# Patient Record
Sex: FEMALE | Race: BLACK OR AFRICAN AMERICAN | Employment: UNEMPLOYED | URBAN - METROPOLITAN AREA
[De-identification: names, ages, dates, MRNs, and addresses within clinical notes are randomized per-mention and may not be internally consistent; named-entity substitution may affect disease eponyms.]

---

## 2017-01-13 ENCOUNTER — OFFICE VISIT (OUTPATIENT)
Dept: ENDOCRINOLOGY | Facility: CLINIC | Age: 52
End: 2017-01-13

## 2017-01-13 VITALS
HEART RATE: 85 BPM | WEIGHT: 158.6 LBS | SYSTOLIC BLOOD PRESSURE: 117 MMHG | DIASTOLIC BLOOD PRESSURE: 72 MMHG | HEIGHT: 69 IN | BODY MASS INDEX: 23.49 KG/M2

## 2017-01-13 DIAGNOSIS — E11.40 TYPE 2 DIABETES MELLITUS WITH DIABETIC NEUROPATHY, WITHOUT LONG-TERM CURRENT USE OF INSULIN (H): Chronic | ICD-10-CM

## 2017-01-13 DIAGNOSIS — E04.9 GOITER: Primary | ICD-10-CM

## 2017-01-13 DIAGNOSIS — E04.9 GOITER: ICD-10-CM

## 2017-01-13 LAB
HBA1C MFR BLD: 9.3 % (ref 4.3–6)
T4 FREE SERPL-MCNC: 1.06 NG/DL (ref 0.76–1.46)
TSH SERPL DL<=0.05 MIU/L-ACNC: 0.32 MU/L (ref 0.4–4)

## 2017-01-13 ASSESSMENT — PAIN SCALES - GENERAL: PAINLEVEL: NO PAIN (0)
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
## 2017-01-13 NOTE — NURSING NOTE
Chief Complaint   Patient presents with     RECHECK     return diabetes      Esperanza Bañuelos CMA
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
## 2017-01-16 LAB — THYROPEROXIDASE AB SERPL-ACNC: 11 IU/ML

## 2017-02-03 ENCOUNTER — OFFICE VISIT (OUTPATIENT)
Dept: ENDOCRINOLOGY | Facility: CLINIC | Age: 52
End: 2017-02-03

## 2017-02-03 VITALS
WEIGHT: 157.7 LBS | DIASTOLIC BLOOD PRESSURE: 80 MMHG | HEIGHT: 69 IN | HEART RATE: 115 BPM | BODY MASS INDEX: 23.36 KG/M2 | SYSTOLIC BLOOD PRESSURE: 117 MMHG

## 2017-02-03 DIAGNOSIS — E04.9 GOITER: Primary | ICD-10-CM

## 2017-02-03 ASSESSMENT — ENCOUNTER SYMPTOMS
LOSS OF CONSCIOUSNESS: 0
HYPERTENSION: 0
SYNCOPE: 0
HOARSE VOICE: 0
HEADACHES: 1
FATIGUE: 1
WEIGHT GAIN: 1
TINGLING: 0
LEG SWELLING: 1
PARALYSIS: 0
HYPOTENSION: 0
INCREASED ENERGY: 1
SINUS CONGESTION: 0
EXERCISE INTOLERANCE: 0
CHILLS: 0
SINUS PAIN: 0
TREMORS: 0
HALLUCINATIONS: 0
ARTHRALGIAS: 1
TROUBLE SWALLOWING: 1
NIGHT SWEATS: 1
DIZZINESS: 0
WEAKNESS: 0
POLYPHAGIA: 0
MYALGIAS: 1
DECREASED APPETITE: 0
CLAUDICATION: 0
TASTE DISTURBANCE: 0
WEIGHT LOSS: 0
MUSCLE CRAMPS: 1
SEIZURES: 0
FEVER: 0
JOINT SWELLING: 0
MEMORY LOSS: 0
SLEEP DISTURBANCES DUE TO BREATHING: 0
ALTERED TEMPERATURE REGULATION: 1
NECK MASS: 0
MUSCLE WEAKNESS: 1
NUMBNESS: 1
SPEECH CHANGE: 0
DISTURBANCES IN COORDINATION: 0
POOR WOUND HEALING: 1
NECK PAIN: 1
NAIL CHANGES: 0
SORE THROAT: 0
TACHYCARDIA: 1
SKIN CHANGES: 0
BACK PAIN: 1
PALPITATIONS: 0
POLYDIPSIA: 1
LIGHT-HEADEDNESS: 1
SMELL DISTURBANCE: 0
STIFFNESS: 1
ORTHOPNEA: 1
LEG PAIN: 1

## 2017-02-03 ASSESSMENT — PAIN SCALES - GENERAL: PAINLEVEL: NO PAIN (0)

## 2017-02-03 NOTE — PROGRESS NOTES
Endocrinology Note         Alessandra is a 51 year old female presents today for thyroid goiter.    HPI  Alessandra Pak is a pleasant 51 years old female with hx of secondary diabetes after Whipple operative for pancreatitis and is insulin dependence who is here for thyroid goiter    She usually sees ADELINE Jeffries for secondary diabetes. Last seen by Milady in January 2017.    Alessandra stated that she was noted to have enlarged thyroid for long time after her doctor examined her neck and noted for goiter. She did have US thyroid done in 6/2010 which showed an enlarged gland. The right lobe measures 6.9 x 2.6 x 1.6 cm. The left lobe measures 7.1 x 2.3 x 1.6 cm.  Thyroid parenchyma is homogeneous in echotexture. No distinct nodules. She has not had any serial ultrasound.     Over the past few weeks, she noticed more enlargement of the neck. She also has trouble swallowing on food. She denied choking or difficulty breathing when laying down. Reviewed lab, she did have mild suppressed TSH ranged 0.24-0.82 with normal FT4. TSI in 2010 was negative. TPO in 2-17 was negative. She denied radiation exposure to head and neck. She has not been on Amiodarone. She denied recent exposure to iodine. She is not currently on Biotin or supplement containing iodine or thyroid hormone. Brother has hx of hypothyroidism.    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     leep     Human papillomavirus in conditions classified elsewhere and of unspecified site 2/2012     + HPV 33     ASCUS with positive high risk HPV 8/2013     + HPV 33, [place] - GAVIN I, ECC- atypia     Depressive disorder      Gastro-oesophageal reflux disease      Type 2 diabetes mellitus without complications (H)      Hypertension       Uncomplicated asthma      Cardiomyopathy (H)      non ischemic cardiomyopathy with EF 15     Tobacco abuse 5/18/2013     Systolic CHF (H) 3/12/2015     Cervical high risk HPV (human papillomavirus) test positive 7/8/15, 7/25/16     NIL pap/+ HR HPV (not 16 or 18).        Allergies  Allergies   Allergen Reactions     No Known Drug Allergies      Medications  Current Outpatient Prescriptions   Medication Sig Dispense Refill     lidocaine (LIDODERM) 5 % Patch APPLY UP TO 3 PATCHES TO PAINFUL AREA AT ONCE FOR UP TO 12 HOURS WITHIN A 24 HOUR PERIOD.  REMOVE AFTER 12 HOURS 30 patch 1     SM NICOTINE 21 MG/24HR 24 hr patch REMOVE OLD PATCH AND APPLY ONE NEW PATCH TO SKIN EVERY 24 HOURS 28 patch 1     furosemide (LASIX) 20 MG tablet TAKE ONE TABLET BY MOUTH EVERY DAY 90 tablet 1     insulin aspart (NOVOLOG VIAL) 100 UNITS/ML VIAL Use as directed in insulin pump. Patient using up to 60 units per day 3 Month 3     blood glucose monitoring (HOLLIE CONTOUR NEXT) test strip Use to test blood sugar 10 times daily or as directed.  Ok to substitute alternative if insurance prefers. 300 each 12     blood glucose monitoring (HOLLIE MICROLET) lancets Use to test blood sugar 10 times daily or as directed.  Ok to substitute alternative if insurance prefers. 1 Box prn     acetone, Urine, test STRP 1 strip by In Vitro route as needed 25 each 1     pregabalin (LYRICA) 75 MG capsule Take 1 capsule (75 mg) by mouth 3 times daily 90 capsule 5     lisinopril (PRINIVIL,ZESTRIL) 10 MG tablet Take 1 tablet (10 mg) by mouth daily 90 tablet 1     amitriptyline (ELAVIL) 50 MG tablet Take 1 tablet (50 mg) by mouth At Bedtime 90 tablet 1     Ipratropium-Albuterol (COMBIVENT RESPIMAT)  MCG/ACT inhaler Inhale 1 puff into the lungs 4 times daily Not to exceed 6 doses per day. 1 Inhaler 6     ranitidine HCl 300 MG CAPS Take 300 mg by mouth daily 90 capsule 3     multivitamin, therapeutic with minerals (THERA-VIT-M) TABS Take 1 tablet by mouth daily 30  each 11     metoprolol (LOPRESSOR) 50 MG tablet Take 25 mg by mouth daily       study - lidocaine, LMX, (IDS# 4490) 4 % CREA Apply topically every 8 hours as needed for moderate pain 45 g 11     albuterol (ALBUTEROL) 108 (90 BASE) MCG/ACT inhaler Inhale 2 puffs into the lungs every 4 hours as needed for shortness of breath / dyspnea 1 Inhaler 5     polyethylene glycol (MIRALAX/GLYCOLAX) packet Take 17 g by mouth daily 28 packet 3     insulin pen needle (B-D U/F) 31G X 5 MM Use 3 time(s) a day. 3 each 3     blood glucose monitoring (FREESTYLE) lancets 1 each See Admin Instructions test 3-4 times per day 3 Box 3     glucose 4 G CHEW Take 3-4 tablets to treat low blood sugar. 25 tablet 11     methadone (DOLPHINE-INTENSOL) 10 mg/mL CONC Take 7 mLs by mouth daily.       MIRENA 20 MCG/24HR IU IUD placed today 1 0     ASPIRIN 81 MG OR TABS 1 tab po QD (Once per day) 100 3     glucagon (GLUCAGON EMERGENCY) 1 MG injection Inject 1 mg into the muscle once for 1 dose 1 mg 1     Family History  family history includes Anxiety Disorder in her maternal aunt; Arthritis in her mother; Bipolar Disorder in her brother; DIABETES in her father and mother; Depression in her maternal aunt; GASTROINTESTINAL DISEASE in her father; Hypertension in her father and mother; Lipids in her mother; Obesity in an other family member; Respiratory in an other family member; Thyroid Disease in her brother.  Social History  Social History   Substance Use Topics     Smoking status: Former Smoker -- 0.30 packs/day for 15 years     Types: Cigarettes     Smokeless tobacco: Former User     Quit date: 10/29/2014      Comment: Started smoking in 89/ smokes about 3 per day     Alcohol Use: No       ROS  Constitutional: no weight change, good energy  Eyes: no vision change, diplopia or red eyes   Neck: + difficulty swallowing, no choking, no neck pain, + neck swelling  Cardiovascular: no chest pain, palpitations  Respiratory: no dyspnea, cough, shortness of  "breath   GI: no nausea, vomiting, diarrhea or constipation, no abdominal pain   : no change in urine, no dysuria or hematuria  Musculoskeletal: no joint or muscle pain or swelling   Integumentary: no concerning lesions   Neuro: no loss of strength or sensation, no numbness or tingling, no tremor, no dizziness, no headache   Endo: no polyuria or polydipsia, no temperature intolerance   Heme/Lymph: no concerning bumps, no bleeding problems   Allergy: no environmental allergies   Psych: no depression or anxiety, wake up often at night    Physical Exam  /80 mmHg  Pulse 115  Ht 1.753 m (5' 9\")  Wt 71.532 kg (157 lb 11.2 oz)  BMI 23.28 kg/m2  Body mass index is 23.28 kg/(m^2).  Constitutional: no distress, comfortable, pleasant   Eyes: anicteric, normal extra-ocular movements, no lid lag or retraction  Neck: +visible and palpable enlarged thyroid bilaterally, no discrete nodule  Cardiovascular: regular rate and rhythm, normal S1 and S2, no murmurs  Respiratory: clear to auscultation, no wheezes or crackles, normal breath sounds   Gastrointestinal:  nontender, no hepatosplenomegaly, no masses   Musculoskeletal: no edema   Skin:  no jaundice   Neurological: cranial nerves intact, 2+ reflexes at patella, normal gait, no tremor on outstretched hands bilaterally  Psychological: appropriate mood   Lymphatic: no cervical  lymphadenopathy.      RESULTS  US thyroid 6/4/2010  FINDINGS: Thyroid ultrasound demonstrates an enlarged gland. The right lobe measures 6.9 x 2.6 x 1.6 cm. The left lobe measures 7.1 x 2.3 x  1.6 cm. The isthmus is 0.4 cm in thickness. Thyroid parenchyma is homogeneous in echotexture. No distinct nodules.         ASSESSMENT:    Alessandra Pak is a pleasant 51 years old female with hx of secondary diabetes after Whipple operative for pancreatitis and is insulin dependence who is here for thyroid goiter    1) generalized thyroid goiter: visible and palpable on exam. No discrete nodule felt. She " reported increased in size of the goiter and trouble swallowing in the past few weeks.  Previous US thyroid in 2010 showed enlarged thyroid with homogeneous echotexture. No discrete nodule noted in the previous US.  I will schedule her to get US thyroid today.      2) mild suppressed TSH -- subclinical hyperthyroidism: she has not been on medication interfering with thyroid function. She is clinically euthyroid on exam. TPO and TSI were negative.  I will see how US thyroid looks. She may need thyroid uptake and scan.      3) secondary diabetes: I did not address today. follow up with with Milady Nuno    PLAN:   - schedule US thyroid  - will contact patient with result    Gisselle Maya MD     Division of Diabetes and Endocrinology  Department of Medicine  [phone]

## 2017-02-03 NOTE — NURSING NOTE
"Chief Complaint   Patient presents with     Consult     F/U GOITER       Initial /80 mmHg  Pulse 115  Ht 1.753 m (5' 9\")  Wt 71.532 kg (157 lb 11.2 oz)  BMI 23.28 kg/m2 Estimated body mass index is 23.28 kg/(m^2) as calculated from the following:    Height as of this encounter: 1.753 m (5' 9\").    Weight as of this encounter: 71.532 kg (157 lb 11.2 oz).  Medication Reconciliation: complete       Yana Bryan, GLORY     "

## 2017-02-03 NOTE — Clinical Note
2/3/2017       RE: Alessandra Pak  [address]  [facility] [address]     Dear Colleague,    Thank you for referring your patient, Alessandra Pak, to the [facility] ENDOCRINOLOGY at [facility]. Please see a copy of my visit note below.         Endocrinology Note         Alessandra is a 51 year old female presents today for thyroid goiter.    HPI  Alessandra Pak is a pleasant 51 years old female with hx of secondary diabetes after Whipple operative for pancreatitis and is insulin dependence who is here for thyroid goiter    She usually sees ADELINE Jeffries for secondary diabetes. Last seen by Milady in January 2017.    Alessandra stated that she was noted to have enlarged thyroid for long time after her doctor examined her neck and noted for goiter. She did have US thyroid done in 6/2010 which showed an enlarged gland. The right lobe measures 6.9 x 2.6 x 1.6 cm. The left lobe measures 7.1 x 2.3 x 1.6 cm.  Thyroid parenchyma is homogeneous in echotexture. No distinct nodules. She has not had any serial ultrasound.     Over the past few weeks, she noticed more enlargement of the neck. She also has trouble swallowing on food. She denied choking or difficulty breathing when laying down. Reviewed lab, she did have mild suppressed TSH ranged 0.24-0.82 with normal FT4. TSI in 2010 was negative. TPO in 2-17 was negative. She denied radiation exposure to head and neck. She has not been on Amiodarone. She denied recent exposure to iodine. She is not currently on Biotin or supplement containing iodine or thyroid hormone. Brother has hx of hypothyroidism.    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     leep     Human papillomavirus in conditions  classified elsewhere and of unspecified site 2/2012     + HPV 33     ASCUS with positive high risk HPV 8/2013     + HPV 33, [place] - GAVIN I, ECC- atypia     Depressive disorder      Gastro-oesophageal reflux disease      Type 2 diabetes mellitus without complications (H)      Hypertension      Uncomplicated asthma      Cardiomyopathy (H)      non ischemic cardiomyopathy with EF 15     Tobacco abuse 5/18/2013     Systolic CHF (H) 3/12/2015     Cervical high risk HPV (human papillomavirus) test positive 7/8/15, 7/25/16     NIL pap/+ HR HPV (not 16 or 18).        Allergies  Allergies   Allergen Reactions     No Known Drug Allergies      Medications  Current Outpatient Prescriptions   Medication Sig Dispense Refill     lidocaine (LIDODERM) 5 % Patch APPLY UP TO 3 PATCHES TO PAINFUL AREA AT ONCE FOR UP TO 12 HOURS WITHIN A 24 HOUR PERIOD.  REMOVE AFTER 12 HOURS 30 patch 1     SM NICOTINE 21 MG/24HR 24 hr patch REMOVE OLD PATCH AND APPLY ONE NEW PATCH TO SKIN EVERY 24 HOURS 28 patch 1     furosemide (LASIX) 20 MG tablet TAKE ONE TABLET BY MOUTH EVERY DAY 90 tablet 1     insulin aspart (NOVOLOG VIAL) 100 UNITS/ML VIAL Use as directed in insulin pump. Patient using up to 60 units per day 3 Month 3     blood glucose monitoring (HOLLIE CONTOUR NEXT) test strip Use to test blood sugar 10 times daily or as directed.  Ok to substitute alternative if insurance prefers. 300 each 12     blood glucose monitoring (HOLLIE MICROLET) lancets Use to test blood sugar 10 times daily or as directed.  Ok to substitute alternative if insurance prefers. 1 Box prn     acetone, Urine, test STRP 1 strip by In Vitro route as needed 25 each 1     pregabalin (LYRICA) 75 MG capsule Take 1 capsule (75 mg) by mouth 3 times daily 90 capsule 5     lisinopril (PRINIVIL,ZESTRIL) 10 MG tablet Take 1 tablet (10 mg) by mouth daily 90 tablet 1     amitriptyline (ELAVIL) 50 MG tablet Take 1 tablet (50 mg) by mouth At Bedtime 90 tablet 1     Ipratropium-Albuterol  (COMBIVENT RESPIMAT)  MCG/ACT inhaler Inhale 1 puff into the lungs 4 times daily Not to exceed 6 doses per day. 1 Inhaler 6     ranitidine HCl 300 MG CAPS Take 300 mg by mouth daily 90 capsule 3     multivitamin, therapeutic with minerals (THERA-VIT-M) TABS Take 1 tablet by mouth daily 30 each 11     metoprolol (LOPRESSOR) 50 MG tablet Take 25 mg by mouth daily       study - lidocaine, LMX, (IDS# 4490) 4 % CREA Apply topically every 8 hours as needed for moderate pain 45 g 11     albuterol (ALBUTEROL) 108 (90 BASE) MCG/ACT inhaler Inhale 2 puffs into the lungs every 4 hours as needed for shortness of breath / dyspnea 1 Inhaler 5     polyethylene glycol (MIRALAX/GLYCOLAX) packet Take 17 g by mouth daily 28 packet 3     insulin pen needle (B-D U/F) 31G X 5 MM Use 3 time(s) a day. 3 each 3     blood glucose monitoring (FREESTYLE) lancets 1 each See Admin Instructions test 3-4 times per day 3 Box 3     glucose 4 G CHEW Take 3-4 tablets to treat low blood sugar. 25 tablet 11     methadone (DOLPHINE-INTENSOL) 10 mg/mL CONC Take 7 mLs by mouth daily.       MIRENA 20 MCG/24HR IU IUD placed today 1 0     ASPIRIN 81 MG OR TABS 1 tab po QD (Once per day) 100 3     glucagon (GLUCAGON EMERGENCY) 1 MG injection Inject 1 mg into the muscle once for 1 dose 1 mg 1     Family History  family history includes Anxiety Disorder in her maternal aunt; Arthritis in her mother; Bipolar Disorder in her brother; DIABETES in her father and mother; Depression in her maternal aunt; GASTROINTESTINAL DISEASE in her father; Hypertension in her father and mother; Lipids in her mother; Obesity in an other family member; Respiratory in an other family member; Thyroid Disease in her brother.  Social History  Social History   Substance Use Topics     Smoking status: Former Smoker -- 0.30 packs/day for 15 years     Types: Cigarettes     Smokeless tobacco: Former User     Quit date: 10/29/2014      Comment: Started smoking in 89/ smokes about 3 per  "day     Alcohol Use: No       ROS  Constitutional: no weight change, good energy  Eyes: no vision change, diplopia or red eyes   Neck: + difficulty swallowing, no choking, no neck pain, + neck swelling  Cardiovascular: no chest pain, palpitations  Respiratory: no dyspnea, cough, shortness of breath   GI: no nausea, vomiting, diarrhea or constipation, no abdominal pain   : no change in urine, no dysuria or hematuria  Musculoskeletal: no joint or muscle pain or swelling   Integumentary: no concerning lesions   Neuro: no loss of strength or sensation, no numbness or tingling, no tremor, no dizziness, no headache   Endo: no polyuria or polydipsia, no temperature intolerance   Heme/Lymph: no concerning bumps, no bleeding problems   Allergy: no environmental allergies   Psych: no depression or anxiety, wake up often at night    Physical Exam  /80 mmHg  Pulse 115  Ht 1.753 m (5' 9\")  Wt 71.532 kg (157 lb 11.2 oz)  BMI 23.28 kg/m2  Body mass index is 23.28 kg/(m^2).  Constitutional: no distress, comfortable, pleasant   Eyes: anicteric, normal extra-ocular movements, no lid lag or retraction  Neck: +visible and palpable enlarged thyroid bilaterally, no discrete nodule  Cardiovascular: regular rate and rhythm, normal S1 and S2, no murmurs  Respiratory: clear to auscultation, no wheezes or crackles, normal breath sounds   Gastrointestinal:  nontender, no hepatosplenomegaly, no masses   Musculoskeletal: no edema   Skin:  no jaundice   Neurological: cranial nerves intact, 2+ reflexes at patella, normal gait, no tremor on outstretched hands bilaterally  Psychological: appropriate mood   Lymphatic: no cervical  lymphadenopathy.      RESULTS  US thyroid 6/4/2010  FINDINGS: Thyroid ultrasound demonstrates an enlarged gland. The right lobe measures 6.9 x 2.6 x 1.6 cm. The left lobe measures 7.1 x 2.3 x  1.6 cm. The isthmus is 0.4 cm in thickness. Thyroid parenchyma is homogeneous in echotexture. No distinct " nodules.         ASSESSMENT:    Alessandra Pak is a pleasant 51 years old female with hx of secondary diabetes after Whipple operative for pancreatitis and is insulin dependence who is here for thyroid goiter    1) generalized thyroid goiter: visible and palpable on exam. No discrete nodule felt. She reported increased in size of the goiter and trouble swallowing in the past few weeks.  Previous US thyroid in 2010 showed enlarged thyroid with homogeneous echotexture. No discrete nodule noted in the previous US.  I will schedule her to get US thyroid today.      2) mild suppressed TSH -- subclinical hyperthyroidism: she has not been on medication interfering with thyroid function. She is clinically euthyroid on exam. TPO and TSI were negative.  I will see how US thyroid looks. She may need thyroid uptake and scan.      3) secondary diabetes: I did not address today. follow up with with Milady Nuno    PLAN:   - schedule US thyroid  - will contact patient with result    Gisselle Maya MD     Division of Diabetes and Endocrinology  Department of Medicine  [phone]

## 2017-02-03 NOTE — NURSING NOTE
Chief Complaint   Patient presents with     RECHECK     F/U GOOLINDA Rogers, CMA  Endocrinology & Diabetes 3G

## 2017-02-09 ENCOUNTER — TELEPHONE (OUTPATIENT)
Dept: ENDOCRINOLOGY | Facility: CLINIC | Age: 52
End: 2017-02-09

## 2017-02-09 NOTE — TELEPHONE ENCOUNTER
Reviewed US thyroid 2/8/2017    Findings:     Thyroid parenchyma: Homogeneous     The right lobe of the thyroid measures: 2.9 x 2.2 x 6.5 cm, previously 2.6 x 1.6 x 6.9 cm       The thyroid isthmus measures: 0.5 cm, previously 0.4 cm       The left lobe of the thyroid measures: 2.5 x 1.8 x 6.5 cm, previously 2.3 x 1.6 x 7.1 cm       Right lobe:  Nodule 1:  Nodule measurement: 0.3 x 0.1 x 0.3 cm  Echogenicity: Hypoechoic  Consistency: cystic  Calcifications: no  Hypervascular: no  Interval growth (>20%): New     Isthmus:    Nodule 1:  Nodule measurement: 0.4 x 0.3 x 0.5 cm  Echogenicity: Hypoechoic  Consistency: mixed  Calcifications: no  Hypervascular: no  Interval growth (>20%): New     Left Lobe:    Nodule 1:  Nodule measurement: 0.4 x 0.2 x 0.4 cm  Echogenicity: Hypoechoic  Consistency: cystic  Calcifications: no  Hypervascular: no  Interval growth (>20%): New                                                                       Impression:  1.  Tiny cystic nodules in the thyroid, as described above, were not seen on 6/4/2010. None of these nodules meet criteria for biopsy.  2.  No significant change in thyroid size.    Left message for the patient.  No intervention required.    Gisselle Maya MD    Division of Diabetes and Endocrinology  Department of Medicine  [phone]

## 2017-02-20 DIAGNOSIS — F41.9 ANXIETY: ICD-10-CM

## 2017-02-20 RX ORDER — AMITRIPTYLINE HYDROCHLORIDE 50 MG/1
TABLET ORAL
Qty: 90 TABLET | Refills: 1 | Status: CANCELLED | OUTPATIENT
Start: 2017-02-20

## 2017-02-20 NOTE — TELEPHONE ENCOUNTER
amitriptyline (ELAVIL) 50 MG tablet     Last Written Prescription Date: 725/16  Last Fill Quantity: 90, # refills: 1  Last Office Visit with [facility] primary care provider:  12/27/16   Next 5 appointments (look out 90 days)     Apr 03, 2017 11:20 AM CDT   [facility] with Brenden [facility]z [facility]   [facility]    [address] [place] [id number]   [phone]                   Last PHQ-9 score on record=   PHQ-9 SCORE 11/2/2016   Total Score 4

## 2017-02-21 ENCOUNTER — MYC REFILL (OUTPATIENT)
Dept: FAMILY MEDICINE | Facility: CLINIC | Age: 52
End: 2017-02-21

## 2017-02-21 DIAGNOSIS — F41.9 ANXIETY: ICD-10-CM

## 2017-02-21 RX ORDER — AMITRIPTYLINE HYDROCHLORIDE 50 MG/1
50 TABLET ORAL AT BEDTIME
Qty: 90 TABLET | Refills: 1 | Status: CANCELLED | OUTPATIENT
Start: 2017-02-21

## 2017-02-21 NOTE — TELEPHONE ENCOUNTER
Message from [employer]:  Original authorizing provider: Brionna Dc MD    Alessandra ROSAS Mellisa would like a refill of the following medications:  amitriptyline (ELAVIL) 50 MG tablet [Brionna Dc MD]    Preferred pharmacy: [facility] [place] [address]    Comment:

## 2017-02-22 ENCOUNTER — MYC MEDICAL ADVICE (OUTPATIENT)
Dept: FAMILY MEDICINE | Facility: CLINIC | Age: 52
End: 2017-02-22

## 2017-02-22 ENCOUNTER — TELEPHONE (OUTPATIENT)
Dept: FAMILY MEDICINE | Facility: CLINIC | Age: 52
End: 2017-02-22

## 2017-02-22 DIAGNOSIS — I42.8 NONISCHEMIC CARDIOMYOPATHY (H): ICD-10-CM

## 2017-02-22 DIAGNOSIS — F41.9 ANXIETY: ICD-10-CM

## 2017-02-22 RX ORDER — AMITRIPTYLINE HYDROCHLORIDE 50 MG/1
50 TABLET ORAL AT BEDTIME
Qty: 90 TABLET | Refills: 1 | Status: SHIPPED | OUTPATIENT
Start: 2017-02-22 | End: 2017-05-30

## 2017-02-22 RX ORDER — LISINOPRIL 10 MG/1
10 TABLET ORAL DAILY
Qty: 90 TABLET | Refills: 1 | Status: SHIPPED | OUTPATIENT
Start: 2017-02-22 | End: 2018-02-05

## 2017-02-28 ENCOUNTER — ALLIED HEALTH/NURSE VISIT (OUTPATIENT)
Dept: CARDIOLOGY | Facility: CLINIC | Age: 52
End: 2017-02-28
Attending: INTERNAL MEDICINE
Payer: COMMERCIAL

## 2017-02-28 DIAGNOSIS — I42.8 NONISCHEMIC CARDIOMYOPATHY (H): Primary | Chronic | ICD-10-CM

## 2017-02-28 PROCEDURE — 93296 REM INTERROG EVL PM/IDS: CPT | Mod: ZF

## 2017-02-28 NOTE — PROGRESS NOTES
Remote ICD transmission received and reviewed.  Device transmission sent per MD orders.  Patient has a [employer] single lead ICD.  Normal ICD function.  2 NSVT episodes recorded - 4 beats, 173-187 bpm.  Presenting EGM = VS @ 94 bpm.   = 0%.  Estimated battery longevity to MARGI = 11 years.  Patient notified of interrogation results via voicemail.  Plan for patient to send a remote transmission in 3 months as scheduled.    Remote ICD transmission

## 2017-02-28 NOTE — MR AVS SNAPSHOT
After Visit Summary   2/28/2017    Alessandra Pak    MRN: [id number]           Patient Information     Date Of Birth          1965        Visit Information        Provider Department      2/28/2017 6:00 AM  ICD [facility]        Today's Diagnoses     Nonischemic cardiomyopathy (H)    -  1       Follow-ups after your visit        Your next 10 appointments already scheduled     Apr 03, 2017 11:00 AM CDT   CT CHEST LOW DOSE NON CONTRAST with MGCT1   [facility])    [address] [place] [id number] [phone]           Please bring any scans or X-rays taken at other hospitals, if similar tests were done. Also bring a list of your medicines, including vitamins, minerals and over-the-counter drugs. It is safest to leave personal items at home.  Be sure to tell your doctor:   If you have any allergies.   If there s any chance you are pregnant.   If you are breastfeeding.   If you have any special needs.  You do not need to do anything special to prepare.  Please wear loose clothing, such as a sweat suit or jogging clothes. Avoid snaps, zippers and other metal. We may ask you to undress and put on a hospital gown.            Apr 03, 2017 11:20 AM CDT   Return Visit with Brenden Tamez MD   [facility])    [id number] [place] [id number]   [phone]            Apr 18, 2017 12:00 PM CDT   (Arrive by 11:45 AM)   RETURN DIABETES with Toya Nuno PA-C   [facility] ([facility])    [address] [place] [id number] [phone]              Who to contact     If you have questions or need follow up information about today's clinic visit or your schedule please contact [facility] directly at [phone].  Normal or non-critical lab and imaging results will be communicated to  you by MyChart, letter or phone within 4 business days after the clinic has received the results. If you do not hear from us within 7 days, please contact the clinic through MediWoundt or phone. If you have a critical or abnormal lab result, we will notify you by phone as soon as possible.  Submit refill requests through Adept Cloud or call your pharmacy and they will forward the refill request to us. Please allow 3 business days for your refill to be completed.          Additional Information About Your Visit        A V.E.T.S.c.a.r.e.harNewgistics Information     Ade[employer] Cloud gives you secure access to your electronic health record. If you see a primary care provider, you can also send messages to your care team and make appointments. If you have questions, please call your primary care clinic.  If you do not have a primary care provider, please call [phone] and they will assist you.        Care EveryWhere ID     This is your Care EveryWhere ID. This could be used by other organizations to access your [place] medical records  [id number]         Blood Pressure from Last 3 Encounters:   02/03/17 117/80   01/13/17 117/72   11/02/16 123/76    Weight from Last 3 Encounters:   02/03/17 71.5 kg (157 lb 11.2 oz)   01/13/17 71.9 kg (158 lb 9.6 oz)   11/02/16 69.4 kg (153 lb)              We Performed the Following     INTERROGATION DEVICE EVAL REMOTE, PACER/ICD        Primary Care Provider Office Phone # Fax #    Brionna Coby Dc [phone]       [facility] [place] [address]  [facility] [address]        Thank you!     Thank you for choosing [facility]  for your care. Our goal is always to provide you with excellent care. Hearing back from our patients is one way we can continue to improve our services. Please take a few minutes to complete the written survey that you may receive in the mail after your visit with us. Thank you!             Your Updated Medication List - Protect others around  you: Learn how to safely use, store and throw away your medicines at www.disposemymeds.org.          This list is accurate as of: 2/28/17 10:59 AM.  Always use your most recent med list.                   Brand Name Dispense Instructions for use    acetone (Urine) test Strp     25 each    1 strip by In Vitro route as needed       albuterol 108 (90 BASE) MCG/ACT Inhaler    albuterol    1 Inhaler    Inhale 2 puffs into the lungs every 4 hours as needed for shortness of breath / dyspnea       amitriptyline 50 MG tablet    ELAVIL    90 tablet    Take 1 tablet (50 mg) by mouth At Bedtime       aspirin 81 MG tablet     100    1 tab po QD (Once per day)       * blood glucose monitoring lancets     3 Box    1 each See Admin Instructions test 3-4 times per day       * blood glucose monitoring lancets     1 Box    Use to test blood sugar 10 times daily or as directed.  Ok to substitute alternative if insurance prefers.       blood glucose monitoring test strip    HOLLIE CONTOUR NEXT    300 each    Use to test blood sugar 10 times daily or as directed.  Ok to substitute alternative if insurance prefers.       furosemide 20 MG tablet    LASIX    90 tablet    TAKE ONE TABLET BY MOUTH EVERY DAY       glucagon 1 MG kit    GLUCAGON EMERGENCY    1 mg    Inject 1 mg into the muscle once for 1 dose       glucose 4 G Chew chewable tablet     25 tablet    Take 3-4 tablets to treat low blood sugar.       insulin pen needle 31G X 5 MM    B-D U/F    3 each    Use 3 time(s) a day.       Ipratropium-Albuterol  MCG/ACT inhaler    COMBIVENT RESPIMAT    1 Inhaler    Inhale 1 puff into the lungs 4 times daily Not to exceed 6 doses per day.       lisinopril 10 MG tablet    PRINIVIL/ZESTRIL    90 tablet    Take 1 tablet (10 mg) by mouth daily       methadone 10 mg/mL Conc (HIGH CONC) solution    DOLPHINE-INTENSOL     Take 7 mLs by mouth daily.       metoprolol 50 MG tablet    LOPRESSOR     Take 25 mg by mouth daily       MIRENA (52 MG) 20  MCG/24HR IUD   Generic drug:  levonorgestrel     1    placed today       multivitamin, therapeutic with minerals Tabs tablet     30 each    Take 1 tablet by mouth daily       NovoLOG VIAL 100 UNITS/ML injection   Generic drug:  insulin aspart     3 Month    Use as directed in insulin pump. Patient using up to 60 units per day       polyethylene glycol Packet    MIRALAX/GLYCOLAX    28 packet    Take 17 g by mouth daily       pregabalin 75 MG capsule    LYRICA    90 capsule    Take 1 capsule (75 mg) by mouth 3 times daily       ranitidine HCl 300 MG Caps     90 capsule    Take 300 mg by mouth daily       SM NICOTINE 21 MG/24HR 24 hr patch   Generic drug:  nicotine     28 patch    REMOVE OLD PATCH AND APPLY ONE NEW PATCH TO SKIN EVERY 24 HOURS       * study - lidocaine (LMX) 4 % Crea    IDS# 4490    45 g    Apply topically every 8 hours as needed for moderate pain       * lidocaine 5 % Patch    LIDODERM    30 patch    APPLY UP TO 3 PATCHES TO PAINFUL AREA AT ONCE FOR UP TO 12 HOURS WITHIN A 24 HOUR PERIOD.  REMOVE AFTER 12 HOURS       * Notice:  This list has 4 medication(s) that are the same as other medications prescribed for you. Read the directions carefully, and ask your doctor or other care provider to review them with you.

## 2017-03-05 DIAGNOSIS — K21.9 GASTROESOPHAGEAL REFLUX DISEASE WITHOUT ESOPHAGITIS: ICD-10-CM

## 2017-03-05 NOTE — TELEPHONE ENCOUNTER
ranitidine HCl 300 MG CAPS      Last Written Prescription Date: 2/4/16  Last Fill Quantity: 90,  # refills: 3   Last Office Visit with [facility], [facility] or [facility] prescribing provider: 11/2/16                                         Next 5 appointments (look out 90 days)     Apr 03, 2017 11:20 AM CDT   Return Visit with Brenden Tamez MD   [facility] ([facility])    [address] [place] [phone]   [phone]                       David Faarax  [facility]

## 2017-03-17 DIAGNOSIS — E11.40 CONTROLLED TYPE 2 DIABETES WITH NEUROPATHY (H): ICD-10-CM

## 2017-03-17 RX ORDER — PREGABALIN 75 MG/1
75 CAPSULE ORAL 3 TIMES DAILY
Qty: 90 CAPSULE | Refills: 5 | Status: SHIPPED | OUTPATIENT
Start: 2017-03-17 | End: 2017-09-24

## 2017-03-17 NOTE — TELEPHONE ENCOUNTER
lyrica 75mg      Last Written Prescription Date:  9/16/16  Last Fill Quantity: 90,   # refills: 5  Last Office Visit with [facility], [facility] or [facility] prescribing provider: 11/2/16  Future Office visit:    Next 5 appointments (look out 90 days)     Apr 03, 2017 11:20 AM CDT   Return Visit with Brenden Tamez MD   [facility] ([facility])    [address] [place] [id number] [phone]                   Routing refill request to provider for review/approval because:  Drug not on the [facility], [facility] or [facility] refill protocol or controlled substance  Thank you very kindly!  Iva Estevez Nathalia  [facility] [place]

## 2017-03-20 ENCOUNTER — TELEPHONE (OUTPATIENT)
Dept: ENDOCRINOLOGY | Facility: CLINIC | Age: 52
End: 2017-03-20

## 2017-03-20 NOTE — TELEPHONE ENCOUNTER
PA initiated for Helen contour next strips to communicate with  her Medtronic insulin pump. She is testing 10 times  daily  .  PA Approved for both the  strips and Quantity  over ride. Insurance  only allows  200/ 30 days.  300 per 30 days approved.   Patient and pharmacy notified.

## 2017-04-03 ENCOUNTER — OFFICE VISIT (OUTPATIENT)
Dept: PULMONOLOGY | Facility: CLINIC | Age: 52
End: 2017-04-03
Payer: MEDICARE

## 2017-04-03 ENCOUNTER — RADIANT APPOINTMENT (OUTPATIENT)
Dept: CT IMAGING | Facility: CLINIC | Age: 52
End: 2017-04-03
Attending: CLINICAL NURSE SPECIALIST
Payer: MEDICARE

## 2017-04-03 VITALS
WEIGHT: 160.9 LBS | BODY MASS INDEX: 23.76 KG/M2 | DIASTOLIC BLOOD PRESSURE: 87 MMHG | OXYGEN SATURATION: 97 % | SYSTOLIC BLOOD PRESSURE: 149 MMHG | HEART RATE: 119 BPM

## 2017-04-03 DIAGNOSIS — R91.8 LUNG NODULES: ICD-10-CM

## 2017-04-03 DIAGNOSIS — R91.8 PULMONARY NODULES: Primary | ICD-10-CM

## 2017-04-03 PROCEDURE — 71250 CT THORAX DX C-: CPT | Performed by: RADIOLOGY

## 2017-04-03 PROCEDURE — 99214 OFFICE O/P EST MOD 30 MIN: CPT | Performed by: INTERNAL MEDICINE

## 2017-04-03 NOTE — MR AVS SNAPSHOT
After Visit Summary   4/3/2017    Alessandra Pak    MRN: [id number]           Patient Information     Date Of Birth          1965        Visit Information        Provider Department      4/3/2017 11:20 AM Brenden Tamez MD [facility]        Today's Diagnoses     Pulmonary nodules    -  1       Follow-ups after your visit        Your next 10 appointments already scheduled     Apr 18, 2017 12:00 PM CDT   (Arrive by 11:45 AM)   RETURN DIABETES with Toya Nuno PA-C   [facility] ([facility])    [address] [place] [address] [phone]              Future tests that were ordered for you today     Open Future Orders        Priority Expected Expires Ordered    CT Chest w/o Contrast Routine 4/3/2018 4/3/2018 4/3/2017            Who to contact     If you have questions or need follow up information about today's clinic visit or your schedule please contact [facility] directly at [phone].  Normal or non-critical lab and imaging results will be communicated to you by Affinity Chinahart, letter or phone within 4 business days after the clinic has received the results. If you do not hear from us within 7 days, please contact the clinic through [employer]pt or phone. If you have a critical or abnormal lab result, we will notify you by phone as soon as possible.  Submit refill requests through Looxii or call your pharmacy and they will forward the refill request to us. Please allow 3 business days for your refill to be completed.          Additional Information About Your Visit        [employer] Chin[employer]art Information     Lo[employer] gives you secure access to your electronic health record. If you see a primary care provider, you can also send messages to your care team and make appointments. If you have questions, please call your primary care clinic.  If you do not have a primary care provider, please call  [phone] and they will assist you.      Voolgo is an electronic gateway that provides easy, online access to your medical records. With Voolgo, you can request a clinic appointment, read your test results, renew a prescription or communicate with your care team.     To access your existing account, please contact your [facility] or call [phone] for assistance.        Care EveryWhere ID     This is your Care EveryWhere ID. This could be used by other organizations to access your [place] medical records  [id number]        Your Vitals Were     Pulse Pulse Oximetry BMI (Body Mass Index)             119 97% 23.76 kg/m2          Blood Pressure from Last 3 Encounters:   04/03/17 149/87   02/03/17 117/80   01/13/17 117/72    Weight from Last 3 Encounters:   04/03/17 73 kg (160 lb 14.4 oz)   02/03/17 71.5 kg (157 lb 11.2 oz)   01/13/17 71.9 kg (158 lb 9.6 oz)               Primary Care Provider Office Phone # Fax #    Brionna Coby Dc [phone]       [facility] [address]  [place] [address]        Thank you!     Thank you for choosing [facility]  for your care. Our goal is always to provide you with excellent care. Hearing back from our patients is one way we can continue to improve our services. Please take a few minutes to complete the written survey that you may receive in the mail after your visit with us. Thank you!             Your Updated Medication List - Protect others around you: Learn how to safely use, store and throw away your medicines at www.disposemymeds.org.          This list is accurate as of: 4/3/17 12:06 PM.  Always use your most recent med list.                   Brand Name Dispense Instructions for use    acetone (Urine) test Strp     25 each    1 strip by In Vitro route as needed       albuterol 108 (90 BASE) MCG/ACT Inhaler    albuterol    1 Inhaler    Inhale 2 puffs into the  lungs every 4 hours as needed for shortness of breath / dyspnea       amitriptyline 50 MG tablet    ELAVIL    90 tablet    Take 1 tablet (50 mg) by mouth At Bedtime       aspirin 81 MG tablet     100    1 tab po QD (Once per day)       * blood glucose monitoring lancets     3 Box    1 each See Admin Instructions test 3-4 times per day       * blood glucose monitoring lancets     1 Box    Use to test blood sugar 10 times daily or as directed.  Ok to substitute alternative if insurance prefers.       blood glucose monitoring test strip    HOLLIE CONTOUR NEXT    300 each    Use to test blood sugar 10 times daily or as directed.  Ok to substitute alternative if insurance prefers.       furosemide 20 MG tablet    LASIX    90 tablet    TAKE ONE TABLET BY MOUTH EVERY DAY       glucagon 1 MG kit    GLUCAGON EMERGENCY    1 mg    Inject 1 mg into the muscle once for 1 dose       glucose 4 G Chew chewable tablet     25 tablet    Take 3-4 tablets to treat low blood sugar.       insulin pen needle 31G X 5 MM    B-D U/F    3 each    Use 3 time(s) a day.       Ipratropium-Albuterol  MCG/ACT inhaler    COMBIVENT RESPIMAT    1 Inhaler    Inhale 1 puff into the lungs 4 times daily Not to exceed 6 doses per day.       lisinopril 10 MG tablet    PRINIVIL/ZESTRIL    90 tablet    Take 1 tablet (10 mg) by mouth daily       methadone 10 mg/mL Conc (HIGH CONC) solution    DOLPHINE-INTENSOL     Take 7 mLs by mouth daily.       metoprolol 50 MG tablet    LOPRESSOR     Take 25 mg by mouth daily       MIRENA (52 MG) 20 MCG/24HR IUD   Generic drug:  levonorgestrel     1    placed today       multivitamin, therapeutic with minerals Tabs tablet     30 each    Take 1 tablet by mouth daily       NovoLOG VIAL 100 UNITS/ML injection   Generic drug:  insulin aspart     3 Month    Use as directed in insulin pump. Patient using up to 60 units per day       polyethylene glycol Packet    MIRALAX/GLYCOLAX    28 packet    Take 17 g by mouth daily        pregabalin 75 MG capsule    LYRICA    90 capsule    Take 1 capsule (75 mg) by mouth 3 times daily       ranitidine 300 MG tablet    ZANTAC    90 tablet    TAKE ONE TABLET BY MOUTH EVERY DAY       SM NICOTINE 21 MG/24HR 24 hr patch   Generic drug:  nicotine     28 patch    REMOVE OLD PATCH AND APPLY ONE NEW PATCH TO SKIN EVERY 24 HOURS       * study - lidocaine (LMX) 4 % Crea    IDS# 4490    45 g    Apply topically every 8 hours as needed for moderate pain       * lidocaine 5 % Patch    LIDODERM    30 patch    APPLY UP TO 3 PATCHES TO PAINFUL AREA AT ONCE FOR UP TO 12 HOURS WITHIN A 24 HOUR PERIOD.  REMOVE AFTER 12 HOURS       * Notice:  This list has 4 medication(s) that are the same as other medications prescribed for you. Read the directions carefully, and ask your doctor or other care provider to review them with you.

## 2017-04-03 NOTE — PROGRESS NOTES
This office note has been dictated.  # [id number].    jos jones md    I spent more than 25 minutes face to face and greater than 50% of time was for counseling and coordination of care about copd.

## 2017-04-03 NOTE — PROGRESS NOTES
REFERRING PROVIDER:  Dr. Mary Dc.      HISTORY OF PRESENT ILLNESS:  Twin Serrano is a 51-year-old woman with history of COPD who quit smoking almost a year ago.  She smoked 25 pack year.  She has not been hospitalized or visited an emergency department since last clinic visit in 10/2016.  She uses her inhalers as prescribed including Flovent maintenance inhaler and albuterol rescue inhaler, averaging once a week.      I have reviewed and there has been no change in her past medical, surgical, social or family history.      MEDICATIONS:  Reviewed in Epic.      PHYSICAL EXAMINATION:   GENERAL:  Alert, awake, oriented, not in distress.   VITAL SIGNS:  Blood pressure 149/87, pulse ox 97% on room air at rest, heart rate 119.   NECK:  Supple, symmetrical.   PSYCHIATRIC:  Mood and affect are appropriate.   NEUROLOGIC:  Alert, awake, oriented x3.      IMPRESSION AND PLAN:   1.  Chronic obstructive pulmonary disease.  Stable with the current regimen as in HPI which we will continue.   We will repeat CT scan of the chest and PFTs in 1 year when I will see her as a followup appointment.         Kehinde CALDWELL MD             D: 2017 12:30   T: 2017 15:20   MT:       Name:     TWIN SERRANO   MRN:      [id number]        Account:      [id number]   :      1965           Visit Date:   2017      Document: [id number]       cc: Brionna Dc MD

## 2017-04-03 NOTE — NURSING NOTE
"Alessandra Pak's goals for this visit include: Lung Nodule  She requests these members of her care team be copied on today's visit information: yes    PCP: Brionna Calabrese    Referring Provider:  No referring provider defined for this encounter.    Chief Complaint   Patient presents with     Lung Nodule       Initial /87 (BP Location: Left arm, Patient Position: Chair, Cuff Size: Adult Regular)  Pulse 119  Wt 73 kg (160 lb 14.4 oz)  SpO2 97%  BMI 23.76 kg/m2 Estimated body mass index is 23.76 kg/(m^2) as calculated from the following:    Height as of 2/3/17: 1.753 m (5' 9\").    Weight as of this encounter: 73 kg (160 lb 14.4 oz).  Medication Reconciliation: complete    Do you need any medication refills at today's visit? no    "

## 2017-04-18 ENCOUNTER — OFFICE VISIT (OUTPATIENT)
Dept: ENDOCRINOLOGY | Facility: CLINIC | Age: 52
End: 2017-04-18

## 2017-04-18 ENCOUNTER — OFFICE VISIT (OUTPATIENT)
Dept: EDUCATION SERVICES | Facility: CLINIC | Age: 52
End: 2017-04-18

## 2017-04-18 ENCOUNTER — MYC MEDICAL ADVICE (OUTPATIENT)
Dept: ENDOCRINOLOGY | Facility: CLINIC | Age: 52
End: 2017-04-18

## 2017-04-18 VITALS — HEIGHT: 69 IN

## 2017-04-18 DIAGNOSIS — E11.40 TYPE 2 DIABETES MELLITUS WITH DIABETIC NEUROPATHY, WITHOUT LONG-TERM CURRENT USE OF INSULIN (H): Primary | ICD-10-CM

## 2017-04-18 DIAGNOSIS — E10.65 TYPE 1 DIABETES MELLITUS WITH HYPERGLYCEMIA (H): Primary | ICD-10-CM

## 2017-04-18 LAB — HBA1C MFR BLD: 10.2 % (ref 4.3–6)

## 2017-04-18 ASSESSMENT — PAIN SCALES - GENERAL: PAINLEVEL: NO PAIN (0)
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
## 2017-04-18 NOTE — MR AVS SNAPSHOT
After Visit Summary   4/18/2017    Alessandra Pak    MRN: [id number]           Patient Information     Date Of Birth          1965        Visit Information        Provider Department      4/18/2017 12:00 PM Toya Nuno PA-C [facility]        Today's Diagnoses     Type 1 diabetes mellitus with hyperglycemia (H)    -  1       Follow-ups after your visit        Additional Services     PODIATRY/FOOT & ANKLE SURGERY REFERRAL       Your provider has referred you to: PREFERRED PROVIDERS:  Schedule pt to see either Dr. Cervantes or Dr. Lewis. Diabetic patient with nonhealing right foot/heel ulcer.    Please be aware that coverage of these services is subject to the terms and limitations of your health insurance plan.  Call member services at your health plan with any benefit or coverage questions.      Please bring the following to your appointment:  >>   Any x-rays, CTs or MRIs which have been performed.  Contact the facility where they were done to arrange for  prior to your scheduled appointment.    >>   List of current medications   >>   This referral request   >>   Any documents/labs given to you for this referral                  Who to contact     Please call your clinic at [phone] to:    Ask questions about your health    Make or cancel appointments    Discuss your medicines    Learn about your test results    Speak to your doctor   If you have compliments or concerns about an experience at your clinic, or if you wish to file a complaint, please contact [facility] Patient Relations at [phone] or email us at [email or web address]         Additional Information About Your Visit        MyChart Information     [employer] gives you secure access to your electronic health record. If you see a primary care provider, you can also send messages to your care team and make appointments. If you have questions, please call your  "primary care clinic.  If you do not have a primary care provider, please call [phone] and they will assist you.      [employer] is an electronic gateway that provides easy, online access to your medical records. With [employer], you can request a clinic appointment, read your test results, renew a prescription or communicate with your care team.     To access your existing account, please contact your [facility] or call [phone] for assistance.        Care EveryWhere ID     This is your Care EveryWhere ID. This could be used by other organizations to access your [facility] medical records  [id number]        Your Vitals Were     Height                   1.753 m (5' 9\")            Blood Pressure from Last 3 Encounters:   04/03/17 149/87   02/03/17 117/80   01/13/17 117/72    Weight from Last 3 Encounters:   04/03/17 73 kg (160 lb 14.4 oz)   02/03/17 71.5 kg (157 lb 11.2 oz)   01/13/17 71.9 kg (158 lb 9.6 oz)              We Performed the Following     PODIATRY/FOOT & ANKLE SURGERY REFERRAL        Primary Care Provider Office Phone # Fax #    Brionna Coby Dc [phone]       [facility] [address] [facility] [address]        Thank you!     Thank you for choosing [facility]  for your care. Our goal is always to provide you with excellent care. Hearing back from our patients is one way we can continue to improve our services. Please take a few minutes to complete the written survey that you may receive in the mail after your visit with us. Thank you!             Your Updated Medication List - Protect others around you: Learn how to safely use, store and throw away your medicines at www.disposemymeds.org.          This list is accurate as of: 4/18/17  2:47 PM.  Always use your most recent med list.                   Brand Name Dispense Instructions for use    acetone (Urine) test Strp     25 each    1 strip by In Vitro " route as needed       albuterol 108 (90 BASE) MCG/ACT Inhaler    albuterol    1 Inhaler    Inhale 2 puffs into the lungs every 4 hours as needed for shortness of breath / dyspnea       amitriptyline 50 MG tablet    ELAVIL    90 tablet    Take 1 tablet (50 mg) by mouth At Bedtime       aspirin 81 MG tablet     100    1 tab po QD (Once per day)       * blood glucose monitoring lancets     3 Box    1 each See Admin Instructions test 3-4 times per day       * blood glucose monitoring lancets     1 Box    Use to test blood sugar 10 times daily or as directed.  Ok to substitute alternative if insurance prefers.       blood glucose monitoring test strip    HOLLIE CONTOUR NEXT    300 each    Use to test blood sugar 10 times daily or as directed.  Ok to substitute alternative if insurance prefers.       furosemide 20 MG tablet    LASIX    90 tablet    TAKE ONE TABLET BY MOUTH EVERY DAY       glucagon 1 MG kit    GLUCAGON EMERGENCY    1 mg    Inject 1 mg into the muscle once for 1 dose       glucose 4 G Chew chewable tablet     25 tablet    Take 3-4 tablets to treat low blood sugar.       insulin pen needle 31G X 5 MM    B-D U/F    3 each    Use 3 time(s) a day.       Ipratropium-Albuterol  MCG/ACT inhaler    COMBIVENT RESPIMAT    1 Inhaler    Inhale 1 puff into the lungs 4 times daily Not to exceed 6 doses per day.       lisinopril 10 MG tablet    PRINIVIL/ZESTRIL    90 tablet    Take 1 tablet (10 mg) by mouth daily       methadone 10 mg/mL Conc (HIGH CONC) solution    DOLPHINE-INTENSOL     Take 7 mLs by mouth daily.       metoprolol 50 MG tablet    LOPRESSOR     Take 25 mg by mouth daily       MIRENA (52 MG) 20 MCG/24HR IUD   Generic drug:  levonorgestrel     1    placed today       multivitamin, therapeutic with minerals Tabs tablet     30 each    Take 1 tablet by mouth daily       NovoLOG VIAL 100 UNITS/ML injection   Generic drug:  insulin aspart     3 Month    Use as directed in insulin pump. Patient using up to 60  units per day       polyethylene glycol Packet    MIRALAX/GLYCOLAX    28 packet    Take 17 g by mouth daily       pregabalin 75 MG capsule    LYRICA    90 capsule    Take 1 capsule (75 mg) by mouth 3 times daily       ranitidine 300 MG tablet    ZANTAC    90 tablet    TAKE ONE TABLET BY MOUTH EVERY DAY       SM NICOTINE 21 MG/24HR 24 hr patch   Generic drug:  nicotine     28 patch    REMOVE OLD PATCH AND APPLY ONE NEW PATCH TO SKIN EVERY 24 HOURS       * study - lidocaine (LMX) 4 % Crea    IDS# 4490    45 g    Apply topically every 8 hours as needed for moderate pain       * lidocaine 5 % Patch    LIDODERM    30 patch    APPLY UP TO 3 PATCHES TO PAINFUL AREA AT ONCE FOR UP TO 12 HOURS WITHIN A 24 HOUR PERIOD.  REMOVE AFTER 12 HOURS       * Notice:  This list has 4 medication(s) that are the same as other medications prescribed for you. Read the directions carefully, and ask your doctor or other care provider to review them with you.

## 2017-04-18 NOTE — MR AVS SNAPSHOT
After Visit Summary   4/18/2017    Alessandra Pak    MRN: [id number]           Patient Information     Date Of Birth          1965        Visit Information        Provider Department      4/18/2017 12:30 PM Livia Connelly RN [facility]        Today's Diagnoses     Type 2 diabetes mellitus with diabetic neuropathy, without long-term current use of insulin (H)    -  1       Follow-ups after your visit        Your next 10 appointments already scheduled     Apr 25, 2017  1:20 PM CDT   (Arrive by 1:05 PM)   NEW FOOT/ANKLE with Barrington Lewis DPM   [facility] ([facility])    [address] [place] [address]  4th [facility] [id number] [phone]            Jun 02, 2017 11:30 AM CDT   (Arrive by 11:15 AM)   RETURN DIABETES with Toya Nuno PA-C   [facility] ([facility])    [address] [place] [address]  3rd [facility] [address] [phone]              Who to contact     Please call your clinic at [phone] to:    Ask questions about your health    Make or cancel appointments    Discuss your medicines    Learn about your test results    Speak to your doctor   If you have compliments or concerns about an experience at your clinic, or if you wish to file a complaint, please contact [facility] Patient Relations at [phone] or email us at [email or web address]         Additional Information About Your Visit        MyChart Information     [employer] gives you secure access to your electronic health record. If you see a primary care provider, you can also send messages to your care team and make appointments. If you have questions, please call your primary care clinic.  If you do not have a primary care provider, please call [phone] and they will assist you.      [employer] is an electronic gateway that provides easy, online access to your medical  records. With [employer], you can request a clinic appointment, read your test results, renew a prescription or communicate with your care team.     To access your existing account, please contact your [facility] or call [phone] for assistance.        Care EveryWhere ID     This is your Care EveryWhere ID. This could be used by other organizations to access your [facility] medical records  [id number]         Blood Pressure from Last 3 Encounters:   04/03/17 149/87   02/03/17 117/80   01/13/17 117/72    Weight from Last 3 Encounters:   04/03/17 73 kg (160 lb 14.4 oz)   02/03/17 71.5 kg (157 lb 11.2 oz)   01/13/17 71.9 kg (158 lb 9.6 oz)              Today, you had the following     No orders found for display       Primary Care Provider Office Phone # Fax #    Brionna Coby Dc [phone]       [facility] [address]  [facility] [address]        Thank you!     Thank you for choosing [facility]  for your care. Our goal is always to provide you with excellent care. Hearing back from our patients is one way we can continue to improve our services. Please take a few minutes to complete the written survey that you may receive in the mail after your visit with us. Thank you!             Your Updated Medication List - Protect others around you: Learn how to safely use, store and throw away your medicines at www.disposemymeds.org.          This list is accurate as of: 4/18/17 11:59 PM.  Always use your most recent med list.                   Brand Name Dispense Instructions for use    acetone (Urine) test Strp     25 each    1 strip by In Vitro route as needed       albuterol 108 (90 BASE) MCG/ACT Inhaler    albuterol    1 Inhaler    Inhale 2 puffs into the lungs every 4 hours as needed for shortness of breath / dyspnea       amitriptyline 50 MG tablet    ELAVIL    90 tablet    Take 1 tablet (50 mg) by mouth At Bedtime        aspirin 81 MG tablet     100    1 tab po QD (Once per day)       * blood glucose monitoring lancets     3 Box    1 each See Admin Instructions test 3-4 times per day       * blood glucose monitoring lancets     1 Box    Use to test blood sugar 10 times daily or as directed.  Ok to substitute alternative if insurance prefers.       blood glucose monitoring test strip    HOLLIE CONTOUR NEXT    300 each    Use to test blood sugar 10 times daily or as directed.  Ok to substitute alternative if insurance prefers.       furosemide 20 MG tablet    LASIX    90 tablet    TAKE ONE TABLET BY MOUTH EVERY DAY       glucagon 1 MG kit    GLUCAGON EMERGENCY    1 mg    Inject 1 mg into the muscle once for 1 dose       glucose 4 G Chew chewable tablet     25 tablet    Take 3-4 tablets to treat low blood sugar.       insulin pen needle 31G X 5 MM    B-D U/F    3 each    Use 3 time(s) a day.       Ipratropium-Albuterol  MCG/ACT inhaler    COMBIVENT RESPIMAT    1 Inhaler    Inhale 1 puff into the lungs 4 times daily Not to exceed 6 doses per day.       lisinopril 10 MG tablet    PRINIVIL/ZESTRIL    90 tablet    Take 1 tablet (10 mg) by mouth daily       methadone 10 mg/mL Conc (HIGH CONC) solution    DOLPHINE-INTENSOL     Take 7 mLs by mouth daily.       metoprolol 50 MG tablet    LOPRESSOR     Take 25 mg by mouth daily       MIRENA (52 MG) 20 MCG/24HR IUD   Generic drug:  levonorgestrel     1    placed today       multivitamin, therapeutic with minerals Tabs tablet     30 each    Take 1 tablet by mouth daily       NovoLOG VIAL 100 UNITS/ML injection   Generic drug:  insulin aspart     3 Month    Use as directed in insulin pump. Patient using up to 60 units per day       polyethylene glycol Packet    MIRALAX/GLYCOLAX    28 packet    Take 17 g by mouth daily       pregabalin 75 MG capsule    LYRICA    90 capsule    Take 1 capsule (75 mg) by mouth 3 times daily       ranitidine 300 MG tablet    ZANTAC    90 tablet    TAKE ONE TABLET  BY MOUTH EVERY DAY       SM NICOTINE 21 MG/24HR 24 hr patch   Generic drug:  nicotine     28 patch    REMOVE OLD PATCH AND APPLY ONE NEW PATCH TO SKIN EVERY 24 HOURS       * study - lidocaine (LMX) 4 % Crea    IDS# 4490    45 g    Apply topically every 8 hours as needed for moderate pain       * lidocaine 5 % Patch    LIDODERM    30 patch    APPLY UP TO 3 PATCHES TO PAINFUL AREA AT ONCE FOR UP TO 12 HOURS WITHIN A 24 HOUR PERIOD.  REMOVE AFTER 12 HOURS       * Notice:  This list has 4 medication(s) that are the same as other medications prescribed for you. Read the directions carefully, and ask your doctor or other care provider to review them with you.
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
## 2017-04-19 ENCOUNTER — PRE VISIT (OUTPATIENT)
Dept: ORTHOPEDICS | Facility: CLINIC | Age: 52
End: 2017-04-19

## 2017-04-19 NOTE — TELEPHONE ENCOUNTER
1.  Date/reason for appt: 4/25/17 -- non healing ulcer  2.  Referring provider: Toya Nuno  3.  Call to patient (Yes / No - short description): no, referred  4.  Previous care at / records requested from: [facility] -- records and referral in Epic.

## 2017-04-21 NOTE — PROGRESS NOTES
"Diabetes Educator Note:    Alessandra stopped in after her visit with Milady Nuno PA-C in endocrinology today.  She has been having some trouble with her Medtronic pump infusion sets \"coming out.\"    It sounds as if she has been putting them in her abdomen, and because of certain activities, they get snagged and pull out.    She is now putting them in her lower abdomen where they are protected by her pants, and is having fewer problems, but wonders if there are better ways to secure the infusion set.     Given samples of Mastisol, Skin Prep, and IV-3000 over tape, and shown on Medtronic's website how to order some specialized IV-3000 with infusion set cut-out to use.    Informed as to how to obtain both products I sampled her.  Asked her to contact us if she wants a prescription for one or the other, and informed that insurance will not always cover the cost of products like this, but we can try.      Contact information given in case of questions or problems.      Time spent in this visit:  Less than 30 minutes.   "

## 2017-04-25 ENCOUNTER — OFFICE VISIT (OUTPATIENT)
Dept: ORTHOPEDICS | Facility: CLINIC | Age: 52
End: 2017-04-25

## 2017-04-25 DIAGNOSIS — L97.412 HEEL ULCER, RIGHT, WITH FAT LAYER EXPOSED (H): Primary | Chronic | ICD-10-CM

## 2017-04-25 DIAGNOSIS — L97.512 ULCER OF TOE, RIGHT, WITH FAT LAYER EXPOSED (H): ICD-10-CM

## 2017-04-25 DIAGNOSIS — E11.40 TYPE 2 DIABETES MELLITUS WITH DIABETIC NEUROPATHY, WITHOUT LONG-TERM CURRENT USE OF INSULIN (H): Chronic | ICD-10-CM

## 2017-04-25 DIAGNOSIS — L97.412 HEEL ULCER, RIGHT, WITH FAT LAYER EXPOSED (H): Chronic | ICD-10-CM

## 2017-04-25 LAB — PREALB SERPL IA-MCNC: 23 MG/DL (ref 15–45)

## 2017-04-25 ASSESSMENT — ENCOUNTER SYMPTOMS
HYPOTENSION: 0
ORTHOPNEA: 0
RECTAL BLEEDING: 0
SORE THROAT: 0
DECREASED APPETITE: 0
MUSCLE CRAMPS: 1
ARTHRALGIAS: 1
LOSS OF CONSCIOUSNESS: 0
NUMBNESS: 0
INCREASED ENERGY: 0
PALPITATIONS: 0
JAUNDICE: 0
SKIN CHANGES: 0
VOMITING: 0
HEADACHES: 1
HALLUCINATIONS: 0
BLOATING: 1
CLAUDICATION: 1
NECK MASS: 0
EXERCISE INTOLERANCE: 0
RECTAL PAIN: 0
DIZZINESS: 0
SINUS CONGESTION: 0
BACK PAIN: 1
NIGHT SWEATS: 0
BLOOD IN STOOL: 0
TACHYCARDIA: 0
STIFFNESS: 1
MUSCLE WEAKNESS: 1
MYALGIAS: 1
SINUS PAIN: 0
CHILLS: 0
TINGLING: 1
HEARTBURN: 1
LIGHT-HEADEDNESS: 0
ABDOMINAL PAIN: 0
LEG PAIN: 1
SYNCOPE: 0
SPEECH CHANGE: 0
WEIGHT GAIN: 0
HYPERTENSION: 0
NECK PAIN: 0
DIARRHEA: 0
ALTERED TEMPERATURE REGULATION: 1
SLEEP DISTURBANCES DUE TO BREATHING: 0
SEIZURES: 0
HOARSE VOICE: 0
POLYDIPSIA: 1
TROUBLE SWALLOWING: 0
DISTURBANCES IN COORDINATION: 0
FATIGUE: 1
NAUSEA: 0
PARALYSIS: 0
NAIL CHANGES: 1
POOR WOUND HEALING: 1
POLYPHAGIA: 0
BOWEL INCONTINENCE: 0
LEG SWELLING: 1
TASTE DISTURBANCE: 1
FEVER: 0
TREMORS: 0
WEAKNESS: 0
JOINT SWELLING: 1
WEIGHT LOSS: 0
MEMORY LOSS: 0
CONSTIPATION: 1
SMELL DISTURBANCE: 0

## 2017-04-25 NOTE — MR AVS SNAPSHOT
After Visit Summary   4/25/2017    Alessandra Pak    MRN: [id number]           Patient Information     Date Of Birth          1965        Visit Information        Provider Department      4/25/2017 1:20 PM Barrington Lewis DPM [facility]        Today's Diagnoses     Heel ulcer, right, with fat layer exposed (H)    -  1    Type 2 diabetes mellitus with diabetic neuropathy, without long-term current use of insulin (H)        Ulcer of toe, right, with fat layer exposed (H)           Follow-ups after your visit        Your next 10 appointments already scheduled     Apr 25, 2017  3:00 PM CDT   US GABRIEL DOPPLER NO EXERCISE with [id number]   [facility] ([facility] [place])    [address] [place] [id number] [phone]           Please bring a list of your medicines (including vitamins, minerals and over-the-counter drugs). Also, tell your doctor about any allergies you may have. Wear comfortable clothes and leave your valuables at home.  No caffeine or tobacco for 1 hour prior to exam.  Please call the Imaging Department at your exam site with any questions.            May 02, 2017  1:20 PM CDT   (Arrive by 1:05 PM)   RETURN FOOT/ANKLE with Barrington Lewis DPM   [facility] ([facility] [place] [facility])    [address] [place] [id number] [phone]            Jun 02, 2017 11:30 AM CDT   (Arrive by 11:15 AM)   RETURN DIABETES with Toya Nuno PA-C   [facility] ([facility] [place])    [address] [place] [id number] [phone]              Who to contact     Please call your clinic at [phone] to:    Ask questions about your health    Make or cancel appointments    Discuss your medicines    Learn about your test results    Speak to your doctor   If you have compliments or concerns about an  experience at your clinic, or if you wish to file a complaint, please contact [facility] Patient Relations at [phone] or email us at [email or web address]         Additional Information About Your Visit        In[employer] Information     [employer] gives you secure access to your electronic health record. If you see a primary care provider, you can also send messages to your care team and make appointments. If you have questions, please call your primary care clinic.  If you do not have a primary care provider, please call [phone] and they will assist you.      Evino is an electronic gateway that provides easy, online access to your medical records. With Evino, you can request a clinic appointment, read your test results, renew a prescription or communicate with your care team.     To access your existing account, please contact your [facility] or call [phone] for assistance.        Care EveryWhere ID     This is your Care EveryWhere ID. This could be used by other organizations to access your [place] medical records  [id number]         Blood Pressure from Last 3 Encounters:   04/03/17 149/87   02/03/17 117/80   01/13/17 117/72    Weight from Last 3 Encounters:   04/03/17 73 kg (160 lb 14.4 oz)   02/03/17 71.5 kg (157 lb 11.2 oz)   01/13/17 71.9 kg (158 lb 9.6 oz)              We Performed the Following     DEBRIDEMENT WOUND UP TO 20 SQ CM        Primary Care Provider Office Phone # Fax #    Brionna Coby Dc [phone]       [facility] [address] [facility] [address]        Thank you!     Thank you for choosing [facility]  for your care. Our goal is always to provide you with excellent care. Hearing back from our patients is one way we can continue to improve our services. Please take a few minutes to complete the written survey that you may receive in the mail  after your visit with us. Thank you!             Your Updated Medication List - Protect others around you: Learn how to safely use, store and throw away your medicines at www.disposemymeds.org.          This list is accurate as of: 4/25/17  2:48 PM.  Always use your most recent med list.                   Brand Name Dispense Instructions for use    acetone (Urine) test Strp     25 each    1 strip by In Vitro route as needed       albuterol 108 (90 BASE) MCG/ACT Inhaler    albuterol    1 Inhaler    Inhale 2 puffs into the lungs every 4 hours as needed for shortness of breath / dyspnea       amitriptyline 50 MG tablet    ELAVIL    90 tablet    Take 1 tablet (50 mg) by mouth At Bedtime       aspirin 81 MG tablet     100    1 tab po QD (Once per day)       * blood glucose monitoring lancets     3 Box    1 each See Admin Instructions test 3-4 times per day       * blood glucose monitoring lancets     1 Box    Use to test blood sugar 10 times daily or as directed.  Ok to substitute alternative if insurance prefers.       blood glucose monitoring test strip    HOLLIE CONTOUR NEXT    300 each    Use to test blood sugar 10 times daily or as directed.  Ok to substitute alternative if insurance prefers.       furosemide 20 MG tablet    LASIX    90 tablet    TAKE ONE TABLET BY MOUTH EVERY DAY       glucagon 1 MG kit    GLUCAGON EMERGENCY    1 mg    Inject 1 mg into the muscle once for 1 dose       glucose 4 G Chew chewable tablet     25 tablet    Take 3-4 tablets to treat low blood sugar.       insulin pen needle 31G X 5 MM    B-D U/F    3 each    Use 3 time(s) a day.       Ipratropium-Albuterol  MCG/ACT inhaler    COMBIVENT RESPIMAT    1 Inhaler    Inhale 1 puff into the lungs 4 times daily Not to exceed 6 doses per day.       lisinopril 10 MG tablet    PRINIVIL/ZESTRIL    90 tablet    Take 1 tablet (10 mg) by mouth daily       methadone 10 mg/mL Conc (HIGH CONC) solution    DOLPHINE-INTENSOL     Take 7 mLs by mouth  daily.       metoprolol 50 MG tablet    LOPRESSOR     Take 25 mg by mouth daily       MIRENA (52 MG) 20 MCG/24HR IUD   Generic drug:  levonorgestrel     1    placed today       multivitamin, therapeutic with minerals Tabs tablet     30 each    Take 1 tablet by mouth daily       NovoLOG VIAL 100 UNITS/ML injection   Generic drug:  insulin aspart     3 Month    Use as directed in insulin pump. Patient using up to 60 units per day       polyethylene glycol Packet    MIRALAX/GLYCOLAX    28 packet    Take 17 g by mouth daily       pregabalin 75 MG capsule    LYRICA    90 capsule    Take 1 capsule (75 mg) by mouth 3 times daily       ranitidine 300 MG tablet    ZANTAC    90 tablet    TAKE ONE TABLET BY MOUTH EVERY DAY       SM NICOTINE 21 MG/24HR 24 hr patch   Generic drug:  nicotine     28 patch    REMOVE OLD PATCH AND APPLY ONE NEW PATCH TO SKIN EVERY 24 HOURS       * study - lidocaine (LMX) 4 % Crea    IDS# [id number]    45 g    Apply topically every 8 hours as needed for moderate pain       * lidocaine 5 % Patch    LIDODERM    30 patch    APPLY UP TO 3 PATCHES TO PAINFUL AREA AT ONCE FOR UP TO 12 HOURS WITHIN A 24 HOUR PERIOD.  REMOVE AFTER 12 HOURS       * Notice:  This list has 4 medication(s) that are the same as other medications prescribed for you. Read the directions carefully, and ask your doctor or other care provider to review them with you.

## 2017-04-25 NOTE — LETTER
4/25/2017       RE: Alessandra Pak  [address]  [facility] [address]     Dear Colleague,    Thank you for referring your patient, Alessandra Pak, to the [facility] at [facility]. Please see a copy of my visit note below.    Date of Service: 4/25/2017    Chief Complaint:   Chief Complaint   Patient presents with     Consult     Wound on Right 2nd toe and heel. Pt is a Type 2 diabetic.         HPI: Alessandra is a 51 year old female who presents today for further evaluation of right foot wounds. Alessandra presents today with her mom. She relates that she has a right heel wound that has been present for a couple of years. She was seeing wound care in [place], however she is transferring her care here. She has tried many treatments on her wound and has not seen much improvement over the last few months. She also endorses a blister formation on the right 2nd toe. This has been present for about a week. She notes that it did have pus coming out from under the blister.  She relates that she works at [employer] and is wearing her tennis shoes to work. Today, she presents wearing a Grecian-type sandal.     Review of Systems:  Answers for HPI/ROS submitted by the patient on 4/25/2017   General Symptoms: Yes  Skin Symptoms: Yes  HENT Symptoms: Yes  EYE SYMPTOMS: No  HEART SYMPTOMS: Yes  LUNG SYMPTOMS: No  INTESTINAL SYMPTOMS: Yes  URINARY SYMPTOMS: No  GYNECOLOGIC SYMPTOMS: No  BREAST SYMPTOMS: No  SKELETAL SYMPTOMS: Yes  BLOOD SYMPTOMS: No  NERVOUS SYSTEM SYMPTOMS: Yes  MENTAL HEALTH SYMPTOMS: No  Fever: No  Loss of appetite: No  Weight loss: No  Weight gain: No  Fatigue: Yes  Night sweats: No  Chills: No  Increased stress: No  Excessive hunger: No  Excessive thirst: Yes  Feeling hot or cold when others believe the temperature is normal: Yes  Loss of height: No  Post-operative complications: No  Surgical site pain: No  Hallucinations: No  Change in or Loss of Energy:  No  Hyperactivity: No  Confusion: No  Changes in hair: No  Changes in moles/birth marks: No  Itching: Yes  Rashes: No  Changes in nails: Yes  Acne: No  Hair in places you don't want it: Yes  Change in facial hair: No  Warts: No  Non-healing sores: Yes  Scarring: Yes  Flaking of skin: Yes  Color changes of hands/feet in cold : Yes  Sun sensitivity: Yes  Skin thickening: No  Ear pain: No  Ear discharge: No  Hearing loss: No  Tinnitus: No  Nosebleeds: Yes  Congestion: No  Sinus pain: No  Trouble swallowing: No   Voice hoarseness: No  Mouth sores: No  Sore throat: No  Tooth pain: No  Gum tenderness: No  Bleeding gums: No  Change in taste: Yes  Change in sense of smell: No  Dry mouth: Yes  Hearing aid used: No  Neck lump: No  Chest pain or pressure: No  Fast or irregular heartbeat: No  Pain in legs with walking: Yes  Swelling in feet or ankles: Yes  Trouble breathing while lying down: No  Fingers or Toes appear blue: Yes  High blood pressure: No  Low blood pressure: No  Fainting: No  Murmurs: No  Chest pain on exertion: No  Chest pain at rest: No  Cramping pain in leg during exercise: Yes  Pacemaker: No  Varicose veins: No  Edema or swelling: Yes  Fast heart beat: No  Wake up at night with shortness of breath: No  Heart flutters: No  Light-headedness: No  Exercise intolerance: No  Heart burn or indigestion: Yes  Nausea: No  Vomiting: No  Abdominal pain: No  Bloating: Yes  Constipation: Yes  Diarrhea: No  Blood in stool: No  Black stools: No  Rectal or Anal pain: No  Fecal incontinence: No  Rectal bleeding: No  Yellowing of skin or eyes: No  Vomit with blood: No  Change in stools: No  Back pain: Yes  Muscle aches: Yes  Neck pain: No  Swollen joints: Yes  Joint pain: Yes  Bone pain: No  Muscle cramps: Yes  Muscle weakness: Yes  Joint stiffness: Yes  Bone fracture: No  Trouble with coordination: No  Dizziness or trouble with balance: No  Fainting or black-out spells: No  Memory loss: No  Headache: Yes  Seizures: No  Speech  problems: No  Tingling: Yes  Tremor: No  Weakness: No  Difficulty walking: No  Paralysis: No  Numbness: No      PMH:   Past Medical History:   Diagnosis Date     Abdominal pain, right upper quadrant     sees Dr Mcclellan pain clinic at [facility]     ASCUS with positive high risk HPV 8/2013    + HPV 33, [place] - GAVIN I, ECC- atypia     Cardiomyopathy (H)     non ischemic cardiomyopathy with EF 15     Cervical high risk HPV (human papillomavirus) test positive 7/8/15, 7/25/16    NIL pap/+ HR HPV (not 16 or 18).      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    leep     Depressive disorder      Gastro-oesophageal reflux disease      Human papillomavirus in conditions classified elsewhere and of unspecified site 2/2012    + HPV 33     Hypertension      Profound impairment, one eye, impairment level not further specified     rt eye due to childhood injury     Systolic CHF (H) 3/12/2015     Tobacco abuse 5/18/2013     Type 2 diabetes mellitus without complications (H)      Uncomplicated asthma        PSxH:   Past Surgical History:   Procedure Laterality Date     C NONSPECIFIC PROCEDURE  2001    cholecystectomy     C NONSPECIFIC PROCEDURE  as a child    tonsillectomy     C NONSPECIFIC PROCEDURE  2001    whipple procedure     CARDIAC SURGERY      defib     COLPOSCOPY,LOOP ELECTRD CERVIX EXCIS  2002, 2011    stage 2 dysplasia     ENDOBRONCHIAL ULTRASOUND FLEXIBLE N/A 2/19/2015    Procedure: ENDOBRONCHIAL ULTRASOUND FLEXIBLE;  Surgeon: Brenden Tamez MD;  Location: [facility]     LEEP TX, CERVICAL  2014    LEEP TX Cervical     RECESSION RESECTION WITH ADJUSTABLE SUTURE  12/13/2011    Procedure:RECESSION RESECTION WITH ADJUSTABLE SUTURE; Right Strabismus Repair with Adjustable Suture       TUBAL LIGATION  2007    essure        Allergies: Naproxen and No known drug allergies    SH:   Social History     Social History     Marital status: Single     Spouse name: N/A     Number of children: N/A     Years of education: N/A     Occupational  History     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 No     Social History Narrative    Balanced Diet - Yes    Osteoporosis Prevention Measures - Dairy servings per day: 3 servings daily    Regular Exercise -  Yes Describe hand weights 20 minutes daily    Dental Exam - YES - Date: 3/10    Eye Exam - YES - Date: 1-2008    Self Breast Exam - Yes    Abuse: Current or Past (Physical, Sexual or Emotional)- No    Do you feel safe in your environment - Yes    Guns stored in the home - No    Sunscreen used - Yes    Seatbelts used - Yes    Lipids -  YES - Date: 1/10    Glucose -  YES - Date: 12/09    Colon Cancer Screening - No    Hemoccults - NO    Pap Test -  YES - Date: 6/19/08    Do you have any concerns about STD's -  No    Mammography - NO    DEXA - NO    Immunizations reviewed and up to date - Yes, last TD was 11-22-04    3/11/10    KATY Burrell CMA                                   FH:   Family History   Problem Relation Age of Onset     DIABETES Mother      diet controled     Thyroid Disease Brother      DIABETES Father      Hypertension Mother      Hypertension Father      Arthritis Mother      GASTROINTESTINAL DISEASE Father      gallbladder removed     Lipids Mother      Obesity Other      Son     Respiratory Other      Son and Daughter; asthma     Bipolar Disorder Brother      Depression Maternal Aunt      Anxiety Disorder Maternal Aunt        Objective:  PT and DP pulses are 1/4 on right foot and  non-palpable on left foot. CRT is <10 seconds. Absent pedal hair.   Sharp/dull is absent with 5.07 [place] monofilament bilaterally.  Nails thickened and discolored bilaterally. Skin is normal on feet, but very dry and scaly on lower legs. Two open lesions noted to right foot.    Wound #1  A diabetic wound is noted at right  dorsal second digit measuring 1.5 cm x 0.5 cm x 0.1 cm.    Lozano Classification: II to subcutaneous    Wound base: Red  Pink/Granulation    Edges: Loose skin, maceration    Drainage: slight/serous    Odor: None    Undermining: None    Bone Exposure: No    Clinical Signs of Infection: No    After obtaining patient consent, the wound was irrigated with copious amounts of saline. A scalpel was then used to debride the wound into subcutaneous tissue. The wound edges were debrided back to healthy, bleeding tissue. The wound base exhibited healthy bleeding. Given the patient's lack of sensation, no anesthesia was necessary for the procedure.  ___________________________________  Wound #2  A diabetic wound is noted at right  plantar heel measuring 1.8 cm x 0.8 cm x 0.2 cm    Lozano Classification: II    Wound base: Red  Pink/Granulation    Edges: Hyperkeratotic    Drainage: small/serous    Odor: None    Undermining: None    Bone Exposure: No    Clinical Signs of Infection: No    After obtaining patient consent, the wound was irrigated with copious amounts of saline. A scalpel was then used to debride the wound into subcutaneous tissue. The wound edges were debrided back to healthy, bleeding tissue. The wound base exhibited healthy bleeding. Given the patient's lack of sensation, no anesthesia was necessary for the procedure.    A1C    10.2%      4/18/17               9.3%      1/13/17    Assessment:   - Diabetic foot ulcerations  - DM type 2, uncontrolled  - Diabetic peripheral neuropathy  - Likely peripheral vascular disease    Plan:  - Pt seen and evaluated. Diagnosis and treatment options  discussed.   - Wounds debrided as described above.  - Ordered ABIs bilaterally.  - Dispensed DH2 shoe and removed pegs for offloading.  - Suggested increasing protein intake. Ordered pre-albumin.   - Ordered x-ray of R foot, three views. I read these after she had left. There is some questionable erosion of the 2nd digit distal phalanx. I did not probe to bone in this area, however it is possible that she has underlying bone infection. Will continue to monitor.   - Wound care instructions: Cleanse both wounds daily with microklenz, pat dry. Apply iodosorb to dorsal toe wound and medihoney to heel wound. Cover with gauze dressings. Change daily.  - RTC 1 week          Again, thank you for allowing me to participate in the care of your patient.      Sincerely,    Barrington Lewis DPM

## 2017-04-25 NOTE — PROGRESS NOTES
Date of Service: 4/25/2017    Chief Complaint:   Chief Complaint   Patient presents with     Consult     Wound on Right 2nd toe and heel. Pt is a Type 2 diabetic.         HPI: Alessandra is a 51 year old female who presents today for further evaluation of right foot wounds. Alessandra presents today with her mom. She relates that she has a right heel wound that has been present for a couple of years. She was seeing wound care in [place], however she is transferring her care here. She has tried many treatments on her wound and has not seen much improvement over the last few months. She also endorses a blister formation on the right 2nd toe. This has been present for about a week. She notes that it did have pus coming out from under the blister.  She relates that she works at [employer] and is wearing her tennis shoes to work. Today, she presents wearing a Grecian-type sandal.     Review of Systems:  Answers for HPI/ROS submitted by the patient on 4/25/2017   General Symptoms: Yes  Skin Symptoms: Yes  HENT Symptoms: Yes  EYE SYMPTOMS: No  HEART SYMPTOMS: Yes  LUNG SYMPTOMS: No  INTESTINAL SYMPTOMS: Yes  URINARY SYMPTOMS: No  GYNECOLOGIC SYMPTOMS: No  BREAST SYMPTOMS: No  SKELETAL SYMPTOMS: Yes  BLOOD SYMPTOMS: No  NERVOUS SYSTEM SYMPTOMS: Yes  MENTAL HEALTH SYMPTOMS: No  Fever: No  Loss of appetite: No  Weight loss: No  Weight gain: No  Fatigue: Yes  Night sweats: No  Chills: No  Increased stress: No  Excessive hunger: No  Excessive thirst: Yes  Feeling hot or cold when others believe the temperature is normal: Yes  Loss of height: No  Post-operative complications: No  Surgical site pain: No  Hallucinations: No  Change in or Loss of Energy: No  Hyperactivity: No  Confusion: No  Changes in hair: No  Changes in moles/birth marks: No  Itching: Yes  Rashes: No  Changes in nails: Yes  Acne: No  Hair in places you don't want it: Yes  Change in facial hair: No  Warts: No  Non-healing sores: Yes  Scarring: Yes  Flaking of skin: Yes  Color  changes of hands/feet in cold : Yes  Sun sensitivity: Yes  Skin thickening: No  Ear pain: No  Ear discharge: No  Hearing loss: No  Tinnitus: No  Nosebleeds: Yes  Congestion: No  Sinus pain: No  Trouble swallowing: No   Voice hoarseness: No  Mouth sores: No  Sore throat: No  Tooth pain: No  Gum tenderness: No  Bleeding gums: No  Change in taste: Yes  Change in sense of smell: No  Dry mouth: Yes  Hearing aid used: No  Neck lump: No  Chest pain or pressure: No  Fast or irregular heartbeat: No  Pain in legs with walking: Yes  Swelling in feet or ankles: Yes  Trouble breathing while lying down: No  Fingers or Toes appear blue: Yes  High blood pressure: No  Low blood pressure: No  Fainting: No  Murmurs: No  Chest pain on exertion: No  Chest pain at rest: No  Cramping pain in leg during exercise: Yes  Pacemaker: No  Varicose veins: No  Edema or swelling: Yes  Fast heart beat: No  Wake up at night with shortness of breath: No  Heart flutters: No  Light-headedness: No  Exercise intolerance: No  Heart burn or indigestion: Yes  Nausea: No  Vomiting: No  Abdominal pain: No  Bloating: Yes  Constipation: Yes  Diarrhea: No  Blood in stool: No  Black stools: No  Rectal or Anal pain: No  Fecal incontinence: No  Rectal bleeding: No  Yellowing of skin or eyes: No  Vomit with blood: No  Change in stools: No  Back pain: Yes  Muscle aches: Yes  Neck pain: No  Swollen joints: Yes  Joint pain: Yes  Bone pain: No  Muscle cramps: Yes  Muscle weakness: Yes  Joint stiffness: Yes  Bone fracture: No  Trouble with coordination: No  Dizziness or trouble with balance: No  Fainting or black-out spells: No  Memory loss: No  Headache: Yes  Seizures: No  Speech problems: No  Tingling: Yes  Tremor: No  Weakness: No  Difficulty walking: No  Paralysis: No  Numbness: No      PMH:   Past Medical History:   Diagnosis Date     Abdominal pain, right upper quadrant     sees Dr Mcclellan pain clinic at [facility]     ASCUS with positive high risk HPV 8/2013    + HPV 33,  [facility] - GAVIN I, ECC- atypia     Cardiomyopathy (H)     non ischemic cardiomyopathy with EF 15     Cervical high risk HPV (human papillomavirus) test positive 7/8/15, 7/25/16    NIL pap/+ HR HPV (not 16 or 18).      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    leep     Depressive disorder      Gastro-oesophageal reflux disease      Human papillomavirus in conditions classified elsewhere and of unspecified site 2/2012    + HPV 33     Hypertension      Profound impairment, one eye, impairment level not further specified     rt eye due to childhood injury     Systolic CHF (H) 3/12/2015     Tobacco abuse 5/18/2013     Type 2 diabetes mellitus without complications (H)      Uncomplicated asthma        PSxH:   Past Surgical History:   Procedure Laterality Date     C NONSPECIFIC PROCEDURE  2001    cholecystectomy     C NONSPECIFIC PROCEDURE  as a child    tonsillectomy     C NONSPECIFIC PROCEDURE  2001    whipple procedure     CARDIAC SURGERY      defib     COLPOSCOPY,LOOP ELECTRD CERVIX EXCIS  2002, 2011    stage 2 dysplasia     ENDOBRONCHIAL ULTRASOUND FLEXIBLE N/A 2/19/2015    Procedure: ENDOBRONCHIAL ULTRASOUND FLEXIBLE;  Surgeon: Brenden Tamez MD;  Location: [facility]     LEEP TX, CERVICAL  2014    LEEP TX Cervical     RECESSION RESECTION WITH ADJUSTABLE SUTURE  12/13/2011    Procedure:RECESSION RESECTION WITH ADJUSTABLE SUTURE; Right Strabismus Repair with Adjustable Suture       TUBAL LIGATION  2007    essure        Allergies: Naproxen and No known drug allergies    SH:   Social History     Social History     Marital status: Single     Spouse name: N/A     Number of children: N/A     Years of education: N/A     Occupational History     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 No     Social History Narrative    Balanced Diet - Yes    Osteoporosis Prevention Measures - Dairy servings per day: 3 servings daily    Regular Exercise -  Yes Describe hand weights 20 minutes daily    Dental Exam - YES - Date: 3/10    Eye Exam - YES - Date: 1-2008    Self Breast Exam - Yes    Abuse: Current or Past (Physical, Sexual or Emotional)- No    Do you feel safe in your environment - Yes    Guns stored in the home - No    Sunscreen used - Yes    Seatbelts used - Yes    Lipids -  YES - Date: 1/10    Glucose -  YES - Date: 12/09    Colon Cancer Screening - No    Hemoccults - NO    Pap Test -  YES - Date: 6/19/08    Do you have any concerns about STD's -  No    Mammography - NO    DEXA - NO    Immunizations reviewed and up to date - Yes, last TD was 11-22-04    3/11/10    KATY Burrell CMA                                   FH:   Family History   Problem Relation Age of Onset     DIABETES Mother      diet controled     Thyroid Disease Brother      DIABETES Father      Hypertension Mother      Hypertension Father      Arthritis Mother      GASTROINTESTINAL DISEASE Father      gallbladder removed     Lipids Mother      Obesity Other      Son     Respiratory Other      Son and Daughter; asthma     Bipolar Disorder Brother      Depression Maternal Aunt      Anxiety Disorder Maternal Aunt        Objective:  PT and DP pulses are 1/4 on right foot and non-palpable on left foot. CRT is <10 seconds. Absent pedal hair.   Sharp/dull is absent with 5.07 [place] monofilament bilaterally.  Nails thickened and discolored bilaterally. Skin is normal on feet, but very dry and scaly on lower legs. Two open lesions noted to right foot.    Wound #1  A diabetic  wound is noted at right  dorsal second digit measuring 1.5 cm x 0.5 cm x 0.1 cm.    Lozano Classification: II to subcutaneous    Wound base: Red  Pink/Granulation    Edges: Loose skin, maceration    Drainage: slight/serous    Odor: None    Undermining: None    Bone Exposure: No    Clinical Signs of Infection: No    After obtaining patient consent, the wound was irrigated with copious amounts of saline. A scalpel was then used to debride the wound into subcutaneous tissue. The wound edges were debrided back to healthy, bleeding tissue. The wound base exhibited healthy bleeding. Given the patient's lack of sensation, no anesthesia was necessary for the procedure.  ___________________________________  Wound #2  A diabetic wound is noted at right  plantar heel measuring 1.8 cm x 0.8 cm x 0.2 cm    Lozano Classification: II    Wound base: Red  Pink/Granulation    Edges: Hyperkeratotic    Drainage: small/serous    Odor: None    Undermining: None    Bone Exposure: No    Clinical Signs of Infection: No    After obtaining patient consent, the wound was irrigated with copious amounts of saline. A scalpel was then used to debride the wound into subcutaneous tissue. The wound edges were debrided back to healthy, bleeding tissue. The wound base exhibited healthy bleeding. Given the patient's lack of sensation, no anesthesia was necessary for the procedure.    A1C    10.2%      4/18/17               9.3%      1/13/17    Assessment:   - Diabetic foot ulcerations  - DM type 2, uncontrolled  - Diabetic peripheral neuropathy  - Likely peripheral vascular disease    Plan:  - Pt seen and evaluated. Diagnosis and treatment options discussed.   - Wounds debrided as described above.  - Ordered ABIs bilaterally.  - Dispensed DH2 shoe and removed pegs for offloading.  - Suggested increasing protein intake. Ordered pre-albumin.   - Ordered x-ray of R foot, three views. I read these after she had left. There is some questionable erosion of  the 2nd digit distal phalanx. I did not probe to bone in this area, however it is possible that she has underlying bone infection. Will continue to monitor.   - Wound care instructions: Cleanse both wounds daily with microklenz, pat dry. Apply iodosorb to dorsal toe wound and medihoney to heel wound. Cover with gauze dressings. Change daily.  - RTC 1 week

## 2017-04-25 NOTE — NURSING NOTE
Reason For Visit:   Chief Complaint   Patient presents with     Consult     Wound on Right 2nd toe and heel. Pt is a Type 2 diabetic.        Pain Assessment  Patient Currently in Pain: Yes  Primary Pain Location: Foot  Pain Orientation: Left, Right          Current Outpatient Prescriptions   Medication Sig Dispense Refill     pregabalin (LYRICA) 75 MG capsule Take 1 capsule (75 mg) by mouth 3 times daily 90 capsule 5     ranitidine (ZANTAC) 300 MG tablet TAKE ONE TABLET BY MOUTH EVERY DAY 90 tablet 1     lisinopril (PRINIVIL/ZESTRIL) 10 MG tablet Take 1 tablet (10 mg) by mouth daily 90 tablet 1     amitriptyline (ELAVIL) 50 MG tablet Take 1 tablet (50 mg) by mouth At Bedtime 90 tablet 1     lidocaine (LIDODERM) 5 % Patch APPLY UP TO 3 PATCHES TO PAINFUL AREA AT ONCE FOR UP TO 12 HOURS WITHIN A 24 HOUR PERIOD.  REMOVE AFTER 12 HOURS 30 patch 1     SM NICOTINE 21 MG/24HR 24 hr patch REMOVE OLD PATCH AND APPLY ONE NEW PATCH TO SKIN EVERY 24 HOURS 28 patch 1     furosemide (LASIX) 20 MG tablet TAKE ONE TABLET BY MOUTH EVERY DAY 90 tablet 1     insulin aspart (NOVOLOG VIAL) 100 UNITS/ML VIAL Use as directed in insulin pump. Patient using up to 60 units per day 3 Month 3     blood glucose monitoring (HOLLIE CONTOUR NEXT) test strip Use to test blood sugar 10 times daily or as directed.  Ok to substitute alternative if insurance prefers. 300 each 12     blood glucose monitoring (HOLLIE MICROLET) lancets Use to test blood sugar 10 times daily or as directed.  Ok to substitute alternative if insurance prefers. 1 Box prn     acetone, Urine, test STRP 1 strip by In Vitro route as needed 25 each 1     glucagon (GLUCAGON EMERGENCY) 1 MG injection Inject 1 mg into the muscle once for 1 dose 1 mg 1     Ipratropium-Albuterol (COMBIVENT RESPIMAT)  MCG/ACT inhaler Inhale 1 puff into the lungs 4 times daily Not to exceed 6 doses per day. 1 Inhaler 6     multivitamin, therapeutic with minerals (THERA-VIT-M) TABS Take 1 tablet by  mouth daily 30 each 11     metoprolol (LOPRESSOR) 50 MG tablet Take 25 mg by mouth daily       study - lidocaine, LMX, (IDS# 4490) 4 % CREA Apply topically every 8 hours as needed for moderate pain 45 g 11     albuterol (ALBUTEROL) 108 (90 BASE) MCG/ACT inhaler Inhale 2 puffs into the lungs every 4 hours as needed for shortness of breath / dyspnea 1 Inhaler 5     polyethylene glycol (MIRALAX/GLYCOLAX) packet Take 17 g by mouth daily 28 packet 3     insulin pen needle (B-D U/F) 31G X 5 MM Use 3 time(s) a day. 3 each 3     blood glucose monitoring (FREESTYLE) lancets 1 each See Admin Instructions test 3-4 times per day 3 Box 3     glucose 4 G CHEW Take 3-4 tablets to treat low blood sugar. 25 tablet 11     methadone (DOLPHINE-INTENSOL) 10 mg/mL CONC Take 7 mLs by mouth daily.       MIRENA 20 MCG/24HR IU IUD placed today 1 0     ASPIRIN 81 MG OR TABS 1 tab po QD (Once per day) 100 3          Allergies   Allergen Reactions     Naproxen GI Disturbance     No Known Drug Allergies

## 2017-05-02 ENCOUNTER — TELEPHONE (OUTPATIENT)
Dept: ENDOCRINOLOGY | Facility: CLINIC | Age: 52
End: 2017-05-02

## 2017-05-02 ENCOUNTER — OFFICE VISIT (OUTPATIENT)
Dept: ORTHOPEDICS | Facility: CLINIC | Age: 52
End: 2017-05-02

## 2017-05-02 DIAGNOSIS — E11.40 TYPE 2 DIABETES MELLITUS WITH DIABETIC NEUROPATHY, WITHOUT LONG-TERM CURRENT USE OF INSULIN (H): Chronic | ICD-10-CM

## 2017-05-02 DIAGNOSIS — L97.412 HEEL ULCER, RIGHT, WITH FAT LAYER EXPOSED (H): Primary | Chronic | ICD-10-CM

## 2017-05-02 NOTE — TELEPHONE ENCOUNTER
----- Message from Toya Nuno PA-C sent at 5/2/2017  1:52 PM CDT -----  Regarding: FW: labs needed    I will place an order for a C peptide/glucose. Could you please let her know the lab has been ordered.  Thanks dariel nuno    ----- Message -----     From: Sona Escobar RN     Sent: 5/2/2017  12:00 PM       To: Toya Nuno PA-C  Subject: labs needed                                      Alessandra is on a  Insulin pump with type 2 diabetes. Insurance requires a  Fasting c peptide and  Glucose done together.  They state she is   Medicare Part B eligible.     Since she is a Type 2 ?

## 2017-05-02 NOTE — LETTER
5/2/2017       RE: Alessandra Pak  [address]  [place] [address]     Dear Colleague,    Thank you for referring your patient, Alessandra Pak, to the [facility] at [facility]. Please see a copy of my visit note below.    Heel 1.5 x 0.5  Chief Complaint:   Chief Complaint   Patient presents with     Wound Check     1 week follow up. Wounds, Right foot.           Allergies   Allergen Reactions     Naproxen GI Disturbance     No Known Drug Allergies          Subjective: Alessandra is a 51 year old female who presents to the clinic today for a follow up of right foot wounds. She relates to feeling well and that the 2nd toe has dried out. She notices a difference in the size of heel wound and relates that it is getting smaller. Also here to discuss labs and xray. She continues to wear the DH2 shoe.     Objective      A diabetic wound is noted at right  heel measuring 1.5cm x 0.5cm x 0.1cm.    Lozano Classification: II to subcutaneous    Wound base: Red/Granulation    Edges: hyperkeratotic    Drainage: slight/serous    Odor: no    Undermining: no    Bone Exposure: No    Clinical Signs of Infection: No    After obtaining patient consent, the wound was irrigated with copious amounts of saline. A scalpel was then used to debride the wound into the subcutaneous tissue. The wound edges were debrided to healthy, bleeding tissue. Given the patient's lack of sensation, no anesthesia was necessary for the procedure.   The 2nd digit wound that was present at the last visit has healed and dried out. The toe is still discolored, however no open lesions are noted.     Assessment: Right diabetic heel wound - smaller than the last visit. Healed right 2nd digit wound    Plan:   - Pt seen and evaluated  - The right hell wound was debrided. I applied Medihoney to the heel. She should continue this. Also, continue with the Iodosorb on the 2nd toe. Continue DH2 shoe,  - Pt to  return to clinic in 2 weeks.     Again, thank you for allowing me to participate in the care of your patient.      Sincerely,    Barrington Lewis DPM

## 2017-05-02 NOTE — PROGRESS NOTES
Heel 1.5 x 0.5  Chief Complaint:   Chief Complaint   Patient presents with     Wound Check     1 week follow up. Wounds, Right foot.           Allergies   Allergen Reactions     Naproxen GI Disturbance     No Known Drug Allergies          Subjective: Alessandra is a 51 year old female who presents to the clinic today for a follow up of right foot wounds. She relates to feeling well and that the 2nd toe has dried out. She notices a difference in the size of heel wound and relates that it is getting smaller. Also here to discuss labs and xray. She continues to wear the DH2 shoe.     Objective      A diabetic wound is noted at right  heel measuring 1.5cm x 0.5cm x 0.1cm.    Lozano Classification: II to subcutaneous    Wound base: Red/Granulation    Edges: hyperkeratotic    Drainage: slight/serous    Odor: no    Undermining: no    Bone Exposure: No    Clinical Signs of Infection: No    After obtaining patient consent, the wound was irrigated with copious amounts of saline. A scalpel was then used to debride the wound into the subcutaneous tissue. The wound edges were debrided to healthy, bleeding tissue. Given the patient's lack of sensation, no anesthesia was necessary for the procedure.   The 2nd digit wound that was present at the last visit has healed and dried out. The toe is still discolored, however no open lesions are noted.     Assessment: Right diabetic heel wound - smaller than the last visit. Healed right 2nd digit wound    Plan:   - Pt seen and evaluated  - The right hell wound was debrided. I applied Medihoney to the heel. She should continue this. Also, continue with the Iodosorb on the 2nd toe. Continue DH2 shoe,  - Pt to return to clinic in 2 weeks.

## 2017-05-02 NOTE — NURSING NOTE
Reason For Visit:   Chief Complaint   Patient presents with     Wound Check     1 week follow up. Wounds, Right foot.        Pain Assessment  Patient Currently in Pain: Denies                   Current Outpatient Prescriptions   Medication Sig Dispense Refill     pregabalin (LYRICA) 75 MG capsule Take 1 capsule (75 mg) by mouth 3 times daily 90 capsule 5     ranitidine (ZANTAC) 300 MG tablet TAKE ONE TABLET BY MOUTH EVERY DAY 90 tablet 1     lisinopril (PRINIVIL/ZESTRIL) 10 MG tablet Take 1 tablet (10 mg) by mouth daily 90 tablet 1     amitriptyline (ELAVIL) 50 MG tablet Take 1 tablet (50 mg) by mouth At Bedtime 90 tablet 1     lidocaine (LIDODERM) 5 % Patch APPLY UP TO 3 PATCHES TO PAINFUL AREA AT ONCE FOR UP TO 12 HOURS WITHIN A 24 HOUR PERIOD.  REMOVE AFTER 12 HOURS 30 patch 1     SM NICOTINE 21 MG/24HR 24 hr patch REMOVE OLD PATCH AND APPLY ONE NEW PATCH TO SKIN EVERY 24 HOURS 28 patch 1     furosemide (LASIX) 20 MG tablet TAKE ONE TABLET BY MOUTH EVERY DAY 90 tablet 1     insulin aspart (NOVOLOG VIAL) 100 UNITS/ML VIAL Use as directed in insulin pump. Patient using up to 60 units per day 3 Month 3     blood glucose monitoring (HOLLIE CONTOUR NEXT) test strip Use to test blood sugar 10 times daily or as directed.  Ok to substitute alternative if insurance prefers. 300 each 12     blood glucose monitoring (HOLLIE MICROLET) lancets Use to test blood sugar 10 times daily or as directed.  Ok to substitute alternative if insurance prefers. 1 Box prn     acetone, Urine, test STRP 1 strip by In Vitro route as needed 25 each 1     glucagon (GLUCAGON EMERGENCY) 1 MG injection Inject 1 mg into the muscle once for 1 dose 1 mg 1     Ipratropium-Albuterol (COMBIVENT RESPIMAT)  MCG/ACT inhaler Inhale 1 puff into the lungs 4 times daily Not to exceed 6 doses per day. 1 Inhaler 6     multivitamin, therapeutic with minerals (THERA-VIT-M) TABS Take 1 tablet by mouth daily 30 each 11     metoprolol (LOPRESSOR) 50 MG tablet  Take 25 mg by mouth daily       study - lidocaine, LMX, (IDS# 4490) 4 % CREA Apply topically every 8 hours as needed for moderate pain 45 g 11     albuterol (ALBUTEROL) 108 (90 BASE) MCG/ACT inhaler Inhale 2 puffs into the lungs every 4 hours as needed for shortness of breath / dyspnea 1 Inhaler 5     polyethylene glycol (MIRALAX/GLYCOLAX) packet Take 17 g by mouth daily 28 packet 3     insulin pen needle (B-D U/F) 31G X 5 MM Use 3 time(s) a day. 3 each 3     blood glucose monitoring (FREESTYLE) lancets 1 each See Admin Instructions test 3-4 times per day 3 Box 3     glucose 4 G CHEW Take 3-4 tablets to treat low blood sugar. 25 tablet 11     methadone (DOLPHINE-INTENSOL) 10 mg/mL CONC Take 7 mLs by mouth daily.       MIRENA 20 MCG/24HR IU IUD placed today 1 0     ASPIRIN 81 MG OR TABS 1 tab po QD (Once per day) 100 3          Allergies   Allergen Reactions     Naproxen GI Disturbance     No Known Drug Allergies

## 2017-05-02 NOTE — MR AVS SNAPSHOT
After Visit Summary   5/2/2017    Alessandra Pak    MRN: [id number]           Patient Information     Date Of Birth          1965        Visit Information        Provider Department      5/2/2017 1:20 PM Barrington Lewis DPM [facility]        Today's Diagnoses     Heel ulcer, right, with fat layer exposed (H)    -  1    Type 2 diabetes mellitus with diabetic neuropathy, without long-term current use of insulin (H)           Follow-ups after your visit        Your next 10 appointments already scheduled     May 16, 2017  1:20 PM CDT   (Arrive by 1:05 PM)   RETURN FOOT/ANKLE with Barrington Lewis DPM   [facility] ([facility])    [address] [place] [address] [phone]            Jun 02, 2017 11:30 AM CDT   (Arrive by 11:15 AM)   RETURN DIABETES with Toya Nuno PA-C   [facility] ([facility])    [address] [place] [id number] [phone]              Future tests that were ordered for you today     Open Future Orders        Priority Expected Expires Ordered    C-peptide Routine 5/3/2017 9/1/2017 5/2/2017    Glucose Routine 5/3/2017 9/1/2017 5/2/2017            Who to contact     Please call your clinic at [phone] to:    Ask questions about your health    Make or cancel appointments    Discuss your medicines    Learn about your test results    Speak to your doctor   If you have compliments or concerns about an experience at your clinic, or if you wish to file a complaint, please contact [facility] Patient Relations at [phone] or email us at [email or web address]         Additional Information About Your Visit        MyChart Information     C[employer] gives you secure access to your electronic health record. If you see a primary care provider, you can also send messages to your  care team and make appointments. If you have questions, please call your primary care clinic.  If you do not have a primary care provider, please call [phone] and they will assist you.      [employer] is an electronic gateway that provides easy, online access to your medical records. With [employer], you can request a clinic appointment, read your test results, renew a prescription or communicate with your care team.     To access your existing account, please contact your [facility] or call [phone] for assistance.        Care EveryWhere ID     This is your Care EveryWhere ID. This could be used by other organizations to access your [place] medical records  [id number]         Blood Pressure from Last 3 Encounters:   04/03/17 149/87   02/03/17 117/80   01/13/17 117/72    Weight from Last 3 Encounters:   04/03/17 73 kg (160 lb 14.4 oz)   02/03/17 71.5 kg (157 lb 11.2 oz)   01/13/17 71.9 kg (158 lb 9.6 oz)              We Performed the Following     DEBRIDEMENT WOUND UP TO 20 SQ CM        Primary Care Provider Office Phone # Fax #    Brionna Coby Dc [phone]       [facility] [address] [facility] [address]        Thank you!     Thank you for choosing [facility]  for your care. Our goal is always to provide you with excellent care. Hearing back from our patients is one way we can continue to improve our services. Please take a few minutes to complete the written survey that you may receive in the mail after your visit with us. Thank you!             Your Updated Medication List - Protect others around you: Learn how to safely use, store and throw away your medicines at www.disposemymeds.org.          This list is accurate as of: 5/2/17  3:32 PM.  Always use your most recent med list.                   Brand Name Dispense Instructions for use    acetone (Urine) test Strp     25 each    1 strip by In Vitro  route as needed       albuterol 108 (90 BASE) MCG/ACT Inhaler    albuterol    1 Inhaler    Inhale 2 puffs into the lungs every 4 hours as needed for shortness of breath / dyspnea       amitriptyline 50 MG tablet    ELAVIL    90 tablet    Take 1 tablet (50 mg) by mouth At Bedtime       aspirin 81 MG tablet     100    1 tab po QD (Once per day)       * blood glucose monitoring lancets     3 Box    1 each See Admin Instructions test 3-4 times per day       * blood glucose monitoring lancets     1 Box    Use to test blood sugar 10 times daily or as directed.  Ok to substitute alternative if insurance prefers.       blood glucose monitoring test strip    HOLLIE CONTOUR NEXT    300 each    Use to test blood sugar 10 times daily or as directed.  Ok to substitute alternative if insurance prefers.       furosemide 20 MG tablet    LASIX    90 tablet    TAKE ONE TABLET BY MOUTH EVERY DAY       glucagon 1 MG kit    GLUCAGON EMERGENCY    1 mg    Inject 1 mg into the muscle once for 1 dose       glucose 4 G Chew chewable tablet     25 tablet    Take 3-4 tablets to treat low blood sugar.       insulin pen needle 31G X 5 MM    B-D U/F    3 each    Use 3 time(s) a day.       Ipratropium-Albuterol  MCG/ACT inhaler    COMBIVENT RESPIMAT    1 Inhaler    Inhale 1 puff into the lungs 4 times daily Not to exceed 6 doses per day.       lisinopril 10 MG tablet    PRINIVIL/ZESTRIL    90 tablet    Take 1 tablet (10 mg) by mouth daily       methadone 10 mg/mL Conc (HIGH CONC) solution    DOLPHINE-INTENSOL     Take 7 mLs by mouth daily.       metoprolol 50 MG tablet    LOPRESSOR     Take 25 mg by mouth daily       MIRENA (52 MG) 20 MCG/24HR IUD   Generic drug:  levonorgestrel     1    placed today       multivitamin, therapeutic with minerals Tabs tablet     30 each    Take 1 tablet by mouth daily       NovoLOG VIAL 100 UNITS/ML injection   Generic drug:  insulin aspart     3 Month    Use as directed in insulin pump. Patient using up to 60  units per day       polyethylene glycol Packet    MIRALAX/GLYCOLAX    28 packet    Take 17 g by mouth daily       pregabalin 75 MG capsule    LYRICA    90 capsule    Take 1 capsule (75 mg) by mouth 3 times daily       ranitidine 300 MG tablet    ZANTAC    90 tablet    TAKE ONE TABLET BY MOUTH EVERY DAY       SM NICOTINE 21 MG/24HR 24 hr patch   Generic drug:  nicotine     28 patch    REMOVE OLD PATCH AND APPLY ONE NEW PATCH TO SKIN EVERY 24 HOURS       * study - lidocaine (LMX) 4 % Crea    IDS# 4490    45 g    Apply topically every 8 hours as needed for moderate pain       * lidocaine 5 % Patch    LIDODERM    30 patch    APPLY UP TO 3 PATCHES TO PAINFUL AREA AT ONCE FOR UP TO 12 HOURS WITHIN A 24 HOUR PERIOD.  REMOVE AFTER 12 HOURS       * Notice:  This list has 4 medication(s) that are the same as other medications prescribed for you. Read the directions carefully, and ask your doctor or other care provider to review them with you.

## 2017-05-29 ENCOUNTER — MYC MEDICAL ADVICE (OUTPATIENT)
Dept: FAMILY MEDICINE | Facility: CLINIC | Age: 52
End: 2017-05-29

## 2017-05-29 DIAGNOSIS — F41.9 ANXIETY: ICD-10-CM

## 2017-05-30 ENCOUNTER — ALLIED HEALTH/NURSE VISIT (OUTPATIENT)
Dept: CARDIOLOGY | Facility: CLINIC | Age: 52
End: 2017-05-30
Attending: INTERNAL MEDICINE
Payer: COMMERCIAL

## 2017-05-30 DIAGNOSIS — I42.8 NONISCHEMIC CARDIOMYOPATHY (H): Primary | Chronic | ICD-10-CM

## 2017-05-30 PROCEDURE — 93289 INTERROG DEVICE EVAL HEART: CPT | Mod: 26 | Performed by: INTERNAL MEDICINE

## 2017-05-30 PROCEDURE — 93296 REM INTERROG EVL PM/IDS: CPT | Mod: ZF

## 2017-05-30 NOTE — TELEPHONE ENCOUNTER
Duplicate request from pharmacy as well      amitriptyline (ELAVIL) 50 MG tablet     Last Written Prescription Date: 02/22/17  Last Fill Quantity: 90, # refills: 1  [place] Office Visit with [facility] [place] care provider:  11/02/16        Last PHQ-9 score on record=   PHQ-9 SCORE 11/2/2016   Total Score 4

## 2017-05-30 NOTE — PROGRESS NOTES
Remote ICD transmission received and reviewed.  Device transmission sent per MD orders.  Patient has a [employer] single lead ICD.  Normal ICD function.  2 NSVT episodes recorded on 1/20/17 - 4 beats, 173-187 bpm.  Presenting EGM = NSR @ 100 bpm.   = 0%.  Estimated battery longevity to MARGI = 10.5 years.  Patient notified of interrogation results.  Patient reports that she is feeling well and denies complaints.  Plan for patient to send a remote transmission in 3 months as scheduled.    Remote ICD transmission

## 2017-05-30 NOTE — MR AVS SNAPSHOT
After Visit Summary   5/30/2017    Alessandra Pak    MRN: [id number]           Patient Information     Date Of Birth          1965        Visit Information        Provider Department      5/30/2017 6:00 AM [facility] ICD [facility]        Today's Diagnoses     Nonischemic cardiomyopathy (H)    -  1       Follow-ups after your visit        Your next 10 appointments already scheduled     Jun 02, 2017 11:30 AM CDT   (Arrive by 11:15 AM)   RETURN DIABETES with Toya Nuno PA-C   [facility] ([facility] [place])    [address] [place] [address] [phone]              Who to contact     If you have questions or need follow up information about today's clinic visit or your schedule please contact [facility] directly at [phone].  Normal or non-critical lab and imaging results will be communicated to you by [employer] Legend[employer]art, letter or phone within 4 business days after the clinic has received the results. If you do not hear from us within 7 days, please contact the clinic through [employer] Legend[employer]art or phone. If you have a critical or abnormal lab result, we will notify you by phone as soon as possible.  Submit refill requests through [employer] or call your pharmacy and they will forward the refill request to us. Please allow 3 business days for your refill to be completed.          Additional Information About Your Visit        MyChart Information     [employer] gives you secure access to your electronic health record. If you see a primary care provider, you can also send messages to your care team and make appointments. If you have questions, please call your primary care clinic.  If you do not have a primary care provider, please call [phone] and they will assist you.        Care EveryWhere ID     This is your Care EveryWhere ID. This could be used by other organizations to access your [place] medical records  [id number]          Blood Pressure from Last 3 Encounters:   04/03/17 149/87   02/03/17 117/80   01/13/17 117/72    Weight from Last 3 Encounters:   04/03/17 73 kg (160 lb 14.4 oz)   02/03/17 71.5 kg (157 lb 11.2 oz)   01/13/17 71.9 kg (158 lb 9.6 oz)              We Performed the Following     INTERROGATION DEVICE EVAL REMOTE, PACER/ICD        Primary Care Provider Office Phone # Fax #    Brionna Cobybaldomero Dc [phone]       [facility] [address] [place]  [facility] [address]        Thank you!     Thank you for choosing [facility]  for your care. Our goal is always to provide you with excellent care. Hearing back from our patients is one way we can continue to improve our services. Please take a few minutes to complete the written survey that you may receive in the mail after your visit with us. Thank you!             Your Updated Medication List - Protect others around you: Learn how to safely use, store and throw away your medicines at www.disposemymeds.org.          This list is accurate as of: 5/30/17  8:45 AM.  Always use your most recent med list.                   Brand Name Dispense Instructions for use    acetone (Urine) test Strp     25 each    1 strip by In Vitro route as needed       albuterol 108 (90 BASE) MCG/ACT Inhaler    albuterol    1 Inhaler    Inhale 2 puffs into the lungs every 4 hours as needed for shortness of breath / dyspnea       amitriptyline 50 MG tablet    ELAVIL    90 tablet    Take 1 tablet (50 mg) by mouth At Bedtime       aspirin 81 MG tablet     100    1 tab po QD (Once per day)       * blood glucose monitoring lancets     3 Box    1 each See Admin Instructions test 3-4 times per day       * blood glucose monitoring lancets     1 Box    Use to test blood sugar 10 times daily or as directed.  Ok to substitute alternative if insurance prefers.       blood glucose monitoring test strip    HOLLIE CONTOUR NEXT    300 each    Use to test blood sugar  10 times daily or as directed.  Ok to substitute alternative if insurance prefers.       furosemide 20 MG tablet    LASIX    90 tablet    TAKE ONE TABLET BY MOUTH EVERY DAY       glucagon 1 MG kit    GLUCAGON EMERGENCY    1 mg    Inject 1 mg into the muscle once for 1 dose       glucose 4 G Chew chewable tablet     25 tablet    Take 3-4 tablets to treat low blood sugar.       insulin pen needle 31G X 5 MM    B-D U/F    3 each    Use 3 time(s) a day.       Ipratropium-Albuterol  MCG/ACT inhaler    COMBIVENT RESPIMAT    1 Inhaler    Inhale 1 puff into the lungs 4 times daily Not to exceed 6 doses per day.       lisinopril 10 MG tablet    PRINIVIL/ZESTRIL    90 tablet    Take 1 tablet (10 mg) by mouth daily       methadone 10 mg/mL Conc (HIGH CONC) solution    DOLPHINE-INTENSOL     Take 7 mLs by mouth daily.       metoprolol 50 MG tablet    LOPRESSOR     Take 25 mg by mouth daily       MIRENA (52 MG) 20 MCG/24HR IUD   Generic drug:  levonorgestrel     1    placed today       multivitamin, therapeutic with minerals Tabs tablet     30 each    Take 1 tablet by mouth daily       NovoLOG VIAL 100 UNITS/ML injection   Generic drug:  insulin aspart     3 Month    Use as directed in insulin pump. Patient using up to 60 units per day       polyethylene glycol Packet    MIRALAX/GLYCOLAX    28 packet    Take 17 g by mouth daily       pregabalin 75 MG capsule    LYRICA    90 capsule    Take 1 capsule (75 mg) by mouth 3 times daily       ranitidine 300 MG tablet    ZANTAC    90 tablet    TAKE ONE TABLET BY MOUTH EVERY DAY       SM NICOTINE 21 MG/24HR 24 hr patch   Generic drug:  nicotine     28 patch    REMOVE OLD PATCH AND APPLY ONE NEW PATCH TO SKIN EVERY 24 HOURS       * study - lidocaine (LMX) 4 % Crea    IDS# 4490    45 g    Apply topically every 8 hours as needed for moderate pain       * lidocaine 5 % Patch    LIDODERM    30 patch    APPLY UP TO 3 PATCHES TO PAINFUL AREA AT ONCE FOR UP TO 12 HOURS WITHIN A 24 HOUR  PERIOD.  REMOVE AFTER 12 HOURS       * Notice:  This list has 4 medication(s) that are the same as other medications prescribed for you. Read the directions carefully, and ask your doctor or other care provider to review them with you.

## 2017-05-31 DIAGNOSIS — F41.9 ANXIETY: ICD-10-CM

## 2017-05-31 NOTE — TELEPHONE ENCOUNTER
amitriptyline (ELAVIL) 50 MG tablet      Last Written Prescription Date: 02/22/17  Last Quantity: 90, # refills: 1  Last Office Visit with [facility], [facility] or [facility] prescribing provider: 11/02/16       Creatinine   Date Value Ref Range Status   11/02/2016 1.13 (H) 0.52 - 1.04 mg/dL Final     Lab Results   Component Value Date    AST 22 02/10/2016     Lab Results   Component Value Date    ALT 36 02/10/2016     BP Readings from Last 3 Encounters:   04/03/17 149/87   02/03/17 117/80   01/13/17 117/72         [facility]

## 2017-06-02 ENCOUNTER — OFFICE VISIT (OUTPATIENT)
Dept: ENDOCRINOLOGY | Facility: CLINIC | Age: 52
End: 2017-06-02

## 2017-06-02 VITALS
DIASTOLIC BLOOD PRESSURE: 64 MMHG | HEART RATE: 102 BPM | WEIGHT: 157.9 LBS | BODY MASS INDEX: 23.39 KG/M2 | HEIGHT: 69 IN | SYSTOLIC BLOOD PRESSURE: 100 MMHG

## 2017-06-02 DIAGNOSIS — E13.42 SECONDARY DIABETES WITH PERIPHERAL NEUROPATHY (H): Primary | ICD-10-CM

## 2017-06-02 RX ORDER — AMITRIPTYLINE HYDROCHLORIDE 50 MG/1
50 TABLET ORAL AT BEDTIME
Qty: 30 TABLET | Refills: 0 | Status: SHIPPED | OUTPATIENT
Start: 2017-06-02 | End: 2017-06-21

## 2017-06-02 ASSESSMENT — PAIN SCALES - GENERAL: PAINLEVEL: NO PAIN (0)

## 2017-06-02 NOTE — TELEPHONE ENCOUNTER
Medication is being filled for 1 time refill only due to:  Patient needs to be seen for office visit for further refills.  Lita Kang RN
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
## 2017-06-02 NOTE — MR AVS SNAPSHOT
After Visit Summary   6/2/2017    Alessandra Pak    MRN: [id number]           Patient Information     Date Of Birth          1965        Visit Information        Provider Department      6/2/2017 11:30 AM Toya Nuno PA-C [facility]         Follow-ups after your visit        Your next 10 appointments already scheduled     Jun 07, 2017 10:40 AM CDT   (Arrive by 10:25 AM)   RETURN FOOT/ANKLE with Barrington Lewis DPM   [facility] ([place] [facility]    [address] [place] [id number] [phone]            Aug 04, 2017 12:00 PM CDT   (Arrive by 11:45 AM)   RETURN DIABETES with ROMIE Betancur [facility] ([place] [facility])    [address] [place] [address] [phone]              Who to contact     Please call your clinic at [phone] to:    Ask questions about your health    Make or cancel appointments    Discuss your medicines    Learn about your test results    Speak to your doctor   If you have compliments or concerns about an experience at your clinic, or if you wish to file a complaint, please contact [facility] Patient Relations at [phone] or email us at [email or web address]         Additional Information About Your Visit        MyChart Information     [employer] gives you secure access to your electronic health record. If you see a primary care provider, you can also send messages to your care team and make appointments. If you have questions, please call your primary care clinic.  If you do not have a primary care provider, please call [phone] and they will assist you.      [employer] is an electronic gateway that provides easy, online access to your medical records. With [employer], you can request a clinic appointment, read your test results, renew a prescription or communicate  "with your care team.     To access your existing account, please contact your [facility] or call [phone] for assistance.        Care EveryWhere ID     This is your Care EveryWhere ID. This could be used by other organizations to access your [place] medical records  [id number]        Your Vitals Were     Pulse Height BMI (Body Mass Index)             102 1.753 m (5' 9\") 23.32 kg/m2          Blood Pressure from Last 3 Encounters:   06/02/17 100/64   04/03/17 149/87   02/03/17 117/80    Weight from Last 3 Encounters:   06/02/17 71.6 kg (157 lb 14.4 oz)   04/03/17 73 kg (160 lb 14.4 oz)   02/03/17 71.5 kg (157 lb 11.2 oz)              Today, you had the following     No orders found for display       Primary Care Provider Office Phone # Fax #    Brionna Coby Dc [phone]       [facility] [address]  [facility] [address]        Thank you!     Thank you for choosing [facility] ENDOCRINOLOGY  for your care. Our goal is always to provide you with excellent care. Hearing back from our patients is one way we can continue to improve our services. Please take a few minutes to complete the written survey that you may receive in the mail after your visit with us. Thank you!             Your Updated Medication List - Protect others around you: Learn how to safely use, store and throw away your medicines at www.disposemymeds.org.          This list is accurate as of: 6/2/17 12:15 PM.  Always use your most recent med list.                   Brand Name Dispense Instructions for use    acetone (Urine) test Strp     25 each    1 strip by In Vitro route as needed       albuterol 108 (90 BASE) MCG/ACT Inhaler    albuterol    1 Inhaler    Inhale 2 puffs into the lungs every 4 hours as needed for shortness of breath / dyspnea       amitriptyline 50 MG tablet    ELAVIL    90 tablet    Take 1 tablet (50 mg) by mouth At Bedtime       aspirin 81 MG " tablet     100    1 tab po QD (Once per day)       * blood glucose monitoring lancets     3 Box    1 each See Admin Instructions test 3-4 times per day       * blood glucose monitoring lancets     1 Box    Use to test blood sugar 10 times daily or as directed.  Ok to substitute alternative if insurance prefers.       blood glucose monitoring test strip    HOLLIE CONTOUR NEXT    300 each    Use to test blood sugar 10 times daily or as directed.  Ok to substitute alternative if insurance prefers.       furosemide 20 MG tablet    LASIX    90 tablet    TAKE ONE TABLET BY MOUTH EVERY DAY       glucagon 1 MG kit    GLUCAGON EMERGENCY    1 mg    Inject 1 mg into the muscle once for 1 dose       glucose 4 G Chew chewable tablet     25 tablet    Take 3-4 tablets to treat low blood sugar.       insulin pen needle 31G X 5 MM    B-D U/F    3 each    Use 3 time(s) a day.       Ipratropium-Albuterol  MCG/ACT inhaler    COMBIVENT RESPIMAT    1 Inhaler    Inhale 1 puff into the lungs 4 times daily Not to exceed 6 doses per day.       lisinopril 10 MG tablet    PRINIVIL/ZESTRIL    90 tablet    Take 1 tablet (10 mg) by mouth daily       methadone 10 mg/mL Conc (HIGH CONC) solution    DOLPHINE-INTENSOL     Take 7 mLs by mouth daily.       metoprolol 50 MG tablet    LOPRESSOR     Take 25 mg by mouth daily       MIRENA (52 MG) 20 MCG/24HR IUD   Generic drug:  levonorgestrel     1    placed today       multivitamin, therapeutic with minerals Tabs tablet     30 each    Take 1 tablet by mouth daily       NovoLOG VIAL 100 UNITS/ML injection   Generic drug:  insulin aspart     3 Month    Use as directed in insulin pump. Patient using up to 60 units per day       polyethylene glycol Packet    MIRALAX/GLYCOLAX    28 packet    Take 17 g by mouth daily       pregabalin 75 MG capsule    LYRICA    90 capsule    Take 1 capsule (75 mg) by mouth 3 times daily       ranitidine 300 MG tablet    ZANTAC    90 tablet    TAKE ONE TABLET BY MOUTH EVERY  DAY       SM NICOTINE 21 MG/24HR 24 hr patch   Generic drug:  nicotine     28 patch    REMOVE OLD PATCH AND APPLY ONE NEW PATCH TO SKIN EVERY 24 HOURS       * study - lidocaine (LMX) 4 % Crea    IDS# 4490    45 g    Apply topically every 8 hours as needed for moderate pain       * lidocaine 5 % Patch    LIDODERM    30 patch    APPLY UP TO 3 PATCHES TO PAINFUL AREA AT ONCE FOR UP TO 12 HOURS WITHIN A 24 HOUR PERIOD.  REMOVE AFTER 12 HOURS       * Notice:  This list has 4 medication(s) that are the same as other medications prescribed for you. Read the directions carefully, and ask your doctor or other care provider to review them with you.

## 2017-06-05 RX ORDER — AMITRIPTYLINE HYDROCHLORIDE 50 MG/1
TABLET ORAL
Qty: 90 TABLET | Refills: 0 | OUTPATIENT
Start: 2017-06-05

## 2017-06-07 ENCOUNTER — OFFICE VISIT (OUTPATIENT)
Dept: ORTHOPEDICS | Facility: CLINIC | Age: 52
End: 2017-06-07

## 2017-06-07 DIAGNOSIS — I73.9 PAD (PERIPHERAL ARTERY DISEASE) (H): Primary | ICD-10-CM

## 2017-06-07 DIAGNOSIS — E11.22 TYPE 2 DIABETES MELLITUS WITH STAGE 3 CHRONIC KIDNEY DISEASE, UNSPECIFIED LONG TERM INSULIN USE STATUS: Chronic | ICD-10-CM

## 2017-06-07 DIAGNOSIS — N18.3 TYPE 2 DIABETES MELLITUS WITH STAGE 3 CHRONIC KIDNEY DISEASE, UNSPECIFIED LONG TERM INSULIN USE STATUS: Chronic | ICD-10-CM

## 2017-06-07 DIAGNOSIS — I42.8 NONISCHEMIC CARDIOMYOPATHY (H): ICD-10-CM

## 2017-06-07 DIAGNOSIS — L97.412 HEEL ULCER, RIGHT, WITH FAT LAYER EXPOSED (H): Chronic | ICD-10-CM

## 2017-06-07 NOTE — PROGRESS NOTES
Chief Complaint:   Chief Complaint   Patient presents with     Wound Check     2 week follow up. Wounds, right foot. Pt state dthe wound on the toe is healing well but the heel is not. Pt state dthat her heel has been draining more then usual.           Allergies   Allergen Reactions     Naproxen GI Disturbance     No Known Drug Allergies          Subjective: Alessandra is a 51 year old female who presents to the clinic today for a follow up of right foot wounds. She relates that the 2nd toe healed uneventfully. The heel was the size of a dime and then broke open again. She has been covering the wound with MediHoney. She relates that she is wearing the DH shoes, however she presents today in a sandal.     Objective      A diabetic wound is noted at right  posterior heel measuring 2.8cm x 2.9cm 0.1cm.    Lozano Classification: II    Wound base: [place]/Granulation    Edges: intact    Drainage: small/serous    Odor: no    Undermining: no    Bone Exposure: No    Clinical Signs of Infection: No    After obtaining patient consent, the wound was irrigated with copious amounts of saline. A scalpel was then used to debride the wound into the subcutaneous tissue. The wound edges were debrided to healthy, bleeding tissue. Given the patient's lack of sensation, no anesthesia was necessary for the procedure.       Assessment: Recurrent right heel wound     Plan:   - Pt seen and evaluated  - Wound was cleansed and debrided as described. I applied Endoform to the wound base and covered with a Hydrofera Blue, followed by 4x4s and an ACE. She should leave this intact until Sunday. On Sunday, go back to MediHoney dressing changes. Cont DH shoe use.   - Pt to return to clinic in 1 week  - Referral to IR for abnormal ABIs.

## 2017-06-07 NOTE — NURSING NOTE
Reason For Visit:   Chief Complaint   Patient presents with     Wound Check     2 week follow up. Wounds, right foot. Pt state dthe wound on the toe is healing well but the heel is not. Pt state dthat her heel has been draining more then usual.        Pain Assessment  Patient Currently in Pain: Denies          Current Outpatient Prescriptions   Medication Sig Dispense Refill     amitriptyline (ELAVIL) 50 MG tablet Take 1 tablet (50 mg) by mouth At Bedtime 30 tablet 0     pregabalin (LYRICA) 75 MG capsule Take 1 capsule (75 mg) by mouth 3 times daily 90 capsule 5     ranitidine (ZANTAC) 300 MG tablet TAKE ONE TABLET BY MOUTH EVERY DAY 90 tablet 1     lisinopril (PRINIVIL/ZESTRIL) 10 MG tablet Take 1 tablet (10 mg) by mouth daily 90 tablet 1     lidocaine (LIDODERM) 5 % Patch APPLY UP TO 3 PATCHES TO PAINFUL AREA AT ONCE FOR UP TO 12 HOURS WITHIN A 24 HOUR PERIOD.  REMOVE AFTER 12 HOURS 30 patch 1     SM NICOTINE 21 MG/24HR 24 hr patch REMOVE OLD PATCH AND APPLY ONE NEW PATCH TO SKIN EVERY 24 HOURS 28 patch 1     furosemide (LASIX) 20 MG tablet TAKE ONE TABLET BY MOUTH EVERY DAY 90 tablet 1     insulin aspart (NOVOLOG VIAL) 100 UNITS/ML VIAL Use as directed in insulin pump. Patient using up to 60 units per day 3 Month 3     blood glucose monitoring (HOLLIE CONTOUR NEXT) test strip Use to test blood sugar 10 times daily or as directed.  Ok to substitute alternative if insurance prefers. 300 each 12     blood glucose monitoring (HOLLIE MICROLET) lancets Use to test blood sugar 10 times daily or as directed.  Ok to substitute alternative if insurance prefers. 1 Box prn     acetone, Urine, test STRP 1 strip by In Vitro route as needed 25 each 1     glucagon (GLUCAGON EMERGENCY) 1 MG injection Inject 1 mg into the muscle once for 1 dose 1 mg 1     Ipratropium-Albuterol (COMBIVENT RESPIMAT)  MCG/ACT inhaler Inhale 1 puff into the lungs 4 times daily Not to exceed 6 doses per day. 1 Inhaler 6     multivitamin,  therapeutic with minerals (THERA-VIT-M) TABS Take 1 tablet by mouth daily 30 each 11     metoprolol (LOPRESSOR) 50 MG tablet Take 25 mg by mouth daily       study - lidocaine, LMX, (IDS# 4490) 4 % CREA Apply topically every 8 hours as needed for moderate pain 45 g 11     albuterol (ALBUTEROL) 108 (90 BASE) MCG/ACT inhaler Inhale 2 puffs into the lungs every 4 hours as needed for shortness of breath / dyspnea 1 Inhaler 5     polyethylene glycol (MIRALAX/GLYCOLAX) packet Take 17 g by mouth daily 28 packet 3     insulin pen needle (B-D U/F) 31G X 5 MM Use 3 time(s) a day. 3 each 3     blood glucose monitoring (FREESTYLE) lancets 1 each See Admin Instructions test 3-4 times per day 3 Box 3     glucose 4 G CHEW Take 3-4 tablets to treat low blood sugar. 25 tablet 11     methadone (DOLPHINE-INTENSOL) 10 mg/mL CONC Take 7 mLs by mouth daily.       MIRENA 20 MCG/24HR IU IUD placed today 1 0     ASPIRIN 81 MG OR TABS 1 tab po QD (Once per day) 100 3          Allergies   Allergen Reactions     Naproxen GI Disturbance     No Known Drug Allergies

## 2017-06-07 NOTE — TELEPHONE ENCOUNTER
furosemide (LASIX) 20 MG tablet      Last Written Prescription Date: 11/30/16  Last Fill Quantity: 90, # refills: 1  Last Office Visit with G, P or [facility] prescribing provider: 11/2/16       Potassium   Date Value Ref Range Status   11/02/2016 4.1 3.4 - 5.3 mmol/L Final     Creatinine   Date Value Ref Range Status   11/02/2016 1.13 (H) 0.52 - 1.04 mg/dL Final     BP Readings from Last 3 Encounters:   06/02/17 100/64   04/03/17 149/87   02/03/17 117/80           David Faarax  [facility]

## 2017-06-07 NOTE — LETTER
6/7/2017       RE: Alessandra Pak  [address]  [facility] [address]     Dear Colleague,    Thank you for referring your patient, Alessandra Pak, to the [facility] at [facility]. Please see a copy of my visit note below.      Chief Complaint:   Chief Complaint   Patient presents with     Wound Check     2 week follow up. Wounds, right foot. Pt state dthe wound on the toe is healing well but the heel is not. Pt state dthat her heel has been draining more then usual.           Allergies   Allergen Reactions     Naproxen GI Disturbance     No Known Drug Allergies          Subjective: Alessandra is a 51 year old female who presents to the clinic today for a follow up of right foot wounds. She relates that the 2nd toe healed uneventfully. The heel was the size of a dime and then broke open again. She has been covering the wound with MediHoney. She relates that she is wearing the DH shoes, however she presents today in a sandal.     Objective      A diabetic wound is noted at right  posterior heel measuring 2.8cm x 2.9cm 0.1cm.    Lozano Classification: II    Wound base: [place]/Granulation    Edges: intact    Drainage: small/serous    Odor: no    Undermining: no    Bone Exposure: No    Clinical Signs of Infection: No    After obtaining patient consent, the wound was irrigated with copious amounts of saline. A scalpel was then used to debride the wound into the subcutaneous tissue. The wound edges were debrided to healthy, bleeding tissue. Given the patient's lack of sensation, no anesthesia was necessary for the procedure.       Assessment: Recurrent right heel wound     Plan:   - Pt seen and evaluated  - Wound was cleansed and debrided as described. I applied Endoform to the wound base and covered with a Hydrofera Blue, followed by 4x4s and an ACE. She should leave this intact until Sunday. On Sunday, go back to MediHoney dressing changes. Cont DH shoe use.    - Pt to return to clinic in 1 week  - Referral to IR for abnormal ABIs.        Again, thank you for allowing me to participate in the care of your patient.      Sincerely,    Barrington Lewis DPM

## 2017-06-07 NOTE — MR AVS SNAPSHOT
After Visit Summary   6/7/2017    Alessandra Pak    MRN: [id number]           Patient Information     Date Of Birth          1965        Visit Information        Provider Department      6/7/2017 10:40 AM Barrington Lewis DPM [facility]        Today's Diagnoses     PAD (peripheral artery disease) (H)    -  1       Follow-ups after your visit        Additional Services     IR REFERRAL       [facility] IR REFERRAL  Call [phone] to schedule.    IR ARTERIAL/VENOUS/PAD referral    Associated Diagnosis: PAD with abnormal left GABRIEL  Date preferred: AYC    The Interventional Radiologist will consult patients for these procedures;   Peripheral arterial disease,   Angiomyolipoma   Claudication,   Varicose veins,   TIPS procedure,   lowers extremity swelling suspected venous disease   Arterial venous malformations (AVM),   venous malformation,   venous insufficiency   pelvic varicosities   Prostate artery embolization (BPH only)    If the procedure requested is not in the list above, please place this referral and call [phone].    The purpose of this referral is to make sure that   You are a candidate for this procedure  Adequate imaging is available to perform necessary procedures if indicated.    Please be aware that coverage of these services is subject to the terms and limitations of your health insurance plan.  Call member services at your health plan with any benefit or coverage questions.       Please bring the following to your appointment:  >>   >>   Any x-rays, CTs or MRIs which have been performed related to this condition.  Please contact the facility where they were done to arrange for  prior to your scheduled appointment.   We will need both images as well as reports.  Any new CT, MRI or other procedures ordered by your specialist must be performed at a [place] facility or coordinated by your clinic's referral office to ensure proper imaging can be obtained.      >>   List of current medications doses and, schedules.  >>   This referral request   >>   Any documents/labs given to you for this referral                  Your next 10 appointments already scheduled     Jun 14, 2017 11:40 AM CDT   (Arrive by 11:25 AM)   RETURN FOOT/ANKLE with Barrington Lewis DPM   [facility] ([facility])    [address] [place] [address] [phone]            Aug 04, 2017 12:00 PM CDT   (Arrive by 11:45 AM)   RETURN DIABETES with Toya Nuno PA-C   [facility] End[facility] ([facility])    [address] [place] [address] [phone]              Who to contact     Please call your clinic at [phone] to:    Ask questions about your health    Make or cancel appointments    Discuss your medicines    Learn about your test results    Speak to your doctor   If you have compliments or concerns about an experience at your clinic, or if you wish to file a complaint, please contact [facility] Patient Relations at [phone] or email us at [email or web address]         Additional Information About Your Visit        [email or web address] Information     [employer] gives you secure access to your electronic health record. If you see a primary care provider, you can also send messages to your care team and make appointments. If you have questions, please call your primary care clinic.  If you do not have a primary care provider, please call [phone] and they will assist you.      [employer] is an electronic gateway that provides easy, online access to your medical records. With [employer], you can request a clinic appointment, read your test results, renew a prescription or communicate with your care team.     To access your existing account, please contact your [facility] or call [phone] for assistance.         Care EveryWhere ID     This is your Care EveryWhere ID. This could be used by other organizations to access your [facility] medical records  [id number]         Blood Pressure from Last 3 Encounters:   06/02/17 100/64   04/03/17 149/87   02/03/17 117/80    Weight from Last 3 Encounters:   06/02/17 71.6 kg (157 lb 14.4 oz)   04/03/17 73 kg (160 lb 14.4 oz)   02/03/17 71.5 kg (157 lb 11.2 oz)              We Performed the Following     IR REFERRAL        Primary Care Provider Office Phone # Fax #    Brionna Coby Dc [phone]       [facility] [address]  [facility] [address]        Thank you!     Thank you for choosing [facility] OR[facility]INIC  for your care. Our goal is always to provide you with excellent care. Hearing back from our patients is one way we can continue to improve our services. Please take a few minutes to complete the written survey that you may receive in the mail after your visit with us. Thank you!             Your Updated Medication List - Protect others around you: Learn how to safely use, store and throw away your medicines at www.disposemymeds.org.          This list is accurate as of: 6/7/17 11:04 AM.  Always use your most recent med list.                   Brand Name Dispense Instructions for use    acetone (Urine) test Strp     25 each    1 strip by In Vitro route as needed       albuterol 108 (90 BASE) MCG/ACT Inhaler    albuterol    1 Inhaler    Inhale 2 puffs into the lungs every 4 hours as needed for shortness of breath / dyspnea       amitriptyline 50 MG tablet    ELAVIL    30 tablet    Take 1 tablet (50 mg) by mouth At Bedtime       aspirin 81 MG tablet     100    1 tab po QD (Once per day)       * blood glucose monitoring lancets     3 Box    1 each See Admin Instructions test 3-4 times per day       * blood glucose monitoring lancets     1 Box    Use to test blood sugar 10 times daily or as directed.  Ok to substitute  alternative if insurance prefers.       blood glucose monitoring test strip    HOLLIE CONTOUR NEXT    300 each    Use to test blood sugar 10 times daily or as directed.  Ok to substitute alternative if insurance prefers.       furosemide 20 MG tablet    LASIX    90 tablet    TAKE ONE TABLET BY MOUTH EVERY DAY       glucagon 1 MG kit    GLUCAGON EMERGENCY    1 mg    Inject 1 mg into the muscle once for 1 dose       glucose 4 G Chew chewable tablet     25 tablet    Take 3-4 tablets to treat low blood sugar.       insulin pen needle 31G X 5 MM    B-D U/F    3 each    Use 3 time(s) a day.       Ipratropium-Albuterol  MCG/ACT inhaler    COMBIVENT RESPIMAT    1 Inhaler    Inhale 1 puff into the lungs 4 times daily Not to exceed 6 doses per day.       lisinopril 10 MG tablet    PRINIVIL/ZESTRIL    90 tablet    Take 1 tablet (10 mg) by mouth daily       methadone 10 mg/mL Conc (HIGH CONC) solution    DOLPHINE-INTENSOL     Take 7 mLs by mouth daily.       metoprolol 50 MG tablet    LOPRESSOR     Take 25 mg by mouth daily       MIRENA (52 MG) 20 MCG/24HR IUD   Generic drug:  levonorgestrel     1    placed today       multivitamin, therapeutic with minerals Tabs tablet     30 each    Take 1 tablet by mouth daily       NovoLOG VIAL 100 UNITS/ML injection   Generic drug:  insulin aspart     3 Month    Use as directed in insulin pump. Patient using up to 60 units per day       polyethylene glycol Packet    MIRALAX/GLYCOLAX    28 packet    Take 17 g by mouth daily       pregabalin 75 MG capsule    LYRICA    90 capsule    Take 1 capsule (75 mg) by mouth 3 times daily       ranitidine 300 MG tablet    ZANTAC    90 tablet    TAKE ONE TABLET BY MOUTH EVERY DAY       SM NICOTINE 21 MG/24HR 24 hr patch   Generic drug:  nicotine     28 patch    REMOVE OLD PATCH AND APPLY ONE NEW PATCH TO SKIN EVERY 24 HOURS       * study - lidocaine (LMX) 4 % Crea    IDS# 5182    45 g    Apply topically every 8 hours as needed for moderate pain        * lidocaine 5 % Patch    LIDODERM    30 patch    APPLY UP TO 3 PATCHES TO PAINFUL AREA AT ONCE FOR UP TO 12 HOURS WITHIN A 24 HOUR PERIOD.  REMOVE AFTER 12 HOURS       * Notice:  This list has 4 medication(s) that are the same as other medications prescribed for you. Read the directions carefully, and ask your doctor or other care provider to review them with you.

## 2017-06-09 NOTE — TELEPHONE ENCOUNTER
Routing refill request to provider for review/approval because:  Labs out of range:  Creatinine  Alicia Pinzon RN

## 2017-06-12 RX ORDER — FUROSEMIDE 20 MG
20 TABLET ORAL DAILY
Qty: 30 TABLET | Refills: 0 | Status: SHIPPED | OUTPATIENT
Start: 2017-06-12 | End: 2017-06-21

## 2017-06-13 ENCOUNTER — MYC REFILL (OUTPATIENT)
Dept: FAMILY MEDICINE | Facility: CLINIC | Age: 52
End: 2017-06-13

## 2017-06-13 DIAGNOSIS — M54.16 LUMBAR RADICULOPATHY: ICD-10-CM

## 2017-06-13 DIAGNOSIS — I42.8 NONISCHEMIC CARDIOMYOPATHY (H): ICD-10-CM

## 2017-06-13 DIAGNOSIS — R06.02 SOB (SHORTNESS OF BREATH): ICD-10-CM

## 2017-06-13 RX ORDER — FUROSEMIDE 20 MG
20 TABLET ORAL DAILY
Qty: 30 TABLET | Refills: 0 | Status: CANCELLED | OUTPATIENT
Start: 2017-06-13

## 2017-06-13 NOTE — TELEPHONE ENCOUNTER
Message from [employer]:  Original authorizing provider: Kailee Paulino MD, MD Apariico RALPHLon Pak would like a refill of the following medications:  albuterol (ALBUTEROL) 108 (90 BASE) MCG/ACT inhaler [Kailee Paulino MD, MD]    Preferred pharmacy: [address]    Comment:  send the prescription to [facility] pharmacy [address] [place] [address]    Medication renewals requested in this message routed to other providers:  lidocaine (LIDODERM) 5 % Patch [Brionna Dc MD]  furosemide (LASIX) 20 MG tablet [Brionna Dc MD]

## 2017-06-13 NOTE — TELEPHONE ENCOUNTER
lidocaine (LIDODERM) 5 % Patch      Last Written Prescription Date: 12/27/16  Last Fill Quantity: 30, # refills: 1  [place] Office Visit with [facility] or [facility] prescribing provider: 11/02/16       Creatinine   Date Value Ref Range Status   11/02/2016 1.13 (H) 0.52 - 1.04 mg/dL Final         furosemide (LASIX) 20 MG tablet      Last Written Prescription Date: 06/12/17  Last Fill Quantity: 30, # refills: 0  Last Office Visit with [facility] or [facility] prescribing provider: 11/02/16       Potassium   Date Value Ref Range Status   11/02/2016 4.1 3.4 - 5.3 mmol/L Final     Creatinine   Date Value Ref Range Status   11/02/2016 1.13 (H) 0.52 - 1.04 mg/dL Final     BP Readings from Last 3 Encounters:   06/02/17 100/64   04/03/17 149/87   02/03/17 117/80

## 2017-06-13 NOTE — TELEPHONE ENCOUNTER
Message from [employer]:  Original authorizing provider: Brionna Dc MD    Alessandra Pak would like a refill of the following medications:  lidocaine (LIDODERM) 5 % Patch [Brionna Dc MD]  furosemide (LASIX) 20 MG tablet [Brionna Dc MD]    Preferred pharmacy: [address]    Comment:  send the prescription to [facility] pharmacy [address] [place] [address]    Medication renewals requested in this message routed to other providers:  albuterol (ALBUTEROL) 108 (90 BASE) MCG/ACT inhaler [Kailee Paulino MD, MD]

## 2017-06-14 ENCOUNTER — OFFICE VISIT (OUTPATIENT)
Dept: ORTHOPEDICS | Facility: CLINIC | Age: 52
End: 2017-06-14

## 2017-06-14 DIAGNOSIS — E11.40 TYPE 2 DIABETES MELLITUS WITH DIABETIC NEUROPATHY, WITHOUT LONG-TERM CURRENT USE OF INSULIN (H): Chronic | ICD-10-CM

## 2017-06-14 DIAGNOSIS — L97.412 HEEL ULCER, RIGHT, WITH FAT LAYER EXPOSED (H): Primary | Chronic | ICD-10-CM

## 2017-06-14 NOTE — LETTER
2017      RE: Alessandra Pak  [address]  [facility] [address]       Chief Complaint:   Chief Complaint   Patient presents with     WOUND CARE     Follow up right foot wounds.          Allergies   Allergen Reactions     Naproxen GI Disturbance     No Known Drug Allergies      Subjective: Alessandra is a 51 year old female who presents to the clinic today for a follow up of right heel wound. Alessandra reports severe pain around medial malleolus this past week, worse than her normal pain level in this area. She has a regimen of topical creams and lidocaine patches that she uses, and is also taking lyrica. She has been changing the wound dressing daily with MediHoney. She has an appointment scheduled with vascular in a week.     Objective  A pressure wound wound is noted at right  medial heel measuring 2.6 cm x 2.8 cm x 0.1 cm.    Lozano Classification: II    Wound base: Red/Granulation    Edges: intact    Drainage: small/serous    Odor: None    Undermining: No    Bone Exposure: No    Clinical Signs of Infection: No    No debridement necessary today.    GABRIEL 17  Impression:   Right le. GABRIEL: 1.11, normal.  2. TBI: 0.91, normal.  3. VPR: Normal waveforms throughout.     Left le. GABRIEL: 0.50, abnormal. Concerning for mild to moderate peripheral  arterial disease.  2. TBI: 0.50, abnormal.  3. VPR: Diminished waveforms throughout, suggestive of iliac inflow  disease. 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    Assessment: Right diabetic heel wound    Plan:   - Pt seen and evaluated  - Wound care instructions: wash with microklenz and pat dry. Apply MediHoney and Duoderm. Secure with bulky amount of 4x4 gauze and ACE wrap. Change every other day.  - Pt to return to clinic in 1 week.      Barrington Lewis DPM

## 2017-06-14 NOTE — NURSING NOTE
Reason For Visit:   Chief Complaint   Patient presents with     WOUND CARE     Follow up right foot wounds.       Pain Assessment  Patient Currently in Pain: Yes  0-10 Pain Scale: 7  Primary Pain Location: Foot  Pain Orientation: Right  Pain Descriptors: Aching, Burning  Alleviating Factors: Other (comment) (Lidocaine patches)  Aggravating Factors: Other (comment) (Nothing it just hurts.)

## 2017-06-14 NOTE — LETTER
2017       RE: Alessandra Pak  [address]  [facility] [address]     Dear Colleague,    Thank you for referring your patient, Alessandra Pak, to the [facility] at [facility]. Please see a copy of my visit note below.    Chief Complaint:   Chief Complaint   Patient presents with     WOUND CARE     Follow up right foot wounds.          Allergies   Allergen Reactions     Naproxen GI Disturbance     No Known Drug Allergies      Subjective: Alessandra is a 51 year old female who presents to the clinic today for a follow up of right heel wound. Alessandra reports severe pain around medial malleolus this past week, worse than her normal pain level in this area. She has a regimen of topical creams and lidocaine patches that she uses, and is also taking lyrica. She has been changing the wound dressing daily with MediHoney. She has an appointment scheduled with vascular in a week.     Objective  A pressure wound wound is noted at right  medial heel measuring 2.6 cm x 2.8 cm x 0.1 cm.    Lozano Classification: II    Wound base: Red/Granulation    Edges: intact    Drainage: small/serous    Odor: None    Undermining: No    Bone Exposure: No    Clinical Signs of Infection: No    No debridement necessary today.    GABRIEL 17  Impression:   Right le. GABRIEL: 1.11, normal.  2. TBI: 0.91, normal.  3. VPR: Normal waveforms throughout.     Left le. GABRIEL: 0.50, abnormal. Concerning for mild to moderate peripheral  arterial disease.  2. TBI: 0.50, abnormal.  3. VPR: Diminished waveforms throughout, suggestive of iliac inflow  disease. 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    Assessment: Right diabetic heel wound    Plan:   - Pt seen and evaluated  - Wound care instructions: wash with microklenz and pat dry. Apply MediHoney and Duoderm. Secure with bulky amount of 4x4 gauze and ACE wrap. Change every other  day.  - Pt to return to clinic in 1 week.      Again, thank you for allowing me to participate in the care of your patient.      Sincerely,    Barrington Lewis DPM

## 2017-06-14 NOTE — MR AVS SNAPSHOT
After Visit Summary   6/14/2017    Alessandra Pak    MRN: [id number]           Patient Information     Date Of Birth          1965        Visit Information        Provider Department      6/14/2017 11:40 AM Barrington Lewis DPM [facility]        Today's Diagnoses     Heel ulcer, right, with fat layer exposed (H)    -  1    Type 2 diabetes mellitus with diabetic neuropathy, without long-term current use of insulin (H)           Follow-ups after your visit        Your next 10 appointments already scheduled     Jun 21, 2017  1:00 PM CDT   (Arrive by 12:45 PM)   RETURN FOOT/ANKLE with Barrington Lewis DPM   [facility] ([facility])    [address] [place] [address]  4th [facility] [id number] [phone]            Jun 21, 2017  1:40 PM CDT   (Arrive by 1:25 PM)   New Vascular Visit with Merlin Kline MD   [facility] ([facility])    [address] [place] [address]  3rd [facility] [id number] [phone]            Aug 04, 2017 12:00 PM CDT   (Arrive by 11:45 AM)   RETURN DIABETES with Toya Nuno PA-C   [facility] ([facility])    [address] [place] [address] [phone]              Who to contact     Please call your clinic at [phone] to:    Ask questions about your health    Make or cancel appointments    Discuss your medicines    Learn about your test results    Speak to your doctor   If you have compliments or concerns about an experience at your clinic, or if you wish to file a complaint, please contact [facility] Patient Relations at [phone] or email us at [email or web address]         Additional Information About Your Visit        MyChart Information     [employer] gives you secure access to your electronic health record. If you see a primary care  provider, you can also send messages to your care team and make appointments. If you have questions, please call your primary care clinic.  If you do not have a primary care provider, please call [phone] and they will assist you.      55[employer] is an electronic gateway that provides easy, online access to your medical records. With 55[employer], you can request a clinic appointment, read your test results, renew a prescription or communicate with your care team.     To access your existing account, please contact your [facility] or call [phone] for assistance.        Care EveryWhere ID     This is your Care EveryWhere ID. This could be used by other organizations to access your [place] medical records  [id number]         Blood Pressure from Last 3 Encounters:   06/02/17 100/64   04/03/17 149/87   02/03/17 117/80    Weight from Last 3 Encounters:   06/02/17 71.6 kg (157 lb 14.4 oz)   04/03/17 73 kg (160 lb 14.4 oz)   02/03/17 71.5 kg (157 lb 11.2 oz)              Today, you had the following     No orders found for display       Primary Care Provider Office Phone # Fax #    Broinna Coby Dc [phone]       [facility] [address] [facility] [address]        Thank you!     Thank you for choosing [facility]  for your care. Our goal is always to provide you with excellent care. Hearing back from our patients is one way we can continue to improve our services. Please take a few minutes to complete the written survey that you may receive in the mail after your visit with us. Thank you!             Your Updated Medication List - Protect others around you: Learn how to safely use, store and throw away your medicines at www.disposemymeds.org.          This list is accurate as of: 6/14/17  1:01 PM.  Always use your most recent med list.                   Brand Name Dispense Instructions for use    acetone (Urine)  test Strp     25 each    1 strip by In Vitro route as needed       albuterol 108 (90 BASE) MCG/ACT Inhaler    albuterol    1 Inhaler    Inhale 2 puffs into the lungs every 4 hours as needed for shortness of breath / dyspnea       amitriptyline 50 MG tablet    ELAVIL    30 tablet    Take 1 tablet (50 mg) by mouth At Bedtime       aspirin 81 MG tablet     100    1 tab po QD (Once per day)       * blood glucose monitoring lancets     3 Box    1 each See Admin Instructions test 3-4 times per day       * blood glucose monitoring lancets     1 Box    Use to test blood sugar 10 times daily or as directed.  Ok to substitute alternative if insurance prefers.       blood glucose monitoring test strip    HOLLIE CONTOUR NEXT    300 each    Use to test blood sugar 10 times daily or as directed.  Ok to substitute alternative if insurance prefers.       furosemide 20 MG tablet    LASIX    30 tablet    Take 1 tablet (20 mg) by mouth daily Needs labs for more.       glucagon 1 MG kit    GLUCAGON EMERGENCY    1 mg    Inject 1 mg into the muscle once for 1 dose       glucose 4 G Chew chewable tablet     25 tablet    Take 3-4 tablets to treat low blood sugar.       insulin pen needle 31G X 5 MM    B-D U/F    3 each    Use 3 time(s) a day.       Ipratropium-Albuterol  MCG/ACT inhaler    COMBIVENT RESPIMAT    1 Inhaler    Inhale 1 puff into the lungs 4 times daily Not to exceed 6 doses per day.       lisinopril 10 MG tablet    PRINIVIL/ZESTRIL    90 tablet    Take 1 tablet (10 mg) by mouth daily       methadone 10 mg/mL Conc (HIGH CONC) solution    DOLPHINE-INTENSOL     Take 7 mLs by mouth daily.       metoprolol 50 MG tablet    LOPRESSOR     Take 25 mg by mouth daily       MIRENA (52 MG) 20 MCG/24HR IUD   Generic drug:  levonorgestrel     1    placed today       multivitamin, therapeutic with minerals Tabs tablet     30 each    Take 1 tablet by mouth daily       NovoLOG VIAL 100 UNITS/ML injection   Generic drug:  insulin aspart      3 Month    Use as directed in insulin pump. Patient using up to 60 units per day       polyethylene glycol Packet    MIRALAX/GLYCOLAX    28 packet    Take 17 g by mouth daily       pregabalin 75 MG capsule    LYRICA    90 capsule    Take 1 capsule (75 mg) by mouth 3 times daily       ranitidine 300 MG tablet    ZANTAC    90 tablet    TAKE ONE TABLET BY MOUTH EVERY DAY       SM NICOTINE 21 MG/24HR 24 hr patch   Generic drug:  nicotine     28 patch    REMOVE OLD PATCH AND APPLY ONE NEW PATCH TO SKIN EVERY 24 HOURS       * study - lidocaine (LMX) 4 % Crea    IDS# [id number]    45 g    Apply topically every 8 hours as needed for moderate pain       * lidocaine 5 % Patch    LIDODERM    30 patch    APPLY UP TO 3 PATCHES TO PAINFUL AREA AT ONCE FOR UP TO 12 HOURS WITHIN A 24 HOUR PERIOD.  REMOVE AFTER 12 HOURS       * Notice:  This list has 4 medication(s) that are the same as other medications prescribed for you. Read the directions carefully, and ask your doctor or other care provider to review them with you.

## 2017-06-14 NOTE — PROGRESS NOTES
Chief Complaint:   Chief Complaint   Patient presents with     WOUND CARE     Follow up right foot wounds.          Allergies   Allergen Reactions     Naproxen GI Disturbance     No Known Drug Allergies      Subjective: Alessandra is a 51 year old female who presents to the clinic today for a follow up of right heel wound. Alessandra reports severe pain around medial malleolus this past week, worse than her normal pain level in this area. She has a regimen of topical creams and lidocaine patches that she uses, and is also taking lyrica. She has been changing the wound dressing daily with MediHoney. She has an appointment scheduled with vascular in a week.     Objective  A pressure wound wound is noted at right  medial heel measuring 2.6 cm x 2.8 cm x 0.1 cm.    Lozano Classification: II    Wound base: Red/Granulation    Edges: intact    Drainage: small/serous    Odor: None    Undermining: No    Bone Exposure: No    Clinical Signs of Infection: No    No debridement necessary today.    GABRIEL 17  Impression:   Right le. GABRIEL: 1.11, normal.  2. TBI: 0.91, normal.  3. VPR: Normal waveforms throughout.     Left le. GABRIEL: 0.50, abnormal. Concerning for mild to moderate peripheral  arterial disease.  2. TBI: 0.50, abnormal.  3. VPR: Diminished waveforms throughout, suggestive of iliac inflow  disease. 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    Assessment: Right diabetic heel wound    Plan:   - Pt seen and evaluated  - Wound care instructions: wash with microklenz and pat dry. Apply MediHoney and Duoderm. Secure with bulky amount of 4x4 gauze and ACE wrap. Change every other day.  - Pt to return to clinic in 1 week.

## 2017-06-16 RX ORDER — ALBUTEROL SULFATE 90 UG/1
2 AEROSOL, METERED RESPIRATORY (INHALATION) EVERY 4 HOURS PRN
Qty: 1 INHALER | Refills: 5 | Status: ON HOLD | OUTPATIENT
Start: 2017-06-16 | End: 2018-07-05

## 2017-06-19 ENCOUNTER — TELEPHONE (OUTPATIENT)
Dept: INTERVENTIONAL RADIOLOGY/VASCULAR | Facility: CLINIC | Age: 52
End: 2017-06-19

## 2017-06-19 DIAGNOSIS — I70.213 ATHEROSCLEROSIS OF BOTH LOWER EXTREMITIES WITH INTERMITTENT CLAUDICATION (H): Primary | ICD-10-CM

## 2017-06-19 RX ORDER — LIDOCAINE 50 MG/G
1 PATCH TOPICAL EVERY 24 HOURS
Qty: 30 PATCH | Refills: 1 | Status: ON HOLD | OUTPATIENT
Start: 2017-06-19 | End: 2018-02-22

## 2017-06-19 NOTE — TELEPHONE ENCOUNTER
I called and spoke with Alessandra, per Dr. Kline's recommendation I have her scheduled for TCO2's bilat for 6/21/17 @ 12 pm at the [facility].  Pt confirms this.  CARLOTA Mujica RN, BSN  Interventional Radiology Care Coordinator   Phone:  [phone]

## 2017-06-20 ENCOUNTER — TELEPHONE (OUTPATIENT)
Dept: FAMILY MEDICINE | Facility: CLINIC | Age: 52
End: 2017-06-20

## 2017-06-20 NOTE — TELEPHONE ENCOUNTER
Plan does not cover this medication. Please call plan at [phone] to initiate prior authorization or call/fax pharmacy to change medication at [phone]. Patient ID # is [id number].        [facility]

## 2017-06-21 ENCOUNTER — OFFICE VISIT (OUTPATIENT)
Dept: RADIOLOGY | Facility: CLINIC | Age: 52
End: 2017-06-21

## 2017-06-21 ENCOUNTER — OFFICE VISIT (OUTPATIENT)
Dept: FAMILY MEDICINE | Facility: CLINIC | Age: 52
End: 2017-06-21
Payer: COMMERCIAL

## 2017-06-21 VITALS
TEMPERATURE: 97.3 F | BODY MASS INDEX: 23.04 KG/M2 | OXYGEN SATURATION: 97 % | SYSTOLIC BLOOD PRESSURE: 111 MMHG | WEIGHT: 156 LBS | DIASTOLIC BLOOD PRESSURE: 73 MMHG | HEART RATE: 95 BPM

## 2017-06-21 VITALS — HEART RATE: 114 BPM | SYSTOLIC BLOOD PRESSURE: 119 MMHG | DIASTOLIC BLOOD PRESSURE: 73 MMHG

## 2017-06-21 DIAGNOSIS — L97.412 HEEL ULCER, RIGHT, WITH FAT LAYER EXPOSED (H): Chronic | ICD-10-CM

## 2017-06-21 DIAGNOSIS — F41.9 ANXIETY: ICD-10-CM

## 2017-06-21 DIAGNOSIS — E11.42 DIABETIC POLYNEUROPATHY ASSOCIATED WITH TYPE 2 DIABETES MELLITUS (H): ICD-10-CM

## 2017-06-21 DIAGNOSIS — J44.9 CHRONIC OBSTRUCTIVE PULMONARY DISEASE, UNSPECIFIED COPD TYPE (H): Chronic | ICD-10-CM

## 2017-06-21 DIAGNOSIS — L97.412 HEEL ULCER, RIGHT, WITH FAT LAYER EXPOSED (H): Primary | ICD-10-CM

## 2017-06-21 DIAGNOSIS — I42.8 NONISCHEMIC CARDIOMYOPATHY (H): Primary | ICD-10-CM

## 2017-06-21 DIAGNOSIS — E11.22 TYPE 2 DIABETES MELLITUS WITH STAGE 3 CHRONIC KIDNEY DISEASE, UNSPECIFIED LONG TERM INSULIN USE STATUS: Chronic | ICD-10-CM

## 2017-06-21 DIAGNOSIS — N18.3 TYPE 2 DIABETES MELLITUS WITH STAGE 3 CHRONIC KIDNEY DISEASE, UNSPECIFIED LONG TERM INSULIN USE STATUS: Chronic | ICD-10-CM

## 2017-06-21 DIAGNOSIS — F33.1 MAJOR DEPRESSIVE DISORDER, RECURRENT EPISODE, MODERATE (H): ICD-10-CM

## 2017-06-21 LAB
ALBUMIN SERPL-MCNC: 3.1 G/DL (ref 3.4–5)
ALP SERPL-CCNC: 252 U/L (ref 40–150)
ALT SERPL W P-5'-P-CCNC: 38 U/L (ref 0–50)
ANION GAP SERPL CALCULATED.3IONS-SCNC: 7 MMOL/L (ref 3–14)
AST SERPL W P-5'-P-CCNC: 25 U/L (ref 0–45)
BILIRUB SERPL-MCNC: 0.3 MG/DL (ref 0.2–1.3)
BUN SERPL-MCNC: 22 MG/DL (ref 7–30)
CALCIUM SERPL-MCNC: 9.2 MG/DL (ref 8.5–10.1)
CHLORIDE SERPL-SCNC: 97 MMOL/L (ref 94–109)
CO2 SERPL-SCNC: 30 MMOL/L (ref 20–32)
CREAT SERPL-MCNC: 1.06 MG/DL (ref 0.52–1.04)
GFR SERPL CREATININE-BSD FRML MDRD: 55 ML/MIN/1.7M2
GLUCOSE SERPL-MCNC: 224 MG/DL (ref 70–99)
HBA1C MFR BLD: 11.8 % (ref 4.3–6)
HGB BLD-MCNC: 13.9 G/DL (ref 11.7–15.7)
POTASSIUM SERPL-SCNC: 4.9 MMOL/L (ref 3.4–5.3)
PROT SERPL-MCNC: 8.2 G/DL (ref 6.8–8.8)
SODIUM SERPL-SCNC: 134 MMOL/L (ref 133–144)

## 2017-06-21 PROCEDURE — 99207 C FOOT EXAM  NO CHARGE: CPT | Performed by: FAMILY MEDICINE

## 2017-06-21 PROCEDURE — 80053 COMPREHEN METABOLIC PANEL: CPT | Performed by: FAMILY MEDICINE

## 2017-06-21 PROCEDURE — 99214 OFFICE O/P EST MOD 30 MIN: CPT | Performed by: FAMILY MEDICINE

## 2017-06-21 PROCEDURE — 36415 COLL VENOUS BLD VENIPUNCTURE: CPT | Performed by: FAMILY MEDICINE

## 2017-06-21 PROCEDURE — 83036 HEMOGLOBIN GLYCOSYLATED A1C: CPT | Performed by: FAMILY MEDICINE

## 2017-06-21 PROCEDURE — 85018 HEMOGLOBIN: CPT | Performed by: FAMILY MEDICINE

## 2017-06-21 RX ORDER — FUROSEMIDE 20 MG
20 TABLET ORAL DAILY
Qty: 90 TABLET | Refills: 1 | Status: SHIPPED | OUTPATIENT
Start: 2017-06-21 | End: 2017-12-27

## 2017-06-21 RX ORDER — AMITRIPTYLINE HYDROCHLORIDE 50 MG/1
50 TABLET ORAL AT BEDTIME
Qty: 90 TABLET | Refills: 1 | Status: SHIPPED | OUTPATIENT
Start: 2017-06-21 | End: 2017-12-23

## 2017-06-21 RX ORDER — LIDOCAINE 40 MG/G
CREAM TOPICAL EVERY 8 HOURS PRN
Qty: 45 G | Refills: 11 | Status: ON HOLD | OUTPATIENT
Start: 2017-06-21 | End: 2018-02-17

## 2017-06-21 ASSESSMENT — PAIN SCALES - GENERAL: PAINLEVEL: NO PAIN (0)

## 2017-06-21 NOTE — PATIENT INSTRUCTIONS
Based on your medical history and these are the current health maintenance or preventive care services that you are due for (some may have been done at this visit)  Health Maintenance Due   Topic Date Due     ADVANCE DIRECTIVE PLANNING Q5 YRS  07/18/1985     FOOT EXAM Q1 YEAR  07/08/2016     EYE EXAM Q1 YEAR  09/23/2016     PAP Q6 MOS DIAGNOSTIC  01/25/2017     ALT Q1 YR  02/10/2017     HEMOGLOBIN Q1 YR  02/10/2017     CBC Q1 YR  02/10/2017     BMP Q6 MOS  05/02/2017     PHQ-9 Q6 MONTHS  05/02/2017     A1C Q3 MO  07/18/2017         At [facility], we strive to deliver an exceptional experience to you, every time we see you.    If you receive a survey in the mail, please send us back your thoughts. We really do value your feedback.    Your care team's suggested websites for health information:  Www.[employer] : Up to date and easily searchable information on multiple topics.  Www.medlineplus.gov : medication info, interactive tutorials, watch real surgeries online  Www.familydoctor.org : good info from the Academy of Family Physicians  Www.cdc.gov : public health info, travel advisories, epidemics (H1N1)  Www.aap.org : children's health info, normal development, vaccinations  Www.health.[facility].[place].us : [place] dept of health, public health issues in [place], N1N1    How to contact your care team:   Team Kristen/Devonte [phone]         Pharmacy [phone]    Dr. Seaman, Cara Cuevas PA-C, Dr. Mendes, Terri MANCIA CNP, Esperanza Slater PA-C, Dr. Perla, and GAVINO Redmond CNP    Team RNs: Marium & Stacy      Clinic hours  M-Th 7 am-7 pm   Fri 7 am-5 pm.   Urgent care M-F 11 am-9 pm,   Sat/Sun 9 am-5 pm.  Pharmacy M-Th 8 am-8 pm Fri 8 am-6 pm  Sat/Sun 9 am-5 pm.     All password changes, disabled accounts, or ID changes in Ebixt/MyHealth will be done by our Access Services Department.    If you need help with your account or password, call: [phone]. Clinic staff no  longer has the ability to change passwords.
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
## 2017-06-21 NOTE — NURSING NOTE
Dermatology Rooming Note    Alessandra Pak's goals for this visit include:   Chief Complaint   Patient presents with     Referral     PAD referral.     Carmencita Gamble MA

## 2017-06-21 NOTE — NURSING NOTE
"Chief Complaint   Patient presents with     Diabetes     Follow up.     Hypertension     Follow up.     Depression     Follow up.     Anxiety     Follow up.       Initial /73 (BP Location: Right arm, Patient Position: Chair, Cuff Size: Adult Regular)  Pulse 95  Temp 97.3  F (36.3  C) (Oral)  Wt 156 lb (70.8 kg)  SpO2 97%  Breastfeeding? No  BMI 23.04 kg/m2 Estimated body mass index is 23.04 kg/(m^2) as calculated from the following:    Height as of 6/2/17: 5' 9\" (1.753 m).    Weight as of this encounter: 156 lb (70.8 kg).  Medication Reconciliation: complete   An,CMA (AMAA)      "

## 2017-06-21 NOTE — LETTER
[address] [id number] [phone]    June 26, 2017    Alessandra Pak  [address]  [facility] [place] [address]    Ms. Pak,     Your kidney function is slightly better.  You diabetes is slightly worse.  Follow up with me in 6 months and with your other specialists as they recommend.     Please contact the clinic if you have additional questions.  Thank you.     Sincerely,     Brionna Dc/zana    Results for orders placed or performed in visit on 06/21/17   Hemoglobin   Result Value Ref Range    Hemoglobin 13.9 11.7 - 15.7 g/dL   HEMOGLOBIN A1C   Result Value Ref Range    Hemoglobin A1C 11.8 (H) 4.3 - 6.0 %   Comprehensive metabolic panel   Result Value Ref Range    Sodium 134 133 - 144 mmol/L    Potassium 4.9 3.4 - 5.3 mmol/L    Chloride 97 94 - 109 mmol/L    Carbon Dioxide 30 20 - 32 mmol/L    Anion Gap 7 3 - 14 mmol/L    Glucose 224 (H) 70 - 99 mg/dL    Urea Nitrogen 22 7 - 30 mg/dL    Creatinine 1.06 (H) 0.52 - 1.04 mg/dL    GFR Estimate 55 (L) >60 mL/min/1.7m2    GFR Estimate If Black 66 >60 mL/min/1.7m2    Calcium 9.2 8.5 - 10.1 mg/dL    Bilirubin Total 0.3 0.2 - 1.3 mg/dL    Albumin 3.1 (L) 3.4 - 5.0 g/dL    Protein Total 8.2 6.8 - 8.8 g/dL    Alkaline Phosphatase 252 (H) 40 - 150 U/L    ALT 38 0 - 50 U/L    AST 25 0 - 45 U/L
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
## 2017-06-21 NOTE — MR AVS SNAPSHOT
After Visit Summary   6/21/2017    Alessandra Pak    MRN: [id number]           Patient Information     Date Of Birth          1965        Visit Information        Provider Department      6/21/2017 3:00 PM Brionna Calabrese MD [facility]        Today's Diagnoses     Nonischemic cardiomyopathy (H)    -  1    Type 2 diabetes mellitus with stage 3 chronic kidney disease, unspecified long term insulin use status (H)        Chronic obstructive pulmonary disease, unspecified COPD type (H)        Heel ulcer, right, with fat layer exposed (H)        Major depressive disorder, recurrent episode, moderate (H)        Anxiety        Diabetic polyneuropathy associated with type 2 diabetes mellitus (H)          Care Instructions    Based on your medical history and these are the current health maintenance or preventive care services that you are due for (some may have been done at this visit)  Health Maintenance Due   Topic Date Due     ADVANCE DIRECTIVE PLANNING Q5 YRS  07/18/1985     FOOT EXAM Q1 YEAR  07/08/2016     EYE EXAM Q1 YEAR  09/23/2016     PAP Q6 MOS DIAGNOSTIC  01/25/2017     ALT Q1 YR  02/10/2017     HEMOGLOBIN Q1 YR  02/10/2017     CBC Q1 YR  02/10/2017     BMP Q6 MOS  05/02/2017     PHQ-9 Q6 MONTHS  05/02/2017     A1C Q3 MO  07/18/2017         At [facility], we strive to deliver an exceptional experience to you, every time we see you.    If you receive a survey in the mail, please send us back your thoughts. We really do value your feedback.    Your care team's suggested websites for health information:  Www.IDENT Technology.org : Up to date and easily searchable information on multiple topics.  Www.medlineplus.gov : medication info, interactive tutorials, watch real surgeries online  Www.familydoctor.org : good info from the Academy of Family Physicians  Www.cdc.gov : public health info, travel advisories, epidemics (H1N1)  Www.aap.org :  children's health info, normal development, vaccinations  Www.health.Pending sale to [facility].[place].us : [place] dept of health, public health issues in [place], N1N1    How to contact your care team:   Team rKisten/Devonte [phone]         Pharmacy [phone]    Dr. Seaman, Caar Cuevas PA-C, Dr. Mendes, Terri MANCIA CNP, Esperanza Slater PA-C, Dr. Perla, and GAVINO Redmond CNP    Team RNs: Marium & Stacy      Clinic hours  M-Th 7 am-7 pm   Fri 7 am-5 pm.   Urgent care M-F 11 am-9 pm,   Sat/Sun 9 am-5 pm.  Pharmacy M-Th 8 am-8 pm Fri 8 am-6 pm  Sat/Sun 9 am-5 pm.     All password changes, disabled accounts, or ID changes in [employer]/MyHealth will be done by our Access Services Department.    If you need help with your account or password, call: [phone]. Clinic staff no longer has the ability to change passwords.             Follow-ups after your visit        Your next 10 appointments already scheduled     Jun 28, 2017  3:00 PM CDT   [facility]    [address] [place] [id number] [phone]            Aug 04, 2017 12:00 PM CDT   (Arrive by 11:45 AM)   RETURN DIABETES with Toya Nuno PA-C   [facility] ([facility])    [address] [place] [id number] [phone]              Who to contact     If you have questions or need follow up information about today's clinic visit or your schedule please contact [facility] directly at [phone].  Normal or non-critical lab and imaging results will be communicated to you by MyChart, letter or phone within 4 business days after the clinic has received the results. If you do not hear from us within 7 days, please contact the clinic through MyChart or phone. If you have a critical or abnormal lab result, we will notify you by phone as soon as  possible.  Submit refill requests through [employer] or call your pharmacy and they will forward the refill request to us. Please allow 3 business days for your refill to be completed.          Additional Information About Your Visit        JobTalentsharFurnÃ©sh Information     [employer] gives you secure access to your electronic health record. If you see a primary care provider, you can also send messages to your care team and make appointments. If you have questions, please call your primary care clinic.  If you do not have a primary care provider, please call [phone] and they will assist you.        Care EveryWhere ID     This is your Care EveryWhere ID. This could be used by other organizations to access your Mobile medical records  [id number]        Your Vitals Were     Pulse Temperature Pulse Oximetry Breastfeeding? BMI (Body Mass Index)       95 97.3  F (36.3  C) (Oral) 97% No 23.04 kg/m2        Blood Pressure from Last 3 Encounters:   06/21/17 111/73   06/21/17 119/73   06/02/17 100/64    Weight from Last 3 Encounters:   06/21/17 156 lb (70.8 kg)   06/02/17 157 lb 14.4 oz (71.6 kg)   04/03/17 160 lb 14.4 oz (73 kg)              We Performed the Following     Comprehensive metabolic panel     FOOT EXAM  NO CHARGE [[id number]]     HEMOGLOBIN A1C     Hemoglobin          Today's Medication Changes          These changes are accurate as of: 6/21/17  4:13 PM.  If you have any questions, ask your nurse or doctor.               These medicines have changed or have updated prescriptions.        Dose/Directions    furosemide 20 MG tablet   Commonly known as:  LASIX   This may have changed:  additional instructions   Used for:  Nonischemic cardiomyopathy (H)   Changed by:  Brionna Calabrese MD        Dose:  20 mg   Take 1 tablet (20 mg) by mouth daily   Quantity:  90 tablet   Refills:  1            Where to get your medicines      These medications were sent to [facility] [address] [place] [facility] [address]CKNER  [address]  [address] [facility] [place] [address]     Phone:  [phone]     amitriptyline 50 MG tablet    furosemide 20 MG tablet         Some of these will need a paper prescription and others can be bought over the counter.  Ask your nurse if you have questions.     Bring a paper prescription for each of these medications     study - lidocaine (LMX) 4 % Crea                Primary Care Provider Office Phone # Fax #    Brionna Cobylucas Dc [phone]       [facility] [address]  [facility] [address]        Equal Access to Services     [facility] Hospital[facility]IG[facility] : Hadii aad ku hadasho Soomaali, waaxda luqadaha, qaybta kaalmada adeegyada, waxay michellein raphael levine . So [facility] [phone].    ATENCIÓN: Si habla español, tiene a nagy disposición servicios gratuitos de asistencia lingüística. Llame al [phone].    We comply with applicable federal civil rights laws and [place] laws. We do not discriminate on the basis of race, color, national origin, age, disability sex, sexual orientation or gender identity.            Thank you!     Thank you for choosing [facility]  for your care. Our goal is always to provide you with excellent care. Hearing back from our patients is one way we can continue to improve our services. Please take a few minutes to complete the written survey that you may receive in the mail after your visit with us. Thank you!             Your Updated Medication List - Protect others around you: Learn how to safely use, store and throw away your medicines at www.disposemymeds.org.          This list is accurate as of: 6/21/17  4:13 PM.  Always use your most recent med list.                   Brand Name Dispense Instructions for use Diagnosis    acetone (Urine) test Strp     25 each    1 strip by In Vitro route as needed    Type 2 diabetes mellitus with diabetic neuropathy (H)       albuterol 108 (90 BASE) MCG/ACT  Inhaler    albuterol    1 Inhaler    Inhale 2 puffs into the lungs every 4 hours as needed for shortness of breath / dyspnea    SOB (shortness of breath)       amitriptyline 50 MG tablet    ELAVIL    90 tablet    Take 1 tablet (50 mg) by mouth At Bedtime    Anxiety       aspirin 81 MG tablet     100    1 tab po QD (Once per day)    Chronic pancreatitis (H)       * blood glucose monitoring lancets     3 Box    1 each See Admin Instructions test 3-4 times per day    Type 2 diabetes, HbA1C goal < 8% (H)       * blood glucose monitoring lancets     1 Box    Use to test blood sugar 10 times daily or as directed.  Ok to substitute alternative if insurance prefers.    Diabetes mellitus type 1 (H)       blood glucose monitoring test strip    HOLLIE CONTOUR NEXT    300 each    Use to test blood sugar 10 times daily or as directed.  Ok to substitute alternative if insurance prefers.    Diabetes mellitus type 1 (H)       furosemide 20 MG tablet    LASIX    90 tablet    Take 1 tablet (20 mg) by mouth daily    Nonischemic cardiomyopathy (H)       glucagon 1 MG kit    GLUCAGON EMERGENCY    1 mg    Inject 1 mg into the muscle once for 1 dose    Type 2 diabetes mellitus with diabetic neuropathy (H)       glucose 4 G Chew chewable tablet     25 tablet    Take 3-4 tablets to treat low blood sugar.    Uncontrolled diabetes mellitus with complications (H)       insulin pen needle 31G X 5 MM    B-D U/F    3 each    Use 3 time(s) a day.    Type 2 diabetes, HbA1c goal < 7% (H)       Ipratropium-Albuterol  MCG/ACT inhaler    COMBIVENT RESPIMAT    1 Inhaler    Inhale 1 puff into the lungs 4 times daily Not to exceed 6 doses per day.    Chronic obstructive pulmonary disease, unspecified COPD type (H)       * lidocaine 5 % Patch    LIDODERM    30 patch    Place 1 patch onto the skin every 24 hours Apply patch up to 12 hours with in a 24 hour period.    Lumbar radiculopathy       * study - lidocaine (LMX) 4 % Crea    IDS# 4490    45 g     Apply topically every 8 hours as needed for moderate pain    Diabetic polyneuropathy associated with type 2 diabetes mellitus (H)       lisinopril 10 MG tablet    PRINIVIL/ZESTRIL    90 tablet    Take 1 tablet (10 mg) by mouth daily    Nonischemic cardiomyopathy (H)       methadone 10 mg/mL Conc (HIGH CONC) solution    DOLPHINE-INTENSOL     Take 7 mLs by mouth daily.    Chemical dependency (H)       metoprolol 50 MG tablet    LOPRESSOR     Take 25 mg by mouth daily        MIRENA (52 MG) 20 MCG/24HR IUD   Generic drug:  levonorgestrel     1    placed today    Excessive or frequent menstruation       multivitamin, therapeutic with minerals Tabs tablet     30 each    Take 1 tablet by mouth daily    Heart failure and kidney disease due to high blood pressure (H)       NovoLOG VIAL 100 UNITS/ML injection   Generic drug:  insulin aspart     3 Month    Use as directed in insulin pump. Patient using up to 60 units per day    Type 2 diabetes mellitus with diabetic neuropathy (H)       polyethylene glycol Packet    MIRALAX/GLYCOLAX    28 packet    Take 17 g by mouth daily    Constipation       pregabalin 75 MG capsule    LYRICA    90 capsule    Take 1 capsule (75 mg) by mouth 3 times daily    Controlled type 2 diabetes with neuropathy (H)       ranitidine 300 MG tablet    ZANTAC    90 tablet    TAKE ONE TABLET BY MOUTH EVERY DAY    Gastroesophageal reflux disease without esophagitis       SM NICOTINE 21 MG/24HR 24 hr patch   Generic drug:  nicotine     28 patch    REMOVE OLD PATCH AND APPLY ONE NEW PATCH TO SKIN EVERY 24 HOURS    Tobacco dependence syndrome       * Notice:  This list has 4 medication(s) that are the same as other medications prescribed for you. Read the directions carefully, and ask your doctor or other care provider to review them with you.

## 2017-06-22 ASSESSMENT — PATIENT HEALTH QUESTIONNAIRE - PHQ9: SUM OF ALL RESPONSES TO PHQ QUESTIONS 1-9: 12

## 2017-06-22 NOTE — PROGRESS NOTES
Faxed Rx for the Study-lidocaine, LMX 4% cream to [facility].  Beatriz Alfredo MA/  For Teams Spirit and Kristen

## 2017-06-26 NOTE — PROGRESS NOTES
Ms. Mellisa,    Your kidney function is slightly better.  You diabetes is slightly worse.  Follow up with me in 6 months and with your other specialists as they recommend.    Please contact the clinic if you have additional questions.  Thank you.    Sincerely,    Brionna Dc

## 2017-06-26 NOTE — PROGRESS NOTES
Interventional Radiology Clinic Visit    Date of this visit: 6/26/2017    Alessandra Pak presents for consultation for evaluation of chronic right heel ulcer.    Primary Physician: Brionna Calabrese      History Of Present Illness:    Alessandra Pak is a 51 year old female with a diagnosis of non or poorly healing right heel wound in the setting of diabetes and peripheral vascular disease. She is only an insulin pump for her diabetes management.    She has some pain along the wound edges but doesn't have what sounds like ischemic rest pain. She does endorse left calf claudication with walking.     Review of Systems:    10 Point ROS is positive for what is described in the HPI. Otherwise, the remainder of the ROS is negative.    Past Medical/Surgical History:    Past Medical History:   Diagnosis Date     Abdominal pain, right upper quadrant     sees Dr Mcclellan pain clinic at [facility]     ASCUS with positive high risk HPV 8/2013    + HPV 33, Leslie - GAVIN I, ECC- atypia     Cardiomyopathy (H)     non ischemic cardiomyopathy with EF 15     Cervical high risk HPV (human papillomavirus) test positive 7/8/15, 7/25/16    NIL pap/+ HR HPV (not 16 or 18).      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    leep     Depressive disorder      Gastro-oesophageal reflux disease      Human papillomavirus in conditions classified elsewhere and of unspecified site 2/2012    + HPV 33     Hypertension      Profound impairment, one eye, impairment level not further specified     rt eye due to childhood injury     Systolic CHF (H) 3/12/2015     Tobacco abuse 5/18/2013     Type 2 diabetes mellitus without complications (H)      Uncomplicated asthma      Past Surgical History:   Procedure Laterality Date     C NONSPECIFIC PROCEDURE  2001    cholecystectomy     C NONSPECIFIC PROCEDURE  as a child    tonsillectomy     C NONSPECIFIC PROCEDURE  2001    whipple procedure     CARDIAC SURGERY      defib     COLPOSCOPY,LOOP ELECTRD CERVIX  EXCIS  2002, 2011    stage 2 dysplasia     ENDOBRONCHIAL ULTRASOUND FLEXIBLE N/A 2/19/2015    Procedure: ENDOBRONCHIAL ULTRASOUND FLEXIBLE;  Surgeon: Brenden Tamez MD;  Location: [facility]     LEEP TX, CERVICAL  2014    LEEP TX Cervical     RECESSION RESECTION WITH ADJUSTABLE SUTURE  12/13/2011    Procedure:RECESSION RESECTION WITH ADJUSTABLE SUTURE; Right Strabismus Repair with Adjustable Suture       TUBAL LIGATION  2007    essure      Current Medications:    Current Outpatient Prescriptions   Medication Sig Dispense Refill     furosemide (LASIX) 20 MG tablet Take 1 tablet (20 mg) by mouth daily 90 tablet 1     amitriptyline (ELAVIL) 50 MG tablet Take 1 tablet (50 mg) by mouth At Bedtime 90 tablet 1     study - lidocaine, LMX, (IDS# 4490) 4 % CREA Apply topically every 8 hours as needed for moderate pain 45 g 11     lidocaine (LIDODERM) 5 % Patch Place 1 patch onto the skin every 24 hours Apply patch up to 12 hours with in a 24 hour period. 30 patch 1     albuterol (ALBUTEROL) 108 (90 BASE) MCG/ACT Inhaler Inhale 2 puffs into the lungs every 4 hours as needed for shortness of breath / dyspnea 1 Inhaler 5     pregabalin (LYRICA) 75 MG capsule Take 1 capsule (75 mg) by mouth 3 times daily 90 capsule 5     ranitidine (ZANTAC) 300 MG tablet TAKE ONE TABLET BY MOUTH EVERY DAY 90 tablet 1     lisinopril (PRINIVIL/ZESTRIL) 10 MG tablet Take 1 tablet (10 mg) by mouth daily 90 tablet 1     SM NICOTINE 21 MG/24HR 24 hr patch REMOVE OLD PATCH AND APPLY ONE NEW PATCH TO SKIN EVERY 24 HOURS 28 patch 1     insulin aspart (NOVOLOG VIAL) 100 UNITS/ML VIAL Use as directed in insulin pump. Patient using up to 60 units per day 3 Month 3     blood glucose monitoring (HOLLIE CONTOUR NEXT) test strip Use to test blood sugar 10 times daily or as directed.  Ok to substitute alternative if insurance prefers. 300 each 12     blood glucose monitoring (HOLLIE MICROLET) lancets Use to test blood sugar 10 times daily or as directed.  Ok to  substitute alternative if insurance prefers. 1 Box prn     acetone, Urine, test STRP 1 strip by In Vitro route as needed 25 each 1     glucagon (GLUCAGON EMERGENCY) 1 MG injection Inject 1 mg into the muscle once for 1 dose 1 mg 1     Ipratropium-Albuterol (COMBIVENT RESPIMAT)  MCG/ACT inhaler Inhale 1 puff into the lungs 4 times daily Not to exceed 6 doses per day. 1 Inhaler 6     multivitamin, therapeutic with minerals (THERA-VIT-M) TABS Take 1 tablet by mouth daily 30 each 11     metoprolol (LOPRESSOR) 50 MG tablet Take 25 mg by mouth daily       polyethylene glycol (MIRALAX/GLYCOLAX) packet Take 17 g by mouth daily 28 packet 3     insulin pen needle (B-D U/F) 31G X 5 MM Use 3 time(s) a day. 3 each 3     blood glucose monitoring (FREESTYLE) lancets 1 each See Admin Instructions test 3-4 times per day 3 Box 3     glucose 4 G CHEW Take 3-4 tablets to treat low blood sugar. 25 tablet 11     methadone (DOLPHINE-INTENSOL) 10 mg/mL CONC Take 7 mLs by mouth daily.       MIRENA 20 MCG/24HR IU IUD placed today 1 0     ASPIRIN 81 MG OR TABS 1 tab po QD (Once per day) 100 3     Allergies:    Naproxen and No known drug allergies    Family History:    Family History   Problem Relation Age of Onset     DIABETES Mother      diet controled     Hypertension Mother      Arthritis Mother      Lipids Mother      DIABETES Father      Hypertension Father      GASTROINTESTINAL DISEASE Father      gallbladder removed     Bipolar Disorder Brother      Thyroid Disease Brother      Obesity Other      Son     Respiratory Other      Son and Daughter; asthma     Depression Maternal Aunt      Anxiety Disorder Maternal Aunt      Social History:    Social History     Social History     Marital status: Single     Spouse name: N/A     Number of children: N/A     Years of education: N/A     Occupational History     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 No     Social History Narrative    Balanced Diet - Yes    Osteoporosis Prevention Measures - Dairy servings per day: 3 servings daily    Regular Exercise -  Yes Describe hand weights 20 minutes daily    Dental Exam - YES - Date: 3/10    Eye Exam - YES - Date: 1-2008    Self Breast Exam - Yes    Abuse: Current or Past (Physical, Sexual or Emotional)- No    Do you feel safe in your environment - Yes    Guns stored in the home - No    Sunscreen used - Yes    Seatbelts used - Yes    Lipids -  YES - Date: 1/10    Glucose -  YES - Date: 12/09    Colon Cancer Screening - No    Hemoccults - NO    Pap Test -  YES - Date: 6/19/08    Do you have any concerns about STD's -  No    Mammography - NO    DEXA - NO    Immunizations reviewed and up to date - Yes, last TD was 11-22-04    3/11/10    KATY Burrell CMA                                 Physical Exam:    /73  Pulse 114     GENERAL APPEARANCE: nad  HEENT: ncat  RESP: cta  CARDIOVASCULAR: rrr  ABDOMEN: soft, nt  VASCULAR: right foot wrapped. +1 left DP/PT no edema. No dermal changes.     Laboratory Studies:    Lab Results   Component Value Date    HGB 13.9 06/21/2017     Lab Results   Component Value Date     02/10/2016     Lab Results   Component Value Date    WBC 9.2 02/10/2016       Lab Results   Component Value Date    INR 1.30 02/21/2015         Lab Results   Component Value Date    CR 1.06 06/21/2017     Lab Results   Component Value Date    BUN 22 06/21/2017       Imaging:     I reviewed the GABRIEL which is normal on the right as well as  TBI. PVR and PPG waveforms are normal as well. Left GABRIEL is moderately diminished at 0.5. Old CT reviewed shows a focal left common iliac artery stenosis.    TcO2s show minimally diminished note along the right foot.     ASSESSMENT/PLAN:      Alessandra Pak is a 51 year old female with poorly healing wound diabetic right heel with non-invasive imaging demonstrating normal arterial flow into the right ankle and foot, with only minimally diminished wound healing capability from a tissue oxygen tension stand point along the right foot.     She does have what sounds like moderate grade left leg/calf claudication, with diminished ABIs and cross sectional evidence of a moderate to severe left RAJNI stenosis.     Plan:  No need for invasive imaging or therapy to treat right leg arteries.   She has evidence of arterial stenosis and life style limiting claudication of her left leg but currently does not desire intervention. She has been through cardiac rehab before.     A total of 45 minutes was spent in care for the patient, of which >50% was spent in counseling and co-ordination of care.     It was a pleasure seeing the patient.     Merlin Batista M.D.  Department of Interventional Radiology  [facility]    CC  Patient Care Team:  Brionna Calabrese MD as PCP - General (Family Practice)  Kailee Paulino MD as Referring Physician (Family Practice)  Nicole Llamas APRN CNS as Nurse Coordinator (Clinical Nurse Specialist)  Toya Nuno PA-C as Referring Physician (Physician Assistant)  Hernesto Guzman MD as MD (Internal Medicine)  Nohemi Dockery as Registered Nurse (Diabetic Education)  Cele Buchanan RD as Registered Dietician (Nutrition)  Merlin Kline MD as Resident (Radiology)  BRAYAN PERALES

## 2017-06-27 ENCOUNTER — OFFICE VISIT (OUTPATIENT)
Dept: ORTHOPEDICS | Facility: CLINIC | Age: 52
End: 2017-06-27

## 2017-06-27 DIAGNOSIS — E11.40 TYPE 2 DIABETES MELLITUS WITH DIABETIC NEUROPATHY, WITHOUT LONG-TERM CURRENT USE OF INSULIN (H): Chronic | ICD-10-CM

## 2017-06-27 DIAGNOSIS — N18.3 TYPE 2 DIABETES MELLITUS WITH STAGE 3 CHRONIC KIDNEY DISEASE, UNSPECIFIED LONG TERM INSULIN USE STATUS: Chronic | ICD-10-CM

## 2017-06-27 DIAGNOSIS — E11.22 TYPE 2 DIABETES MELLITUS WITH STAGE 3 CHRONIC KIDNEY DISEASE, UNSPECIFIED LONG TERM INSULIN USE STATUS: Chronic | ICD-10-CM

## 2017-06-27 DIAGNOSIS — L97.412 HEEL ULCER, RIGHT, WITH FAT LAYER EXPOSED (H): Primary | Chronic | ICD-10-CM

## 2017-06-27 NOTE — PROGRESS NOTES
Chief Complaint:   Chief Complaint   Patient presents with     Wound Check     Follow up. Wounds, right foot.           Allergies   Allergen Reactions     Naproxen GI Disturbance     No Known Drug Allergies          Subjective: Alessandra is a 51 year old female who presents to the clinic today for a follow up of right heel wound. She relates that she is using the Duoderm daily. She has seen IR and they decided that they would not pursue interventions at this time. She is wearing the DH shoes today.     Objective    Lab Results   Component Value Date    A1C 11.8 2017    A1C 11.5 2016    A1C >15.0  Results confirmed by repeat test   2016    A1C 11.8 10/05/2015    A1C 10.1 10/20/2014         A diabetic pressure wound is noted at left  heel measuring 3.5cm x 4cm x 0.2cm .    Lozano Classification: II    Wound base: Red  Pink/Granulation    Edges: maceraiton    Drainage: small/serous    Odor: no    Underminin to 6 O'Clock    Bone Exposure: No    Clinical Signs of Infection: No    After obtaining patient consent, the wound was irrigated with copious amounts of saline. A scalpel was then used to debride the wound into the subcutaneous tissue. The wound edges were debrided to healthy, bleeding tissue. Given the patient's lack of sensation, no anesthesia was necessary for the procedure.       Assessment: Right heel wound - larger than the last visit. More macerated.     Plan:   - Pt seen and evaluated  - Wound was cleansed and debrided as described.   - D/c the duoderm. Too much maceration. Endoform/hydrofera blue and a DSD were applied to the wound. She should leave this intact until Friday. She can then start daily MediHoney dressings.  - Pt to return to clinic in 1 week.

## 2017-06-27 NOTE — NURSING NOTE
Reason For Visit:   Chief Complaint   Patient presents with     Wound Check     Follow up. Wounds, right foot.        Pain Assessment  Patient Currently in Pain: Denies (Pt stated that she has pain in her foot in the morning. )             Current Outpatient Prescriptions   Medication Sig Dispense Refill     furosemide (LASIX) 20 MG tablet Take 1 tablet (20 mg) by mouth daily 90 tablet 1     amitriptyline (ELAVIL) 50 MG tablet Take 1 tablet (50 mg) by mouth At Bedtime 90 tablet 1     study - lidocaine, LMX, (IDS# 4490) 4 % CREA Apply topically every 8 hours as needed for moderate pain 45 g 11     lidocaine (LIDODERM) 5 % Patch Place 1 patch onto the skin every 24 hours Apply patch up to 12 hours with in a 24 hour period. 30 patch 1     albuterol (ALBUTEROL) 108 (90 BASE) MCG/ACT Inhaler Inhale 2 puffs into the lungs every 4 hours as needed for shortness of breath / dyspnea 1 Inhaler 5     pregabalin (LYRICA) 75 MG capsule Take 1 capsule (75 mg) by mouth 3 times daily 90 capsule 5     ranitidine (ZANTAC) 300 MG tablet TAKE ONE TABLET BY MOUTH EVERY DAY 90 tablet 1     lisinopril (PRINIVIL/ZESTRIL) 10 MG tablet Take 1 tablet (10 mg) by mouth daily 90 tablet 1     SM NICOTINE 21 MG/24HR 24 hr patch REMOVE OLD PATCH AND APPLY ONE NEW PATCH TO SKIN EVERY 24 HOURS 28 patch 1     insulin aspart (NOVOLOG VIAL) 100 UNITS/ML VIAL Use as directed in insulin pump. Patient using up to 60 units per day 3 Month 3     blood glucose monitoring (HOLLIE CONTOUR NEXT) test strip Use to test blood sugar 10 times daily or as directed.  Ok to substitute alternative if insurance prefers. 300 each 12     blood glucose monitoring (HOLLIE MICROLET) lancets Use to test blood sugar 10 times daily or as directed.  Ok to substitute alternative if insurance prefers. 1 Box prn     acetone, Urine, test STRP 1 strip by In Vitro route as needed 25 each 1     glucagon (GLUCAGON EMERGENCY) 1 MG injection Inject 1 mg into the muscle once for 1 dose 1 mg 1      Ipratropium-Albuterol (COMBIVENT RESPIMAT)  MCG/ACT inhaler Inhale 1 puff into the lungs 4 times daily Not to exceed 6 doses per day. 1 Inhaler 6     multivitamin, therapeutic with minerals (THERA-VIT-M) TABS Take 1 tablet by mouth daily 30 each 11     metoprolol (LOPRESSOR) 50 MG tablet Take 25 mg by mouth daily       polyethylene glycol (MIRALAX/GLYCOLAX) packet Take 17 g by mouth daily 28 packet 3     insulin pen needle (B-D U/F) 31G X 5 MM Use 3 time(s) a day. 3 each 3     blood glucose monitoring (FREESTYLE) lancets 1 each See Admin Instructions test 3-4 times per day 3 Box 3     glucose 4 G CHEW Take 3-4 tablets to treat low blood sugar. 25 tablet 11     methadone (DOLPHINE-INTENSOL) 10 mg/mL CONC Take 7 mLs by mouth daily.       MIRENA 20 MCG/24HR IU IUD placed today 1 0     ASPIRIN 81 MG OR TABS 1 tab po QD (Once per day) 100 3          Allergies   Allergen Reactions     Naproxen GI Disturbance     No Known Drug Allergies

## 2017-06-27 NOTE — MR AVS SNAPSHOT
After Visit Summary   6/27/2017    Alessandra Pak    MRN: [id number]           Patient Information     Date Of Birth          1965        Visit Information        Provider Department      6/27/2017 2:40 PM Barrington Lewis DPM [facility]         Follow-ups after your visit        Your next 10 appointments already scheduled     Jun 28, 2017  3:00 PM CDT   PHYSICAL with Brionna Dc MD   [facility] ([facility])    [address] [place] [id number]   [phone]            Jul 03, 2017 10:00 AM CDT   (Arrive by 9:45 AM)   RETURN FOOT/ANKLE with Barrington Lewis DPM   [facility] ([facility])    [address] [place] [id number] [phone]            Aug 04, 2017 12:00 PM CDT   (Arrive by 11:45 AM)   RETURN DIABETES with Toya Nuno PA-C   [facility] ([facility])    [address] [place] [address] [phone]              Who to contact     Please call your clinic at [phone] to:    Ask questions about your health    Make or cancel appointments    Discuss your medicines    Learn about your test results    Speak to your doctor   If you have compliments or concerns about an experience at your clinic, or if you wish to file a complaint, please contact [facility] Patient Relations at [phone] or email us at [email or web address]         Additional Information About Your Visit        MyChart Information     [employer] gives you secure access to your electronic health record. If you see a primary care provider, you can also send messages to your care team and make appointments. If you have questions, please call your primary care clinic.  If you do not have a primary care provider, please call [phone] and they  will assist you.      [employer] is an electronic gateway that provides easy, online access to your medical records. With Nimble, you can request a clinic appointment, read your test results, renew a prescription or communicate with your care team.     To access your existing account, please contact your [facility] or call [phone] for assistance.        Care EveryWhere ID     This is your Care EveryWhere ID. This could be used by other organizations to access your [facility] medical records  [id number]         Blood Pressure from Last 3 Encounters:   06/21/17 111/73   06/21/17 119/73   06/02/17 100/64    Weight from Last 3 Encounters:   06/21/17 70.8 kg (156 lb)   06/02/17 71.6 kg (157 lb 14.4 oz)   04/03/17 73 kg (160 lb 14.4 oz)              Today, you had the following     No orders found for display       Primary Care Provider Office Phone # Fax #    Brionna Coby Dc [phone]       [facility] [address] [facility] [address]        Equal Access to Services     [facility] Hospital[facility] : Hadii aad ku hadasho Soomaali, waaxda luqadaha, qaybta kaalmada adeegyada, sanjay schuler haysamuel levine . So [facility] [phone].    ATENCIÓN: Si habla español, tiene a nagy disposición servicios gratuitos de asistencia lingüística. Rodriguez al [phone].    We comply with applicable federal civil rights laws and [place] laws. We do not discriminate on the basis of race, color, national origin, age, disability sex, sexual orientation or gender identity.            Thank you!     Thank you for choosing [facility]  for your care. Our goal is always to provide you with excellent care. Hearing back from our patients is one way we can continue to improve our services. Please take a few minutes to complete the written survey that you may receive in the mail after your visit with us. Thank you!             Your Updated Medication  List - Protect others around you: Learn how to safely use, store and throw away your medicines at www.disposemymeds.org.          This list is accurate as of: 6/27/17  2:54 PM.  Always use your most recent med list.                   Brand Name Dispense Instructions for use Diagnosis    acetone (Urine) test Strp     25 each    1 strip by In Vitro route as needed    Type 2 diabetes mellitus with diabetic neuropathy (H)       albuterol 108 (90 BASE) MCG/ACT Inhaler    albuterol    1 Inhaler    Inhale 2 puffs into the lungs every 4 hours as needed for shortness of breath / dyspnea    SOB (shortness of breath)       amitriptyline 50 MG tablet    ELAVIL    90 tablet    Take 1 tablet (50 mg) by mouth At Bedtime    Anxiety       aspirin 81 MG tablet     100    1 tab po QD (Once per day)    Chronic pancreatitis (H)       * blood glucose monitoring lancets     3 Box    1 each See Admin Instructions test 3-4 times per day    Type 2 diabetes, HbA1C goal < 8% (H)       * blood glucose monitoring lancets     1 Box    Use to test blood sugar 10 times daily or as directed.  Ok to substitute alternative if insurance prefers.    Diabetes mellitus type 1 (H)       blood glucose monitoring test strip    HOLLIE CONTOUR NEXT    300 each    Use to test blood sugar 10 times daily or as directed.  Ok to substitute alternative if insurance prefers.    Diabetes mellitus type 1 (H)       furosemide 20 MG tablet    LASIX    90 tablet    Take 1 tablet (20 mg) by mouth daily    Nonischemic cardiomyopathy (H)       glucagon 1 MG kit    GLUCAGON EMERGENCY    1 mg    Inject 1 mg into the muscle once for 1 dose    Type 2 diabetes mellitus with diabetic neuropathy (H)       glucose 4 G Chew chewable tablet     25 tablet    Take 3-4 tablets to treat low blood sugar.    Uncontrolled diabetes mellitus with complications (H)       insulin pen needle 31G X 5 MM    B-D U/F    3 each    Use 3 time(s) a day.    Type 2 diabetes, HbA1c goal < 7% (H)        Ipratropium-Albuterol  MCG/ACT inhaler    COMBIVENT RESPIMAT    1 Inhaler    Inhale 1 puff into the lungs 4 times daily Not to exceed 6 doses per day.    Chronic obstructive pulmonary disease, unspecified COPD type (H)       * lidocaine 5 % Patch    LIDODERM    30 patch    Place 1 patch onto the skin every 24 hours Apply patch up to 12 hours with in a 24 hour period.    Lumbar radiculopathy       * study - lidocaine (LMX) 4 % Crea    IDS# 4490    45 g    Apply topically every 8 hours as needed for moderate pain    Diabetic polyneuropathy associated with type 2 diabetes mellitus (H)       lisinopril 10 MG tablet    PRINIVIL/ZESTRIL    90 tablet    Take 1 tablet (10 mg) by mouth daily    Nonischemic cardiomyopathy (H)       methadone 10 mg/mL Conc (HIGH CONC) solution    DOLPHINE-INTENSOL     Take 7 mLs by mouth daily.    Chemical dependency (H)       metoprolol 50 MG tablet    LOPRESSOR     Take 25 mg by mouth daily        MIRENA (52 MG) 20 MCG/24HR IUD   Generic drug:  levonorgestrel     1    placed today    Excessive or frequent menstruation       multivitamin, therapeutic with minerals Tabs tablet     30 each    Take 1 tablet by mouth daily    Heart failure and kidney disease due to high blood pressure (H)       NovoLOG VIAL 100 UNITS/ML injection   Generic drug:  insulin aspart     3 Month    Use as directed in insulin pump. Patient using up to 60 units per day    Type 2 diabetes mellitus with diabetic neuropathy (H)       polyethylene glycol Packet    MIRALAX/GLYCOLAX    28 packet    Take 17 g by mouth daily    Constipation       pregabalin 75 MG capsule    LYRICA    90 capsule    Take 1 capsule (75 mg) by mouth 3 times daily    Controlled type 2 diabetes with neuropathy (H)       ranitidine 300 MG tablet    ZANTAC    90 tablet    TAKE ONE TABLET BY MOUTH EVERY DAY    Gastroesophageal reflux disease without esophagitis       SM NICOTINE 21 MG/24HR 24 hr patch   Generic drug:  nicotine     28 patch     REMOVE OLD PATCH AND APPLY ONE NEW PATCH TO SKIN EVERY 24 HOURS    Tobacco dependence syndrome       * Notice:  This list has 4 medication(s) that are the same as other medications prescribed for you. Read the directions carefully, and ask your doctor or other care provider to review them with you.

## 2017-06-27 NOTE — LETTER
2017       RE: Alessandra Pak  [address]  [facility] [address]     Dear Colleague,    Thank you for referring your patient, Alessandra Pak, to the [facility] at [facility]. Please see a copy of my visit note below.    Chief Complaint:   Chief Complaint   Patient presents with     Wound Check     Follow up. Wounds, right foot.           Allergies   Allergen Reactions     Naproxen GI Disturbance     No Known Drug Allergies          Subjective: Alessandra is a 51 year old female who presents to the clinic today for a follow up of right heel wound. She relates that she is using the Duoderm daily. She has seen IR and they decided that they would not pursue interventions at this time. She is wearing the DH shoes today.     Objective    Lab Results   Component Value Date    A1C 11.8 2017    A1C 11.5 2016    A1C >15.0  Results confirmed by repeat test   2016    A1C 11.8 10/05/2015    A1C 10.1 10/20/2014         A diabetic pressure wound is noted at left  heel measuring 3.5cm x 4cm x 0.2cm .    Lozano Classification: II    Wound base: Red  Pink/Granulation    Edges: maceraiton    Drainage: small/serous    Odor: no    Underminin to 6 O'Clock    Bone Exposure: No    Clinical Signs of Infection: No    After obtaining patient consent, the wound was irrigated with copious amounts of saline. A scalpel was then used to debride the wound into the subcutaneous tissue. The wound edges were debrided to healthy, bleeding tissue. Given the patient's lack of sensation, no anesthesia was necessary for the procedure.       Assessment: Right heel wound - larger than the last visit. More macerated.     Plan:   - Pt seen and evaluated  - Wound was cleansed and debrided as described.   - D/c the duoderm. Too much maceration. Endoform/hydrofera blue and a DSD were applied to the wound. She should leave this intact until Friday. She can then start daily  MediHoney dressings.  - Pt to return to clinic in 1 week.    Again, thank you for allowing me to participate in the care of your patient.      Sincerely,    Barrington Lewis DPM

## 2017-06-28 ENCOUNTER — OFFICE VISIT (OUTPATIENT)
Dept: FAMILY MEDICINE | Facility: CLINIC | Age: 52
End: 2017-06-28
Payer: COMMERCIAL

## 2017-06-28 VITALS
HEART RATE: 115 BPM | HEIGHT: 69 IN | WEIGHT: 155 LBS | TEMPERATURE: 97.7 F | OXYGEN SATURATION: 98 % | SYSTOLIC BLOOD PRESSURE: 108 MMHG | BODY MASS INDEX: 22.96 KG/M2 | DIASTOLIC BLOOD PRESSURE: 64 MMHG

## 2017-06-28 DIAGNOSIS — Z71.89 ADVANCE CARE PLANNING: Chronic | ICD-10-CM

## 2017-06-28 DIAGNOSIS — Z00.00 ENCOUNTER FOR ROUTINE ADULT HEALTH EXAMINATION WITHOUT ABNORMAL FINDINGS: Primary | ICD-10-CM

## 2017-06-28 PROCEDURE — 87624 HPV HI-RISK TYP POOLED RSLT: CPT | Performed by: FAMILY MEDICINE

## 2017-06-28 PROCEDURE — G0476 HPV COMBO ASSAY CA SCREEN: HCPCS | Performed by: FAMILY MEDICINE

## 2017-06-28 PROCEDURE — G0145 SCR C/V CYTO,THINLAYER,RESCR: HCPCS | Performed by: FAMILY MEDICINE

## 2017-06-28 PROCEDURE — 99396 PREV VISIT EST AGE 40-64: CPT | Performed by: FAMILY MEDICINE

## 2017-06-28 NOTE — MR AVS SNAPSHOT
After Visit Summary   6/28/2017    Alessandra Pak    MRN: [id number]           Patient Information     Date Of Birth          1965        Visit Information        Provider Department      6/28/2017 3:00 PM Brionna Calabrese MD [facility]        Today's Diagnoses     Encounter for routine adult health examination without abnormal findings    -  1    Advance care planning          Care Instructions    Based on your medical history and these are the current health maintenance or preventive care services that you are due for (some may have been done at this visit)  Health Maintenance Due   Topic Date Due     ADVANCE DIRECTIVE PLANNING Q5 YRS  07/18/1985     EYE EXAM Q1 YEAR  09/23/2016     PAP Q6 MOS DIAGNOSTIC  01/25/2017     CBC Q1 YR  02/10/2017     LIPID MONITORING Q1 YEAR  07/25/2017     DEPRESSION ACTION PLAN Q1 YR  07/25/2017         At [facility], we strive to deliver an exceptional experience to you, every time we see you.    If you receive a survey in the mail, please send us back your thoughts. We really do value your feedback.    Your care team's suggested websites for health information:  Www.[employer].org : Up to date and easily searchable information on multiple topics.  Www.medlineplus.gov : medication info, interactive tutorials, watch real surgeries online  Www.familydoctor.org : good info from the Academy of Family Physicians  Www.cdc.gov : public health info, travel advisories, epidemics (H1N1)  Www.aap.org : children's health info, normal development, vaccinations  Www.health.[facility].[place].us : [place] dept of health, public health issues in [place], N1N1    How to contact your care team:   Team Kristen[employer]Spirit [phone]         Pharmacy [phone]    Dr. Seaman, Cara Cuevas PA-C, Terri Graham APRN CNP, Esperanza Slater PA-C, Dr. Perla, and GAVINO Redmond CNP    Team RNs: Marium Kumar      Clinic hours   M-Th 7 am-7 pm   Fri 7 am-5 pm.   Urgent care M-F 11 am-9 pm,   Sat/Sun 9 am-5 pm.  Pharmacy M-Th 8 am-8 pm Fri 8 am-6 pm  Sat/Sun 9 am-5 pm.     All password changes, disabled accounts, or ID changes in [employer]/MyHealth will be done by our Access Services Department.    If you need help with your account or password, call: [phone]. Clinic staff no longer has the ability to change passwords.     Preventive Health Recommendations  Female Ages 40 to 49    Yearly exam:     See your health care provider every year in order to  1. Review health changes.   2. Discuss preventive care.    3. Review your medicines if your doctor prescribed any.      Get a Pap test every three years (unless you have an abnormal result and your provider advises testing more often).      If you get Pap tests with HPV test, you only need to test every 5 years, unless you have an abnormal result. You do not need a Pap test if your uterus was removed (hysterectomy) and you have not had cancer.      You should be tested each year for STDs (sexually transmitted diseases), if you're at risk.       Ask your doctor if you should have a mammogram.      Have a colonoscopy (test for colon cancer) if someone in your family has had colon cancer or polyps before age 50.       Have a cholesterol test every 5 years.       Have a diabetes test (fasting glucose) after age 45. If you are at risk for diabetes, you should have this test every 3 years.    Shots: Get a flu shot each year. Get a tetanus shot every 10 years.     Nutrition:     Eat at least 5 servings of fruits and vegetables each day.    Eat whole-grain bread, whole-wheat pasta and brown rice instead of white grains and rice.    Talk to your provider about Calcium and Vitamin D.     Lifestyle    Exercise at least 150 minutes a week (an average of 30 minutes a day, 5 days a week). This will help you control your weight and prevent disease.    Limit alcohol to one drink per day.    No smoking.      Wear sunscreen to prevent skin cancer.    See your dentist every six months for an exam and cleaning.          Follow-ups after your visit        Your next 10 appointments already scheduled     Jul 03, 2017 10:00 AM CDT   (Arrive by 9:45 AM)   RETURN FOOT/ANKLE with Barrington Lewis DPM   [facility] ([facility])    [address] [place] [address] [id number] [phone]            Aug 04, 2017 12:00 PM CDT   (Arrive by 11:45 AM)   RETURN DIABETES with Toya Nuno PA-C   [facility] ([facility])    [address] [place] [address] [id number] [phone]              Future tests that were ordered for you today     Open Future Orders        Priority Expected Expires Ordered    Lipid panel reflex to direct LDL Routine 6/28/2017 6/28/2018 6/28/2017            Who to contact     If you have questions or need follow up information about today's clinic visit or your schedule please contact [facility] directly at [phone].  Normal or non-critical lab and imaging results will be communicated to you by [employer], letter or phone within 4 business days after the clinic has received the results. If you do not hear from us within 7 days, please contact the clinic through [employer] or phone. If you have a critical or abnormal lab result, we will notify you by phone as soon as possible.  Submit refill requests through [employer] or call your pharmacy and they will forward the refill request to us. Please allow 3 business days for your refill to be completed.          Additional Information About Your Visit        [employer] Information     [employer] gives you secure access to your electronic health record. If you see a primary care provider, you can also send messages to your care team and make appointments. If you have questions, please call your primary care clinic.  If you do not have  "a primary care provider, please call [phone] and they will assist you.        Care EveryWhere ID     This is your Care EveryWhere ID. This could be used by other organizations to access your [place] medical records  [id number]        Your Vitals Were     Pulse Temperature Height Pulse Oximetry BMI (Body Mass Index)       115 97.7  F (36.5  C) (Oral) 5' 9\" (1.753 m) 98% 22.89 kg/m2        Blood Pressure from Last 3 Encounters:   06/28/17 108/64   06/21/17 111/73   06/21/17 119/73    Weight from Last 3 Encounters:   06/28/17 155 lb (70.3 kg)   06/21/17 156 lb (70.8 kg)   06/02/17 157 lb 14.4 oz (71.6 kg)              We Performed the Following     HPV High Risk Types DNA Cervical     Pap imaged thin layer screen with HPV - recommended age 30 - 65 years (select HPV order below)        Primary Care Provider Office Phone # Fax #    Brionna Cobylucas Dc [phone]       [facility] [address]  [facility] [id number]        Equal Access to Services     NICK ROBLEDO AH: Hadii aad ku hadasho Soomaali, waaxda luqadaha, qaybta kaalmada adeegyada, waxay michellein hayjosefinan kate hoang. So [facility] [phone].    ATENCIÓN: Si habla español, tiene a nagy disposición servicios gratuitos de asistencia lingüística. [facility] [phone].    We comply with applicable federal civil rights laws and [place] laws. We do not discriminate on the basis of race, color, national origin, age, disability sex, sexual orientation or gender identity.            Thank you!     Thank you for choosing [facility]  for your care. Our goal is always to provide you with excellent care. Hearing back from our patients is one way we can continue to improve our services. Please take a few minutes to complete the written survey that you may receive in the mail after your visit with us. Thank you!             Your Updated Medication List - Protect others around you: Learn how to " safely use, store and throw away your medicines at www.disposemymeds.org.          This list is accurate as of: 6/28/17  3:28 PM.  Always use your most recent med list.                   Brand Name Dispense Instructions for use Diagnosis    acetone (Urine) test Strp     25 each    1 strip by In Vitro route as needed    Type 2 diabetes mellitus with diabetic neuropathy (H)       albuterol 108 (90 BASE) MCG/ACT Inhaler    albuterol    1 Inhaler    Inhale 2 puffs into the lungs every 4 hours as needed for shortness of breath / dyspnea    SOB (shortness of breath)       amitriptyline 50 MG tablet    ELAVIL    90 tablet    Take 1 tablet (50 mg) by mouth At Bedtime    Anxiety       aspirin 81 MG tablet     100    1 tab po QD (Once per day)    Chronic pancreatitis (H)       * blood glucose monitoring lancets     3 Box    1 each See Admin Instructions test 3-4 times per day    Type 2 diabetes, HbA1C goal < 8% (H)       * blood glucose monitoring lancets     1 Box    Use to test blood sugar 10 times daily or as directed.  Ok to substitute alternative if insurance prefers.    Diabetes mellitus type 1 (H)       blood glucose monitoring test strip    HOLLIE CONTOUR NEXT    300 each    Use to test blood sugar 10 times daily or as directed.  Ok to substitute alternative if insurance prefers.    Diabetes mellitus type 1 (H)       furosemide 20 MG tablet    LASIX    90 tablet    Take 1 tablet (20 mg) by mouth daily    Nonischemic cardiomyopathy (H)       glucagon 1 MG kit    GLUCAGON EMERGENCY    1 mg    Inject 1 mg into the muscle once for 1 dose    Type 2 diabetes mellitus with diabetic neuropathy (H)       glucose 4 G Chew chewable tablet     25 tablet    Take 3-4 tablets to treat low blood sugar.    Uncontrolled diabetes mellitus with complications (H)       insulin pen needle 31G X 5 MM    B-D U/F    3 each    Use 3 time(s) a day.    Type 2 diabetes, HbA1c goal < 7% (H)       Ipratropium-Albuterol  MCG/ACT inhaler     COMBIVENT RESPIMAT    1 Inhaler    Inhale 1 puff into the lungs 4 times daily Not to exceed 6 doses per day.    Chronic obstructive pulmonary disease, unspecified COPD type (H)       * lidocaine 5 % Patch    LIDODERM    30 patch    Place 1 patch onto the skin every 24 hours Apply patch up to 12 hours with in a 24 hour period.    Lumbar radiculopathy       * study - lidocaine (LMX) 4 % Crea    IDS# [id number]    45 g    Apply topically every 8 hours as needed for moderate pain    Diabetic polyneuropathy associated with type 2 diabetes mellitus (H)       lisinopril 10 MG tablet    PRINIVIL/ZESTRIL    90 tablet    Take 1 tablet (10 mg) by mouth daily    Nonischemic cardiomyopathy (H)       methadone 10 mg/mL Conc (HIGH CONC) solution    DOLPHINE-INTENSOL     Take 7 mLs by mouth daily.    Chemical dependency (H)       metoprolol 50 MG tablet    LOPRESSOR     Take 25 mg by mouth daily        MIRENA (52 MG) 20 MCG/24HR IUD   Generic drug:  levonorgestrel     1    placed today    Excessive or frequent menstruation       multivitamin, therapeutic with minerals Tabs tablet     30 each    Take 1 tablet by mouth daily    Heart failure and kidney disease due to high blood pressure (H)       NovoLOG VIAL 100 UNITS/ML injection   Generic drug:  insulin aspart     3 Month    Use as directed in insulin pump. Patient using up to 60 units per day    Type 2 diabetes mellitus with diabetic neuropathy (H)       polyethylene glycol Packet    MIRALAX/GLYCOLAX    28 packet    Take 17 g by mouth daily    Constipation       pregabalin 75 MG capsule    LYRICA    90 capsule    Take 1 capsule (75 mg) by mouth 3 times daily    Controlled type 2 diabetes with neuropathy (H)       ranitidine 300 MG tablet    ZANTAC    90 tablet    TAKE ONE TABLET BY MOUTH EVERY DAY    Gastroesophageal reflux disease without esophagitis       SM NICOTINE 21 MG/24HR 24 hr patch   Generic drug:  nicotine     28 patch    REMOVE OLD PATCH AND APPLY ONE NEW PATCH TO SKIN  EVERY 24 HOURS    Tobacco dependence syndrome       * Notice:  This list has 4 medication(s) that are the same as other medications prescribed for you. Read the directions carefully, and ask your doctor or other care provider to review them with you.

## 2017-06-28 NOTE — NURSING NOTE
"Chief Complaint   Patient presents with     Physical       Initial /64 (BP Location: Left arm, Patient Position: Chair, Cuff Size: Adult Large)  Pulse 115  Temp 97.7  F (36.5  C) (Oral)  Ht 5' 9\" (1.753 m)  Wt 155 lb (70.3 kg)  SpO2 98%  BMI 22.89 kg/m2 Estimated body mass index is 22.89 kg/(m^2) as calculated from the following:    Height as of this encounter: 5' 9\" (1.753 m).    Weight as of this encounter: 155 lb (70.3 kg).  Medication Reconciliation: complete   An,CMA (AMAA)      "

## 2017-06-28 NOTE — PATIENT INSTRUCTIONS
Based on your medical history and these are the current health maintenance or preventive care services that you are due for (some may have been done at this visit)  Health Maintenance Due   Topic Date Due     ADVANCE DIRECTIVE PLANNING Q5 YRS  07/18/1985     EYE EXAM Q1 YEAR  09/23/2016     PAP Q6 MOS DIAGNOSTIC  01/25/2017     CBC Q1 YR  02/10/2017     LIPID MONITORING Q1 YEAR  07/25/2017     DEPRESSION ACTION PLAN Q1 YR  07/25/2017         At [facility], we strive to deliver an exceptional experience to you, every time we see you.    If you receive a survey in the mail, please send us back your thoughts. We really do value your feedback.    Your care team's suggested websites for health information:  Www.[employer].org : Up to date and easily searchable information on multiple topics.  Www.medlineplus.gov : medication info, interactive tutorials, watch real surgeries online  Www.familydoctor.org : good info from the Academy of Family Physicians  Www.cdc.gov : public health info, travel advisories, epidemics (H1N1)  Www.aap.org : children's health info, normal development, vaccinations  Www.health.state[place]us : [place] dept of health, public health issues in [place], N1N1    How to contact your care team:   Team Kristen[employer]Spirit [phone]         Pharmacy [phone]    Dr. Seaman, Cara Cuevas PA-C, Dr. Mendes, Terri MANCIA CNP, Esperanza Slater PA-C, Dr. Perla, and GAVINO Redmond CNP    Team RNs: Marium Kumar      Clinic hours  M-Th 7 am-7 pm   Fri 7 am-5 pm.   Urgent care M-F 11 am-9 pm,   Sat/Sun 9 am-5 pm.  Pharmacy M-Th 8 am-8 pm Fri 8 am-6 pm  Sat/Sun 9 am-5 pm.     All password changes, disabled accounts, or ID changes in MyChart/MyHealth will be done by our Access Services Department.    If you need help with your account or password, call: [phone]. Clinic staff no longer has the ability to change passwords.     Preventive Health Recommendations  Female Ages 40  to 49    Yearly exam:     See your health care provider every year in order to  1. Review health changes.   2. Discuss preventive care.    3. Review your medicines if your doctor prescribed any.      Get a Pap test every three years (unless you have an abnormal result and your provider advises testing more often).      If you get Pap tests with HPV test, you only need to test every 5 years, unless you have an abnormal result. You do not need a Pap test if your uterus was removed (hysterectomy) and you have not had cancer.      You should be tested each year for STDs (sexually transmitted diseases), if you're at risk.       Ask your doctor if you should have a mammogram.      Have a colonoscopy (test for colon cancer) if someone in your family has had colon cancer or polyps before age 50.       Have a cholesterol test every 5 years.       Have a diabetes test (fasting glucose) after age 45. If you are at risk for diabetes, you should have this test every 3 years.    Shots: Get a flu shot each year. Get a tetanus shot every 10 years.     Nutrition:     Eat at least 5 servings of fruits and vegetables each day.    Eat whole-grain bread, whole-wheat pasta and brown rice instead of white grains and rice.    Talk to your provider about Calcium and Vitamin D.     Lifestyle    Exercise at least 150 minutes a week (an average of 30 minutes a day, 5 days a week). This will help you control your weight and prevent disease.    Limit alcohol to one drink per day.    No smoking.     Wear sunscreen to prevent skin cancer.    See your dentist every six months for an exam and cleaning.

## 2017-07-01 DIAGNOSIS — F41.9 ANXIETY: ICD-10-CM

## 2017-07-01 NOTE — TELEPHONE ENCOUNTER
amitriptyline (ELAVIL) 50 MG tablet      Last Written Prescription Date: 6/21/17  Last Quantity: 90, # refills: 1  Last Office Visit with G, [facility] or [facility] prescribing provider: 6/28/17       Creatinine   Date Value Ref Range Status   06/21/2017 1.06 (H) 0.52 - 1.04 mg/dL Final     Lab Results   Component Value Date    AST 25 06/21/2017     Lab Results   Component Value Date    ALT 38 06/21/2017     BP Readings from Last 3 Encounters:   06/28/17 108/64   06/21/17 111/73   06/21/17 119/73         Melany Marion  [facility]

## 2017-07-05 LAB
COPATH REPORT: NORMAL
PAP: NORMAL

## 2017-07-06 ENCOUNTER — TELEPHONE (OUTPATIENT)
Dept: FAMILY MEDICINE | Facility: CLINIC | Age: 52
End: 2017-07-06

## 2017-07-06 LAB
FINAL DIAGNOSIS: ABNORMAL
HPV HR 12 DNA CVX QL NAA+PROBE: POSITIVE
HPV16 DNA SPEC QL NAA+PROBE: NEGATIVE
HPV18 DNA SPEC QL NAA+PROBE: NEGATIVE
SPECIMEN DESCRIPTION: ABNORMAL

## 2017-07-06 RX ORDER — AMITRIPTYLINE HYDROCHLORIDE 50 MG/1
TABLET ORAL
Qty: 30 TABLET | Refills: 0 | OUTPATIENT
Start: 2017-07-06

## 2017-07-06 NOTE — TELEPHONE ENCOUNTER
Panel Management Review      BP Readings from Last 1 Encounters:   06/28/17 108/64    ,   Lab Results   Component Value Date    A1C 11.8 06/21/2017    A1C 11.5 11/02/2016   , 6/28/2017    Fail List measure:     Depression / Dysthymia review  PHQ-9 SCORE 2/4/2016 11/2/2016 6/21/2017   Total Score - - -   Total Score 3 4 12      Patient is due for:  PHQ9 and GAD7  INDEX DATE: FROM 11/21/2017 TO 1/21/2018      Patient is due/failing the following:   PHQ9    Action needed:   Patient needs to do PHQ9.    Type of outreach:    POSTPONED AS PROTOCOL    Questions for provider review:    None                                                                                                                                    Dai Lew CMA      Chart routed to Care Team .

## 2017-07-07 NOTE — TELEPHONE ENCOUNTER
Denied as duplicate - Rx sent 6/21/17.  Pharmacy advised via E-Scribe response.  Last RX in EPIC shows it was sent to the same pharmacy (as requesting) with 'Receipt confirmed by pharmacy'.      Gio Zheng RN

## 2017-07-19 ENCOUNTER — TELEPHONE (OUTPATIENT)
Dept: ORTHOPEDICS | Facility: CLINIC | Age: 52
End: 2017-07-19

## 2017-07-21 ENCOUNTER — OFFICE VISIT (OUTPATIENT)
Dept: ORTHOPEDICS | Facility: CLINIC | Age: 52
End: 2017-07-21

## 2017-07-21 DIAGNOSIS — L97.409 TYPE 2 DIABETES MELLITUS WITH DIABETIC HEEL ULCER (H): ICD-10-CM

## 2017-07-21 DIAGNOSIS — L97.412 HEEL ULCER, RIGHT, WITH FAT LAYER EXPOSED (H): Primary | Chronic | ICD-10-CM

## 2017-07-21 DIAGNOSIS — E11.621 TYPE 2 DIABETES MELLITUS WITH DIABETIC HEEL ULCER (H): ICD-10-CM

## 2017-07-21 NOTE — LETTER
7/21/2017      RE: Alessandra Pak  [address]  [facility] [id number]       Chief Complaint:   Chief Complaint   Patient presents with     RECHECK     Follow up for right foot wound.      Allergies   Allergen Reactions     Naproxen GI Disturbance     No Known Drug Allergies      Subjective: Alessandra is a 52 year old female who presents to the clinic today for a follow up of diabetic heel ulceration. She has been performing daily dressing changes with MediHoney. It hasn't been draining much lately. Admits pain which is worse than usual. She has been walking and more active lately because her mother is recovering from surgery and she has been taking care of her. Denies purulent drainage, erythema, edema.    Objective  A diabetic pressure wound is noted at left  heel measuring  2.5cm x 1.3cm x 0.2cm .     Lozano Classification: II     Wound base: Red  Pink/Granulation     Edges: maceraiton     Drainage: small/serous     Odor: no     Undermining: No     Bone Exposure: No     Clinical Signs of Infection: No     After obtaining patient consent, the wound was irrigated with copious amounts of saline. A scalpel was then used to debride the wound into the subcutaneous tissue. The wound edges were debrided to healthy, bleeding tissue. Given the patient's lack of sensation, no anesthesia was necessary for the procedure.      Assessment: Right heel wound - Healing well     Plan:   - Pt seen and evaluated  - Wound was cleansed and debrided as described.   -  Endoform/hydrofera blue and a DSD were applied to the wound. She should leave this intact until Monday afternoon. She can then start daily MediHoney dressings.  - Pt to return to clinic in 2 weeks.       Barrington Lewis DPM

## 2017-07-21 NOTE — LETTER
7/21/2017       RE: Alessandra Pak  [address]  [place] [id number]     Dear Colleague,    Thank you for referring your patient, Alessandra Pak, to the [facility] at [facility]. Please see a copy of my visit note below.    Chief Complaint:   Chief Complaint   Patient presents with     RECHECK     Follow up for right foot wound.      Allergies   Allergen Reactions     Naproxen GI Disturbance     No Known Drug Allergies      Subjective: Alessandra is a 52 year old female who presents to the clinic today for a follow up of diabetic heel ulceration. She has been performing daily dressing changes with MediHoney. It hasn't been draining much lately. Admits pain which is worse than usual. She has been walking and more active lately because her mother is recovering from surgery and she has been taking care of her. Denies purulent drainage, erythema, edema.    Objective  A diabetic pressure wound is noted at left  heel measuring  2.5cm x 1.3cm x 0.2cm .     Lozano Classification: II     Wound base: Red  Pink/Granulation     Edges: maceraiton     Drainage: small/serous     Odor: no     Undermining: No     Bone Exposure: No     Clinical Signs of Infection: No     After obtaining patient consent, the wound was irrigated with copious amounts of saline. A scalpel was then used to debride the wound into the subcutaneous tissue. The wound edges were debrided to healthy, bleeding tissue. Given the patient's lack of sensation, no anesthesia was necessary for the procedure.      Assessment: Right heel wound - Healing well     Plan:   - Pt seen and evaluated  - Wound was cleansed and debrided as described.   -  Endoform/hydrofera blue and a DSD were applied to the wound. She should leave this intact until Monday afternoon. She can then start daily MediHoney dressings.  - Pt to return to clinic in 2 weeks.       Barrington Lewis DPM

## 2017-07-21 NOTE — PROGRESS NOTES
Chief Complaint:   Chief Complaint   Patient presents with     RECHECK     Follow up for right foot wound.      Allergies   Allergen Reactions     Naproxen GI Disturbance     No Known Drug Allergies      Subjective: Alessandra is a 52 year old female who presents to the clinic today for a follow up of diabetic heel ulceration. She has been performing daily dressing changes with MediHoney. It hasn't been draining much lately. Admits pain which is worse than usual. She has been walking and more active lately because her mother is recovering from surgery and she has been taking care of her. Denies purulent drainage, erythema, edema.    Objective  A diabetic pressure wound is noted at left  heel measuring 2.5cm x 1.3cm x 0.2cm .     Lozano Classification: II     Wound base: Red  Pink/Granulation     Edges: maceraiton     Drainage: small/serous     Odor: no     Undermining: No     Bone Exposure: No     Clinical Signs of Infection: No     After obtaining patient consent, the wound was irrigated with copious amounts of saline. A scalpel was then used to debride the wound into the subcutaneous tissue. The wound edges were debrided to healthy, bleeding tissue. Given the patient's lack of sensation, no anesthesia was necessary for the procedure.      Assessment: Right heel wound - Healing well     Plan:   - Pt seen and evaluated  - Wound was cleansed and debrided as described.   -  Endoform/hydrofera blue and a DSD were applied to the wound. She should leave this intact until Monday afternoon. She can then start daily MediHoney dressings.  - Pt to return to clinic in 2 weeks.

## 2017-07-21 NOTE — MR AVS SNAPSHOT
After Visit Summary   7/21/2017    Alessandra Pak    MRN: [id number]           Patient Information     Date Of Birth          1965        Visit Information        Provider Department      7/21/2017 1:40 PM Barrington Lewis DPM [facility]        Today's Diagnoses     Heel ulcer, right, with fat layer exposed (H)    -  1    Type 2 diabetes mellitus with diabetic heel ulcer (H)           Follow-ups after your visit        Your next 10 appointments already scheduled     Aug 04, 2017 12:00 PM CDT   (Arrive by 11:45 AM)   RETURN DIABETES with Toya Nuno PA-C   [facility] ([facility])    [address] [place] [id number] [phone]            Aug 07, 2017  9:00 AM CDT   (Arrive by 8:45 AM)   RETURN FOOT/ANKLE with Barrington Lewis DPM   [facility] ([facility])    [address] [place] [id number] [phone]              Who to contact     Please call your clinic at [phone] to:    Ask questions about your health    Make or cancel appointments    Discuss your medicines    Learn about your test results    Speak to your doctor   If you have compliments or concerns about an experience at your clinic, or if you wish to file a complaint, please contact [facility] Patient Relations at [phone] or email us at [email or web address]         Additional Information About Your Visit        "[employer]hart Information     [employer] gives you secure access to your electronic health record. If you see a primary care provider, you can also send messages to your care team and make appointments. If you have questions, please call your primary care clinic.  If you do not have a primary care provider, please call [phone] and they will assist you.      [employer] is an electronic gateway that provides easy, online  access to your medical records. With [employer], you can request a clinic appointment, read your test results, renew a prescription or communicate with your care team.     To access your existing account, please contact your [facility] or call [phone] for assistance.        Care EveryWhere ID     This is your Care EveryWhere ID. This could be used by other organizations to access your [facility] medical records  [id number]         Blood Pressure from Last 3 Encounters:   06/28/17 108/64   06/21/17 111/73   06/21/17 119/73    Weight from Last 3 Encounters:   06/28/17 155 lb   06/21/17 156 lb   06/02/17 157 lb 14.4 oz              We Performed the Following     DEBRIDEMENT WOUND UP TO 20 SQ CM        Primary Care Provider Office Phone # Fax #    Brionna Coby Dc [phone]       [facility] [address]  [facility] [address]        Equal Access to Services     NICK ROBLEDO AH: Hadii aad ku hadasho Soomaali, waaxda luqadaha, qaybta kaalmada adeegyada, waxay idiin hayaan adeeg kharash la'josefinan . So [facility] [phone].    ATENCIÓN: Si habla español, tiene a nagy disposición servicios gratuitos de asistencia lingüística. Llame al [phone].    We comply with applicable federal civil rights laws and [place] laws. We do not discriminate on the basis of race, color, national origin, age, disability sex, sexual orientation or gender identity.            Thank you!     Thank you for choosing [facility]  for your care. Our goal is always to provide you with excellent care. Hearing back from our patients is one way we can continue to improve our services. Please take a few minutes to complete the written survey that you may receive in the mail after your visit with us. Thank you!             Your Updated Medication List - Protect others around you: Learn how to safely use, store and throw away your medicines at  www.disposemymeds.org.          This list is accurate as of: 7/21/17  2:50 PM.  Always use your most recent med list.                   Brand Name Dispense Instructions for use Diagnosis    acetone (Urine) test Strp     25 each    1 strip by In Vitro route as needed    Type 2 diabetes mellitus with diabetic neuropathy (H)       albuterol 108 (90 BASE) MCG/ACT Inhaler    albuterol    1 Inhaler    Inhale 2 puffs into the lungs every 4 hours as needed for shortness of breath / dyspnea    SOB (shortness of breath)       amitriptyline 50 MG tablet    ELAVIL    90 tablet    Take 1 tablet (50 mg) by mouth At Bedtime    Anxiety       aspirin 81 MG tablet     100    1 tab po QD (Once per day)    Chronic pancreatitis (H)       * blood glucose monitoring lancets     3 Box    1 each See Admin Instructions test 3-4 times per day    Type 2 diabetes, HbA1C goal < 8% (H)       * blood glucose monitoring lancets     1 Box    Use to test blood sugar 10 times daily or as directed.  Ok to substitute alternative if insurance prefers.    Diabetes mellitus type 1 (H)       blood glucose monitoring test strip    HOLLIE CONTOUR NEXT    300 each    Use to test blood sugar 10 times daily or as directed.  Ok to substitute alternative if insurance prefers.    Diabetes mellitus type 1 (H)       furosemide 20 MG tablet    LASIX    90 tablet    Take 1 tablet (20 mg) by mouth daily    Nonischemic cardiomyopathy (H)       glucagon 1 MG kit    GLUCAGON EMERGENCY    1 mg    Inject 1 mg into the muscle once for 1 dose    Type 2 diabetes mellitus with diabetic neuropathy (H)       glucose 4 G Chew chewable tablet     25 tablet    Take 3-4 tablets to treat low blood sugar.    Uncontrolled diabetes mellitus with complications (H)       insulin pen needle 31G X 5 MM    B-D U/F    3 each    Use 3 time(s) a day.    Type 2 diabetes, HbA1c goal < 7% (H)       Ipratropium-Albuterol  MCG/ACT inhaler    COMBIVENT RESPIMAT    1 Inhaler    Inhale 1 puff into  the lungs 4 times daily Not to exceed 6 doses per day.    Chronic obstructive pulmonary disease, unspecified COPD type (H)       * lidocaine 5 % Patch    LIDODERM    30 patch    Place 1 patch onto the skin every 24 hours Apply patch up to 12 hours with in a 24 hour period.    Lumbar radiculopathy       * study - lidocaine (LMX) 4 % Crea    IDS# [id number]    45 g    Apply topically every 8 hours as needed for moderate pain    Diabetic polyneuropathy associated with type 2 diabetes mellitus (H)       lisinopril 10 MG tablet    PRINIVIL/ZESTRIL    90 tablet    Take 1 tablet (10 mg) by mouth daily    Nonischemic cardiomyopathy (H)       methadone 10 mg/mL Conc (HIGH CONC) solution    DOLPHINE-INTENSOL     Take 7 mLs by mouth daily.    Chemical dependency (H)       metoprolol 50 MG tablet    LOPRESSOR     Take 25 mg by mouth daily        MIRENA (52 MG) 20 MCG/24HR IUD   Generic drug:  levonorgestrel     1    placed today    Excessive or frequent menstruation       multivitamin, therapeutic with minerals Tabs tablet     30 each    Take 1 tablet by mouth daily    Heart failure and kidney disease due to high blood pressure (H)       NovoLOG VIAL 100 UNITS/ML injection   Generic drug:  insulin aspart     3 Month    Use as directed in insulin pump. Patient using up to 60 units per day    Type 2 diabetes mellitus with diabetic neuropathy (H)       polyethylene glycol Packet    MIRALAX/GLYCOLAX    28 packet    Take 17 g by mouth daily    Constipation       pregabalin 75 MG capsule    LYRICA    90 capsule    Take 1 capsule (75 mg) by mouth 3 times daily    Controlled type 2 diabetes with neuropathy (H)       ranitidine 300 MG tablet    ZANTAC    90 tablet    TAKE ONE TABLET BY MOUTH EVERY DAY    Gastroesophageal reflux disease without esophagitis       SM NICOTINE 21 MG/24HR 24 hr patch   Generic drug:  nicotine     28 patch    REMOVE OLD PATCH AND APPLY ONE NEW PATCH TO SKIN EVERY 24 HOURS    Tobacco dependence syndrome       *  Notice:  This list has 4 medication(s) that are the same as other medications prescribed for you. Read the directions carefully, and ask your doctor or other care provider to review them with you.

## 2017-07-21 NOTE — NURSING NOTE
Reason For Visit:   Chief Complaint   Patient presents with     RECHECK     Follow up for right foot wound.          Pain Assessment  Patient Currently in Pain: Yes  0-10 Pain Scale: 5  Primary Pain Location: Foot  Pain Orientation: Right

## 2017-08-04 ENCOUNTER — OFFICE VISIT (OUTPATIENT)
Dept: ENDOCRINOLOGY | Facility: CLINIC | Age: 52
End: 2017-08-04

## 2017-08-04 VITALS
HEART RATE: 118 BPM | HEIGHT: 69 IN | BODY MASS INDEX: 22.31 KG/M2 | WEIGHT: 150.6 LBS | SYSTOLIC BLOOD PRESSURE: 109 MMHG | DIASTOLIC BLOOD PRESSURE: 75 MMHG

## 2017-08-04 DIAGNOSIS — E13.9 SECONDARY DIABETES MELLITUS (H): Primary | ICD-10-CM

## 2017-08-04 ASSESSMENT — PAIN SCALES - GENERAL: PAINLEVEL: NO PAIN (0)

## 2017-08-04 NOTE — MR AVS SNAPSHOT
After Visit Summary   8/4/2017    Alessandra Pak    MRN: [id number]           Patient Information     Date Of Birth          1965        Visit Information        Provider Department      8/4/2017 12:00 PM Toya Nuno PA-C [facility]         Follow-ups after your visit        Your next 10 appointments already scheduled     Aug 07, 2017  9:00 AM CDT   (Arrive by 8:45 AM)   RETURN FOOT/ANKLE with Barrington Lewis DPM   [facility] ([facility])    [address] [place] [id number] [phone]            Aug 09, 2017 12:30 PM CDT   (Arrive by 12:15 PM)   Office Visit with Anh Chow RN   [facility] ([facility])    [address] [place] [id number] [phone]           Bring a current list of meds and any records pertaining to this visit. For Physicals, please bring immunization records and any forms needing to be filled out. Please arrive 10 minutes early to complete paperwork.            Oct 04, 2017 10:30 AM CDT   (Arrive by 10:15 AM)   RETURN DIABETES with ROMIE Betancur [facility] ([facility])    [address] [place] [id number] [phone]              Who to contact     Please call your clinic at [phone] to:    Ask questions about your health    Make or cancel appointments    Discuss your medicines    Learn about your test results    Speak to your doctor   If you have compliments or concerns about an experience at your clinic, or if you wish to file a complaint, please contact [facility] Patient Relations at [phone] or email us at [email or web address]         Additional Information About Your Visit        MyChart Information     [employer] gives you secure access to your electronic health record. If you see a  "primary care provider, you can also send messages to your care team and make appointments. If you have questions, please call your primary care clinic.  If you do not have a primary care provider, please call [phone] and they will assist you.      [employer] is an electronic gateway that provides easy, online access to your medical records. With [employer], you can request a clinic appointment, read your test results, renew a prescription or communicate with your care team.     To access your existing account, please contact your [facility] or call [phone] for assistance.        Care EveryWhere ID     This is your Care EveryWhere ID. This could be used by other organizations to access your [facility] medical records  [id number]        Your Vitals Were     Pulse Height BMI (Body Mass Index)             118 1.753 m (5' 9\") 22.24 kg/m2          Blood Pressure from Last 3 Encounters:   08/04/17 109/75   06/28/17 108/64   06/21/17 111/73    Weight from Last 3 Encounters:   08/04/17 68.3 kg (150 lb 9.6 oz)   06/28/17 70.3 kg (155 lb)   06/21/17 70.8 kg (156 lb)              Today, you had the following     No orders found for display       Primary Care Provider Office Phone # Fax #    Brionna Coby Dc [phone]       [facility] [address] Health system [address]        Equal Access to Services     NICK ROBLEDO : Hadii aad ku hadasho Soomaali, waaxda luqadaha, qaybta kaalmada adeegyada, sanjay levine . So [facility] [phone].    ATENCIÓN: Si habla español, tiene a nagy disposición servicios gratuitos de asistencia lingüística. Llame al [phone].    We comply with applicable federal civil rights laws and [place] laws. We do not discriminate on the basis of race, color, national origin, age, disability sex, sexual orientation or gender identity.            Thank you!     Thank you for choosing [facility] " ENDOCRINOLOG[facility]  for your care. Our goal is always to provide you with excellent care. Hearing back from our patients is one way we can continue to improve our services. Please take a few minutes to complete the written survey that you may receive in the mail after your visit with us. Thank you!             Your Updated Medication List - Protect others around you: Learn how to safely use, store and throw away your medicines at www.disposemymeds.org.          This list is accurate as of: 8/4/17 12:32 PM.  Always use your most recent med list.                   Brand Name Dispense Instructions for use Diagnosis    acetone (Urine) test Strp     25 each    1 strip by In Vitro route as needed    Type 2 diabetes mellitus with diabetic neuropathy (H)       albuterol 108 (90 BASE) MCG/ACT Inhaler    albuterol    1 Inhaler    Inhale 2 puffs into the lungs every 4 hours as needed for shortness of breath / dyspnea    SOB (shortness of breath)       amitriptyline 50 MG tablet    ELAVIL    90 tablet    Take 1 tablet (50 mg) by mouth At Bedtime    Anxiety       aspirin 81 MG tablet     100    1 tab po QD (Once per day)    Chronic pancreatitis (H)       * blood glucose monitoring lancets     3 Box    1 each See Admin Instructions test 3-4 times per day    Type 2 diabetes, HbA1C goal < 8% (H)       * blood glucose monitoring lancets     1 Box    Use to test blood sugar 10 times daily or as directed.  Ok to substitute alternative if insurance prefers.    Diabetes mellitus type 1 (H)       blood glucose monitoring test strip    HOLLIE CONTOUR NEXT    300 each    Use to test blood sugar 10 times daily or as directed.  Ok to substitute alternative if insurance prefers.    Diabetes mellitus type 1 (H)       furosemide 20 MG tablet    LASIX    90 tablet    Take 1 tablet (20 mg) by mouth daily    Nonischemic cardiomyopathy (H)       glucagon 1 MG kit    GLUCAGON EMERGENCY    1 mg    Inject 1 mg into the muscle once for 1 dose    Type 2  diabetes mellitus with diabetic neuropathy (H)       glucose 4 G Chew chewable tablet     25 tablet    Take 3-4 tablets to treat low blood sugar.    Uncontrolled diabetes mellitus with complications (H)       insulin pen needle 31G X 5 MM    B-D U/F    3 each    Use 3 time(s) a day.    Type 2 diabetes, HbA1c goal < 7% (H)       Ipratropium-Albuterol  MCG/ACT inhaler    COMBIVENT RESPIMAT    1 Inhaler    Inhale 1 puff into the lungs 4 times daily Not to exceed 6 doses per day.    Chronic obstructive pulmonary disease, unspecified COPD type (H)       * lidocaine 5 % Patch    LIDODERM    30 patch    Place 1 patch onto the skin every 24 hours Apply patch up to 12 hours with in a 24 hour period.    Lumbar radiculopathy       * study - lidocaine (LMX) 4 % Crea    IDS# [id number]    45 g    Apply topically every 8 hours as needed for moderate pain    Diabetic polyneuropathy associated with type 2 diabetes mellitus (H)       lisinopril 10 MG tablet    PRINIVIL/ZESTRIL    90 tablet    Take 1 tablet (10 mg) by mouth daily    Nonischemic cardiomyopathy (H)       methadone 10 mg/mL Conc (HIGH CONC) solution    DOLPHINE-INTENSOL     Take 7 mLs by mouth daily.    Chemical dependency (H)       metoprolol 50 MG tablet    LOPRESSOR     Take 25 mg by mouth daily        MIRENA (52 MG) 20 MCG/24HR IUD   Generic drug:  levonorgestrel     1    placed today    Excessive or frequent menstruation       multivitamin, therapeutic with minerals Tabs tablet     30 each    Take 1 tablet by mouth daily    Heart failure and kidney disease due to high blood pressure (H)       NovoLOG VIAL 100 UNITS/ML injection   Generic drug:  insulin aspart     3 Month    Use as directed in insulin pump. Patient using up to 60 units per day    Type 2 diabetes mellitus with diabetic neuropathy (H)       polyethylene glycol Packet    MIRALAX/GLYCOLAX    28 packet    Take 17 g by mouth daily    Constipation       pregabalin 75 MG capsule    LYRICA    90 capsule     Take 1 capsule (75 mg) by mouth 3 times daily    Controlled type 2 diabetes with neuropathy (H)       ranitidine 300 MG tablet    ZANTAC    90 tablet    TAKE ONE TABLET BY MOUTH EVERY DAY    Gastroesophageal reflux disease without esophagitis       SM NICOTINE 21 MG/24HR 24 hr patch   Generic drug:  nicotine     28 patch    REMOVE OLD PATCH AND APPLY ONE NEW PATCH TO SKIN EVERY 24 HOURS    Tobacco dependence syndrome       * Notice:  This list has 4 medication(s) that are the same as other medications prescribed for you. Read the directions carefully, and ask your doctor or other care provider to review them with you.
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
## 2017-08-04 NOTE — NURSING NOTE
"Chief Complaint   Patient presents with     RECHECK     type 2 diabetes f/u        Initial /75 (BP Location: Right arm, Patient Position: Sitting, Cuff Size: Adult Regular)  Pulse 118  Ht 1.753 m (5' 9\")  Wt 68.3 kg (150 lb 9.6 oz)  BMI 22.24 kg/m2 Estimated body mass index is 22.24 kg/(m^2) as calculated from the following:    Height as of this encounter: 1.753 m (5' 9\").    Weight as of this encounter: 68.3 kg (150 lb 9.6 oz).  Medication Reconciliation: complete       "
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
## 2017-08-07 DIAGNOSIS — K21.9 GASTROESOPHAGEAL REFLUX DISEASE WITHOUT ESOPHAGITIS: ICD-10-CM

## 2017-08-07 NOTE — TELEPHONE ENCOUNTER
ranitidine (ZANTAC) 300 MG tablet      Last Written Prescription Date: 03/08/17  Last Fill Quantity: 90,  # refills: 1   Last Office Visit with NAZ, KOBI or [facility] prescribing provider: 6/28/17                                           Next 5 appointments (look out 90 days)     Aug 09, 2017 12:30 PM CDT   (Arrive by 12:15 PM)   Office Visit with Anh Chow RN   [facility] ([facility])    [address] [address] [phone]                      Radha Dixon  [facility]

## 2017-08-09 ENCOUNTER — TELEPHONE (OUTPATIENT)
Dept: FAMILY MEDICINE | Facility: CLINIC | Age: 52
End: 2017-08-09

## 2017-08-09 NOTE — TELEPHONE ENCOUNTER
Message through [facility] - requesting Prior Authorization for : Lidocaine 5% patches  This is a new Medication.  Route to the PCP for approval.  Cover-Meds-Key: [id number]       Form placed in the Green Folder: waiting for approval from PCP.  GLORY Estes ([employer])

## 2017-08-11 NOTE — TELEPHONE ENCOUNTER
Prescription approved per [facility] Refill Protocol.    Marisol Dunaway, MSN, RN-BC  Care Coordinator

## 2017-08-11 NOTE — TELEPHONE ENCOUNTER
Insurance replied: Approved until 2/6/18.  Form faxed to the pharmacy as protocol.  Called and left a voice msg for pt.  Form placed in abstraction.  GLORY Estes ([employer])

## 2017-08-16 ENCOUNTER — OFFICE VISIT (OUTPATIENT)
Dept: ORTHOPEDICS | Facility: CLINIC | Age: 52
End: 2017-08-16

## 2017-08-16 DIAGNOSIS — L97.411 DIABETIC ULCER OF RIGHT HEEL ASSOCIATED WITH TYPE 2 DIABETES MELLITUS, LIMITED TO BREAKDOWN OF SKIN (H): Primary | ICD-10-CM

## 2017-08-16 DIAGNOSIS — N18.30 CKD (CHRONIC KIDNEY DISEASE) STAGE 3, GFR 30-59 ML/MIN (H): Chronic | ICD-10-CM

## 2017-08-16 DIAGNOSIS — E11.40 TYPE 2 DIABETES MELLITUS WITH DIABETIC NEUROPATHY, WITHOUT LONG-TERM CURRENT USE OF INSULIN (H): Chronic | ICD-10-CM

## 2017-08-16 DIAGNOSIS — E11.621 DIABETIC ULCER OF RIGHT HEEL ASSOCIATED WITH TYPE 2 DIABETES MELLITUS, LIMITED TO BREAKDOWN OF SKIN (H): Primary | ICD-10-CM

## 2017-08-16 DIAGNOSIS — B35.1 DERMATOPHYTOSIS OF NAIL: ICD-10-CM

## 2017-08-16 NOTE — MR AVS SNAPSHOT
After Visit Summary   8/16/2017    Alessandra Pak    MRN: [id number]           Patient Information     Date Of Birth          1965        Visit Information        Provider Department      8/16/2017 1:40 PM Barrington Lewis DPM [facility]        Today's Diagnoses     Diabetic ulcer of right heel associated with type 2 diabetes mellitus, limited to breakdown of skin (H)    -  1    Type 2 diabetes mellitus with diabetic neuropathy, without long-term current use of insulin (H)        CKD (chronic kidney disease) stage 3, GFR 30-59 ml/min        Dermatophytosis of nail           Follow-ups after your visit        Your next 10 appointments already scheduled     Oct 04, 2017 10:30 AM CDT   (Arrive by 10:15 AM)   RETURN DIABETES with Toya Nuno PA-C   [facility] ([facility])    [address] [place] [id number] [phone]            Nov 15, 2017 12:00 PM CST   (Arrive by 11:45 AM)   RETURN FOOT/ANKLE with Barrington Lewis DPM   [facility] ([facility] [place])    [address] [place] [address] [phone]              Who to contact     Please call your clinic at [phone] to:    Ask questions about your health    Make or cancel appointments    Discuss your medicines    Learn about your test results    Speak to your doctor   If you have compliments or concerns about an experience at your clinic, or if you wish to file a complaint, please contact [facility] Patient Relations at [phone] or email us at [email or web address]         Additional Information About Your Visit        MyChart Information     [employer] gives you secure access to your electronic health record. If you see a primary care provider, you can also send messages to your care team and make appointments. If you have questions,  please call your primary care clinic.  If you do not have a primary care provider, please call [phone] and they will assist you.      [employer] is an electronic gateway that provides easy, online access to your medical records. With [employer], you can request a clinic appointment, read your test results, renew a prescription or communicate with your care team.     To access your existing account, please contact your [facility] or call [phone] for assistance.        Care EveryWhere ID     This is your Care EveryWhere ID. This could be used by other organizations to access your [place] medical records  [id number]         Blood Pressure from Last 3 Encounters:   08/04/17 109/75   06/28/17 108/64   06/21/17 111/73    Weight from Last 3 Encounters:   08/04/17 68.3 kg (150 lb 9.6 oz)   06/28/17 70.3 kg (155 lb)   06/21/17 70.8 kg (156 lb)              We Performed the Following     DEBRIDEMENT OF NAIL(S), 1-5        Primary Care Provider Office Phone # Fax #    Brionna Cobylucas Dc [phone]       [address] [address] [facility] [address]        Equal Access to Services     NICK ROBLEDO : Hadii aad ku hadasho Soomaali, waaxda luqadaha, qaybta kaalmada adeegyada, waxay idiin hayjosefinan kate hoang. So [facility] [place] [phone].    ATENCIÓN: Si habla español, tiene a nagy disposición servicios gratuitos de asistencia lingüística. Llame al [phone].    We comply with applicable federal civil rights laws and [place] laws. We do not discriminate on the basis of race, color, national origin, age, disability sex, sexual orientation or gender identity.            Thank you!     Thank you for choosing [facility]  for your care. Our goal is always to provide you with excellent care. Hearing back from our patients is one way we can continue to improve our services. Please take a few minutes to complete the written survey that you may receive  in the mail after your visit with us. Thank you!             Your Updated Medication List - Protect others around you: Learn how to safely use, store and throw away your medicines at www.disposemymeds.org.          This list is accurate as of: 8/16/17  2:07 PM.  Always use your most recent med list.                   Brand Name Dispense Instructions for use Diagnosis    acetone (Urine) test Strp     25 each    1 strip by In Vitro route as needed    Type 2 diabetes mellitus with diabetic neuropathy (H)       albuterol 108 (90 BASE) MCG/ACT Inhaler    albuterol    1 Inhaler    Inhale 2 puffs into the lungs every 4 hours as needed for shortness of breath / dyspnea    SOB (shortness of breath)       amitriptyline 50 MG tablet    ELAVIL    90 tablet    Take 1 tablet (50 mg) by mouth At Bedtime    Anxiety       aspirin 81 MG tablet     100    1 tab po QD (Once per day)    Chronic pancreatitis (H)       * blood glucose monitoring lancets     3 Box    1 each See Admin Instructions test 3-4 times per day    Type 2 diabetes, HbA1C goal < 8% (H)       * blood glucose monitoring lancets     1 Box    Use to test blood sugar 10 times daily or as directed.  Ok to substitute alternative if insurance prefers.    Diabetes mellitus type 1 (H)       blood glucose monitoring test strip    HOLLIE CONTOUR NEXT    300 each    Use to test blood sugar 10 times daily or as directed.  Ok to substitute alternative if insurance prefers.    Diabetes mellitus type 1 (H)       furosemide 20 MG tablet    LASIX    90 tablet    Take 1 tablet (20 mg) by mouth daily    Nonischemic cardiomyopathy (H)       glucagon 1 MG kit    GLUCAGON EMERGENCY    1 mg    Inject 1 mg into the muscle once for 1 dose    Type 2 diabetes mellitus with diabetic neuropathy (H)       glucose 4 G Chew chewable tablet     25 tablet    Take 3-4 tablets to treat low blood sugar.    Uncontrolled diabetes mellitus with complications (H)       insulin pen needle 31G X 5 MM    B-D U/F     3 each    Use 3 time(s) a day.    Type 2 diabetes, HbA1c goal < 7% (H)       Ipratropium-Albuterol  MCG/ACT inhaler    COMBIVENT RESPIMAT    1 Inhaler    Inhale 1 puff into the lungs 4 times daily Not to exceed 6 doses per day.    Chronic obstructive pulmonary disease, unspecified COPD type (H)       * lidocaine 5 % Patch    LIDODERM    30 patch    Place 1 patch onto the skin every 24 hours Apply patch up to 12 hours with in a 24 hour period.    Lumbar radiculopathy       * study - lidocaine (LMX) 4 % Crea    IDS# 4490    45 g    Apply topically every 8 hours as needed for moderate pain    Diabetic polyneuropathy associated with type 2 diabetes mellitus (H)       lisinopril 10 MG tablet    PRINIVIL/ZESTRIL    90 tablet    Take 1 tablet (10 mg) by mouth daily    Nonischemic cardiomyopathy (H)       methadone 10 mg/mL Conc (HIGH CONC) solution    DOLPHINE-INTENSOL     Take 7 mLs by mouth daily.    Chemical dependency (H)       metoprolol 50 MG tablet    LOPRESSOR     Take 25 mg by mouth daily        MIRENA (52 MG) 20 MCG/24HR IUD   Generic drug:  levonorgestrel     1    placed today    Excessive or frequent menstruation       multivitamin, therapeutic with minerals Tabs tablet     30 each    Take 1 tablet by mouth daily    Heart failure and kidney disease due to high blood pressure (H)       NovoLOG VIAL 100 UNITS/ML injection   Generic drug:  insulin aspart     3 Month    Use as directed in insulin pump. Patient using up to 60 units per day    Type 2 diabetes mellitus with diabetic neuropathy (H)       polyethylene glycol Packet    MIRALAX/GLYCOLAX    28 packet    Take 17 g by mouth daily    Constipation       pregabalin 75 MG capsule    LYRICA    90 capsule    Take 1 capsule (75 mg) by mouth 3 times daily    Controlled type 2 diabetes with neuropathy (H)       ranitidine 300 MG tablet    ZANTAC    90 tablet    Take 1 tablet (300 mg) by mouth daily    Gastroesophageal reflux disease without esophagitis        SM NICOTINE 21 MG/24HR 24 hr patch   Generic drug:  nicotine     28 patch    REMOVE OLD PATCH AND APPLY ONE NEW PATCH TO SKIN EVERY 24 HOURS    Tobacco dependence syndrome       * Notice:  This list has 4 medication(s) that are the same as other medications prescribed for you. Read the directions carefully, and ask your doctor or other care provider to review them with you.

## 2017-08-16 NOTE — NURSING NOTE
Reason For Visit:   Chief Complaint   Patient presents with     RECHECK     Follow up. Wounds, right foot.        Pain Assessment  Patient Currently in Pain: Other (Comment) (Pt stated that every now and then she has pain in her right foot. )             Current Outpatient Prescriptions   Medication Sig Dispense Refill     ranitidine (ZANTAC) 300 MG tablet Take 1 tablet (300 mg) by mouth daily 90 tablet 3     furosemide (LASIX) 20 MG tablet Take 1 tablet (20 mg) by mouth daily 90 tablet 1     amitriptyline (ELAVIL) 50 MG tablet Take 1 tablet (50 mg) by mouth At Bedtime 90 tablet 1     study - lidocaine, LMX, (IDS# 4490) 4 % CREA Apply topically every 8 hours as needed for moderate pain 45 g 11     lidocaine (LIDODERM) 5 % Patch Place 1 patch onto the skin every 24 hours Apply patch up to 12 hours with in a 24 hour period. 30 patch 1     albuterol (ALBUTEROL) 108 (90 BASE) MCG/ACT Inhaler Inhale 2 puffs into the lungs every 4 hours as needed for shortness of breath / dyspnea 1 Inhaler 5     pregabalin (LYRICA) 75 MG capsule Take 1 capsule (75 mg) by mouth 3 times daily 90 capsule 5     lisinopril (PRINIVIL/ZESTRIL) 10 MG tablet Take 1 tablet (10 mg) by mouth daily 90 tablet 1     SM NICOTINE 21 MG/24HR 24 hr patch REMOVE OLD PATCH AND APPLY ONE NEW PATCH TO SKIN EVERY 24 HOURS 28 patch 1     insulin aspart (NOVOLOG VIAL) 100 UNITS/ML VIAL Use as directed in insulin pump. Patient using up to 60 units per day 3 Month 3     blood glucose monitoring (HOLLIE CONTOUR NEXT) test strip Use to test blood sugar 10 times daily or as directed.  Ok to substitute alternative if insurance prefers. 300 each 12     blood glucose monitoring (HOLLIE MICROLET) lancets Use to test blood sugar 10 times daily or as directed.  Ok to substitute alternative if insurance prefers. 1 Box prn     acetone, Urine, test STRP 1 strip by In Vitro route as needed 25 each 1     glucagon (GLUCAGON EMERGENCY) 1 MG injection Inject 1 mg into the muscle once  for 1 dose 1 mg 1     Ipratropium-Albuterol (COMBIVENT RESPIMAT)  MCG/ACT inhaler Inhale 1 puff into the lungs 4 times daily Not to exceed 6 doses per day. 1 Inhaler 6     multivitamin, therapeutic with minerals (THERA-VIT-M) TABS Take 1 tablet by mouth daily 30 each 11     metoprolol (LOPRESSOR) 50 MG tablet Take 25 mg by mouth daily       polyethylene glycol (MIRALAX/GLYCOLAX) packet Take 17 g by mouth daily 28 packet 3     insulin pen needle (B-D U/F) 31G X 5 MM Use 3 time(s) a day. 3 each 3     blood glucose monitoring (FREESTYLE) lancets 1 each See Admin Instructions test 3-4 times per day 3 Box 3     glucose 4 G CHEW Take 3-4 tablets to treat low blood sugar. 25 tablet 11     methadone (DOLPHINE-INTENSOL) 10 mg/mL CONC Take 7 mLs by mouth daily.       MIRENA 20 MCG/24HR IU IUD placed today 1 0     ASPIRIN 81 MG OR TABS 1 tab po QD (Once per day) 100 3          Allergies   Allergen Reactions     Naproxen GI Disturbance     No Known Drug Allergies

## 2017-08-16 NOTE — PROGRESS NOTES
Chief Complaint:   Chief Complaint   Patient presents with     RECHECK     Follow up. Wounds, right foot.      Allergies   Allergen Reactions     Naproxen GI Disturbance     No Known Drug Allergies      Subjective: Alessandra is a 52 year old female who presents to the clinic today for a follow up of right diabetic heel wound. She has been applying MediHoney and a bandage daily. A new wound started on the top of her right foot after she wrapped the bandage too tight one time. It is mostly scabbed over with one small open area. Would also like her right big toenail trimmed. Denies pain or drainage to the wound, no surrounding erythema, edema or warmth.    Objective  Dorsal right ankle has small superficial wound that is 80% scab, 20% pink dermis tissue. No drainage, erythema, edema or calor.  Right heel wound has scabbed over almost completely, no loose or devitalized tissue to debride today. No erythema, edema, calor or drainage.   Right hallux nail is thickened, elongated, discolored and has subungual debris.     Assessment:   - Onychomycosis  - Diabetic heel ulceration  - DM type 2  - CKD stage 3  - Diabetic peripheral neuropathy    Plan:   - Pt seen and evaluated  - Continue to apply medihoney and bandage until scab falls off on its own.   - Nail trimmed x 1  - Pt to return to clinic in 3 months for diabetic foot care

## 2017-08-16 NOTE — LETTER
8/16/2017       RE: Alessandra Pak  [address]  [facility] [address]     Dear Colleague,    Thank you for referring your patient, Alessandra Pak, to the [facility] at [facility]. Please see a copy of my visit note below.    Chief Complaint:   Chief Complaint   Patient presents with     RECHECK     Follow up. Wounds, right foot.      Allergies   Allergen Reactions     Naproxen GI Disturbance     No Known Drug Allergies      Subjective: Alessandra is a 52 year old female who presents to the clinic today for a follow up of right diabetic heel wound. She has been applying MediHoney and a bandage daily. A new wound started on the top of her right foot after she wrapped the bandage too tight one time. It is mostly scabbed over with one small open area. Would also like her right big toenail trimmed. Denies pain or drainage to the wound, no surrounding erythema, edema or warmth.    Objective  Dorsal right ankle has small superficial wound that is 80% scab, 20% pink dermis tissue. No drainage, erythema, edema or calor.  Right heel wound has scabbed over almost completely, no loose or devitalized tissue to debride today. No erythema, edema, calor or drainage.   Right hallux nail is thickened, elongated, discolored and has subungual debris.     Assessment:   - Onychomycosis  - Diabetic heel ulceration  - DM type 2  - CKD stage 3  - Diabetic peripheral neuropathy    Plan:   - Pt seen and evaluated  - Continue to apply medihoney and bandage until scab falls off on its own.   - Nail trimmed x 1  - Pt to return to clinic in 3 months for diabetic foot care    Again, thank you for allowing me to participate in the care of your patient.      Sincerely,    Barrington Lewis DPM

## 2017-08-30 ENCOUNTER — ALLIED HEALTH/NURSE VISIT (OUTPATIENT)
Dept: CARDIOLOGY | Facility: CLINIC | Age: 52
End: 2017-08-30
Attending: INTERNAL MEDICINE
Payer: COMMERCIAL

## 2017-08-30 DIAGNOSIS — I42.8 NONISCHEMIC CARDIOMYOPATHY (H): Primary | Chronic | ICD-10-CM

## 2017-08-30 PROCEDURE — 93296 REM INTERROG EVL PM/IDS: CPT | Mod: ZF

## 2017-08-30 NOTE — MR AVS SNAPSHOT
After Visit Summary   8/30/2017    Alessandra Pak    MRN: [id number]           Patient Information     Date Of Birth          1965        Visit Information        Provider Department      8/30/2017 6:00 AM UC ICD REMOTE [facility]        Today's Diagnoses     Nonischemic cardiomyopathy (H)    -  1       Follow-ups after your visit        Your next 10 appointments already scheduled     Oct 04, 2017 10:30 AM CDT   (Arrive by 10:15 AM)   RETURN DIABETES with Toya Nuno PA-C   [facility] ([facility])    [address] [place] [address] [phone]            Nov 15, 2017 12:00 PM CST   (Arrive by 11:45 AM)   RETURN FOOT/ANKLE with Barrington Lewsi DPM   [facility] ([facility])    [address] [place] [address] [phone]              Who to contact     If you have questions or need follow up information about today's clinic visit or your schedule please contact [facility] directly at [phone].  Normal or non-critical lab and imaging results will be communicated to you by [employer], letter or phone within 4 business days after the clinic has received the results. If you do not hear from us within 7 days, please contact the clinic through [employer] or phone. If you have a critical or abnormal lab result, we will notify you by phone as soon as possible.  Submit refill requests through [employer] or call your pharmacy and they will forward the refill request to us. Please allow 3 business days for your refill to be completed.          Additional Information About Your Visit        MyChart Information     [employer] gives you secure access to your electronic health record. If you see a primary care provider, you can also send messages to your care team and make appointments. If you have questions, please call your primary care clinic.  If  you do not have a primary care provider, please call [phone] and they will assist you.        Care EveryWhere ID     This is your Care EveryWhere ID. This could be used by other organizations to access your [place] medical records  [id number]         Blood Pressure from Last 3 Encounters:   08/04/17 109/75   06/28/17 108/64   06/21/17 111/73    Weight from Last 3 Encounters:   08/04/17 68.3 kg (150 lb 9.6 oz)   06/28/17 70.3 kg (155 lb)   06/21/17 70.8 kg (156 lb)              We Performed the Following     INTERROGATION DEVICE EVAL REMOTE, PACER/ICD        Primary Care Provider Office Phone # Fax #    Brionna Tenorio Mary Dc [phone]       [address] [place]  [facility] [address]        Equal Access to Services     [facility]: Hadii aad ku hadasho Soomaali, waaxda luqadaha, qaybta kaalmada adeegyada, waxay idiin hayaan kate levine . So [facility] [phone].    ATENCIÓN: Si habla español, tiene a nagy disposición servicios gratuitos de asistencia lingüística. Rodriguez al [phone].    We comply with applicable federal civil rights laws and [place] laws. We do not discriminate on the basis of race, color, national origin, age, disability sex, sexual orientation or gender identity.            Thank you!     Thank you for choosing [facility]  for your care. Our goal is always to provide you with excellent care. Hearing back from our patients is one way we can continue to improve our services. Please take a few minutes to complete the written survey that you may receive in the mail after your visit with us. Thank you!             Your Updated Medication List - Protect others around you: Learn how to safely use, store and throw away your medicines at www.disposemymeds.org.          This list is accurate as of: 8/30/17  4:26 PM.  Always use your most recent med list.                   Brand Name Dispense Instructions for use Diagnosis    acetone (Urine) test Strp      25 each    1 strip by In Vitro route as needed    Type 2 diabetes mellitus with diabetic neuropathy (H)       albuterol 108 (90 BASE) MCG/ACT Inhaler    PROAIR HFA    1 Inhaler    Inhale 2 puffs into the lungs every 4 hours as needed for shortness of breath / dyspnea    SOB (shortness of breath)       amitriptyline 50 MG tablet    ELAVIL    90 tablet    Take 1 tablet (50 mg) by mouth At Bedtime    Anxiety       aspirin 81 MG tablet     100    1 tab po QD (Once per day)    Chronic pancreatitis (H)       * blood glucose monitoring lancets     3 Box    1 each See Admin Instructions test 3-4 times per day    Type 2 diabetes, HbA1C goal < 8% (H)       * blood glucose monitoring lancets     1 Box    Use to test blood sugar 10 times daily or as directed.  Ok to substitute alternative if insurance prefers.    Diabetes mellitus type 1 (H)       blood glucose monitoring test strip    HOLLIE CONTOUR NEXT    300 each    Use to test blood sugar 10 times daily or as directed.  Ok to substitute alternative if insurance prefers.    Diabetes mellitus type 1 (H)       furosemide 20 MG tablet    LASIX    90 tablet    Take 1 tablet (20 mg) by mouth daily    Nonischemic cardiomyopathy (H)       glucagon 1 MG kit    GLUCAGON EMERGENCY    1 mg    Inject 1 mg into the muscle once for 1 dose    Type 2 diabetes mellitus with diabetic neuropathy (H)       glucose 4 G Chew chewable tablet     25 tablet    Take 3-4 tablets to treat low blood sugar.    Uncontrolled diabetes mellitus with complications (H)       insulin pen needle 31G X 5 MM    B-D U/F    3 each    Use 3 time(s) a day.    Type 2 diabetes, HbA1c goal < 7% (H)       Ipratropium-Albuterol  MCG/ACT inhaler    COMBIVENT RESPIMAT    1 Inhaler    Inhale 1 puff into the lungs 4 times daily Not to exceed 6 doses per day.    Chronic obstructive pulmonary disease, unspecified COPD type (H)       * lidocaine 5 % Patch    LIDODERM    30 patch    Place 1 patch onto the skin every 24  hours Apply patch up to 12 hours with in a 24 hour period.    Lumbar radiculopathy       * study - lidocaine (LMX) 4 % Crea    IDS# 4490    45 g    Apply topically every 8 hours as needed for moderate pain    Diabetic polyneuropathy associated with type 2 diabetes mellitus (H)       lisinopril 10 MG tablet    PRINIVIL/ZESTRIL    90 tablet    Take 1 tablet (10 mg) by mouth daily    Nonischemic cardiomyopathy (H)       methadone 10 mg/mL Conc (HIGH CONC) solution    DOLPHINE-INTENSOL     Take 7 mLs by mouth daily.    Chemical dependency (H)       metoprolol 50 MG tablet    LOPRESSOR     Take 25 mg by mouth daily        MIRENA (52 MG) 20 MCG/24HR IUD   Generic drug:  levonorgestrel     1    placed today    Excessive or frequent menstruation       multivitamin, therapeutic with minerals Tabs tablet     30 each    Take 1 tablet by mouth daily    Heart failure and kidney disease due to high blood pressure (H)       NovoLOG VIAL 100 UNITS/ML injection   Generic drug:  insulin aspart     3 Month    Use as directed in insulin pump. Patient using up to 60 units per day    Type 2 diabetes mellitus with diabetic neuropathy (H)       polyethylene glycol Packet    MIRALAX/GLYCOLAX    28 packet    Take 17 g by mouth daily    Constipation       pregabalin 75 MG capsule    LYRICA    90 capsule    Take 1 capsule (75 mg) by mouth 3 times daily    Controlled type 2 diabetes with neuropathy (H)       ranitidine 300 MG tablet    ZANTAC    90 tablet    Take 1 tablet (300 mg) by mouth daily    Gastroesophageal reflux disease without esophagitis       SM NICOTINE 21 MG/24HR 24 hr patch   Generic drug:  nicotine     28 patch    REMOVE OLD PATCH AND APPLY ONE NEW PATCH TO SKIN EVERY 24 HOURS    Tobacco dependence syndrome       * Notice:  This list has 4 medication(s) that are the same as other medications prescribed for you. Read the directions carefully, and ask your doctor or other care provider to review them with you.

## 2017-08-30 NOTE — PROGRESS NOTES
Pt sent in a routine remote ICD check for evaluation per MD order.  Her ICD check shows 2 NSVT episodes 4 beats each and 173 bpm.  She RV paces 0% of the time, her heart rate histograms show good heart rate variation and her ICD battery estimates 10.5 years left.  Pt was called with the results and she reports feeling well and she does not offer any complaints.  We will plan for her to send her next remote on 12/5/17.    remote ICD

## 2017-09-24 DIAGNOSIS — E11.40 CONTROLLED TYPE 2 DIABETES WITH NEUROPATHY (H): ICD-10-CM

## 2017-09-24 NOTE — TELEPHONE ENCOUNTER
pregabalin (LYRICA) 75 MG capsule      Last Written Prescription Date:  3/17/17  Last Fill Quantity: 90,   # refills: 5  [facility] with [facility] [place] [facility], [facility] or  Health prescribing provider: 6/28/17  Future Office visit:       Routing refill request to provider for review/approval because:  Drug not on the [facility] [place] [facility], [facility] or  Health refill protocol or controlled substance      Melany [facility]

## 2017-09-25 RX ORDER — PREGABALIN 75 MG
CAPSULE ORAL
Qty: 90 CAPSULE | Refills: 5 | Status: SHIPPED | OUTPATIENT
Start: 2017-09-25 | End: 2018-03-25

## 2017-09-25 NOTE — TELEPHONE ENCOUNTER
Written rx faxed to the pharmacy, Pharmacy will contact pt when medication is ready for pickup.  Sae Malcolm,  [facility]

## 2017-10-02 DIAGNOSIS — E11.40 TYPE 2 DIABETES MELLITUS WITH DIABETIC NEUROPATHY (H): Chronic | ICD-10-CM

## 2017-10-03 DIAGNOSIS — E11.40 TYPE 2 DIABETES MELLITUS WITH DIABETIC NEUROPATHY (H): Chronic | ICD-10-CM

## 2017-10-04 ENCOUNTER — OFFICE VISIT (OUTPATIENT)
Dept: ENDOCRINOLOGY | Facility: CLINIC | Age: 52
End: 2017-10-04

## 2017-10-04 VITALS
DIASTOLIC BLOOD PRESSURE: 83 MMHG | HEART RATE: 121 BPM | SYSTOLIC BLOOD PRESSURE: 133 MMHG | WEIGHT: 147 LBS | BODY MASS INDEX: 21.77 KG/M2 | HEIGHT: 69 IN

## 2017-10-04 DIAGNOSIS — N18.3 TYPE 2 DIABETES MELLITUS WITH STAGE 3 CHRONIC KIDNEY DISEASE, UNSPECIFIED LONG TERM INSULIN USE STATUS: Primary | Chronic | ICD-10-CM

## 2017-10-04 DIAGNOSIS — E11.22 TYPE 2 DIABETES MELLITUS WITH STAGE 3 CHRONIC KIDNEY DISEASE, UNSPECIFIED LONG TERM INSULIN USE STATUS: Primary | Chronic | ICD-10-CM

## 2017-10-04 LAB — HBA1C MFR BLD: 12.9 % (ref 4.3–6)

## 2017-10-04 ASSESSMENT — PAIN SCALES - GENERAL: PAINLEVEL: NO PAIN (0)

## 2017-10-04 NOTE — MR AVS SNAPSHOT
After Visit Summary   10/4/2017    Alessandra Pak    MRN: [id number]           Patient Information     Date Of Birth          1965        Visit Information        Provider Department      10/4/2017 10:30 AM oTya Nuno PA-C [facility]         Follow-ups after your visit        Your next 10 appointments already scheduled     Nov 08, 2017  1:00 PM CST   (Arrive by 12:45 PM)   RETURN DIABETES with ROMIE Betancur [facility] ([facility])    [address] [place] [id number] [phone]            Nov 15, 2017 12:00 PM CST   (Arrive by 11:45 AM)   RETURN FOOT/ANKLE with ILYA Irby [facility] ([facility])    [address] [place] [id number] [phone]              Who to contact     Please call your clinic at [phone] to:    Ask questions about your health    Make or cancel appointments    Discuss your medicines    Learn about your test results    Speak to your doctor   If you have compliments or concerns about an experience at your clinic, or if you wish to file a complaint, please contact [facility] Patient Relations at [phone] or email us at [email or web address]         Additional Information About Your Visit        MyChart Information     InternetVistahart gives you secure access to your electronic health record. If you see a primary care provider, you can also send messages to your care team and make appointments. If you have questions, please call your primary care clinic.  If you do not have a primary care provider, please call [phone] and they will assist you.      Gnzo is an electronic gateway that provides easy, online access to your medical records. With Gnzo, you can request a clinic appointment, read your test results, renew a prescription or communicate  "with your care team.     To access your existing account, please contact your [facility] or call [phone] for assistance.        Care EveryWhere ID     This is your Care EveryWhere ID. This could be used by other organizations to access your [place] medical records  [id number]        Your Vitals Were     Pulse Height BMI (Body Mass Index)             121 1.753 m (5' 9\") 21.71 kg/m2          Blood Pressure from Last 3 Encounters:   10/04/17 133/83   08/04/17 109/75   06/28/17 108/64    Weight from Last 3 Encounters:   10/04/17 66.7 kg (147 lb)   08/04/17 68.3 kg (150 lb 9.6 oz)   06/28/17 70.3 kg (155 lb)              Today, you had the following     No orders found for display       Primary Care Provider Office Phone # Fax #    Brionna Coby Dc [phone]       [id number]  [facility] [address]        Equal Access to Services     [facility]: Hadii aad ku hadasho Soomaali, waaxda luqadaha, qaybta kaalmada adeegyada, waxay idiin hayaalucas levine . So [facility] [phone].    ATENCIÓN: Si habla español, tiene a nagy disposición servicios gratuitos de asistencia lingüística. Llame al [phone].    We comply with applicable federal civil rights laws and [place] laws. We do not discriminate on the basis of race, color, national origin, age, disability, sex, sexual orientation, or gender identity.            Thank you!     Thank you for choosing [facility]  for your care. Our goal is always to provide you with excellent care. Hearing back from our patients is one way we can continue to improve our services. Please take a few minutes to complete the written survey that you may receive in the mail after your visit with us. Thank you!             Your Updated Medication List - Protect others around you: Learn how to safely use, store and throw away your medicines at www.disposemymeds.org.          This list is accurate " as of: 10/4/17 11:04 AM.  Always use your most recent med list.                   Brand Name Dispense Instructions for use Diagnosis    acetone (Urine) test Strp     25 each    1 strip by In Vitro route as needed    Type 2 diabetes mellitus with diabetic neuropathy (H)       albuterol 108 (90 BASE) MCG/ACT Inhaler    PROAIR HFA    1 Inhaler    Inhale 2 puffs into the lungs every 4 hours as needed for shortness of breath / dyspnea    SOB (shortness of breath)       amitriptyline 50 MG tablet    ELAVIL    90 tablet    Take 1 tablet (50 mg) by mouth At Bedtime    Anxiety       aspirin 81 MG tablet     100    1 tab po QD (Once per day)    Chronic pancreatitis (H)       * blood glucose monitoring lancets     3 Box    1 each See Admin Instructions test 3-4 times per day    Type 2 diabetes, HbA1C goal < 8% (H)       * blood glucose monitoring lancets     1 Box    Use to test blood sugar 10 times daily or as directed.  Ok to substitute alternative if insurance prefers.    Diabetes mellitus type 1 (H)       blood glucose monitoring test strip    HOLLIE CONTOUR NEXT    300 each    Use to test blood sugar 10 times daily or as directed.  Ok to substitute alternative if insurance prefers.    Diabetes mellitus type 1 (H)       furosemide 20 MG tablet    LASIX    90 tablet    Take 1 tablet (20 mg) by mouth daily    Nonischemic cardiomyopathy (H)       glucagon 1 MG kit    GLUCAGON EMERGENCY    1 mg    Inject 1 mg into the muscle once for 1 dose    Type 2 diabetes mellitus with diabetic neuropathy (H)       glucose 4 G Chew chewable tablet     25 tablet    Take 3-4 tablets to treat low blood sugar.    Uncontrolled diabetes mellitus with complications (H)       insulin aspart 100 UNITS/ML injection    NovoLOG VIAL    60 mL    Use as directed in insulin pump. Patient using up to 60 units per day    Type 2 diabetes mellitus with diabetic neuropathy (H)       insulin pen needle 31G X 5 MM    B-D U/F    3 each    Use 3 time(s) a day.     Type 2 diabetes, HbA1c goal < 7% (H)       Ipratropium-Albuterol  MCG/ACT inhaler    COMBIVENT RESPIMAT    1 Inhaler    Inhale 1 puff into the lungs 4 times daily Not to exceed 6 doses per day.    Chronic obstructive pulmonary disease, unspecified COPD type (H)       * lidocaine 5 % Patch    LIDODERM    30 patch    Place 1 patch onto the skin every 24 hours Apply patch up to 12 hours with in a 24 hour period.    Lumbar radiculopathy       * study - lidocaine (LMX) 4 % Crea    IDS# [id number]    45 g    Apply topically every 8 hours as needed for moderate pain    Diabetic polyneuropathy associated with type 2 diabetes mellitus (H)       lisinopril 10 MG tablet    PRINIVIL/ZESTRIL    90 tablet    Take 1 tablet (10 mg) by mouth daily    Nonischemic cardiomyopathy (H)       LYRICA 75 MG capsule   Generic drug:  pregabalin     90 capsule    1 BY MOUTH 3 TIMES A DAY    Controlled type 2 diabetes with neuropathy (H)       methadone 10 mg/mL Conc (HIGH CONC) solution    DOLPHINE-INTENSOL     Take 7 mLs by mouth daily.    Chemical dependency (H)       metoprolol 50 MG tablet    LOPRESSOR     Take 25 mg by mouth daily        MIRENA (52 MG) 20 MCG/24HR IUD   Generic drug:  levonorgestrel     1    placed today    Excessive or frequent menstruation       multivitamin, therapeutic with minerals Tabs tablet     30 each    Take 1 tablet by mouth daily    Heart failure and kidney disease due to high blood pressure (H)       polyethylene glycol Packet    MIRALAX/GLYCOLAX    28 packet    Take 17 g by mouth daily    Constipation       ranitidine 300 MG tablet    ZANTAC    90 tablet    Take 1 tablet (300 mg) by mouth daily    Gastroesophageal reflux disease without esophagitis       SM NICOTINE 21 MG/24HR 24 hr patch   Generic drug:  nicotine     28 patch    REMOVE OLD PATCH AND APPLY ONE NEW PATCH TO SKIN EVERY 24 HOURS    Tobacco dependence syndrome       * Notice:  This list has 4 medication(s) that are the same as other medications  prescribed for you. Read the directions carefully, and ask your doctor or other care provider to review them with you.
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
## 2017-10-04 NOTE — NURSING NOTE
Chief Complaint   Patient presents with     RECHECK     return diabetes     Esperanza Bañuelos CMA
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
## 2017-11-22 NOTE — TELEPHONE ENCOUNTER
This writer attempted to contact patient  on 11/22/17      Reason for call PHQ-9 and left message to return call.      If patient calls back:  Patient contacted by 2nd floor [facility] Team (MA/TC). Inform patient that someone from the team will contact them, document that pt called and route to care team. .        Kim Hawthorne MA

## 2017-12-05 ENCOUNTER — ALLIED HEALTH/NURSE VISIT (OUTPATIENT)
Dept: CARDIOLOGY | Facility: CLINIC | Age: 52
End: 2017-12-05
Attending: INTERNAL MEDICINE
Payer: COMMERCIAL

## 2017-12-05 DIAGNOSIS — I50.22 CHRONIC SYSTOLIC CONGESTIVE HEART FAILURE (H): Primary | Chronic | ICD-10-CM

## 2017-12-05 PROCEDURE — 93296 REM INTERROG EVL PM/IDS: CPT | Mod: ZF

## 2017-12-05 NOTE — MR AVS SNAPSHOT
After Visit Summary   12/5/2017    Alessandra Pak    MRN: [id number]           Patient Information     Date Of Birth          1965        Visit Information        Provider Department      12/5/2017 6:00 AM UC ICD REMOTE [facility]        Today's Diagnoses     Chronic systolic congestive heart failure (H)    -  1       Follow-ups after your visit        Your next 10 appointments already scheduled     Feb 27, 2018  8:00 AM CST   (Arrive by 7:45 AM)   Implanted Defibulator with Uc Cv Device 1   [facility])    [address] [place] [address]  [id number]               Feb 27, 2018  8:30 AM CST   (Arrive by 8:15 AM)   RETURN HEART FAILURE with John Villarreal MD   [facility] ([facility])    [address] [place] [id number] [phone]            Apr 02, 2018  2:00 PM CDT   CT CHEST W/O CONTRAST with MGCT1   [facility])    [id number] [place] [id number] [phone]           Please bring any scans or X-rays taken at other hospitals, if similar tests were done. Also bring a list of your medicines, including vitamins, minerals and over-the-counter drugs. It is safest to leave personal items at home.  Be sure to tell your doctor:   If you have any allergies.   If there s any chance you are pregnant.   If you are breastfeeding.   If you have any special needs.  You do not need to do anything special to prepare.  Please wear loose clothing, such as a sweat suit or jogging clothes. Avoid snaps, zippers and other metal. We may ask you to undress and put on a hospital gown.            Apr 02, 2018  2:30 PM CDT   Office Visit with PFT LAB   [facility])    [address] [place] [address] [phone]           Bring a current list of meds and any records  pertaining to this visit. For Physicals, please bring immunization records and any forms needing to be filled out. Please arrive 10 minutes early to complete paperwork.            Apr 02, 2018  3:30 PM CDT   Return Visit with Rene Swartz MD   [email or web address] ([facility] [[email or web address]])    [address] [place] [address] [phone]              Who to contact     If you have questions or need follow up information about today's clinic visit or your schedule please contact [facility] directly at [phone].  Normal or non-critical lab and imaging results will be communicated to you by [employer], letter or phone within 4 business days after the clinic has received the results. If you do not hear from us within 7 days, please contact the clinic through [employer] or phone. If you have a critical or abnormal lab result, we will notify you by phone as soon as possible.  Submit refill requests through [employer] or call your pharmacy and they will forward the refill request to us. Please allow 3 business days for your refill to be completed.          Additional Information About Your Visit        MyChart Information     [employer] gives you secure access to your electronic health record. If you see a primary care provider, you can also send messages to your care team and make appointments. If you have questions, please call your primary care clinic.  If you do not have a primary care provider, please call [phone] and they will assist you.        Care EveryWhere ID     This is your Care EveryWhere ID. This could be used by other organizations to access your [facility] medical records  [id number]         Blood Pressure from Last 3 Encounters:   10/04/17 133/83   08/04/17 109/75   06/28/17 108/64    Weight from Last 3 Encounters:   10/04/17 66.7 kg (147 lb)   08/04/17 68.3 kg (150 lb 9.6 oz)   06/28/17 70.3 kg (155 lb)              We Performed the Following     INTERROGATION DEVICE  JOSE CRUZ QUIÑONEZ, PACER/ICD        Primary Care Provider Office Phone # Fax #    Brionna Tenorio Mary Dc [phone] [address]  [facility] [address]        Equal Access to Services     NICK ROBLEDO : Hadii aad ku hadasho Soomaali, waaxda luqadaha, qaybta kaalmada adeegyada, waxay idiin hayaan adeeg khkristiansh lashylan ah. So [facility] [phone].    ATENCIÓN: Si habla español, tiene a nagy disposición servicios gratuitos de asistencia lingüística. Llame al [phone].    We comply with applicable federal civil rights laws and [place] laws. We do not discriminate on the basis of race, color, national origin, age, disability, sex, sexual orientation, or gender identity.            Thank you!     Thank you for choosing [facility]  for your care. Our goal is always to provide you with excellent care. Hearing back from our patients is one way we can continue to improve our services. Please take a few minutes to complete the written survey that you may receive in the mail after your visit with us. Thank you!             Your Updated Medication List - Protect others around you: Learn how to safely use, store and throw away your medicines at www.disposemymeds.org.          This list is accurate as of: 12/5/17 11:59 PM.  Always use your most recent med list.                   Brand Name Dispense Instructions for use Diagnosis    acetone (Urine) test Strp     25 each    1 strip by In Vitro route as needed    Type 2 diabetes mellitus with diabetic neuropathy (H)       albuterol 108 (90 BASE) MCG/ACT Inhaler    PROAIR HFA    1 Inhaler    Inhale 2 puffs into the lungs every 4 hours as needed for shortness of breath / dyspnea    SOB (shortness of breath)       amitriptyline 50 MG tablet    ELAVIL    90 tablet    Take 1 tablet (50 mg) by mouth At Bedtime    Anxiety       aspirin 81 MG tablet     100    1 tab po QD (Once per day)    Chronic pancreatitis (H)       * blood glucose monitoring  lancets     3 Box    1 each See Admin Instructions test 3-4 times per day    Type 2 diabetes, HbA1C goal < 8% (H)       * blood glucose monitoring lancets     1 Box    Use to test blood sugar 10 times daily or as directed.  Ok to substitute alternative if insurance prefers.    Diabetes mellitus type 1 (H)       blood glucose monitoring test strip    HOLLIE CONTOUR NEXT    300 each    Use to test blood sugar 10 times daily or as directed.  Ok to substitute alternative if insurance prefers.    Diabetes mellitus type 1 (H)       furosemide 20 MG tablet    LASIX    90 tablet    Take 1 tablet (20 mg) by mouth daily    Nonischemic cardiomyopathy (H)       glucose 4 G Chew chewable tablet     25 tablet    Take 3-4 tablets to treat low blood sugar.    Uncontrolled diabetes mellitus with complications (H)       insulin aspart 100 UNITS/ML injection    NovoLOG VIAL    60 mL    Use as directed in insulin pump. Patient using up to 60 units per day    Type 2 diabetes mellitus with diabetic neuropathy (H)       insulin pen needle 31G X 5 MM    B-D U/F    3 each    Use 3 time(s) a day.    Type 2 diabetes, HbA1c goal < 7% (H)       Ipratropium-Albuterol  MCG/ACT inhaler    COMBIVENT RESPIMAT    1 Inhaler    Inhale 1 puff into the lungs 4 times daily Not to exceed 6 doses per day.    Chronic obstructive pulmonary disease, unspecified COPD type (H)       * lidocaine 5 % Patch    LIDODERM    30 patch    Place 1 patch onto the skin every 24 hours Apply patch up to 12 hours with in a 24 hour period.    Lumbar radiculopathy       * study - lidocaine (LMX) 4 % Crea    IDS# 4490    45 g    Apply topically every 8 hours as needed for moderate pain    Diabetic polyneuropathy associated with type 2 diabetes mellitus (H)       lisinopril 10 MG tablet    PRINIVIL/ZESTRIL    90 tablet    Take 1 tablet (10 mg) by mouth daily    Nonischemic cardiomyopathy (H)       LYRICA 75 MG capsule   Generic drug:  pregabalin     90 capsule    1 BY MOUTH  3 TIMES A DAY    Controlled type 2 diabetes with neuropathy (H)       methadone 10 mg/mL Conc (HIGH CONC) solution    DOLPHINE-INTENSOL     Take 7 mLs by mouth daily.    Chemical dependency (H)       metoprolol 50 MG tablet    LOPRESSOR     Take 25 mg by mouth daily        MIRENA (52 MG) 20 MCG/24HR IUD   Generic drug:  levonorgestrel     1    placed today    Excessive or frequent menstruation       multivitamin, therapeutic with minerals Tabs tablet     30 each    Take 1 tablet by mouth daily    Heart failure and kidney disease due to high blood pressure (H)       polyethylene glycol Packet    MIRALAX/GLYCOLAX    28 packet    Take 17 g by mouth daily    Constipation       ranitidine 300 MG tablet    ZANTAC    90 tablet    Take 1 tablet (300 mg) by mouth daily    Gastroesophageal reflux disease without esophagitis       SM NICOTINE 21 MG/24HR 24 hr patch   Generic drug:  nicotine     28 patch    REMOVE OLD PATCH AND APPLY ONE NEW PATCH TO SKIN EVERY 24 HOURS    Tobacco dependence syndrome       * Notice:  This list has 4 medication(s) that are the same as other medications prescribed for you. Read the directions carefully, and ask your doctor or other care provider to review them with you.

## 2017-12-08 NOTE — TELEPHONE ENCOUNTER
This writer attempted to contact patient on 12/08/17      Reason for call PHQ-9 and left message to return call.      If patient calls back:  Patient contacted by 2nd floor [facility] Team (MA/TC). Inform patient that someone from the team will contact them, document that pt called and route to care team. .        Kim Hawthorne MA

## 2017-12-12 NOTE — PROGRESS NOTES
Remote ICD transmission received and reviewed.  Device transmission sent per MD orders.  Patient has a [employer] single lead ICD.  Normal ICD function.  2 Nonsustained VT episodes recorded   Presenting EGM =  @ 60 bpm.   = 100%.  Estimated battery longevity to MARGI = 1 years  RV lead impedance trends appear stable.  Patient notified of interrogation results.  Patient reports that she is feeling well and denies complaints.  Plan for patient to return to clinic in 3 months as scheduled. 2/27/18    Remote ICD transmission

## 2017-12-23 DIAGNOSIS — F41.9 ANXIETY: ICD-10-CM

## 2017-12-23 NOTE — TELEPHONE ENCOUNTER
Requested Prescriptions   Pending Prescriptions Disp Refills     amitriptyline (ELAVIL) 50 MG tablet [Pharmacy Med Name: AMITRIPTYLINE HCL 50 MG TAB]    Last Written Prescription Date:  6/21/17  Last Fill Quantity: 90,  # refills: 1   Last Office Visit with [facility], [facility] or [facility] prescribing provider:  6/28/17   Future Office Visit:      90 tablet 1     Sig: TAKE 1 TABLET BY MOUTH AT BEDTIME    Tricyclic Antidepressants Protocol Passed    12/23/2017 12:59 AM       Passed - Blood pressure under 140/90    BP Readings from Last 3 Encounters:   10/04/17 133/83   08/04/17 109/75   06/28/17 108/64                Passed - Recent (12 mo) or future (30 d) visit with authorizing provider's specialty     Patient had office visit in the last year or has a visit in the next 30 days with authorizing provider.  See chart review.              Passed - Patient is age 18 or older       Passed - No active pregnancy on record       Passed - No positive pregnancy test in past 12 months              David Faarax  [facility]

## 2017-12-26 ENCOUNTER — TELEPHONE (OUTPATIENT)
Dept: FAMILY MEDICINE | Facility: CLINIC | Age: 52
End: 2017-12-26

## 2017-12-27 DIAGNOSIS — I42.8 NONISCHEMIC CARDIOMYOPATHY (H): ICD-10-CM

## 2017-12-27 NOTE — TELEPHONE ENCOUNTER
Requested Prescriptions   Pending Prescriptions Disp Refills     furosemide (LASIX) 20 MG tablet [Pharmacy Med Name: FUROSEMIDE 20 MG TABLET]  Last Written Prescription Date:  06/21/17  Last Fill Quantity: 90,  # refills: 1   Last Office Visit with [facility], P or [facility] prescribing provider:  06/28/17   Future Office Visit:    90 tablet 1     Sig: TAKE 1 TABLET (20 MG) BY MOUTH DAILY    Diuretics (Including Combos) Protocol Failed    12/27/2017 11:05 AM       Failed - Normal serum creatinine on file in past 12 months    Recent Labs   Lab Test  06/21/17   1528   CR  1.06*             Passed - Blood pressure under 140/90    BP Readings from Last 3 Encounters:   10/04/17 133/83   08/04/17 109/75   06/28/17 108/64                Passed - Recent or future visit with authorizing provider's specialty    Patient had office visit in the last year or has a visit in the next 30 days with authorizing provider.  See chart review.              Passed - Patient is age 18 or older       Passed - No active pregancy on record       Passed - Normal serum potassium on file in past 12 months    Recent Labs   Lab Test  06/21/17   1528   POTASSIUM  4.9                   Passed - Normal serum sodium on file in past 12 months    Recent Labs   Lab Test  06/21/17   1528   NA  134             Passed - No positive pregnancy test in past 12 months

## 2017-12-28 RX ORDER — AMITRIPTYLINE HYDROCHLORIDE 50 MG/1
TABLET ORAL
Qty: 90 TABLET | Refills: 0 | Status: SHIPPED | OUTPATIENT
Start: 2017-12-28 | End: 2018-02-05

## 2017-12-28 NOTE — TELEPHONE ENCOUNTER
Prescription approved per [facility] [place].  Cici Tay RN

## 2018-01-01 ENCOUNTER — TELEPHONE (OUTPATIENT)
Dept: INFECTIOUS DISEASES | Facility: CLINIC | Age: 53
End: 2018-01-01

## 2018-01-01 ENCOUNTER — APPOINTMENT (OUTPATIENT)
Dept: ULTRASOUND IMAGING | Facility: CLINIC | Age: 53
DRG: 853 | End: 2018-01-01
Attending: INTERNAL MEDICINE
Payer: COMMERCIAL

## 2018-01-01 ENCOUNTER — ANESTHESIA (OUTPATIENT)
Dept: SURGERY | Facility: CLINIC | Age: 53
DRG: 853 | End: 2018-01-01
Payer: COMMERCIAL

## 2018-01-01 ENCOUNTER — RADIANT APPOINTMENT (OUTPATIENT)
Dept: GENERAL RADIOLOGY | Facility: CLINIC | Age: 53
End: 2018-01-01
Attending: PODIATRIST
Payer: COMMERCIAL

## 2018-01-01 ENCOUNTER — OFFICE VISIT (OUTPATIENT)
Dept: ORTHOPEDICS | Facility: CLINIC | Age: 53
End: 2018-01-01
Payer: COMMERCIAL

## 2018-01-01 ENCOUNTER — MEDICAL CORRESPONDENCE (OUTPATIENT)
Dept: HEALTH INFORMATION MANAGEMENT | Facility: CLINIC | Age: 53
End: 2018-01-01

## 2018-01-01 ENCOUNTER — HOME INFUSION (PRE-WILLOW HOME INFUSION) (OUTPATIENT)
Dept: PHARMACY | Facility: CLINIC | Age: 53
End: 2018-01-01

## 2018-01-01 ENCOUNTER — TELEPHONE (OUTPATIENT)
Dept: ORTHOPEDICS | Facility: CLINIC | Age: 53
End: 2018-01-01

## 2018-01-01 ENCOUNTER — APPOINTMENT (OUTPATIENT)
Dept: CT IMAGING | Facility: CLINIC | Age: 53
DRG: 853 | End: 2018-01-01
Attending: PEDIATRICS
Payer: COMMERCIAL

## 2018-01-01 ENCOUNTER — HOSPITAL ENCOUNTER (EMERGENCY)
Facility: CLINIC | Age: 53
Discharge: HOME | End: 2018-11-12
Attending: EMERGENCY MEDICINE | Admitting: EMERGENCY MEDICINE
Payer: COMMERCIAL

## 2018-01-01 ENCOUNTER — ANESTHESIA EVENT (OUTPATIENT)
Dept: SURGERY | Facility: CLINIC | Age: 53
DRG: 853 | End: 2018-01-01
Payer: COMMERCIAL

## 2018-01-01 ENCOUNTER — TRANSFERRED RECORDS (OUTPATIENT)
Dept: HEALTH INFORMATION MANAGEMENT | Facility: CLINIC | Age: 53
End: 2018-01-01

## 2018-01-01 ENCOUNTER — APPOINTMENT (OUTPATIENT)
Dept: GENERAL RADIOLOGY | Facility: CLINIC | Age: 53
DRG: 853 | End: 2018-01-01
Attending: INTERNAL MEDICINE
Payer: COMMERCIAL

## 2018-01-01 ENCOUNTER — PRE VISIT (OUTPATIENT)
Dept: ORTHOPEDICS | Facility: CLINIC | Age: 53
End: 2018-01-01

## 2018-01-01 ENCOUNTER — PATIENT OUTREACH (OUTPATIENT)
Dept: CARE COORDINATION | Facility: CLINIC | Age: 53
End: 2018-01-01

## 2018-01-01 ENCOUNTER — HOSPITAL ENCOUNTER (EMERGENCY)
Facility: CLINIC | Age: 53
Discharge: HOME | End: 2018-12-05
Attending: EMERGENCY MEDICINE | Admitting: EMERGENCY MEDICINE
Payer: COMMERCIAL

## 2018-01-01 ENCOUNTER — TELEPHONE (OUTPATIENT)
Dept: CT IMAGING | Facility: CLINIC | Age: 53
End: 2018-01-01

## 2018-01-01 ENCOUNTER — APPOINTMENT (OUTPATIENT)
Dept: OCCUPATIONAL THERAPY | Facility: CLINIC | Age: 53
DRG: 853 | End: 2018-01-01
Attending: PEDIATRICS
Payer: COMMERCIAL

## 2018-01-01 ENCOUNTER — APPOINTMENT (OUTPATIENT)
Dept: PHYSICAL THERAPY | Facility: CLINIC | Age: 53
DRG: 853 | End: 2018-01-01
Attending: PEDIATRICS
Payer: COMMERCIAL

## 2018-01-01 ENCOUNTER — OFFICE VISIT (OUTPATIENT)
Dept: CT IMAGING | Facility: CLINIC | Age: 53
End: 2018-01-01
Attending: INTERNAL MEDICINE
Payer: COMMERCIAL

## 2018-01-01 ENCOUNTER — TELEPHONE (OUTPATIENT)
Dept: FAMILY MEDICINE | Facility: CLINIC | Age: 53
End: 2018-01-01

## 2018-01-01 ENCOUNTER — HOSPITAL ENCOUNTER (INPATIENT)
Facility: CLINIC | Age: 53
LOS: 8 days | Discharge: HOME HEALTH | DRG: 853 | End: 2018-10-15
Attending: EMERGENCY MEDICINE | Admitting: INTERNAL MEDICINE
Payer: COMMERCIAL

## 2018-01-01 ENCOUNTER — APPOINTMENT (OUTPATIENT)
Dept: INTERVENTIONAL RADIOLOGY/VASCULAR | Facility: CLINIC | Age: 53
DRG: 853 | End: 2018-01-01
Attending: SURGERY
Payer: COMMERCIAL

## 2018-01-01 ENCOUNTER — APPOINTMENT (OUTPATIENT)
Dept: GENERAL RADIOLOGY | Facility: CLINIC | Age: 53
End: 2018-01-01
Attending: EMERGENCY MEDICINE
Payer: COMMERCIAL

## 2018-01-01 ENCOUNTER — TELEPHONE (OUTPATIENT)
Dept: PULMONOLOGY | Facility: CLINIC | Age: 53
End: 2018-01-01

## 2018-01-01 ENCOUNTER — APPOINTMENT (OUTPATIENT)
Dept: GENERAL RADIOLOGY | Facility: CLINIC | Age: 53
DRG: 853 | End: 2018-01-01
Attending: EMERGENCY MEDICINE
Payer: COMMERCIAL

## 2018-01-01 ENCOUNTER — OFFICE VISIT (OUTPATIENT)
Dept: FAMILY MEDICINE | Facility: CLINIC | Age: 53
End: 2018-01-01
Payer: COMMERCIAL

## 2018-01-01 ENCOUNTER — MYC MEDICAL ADVICE (OUTPATIENT)
Dept: FAMILY MEDICINE | Facility: CLINIC | Age: 53
End: 2018-01-01

## 2018-01-01 ENCOUNTER — APPOINTMENT (OUTPATIENT)
Dept: OCCUPATIONAL THERAPY | Facility: CLINIC | Age: 53
DRG: 853 | End: 2018-01-01
Payer: COMMERCIAL

## 2018-01-01 ENCOUNTER — HEALTH MAINTENANCE LETTER (OUTPATIENT)
Age: 53
End: 2018-01-01

## 2018-01-01 ENCOUNTER — TELEPHONE (OUTPATIENT)
Dept: NEUROLOGY | Facility: CLINIC | Age: 53
End: 2018-01-01

## 2018-01-01 ENCOUNTER — ALLIED HEALTH/NURSE VISIT (OUTPATIENT)
Dept: PHARMACY | Facility: CLINIC | Age: 53
End: 2018-01-01
Attending: PEDIATRICS
Payer: COMMERCIAL

## 2018-01-01 ENCOUNTER — ALLIED HEALTH/NURSE VISIT (OUTPATIENT)
Dept: CARDIOLOGY | Facility: CLINIC | Age: 53
End: 2018-01-01
Attending: INTERNAL MEDICINE
Payer: COMMERCIAL

## 2018-01-01 VITALS
OXYGEN SATURATION: 97 % | RESPIRATION RATE: 25 BRPM | HEART RATE: 100 BPM | SYSTOLIC BLOOD PRESSURE: 174 MMHG | WEIGHT: 115.08 LBS | BODY MASS INDEX: 16.99 KG/M2 | DIASTOLIC BLOOD PRESSURE: 120 MMHG | TEMPERATURE: 97.6 F

## 2018-01-01 VITALS
TEMPERATURE: 98.4 F | BODY MASS INDEX: 16.44 KG/M2 | HEART RATE: 90 BPM | OXYGEN SATURATION: 96 % | DIASTOLIC BLOOD PRESSURE: 71 MMHG | WEIGHT: 111 LBS | HEIGHT: 69 IN | SYSTOLIC BLOOD PRESSURE: 121 MMHG | RESPIRATION RATE: 18 BRPM

## 2018-01-01 VITALS
DIASTOLIC BLOOD PRESSURE: 91 MMHG | SYSTOLIC BLOOD PRESSURE: 151 MMHG | HEART RATE: 90 BPM | OXYGEN SATURATION: 94 % | TEMPERATURE: 98 F

## 2018-01-01 VITALS
DIASTOLIC BLOOD PRESSURE: 70 MMHG | HEART RATE: 100 BPM | RESPIRATION RATE: 18 BRPM | WEIGHT: 113.76 LBS | BODY MASS INDEX: 16.85 KG/M2 | OXYGEN SATURATION: 93 % | SYSTOLIC BLOOD PRESSURE: 133 MMHG | TEMPERATURE: 96.5 F | HEIGHT: 69 IN

## 2018-01-01 VITALS
HEART RATE: 93 BPM | TEMPERATURE: 98.1 F | OXYGEN SATURATION: 100 % | DIASTOLIC BLOOD PRESSURE: 80 MMHG | SYSTOLIC BLOOD PRESSURE: 138 MMHG

## 2018-01-01 VITALS
DIASTOLIC BLOOD PRESSURE: 90 MMHG | SYSTOLIC BLOOD PRESSURE: 142 MMHG | HEART RATE: 86 BPM | TEMPERATURE: 98.7 F | OXYGEN SATURATION: 95 %

## 2018-01-01 VITALS
DIASTOLIC BLOOD PRESSURE: 76 MMHG | HEART RATE: 110 BPM | TEMPERATURE: 98.5 F | SYSTOLIC BLOOD PRESSURE: 138 MMHG | OXYGEN SATURATION: 90 %

## 2018-01-01 VITALS — WEIGHT: 113.9 LBS | BODY MASS INDEX: 16.87 KG/M2 | HEIGHT: 69 IN

## 2018-01-01 VITALS
TEMPERATURE: 97.3 F | OXYGEN SATURATION: 96 % | SYSTOLIC BLOOD PRESSURE: 88 MMHG | RESPIRATION RATE: 16 BRPM | DIASTOLIC BLOOD PRESSURE: 58 MMHG | HEART RATE: 101 BPM

## 2018-01-01 DIAGNOSIS — L97.426 DIABETIC ULCER OF LEFT MIDFOOT ASSOCIATED WITH TYPE 2 DIABETES MELLITUS, WITH BONE INVOLVEMENT WITHOUT EVIDENCE OF NECROSIS (H): ICD-10-CM

## 2018-01-01 DIAGNOSIS — G56.31 RADIAL NERVE PALSY, RIGHT: ICD-10-CM

## 2018-01-01 DIAGNOSIS — E11.69 DIABETIC FOOT ULCER WITH OSTEOMYELITIS (H): ICD-10-CM

## 2018-01-01 DIAGNOSIS — L97.423 DIABETIC ULCER OF LEFT MIDFOOT ASSOCIATED WITH TYPE 2 DIABETES MELLITUS, WITH NECROSIS OF MUSCLE (H): Primary | ICD-10-CM

## 2018-01-01 DIAGNOSIS — E11.621 DIABETIC ULCER OF LEFT MIDFOOT ASSOCIATED WITH TYPE 2 DIABETES MELLITUS, WITH NECROSIS OF MUSCLE (H): Primary | ICD-10-CM

## 2018-01-01 DIAGNOSIS — S98.132A AMPUTATED TOE OF LEFT FOOT (H): Primary | ICD-10-CM

## 2018-01-01 DIAGNOSIS — L97.423 DIABETIC ULCER OF LEFT MIDFOOT ASSOCIATED WITH TYPE 2 DIABETES MELLITUS, WITH NECROSIS OF MUSCLE (H): ICD-10-CM

## 2018-01-01 DIAGNOSIS — E11.40 TYPE 2 DIABETES MELLITUS WITH DIABETIC NEUROPATHY, WITH LONG-TERM CURRENT USE OF INSULIN (H): ICD-10-CM

## 2018-01-01 DIAGNOSIS — R10.84 ABDOMINAL PAIN, GENERALIZED: ICD-10-CM

## 2018-01-01 DIAGNOSIS — J81.1 CHRONIC PULMONARY EDEMA: ICD-10-CM

## 2018-01-01 DIAGNOSIS — R91.8 PULMONARY NODULES: ICD-10-CM

## 2018-01-01 DIAGNOSIS — M21.331 RIGHT WRIST DROP: ICD-10-CM

## 2018-01-01 DIAGNOSIS — E11.621 DIABETIC ULCER OF LEFT MIDFOOT ASSOCIATED WITH TYPE 2 DIABETES MELLITUS, WITH NECROSIS OF MUSCLE (H): ICD-10-CM

## 2018-01-01 DIAGNOSIS — K21.9 GASTROESOPHAGEAL REFLUX DISEASE WITHOUT ESOPHAGITIS: ICD-10-CM

## 2018-01-01 DIAGNOSIS — N18.30 TYPE 2 DIABETES MELLITUS WITH STAGE 3 CHRONIC KIDNEY DISEASE, WITH LONG-TERM CURRENT USE OF INSULIN (H): Primary | Chronic | ICD-10-CM

## 2018-01-01 DIAGNOSIS — L97.521 DIABETIC ULCER OF TOE OF LEFT FOOT ASSOCIATED WITH DIABETES MELLITUS DUE TO UNDERLYING CONDITION, LIMITED TO BREAKDOWN OF SKIN (H): Primary | ICD-10-CM

## 2018-01-01 DIAGNOSIS — E08.621 DIABETIC ULCER OF TOE OF LEFT FOOT ASSOCIATED WITH DIABETES MELLITUS DUE TO UNDERLYING CONDITION, LIMITED TO BREAKDOWN OF SKIN (H): ICD-10-CM

## 2018-01-01 DIAGNOSIS — L97.509 DIABETIC FOOT ULCER WITH OSTEOMYELITIS (H): ICD-10-CM

## 2018-01-01 DIAGNOSIS — L97.521 DIABETIC ULCER OF TOE OF LEFT FOOT ASSOCIATED WITH DIABETES MELLITUS DUE TO UNDERLYING CONDITION, LIMITED TO BREAKDOWN OF SKIN (H): ICD-10-CM

## 2018-01-01 DIAGNOSIS — T82.898A OCCLUSION OF PERIPHERALLY INSERTED CENTRAL CATHETER (PICC) LINE, INITIAL ENCOUNTER (H): ICD-10-CM

## 2018-01-01 DIAGNOSIS — J44.9 COPD (CHRONIC OBSTRUCTIVE PULMONARY DISEASE) (H): ICD-10-CM

## 2018-01-01 DIAGNOSIS — G89.29 OTHER CHRONIC PAIN: ICD-10-CM

## 2018-01-01 DIAGNOSIS — N17.9 ACUTE RENAL FAILURE, UNSPECIFIED ACUTE RENAL FAILURE TYPE (H): ICD-10-CM

## 2018-01-01 DIAGNOSIS — Z79.4 TYPE 2 DIABETES MELLITUS WITH DIABETIC NEUROPATHY, WITH LONG-TERM CURRENT USE OF INSULIN (H): ICD-10-CM

## 2018-01-01 DIAGNOSIS — R06.02 SOB (SHORTNESS OF BREATH): ICD-10-CM

## 2018-01-01 DIAGNOSIS — N18.30 TYPE 2 DIABETES MELLITUS WITH STAGE 3 CHRONIC KIDNEY DISEASE, WITH LONG-TERM CURRENT USE OF INSULIN (H): Chronic | ICD-10-CM

## 2018-01-01 DIAGNOSIS — I42.8 NONISCHEMIC CARDIOMYOPATHY (H): ICD-10-CM

## 2018-01-01 DIAGNOSIS — E11.621 DIABETIC FOOT ULCER WITH OSTEOMYELITIS (H): ICD-10-CM

## 2018-01-01 DIAGNOSIS — E11.22 TYPE 2 DIABETES MELLITUS WITH STAGE 3 CHRONIC KIDNEY DISEASE, WITH LONG-TERM CURRENT USE OF INSULIN (H): Chronic | ICD-10-CM

## 2018-01-01 DIAGNOSIS — E11.621 DIABETIC ULCER OF LEFT MIDFOOT ASSOCIATED WITH TYPE 2 DIABETES MELLITUS, WITH BONE INVOLVEMENT WITHOUT EVIDENCE OF NECROSIS (H): ICD-10-CM

## 2018-01-01 DIAGNOSIS — M86.072 ACUTE HEMATOGENOUS OSTEOMYELITIS OF LEFT FOOT (H): Primary | ICD-10-CM

## 2018-01-01 DIAGNOSIS — Z89.511 STATUS POST BELOW KNEE AMPUTATION OF RIGHT LOWER EXTREMITY (H): ICD-10-CM

## 2018-01-01 DIAGNOSIS — Z23 NEED FOR PROPHYLACTIC VACCINATION AND INOCULATION AGAINST INFLUENZA: ICD-10-CM

## 2018-01-01 DIAGNOSIS — I73.9 PAD (PERIPHERAL ARTERY DISEASE) (H): Primary | ICD-10-CM

## 2018-01-01 DIAGNOSIS — Z79.4 ENCOUNTER FOR LONG-TERM (CURRENT) USE OF INSULIN (H): ICD-10-CM

## 2018-01-01 DIAGNOSIS — M86.9 DIABETIC FOOT ULCER WITH OSTEOMYELITIS (H): ICD-10-CM

## 2018-01-01 DIAGNOSIS — Z89.511 STATUS POST BELOW KNEE AMPUTATION OF RIGHT LOWER EXTREMITY (H): Primary | ICD-10-CM

## 2018-01-01 DIAGNOSIS — M25.572 PAIN IN JOINT, ANKLE AND FOOT, LEFT: Primary | ICD-10-CM

## 2018-01-01 DIAGNOSIS — E08.621 DIABETIC ULCER OF TOE OF LEFT FOOT ASSOCIATED WITH DIABETES MELLITUS DUE TO UNDERLYING CONDITION, LIMITED TO BREAKDOWN OF SKIN (H): Primary | ICD-10-CM

## 2018-01-01 DIAGNOSIS — M86.9 OSTEOMYELITIS (H): ICD-10-CM

## 2018-01-01 DIAGNOSIS — S98.132A AMPUTATED TOE OF LEFT FOOT (H): ICD-10-CM

## 2018-01-01 DIAGNOSIS — Z79.4 TYPE 2 DIABETES MELLITUS WITH STAGE 3 CHRONIC KIDNEY DISEASE, WITH LONG-TERM CURRENT USE OF INSULIN (H): Chronic | ICD-10-CM

## 2018-01-01 DIAGNOSIS — E11.22 TYPE 2 DIABETES MELLITUS WITH STAGE 3 CHRONIC KIDNEY DISEASE, WITH LONG-TERM CURRENT USE OF INSULIN (H): Primary | Chronic | ICD-10-CM

## 2018-01-01 DIAGNOSIS — Z87.891 PERSONAL HISTORY OF TOBACCO USE, PRESENTING HAZARDS TO HEALTH: ICD-10-CM

## 2018-01-01 DIAGNOSIS — Z12.4 SCREENING FOR MALIGNANT NEOPLASM OF CERVIX: ICD-10-CM

## 2018-01-01 DIAGNOSIS — J33.9 NASAL POLYP: ICD-10-CM

## 2018-01-01 DIAGNOSIS — Z79.4 TYPE 2 DIABETES MELLITUS WITH DIABETIC NEUROPATHY, WITH LONG-TERM CURRENT USE OF INSULIN (H): Chronic | ICD-10-CM

## 2018-01-01 DIAGNOSIS — N18.30 CKD (CHRONIC KIDNEY DISEASE) STAGE 3, GFR 30-59 ML/MIN (H): ICD-10-CM

## 2018-01-01 DIAGNOSIS — E11.40 TYPE 2 DIABETES MELLITUS WITH DIABETIC NEUROPATHY, WITH LONG-TERM CURRENT USE OF INSULIN (H): Chronic | ICD-10-CM

## 2018-01-01 DIAGNOSIS — I42.8 NONISCHEMIC CARDIOMYOPATHY (H): Primary | Chronic | ICD-10-CM

## 2018-01-01 DIAGNOSIS — Z79.4 TYPE 2 DIABETES MELLITUS WITH STAGE 3 CHRONIC KIDNEY DISEASE, WITH LONG-TERM CURRENT USE OF INSULIN (H): Primary | Chronic | ICD-10-CM

## 2018-01-01 LAB
ABO + RH BLD: NORMAL
ABO + RH BLD: NORMAL
ALBUMIN SERPL-MCNC: 1.5 G/DL (ref 3.4–5)
ALBUMIN SERPL-MCNC: 1.6 G/DL (ref 3.4–5)
ALBUMIN SERPL-MCNC: 2.9 G/DL (ref 3.4–5)
ALBUMIN UR-MCNC: NEGATIVE MG/DL
ALP SERPL-CCNC: 149 U/L (ref 40–150)
ALP SERPL-CCNC: 152 U/L (ref 40–150)
ALP SERPL-CCNC: 157 U/L (ref 40–150)
ALT SERPL W P-5'-P-CCNC: 14 U/L (ref 0–50)
ALT SERPL W P-5'-P-CCNC: 20 U/L (ref 0–50)
ALT SERPL W P-5'-P-CCNC: 32 U/L (ref 0–50)
ANION GAP SERPL CALCULATED.3IONS-SCNC: 10 MMOL/L (ref 3–14)
ANION GAP SERPL CALCULATED.3IONS-SCNC: 6 MMOL/L (ref 3–14)
ANION GAP SERPL CALCULATED.3IONS-SCNC: 6 MMOL/L (ref 3–14)
ANION GAP SERPL CALCULATED.3IONS-SCNC: 7 MMOL/L (ref 3–14)
ANION GAP SERPL CALCULATED.3IONS-SCNC: 7 MMOL/L (ref 3–14)
ANION GAP SERPL CALCULATED.3IONS-SCNC: 8 MMOL/L (ref 3–14)
APPEARANCE UR: CLEAR
AST SERPL W P-5'-P-CCNC: 12 U/L (ref 0–45)
AST SERPL W P-5'-P-CCNC: 12 U/L (ref 0–45)
AST SERPL W P-5'-P-CCNC: 29 U/L (ref 0–45)
BACTERIA SPEC CULT: ABNORMAL
BACTERIA SPEC CULT: NO GROWTH
BASOPHILS # BLD AUTO: 0 10E9/L (ref 0–0.2)
BASOPHILS # BLD AUTO: 0 10E9/L (ref 0–0.2)
BASOPHILS NFR BLD AUTO: 0.1 %
BASOPHILS NFR BLD AUTO: 0.6 %
BILIRUB SERPL-MCNC: 0.3 MG/DL (ref 0.2–1.3)
BILIRUB UR QL STRIP: NEGATIVE
BLD GP AB SCN SERPL QL: NORMAL
BLOOD BANK CMNT PATIENT-IMP: NORMAL
BUN SERPL-MCNC: 22 MG/DL (ref 7–30)
BUN SERPL-MCNC: 24 MG/DL (ref 7–30)
BUN SERPL-MCNC: 24 MG/DL (ref 7–30)
BUN SERPL-MCNC: 28 MG/DL (ref 7–30)
BUN SERPL-MCNC: 30 MG/DL (ref 7–30)
BUN SERPL-MCNC: 32 MG/DL (ref 7–30)
BUN SERPL-MCNC: 35 MG/DL (ref 7–30)
BUN SERPL-MCNC: 51 MG/DL (ref 7–30)
BUN SERPL-MCNC: 63 MG/DL (ref 7–30)
CALCIUM SERPL-MCNC: 8.2 MG/DL (ref 8.5–10.1)
CALCIUM SERPL-MCNC: 8.4 MG/DL (ref 8.5–10.1)
CALCIUM SERPL-MCNC: 8.5 MG/DL (ref 8.5–10.1)
CALCIUM SERPL-MCNC: 8.5 MG/DL (ref 8.5–10.1)
CALCIUM SERPL-MCNC: 8.6 MG/DL (ref 8.5–10.1)
CALCIUM SERPL-MCNC: 9 MG/DL (ref 8.5–10.1)
CALCIUM SERPL-MCNC: 9 MG/DL (ref 8.5–10.1)
CHLORIDE SERPL-SCNC: 104 MMOL/L (ref 94–109)
CHLORIDE SERPL-SCNC: 106 MMOL/L (ref 94–109)
CHLORIDE SERPL-SCNC: 108 MMOL/L (ref 94–109)
CHLORIDE SERPL-SCNC: 109 MMOL/L (ref 94–109)
CHLORIDE SERPL-SCNC: 110 MMOL/L (ref 94–109)
CHLORIDE SERPL-SCNC: 111 MMOL/L (ref 94–109)
CHLORIDE SERPL-SCNC: 112 MMOL/L (ref 94–109)
CO2 SERPL-SCNC: 19 MMOL/L (ref 20–32)
CO2 SERPL-SCNC: 19 MMOL/L (ref 20–32)
CO2 SERPL-SCNC: 21 MMOL/L (ref 20–32)
CO2 SERPL-SCNC: 22 MMOL/L (ref 20–32)
CO2 SERPL-SCNC: 23 MMOL/L (ref 20–32)
CO2 SERPL-SCNC: 23 MMOL/L (ref 20–32)
CO2 SERPL-SCNC: 26 MMOL/L (ref 20–32)
COLOR UR AUTO: ABNORMAL
COPATH REPORT: NORMAL
CREAT SERPL-MCNC: 1.02 MG/DL (ref 0.52–1.04)
CREAT SERPL-MCNC: 1.2 MG/DL (ref 0.52–1.04)
CREAT SERPL-MCNC: 1.21 MG/DL (ref 0.52–1.04)
CREAT SERPL-MCNC: 1.28 MG/DL (ref 0.52–1.04)
CREAT SERPL-MCNC: 1.28 MG/DL (ref 0.52–1.04)
CREAT SERPL-MCNC: 1.29 MG/DL (ref 0.52–1.04)
CREAT SERPL-MCNC: 1.38 MG/DL (ref 0.52–1.04)
CREAT SERPL-MCNC: 1.65 MG/DL (ref 0.52–1.04)
CREAT SERPL-MCNC: 1.68 MG/DL (ref 0.57–1.11)
CREAT SERPL-MCNC: 2.12 MG/DL (ref 0.52–1.04)
CRP SERPL-MCNC: 280 MG/L (ref 0–8)
CRP SERPL-MCNC: 292 MG/L (ref 0–8)
DEPRECATED CALCIDIOL+CALCIFEROL SERPL-MC: 25 UG/L (ref 20–75)
DIFFERENTIAL METHOD BLD: ABNORMAL
DIFFERENTIAL METHOD BLD: ABNORMAL
EOSINOPHIL # BLD AUTO: 0.1 10E9/L (ref 0–0.7)
EOSINOPHIL # BLD AUTO: 0.3 10E9/L (ref 0–0.7)
EOSINOPHIL NFR BLD AUTO: 0.3 %
EOSINOPHIL NFR BLD AUTO: 3.6 %
ERYTHROCYTE [DISTWIDTH] IN BLOOD BY AUTOMATED COUNT: 15.2 % (ref 10–15)
ERYTHROCYTE [DISTWIDTH] IN BLOOD BY AUTOMATED COUNT: 15.3 % (ref 10–15)
ERYTHROCYTE [DISTWIDTH] IN BLOOD BY AUTOMATED COUNT: 15.5 % (ref 10–15)
ERYTHROCYTE [DISTWIDTH] IN BLOOD BY AUTOMATED COUNT: 15.6 % (ref 10–15)
ERYTHROCYTE [DISTWIDTH] IN BLOOD BY AUTOMATED COUNT: 15.6 % (ref 10–15)
ERYTHROCYTE [DISTWIDTH] IN BLOOD BY AUTOMATED COUNT: 15.9 % (ref 10–15)
ERYTHROCYTE [DISTWIDTH] IN BLOOD BY AUTOMATED COUNT: 18.5 % (ref 10–15)
ERYTHROCYTE [SEDIMENTATION RATE] IN BLOOD BY WESTERGREN METHOD: 118 MM/H (ref 0–30)
GFR SERPL CREATININE-BSD FRML MDRD: 25 ML/MIN/1.7M2
GFR SERPL CREATININE-BSD FRML MDRD: 32 ML/MIN/1.73M2
GFR SERPL CREATININE-BSD FRML MDRD: 33 ML/MIN/1.7M2
GFR SERPL CREATININE-BSD FRML MDRD: 40 ML/MIN/1.7M2
GFR SERPL CREATININE-BSD FRML MDRD: 44 ML/MIN/1.7M2
GFR SERPL CREATININE-BSD FRML MDRD: 47 ML/MIN/1.7M2
GFR SERPL CREATININE-BSD FRML MDRD: 47 ML/MIN/1.7M2
GFR SERPL CREATININE-BSD FRML MDRD: 57 ML/MIN/1.7M2
GLUCOSE BLDC GLUCOMTR-MCNC: 116 MG/DL (ref 70–99)
GLUCOSE BLDC GLUCOMTR-MCNC: 123 MG/DL (ref 70–99)
GLUCOSE BLDC GLUCOMTR-MCNC: 134 MG/DL (ref 70–99)
GLUCOSE BLDC GLUCOMTR-MCNC: 134 MG/DL (ref 70–99)
GLUCOSE BLDC GLUCOMTR-MCNC: 143 MG/DL (ref 70–99)
GLUCOSE BLDC GLUCOMTR-MCNC: 149 MG/DL (ref 70–99)
GLUCOSE BLDC GLUCOMTR-MCNC: 155 MG/DL (ref 70–99)
GLUCOSE BLDC GLUCOMTR-MCNC: 155 MG/DL (ref 70–99)
GLUCOSE BLDC GLUCOMTR-MCNC: 156 MG/DL (ref 70–99)
GLUCOSE BLDC GLUCOMTR-MCNC: 159 MG/DL (ref 70–99)
GLUCOSE BLDC GLUCOMTR-MCNC: 163 MG/DL (ref 70–99)
GLUCOSE BLDC GLUCOMTR-MCNC: 164 MG/DL (ref 70–99)
GLUCOSE BLDC GLUCOMTR-MCNC: 165 MG/DL (ref 70–99)
GLUCOSE BLDC GLUCOMTR-MCNC: 168 MG/DL (ref 70–99)
GLUCOSE BLDC GLUCOMTR-MCNC: 168 MG/DL (ref 70–99)
GLUCOSE BLDC GLUCOMTR-MCNC: 169 MG/DL (ref 70–99)
GLUCOSE BLDC GLUCOMTR-MCNC: 170 MG/DL (ref 70–99)
GLUCOSE BLDC GLUCOMTR-MCNC: 172 MG/DL (ref 70–99)
GLUCOSE BLDC GLUCOMTR-MCNC: 177 MG/DL (ref 70–99)
GLUCOSE BLDC GLUCOMTR-MCNC: 179 MG/DL (ref 70–99)
GLUCOSE BLDC GLUCOMTR-MCNC: 182 MG/DL (ref 70–99)
GLUCOSE BLDC GLUCOMTR-MCNC: 183 MG/DL (ref 70–99)
GLUCOSE BLDC GLUCOMTR-MCNC: 184 MG/DL (ref 70–99)
GLUCOSE BLDC GLUCOMTR-MCNC: 186 MG/DL (ref 70–99)
GLUCOSE BLDC GLUCOMTR-MCNC: 197 MG/DL (ref 70–99)
GLUCOSE BLDC GLUCOMTR-MCNC: 207 MG/DL (ref 70–99)
GLUCOSE BLDC GLUCOMTR-MCNC: 215 MG/DL (ref 70–99)
GLUCOSE BLDC GLUCOMTR-MCNC: 217 MG/DL (ref 70–99)
GLUCOSE BLDC GLUCOMTR-MCNC: 219 MG/DL (ref 70–99)
GLUCOSE BLDC GLUCOMTR-MCNC: 221 MG/DL (ref 70–99)
GLUCOSE BLDC GLUCOMTR-MCNC: 223 MG/DL (ref 70–99)
GLUCOSE BLDC GLUCOMTR-MCNC: 233 MG/DL (ref 70–99)
GLUCOSE BLDC GLUCOMTR-MCNC: 235 MG/DL (ref 70–99)
GLUCOSE BLDC GLUCOMTR-MCNC: 246 MG/DL (ref 70–99)
GLUCOSE BLDC GLUCOMTR-MCNC: 278 MG/DL (ref 70–99)
GLUCOSE BLDC GLUCOMTR-MCNC: 298 MG/DL (ref 70–99)
GLUCOSE SERPL-MCNC: 120 MG/DL (ref 70–99)
GLUCOSE SERPL-MCNC: 152 MG/DL (ref 70–99)
GLUCOSE SERPL-MCNC: 156 MG/DL (ref 70–99)
GLUCOSE SERPL-MCNC: 162 MG/DL (ref 70–99)
GLUCOSE SERPL-MCNC: 164 MG/DL (ref 70–99)
GLUCOSE SERPL-MCNC: 166 MG/DL (ref 70–99)
GLUCOSE SERPL-MCNC: 170 MG/DL (ref 70–99)
GLUCOSE SERPL-MCNC: 186 MG/DL (ref 70–99)
GLUCOSE SERPL-MCNC: 291 MG/DL (ref 65–100)
GLUCOSE SERPL-MCNC: 91 MG/DL (ref 70–99)
GLUCOSE UR STRIP-MCNC: NEGATIVE MG/DL
HBA1C MFR BLD: 9.1 % (ref 0–5.6)
HCG UR QL: NEGATIVE
HCT VFR BLD AUTO: 24.5 % (ref 35–47)
HCT VFR BLD AUTO: 24.7 % (ref 35–47)
HCT VFR BLD AUTO: 25 % (ref 35–47)
HCT VFR BLD AUTO: 25.2 % (ref 35–47)
HCT VFR BLD AUTO: 26 % (ref 35–47)
HCT VFR BLD AUTO: 26.1 % (ref 35–47)
HCT VFR BLD AUTO: 26.2 % (ref 35–47)
HCT VFR BLD AUTO: 29.2 % (ref 35–47)
HCT VFR BLD AUTO: 30.6 % (ref 35–47)
HCT VFR BLD AUTO: 35.5 % (ref 35–47)
HGB BLD-MCNC: 10 G/DL (ref 11.7–15.7)
HGB BLD-MCNC: 11.5 G/DL (ref 11.7–15.7)
HGB BLD-MCNC: 7.8 G/DL (ref 11.7–15.7)
HGB BLD-MCNC: 8 G/DL (ref 11.7–15.7)
HGB BLD-MCNC: 8.2 G/DL (ref 11.7–15.7)
HGB BLD-MCNC: 8.3 G/DL (ref 11.7–15.7)
HGB BLD-MCNC: 8.5 G/DL (ref 11.7–15.7)
HGB BLD-MCNC: 8.5 G/DL (ref 11.7–15.7)
HGB BLD-MCNC: 8.8 G/DL (ref 11.7–15.7)
HGB BLD-MCNC: 9.6 G/DL (ref 11.7–15.7)
HGB UR QL STRIP: NEGATIVE
IMM GRANULOCYTES # BLD: 0 10E9/L (ref 0–0.4)
IMM GRANULOCYTES # BLD: 0.1 10E9/L (ref 0–0.4)
IMM GRANULOCYTES NFR BLD: 0 %
IMM GRANULOCYTES NFR BLD: 0.4 %
INR PPP: 1.27 (ref 0.86–1.14)
INR PPP: 1.41 (ref 0.86–1.14)
INR PPP: 1.56 (ref 0.86–1.14)
INR PPP: 1.59 (ref 0.86–1.14)
INTERPRETATION ECG - MUSE: NORMAL
KETONES UR STRIP-MCNC: NEGATIVE MG/DL
LACTATE BLD-SCNC: 0.6 MMOL/L (ref 0.7–2)
LACTATE BLD-SCNC: 0.7 MMOL/L (ref 0.7–2)
LACTATE BLD-SCNC: 0.8 MMOL/L (ref 0.7–2)
LACTATE BLD-SCNC: 0.9 MMOL/L (ref 0.7–2)
LACTATE BLD-SCNC: 1 MMOL/L (ref 0.7–2)
LEUKOCYTE ESTERASE UR QL STRIP: ABNORMAL
LIPASE SERPL-CCNC: 22 U/L (ref 73–393)
LYMPHOCYTES # BLD AUTO: 2.8 10E9/L (ref 0.8–5.3)
LYMPHOCYTES # BLD AUTO: 3.5 10E9/L (ref 0.8–5.3)
LYMPHOCYTES NFR BLD AUTO: 11.3 %
LYMPHOCYTES NFR BLD AUTO: 48.9 %
Lab: ABNORMAL
Lab: ABNORMAL
Lab: NORMAL
MCH RBC QN AUTO: 25.3 PG (ref 26.5–33)
MCH RBC QN AUTO: 25.3 PG (ref 26.5–33)
MCH RBC QN AUTO: 25.5 PG (ref 26.5–33)
MCH RBC QN AUTO: 25.6 PG (ref 26.5–33)
MCH RBC QN AUTO: 25.6 PG (ref 26.5–33)
MCH RBC QN AUTO: 25.7 PG (ref 26.5–33)
MCH RBC QN AUTO: 25.9 PG (ref 26.5–33)
MCH RBC QN AUTO: 26.5 PG (ref 26.5–33)
MCH RBC QN AUTO: 26.7 PG (ref 26.5–33)
MCH RBC QN AUTO: 27.4 PG (ref 26.5–33)
MCHC RBC AUTO-ENTMCNC: 31.6 G/DL (ref 31.5–36.5)
MCHC RBC AUTO-ENTMCNC: 32.4 G/DL (ref 31.5–36.5)
MCHC RBC AUTO-ENTMCNC: 32.6 G/DL (ref 31.5–36.5)
MCHC RBC AUTO-ENTMCNC: 32.7 G/DL (ref 31.5–36.5)
MCHC RBC AUTO-ENTMCNC: 32.8 G/DL (ref 31.5–36.5)
MCHC RBC AUTO-ENTMCNC: 32.9 G/DL (ref 31.5–36.5)
MCHC RBC AUTO-ENTMCNC: 32.9 G/DL (ref 31.5–36.5)
MCHC RBC AUTO-ENTMCNC: 33.6 G/DL (ref 31.5–36.5)
MCV RBC AUTO: 77 FL (ref 78–100)
MCV RBC AUTO: 78 FL (ref 78–100)
MCV RBC AUTO: 79 FL (ref 78–100)
MCV RBC AUTO: 80 FL (ref 78–100)
MCV RBC AUTO: 81 FL (ref 78–100)
MCV RBC AUTO: 81 FL (ref 78–100)
MCV RBC AUTO: 85 FL (ref 78–100)
MONOCYTES # BLD AUTO: 0.4 10E9/L (ref 0–1.3)
MONOCYTES # BLD AUTO: 1.3 10E9/L (ref 0–1.3)
MONOCYTES NFR BLD AUTO: 5 %
MONOCYTES NFR BLD AUTO: 5.1 %
MUCOUS THREADS #/AREA URNS LPF: PRESENT /LPF
NEUTROPHILS # BLD AUTO: 20.5 10E9/L (ref 1.6–8.3)
NEUTROPHILS # BLD AUTO: 3 10E9/L (ref 1.6–8.3)
NEUTROPHILS NFR BLD AUTO: 41.8 %
NEUTROPHILS NFR BLD AUTO: 82.9 %
NITRATE UR QL: NEGATIVE
NRBC # BLD AUTO: 0 10*3/UL
NRBC # BLD AUTO: 0 10*3/UL
NRBC BLD AUTO-RTO: 0 /100
NRBC BLD AUTO-RTO: 0 /100
NT-PROBNP SERPL-MCNC: 2096 PG/ML (ref 0–900)
PH UR STRIP: 5 PH (ref 5–7)
PLATELET # BLD AUTO: 214 10E9/L (ref 150–450)
PLATELET # BLD AUTO: 317 10E9/L (ref 150–450)
PLATELET # BLD AUTO: 324 10E9/L (ref 150–450)
PLATELET # BLD AUTO: 328 10E9/L (ref 150–450)
PLATELET # BLD AUTO: 348 10E9/L (ref 150–450)
PLATELET # BLD AUTO: 358 10E9/L (ref 150–450)
PLATELET # BLD AUTO: 360 10E9/L (ref 150–450)
PLATELET # BLD AUTO: 361 10E9/L (ref 150–450)
PLATELET # BLD AUTO: 377 10E9/L (ref 150–450)
PLATELET # BLD AUTO: 428 10E9/L (ref 150–450)
POTASSIUM SERPL-SCNC: 4 MMOL/L (ref 3.4–5.3)
POTASSIUM SERPL-SCNC: 4.2 MMOL/L (ref 3.4–5.3)
POTASSIUM SERPL-SCNC: 4.2 MMOL/L (ref 3.4–5.3)
POTASSIUM SERPL-SCNC: 4.3 MMOL/L (ref 3.4–5.3)
POTASSIUM SERPL-SCNC: 4.4 MMOL/L (ref 3.4–5.3)
POTASSIUM SERPL-SCNC: 4.6 MMOL/L (ref 3.4–5.3)
POTASSIUM SERPL-SCNC: 4.9 MMOL/L (ref 3.4–5.3)
PREALB SERPL IA-MCNC: 6 MG/DL (ref 15–45)
PROT SERPL-MCNC: 6.6 G/DL (ref 6.8–8.8)
PROT SERPL-MCNC: 6.8 G/DL (ref 6.8–8.8)
PROT SERPL-MCNC: 7.6 G/DL (ref 6.8–8.8)
RADIOLOGIST FLAGS: NORMAL
RBC # BLD AUTO: 3.08 10E12/L (ref 3.8–5.2)
RBC # BLD AUTO: 3.16 10E12/L (ref 3.8–5.2)
RBC # BLD AUTO: 3.19 10E12/L (ref 3.8–5.2)
RBC # BLD AUTO: 3.25 10E12/L (ref 3.8–5.2)
RBC # BLD AUTO: 3.32 10E12/L (ref 3.8–5.2)
RBC # BLD AUTO: 3.32 10E12/L (ref 3.8–5.2)
RBC # BLD AUTO: 3.4 10E12/L (ref 3.8–5.2)
RBC # BLD AUTO: 3.6 10E12/L (ref 3.8–5.2)
RBC # BLD AUTO: 3.77 10E12/L (ref 3.8–5.2)
RBC # BLD AUTO: 4.19 10E12/L (ref 3.8–5.2)
RBC #/AREA URNS AUTO: <1 /HPF (ref 0–2)
SODIUM SERPL-SCNC: 133 MMOL/L (ref 133–144)
SODIUM SERPL-SCNC: 136 MMOL/L (ref 133–144)
SODIUM SERPL-SCNC: 138 MMOL/L (ref 133–144)
SODIUM SERPL-SCNC: 138 MMOL/L (ref 133–144)
SODIUM SERPL-SCNC: 139 MMOL/L (ref 133–144)
SODIUM SERPL-SCNC: 139 MMOL/L (ref 133–144)
SODIUM SERPL-SCNC: 140 MMOL/L (ref 133–144)
SODIUM SERPL-SCNC: 141 MMOL/L (ref 133–144)
SODIUM SERPL-SCNC: 141 MMOL/L (ref 133–144)
SOURCE: ABNORMAL
SP GR UR STRIP: 1.01 (ref 1–1.03)
SPECIMEN EXP DATE BLD: NORMAL
SPECIMEN SOURCE: ABNORMAL
SPECIMEN SOURCE: ABNORMAL
SPECIMEN SOURCE: NORMAL
UROBILINOGEN UR STRIP-MCNC: NORMAL MG/DL (ref 0–2)
VANCOMYCIN SERPL-MCNC: 20.7 MG/L
VANCOMYCIN SERPL-MCNC: 27 MG/L
WBC # BLD AUTO: 13.5 10E9/L (ref 4–11)
WBC # BLD AUTO: 15 10E9/L (ref 4–11)
WBC # BLD AUTO: 15.5 10E9/L (ref 4–11)
WBC # BLD AUTO: 16.1 10E9/L (ref 4–11)
WBC # BLD AUTO: 18.9 10E9/L (ref 4–11)
WBC # BLD AUTO: 20.9 10E9/L (ref 4–11)
WBC # BLD AUTO: 23.4 10E9/L (ref 4–11)
WBC # BLD AUTO: 24.8 10E9/L (ref 4–11)
WBC # BLD AUTO: 24.9 10E9/L (ref 4–11)
WBC # BLD AUTO: 7.2 10E9/L (ref 4–11)
WBC #/AREA URNS AUTO: 7 /HPF (ref 0–5)

## 2018-01-01 PROCEDURE — 81025 URINE PREGNANCY TEST: CPT | Performed by: ANESTHESIOLOGY

## 2018-01-01 PROCEDURE — 36415 COLL VENOUS BLD VENIPUNCTURE: CPT | Performed by: PEDIATRICS

## 2018-01-01 PROCEDURE — 85025 COMPLETE CBC W/AUTO DIFF WBC: CPT | Performed by: EMERGENCY MEDICINE

## 2018-01-01 PROCEDURE — 83036 HEMOGLOBIN GLYCOSYLATED A1C: CPT | Performed by: PEDIATRICS

## 2018-01-01 PROCEDURE — 40000133 ZZH STATISTIC OT WARD VISIT: Performed by: OCCUPATIONAL THERAPIST

## 2018-01-01 PROCEDURE — 12000008 ZZH R&B INTERMEDIATE UMMC

## 2018-01-01 PROCEDURE — 25000128 H RX IP 250 OP 636: Performed by: PEDIATRICS

## 2018-01-01 PROCEDURE — 85027 COMPLETE CBC AUTOMATED: CPT | Performed by: INTERNAL MEDICINE

## 2018-01-01 PROCEDURE — 99239 HOSP IP/OBS DSCHRG MGMT >30: CPT | Performed by: INTERNAL MEDICINE

## 2018-01-01 PROCEDURE — 99233 SBSQ HOSP IP/OBS HIGH 50: CPT | Performed by: INTERNAL MEDICINE

## 2018-01-01 PROCEDURE — 25000128 H RX IP 250 OP 636: Performed by: EMERGENCY MEDICINE

## 2018-01-01 PROCEDURE — 37000009 ZZH ANESTHESIA TECHNICAL FEE, EACH ADDTL 15 MIN: Performed by: ORTHOPAEDIC SURGERY

## 2018-01-01 PROCEDURE — 37000008 ZZH ANESTHESIA TECHNICAL FEE, 1ST 30 MIN: Performed by: ORTHOPAEDIC SURGERY

## 2018-01-01 PROCEDURE — 81001 URINALYSIS AUTO W/SCOPE: CPT | Performed by: EMERGENCY MEDICINE

## 2018-01-01 PROCEDURE — 97110 THERAPEUTIC EXERCISES: CPT | Mod: GO

## 2018-01-01 PROCEDURE — 82306 VITAMIN D 25 HYDROXY: CPT | Performed by: INTERNAL MEDICINE

## 2018-01-01 PROCEDURE — 85610 PROTHROMBIN TIME: CPT | Performed by: INTERNAL MEDICINE

## 2018-01-01 PROCEDURE — 25000132 ZZH RX MED GY IP 250 OP 250 PS 637: Performed by: PEDIATRICS

## 2018-01-01 PROCEDURE — 25000132 ZZH RX MED GY IP 250 OP 250 PS 637: Performed by: NURSE PRACTITIONER

## 2018-01-01 PROCEDURE — 87077 CULTURE AEROBIC IDENTIFY: CPT | Performed by: PODIATRIST

## 2018-01-01 PROCEDURE — 80048 BASIC METABOLIC PNL TOTAL CA: CPT | Performed by: PEDIATRICS

## 2018-01-01 PROCEDURE — 84134 ASSAY OF PREALBUMIN: CPT | Performed by: INTERNAL MEDICINE

## 2018-01-01 PROCEDURE — 93922 UPR/L XTREMITY ART 2 LEVELS: CPT

## 2018-01-01 PROCEDURE — 25000128 H RX IP 250 OP 636: Performed by: INTERNAL MEDICINE

## 2018-01-01 PROCEDURE — 97165 OT EVAL LOW COMPLEX 30 MIN: CPT | Mod: GO

## 2018-01-01 PROCEDURE — 80048 BASIC METABOLIC PNL TOTAL CA: CPT | Performed by: INTERNAL MEDICINE

## 2018-01-01 PROCEDURE — 25000128 H RX IP 250 OP 636: Performed by: SURGERY

## 2018-01-01 PROCEDURE — 40000556 ZZH STATISTIC PERIPHERAL IV START W US GUIDANCE

## 2018-01-01 PROCEDURE — 87076 CULTURE ANAEROBE IDENT EACH: CPT | Performed by: PODIATRIST

## 2018-01-01 PROCEDURE — 00000146 ZZHCL STATISTIC GLUCOSE BY METER IP

## 2018-01-01 PROCEDURE — 99284 EMERGENCY DEPT VISIT MOD MDM: CPT | Mod: Z6 | Performed by: EMERGENCY MEDICINE

## 2018-01-01 PROCEDURE — 25000128 H RX IP 250 OP 636

## 2018-01-01 PROCEDURE — 25000125 ZZHC RX 250: Performed by: SURGERY

## 2018-01-01 PROCEDURE — 40000003 ZZH STATISTIC IR STAFF TIME IN THE OR

## 2018-01-01 PROCEDURE — 99223 1ST HOSP IP/OBS HIGH 75: CPT | Mod: AI | Performed by: INTERNAL MEDICINE

## 2018-01-01 PROCEDURE — 83605 ASSAY OF LACTIC ACID: CPT | Performed by: INTERNAL MEDICINE

## 2018-01-01 PROCEDURE — 86850 RBC ANTIBODY SCREEN: CPT | Performed by: CLINICAL NURSE SPECIALIST

## 2018-01-01 PROCEDURE — 99207 ZZC APP CREDIT; MD BILLING SHARED VISIT: CPT | Performed by: NURSE PRACTITIONER

## 2018-01-01 PROCEDURE — 70450 CT HEAD/BRAIN W/O DYE: CPT

## 2018-01-01 PROCEDURE — 83690 ASSAY OF LIPASE: CPT | Performed by: EMERGENCY MEDICINE

## 2018-01-01 PROCEDURE — 25000132 ZZH RX MED GY IP 250 OP 250 PS 637: Performed by: PHYSICIAN ASSISTANT

## 2018-01-01 PROCEDURE — 25000132 ZZH RX MED GY IP 250 OP 250 PS 637: Performed by: INTERNAL MEDICINE

## 2018-01-01 PROCEDURE — 36569 INSJ PICC 5 YR+ W/O IMAGING: CPT

## 2018-01-01 PROCEDURE — 80202 ASSAY OF VANCOMYCIN: CPT | Performed by: INTERNAL MEDICINE

## 2018-01-01 PROCEDURE — 99282 EMERGENCY DEPT VISIT SF MDM: CPT | Mod: Z6 | Performed by: EMERGENCY MEDICINE

## 2018-01-01 PROCEDURE — 36000055 ZZH SURGERY LEVEL 2 W FLUORO 1ST 30 MIN - UMMC: Performed by: SURGERY

## 2018-01-01 PROCEDURE — 83605 ASSAY OF LACTIC ACID: CPT | Performed by: PEDIATRICS

## 2018-01-01 PROCEDURE — 25000128 H RX IP 250 OP 636: Performed by: NURSE PRACTITIONER

## 2018-01-01 PROCEDURE — 97110 THERAPEUTIC EXERCISES: CPT | Mod: GO | Performed by: OCCUPATIONAL THERAPIST

## 2018-01-01 PROCEDURE — C1713 ANCHOR/SCREW BN/BN,TIS/BN: HCPCS | Performed by: SURGERY

## 2018-01-01 PROCEDURE — 87185 SC STD ENZYME DETCJ PER NZM: CPT | Performed by: PODIATRIST

## 2018-01-01 PROCEDURE — G0463 HOSPITAL OUTPT CLINIC VISIT: HCPCS

## 2018-01-01 PROCEDURE — 40000133 ZZH STATISTIC OT WARD VISIT

## 2018-01-01 PROCEDURE — 36415 COLL VENOUS BLD VENIPUNCTURE: CPT | Performed by: INTERNAL MEDICINE

## 2018-01-01 PROCEDURE — 36000053 ZZH SURGERY LEVEL 2 EA 15 ADDTL MIN - UMMC: Performed by: SURGERY

## 2018-01-01 PROCEDURE — 36000059 ZZH SURGERY LEVEL 3 EA 15 ADDTL MIN UMMC: Performed by: ORTHOPAEDIC SURGERY

## 2018-01-01 PROCEDURE — 0Y6N0ZB DETACHMENT AT LEFT FOOT, PARTIAL 2ND RAY, OPEN APPROACH: ICD-10-PCS | Performed by: ORTHOPAEDIC SURGERY

## 2018-01-01 PROCEDURE — C1725 CATH, TRANSLUMIN NON-LASER: HCPCS | Performed by: SURGERY

## 2018-01-01 PROCEDURE — 96375 TX/PRO/DX INJ NEW DRUG ADDON: CPT

## 2018-01-01 PROCEDURE — 25000125 ZZHC RX 250: Performed by: INTERNAL MEDICINE

## 2018-01-01 PROCEDURE — 99214 OFFICE O/P EST MOD 30 MIN: CPT | Performed by: FAMILY MEDICINE

## 2018-01-01 PROCEDURE — 27211024 ZZHC OR SUPPLY OTHER OPNP: Performed by: SURGERY

## 2018-01-01 PROCEDURE — 97760 ORTHOTIC MGMT&TRAING 1ST ENC: CPT | Mod: GO | Performed by: OCCUPATIONAL THERAPIST

## 2018-01-01 PROCEDURE — 99285 EMERGENCY DEPT VISIT HI MDM: CPT | Mod: 25 | Performed by: EMERGENCY MEDICINE

## 2018-01-01 PROCEDURE — 84702 CHORIONIC GONADOTROPIN TEST: CPT | Performed by: ANESTHESIOLOGY

## 2018-01-01 PROCEDURE — 88305 TISSUE EXAM BY PATHOLOGIST: CPT | Performed by: ORTHOPAEDIC SURGERY

## 2018-01-01 PROCEDURE — 85027 COMPLETE CBC AUTOMATED: CPT | Performed by: PEDIATRICS

## 2018-01-01 PROCEDURE — C1769 GUIDE WIRE: HCPCS | Performed by: SURGERY

## 2018-01-01 PROCEDURE — 93010 ELECTROCARDIOGRAM REPORT: CPT | Performed by: INTERNAL MEDICINE

## 2018-01-01 PROCEDURE — 97530 THERAPEUTIC ACTIVITIES: CPT | Mod: GO

## 2018-01-01 PROCEDURE — 96361 HYDRATE IV INFUSION ADD-ON: CPT

## 2018-01-01 PROCEDURE — 25000566 ZZH SEVOFLURANE, EA 15 MIN: Performed by: ORTHOPAEDIC SURGERY

## 2018-01-01 PROCEDURE — 40000171 ZZH STATISTIC PRE-PROCEDURE ASSESSMENT III: Performed by: ORTHOPAEDIC SURGERY

## 2018-01-01 PROCEDURE — 36415 COLL VENOUS BLD VENIPUNCTURE: CPT

## 2018-01-01 PROCEDURE — 40000986 XR CHEST PORT 1 VW

## 2018-01-01 PROCEDURE — 25000128 H RX IP 250 OP 636: Performed by: ANESTHESIOLOGY

## 2018-01-01 PROCEDURE — 83880 ASSAY OF NATRIURETIC PEPTIDE: CPT | Performed by: EMERGENCY MEDICINE

## 2018-01-01 PROCEDURE — 27110028 ZZH OR GENERAL SUPPLY NON-STERILE: Performed by: ORTHOPAEDIC SURGERY

## 2018-01-01 PROCEDURE — 99233 SBSQ HOSP IP/OBS HIGH 50: CPT | Performed by: PEDIATRICS

## 2018-01-01 PROCEDURE — 71000017 ZZH RECOVERY PHASE 1 LEVEL 3 EA ADDTL HR: Performed by: ORTHOPAEDIC SURGERY

## 2018-01-01 PROCEDURE — 99285 EMERGENCY DEPT VISIT HI MDM: CPT | Mod: 25

## 2018-01-01 PROCEDURE — 71045 X-RAY EXAM CHEST 1 VIEW: CPT

## 2018-01-01 PROCEDURE — 37000008 ZZH ANESTHESIA TECHNICAL FEE, 1ST 30 MIN: Performed by: SURGERY

## 2018-01-01 PROCEDURE — 85027 COMPLETE CBC AUTOMATED: CPT | Performed by: NURSE PRACTITIONER

## 2018-01-01 PROCEDURE — 86901 BLOOD TYPING SEROLOGIC RH(D): CPT | Performed by: CLINICAL NURSE SPECIALIST

## 2018-01-01 PROCEDURE — 96365 THER/PROPH/DIAG IV INF INIT: CPT

## 2018-01-01 PROCEDURE — 87075 CULTR BACTERIA EXCEPT BLOOD: CPT | Performed by: PODIATRIST

## 2018-01-01 PROCEDURE — 99285 EMERGENCY DEPT VISIT HI MDM: CPT | Mod: Z6 | Performed by: EMERGENCY MEDICINE

## 2018-01-01 PROCEDURE — 25000125 ZZHC RX 250: Performed by: NURSE ANESTHETIST, CERTIFIED REGISTERED

## 2018-01-01 PROCEDURE — 86140 C-REACTIVE PROTEIN: CPT | Performed by: PEDIATRICS

## 2018-01-01 PROCEDURE — 88311 DECALCIFY TISSUE: CPT | Performed by: ORTHOPAEDIC SURGERY

## 2018-01-01 PROCEDURE — 40000193 ZZH STATISTIC PT WARD VISIT

## 2018-01-01 PROCEDURE — 25000128 H RX IP 250 OP 636: Performed by: NURSE ANESTHETIST, CERTIFIED REGISTERED

## 2018-01-01 PROCEDURE — 93922 UPR/L XTREMITY ART 2 LEVELS: CPT | Performed by: INTERNAL MEDICINE

## 2018-01-01 PROCEDURE — 99606 MTMS BY PHARM EST 15 MIN: CPT | Performed by: PHARMACIST

## 2018-01-01 PROCEDURE — 25000131 ZZH RX MED GY IP 250 OP 636 PS 637: Performed by: NURSE PRACTITIONER

## 2018-01-01 PROCEDURE — 27210196 ZZH KIT POWER PICC SINGLE LUMEN

## 2018-01-01 PROCEDURE — 93005 ELECTROCARDIOGRAM TRACING: CPT

## 2018-01-01 PROCEDURE — 93296 REM INTERROG EVL PM/IDS: CPT | Mod: ZF

## 2018-01-01 PROCEDURE — C1894 INTRO/SHEATH, NON-LASER: HCPCS | Performed by: SURGERY

## 2018-01-01 PROCEDURE — 047D3DZ DILATION OF LEFT COMMON ILIAC ARTERY WITH INTRALUMINAL DEVICE, PERCUTANEOUS APPROACH: ICD-10-PCS | Performed by: SURGERY

## 2018-01-01 PROCEDURE — 97535 SELF CARE MNGMENT TRAINING: CPT | Mod: GO

## 2018-01-01 PROCEDURE — 87040 BLOOD CULTURE FOR BACTERIA: CPT | Performed by: EMERGENCY MEDICINE

## 2018-01-01 PROCEDURE — 25000128 H RX IP 250 OP 636: Performed by: STUDENT IN AN ORGANIZED HEALTH CARE EDUCATION/TRAINING PROGRAM

## 2018-01-01 PROCEDURE — 27210794 ZZH OR GENERAL SUPPLY STERILE: Performed by: SURGERY

## 2018-01-01 PROCEDURE — G0463 HOSPITAL OUTPT CLINIC VISIT: HCPCS | Mod: ZF

## 2018-01-01 PROCEDURE — 99607 MTMS BY PHARM ADDL 15 MIN: CPT | Performed by: PHARMACIST

## 2018-01-01 PROCEDURE — 71046 X-RAY EXAM CHEST 2 VIEWS: CPT

## 2018-01-01 PROCEDURE — 97161 PT EVAL LOW COMPLEX 20 MIN: CPT | Mod: GP

## 2018-01-01 PROCEDURE — 83605 ASSAY OF LACTIC ACID: CPT | Performed by: EMERGENCY MEDICINE

## 2018-01-01 PROCEDURE — 87070 CULTURE OTHR SPECIMN AEROBIC: CPT | Performed by: PODIATRIST

## 2018-01-01 PROCEDURE — 25000125 ZZHC RX 250: Performed by: ORTHOPAEDIC SURGERY

## 2018-01-01 PROCEDURE — 87040 BLOOD CULTURE FOR BACTERIA: CPT | Performed by: PHYSICIAN ASSISTANT

## 2018-01-01 PROCEDURE — 96367 TX/PROPH/DG ADDL SEQ IV INF: CPT

## 2018-01-01 PROCEDURE — 97763 ORTHC/PROSTC MGMT SBSQ ENC: CPT | Mod: GO

## 2018-01-01 PROCEDURE — 36415 COLL VENOUS BLD VENIPUNCTURE: CPT | Performed by: PHYSICIAN ASSISTANT

## 2018-01-01 PROCEDURE — 86900 BLOOD TYPING SEROLOGIC ABO: CPT | Performed by: CLINICAL NURSE SPECIALIST

## 2018-01-01 PROCEDURE — 36000057 ZZH SURGERY LEVEL 3 1ST 30 MIN - UMMC: Performed by: ORTHOPAEDIC SURGERY

## 2018-01-01 PROCEDURE — 99283 EMERGENCY DEPT VISIT LOW MDM: CPT | Performed by: EMERGENCY MEDICINE

## 2018-01-01 PROCEDURE — 71000016 ZZH RECOVERY PHASE 1 LEVEL 3 FIRST HR: Performed by: ORTHOPAEDIC SURGERY

## 2018-01-01 PROCEDURE — 27210577 ZZ H INTRODUCER MICRO SET

## 2018-01-01 PROCEDURE — 80048 BASIC METABOLIC PNL TOTAL CA: CPT | Performed by: EMERGENCY MEDICINE

## 2018-01-01 PROCEDURE — 0Y6N0Z9 DETACHMENT AT LEFT FOOT, PARTIAL 1ST RAY, OPEN APPROACH: ICD-10-PCS | Performed by: ORTHOPAEDIC SURGERY

## 2018-01-01 PROCEDURE — 87186 SC STD MICRODIL/AGAR DIL: CPT | Performed by: PODIATRIST

## 2018-01-01 PROCEDURE — 80053 COMPREHEN METABOLIC PANEL: CPT | Performed by: INTERNAL MEDICINE

## 2018-01-01 PROCEDURE — 80053 COMPREHEN METABOLIC PANEL: CPT | Performed by: PEDIATRICS

## 2018-01-01 PROCEDURE — 97530 THERAPEUTIC ACTIVITIES: CPT | Mod: GP

## 2018-01-01 PROCEDURE — 40000170 ZZH STATISTIC PRE-PROCEDURE ASSESSMENT II: Performed by: SURGERY

## 2018-01-01 PROCEDURE — 37000009 ZZH ANESTHESIA TECHNICAL FEE, EACH ADDTL 15 MIN: Performed by: SURGERY

## 2018-01-01 PROCEDURE — 87086 URINE CULTURE/COLONY COUNT: CPT | Performed by: EMERGENCY MEDICINE

## 2018-01-01 PROCEDURE — 80048 BASIC METABOLIC PNL TOTAL CA: CPT | Performed by: NURSE PRACTITIONER

## 2018-01-01 PROCEDURE — 85610 PROTHROMBIN TIME: CPT | Performed by: NURSE PRACTITIONER

## 2018-01-01 PROCEDURE — 80053 COMPREHEN METABOLIC PANEL: CPT | Performed by: EMERGENCY MEDICINE

## 2018-01-01 PROCEDURE — 27210794 ZZH OR GENERAL SUPPLY STERILE: Performed by: ORTHOPAEDIC SURGERY

## 2018-01-01 PROCEDURE — 96366 THER/PROPH/DIAG IV INF ADDON: CPT

## 2018-01-01 PROCEDURE — C1887 CATHETER, GUIDING: HCPCS | Performed by: SURGERY

## 2018-01-01 PROCEDURE — 96374 THER/PROPH/DIAG INJ IV PUSH: CPT | Performed by: EMERGENCY MEDICINE

## 2018-01-01 PROCEDURE — 25000128 H RX IP 250 OP 636: Performed by: PHYSICIAN ASSISTANT

## 2018-01-01 DEVICE — IMPLANTABLE DEVICE: Type: IMPLANTABLE DEVICE | Site: ILIAC/FEMORALS | Status: FUNCTIONAL

## 2018-01-01 RX ORDER — ASPIRIN 81 MG/1
81 TABLET ORAL DAILY
Status: DISCONTINUED | OUTPATIENT
Start: 2018-01-01 | End: 2018-01-01

## 2018-01-01 RX ORDER — CLINDAMYCIN HCL 300 MG
300 CAPSULE ORAL 3 TIMES DAILY
Qty: 21 CAPSULE | Refills: 0 | Status: ON HOLD | OUTPATIENT
Start: 2018-01-01 | End: 2018-01-01

## 2018-01-01 RX ORDER — VANCOMYCIN HYDROCHLORIDE 1 G/200ML
1000 INJECTION, SOLUTION INTRAVENOUS EVERY 24 HOURS
Status: DISCONTINUED | OUTPATIENT
Start: 2018-01-01 | End: 2018-01-01 | Stop reason: DRUGHIGH

## 2018-01-01 RX ORDER — SODIUM CHLORIDE 9 MG/ML
INJECTION, SOLUTION INTRAVENOUS CONTINUOUS PRN
Status: DISCONTINUED | OUTPATIENT
Start: 2018-01-01 | End: 2018-01-01

## 2018-01-01 RX ORDER — HEPARIN SODIUM,PORCINE 10 UNIT/ML
2-5 VIAL (ML) INTRAVENOUS
Status: DISCONTINUED | OUTPATIENT
Start: 2018-01-01 | End: 2018-01-01 | Stop reason: HOSPADM

## 2018-01-01 RX ORDER — LIDOCAINE HYDROCHLORIDE 20 MG/ML
INJECTION, SOLUTION INFILTRATION; PERINEURAL PRN
Status: DISCONTINUED | OUTPATIENT
Start: 2018-01-01 | End: 2018-01-01

## 2018-01-01 RX ORDER — METRONIDAZOLE 500 MG/1
500 TABLET ORAL EVERY 8 HOURS
Qty: 60 TABLET | Refills: 0 | Status: SHIPPED | OUTPATIENT
Start: 2018-01-01 | End: 2019-01-01

## 2018-01-01 RX ORDER — ALBUTEROL SULFATE 90 UG/1
2 AEROSOL, METERED RESPIRATORY (INHALATION) EVERY 4 HOURS PRN
Status: DISCONTINUED | OUTPATIENT
Start: 2018-01-01 | End: 2018-01-01 | Stop reason: HOSPADM

## 2018-01-01 RX ORDER — SODIUM CHLORIDE, SODIUM LACTATE, POTASSIUM CHLORIDE, CALCIUM CHLORIDE 600; 310; 30; 20 MG/100ML; MG/100ML; MG/100ML; MG/100ML
INJECTION, SOLUTION INTRAVENOUS CONTINUOUS
Status: DISPENSED | OUTPATIENT
Start: 2018-01-01 | End: 2018-01-01

## 2018-01-01 RX ORDER — NICOTINE POLACRILEX 4 MG
15-30 LOZENGE BUCCAL
Status: DISCONTINUED | OUTPATIENT
Start: 2018-01-01 | End: 2018-01-01 | Stop reason: HOSPADM

## 2018-01-01 RX ORDER — ASPIRIN 81 MG/1
81 TABLET ORAL
Status: DISCONTINUED | OUTPATIENT
Start: 2018-01-01 | End: 2018-01-01 | Stop reason: HOSPADM

## 2018-01-01 RX ORDER — AMOXICILLIN 250 MG
1 CAPSULE ORAL 2 TIMES DAILY PRN
Status: DISCONTINUED | OUTPATIENT
Start: 2018-01-01 | End: 2018-01-01 | Stop reason: HOSPADM

## 2018-01-01 RX ORDER — DEXTROSE MONOHYDRATE 25 G/50ML
25-50 INJECTION, SOLUTION INTRAVENOUS
Status: DISCONTINUED | OUTPATIENT
Start: 2018-01-01 | End: 2018-01-01 | Stop reason: HOSPADM

## 2018-01-01 RX ORDER — LIDOCAINE HYDROCHLORIDE 10 MG/ML
INJECTION, SOLUTION EPIDURAL; INFILTRATION; INTRACAUDAL; PERINEURAL PRN
Status: DISCONTINUED | OUTPATIENT
Start: 2018-01-01 | End: 2018-01-01 | Stop reason: HOSPADM

## 2018-01-01 RX ORDER — PROPOFOL 10 MG/ML
INJECTION, EMULSION INTRAVENOUS PRN
Status: DISCONTINUED | OUTPATIENT
Start: 2018-01-01 | End: 2018-01-01

## 2018-01-01 RX ORDER — ONDANSETRON 4 MG/1
4 TABLET, ORALLY DISINTEGRATING ORAL ONCE
Status: DISCONTINUED | OUTPATIENT
Start: 2018-01-01 | End: 2018-01-01 | Stop reason: CLARIF

## 2018-01-01 RX ORDER — OXYCODONE HYDROCHLORIDE 5 MG/1
5 TABLET ORAL EVERY 4 HOURS PRN
Status: DISCONTINUED | OUTPATIENT
Start: 2018-01-01 | End: 2018-01-01

## 2018-01-01 RX ORDER — LIDOCAINE 40 MG/G
CREAM TOPICAL
Status: DISCONTINUED | OUTPATIENT
Start: 2018-01-01 | End: 2018-01-01 | Stop reason: HOSPADM

## 2018-01-01 RX ORDER — PROCHLORPERAZINE 25 MG
25 SUPPOSITORY, RECTAL RECTAL EVERY 12 HOURS PRN
Status: DISCONTINUED | OUTPATIENT
Start: 2018-01-01 | End: 2018-01-01 | Stop reason: HOSPADM

## 2018-01-01 RX ORDER — HYDROMORPHONE HYDROCHLORIDE 1 MG/ML
.3-.5 INJECTION, SOLUTION INTRAMUSCULAR; INTRAVENOUS; SUBCUTANEOUS EVERY 10 MIN PRN
Status: DISCONTINUED | OUTPATIENT
Start: 2018-01-01 | End: 2018-01-01 | Stop reason: HOSPADM

## 2018-01-01 RX ORDER — METOPROLOL SUCCINATE 25 MG/1
25 TABLET, EXTENDED RELEASE ORAL DAILY
Status: DISCONTINUED | OUTPATIENT
Start: 2018-01-01 | End: 2018-01-01 | Stop reason: HOSPADM

## 2018-01-01 RX ORDER — VANCOMYCIN HYDROCHLORIDE 1 G/200ML
1000 INJECTION, SOLUTION INTRAVENOUS ONCE
Status: COMPLETED | OUTPATIENT
Start: 2018-01-01 | End: 2018-01-01

## 2018-01-01 RX ORDER — NALOXONE HYDROCHLORIDE 0.4 MG/ML
.1-.4 INJECTION, SOLUTION INTRAMUSCULAR; INTRAVENOUS; SUBCUTANEOUS
Status: DISCONTINUED | OUTPATIENT
Start: 2018-01-01 | End: 2018-01-01 | Stop reason: HOSPADM

## 2018-01-01 RX ORDER — SODIUM CHLORIDE 9 MG/ML
1000 INJECTION, SOLUTION INTRAVENOUS CONTINUOUS
Status: DISCONTINUED | OUTPATIENT
Start: 2018-01-01 | End: 2018-01-01 | Stop reason: HOSPADM

## 2018-01-01 RX ORDER — HEPARIN SODIUM 1000 [USP'U]/ML
5000 INJECTION, SOLUTION INTRAVENOUS; SUBCUTANEOUS ONCE
Status: DISCONTINUED | OUTPATIENT
Start: 2018-01-01 | End: 2018-01-01

## 2018-01-01 RX ORDER — DOXYCYCLINE HYCLATE 100 MG
100 TABLET ORAL 2 TIMES DAILY
Qty: 180 TABLET | Refills: 0 | Status: SHIPPED | OUTPATIENT
Start: 2018-01-01 | End: 2019-01-01

## 2018-01-01 RX ORDER — MULTIPLE VITAMINS W/ MINERALS TAB 9MG-400MCG
1 TAB ORAL DAILY
Status: DISCONTINUED | OUTPATIENT
Start: 2018-01-01 | End: 2018-01-01 | Stop reason: HOSPADM

## 2018-01-01 RX ORDER — MAGNESIUM HYDROXIDE 1200 MG/15ML
LIQUID ORAL PRN
Status: DISCONTINUED | OUTPATIENT
Start: 2018-01-01 | End: 2018-01-01 | Stop reason: HOSPADM

## 2018-01-01 RX ORDER — HYDROMORPHONE HYDROCHLORIDE 1 MG/ML
0.3 INJECTION, SOLUTION INTRAMUSCULAR; INTRAVENOUS; SUBCUTANEOUS ONCE
Status: COMPLETED | OUTPATIENT
Start: 2018-01-01 | End: 2018-01-01

## 2018-01-01 RX ORDER — BISACODYL 10 MG
10 SUPPOSITORY, RECTAL RECTAL DAILY PRN
Status: DISCONTINUED | OUTPATIENT
Start: 2018-01-01 | End: 2018-01-01 | Stop reason: HOSPADM

## 2018-01-01 RX ORDER — AMITRIPTYLINE HYDROCHLORIDE 50 MG/1
50 TABLET ORAL AT BEDTIME
Status: DISCONTINUED | OUTPATIENT
Start: 2018-01-01 | End: 2018-01-01 | Stop reason: HOSPADM

## 2018-01-01 RX ORDER — AMOXICILLIN 250 MG
2 CAPSULE ORAL 2 TIMES DAILY PRN
Status: DISCONTINUED | OUTPATIENT
Start: 2018-01-01 | End: 2018-01-01 | Stop reason: HOSPADM

## 2018-01-01 RX ORDER — FENTANYL CITRATE 50 UG/ML
50 INJECTION, SOLUTION INTRAMUSCULAR; INTRAVENOUS ONCE
Status: COMPLETED | OUTPATIENT
Start: 2018-01-01 | End: 2018-01-01

## 2018-01-01 RX ORDER — ONDANSETRON 4 MG/1
4 TABLET, ORALLY DISINTEGRATING ORAL EVERY 30 MIN PRN
Status: DISCONTINUED | OUTPATIENT
Start: 2018-01-01 | End: 2018-01-01 | Stop reason: HOSPADM

## 2018-01-01 RX ORDER — HYDROMORPHONE HYDROCHLORIDE 1 MG/ML
0.3 INJECTION, SOLUTION INTRAMUSCULAR; INTRAVENOUS; SUBCUTANEOUS
Status: DISCONTINUED | OUTPATIENT
Start: 2018-01-01 | End: 2018-01-01

## 2018-01-01 RX ORDER — ALUMINA, MAGNESIA, AND SIMETHICONE 2400; 2400; 240 MG/30ML; MG/30ML; MG/30ML
30 SUSPENSION ORAL EVERY 4 HOURS PRN
Status: DISCONTINUED | OUTPATIENT
Start: 2018-01-01 | End: 2018-01-01 | Stop reason: HOSPADM

## 2018-01-01 RX ORDER — ONDANSETRON 4 MG/1
4 TABLET, ORALLY DISINTEGRATING ORAL EVERY 6 HOURS PRN
Status: DISCONTINUED | OUTPATIENT
Start: 2018-01-01 | End: 2018-01-01 | Stop reason: HOSPADM

## 2018-01-01 RX ORDER — OXYCODONE HYDROCHLORIDE 10 MG/1
10-20 TABLET ORAL EVERY 6 HOURS PRN
Qty: 20 TABLET | Refills: 0 | Status: SHIPPED | OUTPATIENT
Start: 2018-01-01 | End: 2019-01-01

## 2018-01-01 RX ORDER — CLINDAMYCIN HCL 300 MG
300 CAPSULE ORAL 3 TIMES DAILY
Status: DISCONTINUED | OUTPATIENT
Start: 2018-01-01 | End: 2018-01-01

## 2018-01-01 RX ORDER — MEPERIDINE HYDROCHLORIDE 50 MG/ML
12.5 INJECTION INTRAMUSCULAR; INTRAVENOUS; SUBCUTANEOUS
Status: DISCONTINUED | OUTPATIENT
Start: 2018-01-01 | End: 2018-01-01 | Stop reason: HOSPADM

## 2018-01-01 RX ORDER — LORAZEPAM 0.5 MG/1
0.5 TABLET ORAL ONCE
Status: DISCONTINUED | OUTPATIENT
Start: 2018-01-01 | End: 2018-01-01 | Stop reason: HOSPADM

## 2018-01-01 RX ORDER — CEFAZOLIN SODIUM 1 G/50ML
1250 SOLUTION INTRAVENOUS EVERY 24 HOURS
Status: DISCONTINUED | OUTPATIENT
Start: 2018-01-01 | End: 2018-01-01

## 2018-01-01 RX ORDER — PROPOFOL 10 MG/ML
INJECTION, EMULSION INTRAVENOUS CONTINUOUS PRN
Status: DISCONTINUED | OUTPATIENT
Start: 2018-01-01 | End: 2018-01-01

## 2018-01-01 RX ORDER — FENTANYL CITRATE 50 UG/ML
INJECTION, SOLUTION INTRAMUSCULAR; INTRAVENOUS PRN
Status: DISCONTINUED | OUTPATIENT
Start: 2018-01-01 | End: 2018-01-01

## 2018-01-01 RX ORDER — PROCHLORPERAZINE MALEATE 10 MG
10 TABLET ORAL EVERY 6 HOURS PRN
Status: DISCONTINUED | OUTPATIENT
Start: 2018-01-01 | End: 2018-01-01 | Stop reason: HOSPADM

## 2018-01-01 RX ORDER — PREGABALIN 75 MG
CAPSULE ORAL
Qty: 90 CAPSULE | Refills: 3 | Status: SHIPPED | OUTPATIENT
Start: 2018-01-01 | End: 2019-01-01

## 2018-01-01 RX ORDER — LIDOCAINE 4 G/G
1 PATCH TOPICAL
Status: DISCONTINUED | OUTPATIENT
Start: 2018-01-01 | End: 2018-01-01 | Stop reason: HOSPADM

## 2018-01-01 RX ORDER — PREGABALIN 50 MG/1
50 CAPSULE ORAL 3 TIMES DAILY
Status: DISCONTINUED | OUTPATIENT
Start: 2018-01-01 | End: 2018-01-01 | Stop reason: HOSPADM

## 2018-01-01 RX ORDER — CYCLOBENZAPRINE HCL 10 MG
10 TABLET ORAL 3 TIMES DAILY PRN
Status: DISCONTINUED | OUTPATIENT
Start: 2018-01-01 | End: 2018-01-01 | Stop reason: HOSPADM

## 2018-01-01 RX ORDER — ONDANSETRON 2 MG/ML
4 INJECTION INTRAMUSCULAR; INTRAVENOUS EVERY 30 MIN PRN
Status: DISCONTINUED | OUTPATIENT
Start: 2018-01-01 | End: 2018-01-01 | Stop reason: HOSPADM

## 2018-01-01 RX ORDER — HEPARIN SODIUM,PORCINE 10 UNIT/ML
5 VIAL (ML) INTRAVENOUS ONCE
Status: COMPLETED | OUTPATIENT
Start: 2018-01-01 | End: 2018-01-01

## 2018-01-01 RX ORDER — HEPARIN SODIUM (PORCINE) LOCK FLUSH IV SOLN 100 UNIT/ML 100 UNIT/ML
100 SOLUTION INTRAVENOUS ONCE
Status: DISCONTINUED | OUTPATIENT
Start: 2018-01-01 | End: 2018-01-01

## 2018-01-01 RX ORDER — HEPARIN SODIUM (PORCINE) LOCK FLUSH IV SOLN 100 UNIT/ML 100 UNIT/ML
500 SOLUTION INTRAVENOUS ONCE
Status: DISCONTINUED | OUTPATIENT
Start: 2018-01-01 | End: 2018-01-01

## 2018-01-01 RX ORDER — IOPAMIDOL 755 MG/ML
INJECTION, SOLUTION INTRAVASCULAR PRN
Status: DISCONTINUED | OUTPATIENT
Start: 2018-01-01 | End: 2018-01-01 | Stop reason: HOSPADM

## 2018-01-01 RX ORDER — HYDROMORPHONE HYDROCHLORIDE 1 MG/ML
0.5 INJECTION, SOLUTION INTRAMUSCULAR; INTRAVENOUS; SUBCUTANEOUS ONCE
Status: COMPLETED | OUTPATIENT
Start: 2018-01-01 | End: 2018-01-01

## 2018-01-01 RX ORDER — ACETAMINOPHEN 500 MG
1000 TABLET ORAL ONCE
Status: DISCONTINUED | OUTPATIENT
Start: 2018-01-01 | End: 2018-01-01 | Stop reason: HOSPADM

## 2018-01-01 RX ORDER — ACETAMINOPHEN 325 MG/1
650 TABLET ORAL EVERY 4 HOURS PRN
Status: DISCONTINUED | OUTPATIENT
Start: 2018-01-01 | End: 2018-01-01 | Stop reason: HOSPADM

## 2018-01-01 RX ORDER — SODIUM CHLORIDE, SODIUM LACTATE, POTASSIUM CHLORIDE, CALCIUM CHLORIDE 600; 310; 30; 20 MG/100ML; MG/100ML; MG/100ML; MG/100ML
INJECTION, SOLUTION INTRAVENOUS CONTINUOUS PRN
Status: DISCONTINUED | OUTPATIENT
Start: 2018-01-01 | End: 2018-01-01

## 2018-01-01 RX ORDER — METHADONE HYDROCHLORIDE 10 MG/ML
75 CONCENTRATE ORAL DAILY
COMMUNITY

## 2018-01-01 RX ORDER — POLYETHYLENE GLYCOL 3350 17 G/17G
17 POWDER, FOR SOLUTION ORAL DAILY
Status: DISCONTINUED | OUTPATIENT
Start: 2018-01-01 | End: 2018-01-01 | Stop reason: HOSPADM

## 2018-01-01 RX ORDER — FENTANYL CITRATE 50 UG/ML
25-50 INJECTION, SOLUTION INTRAMUSCULAR; INTRAVENOUS EVERY 5 MIN PRN
Status: DISCONTINUED | OUTPATIENT
Start: 2018-01-01 | End: 2018-01-01 | Stop reason: HOSPADM

## 2018-01-01 RX ORDER — METRONIDAZOLE 500 MG/1
500 TABLET ORAL EVERY 8 HOURS SCHEDULED
Status: DISCONTINUED | OUTPATIENT
Start: 2018-01-01 | End: 2018-01-01 | Stop reason: HOSPADM

## 2018-01-01 RX ORDER — HEPARIN SODIUM 1000 [USP'U]/ML
INJECTION, SOLUTION INTRAVENOUS; SUBCUTANEOUS PRN
Status: DISCONTINUED | OUTPATIENT
Start: 2018-01-01 | End: 2018-01-01

## 2018-01-01 RX ORDER — PIPERACILLIN SODIUM, TAZOBACTAM SODIUM 3; .375 G/15ML; G/15ML
3.38 INJECTION, POWDER, LYOPHILIZED, FOR SOLUTION INTRAVENOUS ONCE
Status: COMPLETED | OUTPATIENT
Start: 2018-01-01 | End: 2018-01-01

## 2018-01-01 RX ORDER — SODIUM CHLORIDE, SODIUM LACTATE, POTASSIUM CHLORIDE, CALCIUM CHLORIDE 600; 310; 30; 20 MG/100ML; MG/100ML; MG/100ML; MG/100ML
INJECTION, SOLUTION INTRAVENOUS CONTINUOUS
Status: DISCONTINUED | OUTPATIENT
Start: 2018-01-01 | End: 2018-01-01 | Stop reason: HOSPADM

## 2018-01-01 RX ORDER — FUROSEMIDE 20 MG
TABLET ORAL
Qty: 90 TABLET | Refills: 0 | Status: ON HOLD | OUTPATIENT
Start: 2018-01-01 | End: 2018-01-01

## 2018-01-01 RX ORDER — FENTANYL CITRATE 50 UG/ML
INJECTION, SOLUTION INTRAMUSCULAR; INTRAVENOUS
Status: COMPLETED
Start: 2018-01-01 | End: 2018-01-01

## 2018-01-01 RX ORDER — SENNOSIDES 8.6 MG
2 TABLET ORAL DAILY PRN
COMMUNITY

## 2018-01-01 RX ORDER — METHADONE HYDROCHLORIDE 10 MG/ML
75 CONCENTRATE ORAL DAILY
Status: DISCONTINUED | OUTPATIENT
Start: 2018-01-01 | End: 2018-01-01 | Stop reason: HOSPADM

## 2018-01-01 RX ORDER — ONDANSETRON 2 MG/ML
4 INJECTION INTRAMUSCULAR; INTRAVENOUS EVERY 6 HOURS PRN
Status: DISCONTINUED | OUTPATIENT
Start: 2018-01-01 | End: 2018-01-01 | Stop reason: HOSPADM

## 2018-01-01 RX ORDER — OXYCODONE HYDROCHLORIDE 10 MG/1
10-20 TABLET ORAL EVERY 4 HOURS PRN
Status: DISCONTINUED | OUTPATIENT
Start: 2018-01-01 | End: 2018-01-01 | Stop reason: HOSPADM

## 2018-01-01 RX ORDER — CEFEPIME HYDROCHLORIDE 1 G/1
1000 INJECTION, POWDER, FOR SOLUTION INTRAMUSCULAR; INTRAVENOUS EVERY 24 HOURS
Status: DISCONTINUED | OUTPATIENT
Start: 2018-01-01 | End: 2018-01-01

## 2018-01-01 RX ADMIN — METRONIDAZOLE 500 MG: 500 TABLET ORAL at 05:37

## 2018-01-01 RX ADMIN — METRONIDAZOLE 500 MG: 500 TABLET ORAL at 14:39

## 2018-01-01 RX ADMIN — UMECLIDINIUM BROMIDE AND VILANTEROL TRIFENATATE 1 PUFF: 62.5; 25 POWDER RESPIRATORY (INHALATION) at 08:02

## 2018-01-01 RX ADMIN — CLINDAMYCIN HYDROCHLORIDE 300 MG: 300 CAPSULE ORAL at 19:45

## 2018-01-01 RX ADMIN — METRONIDAZOLE 500 MG: 500 TABLET ORAL at 22:12

## 2018-01-01 RX ADMIN — AMITRIPTYLINE HYDROCHLORIDE 50 MG: 50 TABLET, FILM COATED ORAL at 22:29

## 2018-01-01 RX ADMIN — CYCLOBENZAPRINE HYDROCHLORIDE 10 MG: 10 TABLET, FILM COATED ORAL at 03:25

## 2018-01-01 RX ADMIN — AMITRIPTYLINE HYDROCHLORIDE 50 MG: 50 TABLET, FILM COATED ORAL at 22:09

## 2018-01-01 RX ADMIN — METRONIDAZOLE 500 MG: 500 TABLET ORAL at 14:00

## 2018-01-01 RX ADMIN — OXYCODONE HYDROCHLORIDE 20 MG: 10 TABLET ORAL at 19:04

## 2018-01-01 RX ADMIN — ACETAMINOPHEN 650 MG: 325 TABLET, FILM COATED ORAL at 23:05

## 2018-01-01 RX ADMIN — INSULIN ASPART 1 UNITS: 100 INJECTION, SOLUTION INTRAVENOUS; SUBCUTANEOUS at 18:51

## 2018-01-01 RX ADMIN — METRONIDAZOLE 500 MG: 500 TABLET ORAL at 22:29

## 2018-01-01 RX ADMIN — METOPROLOL SUCCINATE 25 MG: 25 TABLET, EXTENDED RELEASE ORAL at 09:30

## 2018-01-01 RX ADMIN — PREGABALIN 50 MG: 50 CAPSULE ORAL at 17:40

## 2018-01-01 RX ADMIN — CEFEPIME HYDROCHLORIDE 2 G: 2 INJECTION, POWDER, FOR SOLUTION INTRAVENOUS at 00:19

## 2018-01-01 RX ADMIN — SODIUM CHLORIDE, PRESERVATIVE FREE 5 ML: 5 INJECTION INTRAVENOUS at 16:19

## 2018-01-01 RX ADMIN — AMITRIPTYLINE HYDROCHLORIDE 50 MG: 50 TABLET, FILM COATED ORAL at 23:03

## 2018-01-01 RX ADMIN — UMECLIDINIUM BROMIDE AND VILANTEROL TRIFENATATE 1 PUFF: 62.5; 25 POWDER RESPIRATORY (INHALATION) at 09:49

## 2018-01-01 RX ADMIN — METHADONE HYDROCHLORIDE 75 MG: 10 CONCENTRATE ORAL at 09:19

## 2018-01-01 RX ADMIN — PREGABALIN 50 MG: 50 CAPSULE ORAL at 19:45

## 2018-01-01 RX ADMIN — CEFEPIME HYDROCHLORIDE 2 G: 2 INJECTION, POWDER, FOR SOLUTION INTRAVENOUS at 00:31

## 2018-01-01 RX ADMIN — METRONIDAZOLE 500 MG: 500 TABLET ORAL at 21:38

## 2018-01-01 RX ADMIN — CEFEPIME HYDROCHLORIDE 1000 MG: 1 INJECTION, POWDER, FOR SOLUTION INTRAMUSCULAR; INTRAVENOUS at 02:00

## 2018-01-01 RX ADMIN — ACETAMINOPHEN 650 MG: 325 TABLET, FILM COATED ORAL at 23:38

## 2018-01-01 RX ADMIN — OXYCODONE HYDROCHLORIDE 20 MG: 10 TABLET ORAL at 04:37

## 2018-01-01 RX ADMIN — OXYCODONE HYDROCHLORIDE 5 MG: 5 TABLET ORAL at 22:34

## 2018-01-01 RX ADMIN — CYCLOBENZAPRINE HYDROCHLORIDE 10 MG: 10 TABLET, FILM COATED ORAL at 09:33

## 2018-01-01 RX ADMIN — VANCOMYCIN HYDROCHLORIDE 1000 MG: 1 INJECTION, SOLUTION INTRAVENOUS at 20:31

## 2018-01-01 RX ADMIN — UMECLIDINIUM BROMIDE AND VILANTEROL TRIFENATATE 1 PUFF: 62.5; 25 POWDER RESPIRATORY (INHALATION) at 09:00

## 2018-01-01 RX ADMIN — OXYCODONE HYDROCHLORIDE 20 MG: 10 TABLET ORAL at 11:20

## 2018-01-01 RX ADMIN — INSULIN ASPART 1 UNITS: 100 INJECTION, SOLUTION INTRAVENOUS; SUBCUTANEOUS at 14:49

## 2018-01-01 RX ADMIN — INSULIN ASPART 1 UNITS: 100 INJECTION, SOLUTION INTRAVENOUS; SUBCUTANEOUS at 08:07

## 2018-01-01 RX ADMIN — PREGABALIN 50 MG: 50 CAPSULE ORAL at 13:48

## 2018-01-01 RX ADMIN — MULTIPLE VITAMINS W/ MINERALS TAB 1 TABLET: TAB at 09:21

## 2018-01-01 RX ADMIN — UMECLIDINIUM BROMIDE AND VILANTEROL TRIFENATATE 1 PUFF: 62.5; 25 POWDER RESPIRATORY (INHALATION) at 09:23

## 2018-01-01 RX ADMIN — OXYCODONE HYDROCHLORIDE 10 MG: 10 TABLET ORAL at 06:04

## 2018-01-01 RX ADMIN — RANITIDINE 150 MG: 150 TABLET ORAL at 08:02

## 2018-01-01 RX ADMIN — LIDOCAINE 1 PATCH: 560 PATCH PERCUTANEOUS; TOPICAL; TRANSDERMAL at 09:27

## 2018-01-01 RX ADMIN — OXYCODONE HYDROCHLORIDE 5 MG: 5 TABLET ORAL at 18:04

## 2018-01-01 RX ADMIN — POLYETHYLENE GLYCOL 3350 17 G: 17 POWDER, FOR SOLUTION ORAL at 09:34

## 2018-01-01 RX ADMIN — FENTANYL CITRATE 100 MCG: 50 INJECTION, SOLUTION INTRAMUSCULAR; INTRAVENOUS at 08:06

## 2018-01-01 RX ADMIN — VANCOMYCIN HYDROCHLORIDE 1000 MG: 1 INJECTION, SOLUTION INTRAVENOUS at 02:07

## 2018-01-01 RX ADMIN — METRONIDAZOLE 500 MG: 500 TABLET ORAL at 13:48

## 2018-01-01 RX ADMIN — SODIUM CHLORIDE, POTASSIUM CHLORIDE, SODIUM LACTATE AND CALCIUM CHLORIDE: 600; 310; 30; 20 INJECTION, SOLUTION INTRAVENOUS at 03:24

## 2018-01-01 RX ADMIN — METOPROLOL SUCCINATE 25 MG: 25 TABLET, EXTENDED RELEASE ORAL at 09:49

## 2018-01-01 RX ADMIN — MIDAZOLAM 1 MG: 1 INJECTION INTRAMUSCULAR; INTRAVENOUS at 07:58

## 2018-01-01 RX ADMIN — OXYCODONE HYDROCHLORIDE 5 MG: 5 TABLET ORAL at 03:31

## 2018-01-01 RX ADMIN — CEFEPIME HYDROCHLORIDE 2 G: 2 INJECTION, POWDER, FOR SOLUTION INTRAVENOUS at 11:19

## 2018-01-01 RX ADMIN — Medication 1 MG: at 21:38

## 2018-01-01 RX ADMIN — INSULIN ASPART 1 UNITS: 100 INJECTION, SOLUTION INTRAVENOUS; SUBCUTANEOUS at 12:10

## 2018-01-01 RX ADMIN — INSULIN ASPART 1 UNITS: 100 INJECTION, SOLUTION INTRAVENOUS; SUBCUTANEOUS at 12:43

## 2018-01-01 RX ADMIN — VANCOMYCIN HYDROCHLORIDE 1000 MG: 1 INJECTION, SOLUTION INTRAVENOUS at 02:22

## 2018-01-01 RX ADMIN — PREGABALIN 50 MG: 50 CAPSULE ORAL at 09:30

## 2018-01-01 RX ADMIN — PREGABALIN 50 MG: 50 CAPSULE ORAL at 09:48

## 2018-01-01 RX ADMIN — METRONIDAZOLE 500 MG: 500 TABLET ORAL at 16:25

## 2018-01-01 RX ADMIN — PREGABALIN 50 MG: 50 CAPSULE ORAL at 14:33

## 2018-01-01 RX ADMIN — HEPARIN SODIUM 5000 UNITS: 1000 INJECTION, SOLUTION INTRAVENOUS; SUBCUTANEOUS at 18:52

## 2018-01-01 RX ADMIN — ALBUTEROL SULFATE 6 PUFF: 90 AEROSOL, METERED RESPIRATORY (INHALATION) at 09:01

## 2018-01-01 RX ADMIN — OXYCODONE HYDROCHLORIDE 10 MG: 10 TABLET ORAL at 15:03

## 2018-01-01 RX ADMIN — MULTIPLE VITAMINS W/ MINERALS TAB 1 TABLET: TAB at 08:47

## 2018-01-01 RX ADMIN — PREGABALIN 50 MG: 50 CAPSULE ORAL at 19:36

## 2018-01-01 RX ADMIN — LIDOCAINE 1 PATCH: 560 PATCH PERCUTANEOUS; TOPICAL; TRANSDERMAL at 10:10

## 2018-01-01 RX ADMIN — CYCLOBENZAPRINE HYDROCHLORIDE 10 MG: 10 TABLET, FILM COATED ORAL at 20:02

## 2018-01-01 RX ADMIN — CEFEPIME HYDROCHLORIDE 2 G: 2 INJECTION, POWDER, FOR SOLUTION INTRAVENOUS at 21:37

## 2018-01-01 RX ADMIN — PREGABALIN 50 MG: 50 CAPSULE ORAL at 11:21

## 2018-01-01 RX ADMIN — RANITIDINE 300 MG: 150 TABLET ORAL at 09:48

## 2018-01-01 RX ADMIN — FENTANYL CITRATE 50 MCG: 50 INJECTION, SOLUTION INTRAMUSCULAR; INTRAVENOUS at 05:32

## 2018-01-01 RX ADMIN — PREGABALIN 50 MG: 50 CAPSULE ORAL at 20:29

## 2018-01-01 RX ADMIN — OXYCODONE HYDROCHLORIDE 5 MG: 5 TABLET ORAL at 14:48

## 2018-01-01 RX ADMIN — SODIUM CHLORIDE 1000 ML: 900 INJECTION, SOLUTION INTRAVENOUS at 17:53

## 2018-01-01 RX ADMIN — INSULIN ASPART 1 UNITS: 100 INJECTION, SOLUTION INTRAVENOUS; SUBCUTANEOUS at 09:37

## 2018-01-01 RX ADMIN — METRONIDAZOLE 500 MG: 500 TABLET ORAL at 06:04

## 2018-01-01 RX ADMIN — OXYCODONE HYDROCHLORIDE 5 MG: 5 TABLET ORAL at 13:09

## 2018-01-01 RX ADMIN — METOPROLOL SUCCINATE 25 MG: 25 TABLET, EXTENDED RELEASE ORAL at 09:20

## 2018-01-01 RX ADMIN — METRONIDAZOLE 500 MG: 500 TABLET ORAL at 16:44

## 2018-01-01 RX ADMIN — OXYCODONE HYDROCHLORIDE 5 MG: 5 TABLET ORAL at 22:18

## 2018-01-01 RX ADMIN — METRONIDAZOLE 500 MG: 500 TABLET ORAL at 22:42

## 2018-01-01 RX ADMIN — RANITIDINE 300 MG: 150 TABLET ORAL at 08:53

## 2018-01-01 RX ADMIN — PROPOFOL 50 MCG/KG/MIN: 10 INJECTION, EMULSION INTRAVENOUS at 18:29

## 2018-01-01 RX ADMIN — CEFEPIME HYDROCHLORIDE 2 G: 2 INJECTION, POWDER, FOR SOLUTION INTRAVENOUS at 01:29

## 2018-01-01 RX ADMIN — PREGABALIN 50 MG: 50 CAPSULE ORAL at 14:25

## 2018-01-01 RX ADMIN — SENNOSIDES AND DOCUSATE SODIUM 2 TABLET: 8.6; 5 TABLET ORAL at 11:31

## 2018-01-01 RX ADMIN — HYDROMORPHONE HYDROCHLORIDE 0.5 MG: 1 INJECTION, SOLUTION INTRAMUSCULAR; INTRAVENOUS; SUBCUTANEOUS at 07:50

## 2018-01-01 RX ADMIN — METRONIDAZOLE 500 MG: 500 TABLET ORAL at 08:02

## 2018-01-01 RX ADMIN — MULTIPLE VITAMINS W/ MINERALS TAB 1 TABLET: TAB at 08:02

## 2018-01-01 RX ADMIN — METRONIDAZOLE 500 MG: 500 TABLET ORAL at 06:06

## 2018-01-01 RX ADMIN — ROCURONIUM BROMIDE 30 MG: 10 INJECTION INTRAVENOUS at 08:06

## 2018-01-01 RX ADMIN — MIDAZOLAM 2 MG: 1 INJECTION INTRAMUSCULAR; INTRAVENOUS at 18:22

## 2018-01-01 RX ADMIN — PIPERACILLIN SODIUM,TAZOBACTAM SODIUM 3.38 G: 3; .375 INJECTION, POWDER, FOR SOLUTION INTRAVENOUS at 18:36

## 2018-01-01 RX ADMIN — ACETAMINOPHEN 650 MG: 325 TABLET, FILM COATED ORAL at 22:18

## 2018-01-01 RX ADMIN — CEFEPIME HYDROCHLORIDE 2 G: 2 INJECTION, POWDER, FOR SOLUTION INTRAVENOUS at 22:36

## 2018-01-01 RX ADMIN — CEFEPIME HYDROCHLORIDE 2 G: 2 INJECTION, POWDER, FOR SOLUTION INTRAVENOUS at 12:11

## 2018-01-01 RX ADMIN — METOPROLOL SUCCINATE 25 MG: 25 TABLET, EXTENDED RELEASE ORAL at 08:47

## 2018-01-01 RX ADMIN — OXYCODONE HYDROCHLORIDE 20 MG: 10 TABLET ORAL at 22:42

## 2018-01-01 RX ADMIN — FENTANYL CITRATE 25 MCG: 50 INJECTION, SOLUTION INTRAMUSCULAR; INTRAVENOUS at 18:35

## 2018-01-01 RX ADMIN — ACETAMINOPHEN 650 MG: 325 TABLET, FILM COATED ORAL at 13:48

## 2018-01-01 RX ADMIN — CLINDAMYCIN HYDROCHLORIDE 300 MG: 300 CAPSULE ORAL at 08:54

## 2018-01-01 RX ADMIN — POLYETHYLENE GLYCOL 3350 17 G: 17 POWDER, FOR SOLUTION ORAL at 09:21

## 2018-01-01 RX ADMIN — PHENYLEPHRINE HYDROCHLORIDE 100 MCG: 10 INJECTION, SOLUTION INTRAMUSCULAR; INTRAVENOUS; SUBCUTANEOUS at 08:30

## 2018-01-01 RX ADMIN — METRONIDAZOLE 500 MG: 500 TABLET ORAL at 23:05

## 2018-01-01 RX ADMIN — RANITIDINE 150 MG: 150 TABLET ORAL at 08:47

## 2018-01-01 RX ADMIN — METHADONE HYDROCHLORIDE 75 MG: 10 CONCENTRATE ORAL at 08:52

## 2018-01-01 RX ADMIN — ASPIRIN 81 MG: 81 TABLET, COATED ORAL at 08:54

## 2018-01-01 RX ADMIN — ASPIRIN 81 MG: 81 TABLET, COATED ORAL at 08:02

## 2018-01-01 RX ADMIN — RANITIDINE 150 MG: 150 TABLET ORAL at 09:21

## 2018-01-01 RX ADMIN — CEFEPIME HYDROCHLORIDE 2 G: 2 INJECTION, POWDER, FOR SOLUTION INTRAVENOUS at 12:41

## 2018-01-01 RX ADMIN — UMECLIDINIUM BROMIDE AND VILANTEROL TRIFENATATE 1 PUFF: 62.5; 25 POWDER RESPIRATORY (INHALATION) at 09:34

## 2018-01-01 RX ADMIN — PREGABALIN 50 MG: 50 CAPSULE ORAL at 08:18

## 2018-01-01 RX ADMIN — CLINDAMYCIN HYDROCHLORIDE 300 MG: 300 CAPSULE ORAL at 14:24

## 2018-01-01 RX ADMIN — MULTIPLE VITAMINS W/ MINERALS TAB 1 TABLET: TAB at 16:50

## 2018-01-01 RX ADMIN — PREGABALIN 50 MG: 50 CAPSULE ORAL at 16:44

## 2018-01-01 RX ADMIN — CEFEPIME HYDROCHLORIDE 2 G: 2 INJECTION, POWDER, FOR SOLUTION INTRAVENOUS at 09:43

## 2018-01-01 RX ADMIN — ACETAMINOPHEN 650 MG: 325 TABLET, FILM COATED ORAL at 06:57

## 2018-01-01 RX ADMIN — PREGABALIN 50 MG: 50 CAPSULE ORAL at 16:25

## 2018-01-01 RX ADMIN — METRONIDAZOLE 500 MG: 500 TABLET ORAL at 14:51

## 2018-01-01 RX ADMIN — AMITRIPTYLINE HYDROCHLORIDE 50 MG: 50 TABLET, FILM COATED ORAL at 22:12

## 2018-01-01 RX ADMIN — RANITIDINE 150 MG: 150 TABLET ORAL at 09:30

## 2018-01-01 RX ADMIN — CEFEPIME HYDROCHLORIDE 2 G: 2 INJECTION, POWDER, FOR SOLUTION INTRAVENOUS at 23:53

## 2018-01-01 RX ADMIN — AMITRIPTYLINE HYDROCHLORIDE 50 MG: 50 TABLET, FILM COATED ORAL at 22:43

## 2018-01-01 RX ADMIN — PREGABALIN 50 MG: 50 CAPSULE ORAL at 20:31

## 2018-01-01 RX ADMIN — METHADONE HYDROCHLORIDE 75 MG: 10 CONCENTRATE ORAL at 14:32

## 2018-01-01 RX ADMIN — INSULIN ASPART 1 UNITS: 100 INJECTION, SOLUTION INTRAVENOUS; SUBCUTANEOUS at 09:22

## 2018-01-01 RX ADMIN — PREGABALIN 50 MG: 50 CAPSULE ORAL at 22:13

## 2018-01-01 RX ADMIN — CLINDAMYCIN HYDROCHLORIDE 300 MG: 300 CAPSULE ORAL at 14:33

## 2018-01-01 RX ADMIN — METHADONE HYDROCHLORIDE 75 MG: 10 CONCENTRATE ORAL at 11:48

## 2018-01-01 RX ADMIN — ASPIRIN 81 MG: 81 TABLET, COATED ORAL at 09:48

## 2018-01-01 RX ADMIN — SODIUM CHLORIDE, POTASSIUM CHLORIDE, SODIUM LACTATE AND CALCIUM CHLORIDE: 600; 310; 30; 20 INJECTION, SOLUTION INTRAVENOUS at 17:06

## 2018-01-01 RX ADMIN — METOPROLOL SUCCINATE 25 MG: 25 TABLET, EXTENDED RELEASE ORAL at 08:18

## 2018-01-01 RX ADMIN — AMITRIPTYLINE HYDROCHLORIDE 50 MG: 50 TABLET, FILM COATED ORAL at 21:38

## 2018-01-01 RX ADMIN — OXYCODONE HYDROCHLORIDE 5 MG: 5 TABLET ORAL at 21:38

## 2018-01-01 RX ADMIN — OXYCODONE HYDROCHLORIDE 5 MG: 5 TABLET ORAL at 12:49

## 2018-01-01 RX ADMIN — ACETAMINOPHEN 650 MG: 325 TABLET, FILM COATED ORAL at 16:43

## 2018-01-01 RX ADMIN — RANITIDINE 150 MG: 150 TABLET ORAL at 11:21

## 2018-01-01 RX ADMIN — METHADONE HYDROCHLORIDE 75 MG: 10 CONCENTRATE ORAL at 08:46

## 2018-01-01 RX ADMIN — CEFEPIME HYDROCHLORIDE 2 G: 2 INJECTION, POWDER, FOR SOLUTION INTRAVENOUS at 22:08

## 2018-01-01 RX ADMIN — AMITRIPTYLINE HYDROCHLORIDE 50 MG: 50 TABLET, FILM COATED ORAL at 03:25

## 2018-01-01 RX ADMIN — INSULIN ASPART 2 UNITS: 100 INJECTION, SOLUTION INTRAVENOUS; SUBCUTANEOUS at 17:06

## 2018-01-01 RX ADMIN — METRONIDAZOLE 500 MG: 500 TABLET ORAL at 06:09

## 2018-01-01 RX ADMIN — INSULIN ASPART 1 UNITS: 100 INJECTION, SOLUTION INTRAVENOUS; SUBCUTANEOUS at 09:52

## 2018-01-01 RX ADMIN — ACETAMINOPHEN 650 MG: 325 TABLET, FILM COATED ORAL at 03:25

## 2018-01-01 RX ADMIN — OXYCODONE HYDROCHLORIDE 10 MG: 10 TABLET ORAL at 00:43

## 2018-01-01 RX ADMIN — ACETAMINOPHEN 650 MG: 325 TABLET, FILM COATED ORAL at 06:33

## 2018-01-01 RX ADMIN — MULTIPLE VITAMINS W/ MINERALS TAB 1 TABLET: TAB at 08:18

## 2018-01-01 RX ADMIN — INSULIN ASPART 2 UNITS: 100 INJECTION, SOLUTION INTRAVENOUS; SUBCUTANEOUS at 18:42

## 2018-01-01 RX ADMIN — PREGABALIN 50 MG: 50 CAPSULE ORAL at 21:38

## 2018-01-01 RX ADMIN — CEFEPIME HYDROCHLORIDE 2 G: 2 INJECTION, POWDER, FOR SOLUTION INTRAVENOUS at 16:39

## 2018-01-01 RX ADMIN — SODIUM CHLORIDE 1000 ML: 9 INJECTION, SOLUTION INTRAVENOUS at 05:30

## 2018-01-01 RX ADMIN — POLYETHYLENE GLYCOL 3350 17 G: 17 POWDER, FOR SOLUTION ORAL at 14:39

## 2018-01-01 RX ADMIN — UMECLIDINIUM BROMIDE AND VILANTEROL TRIFENATATE 1 PUFF: 62.5; 25 POWDER RESPIRATORY (INHALATION) at 08:46

## 2018-01-01 RX ADMIN — METOPROLOL SUCCINATE 25 MG: 25 TABLET, EXTENDED RELEASE ORAL at 08:54

## 2018-01-01 RX ADMIN — LIDOCAINE HYDROCHLORIDE 100 MG: 20 INJECTION, SOLUTION INFILTRATION; PERINEURAL at 08:06

## 2018-01-01 RX ADMIN — PREGABALIN 50 MG: 50 CAPSULE ORAL at 08:02

## 2018-01-01 RX ADMIN — MULTIPLE VITAMINS W/ MINERALS TAB 1 TABLET: TAB at 09:30

## 2018-01-01 RX ADMIN — SUGAMMADEX 150 MG: 100 INJECTION, SOLUTION INTRAVENOUS at 08:58

## 2018-01-01 RX ADMIN — CYCLOBENZAPRINE HYDROCHLORIDE 10 MG: 10 TABLET, FILM COATED ORAL at 16:43

## 2018-01-01 RX ADMIN — MULTIPLE VITAMINS W/ MINERALS TAB 1 TABLET: TAB at 08:54

## 2018-01-01 RX ADMIN — METHADONE HYDROCHLORIDE 75 MG: 10 CONCENTRATE ORAL at 07:59

## 2018-01-01 RX ADMIN — INSULIN ASPART 1 UNITS: 100 INJECTION, SOLUTION INTRAVENOUS; SUBCUTANEOUS at 18:06

## 2018-01-01 RX ADMIN — OXYCODONE HYDROCHLORIDE 5 MG: 5 TABLET ORAL at 18:29

## 2018-01-01 RX ADMIN — Medication 0.3 MG: at 21:56

## 2018-01-01 RX ADMIN — ALUMINUM HYDROXIDE, MAGNESIUM HYDROXIDE, AND DIMETHICONE 30 ML: 400; 400; 40 SUSPENSION ORAL at 21:56

## 2018-01-01 RX ADMIN — SODIUM CHLORIDE, POTASSIUM CHLORIDE, SODIUM LACTATE AND CALCIUM CHLORIDE: 600; 310; 30; 20 INJECTION, SOLUTION INTRAVENOUS at 18:20

## 2018-01-01 RX ADMIN — VANCOMYCIN HYDROCHLORIDE 1000 MG: 1 INJECTION, SOLUTION INTRAVENOUS at 20:41

## 2018-01-01 RX ADMIN — UMECLIDINIUM BROMIDE AND VILANTEROL TRIFENATATE 1 PUFF: 62.5; 25 POWDER RESPIRATORY (INHALATION) at 08:19

## 2018-01-01 RX ADMIN — ONDANSETRON 4 MG: 2 INJECTION INTRAMUSCULAR; INTRAVENOUS at 08:57

## 2018-01-01 RX ADMIN — VANCOMYCIN HYDROCHLORIDE 750 MG: 10 INJECTION, POWDER, LYOPHILIZED, FOR SOLUTION INTRAVENOUS at 12:13

## 2018-01-01 RX ADMIN — INSULIN ASPART 2 UNITS: 100 INJECTION, SOLUTION INTRAVENOUS; SUBCUTANEOUS at 17:41

## 2018-01-01 RX ADMIN — SODIUM CHLORIDE: 9 INJECTION, SOLUTION INTRAVENOUS at 07:58

## 2018-01-01 RX ADMIN — PREGABALIN 50 MG: 50 CAPSULE ORAL at 09:22

## 2018-01-01 RX ADMIN — PREGABALIN 50 MG: 50 CAPSULE ORAL at 08:53

## 2018-01-01 RX ADMIN — PREGABALIN 50 MG: 50 CAPSULE ORAL at 23:03

## 2018-01-01 RX ADMIN — METHADONE HYDROCHLORIDE 75 MG: 10 CONCENTRATE ORAL at 06:57

## 2018-01-01 RX ADMIN — RANITIDINE 150 MG: 150 TABLET ORAL at 08:18

## 2018-01-01 RX ADMIN — METHADONE HYDROCHLORIDE 75 MG: 10 CONCENTRATE ORAL at 08:17

## 2018-01-01 RX ADMIN — VANCOMYCIN HYDROCHLORIDE 1000 MG: 1 INJECTION, SOLUTION INTRAVENOUS at 19:45

## 2018-01-01 RX ADMIN — ACETAMINOPHEN 650 MG: 325 TABLET, FILM COATED ORAL at 19:45

## 2018-01-01 RX ADMIN — PREGABALIN 50 MG: 50 CAPSULE ORAL at 14:00

## 2018-01-01 RX ADMIN — CYCLOBENZAPRINE HYDROCHLORIDE 10 MG: 10 TABLET, FILM COATED ORAL at 06:33

## 2018-01-01 RX ADMIN — VANCOMYCIN HYDROCHLORIDE 1000 MG: 1 INJECTION, SOLUTION INTRAVENOUS at 20:22

## 2018-01-01 RX ADMIN — METOPROLOL SUCCINATE 25 MG: 25 TABLET, EXTENDED RELEASE ORAL at 08:02

## 2018-01-01 RX ADMIN — OXYCODONE HYDROCHLORIDE 20 MG: 10 TABLET ORAL at 05:28

## 2018-01-01 RX ADMIN — Medication 0.5 MG: at 22:41

## 2018-01-01 RX ADMIN — ACETAMINOPHEN 650 MG: 325 TABLET, FILM COATED ORAL at 18:04

## 2018-01-01 RX ADMIN — OXYCODONE HYDROCHLORIDE 5 MG: 5 TABLET ORAL at 08:59

## 2018-01-01 RX ADMIN — PREGABALIN 50 MG: 50 CAPSULE ORAL at 08:47

## 2018-01-01 RX ADMIN — OXYCODONE HYDROCHLORIDE 5 MG: 5 TABLET ORAL at 05:56

## 2018-01-01 RX ADMIN — CEFEPIME HYDROCHLORIDE 2 G: 2 INJECTION, POWDER, FOR SOLUTION INTRAVENOUS at 10:37

## 2018-01-01 RX ADMIN — SODIUM CHLORIDE 1000 ML: 9 INJECTION, SOLUTION INTRAVENOUS at 20:17

## 2018-01-01 RX ADMIN — UMECLIDINIUM BROMIDE AND VILANTEROL TRIFENATATE 1 PUFF: 62.5; 25 POWDER RESPIRATORY (INHALATION) at 07:00

## 2018-01-01 RX ADMIN — INSULIN ASPART 4 UNITS: 100 INJECTION, SOLUTION INTRAVENOUS; SUBCUTANEOUS at 14:24

## 2018-01-01 RX ADMIN — METRONIDAZOLE 500 MG: 500 TABLET ORAL at 05:28

## 2018-01-01 RX ADMIN — CYCLOBENZAPRINE HYDROCHLORIDE 10 MG: 10 TABLET, FILM COATED ORAL at 22:43

## 2018-01-01 RX ADMIN — METRONIDAZOLE 500 MG: 500 TABLET ORAL at 22:09

## 2018-01-01 RX ADMIN — CEFEPIME HYDROCHLORIDE 2 G: 2 INJECTION, POWDER, FOR SOLUTION INTRAVENOUS at 14:30

## 2018-01-01 RX ADMIN — PREGABALIN 50 MG: 50 CAPSULE ORAL at 14:51

## 2018-01-01 RX ADMIN — PROPOFOL 90 MG: 10 INJECTION, EMULSION INTRAVENOUS at 08:06

## 2018-01-01 RX ADMIN — Medication 0.3 MG: at 10:06

## 2018-01-01 RX ADMIN — CEFEPIME HYDROCHLORIDE 2 G: 2 INJECTION, POWDER, FOR SOLUTION INTRAVENOUS at 11:43

## 2018-01-01 RX ADMIN — LIDOCAINE HYDROCHLORIDE 5 ML: 10 INJECTION, SOLUTION INFILTRATION; PERINEURAL at 15:36

## 2018-01-01 RX ADMIN — VANCOMYCIN HYDROCHLORIDE 1000 MG: 1 INJECTION, SOLUTION INTRAVENOUS at 22:57

## 2018-01-01 RX ADMIN — METOPROLOL SUCCINATE 25 MG: 25 TABLET, EXTENDED RELEASE ORAL at 11:20

## 2018-01-01 RX ADMIN — CLINDAMYCIN HYDROCHLORIDE 300 MG: 300 CAPSULE ORAL at 09:48

## 2018-01-01 RX ADMIN — OXYCODONE HYDROCHLORIDE 20 MG: 10 TABLET ORAL at 12:20

## 2018-01-01 RX ADMIN — CLINDAMYCIN HYDROCHLORIDE 300 MG: 300 CAPSULE ORAL at 20:19

## 2018-01-01 ASSESSMENT — ENCOUNTER SYMPTOMS
COUGH: 0
ABDOMINAL PAIN: 0
CONFUSION: 0
EYE REDNESS: 0
BACK PAIN: 1
ARTHRALGIAS: 0
DYSURIA: 1
SHORTNESS OF BREATH: 0
HEADACHES: 0
WEAKNESS: 1
ABDOMINAL PAIN: 1
CONFUSION: 1
ABDOMINAL PAIN: 0
COLOR CHANGE: 0
SHORTNESS OF BREATH: 0
BRUISES/BLEEDS EASILY: 0
CONFUSION: 0
FEVER: 0
FEVER: 0
NECK STIFFNESS: 0
NAUSEA: 0
FEVER: 0
VOMITING: 0
SORE THROAT: 0
DIFFICULTY URINATING: 0

## 2018-01-01 ASSESSMENT — ACTIVITIES OF DAILY LIVING (ADL)
ADLS_ACUITY_SCORE: 11
DEPENDENT_IADLS:: CLEANING;COOKING;LAUNDRY;SHOPPING;MEAL PREPARATION;MONEY MANAGEMENT;TRANSPORTATION
ADLS_ACUITY_SCORE: 12
ADLS_ACUITY_SCORE: 14
ADLS_ACUITY_SCORE: 12
ADLS_ACUITY_SCORE: 11
ADLS_ACUITY_SCORE: 13
ADLS_ACUITY_SCORE: 12
ADLS_ACUITY_SCORE: 12
DEPENDENT_IADLS:: CLEANING;COOKING;LAUNDRY;SHOPPING;MEAL PREPARATION;MONEY MANAGEMENT;TRANSPORTATION
ADLS_ACUITY_SCORE: 11
ADLS_ACUITY_SCORE: 11
ADLS_ACUITY_SCORE: 12
ADLS_ACUITY_SCORE: 11
DEPENDENT_IADLS:: CLEANING;COOKING;LAUNDRY;SHOPPING;MEAL PREPARATION;MONEY MANAGEMENT;TRANSPORTATION
ADLS_ACUITY_SCORE: 12
ADLS_ACUITY_SCORE: 14
ADLS_ACUITY_SCORE: 12
ADLS_ACUITY_SCORE: 11
PREVIOUS_RESPONSIBILITIES: HOUSEKEEPING;MEDICATION MANAGEMENT
ADLS_ACUITY_SCORE: 11
ADLS_ACUITY_SCORE: 12
ADLS_ACUITY_SCORE: 12
DEPENDENT_IADLS:: CLEANING;COOKING;LAUNDRY;SHOPPING;MEAL PREPARATION;MONEY MANAGEMENT;TRANSPORTATION
ADLS_ACUITY_SCORE: 12
ADLS_ACUITY_SCORE: 12
ADLS_ACUITY_SCORE: 11
ADLS_ACUITY_SCORE: 13
ADLS_ACUITY_SCORE: 13
ADLS_ACUITY_SCORE: 12
ADLS_ACUITY_SCORE: 11
ADLS_ACUITY_SCORE: 11
ADLS_ACUITY_SCORE: 12
DEPENDENT_IADLS:: CLEANING;COOKING;LAUNDRY;SHOPPING;MEAL PREPARATION;MONEY MANAGEMENT;TRANSPORTATION
ADLS_ACUITY_SCORE: 11
ADLS_ACUITY_SCORE: 12

## 2018-01-01 ASSESSMENT — COPD QUESTIONNAIRES: COPD: 1

## 2018-01-01 ASSESSMENT — PAIN DESCRIPTION - DESCRIPTORS
DESCRIPTORS: ACHING
DESCRIPTORS: ACHING;CONSTANT
DESCRIPTORS: ACHING;CONSTANT
DESCRIPTORS: ACHING
DESCRIPTORS: ACHING;CONSTANT
DESCRIPTORS: ACHING
DESCRIPTORS: ACHING;CONSTANT
DESCRIPTORS: ACHING
DESCRIPTORS: ACHING;CONSTANT
DESCRIPTORS: ACHING
DESCRIPTORS: SHARP;ACHING
DESCRIPTORS: ACHING
DESCRIPTORS: ACHING;CONSTANT
DESCRIPTORS: ACHING

## 2018-01-01 ASSESSMENT — PAIN SCALES - GENERAL
PAINLEVEL: NO PAIN (0)
PAINLEVEL: SEVERE PAIN (6)

## 2018-01-02 RX ORDER — FUROSEMIDE 20 MG
TABLET ORAL
Qty: 90 TABLET | Refills: 1 | Status: ON HOLD | OUTPATIENT
Start: 2018-01-02 | End: 2018-02-18

## 2018-02-02 ENCOUNTER — OFFICE VISIT (OUTPATIENT)
Dept: ENDOCRINOLOGY | Facility: CLINIC | Age: 53
End: 2018-02-02
Payer: COMMERCIAL

## 2018-02-02 VITALS
DIASTOLIC BLOOD PRESSURE: 73 MMHG | BODY MASS INDEX: 21.15 KG/M2 | HEIGHT: 69 IN | HEART RATE: 130 BPM | WEIGHT: 142.8 LBS | SYSTOLIC BLOOD PRESSURE: 147 MMHG

## 2018-02-02 DIAGNOSIS — Z79.4 TYPE 2 DIABETES MELLITUS WITH HYPERGLYCEMIA, WITH LONG-TERM CURRENT USE OF INSULIN (H): Primary | ICD-10-CM

## 2018-02-02 DIAGNOSIS — E11.65 TYPE 2 DIABETES MELLITUS WITH HYPERGLYCEMIA, WITH LONG-TERM CURRENT USE OF INSULIN (H): Primary | ICD-10-CM

## 2018-02-02 LAB — HBA1C MFR BLD: 12.8 % (ref 4.3–6)

## 2018-02-02 ASSESSMENT — PAIN SCALES - GENERAL: PAINLEVEL: NO PAIN (0)
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
## 2018-02-02 NOTE — MR AVS SNAPSHOT
After Visit Summary   2/2/2018    Alessandra Pak    MRN: [id number]           Patient Information     Date Of Birth          1965        Visit Information        Provider Department      2/2/2018 9:30 AM Toya Nuno PA-C [facility]        Care Instructions    1.  Check your blood sugar each am fasting and before lunch and dinner DAILY.  Set timer on phone for reminder to check your blood sugar.  2.  See our diabetes educator in 2 weeks and me in 4 weeks.  3.  Use bolus insulin for ALL food intake.  Toya Nuno PA-C          Follow-ups after your visit        Your next 10 appointments already scheduled     Feb 05, 2018 11:40 AM CST   [facility]    [address] [place] [address] [phone]           Bring a current list of meds and any records pertaining to this visit. For Physicals, please bring immunization records and any forms needing to be filled out. Please arrive 10 minutes early to complete paperwork.            Feb 08, 2018 12:30 PM CST   (Arrive by 12:15 PM)   Office Visit with Anh Chow RN   [facility] ([facility])    [address]  [address] [facility] [address] [phone]           Bring a current list of meds and any records pertaining to this visit. For Physicals, please bring immunization records and any forms needing to be filled out. Please arrive 10 minutes early to complete paperwork.            Feb 27, 2018  8:00 AM CST   (Arrive by 7:45 AM)   Implanted Defibulator with Uc Cv Device 1   [facility] ([facility] [place])    [address] [place] [address]  [address] [place] [address] [phone]            Feb 27, 2018  8:30 AM CST   (Arrive by 8:15 AM)   RETURN HEART FAILURE with John Villarreal MD   [facility] ([facility] [place])    [address]  [address]  [facility] [address]   [phone]            Mar 02, 2018  8:30 AM CST   (Arrive by 8:15 AM)   RETURN DIABETES with Toya Nuno PA-C   [facility] ([facility] [place])    [address]  3rd Floor  [facility] [address]   [phone]            Apr 02, 2018  2:00 PM CDT   CT CHEST W/O CONTRAST with MGCT1   [facility])    [address] [place] [id number] [phone]           Please bring any scans or X-rays taken at other hospitals, if similar tests were done. Also bring a list of your medicines, including vitamins, minerals and over-the-counter drugs. It is safest to leave personal items at home.  Be sure to tell your doctor:   If you have any allergies.   If there s any chance you are pregnant.   If you are breastfeeding.   If you have any special needs.  You do not need to do anything special to prepare.  Please wear loose clothing, such as a sweat suit or jogging clothes. Avoid snaps, zippers and other metal. We may ask you to undress and put on a hospital gown.            Apr 02, 2018  2:30 PM CDT   Office Visit with PFT LAB   [facility])    [address] [place] [id number] [phone]           Bring a current list of meds and any records pertaining to this visit. For Physicals, please bring immunization records and any forms needing to be filled out. Please arrive 10 minutes early to complete paperwork.            Apr 02, 2018  3:30 PM CDT   Return Visit with Rene Swartz MD   [facility])    [address] [facility] [address]   [phone]            Apr 06, 2018  1:50 PM CDT   (Arrive by 1:35 PM)   RETURN ENDOCRINE with Gisselle Maya MD   [facility] ([facility] [place] [facility])  "   [address] [id number] [phone]              Who to contact     Please call your clinic at [phone] to:    Ask questions about your health    Make or cancel appointments    Discuss your medicines    Learn about your test results    Speak to your doctor   If you have compliments or concerns about an experience at your clinic, or if you wish to file a complaint, please contact [facility] Patient Relations at [phone] or email us at [email or web address]         Additional Information About Your Visit        FFWDhart Information     [employer] gives you secure access to your electronic health record. If you see a primary care provider, you can also send messages to your care team and make appointments. If you have questions, please call your primary care clinic.  If you do not have a primary care provider, please call [phone] and they will assist you.      [employer] is an electronic gateway that provides easy, online access to your medical records. With [employer], you can request a clinic appointment, read your test results, renew a prescription or communicate with your care team.     To access your existing account, please contact your [facility] Clinic or call [phone] for assistance.        Care EveryWhere ID     This is your Care EveryWhere ID. This could be used by other organizations to access your [place] medical records  [id number]        Your Vitals Were     Pulse Height BMI (Body Mass Index)             130 1.753 m (5' 9\") 21.09 kg/m2          Blood Pressure from Last 3 Encounters:   02/02/18 147/73   10/04/17 133/83   08/04/17 109/75    Weight from Last 3 Encounters:   02/02/18 64.8 kg (142 lb 12.8 oz)   10/04/17 66.7 kg (147 lb)   08/04/17 68.3 kg (150 lb 9.6 oz)              Today, you had the following     No orders found for display       Primary Care Provider Office Phone # Fax #    " Brionna Dc [phone]       [address]  [place] [facility] [address]        Equal Access to Services     CHAPISNATALIE VENKAT : Hadii andres ku hakeemo Socindiali, waaxda luqadaha, qaybta kaalmada adeegyada, sanjay arevalolucas natalya. So [facility] [phone].    ATENCIÓN: Si habla español, tiene a nagy disposición servicios gratuitos de asistencia lingüística. Llame al [phone].    We comply with applicable federal civil rights laws and [place] laws. We do not discriminate on the basis of race, color, national origin, age, disability, sex, sexual orientation, or gender identity.            Thank you!     Thank you for choosing [facility]  for your care. Our goal is always to provide you with excellent care. Hearing back from our patients is one way we can continue to improve our services. Please take a few minutes to complete the written survey that you may receive in the mail after your visit with us. Thank you!             Your Updated Medication List - Protect others around you: Learn how to safely use, store and throw away your medicines at www.disposemymeds.org.          This list is accurate as of 2/2/18 10:17 AM.  Always use your most recent med list.                   Brand Name Dispense Instructions for use Diagnosis    acetone (Urine) test Strp     25 each    1 strip by In Vitro route as needed    Type 2 diabetes mellitus with diabetic neuropathy (H)       albuterol 108 (90 BASE) MCG/ACT Inhaler    PROAIR HFA    1 Inhaler    Inhale 2 puffs into the lungs every 4 hours as needed for shortness of breath / dyspnea    SOB (shortness of breath)       amitriptyline 50 MG tablet    ELAVIL    90 tablet    TAKE 1 TABLET BY MOUTH AT BEDTIME    Anxiety       aspirin 81 MG tablet     100    1 tab po QD (Once per day)    Chronic pancreatitis (H)       * blood glucose monitoring lancets     3 Box    1 each See Admin Instructions test 3-4 times per day    Type 2  diabetes, HbA1C goal < 8% (H)       * blood glucose monitoring lancets     1 Box    Use to test blood sugar 10 times daily or as directed.  Ok to substitute alternative if insurance prefers.    Diabetes mellitus type 1 (H)       blood glucose monitoring test strip    HOLLIE CONTOUR NEXT    300 each    Use to test blood sugar 10 times daily or as directed.  Ok to substitute alternative if insurance prefers.    Diabetes mellitus type 1 (H)       furosemide 20 MG tablet    LASIX    90 tablet    TAKE 1 TABLET (20 MG) BY MOUTH DAILY    Nonischemic cardiomyopathy (H)       glucagon 1 MG kit    GLUCAGON EMERGENCY    1 mg    Inject 1 mg into the muscle once for 1 dose    Type 2 diabetes mellitus with diabetic neuropathy (H)       glucose 4 G Chew chewable tablet     25 tablet    Take 3-4 tablets to treat low blood sugar.    Uncontrolled diabetes mellitus with complications (H)       insulin aspart 100 UNITS/ML injection    NovoLOG VIAL    60 mL    Use as directed in insulin pump. Patient using up to 60 units per day    Type 2 diabetes mellitus with diabetic neuropathy (H)       insulin pen needle 31G X 5 MM    B-D U/F    3 each    Use 3 time(s) a day.    Type 2 diabetes, HbA1c goal < 7% (H)       Ipratropium-Albuterol  MCG/ACT inhaler    COMBIVENT RESPIMAT    1 Inhaler    Inhale 1 puff into the lungs 4 times daily Not to exceed 6 doses per day.    Chronic obstructive pulmonary disease, unspecified COPD type (H)       * lidocaine 5 % Patch    LIDODERM    30 patch    Place 1 patch onto the skin every 24 hours Apply patch up to 12 hours with in a 24 hour period.    Lumbar radiculopathy       * study - lidocaine (LMX) 4 % Crea    IDS# [id number]    45 g    Apply topically every 8 hours as needed for moderate pain    Diabetic polyneuropathy associated with type 2 diabetes mellitus (H)       lisinopril 10 MG tablet    PRINIVIL/ZESTRIL    90 tablet    Take 1 tablet (10 mg) by mouth daily    Nonischemic cardiomyopathy (H)        LYRICA 75 MG capsule   Generic drug:  pregabalin     90 capsule    1 BY MOUTH 3 TIMES A DAY    Controlled type 2 diabetes with neuropathy (H)       methadone 10 mg/mL Conc (HIGH CONC) solution    DOLPHINE-INTENSOL     Take 7 mLs by mouth daily.    Chemical dependency (H)       metoprolol tartrate 50 MG tablet    LOPRESSOR     Take 25 mg by mouth daily        MIRENA (52 MG) 20 MCG/24HR IUD   Generic drug:  levonorgestrel     1    placed today    Excessive or frequent menstruation       multivitamin, therapeutic with minerals Tabs tablet     30 each    Take 1 tablet by mouth daily    Heart failure and kidney disease due to high blood pressure (H)       polyethylene glycol Packet    MIRALAX/GLYCOLAX    28 packet    Take 17 g by mouth daily    Constipation       ranitidine 300 MG tablet    ZANTAC    90 tablet    Take 1 tablet (300 mg) by mouth daily    Gastroesophageal reflux disease without esophagitis       SM NICOTINE 21 MG/24HR 24 hr patch   Generic drug:  nicotine     28 patch    REMOVE OLD PATCH AND APPLY ONE NEW PATCH TO SKIN EVERY 24 HOURS    Tobacco dependence syndrome       * Notice:  This list has 4 medication(s) that are the same as other medications prescribed for you. Read the directions carefully, and ask your doctor or other care provider to review them with you.

## 2018-02-02 NOTE — PATIENT INSTRUCTIONS
1.  Check your blood sugar each am fasting and before lunch and dinner DAILY.  Set timer on phone for reminder to check your blood sugar.  2.  See our diabetes educator in 2 weeks and me in 4 weeks.  3.  Use bolus insulin for ALL food intake.  Toya Nuno PA-C

## 2018-02-05 ENCOUNTER — OFFICE VISIT (OUTPATIENT)
Dept: FAMILY MEDICINE | Facility: CLINIC | Age: 53
End: 2018-02-05
Payer: COMMERCIAL

## 2018-02-05 VITALS
SYSTOLIC BLOOD PRESSURE: 124 MMHG | OXYGEN SATURATION: 92 % | WEIGHT: 146.4 LBS | BODY MASS INDEX: 21.62 KG/M2 | TEMPERATURE: 97.4 F | HEART RATE: 112 BPM | DIASTOLIC BLOOD PRESSURE: 70 MMHG

## 2018-02-05 DIAGNOSIS — J44.9 CHRONIC OBSTRUCTIVE PULMONARY DISEASE, UNSPECIFIED COPD TYPE (H): ICD-10-CM

## 2018-02-05 DIAGNOSIS — Z12.11 SCREEN FOR COLON CANCER: ICD-10-CM

## 2018-02-05 DIAGNOSIS — Z79.4 TYPE 2 DIABETES MELLITUS WITH STAGE 3 CHRONIC KIDNEY DISEASE, WITH LONG-TERM CURRENT USE OF INSULIN (H): ICD-10-CM

## 2018-02-05 DIAGNOSIS — N18.30 TYPE 2 DIABETES MELLITUS WITH STAGE 3 CHRONIC KIDNEY DISEASE, WITH LONG-TERM CURRENT USE OF INSULIN (H): ICD-10-CM

## 2018-02-05 DIAGNOSIS — I42.8 NONISCHEMIC CARDIOMYOPATHY (H): ICD-10-CM

## 2018-02-05 DIAGNOSIS — R22.31 MASS OF WRIST, RIGHT: Primary | ICD-10-CM

## 2018-02-05 DIAGNOSIS — Z12.4 SCREENING FOR MALIGNANT NEOPLASM OF CERVIX: ICD-10-CM

## 2018-02-05 DIAGNOSIS — Z12.31 VISIT FOR SCREENING MAMMOGRAM: ICD-10-CM

## 2018-02-05 DIAGNOSIS — F41.9 ANXIETY: ICD-10-CM

## 2018-02-05 DIAGNOSIS — J34.89 NASAL MASS: ICD-10-CM

## 2018-02-05 DIAGNOSIS — E11.22 TYPE 2 DIABETES MELLITUS WITH STAGE 3 CHRONIC KIDNEY DISEASE, WITH LONG-TERM CURRENT USE OF INSULIN (H): ICD-10-CM

## 2018-02-05 LAB
ANION GAP SERPL CALCULATED.3IONS-SCNC: 10 MMOL/L (ref 3–14)
BUN SERPL-MCNC: 14 MG/DL (ref 7–30)
CALCIUM SERPL-MCNC: 9.5 MG/DL (ref 8.5–10.1)
CHLORIDE SERPL-SCNC: 100 MMOL/L (ref 94–109)
CHOLEST SERPL-MCNC: 138 MG/DL
CO2 SERPL-SCNC: 26 MMOL/L (ref 20–32)
CREAT SERPL-MCNC: 0.9 MG/DL (ref 0.52–1.04)
CREAT UR-MCNC: 63 MG/DL
ERYTHROCYTE [DISTWIDTH] IN BLOOD BY AUTOMATED COUNT: 13.7 % (ref 10–15)
GFR SERPL CREATININE-BSD FRML MDRD: 66 ML/MIN/1.7M2
GLUCOSE SERPL-MCNC: 203 MG/DL (ref 70–99)
HCT VFR BLD AUTO: 38.8 % (ref 35–47)
HDLC SERPL-MCNC: 35 MG/DL
HGB BLD-MCNC: 12.6 G/DL (ref 11.7–15.7)
LDLC SERPL CALC-MCNC: 56 MG/DL
MCH RBC QN AUTO: 28.2 PG (ref 26.5–33)
MCHC RBC AUTO-ENTMCNC: 32.5 G/DL (ref 31.5–36.5)
MCV RBC AUTO: 87 FL (ref 78–100)
MICROALBUMIN UR-MCNC: 42 MG/L
MICROALBUMIN/CREAT UR: 66.46 MG/G CR (ref 0–25)
NONHDLC SERPL-MCNC: 103 MG/DL
PLATELET # BLD AUTO: 288 10E9/L (ref 150–450)
POTASSIUM SERPL-SCNC: 5 MMOL/L (ref 3.4–5.3)
RBC # BLD AUTO: 4.47 10E12/L (ref 3.8–5.2)
SODIUM SERPL-SCNC: 136 MMOL/L (ref 133–144)
TRIGL SERPL-MCNC: 236 MG/DL
WBC # BLD AUTO: 10.6 10E9/L (ref 4–11)

## 2018-02-05 PROCEDURE — 82043 UR ALBUMIN QUANTITATIVE: CPT | Performed by: FAMILY MEDICINE

## 2018-02-05 PROCEDURE — 80048 BASIC METABOLIC PNL TOTAL CA: CPT | Performed by: FAMILY MEDICINE

## 2018-02-05 PROCEDURE — 85027 COMPLETE CBC AUTOMATED: CPT | Performed by: FAMILY MEDICINE

## 2018-02-05 PROCEDURE — 36415 COLL VENOUS BLD VENIPUNCTURE: CPT | Performed by: FAMILY MEDICINE

## 2018-02-05 PROCEDURE — 80061 LIPID PANEL: CPT | Performed by: FAMILY MEDICINE

## 2018-02-05 PROCEDURE — 82274 ASSAY TEST FOR BLOOD FECAL: CPT | Performed by: FAMILY MEDICINE

## 2018-02-05 PROCEDURE — 99214 OFFICE O/P EST MOD 30 MIN: CPT | Performed by: FAMILY MEDICINE

## 2018-02-05 RX ORDER — AMITRIPTYLINE HYDROCHLORIDE 50 MG/1
50 TABLET ORAL AT BEDTIME
Qty: 90 TABLET | Refills: 3 | Status: ON HOLD | OUTPATIENT
Start: 2018-02-05 | End: 2018-07-05

## 2018-02-05 RX ORDER — LISINOPRIL 10 MG/1
10 TABLET ORAL DAILY
Qty: 90 TABLET | Refills: 1 | Status: ON HOLD | OUTPATIENT
Start: 2018-02-05 | End: 2018-02-18

## 2018-02-05 RX ORDER — FLUTICASONE PROPIONATE 50 MCG
2 SPRAY, SUSPENSION (ML) NASAL DAILY
Qty: 1 BOTTLE | Refills: 11 | Status: ON HOLD | OUTPATIENT
Start: 2018-02-05 | End: 2018-07-05

## 2018-02-05 NOTE — MR AVS SNAPSHOT
After Visit Summary   2/5/2018    Alessandra Pak    MRN: [id number]           Patient Information     Date Of Birth          1965        Visit Information        Provider Department      2/5/2018 11:40 AM Brionna Calabrese MD [facility]        Today's Diagnoses     Mass of wrist, right    -  1    Screen for colon cancer        Visit for screening mammogram        Screening for malignant neoplasm of cervix        Need for prophylactic vaccination and inoculation against influenza        Nasal mass        Type 2 diabetes mellitus with stage 3 chronic kidney disease, with long-term current use of insulin (H)        Nonischemic cardiomyopathy (H)        Chronic obstructive pulmonary disease, unspecified COPD type (H)        Anxiety          Care Instructions    Call [phone] to schedule mammogram and physical.          Follow-ups after your visit        Additional Services     ORTHOPEDICS ADULT REFERRAL       Your provider has referred you to: [facility]: [facility] [phone]    [email or web address]    Please be aware that coverage of these services is subject to the terms and limitations of your health insurance plan.  Call member services at your health plan with any benefit or coverage questions.      Please bring the following to your appointment:    >>   Any x-rays, CTs or MRIs which have been performed.  Contact the facility where they were done to arrange for  prior to your scheduled appointment.    >>   List of current medications   >>   This referral request   >>   Any documents/labs given to you for this referral                  Your next 10 appointments already scheduled     Feb 08, 2018 12:30 PM CST   (Arrive by 12:15 PM)   Office Visit with Anh Chow RN   [facility] ([facility])    [address] [place] [address]    [phone]           Bring a current list of meds and any records pertaining to this visit. For Physicals, please bring immunization records and any forms needing to be filled out. Please arrive 10 minutes early to complete paperwork.            Feb 27, 2018  8:00 AM CST   (Arrive by 7:45 AM)   Implanted Defibulator with Uc Cv Device 1   [facility] ([facility])    [address]  [address]  [facility] [address]   [phone]            Feb 27, 2018  8:30 AM CST   (Arrive by 8:15 AM)   RETURN HEART FAILURE with John Villarreal MD   [facility]    [address]  [address]  [facility] [address] [phone]            Mar 02, 2018  8:30 AM CST   (Arrive by 8:15 AM)   RETURN DIABETES with Toya Nuno PA-C   [facility] ([facility])    [address] [place] [address]  3rd Floor  [facility] [address] [phone]            Apr 02, 2018  2:00 PM CDT   CT CHEST W/O CONTRAST with MGCT1   [facility])    [address] [place] [address] [phone]           Please bring any scans or X-rays taken at other hospitals, if similar tests were done. Also bring a list of your medicines, including vitamins, minerals and over-the-counter drugs. It is safest to leave personal items at home.  Be sure to tell your doctor:   If you have any allergies.   If there s any chance you are pregnant.   If you are breastfeeding.   If you have any special needs.  You do not need to do anything special to prepare.  Please wear loose clothing, such as a sweat suit or jogging clothes. Avoid snaps, zippers and other metal. We may ask you to undress and put on a hospital gown.            Apr 02, 2018  2:30 PM CDT   Office Visit with PFT LAB   [facility]    [address] [place] [address]  [facility] [address] [phone]           Bring a current list of meds and any records pertaining to this visit. For Physicals, please bring immunization records and any forms needing to be filled out. Please arrive 10 minutes early to complete paperwork.            Apr 02, 2018  3:30 PM CDT   Return Visit with Rene Swartz MD   [facility] ([facility])    [address] [place] [address] [phone]            Apr 06, 2018  1:50 PM CDT   (Arrive by 1:35 PM)   RETURN ENDOCRINE with Gisselle Maya MD   [facility] ([facility])    [address]  3rd Floor  [facility] [id number] [phone]              Future tests that were ordered for you today     Open Future Orders        Priority Expected Expires Ordered    MA SCREENING DIGITAL BILAT - Future  (s+30) Routine  2/5/2019 2/5/2018    Fecal colorectal cancer screen (FIT) Routine 2/26/2018 4/30/2018 2/5/2018            Who to contact     If you have questions or need follow up information about today's clinic visit or your schedule please contact [facility] directly at [phone].  Normal or non-critical lab and imaging results will be communicated to you by [employer], letter or phone within 4 business days after the clinic has received the results. If you do not hear from us within 7 days, please contact the clinic through [employer] or phone. If you have a critical or abnormal lab result, we will notify you by phone as soon as possible.  Submit refill requests through [employer] or call your pharmacy and they will forward the refill request to us. Please allow 3 business days for your refill to be completed.          Additional Information About Your Visit        [employer] Information     [employer] gives you secure access to your electronic health record. If you see a primary care provider, you can also send messages to your care team and make  appointments. If you have questions, please call your primary care clinic.  If you do not have a primary care provider, please call [phone] and they will assist you.        Care EveryWhere ID     This is your Care EveryWhere ID. This could be used by other organizations to access your [facility] medical records  [id number]        Your Vitals Were     Pulse Temperature Pulse Oximetry BMI (Body Mass Index)          112 97.4  F (36.3  C) (Oral) 92% 21.62 kg/m2         Blood Pressure from Last 3 Encounters:   02/05/18 140/86   02/02/18 147/73   10/04/17 133/83    Weight from Last 3 Encounters:   02/05/18 146 lb 6.4 oz (66.4 kg)   02/02/18 142 lb 12.8 oz (64.8 kg)   10/04/17 147 lb (66.7 kg)              We Performed the Following     Albumin Random Urine Quantitative with Creat Ratio     BASIC METABOLIC PANEL     CBC with platelets     Lipid panel reflex to direct LDL Non-fasting     ORTHOPEDICS ADULT REFERRAL          Today's Medication Changes          These changes are accurate as of 2/5/18 12:13 PM.  If you have any questions, ask your nurse or doctor.               Start taking these medicines.        Dose/Directions    fluticasone 50 MCG/ACT spray   Commonly known as:  FLONASE   Used for:  Nasal mass   Started by:  Brionna Calabrese MD        Dose:  2 spray   Spray 2 sprays into left nostril daily   Quantity:  1 Bottle   Refills:  11         These medicines have changed or have updated prescriptions.        Dose/Directions    amitriptyline 50 MG tablet   Commonly known as:  ELAVIL   This may have changed:  See the new instructions.   Used for:  Anxiety   Changed by:  Brionna Calabrese MD        Dose:  50 mg   Take 1 tablet (50 mg) by mouth At Bedtime   Quantity:  90 tablet   Refills:  3            Where to get your medicines      These medications were sent to [facility] [address] [facility] - [place], [place] - [address]  [address] [address]      Phone:  [phone]     amitriptyline 50 MG tablet    fluticasone 50 MCG/ACT spray    Ipratropium-Albuterol  MCG/ACT inhaler    lisinopril 10 MG tablet                Primary Care Provider Office Phone # Fax #    Brionna Tenorio Mary Dc [phone]       [address]  [facility] [address]        Equal Access to Services     [facility]: Hadii aad ku hadasho Soomaali, waaxda luqadaha, qaybta kaalmada adeegyada, waxay idiin hayaan adeeg kharash la'samuel . So [facility] [phone].    ATENCIÓN: Si ary castellanos, tiene a nagy disposición servicios gratuitos de asistencia lingüística. Llame al [phone].    We comply with applicable federal civil rights laws and [place] laws. We do not discriminate on the basis of race, color, national origin, age, disability, sex, sexual orientation, or gender identity.            Thank you!     Thank you for choosing [facility]  for your care. Our goal is always to provide you with excellent care. Hearing back from our patients is one way we can continue to improve our services. Please take a few minutes to complete the written survey that you may receive in the mail after your visit with us. Thank you!             Your Updated Medication List - Protect others around you: Learn how to safely use, store and throw away your medicines at www.disposemymeds.org.          This list is accurate as of 2/5/18 12:13 PM.  Always use your most recent med list.                   Brand Name Dispense Instructions for use Diagnosis    acetone (Urine) test Strp     25 each    1 strip by In Vitro route as needed    Type 2 diabetes mellitus with diabetic neuropathy (H)       albuterol 108 (90 BASE) MCG/ACT Inhaler    PROAIR HFA    1 Inhaler    Inhale 2 puffs into the lungs every 4 hours as needed for shortness of breath / dyspnea    SOB (shortness of breath)       amitriptyline 50 MG tablet    ELAVIL    90 tablet    Take 1 tablet (50 mg) by  mouth At Bedtime    Anxiety       aspirin 81 MG tablet     100    1 tab po QD (Once per day)    Chronic pancreatitis (H)       * blood glucose monitoring lancets     3 Box    1 each See Admin Instructions test 3-4 times per day    Type 2 diabetes, HbA1C goal < 8% (H)       * blood glucose monitoring lancets     1 Box    Use to test blood sugar 10 times daily or as directed.  Ok to substitute alternative if insurance prefers.    Diabetes mellitus type 1 (H)       blood glucose monitoring test strip    HOLLIE CONTOUR NEXT    300 each    Use to test blood sugar 10 times daily or as directed.  Ok to substitute alternative if insurance prefers.    Diabetes mellitus type 1 (H)       fluticasone 50 MCG/ACT spray    FLONASE    1 Bottle    Spray 2 sprays into left nostril daily    Nasal mass       furosemide 20 MG tablet    LASIX    90 tablet    TAKE 1 TABLET (20 MG) BY MOUTH DAILY    Nonischemic cardiomyopathy (H)       glucagon 1 MG kit    GLUCAGON EMERGENCY    1 mg    Inject 1 mg into the muscle once for 1 dose    Type 2 diabetes mellitus with diabetic neuropathy (H)       glucose 4 G Chew chewable tablet     25 tablet    Take 3-4 tablets to treat low blood sugar.    Uncontrolled diabetes mellitus with complications (H)       insulin aspart 100 UNITS/ML injection    NovoLOG VIAL    60 mL    Use as directed in insulin pump. Patient using up to 60 units per day    Type 2 diabetes mellitus with diabetic neuropathy (H)       insulin pen needle 31G X 5 MM    B-D U/F    3 each    Use 3 time(s) a day.    Type 2 diabetes, HbA1c goal < 7% (H)       Ipratropium-Albuterol  MCG/ACT inhaler    COMBIVENT RESPIMAT    1 Inhaler    Inhale 1 puff into the lungs 4 times daily Not to exceed 6 doses per day.    Chronic obstructive pulmonary disease, unspecified COPD type (H)       * lidocaine 5 % Patch    LIDODERM    30 patch    Place 1 patch onto the skin every 24 hours Apply patch up to 12 hours with in a 24 hour period.    Lumbar  radiculopathy       * study - lidocaine (LMX) 4 % Crea    IDS# [id number]    45 g    Apply topically every 8 hours as needed for moderate pain    Diabetic polyneuropathy associated with type 2 diabetes mellitus (H)       lisinopril 10 MG tablet    PRINIVIL/ZESTRIL    90 tablet    Take 1 tablet (10 mg) by mouth daily    Nonischemic cardiomyopathy (H)       LYRICA 75 MG capsule   Generic drug:  pregabalin     90 capsule    1 BY MOUTH 3 TIMES A DAY    Controlled type 2 diabetes with neuropathy (H)       methadone 10 mg/mL Conc (HIGH CONC) solution    DOLPHINE-INTENSOL     Take 7 mLs by mouth daily.    Chemical dependency (H)       MIRENA (52 MG) 20 MCG/24HR IUD   Generic drug:  levonorgestrel     1    placed today    Excessive or frequent menstruation       multivitamin, therapeutic with minerals Tabs tablet     30 each    Take 1 tablet by mouth daily    Heart failure and kidney disease due to high blood pressure (H)       polyethylene glycol Packet    MIRALAX/GLYCOLAX    28 packet    Take 17 g by mouth daily    Constipation       ranitidine 300 MG tablet    ZANTAC    90 tablet    Take 1 tablet (300 mg) by mouth daily    Gastroesophageal reflux disease without esophagitis       SM NICOTINE 21 MG/24HR 24 hr patch   Generic drug:  nicotine     28 patch    REMOVE OLD PATCH AND APPLY ONE NEW PATCH TO SKIN EVERY 24 HOURS    Tobacco dependence syndrome       * Notice:  This list has 4 medication(s) that are the same as other medications prescribed for you. Read the directions carefully, and ask your doctor or other care provider to review them with you.
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
## 2018-02-06 ENCOUNTER — RADIANT APPOINTMENT (OUTPATIENT)
Dept: MAMMOGRAPHY | Facility: CLINIC | Age: 53
End: 2018-02-06
Payer: COMMERCIAL

## 2018-02-06 DIAGNOSIS — R19.5 GUAIAC POSITIVE STOOLS: Primary | ICD-10-CM

## 2018-02-06 DIAGNOSIS — Z12.31 VISIT FOR SCREENING MAMMOGRAM: ICD-10-CM

## 2018-02-06 DIAGNOSIS — Z12.11 SCREEN FOR COLON CANCER: ICD-10-CM

## 2018-02-06 LAB — HEMOCCULT STL QL IA: POSITIVE

## 2018-02-06 PROCEDURE — 77067 SCR MAMMO BI INCL CAD: CPT | Mod: TC

## 2018-02-06 NOTE — LETTER
[facility]  [address] [address]  [phone]                                                                                                           Alessandra Pak  [address] [facility] [address] [address]    February 15, 2018    Ms. Pak,     Your stool test was positive you should have a colonoscopy for further testing.  I have placed this order and you should get a call to schedule.     Please contact the clinic if you have additional questions.  Thank you.     Sincerely,     Brionna Dc/zana    Results for orders placed or performed in visit on 02/06/18   Fecal colorectal cancer screen (FIT)   Result Value Ref Range    Occult Blood Scn FIT Positive (A) NEG^Negative     *Note: Due to a large number of results and/or encounters for the requested time period, some results have not been displayed. A complete set of results can be found in Results Review.

## 2018-02-06 NOTE — PROGRESS NOTES
Ms. Pak,    Your are leaking protein in your urine.  Do not start eating less protein. Do start using your insulin as directed and decrease your carbohydrate intake to reduce your blood sugars.  Follow up with me in 6 months.    Please contact the clinic if you have additional questions.  Thank you.    Sincerely,    Brionna Dc

## 2018-02-13 ENCOUNTER — OFFICE VISIT (OUTPATIENT)
Dept: ORTHOPEDICS | Facility: CLINIC | Age: 53
End: 2018-02-13
Payer: COMMERCIAL

## 2018-02-13 VITALS — TEMPERATURE: 97.6 F | BODY MASS INDEX: 21.48 KG/M2 | RESPIRATION RATE: 18 BRPM | HEIGHT: 69 IN | WEIGHT: 145 LBS

## 2018-02-13 DIAGNOSIS — M77.8 TENDINITIS OF RIGHT WRIST: Primary | ICD-10-CM

## 2018-02-13 PROCEDURE — 99203 OFFICE O/P NEW LOW 30 MIN: CPT | Performed by: ORTHOPAEDIC SURGERY

## 2018-02-13 NOTE — MR AVS SNAPSHOT
After Visit Summary   2/13/2018    Alessandra Pak    MRN: [id number]           Patient Information     Date Of Birth          1965        Visit Information        Provider Department      2/13/2018 10:45 AM Dre Huggins MD [facility]        Care Instructions    Ganglion cyst or possible tendinitis of back of wrist.  Use Aleve up to 4 tablets per day.  If this does not help, we can consider excision or maybe MRI to define it first.          Follow-ups after your visit        Your next 10 appointments already scheduled     Feb 19, 2018 11:40 AM CST   PHYSICAL with Brionna Dc MD   [facility] ([facility])    [address] [facility] [address] [phone]            Feb 27, 2018  8:00 AM CST   (Arrive by 7:45 AM)   Implanted Defibulator with Uc Cv Device 1   [facility] ([facility])    [address] [place] [address]  [address] [place] [address] [phone]            Feb 27, 2018  8:30 AM CST   (Arrive by 8:15 AM)   RETURN HEART FAILURE with John Villarreal MD   [facility] ([facility])    [address] [place] [address]  [address] [place] [id number] [phone]            Mar 02, 2018  8:30 AM CST   (Arrive by 8:15 AM)   RETURN DIABETES with Toya Nuno PA-C   [facility] ([facility])    [address] [place] [address]  3rd Floor  [facility] [address] [phone]            Apr 02, 2018  2:00 PM CDT   CT CHEST W/O CONTRAST with MGCT1   [facility] ([facility])    [address] [place] [address] [phone]           Please bring any scans or X-rays taken at other hospitals, if similar tests were done. Also bring a list of your medicines, including vitamins, minerals and over-the-counter drugs. It is safest to leave personal  items at home.  Be sure to tell your doctor:   If you have any allergies.   If there s any chance you are pregnant.   If you are breastfeeding.  You do not need to do anything special to prepare for this exam.  Please wear loose clothing, such as a sweat suit or jogging clothes. Avoid snaps, zippers and other metal. We may ask you to undress and put on a hospital gown.            Apr 02, 2018  2:30 PM CDT   Office Visit with PFT LAB   [facility] ([facility])    [address] [place] [id number] [phone]           Bring a current list of meds and any records pertaining to this visit. For Physicals, please bring immunization records and any forms needing to be filled out. Please arrive 10 minutes early to complete paperwork.            Apr 02, 2018  3:30 PM CDT   Return Visit with Rene Swartz MD   [facility] ([facility])    [address] [place] [address] [phone]            Apr 06, 2018  1:50 PM CDT   (Arrive by 1:35 PM)   RETURN ENDOCRINE with Gisselle Maya MD   [facility] ([facility])    [address] [address] [phone]              Who to contact     If you have questions or need follow up information about today's clinic visit or your schedule please contact [facility] directly at [phone].  Normal or non-critical lab and imaging results will be communicated to you by MyChart, letter or phone within 4 business days after the clinic has received the results. If you do not hear from us within 7 days, please contact the clinic through MyChart or phone. If you have a critical or abnormal lab result, we will notify you by phone as soon as possible.  Submit refill requests through [employer] or call your pharmacy and they will forward the refill request to us. Please allow 3 business days for your  "refill to be completed.          Additional Information About Your Visit        MyChart Information     [employer] gives you secure access to your electronic health record. If you see a primary care provider, you can also send messages to your care team and make appointments. If you have questions, please call your primary care clinic.  If you do not have a primary care provider, please call [phone] and they will assist you.        Care EveryWhere ID     This is your Care EveryWhere ID. This could be used by other organizations to access your [place] medical records  [id number]        Your Vitals Were     Temperature Respirations Height BMI (Body Mass Index)          97.6  F (36.4  C) 18 1.753 m (5' 9\") 21.41 kg/m2         Blood Pressure from Last 3 Encounters:   02/05/18 124/70   02/02/18 147/73   10/04/17 133/83    Weight from Last 3 Encounters:   02/13/18 65.8 kg (145 lb)   02/05/18 66.4 kg (146 lb 6.4 oz)   02/02/18 64.8 kg (142 lb 12.8 oz)              Today, you had the following     No orders found for display       Primary Care Provider Office Phone # Fax #    Brionna Coby Dc [phone]       [address]  [facility] [address]        Equal Access to Services     [facility] : Hadii aad ku hadasho Soomaali, waaxda luqadaha, qaybta kaalmada adeegyada, waxannel schuler haysamuel levine . So [facility] [phone].    ATENCIÓN: Si habla español, tiene a nagy disposición servicios gratuitos de asistencia lingüística. Llame al [phone].    We comply with applicable federal civil rights laws and [place] laws. We do not discriminate on the basis of race, color, national origin, age, disability, sex, sexual orientation, or gender identity.            Thank you!     Thank you for choosing [facility]  for your care. Our goal is always to provide you with excellent care. Hearing back from our patients is one way we can continue to improve our " services. Please take a few minutes to complete the written survey that you may receive in the mail after your visit with us. Thank you!             Your Updated Medication List - Protect others around you: Learn how to safely use, store and throw away your medicines at www.disposemymeds.org.          This list is accurate as of 2/13/18 11:00 AM.  Always use your most recent med list.                   Brand Name Dispense Instructions for use Diagnosis    acetone (Urine) test Strp     25 each    1 strip by In Vitro route as needed    Type 2 diabetes mellitus with diabetic neuropathy (H)       albuterol 108 (90 BASE) MCG/ACT Inhaler    PROAIR HFA    1 Inhaler    Inhale 2 puffs into the lungs every 4 hours as needed for shortness of breath / dyspnea    SOB (shortness of breath)       amitriptyline 50 MG tablet    ELAVIL    90 tablet    Take 1 tablet (50 mg) by mouth At Bedtime    Anxiety       aspirin 81 MG tablet     100    1 tab po QD (Once per day)    Chronic pancreatitis (H)       * blood glucose monitoring lancets     3 Box    1 each See Admin Instructions test 3-4 times per day    Type 2 diabetes, HbA1C goal < 8% (H)       * blood glucose monitoring lancets     1 Box    Use to test blood sugar 10 times daily or as directed.  Ok to substitute alternative if insurance prefers.    Diabetes mellitus type 1 (H)       blood glucose monitoring test strip    HOLLIE CONTOUR NEXT    300 each    Use to test blood sugar 10 times daily or as directed.  Ok to substitute alternative if insurance prefers.    Diabetes mellitus type 1 (H)       fluticasone 50 MCG/ACT spray    FLONASE    1 Bottle    Spray 2 sprays into left nostril daily    Nasal mass       furosemide 20 MG tablet    LASIX    90 tablet    TAKE 1 TABLET (20 MG) BY MOUTH DAILY    Nonischemic cardiomyopathy (H)       glucagon 1 MG kit    GLUCAGON EMERGENCY    1 mg    Inject 1 mg into the muscle once for 1 dose    Type 2 diabetes mellitus with diabetic neuropathy (H)        glucose 4 G Chew chewable tablet     25 tablet    Take 3-4 tablets to treat low blood sugar.    Uncontrolled diabetes mellitus with complications (H)       insulin aspart 100 UNITS/ML injection    NovoLOG VIAL    60 mL    Use as directed in insulin pump. Patient using up to 60 units per day    Type 2 diabetes mellitus with diabetic neuropathy (H)       insulin pen needle 31G X 5 MM    B-D U/F    3 each    Use 3 time(s) a day.    Type 2 diabetes, HbA1c goal < 7% (H)       Ipratropium-Albuterol  MCG/ACT inhaler    COMBIVENT RESPIMAT    1 Inhaler    Inhale 1 puff into the lungs 4 times daily Not to exceed 6 doses per day.    Chronic obstructive pulmonary disease, unspecified COPD type (H)       * lidocaine 5 % Patch    LIDODERM    30 patch    Place 1 patch onto the skin every 24 hours Apply patch up to 12 hours with in a 24 hour period.    Lumbar radiculopathy       * study - lidocaine (LMX) 4 % Crea    IDS# [id number]    45 g    Apply topically every 8 hours as needed for moderate pain    Diabetic polyneuropathy associated with type 2 diabetes mellitus (H)       lisinopril 10 MG tablet    PRINIVIL/ZESTRIL    90 tablet    Take 1 tablet (10 mg) by mouth daily    Nonischemic cardiomyopathy (H)       LYRICA 75 MG capsule   Generic drug:  pregabalin     90 capsule    1 BY MOUTH 3 TIMES A DAY    Controlled type 2 diabetes with neuropathy (H)       methadone 10 mg/mL Conc (HIGH CONC) solution    DOLPHINE-INTENSOL     Take 7 mLs by mouth daily.    Chemical dependency (H)       MIRENA (52 MG) 20 MCG/24HR IUD   Generic drug:  levonorgestrel     1    placed today    Excessive or frequent menstruation       multivitamin, therapeutic with minerals Tabs tablet     30 each    Take 1 tablet by mouth daily    Heart failure and kidney disease due to high blood pressure (H)       polyethylene glycol Packet    MIRALAX/GLYCOLAX    28 packet    Take 17 g by mouth daily    Constipation       ranitidine 300 MG tablet    ZANTAC    90  tablet    Take 1 tablet (300 mg) by mouth daily    Gastroesophageal reflux disease without esophagitis       SM NICOTINE 21 MG/24HR 24 hr patch   Generic drug:  nicotine     28 patch    REMOVE OLD PATCH AND APPLY ONE NEW PATCH TO SKIN EVERY 24 HOURS    Tobacco dependence syndrome       * Notice:  This list has 4 medication(s) that are the same as other medications prescribed for you. Read the directions carefully, and ask your doctor or other care provider to review them with you.

## 2018-02-13 NOTE — LETTER
2/13/2018         RE: Alessandra Pak  [address] [address]        Dear Colleague,    Thank you for referring your patient, Alessandra Pak, to the [facility]. Please see a copy of my visit note below.    Alessandra Pak is a 52 year old female who is seen in consultation at the request of Dr. Brionna Seaman  for dorsal right wrist mass.    Past Medical History:   Diagnosis Date     Abdominal pain, right upper quadrant     [place] [facility]     ASCUS with positive high risk HPV 8/2013    + HPV 33, [place] - GAVIN I, ECC- atypia     Cardiomyopathy (H)     non ischemic cardiomyopathy with EF 15     Cervical high risk HPV (human papillomavirus) test positive 7/8/15, 7/25/16    NIL pap/+ HR HPV (not 16 or 18).      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    leep     Depressive disorder      Gastro-oesophageal reflux disease      Human papillomavirus in conditions classified elsewhere and of unspecified site 2/2012    + HPV 33     Hypertension      Profound impairment, one eye, impairment level not further specified     rt eye due to childhood injury     Systolic CHF (H) 3/12/2015     Tobacco abuse 5/18/2013     Type 2 diabetes mellitus without complications (H)      Uncomplicated asthma        Past Surgical History:   Procedure Laterality Date     C NONSPECIFIC PROCEDURE  2001    cholecystectomy     C NONSPECIFIC PROCEDURE  as a child    tonsillectomy     C NONSPECIFIC PROCEDURE  2001    whipple procedure     CARDIAC SURGERY      defib     COLPOSCOPY,LOOP ELECTRD CERVIX EXCIS  2002, 2011    stage 2 dysplasia     ENDOBRONCHIAL ULTRASOUND FLEXIBLE N/A 2/19/2015    Procedure: ENDOBRONCHIAL ULTRASOUND FLEXIBLE;  Surgeon: Brenden Tamez MD;  Location: [facility]     LEEP TX, CERVICAL  2014    LEEP TX Cervical     RECESSION RESECTION WITH ADJUSTABLE SUTURE  12/13/2011    Procedure:RECESSION RESECTION WITH ADJUSTABLE SUTURE; Right Strabismus Repair with  Adjustable Suture       [facility]        Family History   Problem Relation Age of Onset     DIABETES Mother      diet controled     Hypertension Mother      Arthritis Mother      Lipids Mother      DIABETES Father      Hypertension Father      GASTROINTESTINAL DISEASE Father      gallbladder removed     Bipolar Disorder Brother      Thyroid Disease Brother      Obesity Other      Son     Respiratory Other      Son and Daughter; asthma     Depression Maternal Aunt      Anxiety Disorder Maternal Aunt        Social History     Social History     Marital status: Single     Spouse name: N/A     Number of children: N/A     Years of education: N/A     Occupational History     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 No     Social History Narrative    Balanced Diet - Yes    Osteoporosis Prevention Measures - Dairy servings per day: 3 servings daily    Regular Exercise -  Yes Describe hand weights 20 minutes daily    Dental Exam - YES - Date: 3/10    Eye Exam - YES - Date: 1-2008    Self Breast Exam - Yes    Abuse: Current or Past (Physical, Sexual or Emotional)- No    Do you feel safe in your environment - Yes    Guns stored in the home - No    Sunscreen used - Yes    Seatbelts used - Yes    Lipids -  YES - Date: 1/10    Glucose -  YES - Date: 12/09    Colon Cancer Screening - No    Hemoccults - NO    Pap Test -  YES - Date: 6/19/08    Do you have any concerns about STD's -  No    Mammography - NO    DEXA  - NO    Immunizations reviewed and up to date - Yes, last TD was 11-22-04    3/11/10    M. GLORY Burrell                                   Current Outpatient Prescriptions   Medication Sig Dispense Refill     fluticasone (FLONASE) 50 MCG/ACT spray Spray 2 sprays into left nostril daily 1 Bottle 11     lisinopril (PRINIVIL/ZESTRIL) 10 MG tablet Take 1 tablet (10 mg) by mouth daily 90 tablet 1     Ipratropium-Albuterol (COMBIVENT RESPIMAT)  MCG/ACT inhaler Inhale 1 puff into the lungs 4 times daily Not to exceed 6 doses per day. 1 Inhaler 6     amitriptyline (ELAVIL) 50 MG tablet Take 1 tablet (50 mg) by mouth At Bedtime 90 tablet 3     furosemide (LASIX) 20 MG tablet TAKE 1 TABLET (20 MG) BY MOUTH DAILY 90 tablet 1     insulin aspart (NOVOLOG VIAL) 100 UNITS/ML injection Use as directed in insulin pump. Patient using up to 60 units per day 60 mL 3     LYRICA 75 MG capsule 1 BY MOUTH 3 TIMES A DAY 90 capsule 5     ranitidine (ZANTAC) 300 MG tablet Take 1 tablet (300 mg) by mouth daily 90 tablet 3     study - lidocaine, LMX, (IDS# 4490) 4 % CREA Apply topically every 8 hours as needed for moderate pain 45 g 11     lidocaine (LIDODERM) 5 % Patch Place 1 patch onto the skin every 24 hours Apply patch up to 12 hours with in a 24 hour period. 30 patch 1     albuterol (ALBUTEROL) 108 (90 BASE) MCG/ACT Inhaler Inhale 2 puffs into the lungs every 4 hours as needed for shortness of breath / dyspnea 1 Inhaler 5     SM NICOTINE 21 MG/24HR 24 hr patch REMOVE OLD PATCH AND APPLY ONE NEW PATCH TO SKIN EVERY 24 HOURS 28 patch 1     blood glucose monitoring (HOLLIE CONTOUR NEXT) test strip Use to test blood sugar 10 times daily or as directed.  Ok to substitute alternative if insurance prefers. 300 each 12     blood glucose monitoring (HOLLIE MICROLET) lancets Use to test blood sugar 10 times daily or as directed.  Ok to substitute alternative if insurance prefers. 1 Box prn     acetone, Urine, test STRP 1 strip by In Vitro route as  "needed 25 each 1     multivitamin, therapeutic with minerals (THERA-VIT-M) TABS Take 1 tablet by mouth daily 30 each 11     polyethylene glycol (MIRALAX/GLYCOLAX) packet Take 17 g by mouth daily 28 packet 3     insulin pen needle (B-D U/F) 31G X 5 MM Use 3 time(s) a day. 3 each 3     blood glucose monitoring (FREESTYLE) lancets 1 each See Admin Instructions test 3-4 times per day 3 Box 3     glucose 4 G CHEW Take 3-4 tablets to treat low blood sugar. 25 tablet 11     methadone (DOLPHINE-INTENSOL) 10 mg/mL CONC Take 7 mLs by mouth daily.       MIRENA 20 MCG/24HR IU IUD placed today 1 0     ASPIRIN 81 MG OR TABS 1 tab po QD (Once per day) 100 3     glucagon (GLUCAGON EMERGENCY) 1 MG injection Inject 1 mg into the muscle once for 1 dose 1 mg 1       Allergies   Allergen Reactions     Naproxen GI Disturbance     No Known Drug Allergies        REVIEW OF SYSTEMS:  CONSTITUTIONAL:  NEGATIVE for fever, chills, change in weight, not feeling tired  SKIN:  NEGATIVE for worrisome rashes, no skin lumps, no skin ulcers and no non-healing wounds  EYES:  NEGATIVE for vision changes or irritation.  ENT/MOUTH:  NEGATIVE.  No hearing loss, no hoarseness, no difficulty swallowing.  RESP:  NEGATIVE. No cough or shortness of breath.  CV:  NEGATIVE for chest pain, palpitations or peripheral edema  GI:  NEGATIVE for nausea, abdominal pain, heartburn, or change in bowel habits  :  Negative. No dysuria, no hematuria  MUSCULOSKELETAL:  See HPI above  NEURO:  NEGATIVE . No headaches, no dizziness,  no numbness  ENDOCRINE:  NEGATIVE for temperature intolerance, skin/hair changes  HEME/ALLERGY/IMMUNE:  NEGATIVE for bleeding problems  PSYCHIATRIC:  NEGATIVE. no anxiety, no depression.     Exam:  Vitals: Temp 97.6  F (36.4  C)  Resp 18  Ht 1.753 m (5' 9\")  Wt 65.8 kg (145 lb)  BMI 21.41 kg/m2  BMI= Body mass index is 21.41 kg/(m^2).  Constitutional:  healthy, alert and no distress  Neuro: Alert and Oriented x 3, Gait normal. Sensation " grossly WNL.  Psych: Affect normal   Respiratory: Breathing not labored.  Cardiovascular: normal peripheral pulses  Lymph: no adenopathy  Skin: No rashes,worrisome lesions or skin problems      Again, thank you for allowing me to participate in the care of your patient.        Sincerely,        Dre Huggins MD

## 2018-02-13 NOTE — PATIENT INSTRUCTIONS
Ganglion cyst or possible tendinitis of back of wrist.  Use Aleve up to 4 tablets per day.  If this does not help, we can consider excision or maybe MRI to define it first.

## 2018-02-14 ENCOUNTER — TELEPHONE (OUTPATIENT)
Dept: ORTHOPEDICS | Facility: CLINIC | Age: 53
End: 2018-02-14

## 2018-02-14 NOTE — TELEPHONE ENCOUNTER
Patient's family member called to make an appt.  With Dr. Lewis.  Unable to make the appt.  Note in chart states patient must speak with financial office before appt.  Could be scheduled.  Patient given phone number.

## 2018-02-15 PROBLEM — M77.8 TENDINITIS OF RIGHT WRIST: Status: ACTIVE | Noted: 2018-02-15

## 2018-02-15 NOTE — PROGRESS NOTES
Visit Date:   2018      DATE OF VISIT:  2018      HISTORY OF PRESENT ILLNESS:  Ms. Serrano is a 52-year-old female referred from Dr. Brionna Dc for evaluation of right wrist mass.  She has swelling and pain over the dorsal aspect of the wrist starting in 2016 without history of injury.  It gets worse with use, better with rest.  She has dull aching pain rated 4-5/10.  She has noted swelling over the dorsal aspect of the wrist.      PHYSICAL EXAMINATION:  Diffuse swelling over the dorsal wrist.  Some of it is over the wrist joint itself, some is extending out onto the back of the hand.  The swelling does move with finger movement, migrating with the flexor tendons or sheaths of the flexor tendon.  I am not finding a definite ganglion cyst on the back of the wrist itself.  She has good active use of the flexors and extensors of the wrist and fingers.  Sensation and circulation are intact.      IMPRESSION:  Right dorsal wrist mass which may be related to synovitis about the extensor tendons to the fingers.  I am not seeing a definite dorsal ganglion cyst but it is possible this could also be present.  We have discussed options and will use anti-inflammatory to help control this with Aleve up to 4 tablets a day.  If that does not help we could consider excision.  We may wish to have MRI scan to define the mass first.         MICHAELA GARCIA MD             D: 02/15/2018   T: 02/15/2018   MT: LAZ      Name:     TWIN SERRANO   MRN:      [id number]        Account:      [id number]   :      1965           Visit Date:   2018      Document: [id number]

## 2018-02-15 NOTE — PROGRESS NOTES
Alessandra Pak is a 52 year old female who is seen in consultation at the request of Dr. Brionan Seaman  for dorsal right wrist mass.    Past Medical History:   Diagnosis Date     Abdominal pain, right upper quadrant     sees Dr Mcclellan pain clinic at [facility]     ASCUS with positive high risk HPV 8/2013    + HPV 33, [place] - GAVIN I, ECC- atypia     Cardiomyopathy (H)     non ischemic cardiomyopathy with EF 15     Cervical high risk HPV (human papillomavirus) test positive 7/8/15, 7/25/16    NIL pap/+ HR HPV (not 16 or 18).      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    leep     Depressive disorder      Gastro-oesophageal reflux disease      Human papillomavirus in conditions classified elsewhere and of unspecified site 2/2012    + HPV 33     Hypertension      Profound impairment, one eye, impairment level not further specified     rt eye due to childhood injury     Systolic CHF (H) 3/12/2015     Tobacco abuse 5/18/2013     Type 2 diabetes mellitus without complications (H)      Uncomplicated asthma        Past Surgical History:   Procedure Laterality Date     C NONSPECIFIC PROCEDURE  2001    cholecystectomy     C NONSPECIFIC PROCEDURE  as a child    tonsillectomy     C NONSPECIFIC PROCEDURE  2001    whipple procedure     CARDIAC SURGERY      defib     COLPOSCOPY,LOOP ELECTRD CERVIX EXCIS  2002, 2011    stage 2 dysplasia     ENDOBRONCHIAL ULTRASOUND FLEXIBLE N/A 2/19/2015    Procedure: ENDOBRONCHIAL ULTRASOUND FLEXIBLE;  Surgeon: Brenden Tamez MD;  Location: [facility]     LEEP TX, CERVICAL  2014    LEEP TX Cervical     RECESSION RESECTION WITH ADJUSTABLE SUTURE  12/13/2011    Procedure:RECESSION RESECTION WITH ADJUSTABLE SUTURE; Right Strabismus Repair with Adjustable Suture       TUBAL LIGATION  2007    essure        Family History   Problem Relation Age of Onset     DIABETES Mother      diet controled     Hypertension Mother      Arthritis Mother      Lipids Mother      DIABETES Father      Hypertension Father       GASTROINTESTINAL DISEASE Father      gallbladder removed     Bipolar Disorder Brother      Thyroid Disease Brother      Obesity Other      Son     Respiratory Other      Son and Daughter; asthma     Depression Maternal Aunt      Anxiety Disorder Maternal Aunt        Social History     Social History     Marital status: Single     Spouse name: N/A     Number of children: N/A     Years of education: N/A     Occupational History     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 No     Social History Narrative    Balanced Diet - Yes    Osteoporosis Prevention Measures - Dairy servings per day: 3 servings daily    Regular Exercise -  Yes Describe hand weights 20 minutes daily    Dental Exam - YES - Date: 3/10    Eye Exam - YES - Date: 1-2008    Self Breast Exam - Yes    Abuse: Current or Past (Physical, Sexual or Emotional)- No    Do you feel safe in your environment - Yes    Guns stored in the home - No    Sunscreen used - Yes    Seatbelts used - Yes    Lipids -  YES - Date: 1/10    Glucose -  YES - Date: 12/09    Colon Cancer Screening - No    Hemoccults - NO    Pap Test -  YES - Date: 6/19/08    Do you have any concerns about STD's -  No    Mammography - NO    DEXA - NO    Immunizations reviewed and up to date - Yes, last TD was 11-22-04    3/11/10    KATY Burrell CMA                                   Current Outpatient Prescriptions   Medication Sig Dispense Refill     fluticasone (FLONASE) 50 MCG/ACT spray Spray 2 sprays  into left nostril daily 1 Bottle 11     lisinopril (PRINIVIL/ZESTRIL) 10 MG tablet Take 1 tablet (10 mg) by mouth daily 90 tablet 1     Ipratropium-Albuterol (COMBIVENT RESPIMAT)  MCG/ACT inhaler Inhale 1 puff into the lungs 4 times daily Not to exceed 6 doses per day. 1 Inhaler 6     amitriptyline (ELAVIL) 50 MG tablet Take 1 tablet (50 mg) by mouth At Bedtime 90 tablet 3     furosemide (LASIX) 20 MG tablet TAKE 1 TABLET (20 MG) BY MOUTH DAILY 90 tablet 1     insulin aspart (NOVOLOG VIAL) 100 UNITS/ML injection Use as directed in insulin pump. Patient using up to 60 units per day 60 mL 3     LYRICA 75 MG capsule 1 BY MOUTH 3 TIMES A DAY 90 capsule 5     ranitidine (ZANTAC) 300 MG tablet Take 1 tablet (300 mg) by mouth daily 90 tablet 3     study - lidocaine, LMX, (IDS# 4490) 4 % CREA Apply topically every 8 hours as needed for moderate pain 45 g 11     lidocaine (LIDODERM) 5 % Patch Place 1 patch onto the skin every 24 hours Apply patch up to 12 hours with in a 24 hour period. 30 patch 1     albuterol (ALBUTEROL) 108 (90 BASE) MCG/ACT Inhaler Inhale 2 puffs into the lungs every 4 hours as needed for shortness of breath / dyspnea 1 Inhaler 5     SM NICOTINE 21 MG/24HR 24 hr patch REMOVE OLD PATCH AND APPLY ONE NEW PATCH TO SKIN EVERY 24 HOURS 28 patch 1     blood glucose monitoring (HOLLIE CONTOUR NEXT) test strip Use to test blood sugar 10 times daily or as directed.  Ok to substitute alternative if insurance prefers. 300 each 12     blood glucose monitoring (HOLLIE MICROLET) lancets Use to test blood sugar 10 times daily or as directed.  Ok to substitute alternative if insurance prefers. 1 Box prn     acetone, Urine, test STRP 1 strip by In Vitro route as needed 25 each 1     multivitamin, therapeutic with minerals (THERA-VIT-M) TABS Take 1 tablet by mouth daily 30 each 11     polyethylene glycol (MIRALAX/GLYCOLAX) packet Take 17 g by mouth daily 28 packet 3     insulin pen needle (B-D U/F) 31G X 5 MM Use 3  "time(s) a day. 3 each 3     blood glucose monitoring (FREESTYLE) lancets 1 each See Admin Instructions test 3-4 times per day 3 Box 3     glucose 4 G CHEW Take 3-4 tablets to treat low blood sugar. 25 tablet 11     methadone (DOLPHINE-INTENSOL) 10 mg/mL CONC Take 7 mLs by mouth daily.       MIRENA 20 MCG/24HR IU IUD placed today 1 0     ASPIRIN 81 MG OR TABS 1 tab po QD (Once per day) 100 3     glucagon (GLUCAGON EMERGENCY) 1 MG injection Inject 1 mg into the muscle once for 1 dose 1 mg 1       Allergies   Allergen Reactions     Naproxen GI Disturbance     No Known Drug Allergies        REVIEW OF SYSTEMS:  CONSTITUTIONAL:  NEGATIVE for fever, chills, change in weight, not feeling tired  SKIN:  NEGATIVE for worrisome rashes, no skin lumps, no skin ulcers and no non-healing wounds  EYES:  NEGATIVE for vision changes or irritation.  ENT/MOUTH:  NEGATIVE.  No hearing loss, no hoarseness, no difficulty swallowing.  RESP:  NEGATIVE. No cough or shortness of breath.  CV:  NEGATIVE for chest pain, palpitations or peripheral edema  GI:  NEGATIVE for nausea, abdominal pain, heartburn, or change in bowel habits  :  Negative. No dysuria, no hematuria  MUSCULOSKELETAL:  See HPI above  NEURO:  NEGATIVE . No headaches, no dizziness,  no numbness  ENDOCRINE:  NEGATIVE for temperature intolerance, skin/hair changes  HEME/ALLERGY/IMMUNE:  NEGATIVE for bleeding problems  PSYCHIATRIC:  NEGATIVE. no anxiety, no depression.     Exam:  Vitals: Temp 97.6  F (36.4  C)  Resp 18  Ht 1.753 m (5' 9\")  Wt 65.8 kg (145 lb)  BMI 21.41 kg/m2  BMI= Body mass index is 21.41 kg/(m^2).  Constitutional:  healthy, alert and no distress  Neuro: Alert and Oriented x 3, Gait normal. Sensation grossly WNL.  Psych: Affect normal   Respiratory: Breathing not labored.  Cardiovascular: normal peripheral pulses  Lymph: no adenopathy  Skin: No rashes,worrisome lesions or skin problems    "

## 2018-02-15 NOTE — PROGRESS NOTES
Ms. Pak,    Your stool test was positive you should have a colonoscopy for further testing.  I have placed this order and you should get a call to schedule.    Please contact the clinic if you have additional questions.  Thank you.    Sincerely,    Brionna Dc

## 2018-02-16 ENCOUNTER — OFFICE VISIT (OUTPATIENT)
Dept: ORTHOPEDICS | Facility: CLINIC | Age: 53
End: 2018-02-16
Payer: COMMERCIAL

## 2018-02-16 ENCOUNTER — APPOINTMENT (OUTPATIENT)
Dept: GENERAL RADIOLOGY | Facility: CLINIC | Age: 53
DRG: 853 | End: 2018-02-16
Attending: EMERGENCY MEDICINE
Payer: COMMERCIAL

## 2018-02-16 ENCOUNTER — APPOINTMENT (OUTPATIENT)
Dept: ULTRASOUND IMAGING | Facility: CLINIC | Age: 53
DRG: 853 | End: 2018-02-16
Attending: PHYSICIAN ASSISTANT
Payer: COMMERCIAL

## 2018-02-16 ENCOUNTER — HOSPITAL ENCOUNTER (INPATIENT)
Facility: CLINIC | Age: 53
LOS: 7 days | Discharge: HOME | DRG: 853 | End: 2018-02-23
Attending: EMERGENCY MEDICINE | Admitting: INTERNAL MEDICINE
Payer: COMMERCIAL

## 2018-02-16 VITALS
HEART RATE: 124 BPM | DIASTOLIC BLOOD PRESSURE: 84 MMHG | OXYGEN SATURATION: 78 % | HEIGHT: 69 IN | BODY MASS INDEX: 20.73 KG/M2 | WEIGHT: 140 LBS | TEMPERATURE: 102.2 F | SYSTOLIC BLOOD PRESSURE: 135 MMHG

## 2018-02-16 DIAGNOSIS — R57.9 SHOCK (H): ICD-10-CM

## 2018-02-16 DIAGNOSIS — E11.621 DIABETIC ULCER OF RIGHT MIDFOOT ASSOCIATED WITH TYPE 2 DIABETES MELLITUS, WITH FAT LAYER EXPOSED (H): Primary | ICD-10-CM

## 2018-02-16 DIAGNOSIS — A41.9 SEPSIS (H): ICD-10-CM

## 2018-02-16 DIAGNOSIS — L97.419 DIABETIC ULCER OF RIGHT MIDFOOT ASSOCIATED WITH TYPE 1 DIABETES MELLITUS, UNSPECIFIED ULCER STAGE (H): ICD-10-CM

## 2018-02-16 DIAGNOSIS — R09.02 HYPOXEMIA: ICD-10-CM

## 2018-02-16 DIAGNOSIS — G89.29 OTHER CHRONIC PAIN: ICD-10-CM

## 2018-02-16 DIAGNOSIS — L03.115 CELLULITIS OF RIGHT FOOT: ICD-10-CM

## 2018-02-16 DIAGNOSIS — E11.22 TYPE 2 DIABETES MELLITUS WITH STAGE 3 CHRONIC KIDNEY DISEASE, WITH LONG-TERM CURRENT USE OF INSULIN (H): Chronic | ICD-10-CM

## 2018-02-16 DIAGNOSIS — E10.621 TYPE 1 DIABETES MELLITUS WITH FOOT ULCER (H): ICD-10-CM

## 2018-02-16 DIAGNOSIS — N18.30 TYPE 2 DIABETES MELLITUS WITH STAGE 3 CHRONIC KIDNEY DISEASE, WITH LONG-TERM CURRENT USE OF INSULIN (H): Chronic | ICD-10-CM

## 2018-02-16 DIAGNOSIS — L03.119 CELLULITIS OF FOOT: ICD-10-CM

## 2018-02-16 DIAGNOSIS — L97.419: ICD-10-CM

## 2018-02-16 DIAGNOSIS — Z87.891 PERSONAL HISTORY OF TOBACCO USE, PRESENTING HAZARDS TO HEALTH: ICD-10-CM

## 2018-02-16 DIAGNOSIS — E10.621 DIABETIC ULCER OF RIGHT MIDFOOT ASSOCIATED WITH TYPE 1 DIABETES MELLITUS, UNSPECIFIED ULCER STAGE (H): ICD-10-CM

## 2018-02-16 DIAGNOSIS — L97.509 TYPE 1 DIABETES MELLITUS WITH FOOT ULCER (H): ICD-10-CM

## 2018-02-16 DIAGNOSIS — A41.9 SEPSIS, DUE TO UNSPECIFIED ORGANISM: ICD-10-CM

## 2018-02-16 DIAGNOSIS — I10 HYPERTENSION GOAL BP (BLOOD PRESSURE) < 140/90: Primary | Chronic | ICD-10-CM

## 2018-02-16 DIAGNOSIS — L97.412 DIABETIC ULCER OF RIGHT MIDFOOT ASSOCIATED WITH TYPE 2 DIABETES MELLITUS, WITH FAT LAYER EXPOSED (H): Primary | ICD-10-CM

## 2018-02-16 DIAGNOSIS — Z79.4 TYPE 2 DIABETES MELLITUS WITH STAGE 3 CHRONIC KIDNEY DISEASE, WITH LONG-TERM CURRENT USE OF INSULIN (H): Chronic | ICD-10-CM

## 2018-02-16 LAB
ALBUMIN SERPL-MCNC: 2.8 G/DL (ref 3.4–5)
ALBUMIN UR-MCNC: 10 MG/DL
ALP SERPL-CCNC: 212 U/L (ref 40–150)
ALT SERPL W P-5'-P-CCNC: 14 U/L (ref 0–50)
ANION GAP SERPL CALCULATED.3IONS-SCNC: 8 MMOL/L (ref 3–14)
ANION GAP SERPL CALCULATED.3IONS-SCNC: 9 MMOL/L (ref 3–14)
APPEARANCE UR: ABNORMAL
AST SERPL W P-5'-P-CCNC: 22 U/L (ref 0–45)
BACTERIA #/AREA URNS HPF: ABNORMAL /HPF
BASOPHILS # BLD AUTO: 0 10E9/L (ref 0–0.2)
BASOPHILS NFR BLD AUTO: 0.1 %
BILIRUB SERPL-MCNC: 0.6 MG/DL (ref 0.2–1.3)
BILIRUB UR QL STRIP: NEGATIVE
BUN SERPL-MCNC: 27 MG/DL (ref 7–30)
BUN SERPL-MCNC: 30 MG/DL (ref 7–30)
CALCIUM SERPL-MCNC: 8.5 MG/DL (ref 8.5–10.1)
CALCIUM SERPL-MCNC: 8.5 MG/DL (ref 8.5–10.1)
CHLORIDE SERPL-SCNC: 92 MMOL/L (ref 94–109)
CHLORIDE SERPL-SCNC: 93 MMOL/L (ref 94–109)
CO2 BLDCOV-SCNC: 29 MMOL/L (ref 21–28)
CO2 SERPL-SCNC: 27 MMOL/L (ref 20–32)
CO2 SERPL-SCNC: 28 MMOL/L (ref 20–32)
COLOR UR AUTO: YELLOW
CREAT SERPL-MCNC: 1.25 MG/DL (ref 0.52–1.04)
CREAT SERPL-MCNC: 1.5 MG/DL (ref 0.52–1.04)
CRP SERPL-MCNC: 290 MG/L (ref 0–8)
DIFFERENTIAL METHOD BLD: ABNORMAL
EOSINOPHIL # BLD AUTO: 0 10E9/L (ref 0–0.7)
EOSINOPHIL NFR BLD AUTO: 0 %
ERYTHROCYTE [DISTWIDTH] IN BLOOD BY AUTOMATED COUNT: 13.6 % (ref 10–15)
GFR SERPL CREATININE-BSD FRML MDRD: 37 ML/MIN/1.7M2
GFR SERPL CREATININE-BSD FRML MDRD: 45 ML/MIN/1.7M2
GLUCOSE BLDC GLUCOMTR-MCNC: 271 MG/DL (ref 70–99)
GLUCOSE BLDC GLUCOMTR-MCNC: 364 MG/DL (ref 70–99)
GLUCOSE BLDC GLUCOMTR-MCNC: 369 MG/DL (ref 70–99)
GLUCOSE SERPL-MCNC: 340 MG/DL (ref 70–99)
GLUCOSE SERPL-MCNC: 454 MG/DL (ref 70–99)
GLUCOSE UR STRIP-MCNC: >1000 MG/DL
HBA1C MFR BLD: 12.8 % (ref 4.3–6)
HCT VFR BLD AUTO: 35.6 % (ref 35–47)
HGB BLD-MCNC: 11.9 G/DL (ref 11.7–15.7)
HGB UR QL STRIP: ABNORMAL
IMM GRANULOCYTES # BLD: 0.1 10E9/L (ref 0–0.4)
IMM GRANULOCYTES NFR BLD: 0.4 %
KETONES UR STRIP-MCNC: NEGATIVE MG/DL
LACTATE BLD-SCNC: 1.6 MMOL/L (ref 0.7–2.1)
LEUKOCYTE ESTERASE UR QL STRIP: ABNORMAL
LYMPHOCYTES # BLD AUTO: 1.6 10E9/L (ref 0.8–5.3)
LYMPHOCYTES NFR BLD AUTO: 7.2 %
MCH RBC QN AUTO: 28.3 PG (ref 26.5–33)
MCHC RBC AUTO-ENTMCNC: 33.4 G/DL (ref 31.5–36.5)
MCV RBC AUTO: 85 FL (ref 78–100)
MONOCYTES # BLD AUTO: 1 10E9/L (ref 0–1.3)
MONOCYTES NFR BLD AUTO: 4.3 %
MUCOUS THREADS #/AREA URNS LPF: PRESENT /LPF
NEUTROPHILS # BLD AUTO: 19.6 10E9/L (ref 1.6–8.3)
NEUTROPHILS NFR BLD AUTO: 88 %
NITRATE UR QL: NEGATIVE
NRBC # BLD AUTO: 0 10*3/UL
NRBC BLD AUTO-RTO: 0 /100
NT-PROBNP SERPL-MCNC: 3096 PG/ML (ref 0–900)
PCO2 BLDV: 48 MM HG (ref 40–50)
PH BLDV: 7.39 PH (ref 7.32–7.43)
PH UR STRIP: 5 PH (ref 5–7)
PLATELET # BLD AUTO: 288 10E9/L (ref 150–450)
PO2 BLDV: 15 MM HG (ref 25–47)
POTASSIUM SERPL-SCNC: 4 MMOL/L (ref 3.4–5.3)
POTASSIUM SERPL-SCNC: 4.4 MMOL/L (ref 3.4–5.3)
PROCALCITONIN SERPL-MCNC: 1.42 NG/ML
PROT SERPL-MCNC: 8.2 G/DL (ref 6.8–8.8)
RBC # BLD AUTO: 4.2 10E12/L (ref 3.8–5.2)
RBC #/AREA URNS AUTO: 1 /HPF (ref 0–2)
SAO2 % BLDV FROM PO2: 18 %
SODIUM SERPL-SCNC: 128 MMOL/L (ref 133–144)
SODIUM SERPL-SCNC: 130 MMOL/L (ref 133–144)
SOURCE: ABNORMAL
SP GR UR STRIP: 1.01 (ref 1–1.03)
SQUAMOUS #/AREA URNS AUTO: 2 /HPF (ref 0–1)
TRANS CELLS #/AREA URNS HPF: <1 /HPF (ref 0–1)
TROPONIN I SERPL-MCNC: <0.015 UG/L (ref 0–0.04)
UROBILINOGEN UR STRIP-MCNC: NORMAL MG/DL (ref 0–2)
WBC # BLD AUTO: 22.3 10E9/L (ref 4–11)
WBC #/AREA URNS AUTO: 60 /HPF (ref 0–2)

## 2018-02-16 PROCEDURE — 86140 C-REACTIVE PROTEIN: CPT | Performed by: EMERGENCY MEDICINE

## 2018-02-16 PROCEDURE — 84145 PROCALCITONIN (PCT): CPT | Performed by: EMERGENCY MEDICINE

## 2018-02-16 PROCEDURE — 96365 THER/PROPH/DIAG IV INF INIT: CPT | Performed by: EMERGENCY MEDICINE

## 2018-02-16 PROCEDURE — 82803 BLOOD GASES ANY COMBINATION: CPT

## 2018-02-16 PROCEDURE — 99223 1ST HOSP IP/OBS HIGH 75: CPT | Mod: AI | Performed by: INTERNAL MEDICINE

## 2018-02-16 PROCEDURE — 80053 COMPREHEN METABOLIC PANEL: CPT | Performed by: EMERGENCY MEDICINE

## 2018-02-16 PROCEDURE — 25000132 ZZH RX MED GY IP 250 OP 250 PS 637: Performed by: PHYSICIAN ASSISTANT

## 2018-02-16 PROCEDURE — 93010 ELECTROCARDIOGRAM REPORT: CPT | Performed by: INTERNAL MEDICINE

## 2018-02-16 PROCEDURE — 85025 COMPLETE CBC W/AUTO DIFF WBC: CPT | Performed by: EMERGENCY MEDICINE

## 2018-02-16 PROCEDURE — 96366 THER/PROPH/DIAG IV INF ADDON: CPT | Performed by: EMERGENCY MEDICINE

## 2018-02-16 PROCEDURE — 99285 EMERGENCY DEPT VISIT HI MDM: CPT | Mod: 25 | Performed by: EMERGENCY MEDICINE

## 2018-02-16 PROCEDURE — 73630 X-RAY EXAM OF FOOT: CPT | Mod: RT

## 2018-02-16 PROCEDURE — 87086 URINE CULTURE/COLONY COUNT: CPT | Performed by: PHYSICIAN ASSISTANT

## 2018-02-16 PROCEDURE — 83880 ASSAY OF NATRIURETIC PEPTIDE: CPT | Performed by: EMERGENCY MEDICINE

## 2018-02-16 PROCEDURE — 81001 URINALYSIS AUTO W/SCOPE: CPT | Performed by: PHYSICIAN ASSISTANT

## 2018-02-16 PROCEDURE — 99285 EMERGENCY DEPT VISIT HI MDM: CPT | Mod: Z6 | Performed by: EMERGENCY MEDICINE

## 2018-02-16 PROCEDURE — 71046 X-RAY EXAM CHEST 2 VIEWS: CPT

## 2018-02-16 PROCEDURE — 36415 COLL VENOUS BLD VENIPUNCTURE: CPT | Performed by: PHYSICIAN ASSISTANT

## 2018-02-16 PROCEDURE — 00000146 ZZHCL STATISTIC GLUCOSE BY METER IP

## 2018-02-16 PROCEDURE — 96375 TX/PRO/DX INJ NEW DRUG ADDON: CPT | Performed by: EMERGENCY MEDICINE

## 2018-02-16 PROCEDURE — 12000025 ZZH R&B TRANSPLANT INTERMEDIATE

## 2018-02-16 PROCEDURE — 94640 AIRWAY INHALATION TREATMENT: CPT

## 2018-02-16 PROCEDURE — 87040 BLOOD CULTURE FOR BACTERIA: CPT | Performed by: EMERGENCY MEDICINE

## 2018-02-16 PROCEDURE — 93970 EXTREMITY STUDY: CPT

## 2018-02-16 PROCEDURE — 83036 HEMOGLOBIN GLYCOSYLATED A1C: CPT | Performed by: EMERGENCY MEDICINE

## 2018-02-16 PROCEDURE — 83605 ASSAY OF LACTIC ACID: CPT

## 2018-02-16 PROCEDURE — 25000125 ZZHC RX 250: Performed by: PHYSICIAN ASSISTANT

## 2018-02-16 PROCEDURE — 87633 RESP VIRUS 12-25 TARGETS: CPT | Performed by: PHYSICIAN ASSISTANT

## 2018-02-16 PROCEDURE — 40000556 ZZH STATISTIC PERIPHERAL IV START W US GUIDANCE

## 2018-02-16 PROCEDURE — 25000128 H RX IP 250 OP 636: Performed by: PHYSICIAN ASSISTANT

## 2018-02-16 PROCEDURE — 83880 ASSAY OF NATRIURETIC PEPTIDE: CPT | Performed by: PHYSICIAN ASSISTANT

## 2018-02-16 PROCEDURE — 93005 ELECTROCARDIOGRAM TRACING: CPT

## 2018-02-16 PROCEDURE — 84484 ASSAY OF TROPONIN QUANT: CPT | Performed by: EMERGENCY MEDICINE

## 2018-02-16 PROCEDURE — 99207 ZZC APP CREDIT; MD BILLING SHARED VISIT: CPT | Performed by: PHYSICIAN ASSISTANT

## 2018-02-16 PROCEDURE — 80048 BASIC METABOLIC PNL TOTAL CA: CPT | Performed by: PHYSICIAN ASSISTANT

## 2018-02-16 PROCEDURE — 25000128 H RX IP 250 OP 636: Performed by: EMERGENCY MEDICINE

## 2018-02-16 RX ORDER — FLUTICASONE PROPIONATE 50 MCG
2 SPRAY, SUSPENSION (ML) NASAL DAILY
Status: DISCONTINUED | OUTPATIENT
Start: 2018-02-16 | End: 2018-02-23 | Stop reason: HOSPADM

## 2018-02-16 RX ORDER — ASPIRIN 81 MG/1
81 TABLET, CHEWABLE ORAL DAILY
Status: DISCONTINUED | OUTPATIENT
Start: 2018-02-16 | End: 2018-02-23 | Stop reason: HOSPADM

## 2018-02-16 RX ORDER — DEXTROSE MONOHYDRATE 25 G/50ML
25-50 INJECTION, SOLUTION INTRAVENOUS
Status: DISCONTINUED | OUTPATIENT
Start: 2018-02-16 | End: 2018-02-23 | Stop reason: HOSPADM

## 2018-02-16 RX ORDER — AMOXICILLIN 250 MG
1 CAPSULE ORAL 2 TIMES DAILY PRN
Status: DISCONTINUED | OUTPATIENT
Start: 2018-02-16 | End: 2018-02-23 | Stop reason: HOSPADM

## 2018-02-16 RX ORDER — ONDANSETRON 2 MG/ML
4 INJECTION INTRAMUSCULAR; INTRAVENOUS EVERY 6 HOURS PRN
Status: DISCONTINUED | OUTPATIENT
Start: 2018-02-16 | End: 2018-02-23 | Stop reason: HOSPADM

## 2018-02-16 RX ORDER — POLYETHYLENE GLYCOL 3350 17 G/17G
17 POWDER, FOR SOLUTION ORAL DAILY PRN
Status: DISCONTINUED | OUTPATIENT
Start: 2018-02-16 | End: 2018-02-23 | Stop reason: HOSPADM

## 2018-02-16 RX ORDER — OXYCODONE HYDROCHLORIDE 5 MG/1
5-10 TABLET ORAL EVERY 4 HOURS PRN
Status: DISCONTINUED | OUTPATIENT
Start: 2018-02-16 | End: 2018-02-21

## 2018-02-16 RX ORDER — NALOXONE HYDROCHLORIDE 0.4 MG/ML
.1-.4 INJECTION, SOLUTION INTRAMUSCULAR; INTRAVENOUS; SUBCUTANEOUS
Status: DISCONTINUED | OUTPATIENT
Start: 2018-02-16 | End: 2018-02-16

## 2018-02-16 RX ORDER — NALOXONE HYDROCHLORIDE 0.4 MG/ML
.1-.4 INJECTION, SOLUTION INTRAMUSCULAR; INTRAVENOUS; SUBCUTANEOUS
Status: DISCONTINUED | OUTPATIENT
Start: 2018-02-16 | End: 2018-02-23 | Stop reason: HOSPADM

## 2018-02-16 RX ORDER — ALBUTEROL SULFATE 90 UG/1
2 AEROSOL, METERED RESPIRATORY (INHALATION) EVERY 4 HOURS PRN
Status: DISCONTINUED | OUTPATIENT
Start: 2018-02-16 | End: 2018-02-17

## 2018-02-16 RX ORDER — POLYETHYLENE GLYCOL 3350 17 G/17G
17 POWDER, FOR SOLUTION ORAL DAILY
Status: DISCONTINUED | OUTPATIENT
Start: 2018-02-16 | End: 2018-02-23 | Stop reason: HOSPADM

## 2018-02-16 RX ORDER — SODIUM CHLORIDE 9 MG/ML
INJECTION, SOLUTION INTRAVENOUS CONTINUOUS
Status: DISCONTINUED | OUTPATIENT
Start: 2018-02-16 | End: 2018-02-17

## 2018-02-16 RX ORDER — LIDOCAINE 40 MG/G
CREAM TOPICAL
Status: DISCONTINUED | OUTPATIENT
Start: 2018-02-16 | End: 2018-02-16

## 2018-02-16 RX ORDER — AMOXICILLIN 250 MG
2 CAPSULE ORAL 2 TIMES DAILY PRN
Status: DISCONTINUED | OUTPATIENT
Start: 2018-02-16 | End: 2018-02-23 | Stop reason: HOSPADM

## 2018-02-16 RX ORDER — IPRATROPIUM BROMIDE AND ALBUTEROL SULFATE 2.5; .5 MG/3ML; MG/3ML
3 SOLUTION RESPIRATORY (INHALATION)
Status: DISCONTINUED | OUTPATIENT
Start: 2018-02-16 | End: 2018-02-17

## 2018-02-16 RX ORDER — ONDANSETRON 4 MG/1
4 TABLET, ORALLY DISINTEGRATING ORAL EVERY 6 HOURS PRN
Status: DISCONTINUED | OUTPATIENT
Start: 2018-02-16 | End: 2018-02-23 | Stop reason: HOSPADM

## 2018-02-16 RX ORDER — NICOTINE POLACRILEX 4 MG
15-30 LOZENGE BUCCAL
Status: DISCONTINUED | OUTPATIENT
Start: 2018-02-16 | End: 2018-02-23 | Stop reason: HOSPADM

## 2018-02-16 RX ORDER — MORPHINE SULFATE 4 MG/ML
4 INJECTION, SOLUTION INTRAMUSCULAR; INTRAVENOUS
Status: DISCONTINUED | OUTPATIENT
Start: 2018-02-16 | End: 2018-02-16

## 2018-02-16 RX ORDER — LIDOCAINE 40 MG/G
CREAM TOPICAL
Status: DISCONTINUED | OUTPATIENT
Start: 2018-02-16 | End: 2018-02-18

## 2018-02-16 RX ADMIN — FLUTICASONE PROPIONATE 2 SPRAY: 50 SPRAY, METERED NASAL at 21:55

## 2018-02-16 RX ADMIN — VANCOMYCIN HYDROCHLORIDE 1250 MG: 10 INJECTION, POWDER, LYOPHILIZED, FOR SOLUTION INTRAVENOUS at 16:53

## 2018-02-16 RX ADMIN — PIPERACILLIN SODIUM AND TAZOBACTAM SODIUM 3.38 G: 36; 4.5 INJECTION, POWDER, FOR SOLUTION INTRAVENOUS at 23:21

## 2018-02-16 RX ADMIN — MORPHINE SULFATE 4 MG: 4 INJECTION, SOLUTION INTRAMUSCULAR; INTRAVENOUS at 17:14

## 2018-02-16 RX ADMIN — PIPERACILLIN SODIUM AND TAZOBACTAM SODIUM 3.38 G: 36; 4.5 INJECTION, POWDER, FOR SOLUTION INTRAVENOUS at 17:13

## 2018-02-16 RX ADMIN — RANITIDINE 300 MG: 150 TABLET, FILM COATED ORAL at 20:40

## 2018-02-16 RX ADMIN — ASPIRIN 81 MG CHEWABLE TABLET 81 MG: 81 TABLET CHEWABLE at 20:40

## 2018-02-16 RX ADMIN — HUMAN INSULIN 5 UNITS/HR: 100 INJECTION, SOLUTION SUBCUTANEOUS at 22:30

## 2018-02-16 RX ADMIN — IPRATROPIUM BROMIDE AND ALBUTEROL SULFATE 3 ML: .5; 3 SOLUTION RESPIRATORY (INHALATION) at 20:37

## 2018-02-16 ASSESSMENT — ENCOUNTER SYMPTOMS
DIAPHORESIS: 1
FEVER: 1
COLOR CHANGE: 1
CHILLS: 1
WOUND: 1

## 2018-02-16 NOTE — MR AVS SNAPSHOT
After Visit Summary   2/16/2018    Alessandra Pak    MRN: [id number]           Patient Information     Date Of Birth          1965        Visit Information        Provider Department      2/16/2018 2:00 PM Barrington Lewis DPM [facility]        Today's Diagnoses     Diabetic ulcer of right midfoot associated with type 2 diabetes mellitus, with fat layer exposed (H)    -  1    Type 2 diabetes mellitus with stage 3 chronic kidney disease, with long-term current use of insulin (H)        Shock (H)        Sepsis, due to unspecified organism (H)           Follow-ups after your visit        Your next 10 appointments already scheduled     Feb 19, 2018 11:40 AM CST   PHYSICAL with Brionna Dc MD   [facility]    [address] [facility] [address] [phone]            Feb 27, 2018  8:00 AM CST   (Arrive by 7:45 AM)   Implanted Defibulator with Uc Cv Device 1   [facility] ([facility])    [address] [place] [address]  [address] [place] [id number] [phone]            Feb 27, 2018  8:30 AM CST   (Arrive by 8:15 AM)   RETURN HEART FAILURE with John Villarreal MD   [facility] ([facility])    [address] [place] [address]  [address] [place] [id number] [phone]            Mar 02, 2018  8:30 AM CST   (Arrive by 8:15 AM)   RETURN DIABETES with Toya Nuno PA-C   [facility] ([facility])    [address] [place] [address]  [address] [facility] [address] [phone]            Apr 02, 2018  2:00 PM CDT   CT CHEST W/O CONTRAST with MGCT1   [facility]    [address] [place] [address] [phone]           Please bring any scans or X-rays taken at other hospitals, if similar tests were done. Also bring a list  of your medicines, including vitamins, minerals and over-the-counter drugs. It is safest to leave personal items at home.  Be sure to tell your doctor:   If you have any allergies.   If there s any chance you are pregnant.   If you are breastfeeding.  You do not need to do anything special to prepare for this exam.  Please wear loose clothing, such as a sweat suit or jogging clothes. Avoid snaps, zippers and other metal. We may ask you to undress and put on a hospital gown.            Apr 02, 2018  2:30 PM CDT   Office Visit with PFT LAB   [facility])    [address] [place] [id number] [phone]           Bring a current list of meds and any records pertaining to this visit. For Physicals, please bring immunization records and any forms needing to be filled out. Please arrive 10 minutes early to complete paperwork.            Apr 02, 2018  3:30 PM CDT   Return Visit with Rene Swartz MD   [facility])    [address] [place] [address] [phone]            Apr 06, 2018  1:50 PM CDT   (Arrive by 1:35 PM)   RETURN ENDOCRINE with Gisselle Maya MD   [facility] ([facility])    [address] [address] [phone]              Who to contact     Please call your clinic at [phone] to:    Ask questions about your health    Make or cancel appointments    Discuss your medicines    Learn about your test results    Speak to your doctor            Additional Information About Your Visit        MyChart Information     [employer] gives you secure access to your electronic health record. If you see a primary care provider, you can also send messages to your care team and make appointments. If you have questions, please call your primary care clinic.  If you do not have a primary care provider, please call  "[phone] and they will assist you.      [email or web address] is an electronic gateway that provides easy, online access to your medical records. With [email or web address], you can request a clinic appointment, read your test results, renew a prescription or communicate with your care team.     To access your existing account, please contact your [facility] or call [phone] for assistance.        Care EveryWhere ID     This is your Care EveryWhere ID. This could be used by other organizations to access your [facility] medical records  [id number]        Your Vitals Were     Pulse Temperature Height Pulse Oximetry BMI (Body Mass Index)       124 102.2  F (39  C) (Oral) 1.753 m (5' 9\") 78% 20.67 kg/m2        Blood Pressure from Last 3 Encounters:   02/18/18 95/62   02/16/18 135/84   02/05/18 124/70    Weight from Last 3 Encounters:   02/18/18 63 kg (139 lb)   02/16/18 63.5 kg (140 lb)   02/13/18 65.8 kg (145 lb)              We Performed the Following     DEBRIDE SKIN/SUBQ TISSUE        Primary Care Provider Office Phone # Fax #    Brionna Cobylucas Dc [phone] [address]  [facility] [address]        Equal Access to Services     [facility]: Hadii aad ku hadasho Soomaali, waaxda luqadaha, qaybta kaalmada adeegyada, waxay ganga hayaan kate levine . So [facility] [phone].    ATENCIÓN: Si habla español, tiene a nagy disposición servicios gratuitos de asistencia lingüística. Llame al [phone].    We comply with applicable federal civil rights laws and [place] laws. We do not discriminate on the basis of race, color, national origin, age, disability, sex, sexual orientation, or gender identity.            Thank you!     Thank you for choosing [facility]  for your care. Our goal is always to provide you with excellent care. Hearing back from our patients is one way we can continue to improve our services. Please take a few minutes to " complete the written survey that you may receive in the mail after your visit with us. Thank you!             Your Updated Medication List - Protect others around you: Learn how to safely use, store and throw away your medicines at www.disposemymeds.org.          This list is accurate as of 2/16/18  2:53 PM.  Always use your most recent med list.                   Brand Name Dispense Instructions for use Diagnosis    acetone (Urine) test Strp     25 each    1 strip by In Vitro route as needed    Type 2 diabetes mellitus with diabetic neuropathy (H)       albuterol 108 (90 BASE) MCG/ACT Inhaler    PROAIR HFA    1 Inhaler    Inhale 2 puffs into the lungs every 4 hours as needed for shortness of breath / dyspnea    SOB (shortness of breath)       amitriptyline 50 MG tablet    ELAVIL    90 tablet    Take 1 tablet (50 mg) by mouth At Bedtime    Anxiety       aspirin 81 MG tablet     100    1 tab po QD (Once per day)    Chronic pancreatitis (H)       * blood glucose monitoring lancets     3 Box    1 each See Admin Instructions test 3-4 times per day    Type 2 diabetes, HbA1C goal < 8% (H)       * blood glucose monitoring lancets     1 Box    Use to test blood sugar 10 times daily or as directed.  Ok to substitute alternative if insurance prefers.    Diabetes mellitus type 1 (H)       blood glucose monitoring test strip    HOLLIE CONTOUR NEXT    300 each    Use to test blood sugar 10 times daily or as directed.  Ok to substitute alternative if insurance prefers.    Diabetes mellitus type 1 (H)       COMBIVENT RESPIMAT  MCG/ACT inhaler   Generic drug:  Ipratropium-Albuterol      Inhale 1 puff into the lungs 4 times daily Do not exceed 6 doses/day.        doxycycline 100 MG capsule    VIBRAMYCIN    14 capsule    Take 1 capsule (100 mg) by mouth 2 times daily for 7 days    Cellulitis of foot       fluticasone 50 MCG/ACT spray    FLONASE    1 Bottle    Spray 2 sprays into left nostril daily    Nasal mass       furosemide  20 MG tablet    LASIX    90 tablet    TAKE 1 TABLET (20 MG) BY MOUTH DAILY    Nonischemic cardiomyopathy (H)       glucagon 1 MG kit    GLUCAGON EMERGENCY    1 mg    Inject 1 mg into the muscle once for 1 dose    Type 2 diabetes mellitus with diabetic neuropathy (H)       glucose 4 G Chew chewable tablet     25 tablet    Take 3-4 tablets to treat low blood sugar.    Uncontrolled diabetes mellitus with complications (H)       insulin aspart 100 UNITS/ML injection    NovoLOG VIAL    60 mL    Use as directed in insulin pump. Patient using up to 60 units per day    Type 2 diabetes mellitus with diabetic neuropathy (H)       insulin pen needle 31G X 5 MM    B-D U/F    3 each    Use 3 time(s) a day.    Type 2 diabetes, HbA1c goal < 7% (H)       lidocaine 5 % Patch    LIDODERM    30 patch    Place 1 patch onto the skin every 24 hours Apply patch up to 12 hours with in a 24 hour period.    Lumbar radiculopathy       lidocaine HCl 3 % cream      Apply topically 3 times daily as needed (foot pain) OTC product        lisinopril 10 MG tablet    PRINIVIL/ZESTRIL    90 tablet    Take 1 tablet (10 mg) by mouth daily    Nonischemic cardiomyopathy (H)       LYRICA 75 MG capsule   Generic drug:  pregabalin     90 capsule    1 BY MOUTH 3 TIMES A DAY    Controlled type 2 diabetes with neuropathy (H)       methadone 10 mg/mL Conc (HIGH CONC) solution    DOLPHINE-INTENSOL     Take 7 mLs by mouth daily.    Chemical dependency (H)       MIRENA (52 MG) 20 MCG/24HR IUD   Generic drug:  levonorgestrel     1    placed today    Excessive or frequent menstruation       multivitamin, therapeutic with minerals Tabs tablet     30 each    Take 1 tablet by mouth daily    Heart failure and kidney disease due to high blood pressure (H)       polyethylene glycol Packet    MIRALAX/GLYCOLAX     Take 17 g by mouth daily as needed for constipation        ranitidine 300 MG tablet    ZANTAC    90 tablet    Take 1 tablet (300 mg) by mouth daily     Gastroesophageal reflux disease without esophagitis       SM NICOTINE 21 MG/24HR 24 hr patch   Generic drug:  nicotine     28 patch    REMOVE OLD PATCH AND APPLY ONE NEW PATCH TO SKIN EVERY 24 HOURS    Tobacco dependence syndrome       * Notice:  This list has 2 medication(s) that are the same as other medications prescribed for you. Read the directions carefully, and ask your doctor or other care provider to review them with you.

## 2018-02-16 NOTE — IP AVS SNAPSHOT
[address] [address]    Phone:  [phone]                                       After Visit Summary   2/16/2018    Alessandra Pak    MRN: [id number]           After Visit Summary Signature Page     I have received my discharge instructions, and my questions have been answered. I have discussed any challenges I see with this plan with the nurse or doctor.    ..........................................................................................................................................  Patient/Patient Representative Signature      ..........................................................................................................................................  Patient Representative Print Name and Relationship to Patient    ..................................................               ................................................  Date                                            Time    ..........................................................................................................................................  Reviewed by Signature/Title    ...................................................              ..............................................  Date                                                            Time

## 2018-02-16 NOTE — PROGRESS NOTES
"Chief Complaint:   Chief Complaint   Patient presents with     RECHECK     f/u right foot pain and ulcer. pt states it is painful and worse than previous.          Allergies   Allergen Reactions     Naproxen GI Disturbance     No Known Drug Allergies          Subjective: Alessandra is a 52 year old female who presents to the clinic today for right foot pain, swelling, and new foot ulcer. She relates that the ulcer on the heel on the same foot healed without incident. She relates that she has had the wound on the bottom of the ball of the first toe for about 6 weeks. She relates over the last week or so, she has had a fever, night sweats, chills, and nausea. She relates that she notes an increased amount of pain and edema throughout the entire leg. She notes a discoloration around the joint of the big toe on the right. She has some laboured breathing while in the office.     Objective  T:102.2 P:124 O2:74 BP:135/84 5' 9\" 140 lbs 0 oz    A diabetic wound is noted at right  plantar 1st met head measuring approximately 1.5cm x 1.5cm x 0.1cm .    Lozano Classification: 2    Wound base: Pink/Granulation    Edges: hyperkeratotic. Skin is dark discolored and hot to touch.     Drainage: light/serous    Odor: no    Undermining: no    Bone Exposure: No    Clinical Signs of Infection: Yes: Warmth noted from digits to knee. Cellulitis. Pain. Abnormal vitals.     After obtaining patient consent, the wound was irrigated with copious amounts of saline. A scalpel and curette was then used to debride the wound into the subcutaneous tissue to see if there was an underlying abscess that could be found. No deeper structures other than the sub-Q were noted today. Given the patient's lack of sensation, no anesthesia was necessary for the procedure.       Assessment: She is currently hypoxic, tachycardic, and febrile with new right leg cellulitis.  Because of this, I have recommended that she immediately presented to the ED at [facility].  She agrees " to this.      Plan:   - Pt seen and evaluated  -The wound was debrided into the subcutaneous to see if I could see any areas of deep tracking.  I could not at this point.  Because of her hypoxic state, I was not able to do a full workup and get x-rays and labs.  She should have these done while in-house.  She may also need an MRI.  - Pt to present to ED. Daughter is driving her.   - Pt to return to clinic s/p hospitalization.

## 2018-02-16 NOTE — IP AVS SNAPSHOT
MRN:[id number]                      After Visit Summary   2/16/2018    Alessandra Pak    MRN: [id number]           Thank you!     Thank you for choosing [facility] for your care. Our goal is always to provide you with excellent care. Hearing back from our patients is one way we can continue to improve our services. Please take a few minutes to complete the written survey that you may receive in the mail after you visit with us. Thank you!        Patient Information     Date Of Birth          1965        Designated Caregiver       Most Recent Value    Caregiver    Will someone help with your care after discharge? yes    Name of designated caregiver Anna Gilliam    Phone number of caregiver [phone]    Caregiver address [place], [place]      About your hospital stay     You were admitted on:  February 16, 2018 You last received care in the:  [address] [facility]    You were discharged on:  February 23, 2018        Reason for your hospital stay       You were admitted with increased pain, swelling, and redness in the foot. You were treated with IV antibiotics and you underwent two bedside incision and drainage procedures by the orthopedics team. You discharged when medically stable and clinically improvement. You will discharge with IV antibiotics with plan to continue them for three weeks                  Who to Call     For medical emergencies, please call 911.  For non-urgent questions about your medical care, please call your primary care provider or clinic, [phone]          Attending Provider     Provider Specialty    Lizandro Henao MD Emergency Medicine    Jason, Raman Briceño MD Internal Medicine    Charissa Eric DO Internal Medicine    Steven Morse MD Internal Medicine       Primary Care Provider Office Phone # Fax #    Brionna Coby Dc [phone]       When to contact your care team       Call or return if you develop fever  >101, confusion, altered mental status, uncontrolled foot pain, increased redness/drainge/swelling/warmth in the right foot, shortness of breath, difficulty breathing, blood sugar less than 70 or greater than 350, or other symptoms of concern to you.                  After Care Instructions     Activity       Your activity upon discharge: activity as tolerated            Activity       Your activity upon discharge: activity as tolerated            Diet       Follow this diet upon discharge: carb controlled            Discharge Instructions       1. Continue antibiotics for three weeks. You will see infectious disease prior to stopping antibiotics  2. Continue wound cares of the right foot  3. Follow up with PCP within 1 week. You will need weekly labs with PCP while on antibiotics  4. No driving while on opiates            Supplies       List the supplies the pt needs to go home:  Please send pt home with dressing change supplies.                  Follow-up Appointments     Follow Up and recommended labs and tests       Follow up with PCP, Dr. Mary Dc, within 1 week. Will need weekly CBC, CMP, CRP, and Vanc trough levels. Please fax results to infectious disease at [phone]    Follow up with ID within the next three weeks/before stopping antibiotics- Scheduled on 3/12/18    Follow up with Dr. Lewis of podiatry following discharge                  Your next 10 appointments already scheduled     Feb 23, 2018  2:00 PM CST   Peripherally Inserted Central Catheter (PICC) & IV Med - [address] [place] [address] with Christine M Klisch, RN   [facility] ([facility])    [address] [facility] [address]              Appointment is located at [address] [place] [id number]            Feb 27, 2018  8:00 AM CST   (Arrive by 7:45 AM)   Implanted Defibulator with Uc Cv Device 1    [facility] [place]  [facility])    [address]  [address]  [facility] [address]   [phone]            Feb 27, 2018  8:30 AM CST   (Arrive by 8:15 AM)   RETURN HEART FAILURE with John Villarreal MD   [facility] ([facility])    [address]  [address]  [facility] [address]   [phone]            Mar 02, 2018  8:30 AM CST   (Arrive by 8:15 AM)   RETURN DIABETES with Toya Nuno PA-C   [facility] Endocrinology ([facility])    [address]  3rd Floor  [facility] [address]   [phone]            Mar 12, 2018 12:30 PM CDT   (Arrive by 12:15 PM)   Return Visit with Hoda Madison MD   [facility] and Infectious Diseases ([facility])    [address]  [address]  [facility] [address]   [phone]            Apr 02, 2018  2:00 PM CDT   CT CHEST W/O CONTRAST with [facility]CT[facility]   [facility]    [address] [place] [address] [phone]           Please bring any scans or X-rays taken at other hospitals, if similar tests were done. Also bring a list of your medicines, including vitamins, minerals and over-the-counter drugs. It is safest to leave personal items at home.  Be sure to tell your doctor:   If you have any allergies.   If there s any chance you are pregnant.   If you are breastfeeding.  You do not need to do anything special to prepare for this exam.  Please wear loose clothing, such as a sweat suit or jogging clothes. Avoid snaps, zippers and other metal. We may ask you to undress and put on a hospital gown.            Apr 02, 2018  2:30 PM CDT   [facility] Visit with [facility] LAB   [facility]    [address] [address] [place] [address] [phone]           Bring a current list of meds and any records pertaining to this visit. For  Physicals, please bring immunization records and any forms needing to be filled out. Please arrive 10 minutes early to complete paperwork.            Apr 02, 2018  3:30 PM CDT   Return Visit with Rene Swartz MD   [facility]    [address] [place] [id number] [phone]            Apr 06, 2018  1:50 PM CDT   (Arrive by 1:35 PM)   RETURN ENDOCRINE with Gisselle Maya MD   [facility] ([facility])    [address] [place] [address]  3rd [facility] [address] [phone]              Additional Services     Home care nursing referral       RN skilled nursing visit. RN to assess vital signs and weight, respiratory and cardiac status, pain level and activity tolerance, incision for signs/symptoms of infection, hydration, nutrition and bowel status and wound care.  RN to teach wound care and management    Current wound care orders read:   Treatment Plan  Sween 24 lotion to intact skin on feet, avoid webbing between the toes.     Right plantar foot wound:  -cleanse wound with microklenz spray and gauze.    -Cut a piece of Hydrofera blue dressing to the size of the wound.    -Moisten the dressing with sterile NS and squeeze out excess saline.  Pack into wound.    -Cover with dry gauze.    -Secure with kerlix and loose ABD wrap.   Change daily      Left lateral heel wound:  Cleanse with microklenz or sterile NS.  Apply Iodosorb gel nickel thick into wound bed.  Cover with dry gauze and secure with kerlix roll gauze.  Change daily.    [facility]  Phone [phone]  Fax  [phone]      _______________________  [facility]  Phone  [phone]  Fax  [phone]  ______________________     Your provider has ordered home care nursing services. If you have not been contacted within 2 days of your discharge please call the inpatient department phone number at [phone] .            Home  infusion referral       Your provider has referred you to:   [facility]  Phone [phone]  Fax  [phone]        Local Address (if different from home address): NA    Anticipated Length of Therapy: Vancomycin IV (goal trough 15-20) -plan to continue up until her ID appointment on 3/12  Pt will follow-up in ID clinic with Dr. Madison on March 12th, at 12:30pm.     Please obtain weekly CBC, CMP, CRP and vancomycin trough with results faxed to [facility], attn: Dr. Madison  Home Infusion Pharmacist to adjust therapy based on labs and clinical assessments: Yes    Labs:  May draw labs from Venous Catheter: Yes  Home Infusion Pharmacist to order labs based on therapy type and clinical assessments: Yes      Agency Staff to assess nursing needs for Infusion Therapy.    Access Device Management:  IV Access Type: PICC  Flush with Heparin and Normal Saline IVP PRN and routine site care (per agency protocol) to maintain access device? Yes                  Future tests that were ordered for you     CRP inflammation           Vancomycin           CBC with platelets differential       Last Lab Result: Hemoglobin (g/dL)       Date                     Value                 02/22/2018               9.1 (L)          ----------            Comprehensive metabolic panel                 Further instructions from your care team       2/23/18 revised wound care instructions:  -Cleanse wounds with microklenz spray and gauze.  -Using a sterile Q-tip, Pack Iodoform packing strip into tunnels on the top of foot and incisional wound on bottom of foot   -Apply Iodosorb gel to large wound on bottom of the right foot and the heel wound on the left.   -Cover all wounds with dry gauze or ABD pad.    -Secure with kerlix roll gauze.   -Secure kerlix roll gauze with loose ACE wrap.   Change dressings daily.    After 7 days, discontinue the Iodoform packing into the tunnels and start packing with Nu-Gauze packing strip.   "      Pending Results     Date and Time Order Name Status Description    2/19/2018 2045 Blood culture Preliminary     2/19/2018 2045 Blood culture Preliminary     2/19/2018 1131 Anaerobic bacterial culture Preliminary     2/18/2018 0822 Blood culture Preliminary     2/18/2018 0822 Blood culture Preliminary             Statement of Approval     Ordered          02/23/18 1116  I have reviewed and agree with all the recommendations and orders detailed in this document.  EFFECTIVE NOW     Approved and electronically signed by:  Karely Caraballo PA-C           02/18/18 0851  I have reviewed and agree with all the recommendations and orders detailed in this document.  EFFECTIVE NOW     Approved and electronically signed by:  Camilla Soler PA             Admission Information     Date & Time Provider Department Dept. Phone    2/16/2018 Steven Morse MD [address] [facility] [phone]      Your Vitals Were     Blood Pressure Pulse Temperature Respirations Height Weight    109/88 (BP Location: Left arm) 63 98.5  F (36.9  C) (Oral) 16 1.753 m (5' 9\") 63.5 kg (139 lb 15.9 oz)    Pulse Oximetry BMI (Body Mass Index)                97% 20.67 kg/m2          [facility] Information     [facility] gives you secure access to your electronic health record. If you see a primary care provider, you can also send messages to your care team and make appointments. If you have questions, please call your primary care clinic.  If you do not have a primary care provider, please call [phone] and they will assist you.        Care EveryWhere ID     This is your Care EveryWhere ID. This could be used by other organizations to access your [facility] medical records  [id number]        Equal Access to Services     [facility]: Chiqui Terrell, wacaitlin luoscar, qaybta sanjay milner. So [facility] [phone].    ATENCIÓN: Si habla español, tiene a nagy disposición " servicios gratuitos de asistencia lingüística. Rodriguez roche [phone].    We comply with applicable federal civil rights laws and [place] laws. We do not discriminate on the basis of race, color, national origin, age, disability, sex, sexual orientation, or gender identity.               Review of your medicines      START taking        Dose / Directions    gabapentin 8 % Gel topical PLO cream   Used for:  Other chronic pain        Dose:  1 g   Apply 1 g topically every 8 hours   Quantity:  50 g   Refills:  0       Lidocaine 4 % Patch   Commonly known as:  LIDOCARE   Used for:  Other chronic pain   Replaces:  lidocaine 5 % Patch        Dose:  3 patch   Place 3 patches onto the skin every 24 hours   Quantity:  30 patch   Refills:  0       oxyCODONE IR 5 MG tablet   Commonly known as:  ROXICODONE   Used for:  Diabetic ulcer of right midfoot associated with type 1 diabetes mellitus, unspecified ulcer stage (H)        Dose:  5 mg   Take 1 tablet (5 mg) by mouth every 6 hours as needed for moderate to severe pain   Quantity:  12 tablet   Refills:  0       vancomycin 750 mg   Indication:  Infection of the Skin and/or Related Soft Tissue   Used for:  Cellulitis of foot, Diabetic ulcer of right midfoot associated with type 1 diabetes mellitus, unspecified ulcer stage (H)        Dose:  750 mg   Inject 750 mg into the vein every 12 hours   Quantity:  35 Units   Refills:  0         CONTINUE these medicines which may have CHANGED, or have new prescriptions. If we are uncertain of the size of tablets/capsules you have at home, strength may be listed as something that might have changed.        Dose / Directions    furosemide 20 MG tablet   Commonly known as:  LASIX   This may have changed:  See the new instructions.   Used for:  Hypertension goal BP (blood pressure) < 140/90        Dose:  20 mg   Take 1 tablet (20 mg) by mouth daily   Quantity:  30 tablet   Refills:  0       MIRENA (52 MG) 20 MCG/24HR IUD   This may have  changed:  See the new instructions.   Used for:  Excessive or frequent menstruation   Generic drug:  levonorgestrel        placed today   Quantity:  1   Refills:  0         CONTINUE these medicines which have NOT CHANGED        Dose / Directions    acetone (Urine) test Strp   Used for:  Type 2 diabetes mellitus with diabetic neuropathy (H)        Dose:  1 strip   1 strip by In Vitro route as needed   Quantity:  25 each   Refills:  1       albuterol 108 (90 BASE) MCG/ACT Inhaler   Commonly known as:  PROAIR HFA   Used for:  SOB (shortness of breath)        Dose:  2 puff   Inhale 2 puffs into the lungs every 4 hours as needed for shortness of breath / dyspnea   Quantity:  1 Inhaler   Refills:  5       amitriptyline 50 MG tablet   Commonly known as:  ELAVIL   Used for:  Anxiety        Dose:  50 mg   Take 1 tablet (50 mg) by mouth At Bedtime   Quantity:  90 tablet   Refills:  3       aspirin 81 MG tablet   Used for:  Chronic pancreatitis (H)        1 tab po QD (Once per day)   Quantity:  100   Refills:  3       * blood glucose monitoring lancets   Used for:  Type 2 diabetes, HbA1C goal < 8% (H)        Dose:  1 each   1 each See Admin Instructions test 3-4 times per day   Quantity:  3 Box   Refills:  3       * blood glucose monitoring lancets   Used for:  Diabetes mellitus type 1 (H)        Use to test blood sugar 10 times daily or as directed.  Ok to substitute alternative if insurance prefers.   Quantity:  1 Box   Refills:  prn       blood glucose monitoring test strip   Commonly known as:  HOLLIE CONTOUR NEXT   Used for:  Diabetes mellitus type 1 (H)        Use to test blood sugar 10 times daily or as directed.  Ok to substitute alternative if insurance prefers.   Quantity:  300 each   Refills:  12       COMBIVENT RESPIMAT  MCG/ACT inhaler   Generic drug:  Ipratropium-Albuterol        Dose:  1 puff   Inhale 1 puff into the lungs 4 times daily Do not exceed 6 doses/day.   Refills:  0       fluticasone 50 MCG/ACT  spray   Commonly known as:  FLONASE   Used for:  Nasal mass        Dose:  2 spray   Spray 2 sprays into left nostril daily   Quantity:  1 Bottle   Refills:  11       glucose 4 G Chew chewable tablet   Used for:  Uncontrolled diabetes mellitus with complications (H)        Take 3-4 tablets to treat low blood sugar.   Quantity:  25 tablet   Refills:  11       insulin aspart 100 UNITS/ML injection   Commonly known as:  NovoLOG VIAL   Used for:  Type 2 diabetes mellitus with diabetic neuropathy (H)        Use as directed in insulin pump. Patient using up to 60 units per day   Quantity:  60 mL   Refills:  3       insulin pen needle 31G X 5 MM   Commonly known as:  B-D U/F   Used for:  Type 2 diabetes, HbA1c goal < 7% (H)        Use 3 time(s) a day.   Quantity:  3 each   Refills:  3       lidocaine HCl 3 % cream        Apply topically 3 times daily as needed (foot pain) OTC product   Refills:  0       lisinopril 10 MG tablet   Commonly known as:  PRINIVIL/ZESTRIL   Used for:  Hypertension goal BP (blood pressure) < 140/90        Dose:  10 mg   Take 1 tablet (10 mg) by mouth daily   Quantity:  30 tablet   Refills:  0       LYRICA 75 MG capsule   Used for:  Controlled type 2 diabetes with neuropathy (H)   Generic drug:  pregabalin        1 BY MOUTH 3 TIMES A DAY   Quantity:  90 capsule   Refills:  5       methadone 10 mg/mL Conc (HIGH CONC) solution   Commonly known as:  DOLPHINE-INTENSOL   Used for:  Chemical dependency (H)        Dose:  70 mg   Take 7 mLs by mouth daily.   Refills:  0       multivitamin, therapeutic with minerals Tabs tablet   Used for:  Heart failure and kidney disease due to high blood pressure (H)        Dose:  1 tablet   Take 1 tablet by mouth daily   Quantity:  30 each   Refills:  11       polyethylene glycol Packet   Commonly known as:  MIRALAX/GLYCOLAX        Dose:  17 g   Take 17 g by mouth daily as needed for constipation   Refills:  0       ranitidine 300 MG tablet   Commonly known as:  ZANTAC    Used for:  Gastroesophageal reflux disease without esophagitis        Dose:  300 mg   Take 1 tablet (300 mg) by mouth daily   Quantity:  90 tablet   Refills:  3       SM NICOTINE 21 MG/24HR 24 hr patch   Used for:  Tobacco dependence syndrome   Generic drug:  nicotine        REMOVE OLD PATCH AND APPLY ONE NEW PATCH TO SKIN EVERY 24 HOURS   Quantity:  28 patch   Refills:  1       * Notice:  This list has 2 medication(s) that are the same as other medications prescribed for you. Read the directions carefully, and ask your doctor or other care provider to review them with you.      STOP taking     glucagon 1 MG kit   Commonly known as:  GLUCAGON EMERGENCY           lidocaine 5 % Patch   Commonly known as:  LIDODERM   Replaced by:  Lidocaine 4 % Patch                Where to get your medicines      These medications were sent to [facility] [place] [place] - [address] [address]     Phone:  [phone]     furosemide 20 MG tablet    Lidocaine 4 % Patch    lisinopril 10 MG tablet         Some of these will need a paper prescription and others can be bought over the counter. Ask your nurse if you have questions.     Bring a paper prescription for each of these medications     gabapentin 8 % Gel topical PLO cream    oxyCODONE IR 5 MG tablet    vancomycin 750 mg                Protect others around you: Learn how to safely use, store and throw away your medicines at www.disposemymeds.org.        ANTIBIOTIC INSTRUCTION     You've Been Prescribed an Antibiotic - Now What?  Your healthcare team thinks that you or your loved one might have an infection. Some infections can be treated with antibiotics, which are powerful, life-saving drugs. Like all medications, antibiotics have side effects and should only be used when necessary. There are some important things you should know about your antibiotic treatment.      Your healthcare team may run tests before you start  taking an antibiotic.    Your team may take samples (e.g., from your blood, urine or other areas) to run tests to look for bacteria. These test can be important to determine if you need an antibiotic at all and, if you do, which antibiotic will work best.      Within a few days, your healthcare team might change or even stop your antibiotic.    Your team may start you on an antibiotic while they are working to find out what is making you sick.    Your team might change your antibiotic because test results show that a different antibiotic would be better to treat your infection.    In some cases, once your team has more information, they learn that you do not need an antibiotic at all. They may find out that you don't have an infection, or that the antibiotic you're taking won't work against your infection. For example, an infection caused by a virus can't be treated with antibiotics. Staying on an antibiotic when you don't need it is more likely to be harmful than helpful.      You may experience side effects from your antibiotic.    Like all medications, antibiotics have side effects. Some of these can be serious.    Let you healthcare team know if you have any known allergies when you are admitted to the hospital.    One significant side effect of nearly all antibiotics is the risk of severe and sometimes deadly diarrhea caused by Clostridium difficile (C. Difficile). This occurs when a person takes antibiotics because some good germs are destroyed. Antibiotic use allows C. diificile to take over, putting patients at high risk for this serious infection.    As a patient or caregiver, it is important to understand your or your loved one's antibiotic treatment. It is especially important for caregivers to speak up when patients can't speak for themselves. Here are some important questions to ask your healthcare team.    What infection is this antibiotic treating and how do you know I have that infection?    What  side effects might occur from this antibiotic?    How long will I need to take this antibiotic?    Is it safe to take this antibiotic with other medications or supplements (e.g., vitamins) that I am taking?     Are there any special directions I need to know about taking this antibiotic? For example, should I take it with food?    How will I be monitored to know whether my infection is responding to the antibiotic?    What tests may help to make sure the right antibiotic is prescribed for me?      Information provided by:  www.cdc.gov/getsmart  U.S. Department of Health and Human Services  Centers for disease Control and Prevention  National Center for Emerging and Zoonotic Infectious Diseases  Division of Healthcare Quality Promotion        Information about OPIOIDS     PRESCRIPTION OPIOIDS: WHAT YOU NEED TO KNOW    Prescription opioids can be used to help relieve moderate to severe pain and are often prescribed following a surgery or injury, or for certain health conditions. These medications can be an important part of treatment but also come with serious risks. It is important to work with your health care provider to make sure you are getting the safest, most effective care.    WHAT ARE THE RISKS AND SIDE EFFECTS OF OPIOID USE?  Prescription opioids carry serious risks of addiction and overdose, especially with prolonged use. An opioid overdose, often marked by slowed breathing can cause sudden death. The use of prescription opioids can have a number of side effects as well, even when taken as directed:      Tolerance - meaning you might need to take more of a medication for the same pain relief    Physical dependence - meaning you have symptoms of withdrawal when a medication is stopped    Increased sensitivity to pain    Constipation    Nausea, vomiting, and dry mouth    Sleepiness and dizziness    Confusion    Depression    Low levels of testosterone that can result in lower sex drive, energy, and  strength    Itching and sweating    RISKS ARE GREATER WITH:    History of drug misuse, substance use disorder, or overdose    Mental health conditions (such as depression or anxiety)    Sleep apnea    Older age (65 years or older)    Pregnancy    Avoid alcohol while taking prescription opioids.   Also, unless specifically advised by your health care provider, medications to avoid include:    Benzodiazepines (such as Xanax or Valium)    Muscle relaxants (such as Soma or Flexeril)    Hypnotics (such as Ambien or Lunesta)    Other prescription opioids    KNOW YOUR OPTIONS:  Talk to your health care provider about ways to manage your pain that do not involve prescription opioids. Some of these options may actually work better and have fewer risks and side effects:    Pain relievers such as acetaminophen, ibuprofen, and naproxen    Some medications that are also used for depression or seizures    Physical therapy and exercise    Cognitive behavioral therapy, a psychological, goal-directed approach, in which patients learn how to modify physical, behavioral, and emotional triggers of pain and stress    IF YOU ARE PRESCRIBED OPIOIDS FOR PAIN:    Never take opioids in greater amounts or more often than prescribed    Follow up with your primary health care provider and work together to create a plan on how to manage your pain.    Talk about ways to help manage your pain that do not involve prescription opioids    Talk about all concerns and side effects    Help prevent misuse and abuse    Never sell or share prescription opioids    Never use another person's prescription opioids    Store prescription opioids in a secure place and out of reach of others (this may include visitors, children, friends, and family)    Visit www.cdc.gov/drugoverdose to learn about risks of opioid abuse and overdose    If you believe you may be struggling with addiction, tell your health care provider and ask for guidance or call [facility] National  Helpline at 8-953-358-HELP    LEARN MORE / www.cdc.gov/drugoverdose/prescribing/guideline.html    Safely dispose of unused prescription opioids: Find your local drug take-back programs and more information about the importance of safe disposal at www.doseofreality.mn.gov             Medication List: This is a list of all your medications and when to take them. Check marks below indicate your daily home schedule. Keep this list as a reference.      Medications           Morning Afternoon Evening Bedtime As Needed    acetone (Urine) test Strp   1 strip by In Vitro route as needed                                albuterol 108 (90 BASE) MCG/ACT Inhaler   Commonly known as:  PROAIR HFA   Inhale 2 puffs into the lungs every 4 hours as needed for shortness of breath / dyspnea   Last time this was given:  2 puffs on 2/23/2018 12:01 PM                                amitriptyline 50 MG tablet   Commonly known as:  ELAVIL   Take 1 tablet (50 mg) by mouth At Bedtime   Last time this was given:  50 mg on 2/22/2018  9:32 PM                                aspirin 81 MG tablet   1 tab po QD (Once per day)                                * blood glucose monitoring lancets   1 each See Admin Instructions test 3-4 times per day                                * blood glucose monitoring lancets   Use to test blood sugar 10 times daily or as directed.  Ok to substitute alternative if insurance prefers.                                blood glucose monitoring test strip   Commonly known as:  HOLLIE CONTOUR NEXT   Use to test blood sugar 10 times daily or as directed.  Ok to substitute alternative if insurance prefers.                                COMBIVENT RESPIMAT  MCG/ACT inhaler   Inhale 1 puff into the lungs 4 times daily Do not exceed 6 doses/day.   Generic drug:  Ipratropium-Albuterol                                fluticasone 50 MCG/ACT spray   Commonly known as:  FLONASE   Spray 2 sprays into left nostril daily   Last time  this was given:  2 sprays on 2/22/2018  9:05 AM                                furosemide 20 MG tablet   Commonly known as:  LASIX   Take 1 tablet (20 mg) by mouth daily   Last time this was given:  20 mg on 2/23/2018  9:15 AM                                gabapentin 8 % Gel topical PLO cream   Apply 1 g topically every 8 hours   Last time this was given:  2/22/2018  8:38 PM                                glucose 4 G Chew chewable tablet   Take 3-4 tablets to treat low blood sugar.                                insulin aspart 100 UNITS/ML injection   Commonly known as:  NovoLOG VIAL   Use as directed in insulin pump. Patient using up to 60 units per day                                insulin pen needle 31G X 5 MM   Commonly known as:  B-D U/F   Use 3 time(s) a day.                                Lidocaine 4 % Patch   Commonly known as:  LIDOCARE   Place 3 patches onto the skin every 24 hours   Last time this was given:  2 patches on 2/23/2018  9:17 AM                                lidocaine HCl 3 % cream   Apply topically 3 times daily as needed (foot pain) OTC product                                lisinopril 10 MG tablet   Commonly known as:  PRINIVIL/ZESTRIL   Take 1 tablet (10 mg) by mouth daily   Last time this was given:  10 mg on 2/23/2018  9:16 AM                                LYRICA 75 MG capsule   1 BY MOUTH 3 TIMES A DAY   Last time this was given:  75 mg on 2/23/2018  9:16 AM   Generic drug:  pregabalin                                methadone 10 mg/mL Conc (HIGH CONC) solution   Commonly known as:  DOLPHINE-INTENSOL   Take 7 mLs by mouth daily.                                MIRENA (52 MG) 20 MCG/24HR IUD   placed today   Generic drug:  levonorgestrel                                multivitamin, therapeutic with minerals Tabs tablet   Take 1 tablet by mouth daily                                oxyCODONE IR 5 MG tablet   Commonly known as:  ROXICODONE   Take 1 tablet (5 mg) by mouth every 6 hours as  needed for moderate to severe pain   Last time this was given:  5 mg on 2/23/2018  9:16 AM                                polyethylene glycol Packet   Commonly known as:  MIRALAX/GLYCOLAX   Take 17 g by mouth daily as needed for constipation   Last time this was given:  17 g on 2/23/2018  9:17 AM                                ranitidine 300 MG tablet   Commonly known as:  ZANTAC   Take 1 tablet (300 mg) by mouth daily   Last time this was given:  300 mg on 2/23/2018  9:15 AM                                SM NICOTINE 21 MG/24HR 24 hr patch   REMOVE OLD PATCH AND APPLY ONE NEW PATCH TO SKIN EVERY 24 HOURS   Generic drug:  nicotine                                vancomycin 750 mg   Inject 750 mg into the vein every 12 hours   Last time this was given:  750 mg on 2/23/2018 12:02 PM                                * Notice:  This list has 2 medication(s) that are the same as other medications prescribed for you. Read the directions carefully, and ask your doctor or other care provider to review them with you.

## 2018-02-16 NOTE — PHARMACY-VANCOMYCIN DOSING SERVICE
Pharmacy Vancomycin Initial Note  Date of Service 2018  Patient's  1965  52 year old female    Indication: Skin and Soft Tissue Infection/diabetic foot infection, concern for MRSA    Current estimated CrCl = Estimated Creatinine Clearance: 45 mL/min (based on Cr of 1.5).    Creatinine for last 3 days  2018:  3:28 PM Creatinine 1.50 mg/dL    Recent Vancomycin Level(s) for last 3 days  No results found for requested labs within last 72 hours.      Vancomycin IV Administrations (past 72 hours)      No vancomycin orders with administrations in past 72 hours.                Nephrotoxins and other renal medications (Future)    Start     Dose/Rate Route Frequency Ordered Stop    18 1627  vancomycin place hicks - receiving intermittent dosing      1 each Does not apply SEE ADMIN INSTRUCTIONS 18 1628      18 1522  vancomycin (VANCOCIN) 1,250 mg in sodium chloride 0.9 % 250 mL intermittent infusion      1,250 mg  over 90 Minutes Intravenous ONCE 18 1522          Contrast Orders - past 72 hours     None            Plan:  1.  Give vancomycin 1250mg (20mg/kg) x1 in ED and follow with intermittent dosing until creatinine trend can be evaluated as patient appears to have an NIKOLAY (baseline SCr 0.8-1, SCr today 1.5).   2.  Goal Trough Level: 15-20 mg/L   3.  Pharmacy will check trough levels as appropriate in 1-3 Days.    4. Serum creatinine levels will be ordered daily for the first week of therapy and at least twice weekly for subsequent weeks.    5. [place] method utilized to dose vancomycin therapy: Method 2    Park Degroot, PharmD

## 2018-02-16 NOTE — LETTER
"2/16/2018       RE: Alessandra Pak  [address] [address]     Dear Colleague,    Thank you for referring your patient, Alessandra Pak, to the [facility] at [facility]. Please see a copy of my visit note below.    Chief Complaint:   Chief Complaint   Patient presents with     RECHECK     f/u right foot pain and ulcer. pt states it is painful and worse than previous.          Allergies   Allergen Reactions     Naproxen GI Disturbance     No Known Drug Allergies          Subjective: Alessandra is a 52 year old female who presents to the clinic today for right foot pain, swelling, and new foot ulcer. She relates that the ulcer on the heel on the same foot healed without incident. She relates that she has had the wound on the bottom of the ball of the first toe for about 6 weeks. She relates over the last week or so, she has had a fever, night sweats, chills, and nausea. She relates that she notes an increased amount of pain and edema throughout the entire leg. She notes a discoloration around the joint of the big toe on the right. She has some laboured breathing while in the office.     Objective  T:102.2 P:124 O2:74 BP:135/84 5' 9\" 140 lbs 0 oz    A diabetic wound is noted at right  plantar 1st met head measuring approximately 1.5cm x 1.5cm x 0.1cm .    Lozano Classification: 2    Wound base: Pink/Granulation    Edges: hyperkeratotic. Skin is dark discolored and hot to touch.     Drainage: light/serous    Odor: no    Undermining: no    Bone Exposure: No    Clinical Signs of Infection: Yes: Warmth noted from digits to knee. Cellulitis. Pain. Abnormal vitals.     After obtaining patient consent, the wound was irrigated with copious amounts of saline. A scalpel and curette was then used to debride the wound into the subcutaneous tissue to see if there was an underlying abscess that could be found. No deeper structures other than the sub-Q were " noted today. Given the patient's lack of sensation, no anesthesia was necessary for the procedure.       Assessment: She is currently hypoxic, tachycardic, and febrile with new right leg cellulitis.  Because of this, I have recommended that she immediately presented to the ED at [facility].  She agrees to this.      Plan:   - Pt seen and evaluated  -The wound was debrided into the subcutaneous to see if I could see any areas of deep tracking.  I could not at this point.  Because of her hypoxic state, I was not able to do a full workup and get x-rays and labs.  She should have these done while in-house.  She may also need an MRI.  - Pt to present to ED. Daughter is driving her.   - Pt to return to clinic s/p hospitalization. ,    Barrington Lewis DPM

## 2018-02-16 NOTE — ED PROVIDER NOTES
History     Chief Complaint   Patient presents with     Foot Pain     HPI  Alessandra Pak is a 52 year old female with a history of non-ischemic cardiomyopathy (EF 15%), HTN, DM2, asthma, COPD, systolic CHF, GERD, and depression who presents from the [facility] for further evaluation and management of a right foot wound. Per chart review, patient was seen and evaluated in the Orthopedic Clinic this afternoon for follow-up of right foot redness, pain, and swelling secondary to a right foot ulcer on the ball of her foot. In clinic she was found to be febrile with a temperature of 102.2  F and hypoxic with oxygen saturations of 74% on RA, so she was sent here to the ED for further evaluation. Patient reports she has had the ulcer on the ball of her foot for the past six weeks, but over the past few days has had increased pain, redness, and swelling in the foot with associated hot/cold flashes and subjective fevers. On arrival here in the ED patient has oxygen saturations of 89% on RA and a low-grade temperature of 100.4  F. She has not taken any medications for her fever or pain prior to arrival. She was a smoker. She is not on home oxygen.     I have reviewed the Medications, Allergies, Past Medical and Surgical History, and Social History in the [employer] system.  Past Medical History:   Diagnosis Date     Abdominal pain, right upper quadrant     [place] [facility]     ASCUS with positive high risk HPV 8/2013    + HPV 33, [place] - GAVIN I, ECC- atypia     Cardiomyopathy (H)     non ischemic cardiomyopathy with EF 15     Cervical high risk HPV (human papillomavirus) test positive 7/8/15, 7/25/16    NIL pap/+ HR HPV (not 16 or 18).      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    leep     Depressive disorder      Gastro-oesophageal reflux disease      Human papillomavirus in conditions classified elsewhere and of unspecified site 2/2012    + HPV 33     Hypertension      Profound impairment, one eye, impairment  level not further specified     rt eye due to childhood injury     Systolic CHF (H) 3/12/2015     Tobacco abuse 5/18/2013     Type 2 diabetes mellitus without complications (H)      Uncomplicated asthma        Past Surgical History:   Procedure Laterality Date     C NONSPECIFIC PROCEDURE  2001    cholecystectomy     C NONSPECIFIC PROCEDURE  as a child    tonsillectomy     C NONSPECIFIC PROCEDURE  2001    whipple procedure     CARDIAC SURGERY      defib     COLPOSCOPY,LOOP ELECTRD CERVIX EXCIS  2002, 2011    stage 2 dysplasia     ENDOBRONCHIAL ULTRASOUND FLEXIBLE N/A 2/19/2015    Procedure: ENDOBRONCHIAL ULTRASOUND FLEXIBLE;  Surgeon: Brenden Tamez MD;  Location: [facility]     LEEP TX, CERVICAL  2014    LEEP TX Cervical     RECESSION RESECTION WITH ADJUSTABLE SUTURE  12/13/2011    Procedure:RECESSION RESECTION WITH ADJUSTABLE SUTURE; Right Strabismus Repair with Adjustable Suture       [facility]  [facility]        Family History   Problem Relation Age of Onset     DIABETES Mother      diet controled     Hypertension Mother      Arthritis Mother      Lipids Mother      DIABETES Father      Hypertension Father      GASTROINTESTINAL DISEASE Father      gallbladder removed     Bipolar Disorder Brother      Thyroid Disease Brother      Obesity Other      Son     Respiratory Other      Son and Daughter; asthma     Depression Maternal Aunt      Anxiety Disorder Maternal Aunt        Social History   Substance Use Topics     Smoking status: Former Smoker     Packs/day: 0.30     Years: 15.00     Types: Cigarettes     Smokeless tobacco: Former User     Quit date: 10/29/2014      Comment: Started smoking in 89/ smokes about 3 per day     Alcohol use No       Current Facility-Administered Medications   Medication     sodium chloride 0.9% infusion     vancomycin (VANCOCIN) 1,250 mg in sodium chloride 0.9 % 250 mL intermittent infusion     vancomycin place hicks - receiving intermittent dosing      "piperacillin-tazobactam (ZOSYN) 3.375g in 15 mL NS Premix Syringe     morphine (PF) injection 4 mg     Current Outpatient Prescriptions   Medication     fluticasone (FLONASE) 50 MCG/ACT spray     lisinopril (PRINIVIL/ZESTRIL) 10 MG tablet     Ipratropium-Albuterol (COMBIVENT RESPIMAT)  MCG/ACT inhaler     amitriptyline (ELAVIL) 50 MG tablet     furosemide (LASIX) 20 MG tablet     insulin aspart (NOVOLOG VIAL) 100 UNITS/ML injection     LYRICA 75 MG capsule     ranitidine (ZANTAC) 300 MG tablet     study - lidocaine, LMX, (IDS# 4490) 4 % CREA     lidocaine (LIDODERM) 5 % Patch     albuterol (ALBUTEROL) 108 (90 BASE) MCG/ACT Inhaler     SM NICOTINE 21 MG/24HR 24 hr patch     blood glucose monitoring (HOLLIE CONTOUR NEXT) test strip     blood glucose monitoring (HOLLIE MICROLET) lancets     acetone, Urine, test STRP     glucagon (GLUCAGON EMERGENCY) 1 MG injection     multivitamin, therapeutic with minerals (THERA-VIT-M) TABS     polyethylene glycol (MIRALAX/GLYCOLAX) packet     insulin pen needle (B-D U/F) 31G X 5 MM     blood glucose monitoring (FREESTYLE) lancets     glucose 4 G CHEW     methadone (DOLPHINE-INTENSOL) 10 mg/mL CONC     MIRENA 20 MCG/24HR IU IUD     ASPIRIN 81 MG OR TABS        Allergies   Allergen Reactions     Naproxen GI Disturbance     No Known Drug Allergies        Review of Systems   Constitutional: Positive for chills, diaphoresis and fever.   Musculoskeletal:        Positive for R foot pain/swelling   Skin: Positive for color change (R foot redness) and wound (right foot ulcer).   All other systems reviewed and are negative.      Physical Exam   BP: 129/74  Pulse: 125  Temp: 100.4  F (38  C)  Resp: 18  Height: 175.3 cm (5' 9\")  Weight: 63.5 kg (140 lb 1.6 oz)  SpO2: (!) 89 %      Physical Exam   Constitutional: She is oriented to person, place, and time. She appears well-developed and well-nourished. She appears distressed.   HENT:   Mouth/Throat: Oropharynx is clear and moist.   Eyes: " Pupils are equal, round, and reactive to light.   Neck: Neck supple.   Cardiovascular: Tachycardia present.    Pulmonary/Chest: Effort normal and breath sounds normal.   Abdominal: Soft.   Musculoskeletal:        Right foot: There is tenderness, bony tenderness and swelling.        Feet:    Cellulitis and swelling of the right foot, open ulcer on the plantar aspect over the first metatarsal head   Neurological: She is alert and oriented to person, place, and time.   Nursing note and vitals reviewed.      ED Course     ED Course     Procedures       3:12 PM  The patient was seen and examined by Dr. Henao in Room [facility].     Medications   sodium chloride 0.9% infusion (not administered)   vancomycin (VANCOCIN) 1,250 mg in sodium chloride 0.9 % 250 mL intermittent infusion (1,250 mg Intravenous New Bag 2/16/18 5969)   vancomycin place hicks - receiving intermittent dosing (not administered)   piperacillin-tazobactam (ZOSYN) 3.375g in 15 mL NS Premix Syringe (not administered)   morphine (PF) injection 4 mg (not administered)            Labs Ordered and Resulted from Time of ED Arrival Up to the Time of Departure from the ED   COMPREHENSIVE METABOLIC PANEL - Abnormal; Notable for the following:        Result Value    Sodium 128 (*)     Chloride 92 (*)     Glucose 454 (*)     Creatinine 1.50 (*)     GFR Estimate 37 (*)     GFR Estimate If Black 44 (*)     Albumin 2.8 (*)     Alkaline Phosphatase 212 (*)     All other components within normal limits   CBC WITH PLATELETS DIFFERENTIAL - Abnormal; Notable for the following:     WBC 22.3 (*)     Absolute Neutrophil 19.6 (*)     All other components within normal limits   CRP INFLAMMATION - Abnormal; Notable for the following:     CRP Inflammation 290.0 (*)     All other components within normal limits   ISTAT  GASES LACTATE MUNA POCT - Abnormal; Notable for the following:     PO2 Venous 15 (*)     Bicarbonate Venous 29 (*)     All other components within normal limits   NT  PROBNP INPATIENT   PROCALCITONIN   ISTAT CG4 GASES LACTATE MUNA NURSING POCT   NURSING DRAW AND HOLD   BLOOD CULTURE   BLOOD CULTURE            Assessments & Plan (with Medical Decision Making)   52-year-old female with diabetes and infected right foot here with fever, pain, swelling, cellulitis of the foot, and elevated white count.  Her lactate is normal. She was given vancomycin and Zosyn. I did x-ray of the foot, there is no obvious osteomyelitis.  She is stable and will be admitted to the medicine service.  Her chest x-ray does not show a reason for her hypoxia. She says she has a history of COPD.    This part of the medical record was transcribed by Kelly Penn, Medical Scribe, from a dictation done by Lizandro Henao MD.     I have reviewed the nursing notes.    I have reviewed the findings, diagnosis, plan and need for follow up with the patient.    New Prescriptions    No medications on file       Final diagnoses:   Sepsis (H)   Diabetic ulcer of right midfoot associated with type 1 diabetes mellitus, unspecified ulcer stage (H)   Cellulitis of foot   I, Amara Brown, am serving as a trained medical scribe to document services personally performed by Dax Henao MD, based on the provider's statements to me.   I, Dax Henao MD, was physically present and have reviewed and verified the accuracy of this note documented by Amara Brown.      2/16/2018   [facility], [place], EMERGENCY DEPARTMENT     Lizandro Henao MD  02/16/18 4037       Lizandro Henao MD  02/16/18 0837

## 2018-02-16 NOTE — H&P
[facility]    Internal Medicine History and Physical - Gold Service       Date of Admission:  2/16/2018    Assessment & Plan   Alessandra Pak is a 52 year old female  admitted on 2/16/2018. She has PMH of NICM (EF 55-55%, s/p ICD placement), HTN, chronic pancreatitis s/p pylorus sparing Whipple (2001), chronic methadone use,  DMII c/b chronic non-healing foot ulcers COPD, GERD, and MDD who presented to the ED from [facility] [place] for further evaluation of fever and hypoxia. Patient admitted to Medicine for further evaluation.     # Acute hypoxic respiratory failure, sepsis 2/2 RLE cellulitis: Patient presented to the ED from Podiatry clinic with hypoxia (70's on RA), and fever to 102.2. ED evaluation: Tachycardia to 130's (BL appears 120'2)  Leukocytosis 22.3 with left shift, Tmax 100.4, BP stable. VBG 7.39/48/15/29,  LA 1.6 CXR with streaky LLL opacity and stable pneumobilia. O2 sats 92% on 5L O2 ( no home O2 use), NTBNP 3096, Procal 1.42. Bilateral LE US negative for DVT. Will further assess with VQ scan in setting of tachycardia, hypoxia, hx of presyncope episode, and progressive garza for the past 4 weeks.   - Respiratory viral panel   - Droplet isolation  - Continue Vanc/zosyn  - O2 PRN to maintain O2 sat ws90-92%  - ABG  - Nebs  - VQ scan   - IVF  - Tele  - Pulse Ox  - CBC, BMP, CRP in am   - Repeat BMP tonight  # Diabetic Foot ulcers, RLE cellulitis: Follows with Dr. Cervantes with clinic visit today 2/16/18, s/p debridement or right plantar ulcer over first metatarsal head. XRay of Right foot with acute mildly displaced, probably intra-articular fracture involving the proximal aspect of the middle phalanx or fourth toe; ulcer defect on the plantar aspect  under the first MTP- no evidence of active osteomyelitis.  - Podiatry consult places- please discuss with in am  - Vanc/zosyn  - WOCN consult  - BCx2  - CRP, CBC, BMP in am     # NIKOLAY on CKDIII: Cr on admission 1.50, BUN 30. BL  Cr appears 0.90-1.20. Likely prerenal in setting of poor PO intake.   - Avoid dehydration, hypotension, dehydration  - UA/UC  - BMP in am  - IVF    # Poorly controlled DMII: c/b neuropathy. A1c 12.8 (2/16/18). BG on admission 464. PTA BG in 300-400 range over past 2 weeks. PTA: insulin pump. Follows with [facility] with recent clinic visit 2/2/18.   - D/c insulin pump  - Insulin gtt  - Endocrine consult- appreciate recommendations and assistance  - MOD CHO diet  - PTA lyrica TID    # Pseudo hyponatremia: Na on admission 128, though corrected for hyperglycemia- 135.  - BMP in am     # MDD: Patient reports significant life stressors d/t daughters pregnancy and mother in hospital. No PTA medications. Previously met with weekly counselor, though not currently.  - Continue to provide support   - Recommend outpatient f/u with counselor    # COPD: Previous smoker. No home O2 use. PTA combivent and albuterol. Reports small amount of green sputum for the past 2 weeks.   - Continue PRN albuterol   - Duonebs QID  - Sputum culture  - PRN O2 to maintain O2 sats 90-92%    # Chronic pain: Abdominal . PTA methadone.   - Pharmacy consult to verify Rx  - Oxycodone 5-10 mg q4h PRN     # NICM: EF in 2014- 15%. ECHO 2/2016 EF 50-55%. S/p single lead ICD placement. Recently interrogated 12/5/17 with 2 nonsustained VT episodes. Plan for 3 month f/u. PTA lisinopril. Stable 3 pillow orthopnea. No PND. Reports 4 week hx of garza. Appears euvolemic to hypovolemic on exam.   - Tele  - EKG  - Troponin  - NTBNP     # HTN: Normotensive on admission. PTA Lisinopril  - Hold in setting of NIKOLAY  - Monitor    # GERD: PTA zantac.   - Continue     # Pain Assessment:  Current Pain Score 2/16/2018 7/21/2017 6/14/2017   Pain score (0-10) 7 5 7   Pain location - - -   Pain descriptors Aching;Burning;Sharp - Aching;Burning   CPOT pain score - - -   - Alessandra is experiencing pain due to RLE neuropathy and swelling, ulceration. Pain management was discussed and  the plan was created in a collaborative fashion.  Alessandra's response to the current recommendations: engaged  - Please see the plan for pain management as documented above    Diet:  MOD CHO  Fluids: NS @ 100 cc/hr  DVT Prophylaxis: Pneumatic Compression Devices  Code Status: Prior    Disposition Plan   Expected discharge: 2 - 3 days; recommended to prior living arrangement once SIRS/Sepsis treated.     Entered: Vivien Mercado 02/16/2018, 4:51 PM   Information in the above section will display in the discharge planner report.    The patient's care was discussed with the Attending Physician, Dr. Gomez.    Vivien Mercado PA-C  Internal Medicine Hospitalist Service  [facility]  Pager: [phone]    Please see sticky note for cross cover information  ______________________________________________________________________    Chief Complaint   RLE pain, garza, fever    History is obtained from the patient and EMR    History of Present Illness   Alessandra Pak is a 52 year old female with PMH as outlined above who presented to the ED from podiatry clinic with hypoxia and fever.     Patient reports for the past 4 weeks she has experienced right foot swelling and pain. She noticed a callous on the ball of her right foot ~ 6 weeks ago that developed into a small ulcer, though was decreasing in size, but pain and swelling worsened in the past 2 weeks. Fever, and chills are reported, though patient unable to discern of from menopause vs infection. Patient presented to Podiatry clinic today (2/16/18) for further evaluation. Ulceration has been draining small amounts of pink fluid, though no green drainage or foul smell.     She also reports progressive garza over the past 4 weeks, stable 3 pillow orthopnea, sick contacts (son in law has URI sx), small amounts of green sputum production, occasional cough. Patient reports sx have not been experienced previously. No home O2 use. No new medications. No CP,  palpitations, abdominal pain, dysuria, falls, HA, or vision changes.     Currently, patient with reports of moderate distress related to life stressors- daughter pregnant and mother in hospital. RLE shooting pains.     Patient does not endorse: headaches, changes in vision, chest pain, palpitations, upper respiratory symptoms of rhinorrhea or congestion, wheezing, abdominal pain, nausea, emesis, constipation, diarrhea, dysuria, edema, rashes, weakness, focal neurologic deficits, recent travel, illness, fever, chills.          Review of Systems   The 10 point Review of Systems is negative other than noted in the HPI or here.     Past Medical History    I have reviewed this patient's medical history and updated it with pertinent information if needed.   Past Medical History:   Diagnosis Date     Abdominal pain, right upper quadrant     sees Dr Mcclellan pain clinic at [facility] [place]     ASCUS with positive high risk HPV 8/2013    + HPV 33, [place] - GAVIN I, ECC- atypia     Cardiomyopathy (H)     non ischemic cardiomyopathy with EF 15     Cervical high risk HPV (human papillomavirus) test positive 7/8/15, 7/25/16    NIL pap/+ HR HPV (not 16 or 18).      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  Past Surgical History:   Procedure Laterality Date     C NONSPECIFIC PROCEDURE  2001    cholecystectomy     C NONSPECIFIC PROCEDURE  as a child    tonsillectomy     C NONSPECIFIC  PROCEDURE      whipple procedure     CARDIAC SURGERY      defib     COLPOSCOPY,LOOP ELECTRD CERVIX EXCIS  ,     stage 2 dysplasia     ENDOBRONCHIAL ULTRASOUND FLEXIBLE N/A 2015    Procedure: ENDOBRONCHIAL ULTRASOUND FLEXIBLE;  Surgeon: Brenden Tamez MD;  Location: [facility]     LEEP TX, CERVICAL  2014    LEEP TX Cervical     RECESSION RESECTION WITH ADJUSTABLE SUTURE  2011    Procedure:RECESSION RESECTION WITH ADJUSTABLE SUTURE; Right Strabismus Repair with Adjustable Suture       TUBAL LIGATION      [facility]         Social History   Social History   Substance Use Topics     Smoking status: Former Smoker     Packs/day: 0.30     Years: 15.00     Types: Cigarettes     Smokeless tobacco: Former User     Quit date: 10/29/2014      Comment: Started smoking in 89/ smokes about 3 per day     Alcohol use No       Family History   I have reviewed this patient's family history and updated it with pertinent information if needed.   Family History   Problem Relation Age of Onset     DIABETES Mother      diet controled     Hypertension Mother      Arthritis Mother      Lipids Mother      DIABETES Father      Hypertension Father      GASTROINTESTINAL DISEASE Father      gallbladder removed     Bipolar Disorder Brother      Thyroid Disease Brother      Obesity Other      Son     Respiratory Other      Son and Daughter; asthma     Depression Maternal Aunt      Anxiety Disorder Maternal Aunt        Prior to Admission Medications   Prior to Admission Medications   Prescriptions Last Dose Informant Patient Reported? Taking?   ASPIRIN 81 MG OR TABS  Self No No   Si tab po QD (Once per day)   Ipratropium-Albuterol (COMBIVENT RESPIMAT)  MCG/ACT inhaler   No No   Sig: Inhale 1 puff into the lungs 4 times daily Not to exceed 6 doses per day.   LYRICA 75 MG capsule   No No   Si BY MOUTH 3 TIMES A DAY   MIRENA 20 MCG/24HR IU IUD   No No   Sig: placed today   SM NICOTINE 21 MG/24HR 24 hr patch   No  No   Sig: REMOVE OLD PATCH AND APPLY ONE NEW PATCH TO SKIN EVERY 24 HOURS   acetone, Urine, test STRP   No No   Si strip by In Vitro route as needed   albuterol (ALBUTEROL) 108 (90 BASE) MCG/ACT Inhaler   No No   Sig: Inhale 2 puffs into the lungs every 4 hours as needed for shortness of breath / dyspnea   amitriptyline (ELAVIL) 50 MG tablet   No No   Sig: Take 1 tablet (50 mg) by mouth At Bedtime   blood glucose monitoring (HOLLIE CONTOUR NEXT) test strip   No No   Sig: Use to test blood sugar 10 times daily or as directed.  Ok to substitute alternative if insurance prefers.   blood glucose monitoring (HOLLIE MICROLET) lancets   No No   Sig: Use to test blood sugar 10 times daily or as directed.  Ok to substitute alternative if insurance prefers.   blood glucose monitoring (FREESTYLE) lancets   No No   Si each See Admin Instructions test 3-4 times per day   fluticasone (FLONASE) 50 MCG/ACT spray   No No   Sig: Spray 2 sprays into left nostril daily   furosemide (LASIX) 20 MG tablet   No No   Sig: TAKE 1 TABLET (20 MG) BY MOUTH DAILY   glucagon (GLUCAGON EMERGENCY) 1 MG injection   No No   Sig: Inject 1 mg into the muscle once for 1 dose   glucose 4 G CHEW  Self No No   Sig: Take 3-4 tablets to treat low blood sugar.   insulin aspart (NOVOLOG VIAL) 100 UNITS/ML injection   No No   Sig: Use as directed in insulin pump. Patient using up to 60 units per day   insulin pen needle (B-D U/F) 31G X 5 MM   No No   Sig: Use 3 time(s) a day.   lidocaine (LIDODERM) 5 % Patch   No No   Sig: Place 1 patch onto the skin every 24 hours Apply patch up to 12 hours with in a 24 hour period.   lisinopril (PRINIVIL/ZESTRIL) 10 MG tablet   No No   Sig: Take 1 tablet (10 mg) by mouth daily   methadone (DOLPHINE-INTENSOL) 10 mg/mL CONC  Self Yes No   Sig: Take 7 mLs by mouth daily.   multivitamin, therapeutic with minerals (THERA-VIT-M) TABS   No No   Sig: Take 1 tablet by mouth daily   polyethylene glycol (MIRALAX/GLYCOLAX) packet    No No   Sig: Take 17 g by mouth daily   ranitidine (ZANTAC) 300 MG tablet   No No   Sig: Take 1 tablet (300 mg) by mouth daily   study - lidocaine, LMX, (IDS# 4490) 4 % CREA   No No   Sig: Apply topically every 8 hours as needed for moderate pain      Facility-Administered Medications: None     Allergies   Allergies   Allergen Reactions     Naproxen GI Disturbance     No Known Drug Allergies        Physical Exam   Vital Signs: Temp: 100.4  F (38  C) Temp src: Oral BP: 129/74 Pulse: 119   Resp: 18 SpO2: 90 % O2 Device: Nasal cannula Oxygen Delivery: 4 LPM  Weight: 140 lbs 1.6 oz    Physical Exam   Constitutional: Pleasant, chronically-ill appearing female sitting up in bed.  Mild distress  HEENT:   Head: Normocephalic and atraumatic.   Eyes: Conjunctivae are normal. Pupils are equal, round, and reactive to light.  Pharynx has no erythema or exudate, mucous membranes are dry  Neck:   No adenopathy, no bony tenderness  Cardiovascular: Tachycardia, though  Regular rate and rhythm without murmurs or gallops  Pulmonary/Chest: Diminished in bilateral base with no wheezes or retractions.O2 sats 95% on 5L O2.  GI: Soft with good bowel sounds.  Non-tender, non-distended, with no guarding, no rebound, no peritoneal signs.   Back:  No bony or CVA tenderness   Musculoskeletal:  No edema or clubbing . Right foot warm, mild swelling to the ankle, nickel-sized ulceration on plantar aspect over first metatarsal head. No fluctuance, drainage or foul-odor.    Skin: Skin is warm and dry. No rash noted to exposed skin areas.   Neurological: Alert and oriented to person, place, and time. Nonfocal exam  Psychiatric:  Tearful, flat.       Data   Data reviewed today: I reviewed all medications, new labs and imaging results over the last 24 hours. I personally reviewed recent images, daily labs, progress notes      Data     Recent Labs  Lab 02/16/18  1528   WBC 22.3*   HGB 11.9   MCV 85      *   POTASSIUM 4.4   CHLORIDE 92*    CO2 27   BUN 30   CR 1.50*   ANIONGAP 9   RICK 8.5   *   ALBUMIN 2.8*   PROTTOTAL 8.2   BILITOTAL 0.6   ALKPHOS 212*   ALT 14   AST 22     Physician Attestation   I, Raman Gomez, saw and evaluated Alessandra Pak as part of a shared visit.  I have reviewed and discussed with the advanced practice provider their history, physical and plan.    I personally reviewed the vital signs, medications, labs and imaging.    My key history or physical exam findings: Patient seen and examined, feels somewhat improved since admission.  Does acknowledge ongoing dyspnea, particularly on exertion which has progressed over the last month.  She also notes that she has had several episodes of syncope and pre-syncope.  These were unprovoked over the two weeks lasting several seconds.  Most occurred at rest.  Her physical activity has decreased during this time related to her leg pain, but she does not feel she has been immobile for any extended period of time.       PE:   VS: Afebrile and stable  GEN: NAD  CV: RRR S1/S2, no m,r,g  Pulm: diminished diffusely, no wheezes or rhonchi.  Desats when taken off O2.    Abd: soft, NT, ND, +BS  Ext: warm and well perfused, right foot warm with swelling and erythema to the top of the ankle with small ulceration.  No fluctuance or pururlent drainage.      Key management decisions made by me: Admitted with sepsis, non healing foot ulcer and worsening hypoxia.  Likely has multiple problems as the cellulitis does not explain hypoxia or syncopal episodes.  Given hypoxia, presyncope, and possibility of immobility, risk of PE is not trivial, so will under go a VQ scan.  A CT scan would likely have diagnostic yield but this would be delayed because of her NIKOLAY.  Will continue O2 support, broad spectrum antibiotics.  As she stabilizes will need repeat foot imaging to rule out osteomyelitis.      Raman Gomez  Date of Service (when I saw the patient): 02/16/18

## 2018-02-16 NOTE — NURSING NOTE
"Chief Complaint   Patient presents with     RECHECK     f/u right foot pain and ulcer. pt states it is painful and worse than previous.       Pain Assessment  Patient Currently in Pain: Yes  0-10 Pain Scale: 7  Primary Pain Location: Foot  Pain Orientation: Right  Pain Descriptors: Aching, Burning, Sharp  Alleviating Factors: Rest (Elevation)  Aggravating Factors: Walking               Ht 1.753 m (5' 9\")  Wt 63.5 kg (140 lb)  BMI 20.67 kg/m2    Allergies   Allergen Reactions     Naproxen GI Disturbance     No Known Drug Allergies        Current Outpatient Prescriptions   Medication Sig Dispense Refill     fluticasone (FLONASE) 50 MCG/ACT spray Spray 2 sprays into left nostril daily 1 Bottle 11     lisinopril (PRINIVIL/ZESTRIL) 10 MG tablet Take 1 tablet (10 mg) by mouth daily 90 tablet 1     Ipratropium-Albuterol (COMBIVENT RESPIMAT)  MCG/ACT inhaler Inhale 1 puff into the lungs 4 times daily Not to exceed 6 doses per day. 1 Inhaler 6     amitriptyline (ELAVIL) 50 MG tablet Take 1 tablet (50 mg) by mouth At Bedtime 90 tablet 3     furosemide (LASIX) 20 MG tablet TAKE 1 TABLET (20 MG) BY MOUTH DAILY 90 tablet 1     insulin aspart (NOVOLOG VIAL) 100 UNITS/ML injection Use as directed in insulin pump. Patient using up to 60 units per day 60 mL 3     LYRICA 75 MG capsule 1 BY MOUTH 3 TIMES A DAY 90 capsule 5     ranitidine (ZANTAC) 300 MG tablet Take 1 tablet (300 mg) by mouth daily 90 tablet 3     study - lidocaine, LMX, (IDS# 4490) 4 % CREA Apply topically every 8 hours as needed for moderate pain 45 g 11     lidocaine (LIDODERM) 5 % Patch Place 1 patch onto the skin every 24 hours Apply patch up to 12 hours with in a 24 hour period. 30 patch 1     albuterol (ALBUTEROL) 108 (90 BASE) MCG/ACT Inhaler Inhale 2 puffs into the lungs every 4 hours as needed for shortness of breath / dyspnea 1 Inhaler 5     SM NICOTINE 21 MG/24HR 24 hr patch REMOVE OLD PATCH AND APPLY ONE NEW PATCH TO SKIN EVERY 24 HOURS 28 patch 1 "     blood glucose monitoring (HOLLIE CONTOUR NEXT) test strip Use to test blood sugar 10 times daily or as directed.  Ok to substitute alternative if insurance prefers. 300 each 12     blood glucose monitoring (HOLLIE MICROLET) lancets Use to test blood sugar 10 times daily or as directed.  Ok to substitute alternative if insurance prefers. 1 Box prn     acetone, Urine, test STRP 1 strip by In Vitro route as needed 25 each 1     multivitamin, therapeutic with minerals (THERA-VIT-M) TABS Take 1 tablet by mouth daily 30 each 11     polyethylene glycol (MIRALAX/GLYCOLAX) packet Take 17 g by mouth daily 28 packet 3     insulin pen needle (B-D U/F) 31G X 5 MM Use 3 time(s) a day. 3 each 3     blood glucose monitoring (FREESTYLE) lancets 1 each See Admin Instructions test 3-4 times per day 3 Box 3     glucose 4 G CHEW Take 3-4 tablets to treat low blood sugar. 25 tablet 11     methadone (DOLPHINE-INTENSOL) 10 mg/mL CONC Take 7 mLs by mouth daily.       MIRENA 20 MCG/24HR IU IUD placed today 1 0     ASPIRIN 81 MG OR TABS 1 tab po QD (Once per day) 100 3     glucagon (GLUCAGON EMERGENCY) 1 MG injection Inject 1 mg into the muscle once for 1 dose 1 mg 1       Shelley Scruggs CMA  2/16/2018

## 2018-02-17 ENCOUNTER — APPOINTMENT (OUTPATIENT)
Dept: NUCLEAR MEDICINE | Facility: CLINIC | Age: 53
DRG: 853 | End: 2018-02-17
Attending: PHYSICIAN ASSISTANT
Payer: COMMERCIAL

## 2018-02-17 ENCOUNTER — APPOINTMENT (OUTPATIENT)
Dept: CT IMAGING | Facility: CLINIC | Age: 53
DRG: 853 | End: 2018-02-17
Attending: PHYSICIAN ASSISTANT
Payer: COMMERCIAL

## 2018-02-17 LAB
ANION GAP SERPL CALCULATED.3IONS-SCNC: 8 MMOL/L (ref 3–14)
BACTERIA SPEC CULT: NORMAL
BASE EXCESS BLDA CALC-SCNC: 3.6 MMOL/L
BUN SERPL-MCNC: 23 MG/DL (ref 7–30)
CALCIUM SERPL-MCNC: 8.2 MG/DL (ref 8.5–10.1)
CHLORIDE SERPL-SCNC: 101 MMOL/L (ref 94–109)
CO2 SERPL-SCNC: 26 MMOL/L (ref 20–32)
CREAT SERPL-MCNC: 1.22 MG/DL (ref 0.52–1.04)
CRP SERPL-MCNC: 280 MG/L (ref 0–8)
ERYTHROCYTE [DISTWIDTH] IN BLOOD BY AUTOMATED COUNT: 13.6 % (ref 10–15)
FLUAV H1 2009 PAND RNA SPEC QL NAA+PROBE: NEGATIVE
FLUAV H1 RNA SPEC QL NAA+PROBE: NEGATIVE
FLUAV H3 RNA SPEC QL NAA+PROBE: NEGATIVE
FLUAV RNA SPEC QL NAA+PROBE: NEGATIVE
FLUBV RNA SPEC QL NAA+PROBE: NEGATIVE
GFR SERPL CREATININE-BSD FRML MDRD: 47 ML/MIN/1.7M2
GLUCOSE BLDC GLUCOMTR-MCNC: 105 MG/DL (ref 70–99)
GLUCOSE BLDC GLUCOMTR-MCNC: 108 MG/DL (ref 70–99)
GLUCOSE BLDC GLUCOMTR-MCNC: 113 MG/DL (ref 70–99)
GLUCOSE BLDC GLUCOMTR-MCNC: 113 MG/DL (ref 70–99)
GLUCOSE BLDC GLUCOMTR-MCNC: 115 MG/DL (ref 70–99)
GLUCOSE BLDC GLUCOMTR-MCNC: 116 MG/DL (ref 70–99)
GLUCOSE BLDC GLUCOMTR-MCNC: 117 MG/DL (ref 70–99)
GLUCOSE BLDC GLUCOMTR-MCNC: 118 MG/DL (ref 70–99)
GLUCOSE BLDC GLUCOMTR-MCNC: 121 MG/DL (ref 70–99)
GLUCOSE BLDC GLUCOMTR-MCNC: 124 MG/DL (ref 70–99)
GLUCOSE BLDC GLUCOMTR-MCNC: 129 MG/DL (ref 70–99)
GLUCOSE BLDC GLUCOMTR-MCNC: 132 MG/DL (ref 70–99)
GLUCOSE BLDC GLUCOMTR-MCNC: 134 MG/DL (ref 70–99)
GLUCOSE BLDC GLUCOMTR-MCNC: 142 MG/DL (ref 70–99)
GLUCOSE BLDC GLUCOMTR-MCNC: 143 MG/DL (ref 70–99)
GLUCOSE BLDC GLUCOMTR-MCNC: 145 MG/DL (ref 70–99)
GLUCOSE BLDC GLUCOMTR-MCNC: 184 MG/DL (ref 70–99)
GLUCOSE BLDC GLUCOMTR-MCNC: 186 MG/DL (ref 70–99)
GLUCOSE BLDC GLUCOMTR-MCNC: 193 MG/DL (ref 70–99)
GLUCOSE BLDC GLUCOMTR-MCNC: 227 MG/DL (ref 70–99)
GLUCOSE BLDC GLUCOMTR-MCNC: 282 MG/DL (ref 70–99)
GLUCOSE BLDC GLUCOMTR-MCNC: 91 MG/DL (ref 70–99)
GLUCOSE BLDC GLUCOMTR-MCNC: 94 MG/DL (ref 70–99)
GLUCOSE SERPL-MCNC: 116 MG/DL (ref 70–99)
HADV DNA SPEC QL NAA+PROBE: NEGATIVE
HADV DNA SPEC QL NAA+PROBE: NEGATIVE
HCO3 BLD-SCNC: 28 MMOL/L (ref 21–28)
HCT VFR BLD AUTO: 31.5 % (ref 35–47)
HGB BLD-MCNC: 10.3 G/DL (ref 11.7–15.7)
HMPV RNA SPEC QL NAA+PROBE: NEGATIVE
HPIV1 RNA SPEC QL NAA+PROBE: NEGATIVE
HPIV2 RNA SPEC QL NAA+PROBE: NEGATIVE
HPIV3 RNA SPEC QL NAA+PROBE: NEGATIVE
LACTATE BLD-SCNC: 0.9 MMOL/L (ref 0.4–1.9)
Lab: NORMAL
MCH RBC QN AUTO: 27.7 PG (ref 26.5–33)
MCHC RBC AUTO-ENTMCNC: 32.7 G/DL (ref 31.5–36.5)
MCV RBC AUTO: 85 FL (ref 78–100)
MICROBIOLOGIST REVIEW: NORMAL
O2/TOTAL GAS SETTING VFR VENT: ABNORMAL %
PCO2 BLD: 43 MM HG (ref 35–45)
PH BLD: 7.43 PH (ref 7.35–7.45)
PLATELET # BLD AUTO: 259 10E9/L (ref 150–450)
PO2 BLD: 56 MM HG (ref 80–105)
POTASSIUM SERPL-SCNC: 3.9 MMOL/L (ref 3.4–5.3)
RBC # BLD AUTO: 3.72 10E12/L (ref 3.8–5.2)
RHINOVIRUS RNA SPEC QL NAA+PROBE: NEGATIVE
RSV RNA SPEC QL NAA+PROBE: NEGATIVE
RSV RNA SPEC QL NAA+PROBE: NEGATIVE
SODIUM SERPL-SCNC: 135 MMOL/L (ref 133–144)
SPECIMEN SOURCE: NORMAL
SPECIMEN SOURCE: NORMAL
VANCOMYCIN SERPL-MCNC: 12 MG/L
WBC # BLD AUTO: 19.8 10E9/L (ref 4–11)

## 2018-02-17 PROCEDURE — 36415 COLL VENOUS BLD VENIPUNCTURE: CPT | Performed by: PHYSICIAN ASSISTANT

## 2018-02-17 PROCEDURE — 25000132 ZZH RX MED GY IP 250 OP 250 PS 637: Performed by: PHYSICIAN ASSISTANT

## 2018-02-17 PROCEDURE — 78582 LUNG VENTILAT&PERFUS IMAGING: CPT

## 2018-02-17 PROCEDURE — 85027 COMPLETE CBC AUTOMATED: CPT | Performed by: PHYSICIAN ASSISTANT

## 2018-02-17 PROCEDURE — 83605 ASSAY OF LACTIC ACID: CPT | Performed by: INTERNAL MEDICINE

## 2018-02-17 PROCEDURE — 40000275 ZZH STATISTIC RCP TIME EA 10 MIN

## 2018-02-17 PROCEDURE — 99207 ZZC APP CREDIT; MD BILLING SHARED VISIT: CPT | Performed by: PHYSICIAN ASSISTANT

## 2018-02-17 PROCEDURE — 36600 WITHDRAWAL OF ARTERIAL BLOOD: CPT

## 2018-02-17 PROCEDURE — 99233 SBSQ HOSP IP/OBS HIGH 50: CPT | Performed by: INTERNAL MEDICINE

## 2018-02-17 PROCEDURE — 25000125 ZZHC RX 250: Performed by: PHYSICIAN ASSISTANT

## 2018-02-17 PROCEDURE — 25000132 ZZH RX MED GY IP 250 OP 250 PS 637

## 2018-02-17 PROCEDURE — 25000128 H RX IP 250 OP 636

## 2018-02-17 PROCEDURE — 25000128 H RX IP 250 OP 636: Performed by: PHYSICIAN ASSISTANT

## 2018-02-17 PROCEDURE — 00000146 ZZHCL STATISTIC GLUCOSE BY METER IP

## 2018-02-17 PROCEDURE — 34300033 ZZH RX 343: Performed by: INTERNAL MEDICINE

## 2018-02-17 PROCEDURE — 71250 CT THORAX DX C-: CPT

## 2018-02-17 PROCEDURE — 94640 AIRWAY INHALATION TREATMENT: CPT | Mod: 76

## 2018-02-17 PROCEDURE — A9540 TC99M MAA: HCPCS | Performed by: INTERNAL MEDICINE

## 2018-02-17 PROCEDURE — A9567 TECHNETIUM TC-99M AEROSOL: HCPCS | Performed by: INTERNAL MEDICINE

## 2018-02-17 PROCEDURE — 80202 ASSAY OF VANCOMYCIN: CPT | Performed by: PHYSICIAN ASSISTANT

## 2018-02-17 PROCEDURE — 36415 COLL VENOUS BLD VENIPUNCTURE: CPT | Performed by: INTERNAL MEDICINE

## 2018-02-17 PROCEDURE — 40000274 ZZH STATISTIC RCP CONSULT EA 30 MIN

## 2018-02-17 PROCEDURE — 82803 BLOOD GASES ANY COMBINATION: CPT | Performed by: INTERNAL MEDICINE

## 2018-02-17 PROCEDURE — 86140 C-REACTIVE PROTEIN: CPT | Performed by: PHYSICIAN ASSISTANT

## 2018-02-17 PROCEDURE — 25000131 ZZH RX MED GY IP 250 OP 636 PS 637: Performed by: PHYSICIAN ASSISTANT

## 2018-02-17 PROCEDURE — 25000132 ZZH RX MED GY IP 250 OP 250 PS 637: Performed by: HOSPITALIST

## 2018-02-17 PROCEDURE — 80048 BASIC METABOLIC PNL TOTAL CA: CPT | Performed by: PHYSICIAN ASSISTANT

## 2018-02-17 PROCEDURE — 12000026 ZZH R&B TRANSPLANT

## 2018-02-17 PROCEDURE — 94640 AIRWAY INHALATION TREATMENT: CPT

## 2018-02-17 RX ORDER — ACETAMINOPHEN 325 MG/1
325 TABLET ORAL EVERY 4 HOURS PRN
Status: DISCONTINUED | OUTPATIENT
Start: 2018-02-17 | End: 2018-02-23 | Stop reason: HOSPADM

## 2018-02-17 RX ORDER — ALBUTEROL SULFATE 90 UG/1
1-2 AEROSOL, METERED RESPIRATORY (INHALATION) EVERY 4 HOURS PRN
Status: DISCONTINUED | OUTPATIENT
Start: 2018-02-17 | End: 2018-02-17

## 2018-02-17 RX ORDER — ALBUTEROL SULFATE 90 UG/1
1-2 AEROSOL, METERED RESPIRATORY (INHALATION) 4 TIMES DAILY
Status: DISCONTINUED | OUTPATIENT
Start: 2018-02-17 | End: 2018-02-23 | Stop reason: HOSPADM

## 2018-02-17 RX ORDER — POLYETHYLENE GLYCOL 3350 17 G/17G
17 POWDER, FOR SOLUTION ORAL DAILY PRN
Status: ON HOLD | COMMUNITY
End: 2018-07-05

## 2018-02-17 RX ORDER — IPRATROPIUM BROMIDE AND ALBUTEROL SULFATE 2.5; .5 MG/3ML; MG/3ML
3 SOLUTION RESPIRATORY (INHALATION)
Status: DISCONTINUED | OUTPATIENT
Start: 2018-02-17 | End: 2018-02-17

## 2018-02-17 RX ORDER — VANCOMYCIN HYDROCHLORIDE 1 G/200ML
1000 INJECTION, SOLUTION INTRAVENOUS EVERY 24 HOURS
Status: DISCONTINUED | OUTPATIENT
Start: 2018-02-17 | End: 2018-02-18

## 2018-02-17 RX ORDER — METHADONE HYDROCHLORIDE 10 MG/1
70 TABLET ORAL AT BEDTIME
Status: DISCONTINUED | OUTPATIENT
Start: 2018-02-17 | End: 2018-02-18

## 2018-02-17 RX ORDER — AMITRIPTYLINE HYDROCHLORIDE 50 MG/1
50 TABLET ORAL AT BEDTIME
Status: DISCONTINUED | OUTPATIENT
Start: 2018-02-17 | End: 2018-02-23 | Stop reason: HOSPADM

## 2018-02-17 RX ORDER — LIDOCAINE HYDROCHLORIDE 30 MG/G
CREAM TOPICAL 3 TIMES DAILY PRN
Status: ON HOLD | COMMUNITY
End: 2018-07-05

## 2018-02-17 RX ORDER — IPRATROPIUM BROMIDE AND ALBUTEROL SULFATE 2.5; .5 MG/3ML; MG/3ML
3 SOLUTION RESPIRATORY (INHALATION) EVERY 4 HOURS PRN
Status: DISCONTINUED | OUTPATIENT
Start: 2018-02-17 | End: 2018-02-23 | Stop reason: HOSPADM

## 2018-02-17 RX ORDER — ALBUTEROL SULFATE 90 UG/1
2 AEROSOL, METERED RESPIRATORY (INHALATION) 2 TIMES DAILY
Status: DISCONTINUED | OUTPATIENT
Start: 2018-02-17 | End: 2018-02-17

## 2018-02-17 RX ADMIN — INSULIN ASPART 5 UNITS: 100 INJECTION, SOLUTION INTRAVENOUS; SUBCUTANEOUS at 17:43

## 2018-02-17 RX ADMIN — ACETAMINOPHEN 325 MG: 325 TABLET, FILM COATED ORAL at 05:49

## 2018-02-17 RX ADMIN — HUMAN INSULIN 1.5 UNITS/HR: 100 INJECTION, SOLUTION SUBCUTANEOUS at 15:17

## 2018-02-17 RX ADMIN — KIT FOR THE PREPARATION OF TECHNETIUM TC 99M PENTETATE 2 MCI.: 20 INJECTION, POWDER, LYOPHILIZED, FOR SOLUTION INTRAVENOUS; RESPIRATORY (INHALATION) at 01:16

## 2018-02-17 RX ADMIN — RANITIDINE 300 MG: 150 TABLET, FILM COATED ORAL at 07:50

## 2018-02-17 RX ADMIN — ASPIRIN 81 MG CHEWABLE TABLET 81 MG: 81 TABLET CHEWABLE at 07:50

## 2018-02-17 RX ADMIN — OXYCODONE HYDROCHLORIDE 5 MG: 5 TABLET ORAL at 16:24

## 2018-02-17 RX ADMIN — METHADONE HYDROCHLORIDE 70 MG: 10 TABLET ORAL at 20:01

## 2018-02-17 RX ADMIN — PIPERACILLIN SODIUM AND TAZOBACTAM SODIUM 3.38 G: 36; 4.5 INJECTION, POWDER, FOR SOLUTION INTRAVENOUS at 10:25

## 2018-02-17 RX ADMIN — PREGABALIN 75 MG: 50 CAPSULE ORAL at 20:01

## 2018-02-17 RX ADMIN — ALBUTEROL SULFATE 2 PUFF: 90 AEROSOL, METERED RESPIRATORY (INHALATION) at 22:13

## 2018-02-17 RX ADMIN — PREGABALIN 75 MG: 50 CAPSULE ORAL at 14:11

## 2018-02-17 RX ADMIN — POLYETHYLENE GLYCOL 3350 17 G: 17 POWDER, FOR SOLUTION ORAL at 07:50

## 2018-02-17 RX ADMIN — VANCOMYCIN HYDROCHLORIDE 1000 MG: 1 INJECTION, SOLUTION INTRAVENOUS at 12:03

## 2018-02-17 RX ADMIN — AMITRIPTYLINE HYDROCHLORIDE 50 MG: 50 TABLET, FILM COATED ORAL at 22:13

## 2018-02-17 RX ADMIN — OXYCODONE HYDROCHLORIDE 5 MG: 5 TABLET ORAL at 20:00

## 2018-02-17 RX ADMIN — PIPERACILLIN SODIUM AND TAZOBACTAM SODIUM 3.38 G: 36; 4.5 INJECTION, POWDER, FOR SOLUTION INTRAVENOUS at 23:06

## 2018-02-17 RX ADMIN — FLUTICASONE PROPIONATE 2 SPRAY: 50 SPRAY, METERED NASAL at 07:50

## 2018-02-17 RX ADMIN — HUMAN INSULIN 0.5 UNITS/HR: 100 INJECTION, SOLUTION SUBCUTANEOUS at 19:19

## 2018-02-17 RX ADMIN — HUMAN INSULIN 3.5 UNITS/HR: 100 INJECTION, SOLUTION SUBCUTANEOUS at 16:21

## 2018-02-17 RX ADMIN — PREGABALIN 75 MG: 50 CAPSULE ORAL at 07:48

## 2018-02-17 RX ADMIN — ENOXAPARIN SODIUM 40 MG: 40 INJECTION SUBCUTANEOUS at 20:01

## 2018-02-17 RX ADMIN — ALBUTEROL SULFATE 2 PUFF: 90 AEROSOL, METERED RESPIRATORY (INHALATION) at 16:59

## 2018-02-17 RX ADMIN — ACETAMINOPHEN 325 MG: 325 TABLET, FILM COATED ORAL at 23:07

## 2018-02-17 RX ADMIN — OXYCODONE HYDROCHLORIDE 5 MG: 5 TABLET ORAL at 07:49

## 2018-02-17 RX ADMIN — PIPERACILLIN SODIUM AND TAZOBACTAM SODIUM 3.38 G: 36; 4.5 INJECTION, POWDER, FOR SOLUTION INTRAVENOUS at 16:25

## 2018-02-17 RX ADMIN — Medication 6.5 MILLICURIE: at 01:30

## 2018-02-17 RX ADMIN — PIPERACILLIN SODIUM AND TAZOBACTAM SODIUM 3.38 G: 36; 4.5 INJECTION, POWDER, FOR SOLUTION INTRAVENOUS at 05:07

## 2018-02-17 RX ADMIN — IPRATROPIUM BROMIDE AND ALBUTEROL SULFATE 3 ML: .5; 3 SOLUTION RESPIRATORY (INHALATION) at 08:51

## 2018-02-17 RX ADMIN — IPRATROPIUM BROMIDE AND ALBUTEROL SULFATE 3 ML: .5; 3 SOLUTION RESPIRATORY (INHALATION) at 12:56

## 2018-02-17 ASSESSMENT — PAIN DESCRIPTION - DESCRIPTORS
DESCRIPTORS: ACHING;BURNING

## 2018-02-17 NOTE — PHARMACY-VANCOMYCIN DOSING SERVICE
Pharmacy Vancomycin Note  Date of Service 2018  Patient's  1965   52 year old, female    Indication: Skin and Soft Tissue Infection  Goal Trough Level: 10-15 mg/L  Day of Therapy: 2  Current Vancomycin regimen:  Intermittent dosing. Received 1x dose of 1250mg on  @ 1700    Current estimated CrCl = Estimated Creatinine Clearance: 54.6 mL/min (based on Cr of 1.22).    Creatinine for last 3 days  2018:  3:28 PM Creatinine 1.50 mg/dL;  9:45 PM Creatinine 1.25 mg/dL  2018:  6:43 AM Creatinine 1.22 mg/dL    Recent Vancomycin Levels (past 3 days)  2018:  6:43 AM Vancomycin Level 12.0 mg/L    Vancomycin IV Administrations (past 72 hours)                   vancomycin (VANCOCIN) 1,250 mg in sodium chloride 0.9 % 250 mL intermittent infusion (mg) 1,250 mg New Bag 18 1653                Nephrotoxins and other renal medications (Future)    Start     Dose/Rate Route Frequency Ordered Stop    18 2300  piperacillin-tazobactam (ZOSYN) 3.375g in 15 mL NS Premix Syringe      3.375 g  over 3 Minutes Intravenous EVERY 6 HOURS 18 1912      18 1627  vancomycin place hicks - receiving intermittent dosing      1 each Does not apply SEE ADMIN INSTRUCTIONS 18 1628               Contrast Orders - past 72 hours (72h ago through future)    Start     Dose/Rate Route Frequency Ordered Stop    18 0015  technetium pentetate Tc99m (DTPA) inhaled radioisotope 2 mCi      2 mCi Inhalation ONCE 18 0013 18 0116    18 0015  technetium pertechnetate with albumin (Tc99m MAA) radioisotope injection 4.8-7.2 millicurie      4.8-7.2 millicurie Intravenous ONCE 18 0013 18 0130          Interpretation of levels and current regimen:  Trough level is  Therapeutic after one dose, patient is not yet at steady state.     Has serum creatinine changed > 50% in last 72 hours: No but remains elevated from baseline.    Urine output:  good urine output    Renal  Function: Improving but still elevated from baseline.     Plan:  1.  Will start scheduled dosing regimen of 1000mg IV q24h (15mg/kg). Consider dose change to q12h when creatinine returns to baseline as patient thas previously tolerated q12h dosing.  2.  Pharmacy will check trough levels as appropriate in 1-3 Days.    3. Serum creatinine levels will be ordered daily for the first week of therapy and at least twice weekly for subsequent weeks.      Cathy Sanders, PharmD        .

## 2018-02-17 NOTE — PLAN OF CARE
"Problem: Patient Care Overview  Goal: Plan of Care/Patient Progress Review  Outcome: Therapy, progress toward functional goals is gradual  /72  Pulse 103  Temp 99  F (37.2  C) (Oral)  Resp 16  Ht 1.753 m (5' 9\")  Wt 62.7 kg (138 lb 3.2 oz)  SpO2 97%  BMI 20.41 kg/m2    VSS on 3L O2. Pain managed with prn oxycodone and scheduled lyrica. Methadone to resume at bedtime tonight. Tolerating regular diet. Denies nausea. Appetite fair. Had BM x1 this shift and is passing gas. Voiding good amounts of urine spontaneously. Up with assist x1. Somewhat unsteady on feet. BG's ranging from 105-282. On insulin drip using algorithm 1. Currently infusing at 3.5 units/hr. Carb coverage added. Foot ulceration dressing changed. On multiple IV antibiotics. RSV panel negative, droplet dc'd. Family visited today. Will continue plan of care and notify team with any changes in status.       "

## 2018-02-17 NOTE — CONSULTS
Inpatient Diabetes Management Consult   Patient: Alessandra Pak  : 1965  MRN: [id number]  Date of Service: 2018    Reason for consultation: Hyperglycemia  Consulting physician: Steven Morse MD    History of Diabetes  Secondary DM s/p whipple for chronic pancreatitis, diagnosed , reports being on insulin pump since ~2016.  She follows with Toya Nuno in the endocrine clinic at [facility], last visit was 18.  Unfortunately, Alessandra had not been checking BGs at home very often, so it was unclear how her pump regimen was working for her. Per review of that note as well as discussion with Alessandra, she does report forgetting to bolus occasionally.  She denies any lows while at home recently, and actually reports the last 2 weeks having BGs in the 300-400s range when she checked.  She reports that she changes her insulin/set every 3 days or so at home, and she stores her insulin in the refrigerator. She does not think that the insulin was , old or stored improperly.     She reports having a decreased appetite at home due to the fact that she was feeling poorly overall and having a lot of pain in her foot. She denies any difficulty breathing, cough, abd pain, n/v/d, dysuria or back pain at home.     At time of admission, she was taken off her insulin pump and started on an insulin drip due to hyperglycemia in the 400s.     In talking with the patient today, she is lethargic and falls asleep during conversation, and would not be able to manage her pump independently.       Complications    1. Retinopathy: unknown, last exam negative in 2016. 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     2.  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 Previously had none.   Lab Results   Component Value Date    MICROL 42 2018       3. Neuropathy:  Yes: also has DM foot infections and ulcers in the past    4. Hypoglycemia:  No   Hypoglycemic unawareness:  No, patient  denies    Treatment  Insulin Pump - medtronic 630G  Novolog   Basal:  MN: 1.0u/hr  0800: 0.9u/hr  1200: 1.0u/hr  Basal insulin infusion is 23.6 units/24 hours    Bolus:  1u:12gCHO    Sensitivity  1u:50mg/dl    Active insulin time 3hr      Prevention  Lipid  LDL Cholesterol Calculated   Date Value Ref Range Status   02/05/2018 56 <100 mg/dL Final     Comment:     Desirable:       <100 mg/dl   07/08/2015 49 0 - 129 mg/dL Final     Comment:     LDL Cholesterol is the primary guide to therapy: LDL-cholesterol goal in high   risk patients is <100 mg/dL and in very high risk patients is <70 mg/dL.       LDL Cholesterol Direct   Date Value Ref Range Status   07/25/2016 77 <100 mg/dL Final     Comment:     Desirable:       <100 mg/dl       Medications     Salicylates    aspirin chewable tablet 81 mg             Weight  Wt Readings from Last 4 Encounters:   02/17/18 62.7 kg (138 lb 3.2 oz)   02/16/18 63.5 kg (140 lb)   02/13/18 65.8 kg (145 lb)   02/05/18 66.4 kg (146 lb 6.4 oz)        A1c today is   Lab Results   Component Value Date    A1C 12.8 02/16/2018    A1C 11.8 06/21/2017    A1C 11.5 11/02/2016    A1C >15.0  Results confirmed by repeat test   07/25/2016    A1C 11.8 10/05/2015         Allergies   Allergen Reactions     Naproxen GI Disturbance     No Known Drug Allergies        Current Facility-Administered Medications   Medication     acetaminophen (TYLENOL) tablet 325 mg     methadone (DOLOPHINE) tablet 70 mg     vancomycin (VANCOCIN) 1000 mg in dextrose 5% 200 mL PREMIX     albuterol (PROAIR HFA/PROVENTIL HFA/VENTOLIN HFA) Inhaler 1-2 puff     ipratropium - albuterol 0.5 mg/2.5 mg/3 mL (DUONEB) neb solution 3 mL     insulin aspart (NovoLOG) inj (RAPID ACTING)     naloxone (NARCAN) injection 0.1-0.4 mg     melatonin tablet 1 mg     lidocaine 1 % 1 mL     lidocaine (LMX4) kit     sodium chloride (PF) 0.9% PF flush 3 mL     sodium chloride (PF) 0.9% PF flush 3 mL     senna-docusate (SENOKOT-S;PERICOLACE) 8.6-50 MG per  tablet 1 tablet    Or     senna-docusate (SENOKOT-S;PERICOLACE) 8.6-50 MG per tablet 2 tablet     polyethylene glycol (MIRALAX/GLYCOLAX) Packet 17 g     ondansetron (ZOFRAN-ODT) ODT tab 4 mg    Or     ondansetron (ZOFRAN) injection 4 mg     dextrose 10 % 1,000 mL infusion     insulin 1 unit/mL in saline (NovoLIN, HumuLIN Regular) drip - ADULT IV Infusion     glucose 40 % gel 15-30 g    Or     dextrose 50 % injection 25-50 mL    Or     glucagon injection 1 mg     aspirin chewable tablet 81 mg     fluticasone (FLONASE) 50 MCG/ACT spray 2 spray     polyethylene glycol (MIRALAX/GLYCOLAX) Packet 17 g     ranitidine (ZANTAC) tablet 300 mg     piperacillin-tazobactam (ZOSYN) 3.375g in 15 mL NS Premix Syringe     oxyCODONE IR (ROXICODONE) tablet 5-10 mg     pregabalin (LYRICA) capsule 75 mg         Review of Systems     Constitutional: +fever, poor appetite   Head: no headache   Eye: no vision change/ loss of peripheral vision.   ENT: No throat congestion.   Respiratory: no cough , denies SOB  Cardiovascular: no chest pain   Gastrointestinal: Negative for vomiting, abdominal pain and diarrhea.  Genitourinary: No bladder problems.  Musculoskeletal: Negative for myalgias. No weakness.  Neurological: Negative for seizures and headaches.  Psychiatric/Behavioral: Negative for behavioral problem and dysphoric mood.    Past Medical History:   Diagnosis Date     Abdominal pain, right upper quadrant     sees Dr Mcclellan pain clinic at [facility] [place]     ASCUS with positive high risk HPV 8/2013    + HPV 33, [place] - GAVIN I, ECC- atypia     Cardiomyopathy (H)     non ischemic cardiomyopathy with EF 15     Cervical high risk HPV (human papillomavirus) test positive 7/8/15, 7/25/16    NIL pap/+ HR HPV (not 16 or 18).      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    leep     Depressive disorder      Gastro-oesophageal reflux disease      Human papillomavirus in conditions classified elsewhere and of unspecified site 2/2012    + HPV 33     Hypertension       Profound impairment, one eye, impairment level not further specified     rt eye due to childhood injury     Systolic CHF (H) 3/12/2015     Tobacco abuse 5/18/2013     Type 2 diabetes mellitus without complications (H)      Uncomplicated asthma        Past Surgical History:   Procedure Laterality Date     C NONSPECIFIC PROCEDURE  2001    cholecystectomy     C NONSPECIFIC PROCEDURE  as a child    tonsillectomy     C NONSPECIFIC PROCEDURE  2001    whipple procedure     CARDIAC SURGERY      defib     COLPOSCOPY,LOOP ELECTRD CERVIX EXCIS  2002, 2011    stage 2 dysplasia     ENDOBRONCHIAL ULTRASOUND FLEXIBLE N/A 2/19/2015    Procedure: ENDOBRONCHIAL ULTRASOUND FLEXIBLE;  Surgeon: Brenden Tamez MD;  Location: [facility]     LEEP TX, CERVICAL  2014    LEEP TX Cervical     RECESSION RESECTION WITH ADJUSTABLE SUTURE  12/13/2011    Procedure:RECESSION RESECTION WITH ADJUSTABLE SUTURE; Right Strabismus Repair with Adjustable Suture       TUBAL LIGATION  2007    essure        Family History   Problem Relation Age of Onset     DIABETES Mother      diet controled     Hypertension Mother      Arthritis Mother      Lipids Mother      DIABETES Father      Hypertension Father      GASTROINTESTINAL DISEASE Father      gallbladder removed     Bipolar Disorder Brother      Thyroid Disease Brother      Obesity Other      Son     Respiratory Other      Son and Daughter; asthma     Depression Maternal Aunt      Anxiety Disorder Maternal Aunt        Social History     Social History     Marital status: Single     Spouse name: N/A     Number of children: N/A     Years of education: N/A     Occupational History     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 No     Social History Narrative    Balanced Diet - Yes    Osteoporosis Prevention Measures - Dairy servings per day: 3 servings daily    Regular Exercise -  Yes Describe hand weights 20 minutes daily    Dental Exam - YES - Date: 3/10    Eye Exam - YES - Date: 1-2008    Self Breast Exam - Yes    Abuse: Current or Past (Physical, Sexual or Emotional)- No    Do you feel safe in your environment - Yes    Guns stored in the home - No    Sunscreen used - Yes    Seatbelts used - Yes    Lipids -  YES - Date: 1/10    Glucose -  YES - Date: 12/09    Colon Cancer Screening - No    Hemoccults - NO    Pap Test -  YES - Date: 6/19/08    Do you have any concerns about STD's -  No    Mammography - NO    DEXA - NO    Immunizations reviewed and up to date - Yes, last TD was 11-22-04    3/11/10    KATY Burrell CMA                                   Objective:   /68  Pulse 105  Temp 99  F (37.2  C) (Oral)  Resp 16  Ht 1.753 m (5' 9\")  Wt 62.7 kg (138 lb 3.2 oz)  SpO2 97%  BMI 20.41 kg/m2  Constitutional: Appears well-developed and well-nourished. lethargic.   EYES: anicteric, normal extra-ocular movements, no lid lag or retraction   HEENT: Mouth/Throat: Mucous membrane is moist. Oropharynx is clear. No adenopathy. Normal thyroid   Cardiovascular: tachycardic, hyperdynamic S1, S2. No m/g/r   Pulmonary/Chest: CTAB. No wheezing or rales   Abdominal: +BS. Nontender to palpation. No organomegaly present.  Neurological: drowsy, moves all 4 extremities  Extremities: R foot edematous, TTP, bandaged wound on plantar surface, left foot without edema  Skin: redness of left foot, otherwise normal texture, color    In House Labs:   Lab Results   Component Value Date    A1C 12.8 02/16/2018    A1C 11.8 06/21/2017    A1C 11.5 " 11/02/2016    A1C >15.0  Results confirmed by repeat test   07/25/2016    A1C 11.8 10/05/2015         Recent Labs  Lab 02/17/18  1206 02/17/18  1102 02/17/18  1021 02/17/18  0856 02/17/18  0740 02/17/18  0643 02/17/18  0611  02/16/18  2145  02/16/18  1528   GLC  --   --   --   --   --  116*  --   --  340*  --  454*   * 108* 124* 113* 113*  --  115*  < >  --   < >  --    < > = values in this interval not displayed.      Assessment/Treatment Plan:      Alessandra Pak is a 52 year old year old female with secondary DM s/p Whipple in 2001, on insulin pump for >1 year, presents with sepsis, likely related to RLE infection.     1.Secondary Diabetes s/p whipple, on insulin pump. Poorly controled. Complicated by neuropathy and nephropathy. Last A1c is 12.6%.  Inpatient blood glucose control has been good after initiation of insulin drip, and actually requiring very little insulin compared to home basal rates. She reports improving appetite but has not been able to stay awake long enough to order food. She continues to be febrile and tachycardic, though improving. Patient is amenable to staying on insulin drip for the current time until her medical course stabilizes a bit more.  We will consider transition off the drip to sq insulin injections later today vs tomorrow AM if her clinical course improves and insulin requirements are stable. We could consider re-initiation of her pump in the next few days if she has the supplies needed for the pump (infusion sets, tubing) and if she is alert, mentally clear and capable of managing her pump.     Recommendations:    -continue insulin gtt for now  -add carb coverage for PO intake, 1u:12g CHO    Patient was seen and discussed with Dr. Patel.     Alicia Roque MD  Endocrinology Fellow, PGY4  Pager [phone]     I have seen and examined the patient, reviewed and edited the fellow's note, and agree with the plan of care.  JAVIER Putnam

## 2018-02-17 NOTE — PROVIDER NOTIFICATION
Notified Resident at  AM regarding elevated temperature.      Spoke with:  BELLO Arechiga MD    Orders were obtained.    Comments: MD notified of pt's temp. 101.1 (oral), HI: 123, OVSS at 0422.  PRN Tylenol ordered & will give once verified by pharmacy.

## 2018-02-17 NOTE — PLAN OF CARE
Problem: Patient Care Overview  Goal: Plan of Care/Patient Progress Review  Outcome: Declining  Tmax: 101.9 (o), AK: 110's-120's, OVSS, satting 97-98% on 3L/NC.  On-call notified of temp. & vitals & ordered PRN Tylenol & given x1. Pt. on telemetry & in sinus tachy.  BG range: 115-186, insulin gtt per algorithm 1.  Pt. had no c/o's pain or nausea.    NS at 100cc/hour via left PIV.  Pt. up with assist of 1 & voided 590cc, no stools this shift. [facility] lung scan done & negative for PE.  Pt. slept well between cares. Encourage IS & activity.  Ordered for endocrine & podiatry consult ordered for right foot ulcer.  Primapore dressing on right foot ulcer with no drainage. Continue to follow POC & notify team with issues.

## 2018-02-17 NOTE — PHARMACY-CONSULT NOTE
Methadone Clinic Information Note    Clinic Name[facility]  Clinic Location (city): [place], [place]  Phone Number: [phone]  Prescriber's Name: Dr. Rich Patel    Verified dose = 70 mg    Cathy Sanders, PharmD

## 2018-02-17 NOTE — PHARMACY-ADMISSION MEDICATION HISTORY
Admission medication history interview status for the 2/16/2018 admission is complete. See Epic admission navigator for allergy information, pharmacy, prior to admission medications and immunization status.     Medication history interview sources:  patient, pharmacy, methadone clinic (see other note)    Changes made to PTA medication list (reason)  Added: none  Deleted: none  Changed: miralax (PRN), lidocaine (has OTC cream)    Additional medication history information (including reliability of information, actions taken by pharmacist):  Patient thought she was on flovent but pharmacy reports Combivent. Unable to find records of Flovent.  Added flu shot, she reports getting it this past fall.  Confirmed no known allergies.  Patient reliable historian.       Prior to Admission medications    Medication Sig Last Dose Taking? Auth Provider   Ipratropium-Albuterol (COMBIVENT RESPIMAT)  MCG/ACT inhaler Inhale 1 puff into the lungs 4 times daily Do not exceed 6 doses/day. 2/16/2018 at 1300 Yes Unknown, Entered By History   lidocaine HCl 3 % cream Apply topically 3 times daily as needed (foot pain) OTC product 2/16/2018 at Unknown time Yes Unknown, Entered By History   fluticasone (FLONASE) 50 MCG/ACT spray Spray 2 sprays into left nostril daily 2/16/2018 at 0800 Yes Brionna Calabrese MD   lisinopril (PRINIVIL/ZESTRIL) 10 MG tablet Take 1 tablet (10 mg) by mouth daily 2/16/2018 at 0800 Yes Brionna Calabrese MD   furosemide (LASIX) 20 MG tablet TAKE 1 TABLET (20 MG) BY MOUTH DAILY 2/16/2018 at 0800 Yes Brionna Calabrese MD   insulin aspart (NOVOLOG VIAL) 100 UNITS/ML injection Use as directed in insulin pump. Patient using up to 60 units per day 2/16/2018 at Unknown time Yes Toya Nuno PA-C   ranitidine (ZANTAC) 300 MG tablet Take 1 tablet (300 mg) by mouth daily 2/16/2018 at 0800 Yes Brionna Calabrese MD   lidocaine (LIDODERM) 5 % Patch Place 1  patch onto the skin every 24 hours Apply patch up to 12 hours with in a 24 hour period. 2/16/2018 at 0800 Yes Brionna Calabrese MD   albuterol (ALBUTEROL) 108 (90 BASE) MCG/ACT Inhaler Inhale 2 puffs into the lungs every 4 hours as needed for shortness of breath / dyspnea 2/16/2018 at 1330 Yes Brionna Calabrese MD   SM NICOTINE 21 MG/24HR 24 hr patch REMOVE OLD PATCH AND APPLY ONE NEW PATCH TO SKIN EVERY 24 HOURS Past Month at Unknown time Yes Brionna Calabrese MD   blood glucose monitoring (HOLLIE CONTOUR NEXT) test strip Use to test blood sugar 10 times daily or as directed.  Ok to substitute alternative if insurance prefers. 2/16/2018 at Unknown time Yes Toya Nuno PA-C   blood glucose monitoring (HOLLIE MICROLET) lancets Use to test blood sugar 10 times daily or as directed.  Ok to substitute alternative if insurance prefers. 2/16/2018 at Unknown time Yes Toya Nuno PA-C   blood glucose monitoring (FREESTYLE) lancets 1 each See Admin Instructions test 3-4 times per day 2/16/2018 at Unknown time Yes Milton Durán MD   glucose 4 G CHEW Take 3-4 tablets to treat low blood sugar. Past Month at Unknown time Yes Hernesto Guzman MD   methadone (DOLPHINE-INTENSOL) 10 mg/mL CONC Take 7 mLs by mouth daily. 2/15/2018 at 2100 Yes Kary Baron MD   MIRENA 20 MCG/24HR IU IUD placed today 2/16/2018 at Unknown time Yes Dre Giles MD   ASPIRIN 81 MG OR TABS 1 tab po QD (Once per day) 2/16/2018 at 0800 Yes Manjula Larose NP   amitriptyline (ELAVIL) 50 MG tablet Take 1 tablet (50 mg) by mouth At Bedtime 2/16/2018 at 0800  Brionna Calabrese MD   LYRICA 75 MG capsule 1 BY MOUTH 3 TIMES A DAY 2/16/2018 at 1300  Vocal, Chandana Muhammad MD   acetone, Urine, test STRP 1 strip by In Vitro route as needed More than a month at Unknown time  Toya Nuno PA-C   glucagon (GLUCAGON EMERGENCY) 1 MG injection Inject 1 mg into the  muscle once for 1 dose   Toya Nuno PA-C   multivitamin, therapeutic with minerals (THERA-VIT-M) TABS Take 1 tablet by mouth daily 2/16/2018 at 0800  Branch Brionna Dc MD   polyethylene glycol (MIRALAX/GLYCOLAX) packet Take 17 g by mouth daily as needed. More than a month at Unknown time  Milton Durán MD   insulin pen needle (B-D U/F) 31G X 5 MM Use 3 time(s) a day.   Milton Durán MD         Medication history completed by: Cathy Sanders, PharmD

## 2018-02-17 NOTE — PROGRESS NOTES
[facility], [place]    Internal Medicine Progress Note - Gold Service      Assessment & Plan   Alessandra Pak is a 52 year old woman with a history of NICM, HTN, COPD, GERD, chronic pancreatitis s/p pylorus sparing Whipple (2001) w/ secondary DM and diabetic foot ulcers, depression, and chronic methadone use who was admitted on 2/16 w/ sepsis attributed to RLE cellulitis, as well as acute hypoxic respiratory failure.    #Sepsis, Likely 2/2 RLE Cellulitis. T 102.2 deg in clinic --> 99 today. HR 120s --> 105, WBC 22.3-->19.8, , procal 1.42, lactate wnl. CXR w/ streaky LLL opacity felt most c/w atelectasis. UA w/ large LE, 60 WBC, but also squamous epithelial cells so doubt clean catch and cx neg to date. Blood cx neg to date. Has right foot plantar wound x 6 wks seen in podiatry clinic PTA and debrided. X-ray showed acute, mildly displaced, probable intra-articular fracture involving the proximal aspect of the middle phalanx or 4th toe and ulcer defect under the 1st MTP w/o evidence of osteomyelitis. Ongoing RLE warmth and erythema from toes to ankle today.   - Continue vancomycin and Zosyn started in ED. Likely deescalate broad spectrum gram negative coverage in coming days but will keep for now given that patient is diabetic and at risk for polymicrobial foot wound.   - Inpatient podiatry and WOCN consults pending.     #Acute Hypoxic Respiratory Failure. Presented w/ O2 sats in the 70s on room air. Now high 90s on 3L NC. CXR as above felt c/w atelectasis but PNA remains in differential at this time. Note 4/2017 chest CT w/ evidence of fibrosis and no f/u since. Also has underlying COPD but no evidence of significant exacerbation at this time. BLE US and VQ scan on admit neg for PE. BNP 3096 and has hx NICM w/ last echo 2/2017 showing improvement in LVEF from 15 to 50-55%; has some faint crackles and reports NIXON, orthopnea but weight 138 lb from 145 lb in clinic on 2/13 and 2/5. EKG  and trop neg on admit.   - RVP still pending.   - Non contrast chest CT today to further eval.   - Consider repeat echo.   - Continue to wean O2 as able.     #Secondary DM. S/p Whipple in 2001. C/b neuropathy and nephropathy. Poorly controlled w/ HgbA1c 12.8%.  on admit. Insulin pump stopped and insulin drip started on admit.   - Endocrinology following.   - Continue insulin gtt overnight.   - Added Novolog meal coverage 1 unit per 12 g carbs.     #CKD III. Cr 1.22, near baseline range 0.9-1.2.     #COPD. No longer smoking. Not on home O2.   - On Combivent inhaler BID PTA (non formulary). Changed to DuoNeb QID here for now.     #Chronic Abdominal Pain. On PTA methadone 70 mg HS but did not receive dose last night as pharmacy had not yet verified. Appeared to perhaps have some withdrawal sx this AM, improved w/ oxycodone. QTc 476 ms.   - Will get home methadone tonight. In the meantime can use PRN oxycodone if needed for pain.     #NICM, HTN. LVEF 50-55% on 2/2016 echo. Previously as low as 15% and has ICD in place - last interrogated 12/5/17 w/ 2 episodes NSVT.   - Daily weights.   - Home lisinopril 10 mg and Lasix 20 mg on hold w/ sepsis and mild NIKOLAY.     Consulting Services: Endocrinology, Podiatry, WOCN    Diet: Carb controlled  Fluids: N/A  DVT Prophylaxis: Lovenox   Code Status: Full Code    # Pain Assessment:   Current Pain Score 2/17/2018 2/17/2018 2/17/2018   Patient currently in pain? yes sleeping: patient not able to self report sleeping: patient not able to self report   Pain score (0-10) - - -   Pain location Foot - -   Pain descriptors Aching;Burning - -   CPOT pain score - - -   - Alessandra is experiencing pain due to RLE neuropathy and cellulitis. Pain management was discussed and the plan was created in a collaborative fashion.  Alessandra's response to the current recommendations: compliant  - Opioid regimen: chronic methadone  - Pharmacologic adjuvants: Pregabalin (Lyrica) and  "amitriptyline    Disposition Plan   Expected discharge: 4 - 7 days; recommended to prior living arrangement once on oral antibiotics for infection, off insulin drip, off oxygen. May need to consider PT/OT pending course.     The patient's care was discussed with bedside RN Charla, patient, endocrinology fellow, and medicine attending Dr. Morse.     Camilla Soler PA-C  Hospitalist Service  [facility]  Pager: [phone]    Interval History   Feeling better today. Pain in right leg controlled unless moved/touched. Ongoing fever overnight. Still short of breath and more comfortable sitting up. Only coughs in the morning w/ scant sputum production. No chest pain.     A 4 point ROS was performed (including CV, Resp, GI, ) and negative unless otherwise noted in HPI.     Physical Exam   /72  Pulse 103  Temp 99  F (37.2  C) (Oral)  Resp 16  Ht 1.753 m (5' 9\")  Wt 62.7 kg (138 lb 3.2 oz)  SpO2 97%  BMI 20.41 kg/m2     GENERAL: Alert and oriented x 3. Lying in bed, appears comfortable. Pleasant and conversant.   HEENT: Anicteric sclera. Mucous membranes moist.   CV: RRR. S1, S2. No murmurs appreciated.   RESPIRATORY: Effort normal on 3L NC. Lungs CTAB with slightly diminished bases/faint scattered crackles.   GI: Abdomen soft and non distended, bowel sounds present. No tenderness, rebound, guarding.   NEUROLOGICAL: No focal deficits. Moves all extremities.    EXTREMITIES: Warm and well perfused. Right foot warm and erythematous w/ edema to the ankle.   SKIN: No jaundice. No rashes. Right foot planter ulceration over first metatarsal head w/o significant drainage or odor.     Lines/Tubes/Drains  PIV     Data reviewed today: I reviewed all medications, new labs and imaging results over the last 24 hours.                   "

## 2018-02-17 NOTE — PROGRESS NOTES
"/77 (BP Location: Left arm)  Pulse 115  Temp 99.3  F (37.4  C) (Oral)  Resp 18  Ht 1.753 m (5' 9\")  Wt 63.5 kg (140 lb 1.6 oz)  SpO2 92%  BMI 20.69 kg/m2    Patient arrived to  from ED at 1900. A&O x4. Febrile, tachycardic, and on 5L 02. Denies pain. Able to ambulate in room to bed. Patient settled and report given to oncoming RN. MD notified that patient has arrived to .   "

## 2018-02-18 ENCOUNTER — APPOINTMENT (OUTPATIENT)
Dept: MRI IMAGING | Facility: CLINIC | Age: 53
DRG: 853 | End: 2018-02-18
Attending: PHYSICIAN ASSISTANT
Payer: COMMERCIAL

## 2018-02-18 LAB
ANION GAP SERPL CALCULATED.3IONS-SCNC: 7 MMOL/L (ref 3–14)
BUN SERPL-MCNC: 22 MG/DL (ref 7–30)
CALCIUM SERPL-MCNC: 8.4 MG/DL (ref 8.5–10.1)
CHLORIDE SERPL-SCNC: 102 MMOL/L (ref 94–109)
CO2 SERPL-SCNC: 26 MMOL/L (ref 20–32)
CREAT SERPL-MCNC: 1.19 MG/DL (ref 0.52–1.04)
CREAT SERPL-MCNC: 1.23 MG/DL (ref 0.52–1.04)
CRP SERPL-MCNC: 290 MG/L (ref 0–8)
ERYTHROCYTE [DISTWIDTH] IN BLOOD BY AUTOMATED COUNT: 14 % (ref 10–15)
GFR SERPL CREATININE-BSD FRML MDRD: 46 ML/MIN/1.7M2
GFR SERPL CREATININE-BSD FRML MDRD: 48 ML/MIN/1.7M2
GLUCOSE BLDC GLUCOMTR-MCNC: 100 MG/DL (ref 70–99)
GLUCOSE BLDC GLUCOMTR-MCNC: 103 MG/DL (ref 70–99)
GLUCOSE BLDC GLUCOMTR-MCNC: 105 MG/DL (ref 70–99)
GLUCOSE BLDC GLUCOMTR-MCNC: 105 MG/DL (ref 70–99)
GLUCOSE BLDC GLUCOMTR-MCNC: 114 MG/DL (ref 70–99)
GLUCOSE BLDC GLUCOMTR-MCNC: 117 MG/DL (ref 70–99)
GLUCOSE BLDC GLUCOMTR-MCNC: 122 MG/DL (ref 70–99)
GLUCOSE BLDC GLUCOMTR-MCNC: 146 MG/DL (ref 70–99)
GLUCOSE BLDC GLUCOMTR-MCNC: 184 MG/DL (ref 70–99)
GLUCOSE BLDC GLUCOMTR-MCNC: 280 MG/DL (ref 70–99)
GLUCOSE BLDC GLUCOMTR-MCNC: 91 MG/DL (ref 70–99)
GLUCOSE SERPL-MCNC: 275 MG/DL (ref 70–99)
HCT VFR BLD AUTO: 32.1 % (ref 35–47)
HGB BLD-MCNC: 10.5 G/DL (ref 11.7–15.7)
LACTATE BLD-SCNC: 0.8 MMOL/L (ref 0.4–1.9)
MCH RBC QN AUTO: 28.1 PG (ref 26.5–33)
MCHC RBC AUTO-ENTMCNC: 32.7 G/DL (ref 31.5–36.5)
MCV RBC AUTO: 86 FL (ref 78–100)
PLATELET # BLD AUTO: 261 10E9/L (ref 150–450)
POTASSIUM SERPL-SCNC: 4 MMOL/L (ref 3.4–5.3)
RBC # BLD AUTO: 3.74 10E12/L (ref 3.8–5.2)
SODIUM SERPL-SCNC: 134 MMOL/L (ref 133–144)
WBC # BLD AUTO: 19 10E9/L (ref 4–11)

## 2018-02-18 PROCEDURE — 99207 ZZC APP CREDIT; MD BILLING SHARED VISIT: CPT | Performed by: PHYSICIAN ASSISTANT

## 2018-02-18 PROCEDURE — 12000026 ZZH R&B TRANSPLANT

## 2018-02-18 PROCEDURE — 25000128 H RX IP 250 OP 636

## 2018-02-18 PROCEDURE — 86140 C-REACTIVE PROTEIN: CPT | Performed by: PHYSICIAN ASSISTANT

## 2018-02-18 PROCEDURE — 83605 ASSAY OF LACTIC ACID: CPT | Performed by: SOCIAL WORKER

## 2018-02-18 PROCEDURE — 00000146 ZZHCL STATISTIC GLUCOSE BY METER IP

## 2018-02-18 PROCEDURE — 87040 BLOOD CULTURE FOR BACTERIA: CPT | Performed by: PHYSICIAN ASSISTANT

## 2018-02-18 PROCEDURE — 25000131 ZZH RX MED GY IP 250 OP 636 PS 637: Performed by: SOCIAL WORKER

## 2018-02-18 PROCEDURE — 80048 BASIC METABOLIC PNL TOTAL CA: CPT | Performed by: PHYSICIAN ASSISTANT

## 2018-02-18 PROCEDURE — 25000132 ZZH RX MED GY IP 250 OP 250 PS 637: Performed by: PHYSICIAN ASSISTANT

## 2018-02-18 PROCEDURE — 40000556 ZZH STATISTIC PERIPHERAL IV START W US GUIDANCE

## 2018-02-18 PROCEDURE — 36415 COLL VENOUS BLD VENIPUNCTURE: CPT | Performed by: SOCIAL WORKER

## 2018-02-18 PROCEDURE — 36415 COLL VENOUS BLD VENIPUNCTURE: CPT | Performed by: PHYSICIAN ASSISTANT

## 2018-02-18 PROCEDURE — 82565 ASSAY OF CREATININE: CPT | Performed by: INTERNAL MEDICINE

## 2018-02-18 PROCEDURE — 73720 MRI LWR EXTREMITY W/O&W/DYE: CPT | Mod: RT

## 2018-02-18 PROCEDURE — 87389 HIV-1 AG W/HIV-1&-2 AB AG IA: CPT | Performed by: PHYSICIAN ASSISTANT

## 2018-02-18 PROCEDURE — 85027 COMPLETE CBC AUTOMATED: CPT | Performed by: PHYSICIAN ASSISTANT

## 2018-02-18 PROCEDURE — A9585 GADOBUTROL INJECTION: HCPCS

## 2018-02-18 PROCEDURE — 25000128 H RX IP 250 OP 636: Performed by: PHYSICIAN ASSISTANT

## 2018-02-18 PROCEDURE — 25000132 ZZH RX MED GY IP 250 OP 250 PS 637: Performed by: HOSPITALIST

## 2018-02-18 PROCEDURE — 99233 SBSQ HOSP IP/OBS HIGH 50: CPT | Performed by: INTERNAL MEDICINE

## 2018-02-18 RX ORDER — GADOBUTROL 604.72 MG/ML
0.1 INJECTION INTRAVENOUS ONCE
Status: COMPLETED | OUTPATIENT
Start: 2018-02-18 | End: 2018-02-18

## 2018-02-18 RX ORDER — DOXYCYCLINE 100 MG/1
100 CAPSULE ORAL 2 TIMES DAILY
Qty: 14 CAPSULE | Refills: 0 | Status: SHIPPED | OUTPATIENT
Start: 2018-02-18 | End: 2018-02-18

## 2018-02-18 RX ORDER — METHADONE HYDROCHLORIDE 10 MG/1
70 TABLET ORAL AT BEDTIME
Status: DISCONTINUED | OUTPATIENT
Start: 2018-02-18 | End: 2018-02-23 | Stop reason: HOSPADM

## 2018-02-18 RX ADMIN — FLUTICASONE PROPIONATE 2 SPRAY: 50 SPRAY, METERED NASAL at 07:42

## 2018-02-18 RX ADMIN — PREGABALIN 75 MG: 50 CAPSULE ORAL at 07:42

## 2018-02-18 RX ADMIN — INSULIN ASPART 3 UNITS: 100 INJECTION, SOLUTION INTRAVENOUS; SUBCUTANEOUS at 19:31

## 2018-02-18 RX ADMIN — GADOBUTROL 6 ML: 604.72 INJECTION INTRAVENOUS at 16:31

## 2018-02-18 RX ADMIN — OXYCODONE HYDROCHLORIDE 10 MG: 5 TABLET ORAL at 11:02

## 2018-02-18 RX ADMIN — SENNOSIDES AND DOCUSATE SODIUM 2 TABLET: 8.6; 5 TABLET ORAL at 11:02

## 2018-02-18 RX ADMIN — ALBUTEROL SULFATE 2 PUFF: 90 AEROSOL, METERED RESPIRATORY (INHALATION) at 11:08

## 2018-02-18 RX ADMIN — METHADONE HYDROCHLORIDE 70 MG: 10 TABLET ORAL at 18:29

## 2018-02-18 RX ADMIN — ALBUTEROL SULFATE 2 PUFF: 90 AEROSOL, METERED RESPIRATORY (INHALATION) at 15:51

## 2018-02-18 RX ADMIN — ASPIRIN 81 MG CHEWABLE TABLET 81 MG: 81 TABLET CHEWABLE at 07:43

## 2018-02-18 RX ADMIN — PIPERACILLIN SODIUM AND TAZOBACTAM SODIUM 3.38 G: 36; 4.5 INJECTION, POWDER, FOR SOLUTION INTRAVENOUS at 05:07

## 2018-02-18 RX ADMIN — ACETAMINOPHEN 325 MG: 325 TABLET, FILM COATED ORAL at 03:34

## 2018-02-18 RX ADMIN — VANCOMYCIN HYDROCHLORIDE 1250 MG: 10 INJECTION, POWDER, LYOPHILIZED, FOR SOLUTION INTRAVENOUS at 13:27

## 2018-02-18 RX ADMIN — INSULIN GLARGINE 5 UNITS: 100 INJECTION, SOLUTION SUBCUTANEOUS at 13:23

## 2018-02-18 RX ADMIN — ALBUTEROL SULFATE 2 PUFF: 90 AEROSOL, METERED RESPIRATORY (INHALATION) at 19:30

## 2018-02-18 RX ADMIN — PIPERACILLIN SODIUM AND TAZOBACTAM SODIUM 3.38 G: 36; 4.5 INJECTION, POWDER, FOR SOLUTION INTRAVENOUS at 18:29

## 2018-02-18 RX ADMIN — PREGABALIN 75 MG: 50 CAPSULE ORAL at 19:31

## 2018-02-18 RX ADMIN — PREGABALIN 75 MG: 50 CAPSULE ORAL at 14:59

## 2018-02-18 RX ADMIN — RANITIDINE 300 MG: 150 TABLET, FILM COATED ORAL at 07:43

## 2018-02-18 RX ADMIN — PIPERACILLIN SODIUM AND TAZOBACTAM SODIUM 3.38 G: 36; 4.5 INJECTION, POWDER, FOR SOLUTION INTRAVENOUS at 11:02

## 2018-02-18 RX ADMIN — ALBUTEROL SULFATE 2 PUFF: 90 AEROSOL, METERED RESPIRATORY (INHALATION) at 07:42

## 2018-02-18 RX ADMIN — INSULIN ASPART 3 UNITS: 100 INJECTION, SOLUTION INTRAVENOUS; SUBCUTANEOUS at 11:48

## 2018-02-18 RX ADMIN — POLYETHYLENE GLYCOL 3350 17 G: 17 POWDER, FOR SOLUTION ORAL at 07:43

## 2018-02-18 RX ADMIN — OXYCODONE HYDROCHLORIDE 10 MG: 5 TABLET ORAL at 16:00

## 2018-02-18 NOTE — DISCHARGE SUMMARY
"[facility], [place]    Internal Medicine Discharge Summary- Gold Service    Date of Admission: 2/16/2018  Date of Discharge: 2/18/2018  Discharging Provider: Camilla Soler PA-C/Steven Morse MD  Discharging Team: Gold 3    Patient is leaving the hospital against medical advice    Discharge Diagnoses   Sepsis  Right Lower Extremity Diabetic Foot Wound and Cellulitis   Bilateral Upper Lobe Ground Glass Opacities Concerning for Pneumonia   Acute Hypoxic Respiratory Failure   Uncontrolled Diabetes Mellitus   CKD III  COPD  Chronic Pancreatitis s/p Pylorus Sparing Whipple w/ Chronic Abdominal Pain on Methadone Maintenance   Depression  Non Ischemic Cardiomyopathy  Hypertension   GERD    Hospital Course   Alessandra Pak is a 52 year old woman with a history of NICM, HTN, COPD, GERD, chronic pancreatitis s/p pylorus sparing Whipple (2001) w/ secondary DM and diabetic foot ulcers, depression, and chronic methadone use who was admitted on 2/16 w/ sepsis and acute hypoxic respiratory failure after being sent to the ED from podiatry clinic. Unfortunately, she is choosing to leave the hospital against medical advice at this time. She is unwilling to talk about her reasoning for this, just that she needs to go home. She is uncomfortable being \"poked and prodded\" in the hospital but will not stay despite our offers to remove telemetry, stop frequent glucose monitoring, skip lab draw today, etc. She is alert, oriented, and able to verbalize what she is in the hospital for and the risks associated with leaving - including death given that she is septic and hypoxic. We asked that she please return to the ED later if she is willing. She is not having suicidal ideation or evidence of withdrawal (on methadone for chronic pain and has not complained of uncontrolled pain here). She had cited some stressors at time of admit involving her daughter being pregnant and her mother being in the hospital.     #Sepsis. T " 102.2 deg in clinic --> 99s yesterday --> 102s again overnight - concerning that she continues to spike temps despite broad spectrum IV antibiotics. HR 120s on admit --> 90s now. Has leukocytosis (22.3), elevated CRP (280), elevated procal (1.42); lactate wnl. Is not allowing lab draw today. Admit CXR w/ streaky LLL opacity felt most c/w atelectasis, however chest CT 2/17 (obtained d/t evidence of fibrosis on last CT 4/2017 and unclear etiology of hypoxia) w/ new groundglass opacities in the upper lobes bilaterally in a peribronchovascular distribution concerning for pneumonia. This could be concerning for an atypical infection but we have not been able to discuss risk factors further w/ patient (would need to question inhalation, HIV, other exposures). UA w/ large LE, 60 WBC, but also squamous epithelial cells so doubt clean catch and cx neg to date. 2/16 blood cx neg to date; would not allow repeat today. Has right foot plantar wound x 6 wks seen in podiatry clinic PTA and debrided. X-ray showed acute, mildly displaced, probable intra-articular fracture involving the proximal aspect of the middle phalanx or 4th toe and ulcer defect under the 1st MTP w/o evidence of osteomyelitis. Had RLE warmth and erythema from toes to ankle and sepsis was felt likely 2/2 RLE cellulitis, however now w/ CT findings and ongoing hypoxia would also question pulmonary process. She remained on vancomycin and Zosyn here w/ plan for further w/u. She was provided w/ script for doxycycline at d/c but unclear if she will pick this up. We are hopeful that she will return to the hospital for further management, but at this time she is competent to make her own decisions.      #Acute Hypoxic Respiratory Failure. Presented w/ O2 sats in the 70s on room air. Now high 90s on 3L NC but continues to desat off O2 and does not have O2 at home. CXR as above felt c/w atelectasis but CT as above shows concern for infectious process. Also has underlying  "COPD but no evidence of significant exacerbation at this time. BLE US and VQ scan on admit neg for PE. BNP 3096 and has hx NICM w/ last echo 2/2017 showing improvement in LVEF from 15 to 50-55%; has some faint crackles and reports NIXON, orthopnea but weight 138 lb from 145 lb in clinic on 2/13 and 2/5. EKG and trop neg on admit. Respiratory viral panel still pending. Discussed her current need for oxygen and risk of morbidity/mortality leaving the hospital against medical advice.      #Secondary DM. S/p Whipple in 2001. C/b neuropathy and nephropathy. Poorly controlled w/ HgbA1c 12.8%.  on admit. Insulin pump stopped and insulin drip started on admit. Endocrinology consulted. They will try to meet w/ patient urgently before she leaves to make sure that she has what she needs to get back on the pump and be able to manage it.      #CKD III. Cr 1.22, near baseline range 0.9-1.2.      #COPD. No longer smoking. On Combivent inhaler, using BID PTA.     #Chronic Abdominal Pain. On PTA methadone 70 mg HS.     #NICM, HTN. LVEF 50-55% on 2/2016 echo. Previously as low as 15% and has ICD in place - last interrogated 12/5/17 w/ 2 episodes NSVT. Asked patient to hold home lisinopril 10 mg and Lasix 20 mg w/ sepsis and mild NIKOLAY.     #Discharge Pain Plan:   Patient currently has NO PAIN and is not being prescribed pain medications on discharge. She is on chronic methadone therapy.     Consultations This Hospital Stay   Endocrinology   Podiatry and WOCN were still pending     Physical Exam   Blood pressure 95/62, pulse 94, temperature 98.3  F (36.8  C), temperature source Oral, resp. rate 16, height 1.753 m (5' 9\"), weight 63 kg (139 lb), SpO2 92 %, not currently breastfeeding.  Alert and oriented x 3. Tearful. No tremulousness, diaphoresis, agitation. Able to ambulate in room.     Significant Results and Procedures   Chest CT 2/17:   IMPRESSION:      1. New groundglass opacities in the upper lobes bilaterally in " a  peribronchovascular distribution. Findings are concerning for  pneumonia.  2. Upper lobe predominant centrilobular and paraseptal emphysema.  3. Sequelae of chronic pancreatitis, including parenchymal  calcifications.  4. Unchanged pneumobilia.     Pending Results   These results will be followed up by PCP  Unresulted Labs Ordered in the Past 30 Days of this Admission     Date and Time Order Name Status Description    2/16/2018 1517 Blood culture Preliminary     2/16/2018 1517 Blood culture Preliminary           Primary Care Physician   Brionna Calabrese - will send Epic inbox message     Discharge Disposition   Discharged to home against medical advice  Condition at discharge: Guarded - needs ongoing hospitalization     Code Status   Full    Discharge Procedure Orders  Reason for your hospital stay   Order Comments: You are septic - this is a serious infection that you could die from and you are choosing to leave against medical advice. You have a cellulitis (skin infection) of the right leg. You also have opacities in the lungs concerning for some type of infection that we have not yet identified. You are requiring oxygen because of this.     Follow Up and recommended labs and tests   Order Comments: Please return to the ED if you are willing. Please call you primary care doctor's office in the morning for a follow up appointment.     Activity   Order Comments: Your activity upon discharge: activity as tolerated   Order Specific Question Answer Comments   Is discharge order? Yes      Full Code     Diet   Order Comments: Follow this diet upon discharge: carb controlled   Order Specific Question Answer Comments   Is discharge order? Yes           Discharge Medications   Current Discharge Medication List      START taking these medications    Details   doxycycline (VIBRAMYCIN) 100 MG capsule Take 1 capsule (100 mg) by mouth 2 times daily for 7 days  Qty: 14 capsule, Refills: 0    Associated Diagnoses:  Cellulitis of foot         CONTINUE these medications which have NOT CHANGED    Details   Ipratropium-Albuterol (COMBIVENT RESPIMAT)  MCG/ACT inhaler Inhale 1 puff into the lungs 4 times daily Do not exceed 6 doses/day.      lidocaine HCl 3 % cream Apply topically 3 times daily as needed (foot pain) OTC product      polyethylene glycol (MIRALAX/GLYCOLAX) Packet Take 17 g by mouth daily as needed for constipation      fluticasone (FLONASE) 50 MCG/ACT spray Spray 2 sprays into left nostril daily  Qty: 1 Bottle, Refills: 11    Associated Diagnoses: Nasal mass      insulin aspart (NOVOLOG VIAL) 100 UNITS/ML injection Use as directed in insulin pump. Patient using up to 60 units per day  Qty: 60 mL, Refills: 3    Associated Diagnoses: Type 2 diabetes mellitus with diabetic neuropathy (H)      ranitidine (ZANTAC) 300 MG tablet Take 1 tablet (300 mg) by mouth daily  Qty: 90 tablet, Refills: 3    Associated Diagnoses: Gastroesophageal reflux disease without esophagitis      lidocaine (LIDODERM) 5 % Patch Place 1 patch onto the skin every 24 hours Apply patch up to 12 hours with in a 24 hour period.  Qty: 30 patch, Refills: 1    Associated Diagnoses: Lumbar radiculopathy      albuterol (ALBUTEROL) 108 (90 BASE) MCG/ACT Inhaler Inhale 2 puffs into the lungs every 4 hours as needed for shortness of breath / dyspnea  Qty: 1 Inhaler, Refills: 5    Associated Diagnoses: SOB (shortness of breath)      SM NICOTINE 21 MG/24HR 24 hr patch REMOVE OLD PATCH AND APPLY ONE NEW PATCH TO SKIN EVERY 24 HOURS  Qty: 28 patch, Refills: 1    Associated Diagnoses: Tobacco dependence syndrome      blood glucose monitoring (HOLLIE CONTOUR NEXT) test strip Use to test blood sugar 10 times daily or as directed.  Ok to substitute alternative if insurance prefers.  Qty: 300 each, Refills: 12    Associated Diagnoses: Diabetes mellitus type 1 (H)      !! blood glucose monitoring (HOLLIE MICROLET) lancets Use to test blood sugar 10 times daily or as  directed.  Ok to substitute alternative if insurance prefers.  Qty: 1 Box, Refills: prn    Associated Diagnoses: Diabetes mellitus type 1 (H)      !! blood glucose monitoring (FREESTYLE) lancets 1 each See Admin Instructions test 3-4 times per day  Qty: 3 Box, Refills: 3    Associated Diagnoses: Type 2 diabetes, HbA1C goal < 8% (H)      glucose 4 G CHEW Take 3-4 tablets to treat low blood sugar.  Qty: 25 tablet, Refills: 11    Associated Diagnoses: Uncontrolled diabetes mellitus with complications (H)      methadone (DOLPHINE-INTENSOL) 10 mg/mL CONC Take 7 mLs by mouth daily.    Associated Diagnoses: Chemical dependency (H)      MIRENA 20 MCG/24HR IU IUD placed today  Qty: 1, Refills: 0    Associated Diagnoses: Excessive or frequent menstruation      ASPIRIN 81 MG OR TABS 1 tab po QD (Once per day)  Qty: 100, Refills: 3    Associated Diagnoses: Chronic pancreatitis (H)      amitriptyline (ELAVIL) 50 MG tablet Take 1 tablet (50 mg) by mouth At Bedtime  Qty: 90 tablet, Refills: 3    Associated Diagnoses: Anxiety      LYRICA 75 MG capsule 1 BY MOUTH 3 TIMES A DAY  Qty: 90 capsule, Refills: 5    Comments: This request is for a new prescription for a controlled substance as required by Federal/State law.  Associated Diagnoses: Controlled type 2 diabetes with neuropathy (H)      acetone, Urine, test STRP 1 strip by In Vitro route as needed  Qty: 25 each, Refills: 1    Associated Diagnoses: Type 2 diabetes mellitus with diabetic neuropathy (H)      multivitamin, therapeutic with minerals (THERA-VIT-M) TABS Take 1 tablet by mouth daily  Qty: 30 each, Refills: 11    Associated Diagnoses: Heart failure and kidney disease due to high blood pressure (H)      insulin pen needle (B-D U/F) 31G X 5 MM Use 3 time(s) a day.  Qty: 3 each, Refills: 3    Associated Diagnoses: Type 2 diabetes, HbA1c goal < 7% (H)       !! - Potential duplicate medications found. Please discuss with provider.      STOP taking these medications        lisinopril (PRINIVIL/ZESTRIL) 10 MG tablet Comments:   Reason for Stopping:         furosemide (LASIX) 20 MG tablet Comments:   Reason for Stopping:         glucagon (GLUCAGON EMERGENCY) 1 MG injection Comments:   Reason for Stopping:               Time Spent on this Encounter   40 minutes spent in discharge     Patient was seen with attending physician.     Camilla Soler PA-C  Hospitalist Service  [facility]  Pager: [phone]

## 2018-02-18 NOTE — PLAN OF CARE
"Problem: Patient Care Overview  Goal: Individualization & Mutuality  Outcome: No Change  /63 (BP Location: Left arm)  Pulse 63  Temp 98.5  F (36.9  C) (Oral)  Resp 16  Ht 1.753 m (5' 9\")  Wt 63 kg (139 lb)  SpO2 93%  BMI 20.53 kg/m2   VSS on 4L O2 via NC. Patient c/o R leg pain and received oxycodone 10mg. Patient denies nausea. Urine Output - voiding spontaneously without difficulty. Bowel Function - no BM this shift and pt was given senna PRN. Nutrition - on const carb diet and tolerated good, pt had half of meal and she needs encouragement to eat. Drains - L arm PIV SL between antibiotics. Activity - limited assist of 1. Pt has changed her mind and agreed to stay in hospital for better treatment. Started on Novolog and Lantus. Will continue with POC      "

## 2018-02-18 NOTE — PROGRESS NOTES
[facility], [place]    Internal Medicine Progress Note - Gold Service      Assessment & Plan   Alessandra Pak is a 52 year old woman with a history of NICM, HTN, COPD, GERD, chronic pancreatitis s/p pylorus sparing Whipple (2001) w/ secondary DM and diabetic foot ulcers, depression, and chronic methadone use who was admitted on 2/16 w/ sepsis attributed to RLE cellulitis, as well as acute hypoxic respiratory failure.    #Sepsis, RLE Diabetic Foot Ulcer and Cellulitis, Pneumonia. T 102.2 deg in clinic --> 99s yesterday --> 102s again overnight - concerning that she continues to spike temps despite broad spectrum IV antibiotics. HR improved to 60s-90s from 120s on admit. Has leukocytosis (22.3-->19), elevated CRP (290 w/o improvement since admit), elevated procal (1.42); lactate wnl. Sepsis initially attributed to RLE cellulitis w/ erythema, warmth, edema of foot/ankle in the setting of right foot planter ulcer present x 6 wks and debrided in podiatry clinic PTA. X-ray showed acute, mildly displaced, probable intra-articular fracture involving the proximal aspect of the middle phalanx or 4th toe and ulcer defect under the 1st MTP w/o evidence of osteomyelitis. Admit CXR w/ streaky LLL opacity felt most c/w atelectasis, however chest CT 2/17 (obtained d/t evidence of fibrosis on last CT 4/2017 and unclear etiology of hypoxia) w/ new groundglass opacities in the upper lobes bilaterally in a peribronchovascular distribution concerning for pneumonia and patient has ongoing hypoxia. Could be atypical process. Respiratory viral panel neg. Urine and blood cultures neg to date.   - Attempted to leave against medical advice this morning for unclear reasons, but is now fortunately agreeable to staying.   - Repeat set of blood cultures today.   - Continue Zosyn.  - Received 3rd dose of Q24hr vancomycin today. Will d/c for now although MRSA nares swab still pending. Patient wishes to avoid excessive  "lab draws (cancelled Cr/vanco level for tomorrow) and the gram negative/polymicrobial coverage w/ Zosyn is likely most important at this time.   - Podiatry consult is still pending. Would call tomorrow. Per Dr. Lewis's clinic note was thought to potentially need MRI which we will order today given the persistent fevers, CRP elevation, pain.   - Check HIV.   - Low threshold to consult ID in coming days.     #Acute Hypoxic Respiratory Failure. Presented w/ O2 sats in the 70s on room air. Now high 90s on 3L NC but continues to desat off O2 and does not have O2 at home. CXR as above felt c/w atelectasis but CT as above shows concern for infectious process. Also has underlying COPD but no evidence of significant exacerbation at this time. BLE US and VQ scan on admit neg for PE. BNP 3096 and has hx NICM w/ last echo 2/2017 showing improvement in LVEF from 15 to 50-55%; has some orthopnea but weight 138 lb from 145 lb in clinic on 2/13 and 2/5. EKG and trop neg on admit. Respiratory viral panel neg.    - W/u and tx of PNA as above.   - Wean O2 to keep sats > 90%.  - Consider repeat echo if not improving.     #Secondary DM. S/p Laurenle in 2001. C/b neuropathy and nephropathy. Poorly controlled w/ HgbA1c 12.8%.  on admit. Insulin pump stopped and insulin drip started on admit. Off drip today as patient was planning to leave AMA. Does not have supplies here for pump. Now on SQ regimen.   - Endocrinology following and appreciate assistance w/ placing SQ insulin orders (currently Lantus 5 units, Novolog 1 unit per carb unit and \"low\" correction scale).     #CKD III. Cr 1.19, near baseline range 0.9-1.2.     #COPD. No longer smoking. Not on home O2.   - On Combivent inhaler BID PTA (non formulary). Changed to DuoNeb QID here for now but patient preferred PRN.     #Chronic Abdominal Pain. On PTA methadone 70 mg HS. QTc 476 ms.   - Continue home methadone but change admin time from HS to 1900 per home routine.     #NICM, " HTN. LVEF 50-55% on 2/2016 echo. Previously as low as 15% and has ICD in place - last interrogated 12/5/17 w/ 2 episodes NSVT.   - Daily weights.   - Home lisinopril 10 mg and Lasix 20 mg on hold w/ sepsis/borderline BPs.     #Depression. Currently untreated but has followed w/ OP counselor in the past. Discussed importance of putting her own health first so that she can continue to help care for her mother and daughter. No SI. Low threshold for psychology and/or psychiatry consults if patient were to be interested in this while here.     Consulting Services: Endocrinology  Podiatry, WOCN pending     Diet: Carb controlled  Fluids: N/A  DVT Prophylaxis: Ambulate Q shift - d/c Lovenox prophylaxis as patient prefers to minimize pokes; reconsider if not mobile   Code Status: Full    # Pain Assessment:   Current Pain Score 2/18/2018 2/17/2018 2/17/2018   Patient currently in pain? denies yes yes   Pain score (0-10) - - -   Pain location - Foot Foot   Pain descriptors - Aching;Burning Aching;Burning   CPOT pain score - - -   - Alessandra is experiencing pain due to RLE neuropathy and cellulitis. Pain management was discussed and the plan was created in a collaborative fashion.  Alessandra's response to the current recommendations: compliant  - Opioid regimen: chronic methadone, PRN oxycodone here for acute pain  - Pharmacologic adjuvants: Pregabalin (Lyrica) and amitriptyline    Disposition Plan   Expected discharge: 2 - 3 days; recommended to prior living arrangement once on oral antibiotics for infection and off oxygen. Is ambulating independently.     The patient's care was discussed with bedside VADIM Dang, patient, endocrinology fellow, and medicine attending Dr. Morse.     Camilla Soler PA-C  Hospitalist Service  [facility]  Pager: [phone]    Interval History   This morning was very tearful and planned to go home against medical advice without clear reasoning why. Later in the morning she changed her  "mind and agreed to stay. She has been concerned about her mother who was just discharged from the hospital with C diff and weight loss. Also concerned about her daughter being unexpectedly pregnant and living with her. She continues to have right foot pain, redness, swelling that she is concerned about. She continues to have fevers up to 102 degrees overnight. Still requiring oxygen up to 4L with minimal dry cough. No chest pain. No bowel or bladder complaints. Denies outpatient inhalations, known exposures (although lived in a rental with mold until November), or travel.     A 4 point ROS was performed (including CV, Resp, GI, ) and negative unless otherwise noted in HPI.     Physical Exam   /63 (BP Location: Left arm)  Pulse 63  Temp 98.5  F (36.9  C) (Oral)  Resp 16  Ht 1.753 m (5' 9\")  Wt 63 kg (139 lb)  SpO2 93%  BMI 20.53 kg/m2     GENERAL: Alert and oriented x 3. Sitting up in bed, appears comfortable. Pleasant and conversant.   HEENT: Anicteric sclera. Mucous membranes moist.   CV: RRR. S1, S2. No murmurs appreciated.   RESPIRATORY: Effort normal on 3L NC. Lungs CTAB.   GI: Abdomen soft, non distended, non tender.   NEUROLOGICAL: No focal deficits. Moves all extremities.    EXTREMITIES: Right foot w/ ongoing warmth and erythema but edema has improved from yesterday.   SKIN: No jaundice. No rashes. Right foot planter ulceration over first metatarsal head appears clean w/o drainage, but new crack in the skin adjacent to this.     Lines/Tubes/Drains  PIV     Data reviewed today: I reviewed all medications, new labs and imaging results over the last 24 hours.                   "

## 2018-02-18 NOTE — PLAN OF CARE
Problem: Patient Care Overview  Goal: Plan of Care/Patient Progress Review  Outcome: No Change  Tmax: 102.9 (o), HR: 100's, OVSS, satting on 4L/NC.  On-call, Dr. Horn, notified of fevers & no new orders given d/t pt. on 2 IV antibiotics..  PRN Tylenol given x2 & temp. Now 99.3 (o).  Pt. Triggered sepsis protocol & lactic acid 0.8. No c/o's pain or nausea.  BG range: 100-184, insulin gtt per algorithm 1.  Pt. Up with assist of 1 d/t weakness.  Voided adequate amounts, no stools this shift. Pt. slept well most of the shift. Continue to follow POC & update MD with changes.

## 2018-02-18 NOTE — PROGRESS NOTES
Diabetes Consult Daily Progress Note          Assessment/Plan:     Alessandra Pak is a 52 year old year old female with secondary DM s/p Whipple in 2001, on insulin pump for >1 year, presents with sepsis, likely related to RLE infection.      1.Secondary Diabetes s/p whipple, on insulin pump. Poorly controled. Complicated by neuropathy and nephropathy. Last A1c is 12.6%.  Inpatient blood glucose control has been good after initiation of insulin drip, and actually requiring very little insulin compared to home basal rates.  She continues to be febrile, though mental status is improving. Patient planned to sign out AMA this am, likely due to multiple reasons, one of which was multiple pokes per day. We discussed we could take her off the insulin drip, but she still expressed her plan to leave AMA. After devising a plan to get her back on her pump at home, she changed her mind to stay. She does not have any pump supplies with her, so we will have to transition to sq insulin plan for the remainder of her stay, or until a family member or friend can bring her insulin pump supplies. Given that her requirements have been extremely low in the hospital, we will restart a very conservative regimen, with plan to increase if needed.     Recommendations:  -pt is already off insulin gtt currently based on protocol (requriring 0u/hr)  -start lantus 5u q24h. May need BID dosing as lower lantus doses <10 may not last full 24h  -decrease carb coverage for PO intake, novolog 1u:15g CHO  -start low dose novolog correction scale 1u: 100mg/dl >140 Ac, >200 Hs  -DM team will continue to follow      Alicia Roque MD  Endocrinology Fellow, PGY4  Pager [phone]    Patient was seen and discussed with attending Dr. Patel.         Interval History:     Patient on insulin gtt overnight, requiring ~0.2u/hr, much less than home basal rates of ~1u/hr.  She has eating a few meals since being in the hospital, but  "overall less than her normal appetite at home. She planned to leave AMA this morning, she was very emotional but could not describe her reasoning for going. We had planned to send her home after breakfast with fast acting coverage, with plan to initiate her pump on arrival home (~30 min), with plan for temp basal of 50% given her lower needs while IP.  However, patient changed her mind and now apparently plans to stay. She is tired of frequent needle sticks.     Current Diabetes Regimen:  Insulin gtt    Total Daily Insulin Needs:  Basal ~4.8u/24h  Bolus ~5u    Other Pertinent Medications:   NA        Active Diet Order      Moderate Consistent CHO Diet      Diet            Exam:      Blood pressure 95/62, pulse 94, temperature 98.3  F (36.8  C), temperature source Oral, resp. rate 16, height 1.753 m (5' 9\"), weight 63 kg (139 lb), SpO2 92 %, not currently breastfeeding.    General: Alert, resting in bed, NAD.   HEENT: NC/AT, mucous membranes are moist.  Resp: Unlabored breathing.   Neuro: Alert and oriented, emotional but communicating clearly.          Data:     Lab Results   Component Value Date    A1C 12.8 02/16/2018    A1C 11.8 06/21/2017    A1C 11.5 11/02/2016    A1C >15.0  Results confirmed by repeat test   07/25/2016    A1C 11.8 10/05/2015         Recent Labs  Lab 02/18/18  0915 02/18/18  0751 02/18/18  0654 02/18/18  0621 02/18/18  0500 02/18/18  0409  02/17/18  0643  02/16/18  2145  02/16/18  1528   GLC  --   --   --   --   --   --   --  116*  --  340*  --  454*   * 91 122* 117* 184* 105*  < >  --   < >  --   < >  --    < > = values in this interval not displayed.     I have seen and examined the patient, reviewed and edited the fellow's note, and agree with the plan of care.  JAVIER Putnam    "

## 2018-02-18 NOTE — PLAN OF CARE
"Problem: Patient Care Overview  Goal: Individualization & Mutuality  Outcome: No Change  Pt refusing care this morning and requesting to be discharge to home. Provider was notified and pt was seen by MD, education on plan of care provided to pt, risk of leaving hospital explained to pt and pt continue to say, \"it is to stressful being here, I will feel better when I get home with my family\". Refused labs draw this morning. At 8am, BG 91, insuline drip off and pt was encouraged to order some breakfast but refused. At 9am, , pt requested to disconnect IV. Pt is in room and asked this writer to leave her alone. Will continue with care      "

## 2018-02-18 NOTE — PROVIDER NOTIFICATION
Notified Resident at 2324 PM regarding elevated temperature.      Spoke with: DANTE Horn MD    Orders were not obtained.    Comments: MD notified of pt's temp. 102.9 oral around 2300 & no orders given d/t pt. currently on IV antibiotics.  PRN Tylenol x1 given & cold compress to forehead.  Will continue to monitor vitals closely.

## 2018-02-18 NOTE — PHARMACY-VANCOMYCIN DOSING SERVICE
Pharmacy Empir[facility] Dose Change Per Policy  Original Dose Ordered: Vancomycin 1000mg IV q24h  Dose Changed To: Vancomycin 1250mg IV q24h  This dose change was based on the pharmacist's assessment of this patient's weight, trough level after one dose, severity of infection (requiring trough goal of 15-20 rather than 10-15).    Creatinine Clearance= 55mL/min (SCr=1.2)    Estimated Creatinine Clearance: 55 mL/min (based on Cr of 1.22).  Will continue to follow and modify dosage according to levels, organ function and clinical condition    Cathy Sanders, PharmD

## 2018-02-19 LAB
BACTERIA SPEC CULT: NORMAL
GLUCOSE BLDC GLUCOMTR-MCNC: 104 MG/DL (ref 70–99)
GLUCOSE BLDC GLUCOMTR-MCNC: 192 MG/DL (ref 70–99)
GLUCOSE BLDC GLUCOMTR-MCNC: 218 MG/DL (ref 70–99)
GLUCOSE BLDC GLUCOMTR-MCNC: 248 MG/DL (ref 70–99)
GLUCOSE BLDC GLUCOMTR-MCNC: 280 MG/DL (ref 70–99)
GLUCOSE BLDC GLUCOMTR-MCNC: 313 MG/DL (ref 70–99)
HIV 1+2 AB+HIV1 P24 AG SERPL QL IA: NONREACTIVE
LACTATE BLD-SCNC: 0.7 MMOL/L (ref 0.4–1.9)
LACTATE BLD-SCNC: 0.7 MMOL/L (ref 0.7–2)
SPECIMEN SOURCE: NORMAL

## 2018-02-19 PROCEDURE — 25000132 ZZH RX MED GY IP 250 OP 250 PS 637: Performed by: PHYSICIAN ASSISTANT

## 2018-02-19 PROCEDURE — 99207 ZZC APP CREDIT; MD BILLING SHARED VISIT: CPT | Performed by: PHYSICIAN ASSISTANT

## 2018-02-19 PROCEDURE — 87040 BLOOD CULTURE FOR BACTERIA: CPT | Performed by: PHYSICIAN ASSISTANT

## 2018-02-19 PROCEDURE — 87641 MR-STAPH DNA AMP PROBE: CPT | Performed by: PHYSICIAN ASSISTANT

## 2018-02-19 PROCEDURE — 99222 1ST HOSP IP/OBS MODERATE 55: CPT | Performed by: PSYCHIATRY & NEUROLOGY

## 2018-02-19 PROCEDURE — 25000132 ZZH RX MED GY IP 250 OP 250 PS 637: Performed by: INTERNAL MEDICINE

## 2018-02-19 PROCEDURE — 00000146 ZZHCL STATISTIC GLUCOSE BY METER IP

## 2018-02-19 PROCEDURE — 12000026 ZZH R&B TRANSPLANT

## 2018-02-19 PROCEDURE — 87176 TISSUE HOMOGENIZATION CULTR: CPT | Performed by: ORTHOPAEDIC SURGERY

## 2018-02-19 PROCEDURE — 99233 SBSQ HOSP IP/OBS HIGH 50: CPT | Performed by: INTERNAL MEDICINE

## 2018-02-19 PROCEDURE — 36415 COLL VENOUS BLD VENIPUNCTURE: CPT | Performed by: PHYSICIAN ASSISTANT

## 2018-02-19 PROCEDURE — 25000132 ZZH RX MED GY IP 250 OP 250 PS 637: Performed by: HOSPITALIST

## 2018-02-19 PROCEDURE — 87075 CULTR BACTERIA EXCEPT BLOOD: CPT | Performed by: ORTHOPAEDIC SURGERY

## 2018-02-19 PROCEDURE — 25000128 H RX IP 250 OP 636: Performed by: INTERNAL MEDICINE

## 2018-02-19 PROCEDURE — 87077 CULTURE AEROBIC IDENTIFY: CPT | Performed by: ORTHOPAEDIC SURGERY

## 2018-02-19 PROCEDURE — 83605 ASSAY OF LACTIC ACID: CPT | Performed by: INTERNAL MEDICINE

## 2018-02-19 PROCEDURE — 83605 ASSAY OF LACTIC ACID: CPT | Performed by: PHYSICIAN ASSISTANT

## 2018-02-19 PROCEDURE — 25000128 H RX IP 250 OP 636: Performed by: PHYSICIAN ASSISTANT

## 2018-02-19 PROCEDURE — 87070 CULTURE OTHR SPECIMN AEROBIC: CPT | Performed by: ORTHOPAEDIC SURGERY

## 2018-02-19 PROCEDURE — 36415 COLL VENOUS BLD VENIPUNCTURE: CPT | Performed by: INTERNAL MEDICINE

## 2018-02-19 PROCEDURE — 87186 SC STD MICRODIL/AGAR DIL: CPT | Performed by: ORTHOPAEDIC SURGERY

## 2018-02-19 PROCEDURE — 87640 STAPH A DNA AMP PROBE: CPT | Performed by: PHYSICIAN ASSISTANT

## 2018-02-19 RX ORDER — KETOROLAC TROMETHAMINE 30 MG/ML
15 INJECTION, SOLUTION INTRAMUSCULAR; INTRAVENOUS ONCE
Status: COMPLETED | OUTPATIENT
Start: 2018-02-19 | End: 2018-02-20

## 2018-02-19 RX ORDER — OXYCODONE HYDROCHLORIDE 10 MG/1
10 TABLET ORAL ONCE
Status: COMPLETED | OUTPATIENT
Start: 2018-02-19 | End: 2018-02-19

## 2018-02-19 RX ADMIN — FLUTICASONE PROPIONATE 2 SPRAY: 50 SPRAY, METERED NASAL at 08:38

## 2018-02-19 RX ADMIN — PIPERACILLIN SODIUM AND TAZOBACTAM SODIUM 3.38 G: 36; 4.5 INJECTION, POWDER, FOR SOLUTION INTRAVENOUS at 00:42

## 2018-02-19 RX ADMIN — PREGABALIN 75 MG: 50 CAPSULE ORAL at 08:34

## 2018-02-19 RX ADMIN — OXYCODONE HYDROCHLORIDE 10 MG: 5 TABLET ORAL at 10:13

## 2018-02-19 RX ADMIN — INSULIN ASPART 2 UNITS: 100 INJECTION, SOLUTION INTRAVENOUS; SUBCUTANEOUS at 19:51

## 2018-02-19 RX ADMIN — METHADONE HYDROCHLORIDE 70 MG: 10 TABLET ORAL at 19:52

## 2018-02-19 RX ADMIN — ASPIRIN 81 MG CHEWABLE TABLET 81 MG: 81 TABLET CHEWABLE at 08:35

## 2018-02-19 RX ADMIN — ACETAMINOPHEN 325 MG: 325 TABLET, FILM COATED ORAL at 20:04

## 2018-02-19 RX ADMIN — PIPERACILLIN SODIUM AND TAZOBACTAM SODIUM 3.38 G: 36; 4.5 INJECTION, POWDER, FOR SOLUTION INTRAVENOUS at 13:27

## 2018-02-19 RX ADMIN — VANCOMYCIN HYDROCHLORIDE 1250 MG: 10 INJECTION, POWDER, LYOPHILIZED, FOR SOLUTION INTRAVENOUS at 22:15

## 2018-02-19 RX ADMIN — ALBUTEROL SULFATE 2 PUFF: 90 AEROSOL, METERED RESPIRATORY (INHALATION) at 15:56

## 2018-02-19 RX ADMIN — PREGABALIN 75 MG: 50 CAPSULE ORAL at 14:55

## 2018-02-19 RX ADMIN — ALBUTEROL SULFATE 2 PUFF: 90 AEROSOL, METERED RESPIRATORY (INHALATION) at 12:46

## 2018-02-19 RX ADMIN — OXYCODONE HYDROCHLORIDE 10 MG: 10 TABLET ORAL at 07:04

## 2018-02-19 RX ADMIN — INSULIN ASPART 5 UNITS: 100 INJECTION, SOLUTION INTRAVENOUS; SUBCUTANEOUS at 14:55

## 2018-02-19 RX ADMIN — OXYCODONE HYDROCHLORIDE 10 MG: 5 TABLET ORAL at 16:17

## 2018-02-19 RX ADMIN — ALBUTEROL SULFATE 2 PUFF: 90 AEROSOL, METERED RESPIRATORY (INHALATION) at 19:52

## 2018-02-19 RX ADMIN — PREGABALIN 75 MG: 50 CAPSULE ORAL at 19:52

## 2018-02-19 RX ADMIN — AMITRIPTYLINE HYDROCHLORIDE 50 MG: 50 TABLET, FILM COATED ORAL at 22:15

## 2018-02-19 RX ADMIN — ALBUTEROL SULFATE 2 PUFF: 90 AEROSOL, METERED RESPIRATORY (INHALATION) at 08:35

## 2018-02-19 RX ADMIN — OXYCODONE HYDROCHLORIDE 10 MG: 5 TABLET ORAL at 04:47

## 2018-02-19 RX ADMIN — PIPERACILLIN SODIUM AND TAZOBACTAM SODIUM 3.38 G: 36; 4.5 INJECTION, POWDER, FOR SOLUTION INTRAVENOUS at 06:24

## 2018-02-19 RX ADMIN — PIPERACILLIN SODIUM AND TAZOBACTAM SODIUM 3.38 G: 36; 4.5 INJECTION, POWDER, FOR SOLUTION INTRAVENOUS at 19:52

## 2018-02-19 RX ADMIN — SENNOSIDES AND DOCUSATE SODIUM 2 TABLET: 8.6; 5 TABLET ORAL at 10:13

## 2018-02-19 RX ADMIN — RANITIDINE 300 MG: 150 TABLET, FILM COATED ORAL at 08:35

## 2018-02-19 NOTE — PLAN OF CARE
"Problem: Patient Care Overview  Goal: Plan of Care/Patient Progress Review  Outcome: No Change  /75 (BP Location: Right arm)  Pulse 107  Temp 99.7  F (37.6  C) (Oral)  Resp 14  Ht 1.753 m (5' 9\")  Wt 63 kg (139 lb)  SpO2 92%  BMI 20.53 kg/m2     Tmax 100.2. Denies feeling feverish, hot or chills. HR tachy in 100's. On 3L NC. Triggered sepsis protocol, lactic 0.7. Alert and oriented. Up ind. Received 10mg oxy x1 for pain. No c/o nausea. BG checked at 0200 - 313. Cross cover MD notified and one time dose of 3u Novolog ordered. PIV x2 saline locked. Voiding, not saving. No BM reported. Will continue to monitor and notify of any changes.       "

## 2018-02-19 NOTE — PLAN OF CARE
Problem: Patient Care Overview  Goal: Plan of Care/Patient Progress Review  Outcome: No Change  V/S/S on 3 L of 02. Dyspnea with exertion. BG (280), insulin coverage given with sliding scale and carb. Coverage. Increased CPR inflammation, 290 and increased WBC, 19. Pain managed with scheduled methadone and Oxy Q4 hours. Denies nausea. On a consistent carb/ diabetic diet. PIV in right arm, saline locked. No BM since 2/16/18. Adequate urine output, not saving. Up with stand by assist. MRI completed today, found fractured right toe and fluid buildup in right foot. Continue with plan of care, notify MD of any changes.

## 2018-02-19 NOTE — CONSULTS
Ambulatory Insulin Pump Assessment    Received consult request to see this 52 year old female for pump assessment.  Patient with history of secondary diabetes s/p whipple for chronic pancreatitis.  She has used an insulin pump since 2016.  Her pump is currently disconnected and delivery is suspended.  She called her daughter to bring in pump supplies this afternoon.    Type of pump:  Ruby Ribbon 630G  Type of insulin:  aspart (NovoLog)  Supplies:  3.0 ml insulin reservoir, and Quik-set infusion sets    Basal rates:  #1:  1.00 units/hr from 4372-0518  #2:  0.90 units/hr from 4317-3766  #3:  1.00 units/hr from 0659-7403    Bolus settings:  1 unit to lower blood glucose 50 mg/dL  1 unit/12 grams CHO  Blood glucose target= 100-130  Active insulin time is 3 hours    Patient states she does not know how to change pump settings.  She changes her infusion set and site every 3 days.  Per last endocrine clinic visit, she was not regularly checking blood glucoses and forgetting to bolus occasionally.    Patient currently alert and oriented, able to manage pump.    Vannesa Penn MS RN CDE [facility]  [phone]

## 2018-02-19 NOTE — CONSULTS
"Consult Date:  02/19/2018      REQUESTING SOURCE:  [facility] 3 team.      IDENTIFYING DATA:  This 52-year-old woman is seen today for psychiatric consultation regarding her history of anxiety and depression. Also, there is a question of whether she should continue with her amitriptyline.  I had actually seen her as an outpatient on several occasions in 2012.      HISTORY OF PRESENT ILLNESS:  Ms. Pak has a history of poorly controlled diabetes type 2, respiratory failure, hyponatremia and COPD, who presents with a nonhealing ulcer on her right foot.  She has now had some cellulitis.  Also has history of peripheral neuropathy resulting in bilateral foot and ankle pain.        From a psychiatric perspective, she has a long history of depression and anxiety and has tried a number of different antidepressants as will be reviewed below.  However, she has not had much luck with them in terms of efficacy, but he situation has been complicated by opioid use problems. She has been on amitriptyline for a long time, which she said helps with her sleep and anxiety.  She occasionally takes an extra half of one during the day, which helps a lot with her anxiety.  She says her mood currently is \"fair,\" but she has some loss of interest in usual activities and sleeps okay with amitriptyline, is not hopeless or suicidal.  She does have quite a bit of anxiety and will have occasional panic attacks.  She said this is the main problem for her.  Also has occasional nightmares and flashbacks related to when her daughter was killed by a stray bullet when she was just age 3.  She has not tried any medicines for the nightmares.  No significant mood swings.  No psychosis symptoms.      PAST PSYCHIATRIC HISTORY:  She was hospitalized at [facility] in 2012 after an accidental overdose on her opioids.  The patient then went to [facility] for inpatient chemical dependency treatment.  She did briefly relapse within a year after going through " treatment, so she ended up on suboxone for some time, but she had some sort of reaction, and so she has been on methadone through [email or web address] since 2013.  She is planning to taper it.  In 2012 and 2013, she was tried on Celexa, Lexapro and augmentations with buspirone and Wellbutrin without much luck.  Also, minimal efficacy with the Cymbalta.        CHEMICAL USE HISTORY:  As indicated above, she is on methadone for opioid dependence.  She ran into problems with use of opioids after she had a Whipple procedure for pancreatitis in 1999.  Prior to that, she was drinking quite a bit of alcohol, but stopped alcohol after the pancreatectomy.  However, that is when she ran into trouble with opioids.  She has been doing well on the methadone program.  She also quit smoking cigarettes several years ago.      PAST MEDICAL HISTORY:  Diabetes type 2 with associated neuropathy and systolic CHF.  Carcinoma in situ with LEEP procedure.  Surgical history also includes the Whipple procedure in 1999, tubal ligation and cholecystectomy.      ALLERGIES:  NO ALLERGIES TO MEDICATIONS.      REVIEW OF SYSTEMS:  A 10-point review of systems today is completed and is negative except for some pain in her right foot where she has the ulcer and also bilateral burning foot and lower leg pain.      MEDICATIONS:  Limited to Lyrica.      FAMILY HISTORY:  Positive for depression and alcohol use problems in her brother and both of her parents were alcoholics and had a lot of depression or other psychiatric problems.      SOCIAL HISTORY:  Grew up in [place], [place] with 1 brother in an intact family.  She did go to some college.  She had stopped working in 2012 due to her health problems.  Currently living with her mother who has some health problems along with her 23-year-old daughter who is now pregnant.  Her brother lives nearby independently.      MENTAL STATUS EXAMINATION:  She is sitting up in bed and was very engaged and at times had some  "appropriate laughter; no evidence of mood lability.  She was well groomed, pleasant, cooperative.  Moves her extremities without difficulty.  Speech is fluent.  Thought process direct.  Associations tight.  Denies delusions or hallucinations.  Mood is described as \"fair.\"  Affect is quite bright.  She is oriented x 3.  Recent and remote memory, attention span and concentration were intact.  Use of language, fund of knowledge are within normal limits.  Insight and judgment fair.      VITAL SIGNS:  Blood pressure 107/62, pulse 103, temperature 101.3.      DIAGNOSES:  Major depressive disorder, recurrent, in partial remission; generalized anxiety disorder; panic disorder; posttraumatic stress disorder; alcohol use disorder in remission; opioid use disorder, on maintenance.      ASSESSMENT:  The amitriptyline is quite helpful for her, using it 50 mg at bedtime and occasionally using 25 mg during the day as needed.  She has slight dry mouth from taking it, but no constipation.  It may be a good idea to check a serum level at some point.  She has not done particularly well with several SSRIs and augmentations with BuSpar and Wellbutrin.  She is not interested in trying anything else other than maintaining the amitriptyline.  I think this is perfectly reasonable.  She also is having nightmares related to past traumatic events about once a week; she may benefit from a trial of prazosin, but she does not want to try anything new at this point.      RECOMMENDATIONS:     1.  Okay to continue the amitriptyline 50 mg at bedtime and should be okay taking half of one during the day as needed once in a while.     2.  She should continue with her psychotherapy and will have her medications managed by her primary care provider, Brionna Dc.   3.  If she elected to try another antidepressant, I would suggest Zoloft and also, if she wants to try something for nightmares, I would use prazosin, starting at 1 mg at bedtime, " increasing as tolerated to 3 mg.   4.  Reconsult Psychiatry as needed.         MICHAELA MCLEOD MD             D: 2018   T: 2018   MT: HAYDEE      Name:     TWIN SERRANO   MRN:      [id number]        Account:       [id number]   :      1965           Consult Date:  2018      Document: [id number]       cc: Brionna Dc MD

## 2018-02-19 NOTE — PLAN OF CARE
"Problem: Patient Care Overview  Goal: Individualization & Mutuality  Outcome: No Change  /75  Pulse 110  Temp 98.6  F (37  C) (Oral)  Resp 16  Ht 1.753 m (5' 9\")  Wt 63 kg (139 lb)  SpO2 93%  BMI 20.53 kg/m2 Pt is A/Ox4. VSS with 2L O2 via NC. Patient c/o R food pain and received PRN oxycodone 1x. Patient denies nausea. Urine Output - voiding spontaneously without difficulty. Bowel Function - BM today. Nutrition - Consistence carb diet and need encouragement to eat  . Drains - PIV SL. Activity - independent in room. R food wound debridement completed today at bedside, noted bloody drainage on dressing. Needing Novolog sliding scare and carb count coverage. Pt's daughter at bedside and supportive and ambulatory insuline pump supply from home at bedside for diabetes educator to used with teaching. Continue with POC        "

## 2018-02-19 NOTE — CONSULTS
Assessment: Right plantar foot wound with likely infectious fluid collection about the 2nd MTPJ, with no clear communication that could be probed at bedside today. Leukocytosis. Elevated CRP.  Foot is still warm with pain around the 1st and 2nd MTPJs. Small amount of purulent drainage was noted today.     Sepsis: Fluid collection noted on MRI. Unable to open the area bedside 2/2 no tunnel and pain.     Plan:  - Pt seen and evaluated bedside.   - Foot still has increased warmth and the forefoot is tensed. MRI shows fluid collection around the 2nd metatarsal head, which is likely infectious, although I could not find a  tract that communicates with this area with a bedside debridement. Because of this, I will speak to my on-call ortho colleague for today about a possible OR I&D.   - Continue with Zosyn until culture of the fluid collection can be obtained.   - Keep the wound covered with a dry, sterile dressing.   - Will discuss with medicine team this AM.   - Will make NPO until I speak to Dr. Nelson at about 8am and will update then.   Pager: [phone]        HPI: Alessandra is a 53 YO female seen at bedside this morning for cellulitis and sepsis on the right foot. I saw her in clinic on Friday and she presented with a wound on the bottom of the right 1st met head of 6 weeks duration. I debrided the wound then and found no deep tracking, however she was hypoxic, febrile, and tachycardic in clinic and was sent to the ED. Since admission, she has continued to spike temps and has leukocytosis. I reviewed her hospital course and she did try to leave AMA yesterday because of excessive needle sticks, however she decided to stay. She continues on IV Zosyn. Had an xray performed that showed a 4th proximal phalanx fracture. Because of the ongoing leukocytosis, MRI was performed and fluid collection was noted about the 2nd met head. This AM, she does relate to feeling better, however she is still having pain in the foot. She relates  pressure feeling in the forefoot.     Past Medical History:   Diagnosis Date     Abdominal pain, right upper quadrant     sees Dr Mcclellan pain clinic at [facility]     ASCUS with positive high risk HPV 8/2013    + HPV 33, [place] - GAVIN I, ECC- atypia     Cardiomyopathy (H)     non ischemic cardiomyopathy with EF 15     Cervical high risk HPV (human papillomavirus) test positive 7/8/15, 7/25/16    NIL pap/+ HR HPV (not 16 or 18).      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    leep     Depressive disorder      Gastro-oesophageal reflux disease      Human papillomavirus in conditions classified elsewhere and of unspecified site 2/2012    + HPV 33     Hypertension      Profound impairment, one eye, impairment level not further specified     rt eye due to childhood injury     Systolic CHF (H) 3/12/2015     Tobacco abuse 5/18/2013     Type 2 diabetes mellitus without complications (H)      Uncomplicated asthma      Past Surgical History:   Procedure Laterality Date     C NONSPECIFIC PROCEDURE  2001    cholecystectomy     C NONSPECIFIC PROCEDURE  as a child    tonsillectomy     C NONSPECIFIC PROCEDURE  2001    whipple procedure     CARDIAC SURGERY      defib     COLPOSCOPY,LOOP ELECTRD CERVIX EXCIS  2002, 2011    stage 2 dysplasia     ENDOBRONCHIAL ULTRASOUND FLEXIBLE N/A 2/19/2015    Procedure: ENDOBRONCHIAL ULTRASOUND FLEXIBLE;  Surgeon: Brenden Tamez MD;  Location: [facility]     LEEP TX, CERVICAL  2014    LEEP TX Cervical     RECESSION RESECTION WITH ADJUSTABLE SUTURE  12/13/2011    Procedure:RECESSION RESECTION WITH ADJUSTABLE SUTURE; Right Strabismus Repair with Adjustable Suture       TUBAL LIGATION  2007    essure         Allergies   Allergen Reactions     No Known Drug Allergies      Family History   Problem Relation Age of Onset     DIABETES Mother      diet controled     Hypertension Mother      Arthritis Mother      Lipids Mother      DIABETES Father      Hypertension Father      GASTROINTESTINAL DISEASE Father       gallbladder removed     Bipolar Disorder Brother      Thyroid Disease Brother      Obesity Other      Son     Respiratory Other      Son and Daughter; asthma     Depression Maternal Aunt      Anxiety Disorder Maternal Aunt      Social History     Social History     Marital status: Single     Spouse name: N/A     Number of children: N/A     Years of education: N/A     Occupational History     nursing assistant [employer]     in [employer]     Social History Main Topics     Smoking status: Former Smoker     Packs/day: 0.30     Years: 15.00     Types: Cigarettes     Smokeless tobacco: Former User     Quit date: 10/29/2014      Comment: Started smoking in 89/ smokes about 3 per day     Alcohol use No     Drug use: No     Sexual activity: Not Currently     Partners: Male     Birth control/ protection: IUD      Comment: tubes tide     Other Topics Concern     Parent/Sibling W/ Cabg, Mi Or Angioplasty Before 65f 55m? No     Social History Narrative    Balanced Diet - Yes    Osteoporosis Prevention Measures - Dairy servings per day: 3 servings daily    Regular Exercise -  Yes Describe hand weights 20 minutes daily    Dental Exam - YES - Date: 3/10    Eye Exam - YES - Date: 1-2008    Self Breast Exam - Yes    Abuse: Current or Past (Physical, Sexual or Emotional)- No    Do you feel safe in your environment - Yes    Guns stored in the home - No    Sunscreen used - Yes    Seatbelts used - Yes    Lipids -  YES - Date: 1/10    Glucose -  YES - Date: 12/09    Colon Cancer Screening - No    Hemoccults - NO    Pap Test -  YES - Date: 6/19/08    Do you have any concerns about STD's -  No    Mammography - NO    DEXA - NO    Immunizations reviewed and up to date - Yes, last TD was 11-22-04    3/11/10    KATY Burrell CMA                                 O:  Temp: 99.7  F (37.6  C) Temp src: Oral BP: 132/75 Pulse: 107   Resp: 14 SpO2: 92 % O2 Device: Nasal cannula Oxygen Delivery: 3 LPM  Last Basic Metabolic Panel:  Lab  Results   Component Value Date     02/18/2018      Lab Results   Component Value Date    POTASSIUM 4.0 02/18/2018     Lab Results   Component Value Date    CHLORIDE 102 02/18/2018     Lab Results   Component Value Date    RICK 8.4 02/18/2018     Lab Results   Component Value Date    CO2 26 02/18/2018     Lab Results   Component Value Date    BUN 22 02/18/2018     Lab Results   Component Value Date    CR 1.19 02/18/2018    CR 1.23 02/18/2018     Lab Results   Component Value Date     02/18/2018     Lab Results   Component Value Date    WBC 19.0 02/18/2018     Lab Results   Component Value Date    RBC 3.74 02/18/2018     Lab Results   Component Value Date    HGB 10.5 02/18/2018     Lab Results   Component Value Date    HCT 32.1 02/18/2018     No components found for: MCT  Lab Results   Component Value Date    MCV 86 02/18/2018     Lab Results   Component Value Date    MCH 28.1 02/18/2018     Lab Results   Component Value Date    MCHC 32.7 02/18/2018     Lab Results   Component Value Date    RDW 14.0 02/18/2018     Lab Results   Component Value Date     02/18/2018     CRP: 290 mg/L    PE:  Non-palpable DP on the right. 1/4 PT. CRT> 3 seconds. Absent pedal hair.  [place] area with no skin break on the dorsal aspect of the right foot about the 1st and 2nd metatarsals. Pain with compression of these areas.   A diabetic wound is noted at right  plantar 1st met head measuring 1.5cm x 1.5cm x 0.1cm    Lozano Classification: 2    Wound base: Pink/Granulation    Edges: tensed, darkened, warm, and hyperkeratotic    Drainage: slight/serous    Odor: no    Undermining: no    Tunneling: None found today with probing of the wound.     Bone Exposure: No    Clinical Signs of Infection: Yes: Small amount of purulent drainage noted. Warmth, erythema of the surrounding area.

## 2018-02-19 NOTE — PROGRESS NOTES
Full note pending by Dr Hughes    52F uncontrolled DM last A1C 12.8 followed by Dr Lewis for right plantar 1st met head ulcer, ortho consulted for I&D right 1st met head ulcer    Patients states onset approx 6 wks ago, has hx of ulceration to ipsilateral medial heel that has since healed, no formal I&D performed yet, denies sig pain, denies drainage, otherwise feeling fine, underwent MRI right foot today which showed fluid collectiontr acking dorsally around 1st met head, therefore ortho consulted for I&D    VSS  On exam toes wwp, dp palp, no silt throughout toes, fires ehl/fhl/gsc/ta, ulceration ~7mm round over plantar medial 1st met head, -probe to bone, no active drainage, minimal surrounding erythema, fibrinous base    Procedure:  Consent obtained, site and side verified with patient, right side prepped using betadine prep, using a 15blade, all nonviable tissue + eschar debrided to healthy bleeding base, cultures sent, hemostasis achieved, specimen sent for culture anaerobic + aerobic, pt tolerated well, was thankful after, wound dressed using routine sterile dressings    A/p:  diabteic ulcer to 1st met head, right foot, VSS    -I&D performed today, fu cultures  -discussed that patient will likely need more extensive debridement in future however need sig improvement in sugar control prior to proceeding with larger intervention as patient at huge risk for wound healing/infection complication given uncontrolled DM  -abx per primary  -NWB RLE  -dvt ppx per primary  -daily wound cares per podiatry/wound nursing  -dispo per primary  -fu Dr Peralta once diabteic control improved to discuss further I&D vs amputation    Dax Ibrahim MD MS  Orthopedic Surgery, PGY4  [phone]    To be mariann Nelson

## 2018-02-19 NOTE — PROGRESS NOTES
[facility], [place]    Internal Medicine Progress Note - Gold Service      Assessment & Plan   Alessandra Pak is a 52 year old female with history of NICM, HTN, COPD, GERD, chronic pancreatitis s/p pylorus sparing Whipple (2001) w/ secondary DM and diabetic foot ulcers, depression, and chronic methadone use who was admitted 2/16 with sepsis 2/2 RLE cellulitis, as well as acute hypoxic respiratory failure.     Sepsis, RLE diabetic foot ulcer and cellulitis, possible CAP: Admitted from clinic 2/16 with temp 102.2, started on Zosyn and Vanc (stopped 2/18). Pct 1.42, lactate normal 2/16. RVP negative. UC negative, BCx NGTD. WBC 19 (19.8),  (280) 2/18. MRI 2/18 no definitive osteomyelitis, noted fluid collection around 2nd metatarsal head. Fever curve improving. Tachycardia as below. Sepsis initially thought 2/2 cellulitis, with repeat chest CT 2/17 new ground glass opacities in bilat upper lobes, concerning for PNA. Was hypoxic initially, since then weaned off O2.   - Podiatry consulted, attempted to tap foot bedside today but were unable. D/w ortho and they will assess today with possible OR I&D  - NPO  - Continue Zosyn  - Follow cultures and HIV  - Consider atypical coverage if ongoing fevers  - Will consider ID consult pending course   - CBC, BMP, CRP tomorrow     Secondary DM: S/p Whipple 2001. C/b neuropathy and nephropathy. Poorly controlled, A1c 12.8%. PTA on insulin pump, was on insulin gtt on admission due to glucose 464. Does not have supplies here for pump. On SQ regimen. Glucose 190s-310s recently.   - Endocrine following, currently on Lantus 5 units, Novolog 1 unit per 12 grams carbs, MSSI   - Diabetes education consult pending, family will bring pump supplies per endo note     Acute on chronic pain: Chronic abdominal pain, PTA on methadone 70 mg HS. QTc 476 ms. Increased RLE pain with worsening edema, manipulation by podiatry bedside 2/19.  - Continue PTA methadone  -  Oxycodone prn for increased pain     NICM, HTN: Echo with EF 50-55% 2/2016. Previously as low as 15% and has ICD in place - last interrogated 12/5/17 w/ 2 episodes NSVT. PTA on lasix and lisinopril. BPs stable  - Daily weights  - Continue PTA lisinopril and lasix with hold parameters      Depression, worsening anxiety: PTA on amitriptyline. Worsening anxiety regarding acute illness, family social issues.   - Psychiatry consult given worsening anxiety, amitriptyline use and QTc prolongation  - Essential oils, mandalas    Tachycardia: In the setting of acute pain, increased anxiety, and infection. Patient feels most likely correlated to anxiety. EKG without acute changes      Resolved Hospital Problems and Stable, Chronic Medical Conditions:   Acute hypoxic respiratory failure: Presented with O2 sats in the 70s on room air. Improved to 90s on 3L O2. CXR with atelectasis, repeat CT with concern for PNA as above. No PTA supplemental O2. Now on room air.   COPD: No longer smoking. Not on home O2. PTA on Combivent bid. Changed to DuoNeb QID here for now but patient preferred PRN   CKD III: Cr [facility] 0.9-1.2, last check at [facility]       Consulting Services: Endocrinology, podiatry       Pain assessment: As above  Current Pain Score 2/19/2018 2/19/2018 2/18/2018   Patient currently in pain? denies denies yes   Pain score (0-10) - - -   Pain location - - Foot   Pain descriptors - - Aching   CPOT pain score - - -   - Alessandra is experiencing pain due to the above. Pain management was discussed and the plan was created in a collaborative fashion. Alessandra's response to the current recommendations: mixed response  - See above    Disposition Plan   Expected discharge: 2 - 3 days, recommended to prior living arrangement once SIRS/Sepsis treated.     Entered: Karely Caraballo 02/19/2018, 10:18 AM   Information in the above section will display in the discharge planner report.      The patient's care was discussed with the patient, nursing,  podiatry, and attending physician, Dr. Mini Caraballo  Internal Medicine Staff Hospitalist Service  [facility]  Pager: [phone]  Please see sticky note for cross cover information    Interval History   Worsening RLE pain and swelling since this morning with podiatry attempted to aspirate bedside. Intermittent sweats and chills. Denies chest pain, dyspnea, or shortness of breath. Reports racing heart rate due to increased anxiety. Very concerned about family stressors as well as caring for herself with acute illness. Denies urinary symptoms. No change in bowels.       Data reviewed today: I reviewed all medications, new labs and imaging results over the last 24 hours. I personally reviewed prior EKG, MRI, CT report, specialists notes    Physical Exam   Vital Signs: Temp: 99.9  F (37.7  C) Temp src: Oral BP: 143/83 Pulse: 101   Resp: 16 SpO2: 92 % O2 Device: Nasal cannula Oxygen Delivery: 3 LPM  Weight: 139 lbs 0 oz  General Appearance: Alert and oriented x3, intermittently tearful  Respiratory: CTAB without wheezing or rales  Cardiovascular: Tachycardic, S1, S2. No murmur noted  GI: Abdomen soft, non-tender with active bowel sounds. No guarding or rebound   Skin: Right foot edematous and erythematous on the dorsal aspect. Right plantar ulceration at the first metatarsal head with some drainage on guaze. No jaundice   Other: Distal pulses intact. No focal neuro deficits       Data   Recent Results (from the past 24 hour(s))   MR Foot Right w/o & w Contrast    Narrative    History: Right foot plantar ulcer with sepsis and right lower  extremity cellulitis. Persistent fevers and COPD 2 diabetes by 40  hours of IV antibiotics. Evaluate for abscess or osteomyelitis.    TECHNIQUE: Routine high-field MRI of the right foot preceding and  following the uncomplicated intravenous administration of gadolinium.    COMPARISON: Three-view right foot 2/16/2018.    FINDINGS:    Osseous structures:  No acute fracture or dislocation about the right  foot from the tarsometatarsal joints to the anterior aspects of the  toes, the exception of the fracture described on the 3 view foot  radiograph about the dorsum of the middle phalanx right fourth toe.  Motion artifact degrades detail. Bone marrow signal is generally  within normal limits about the metatarsals and toes. There is field in  homogeneity affecting signal about the toes on coronal sequences, and  to a lesser degree sagittally and axially. There is a localization  marker on the plantar aspect of the foot although this shifted forward  prior to the examination by the patient's foot movement. The ulcer  defect is centered approximately    Joint spaces and periarticular soft tissues: Small joint effusions  about the third through fifth MTP joints with a moderate joint  effusion in the first MTP joint. Large amount of fluid surrounding the  proximal aspect of the proximal phalanx of the second toe and about  the second MTP joint. This extends over the dorsum of the second MTP  joint measuring approximately 3.5 cm in craniocaudad dimension should  the ankle be plantar flexed versus AP dimension with the foot in the  standing position. This tracks inferiorly about the medial and lateral  margins of the joint, much greater medially and communicates with a  large plantar collection measuring up to 6 cm in CC or AP dimension as  described with the dorsal collection by up to 3.3 cm transversely. The  thickness of the collection over the dorsum of the MTP joint measures  up to 10 mm and the thickness of the plantar aspect is up to 1.5 cm.  The ulcer defect on the plantar aspect of the foot under the second  MTP joint is poorly defined although there is decreased signal on  T1-weighted images spanning roughly 4 cm from the distal diaphyseal  level of the second metatarsal to just distal to the first MTP joint.  This same area is predominantly occupied by the high  signal on  inversion recovery sequences. The articular cartilage is poorly  evaluated secondary to motion artifact. It appears to be thinned,  especially about the lateral aspect of the first MTP joint but this is  by no means certain. Narrowing between the plantar aspect of the first  metatarsal head and the lateral sesamoid with areas of increased T2  signal in both sesamoids and subtly about the plantar aspect of the  first metatarsal head in the area of the sesamoids. No significant  ganglion cyst formation.    There is increased T2 signal throughout the musculature of the plantar  aspect of the foot including the central compartment and dorsally,  especially medially and laterally about the dorsal aspect of the  forefoot tracking inferiorly both medially and laterally. No evidence  of Bonilla's neuroma about the plantar aspect of the MTP regions.    Tendons: The flexor, extensor and pollicis tendons appear intact.  Fluid about the deep and superficial aspect of the extensor digitorum  tendons from approximately the neck of the second metatarsal through  the mid diaphysis of the proximal phalanx of the second toe. Fluid  also surrounding the deep and superficial flexor digitorum tendons  from the tarsometatarsal region for a short distance involving all 4  but then continues surrounding the second and third superficial and  deep flexor digitorum tendons to the level of the distal metaphyses of  the proximal phalanges of the second and third toes. It is difficult  to sort out the margins of the joint capsule about the second MCP  joint from the large fluid collection surrounding the joint.    Ligaments: The visualized capsular ligaments about the MTP joints are  normal in appearance the Lisfranc ligament is normal as are the  capsular ligaments about the PIP and DIP joints.    Following intravenous gadolinium administration there is enhancement  of the musculature throughout the central compartment of  the  tarsometatarsal region on the plantar aspect and also of the  subcutaneous fat especially medially and laterally about the  metatarsal region extending from the TMT joints and the endpoint DIP  joints. Interestingly, there is very little enhancement of the  synovium about the MTP joints or about the large fluid collection  surrounding the second MTP joints. Normal synovial tissue usually  enhances quite brightly and abscess collections usually have a fairly  well-defined thin enhancing peripheral margin. There is also lack of  enhancement on the lateral aspect of the foot under the proximal  fourth and fifth metatarsals through the toes.      Impression    IMPRESSION:  1. Ulcer defect on the plantar aspect of the foot which appears to  communicate with a large fluid collection surrounding the second MTP  joint and extending distally to the level of the distal aspect of the  proximal phalanx and extending proximally to the proximal metatarsal  diaphyseal region. It is difficult to sort out the joint capsule from  pericapsular fluid and there could be significant distention of the  joint capsule or extensive surrounding fluid with lobular margins  maintained by surrounding soft tissues. Infected contents of this  fluid collection and this could be relatively easily sampled with  ultrasound guidance either from a dorsal or plantar approach.  2. Small joint effusions about the third through fifth MTP joints and  more moderate joint effusion about the first MTP joint. Relatively  unusual lack of enhancement about the synovium may be related to poor  synovial vascularity but is nonspecific.  3. Cellulitis about the central compartment of the foot without other  associated fluid collections. Edema/cellulitis about the dorsal soft  tissues, medially from the dorsum of the first metatarsal and  extending nearly to the plantar margin of the medial forefoot and  laterally extending from the intertarsal region of the  second and  third metatarsals to the lateral margin of the foot almost to the  plantar surface.  4. Fluid surrounding the extensor digitorum and flexor digitorum  tendons, possibly reactive and/or tenosynovitis. The tendons  themselves appear normal.  5. No MR evidence of osteomyelitis although this cannot be completely  excluded about the second metatarsal head and MTP joint region.  Questionable mild periosteal reaction about the plantar aspect of the  proximal phalanx of the second toe, proximally at the joint margin on  the comparison radiograph.  6. The small dorsal fracture off the base of the middle phalanx of the  fourth toe is poorly visualized.    TOMAS GRANADO MD

## 2018-02-19 NOTE — CONSULTS
Patient seen and chart reviewed. Note dictated (initial)    OK for her to continue with amitriptyline 50 mg HS and occasional use of 25 mg PRN  Perhaps check a morning serum level   She is not interested in trying another antidepressant   Will be following up with her PCP

## 2018-02-19 NOTE — PROGRESS NOTES
Diabetes Consult Daily  Progress Note          Assessment/Plan:   Alessandra Pak is a 52 year old year old female with secondary DM s/p Whipple in 2001, complicated by nephropathy and peripheral neuropathy, on insulin pump for >1 year, presents with sepsis, likely related to RLE infection.     Persistently high glucose yesterday, but improved this morning.  NPO awaiting I&D.    -glargine: plan to increase pending BG trend  -increase aspart 1 per 12 grams carb  -increase aspart correction to medium, q4h whole NPO  -pump assessment diabetes educ consult requested (pt will ask family to bring pump supplies)    Outpatient diabetes follow up: 3/2/18 w/ staci Nuno  Plan discussed with patient, bedside RN, and paged to primary team.           Interval History:   The last 24 hours progress and nursing notes reviewed.  Feeling better than yesterday but still w/ fever and foot pain.  Awaiting I&D.  NPO overnight.  For  received extra aspart 3 units w/ improvement to 192 this morning--  Sensitivity 40.  Ate two good meals lunch and supper yesterday.  Did get aspart, but BG high 200s + afterwards.  Snacks in room as well.  Pt has symptoms low BG when gets less than 70.  No symptoms low when in 90s yesterday.    Pt would prefer to restart pump before discharge.  Accepting of potential for pump program change and assessment w/ diabetes educator.          Recent Labs  Lab 02/19/18  0730 02/19/18  0219 02/18/18  1802 02/18/18  1321 02/18/18  0915 02/18/18  0751 02/18/18  0654  02/17/18  0643  02/16/18  2145  02/16/18  1528   GLC  --   --   --  275*  --   --   --   --  116*  --  340*  --  454*   * 313* 280*  --  105* 91 122*  < >  --   < >  --   < >  --    < > = values in this interval not displayed.            Review of Systems:   See interval hx          Medications:       Active Diet Order      Diet      NPO per Anesthesia Guidelines for Procedure/Surgery Except for: Meds     Physical  "Exam:  Gen: Alert, resting in bed, in NAD   HEENT: NC/AT, mucous membranes are moist  Resp: Unlabored  Ext: No lower extremity edema, R foot bandaged, applying cutaneous ice  Neuro:oriented x3, communicating clearly  /83 (BP Location: Right arm)  Pulse 101  Temp 99.9  F (37.7  C) (Oral)  Resp 16  Ht 1.753 m (5' 9\")  Wt 63 kg (139 lb)  SpO2 92%  BMI 20.53 kg/m2           Data:     Lab Results   Component Value Date    A1C 12.8 02/16/2018    A1C 11.8 06/21/2017    A1C 11.5 11/02/2016    A1C >15.0  Results confirmed by repeat test   07/25/2016    A1C 11.8 10/05/2015              CBC RESULTS:   Recent Labs   Lab Test  02/18/18   1321   WBC  19.0*   RBC  3.74*   HGB  10.5*   HCT  32.1*   MCV  86   MCH  28.1   MCHC  32.7   RDW  14.0   PLT  261     Recent Labs   Lab Test  02/18/18   1321  02/17/18   0643   NA  134  135   POTASSIUM  4.0  3.9   CHLORIDE  102  101   CO2  26  26   ANIONGAP  7  8   GLC  275*  116*   BUN  22  23   CR  1.19*  1.23*  1.22*   RICK  8.4*  8.2*     Liver Function Studies -   Recent Labs   Lab Test  02/16/18   1528   PROTTOTAL  8.2   ALBUMIN  2.8*   BILITOTAL  0.6   ALKPHOS  212*   AST  22   ALT  14     Lab Results   Component Value Date    INR 1.30 02/21/2015    INR 1.72 10/29/2014    INR 1.71 10/28/2014           Esperanza Link APRN [facility] [phone]    Diabetes Management job code [id number]          "

## 2018-02-19 NOTE — CONSULTS
"[facility], Orthopaedic Surgery Consultation    Alessandra Pak MRN# [id number]   Age: 52 year old YOB: 1965     Date of Admission:  2/16/2018    Reason for consult: Diabetic foot ulcer, right foot       Requesting physician: Dr. Gary DPM         Assessment and Plan:   Assessment:  - Diabetic foot ulcer, first metatarsal head, right foot    Plan:  - Bedside irrigation and debridement by Dr. Ibrahim today (see Dr. Ibrahim's note for details)  - [facility] nursing consult for further recs regarding wound cares   - Patient will likely need more extensive surgical intervention in the future - however, due to her current stability (downtrending leukocytosis, afebrile last 24hrs (Tmax 100.2F), intermittent tachycardia, BP stable) and uncontrolled sugars (continued BG >200), we will defer further debridement/amputation to a later date.  - Follow-up with Dr. Peralta when sugars are under better control to discuss further debridement/amputation.  - Continue ABx per primary  - NWB RLE          History of Present Illness:   Patient was seen and examined by me. History, PMH, Meds, SH, complete ROS (10 organ systems) and PE reviewed with patient and prior medical records.      Ms. Pak is a 52 year-old female with a history of uncontrolled DM who presents with a six-week history of a right foot wound.  She noticed an ulcer which she kept covered then subsequently \"healed over\" but then reopened with drainage.  She noticed some intermittent fevers but otherwise feels well.     She states she does not have a history of open wounds with the exception of one ulcer on her heel which has since healed.  She does have bilateral paresthesias in her feet due to her diabetes.          Past Medical History:     Past Medical History:   Diagnosis Date     Abdominal pain, right upper quadrant     sees Dr Mcclellan pain clinic at [facility] [place]     ASCUS with positive high risk HPV 8/2013    + HPV 33, [place] - GAVIN I, ECC- atypia     " Cardiomyopathy (H)     non ischemic cardiomyopathy with EF 15     Cervical high risk HPV (human papillomavirus) test positive 7/8/15, 7/25/16    NIL pap/+ HR HPV (not 16 or 18).      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    leep     Depressive disorder      Gastro-oesophageal reflux disease      Human papillomavirus in conditions classified elsewhere and of unspecified site 2/2012    + HPV 33     Hypertension      Profound impairment, one eye, impairment level not further specified     rt eye due to childhood injury     Systolic CHF (H) 3/12/2015     Tobacco abuse 5/18/2013     Type 2 diabetes mellitus without complications (H)      Uncomplicated asthma              Past Surgical History:     Past Surgical History:   Procedure Laterality Date     C NONSPECIFIC PROCEDURE  2001    cholecystectomy     C NONSPECIFIC PROCEDURE  as a child    tonsillectomy     C NONSPECIFIC PROCEDURE  2001    whipple procedure     CARDIAC SURGERY      defib     COLPOSCOPY,LOOP ELECTRD CERVIX EXCIS  2002, 2011    stage 2 dysplasia     ENDOBRONCHIAL ULTRASOUND FLEXIBLE N/A 2/19/2015    Procedure: ENDOBRONCHIAL ULTRASOUND FLEXIBLE;  Surgeon: Brenden Tamez MD;  Location: [facility]     LEEP TX, CERVICAL  2014    LEEP TX Cervical     RECESSION RESECTION WITH ADJUSTABLE SUTURE  12/13/2011    Procedure:RECESSION RESECTION WITH ADJUSTABLE SUTURE; Right Strabismus Repair with Adjustable Suture       TUBAL LIGATION  2007    essure              Social History:     Social History     Social History     Marital status: Single     Spouse name: N/A     Number of children: N/A     Years of education: N/A     Occupational History     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 No     Social History Narrative    Balanced Diet - Yes    Osteoporosis Prevention Measures - Dairy servings per day: 3 servings daily    Regular Exercise -  Yes Describe hand weights 20 minutes daily    Dental Exam - YES - Date: 3/10    Eye Exam - YES - Date: 1-2008    Self Breast Exam - Yes    Abuse: Current or Past (Physical, Sexual or Emotional)- No    Do you feel safe in your environment - Yes    Guns stored in the home - No    Sunscreen used - Yes    Seatbelts used - Yes    Lipids -  YES - Date: 1/10    Glucose -  YES - Date: 12/09    Colon Cancer Screening - No    Hemoccults - NO    Pap Test -  YES - Date: 6/19/08    Do you have any concerns about STD's -  No    Mammography - NO    DEXA - NO    Immunizations reviewed and up to date - Yes, last TD was 11-22-04    3/11/10    KATY Burrell CMA                                         Family History:     Family History   Problem Relation Age of Onset     DIABETES Mother      diet controled     Hypertension Mother      Arthritis Mother      Lipids Mother      DIABETES Father      Hypertension Father      GASTROINTESTINAL DISEASE Father      gallbladder removed     Bipolar Disorder Brother      Thyroid Disease Brother      Obesity Other      Son     Respiratory Other      Son and Daughter; asthma     Depression Maternal Aunt      Anxiety Disorder Maternal Aunt               Medications:     Current Facility-Administered Medications   Medication     ketorolac (TORADOL) injection 15 mg     insulin glargine (LANTUS) injection 8 Units     insulin aspart (NovoLOG) inj (RAPID ACTING)     insulin aspart (NovoLOG) inj (RAPID ACTING)     methadone (DOLOPHINE) tablet 70 mg     acetaminophen (TYLENOL) tablet 325 mg     insulin aspart (NovoLOG) inj (RAPID ACTING)  "    amitriptyline (ELAVIL) tablet 50 mg     albuterol (PROAIR HFA/PROVENTIL HFA/VENTOLIN HFA) Inhaler 1-2 puff     ipratropium - albuterol 0.5 mg/2.5 mg/3 mL (DUONEB) neb solution 3 mL     naloxone (NARCAN) injection 0.1-0.4 mg     melatonin tablet 1 mg     sodium chloride (PF) 0.9% PF flush 3 mL     sodium chloride (PF) 0.9% PF flush 3 mL     senna-docusate (SENOKOT-S;PERICOLACE) 8.6-50 MG per tablet 1 tablet    Or     senna-docusate (SENOKOT-S;PERICOLACE) 8.6-50 MG per tablet 2 tablet     polyethylene glycol (MIRALAX/GLYCOLAX) Packet 17 g     ondansetron (ZOFRAN-ODT) ODT tab 4 mg    Or     ondansetron (ZOFRAN) injection 4 mg     dextrose 10 % 1,000 mL infusion     glucose 40 % gel 15-30 g    Or     dextrose 50 % injection 25-50 mL    Or     glucagon injection 1 mg     aspirin chewable tablet 81 mg     fluticasone (FLONASE) 50 MCG/ACT spray 2 spray     polyethylene glycol (MIRALAX/GLYCOLAX) Packet 17 g     ranitidine (ZANTAC) tablet 300 mg     piperacillin-tazobactam (ZOSYN) 3.375g in 15 mL NS Premix Syringe     oxyCODONE IR (ROXICODONE) tablet 5-10 mg     pregabalin (LYRICA) capsule 75 mg             Allergies:      Allergies   Allergen Reactions     No Known Drug Allergies             Review of Systems:   A comprehensive 10 point review of systems (constitutional, ENT, cardiac, peripheral vascular, respiratory, GI, , Musculoskeletal, skin, Neurological) was performed and found to be negative except as described in this note.           Physical Exam:   COMPLETE EXAMINATION:   VITAL SIGNS: /83 (BP Location: Right arm)  Pulse 101  Temp 99.9  F (37.7  C) (Oral)  Resp 16  Ht 1.753 m (5' 9\")  Wt 63 kg (139 lb)  SpO2 92%  BMI 20.53 kg/m2   GENERAL:  No acute distress, pleasant.  RESP: Non-labored breathing on RA.  ABD: Benign  SKIN: Grossly normal (outside of ulceration noted on MSK)  LYMPHATIC:  Grossly normal  NEURO:  Grossly normal - bilateral diabetic neuropathy  VASCULAR: Satisfactory perfusion of " all extremities  MUSCULOSKELETAL:   Inspection: There is an ulceration on the plantar surface of the first metatarsal head with active drainage.  There is swelling over the dorsum of the first webspace extending over the second webspace as well.  Palpation:  There is intermittent sensation over the plantar and dorsal aspects of the right foot.  The wound does not probe deep to bone.  Motor:  Tibialis anterior, gastroc/soleus, EHL strength 5/5   Sensory: Not intact to superficial peroneal, deep peroneal, saphenous, sural, and tibial nerve territories with intermittent paresthesias and variable sensation due to uncontrolled DM.  Circulation: palpable DP and TP, foot warm and well perfused            Data:   All pertinent laboratory data reviewed  All imaging studies reviewed by me.    Recent Labs   Lab Test  02/18/18   1321  02/17/18   0643  02/16/18   1528   02/15/15   0240   HGB  10.5*  10.3*  11.9   < >   --    SED   --    --    --    --   132*   CRP  290.0*  280.0*  290.0*   < >   --    WBC  19.0*  19.8*  22.3*   < >   --     < > = values in this interval not displayed.     Recent Labs   Lab Test  02/19/15   1140  10/29/14   1500   FTYP  Bronchoalveolar Lavage  Fluid   FNEU  13  32   FCOL  Colorless  Pink   FAPR  Clear  Cloudy   FWBC  185  682       Signed:    This consultation has been seen and discussed with Dr. Ibrahim, PGY-4, and discussed with Dr. Nelson, Attending Physician.    Burton Hughes MD  Orthopaedic Surgery PGY-1  #: [phone]

## 2018-02-20 LAB
ANION GAP SERPL CALCULATED.3IONS-SCNC: 6 MMOL/L (ref 3–14)
BUN SERPL-MCNC: 16 MG/DL (ref 7–30)
CALCIUM SERPL-MCNC: 8.7 MG/DL (ref 8.5–10.1)
CHLORIDE SERPL-SCNC: 102 MMOL/L (ref 94–109)
CO2 SERPL-SCNC: 26 MMOL/L (ref 20–32)
CREAT SERPL-MCNC: 1.09 MG/DL (ref 0.52–1.04)
CRP SERPL-MCNC: 260 MG/L (ref 0–8)
ERYTHROCYTE [DISTWIDTH] IN BLOOD BY AUTOMATED COUNT: 14 % (ref 10–15)
GFR SERPL CREATININE-BSD FRML MDRD: 53 ML/MIN/1.7M2
GLUCOSE BLDC GLUCOMTR-MCNC: 115 MG/DL (ref 70–99)
GLUCOSE BLDC GLUCOMTR-MCNC: 154 MG/DL (ref 70–99)
GLUCOSE BLDC GLUCOMTR-MCNC: 179 MG/DL (ref 70–99)
GLUCOSE BLDC GLUCOMTR-MCNC: 286 MG/DL (ref 70–99)
GLUCOSE BLDC GLUCOMTR-MCNC: 296 MG/DL (ref 70–99)
GLUCOSE BLDC GLUCOMTR-MCNC: 328 MG/DL (ref 70–99)
GLUCOSE BLDC GLUCOMTR-MCNC: 66 MG/DL (ref 70–99)
GLUCOSE BLDC GLUCOMTR-MCNC: 67 MG/DL (ref 70–99)
GLUCOSE BLDC GLUCOMTR-MCNC: 75 MG/DL (ref 70–99)
GLUCOSE SERPL-MCNC: 116 MG/DL (ref 70–99)
HCT VFR BLD AUTO: 30.7 % (ref 35–47)
HGB BLD-MCNC: 9.8 G/DL (ref 11.7–15.7)
INTERPRETATION ECG - MUSE: NORMAL
LACTATE BLD-SCNC: 1.4 MMOL/L (ref 0.4–1.9)
MCH RBC QN AUTO: 26.8 PG (ref 26.5–33)
MCHC RBC AUTO-ENTMCNC: 31.9 G/DL (ref 31.5–36.5)
MCV RBC AUTO: 84 FL (ref 78–100)
MRSA DNA SPEC QL NAA+PROBE: POSITIVE
PLATELET # BLD AUTO: 295 10E9/L (ref 150–450)
POTASSIUM SERPL-SCNC: 3.7 MMOL/L (ref 3.4–5.3)
RBC # BLD AUTO: 3.65 10E12/L (ref 3.8–5.2)
SODIUM SERPL-SCNC: 135 MMOL/L (ref 133–144)
SPECIMEN SOURCE: ABNORMAL
VANCOMYCIN SERPL-MCNC: 9.8 MG/L
WBC # BLD AUTO: 18.1 10E9/L (ref 4–11)

## 2018-02-20 PROCEDURE — 36415 COLL VENOUS BLD VENIPUNCTURE: CPT | Performed by: PHYSICIAN ASSISTANT

## 2018-02-20 PROCEDURE — 85027 COMPLETE CBC AUTOMATED: CPT | Performed by: PHYSICIAN ASSISTANT

## 2018-02-20 PROCEDURE — 36415 COLL VENOUS BLD VENIPUNCTURE: CPT | Performed by: INTERNAL MEDICINE

## 2018-02-20 PROCEDURE — 25000132 ZZH RX MED GY IP 250 OP 250 PS 637: Performed by: HOSPITALIST

## 2018-02-20 PROCEDURE — 25000132 ZZH RX MED GY IP 250 OP 250 PS 637: Performed by: PHYSICIAN ASSISTANT

## 2018-02-20 PROCEDURE — 25000128 H RX IP 250 OP 636: Performed by: INTERNAL MEDICINE

## 2018-02-20 PROCEDURE — 86140 C-REACTIVE PROTEIN: CPT | Performed by: PHYSICIAN ASSISTANT

## 2018-02-20 PROCEDURE — 25000128 H RX IP 250 OP 636: Performed by: PHYSICIAN ASSISTANT

## 2018-02-20 PROCEDURE — 99233 SBSQ HOSP IP/OBS HIGH 50: CPT | Performed by: INTERNAL MEDICINE

## 2018-02-20 PROCEDURE — 80048 BASIC METABOLIC PNL TOTAL CA: CPT | Performed by: PHYSICIAN ASSISTANT

## 2018-02-20 PROCEDURE — 83605 ASSAY OF LACTIC ACID: CPT | Performed by: INTERNAL MEDICINE

## 2018-02-20 PROCEDURE — 12000026 ZZH R&B TRANSPLANT

## 2018-02-20 PROCEDURE — G0463 HOSPITAL OUTPT CLINIC VISIT: HCPCS

## 2018-02-20 PROCEDURE — 80202 ASSAY OF VANCOMYCIN: CPT | Performed by: INTERNAL MEDICINE

## 2018-02-20 PROCEDURE — 99207 ZZC APP CREDIT; MD BILLING SHARED VISIT: CPT | Performed by: PHYSICIAN ASSISTANT

## 2018-02-20 PROCEDURE — 80335 ANTIDEPRESSANT TRICYCLIC 1/2: CPT | Performed by: PHYSICIAN ASSISTANT

## 2018-02-20 PROCEDURE — 40000902 ZZH STATISTIC WOC PT EDUCATION, 16-30 MIN

## 2018-02-20 PROCEDURE — 25000128 H RX IP 250 OP 636: Performed by: PODIATRIST

## 2018-02-20 PROCEDURE — 00000146 ZZHCL STATISTIC GLUCOSE BY METER IP

## 2018-02-20 RX ORDER — LISINOPRIL 10 MG/1
10 TABLET ORAL DAILY
Status: DISCONTINUED | OUTPATIENT
Start: 2018-02-20 | End: 2018-02-23 | Stop reason: HOSPADM

## 2018-02-20 RX ORDER — FUROSEMIDE 20 MG
20 TABLET ORAL DAILY
Status: DISCONTINUED | OUTPATIENT
Start: 2018-02-20 | End: 2018-02-23 | Stop reason: HOSPADM

## 2018-02-20 RX ADMIN — RANITIDINE 300 MG: 150 TABLET, FILM COATED ORAL at 08:57

## 2018-02-20 RX ADMIN — INSULIN ASPART 1 UNITS: 100 INJECTION, SOLUTION INTRAVENOUS; SUBCUTANEOUS at 19:53

## 2018-02-20 RX ADMIN — ALBUTEROL SULFATE 2 PUFF: 90 AEROSOL, METERED RESPIRATORY (INHALATION) at 11:29

## 2018-02-20 RX ADMIN — PIPERACILLIN SODIUM AND TAZOBACTAM SODIUM 3.38 G: 36; 4.5 INJECTION, POWDER, FOR SOLUTION INTRAVENOUS at 19:57

## 2018-02-20 RX ADMIN — PREGABALIN 75 MG: 50 CAPSULE ORAL at 08:53

## 2018-02-20 RX ADMIN — PIPERACILLIN SODIUM AND TAZOBACTAM SODIUM 3.38 G: 36; 4.5 INJECTION, POWDER, FOR SOLUTION INTRAVENOUS at 02:07

## 2018-02-20 RX ADMIN — PIPERACILLIN SODIUM AND TAZOBACTAM SODIUM 3.38 G: 36; 4.5 INJECTION, POWDER, FOR SOLUTION INTRAVENOUS at 14:13

## 2018-02-20 RX ADMIN — ALBUTEROL SULFATE 2 PUFF: 90 AEROSOL, METERED RESPIRATORY (INHALATION) at 17:35

## 2018-02-20 RX ADMIN — ACETAMINOPHEN 325 MG: 325 TABLET, FILM COATED ORAL at 00:31

## 2018-02-20 RX ADMIN — DEXTROSE 15 G: 15 GEL ORAL at 00:39

## 2018-02-20 RX ADMIN — AMITRIPTYLINE HYDROCHLORIDE 50 MG: 50 TABLET, FILM COATED ORAL at 22:25

## 2018-02-20 RX ADMIN — PREGABALIN 75 MG: 50 CAPSULE ORAL at 19:55

## 2018-02-20 RX ADMIN — KETOROLAC TROMETHAMINE 15 MG: 30 INJECTION, SOLUTION INTRAMUSCULAR at 14:54

## 2018-02-20 RX ADMIN — INSULIN ASPART 3 UNITS: 100 INJECTION, SOLUTION INTRAVENOUS; SUBCUTANEOUS at 15:07

## 2018-02-20 RX ADMIN — ACETAMINOPHEN 325 MG: 325 TABLET, FILM COATED ORAL at 09:09

## 2018-02-20 RX ADMIN — ASPIRIN 81 MG CHEWABLE TABLET 81 MG: 81 TABLET CHEWABLE at 08:57

## 2018-02-20 RX ADMIN — PREGABALIN 75 MG: 50 CAPSULE ORAL at 14:07

## 2018-02-20 RX ADMIN — FUROSEMIDE 20 MG: 20 TABLET ORAL at 11:27

## 2018-02-20 RX ADMIN — VANCOMYCIN HYDROCHLORIDE 1250 MG: 10 INJECTION, POWDER, LYOPHILIZED, FOR SOLUTION INTRAVENOUS at 22:27

## 2018-02-20 RX ADMIN — ALBUTEROL SULFATE 2 PUFF: 90 AEROSOL, METERED RESPIRATORY (INHALATION) at 19:57

## 2018-02-20 RX ADMIN — METHADONE HYDROCHLORIDE 70 MG: 10 TABLET ORAL at 19:55

## 2018-02-20 RX ADMIN — LISINOPRIL 10 MG: 10 TABLET ORAL at 11:26

## 2018-02-20 RX ADMIN — OXYCODONE HYDROCHLORIDE 5 MG: 5 TABLET ORAL at 11:26

## 2018-02-20 RX ADMIN — ALBUTEROL SULFATE 2 PUFF: 90 AEROSOL, METERED RESPIRATORY (INHALATION) at 08:57

## 2018-02-20 RX ADMIN — PIPERACILLIN SODIUM AND TAZOBACTAM SODIUM 3.38 G: 36; 4.5 INJECTION, POWDER, FOR SOLUTION INTRAVENOUS at 06:18

## 2018-02-20 RX ADMIN — FLUTICASONE PROPIONATE 2 SPRAY: 50 SPRAY, METERED NASAL at 08:56

## 2018-02-20 NOTE — PROGRESS NOTES
"WOC consult for dressing recommendations for a 52 year-old female with a history of uncontrolled DM who presents with a six-week history of a right foot wound.  She noticed an ulcer which she kept covered then subsequently \"healed over\" but then reopened with drainage.  She noticed some intermittent fevers but otherwise feels well.   S/p bedside I&D of wound today.  Dressings just rewrapped by ortho, pt states that wound had large amt of bloody drainage and was cauterized by ortho.  pt requesting that dressing not be changed until tomm.      P:  Will see pt in AM   "

## 2018-02-20 NOTE — PLAN OF CARE
"Problem: Patient Care Overview  Goal: Plan of Care/Patient Progress Review  Outcome: Declining  /77 (BP Location: Left arm)  Pulse 117  Temp 102.1  F (38.9  C) (Oral)  Resp 20  Ht 1.753 m (5' 9\")  Wt 63.4 kg (139 lb 12.8 oz)  SpO2 93%  BMI 20.64 kg/m2    A/Ox4; lethargic this evening. -130s, Tmax 102.9, sats 92-94% on 4L O2. No respiratory distress, lungs diminished in the bases. No wheezing. MD paged; see note. Lactic was 0.7. Blood cultures pending. MD redressed R foot wound this afternoon; new dressing is clean, dry, and intact. Oxy given x1 for pain with good relief. L lateral heel cracked open this evening; small amount of bleeding from pre-existing wound. Denies nausea. Fair appetite. R PIV with vanco running currently. L PIV SL. Adequate urine output; BM x1. BGs 248, 104; SSI and carb coverage given per protocol. Will continue to monitor closely and notify the team of any changes.       "

## 2018-02-20 NOTE — PROVIDER NOTIFICATION
Gold crosscover notified of pt's + MRSA.    Pt placed in contact isolation.    Pt education provided.

## 2018-02-20 NOTE — PROGRESS NOTES
"Orthopaedic Brief Progress Note    Was asked by medicine team to re-evaluate foot wound today.    S:  Patient states the pain in her right foot is \"much better\" after bedside I&D yesterday.  She states that she feels less pressure in the foot and that her pain is much improved.     O:  At time of exam today, latest VS were stable - afebrile at 98.3, borderline tachycardia (stable for patient) at 106, and /78.  She was saturating well on RA.  She was febrile overnight but has not had fevers this afternoon.    The dressing was removed and the wound was visualized.  The wound appears improved from yesterday and the dorsal swelling about her first and second metatarsal heads has improved but remains present.  There remains a small area of erythema and warmth over the dorsal second metatarsal, which is improved from yesterday.  The wound bed appears healthy and has mild active s/s drainage.  The dressings were replaced.    A/P:  Continue current plan:   - WO nurse to manage wound and provide recs as inpatient   - Follow-up with Dr. Peralta as an outpatient once sugars are better controlled.  Defer further surgical intervention unless pain/drainage/swelling significantly worsens on foot.    - Continue ABx per primary    Patient was discussed with Dr. Ibrahim, PGY-4.    Burton Hughes MD 02/20/2018  Orthopaedic Surgery Resident, PGY-1  Pager: [phone]              "

## 2018-02-20 NOTE — PROGRESS NOTES
"                          Diabetes Consult Daily  Progress Note          Assessment/Plan:   Alessandra Pak is a 52 year old year old female with secondary DM s/p Whipple in 2001, complicated by nephropathy and peripheral neuropathy, on insulin pump for >1 year, presents with sepsis, likely related to RLE infection.     Mild hypoglycemia around midnight.  Still has post-prandial hyperglycemia yesterday.    -glargine: decrease from 8 to 7 units q24h  -aspart 1 per 12 grams carb  -change aspart correction to qAC, HS since no longer NPO      Outpatient diabetes follow up: 3/2/18 w/ staci Nuno  Plan discussed with patient, bedside RN, and paged to primary team.           Interval History:   The last 24 hours progress and nursing notes reviewed.  Diabetes educ met w/ pt for pump assessment.  She's safe for pump use but cannot change pump program on her own.  Today has been stressful for pt and she was considering leaving AMA this morning.  She prefers to remain on glargine at this time.    Historical:  Pt has symptoms low BG when gets less than 70      Recent Labs  Lab 02/20/18  1124 02/20/18  0633 02/20/18  0358 02/20/18  0105 02/20/18  0048 02/20/18  0032 02/20/18  0017  02/18/18  1321  02/17/18  0643  02/16/18  2145  02/16/18  1528   GLC  --  116*  --   --   --   --   --   --  275*  --  116*  --  340*  --  454*   *  --  154* 115* 75 67* 66*  < >  --   < >  --   < >  --   < >  --    < > = values in this interval not displayed.            Review of Systems:   See interval hx          Medications:       Active Diet Order      Diet      Moderate Consistent CHO Diet     Physical Exam:  Gen:  resting in bed, dozing  HEENT: NC/AT, mucous membranes are moist  Resp: Unlabored  Ext:  R foot bandaged    /84 (BP Location: Left arm)  Pulse 118  Temp 98.5  F (36.9  C) (Oral)  Resp 20  Ht 1.753 m (5' 9\")  Wt 63.4 kg (139 lb 12.8 oz)  SpO2 90%  BMI 20.64 kg/m2           Data:     Lab Results   Component Value " Date    A1C 12.8 02/16/2018    A1C 11.8 06/21/2017    A1C 11.5 11/02/2016    A1C >15.0  Results confirmed by repeat test   07/25/2016    A1C 11.8 10/05/2015            Recent Labs   Lab Test  02/20/18   0633  02/18/18   1321   NA  135  134   POTASSIUM  3.7  4.0   CHLORIDE  102  102   CO2  26  26   ANIONGAP  6  7   GLC  116*  275*   BUN  16  22   CR  1.09*  1.19*  1.23*   RICK  8.7  8.4*     CBC RESULTS:   Recent Labs   Lab Test  02/20/18   0633   WBC  18.1*   RBC  3.65*   HGB  9.8*   HCT  30.7*   MCV  84   MCH  26.8   MCHC  31.9   RDW  14.0   PLT  295         Esperanza Nikolay APRN [facility] [phone]    Diabetes Management job code [id number]

## 2018-02-20 NOTE — PROGRESS NOTES
"[facility] Nurse Inpatient Wound Assessment     Initial  Assessment  Reason for consultation: Evaluate and treat bilateral foot wounds     Assessment  R plantar foot and Left lateral heel wounds due to Neuropathic Ulcer  Status: initial assessment  Recommend using Hydrofera blue dressing to right foot wound to improve drainage from wound and provide topical antimicrobial coverage.  Recommend using Iodosorb gel to right foot wound for prevention.        Treatment Plan  Sween 24 lotion to intact skin on feet, avoid webbing between the toes.    Right plantar foot wound:  -cleanse wound with microklenz spray and gauze.    -Cut a piece of Hydrofera blue dressing to the size of the wound.    -Moisten the dressing with sterile NS and squeeze out excess saline.  Pack into wound.    -Cover with dry gauze.    -Secure with kerlix and loose ABD wrap.   Change daily     Left lateral heel wound:  Cleanse with microklenz or sterile NS.  Apply Iodosorb gel nickel thick into wound bed.  Cover with dry gauze and secure with kerlix roll gauze.  Change daily.    Orders Written  WO Nurse follow-up plan:Friday   Nursing to notify the Provider(s) and re-consult the [facility] Nurse if wound(s) deteriorates or new skin concern.    Patient History  According to provider note(s):  history of NICM, HTN, COPD, GERD, chronic pancreatitis s/p pylorus sparing Adrienne (2001) w/ secondary DM and diabetic foot ulcers, depression, and chronic methadone use who was admitted 2/16 with sepsis 2/2 RLE cellulitis, as well as acute hypoxic respiratory failure  six-week history of a right foot wound.  She noticed an ulcer which she kept covered then subsequently \"healed over\" but then reopened with drainage.  She noticed some intermittent fevers but otherwise feels well. S/p bedside I&D of wound 2/19/18     Objective Data  Active Diet Order    Active Diet Order      Diet      Moderate Consistent CHO Diet    Output:   I/O last 3 completed shifts:  In: 590 [P.O.:340; " I.V.:250]  Out: 500 [Urine:500]    Risk Assessment:    Kye Kye Score  Av.4  Min: 18  Max: 20                            Labs:   Recent Labs  Lab 18  0633  18  1528   HGB 9.8*  < > 11.9   WBC 18.1*  < > 22.3*   A1C  --   --  12.8*   .0*  < > 290.0*   < > = values in this interval not displayed.  Glucose Values Latest Ref Rng & Units 2018   Bedside Glucose (mg/dl )  - -- -- -- -- -- -- --   GLUCOSE 70 - 99 mg/dL 67(L) 75 115(H) 154(H) 116(H) 179(H) 296(H)   Some recent data might be hidden     Physical Exam  Skin assessment: bilateral feet  Skin appropriately warm, dry, peeling and cracking.  DP 1/4, very little sensation in feet.      Wound Location: right 1st metatarsal head.    Wound History:  started as a callus, open wound noted 6 weeks ago. Wound debrided in Podiatry clinic on  and she was admitted for hypoxia, fever, and tachycardia.  IV Vanco and Zosyn started. MRI right foot showed fluid collection tracking dorsally around 1st met head, ortho performed bedside I&D 18. Pain improved after debridement   Currently NWB on R foot but does have a DH2 shoe that she had been wearing.       Measurements (length x width x depth, in cm) 2 cm x 2.4 cm  x  0.9 cm   Wound Base:  100% red, moist granulation with pinpoint blackened areas from cautery   Tunneling:  No visible tunneling but wound drainage noted to be expressed from pinpoint area on lateral side of wound  Undermining N/A  Palpation of the wound bed: boggy   Periwound skin: dark callus on medial side.  White tinged fluctuant area lateral to wound, between 1st and 2nd MTPJ.  When this area is pressed purulent drainage expressed from wound.      Temperature: slightly increased warmth   Drainage:, purulent from pocket between 1st and 2nd MTPJ, serosanguinous from wound bed.    Odor: mild  Pain: during palpation only      Wound Location:  Left medial heel    Wound history:  Site of previous wound.  Pt does not have diabetic shoes for this foot  Measurements (length x width x depth, in cm) 1.5 cm x 0.5 cm  x  0.2 cm   Wound Base:  100% smooth pink dermis   Tunneling N/A  Undermining N/A  Palpation of the wound bed: normal   Periwound skin: peeling nonpigmented scar tissue   Color: normal and consistent with surrounding tissue  Temperature: normal   Drainage:, none  Odor: none  Pain: denies ,     Interventions  Current support surface: Standard  Atmos Air mattress    Visual inspection of wound(s) completed  Wound Care: done per plan of care to right foot, floor RN will complete left foot dressing when Iodosorb arrives     Supplies: ordered: Iodosorb  Education provided today: diabetic foot wear    Discussed plan of care with Patient and Nurse    Face to face time: 35 minutes      Ofe Calvillo, RN

## 2018-02-20 NOTE — PHARMACY-VANCOMYCIN DOSING SERVICE
Pharmacy Vancomycin Initial Note  Date of Service 2018  Patient's  1965  52 year old, female    Indication: Skin and Soft Tissue Infection    Current estimated CrCl = Estimated Creatinine Clearance: 56.6 mL/min (based on Cr of 1.19).    Creatinine for last 3 days  2018:  9:45 PM Creatinine 1.25 mg/dL  2018:  6:43 AM Creatinine 1.22 mg/dL  2018:  1:21 PM Creatinine 1.19 mg/dL;  1:21 PM Creatinine 1.23 mg/dL    Recent Vancomycin Level(s) for last 3 days  2018:  6:43 AM Vancomycin Level 12.0 mg/L      Vancomycin IV Administrations (past 72 hours)                   vancomycin (VANCOCIN) 1,250 mg in sodium chloride 0.9 % 250 mL intermittent infusion (mg) 1,250 mg New Bag 18 1327    vancomycin (VANCOCIN) 1000 mg in dextrose 5% 200 mL PREMIX (mg) 1,000 mg New Bag 18 1203                Nephrotoxins and other renal medications (Future)    Start     Dose/Rate Route Frequency Ordered Stop    18 0700  ketorolac (TORADOL) injection 15 mg      15 mg Intravenous ONCE 18 0651      18 2300  piperacillin-tazobactam (ZOSYN) 3.375g in 15 mL NS Premix Syringe      3.375 g  over 3 Minutes Intravenous EVERY 6 HOURS 18 1912            Contrast Orders - past 72 hours (72h ago through future)    Start     Dose/Rate Route Frequency Ordered Stop    18 1615  gadobutrol (GADAVIST) injection 6.31 mL      0.1 mL/kg × 63.1 kg Intravenous ONCE 18 1614 18 1631    18 0015  technetium pentetate Tc99m (DTPA) inhaled radioisotope 2 mCi      2 mCi Inhalation ONCE 18 0013 18 0116    18 0015  technetium pertechnetate with albumin (Tc99m MAA) radioisotope injection 4.8-7.2 millicurie      4.8-7.2 millicurie Intravenous ONCE 18 0013 18 0130                Plan:  1.  Start vancomycin 1250 mg IV q24h.   2.  Goal Trough Level: 15-20 mg/L   3.  Pharmacy will check trough levels as appropriate in 1-3 Days.    4. Serum creatinine  levels will be ordered daily for the first week of therapy and at least twice weekly for subsequent weeks.    5. [place] method utilized to dose vancomycin therapy: Reordering previous dose.    Mary Riojas

## 2018-02-20 NOTE — PROVIDER NOTIFICATION
Gold team notified of HR in 120s, Temp 101.4, and increasing need for oxygen (sats mid 80s on RA; requiring 4L to stay >92%). Pt not in distress. MD ordered blood cultures, lactic acid, and restarted vanco.

## 2018-02-20 NOTE — PLAN OF CARE
Problem: Patient Care Overview  Goal: Plan of Care/Patient Progress Review  Outcome: No Change  Tmax 100.2*, came down to 98.7* after one dose of tylenol. AOVSS on 4LPM per oximyzer NC. Hypoglycemia at 0000: 66. Started with 1 apple juice, only improved to 67. Started glucose gel and improved to 77. One more apple juice and 10g total of gel improved to 115. 0400 BG was 154, no correction per nursing judgement. Denies pain and nausea. CHO diet, peripheral IVs (2) saline locked. No voids or BM overnight. Up SB assist. R foot wound UTV, given surgical dressing. L foot wound dressing not staying on well so WOC consult placed. Pt upset at the end of shift, wanting to leave AMA, but did not want to talk to nurse or charge nurse about rationalization. Will continue to monitor and follow POC.

## 2018-02-20 NOTE — PROGRESS NOTES
[facility], [place]    Internal Medicine Progress Note - Gold Service      Assessment & Plan   Alessandra Pak is a 52 year old female with history of NICM, HTN, COPD, GERD, chronic pancreatitis s/p pylorus sparing Whipple (2001) w/ secondary DM and diabetic foot ulcers, depression, and chronic methadone use who was admitted 2/16 with sepsis 2/2 RLE cellulitis, as well as acute hypoxic respiratory failure.     Sepsis, RLE diabetic foot ulcer and cellulitis, possible CAP: Admitted from clinic 2/16 with temp 102.2. RLE concerning for cellulitis. Repeat chest CT 2/17 new ground glass opacities in bilat upper lobes, concerning for PNA. Pct 1.42, lactate normal 2/16. RVP negative. UC negative. MRI 2/18 no definitive osteomyelitis, noted fluid collection around 2nd metatarsal head. S/p bedside I&D by ortho. Tachycardia as below. Treated with Vanc and Zosyn 2/16 to present. Of note, Vanc temporarily stopped from 2/18 to 2/19 but was resumed as fevers returned. BCx NGTD. WBC 18.1 (19),  (290) 2/20. Febrile to 102.9 overnight, now afebrile. Intermittent O2 use, but currently on room air.   - ID consult today given ongoing fevers   - Podiatry/ortho following  - WOCN following    - Continue Zosyn and Vanc  - Follow cultures     Secondary DM: S/p Laurenle 2001. C/b neuropathy and nephropathy. Poorly controlled, A1c 12.8%. PTA on insulin pump, was on insulin gtt on admission due to glucose 464. Family brought pump supplies 2/19. Currently on Lantus 5 units, Novolog 1 unit per 12 grams carbs, MSSI. Glucose 67-210s recently.   - Endocrine following  - Diabetes education consulted     Acute on chronic pain: Chronic abdominal pain, PTA on methadone 70 mg HS. QTc 476. Increased RLE pain with worsening edema, manipulation by podiatry bedside 2/19 which is now improving  - Continue PTA methadone  - Oxycodone prn for increased pain     NICM, HTN: Echo with EF 50-55% 2/2016. Previously low to 15%. Has ICD  in place, last interrogated 12/5/17 w/ 2 episodes NSVT. PTA on lasix and lisinopril. BPs stable  - Daily weights  - Continue PTA lisinopril and lasix with hold parameters      Depression, worsening anxiety: PTA on amitriptyline. Worsening anxiety regarding acute illness, family social issues. Psych consulted 2/19, no changes made. Intermittently wants to leave AMA. Long discussion about the risks of leaving the hospital at this time. Thus far, has not left the building but is aware that leaving would be AMA. At this time, agreeable or oral antibiotics if she does decide to leave AMA  - Essential oils, mandalas    Tachycardia: HR 100s-120s. In the setting of acute pain, increased anxiety, and infection. Patient feels most likely correlated to anxiety. EKG without acute changes      Resolved Hospital Problems and Stable, Chronic Medical Conditions:   Acute hypoxic respiratory failure: Presented with O2 sats in the 70s on room air. Improved to 90s on 3L O2. CXR with atelectasis, repeat CT with concern for PNA as above. No PTA supplemental O2. Now on room air.   COPD: No longer smoking. Not on home O2. PTA on Combivent bid. Changed to DuoNeb QID here for now but patient preferred PRN   CKD III: Cr BL 0.9-1.2, last check at [facility]       Consulting Services: Endocrinology, podiatry/ortho, ID      Pain assessment: Pain increased 2/19, now stable  Current Pain Score 2/19/2018 2/19/2018 2/18/2018   Patient currently in pain? denies denies yes   Pain score (0-10) - - -   Pain location - - Foot   Pain descriptors - - Aching   CPOT pain score - - -   - Alessandra is experiencing pain due to the above. Pain management was discussed and the plan was created in a collaborative fashion. Alessandra's response to the current recommendations: agreeable     Disposition Plan   Expected discharge: 2 - 3 days, recommended to prior living arrangement once SIRS/Sepsis treated and final antibiotic plan determined      Entered: Karely Caraballo  02/20/2018, 8:16 AM   Information in the above section will display in the discharge planner report.      The patient's care was discussed with the patient, nursing, and attending physician, Dr. Mini Caraballo  Internal Medicine Staff Hospitalist Service  [facility]  Pager: [phone]  Please see sticky note for cross cover information    Interval History   Quite frustrated today. Was woken up several times overnight for blood work, labs, etc. Sick of being admitted. Wants to go home. Thinks she could manage illness well at home on oral antibiotics. Denies fever or chills. Wants to be at home with family and get some rest. Aware that leaving would be AMA. Feels lonely. Pain in right foot slightly better.    Data reviewed today: I reviewed all medications, new labs and imaging results over the last 24 hours. I personally reviewed recent notes, labs, etc    Physical Exam   Vital Signs: Temp: 98.7  F (37.1  C) Temp src: Oral BP: 133/71 Pulse: 103   Resp: 20 SpO2: 98 % O2 Device: Oxymizer cannula Oxygen Delivery: 4 LPM  Weight: 139 lbs 12.8 oz  General Appearance: Alert and oriented x3, tearful and appears frustrated  Respiratory: CTAB without wheezing or rales  Cardiovascular: Tachycardic, S1, S2. No murmur noted  GI: Abdomen soft, non-tender with active bowel sounds. No guarding or rebound   Skin: Right foot wrapped in guaze and ace wrap. BLE warm to touch with good circulation. No jaundice   Other: Appears depressed. No focal neuro deficits     Data   No results found for this or any previous visit (from the past 24 hour(s)).

## 2018-02-21 LAB
BACTERIA SPEC CULT: ABNORMAL
GLUCOSE BLDC GLUCOMTR-MCNC: 120 MG/DL (ref 70–99)
GLUCOSE BLDC GLUCOMTR-MCNC: 135 MG/DL (ref 70–99)
GLUCOSE BLDC GLUCOMTR-MCNC: 185 MG/DL (ref 70–99)
GLUCOSE BLDC GLUCOMTR-MCNC: 202 MG/DL (ref 70–99)
GLUCOSE BLDC GLUCOMTR-MCNC: 220 MG/DL (ref 70–99)
LACTATE BLD-SCNC: 1.1 MMOL/L (ref 0.4–1.9)
SPECIMEN SOURCE: ABNORMAL

## 2018-02-21 PROCEDURE — 25000132 ZZH RX MED GY IP 250 OP 250 PS 637: Performed by: PHYSICIAN ASSISTANT

## 2018-02-21 PROCEDURE — 83605 ASSAY OF LACTIC ACID: CPT | Performed by: INTERNAL MEDICINE

## 2018-02-21 PROCEDURE — 36415 COLL VENOUS BLD VENIPUNCTURE: CPT | Performed by: INTERNAL MEDICINE

## 2018-02-21 PROCEDURE — 99233 SBSQ HOSP IP/OBS HIGH 50: CPT | Performed by: INTERNAL MEDICINE

## 2018-02-21 PROCEDURE — 12000026 ZZH R&B TRANSPLANT

## 2018-02-21 PROCEDURE — 0KBV0ZZ EXCISION OF RIGHT FOOT MUSCLE, OPEN APPROACH: ICD-10-PCS | Performed by: ORTHOPAEDIC SURGERY

## 2018-02-21 PROCEDURE — 99207 ZZC APP CREDIT; MD BILLING SHARED VISIT: CPT | Performed by: PHYSICIAN ASSISTANT

## 2018-02-21 PROCEDURE — 40000556 ZZH STATISTIC PERIPHERAL IV START W US GUIDANCE

## 2018-02-21 PROCEDURE — 25000128 H RX IP 250 OP 636

## 2018-02-21 PROCEDURE — 27210995 ZZH RX 272

## 2018-02-21 PROCEDURE — 25000128 H RX IP 250 OP 636: Performed by: PHYSICIAN ASSISTANT

## 2018-02-21 PROCEDURE — 00000146 ZZHCL STATISTIC GLUCOSE BY METER IP

## 2018-02-21 PROCEDURE — 25000128 H RX IP 250 OP 636: Performed by: INTERNAL MEDICINE

## 2018-02-21 RX ORDER — MAGNESIUM HYDROXIDE 1200 MG/15ML
LIQUID ORAL
Status: COMPLETED
Start: 2018-02-21 | End: 2018-02-21

## 2018-02-21 RX ORDER — OXYCODONE HYDROCHLORIDE 5 MG/1
5-10 TABLET ORAL EVERY 6 HOURS PRN
Status: DISCONTINUED | OUTPATIENT
Start: 2018-02-21 | End: 2018-02-22

## 2018-02-21 RX ORDER — HYDROMORPHONE HYDROCHLORIDE 1 MG/ML
0.3 INJECTION, SOLUTION INTRAMUSCULAR; INTRAVENOUS; SUBCUTANEOUS ONCE
Status: COMPLETED | OUTPATIENT
Start: 2018-02-21 | End: 2018-02-21

## 2018-02-21 RX ORDER — HYDROMORPHONE HYDROCHLORIDE 1 MG/ML
INJECTION, SOLUTION INTRAMUSCULAR; INTRAVENOUS; SUBCUTANEOUS
Status: COMPLETED
Start: 2018-02-21 | End: 2018-02-21

## 2018-02-21 RX ORDER — VANCOMYCIN HYDROCHLORIDE 1 G/200ML
1000 INJECTION, SOLUTION INTRAVENOUS EVERY 12 HOURS
Status: DISCONTINUED | OUTPATIENT
Start: 2018-02-21 | End: 2018-02-23

## 2018-02-21 RX ADMIN — FUROSEMIDE 20 MG: 20 TABLET ORAL at 07:52

## 2018-02-21 RX ADMIN — RANITIDINE 300 MG: 150 TABLET, FILM COATED ORAL at 07:52

## 2018-02-21 RX ADMIN — PREGABALIN 75 MG: 50 CAPSULE ORAL at 14:15

## 2018-02-21 RX ADMIN — METHADONE HYDROCHLORIDE 70 MG: 10 TABLET ORAL at 21:05

## 2018-02-21 RX ADMIN — HYDROMORPHONE HYDROCHLORIDE 0.3 MG: 1 INJECTION, SOLUTION INTRAMUSCULAR; INTRAVENOUS; SUBCUTANEOUS at 11:47

## 2018-02-21 RX ADMIN — PIPERACILLIN SODIUM AND TAZOBACTAM SODIUM 3.38 G: 36; 4.5 INJECTION, POWDER, FOR SOLUTION INTRAVENOUS at 21:05

## 2018-02-21 RX ADMIN — PREGABALIN 75 MG: 50 CAPSULE ORAL at 21:05

## 2018-02-21 RX ADMIN — OXYCODONE HYDROCHLORIDE 10 MG: 5 TABLET ORAL at 18:08

## 2018-02-21 RX ADMIN — SODIUM CHLORIDE 500 ML: 900 IRRIGANT IRRIGATION at 11:52

## 2018-02-21 RX ADMIN — Medication 0.3 MG: at 11:47

## 2018-02-21 RX ADMIN — PIPERACILLIN SODIUM AND TAZOBACTAM SODIUM 3.38 G: 36; 4.5 INJECTION, POWDER, FOR SOLUTION INTRAVENOUS at 01:23

## 2018-02-21 RX ADMIN — AMITRIPTYLINE HYDROCHLORIDE 50 MG: 50 TABLET, FILM COATED ORAL at 22:08

## 2018-02-21 RX ADMIN — OXYCODONE HYDROCHLORIDE 10 MG: 5 TABLET ORAL at 08:03

## 2018-02-21 RX ADMIN — VANCOMYCIN HYDROCHLORIDE 1000 MG: 1 INJECTION, SOLUTION INTRAVENOUS at 23:08

## 2018-02-21 RX ADMIN — PIPERACILLIN SODIUM AND TAZOBACTAM SODIUM 3.38 G: 36; 4.5 INJECTION, POWDER, FOR SOLUTION INTRAVENOUS at 06:45

## 2018-02-21 RX ADMIN — ASPIRIN 81 MG CHEWABLE TABLET 81 MG: 81 TABLET CHEWABLE at 07:52

## 2018-02-21 RX ADMIN — PIPERACILLIN SODIUM AND TAZOBACTAM SODIUM 3.38 G: 36; 4.5 INJECTION, POWDER, FOR SOLUTION INTRAVENOUS at 13:27

## 2018-02-21 RX ADMIN — ALBUTEROL SULFATE 2 PUFF: 90 AEROSOL, METERED RESPIRATORY (INHALATION) at 16:55

## 2018-02-21 RX ADMIN — ALBUTEROL SULFATE 2 PUFF: 90 AEROSOL, METERED RESPIRATORY (INHALATION) at 21:05

## 2018-02-21 RX ADMIN — FLUTICASONE PROPIONATE 2 SPRAY: 50 SPRAY, METERED NASAL at 07:54

## 2018-02-21 RX ADMIN — PREGABALIN 75 MG: 50 CAPSULE ORAL at 09:27

## 2018-02-21 RX ADMIN — ALBUTEROL SULFATE 1 PUFF: 90 AEROSOL, METERED RESPIRATORY (INHALATION) at 07:52

## 2018-02-21 RX ADMIN — VANCOMYCIN HYDROCHLORIDE 1000 MG: 1 INJECTION, SOLUTION INTRAVENOUS at 11:52

## 2018-02-21 RX ADMIN — LISINOPRIL 10 MG: 10 TABLET ORAL at 07:54

## 2018-02-21 NOTE — PLAN OF CARE
"Problem: Patient Care Overview  Goal: Plan of Care/Patient Progress Review  Outcome: No Change  /82 (BP Location: Left arm)  Pulse 109  Temp 98.5  F (36.9  C) (Oral)  Resp 18  Ht 1.753 m (5' 9\")  Wt 63.4 kg (139 lb 12.8 oz)  SpO2 96%  BMI 20.64 kg/m2    A/Ox4. Hypertensive; OVSS on RA. Mod carb diet; poor appetite. Pt ate one cup of fruit for dinner. BGs 296, 328, 286; SSI given per protocol. Reiterated and provided education on needing insulin for carb coverage for snacks; pt is agreeable to this plan. Pain is well controlled with scheduled regimen; no PRNs given. Denies nausea. L foot wound dressing changed. R foot wound dressing intact. WOC orders are posted in pt room. Adequate urine output. BM x1. Lactic was 1.4. Continue zosyn and vanco. Will continue to monitor closely and notify the team of any changes.       "

## 2018-02-21 NOTE — PROGRESS NOTES
[facility] ID SERVICE: ONGOING CONSULTATION   Alessandra Pak : 1965 Sex: female:   Medical record number [id number] Attending Physician: Steven Morse MD  Date of Service: 2018    PROBLEM LIST:   1) R foot ulcer with large plantar fluid collection - s/p bedside I&D 18  2) Hypoxia, CT findings concerning for pneumonia  3) Secondary diabetes s/p Whipple surgery with hyperglycemia  4) Hx of NICM, HTN, COPD, chronic pancreatitis, depression    RECOMMENDATIONS:   1) Continue to clinically monitor fever curve and leukocytosis now that post bedside I&D today  2) Follow-up results of final tissue culture - currently growing S. Aureus and CoNS thus far  3) Continue Vancomycin and Piperacillin-Tazobactam pending final cultures and clinical improvement    DISCUSSION:   Ms. Pak is a 51 yo woman with a history of NICM, HTN, COPD, GERD, depression, chronic pancreatitis s/p pylorus sparing Whipple with secondary diabetes who was admitted on 2018 with hypoxia and fever. Imaging was negative for PE, but suggestive of pneumonia. However, on exam patient denies cough or ongoing dyspnea and is now back on room air suggesting she may not have actually had a pneumonia.    The more pressing issue is her ongoing fever and leukocytosis in the setting of her foot infection. MRI was obtained and showed a large fluid collection about her second MTP joint which is almost 6cm AP on the plantar aspect. The concern was that her clinical picture represented insufficient source control, she had a repeat bedside I&D done today with dimitri purulence. Ortho feels repeat I&D will not likely be necessary. Would continue Vancomycin and Piperacillin-Tazobactam pending her final tissue cultures and clinical improvement.    Above discussed with Infectious Diseases Staff, Dr. Hoda Madison.  ID will continue to follow.      Esperanza Gilman D.O.  [facility] ID Fellow  [phone]      Attestation:  I have  "reviewed today's vital signs, medications, labs and imaging.  Floor time: 25 minutes, Face-to-face time: 10 minutes, Total time: 35 minutes    Alessandra Pak was seen in the hospital by Hoda Madison on 02/21/2018, with the fellow, Dr. Esperanza Gilman MD. [facility] Infectious Diseases Fellow. I reviewed the history & exam. Assessment and plan were jointly made.  I agree with and have edited the fellow's note and plan of care.      Hoda Madison MD.  ID Staff  [id number]         CHIEF INFECTIOUS DISEASES COMPLAINT:  Diabetic foot infection with ongoing fevers    INTERVAL HISTORY:   Patient underwent bedside I&D by Ortho today with evacuation of large amounts of purulence. Patient remains afebrile. Denies cough or shortness of breath. No progression of cellulitis up the leg.    ROS: A five-point review of systems was obtained and was negative with the exception of that which is described above.    Allergies   Allergen Reactions     No Known Drug Allergies    Allergies were reviewed.    No current outpatient prescriptions on file.     CURRENT ANTI-INFECTIVES:   Vancomycin  Piperacillin-Tazobactam     EXAMINATION:   /82  Pulse 111  Temp 99.8  F (37.7  C) (Oral)  Resp 16  Ht 1.753 m (5' 9\")  Wt 63.4 kg (139 lb 12.8 oz)  SpO2 94%  BMI 20.64 kg/m2  GENERAL:  Well-developed, well-nourished, laying in bed in no acute distress.   EYES:  Eyes have anicteric sclerae without conjunctival injection.  ENT:  Oropharynx is slightly dry without exudates or ulcers.  NECK:  Supple. No cervical lymphadenopathy.  LUNGS:  Normal respiratory effort. Faint bibasilar crackles.  CARDIOVASCULAR:  Tachycardic, ?S3, 3/6 systolic ejection murmur  ABDOMEN:  Normal bowel sounds, soft, nontender. No appreciable hepatosplenomegaly.  SKIN:  R foot with ~5cm area of fluctuance on plantar aspect with tracking between the first and second toes, ~1-2cm debrided area over the plantar medial 1st metatarsal head with clean " appearing base. Cellulitis apparent over R dorsal foot.   NEUROLOGIC:  Grossly nonfocal. Active x4 extremities.  PSYCH: Appropriate affect. Alert and oriented to person, place and time.  CURRENT LINES: Right arm PIV - infiltrated (RN informed)    NEW DATA/RESULTS:   All interval basic labs, microbiology results and imaging were reviewed.    Culture Micro   Date Value Ref Range Status   02/19/2018 No growth after 2 days  Preliminary   02/19/2018 No growth after 2 days  Preliminary   02/19/2018   Final    Canceled, Test credited  Incorrectly ordered by PCU/Clinic  Test reordered as correct code  Tissue culture     02/19/2018 Culture negative monitoring continues  Preliminary   02/19/2018 (A)  Preliminary    Light growth  Staphylococcus aureus  Susceptibility testing in progress     02/19/2018 (A)  Preliminary    Light growth  Coagulase negative Staphylococcus  Susceptibility testing not routinely done  This organism is part of normal jim, but on occasion, may be a true pathogen. Clinical   correlation must be applied to interpreting this microbiology result.         Recent Labs   Lab Test  02/20/18   0633  02/18/18   1321  02/17/18   0643  02/16/18   1528  02/10/16   1229  08/10/15   1003   CRP  260.0*  290.0*  280.0*  290.0*  19.0*  7.8     Recent Labs   Lab Test  02/20/18   0633  02/18/18   1321  02/17/18   0643  02/16/18   1528  02/05/18   1234  02/10/16   1229   WBC  18.1*  19.0*  19.8*  22.3*  10.6  9.2     Recent Labs   Lab Test  02/20/18   0633  02/18/18   1321  02/17/18   0643  02/16/18   2145   CR  1.09*  1.19*  1.23*  1.22*  1.25*   GFRESTIMATED  53*  48*  46*  47*  45*       Hematology Studies  Recent Labs   Lab Test  02/20/18   0633  02/18/18   1321  02/17/18   0643  02/16/18   1528  02/05/18   1234  02/10/16   1229   02/15/15   0544   02/13/15   1244   10/30/14   0445   10/28/14   2215   WBC  18.1*  19.0*  19.8*  22.3*  10.6  9.2   < >  11.1*   < >  13.3*   < >  8.0   < >  11.0   ANEU   --    --    --    19.6*   --   4.5   --   8.1   --   9.8*   --   5.5   --   8.7*   AEOS   --    --    --   0.0   --   0.4   --   0.3   --   0.3   --   0.5   --   0.1   HCT  30.7*  32.1*  31.5*  35.6  38.8  37.2   < >  30.3*   < >  32.7*   < >  30.7*   < >  28.1*   PLT  295  261  259  288  288  201   < >  184   < >  150   < >  235   < >  219    < > = values in this interval not displayed.       Metabolic  Recent Labs   Lab Test  02/20/18   0633  02/18/18   1321  02/17/18   0643   NA  135  134  135   BUN  16  22  23   CO2  26  26  26   CR  1.09*  1.19*  1.23*  1.22*   GFRESTIMATED  53*  48*  46*  47*       Hepatic Studies  Recent Labs   Lab Test  02/16/18   1528  06/21/17   1528  02/10/16   1229   BILITOTAL  0.6  0.3  0.2   ALKPHOS  212*  252*  217*   ALBUMIN  2.8*  3.1*  3.2*   AST  22  25  22   ALT  14  38  36       Immunologlobulins  Recent Labs   Lab Test  02/21/15   0652  02/15/15   1520  02/15/15   0240   IGG  1330   --    --    IGE   --   46   --    SED   --    --   132*       IMAGING RESULTS     Bilateral LE Duplex 2/16/2018  No evidence of deep venous thrombosis in either lower extremity.     NM V/Q Scan 2/17/2018  Impression: Pulmonary emboli is not present.     CT Chest w/o Contrast 2/17/2018  1. New groundglass opacities in the upper lobes bilaterally in a peribronchovascular distribution. Findings are concerning for pneumonia.  2. Upper lobe predominant centrilobular and paraseptal emphysema.  3. Sequelae of chronic pancreatitis, including parenchymal calcifications.  4. Unchanged pneumobilia.      MRI Right Foot 2/18/2018  1. Ulcer defect on the plantar aspect of the foot which appears to communicate with a large fluid collection surrounding the second MTP joint and extending distally to the level of the distal aspect of the proximal phalanx and extending proximally to the proximal metatarsal diaphyseal region. It is difficult to sort out the joint capsule from pericapsular fluid and there could be significant  distention of the joint capsule or extensive surrounding fluid with lobular margins maintained by surrounding soft tissues. Infected contents of this fluid collection and this could be relatively easily sampled with ultrasound guidance either from a dorsal or plantar approach.  2. Small joint effusions about the third through fifth MTP joints and more moderate joint effusion about the first MTP joint. Relatively unusual lack of enhancement about the synovium may be related to poor synovial vascularity but is nonspecific.  3. Cellulitis about the central compartment of the foot without other associated fluid collections. Edema/cellulitis about the dorsal soft tissues, medially from the dorsum of the first metatarsal and extending nearly to the plantar margin of the medial forefoot and laterally extending from the intertarsal region of the second and third metatarsals to the lateral margin of the foot almost to the plantar surface.  4. Fluid surrounding the extensor digitorum and flexor digitorum tendons, possibly reactive and/or tenosynovitis. The tendons themselves appear normal.  5. No MR evidence of osteomyelitis although this cannot be completely excluded about the second metatarsal head and MTP joint region. Questionable mild periosteal reaction about the plantar aspect of the proximal phalanx of the second toe, proximally at the joint margin on the comparison radiograph.  6. The small dorsal fracture off the base of the middle phalanx of the fourth toe is poorly visualized.

## 2018-02-21 NOTE — PROCEDURES
Ortho procedure note    R foot was steriley prepped and draped at bedside.  Inter-metatarsal block and local cutaneous block using 15ml plain lidocaine performed.  Skin re-prepped.    The existing chronic plantar ulcer had a small area of punctate purulent drainage.  This opening was exploited and spread using scissors and irrigated w saline using a syringe and IV tubing.  Small incisions w an 11-blade were made both dorsally and plantarly overlying the areas of fluctuance, underlying tissues were spread and debrided using mosquito clamps.  These areas were irrigated using same technique, with evacuation of large amounts of purulence.  By procedure's end, saline introduced through any of the 3 skin portals would produce saline exiting all other portals, indicating that this was a single continuous abscess and that it was fully decompressed.    In total, this was an excisional debridement of skin, dermins, subcutaneous tissue, fascia, and muscle.  Scalpel, IV tubing/syringe, curettes, and scissors were used.  The excised tissue was necrotic and infected.    Gauze dionisio x3 were placed into each of the skin openings.  Dressed using adaptic, gauze, kerlix, and ACE.      Patient tolerated well.    Would not anticipate the need for any further debridement in the OR or at bedside.    Plan to pull dionisio prior to discharge.    Marco Antonio Rios MD  PGY-4 Orthopaedic Surgery  [phone]

## 2018-02-21 NOTE — PLAN OF CARE
Problem: Patient Care Overview  Goal: Plan of Care/Patient Progress Review  Outcome: No Change  Neuro: A&Ox4.   Cardiac: VSS.   Respiratory: Sating 94% on RA.  GI/: Void not saving  Diet/appetite: Poor PO- didn't ordered any meals. No carb cover given.   Activity:  Up independent in room. Slept between cares today.   Pain: At acceptable level on current regimen. Oxycodone x 1. One time IV dilaudid for I&D of Right foot at bedside.   Skin: Bilateral foot wounds. Right for debrided today- dressing new with moderate amount of bloody drainage. Left foot dressing due to be changed this evening.   LDA's:  New Left PIV.     Plan: Continue with POC. Notify primary team with changes.

## 2018-02-21 NOTE — PHARMACY-VANCOMYCIN DOSING SERVICE
Pharmacy Vancomycin Note  Date of Service 2018  Patient's  1965   52 year old, female    Indication: diabetic foot infection    tissue culture = staphylococcus aureus (sensitivities are pending)    Goal Trough Level: 15-20 mg/L  Day of Therapy: 6  Current Vancomycin regimen:  1250 mg IV q24h    Current estimated CrCl = Estimated Creatinine Clearance: 61.8 mL/min (based on Cr of 1.09).    Creatinine for last 3 days  2018:  1:21 PM Creatinine 1.19 mg/dL;  1:21 PM Creatinine 1.23 mg/dL  2018:  6:33 AM Creatinine 1.09 mg/dL    Recent Vancomycin Levels (past 3 days)  2018:  8:41 PM Vancomycin Level 9.8 mg/L    Vancomycin IV Administrations (past 72 hours)                   vancomycin (VANCOCIN) 1,250 mg in sodium chloride 0.9 % 250 mL intermittent infusion (mg) 1,250 mg New Bag 18 2227     1,250 mg New Bag 18 2215                Nephrotoxins and other renal medications (Future)    Start     Dose/Rate Route Frequency Ordered Stop    18 1100  vancomycin (VANCOCIN) 1000 mg in dextrose 5% 200 mL PREMIX      1,000 mg  200 mL/hr over 1 Hours Intravenous EVERY 12 HOURS 18 1033      18 1015  lisinopril (PRINIVIL/ZESTRIL) tablet 10 mg      10 mg Oral DAILY 18 1013      18 1015  furosemide (LASIX) tablet 20 mg      20 mg Oral DAILY 18 1013      18 2300  piperacillin-tazobactam (ZOSYN) 3.375g in 15 mL NS Premix Syringe      3.375 g  over 3 Minutes Intravenous EVERY 6 HOURS 18 1912               Contrast Orders - past 72 hours (72h ago through future)    Start     Dose/Rate Route Frequency Ordered Stop    18 1615  gadobutrol (GADAVIST) injection 6.31 mL      0.1 mL/kg × 63.1 kg Intravenous ONCE 18 1614 18 1631          Interpretation of levels and current regimen:  Trough level is below goal range and represents an approximate 22.5 hr trough level    Has serum creatinine changed > 50% in last 72 hours: No  Urine  output:  good urine output  Renal Function: Improving (Scr down from admission: 1.50 --> 1.09)    Plan:  1.  Increase Dose to 1000 mg IV Q12H  2.  Pharmacy will check trough levels as appropriate in 1-3 Days.    3. Serum creatinine levels will be ordered daily for the first week of therapy and at least twice weekly for subsequent weeks.      Jesica Alan, PharmD, BCPS  Pager [phone]        .

## 2018-02-21 NOTE — PROGRESS NOTES
Diabetes Consult Daily  Progress Note          Assessment/Plan:   Alessandra Pak is a 52 year old year old female with secondary DM s/p Whipple in 2001, complicated by nephropathy and peripheral neuropathy, on insulin pump for >1 year, presents with sepsis, likely related to RLE infection.     Post prandial high BGs, particularly after aspart total omission.  Good fasting glucsoe control.    -glargine: decreased from 7 to 6 units q24h, due to NPO status  -increase aspart 1 per 10 grams carb with meals and ALL snacks  -change aspart correction to [facility],  since no longer NPO      Outpatient diabetes follow up: 3/2/18 w/ staci Nuno  Plan discussed with patient, bedside RN, and paged to primary team.           Interval History:   The last 24 hours progress and nursing notes reviewed.  BG up to 300s last night after pt had hot chocolate from coffee shop without any aspart.  BG 100s overnight fasting.  Pt still really wants to leave.  Says she'll leave by Friday. Made NPO for repeat I&D-- already completed and diet restart.  She prefers to remain on glargine at this time.  Feeling too stressed for pump.    Historical:  Pt has symptoms low BG when gets less than 70      Recent Labs  Lab 02/21/18  1135 02/21/18  0751 02/21/18  0138 02/20/18  2159 02/20/18  1858 02/20/18  1739  02/20/18  0633  02/18/18  1321  02/17/18  0643  02/16/18  2145  02/16/18  1528   GLC  --   --   --   --   --   --   --  116*  --  275*  --  116*  --  340*  --  454*   * 120* 135* 286* 328* 296*  < >  --   < >  --   < >  --   < >  --   < >  --    < > = values in this interval not displayed.            Review of Systems:   See interval hx          Medications:       Active Diet Order      Diet      Moderate Consistent CHO Diet     Physical Exam:  Gen:  Sitting up at edge of bed, tearful  HEENT: NC/AT, mucous membranes are moist  Resp: Unlabored  Ext:  R foot bandaged    /82  Pulse 111  Temp 99.8  F (37.7  C)  "(Oral)  Resp 16  Ht 1.753 m (5' 9\")  Wt 63.4 kg (139 lb 12.8 oz)  SpO2 94%  BMI 20.64 kg/m2           Data:     Lab Results   Component Value Date    A1C 12.8 02/16/2018    A1C 11.8 06/21/2017    A1C 11.5 11/02/2016    A1C >15.0  Results confirmed by repeat test   07/25/2016    A1C 11.8 10/05/2015            Recent Labs   Lab Test  02/20/18   0633  02/18/18   1321   NA  135  134   POTASSIUM  3.7  4.0   CHLORIDE  102  102   CO2  26  26   ANIONGAP  6  7   GLC  116*  275*   BUN  16  22   CR  1.09*  1.19*  1.23*   RICK  8.7  8.4*     CBC RESULTS:   Recent Labs   Lab Test  02/20/18   0633   WBC  18.1*   RBC  3.65*   HGB  9.8*   HCT  30.7*   MCV  84   MCH  26.8   MCHC  31.9   RDW  14.0   PLT  295       Esperanza Link APRN [facility] [phone]    Diabetes Management job code [id number]          "

## 2018-02-21 NOTE — CONSULTS
[facility] ID SERVICE: NEW CONSULTATION   Alessandra Pak : 1965 Sex: female:   Medical record number [id number]  Date of Admission: 2018  Consult Requester:Steven Morse MD  Date of Service: 2018      REASON FOR CONSULTATION:   Diabetic foot infection with ongoing fevers    PROBLEM LIST:  1) R foot ulcer with large plantar fluid collection  2) Fever with persistent leukocytosis - presumably secondary to undrained fluid collection of R foot  3) Hypoxia, CT findings concerning for pneumonia  4) Secondary diabetes s/p Whipple surgery with hyperglycemia  5) Hx of NICM, HTN, COPD, chronic pancreatitis, depression    RECOMMENDATIONS:   1) Would discuss with Orthopedics regarding repeat I&D given persistent fever, leukocytosis and plantar fluctuance on exam (corresponding to MRI findings of ~6cm fluid collection)  2) Follow-up results of tissue culture growing GPC  3) Resume Vancomycin   4) Continue Piperacillin-Tazobactam    DISCUSSION:   Ms. Pak is a 53yo woman with a history of NICM, HTN, COPD, GERD, depression, chronic pancreatitis s/p pylorus sparing Whipple with secondary diabetes who was admitted on 2018 with hypoxia and fever. Imaging was negative for PE, but suggestive of pneumonia. However, on exam patient denies cough or ongoing dyspnea and is now back on room air suggesting she may not have actually had a pneumonia.The more pressing issue is her ongoing fever and leukocytosis in the setting of her foot infection. MRI was obtained and showed a large fluid collection about her second MTP joint which is almost 6cm AP on the plantar aspect. Concerned that her clinical picture represents insufficient source control and patient needs to be reevaluated for possible I&D. Until tissue cultures finalize and patient clinically improves, would continue Vancomycin and Piperacillin-Tazobactam.     Patient seen and discussed with Infectious Diseases Staff, Dr. Hoda Madison.  ID  will continue to follow.    Esperanza Gilman D.O.  [facility] ID Fellow  [phone]    Attestation:  I have reviewed today's vital signs, medications, labs and imaging.  Floor time: 25 minutes, Face-to-face time: 10 minutes, Total time: 35 minutes    Alessandra Pak was seen in the hospital by Hoda Madison on 02/20/2018, with the fellow, Dr. Esperanza Gilman MD. [facility] Infectious Diseases Fellow. I reviewed the history & exam. Assessment and plan were jointly made.  I agree with and have edited the fellow's note and plan of care.      Hoda Madison MD.  ID Staff  [id number]      HPI: Ms. Pak is a 53yo woman with a history of NICM, HTN, COPD, GERD, chronic pancreatitis s/p pylorus sparing Whipple, chronic Methadone use, depression and secondary diabetes who was admitted on 2/16/2018 from Podiatry clinic with hypoxia and fever.   Per patient, she noted a callus on the bottom of her foot approximately 6 weeks ago which developed into a small ulcer. She was trying to heal up the area on her own as she has done in the past, but it continued to progress. Denies malaise or nausea. Has had hot flushes for the past 2 years due to menopause, and is unable to say if she is having new or worsening fevers. She has had chills for the past 6 weeks.   On presentation, she was febrile to 102.2 and hypoxic down to 80%. A V/Q scan was negative for pulmonary embolism. CT chest showed ground glass opacities in the bilateral upper lobes concerning for pneumonia, but patient denies any dyspnea or cough and is now back on room air. Given ongoing CRP elevation an MRI of the foot was obtained and showed a large fluid collection surrounding the second MTP joint. Orthopedics was consulted and performed a bedside I&D on 2/19/18 of nonviable tissue. More extensive debridement was felt likely needed in the future, but deferred in light of poorly controlled diabetes. Infectious Diseases is consulted as patient  continues to spike fevers.    Currently she is very anxious about being in the hospital, and is having significant R foot pain.     ANTI-INFECTIVES:   Vancomycin  Piperacillin-Tazobactam    ROS:  A ten point review of systems was obtained and was negative with the exception of that which is described in the HPI.    PMH:   Past Medical History:   Diagnosis Date     Abdominal pain, right upper quadrant     sees Dr Mcclellan pain clinic at [facility] [place]     ASCUS with positive high risk HPV 8/2013    + HPV 33, [place] - GAVIN I, ECC- atypia     Cardiomyopathy (H)     non ischemic cardiomyopathy with EF 15     Cervical high risk HPV (human papillomavirus) test positive 7/8/15, 7/25/16    NIL pap/+ HR HPV (not 16 or 18).      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    leep     Depressive disorder      Gastro-oesophageal reflux disease      Human papillomavirus in conditions classified elsewhere and of unspecified site 2/2012    + HPV 33     Hypertension      Profound impairment, one eye, impairment level not further specified     rt eye due to childhood injury     Systolic CHF (H) 3/12/2015     Tobacco abuse 5/18/2013     Type 2 diabetes mellitus without complications (H)      Uncomplicated asthma      Past Surgical History:   Procedure Laterality Date     C NONSPECIFIC PROCEDURE  2001    cholecystectomy     C NONSPECIFIC PROCEDURE  as a child    tonsillectomy     C NONSPECIFIC PROCEDURE  2001    whipple procedure     CARDIAC SURGERY      defib     COLPOSCOPY,LOOP ELECTRD CERVIX EXCIS  2002, 2011    stage 2 dysplasia     ENDOBRONCHIAL ULTRASOUND FLEXIBLE N/A 2/19/2015    Procedure: ENDOBRONCHIAL ULTRASOUND FLEXIBLE;  Surgeon: Brenden Tamez MD;  Location: [facility]     LEEP TX, CERVICAL  2014    LEEP TX Cervical     RECESSION RESECTION WITH ADJUSTABLE SUTURE  12/13/2011    Procedure:RECESSION RESECTION WITH ADJUSTABLE SUTURE; Right Strabismus Repair with Adjustable Suture       TUBAL LIGATION  2007    essure        MEDICATIONS: Reviewed  "in chart. No known drug allergies.    SOCIAL HISTORY AND RISK FACTORS   Social History   Substance Use Topics     Smoking status: Former Smoker     Packs/day: 0.30     Years: 15.00     Types: Cigarettes     Smokeless tobacco: Former User     Quit date: 10/29/2014      Comment: Started smoking in 89/ smokes about 3 per day     Alcohol use No     History   Sexual Activity     Sexual activity: Not Currently     Partners: Male     Birth control/ protection: IUD     Comment: tubes tide       FAMILY HISTORY:   Reviewed and non-contributory  Family History   Problem Relation Age of Onset     DIABETES Mother      diet controled     Hypertension Mother      Arthritis Mother      Lipids Mother      DIABETES Father      Hypertension Father      GASTROINTESTINAL DISEASE Father      gallbladder removed     Bipolar Disorder Brother      Thyroid Disease Brother      Obesity Other      Son     Respiratory Other      Son and Daughter; asthma     Depression Maternal Aunt      Anxiety Disorder Maternal Aunt        EXAMINATION:  /78 (BP Location: Left arm)  Pulse 106  Temp 98.3  F (36.8  C) (Oral)  Resp 20  Ht 1.753 m (5' 9\")  Wt 63.4 kg (139 lb 12.8 oz)  SpO2 98%  BMI 20.64 kg/m2  GENERAL:  Well-developed, well-nourished, laying in bed in no acute distress.   EYES:  Eyes have anicteric sclerae without conjunctival injection.  ENT:  Oropharynx is slightly dry without exudates or ulcers.  NECK:  Supple. No cervical lymphadenopathy.  LUNGS:  Normal respiratory effort. Faint bibasilar crackles.  CARDIOVASCULAR:  Tachycardic, ?S3, 3/6 systolic ejection murmur  ABDOMEN:  Normal bowel sounds, soft, nontender. No appreciable hepatosplenomegaly.  SKIN:  R foot with ~5cm area of fluctuance on plantar aspect with tracking between the first and second toes, ~1-2cm debrided area over the plantar medial 1st metatarsal head with clean appearing base. Cellulitis apparent over R dorsal foot. Line(s) are in place without any surrounding " erythema or exudate. No stigmata of endocarditis.  NEUROLOGIC:  Grossly nonfocal. Active x4 extremities.  PSYCH: Appropriate affect. Alert and oriented to person, place and time.  CURRENT LINES: Right arm PIV    RELEVANT DATA:     BASIC LABS   The following basic labs were personally reviewed:    Inflammatory Markers    Recent Labs   Lab Test  02/20/18   0633  02/18/18   1321  02/17/18   0643  02/16/18   1528  02/10/16   1229  08/10/15   1003  02/15/15   0240  02/15/15   0233   SED   --    --    --    --    --    --   132*   --    CRP  260.0*  290.0*  280.0*  290.0*  19.0*  7.8   --   170.0*       Hematology Studies    Recent Labs   Lab Test  02/20/18   0633  02/18/18   1321  02/17/18   0643  02/16/18   1528  02/05/18   1234  06/21/17   1528  02/10/16   1229   02/15/15   0544   02/13/15   1244   10/30/14   0445   10/28/14   2215   WBC  18.1*  19.0*  19.8*  22.3*  10.6   --   9.2   < >  11.1*   < >  13.3*   < >  8.0   < >  11.0   ANEU   --    --    --   19.6*   --    --   4.5   --   8.1   --   9.8*   --   5.5   --   8.7*   AEOS   --    --    --   0.0   --    --   0.4   --   0.3   --   0.3   --   0.5   --   0.1   HGB  9.8*  10.5*  10.3*  11.9  12.6  13.9  12.8   < >  10.1*   < >  11.0*   < >  9.8*   < >  9.4*   MCV  84  86  85  85  87   --   80   < >  83   < >  81   < >  84   < >  81   PLT  295  261  259  288  288   --   201   < >  184   < >  150   < >  235   < >  219    < > = values in this interval not displayed.       Immune Globulin Studies    Recent Labs   Lab Test  02/21/15   0652  02/15/15   1520   IGG  1330   --    IGE   --   46       Metabolic Studies     Recent Labs   Lab Test  02/20/18   0633  02/18/18   1321  02/17/18   0643  02/16/18   2145  02/16/18   1528   NA  135  134  135  130*  128*   POTASSIUM  3.7  4.0  3.9  4.0  4.4   CHLORIDE  102  102  101  93*  92*   CO2  26  26  26  28  27   BUN  16  22  23  27  30   CR  1.09*  1.19*  1.23*  1.22*  1.25*  1.50*   GFRESTIMATED  53*  48*  46*  47*  45*  37*        Hepatic Studies    Recent Labs   Lab Test  02/16/18   1528  06/21/17   1528  02/10/16   1229  10/27/15   1106  06/24/15   1443  02/19/15   0653   BILITOTAL  0.6  0.3  0.2  0.2  0.3  0.5   ALKPHOS  212*  252*  217*  230*  186*  170*   ALBUMIN  2.8*  3.1*  3.2*  3.3*  2.8*  1.9*   AST  22  25  22  38  38  29   ALT  14  38  36  42  34  32       Thyroid Studies    Recent Labs   Lab Test  01/13/17   1353  11/02/16   1359   03/28/10   0806   TSH  0.32*  0.34*   < >  0.33*   T4  1.06  1.02   < >  0.88   T3   --    --    --   107    < > = values in this interval not displayed.       MICROBIOLOGY LABS  The following microbiology studies were personally reviewed:  Culture Micro   Date Value Ref Range Status   02/19/2018 No growth after 14 hours  Preliminary   02/19/2018 No growth after 14 hours  Preliminary   02/19/2018   Final    Canceled, Test credited  Incorrectly ordered by PCU/Clinic  Test reordered as correct code  Tissue culture     02/19/2018 Culture negative monitoring continues  Preliminary   02/19/2018 Light growth  Gram positive cocci   (A)  Preliminary   02/19/2018 Culture in progress  Preliminary   02/18/2018 No growth after 2 days  Preliminary   02/18/2018 No growth after 2 days  Preliminary   02/16/2018   Final    <10,000 colonies/mL  mixed urogenital jim  Susceptibility testing not routinely done     02/16/2018 No growth after 4 days  Preliminary   02/16/2018 No growth after 4 days  Preliminary   04/06/2016 (A)  Final    Heavy growth Staphylococcus aureus  Heavy growth Beta hemolytic Streptococcus group B This isolate DOES NOT   demonstrate inducible clindamycin resistance in vitro. Clindamycin is   susceptible and could be used when indicated, however, erythromycin is   resistant and should not be used.     06/24/2015   Final    <10,000 colonies/mL mixed urogenital jim Susceptibility testing not routinely   done     02/19/2015   Final    Culture negative for acid fast bacilli  Assayed at [facility] [address]     02/19/2015 (A)  Final    Normal respiratory jim  Light growth Candida albicans / dubliniensis Candida albicans and Candida   dubliniensis are not routinely speciated Susceptibility testing not routinely   done     02/19/2015 (A)  Final    Candida tropicalis isolated  Candida glabrata isolated  No additional fungi cultured after 4 weeks incubation     02/19/2015 No growth after 4 weeks  Final   02/16/2015 (A)  Final    Canceled, Test credited  >10 Squamous epithelial cells/low power field indicates oral contamination.   Please recollect.  Called to [facility] on 4E, 2/16/15 14:10 CWi     02/15/2015 No growth  Final   02/15/2015 No growth  Final   02/13/2015 No growth  Final   02/13/2015 No growth  Final   10/31/2014 No growth  Final   10/29/2014   Final    Culture negative for acid fast bacilli  Assayed at [email or web address] [address]     10/29/2014 No growth  Final   10/29/2014 Culture negative after 4 weeks  Final   10/29/2014 No growth after 4 weeks  Final   10/29/2014 (A)  Final    Light growth Staphylococcus aureus  Light growth Cristela albicans / dubliniensis Candida albicans and Candida   dubliniensis are not routinely speciated     10/28/2014 No growth  Final   10/28/2014 No growth  Final   11/23/2009   Final    <10,000 colonies/mL Beta hemolytic Streptococcus group B     Comment:     10 to 50,000 colonies/mL Mixed gram positive jim Multiple species present,   probable perineal contamination.  Clindamycin and Erythromycin are not routinely prescribed for isolates from   the urinary tract.   03/04/2005   Final    <10,000 colonies/mL Staphylococcus species Susceptibility testing not routinely     Comment:      done       Urine Studies    Recent Labs   Lab Test  02/16/18   2036  06/24/15   1612  02/14/15   1545  10/31/14   1335  10/28/14   2300   LEUKEST  Large*  Moderate*  Duplicate request  SEE ACCN [id number]  *  Trace*  Negative  Negative   WBCU  60*   2  0  5*  <1       Vancomycin Levels    Recent Labs   Lab Test  02/17/18   0643  02/19/15   1409  02/15/15   1100   VANCOMYCIN  12.0  20.6  16.7       Virology:  CMV viral loads    Recent Labs   Lab Test  02/19/15   1140  10/29/14   1500   CSPEC  Bronchoalveolar Lavage   Right   Middle  LOBE    Bronchoalveolar Lavage   CMQNT   --   <100     CMV Quantitative   Date Value Ref Range Status   10/29/2014 <100 <100 Copies/mL Final       Hepatitis B Testing   Recent Labs   Lab Test  10/30/14   0445  08/06/13   1352   HBCAB  Negative   --    HEPBANG   --   Negative     Hepatitis C Testing     Hepatitis C Antibody   Date Value Ref Range Status   10/30/2014 Negative NEG Final   08/06/2013 Negative NEG Final       IMAGING RESULTS    Bilateral LE Duplex 2/16/2018  No evidence of deep venous thrombosis in either lower extremity.    NM V/Q Scan 2/17/2018  Impression: Pulmonary emboli is not present.    CT Chest w/o Contrast 2/17/2018  1. New groundglass opacities in the upper lobes bilaterally in a peribronchovascular distribution. Findings are concerning for pneumonia.  2. Upper lobe predominant centrilobular and paraseptal emphysema.  3. Sequelae of chronic pancreatitis, including parenchymal calcifications.  4. Unchanged pneumobilia.     MRI Right Foot 2/18/2018  1. Ulcer defect on the plantar aspect of the foot which appears to communicate with a large fluid collection surrounding the second MTP joint and extending distally to the level of the distal aspect of the proximal phalanx and extending proximally to the proximal metatarsal diaphyseal region. It is difficult to sort out the joint capsule from pericapsular fluid and there could be significant distention of the joint capsule or extensive surrounding fluid with lobular margins maintained by surrounding soft tissues. Infected contents of this fluid collection and this could be relatively easily sampled with ultrasound guidance either from a dorsal or plantar  approach.  2. Small joint effusions about the third through fifth MTP joints and more moderate joint effusion about the first MTP joint. Relatively unusual lack of enhancement about the synovium may be related to poor synovial vascularity but is nonspecific.  3. Cellulitis about the central compartment of the foot without other associated fluid collections. Edema/cellulitis about the dorsal soft tissues, medially from the dorsum of the first metatarsal and extending nearly to the plantar margin of the medial forefoot and laterally extending from the intertarsal region of the second and third metatarsals to the lateral margin of the foot almost to the plantar surface.  4. Fluid surrounding the extensor digitorum and flexor digitorum tendons, possibly reactive and/or tenosynovitis. The tendons themselves appear normal.  5. No MR evidence of osteomyelitis although this cannot be completely excluded about the second metatarsal head and MTP joint region. Questionable mild periosteal reaction about the plantar aspect of the proximal phalanx of the second toe, proximally at the joint margin on the comparison radiograph.  6. The small dorsal fracture off the base of the middle phalanx of the fourth toe is poorly visualized.

## 2018-02-21 NOTE — PROGRESS NOTES
[facility], [place]    Internal Medicine Progress Note - Gold Service      Assessment & Plan   Alessandra Pak is a 52 year old female with history of NICM, HTN, COPD, GERD, chronic pancreatitis s/p pylorus sparing Whipple (2001) w/ secondary DM and diabetic foot ulcers, depression, and chronic methadone use who was admitted 2/16 with sepsis 2/2 RLE cellulitis, as well as acute hypoxic respiratory failure.     Sepsis, RLE diabetic foot ulcer and cellulitis, possible CAP: Admitted from clinic 2/16 with temp 102.2. RLE concerning for cellulitis. Chest CT 2/17 ground glass opacities in bilat upper lobes. MRI 2/18 no definitive osteomyelitis, fluid collection around 2nd metatarsal head. Treated with Vanc and Zosyn 2/16 to present. S/p bedside I&D per ortho on 2/19 and 2/21. Culture from 2/19 +for staph aureus. BCx NGTD. WBC 18.1 (19),  (290) 2/20. Fever curve improving. Intermittent O2 use, but on 2L. Tachycardia as below.   - ID consulted, suggest repeat I&D today bedside vs OR. Staff d/w ortho/podiatry staff today  - Podiatry/ortho and WOCN following  - Continue Zosyn and Vanc  - Follow cultures     Secondary DM: S/p Shamaripple 2001. C/b neuropathy and nephropathy. Poorly controlled, A1c 12.8%. Has pump supplies here. Currently on Lantus 7 units, Novolog 1 unit per 12 grams carbs, MSSI. Glucose stable  - Endocrine following  - Diabetes education consulted     Acute on chronic pain: Chronic abdominal pain, PTA on methadone 70 mg HS. QTc 476. Increased RLE pain with worsening edema, manipulation by podiatry bedside 2/19 which is now improving  - Continue PTA methadone  - Decrease oxycodone to q6h prn given procedures are complete      NICM, HTN: Echo with EF 50-55% 2/2016. Previously low to 15%. Has ICD in place, last interrogated 12/5/17 w/ 2 episodes NSVT. PTA on lasix and lisinopril. BPs stable  - Daily weights  - Continue PTA lisinopril and lasix with hold parameters      Depression,  worsening anxiety: PTA on amitriptyline. Worsening anxiety regarding acute illness, family social issues. Psych consulted 2/19, no changes made.   - Essential oils, mandalas    Tachycardia: HR 100s. In the setting of acute pain, increased anxiety, and infection. Patient feels most likely correlated to anxiety. EKG without acute changes      Resolved Hospital Problems and Stable, Chronic Medical Conditions:   Acute hypoxic respiratory failure: Presented with O2 sats in the 70s on room air. Improved to 90s on 3L O2. CXR with atelectasis, repeat CT with concern for PNA as above. No PTA supplemental O2  COPD: No longer smoking. PTA on Combivent bid. Changed to DuoNeb QID here for now but patient preferred PRN   CKD III: Cr BL 0.9-1.2, last check at [facility]       Consulting Services: Endocrinology, podiatry/ortho, ID, diabetes educator       Pain assessment: Pain increased 2/19, now stable  Current Pain Score 2/19/2018 2/19/2018 2/18/2018   Patient currently in pain? denies denies yes   Pain score (0-10) - - -   Pain location - - Foot   Pain descriptors - - Aching   CPOT pain score - - -   - Alessandra is experiencing pain due to the above. Pain management was discussed and the plan was created in a collaborative fashion. Alessandra's response to the current recommendations: agreeable     Disposition Plan   Expected discharge: 2 - 3 days, recommended to prior living arrangement once SIRS/Sepsis treated and final antibiotic plan determined      Entered: Karely Caraballo 02/21/2018, 1:12 PM   Information in the above section will display in the discharge planner report.      The patient's care was discussed with the patient, nursing, ID, ortho, endocrine, and attending physician, Dr. Mini Caraballo  Internal Medicine Staff Hospitalist Service  [facility]  Pager: [phone]  Please see sticky note for cross cover information    Interval History   Feeling better today. Pain well controlled. Less  anxious about being here. Not thinking about leaving AMA at this time. No fever or chills. Foot feels hot. No confusion. Denies dyspnea at this time. Appetite stable     Data reviewed today: I reviewed all medications, new labs and imaging results over the last 24 hours. I personally reviewed recent notes, labs, etc    Physical Exam   Vital Signs: Temp: 98.2  F (36.8  C) Temp src: Oral BP: 129/88 Pulse: 107   Resp: 16 SpO2: 95 % O2 Device: Nasal cannula Oxygen Delivery: 2 LPM  Weight: 139 lbs 12.8 oz  General Appearance: Alert and oriented x3, pleasant   Respiratory: CTAB without wheezing or rales  Cardiovascular: Tachycardic, S1, S2. No murmur noted  GI: Abdomen soft, non-tender with active bowel sounds. No guarding or rebound   Skin: Right foot with dorsal cellulitis. Plantar aspect with area of debridement over the 1st metatarsal. Large, 4-6 cm of fluctuance along the 2nd to 4th metatarsals of the plantar aspect of the foot, tracking to the toes. BLE warm to touch with good circulation. No jaundice   Other: Moves all extremities. No focal neuro deficits     Data   No results found for this or any previous visit (from the past 24 hour(s)).

## 2018-02-21 NOTE — PLAN OF CARE
Problem: Patient Care Overview  Goal: Plan of Care/Patient Progress Review  Outcome: Improving  Tmax 99.9*, pulse 109, AOVSS on 2LPM per oxymizer NC. Denies pain and nausea. 0200 BG was 135. Poor appetite on CHO diet. R upper peripheral IV saline locked. No voids or reported BM overnight. Ulcers on bilateral feet UTV, but WOC orders in for wound care. Pt slept soundly throughout the night, and kept nasal cannula on.

## 2018-02-22 ENCOUNTER — APPOINTMENT (OUTPATIENT)
Dept: GENERAL RADIOLOGY | Facility: CLINIC | Age: 53
DRG: 853 | End: 2018-02-22
Attending: PHYSICIAN ASSISTANT
Payer: COMMERCIAL

## 2018-02-22 ENCOUNTER — TELEPHONE (OUTPATIENT)
Dept: FAMILY MEDICINE | Facility: CLINIC | Age: 53
End: 2018-02-22

## 2018-02-22 LAB
ANION GAP SERPL CALCULATED.3IONS-SCNC: 6 MMOL/L (ref 3–14)
BACTERIA SPEC CULT: NO GROWTH
BACTERIA SPEC CULT: NO GROWTH
BUN SERPL-MCNC: 17 MG/DL (ref 7–30)
CALCIUM SERPL-MCNC: 8.5 MG/DL (ref 8.5–10.1)
CHLORIDE SERPL-SCNC: 105 MMOL/L (ref 94–109)
CO2 SERPL-SCNC: 26 MMOL/L (ref 20–32)
CREAT SERPL-MCNC: 1.25 MG/DL (ref 0.52–1.04)
CRP SERPL-MCNC: 200 MG/L (ref 0–8)
ERYTHROCYTE [DISTWIDTH] IN BLOOD BY AUTOMATED COUNT: 14.4 % (ref 10–15)
GFR SERPL CREATININE-BSD FRML MDRD: 45 ML/MIN/1.7M2
GLUCOSE BLDC GLUCOMTR-MCNC: 160 MG/DL (ref 70–99)
GLUCOSE BLDC GLUCOMTR-MCNC: 170 MG/DL (ref 70–99)
GLUCOSE BLDC GLUCOMTR-MCNC: 227 MG/DL (ref 70–99)
GLUCOSE BLDC GLUCOMTR-MCNC: 267 MG/DL (ref 70–99)
GLUCOSE SERPL-MCNC: 125 MG/DL (ref 70–99)
HCT VFR BLD AUTO: 27.5 % (ref 35–47)
HGB BLD-MCNC: 9.1 G/DL (ref 11.7–15.7)
LACTATE BLD-SCNC: 1.1 MMOL/L (ref 0.4–1.9)
MCH RBC QN AUTO: 27.1 PG (ref 26.5–33)
MCHC RBC AUTO-ENTMCNC: 33.1 G/DL (ref 31.5–36.5)
MCV RBC AUTO: 82 FL (ref 78–100)
PLATELET # BLD AUTO: 382 10E9/L (ref 150–450)
POTASSIUM SERPL-SCNC: 3.8 MMOL/L (ref 3.4–5.3)
RBC # BLD AUTO: 3.36 10E12/L (ref 3.8–5.2)
SODIUM SERPL-SCNC: 138 MMOL/L (ref 133–144)
SPECIMEN SOURCE: NORMAL
SPECIMEN SOURCE: NORMAL
VANCOMYCIN SERPL-MCNC: 24.6 MG/L
WBC # BLD AUTO: 13.8 10E9/L (ref 4–11)

## 2018-02-22 PROCEDURE — 80048 BASIC METABOLIC PNL TOTAL CA: CPT | Performed by: PHYSICIAN ASSISTANT

## 2018-02-22 PROCEDURE — 25000128 H RX IP 250 OP 636: Performed by: PHYSICIAN ASSISTANT

## 2018-02-22 PROCEDURE — 36569 INSJ PICC 5 YR+ W/O IMAGING: CPT

## 2018-02-22 PROCEDURE — 36415 COLL VENOUS BLD VENIPUNCTURE: CPT | Performed by: PHYSICIAN ASSISTANT

## 2018-02-22 PROCEDURE — 80202 ASSAY OF VANCOMYCIN: CPT | Performed by: INTERNAL MEDICINE

## 2018-02-22 PROCEDURE — 00000146 ZZHCL STATISTIC GLUCOSE BY METER IP

## 2018-02-22 PROCEDURE — 86140 C-REACTIVE PROTEIN: CPT | Performed by: PHYSICIAN ASSISTANT

## 2018-02-22 PROCEDURE — 99207 ZZC APP CREDIT; MD BILLING SHARED VISIT: CPT | Performed by: PHYSICIAN ASSISTANT

## 2018-02-22 PROCEDURE — 25000125 ZZHC RX 250: Performed by: PHYSICIAN ASSISTANT

## 2018-02-22 PROCEDURE — 25000128 H RX IP 250 OP 636: Performed by: INTERNAL MEDICINE

## 2018-02-22 PROCEDURE — 83605 ASSAY OF LACTIC ACID: CPT | Performed by: INTERNAL MEDICINE

## 2018-02-22 PROCEDURE — 99233 SBSQ HOSP IP/OBS HIGH 50: CPT | Performed by: INTERNAL MEDICINE

## 2018-02-22 PROCEDURE — 25000132 ZZH RX MED GY IP 250 OP 250 PS 637: Performed by: HOSPITALIST

## 2018-02-22 PROCEDURE — 25000132 ZZH RX MED GY IP 250 OP 250 PS 637: Performed by: PHYSICIAN ASSISTANT

## 2018-02-22 PROCEDURE — 27210209 ZZH KIT VALVED SINGLE LUMEN

## 2018-02-22 PROCEDURE — 36415 COLL VENOUS BLD VENIPUNCTURE: CPT | Performed by: INTERNAL MEDICINE

## 2018-02-22 PROCEDURE — 85027 COMPLETE CBC AUTOMATED: CPT | Performed by: PHYSICIAN ASSISTANT

## 2018-02-22 PROCEDURE — 40000986 XR CHEST 1 VW

## 2018-02-22 PROCEDURE — 36592 COLLECT BLOOD FROM PICC: CPT | Performed by: INTERNAL MEDICINE

## 2018-02-22 PROCEDURE — 12000025 ZZH R&B TRANSPLANT INTERMEDIATE

## 2018-02-22 RX ORDER — FUROSEMIDE 20 MG
20 TABLET ORAL DAILY
Qty: 30 TABLET | Refills: 0 | Status: ON HOLD | OUTPATIENT
Start: 2018-02-23 | End: 2018-07-05

## 2018-02-22 RX ORDER — OXYCODONE HYDROCHLORIDE 5 MG/1
5 TABLET ORAL EVERY 6 HOURS PRN
Qty: 12 TABLET | Refills: 0 | Status: ON HOLD | OUTPATIENT
Start: 2018-02-22 | End: 2018-07-05

## 2018-02-22 RX ORDER — LISINOPRIL 10 MG/1
10 TABLET ORAL DAILY
Qty: 30 TABLET | Refills: 0 | Status: ON HOLD | OUTPATIENT
Start: 2018-02-23 | End: 2018-07-05

## 2018-02-22 RX ORDER — OXYCODONE HYDROCHLORIDE 5 MG/1
5 TABLET ORAL EVERY 6 HOURS PRN
Status: DISCONTINUED | OUTPATIENT
Start: 2018-02-22 | End: 2018-02-23 | Stop reason: HOSPADM

## 2018-02-22 RX ORDER — LIDOCAINE 4 G/G
3 PATCH TOPICAL
Status: DISCONTINUED | OUTPATIENT
Start: 2018-02-22 | End: 2018-02-23 | Stop reason: HOSPADM

## 2018-02-22 RX ORDER — HEPARIN SODIUM,PORCINE 10 UNIT/ML
2-5 VIAL (ML) INTRAVENOUS
Status: DISCONTINUED | OUTPATIENT
Start: 2018-02-22 | End: 2018-02-23 | Stop reason: HOSPADM

## 2018-02-22 RX ORDER — LIDOCAINE 40 MG/G
CREAM TOPICAL
Status: DISCONTINUED | OUTPATIENT
Start: 2018-02-22 | End: 2018-02-23 | Stop reason: HOSPADM

## 2018-02-22 RX ADMIN — ALBUTEROL SULFATE 1 PUFF: 90 AEROSOL, METERED RESPIRATORY (INHALATION) at 20:38

## 2018-02-22 RX ADMIN — ALBUTEROL SULFATE 1 PUFF: 90 AEROSOL, METERED RESPIRATORY (INHALATION) at 15:46

## 2018-02-22 RX ADMIN — LISINOPRIL 10 MG: 10 TABLET ORAL at 09:05

## 2018-02-22 RX ADMIN — ALBUTEROL SULFATE 2 PUFF: 90 AEROSOL, METERED RESPIRATORY (INHALATION) at 11:22

## 2018-02-22 RX ADMIN — METHADONE HYDROCHLORIDE 70 MG: 10 TABLET ORAL at 20:37

## 2018-02-22 RX ADMIN — FUROSEMIDE 20 MG: 20 TABLET ORAL at 09:05

## 2018-02-22 RX ADMIN — ASPIRIN 81 MG CHEWABLE TABLET 81 MG: 81 TABLET CHEWABLE at 09:05

## 2018-02-22 RX ADMIN — OXYCODONE HYDROCHLORIDE 10 MG: 5 TABLET ORAL at 09:04

## 2018-02-22 RX ADMIN — PREGABALIN 75 MG: 50 CAPSULE ORAL at 20:38

## 2018-02-22 RX ADMIN — FLUTICASONE PROPIONATE 2 SPRAY: 50 SPRAY, METERED NASAL at 09:05

## 2018-02-22 RX ADMIN — Medication: at 20:38

## 2018-02-22 RX ADMIN — PREGABALIN 75 MG: 50 CAPSULE ORAL at 13:24

## 2018-02-22 RX ADMIN — RANITIDINE 300 MG: 150 TABLET, FILM COATED ORAL at 09:05

## 2018-02-22 RX ADMIN — AMITRIPTYLINE HYDROCHLORIDE 50 MG: 50 TABLET, FILM COATED ORAL at 21:32

## 2018-02-22 RX ADMIN — PIPERACILLIN SODIUM AND TAZOBACTAM SODIUM 3.38 G: 36; 4.5 INJECTION, POWDER, FOR SOLUTION INTRAVENOUS at 02:09

## 2018-02-22 RX ADMIN — VANCOMYCIN HYDROCHLORIDE 1000 MG: 1 INJECTION, SOLUTION INTRAVENOUS at 11:17

## 2018-02-22 RX ADMIN — ACETAMINOPHEN 325 MG: 325 TABLET, FILM COATED ORAL at 09:13

## 2018-02-22 RX ADMIN — PREGABALIN 75 MG: 50 CAPSULE ORAL at 09:13

## 2018-02-22 RX ADMIN — LIDOCAINE HYDROCHLORIDE 1 ML: 20 INJECTION, SOLUTION INFILTRATION; PERINEURAL at 20:03

## 2018-02-22 RX ADMIN — LIDOCAINE 3 PATCH: 560 PATCH PERCUTANEOUS; TOPICAL; TRANSDERMAL at 11:16

## 2018-02-22 RX ADMIN — ALBUTEROL SULFATE 2 PUFF: 90 AEROSOL, METERED RESPIRATORY (INHALATION) at 09:07

## 2018-02-22 RX ADMIN — OXYCODONE HYDROCHLORIDE 5 MG: 5 TABLET ORAL at 15:45

## 2018-02-22 ASSESSMENT — PAIN DESCRIPTION - DESCRIPTORS: DESCRIPTORS: ACHING

## 2018-02-22 NOTE — PLAN OF CARE
"Problem: Patient Care Overview  Goal: Plan of Care/Patient Progress Review  Outcome: Therapy, progress toward functional goals is gradual  /89 (BP Location: Left arm)  Pulse 115  Temp 98.3  F (36.8  C) (Oral)  Resp 18  Ht 1.753 m (5' 9\")  Wt 63 kg (139 lb)  SpO2 97%  BMI 20.53 kg/m2  Neuro: A&Ox4.   Cardiac: VSS.   Respiratory: RA , needs O2 at night.  GI/: Voiding spontaneously.BM x1 this shift.   Diet/appetite: Tolerating diet. Denies nausea   Activity: Up independently.  Pain: .agreeble to current regimen.  Skin: Intact, no new deficits noted. Dressing to R & L foot changed per POC.  Lines: PIV  Drains: None     Will continue to monitor and follow plan of care. Notify MD of any changes.          "

## 2018-02-22 NOTE — TELEPHONE ENCOUNTER
[facility] would like to speak directly to Dr Seaman.    Patient is in patient for diabetic foot and right lower leg cellulitis.   Patient has had 2 bed side I&D's.   That are MRSA positive. She has been getting IV vancomycin.   Infectious disease would like this to continue for 3 weeks.  Would like to place a PIC for 3 week.   Distance history of Narcotic abuse. Patient has had extra in patient, but they are not concerned as patient was requesting low dosing and such.    They want to know provider opinion on PIC line being placed.    Stacy Nair RN, [facility] Triage

## 2018-02-22 NOTE — PLAN OF CARE
Problem: Patient Care Overview  Goal: Plan of Care/Patient Progress Review  2058-4271:     Neuro: A&Ox4.   Cardiac: VSS.   Respiratory: RA   GI/: Voiding spontaneously. No BM this shift.   Diet/appetite: Moderate consistent carb diet, tolerating. , gave 1 Unit of insulin. Denies nausea   Activity: Up independently.  Pain: . C/o foot pain, gave scheduled methadone. Did not request further PRN medications.   Skin: Intact, no new deficits noted.  Lines: PIV SL'd. Gave scheduled IV abx.   Drains: N/A.    Pt rested in between cares. Calls appropriately and makes needs known. R foot culture results came back with isolated MRSA. Will continue to monitor and follow POC.

## 2018-02-22 NOTE — PROGRESS NOTES
"                          Diabetes Consult Daily  Progress Note          Assessment/Plan:   Alessandra Pak is a 52 year old year old female with secondary DM s/p Whipple in 2001, complicated by nephropathy and peripheral neuropathy, on insulin pump for >1 year, presents with sepsis, likely related to RLE infection.     Improving post-prandial control and fasting control remains good.  Pt would like to resume her ambulatory pump tomorrow, before discharge.    -glargine: 7 units today  -aspart 1 per 10 grams carb with meals and ALL snacks  -medium aspart correction to qAC, HS   -get battery for insulin pump today    Tomorrow,  -start ambulatory pump at or just before noon (pt will need help changing pump program)  BASAL- 0.3 units/h from 4356-2208, 0.5 units/h from 1100-7498  BOLUS- 1 unit per 10 grams carbohydrate, ISF 50, BG target 100-130, active insulin time 3 hours  -BG monitoring qAC, HS 0200, 0500  -Great emphasis was conveyed to patient about the importance of her systematically bolusing for her intake and about her disproportionately high bolus needs relative to basal needs    Outpatient diabetes follow up: 3/2/18 w/ Toya Nuno  Plan discussed with patient, bedside RN, and paged to primary team.           Interval History:   The last 24 hours progress and nursing notes reviewed.  Max glucose 220 yesterday.  Pt planning for discharge tomorrow. Wants to keep on glargine today and start her pump tomorrow.  Having increased foot pain this morning.    Reviewed in detail the variance between her current basal insulin need and her pump settings on admission.  And the disproportionately high bolus need.  Should cover all PO intake with bolus at start of eating, like bolus is the \"appetizer.\"  Need glucose control for wound healing now and for long range complication prevention.        Historical:  Pt has symptoms low BG when gets less than 70      Recent Labs  Lab 02/22/18  1116 02/22/18  0636 02/22/18  0212 " "02/21/18  2138 02/21/18  1654 02/21/18  1135 02/21/18  0751  02/20/18  0633  02/18/18  1321  02/17/18  0643  02/16/18  2145  02/16/18  1528   GLC  --  125*  --   --   --   --   --   --  116*  --  275*  --  116*  --  340*  --  454*   *  --  160* 202* 220* 185* 120*  < >  --   < >  --   < >  --   < >  --   < >  --    < > = values in this interval not displayed.            Review of Systems:   See interval hx          Medications:       Active Diet Order      Diet      Moderate Consistent CHO Diet     Physical Exam:  Gen:  Sitting up at edge of bed, NAD  HEENT: NC/AT, mucous membranes are moist  Resp: Unlabored  Ext:  R foot bandaged    /89 (BP Location: Left arm)  Pulse 115  Temp 98.3  F (36.8  C) (Oral)  Resp 18  Ht 1.753 m (5' 9\")  Wt 63 kg (139 lb)  SpO2 97%  BMI 20.53 kg/m2           Data:     Lab Results   Component Value Date    A1C 12.8 02/16/2018    A1C 11.8 06/21/2017    A1C 11.5 11/02/2016    A1C >15.0  Results confirmed by repeat test   07/25/2016    A1C 11.8 10/05/2015            Recent Labs   Lab Test  02/22/18   0636  02/20/18   0633   NA  138  135   POTASSIUM  3.8  3.7   CHLORIDE  105  102   CO2  26  26   ANIONGAP  6  6   GLC  125*  116*   BUN  17  16   CR  1.25*  1.09*   RICK  8.5  8.7     CBC RESULTS:   Recent Labs   Lab Test  02/22/18   0636   WBC  13.8*   RBC  3.36*   HGB  9.1*   HCT  27.5*   MCV  82   MCH  27.1   MCHC  33.1   RDW  14.4   PLT  382     I spent a total of 35 minutes bedside and on the inpatient unit managing the glycemic care of Alessandra Pak. Over 50% of my time on the unit was spent counseling the patient and/or coordinating care regarding glucose control impact on healing and method for maintaining glucose control at home.  See note for details.    Esperanza Langpton APRN [id number]    Diabetes Management job code [id number]          "

## 2018-02-22 NOTE — PLAN OF CARE
Problem: Patient Care Overview  Goal: Plan of Care/Patient Progress Review  Outcome: No Change  AVSS on 2LPM oxymizer NC, which patient is only intermittently willing to keep on, despite different trials of interventions to keep the cannula on, but not behind her ears. Denies pain and nausea. 0200 BG was 160. Poor appetite on CHO diet. L lower peripheral IV saline locked. No voids or reported BM overnight. Ulcers on bilateral feet UTV, but WOC orders in for wound care. New mepilex placed with ace wrap on R heel d/t mepilex continually falling off. Pt slept soundly throughout the night. Will continue to monitor and follow POC.

## 2018-02-22 NOTE — PLAN OF CARE
"/82  Pulse 111  Temp 99.8  F (37.7  C) (Oral)  Resp 16  Ht 1.753 m (5' 9\")  Wt 63.4 kg (139 lb 12.8 oz)  SpO2 94%  BMI 20.64 kg/m2     4587-9364: VS at 1400 stable ex Tmax 99.8 and HR 110s. Satting on RA. Patient reporting pain in feet, PRN oxycodone 10 mg x1. Patient denies nausea. Regular diet with fair/poor appetite.  covered with SSI and CC. Voiding, patient reporting output amounts. L PIV SL. L foot dressing changed. Debridement of R foot performed at bedside this AM. Dressing with some sanguinous drainage, gauze applied. Up SBA, reports intermittent unsteadiness. Will continue with POC, notify team with changes/concerns.     "

## 2018-02-22 NOTE — DISCHARGE SUMMARY
[facility], [place]    Internal Medicine Discharge Summary- Gold Service    Date of Admission:  2/16/2018  Date of Discharge:  2/23/2018  Discharging Provider: Karely Caraballo/Dr. Morse  Discharge Team: Gold 3    Discharge Diagnoses   Sepsis 2/2 RLE diabetic foot with cellulitis and abscess  Possible CAP, resolved  Secondary DM  Acute on chronic pain  HTN  NICM  Tachycardia  COPD  CKDIII    Follow-ups Needed After Discharge   - Follow up with PCP within 1 week for ongoing care. Will need weekly CBC, CMP, CRP, and Vanc trough  - Follow up with ID as scheduled 3/12  - Follow up with Dr. Lewis of podiatry as next available     Hospital Course   Alessandra Pak is a 52 year old female with history of NICM, HTN, COPD, GERD, chronic pancreatitis s/p pylorus sparing Adrienne (2001) w/ secondary DM and diabetic foot ulcers, depression, and chronic methadone use who was admitted 2/16 with sepsis 2/2 RLE cellulitis and acute hypoxic respiratory failure.      Sepsis, RLE diabetic foot ulcer and cellulitis, possible CAP: Admitted from clinic 2/16 with temp 102.2. RLE concerning for cellulitis. Chest CT 2/17 ground glass opacities in bilat upper lobes. MRI 2/18 no definitive osteomyelitis, fluid collection around 2nd metatarsal head. Treated with Zosyn 2/16 to 2/21 and Vanc 2/16 to present. S/p bedside I&D per ortho on 2/19 and 2/21. Culture +for MRSA. BCx NGTD. WBC 13.8 (18.1),  (260) 2/22. Followed by ID, podiatry/ortho, and WOCN this admission. Tachycardia as below. Afebrile   - Follow up with PCP within 1 week. Needs weekly CBC, CMP, CRP and Vanc trough  - Continue Vancomycin for three weeks, follow up with ID prior to stopping   - Patient will discharge with PICC for daily infusions   - Home nursing for dressing changes and infusion teaching orders placed       Secondary DM: S/p Adrienne 2001. C/b neuropathy and nephropathy. Poorly controlled, A1c 12.8%. Underwent diabetes education  this admission. Was followed closely by endocrinology while admitted, transitioned to PTA pump regimen on discharge. See endocrine notes for details. Long conversation regarding diabetic management to help heal and prevent further diabetes-related comorbidities       Acute on chronic pain: Chronic abdominal pain, PTA on methadone 70 mg HS. QTc 476. Increased RLE pain with worsening edema, manipulation by podiatry bedside 2/19 which improved throughout admission and with I&D.   - Continue PTA methadone  - Patient will discharge with limited supply oxycodone for ongoing pain as well as Lidoderm patches and gabapentin cream       NICM, HTN: EF 50-55% 2/2016. Previously low to 15%. Has ICD in place, last interrogated 12/5/17 w/ 2 episodes NSVT. PTA on lasix and lisinopril. BPs stable. Continued PTA lisinopril and lasix with hold parameters this admission      Depression, anxiety: PTA on amitriptyline. Anxiety regarding acute illness, family social issues. Psych consulted 2/19, no changes made. Will continue amitriptyline on discharge     Tachycardia: HR 100s. In the setting of acute pain, increased anxiety, and infection. Patient feels most likely correlated to anxiety. EKG without acute changes. Ongoing issue which patient thinks is mainly due to anxiety, however, appears calm at present at and HR 90s  - Follow up with PCP for ongoing care         Resolved Hospital Problems and Stable, Chronic Medical Conditions:   Acute hypoxic respiratory failure: Presented with O2 sats in the 70s on room air. Improved to 90s on 3L O2. CXR with atelectasis, repeat CT with concern for PNA as above. No PTA supplemental O2  COPD: No longer smoking. PTA on Combivent bid. Changed to DuoNeb QID here for now but patient preferred PRN   CKD III: Cr BL 0.9-1.2, last check at BL      Discharge Pain Plan: During her hospitalization, Alessandra experienced pain due to chronic pain and acute infection. The pain plan for discharge was discussed with  Alessandra and the plan was created in a collaborative fashion. See above    Consultations This Hospital Stay   Cardiology, endocrinology, podiatry/orthopedics, ID, endocrinology, pharmacy, diabetes eduction, psychiatry, WOCN    Code Status   Full Code    Time Spent on this Encounter   I, Karely Caraballo, personally saw the patient today and spent greater than 30 minutes discharging this patient.       Karely Caraballo  Internal Medicine Staff Hospitalist Service  [facility]  Pager: [phone]  ______________________________________________________________________    Physical Exam   Vital Signs: Temp: 98.5  F (36.9  C) Temp src: Oral BP: 109/88 Pulse: 63   Resp: 16 SpO2: 97 % O2 Device: None (Room air) Oxygen Delivery: 1.5 LPM  Weight: 139 lbs 15.87 oz    General Appearance: Alert and oriented x3, pleasant   Respiratory: CTAB without wheezing or rales  Cardiovascular: Tachycardic, S1, S2. No murmur noted  GI: Abdomen soft, non-tender with active bowel sounds. No guarding or rebound   Skin: Right foot wrapped in dressing and ace wrap.   Other: Moves all extremities. No focal neuro deficits     Significant Results and Procedures   Most Recent 3 CBC's:  Recent Labs   Lab Test  02/22/18   0636  02/20/18   0633  02/18/18   1321   WBC  13.8*  18.1*  19.0*   HGB  9.1*  9.8*  10.5*   MCV  82  84  86   PLT  382  295  261     Most Recent 3 BMP's:  Recent Labs   Lab Test  02/22/18   0636  02/20/18   0633  02/18/18   1321   NA  138  135  134   POTASSIUM  3.8  3.7  4.0   CHLORIDE  105  102  102   CO2  26  26  26   BUN  17  16  22   CR  1.25*  1.09*  1.19*  1.23*   ANIONGAP  6  6  7   RICK  8.5  8.7  8.4*   GLC  125*  116*  275*       Pending Results   These results will be followed up by ID, PCP, writer   Unresulted Labs Ordered in the Past 30 Days of this Admission     Date and Time Order Name Status Description    2/19/2018 2045 Blood culture Preliminary     2/19/2018 2045 Blood culture Preliminary      2/19/2018 1131 Anaerobic bacterial culture Preliminary     2/18/2018 0822 Blood culture Preliminary     2/18/2018 0822 Blood culture Preliminary              Primary Care Physician   Brionna Hernandez [place]    Discharge Disposition   Discharged to home  Condition at discharge: Stable    Discharge Orders     CBC with platelets differential   Last Lab Result: Hemoglobin (g/dL)      Date                     Value                02/22/2018               9.1 (L)          ----------     Comprehensive metabolic panel     CRP inflammation     Vancomycin     Home care nursing referral     Home infusion referral     Activity   Your activity upon discharge: activity as tolerated     Supplies   List the supplies the pt needs to go home:  Please send pt home with dressing change supplies.     Reason for your hospital stay   You were admitted with increased pain, swelling, and redness in the foot. You were treated with IV antibiotics and you underwent two bedside incision and drainage procedures by the orthopedics team. You discharged when medically stable and clinically improvement. You will discharge with IV antibiotics with plan to continue them for three weeks     Activity   Your activity upon discharge: activity as tolerated     When to contact your care team   Call or return if you develop fever >101, confusion, altered mental status, uncontrolled foot pain, increased redness/drainge/swelling/warmth in the right foot, shortness of breath, difficulty breathing, blood sugar less than 70 or greater than 350, or other symptoms of concern to you.     Discharge Instructions   1. Continue antibiotics for three weeks. You will see infectious disease prior to stopping antibiotics  2. Continue wound cares of the right foot  3. Follow up with PCP within 1 week. You will need weekly labs with PCP while on antibiotics  4. No driving while on opiates     MD face to face encounter   Documentation of Face to Face and Certification for  Home Health Services    I certify that patient: Alessandra Pak is under my care and that I, or a nurse practitioner or physician's assistant working with me, had a face-to-face encounter that meets the physician face-to-face encounter requirements with this patient on: 2/23/2018.    This encounter with the patient was in whole, or in part, for the following medical condition, which is the primary reason for home health care: wound infection.    I certify that, based on my findings, the following services are medically necessary home health services: Nursing.    My clinical findings support the need for the above services because: Nurse is needed: To assess pts wounds and response to IV ABX and dressing changes after changes in medications or other medical regimen. and to teach and train about the disease and treatments for open wounds and infection.    Further, I certify that my clinical findings support that this patient is homebound (i.e. absences from home require considerable and taxing effort and are for medical reasons or [employer]ze services or infrequently or of short duration when for other reasons) because: Requires assistance of another person or specialized equipment to access medical services because patient: Has prohibitive pain during ambulation...    Based on the above findings. I certify that this patient is confined to the home and needs intermittent skilled nursing care, physical therapy and/or speech therapy.  The patient is under my care, and I have initiated the establishment of the plan of care.  This patient will be followed by a physician who will periodically review the plan of care.  Physician/Provider to provide follow up care: Brionna Calabrese    Attending hospital physician (the Medicare certified [facility] provider): Steven Morse MD  Physician Signature: See electronic signature associated with these discharge orders.  Date: 2/23/2018     Follow Up and recommended labs  and tests   Follow up with PCP, Dr. Mary Dc, within 1 week. Will need weekly CBC, CMP, CRP, and Vanc trough levels. Please fax results to infectious disease at [phone]    Follow up with ID within the next three weeks/before stopping antibiotics- Scheduled on 3/12/18    Follow up with Dr. Lewis of podiatry following discharge     Full Code     Full Code     Diet   Follow this diet upon discharge: carb controlled       Discharge Medications   Current Discharge Medication List      START taking these medications    Details   vancomycin 750 mg Inject 750 mg into the vein every 12 hours  Qty: 35 Units, Refills: 0    Associated Diagnoses: Cellulitis of foot; Diabetic ulcer of right midfoot associated with type 1 diabetes mellitus, unspecified ulcer stage (H)      Lidocaine (LIDOCARE) 4 % Patch Place 3 patches onto the skin every 24 hours  Qty: 30 patch, Refills: 0    Associated Diagnoses: Other chronic pain      gabapentin 8 % GEL topical PLO cream Apply 1 g topically every 8 hours  Qty: 50 g, Refills: 0    Associated Diagnoses: Other chronic pain      oxyCODONE IR (ROXICODONE) 5 MG tablet Take 1 tablet (5 mg) by mouth every 6 hours as needed for moderate to severe pain  Qty: 12 tablet, Refills: 0    Associated Diagnoses: Diabetic ulcer of right midfoot associated with type 1 diabetes mellitus, unspecified ulcer stage (H)         CONTINUE these medications which have CHANGED    Details   furosemide (LASIX) 20 MG tablet Take 1 tablet (20 mg) by mouth daily  Qty: 30 tablet, Refills: 0    Associated Diagnoses: Hypertension goal BP (blood pressure) < 140/90      lisinopril (PRINIVIL/ZESTRIL) 10 MG tablet Take 1 tablet (10 mg) by mouth daily  Qty: 30 tablet, Refills: 0    Associated Diagnoses: Hypertension goal BP (blood pressure) < 140/90         CONTINUE these medications which have NOT CHANGED    Details   Ipratropium-Albuterol (COMBIVENT RESPIMAT)  MCG/ACT inhaler Inhale 1 puff into the lungs 4 times  daily Do not exceed 6 doses/day.      lidocaine HCl 3 % cream Apply topically 3 times daily as needed (foot pain) OTC product      polyethylene glycol (MIRALAX/GLYCOLAX) Packet Take 17 g by mouth daily as needed for constipation      fluticasone (FLONASE) 50 MCG/ACT spray Spray 2 sprays into left nostril daily  Qty: 1 Bottle, Refills: 11    Associated Diagnoses: Nasal mass      insulin aspart (NOVOLOG VIAL) 100 UNITS/ML injection Use as directed in insulin pump. Patient using up to 60 units per day  Qty: 60 mL, Refills: 3    Associated Diagnoses: Type 2 diabetes mellitus with diabetic neuropathy (H)      ranitidine (ZANTAC) 300 MG tablet Take 1 tablet (300 mg) by mouth daily  Qty: 90 tablet, Refills: 3    Associated Diagnoses: Gastroesophageal reflux disease without esophagitis      albuterol (ALBUTEROL) 108 (90 BASE) MCG/ACT Inhaler Inhale 2 puffs into the lungs every 4 hours as needed for shortness of breath / dyspnea  Qty: 1 Inhaler, Refills: 5    Associated Diagnoses: SOB (shortness of breath)      SM NICOTINE 21 MG/24HR 24 hr patch REMOVE OLD PATCH AND APPLY ONE NEW PATCH TO SKIN EVERY 24 HOURS  Qty: 28 patch, Refills: 1    Associated Diagnoses: Tobacco dependence syndrome      blood glucose monitoring (HOLLIE CONTOUR NEXT) test strip Use to test blood sugar 10 times daily or as directed.  Ok to substitute alternative if insurance prefers.  Qty: 300 each, Refills: 12    Associated Diagnoses: Diabetes mellitus type 1 (H)      !! blood glucose monitoring (HOLLIE MICROLET) lancets Use to test blood sugar 10 times daily or as directed.  Ok to substitute alternative if insurance prefers.  Qty: 1 Box, Refills: prn    Associated Diagnoses: Diabetes mellitus type 1 (H)      !! blood glucose monitoring (FREESTYLE) lancets 1 each See Admin Instructions test 3-4 times per day  Qty: 3 Box, Refills: 3    Associated Diagnoses: Type 2 diabetes, HbA1C goal < 8% (H)      glucose 4 G CHEW Take 3-4 tablets to treat low blood  sugar.  Qty: 25 tablet, Refills: 11    Associated Diagnoses: Uncontrolled diabetes mellitus with complications (H)      methadone (DOLPHINE-INTENSOL) 10 mg/mL CONC Take 7 mLs by mouth daily.    Associated Diagnoses: Chemical dependency (H)      MIRENA 20 MCG/24HR IU IUD placed today  Qty: 1, Refills: 0    Associated Diagnoses: Excessive or frequent menstruation      ASPIRIN 81 MG OR TABS 1 tab po QD (Once per day)  Qty: 100, Refills: 3    Associated Diagnoses: Chronic pancreatitis (H)      amitriptyline (ELAVIL) 50 MG tablet Take 1 tablet (50 mg) by mouth At Bedtime  Qty: 90 tablet, Refills: 3    Associated Diagnoses: Anxiety      LYRICA 75 MG capsule 1 BY MOUTH 3 TIMES A DAY  Qty: 90 capsule, Refills: 5    Comments: This request is for a new prescription for a controlled substance as required by Federal/State law.  Associated Diagnoses: Controlled type 2 diabetes with neuropathy (H)      acetone, Urine, test STRP 1 strip by In Vitro route as needed  Qty: 25 each, Refills: 1    Associated Diagnoses: Type 2 diabetes mellitus with diabetic neuropathy (H)      multivitamin, therapeutic with minerals (THERA-VIT-M) TABS Take 1 tablet by mouth daily  Qty: 30 each, Refills: 11    Associated Diagnoses: Heart failure and kidney disease due to high blood pressure (H)      insulin pen needle (B-D U/F) 31G X 5 MM Use 3 time(s) a day.  Qty: 3 each, Refills: 3    Associated Diagnoses: Type 2 diabetes, HbA1c goal < 7% (H)       !! - Potential duplicate medications found. Please discuss with provider.      STOP taking these medications       lidocaine (LIDODERM) 5 % Patch Comments:   Reason for Stopping:         glucagon (GLUCAGON EMERGENCY) 1 MG injection Comments:   Reason for Stopping:             Allergies   Allergies   Allergen Reactions     No Known Drug Allergies

## 2018-02-22 NOTE — PROGRESS NOTES
[facility], [place]    Internal Medicine Progress Note - Gold Service      Assessment & Plan   Alessandra Pak is a 52 year old female with history of NICM, HTN, COPD, GERD, chronic pancreatitis s/p pylorus sparing Whipple (2001) w/ secondary DM and diabetic foot ulcers, depression, and chronic methadone use who was admitted 2/16 with sepsis 2/2 RLE cellulitis and acute hypoxic respiratory failure.     Sepsis, RLE diabetic foot ulcer and cellulitis, possible CAP: Admitted from clinic 2/16 with temp 102.2. RLE concerning for cellulitis. Chest CT 2/17 ground glass opacities in bilat upper lobes. MRI 2/18 no definitive osteomyelitis, fluid collection around 2nd metatarsal head. Treated with Zosyn 2/16 to 2/21 and Vanc 2/16 to present. S/p bedside I&D per ortho on 2/19 and 2/21. Culture +for MRSA. BCx NGTD. WBC 13.8 (18.1),  (260). Fever curve improving. Intermittent O2 use, currently on RA and reports increased use with anxiety. Tachycardia as below. Afebrile   - ID, Podiatry/ortho and WOCN following  - Continue Vanc for 3 weeks. Will need weekly CBC, CMP, CRP, and vanc trough   - Stop Zosyn   - Left VM with PCP regarding PICC plans  - Follow cultures     Secondary DM: S/p Whipple 2001. C/b neuropathy and nephropathy. Poorly controlled, A1c 12.8%. Has pump supplies here. Currently on Lantus 6 units, Novolog 1 unit per 12 grams carbs, MSSI. Glucose stable  - Endocrine following  - Diabetes education consulted     Acute on chronic pain: Chronic abdominal pain, PTA on methadone 70 mg HS. QTc 476. Increased RLE pain with worsening edema, manipulation by podiatry bedside 2/19 which is now improving  - Continue PTA methadone  - Decrease oxycodone to 5 mg q6h prn  - Lidoderm patches, gabapentin cream      NICM, HTN: EF 50-55% 2/2016. Previously low to 15%. Has ICD in place, last interrogated 12/5/17 w/ 2 episodes NSVT. PTA on lasix and lisinopril. BPs stable  - Daily weights  - Continue PTA  lisinopril and lasix with hold parameters      Depression, anxiety: PTA on amitriptyline. Anxiety regarding acute illness, family social issues. Psych consulted 2/19, no changes made.   - PTA amitriptyline     Tachycardia: HR 100s. In the setting of acute pain, increased anxiety, and infection. Patient feels most likely correlated to anxiety. EKG without acute changes. Ongoing issue which patient thinks is mainly due to anxiety, however, appears calm at present at HR 100s  - Will hold off on further workup unless worsening  - Follow up with PCP for ongoing care       Resolved Hospital Problems and Stable, Chronic Medical Conditions:   Acute hypoxic respiratory failure: Presented with O2 sats in the 70s on room air. Improved to 90s on 3L O2. CXR with atelectasis, repeat CT with concern for PNA as above. No PTA supplemental O2  COPD: No longer smoking. PTA on Combivent bid. Changed to DuoNeb QID here for now but patient preferred PRN   CKD III: Cr BL 0.9-1.2, last check at BL       Consulting Services: Endocrinology, podiatry/ortho, ID, diabetes educator       Pain assessment: Pain increased 2/19, now stable  Current Pain Score 2/19/2018 2/19/2018 2/18/2018   Patient currently in pain? denies denies yes   Pain score (0-10) - - -   Pain location - - Foot   Pain descriptors - - Aching   CPOT pain score - - -   - Alessandra is experiencing pain due to the above. Pain management was discussed and the plan was created in a collaborative fashion. Alessandra's response to the current recommendations: agreeable     Disposition Plan   Expected discharge: tomorrow, recommended to prior living arrangement once SIRS/Sepsis treated and final antibiotic plan determined      Entered: Karely Caraballo 02/22/2018, 1:08 PM   Information in the above section will display in the discharge planner report.      The patient's care was discussed with the patient, nursing, ID, ortho, endocrine, and attending physician, Dr. Mini MENDEZ  Ilya  Internal Medicine Staff Hospitalist Service  [facility]  Pager: [phone]  Please see sticky note for cross cover information    Interval History   Foot pain/pressure much better since second I&D. Very happy with results. Ongoing chronic pain in the foot and ankle joints. Would like Lidoderm patch. Denies dyspnea at time, although has intermittent shortness of breath and palpitations with anxiety. Denies fever or chills. Slept well. Appetite stable     Data reviewed today: I reviewed all medications, new labs and imaging results over the last 24 hours. I personally reviewed recent notes, labs, etc    Physical Exam   Vital Signs: Temp: 98.3  F (36.8  C) Temp src: Oral BP: 117/89 Pulse: 115   Resp: 18 SpO2: 97 % O2 Device: None (Room air) Oxygen Delivery: 2 LPM  Weight: 139 lbs 0 oz  General Appearance: Alert and oriented x3, pleasant   Respiratory: CTAB without wheezing or rales  Cardiovascular: Tachycardic, S1, S2. No murmur noted  GI: Abdomen soft, non-tender with active bowel sounds. No guarding or rebound   Skin: Right foot with three I&D sites, one on the dorsum and two on the plantar aspect. Erythema and edema significantly improved from 2/21. BLE warm to touch with good circulation. No jaundice   Other: Moves all extremities. No focal neuro deficits     Data   No results found for this or any previous visit (from the past 24 hour(s)).

## 2018-02-22 NOTE — TELEPHONE ENCOUNTER
Reason for Call:  Other call back    Detailed comments: Alessandra with [facility] calling for she has questions she would like to address regarding Pt who was admitted to hospital.     Phone Number [facility] can be reached at: Other phone number:  [phone] or by pager at [phone]     Best Time: anytime    Can we leave a detailed message on this number? YES    Call taken on 2/22/2018 at 1:59 PM by Rich Oneill

## 2018-02-23 VITALS
HEART RATE: 63 BPM | DIASTOLIC BLOOD PRESSURE: 88 MMHG | TEMPERATURE: 98.5 F | OXYGEN SATURATION: 97 % | WEIGHT: 139.99 LBS | HEIGHT: 69 IN | BODY MASS INDEX: 20.73 KG/M2 | RESPIRATION RATE: 16 BRPM | SYSTOLIC BLOOD PRESSURE: 109 MMHG

## 2018-02-23 LAB
AMITRIP SERPL-MCNC: 215 NG/ML
AMITRIP+NOR SERPL-MCNC: 414 NG/ML
GLUCOSE BLDC GLUCOMTR-MCNC: 112 MG/DL (ref 70–99)
GLUCOSE BLDC GLUCOMTR-MCNC: 123 MG/DL (ref 70–99)
GLUCOSE BLDC GLUCOMTR-MCNC: 143 MG/DL (ref 70–99)
GLUCOSE BLDC GLUCOMTR-MCNC: 174 MG/DL (ref 70–99)
GLUCOSE BLDC GLUCOMTR-MCNC: 202 MG/DL (ref 70–99)
NORTRIP SERPL-MCNC: 199 NG/ML

## 2018-02-23 PROCEDURE — 00000146 ZZHCL STATISTIC GLUCOSE BY METER IP

## 2018-02-23 PROCEDURE — 25000132 ZZH RX MED GY IP 250 OP 250 PS 637: Performed by: PHYSICIAN ASSISTANT

## 2018-02-23 PROCEDURE — G0463 HOSPITAL OUTPT CLINIC VISIT: HCPCS

## 2018-02-23 PROCEDURE — 40000901 ZZH STATISTIC WOC PT EDUCATION, 0-15 MIN

## 2018-02-23 PROCEDURE — 25000128 H RX IP 250 OP 636: Performed by: INTERNAL MEDICINE

## 2018-02-23 PROCEDURE — 25000131 ZZH RX MED GY IP 250 OP 636 PS 637: Performed by: CLINICAL NURSE SPECIALIST

## 2018-02-23 RX ORDER — LIDOCAINE 4 G/G
3 PATCH TOPICAL EVERY 24 HOURS
Qty: 30 PATCH | Refills: 0 | Status: ON HOLD | OUTPATIENT
Start: 2018-02-23 | End: 2018-07-05

## 2018-02-23 RX ADMIN — RANITIDINE 300 MG: 150 TABLET, FILM COATED ORAL at 09:15

## 2018-02-23 RX ADMIN — VANCOMYCIN HYDROCHLORIDE 1000 MG: 1 INJECTION, SOLUTION INTRAVENOUS at 00:06

## 2018-02-23 RX ADMIN — POLYETHYLENE GLYCOL 3350 17 G: 17 POWDER, FOR SOLUTION ORAL at 09:17

## 2018-02-23 RX ADMIN — INSULIN ASPART 1 VIAL: 100 INJECTION, SOLUTION INTRAVENOUS; SUBCUTANEOUS at 15:44

## 2018-02-23 RX ADMIN — FUROSEMIDE 20 MG: 20 TABLET ORAL at 09:15

## 2018-02-23 RX ADMIN — PREGABALIN 75 MG: 50 CAPSULE ORAL at 13:44

## 2018-02-23 RX ADMIN — PREGABALIN 75 MG: 50 CAPSULE ORAL at 09:16

## 2018-02-23 RX ADMIN — ASPIRIN 81 MG CHEWABLE TABLET 81 MG: 81 TABLET CHEWABLE at 09:16

## 2018-02-23 RX ADMIN — VANCOMYCIN HYDROCHLORIDE 750 MG: 1 INJECTION, POWDER, LYOPHILIZED, FOR SOLUTION INTRAVENOUS at 12:02

## 2018-02-23 RX ADMIN — ALBUTEROL SULFATE 2 PUFF: 90 AEROSOL, METERED RESPIRATORY (INHALATION) at 12:01

## 2018-02-23 RX ADMIN — OXYCODONE HYDROCHLORIDE 5 MG: 5 TABLET ORAL at 09:16

## 2018-02-23 RX ADMIN — LIDOCAINE 2 PATCH: 560 PATCH PERCUTANEOUS; TOPICAL; TRANSDERMAL at 09:17

## 2018-02-23 RX ADMIN — LISINOPRIL 10 MG: 10 TABLET ORAL at 09:16

## 2018-02-23 NOTE — PROGRESS NOTES
Diabetes Consult Daily  Progress Note          Assessment/Plan:                          Alessandra Pak is a 52 year old year old female with secondary DM s/p Whipple in 2001, complicated by nephropathy and peripheral neuropathy, on insulin pump for >1 year, presents with sepsis, likely related to RLE infection.     Secondary diabetes s/p Whipple  Plan:  - transitioned to ambulatory pump  BASAL- 0.3 units/h from 6959-8378, 0.5 units/h from 6796-6380  BOLUS- 1 unit per 10 grams carbohydrate, ISF 50, BG target 100-130, active insulin time 3 hours  -BG monitoring qAC, HS 0200, 0500  - reviewed the need to bolus for her intake.     Outpatient diabetes follow up: 3/2/18 w/ Toya Nuno  Plan discussed with patient, bedside RN, and paged to primary team.           Interval History:   The last 24 hours progress and nursing notes reviewed.  transitioned to ambulatory pump today prior to discharge.  Blood sugars on sub-q insulin, moderately controlled and ranged from 125-267  glucose at ~ 0200, 202 and fasting 143.  Ms. Pak is unable to make adjustments to her pump setting, therefore the adjustments were made by this provider.   Reviewed discharge diabetes plan, all questions were answered.        Recent Labs  Lab 02/23/18  1345 02/23/18  0922 02/23/18  0205 02/23/18  0020 02/22/18  2134 02/22/18  1801  02/22/18  0636  02/20/18  0633  02/18/18  1321  02/17/18  0643  02/16/18  2145  02/16/18  1528   GLC  --   --   --   --   --   --   --  125*  --  116*  --  275*  --  116*  --  340*  --  454*   * 143* 202* 174* 227* 267*  < >  --   < >  --   < >  --   < >  --   < >  --   < >  --    < > = values in this interval not displayed.            Review of Systems:   See interval hx          Medications:       Active Diet Order      Diet      Moderate Consistent CHO Diet     Physical Exam:  Gen: Alert, NAD   HEENT: NC/AT, mucous membranes are moist  Resp: Unlabored  Ext: moving all  "extremities  Neuro:oriented x3, communicating clearly  /88 (BP Location: Left arm)  Pulse 63  Temp 98.5  F (36.9  C) (Oral)  Resp 16  Ht 1.753 m (5' 9\")  Wt 63.5 kg (139 lb 15.9 oz)  SpO2 97%  BMI 20.67 kg/m2           Data:     Lab Results   Component Value Date    A1C 12.8 02/16/2018    A1C 11.8 06/21/2017    A1C 11.5 11/02/2016    A1C >15.0  Results confirmed by repeat test   07/25/2016    A1C 11.8 10/05/2015              CBC RESULTS:   Recent Labs   Lab Test  02/22/18   0636   WBC  13.8*   RBC  3.36*   HGB  9.1*   HCT  27.5*   MCV  82   MCH  27.1   MCHC  33.1   RDW  14.4   PLT  382     Recent Labs   Lab Test  02/22/18   0636  02/20/18   0633   NA  138  135   POTASSIUM  3.8  3.7   CHLORIDE  105  102   CO2  26  26   ANIONGAP  6  6   GLC  125*  116*   BUN  17  16   CR  1.25*  1.09*   RICK  8.5  8.7     Liver Function Studies -   Recent Labs   Lab Test  02/16/18   1528   PROTTOTAL  8.2   ALBUMIN  2.8*   BILITOTAL  0.6   ALKPHOS  212*   AST  22   ALT  14     Lab Results   Component Value Date    INR 1.30 02/21/2015    INR 1.72 10/29/2014    INR 1.71 10/28/2014         I spent a total of 35 minutes bedside and on the inpatient unit managing the glycemic care of Alessandra Pak. Over 50% of my time on the unit was spent counseling the patient  and/or coordinating care regarding       Michelle Jones -[phone]  Diabetes Management job code [id number]            "

## 2018-02-23 NOTE — PROGRESS NOTES
Care Coordinator Progress Note     Admission Date/Time:  2/16/2018  Attending MD:  Steven Morse MD     Data  Chart reviewed, discussed with interdisciplinary team.   Patient was admitted for:    Sepsis (H)  Diabetic ulcer of right midfoot associated with type 1 diabetes mellitus, unspecified ulcer stage (H)  Cellulitis of foot  Sepsis, due to unspecified organism (H)  Type 1 diabetes mellitus with foot ulcer (H)  Midfoot skin ulcer, right, with unspecified severity (H)  Cellulitis of right foot  Hypoxemia  Personal history of tobacco use, presenting hazards to [facility]  Hypertension goal BP (blood pressure) < 140/90  Other chronic pain.    Concerns with insurance coverage for discharge needs: None.  Current Living Situation: Patient lives with family.  Support System: Supportive and Involved  Services Involved: michaela  Transportation: MA transportation,  [facility] [phone] and Family or Friend will provide  Barriers to Discharge: pending completion of IV abx teaching, dc this afternoon, C referrals sent.    Coordination of Care and Referrals: Provided patient/family with options for Home Care and Home Infusion.        Assessment  Alessandra is a 53 yo female that was admitted due to cellulitis of foot which has been debride.  Per care team pt to dc today with need for HHC wound care and HI for vanco bid.  RNCC met with pt in her hospital room.  Pt requested [facility] for HI needs and was open to having [facility] for wound/or other agency per insurance and other RN skilled visits, this referral will be up to the infusion provider.  Pt to have plc today at 2 pm for IV teaching then she will be ready for dc.  [facility] has notified me that they are not in network with pts [employer], referral made to [facility] instead.  RNCC faxed clinicals to [facility].    No other concerns expressed by pt.  Pt has a follow up appointment with ID created for her on March 12th, she will arrange her PCP appointment.      [facility]  Phone  [phone]  Fax  [phone]- faxed over referral and spoke with intake.    VADIM Souza Liaison: [phone]- RNCC spoke with Libby who is reviewing pt,  PA placing orders for vanco.    _______________________  [facility]  Phone  [phone]  Fax  [phone]  ______________________    2/23/2018 2:05 PM [facility] liaison, Libby, will stop by pts room to meet with her and explain delivery and HHC agency prior to pt dc.  No additional needs known, pt to dc home this evening.       Plan  Anticipated Discharge Date:  2/23/2018   Anticipated Discharge Plan:  Dc to home after IV teaching occurs,     Radha Wheeler, VADIMCC, BSN    [facility]  [address] [place] [id number]    [email or web address]  [email or web address]    Office: [phone] Pager: [phone]

## 2018-02-23 NOTE — PLAN OF CARE
"Problem: Patient Care Overview  Goal: Plan of Care/Patient Progress Review  Outcome: Improving  /90 (BP Location: Left arm)  Pulse 121  Temp 98.2  F (36.8  C) (Oral)  Resp 18  Ht 1.753 m (5' 9\")  Wt 63 kg (139 lb)  SpO2 93%  BMI 20.53 kg/m2    Pt is tachycardic, with a max of 121. Other vitals are stable on RA. She triggered sepsis protocol, lactic was 1.1. Pt complains of pain in her feet was given prn pain meds x 1. Pt went down for PICC placement today, she now has a single lumen PICC in her R arm so that she can go home on her antibiotics. BGs were 267 and 227, insulin correction given as ordered. Pt is voiding adequate amounts of urine. No BM tonight. Pt did not eat her dinner, she has a poor appetite. Pt to start her using her personal insulin pump tomorrow before DC. She is resting comfortably now, will continue to monitor care.       "

## 2018-02-23 NOTE — PROGRESS NOTES
"[facility] Nurse Inpatient Wound Assessment     Re  Assessment  Reason for consultation: Evaluate and treat bilateral foot wounds     Assessment  R plantar foot and Left lateral heel wounds due to Neuropathic Ulcer abscess  Status: improving, decreasing amt of purulent fluid.   Recommend using Iodoform packing for 7 days         Treatment Plan  Sween 24 lotion to intact skin on feet, avoid webbing between the toes.    Bilateral foot wounds:  Change daily.   2/23/18 revised wound care instructions:  -Cleanse wounds with microklenz spray and gauze.  -Using a sterile Q-tip, Pack Iodoform packing strip into tunnels on the top of foot and incisional wound on bottom of foot   -Apply Iodosorb gel to large wound on bottom of the right foot and the heel wound on the left.   -Cover all wounds with dry gauze or ABD pad.    -Secure with kerlix roll gauze.   -Secure kerlix roll gauze with loose ACE wrap.   Change dressings daily.    After 7 days, discontinue the Iodoform packing into the tunnels and start packing with Nu-Gauze packing strip.         Orders revised   Discharging to home today with Home care f/u for wound care   Nursing to notify the Provider(s) and re-consult the [facility] Nurse if wound(s) deteriorates or new skin concern.    Patient History  According to provider note(s):  history of NICM, HTN, COPD, GERD, chronic pancreatitis s/p pylorus sparing Adrienne (2001) w/ secondary DM and diabetic foot ulcers, depression, and chronic methadone use who was admitted 2/16 with sepsis 2/2 RLE cellulitis, as well as acute hypoxic respiratory failure  six-week history of a right foot wound.  She noticed an ulcer which she kept covered then subsequently \"healed over\" but then reopened with drainage.  She noticed some intermittent fevers but otherwise feels well. S/p  I&D of wound 2/19/18 and 2/21/18    Objective Data  Active Diet Order    Active Diet Order      Diet      Moderate Consistent CHO Diet    Output:   I/O last 3 completed " shifts:  In: 270 [I.V.:270]  Out: 1250 [Urine:1250]    Risk Assessment:    Kye Kye Score  Av.9  Min: 18  Max: 20                            Labs:   Recent Labs  Lab 18  0636  18  1528   HGB 9.1*  < > 11.9   WBC 13.8*  < > 22.3*   A1C  --   --  12.8*   .0*  < > 290.0*   < > = values in this interval not displayed.  Glucose Values Latest Ref Rng & Units 2018   Bedside Glucose (mg/dl )  - -- -- -- -- -- -- --   GLUCOSE 70 - 99 mg/dL 125(H) 170(H) 267(H) 227(H) 174(H) 202(H) 143(H)   Some recent data might be hidden     Physical Exam  Skin assessment: bilateral feet  Skin appropriately warm, dry, peeling and cracking.  DP 1/4, very little sensation in feet.         18 R Plantar foot      18 R dorsal foot   Wound Location: right    Wound History:  started as a callus, open wound noted 6 weeks ago. Wound debrided in Podiatry clinic on  and she was admitted for hypoxia, fever, and tachycardia.  IV Vanco and Zosyn started. MRI right foot showed fluid collection tracking dorsally around 1st met head, ortho performed bedside I&D 18 and 18. Pain improved after debridement   Currently NWB on R foot but does have a DH2 shoe that she had been wearing.       Measurements (length x width x depth, in cm)  1st metatarsal head.  2 cm x 2.3 cm  x  0.9 cm Wound Base:  100% moist subcutaneous tissue, no granulation   Midfoot incision:  1.1 in length and 1 cm depth tunnels superiorly, too narrow to measure    Palpation of the wound bed: slightly boggy   Periwound skin: dark callus.  White tinged fluctuant area medial to wound, between 1st and 2nd MTPJ is unpigmented but fluctuance has improved.  When this area is pressed, very small amt of purulent drainage expressed from wound.      Temperature: no increased warmth   Drainage:, purulent from pocket between 1st and 2nd MTPJ, serosanguinous from wound bed.    Odor:  mild  Pain: during palpation only      Dorsal foot:  2nd metarsal head:   Measurements:  1.6 x 1.5 x 0.9   Undermining:  Up to 1.5 cm surrounding the entire wound.    Wound base:  Yellow fibrin and slough   Periwound:  Sloughing epithelium, edematous   Drainage:  Moderate amt of serosanguinous, small amt of tan purulent       Wound Location:  Left medial heel   Wound history:  Site of previous wound.  Pt does not have diabetic shoes for this foot  Measurements (length x width x depth, in cm) 1.1 cm x 0.3 cm  x  0.2 cm   Wound Base:  100% smooth pink dermis   Tunneling N/A  Undermining N/A  Palpation of the wound bed: normal   Periwound skin: peeling nonpigmented scar tissue   Color: normal and consistent with surrounding tissue  Temperature: normal   Drainage:, none  Odor: none  Pain: denies ,     Interventions  Current support surface: Standard  Atmos Air mattress    Visual inspection of wound(s) completed  Wound Care: done per plan of care to right foot, floor RN will complete left foot dressing when Iodosorb arrives     Supplies: ordered: Iodosorb  Education provided today: diabetic foot wear, f/u POC     Discussed plan of care with Patient and Nurse    Face to face time: 35 minutes      Ofe Calvillo RN

## 2018-02-23 NOTE — DISCHARGE INSTRUCTIONS
2/23/18 revised wound care instructions:  -Cleanse wounds with microklenz spray and gauze.  -Using a sterile Q-tip, Pack Iodoform packing strip into tunnels on the top of foot and incisional wound on bottom of foot   -Apply Iodosorb gel to large wound on bottom of the right foot and the heel wound on the left.   -Cover all wounds with dry gauze or ABD pad.    -Secure with kerlix roll gauze.   -Secure kerlix roll gauze with loose ACE wrap.   Change dressings daily.    After 7 days, discontinue the Iodoform packing into the tunnels and start packing with Nu-Gauze packing strip.

## 2018-02-23 NOTE — PHARMACY-VANCOMYCIN DOSING SERVICE
Pharmacy Vancomycin Note  Date of Service 2018  Patient's  1965   52 year old, female    Indication: R foot ulcer with large plantar fluid collection -  tissue culture = MRSA (Vancomycin SUGEY <=0.5) and Coag negative staph  Goal Trough Level: 15-20 mg/L  Day of Therapy: 8  Current Vancomycin regimen:  1000 mg IV q12h (recent increase from 1250 mg IV Q24H)    Current estimated CrCl = Estimated Creatinine Clearance: 53.6 mL/min (based on Cr of 1.25).    Creatinine for last 3 days  2018:  6:36 AM Creatinine 1.25 mg/dL    Recent Vancomycin Levels (past 3 days)  2018:  8:41 PM Vancomycin Level 9.8 mg/L  2018:  9:42 PM Vancomycin Level 24.6 mg/L    Vancomycin IV Administrations (past 72 hours)                   vancomycin (VANCOCIN) 1000 mg in dextrose 5% 200 mL PREMIX (mg) 1,000 mg New Bag 18 0006     1,000 mg New Bag 18 1117     1,000 mg New Bag 18 2308     1,000 mg New Bag  1152    vancomycin (VANCOCIN) 1,250 mg in sodium chloride 0.9 % 250 mL intermittent infusion (mg) 1,250 mg New Bag 18 2227                Nephrotoxins and other renal medications (Future)    Start     Dose/Rate Route Frequency Ordered Stop    18 1200  vancomycin (VANCOCIN) 750 mg in sodium chloride 0.9 % 250 mL intermittent infusion      750 mg  over 90 Minutes Intravenous EVERY 12 HOURS 18 0745      18 0000  furosemide (LASIX) 20 MG tablet      20 mg Oral DAILY 18 1608      18 0000  lisinopril (PRINIVIL/ZESTRIL) 10 MG tablet      10 mg Oral DAILY 18 1608      18 1015  lisinopril (PRINIVIL/ZESTRIL) tablet 10 mg      10 mg Oral DAILY 18 1013      18 1015  furosemide (LASIX) tablet 20 mg      20 mg Oral DAILY 18 1013               Contrast Orders - past 72 hours     None          Interpretation of levels and current regimen:  Trough level is  Supratherapeutic    Has serum creatinine changed > 50% in last 72 hours: No  Urine  output:  good urine output  Renal Function: Stable    Plan:  1.  Decrease Dose to 750 mg IV Q12H  2.  Pharmacy will check trough levels as appropriate in 1-3 Days.    3. Serum creatinine levels will be ordered daily for the first week of therapy and at least twice weekly for subsequent weeks.      Jesica Alan, PharmD, BCPS  Pager [phone]        .

## 2018-02-23 NOTE — PLAN OF CARE
"Problem: Patient Care Overview  Goal: Plan of Care/Patient Progress Review  Outcome: Adequate for Discharge Date Met: 02/23/18  /88 (BP Location: Left arm)  Pulse 63  Temp 98.5  F (36.9  C) (Oral)  Resp 16  Ht 1.753 m (5' 9\")  Wt 63.5 kg (139 lb 15.9 oz)  SpO2 97%  BMI 20.67 kg/m2     9102-8371: AVSS on RA. Patient reporting foot pain, PRN oxycodone x1 and lidocaine patches applied to BLE. Patient denies nausea. Voiding adequately. LBM 2/22. Mod consistent carb diet with fair PO intake. , 123, 112. Personal insulin pump started prior to DC - infusing 0.5 units/hr 5375-4273 and 0.3 units/hr from 1085-8068. R PICC SL. Patient received PICC/IV med teaching at [facility] this afternoon. L PIV removed prior to DC. BL foot wound dressings changed by WOC RN today. Updated wound care orders in AVS. Supplies sent with patient. Will receive HI from [place] and  services. Ambulated off unit independently with family. DC to home.          "

## 2018-02-23 NOTE — PLAN OF CARE
Problem: Patient Care Overview  Goal: Plan of Care/Patient Progress Review  Outcome: No Change  Max temp 99.4, tachycardic and used 1.5L O2 while asleep. BP's stable. Decline any pain or nausea.  and 202. PICC SL and LPIV SL. No BM during shift. Continues to have a poor appetite. Will be DC'ed on her home insulin pump. Rested quietly throughout night and used her call light appropriately.

## 2018-02-24 LAB
BACTERIA SPEC CULT: NO GROWTH
BACTERIA SPEC CULT: NO GROWTH
SPECIMEN SOURCE: NORMAL
SPECIMEN SOURCE: NORMAL

## 2018-02-25 ENCOUNTER — CARE COORDINATION (OUTPATIENT)
Dept: CARE COORDINATION | Facility: CLINIC | Age: 53
End: 2018-02-25

## 2018-02-26 ENCOUNTER — PRE VISIT (OUTPATIENT)
Dept: CARDIOLOGY | Facility: CLINIC | Age: 53
End: 2018-02-26

## 2018-02-26 LAB
BACTERIA SPEC CULT: NORMAL
Lab: NORMAL
SPECIMEN SOURCE: NORMAL

## 2018-02-27 ENCOUNTER — TELEPHONE (OUTPATIENT)
Dept: INFECTIOUS DISEASES | Facility: CLINIC | Age: 53
End: 2018-02-27

## 2018-02-27 ENCOUNTER — TELEPHONE (OUTPATIENT)
Dept: FAMILY MEDICINE | Facility: CLINIC | Age: 53
End: 2018-02-27

## 2018-02-27 ENCOUNTER — NURSE TRIAGE (OUTPATIENT)
Dept: NURSING | Facility: CLINIC | Age: 53
End: 2018-02-27

## 2018-02-27 NOTE — TELEPHONE ENCOUNTER
Called Ronal at [facility] to let her know pt is Dr Drummond's pt. She reports the vanco is being held because vanco level was too high. They will redraw pt this afternoon (pt was at an appt earlier which is why they could not get a hold of her) and fax results to Dr Drummond.  Cathy Hdez RN

## 2018-02-27 NOTE — TELEPHONE ENCOUNTER
Reason for Call:  Other      Detailed comments: Shaina is calling for Dr Levy an Infectious Disease provider, they are on assumption that patient is being seen today and if a vancomycin be redrawn?    Phone Number Patient can be reached at: Other phone number:    KATHERINE/SHAINA (Pharmacy) [phone]         Best Time: any    Can we leave a detailed message on this number? YES    Call taken on 2/27/2018 at 12:28 PM by Jovita Yousif

## 2018-02-27 NOTE — TELEPHONE ENCOUNTER
Ronal with [facility], called to advise Dr Levy, that they will continue to hold vanco until they are able to reach pt & redraw labs.    Ronal can be called at [phone].

## 2018-02-28 ENCOUNTER — TELEPHONE (OUTPATIENT)
Dept: INFECTIOUS DISEASES | Facility: CLINIC | Age: 53
End: 2018-02-28

## 2018-02-28 NOTE — TELEPHONE ENCOUNTER
Ronal with [facility] calling re: pt's labs from yesterday evening. Pt's vanco level is still high at 23.1 and creatinine continues to increase, was 2.16. Wondering if Dr. Drummond wants to continue IV vanco, switch to a different agent, etc.  Haley [place] RN

## 2018-02-28 NOTE — TELEPHONE ENCOUNTER
Milady with [facility] calling with critical lab value of today's date.  Kasi lópez is 23.1.  She states she has tried to contact ordering physician and no one has any contact information on that doctor and she keeps getting routed to FNA.  She states the ordering physician is Francisco Sinclair.  FNA found similar name listed at clinic in [employer] Web paging and contacted page , Diana, for assistance.  She also sees physicians name, but no contact information.  FNA asked page  to page on call for PCP to call back FNA.  Dr. ASHER Alberto called back and was given critical lab value.  She will contact patient/emergency contact and asks that lab fax results to clinic tomorrow; FNA called Milady at lab back to inform critical lab value was given to Dr. ASHER Alberto and to fax results to clinic.

## 2018-03-01 NOTE — TELEPHONE ENCOUNTER
Hoda Madison MD   You 16 hours ago (4:05 PM)                 Hi Haley,     She should switch to Daptomycin. The problem is she will have to come in to an infusion center to get her first dose. Would you be able to organize this? It should be Dapto 6mg/kg IV q24h until I see her in 2 weeks.     Hoda (Routing comment)         Spoke with Ronal at [facility] and relayed message above. She will help arrange first dose of dapto at home. Spoke with Alessandra and let her know we are changing IV abx and she should continue holding vanco until she hears from [facility]. Pt verbalized understanding.   Haley Calhoun RN

## 2018-03-02 NOTE — TELEPHONE ENCOUNTER
Ronal from [facility] is requesting a return call regarding patients IV medication.   Please call Ronal at [phone].   Thanks   Tamara Hopper LPN

## 2018-03-03 ENCOUNTER — HEALTH MAINTENANCE LETTER (OUTPATIENT)
Age: 53
End: 2018-03-03

## 2018-03-06 ENCOUNTER — TELEPHONE (OUTPATIENT)
Dept: FAMILY MEDICINE | Facility: CLINIC | Age: 53
End: 2018-03-06

## 2018-03-12 ENCOUNTER — TELEPHONE (OUTPATIENT)
Dept: INFECTIOUS DISEASES | Facility: CLINIC | Age: 53
End: 2018-03-12

## 2018-03-12 NOTE — TELEPHONE ENCOUNTER
Pt did not show up for appt today. Called her and she was not aware that she had an appt today. We rescheduled her to 3/19 with Dr. Drummond at 2pm. Called Ronal at [facility] to continue IV abxs until this appt. Left my direct number for questions.  Haley Calhoun RN

## 2018-03-14 ENCOUNTER — TELEPHONE (OUTPATIENT)
Dept: FAMILY MEDICINE | Facility: CLINIC | Age: 53
End: 2018-03-14

## 2018-03-14 ENCOUNTER — NURSE TRIAGE (OUTPATIENT)
Dept: NURSING | Facility: CLINIC | Age: 53
End: 2018-03-14

## 2018-03-15 ENCOUNTER — ALLIED HEALTH/NURSE VISIT (OUTPATIENT)
Dept: CARDIOLOGY | Facility: CLINIC | Age: 53
End: 2018-03-15
Attending: INTERNAL MEDICINE
Payer: COMMERCIAL

## 2018-03-15 ENCOUNTER — TELEPHONE (OUTPATIENT)
Dept: INFECTIOUS DISEASES | Facility: CLINIC | Age: 53
End: 2018-03-15

## 2018-03-15 DIAGNOSIS — I42.8 NONISCHEMIC CARDIOMYOPATHY (H): Primary | Chronic | ICD-10-CM

## 2018-03-15 PROCEDURE — 93295 DEV INTERROG REMOTE 1/2/MLT: CPT | Performed by: INTERNAL MEDICINE

## 2018-03-15 PROCEDURE — 93296 REM INTERROG EVL PM/IDS: CPT | Mod: ZF

## 2018-03-15 NOTE — TELEPHONE ENCOUNTER
Call from [facility]; critical lab value of  [id number] done at 1715 today 03/14/18; lab's only instruction is to fax results to ? ID @ [facility] ; Advised to follow instructions and fax  Review of EMR reveals PCP is also following   Contacted Dr. Bruner ( On call for [facility] IM Dr.Branch Dc)and given result   Autumn Peters RN  FNA

## 2018-03-15 NOTE — MR AVS SNAPSHOT
After Visit Summary   3/15/2018    Alessandra Pak    MRN: [id number]           Patient Information     Date Of Birth          1965        Visit Information        Provider Department      3/15/2018 6:00 AM [facility]        Today's Diagnoses     Nonischemic cardiomyopathy (H)    -  1       Follow-ups after your visit        Your next 10 appointments already scheduled     Apr 02, 2018  2:00 PM CDT   CT CHEST W/O CONTRAST with MGCT1   [facility])    [address] [place] [address] [id number] [phone]           Please bring any scans or X-rays taken at other hospitals, if similar tests were done. Also bring a list of your medicines, including vitamins, minerals and over-the-counter drugs. It is safest to leave personal items at home.  Be sure to tell your doctor:   If you have any allergies.   If there s any chance you are pregnant.   If you are breastfeeding.  You do not need to do anything special to prepare for this exam.  Please wear loose clothing, such as a sweat suit or jogging clothes. Avoid snaps, zippers and other metal. We may ask you to undress and put on a hospital gown.            Apr 02, 2018  2:30 PM CDT   Office Visit with PFT LAB   [facility])    [address] [place] [address] [id number] [phone]           Bring a current list of meds and any records pertaining to this visit. For Physicals, please bring immunization records and any forms needing to be filled out. Please arrive 10 minutes early to complete paperwork.            Apr 02, 2018  3:30 PM CDT   Return Visit with Rene Swartz MD   [facility])    [address] [place] [phone]   [phone]            Apr 06, 2018  1:50 PM CDT   (Arrive by 1:35 PM)   RETURN ENDOCRINE with Gisselle Maya MD   [facility]  Endocrinology ([facility])    [address]  [address]rd [facility] [id number] [phone]              Future tests that were ordered for you today     Open Future Orders        Priority Expected Expires Ordered    General PFT Lab (Please always keep checked) Routine 4/2/2018 3/22/2019 3/22/2018    Pulmonary Function Test Routine 4/2/2018 3/22/2019 3/22/2018            Who to contact     If you have questions or need follow up information about today's clinic visit or your schedule please contact [facility] directly at [phone].  Normal or non-critical lab and imaging results will be communicated to you by [employer], letter or phone within 4 business days after the clinic has received the results. If you do not hear from us within 7 days, please contact the clinic through [employer] or phone. If you have a critical or abnormal lab result, we will notify you by phone as soon as possible.  Submit refill requests through [employer] or call your pharmacy and they will forward the refill request to us. Please allow 3 business days for your refill to be completed.          Additional Information About Your Visit        [employer] Information     [employer] gives you secure access to your electronic health record. If you see a primary care provider, you can also send messages to your care team and make appointments. If you have questions, please call your primary care clinic.  If you do not have a primary care provider, please call [phone] and they will assist you.        Care EveryWhere ID     This is your Care EveryWhere ID. This could be used by other organizations to access your [facility] medical records  [id number]         Blood Pressure from Last 3 Encounters:   02/23/18 109/88   02/16/18 135/84   02/05/18 124/70    Weight from Last 3 Encounters:   02/23/18 63.5 kg (139 lb 15.9 oz)   02/16/18 63.5 kg (140 lb)   02/13/18 65.8 kg (145 lb)              We Performed the  Following     INTERROGATION DEVICE EVAL REMOTE, PACER/ICD          Today's Medication Changes          These changes are accurate as of 3/15/18 11:59 PM.  If you have any questions, ask your nurse or doctor.               These medicines have changed or have updated prescriptions.        Dose/Directions    MIRENA (52 MG) 20 MCG/24HR IUD   This may have changed:  See the new instructions.   Used for:  Excessive or frequent menstruation   Generic drug:  levonorgestrel        placed today   Quantity:  1   Refills:  0                Primary Care Provider Office Phone # Fax Freda Tenorio Mary Dc [phone]       [address]  [place] [facility] [address]        Equal Access to Services     [facility] : Hadii andres bolivar hadasho Soomaali, waaxda luqadaha, qaybta kaalmada adeegyada, sanjay levine . So [facility] [phone].    ATENCIÓN: Si habla español, tiene a nagy disposición servicios gratuitos de asistencia lingüística. Llame al [phone].    We comply with applicable federal civil rights laws and [place] laws. We do not discriminate on the basis of race, color, national origin, age, disability, sex, sexual orientation, or gender identity.            Thank you!     Thank you for choosing [facility]  for your care. Our goal is always to provide you with excellent care. Hearing back from our patients is one way we can continue to improve our services. Please take a few minutes to complete the written survey that you may receive in the mail after your visit with us. Thank you!             Your Updated Medication List - Protect others around you: Learn how to safely use, store and throw away your medicines at www.disposemymeds.org.          This list is accurate as of 3/15/18 11:59 PM.  Always use your most recent med list.                   Brand Name Dispense Instructions for use Diagnosis    acetone (Urine) test Strp     25 each    1 strip by In Vitro  route as needed    Type 2 diabetes mellitus with diabetic neuropathy (H)       albuterol 108 (90 BASE) MCG/ACT Inhaler    PROAIR HFA    1 Inhaler    Inhale 2 puffs into the lungs every 4 hours as needed for shortness of breath / dyspnea    SOB (shortness of breath)       amitriptyline 50 MG tablet    ELAVIL    90 tablet    Take 1 tablet (50 mg) by mouth At Bedtime    Anxiety       aspirin 81 MG tablet     100    1 tab po QD (Once per day)    Chronic pancreatitis (H)       * blood glucose monitoring lancets     3 Box    1 each See Admin Instructions test 3-4 times per day    Type 2 diabetes, HbA1C goal < 8% (H)       * blood glucose monitoring lancets     1 Box    Use to test blood sugar 10 times daily or as directed.  Ok to substitute alternative if insurance prefers.    Diabetes mellitus type 1 (H)       blood glucose monitoring test strip    HOLLIE CONTOUR NEXT    300 each    Use to test blood sugar 10 times daily or as directed.  Ok to substitute alternative if insurance prefers.    Diabetes mellitus type 1 (H)       COMBIVENT RESPIMAT  MCG/ACT inhaler   Generic drug:  Ipratropium-Albuterol      Inhale 1 puff into the lungs 4 times daily Do not exceed 6 doses/day.        fluticasone 50 MCG/ACT spray    FLONASE    1 Bottle    Spray 2 sprays into left nostril daily    Nasal mass       furosemide 20 MG tablet    LASIX    30 tablet    Take 1 tablet (20 mg) by mouth daily    Hypertension goal BP (blood pressure) < 140/90       gabapentin 8 % Gel topical PLO cream     50 g    Apply 1 g topically every 8 hours    Other chronic pain       glucose 4 G Chew chewable tablet     25 tablet    Take 3-4 tablets to treat low blood sugar.    Uncontrolled diabetes mellitus with complications (H)       insulin aspart 100 UNITS/ML injection    NovoLOG VIAL    60 mL    Use as directed in insulin pump. Patient using up to 60 units per day    Type 2 diabetes mellitus with diabetic neuropathy (H)       insulin pen needle 31G X 5 MM     B-D U/F    3 each    Use 3 time(s) a day.    Type 2 diabetes, HbA1c goal < 7% (H)       Lidocaine 4 % Patch    LIDOCARE    30 patch    Place 3 patches onto the skin every 24 hours    Other chronic pain       lidocaine HCl 3 % cream      Apply topically 3 times daily as needed (foot pain) OTC product        lisinopril 10 MG tablet    PRINIVIL/ZESTRIL    30 tablet    Take 1 tablet (10 mg) by mouth daily    Hypertension goal BP (blood pressure) < 140/90       methadone 10 mg/mL Conc (HIGH CONC) solution    DOLPHINE-INTENSOL     Take 7 mLs by mouth daily.    Chemical dependency (H)       MIRENA (52 MG) 20 MCG/24HR IUD   Generic drug:  levonorgestrel     1    placed today    Excessive or frequent menstruation       multivitamin, therapeutic with minerals Tabs tablet     30 each    Take 1 tablet by mouth daily    Heart failure and kidney disease due to high blood pressure (H)       oxyCODONE IR 5 MG tablet    ROXICODONE    12 tablet    Take 1 tablet (5 mg) by mouth every 6 hours as needed for moderate to severe pain    Diabetic ulcer of right midfoot associated with type 1 diabetes mellitus, unspecified ulcer stage (H)       polyethylene glycol Packet    MIRALAX/GLYCOLAX     Take 17 g by mouth daily as needed for constipation        ranitidine 300 MG tablet    ZANTAC    90 tablet    Take 1 tablet (300 mg) by mouth daily    Gastroesophageal reflux disease without esophagitis       SM NICOTINE 21 MG/24HR 24 hr patch   Generic drug:  nicotine     28 patch    REMOVE OLD PATCH AND APPLY ONE NEW PATCH TO SKIN EVERY 24 HOURS    Tobacco dependence syndrome       vancomycin 750 mg     35 Units    Inject 750 mg into the vein every 12 hours    Cellulitis of foot, Diabetic ulcer of right midfoot associated with type 1 diabetes mellitus, unspecified ulcer stage (H)       * Notice:  This list has 2 medication(s) that are the same as other medications prescribed for you. Read the directions carefully, and ask your doctor or other  care provider to review them with you.

## 2018-03-15 NOTE — TELEPHONE ENCOUNTER
Dre with [facility], called to report lab results to Dr Madison,    Dre states pt is on Daptomycin 380 mg. Labs were drawn 03/15 & CK is 2122.    Dre states he spoke with pt & reported that pt is not experiencing any symptoms, no muscle weakness, no myalgia.    [facility] PharmD can be called at [phone], Dre will fax lab results.

## 2018-03-15 NOTE — TELEPHONE ENCOUNTER
Progress Notes   Yaz Bruner MD (Physician)     Family Practice   Unsigned      CK elevated, labs were drawn at [facility].   Talked to pt, per pt CK was ordered by ID specialist.   Pt was informed by her home visit RN about it an hr ago and plan was to fax the results to ID and PCP . Per pt most likely they are going to change the antibiotics.   Pt denies any new symptoms at this point. She denies muscles aches/ pain, denies CP, sob, n/v, HA, vision changes etc.   Plan:   - fax results to ID as planned.   - routing to PCP to review.   - pt to got ER in case any new symptoms develop.      Yaz Bruner MD.   Family Physician.  [facility]

## 2018-03-15 NOTE — TELEPHONE ENCOUNTER
"Patient Communication Preferences indicate  Do not contact  and/or communication by \"Phone\" is not preferred. Call not required per Outreach team.    "
115

## 2018-03-19 ENCOUNTER — TELEPHONE (OUTPATIENT)
Dept: INFECTIOUS DISEASES | Facility: CLINIC | Age: 53
End: 2018-03-19

## 2018-03-19 NOTE — TELEPHONE ENCOUNTER
Adis from [facility] was calling for orders on Daptomycin. Patient was to have seen her provider today, supply only ordered through today 3/19/18. Please call to update at [phone].

## 2018-03-20 NOTE — TELEPHONE ENCOUNTER
[facility] called back asking for an update on the message below. Informed pharmacist that the message was routed to Dr. Mai, but does not look like there is a response yet. Please call [phone] when a decision has been made.    Chandra Pike RN

## 2018-03-20 NOTE — TELEPHONE ENCOUNTER
Spoke with Dr. Drummond re: pt's IV abx. Pt did not show up for appt yesterday 3/19. Dr. Drummond would like to stop IV abx and pull PICC line. She also wants pt to know that she can return to clinic any time for f/u when she is able to make it or she can go to her PCP and have them look at her foot. Called Ronal, pharmacist at [facility], and gave her orders to d/c abx and PICC. She will relay information/message from Dr. Drummond to pt.  Haley Calhoun, RN

## 2018-03-23 NOTE — PROGRESS NOTES
Preliminary Device Interrogation Results.  Final physician signed paceart report to be scanned and attached.    Remote ICD transmission received and reviewed.  Device transmission sent per MD orders.  Patient has a BS single lead ICD.  Normal ICD function.  5 NSVT episodes recorded, 173-187 bpm., 3-5 beats, from 5 - 37 sec.  Available EGM's reveal frequent 2-3 beats in an episode.  Presenting EGM = VS @ 94 bpm.   = 0%.    Estimated battery longevity to MARGI = 10 years.  RV lead impedance trends appear stable.  Patient notified of interrogation results via VM.  Plan for patient to return to clinic in 3 months as scheduled.    Remote ICD transmission

## 2018-03-25 DIAGNOSIS — E11.40 CONTROLLED TYPE 2 DIABETES WITH NEUROPATHY (H): ICD-10-CM

## 2018-03-25 NOTE — TELEPHONE ENCOUNTER
Requested Prescriptions   Pending Prescriptions Disp Refills     LYRICA 75 MG capsule [Pharmacy Med Name: LYRICA 75 MG CAPSULE] 90 capsule      Sig: TAKE 1 CAPSULE BY MOUTH 3 TIMES DAILY    There is no refill protocol information for this order        Last Written Prescription Date:  9/25/17  Last Fill Quantity: 90,  # refills: 5   Last Office Visit with [facility] [place] or [facility] prescribing provider:  2/19/2018     Future Office Visit:    Next 5 appointments (look out 90 days)     Apr 02, 2018  2:30 PM CDT   Office Visit with PFT LAB   [facility] ([facility]    [address] [place] [id number]   [phone]            Apr 02, 2018  3:30 PM CDT   Return Visit with Rene Swartz MD   [facility] ([facility])    [address] [place] [id number]   [phone]

## 2018-03-26 RX ORDER — PREGABALIN 75 MG
CAPSULE ORAL
Qty: 90 CAPSULE | Refills: 5 | Status: ON HOLD | OUTPATIENT
Start: 2018-03-26 | End: 2018-07-05

## 2018-03-26 NOTE — TELEPHONE ENCOUNTER
Requested Prescriptions   Pending Prescriptions Disp Refills     LYRICA 75 MG capsule [Pharmacy Med Name: LYRICA 75 MG CAPSULE] 90 capsule      Sig: TAKE 1 CAPSULE BY MOUTH 3 TIMES DAILY    There is no refill protocol information for this order      Routing refill request to provider for review/approval because:  Drug not on the [facility] refill protocol   Eun Barnes RN

## 2018-03-26 NOTE — TELEPHONE ENCOUNTER
Faxed Rx for the Lyrica to [facility], [place], [phone],  Right fax confirmed at 3:37 pm today.  Beatriz Alfredo MA/  For [employer] and Kristen

## 2018-04-03 ENCOUNTER — TELEPHONE (OUTPATIENT)
Dept: ENDOCRINOLOGY | Facility: CLINIC | Age: 53
End: 2018-04-03

## 2018-04-04 ENCOUNTER — MEDICAL CORRESPONDENCE (OUTPATIENT)
Dept: HEALTH INFORMATION MANAGEMENT | Facility: CLINIC | Age: 53
End: 2018-04-04

## 2018-04-04 DIAGNOSIS — F41.9 ANXIETY: ICD-10-CM

## 2018-04-04 NOTE — TELEPHONE ENCOUNTER
amitriptyline (ELAVIL) 50 MG tablet filled 2/5/18 with 90 tablets and 3 refill.          David Faarax  [facility]

## 2018-04-05 RX ORDER — AMITRIPTYLINE HYDROCHLORIDE 50 MG/1
TABLET ORAL
Qty: 90 TABLET | Refills: 0 | OUTPATIENT
Start: 2018-04-05

## 2018-04-05 NOTE — TELEPHONE ENCOUNTER
Refill request denied. Medication last written on 2/5/18, #90 with 3 refills. Sent to same requesting pharmacy.    Charla Gilliland RN  [facility] Triage

## 2018-04-25 ENCOUNTER — TELEPHONE (OUTPATIENT)
Dept: FAMILY MEDICINE | Facility: CLINIC | Age: 53
End: 2018-04-25

## 2018-04-25 DIAGNOSIS — D06.9 CIN III (CERVICAL INTRAEPITHELIAL NEOPLASIA GRADE III) WITH SEVERE DYSPLASIA: ICD-10-CM

## 2018-06-06 ENCOUNTER — APPOINTMENT (OUTPATIENT)
Dept: GENERAL RADIOLOGY | Facility: CLINIC | Age: 53
DRG: 853 | End: 2018-06-06
Attending: EMERGENCY MEDICINE
Payer: COMMERCIAL

## 2018-06-06 ENCOUNTER — HOSPITAL ENCOUNTER (INPATIENT)
Facility: CLINIC | Age: 53
LOS: 33 days | Discharge: NURSING, REHAB OR LONG-TERM CARE | DRG: 853 | End: 2018-07-10
Attending: EMERGENCY MEDICINE | Admitting: PEDIATRICS
Payer: COMMERCIAL

## 2018-06-06 DIAGNOSIS — R41.82 ALTERED MENTAL STATUS, UNSPECIFIED ALTERED MENTAL STATUS TYPE: ICD-10-CM

## 2018-06-06 DIAGNOSIS — R06.02 SOB (SHORTNESS OF BREATH): ICD-10-CM

## 2018-06-06 DIAGNOSIS — E11.40 CONTROLLED TYPE 2 DIABETES WITH NEUROPATHY (H): ICD-10-CM

## 2018-06-06 DIAGNOSIS — A41.9 SEPSIS, DUE TO UNSPECIFIED ORGANISM: Primary | ICD-10-CM

## 2018-06-06 DIAGNOSIS — I10 HYPERTENSION GOAL BP (BLOOD PRESSURE) < 140/90: Chronic | ICD-10-CM

## 2018-06-06 DIAGNOSIS — Z76.89 HEALTH CARE HOME: ICD-10-CM

## 2018-06-06 DIAGNOSIS — J96.21 ACUTE ON CHRONIC RESPIRATORY FAILURE WITH HYPOXIA (H): ICD-10-CM

## 2018-06-06 DIAGNOSIS — J44.9 CHRONIC OBSTRUCTIVE PULMONARY DISEASE, UNSPECIFIED COPD TYPE (H): Chronic | ICD-10-CM

## 2018-06-06 DIAGNOSIS — Z89.511 STATUS POST BELOW KNEE AMPUTATION OF RIGHT LOWER EXTREMITY (H): ICD-10-CM

## 2018-06-06 DIAGNOSIS — I42.8 NICM (NONISCHEMIC CARDIOMYOPATHY) (H): ICD-10-CM

## 2018-06-06 DIAGNOSIS — N92.0 EXCESSIVE OR FREQUENT MENSTRUATION: ICD-10-CM

## 2018-06-06 DIAGNOSIS — E11.8 TYPE 2 DIABETES MELLITUS WITH COMPLICATION, WITH LONG-TERM CURRENT USE OF INSULIN (H): ICD-10-CM

## 2018-06-06 DIAGNOSIS — I42.8 NONISCHEMIC CARDIOMYOPATHY (H): Chronic | ICD-10-CM

## 2018-06-06 DIAGNOSIS — J34.89 NASAL MASS: ICD-10-CM

## 2018-06-06 DIAGNOSIS — R09.02 HYPOXEMIA: ICD-10-CM

## 2018-06-06 DIAGNOSIS — Z79.4 TYPE 2 DIABETES MELLITUS WITH COMPLICATION, WITH LONG-TERM CURRENT USE OF INSULIN (H): ICD-10-CM

## 2018-06-06 DIAGNOSIS — F41.9 ANXIETY: ICD-10-CM

## 2018-06-06 DIAGNOSIS — F19.20 CHEMICAL DEPENDENCY (H): ICD-10-CM

## 2018-06-06 DIAGNOSIS — M86.9 OSTEOMYELITIS OF RIGHT TIBIA, UNSPECIFIED TYPE (H): ICD-10-CM

## 2018-06-06 DIAGNOSIS — J18.9 PNEUMONIA OF BOTH LUNGS DUE TO INFECTIOUS ORGANISM, UNSPECIFIED PART OF LUNG: ICD-10-CM

## 2018-06-06 DIAGNOSIS — K86.1 CHRONIC PANCREATITIS (H): ICD-10-CM

## 2018-06-06 DIAGNOSIS — L97.412 SKIN ULCER OF RIGHT HEEL WITH FAT LAYER EXPOSED (H): Chronic | ICD-10-CM

## 2018-06-06 DIAGNOSIS — N17.9 ACUTE RENAL INJURY (H): ICD-10-CM

## 2018-06-06 DIAGNOSIS — R09.02 HYPOXIA: ICD-10-CM

## 2018-06-06 DIAGNOSIS — I13.0 HEART FAILURE AND KIDNEY DISEASE DUE TO HIGH BLOOD PRESSURE (H): ICD-10-CM

## 2018-06-06 DIAGNOSIS — K21.9 GASTROESOPHAGEAL REFLUX DISEASE WITHOUT ESOPHAGITIS: ICD-10-CM

## 2018-06-06 DIAGNOSIS — G89.29 OTHER CHRONIC PAIN: ICD-10-CM

## 2018-06-06 DIAGNOSIS — E87.1 HYPONATREMIA: ICD-10-CM

## 2018-06-06 DIAGNOSIS — K59.00 CONSTIPATION, UNSPECIFIED CONSTIPATION TYPE: ICD-10-CM

## 2018-06-06 LAB
ALBUMIN SERPL-MCNC: 2.5 G/DL (ref 3.4–5)
ALP SERPL-CCNC: 233 U/L (ref 40–150)
ALT SERPL W P-5'-P-CCNC: 27 U/L (ref 0–50)
ANION GAP SERPL CALCULATED.3IONS-SCNC: 6 MMOL/L (ref 3–14)
AST SERPL W P-5'-P-CCNC: ABNORMAL U/L (ref 0–45)
BASE EXCESS BLDV CALC-SCNC: 2.1 MMOL/L
BASOPHILS # BLD AUTO: 0 10E9/L (ref 0–0.2)
BASOPHILS NFR BLD AUTO: 0 %
BILIRUB SERPL-MCNC: 0.5 MG/DL (ref 0.2–1.3)
BUN SERPL-MCNC: 37 MG/DL (ref 7–30)
CALCIUM SERPL-MCNC: 9.3 MG/DL (ref 8.5–10.1)
CHLORIDE SERPL-SCNC: 95 MMOL/L (ref 94–109)
CO2 BLDCOV-SCNC: 31 MMOL/L (ref 21–28)
CO2 SERPL-SCNC: 26 MMOL/L (ref 20–32)
CREAT SERPL-MCNC: 2.16 MG/DL (ref 0.52–1.04)
DIFFERENTIAL METHOD BLD: ABNORMAL
EOSINOPHIL # BLD AUTO: 0 10E9/L (ref 0–0.7)
EOSINOPHIL NFR BLD AUTO: 0 %
ERYTHROCYTE [DISTWIDTH] IN BLOOD BY AUTOMATED COUNT: 15.8 % (ref 10–15)
GFR SERPL CREATININE-BSD FRML MDRD: 24 ML/MIN/1.7M2
GLUCOSE SERPL-MCNC: 193 MG/DL (ref 70–99)
HCO3 BLDV-SCNC: 28 MMOL/L (ref 21–28)
HCT VFR BLD AUTO: 29.7 % (ref 35–47)
HGB BLD-MCNC: 9.9 G/DL (ref 11.7–15.7)
IMM GRANULOCYTES # BLD: 0.1 10E9/L (ref 0–0.4)
IMM GRANULOCYTES NFR BLD: 0.4 %
INR PPP: 1.46 (ref 0.86–1.14)
LACTATE BLD-SCNC: 1.3 MMOL/L (ref 0.7–2.1)
LACTATE BLD-SCNC: 1.7 MMOL/L (ref 0.7–2)
LIPASE SERPL-CCNC: 17 U/L (ref 73–393)
LYMPHOCYTES # BLD AUTO: 1.3 10E9/L (ref 0.8–5.3)
LYMPHOCYTES NFR BLD AUTO: 4.7 %
MCH RBC QN AUTO: 25.4 PG (ref 26.5–33)
MCHC RBC AUTO-ENTMCNC: 33.3 G/DL (ref 31.5–36.5)
MCV RBC AUTO: 76 FL (ref 78–100)
MONOCYTES # BLD AUTO: 1.6 10E9/L (ref 0–1.3)
MONOCYTES NFR BLD AUTO: 5.6 %
NEUTROPHILS # BLD AUTO: 25.2 10E9/L (ref 1.6–8.3)
NEUTROPHILS NFR BLD AUTO: 89.3 %
NRBC # BLD AUTO: 0 10*3/UL
NRBC BLD AUTO-RTO: 0 /100
O2/TOTAL GAS SETTING VFR VENT: ABNORMAL %
PCO2 BLDV: 47 MM HG (ref 40–50)
PCO2 BLDV: 50 MM HG (ref 40–50)
PH BLDV: 7.36 PH (ref 7.32–7.43)
PH BLDV: 7.42 PH (ref 7.32–7.43)
PLATELET # BLD AUTO: 317 10E9/L (ref 150–450)
PO2 BLDV: 14 MM HG (ref 25–47)
PO2 BLDV: 17 MM HG (ref 25–47)
POTASSIUM SERPL-SCNC: 5.9 MMOL/L (ref 3.4–5.3)
PROCALCITONIN SERPL-MCNC: 5.36 NG/ML
PROT SERPL-MCNC: 8.9 G/DL (ref 6.8–8.8)
RBC # BLD AUTO: 3.9 10E12/L (ref 3.8–5.2)
SAO2 % BLDV FROM PO2: 18 %
SODIUM SERPL-SCNC: 128 MMOL/L (ref 133–144)
TROPONIN I SERPL-MCNC: <0.015 UG/L (ref 0–0.04)
WBC # BLD AUTO: 28.2 10E9/L (ref 4–11)

## 2018-06-06 PROCEDURE — 71045 X-RAY EXAM CHEST 1 VIEW: CPT

## 2018-06-06 PROCEDURE — 83690 ASSAY OF LIPASE: CPT | Performed by: EMERGENCY MEDICINE

## 2018-06-06 PROCEDURE — 84145 PROCALCITONIN (PCT): CPT | Performed by: EMERGENCY MEDICINE

## 2018-06-06 PROCEDURE — 87040 BLOOD CULTURE FOR BACTERIA: CPT | Performed by: EMERGENCY MEDICINE

## 2018-06-06 PROCEDURE — 85025 COMPLETE CBC W/AUTO DIFF WBC: CPT | Performed by: EMERGENCY MEDICINE

## 2018-06-06 PROCEDURE — 85610 PROTHROMBIN TIME: CPT | Performed by: EMERGENCY MEDICINE

## 2018-06-06 PROCEDURE — 96361 HYDRATE IV INFUSION ADD-ON: CPT | Performed by: EMERGENCY MEDICINE

## 2018-06-06 PROCEDURE — 83036 HEMOGLOBIN GLYCOSYLATED A1C: CPT | Performed by: EMERGENCY MEDICINE

## 2018-06-06 PROCEDURE — 40000556 ZZH STATISTIC PERIPHERAL IV START W US GUIDANCE

## 2018-06-06 PROCEDURE — 82803 BLOOD GASES ANY COMBINATION: CPT

## 2018-06-06 PROCEDURE — 82803 BLOOD GASES ANY COMBINATION: CPT | Performed by: EMERGENCY MEDICINE

## 2018-06-06 PROCEDURE — 93010 ELECTROCARDIOGRAM REPORT: CPT | Mod: Z6 | Performed by: EMERGENCY MEDICINE

## 2018-06-06 PROCEDURE — 99285 EMERGENCY DEPT VISIT HI MDM: CPT | Mod: 25 | Performed by: EMERGENCY MEDICINE

## 2018-06-06 PROCEDURE — 84484 ASSAY OF TROPONIN QUANT: CPT | Performed by: EMERGENCY MEDICINE

## 2018-06-06 PROCEDURE — 83605 ASSAY OF LACTIC ACID: CPT | Performed by: EMERGENCY MEDICINE

## 2018-06-06 PROCEDURE — 80053 COMPREHEN METABOLIC PANEL: CPT | Performed by: EMERGENCY MEDICINE

## 2018-06-06 PROCEDURE — 25000128 H RX IP 250 OP 636: Performed by: EMERGENCY MEDICINE

## 2018-06-06 PROCEDURE — 93005 ELECTROCARDIOGRAM TRACING: CPT | Performed by: EMERGENCY MEDICINE

## 2018-06-06 PROCEDURE — 83605 ASSAY OF LACTIC ACID: CPT

## 2018-06-06 PROCEDURE — 99291 CRITICAL CARE FIRST HOUR: CPT | Mod: 25 | Performed by: EMERGENCY MEDICINE

## 2018-06-06 RX ORDER — PIPERACILLIN SODIUM, TAZOBACTAM SODIUM 3; .375 G/15ML; G/15ML
3.38 INJECTION, POWDER, LYOPHILIZED, FOR SOLUTION INTRAVENOUS ONCE
Status: DISCONTINUED | OUTPATIENT
Start: 2018-06-06 | End: 2018-06-06

## 2018-06-06 RX ORDER — PIPERACILLIN SODIUM, TAZOBACTAM SODIUM 4; .5 G/20ML; G/20ML
4.5 INJECTION, POWDER, LYOPHILIZED, FOR SOLUTION INTRAVENOUS ONCE
Status: COMPLETED | OUTPATIENT
Start: 2018-06-06 | End: 2018-06-07

## 2018-06-06 RX ORDER — LEVOFLOXACIN 5 MG/ML
750 INJECTION, SOLUTION INTRAVENOUS ONCE
Status: COMPLETED | OUTPATIENT
Start: 2018-06-06 | End: 2018-06-07

## 2018-06-06 RX ORDER — SODIUM CHLORIDE 9 MG/ML
INJECTION, SOLUTION INTRAVENOUS CONTINUOUS
Status: DISCONTINUED | OUTPATIENT
Start: 2018-06-06 | End: 2018-06-08

## 2018-06-06 RX ORDER — CEFAZOLIN SODIUM 1 G/50ML
1250 SOLUTION INTRAVENOUS EVERY 24 HOURS
Status: DISCONTINUED | OUTPATIENT
Start: 2018-06-08 | End: 2018-06-07

## 2018-06-06 RX ADMIN — SODIUM CHLORIDE 1000 ML: 9 INJECTION, SOLUTION INTRAVENOUS at 22:09

## 2018-06-06 RX ADMIN — SODIUM CHLORIDE 1000 ML: 9 INJECTION, SOLUTION INTRAVENOUS at 22:26

## 2018-06-06 NOTE — LETTER
Transition Communication Hand-off for Care Transitions to Next Level of Care Provider    Name: Alessandra Pak  : 1965  MRN #: [id number]  Primary Care Provider: Brionna Dc     Primary Clinic: [address]  [place] [facility] [address]     Reason for Hospitalization:  Hyponatremia [E87.1]  Hypoxia [R09.02]  Acute renal injury (H) [N17.9]  Altered mental status, unspecified altered mental status type [R41.82]  Pneumonia of both lungs due to infectious organism, unspecified part of lung [J18.9]  S/P amputation [Z89.9]  Admit Date/Time: 2018 10:08 PM  Discharge Date: 7/10/18  Payor Source: Payor: HUMANA / Plan: HUMANA MEDICARE ADVANTAGE / Product Type: Medicare /     Readmission Assessment Measure (GULSHAN) Risk Score/category: Elevated    Reason for Communication Hand-off Referral: Multiple providers/specialties    [facility]     Concern for non-adherence with plan of care:   Y/N No  Discharge Needs Assessment:  Needs       Most Recent Value    Equipment Currently Used at Home none    Transportation Available family or friend will provide          Already enrolled in Tele-monitoring program and name of program:  Unknown  Follow-up specialty is recommended: Yes    Follow-up plan:  Future Appointments  Date Time Provider Department Center   7/10/2018 7:00 PM Petr Alvarez OTR [facility] hospital   2018 12:20 PM Becky King PA-C [facility]   2018 1:00 PM [facility]   2018 1:30 PM Rene Swartz MD [facility]   2018 9:20 AM Ambrose King MD [facility]       Any outstanding tests or procedures:        Referrals     Future Labs/Procedures    Home care nursing referral     Comments:    [facility]  Phone  [phone]  Fax  [phone]    RN skilled nursing visit.   RN to assess vital signs and weight, respiratory and cardiac status, pain level and activity tolerance, hydration, nutrition and bowel status  RN  to complete home safety eval  RN to assist with medication management and setup.    WOC RN to required to provide wound care three times per week.   (Patient will be completing wound care independently other four days of the week)  RN to assess wound for increased signs/symptoms of infection.    Wound Care Instructions:  Right plantar foot wounds: Daily: Remove dressing and apply Vashe (order #[id number]) soaked gauze into wound beds for 10 minutes.   Remove and do not rinse. Apply Iodasorb gel (order #[id number]) to wound beds. Pack gently with dry fluffed 4x4. Cover with ABD and secure with Kerlix and ACE wrap.     Your provider has ordered home care nursing services.   If you have not been contacted within 2 days of your discharge please call the inpatient department phone number at [phone] .    Home infusion referral     Comments:    [facility]  Phone # [phone]  Fax # [phone]     Anticipated Length of Therapy: per discharge orders    Home Infusion Pharmacist to adjust therapy based on labs and clinical assessments: No (Home Infusion will call for order)    Labs:  May draw labs from Venous Catheter: Yes  Home Infusion Pharmacist to order labs based on therapy type and clinical assessments: Yes  Call/Fax Lab Results to: per protocol    Agency Staff to assess nursing needs for Infusion Therapy.    Access Device Management:  IV Access Type: PICC  Flush with Heparin and Normal Saline IVP PRN and routine site care (per agency protocol) to maintain access device? Yes    Medication Therapy Management Referral     Comments:    MTM referral reason            Patient had a hospital or ED visit in last 6 months and has more than 10   PTA or Discharge medications    Patient has 5 PTA or Discharge Medications AND one of the following   diagnoses: DM,HF,COPD,AMI DX,PULM HTN       This service is designed to help you get the most from your medications.  A specially trained pharmacist will work closely with  you and your doctors  to solve any problems related to your medications and to help you get the   best results from taking them.      The Medication Therapy Management staff will call you to schedule an appointment.    Nutrition Services Adult IP Consult     Comments:    Reason:  Ongoing post op nutritional support    Occupational Therapy Adult Consult     Comments:    Evaluate and treat as clinically indicated.    Reason:  Post amputation    Physical Therapy Adult Consult     Comments:    Evaluate and treat as clinically indicated.    Reason:  Post amputation            WILSON Rojas, LGSW  5A Unit   Pager [phone]  Phone [phone]      AVS/Discharge Summary is the source of truth; this is a helpful guide for improved communication of patient story

## 2018-06-07 ENCOUNTER — APPOINTMENT (OUTPATIENT)
Dept: GENERAL RADIOLOGY | Facility: CLINIC | Age: 53
DRG: 853 | End: 2018-06-07
Payer: COMMERCIAL

## 2018-06-07 LAB
ALBUMIN UR-MCNC: 10 MG/DL
AMPHETAMINES UR QL SCN: NEGATIVE
ANION GAP SERPL CALCULATED.3IONS-SCNC: 8 MMOL/L (ref 3–14)
APPEARANCE UR: CLEAR
BARBITURATES UR QL: NEGATIVE
BASE DEFICIT BLDV-SCNC: 3.6 MMOL/L
BASOPHILS # BLD AUTO: 0 10E9/L (ref 0–0.2)
BASOPHILS NFR BLD AUTO: 0.1 %
BENZODIAZ UR QL: NEGATIVE
BILIRUB UR QL STRIP: NEGATIVE
BUN SERPL-MCNC: 33 MG/DL (ref 7–30)
CALCIUM SERPL-MCNC: 8.1 MG/DL (ref 8.5–10.1)
CANNABINOIDS UR QL SCN: NEGATIVE
CHLORIDE SERPL-SCNC: 103 MMOL/L (ref 94–109)
CO2 SERPL-SCNC: 23 MMOL/L (ref 20–32)
COCAINE UR QL: NEGATIVE
COLOR UR AUTO: YELLOW
CORTIS SERPL-MCNC: 20.7 UG/DL (ref 4–22)
CREAT SERPL-MCNC: 1.8 MG/DL (ref 0.52–1.04)
CRP SERPL-MCNC: 310 MG/L (ref 0–8)
DIFFERENTIAL METHOD BLD: ABNORMAL
EOSINOPHIL # BLD AUTO: 0 10E9/L (ref 0–0.7)
EOSINOPHIL NFR BLD AUTO: 0 %
ERYTHROCYTE [DISTWIDTH] IN BLOOD BY AUTOMATED COUNT: 15.9 % (ref 10–15)
ETHANOL UR QL SCN: NEGATIVE
GFR SERPL CREATININE-BSD FRML MDRD: 30 ML/MIN/1.7M2
GLUCOSE BLDC GLUCOMTR-MCNC: 151 MG/DL (ref 70–99)
GLUCOSE BLDC GLUCOMTR-MCNC: 156 MG/DL (ref 70–99)
GLUCOSE BLDC GLUCOMTR-MCNC: 187 MG/DL (ref 70–99)
GLUCOSE BLDC GLUCOMTR-MCNC: 193 MG/DL (ref 70–99)
GLUCOSE BLDC GLUCOMTR-MCNC: 195 MG/DL (ref 70–99)
GLUCOSE BLDC GLUCOMTR-MCNC: 228 MG/DL (ref 70–99)
GLUCOSE BLDC GLUCOMTR-MCNC: 255 MG/DL (ref 70–99)
GLUCOSE SERPL-MCNC: 191 MG/DL (ref 70–99)
GLUCOSE UR STRIP-MCNC: 70 MG/DL
HBA1C MFR BLD: 9.5 % (ref 0–5.6)
HCG UR QL: NEGATIVE
HCO3 BLDV-SCNC: 23 MMOL/L (ref 21–28)
HCT VFR BLD AUTO: 25.5 % (ref 35–47)
HGB BLD-MCNC: 8.4 G/DL (ref 11.7–15.7)
HGB UR QL STRIP: NEGATIVE
HYALINE CASTS #/AREA URNS LPF: 4 /LPF (ref 0–2)
IMM GRANULOCYTES # BLD: 0.1 10E9/L (ref 0–0.4)
IMM GRANULOCYTES NFR BLD: 0.4 %
INTERPRETATION ECG - MUSE: NORMAL
KETONES UR STRIP-MCNC: NEGATIVE MG/DL
LEUKOCYTE ESTERASE UR QL STRIP: ABNORMAL
LYMPHOCYTES # BLD AUTO: 2 10E9/L (ref 0.8–5.3)
LYMPHOCYTES NFR BLD AUTO: 6.9 %
MCH RBC QN AUTO: 25.1 PG (ref 26.5–33)
MCHC RBC AUTO-ENTMCNC: 32.9 G/DL (ref 31.5–36.5)
MCV RBC AUTO: 76 FL (ref 78–100)
MONOCYTES # BLD AUTO: 1.9 10E9/L (ref 0–1.3)
MONOCYTES NFR BLD AUTO: 6.5 %
NEUTROPHILS # BLD AUTO: 24.5 10E9/L (ref 1.6–8.3)
NEUTROPHILS NFR BLD AUTO: 86.1 %
NITRATE UR QL: NEGATIVE
NRBC # BLD AUTO: 0 10*3/UL
NRBC BLD AUTO-RTO: 0 /100
O2/TOTAL GAS SETTING VFR VENT: 21 %
OPIATES UR QL SCN: NEGATIVE
PCO2 BLDV: 46 MM HG (ref 40–50)
PCP UR QL SCN: NEGATIVE
PH BLDV: 7.3 PH (ref 7.32–7.43)
PH UR STRIP: 5.5 PH (ref 5–7)
PLATELET # BLD AUTO: 232 10E9/L (ref 150–450)
PO2 BLDV: 36 MM HG (ref 25–47)
POTASSIUM SERPL-SCNC: 4.9 MMOL/L (ref 3.4–5.3)
POTASSIUM SERPL-SCNC: 5 MMOL/L (ref 3.4–5.3)
RBC # BLD AUTO: 3.35 10E12/L (ref 3.8–5.2)
RBC #/AREA URNS AUTO: 1 /HPF (ref 0–2)
SODIUM SERPL-SCNC: 134 MMOL/L (ref 133–144)
SOURCE: ABNORMAL
SP GR UR STRIP: 1.01 (ref 1–1.03)
TRANS CELLS #/AREA URNS HPF: <1 /HPF (ref 0–1)
UROBILINOGEN UR STRIP-MCNC: NORMAL MG/DL (ref 0–2)
WBC # BLD AUTO: 28.4 10E9/L (ref 4–11)
WBC #/AREA URNS AUTO: 0 /HPF (ref 0–5)

## 2018-06-07 PROCEDURE — 00000146 ZZHCL STATISTIC GLUCOSE BY METER IP

## 2018-06-07 PROCEDURE — 96367 TX/PROPH/DG ADDL SEQ IV INF: CPT | Performed by: EMERGENCY MEDICINE

## 2018-06-07 PROCEDURE — 25000128 H RX IP 250 OP 636: Performed by: STUDENT IN AN ORGANIZED HEALTH CARE EDUCATION/TRAINING PROGRAM

## 2018-06-07 PROCEDURE — 86140 C-REACTIVE PROTEIN: CPT | Performed by: STUDENT IN AN ORGANIZED HEALTH CARE EDUCATION/TRAINING PROGRAM

## 2018-06-07 PROCEDURE — 12000008 ZZH R&B INTERMEDIATE UMMC

## 2018-06-07 PROCEDURE — 81001 URINALYSIS AUTO W/SCOPE: CPT | Performed by: EMERGENCY MEDICINE

## 2018-06-07 PROCEDURE — 96372 THER/PROPH/DIAG INJ SC/IM: CPT | Performed by: EMERGENCY MEDICINE

## 2018-06-07 PROCEDURE — 81025 URINE PREGNANCY TEST: CPT | Performed by: EMERGENCY MEDICINE

## 2018-06-07 PROCEDURE — 36415 COLL VENOUS BLD VENIPUNCTURE: CPT | Performed by: STUDENT IN AN ORGANIZED HEALTH CARE EDUCATION/TRAINING PROGRAM

## 2018-06-07 PROCEDURE — 25000132 ZZH RX MED GY IP 250 OP 250 PS 637: Performed by: STUDENT IN AN ORGANIZED HEALTH CARE EDUCATION/TRAINING PROGRAM

## 2018-06-07 PROCEDURE — 80320 DRUG SCREEN QUANTALCOHOLS: CPT | Performed by: EMERGENCY MEDICINE

## 2018-06-07 PROCEDURE — 99223 1ST HOSP IP/OBS HIGH 75: CPT | Mod: AI | Performed by: PEDIATRICS

## 2018-06-07 PROCEDURE — 82533 TOTAL CORTISOL: CPT | Performed by: NURSE PRACTITIONER

## 2018-06-07 PROCEDURE — 25000128 H RX IP 250 OP 636: Performed by: EMERGENCY MEDICINE

## 2018-06-07 PROCEDURE — 96368 THER/DIAG CONCURRENT INF: CPT | Performed by: EMERGENCY MEDICINE

## 2018-06-07 PROCEDURE — 87086 URINE CULTURE/COLONY COUNT: CPT | Performed by: EMERGENCY MEDICINE

## 2018-06-07 PROCEDURE — 80307 DRUG TEST PRSMV CHEM ANLYZR: CPT | Performed by: EMERGENCY MEDICINE

## 2018-06-07 PROCEDURE — 0KDV0ZZ EXTRACTION OF RIGHT FOOT MUSCLE, OPEN APPROACH: ICD-10-PCS | Performed by: ORTHOPAEDIC SURGERY

## 2018-06-07 PROCEDURE — 84132 ASSAY OF SERUM POTASSIUM: CPT | Performed by: EMERGENCY MEDICINE

## 2018-06-07 PROCEDURE — 93005 ELECTROCARDIOGRAM TRACING: CPT

## 2018-06-07 PROCEDURE — 85025 COMPLETE CBC W/AUTO DIFF WBC: CPT | Performed by: STUDENT IN AN ORGANIZED HEALTH CARE EDUCATION/TRAINING PROGRAM

## 2018-06-07 PROCEDURE — 96375 TX/PRO/DX INJ NEW DRUG ADDON: CPT | Performed by: EMERGENCY MEDICINE

## 2018-06-07 PROCEDURE — 96361 HYDRATE IV INFUSION ADD-ON: CPT | Performed by: EMERGENCY MEDICINE

## 2018-06-07 PROCEDURE — 25000131 ZZH RX MED GY IP 250 OP 636 PS 637: Performed by: STUDENT IN AN ORGANIZED HEALTH CARE EDUCATION/TRAINING PROGRAM

## 2018-06-07 PROCEDURE — 36569 INSJ PICC 5 YR+ W/O IMAGING: CPT

## 2018-06-07 PROCEDURE — 25000128 H RX IP 250 OP 636

## 2018-06-07 PROCEDURE — 96365 THER/PROPH/DIAG IV INF INIT: CPT | Performed by: EMERGENCY MEDICINE

## 2018-06-07 PROCEDURE — 40000986 XR CHEST PORT 1 VW

## 2018-06-07 PROCEDURE — 27210197 ZZH KIT POWER PICC TRIPLE LUMEN

## 2018-06-07 PROCEDURE — 80048 BASIC METABOLIC PNL TOTAL CA: CPT | Performed by: STUDENT IN AN ORGANIZED HEALTH CARE EDUCATION/TRAINING PROGRAM

## 2018-06-07 PROCEDURE — 25000125 ZZHC RX 250: Performed by: STUDENT IN AN ORGANIZED HEALTH CARE EDUCATION/TRAINING PROGRAM

## 2018-06-07 PROCEDURE — 82803 BLOOD GASES ANY COMBINATION: CPT | Performed by: STUDENT IN AN ORGANIZED HEALTH CARE EDUCATION/TRAINING PROGRAM

## 2018-06-07 PROCEDURE — 93010 ELECTROCARDIOGRAM REPORT: CPT | Performed by: INTERNAL MEDICINE

## 2018-06-07 RX ORDER — NICOTINE POLACRILEX 4 MG
15-30 LOZENGE BUCCAL
Status: DISCONTINUED | OUTPATIENT
Start: 2018-06-07 | End: 2018-06-13

## 2018-06-07 RX ORDER — CEFAZOLIN SODIUM 1 G/50ML
1250 SOLUTION INTRAVENOUS
Status: DISCONTINUED | OUTPATIENT
Start: 2018-06-07 | End: 2018-06-08 | Stop reason: DRUGHIGH

## 2018-06-07 RX ORDER — PIPERACILLIN SODIUM, TAZOBACTAM SODIUM 3; .375 G/15ML; G/15ML
3.38 INJECTION, POWDER, LYOPHILIZED, FOR SOLUTION INTRAVENOUS EVERY 6 HOURS
Status: DISCONTINUED | OUTPATIENT
Start: 2018-06-07 | End: 2018-06-15

## 2018-06-07 RX ORDER — SODIUM CHLORIDE 9 MG/ML
INJECTION, SOLUTION INTRAVENOUS
Status: COMPLETED
Start: 2018-06-07 | End: 2018-06-08

## 2018-06-07 RX ORDER — NALOXONE HYDROCHLORIDE 0.4 MG/ML
.1-.4 INJECTION, SOLUTION INTRAMUSCULAR; INTRAVENOUS; SUBCUTANEOUS
Status: DISCONTINUED | OUTPATIENT
Start: 2018-06-07 | End: 2018-06-10

## 2018-06-07 RX ORDER — FLUTICASONE PROPIONATE 50 MCG
2 SPRAY, SUSPENSION (ML) NASAL DAILY
Status: DISCONTINUED | OUTPATIENT
Start: 2018-06-07 | End: 2018-07-10 | Stop reason: HOSPADM

## 2018-06-07 RX ORDER — ACETAMINOPHEN 325 MG/1
650 TABLET ORAL EVERY 4 HOURS PRN
Status: DISCONTINUED | OUTPATIENT
Start: 2018-06-07 | End: 2018-06-07

## 2018-06-07 RX ORDER — POLYETHYLENE GLYCOL 3350 17 G/17G
17 POWDER, FOR SOLUTION ORAL DAILY PRN
Status: DISCONTINUED | OUTPATIENT
Start: 2018-06-07 | End: 2018-07-01

## 2018-06-07 RX ORDER — ALBUTEROL SULFATE 90 UG/1
2 AEROSOL, METERED RESPIRATORY (INHALATION) EVERY 4 HOURS PRN
Status: DISCONTINUED | OUTPATIENT
Start: 2018-06-07 | End: 2018-06-11

## 2018-06-07 RX ORDER — SODIUM CHLORIDE 9 MG/ML
INJECTION, SOLUTION INTRAVENOUS
Status: DISCONTINUED
Start: 2018-06-07 | End: 2018-06-15 | Stop reason: HOSPADM

## 2018-06-07 RX ORDER — ASPIRIN 81 MG/1
81 TABLET, CHEWABLE ORAL DAILY
Status: DISCONTINUED | OUTPATIENT
Start: 2018-06-07 | End: 2018-06-10

## 2018-06-07 RX ORDER — NICOTINE 21 MG/24HR
1 PATCH, TRANSDERMAL 24 HOURS TRANSDERMAL DAILY
Status: DISCONTINUED | OUTPATIENT
Start: 2018-06-07 | End: 2018-06-13

## 2018-06-07 RX ORDER — LIDOCAINE 40 MG/G
CREAM TOPICAL
Status: DISCONTINUED | OUTPATIENT
Start: 2018-06-07 | End: 2018-06-14

## 2018-06-07 RX ORDER — SODIUM CHLORIDE 9 MG/ML
INJECTION, SOLUTION INTRAVENOUS
Status: COMPLETED
Start: 2018-06-07 | End: 2018-06-07

## 2018-06-07 RX ORDER — HEPARIN SODIUM,PORCINE 10 UNIT/ML
2-5 VIAL (ML) INTRAVENOUS
Status: DISCONTINUED | OUTPATIENT
Start: 2018-06-07 | End: 2018-06-14

## 2018-06-07 RX ORDER — DEXTROSE MONOHYDRATE 25 G/50ML
25-50 INJECTION, SOLUTION INTRAVENOUS
Status: DISCONTINUED | OUTPATIENT
Start: 2018-06-07 | End: 2018-06-13

## 2018-06-07 RX ORDER — HEPARIN SODIUM,PORCINE 10 UNIT/ML
5-10 VIAL (ML) INTRAVENOUS
Status: DISCONTINUED | OUTPATIENT
Start: 2018-06-07 | End: 2018-07-10 | Stop reason: HOSPADM

## 2018-06-07 RX ORDER — ACETAMINOPHEN 500 MG
1000 TABLET ORAL 3 TIMES DAILY
Status: DISCONTINUED | OUTPATIENT
Start: 2018-06-07 | End: 2018-07-10 | Stop reason: HOSPADM

## 2018-06-07 RX ORDER — HALOPERIDOL 5 MG/ML
2 INJECTION INTRAMUSCULAR
Status: COMPLETED | OUTPATIENT
Start: 2018-06-07 | End: 2018-06-07

## 2018-06-07 RX ORDER — METHADONE HYDROCHLORIDE 10 MG/ML
70 CONCENTRATE ORAL DAILY
Status: DISCONTINUED | OUTPATIENT
Start: 2018-06-07 | End: 2018-06-16

## 2018-06-07 RX ORDER — HEPARIN SODIUM,PORCINE 10 UNIT/ML
5-10 VIAL (ML) INTRAVENOUS EVERY 24 HOURS
Status: DISCONTINUED | OUTPATIENT
Start: 2018-06-07 | End: 2018-07-10 | Stop reason: HOSPADM

## 2018-06-07 RX ORDER — LEVOFLOXACIN 5 MG/ML
750 INJECTION, SOLUTION INTRAVENOUS
Status: DISCONTINUED | OUTPATIENT
Start: 2018-06-09 | End: 2018-06-09

## 2018-06-07 RX ADMIN — INSULIN ASPART 1 UNITS: 100 INJECTION, SOLUTION INTRAVENOUS; SUBCUTANEOUS at 13:38

## 2018-06-07 RX ADMIN — PIPERACILLIN SODIUM,TAZOBACTAM SODIUM 4.5 G: 4; .5 INJECTION, POWDER, FOR SOLUTION INTRAVENOUS at 00:32

## 2018-06-07 RX ADMIN — AMITRIPTYLINE HYDROCHLORIDE 50 MG: 50 TABLET, FILM COATED ORAL at 03:45

## 2018-06-07 RX ADMIN — ACETAMINOPHEN 1000 MG: 500 TABLET, FILM COATED ORAL at 19:12

## 2018-06-07 RX ADMIN — Medication 1000 ML: at 07:44

## 2018-06-07 RX ADMIN — FLUTICASONE PROPIONATE 2 SPRAY: 50 SPRAY, METERED NASAL at 07:45

## 2018-06-07 RX ADMIN — INSULIN ASPART 2 UNITS: 100 INJECTION, SOLUTION INTRAVENOUS; SUBCUTANEOUS at 07:48

## 2018-06-07 RX ADMIN — INSULIN ASPART 1 UNITS: 100 INJECTION, SOLUTION INTRAVENOUS; SUBCUTANEOUS at 18:50

## 2018-06-07 RX ADMIN — SODIUM CHLORIDE 1000 ML: 9 INJECTION, SOLUTION INTRAVENOUS at 07:44

## 2018-06-07 RX ADMIN — PIPERACILLIN SODIUM AND TAZOBACTAM SODIUM 3.38 G: 3; .375 INJECTION, POWDER, LYOPHILIZED, FOR SOLUTION INTRAVENOUS at 06:36

## 2018-06-07 RX ADMIN — SODIUM CHLORIDE: 9 INJECTION, SOLUTION INTRAVENOUS at 00:46

## 2018-06-07 RX ADMIN — LEVOFLOXACIN 750 MG: 5 INJECTION, SOLUTION INTRAVENOUS at 00:47

## 2018-06-07 RX ADMIN — NICOTINE 1 PATCH: 21 PATCH TRANSDERMAL at 08:02

## 2018-06-07 RX ADMIN — UMECLIDINIUM BROMIDE AND VILANTEROL TRIFENATATE 1 PUFF: 62.5; 25 POWDER RESPIRATORY (INHALATION) at 07:45

## 2018-06-07 RX ADMIN — LIDOCAINE HYDROCHLORIDE 5 ML: 10 INJECTION, SOLUTION EPIDURAL; INFILTRATION; INTRACAUDAL; PERINEURAL at 09:42

## 2018-06-07 RX ADMIN — VANCOMYCIN HYDROCHLORIDE 1500 MG: 10 INJECTION, POWDER, LYOPHILIZED, FOR SOLUTION INTRAVENOUS at 01:10

## 2018-06-07 RX ADMIN — HYDROCORTISONE SODIUM SUCCINATE 100 MG: 100 INJECTION, POWDER, FOR SOLUTION INTRAMUSCULAR; INTRAVENOUS at 11:01

## 2018-06-07 RX ADMIN — AMITRIPTYLINE HYDROCHLORIDE 50 MG: 50 TABLET, FILM COATED ORAL at 21:11

## 2018-06-07 RX ADMIN — ASPIRIN 81 MG CHEWABLE TABLET 81 MG: 81 TABLET CHEWABLE at 07:44

## 2018-06-07 RX ADMIN — ACETAMINOPHEN 650 MG: 325 TABLET, FILM COATED ORAL at 06:36

## 2018-06-07 RX ADMIN — PIPERACILLIN SODIUM AND TAZOBACTAM SODIUM 3.38 G: 3; .375 INJECTION, POWDER, LYOPHILIZED, FOR SOLUTION INTRAVENOUS at 13:37

## 2018-06-07 RX ADMIN — INSULIN GLARGINE 5 UNITS: 100 INJECTION, SOLUTION SUBCUTANEOUS at 07:49

## 2018-06-07 RX ADMIN — PIPERACILLIN SODIUM AND TAZOBACTAM SODIUM 3.38 G: 3; .375 INJECTION, POWDER, LYOPHILIZED, FOR SOLUTION INTRAVENOUS at 18:00

## 2018-06-07 RX ADMIN — SODIUM CHLORIDE 1000 ML: 9 INJECTION, SOLUTION INTRAVENOUS at 06:36

## 2018-06-07 RX ADMIN — HALOPERIDOL LACTATE 2 MG: 5 INJECTION, SOLUTION INTRAMUSCULAR at 03:38

## 2018-06-07 ASSESSMENT — ACTIVITIES OF DAILY LIVING (ADL)
FALL_HISTORY_WITHIN_LAST_SIX_MONTHS: NO
RETIRED_EATING: 0-->INDEPENDENT
AMBULATION: 2-->ASSISTIVE PERSON
SWALLOWING: 0-->SWALLOWS FOODS/LIQUIDS WITHOUT DIFFICULTY
TOILETING: 0-->INDEPENDENT
TRANSFERRING: 2-->ASSISTIVE PERSON
COGNITION: 0 - NO COGNITION ISSUES REPORTED
DRESS: 0-->INDEPENDENT
BATHING: 1-->ASSISTIVE EQUIPMENT
RETIRED_COMMUNICATION: 0-->UNDERSTANDS/COMMUNICATES WITHOUT DIFFICULTY

## 2018-06-07 NOTE — PHARMACY-VANCOMYCIN DOSING SERVICE
Pharmacy Vancomycin Initial Note  Date of Service 2018  Patient's  1965  52 year old, female    Indication: Sepsis    Current estimated CrCl = CrCl cannot be calculated (Patient's most recent sCr result is older than the maximum 90 days allowed.).    Creatinine for last 3 days  No results found for requested labs within last 72 hours.    Recent Vancomycin Level(s) for last 3 days  No results found for requested labs within last 72 hours.      Vancomycin IV Administrations (past 72 hours)      No vancomycin orders with administrations in past 72 hours.                Nephrotoxins and other renal medications (Future)    Start     Dose/Rate Route Frequency Ordered Stop    18 225  piperacillin-tazobactam (ZOSYN) 4.5 g vial to attach to  mL bag      4.5 g  over 30 Minutes Intravenous ONCE 18 225            Contrast Orders - past 72 hours     None                Plan:  1.  Start vancomycin  1500 mg IV once followed by 1250 mg IV q12h. Modify frequency per CrCl when available.   2.  Goal Trough Level: 15-20 mg/L   3.  Pharmacy will check trough levels as appropriate in 1-3 Days.    4. Serum creatinine levels will be ordered daily for the first week of therapy and at least twice weekly for subsequent weeks.    5. [place] method utilized to dose vancomycin therapy: Method 2    Daiv Alonzo        ====ADDENDUM====  Creatinine came back at 2.16 mg/dL. Will adjust vancomycin frequency to q24h.    Ralph Sibley, PharmD, BCPS  2018

## 2018-06-07 NOTE — ED NOTES
Bed: ED01  Expected date:   Expected time:   Means of arrival:   Comments:  N710 - 52F with AMS and sats in the 80s - red

## 2018-06-07 NOTE — PROGRESS NOTES
Pt unable to void. Bladder scan shows 550 mL. Refused straight cath/carvajal.   Will continue to attempt voiding, encourage straight cath.

## 2018-06-07 NOTE — ED PROVIDER NOTES
History     Chief Complaint   Patient presents with     Altered Mental Status     HPI  Alessandra Pak is a 52 year old female with a history of cardiomyopathy, CHF, DM 2, COPD, CKD, HTN, and heel ulcers among others who was brought in by ambulance to the ED for altered mental status.  Per EMS her blood pressure was running 130/80s.  Her O2 sats were in the low 80s on room air, but after 6 L of O2 she is now in the 90s.  Her heart rate was in the 130s, and her blood sugar was 216.  EMS states that they found her downstairs in her bed.  Her family members have noted that she has become increasingly confused and lethargic.  She has not been eating much and has not taken insulin for the past 3 days.  EMS also reports that whenever they asked a question she would just giggle.     PAST MEDICAL HISTORY  Past Medical History:   Diagnosis Date     Abdominal pain, right upper quadrant     [place] [facility] [place] with positive high risk HPV 8/2013    + HPV 33, [place] - GAVIN I, ECC- atypia     Cardiomyopathy (H)     non ischemic cardiomyopathy with EF 15     Cervical high risk HPV (human papillomavirus) test positive 7/8/15, 7/25/16    NIL pap/+ HR HPV (not 16 or 18).      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    leep     Depressive disorder      Gastro-oesophageal reflux disease      Human papillomavirus in conditions classified elsewhere and of unspecified site 2/2012    + HPV 33     Hypertension      Profound impairment, one eye, impairment level not further specified     rt eye due to childhood injury     Systolic CHF (H) 3/12/2015     Tobacco abuse 5/18/2013     Type 2 diabetes mellitus without complications (H)      Uncomplicated asthma      PAST SURGICAL HISTORY  Past Surgical History:   Procedure Laterality Date     C NONSPECIFIC PROCEDURE  2001    cholecystectomy     C NONSPECIFIC PROCEDURE  as a child    tonsillectomy     C NONSPECIFIC PROCEDURE  2001    whipple procedure     CARDIAC SURGERY       defib     COLPOSCOPY,LOOP ELECTRD CERVIX EXCIS  2002, 2011    stage 2 dysplasia     ENDOBRONCHIAL ULTRASOUND FLEXIBLE N/A 2/19/2015    Procedure: ENDOBRONCHIAL ULTRASOUND FLEXIBLE;  Surgeon: Brenden Tamez MD;  Location: [facility]     LEEP TX, CERVICAL  2014    LEEP TX Cervical     RECESSION RESECTION WITH ADJUSTABLE SUTURE  12/13/2011    Procedure:RECESSION RESECTION WITH ADJUSTABLE SUTURE; Right Strabismus Repair with Adjustable Suture       TUBAL LIGATION  2007    essure      FAMILY HISTORY  Family History   Problem Relation Age of Onset     DIABETES Mother      diet controled     Hypertension Mother      Arthritis Mother      Lipids Mother      DIABETES Father      Hypertension Father      GASTROINTESTINAL DISEASE Father      gallbladder removed     Bipolar Disorder Brother      Thyroid Disease Brother      Obesity Other      Son     Respiratory Other      Son and Daughter; asthma     Depression Maternal Aunt      Anxiety Disorder Maternal Aunt      SOCIAL HISTORY  Social History   Substance Use Topics     Smoking status: Former Smoker     Packs/day: 0.30     Years: 15.00     Types: Cigarettes     Smokeless tobacco: Former User     Quit date: 10/29/2014      Comment: Started smoking in 89/ smokes about 3 per day     Alcohol use No     MEDICATIONS  Current Facility-Administered Medications   Medication     levofloxacin (LEVAQUIN) infusion 750 mg     piperacillin-tazobactam (ZOSYN) 4.5 g vial to attach to  mL bag     sodium chloride 0.9% infusion     vancomycin (VANCOCIN) 1,250 mg in sodium chloride 0.9 % 250 mL intermittent infusion     vancomycin (VANCOCIN) 1,500 mg in sodium chloride 0.9 % 250 mL intermittent infusion     Current Outpatient Prescriptions   Medication     acetone, Urine, test STRP     albuterol (ALBUTEROL) 108 (90 BASE) MCG/ACT Inhaler     amitriptyline (ELAVIL) 50 MG tablet     ASPIRIN 81 MG OR TABS     blood glucose monitoring (HOLLIE CONTOUR NEXT) test strip     blood glucose  "monitoring (HOLLIE MICROLET) lancets     blood glucose monitoring (FREESTYLE) lancets     fluticasone (FLONASE) 50 MCG/ACT spray     furosemide (LASIX) 20 MG tablet     gabapentin 8 % GEL topical PLO cream     glucose 4 G CHEW     insulin aspart (NOVOLOG VIAL) 100 UNITS/ML injection     insulin pen needle (B-D U/F) 31G X 5 MM     Ipratropium-Albuterol (COMBIVENT RESPIMAT)  MCG/ACT inhaler     Lidocaine (LIDOCARE) 4 % Patch     lidocaine HCl 3 % cream     lisinopril (PRINIVIL/ZESTRIL) 10 MG tablet     LYRICA 75 MG capsule     methadone (DOLPHINE-INTENSOL) 10 mg/mL CONC     MIRENA 20 MCG/24HR IU IUD     multivitamin, therapeutic with minerals (THERA-VIT-M) TABS     oxyCODONE IR (ROXICODONE) 5 MG tablet     polyethylene glycol (MIRALAX/GLYCOLAX) Packet     ranitidine (ZANTAC) 300 MG tablet     SM NICOTINE 21 MG/24HR 24 hr patch     vancomycin 750 mg     ALLERGIES  Allergies   Allergen Reactions     No Known Drug Allergies        I have reviewed the Medications, Allergies, Past Medical and Surgical History, and Social History in the Epic system.    Review of Systems   Unable to perform ROS: Mental status change       Physical Exam   BP: 166/75  Heart Rate: 131  Temp: 100.1  F (37.8  C)  Resp: 22  Height: 172.7 cm (5' 8\")  Weight: 61.3 kg (135 lb 2.3 oz)  SpO2: 92 %      Physical Exam   Constitutional: She appears distressed.   Eyes open, tracking, following commands; confused; oriented x 1; unaware of place or time     HENT:   Head: Normocephalic and atraumatic.   Extensively dry lips and oral mucosa   Eyes: Conjunctivae are normal. Pupils are equal, round, and reactive to light.   Neck: Normal range of motion. Neck supple.   Cardiovascular: Regular rhythm.  Tachycardia present.    Pulmonary/Chest:   Hypoxic to 70's on RA;  sats increase to mid 90's on 6 liters; no retractions, no wheezes; good air movement    Abdominal:   Insulin pump in place; no tenderness    Musculoskeletal:   Swollen and foul smelling right " foot 1 inch x 1 inch crater on bottom of foot between metatarsal 1 and 2; no drainage;    Neurological: She is alert.   Moving all extremities; no weakness    Skin: She is not diaphoretic.   Psychiatric:   Inappropriate laughter       ED Course     ED Course     Procedures   9:59 PM  The patient was seen and examined by Dr. Vegas in Room 1.                EKG Interpretation:      Interpreted by Darshana Vegas  Time reviewed: 2209  Symptoms at time of EKG: None   Rhythm: sinus tachycardia  Rate: 130-140  Axis: Left Axis Deviation  Ectopy: none  Conduction: normal  ST Segments/ T Waves: No ST-T wave changes and No acute ischemic changes  Q Waves: III and aVf  Comparison to prior: Unchanged    Clinical Impression: sinus tachycardia          Results for orders placed or performed during the hospital encounter of 06/06/18   XR Chest Port 1 View    Narrative    Preliminary report:  This is a preliminary resident interpretation. Full report to follow.        Impression    Impression:   Left greater than right, bibasilar mixed patchy airspace and  interstitial opacities with silhouetting of the left hemidiaphragm.  Differential includes atelectasis and/or infection, aspiration.   CBC with platelets differential   Result Value Ref Range    WBC 28.2 (H) 4.0 - 11.0 10e9/L    RBC Count 3.90 3.8 - 5.2 10e12/L    Hemoglobin 9.9 (L) 11.7 - 15.7 g/dL    Hematocrit 29.7 (L) 35.0 - 47.0 %    MCV 76 (L) 78 - 100 fl    MCH 25.4 (L) 26.5 - 33.0 pg    MCHC 33.3 31.5 - 36.5 g/dL    RDW 15.8 (H) 10.0 - 15.0 %    Platelet Count 317 150 - 450 10e9/L    Diff Method Automated Method     % Neutrophils 89.3 %    % Lymphocytes 4.7 %    % Monocytes 5.6 %    % Eosinophils 0.0 %    % Basophils 0.0 %    % Immature Granulocytes 0.4 %    Nucleated RBCs 0 0 /100    Absolute Neutrophil 25.2 (H) 1.6 - 8.3 10e9/L    Absolute Lymphocytes 1.3 0.8 - 5.3 10e9/L    Absolute Monocytes 1.6 (H) 0.0 - 1.3 10e9/L    Absolute Eosinophils 0.0 0.0 - 0.7 10e9/L     Absolute Basophils 0.0 0.0 - 0.2 10e9/L    Abs Immature Granulocytes 0.1 0 - 0.4 10e9/L    Absolute Nucleated RBC 0.0    INR   Result Value Ref Range    INR 1.46 (H) 0.86 - 1.14   Comprehensive metabolic panel   Result Value Ref Range    Sodium 128 (L) 133 - 144 mmol/L    Potassium 5.9 (H) 3.4 - 5.3 mmol/L    Chloride 95 94 - 109 mmol/L    Carbon Dioxide 26 20 - 32 mmol/L    Anion Gap 6 3 - 14 mmol/L    Glucose 193 (H) 70 - 99 mg/dL    Urea Nitrogen 37 (H) 7 - 30 mg/dL    Creatinine 2.16 (H) 0.52 - 1.04 mg/dL    GFR Estimate 24 (L) >60 mL/min/1.7m2    GFR Estimate If Black 29 (L) >60 mL/min/1.7m2    Calcium 9.3 8.5 - 10.1 mg/dL    Bilirubin Total 0.5 0.2 - 1.3 mg/dL    Albumin 2.5 (L) 3.4 - 5.0 g/dL    Protein Total 8.9 (H) 6.8 - 8.8 g/dL    Alkaline Phosphatase 233 (H) 40 - 150 U/L    ALT 27 0 - 50 U/L    AST Canceled, Test credited 0 - 45 U/L   Lactic acid whole blood   Result Value Ref Range    Lactic Acid 1.7 0.7 - 2.0 mmol/L   Lipase   Result Value Ref Range    Lipase 17 (L) 73 - 393 U/L   Troponin I   Result Value Ref Range    Troponin I ES <0.015 0.000 - 0.045 ug/L   Blood gas venous   Result Value Ref Range    Ph Venous 7.36 7.32 - 7.43 pH    PCO2 Venous 50 40 - 50 mm Hg    PO2 Venous 17 (L) 25 - 47 mm Hg    Bicarbonate Venous 28 21 - 28 mmol/L    Base Excess Venous 2.1 mmol/L    FIO2 6L    Procalcitonin   Result Value Ref Range    Procalcitonin 5.36 ng/ml   EKG 12-lead, tracing only   Result Value Ref Range    Interpretation ECG Click View Image link to view waveform and result    ISTAT gases lactate joseph POCT   Result Value Ref Range    Ph Venous 7.42 7.32 - 7.43 pH    PCO2 Venous 47 40 - 50 mm Hg    PO2 Venous 14 (L) 25 - 47 mm Hg    Bicarbonate Venous 31 (H) 21 - 28 mmol/L    O2 Sat Venous 18 %    Lactic Acid 1.3 0.7 - 2.1 mmol/L   Blood culture   Result Value Ref Range    Specimen Description Blood Left Arm     Special Requests Received in aerobic bottle only     Culture Micro PENDING      *Note: Due  to a large number of results and/or encounters for the requested time period, some results have not been displayed. A complete set of results can be found in Results Review.     Medications   0.9% sodium chloride BOLUS (0 mLs Intravenous Stopped 6/7/18 0030)     Followed by   0.9% sodium chloride BOLUS (0 mLs Intravenous Stopped 6/7/18 0030)     Followed by   sodium chloride 0.9% infusion (not administered)   levofloxacin (LEVAQUIN) infusion 750 mg (not administered)   piperacillin-tazobactam (ZOSYN) 4.5 g vial to attach to  mL bag (4.5 g Intravenous New Bag 6/7/18 0032)   vancomycin (VANCOCIN) 1,500 mg in sodium chloride 0.9 % 250 mL intermittent infusion (not administered)   vancomycin (VANCOCIN) 1,250 mg in sodium chloride 0.9 % 250 mL intermittent infusion (not administered)           Critical Care time:  was 45 minutes for this patient excluding procedures.     The patient has signs of Severe Sepsis as evidenced by:    1. 2 SIRS criteria, AND  2. Suspected infection, AND   3. Organ dysfunction: Acute encephalopathy due to sepsis    Time severe sepsis diagnosis confirmed = 2238 as this was the time when Lab results revealing acute organ dysfunction due to infection (Cr, Bili, Plt)      3 Hour Severe Sepsis Bundle Completion:  1. Initial Lactic Acid Result:   Recent Labs   Lab Test  06/06/18 2218 06/06/18 2215 02/19/18   2139   LACT  1.3  1.7  0.7     2. Blood Cultures before Antibiotics: Yes  3. Broad Spectrum Antibiotics Administered: Yes     Anti-infectives (Future)    Start     Dose/Rate Route Frequency Ordered Stop    06/07/18 1130  vancomycin (VANCOCIN) 1,250 mg in sodium chloride 0.9 % 250 mL intermittent infusion      1,250 mg  over 90 Minutes Intravenous EVERY 12 HOURS 06/06/18 2303      06/06/18 2330  vancomycin (VANCOCIN) 1,500 mg in sodium chloride 0.9 % 250 mL intermittent infusion      1,500 mg  over 90 Minutes Intravenous ONCE 06/06/18 2303      06/06/18 2252  piperacillin-tazobactam  (ZOSYN) 4.5 g vial to attach to  mL bag      4.5 g  over 30 Minutes Intravenous ONCE 06/06/18 2251      06/06/18 2243  levofloxacin (LEVAQUIN) infusion 750 mg     Comments:  DO NOT USE THIS FIELD FOR ADMIN INSTRUCTIONS; INFORMATION DOES NOT SHOW ON MAR. USE THE FIELD ABOVE MARKED ADMIN INSTRUCTIONS    750 mg  100 mL/hr over 90 Minutes Intravenous ONCE 06/06/18 2242          4. 2000 ml of IV fluids.  Ideal body weight: 63.9 kg (140 lb 14 oz)    Medications   0.9% sodium chloride BOLUS (0 mLs Intravenous Stopped 6/7/18 0030)     Followed by   0.9% sodium chloride BOLUS (0 mLs Intravenous Stopped 6/7/18 0030)     Followed by   sodium chloride 0.9% infusion (not administered)   levofloxacin (LEVAQUIN) infusion 750 mg (not administered)   piperacillin-tazobactam (ZOSYN) 4.5 g vial to attach to  mL bag (4.5 g Intravenous New Bag 6/7/18 0032)   vancomycin (VANCOCIN) 1,500 mg in sodium chloride 0.9 % 250 mL intermittent infusion (not administered)   vancomycin (VANCOCIN) 1,250 mg in sodium chloride 0.9 % 250 mL intermittent infusion (not administered)     Results for orders placed or performed during the hospital encounter of 06/06/18   XR Chest Port 1 View    Narrative    Preliminary report:  This is a preliminary resident interpretation. Full report to follow.        Impression    Impression:   Left greater than right, bibasilar mixed patchy airspace and  interstitial opacities with silhouetting of the left hemidiaphragm.  Differential includes atelectasis and/or infection, aspiration.   CBC with platelets differential   Result Value Ref Range    WBC 28.2 (H) 4.0 - 11.0 10e9/L    RBC Count 3.90 3.8 - 5.2 10e12/L    Hemoglobin 9.9 (L) 11.7 - 15.7 g/dL    Hematocrit 29.7 (L) 35.0 - 47.0 %    MCV 76 (L) 78 - 100 fl    MCH 25.4 (L) 26.5 - 33.0 pg    MCHC 33.3 31.5 - 36.5 g/dL    RDW 15.8 (H) 10.0 - 15.0 %    Platelet Count 317 150 - 450 10e9/L    Diff Method Automated Method     % Neutrophils 89.3 %    %  Lymphocytes 4.7 %    % Monocytes 5.6 %    % Eosinophils 0.0 %    % Basophils 0.0 %    % Immature Granulocytes 0.4 %    Nucleated RBCs 0 0 /100    Absolute Neutrophil 25.2 (H) 1.6 - 8.3 10e9/L    Absolute Lymphocytes 1.3 0.8 - 5.3 10e9/L    Absolute Monocytes 1.6 (H) 0.0 - 1.3 10e9/L    Absolute Eosinophils 0.0 0.0 - 0.7 10e9/L    Absolute Basophils 0.0 0.0 - 0.2 10e9/L    Abs Immature Granulocytes 0.1 0 - 0.4 10e9/L    Absolute Nucleated RBC 0.0    INR   Result Value Ref Range    INR 1.46 (H) 0.86 - 1.14   Comprehensive metabolic panel   Result Value Ref Range    Sodium 128 (L) 133 - 144 mmol/L    Potassium 5.9 (H) 3.4 - 5.3 mmol/L    Chloride 95 94 - 109 mmol/L    Carbon Dioxide 26 20 - 32 mmol/L    Anion Gap 6 3 - 14 mmol/L    Glucose 193 (H) 70 - 99 mg/dL    Urea Nitrogen 37 (H) 7 - 30 mg/dL    Creatinine 2.16 (H) 0.52 - 1.04 mg/dL    GFR Estimate 24 (L) >60 mL/min/1.7m2    GFR Estimate If Black 29 (L) >60 mL/min/1.7m2    Calcium 9.3 8.5 - 10.1 mg/dL    Bilirubin Total 0.5 0.2 - 1.3 mg/dL    Albumin 2.5 (L) 3.4 - 5.0 g/dL    Protein Total 8.9 (H) 6.8 - 8.8 g/dL    Alkaline Phosphatase 233 (H) 40 - 150 U/L    ALT 27 0 - 50 U/L    AST Canceled, Test credited 0 - 45 U/L   Lactic acid whole blood   Result Value Ref Range    Lactic Acid 1.7 0.7 - 2.0 mmol/L   Lipase   Result Value Ref Range    Lipase 17 (L) 73 - 393 U/L   Troponin I   Result Value Ref Range    Troponin I ES <0.015 0.000 - 0.045 ug/L   Blood gas venous   Result Value Ref Range    Ph Venous 7.36 7.32 - 7.43 pH    PCO2 Venous 50 40 - 50 mm Hg    PO2 Venous 17 (L) 25 - 47 mm Hg    Bicarbonate Venous 28 21 - 28 mmol/L    Base Excess Venous 2.1 mmol/L    FIO2 6L    Procalcitonin   Result Value Ref Range    Procalcitonin 5.36 ng/ml   EKG 12-lead, tracing only   Result Value Ref Range    Interpretation ECG Click View Image link to view waveform and result    ISTAT gases lactate joseph POCT   Result Value Ref Range    Ph Venous 7.42 7.32 - 7.43 pH    PCO2 Venous  47 40 - 50 mm Hg    PO2 Venous 14 (L) 25 - 47 mm Hg    Bicarbonate Venous 31 (H) 21 - 28 mmol/L    O2 Sat Venous 18 %    Lactic Acid 1.3 0.7 - 2.1 mmol/L   Blood culture   Result Value Ref Range    Specimen Description Blood Left Arm     Special Requests Received in aerobic bottle only     Culture Micro PENDING      *Note: Due to a large number of results and/or encounters for the requested time period, some results have not been displayed. A complete set of results can be found in Results Review.                 Labs Ordered and Resulted from Time of ED Arrival Up to the Time of Departure from the ED   CBC WITH PLATELETS DIFFERENTIAL - Abnormal; Notable for the following:        Result Value    WBC 28.2 (*)     Hemoglobin 9.9 (*)     Hematocrit 29.7 (*)     MCV 76 (*)     MCH 25.4 (*)     RDW 15.8 (*)     Absolute Neutrophil 25.2 (*)     Absolute Monocytes 1.6 (*)     All other components within normal limits   INR - Abnormal; Notable for the following:     INR 1.46 (*)     All other components within normal limits   COMPREHENSIVE METABOLIC PANEL - Abnormal; Notable for the following:     Sodium 128 (*)     Potassium 5.9 (*)     Glucose 193 (*)     Urea Nitrogen 37 (*)     Creatinine 2.16 (*)     GFR Estimate 24 (*)     GFR Estimate If Black 29 (*)     Albumin 2.5 (*)     Protein Total 8.9 (*)     Alkaline Phosphatase 233 (*)     All other components within normal limits   LIPASE - Abnormal; Notable for the following:     Lipase 17 (*)     All other components within normal limits   BLOOD GAS VENOUS - Abnormal; Notable for the following:     PO2 Venous 17 (*)     All other components within normal limits   ISTAT  GASES LACTATE MUNA POCT - Abnormal; Notable for the following:     PO2 Venous 14 (*)     Bicarbonate Venous 31 (*)     All other components within normal limits   LACTIC ACID WHOLE BLOOD   TROPONIN I   PROCALCITONIN   ROUTINE UA WITH MICROSCOPIC   POTASSIUM   CARDIAC CONTINUOUS MONITORING   ISTAT CG8 GAS  ELEC ICA GLUC MUNA NURSE POCT   ORTHOSTATIC BLOOD PRESSURE AND PULSE   ISTAT TROPONIN NURSING POCT   BLOOD CULTURE   URINE CULTURE AEROBIC BACTERIAL   BLOOD CULTURE            Assessments & Plan (with Medical Decision Making): Patient is a 52-year-old female with a history of cardiomyopathy and diabetes presented to the ER due to altered mental status and hypoxia.  Family found her decreased interaction for the past couple of days.  When the ambulance arrived she was hypoxic on room air and confused.  Patient here is answering questions but is only ANO ×1.  She did get some fluids and she is starting to clear a little bit.  Patient initially was tachycardic and febrile.  She was satting in the 70s on room air.  She was placed on oxygen and sats increased to the mid 90s.  Chest x-ray found that she had an opacity in the left lower lobe.  Patient's white count is elevated 28,000 and her neutrophils are elevated to 25.  Patient's pH is stable.  Patient is mildly hyponatremic.  Creatinine is doubled from 1 to 2.6.  Patient's pro-calcitonin is also elevated at 5.36.  Patient was started on broad-spectrum antibiotics including vancomycin, Levaquin, and Zosyn.  Case was discussed with internal medicine triage.  Patient will be admitted to stepdown unit.  Patient remains in critical condition.  Critical care for this patient was 45 minutes.   This part of the document was transcribed by Amara Brown Medical Scribe.     I have reviewed the nursing notes.    I have reviewed the findings, diagnosis, plan and need for follow up with the patient.    New Prescriptions    No medications on file       Final diagnoses:   Pneumonia of both lungs due to infectious organism, unspecified part of lung   Hypoxia   Acute renal injury (H)   Hyponatremia   Altered mental status, unspecified altered mental status type     I, Carmine Contreras, am serving as a trained medical scribe to document services personally performed by Darshana Vegas MD,  based on the provider's statements to me.      I, Darshana Vegas MD, was physically present and have reviewed and verified the accuracy of this note documented by Carmine Contreras.     6/6/2018   [facility], [place], EMERGENCY DEPARTMENT     Darshana Vegas MD  06/07/18 0046

## 2018-06-07 NOTE — CONSULTS
[facility] Orthopedic Consultation    Alessandra Pak MRN# [id number]   Age: 52 year old YOB: 1965   Date of Admission:  6/6/2018    Reason for consult: Diabetic foot wound   Requesting physician: Bairon Aguilar MD              Impression and Recommendation:   Impression:  52-year-old female with bilateral diabetic foot ulcers, right foot ulcers and abscess requiring bedside irrigation and debridement.  Chest x-ray with L > R interstitial opacities concerning for pneumonia.     Procedures:  Bedside I&D/debridement performed 6/7/2018   - Verbal consent obtained   - Right plantar foot wounds:    1) Quarter-sized ulcer overlying first MTP joint: Purulent drainage visible.  Sharp debridement with a scalpel was carried down to the level of healthy bleeding muscle .   2) Quarter-sized ulcer overlying the distal fourth metatarsal: Purulent drainage visible.  Upon initiation of sharp debridement with a scalpel, an adjacent abscess tracking approximately 5 cm medially and 1 cm laterally around the lateral aspect of the foot was noted.  Abscess was incised and all surrounding necrotic tissue was debrided to the level of tendon and bleeding muscle.  Total wound dimensions approximately 8 cm wide x 4 cm long in maximal dimension x 1.5 cm deep.  -Wounds were irrigated with 500 cc of sterile normal saline  -4 x 4 gauze packing applied to soft tissue defect and sterile dressings applied    Recomendations:  Operative Plan: None at this time.    Activity: NWB BLE while wound heals. ROM as tolerated.   Wound Cares/Dressing: Daily dressing changes and wound flushing by WOCN - primary team to consult WOCN. Keep wound clean/dry. Wound packing to be replaced daily by wound team/nursing.  DVT PPx: Per primary.  Antibiotic: Recommend broad spectrum IV antibiotics and ID consultation.   Labs: Follow inflammatory labs; repeat WBC, CRP and PCT q 48 hours until trending down. Obtain baseline current A1C to evaluate  glucose management.Recommend  Albumin/pre-albumin for eval for protein deficiency, and Vitamin D deficiency labs.   Imaging: Recommend plain films of right foot. Recommend against advance imaging at this time.  Vascular workup: Recommend GABRIEL's. If < 0.8, recommend vascular consultation to eval for potential benefit of lower extremity revascularization. Recommend getting TcO2 of lower extremity to eval perfusion.  PT/OT: Work on strengthening, gait training, mobility, and ADLs  Consults: Consider infectious disease consult to aid in antibiotic selection/duration. Vascular surgery as above.Recommend nutrition consult to eval for nutritional deficiency and consideration of protein/vitamin/mineral supplementation to improve wound healing potential.   Dispo: Per Primary team. No current plans for OR.  All other cares per primary team.         Chief Complaint:   Right foot ulcer         History of Present Illness:   Alessandra Pak is a 52 year old female with PMH NICM with ICD, CKD3, chronic pancreatitis status post pylorus sparing Whipple (2001) with secondary DM c/b recurrent diabetic foot ulcers, COPD, HTN, and chronic methadone use who has been admitted for sepsis and longstanding history of recurrent b/l diabteic foot ulcers. Patient is unsure exactly when she first noticed her bilateral foot ulcers, however present for a period time.  2 days ago the patient's daughter noticed a foul smell coming from her mother's feet, increased confusion, and decreased p.o. Intake.     Of note, she was seen by orthopedics during an admission in February 2018 at which time she underwent bedside irrigation and debridement of a right foot abscess and ulcer and was recommended to follow-up in clinic.  Patient was lost to follow-up until her current presentation.  She reports chronic bilateral numbness in a stocking glove distribution, however denies any new numbness or tingling.  She follows with Dr. Lewis with Podiatry as an  outpatient, however notes her last visit was approximately 3 months ago    History obtained from patient interview and chart review.        Past Medical History:     Past Medical History:   Diagnosis Date     Abdominal pain, right upper quadrant     sees Dr Mcclellan pain clinic at [facility] [place]     ASCUS with positive high risk HPV 8/2013    + HPV 33, [facility] - GAVIN I, ECC- atypia     Cardiomyopathy (H)     non ischemic cardiomyopathy with EF 15     Cervical high risk HPV (human papillomavirus) test positive 7/8/15, 7/25/16    NIL pap/+ HR HPV (not 16 or 18).      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    leep     Depressive disorder      Gastro-oesophageal reflux disease      Human papillomavirus in conditions classified elsewhere and of unspecified site 2/2012    + HPV 33     Hypertension      Profound impairment, one eye, impairment level not further specified     rt eye due to childhood injury     Systolic CHF (H) 3/12/2015     Tobacco abuse 5/18/2013     Type 2 diabetes mellitus without complications (H)      Uncomplicated asthma              Past Surgical History:     Past Surgical History:   Procedure Laterality Date     C NONSPECIFIC PROCEDURE  2001    cholecystectomy     C NONSPECIFIC PROCEDURE  as a child    tonsillectomy     C NONSPECIFIC PROCEDURE  2001    whipple procedure     CARDIAC SURGERY      defib     COLPOSCOPY,LOOP ELECTRD CERVIX EXCIS  2002, 2011    stage 2 dysplasia     ENDOBRONCHIAL ULTRASOUND FLEXIBLE N/A 2/19/2015    Procedure: ENDOBRONCHIAL ULTRASOUND FLEXIBLE;  Surgeon: Brenden Tamez MD;  Location: [facility]     LEEP TX, CERVICAL  2014    LEEP TX Cervical     RECESSION RESECTION WITH ADJUSTABLE SUTURE  12/13/2011    Procedure:RECESSION RESECTION WITH ADJUSTABLE SUTURE; Right Strabismus Repair with Adjustable Suture       [facility]              Social History:   Tobacco use: Uses nicotine patch daily.  Longtime smoker, approximately half pack per day  Alcohol use: Denies  Elicit  "drug use: History of opioid abuse, on methadone 70 mg daily  Living situation: Lives with her daughter, brother, mother  Family contact information: Daughter Anna [phone]          Family History:   No family history of anesthesia, bleeding or clotting complications.           Allergies:     Allergies   Allergen Reactions     No Known Drug Allergies              Medications:   Medication reviewed with patient and in chart.          Review of Systems:   CONSTITUTIONAL:  negative for  fevers, chills, sweats, fatigue, malaise and weight loss  HEENT:  negative for tinnitus, earaches, nasal congestion, epistaxis, sore throat  RESPIRATORY:  negative for dyspnea, wheezing, chest pain and cough.  CARDIOVASCULAR:  negative for chest pain, palpitations, orthopnea, edema, syncope.  GASTROINTESTINAL:  negative for nausea, vomiting, diarrhea, constipation, abdominal pain.  GENITOURINARY:  negative for dysuria, nocturia, urinary incontinence and hematuria  INTEGUMENT/BREAST:  negative for rash and skin lesions  HEMATOLOGIC/LYMPHATIC:  negative for easy bruising, bleeding, swelling/edema  ALLERGIC/IMMUNOLOGIC:  negative for recurrent infections and drug reactions  ENDOCRINE: negative for diabetic symptoms including polyuria and polydipsia  MUSCULOSKELETAL: negative for myalgias, arthralgias, joint swelling and muscle weakness  NEUROLOGICAL: negative for headaches, dizziness, gait problems, numbness/tingling          Physical Exam:     BP (!) 85/50  Temp 100.8  F (38.2  C) (Oral)  Resp 20  Ht 1.727 m (5' 8\")  Wt 58.7 kg (129 lb 6.6 oz)  SpO2 92%  BMI 19.68 kg/m2  General: awake, alert, cooperative, no apparent distress, appears stated age  HEENT: normocephalic, atraumatic, PERRL, EOMI, no scleral icterus, MMM  Respiratory: breathing non-labored, no wheezing  Cardiovascular: peripheral pulses 2+ and symmetric, capillary refill < 2sec, skin wwp  Skin: no rashes or lesions  Neurological: A&Ox3, CN II-XII grossly " intact  Musculoskeletal:  RLE: No gross deformity. Two quarter-sized plantar foot wounds - one overlying approximately the first metatarsal head, and second overlying approximately the fourth metatarsal head.  Visible purulent drainage from both wounds.  No significant vinh-wound erythema.  Fluctuant pocket adjacent to lateral wound with active and expressible purulent drainage.  Probing the medial wound reveals no significant cavities or tracts. Negative probe-to-bone. Sensation diminished in stocking pattern distribution in BLE. 5/5 SLR. Fires TA/Gastroc/EHL/FHL with 5/5 strength. Dorsalis pedis and posterior tibial arteries are palpable.            Imaging:   No x-rays of bilateral feet obtained during this admission.          Laboratory date:   CBC:  Lab Results   Component Value Date    WBC 28.4 (H) 06/07/2018    HGB 8.4 (L) 06/07/2018     06/07/2018       BMP:  Lab Results   Component Value Date     06/07/2018    POTASSIUM 5.0 06/07/2018    CHLORIDE 103 06/07/2018    CO2 23 06/07/2018    BUN 33 (H) 06/07/2018    CR 1.80 (H) 06/07/2018    ANIONGAP 8 06/07/2018    RICK 8.1 (L) 06/07/2018     (H) 06/07/2018       Inflammatory Markers:  Lab Results   Component Value Date    WBC 28.4 (H) 06/07/2018    .0 (H) 06/07/2018     (H) 02/15/2015       Cultures:    Recent Labs  Lab 06/07/18  0031 06/06/18  2334 06/06/18  2301   CULT PENDING No growth after 6 hours No growth after 7 hours       Signed,  Shakeel Estrada MD  PGY-1, Orthopaedic Surgery      Attestation:  This patient was discussed with Dr Lon Rios, PGY-4 who agrees with the above and will be discussed with Dr. King, Attending Physician.

## 2018-06-07 NOTE — H&P
[facility], [place]  Internal Medicine History and Physical - [facility] Service       Date of Admission:  6/6/2018    Assessment & Plan:   Alessandra Pak is a 52 yr old female with a history of NICM with ICD, CKD stage III, chronic pancreatitis s/p pylorus sparing Whipple (2001) with secondary DM c/b diabetic foot ulcers, COPD, HTN, and chronic methadone use who presents with altered mental status found to be septic.     #Sepsis   Patient met sepsis criteria on admission with WBC of 28.2 (ANC 25.2) and tachycardia with a suspected source of infection. Procal noted to be 5.36, lactic acid 1.7. Noted to have a swollen right foot with open ulcers on both feet with likely abscess on right foot. UA unremarkable. Chest X-ray showed L>R interstitial opacities, concerning for pneumonia.   -Continue Vancomycin (Pharmacy to dose)  -Continue Zosyn 4.5 g q6H   -Continue Levofloxacin 750 mg q48 hrs, renally dosed (received 1 dose in ED)   -Consider MRI w/ and w/o contrast of right foot for further evaluation of osteomyelitis  -CRP, repeat CBC w/ diff in AM  -Sputum culture   -Follow-up blood and urine cultures   -Cardiac monitoring   -Tylenol 650 mg q4H PRN     #Encephalopathy in the setting of sepsis   Patient's AMS likely secondary to acute illness, although medication/drug ingestion cannot be ruled out. She has some symptoms of anticholinergic syndrome (urinary retention, dry mouth).   -Urine tox     #Acute Hypoxic Respiratory Failure   #Hx of COPD (not on oxygen at home)   Patient required supplemental oxygen to maintain her O2 sats >90%. CXR showed findings concerning for possible pneumonia.   -Continuous pulse ox   -Provide supplemental oxygen as needed   -Continue PTA fluticasone 2 spray daily   -Continue Ipratropium-Albuterol 1 puff QID  -Albuterol inhaler q4H PRN     #Acute on Chronic Kidney Disease  #Hyperkalemia   #Hyponatremia   Baseline creatinine around 1.2, currently 2.16. Potassium on  admission was 5.9, which improved to 4.9 with fluids. Sodium on admission was 128. Patient's NIKOLAY and hyponatremia likely secondary to decreased oral intake in the setting of diuretic use. Expect patient's hyponatremia to improve once oral intake increases.    -Repeat BMP in the AM   -Hold home lasix 20 mg daily   -If hyponatremia does not improve, could consider further evaluation with serum/urine osm and urine sodium levels    #Urinary Retention   Required straight cath in the ED to obtain urine sample.   -Bladder scan PRN  -Strict intake/output     #Type 2 DM (poorly controlled)   Secondary to chronic pancreatitis s/p pylorus sparing Whipple (2001). On insulin pump at home, appears that she has not received insulin for several days. She has also not been eating. Glucose on admission 193. Hemoglobin a1c on admission was 9.5, improved from 12.8 in Feb 2018.    -Insulin Glargine 5 U subq qAM (Patient receives an equivalent of about 10U basal at home)   -Insulin sliding scale   -Hypoglycemia protocol   -Glucose checks QID and at bedtime   -Endocrine consult (will need to call in the AM)   -Hold home gabapentin gel and Lyrica 75 mg TID    #NICM with ICD:   Last interrogation 3/2018 - 5 NSVT episodes recorded. Last ECHO 2/2016 with EF of 50-55%.    -Continue PTA ASA 81 mg daily    Chronic Issues:   #HTN: Hold home lisinopril 10 mg daily and lasix 20 mg as noted above  #GERD: Hold home Zantac 300 mg daily   #Mental Health: Continue amitriptyline 50 mg qPM  #Smoking: Nicotine patch daily  #Hx of opioid abuse: Continue PTA methadone 70 mg daily    # Pain Assessment:  Current Pain Score 2/23/2018   Patient currently in pain? yes   Pain score (0-10) -   Pain location Foot   Pain descriptors Aching   CPOT pain score -   - Alessandra is unable to participate in a collaborative plan for pain management due to altered mental status.   - Please see the plan for pain management as documented above    Fluids: No IV fluids  Diet:  "Diabetic diet   DVT Prophylaxis: Low Risk/Ambulatory with no VTE prophylaxis indicated  Code Status: Full Code    Disposition Plan:   Expected discharge: 2 - 3 days; recommended to prior living arrangement once on room air, antibiotic plan established, mental status at baseline and renal function improved.    The patient will be formally staffed in the AM.     Kaleigh Blunt MD  Med/Peds, PGY1  Pager      ---------------------------------------------------------------------------------------------    Chief Complaint:   AMS    History is obtained from the patient, chart review, and EMS reports     History of Present Illness   Alessanrda Pak is a 52 yr old female with a history of NICM with ICD, CKD stage III, chronic pancreatitis s/p pylorus sparing Adrienne (2001) with secondary DM c/b diabetic foot ulcers, COPD (not on home O2), HTN, and chronic methadone use who presents with altered mental status. Per report, EMS found the patient in her bed. Her O2 sats were in the low 80s on room air, improved to the 90s on 6L of oxygen. Her blood pressure was in the 130s/80s and she was tachycardic to the 130s. Her blood glucose was 216.    Writer spoke with patient's daughter, Anna () who reports that her mother has been acting more confused for the past 2 days. Prior to 2 days ago, the patient was in her normal state of health. For the past two days, Anna has noticed a worsening \"foul\" smell coming from her mother's feet. She has not had much to eat for the past two days and has not been taking her insulin. Anna checked her mother's sugar yesterday and it was 262. Unknown as to whether the patient has been febrile. Anna reports that her mother takes methadone due to a past history of opioid use, but currently does not use drugs. She reports no alcohol use. Anna did not call EMS earlier because her mother did not want to go to the hospital. She states that she has never seen her mother act like this " "before.     The patient reports that she came to the hospital because she wanted to \"discuss her diabetes with a Doctor.\" She notes that she walked to the hospital. She denies pain or shortness of breath. No vision changes. States she has not recently been ill, no fevers. She is aware that she has foot ulcers.     In the ED, patient was afebrile with HRs to the 120s, BP 140s/70s. She required 6 LPM O2 via nasal cannula to maintain O2 sats >90%. Labs notable for WBC of 28.2 (ANC 25.2), Hgb 9.9, Plt 317, INR of 1.46, Sodium of 128, potassium of 5.9, Creatinine of 2.16, Glucose 193, Alk phos 233, Lactic acid 1.7, Lipase 17, Trop <0.015, and procal of 5.36. Blood culture was obtained, UA without evidence of infection, urine culture pending. CXR showed L>R, bibasilar mixed patchy airspace and interstitial opacities. EKG showed sinus tachycardia. She received 2L NS and was started on Levaquin, Zosyn, and Vancomycin.     Of note, patient was admitted in February 2018 with sepsis 2/2 to E diabetic foot cellulitis/abscess - MRI 2/18 with no definitive osteomyelitis but with fluid collection around 2nd metatarsal head. Tissue from right foot grew MRSA. Treated with Zosyn and Vanc. Discharged home on Vancomycin x3 weeks.     Review of Systems:   Unable to obtain given patient's AMS.     Past Medical History:    Patient's past medical history obtained from chart review, unable to verify with patient given AMS  Past Medical History:   Diagnosis Date     Abdominal pain, right upper quadrant     sees Dr Mcclellan pain clinic at [facility]     ASCUS with positive high risk HPV 8/2013    + HPV 33, [place] - GAVIN I, ECC- atypia     Cardiomyopathy (H)     non ischemic cardiomyopathy with EF 15     Cervical high risk HPV (human papillomavirus) test positive 7/8/15, 7/25/16    NIL pap/+ HR HPV (not 16 or 18).      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    leep     Depressive disorder      Gastro-oesophageal reflux disease      Human papillomavirus " in conditions classified elsewhere and of unspecified site 2/2012    + HPV 33     Hypertension      Profound impairment, one eye, impairment level not further specified     rt eye due to childhood injury     Systolic CHF (H) 3/12/2015     Tobacco abuse 5/18/2013     Type 2 diabetes mellitus without complications (H)      Uncomplicated asthma      Past Surgical History:   Surgical history obtained from chart review, unable to verify with patient given AMS  Past Surgical History:   Procedure Laterality Date     C NONSPECIFIC PROCEDURE  2001    cholecystectomy     C NONSPECIFIC PROCEDURE  as a child    tonsillectomy     C NONSPECIFIC PROCEDURE  2001    whipple procedure     CARDIAC SURGERY      defib     COLPOSCOPY,LOOP ELECTRD CERVIX EXCIS  2002, 2011    stage 2 dysplasia     ENDOBRONCHIAL ULTRASOUND FLEXIBLE N/A 2/19/2015    Procedure: ENDOBRONCHIAL ULTRASOUND FLEXIBLE;  Surgeon: Brenden Tamez MD;  Location: [facility]     LEEP TX, CERVICAL  2014    LEEP TX Cervical     RECESSION RESECTION WITH ADJUSTABLE SUTURE  12/13/2011    Procedure:RECESSION RESECTION WITH ADJUSTABLE SUTURE; Right Strabismus Repair with Adjustable Suture       TUBAL LIGATION  2007    essure      Social History:   Obtained from patient's daughter.     The patient lives with her daughter (Anna ), her brother, and her mother. Anna states her mother does not drink alcohol and has a prior opioid abuse history, currently on methadone. The patient has smoked cigarettes for as long as Anna can remember. 1 pack of cigarettes lasts the patient about 2-3 days.     Family History:   Family history obtained from chart review and patient's daughter, Anna.   Family History   Problem Relation Age of Onset     DIABETES Mother      diet controled     Hypertension Mother      Arthritis Mother      Lipids Mother      DIABETES Father      Hypertension Father      GASTROINTESTINAL DISEASE Father      gallbladder removed     Bipolar Disorder  Brother      Thyroid Disease Brother      Obesity Other      Son     Respiratory Other      Son and Daughter; asthma     Depression Maternal Aunt      Anxiety Disorder Maternal Aunt      Prior to Admission Medications:     No current facility-administered medications on file prior to encounter.   Current Outpatient Prescriptions on File Prior to Encounter:  acetone, Urine, test STRP 1 strip by In Vitro route as needed   albuterol (ALBUTEROL) 108 (90 BASE) MCG/ACT Inhaler Inhale 2 puffs into the lungs every 4 hours as needed for shortness of breath / dyspnea   amitriptyline (ELAVIL) 50 MG tablet Take 1 tablet (50 mg) by mouth At Bedtime   ASPIRIN 81 MG OR TABS 1 tab po QD (Once per day)   blood glucose monitoring (HOLLIE CONTOUR NEXT) test strip Use to test blood sugar 10 times daily or as directed.  Ok to substitute alternative if insurance prefers.   blood glucose monitoring (HOLLIE MICROLET) lancets Use to test blood sugar 10 times daily or as directed.  Ok to substitute alternative if insurance prefers.   blood glucose monitoring (FREESTYLE) lancets 1 each See Admin Instructions test 3-4 times per day   fluticasone (FLONASE) 50 MCG/ACT spray Spray 2 sprays into left nostril daily   furosemide (LASIX) 20 MG tablet Take 1 tablet (20 mg) by mouth daily   gabapentin 8 % GEL topical PLO cream Apply 1 g topically every 8 hours   glucose 4 G CHEW Take 3-4 tablets to treat low blood sugar.   insulin aspart (NOVOLOG VIAL) 100 UNITS/ML injection Use as directed in insulin pump. Patient using up to 60 units per day   insulin pen needle (B-D U/F) 31G X 5 MM Use 3 time(s) a day.   Ipratropium-Albuterol (COMBIVENT RESPIMAT)  MCG/ACT inhaler Inhale 1 puff into the lungs 4 times daily Do not exceed 6 doses/day.   Lidocaine (LIDOCARE) 4 % Patch Place 3 patches onto the skin every 24 hours   lidocaine HCl 3 % cream Apply topically 3 times daily as needed (foot pain) OTC product   lisinopril (PRINIVIL/ZESTRIL) 10 MG tablet Take  1 tablet (10 mg) by mouth daily   LYRICA 75 MG capsule TAKE 1 CAPSULE BY MOUTH 3 TIMES DAILY   methadone (DOLPHINE-INTENSOL) 10 mg/mL CONC Take 7 mLs by mouth daily.   MIRENA 20 MCG/24HR IU IUD placed today (Patient taking differently: No sig reported)   multivitamin, therapeutic with minerals (THERA-VIT-M) TABS Take 1 tablet by mouth daily   oxyCODONE IR (ROXICODONE) 5 MG tablet Take 1 tablet (5 mg) by mouth every 6 hours as needed for moderate to severe pain   polyethylene glycol (MIRALAX/GLYCOLAX) Packet Take 17 g by mouth daily as needed for constipation   ranitidine (ZANTAC) 300 MG tablet Take 1 tablet (300 mg) by mouth daily   SM NICOTINE 21 MG/24HR 24 hr patch REMOVE OLD PATCH AND APPLY ONE NEW PATCH TO SKIN EVERY 24 HOURS   vancomycin 750 mg Inject 750 mg into the vein every 12 hours       Allergies:   Allergies   Allergen Reactions     No Known Drug Allergies        Physical Exam:   Vital Signs: Temp: 100.1  F (37.8  C) Temp src: Oral BP: 158/69   Heart Rate: 129 Resp: 22 SpO2: 92 % O2 Device: Nasal cannula Oxygen Delivery: 6 LPM  Weight: 135 lbs 2.27 oz    General: No distress, Alert.   HEENT: No Scleral icterus. PERRL, EOMI, dry mucous membranes  Cardiac: Tachycardic, regular rhythm. S1 and S2 heard.   Pulm: Crackles at lung bases bilaterally, good aeration bilaterally. Mild increased work of breathing. On oxymask at 6LPM.   Abd: +bs. Soft, non distended, non tender to palpation.  Skin: 2 ulcers on bottom of right foot, below metatarsal 1 and 5. Fluctuating mass near ulcer on 5th metatarsal. Foul smelling, no active drainage. Ulcer on left foot, metatarsal 1.   Extremities: Right foot swollen. DP pulses intact bilaterally.    Neuro: Alert but only oriented to person. Has difficulty sitting up, appears weak. Strength of upper and lower extremities 5/5 bilaterally. Sensation diminished of feet bilaterally. CN II - XII intact. Able to perform finger-to-nose.     Pictures provided by Charli So MD (patient  consented)     Right foot, area of fluctuance visible right bottom screen      Right foot w/ area of fluctuance immediately below      Left medial foot        Data   Data     Recent Labs  Lab 06/07/18  0043 06/06/18  2215   WBC  --  28.2*   HGB  --  9.9*   MCV  --  76*   PLT  --  317   INR  --  1.46*   NA  --  128*   POTASSIUM 4.9 5.9*   CHLORIDE  --  95   CO2  --  26   BUN  --  37*   CR  --  2.16*   ANIONGAP  --  6   RICK  --  9.3   GLC  --  193*   ALBUMIN  --  2.5*   PROTTOTAL  --  8.9*   BILITOTAL  --  0.5   ALKPHOS  --  233*   ALT  --  27   AST  --  Canceled, Test credited   LIPASE  --  17*   TROPI  --  <0.015     Recent Results (from the past 24 hour(s))   XR Chest Port 1 View    Narrative    Preliminary report:  This is a preliminary resident interpretation. Full report to follow.        Impression    Impression:   Left greater than right, bibasilar mixed patchy airspace and  interstitial opacities with silhouetting of the left hemidiaphragm.  Differential includes atelectasis and/or infection, aspiration.

## 2018-06-07 NOTE — PLAN OF CARE
Problem: Patient Care Overview  Goal: Plan of Care/Patient Progress Review  Outcome: No Change  Assumed care of patient 0630.  Tachycardic, normotensive.  Febrile to 102.5, given tylenol.  RR 20.  Lungs coarse throughout.  Requiring 5-7L NC to maintain sats > 90% as ordered per MD.  T waves peaked, last K 4.9.  New lab drawn, awaiting results.  Hung 1 dose zosyn IV.  Hand off report given to day RN 0700.  Marilynn resident to bedside to assess patient, discuss POC.  Continue to monitor and notify team of changes. Pt to go to MRI of bilateral feet today to r/o osteomyelitis.

## 2018-06-07 NOTE — ED TRIAGE NOTES
Pt BIBA c/o AMS via family. Pt has not taken any insulin in 3 days maybe longer. Pt not drinking well. Pt worsening confusion, foot ulcers, and diet over the past week per EMS per family.

## 2018-06-08 ENCOUNTER — HOME INFUSION (PRE-WILLOW HOME INFUSION) (OUTPATIENT)
Dept: PHARMACY | Facility: CLINIC | Age: 53
End: 2018-06-08

## 2018-06-08 DIAGNOSIS — R91.8 PULMONARY NODULES: Primary | ICD-10-CM

## 2018-06-08 LAB
ANION GAP SERPL CALCULATED.3IONS-SCNC: 10 MMOL/L (ref 3–14)
BACTERIA SPEC CULT: NO GROWTH
BUN SERPL-MCNC: 33 MG/DL (ref 7–30)
CALCIUM SERPL-MCNC: 8.1 MG/DL (ref 8.5–10.1)
CHLORIDE SERPL-SCNC: 107 MMOL/L (ref 94–109)
CO2 SERPL-SCNC: 22 MMOL/L (ref 20–32)
CREAT SERPL-MCNC: 1.46 MG/DL (ref 0.52–1.04)
CRP SERPL-MCNC: 310 MG/L (ref 0–8)
ERYTHROCYTE [DISTWIDTH] IN BLOOD BY AUTOMATED COUNT: 15.8 % (ref 10–15)
GFR SERPL CREATININE-BSD FRML MDRD: 38 ML/MIN/1.7M2
GLUCOSE BLDC GLUCOMTR-MCNC: 141 MG/DL (ref 70–99)
GLUCOSE BLDC GLUCOMTR-MCNC: 161 MG/DL (ref 70–99)
GLUCOSE BLDC GLUCOMTR-MCNC: 244 MG/DL (ref 70–99)
GLUCOSE BLDC GLUCOMTR-MCNC: 264 MG/DL (ref 70–99)
GLUCOSE SERPL-MCNC: 212 MG/DL (ref 70–99)
HCT VFR BLD AUTO: 24.6 % (ref 35–47)
HGB BLD-MCNC: 8 G/DL (ref 11.7–15.7)
INTERPRETATION ECG - MUSE: NORMAL
INTERPRETATION ECG - MUSE: NORMAL
Lab: NORMAL
MCH RBC QN AUTO: 25.1 PG (ref 26.5–33)
MCHC RBC AUTO-ENTMCNC: 32.5 G/DL (ref 31.5–36.5)
MCV RBC AUTO: 77 FL (ref 78–100)
PLATELET # BLD AUTO: 210 10E9/L (ref 150–450)
POTASSIUM SERPL-SCNC: 4.3 MMOL/L (ref 3.4–5.3)
RBC # BLD AUTO: 3.19 10E12/L (ref 3.8–5.2)
SODIUM SERPL-SCNC: 139 MMOL/L (ref 133–144)
SPECIMEN SOURCE: NORMAL
VANCOMYCIN SERPL-MCNC: 12.3 MG/L
WBC # BLD AUTO: 24.5 10E9/L (ref 4–11)

## 2018-06-08 PROCEDURE — 86140 C-REACTIVE PROTEIN: CPT | Performed by: STUDENT IN AN ORGANIZED HEALTH CARE EDUCATION/TRAINING PROGRAM

## 2018-06-08 PROCEDURE — 25000132 ZZH RX MED GY IP 250 OP 250 PS 637: Performed by: STUDENT IN AN ORGANIZED HEALTH CARE EDUCATION/TRAINING PROGRAM

## 2018-06-08 PROCEDURE — 25000128 H RX IP 250 OP 636: Performed by: INTERNAL MEDICINE

## 2018-06-08 PROCEDURE — 93010 ELECTROCARDIOGRAM REPORT: CPT | Performed by: INTERNAL MEDICINE

## 2018-06-08 PROCEDURE — 93005 ELECTROCARDIOGRAM TRACING: CPT

## 2018-06-08 PROCEDURE — G0463 HOSPITAL OUTPT CLINIC VISIT: HCPCS | Mod: 25

## 2018-06-08 PROCEDURE — 00000146 ZZHCL STATISTIC GLUCOSE BY METER IP

## 2018-06-08 PROCEDURE — 12000008 ZZH R&B INTERMEDIATE UMMC

## 2018-06-08 PROCEDURE — 99233 SBSQ HOSP IP/OBS HIGH 50: CPT | Mod: GC | Performed by: PEDIATRICS

## 2018-06-08 PROCEDURE — 97602 WOUND(S) CARE NON-SELECTIVE: CPT

## 2018-06-08 PROCEDURE — 80202 ASSAY OF VANCOMYCIN: CPT | Performed by: INTERNAL MEDICINE

## 2018-06-08 PROCEDURE — 85027 COMPLETE CBC AUTOMATED: CPT | Performed by: STUDENT IN AN ORGANIZED HEALTH CARE EDUCATION/TRAINING PROGRAM

## 2018-06-08 PROCEDURE — 25000128 H RX IP 250 OP 636: Performed by: STUDENT IN AN ORGANIZED HEALTH CARE EDUCATION/TRAINING PROGRAM

## 2018-06-08 PROCEDURE — 80048 BASIC METABOLIC PNL TOTAL CA: CPT | Performed by: STUDENT IN AN ORGANIZED HEALTH CARE EDUCATION/TRAINING PROGRAM

## 2018-06-08 PROCEDURE — 25000128 H RX IP 250 OP 636

## 2018-06-08 RX ORDER — HEPARIN SODIUM 5000 [USP'U]/.5ML
5000 INJECTION, SOLUTION INTRAVENOUS; SUBCUTANEOUS EVERY 12 HOURS
Status: DISCONTINUED | OUTPATIENT
Start: 2018-06-08 | End: 2018-06-14

## 2018-06-08 RX ORDER — PREGABALIN 75 MG/1
75 CAPSULE ORAL 3 TIMES DAILY
Status: DISCONTINUED | OUTPATIENT
Start: 2018-06-08 | End: 2018-06-13

## 2018-06-08 RX ADMIN — ACETAMINOPHEN 1000 MG: 500 TABLET, FILM COATED ORAL at 08:55

## 2018-06-08 RX ADMIN — PIPERACILLIN SODIUM AND TAZOBACTAM SODIUM 3.38 G: 3; .375 INJECTION, POWDER, LYOPHILIZED, FOR SOLUTION INTRAVENOUS at 17:30

## 2018-06-08 RX ADMIN — POLYETHYLENE GLYCOL 3350 17 G: 17 POWDER, FOR SOLUTION ORAL at 22:39

## 2018-06-08 RX ADMIN — HEPARIN SODIUM 5000 UNITS: 5000 INJECTION, SOLUTION INTRAVENOUS; SUBCUTANEOUS at 22:28

## 2018-06-08 RX ADMIN — FLUTICASONE PROPIONATE 2 SPRAY: 50 SPRAY, METERED NASAL at 09:10

## 2018-06-08 RX ADMIN — INSULIN ASPART 3 UNITS: 100 INJECTION, SOLUTION INTRAVENOUS; SUBCUTANEOUS at 13:01

## 2018-06-08 RX ADMIN — PREGABALIN 75 MG: 75 CAPSULE ORAL at 13:01

## 2018-06-08 RX ADMIN — INSULIN ASPART 1 UNITS: 100 INJECTION, SOLUTION INTRAVENOUS; SUBCUTANEOUS at 17:20

## 2018-06-08 RX ADMIN — AMITRIPTYLINE HYDROCHLORIDE 50 MG: 50 TABLET, FILM COATED ORAL at 22:27

## 2018-06-08 RX ADMIN — Medication 1 MG: at 23:22

## 2018-06-08 RX ADMIN — ACETAMINOPHEN 1000 MG: 500 TABLET, FILM COATED ORAL at 13:01

## 2018-06-08 RX ADMIN — PIPERACILLIN SODIUM AND TAZOBACTAM SODIUM 3.38 G: 3; .375 INJECTION, POWDER, LYOPHILIZED, FOR SOLUTION INTRAVENOUS at 00:01

## 2018-06-08 RX ADMIN — SODIUM CHLORIDE 1000 ML: 9 INJECTION, SOLUTION INTRAVENOUS at 00:01

## 2018-06-08 RX ADMIN — PREGABALIN 75 MG: 75 CAPSULE ORAL at 19:34

## 2018-06-08 RX ADMIN — PREGABALIN 75 MG: 75 CAPSULE ORAL at 08:54

## 2018-06-08 RX ADMIN — OXYCODONE HYDROCHLORIDE 2.5 MG: 5 TABLET ORAL at 04:40

## 2018-06-08 RX ADMIN — NICOTINE 1 PATCH: 21 PATCH TRANSDERMAL at 08:59

## 2018-06-08 RX ADMIN — VANCOMYCIN HYDROCHLORIDE 1500 MG: 10 INJECTION, POWDER, LYOPHILIZED, FOR SOLUTION INTRAVENOUS at 04:40

## 2018-06-08 RX ADMIN — INSULIN GLARGINE 5 UNITS: 100 INJECTION, SOLUTION SUBCUTANEOUS at 09:34

## 2018-06-08 RX ADMIN — METHADONE HYDROCHLORIDE 70 MG: 10 CONCENTRATE ORAL at 09:07

## 2018-06-08 RX ADMIN — PIPERACILLIN SODIUM AND TAZOBACTAM SODIUM 3.38 G: 3; .375 INJECTION, POWDER, LYOPHILIZED, FOR SOLUTION INTRAVENOUS at 13:52

## 2018-06-08 RX ADMIN — ACETAMINOPHEN 1000 MG: 500 TABLET, FILM COATED ORAL at 19:34

## 2018-06-08 RX ADMIN — ASPIRIN 81 MG CHEWABLE TABLET 81 MG: 81 TABLET CHEWABLE at 08:55

## 2018-06-08 RX ADMIN — UMECLIDINIUM BROMIDE AND VILANTEROL TRIFENATATE 1 PUFF: 62.5; 25 POWDER RESPIRATORY (INHALATION) at 09:01

## 2018-06-08 RX ADMIN — PIPERACILLIN SODIUM AND TAZOBACTAM SODIUM 3.38 G: 3; .375 INJECTION, POWDER, LYOPHILIZED, FOR SOLUTION INTRAVENOUS at 05:53

## 2018-06-08 ASSESSMENT — PAIN DESCRIPTION - DESCRIPTORS: DESCRIPTORS: STABBING;ACHING

## 2018-06-08 NOTE — PROGRESS NOTES
[facility] Nurse Inpatient Wound Assessment   Reason for consultation: Evaluate and treat right foot wounds    Assessment  Right foot wounds due to Diabetic Ulcer  Status: initial assessment    Treatment Plan  Right plantar foot wounds: Daily: Remove dressing and apply Vashe (order #[id number]) soaked gauze into wound beds for 10 minutes. Remove and do not rinse. Apply Iodasorb gel (order #[id number]) to wound beds. Pack gently with dry fluffed 4x4. Cover with ABD and secure with Kerlix and ACE wrap.   Orders Written  Recommended provider order: Podiatry consult- bedside I and D 6/7/18  [facility] Nurse follow-up plan:weekly  Nursing to notify the Provider(s) and re-consult the [facility] Nurse if wound(s) deteriorates or new skin concern.    Patient History  According to provider note(s):  Alessandra Pak is a 52 year old female with PMH NICM with ICD, CKD3, chronic pancreatitis status post pylorus sparing Whipple (2001) with secondary DM c/b recurrent diabetic foot ulcers, COPD, HTN, and chronic methadone use who has been admitted for sepsis and longstanding history of recurrent b/l diabteic foot ulcers. Patient is unsure exactly when she first noticed her bilateral foot ulcers, however present for a period time.  2 days ago the patient's daughter noticed a foul smell coming from her mother's feet, increased confusion, and decreased p.o. Intake.      Of note, she was seen by orthopedics during an admission in February 2018 at which time she underwent bedside irrigation and debridement of a right foot abscess and ulcer and was recommended to follow-up in clinic.  Patient was lost to follow-up until her current presentation.  She reports chronic bilateral numbness in a stocking glove distribution, however denies any new numbness or tingling.  She follows with Dr. Lewis with Podiatry as an outpatient, however notes her last visit was approximately 3 months ago.    Objective Data  Containment of urine/stool: Continent of bowel and Continent of  bladder    Active Diet Order    Active Diet Order      Combination Diet Regular Diet Adult; 3004-6675 Calories: Moderate Consistent CHO (4-6 CHO units/meal)    Output:   I/O last 3 completed shifts:  In: 3385 [I.V.:2385; IV Piggyback:1000]  Out: 2260 [Urine:2260]    Risk Assessment:   Sensory Perception: 3-->slightly limited  Moisture: 4-->rarely moist  Activity: 2-->chairfast  Mobility: 3-->slightly limited  Nutrition: 2-->probably inadequate  Friction and Shear: 2-->potential problem  Kye Score: 16                          Labs:   Recent Labs  Lab 06/08/18  0240  06/06/18  2215   ALBUMIN  --   --  2.5*   HGB 8.0*  < > 9.9*   INR  --   --  1.46*   WBC 24.5*  < > 28.2*   A1C  --   --  9.5*   .0*  < >  --    < > = values in this interval not displayed.    Physical Exam  Skin inspection: focused right foot    Wound Location:  Right plantar 1st MPJ  Date of last photo 6/8/2018        Wound History: See above. Present on admission. I and D on 6/7/1028  Measurements (length x width x depth, in cm) 2 cm x 2 cm  x  0.8 cm   Wound Base:  **% granulation tissue  Tunneling N/A  Undermining N/A  Palpation of the wound bed: firm   Periwound skin: hyperkeratosis  Color: pale  Temperature: normal   Drainage:, moderate  Description of drainage: serosanguinous  Odor: strong  Pain: denies      Wound Location:  Right plantar to lateral foot  Date of last photo 6/8/2018        Wound History: See above. Present on admission. I and D on 6/7/1028  Measurements (length x width x depth, in cm) 8 cm x 6.9 cm  x  1 cm   Wound Base:  Mix of moist slough, tendon, and granulation tissue  Tunneling: none noted, however, as slough is washed away, tunnels may be noted  Undermining N/A  Palpation of the wound bed: firm   Periwound skin: denuded  Color: pale and pink  Temperature: normal   Drainage:, moderate  Description of drainage: serosanguinous  Odor: strong  Pain: score 8, aching    Interventions  Current support surface: Standard   Low air loss mattress  Current off-loading measures: Pillows under calves  Visual inspection of wound(s) completed  Wound Care: done per plan of care  Supplies: ordered: Vashe and Iodasorb gel and placed at the bedside  Education provided: plan of care and wound progress  Discussed plan of care with Patient and Nurse    Madison Page RN

## 2018-06-08 NOTE — PROGRESS NOTES
Care Coordinator Progress Note    Admission Date/Time:  6/6/2018  Attending MD:  Bairon Aguilar MD    Data  Chart reviewed, discussed with interdisciplinary team.   Patient was admitted for:    Pneumonia of both lungs due to infectious organism, unspecified part of lung  Hypoxia  Acute renal injury (H)  Hyponatremia  Altered mental status, unspecified altered mental status type  Hypoxemia.    Concerns with insurance coverage for discharge needs: None.  Current Living Situation: Patient lives alone.  Support System: Supportive and Involved; mom  Services Involved: none\  Transportation at Discharge: Family or friend will provide  Transportation to Medical Appointments:  - family or friend  Barriers to Discharge: culture and sensitivies for narrowing of home IV ABX, learn to complete wound care for home    Coordination of Care  D: Chart reviewed and plan of care discussed with MD team. Per Medical Team report patient will need many weeks (6?) of IV ABX at home. PICC to be placed, ordered yesterday 6/7. Presently patient has broad antibiotic coverage with Levaquin, Zosyn, and Vancomycin. No cultures and sensitivities yet.      IV ABX  I/A: Spoke with patient at bedside, verbal authorization received to call benefit check into [facility] for Home IV ABX. Explained that pt will attend Patient Learning Center class (ordered). Inquired if there are family members that would be able to learn with patient to assist with IV ABX administration; patient stated, no.     WOUND CARE  I/A: Patient requires wound care daily. Patient will need to complete wound care at home independently four days per week as Home Care visits can occur maximum three times per week. RNCC discussed this with patient; she feels comfortable with being trained and completing her own wound care at home. No preference for Home Care agency; orders for [facility] completed. Bedside nursing to teach patient wound care with every  dressing change please.    P: Care Coordination is available on the weekends by paging job code [phone].    Referrals: Provided patient with options for Home Care and Home Infusion.        [facility]  Phone # [phone]  Fax # [phone]   _________________________    [facility]  Phone  [phone]  Fax  [phone]    WOC RN visits three times per week    Wound Care Instructions:  Right plantar foot wounds: Daily: Remove dressing and apply Vashe (order #[id number]) soaked gauze into wound beds for 10 minutes. Remove and do not rinse. Apply Iodasorb gel (order #[id number]) to wound beds. Pack gently with dry fluffed 4x4. Cover with ABD and secure with Kerlix and ACE wrap.          Plan  Anticipated Discharge Date:  Over the weekend/Monday TBD speed of culture & sensitivies  Anticipated Discharge Plan:  Home    Cathryn RODRIGEZN RN PHN  Patient Care Management Coordinator  [facility]  Phone: [phone] / Pager: [phone]

## 2018-06-08 NOTE — PROGRESS NOTES
Care Coordinator - Discharge Planning    Admission Date/Time:  6/6/2018  Attending MD:  Bairon Aguilar MD     Data  Date of initial CC assessment:    Chart reviewed, discussed with interdisciplinary team.   Patient was admitted for:   1. Sepsis, due to unspecified organism (H)    2. Pneumonia of both lungs due to infectious organism, unspecified part of lung    3. Hypoxia    4. Acute renal injury (H)    5. Hyponatremia    6. Altered mental status, unspecified altered mental status type    7. Hypoxemia    8. Health Care Home    9. Skin ulcer of right heel with fat layer exposed (H)         Assessment   Full assessment completed in previous note    Coordination of Care and Referrals: Provided patient/family with options for Home Care.    Received call from Sharla with [facility] and they are not going to take patient.  They are not able to provide WOC RN 3 times/week and they have not been able to find another agency that will take patient either.  Team updated and state to check with WOC RN and ortho to see if WOC really needs to see 3 day/wk or can they see patient for example, once a week and have RN do the other days.  Patient not going to d/c this weekend per Marilynn Bernard MD team.  Will updated RNCC monday.      Plan  Anticipated Discharge Date:  Several days  Anticipated Discharge Plan:  Home with [facility]    CTS Handoff completed:  NO    Martita Rose RN, BSN  Care Coordinator [facility] Phil [facility]  Pager: [phone]  Phone: [phone]

## 2018-06-08 NOTE — PROGRESS NOTES
[facility], [place]   Antimicrobial Management Team (AMT) Note              To: Marilynn Bernard Medical Team  Unit: 4C  Allergies   Allergen Reactions     No Known Drug Allergies        Brief Summary:   Alessandra Pak is a 53yo F with PMH of cardiomyopathy, CHF, DM 2, COPD (not on O2 at baseline), CKD, HTN, and heel ulcers was admitted 6/6 for altered mental status    HPI:   Based on chart review, patient was admitted in February 2018 with sepsis, and was found to have a RLE diabetic foot cellulitis/abscess that grew MRSA. The MRI showed no definitive osteomyelitis. Patient was treated with Zosyn and vancomycin then discharge home on vancomycin (eventually changed to daptomycin) for three weeks, followed by Infectious Diseases.   On 6/6, patient was admitted to the ED with elevated WBC (28.2K), PCT 5.26, LA 1.7, , elevated BP (166/75mmHg), and elevated HR (131bpm). Patient was hypoxic, Tmax alz688.1F. It was also noted that the patient had a swollen and foul smelling right foot and AMS (uncontrolled laughter).  Patient was noted to have urinary retention but UA was unremarkable. Chest X-ray showed interstitial opacities greater in left than right which could be atelectasis and/or infection. Blood cultures, urine culture, and sputum culture was taken.   On 6/7, patient was febrile (102F) and hypotensive. Right foot ulcers were debrided by orthopedics, and a urinary catheter was placed.   As of 6/8, the blood andurine cultures are negative; sputum culture was never collected. An MRI of right foot for evaluation of osteomyelitis is being considered. WBC continues to be elevated (24.5K) and patient is afebrile, however still requiring 6L O2 via nasal cannula.  Assessment:   Foot abscesses: The suspected source of infection could be the right foot ulcers due to swelling and foul smell. Infections of diabetic feet can involve multiple pathogens,(e.g. MSSA/MRSA, gram negatives including  Pseudomonas, and anaerobes). In this patient, MRSA involvement cannot be ruled out at this time given the recent history of MRSA in the foot tissue culture (2/18). Zosyn and vancomycin provide excellent coverage for the pathogens commonly implicated in diabetic foot infections, and are appropriate at this time. .      Pneumonia: The patient s hypoxia and chest Xray findings leave pneumonia as a potential infectious source. The Zosyn and vancomycin utilized for the infected foot would provide coverage for the pathogens commonly implicated in a health care associated pneumonia in this patient with a recent admission and antibiotic history. Additional coverage with levofloxacin does not contribute significantly toward double coverage of Pseudomonas based upon the [facility] antibiogram, and can be discontinued    Recommendations:  1). Discontinue levofloxacin IV  2). Continue vancomycin IV and Zosyn IV  Discussed w/ ID Staff-Dr. Berry Calero MD and Nereyda CruzD  Sameera Lynn    Current Anti-infective Orders:  Anti-infectives (Future)    Start     Dose/Rate Route Frequency Ordered Stop    06/09/18 0000  levofloxacin (LEVAQUIN) infusion 750 mg      750 mg  100 mL/hr over 90 Minutes Intravenous EVERY 48 HOURS 06/07/18 1250 06/16/18 2359    06/08/18 0230  vancomycin (VANCOCIN) 1,500 mg in sodium chloride 0.9 % 250 mL intermittent infusion      1,500 mg  over 90 Minutes Intravenous EVERY 24 HOURS 06/08/18 0224      06/07/18 0630  piperacillin-tazobactam (ZOSYN) 3.375 g vial to attach to  mL bag      3.375 g  over 30 Minutes Intravenous EVERY 6 HOURS 06/07/18 0235            Vitals and other clinical features  Vitals       06/06 0700  -  06/07 0659 06/07 0700  -  06/08 0659 06/08 0700  -  06/08 1450   Most Recent    Temp ( F) 100.1 -  102.5    97.3 -  100.8    97.9 -  98.3     98.3 (36.8)    Heart Rate 119 -  131    75 -  120    91 -  99     96    Resp 14 -  22    12 -  20    12 -  16     12    /81 -  166/75     (!)78/51 -  129/77    (!)80/53 -  117/65     (!) 80/53    SpO2 (%) (!)82 -  100    (!)83 -  96    (!)89 -  95     90        Labs  Estimated Creatinine Clearance: 41.4 mL/min (based on Cr of 1.46).  Recent Labs   Lab Test  06/08/18   0240  06/07/18   0649  06/06/18 2215 02/22/18   0636  02/20/18   0633  02/18/18   1321   CR  1.46*  1.80*  2.16*  1.25*  1.09*  1.19*  1.23*       Recent Labs   Lab Test  06/08/18   0240  06/07/18   0649  06/06/18 2215 02/22/18   0636  02/20/18   0633  02/18/18   1321   02/16/18   1528   02/10/16   1229   02/15/15   0544   02/13/15   1244   WBC  24.5*  28.4*  28.2*  13.8*  18.1*  19.0*   < >  22.3*   < >  9.2   < >  11.1*   < >  13.3*   ANEU   --   24.5*  25.2*   --    --    --    --   19.6*   --   4.5   --   8.1   --   9.8*   ALYM   --   2.0  1.3   --    --    --    --   1.6   --   3.7   --   1.7   --   2.1   JAMESON   --   1.9*  1.6*   --    --    --    --   1.0   --   0.7   --   0.8   --   1.0   AEOS   --   0.0  0.0   --    --    --    --   0.0   --   0.4   --   0.3   --   0.3   HGB  8.0*  8.4*  9.9*  9.1*  9.8*  10.5*   < >  11.9   < >  12.8   < >  10.1*   < >  11.0*   HCT  24.6*  25.5*  29.7*  27.5*  30.7*  32.1*   < >  35.6   < >  37.2   < >  30.3*   < >  32.7*   MCV  77*  76*  76*  82  84  86   < >  85   < >  80   < >  83   < >  81   PLT  210  232  317  382  295  261   < >  288   < >  201   < >  184   < >  150    < > = values in this interval not displayed.       Recent Labs   Lab Test  06/06/18   2215  02/16/18   1528  06/21/17   1528  02/10/16   1229  10/27/15   1106  06/24/15   1443   BILITOTAL  0.5  0.6  0.3  0.2  0.2  0.3   ALKPHOS  233*  212*  252*  217*  230*  186*   ALBUMIN  2.5*  2.8*  3.1*  3.2*  3.3*  2.8*   AST  Canceled, Test credited  22  25  22  38  38   ALT  27  14  38  36  42  34       Recent Labs   Lab Test  06/08/18   0240  06/07/18   0649  06/06/18   2218  06/06/18   2215  02/22/18   0636  02/20/18   0633   02/18/18   1321  02/17/18   0643   02/16/18    1528  02/10/16   1229   02/15/15   0240   LACT   --    --   1.3  1.7   --    --    < >   --    --    < >   --    --    --    --    CRP  310.0*  310.0*   --    --   200.0*  260.0*   --   290.0*  280.0*   --   290.0*  19.0*   < >   --    SED   --    --    --    --    --    --    --    --    --    --    --    --    --   132*   PCAL   --    --    --   5.36   --    --    --    --    --    --   1.42   --    --    --     < > = values in this interval not displayed.     Recent Labs   Lab Test  06/08/18   0036   VANCOMYCIN  12.3       Culture results with specimen source  Culture Micro   Date Value Ref Range Status   06/07/2018 No growth  Final   06/06/2018 No growth after 1 day  Preliminary   06/06/2018 No growth after 2 days  Preliminary   02/19/2018 No growth  Final   02/19/2018 No growth  Final   02/19/2018   Final    Canceled, Test credited  Incorrectly ordered by [facility]  Test reordered as correct code  Tissue culture     02/19/2018   Final    No anaerobes isolated  Since this specimen was not transported in the proper anaerobic transport media, the   absence of anaerobes in this culture does not rule out the presence of anaerobes in this   specimen.     02/19/2018 (A)  Final    Light growth  Methicillin resistant Staphylococcus aureus (MRSA)     02/19/2018 (A)  Final    Light growth  Coagulase negative Staphylococcus  Susceptibility testing not routinely done  This organism is part of normal jim, but on occasion, may be a true pathogen. Clinical   correlation must be applied to interpreting this microbiology result.     02/19/2018   Final    Critical Value/Significant Value called to and read back by  NESHA HORTON RN (7A).  02.21.18 2206 GJS      Specimen Description   Date Value Ref Range Status   06/07/2018 Catheterized Urine  Final   06/06/2018 Blood Right Hand  Final   06/06/2018 Blood Left Arm  Final   02/19/2018 Nares  Final   02/19/2018 Blood Right Hand  Final   02/19/2018 Blood Left Hand  Final         Urine Studies     Recent Labs   Lab Test  06/07/18   0031  02/16/18   2036  06/24/15   1612  02/14/15   1545  10/31/14   1335   URINEPH  5.5  5.0  5.0  Duplicate request  SEE ACCN [id number]    5.0  5.0   NITRITE  Negative  Negative  Negative  Duplicate request  SEE ACCN [id number]  *  Negative  Negative   LEUKEST  Small*  Large*  Moderate*  Duplicate request  SEE ACCN [id number]  *  Trace*  Negative   WBCU  0  60*  2  0  5*       Respiratory Virus Testing    Respiratory Virus Source   Date Value Ref Range Status   02/16/2018 Nasopharyngeal  Final     Influenza A   Date Value Ref Range Status   02/16/2018 Negative NEG^Negative Final     Influenza A, H1   Date Value Ref Range Status   02/16/2018 Negative NEG^Negative Final     Influenza A, H3   Date Value Ref Range Status   02/16/2018 Negative NEG^Negative Final     Influenza A 2009 H1N1   Date Value Ref Range Status   02/16/2018 Negative NEG^Negative Final     Influenza B   Date Value Ref Range Status   02/16/2018 Negative NEG^Negative Final     Respiratory Syncytial Virus A   Date Value Ref Range Status   02/16/2018 Negative NEG^Negative Final     Respiratory Syncytial Virus B   Date Value Ref Range Status   02/16/2018 Negative NEG^Negative Final     Parainfluenza Virus 1   Date Value Ref Range Status   02/16/2018 Negative NEG^Negative Final     Parainfluenza Virus 2   Date Value Ref Range Status   02/16/2018 Negative NEG^Negative Final     Parainfluenza Virus 3   Date Value Ref Range Status   02/16/2018 Negative NEG^Negative Final     Human Metapneumovirus   Date Value Ref Range Status   02/16/2018 Negative NEG^Negative Final     Human Rhinovirus   Date Value Ref Range Status   02/16/2018 Negative NEG^Negative Final     Adenovirus Species B/E   Date Value Ref Range Status   02/16/2018 Negative NEG^Negative Final     Adenovirus Species C   Date Value Ref Range Status   02/16/2018 Negative NEG^Negative Final     Imaging:  Xr Chest Port 1 View    Result Date:  6/7/2018  Exam: XR CHEST PORT 1 VW, 6/7/2018 11:24 AM Indication: RN placed PICC - verify tip placement; Comparison: Radiograph of the chest 6/6/2018 Findings: AP view of the chest. Right PICC projects with the tip at the cavoatrial junction. Left implanted cardiac device is in stable position. Cardiomediastinal silhouette is stable. Retrocardiac opacity and bilateral interstitial pulmonary opacities with curly B-lines. No pneumothorax or pleural effusion. Cholecystectomy clips. Otherwise the upper abdomen is unremarkable.     Impression: 1. Right PICC tip projects over the cavoatrial junction. 2. Unchanged interstitial pulmonary edema and retrocardiac opacity. I have personally reviewed the examination and initial interpretation and I agree with the findings. BERNADETTE CONTEH MD    Xr Chest Port 1 View    Result Date: 6/7/2018  Exam: XR CHEST PORT 1 VW, 6/6/2018 10:28 PM Indication: sob; Comparison: Radiograph on 2/22/2018 Findings: Single semiupright frontal view of chest. Left chest wall pacemaker/AICD with transvenous leads. Low lung volumes. Left greater than on right patchy bibasilar opacities with silhouetting of the left hemidiaphragm. No pneumothorax. No acute osseous abnormality. Visualized upper abdomen is unremarkable.     Impression: 1. Suspicion of increasing mild interstitial edema. 2. Left greater than right, bibasilar mixed patchy airspace and interstitial opacities with silhouetting of the left hemidiaphragm. Differential includes atelectasis and/or infection, aspiration. I have personally reviewed the examination and initial interpretation and I agree with the findings. ANDREI RODRIGUES MD

## 2018-06-08 NOTE — CONSULTS
"Diabetes/Hyperglycemia Management Consult    Chief Complaint glycemic management recommendations  Consult requested by: Dr. Blunt  History of Present Illness Alessandra Pak is a 52 year old female with history of  Chronic pancreatitis s/p pylorus sparing  whipple in 2001, secondary diabetes, complicated by CKD stage 3, peripheral neuropathy and diabetic foot ulcers, hypertension, COPD,  And chronic methadone use, presented to the emergency department with altered mental status found to be septic.    Ms Pak is known to the Inpatient Diabetes team from her previous admission (2/16 to 2/23/2018)  She was on lantus 7 units, Novolog 1 unit per 10 grams of CHO for meals and snacks and medium intensity sliding scale while hospitalized and transitioned to ambulatory pump on day of discharge with below settings.    Alessandra reports after discharging in February, she has been doing well with bolusing for her meals and using the sliding scale but has not been testing her blood sugars.  The only time she will test her blood sugar is when she \"feels low.\"  The most recent blood sugar that she tested, reading  Was 108.  She has not been testing because she feels that she eats the same foods every day and believes her blood sugars would stay consistent.    Alessandra does endorse not being able to take care of herself lately due to a lot of stress in the family.  She currently lives with her mother and has been helping her with her diagnosis of lung cancer and within the last few weeks her grandson has passed away.  She has been losing weight and has had decreased in appetite.  Alessandra does report feeling increased thirst and urinating more often prior to her admission.    Glucose on admission was 193 with a hemoglobin A1c of 9.5%.  Her ambulatory pump was removed and she was started on basal insulin with Lantus at 5 units along with sliding scale.  Now that her appetite is increasing her blood sugars are also increasing and are in the " 200 range.    Currently, denies fever, chills, chest pain or shortness of breath, abdominal pain, nausea and/or vomiting.  Endorses decrease in appetite with weight loss.  Alessandra is also having troubles with memory and endorses not being clear on what ha been happening.      Recent Labs  Lab 06/08/18  0240 06/07/18  2114 06/07/18  1759 06/07/18  1319 06/07/18  1048 06/07/18  0747 06/07/18  0649 06/07/18  0343  06/06/18  2215   *  --   --   --   --   --  191*  --   --  193*   BGM  --  228* 187* 151* 156* 195*  --  255*  < >  --    < > = values in this interval not displayed.      Diabetes Type:  Secondary diabetes  Diabetes Duration: 2001  Usual Diabetes Regimen:   Settings from hospitalization in February 2018  Medtronic 630G  BASAL- 0.3 units/h from 4014-8729, 0.5 units/h from 7631-8463  BOLUS- 1 unit per 10 grams carbohydrate,   ISF 50,   BG target 100-130,   active insulin time 3 hours    Ability to [place] Prescribed Regimen: yes with encouragement  Diabetes Control:   Lab Results   Component Value Date    A1C 9.5 06/06/2018    A1C 12.8 02/16/2018    A1C 11.8 06/21/2017    A1C 11.5 11/02/2016    A1C >15.0  Results confirmed by repeat test   07/25/2016     Diabetes Complications: peripheral neuropathy, CKD stage 3  History of DKA: unknown  Able to Detect Hypoglycemia: yes  Usual Diabetes Care Provider: Toya burrell PA-C, most recent visit, (2/2/2018)  Factors Impacting Glucose Control: lack of testing, stress, poor appetiet      Review of Systems  10 point ROS completed with pertinent positives and negatives noted in the HPI    Past medical, family and social histories are reviewed and updated.    Past Medical History  Past Medical History:   Diagnosis Date     Abdominal pain, right upper quadrant     [place] [facility] [place]     ASCUS with positive high risk HPV 8/2013    + HPV 33, [place] - GAVIN I, ECC- atypia     Cardiomyopathy (H)     non ischemic cardiomyopathy with EF 15     Cervical high risk  HPV (human papillomavirus) test positive 7/8/15, 7/25/16    NIL pap/+ HR HPV (not 16 or 18).      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    leep     Depressive disorder      Gastro-oesophageal reflux disease      Human papillomavirus in conditions classified elsewhere and of unspecified site 2/2012    + HPV 33     Hypertension      Profound impairment, one eye, impairment level not further specified     rt eye due to childhood injury     Systolic CHF (H) 3/12/2015     Tobacco abuse 5/18/2013     Type 2 diabetes mellitus without complications (H)      Uncomplicated asthma        Family History  Family History   Problem Relation Age of Onset     DIABETES Mother      diet controled     Hypertension Mother      Arthritis Mother      Lipids Mother      DIABETES Father      Hypertension Father      GASTROINTESTINAL DISEASE Father      gallbladder removed     Bipolar Disorder Brother      Thyroid Disease Brother      Obesity Other      Son     Respiratory Other      Son and Daughter; asthma     Depression Maternal Aunt      Anxiety Disorder Maternal Aunt        Social History  Social History     Social History     Marital status: Single     Spouse name: N/A     Number of children: N/A     Years of education: N/A     Occupational History     nursing assistant [employer]     in [employer]     Social History Main Topics     Smoking status: Former Smoker     Packs/day: 0.30     Years: 15.00     Types: Cigarettes     Smokeless tobacco: Former User     Quit date: 10/29/2014      Comment: Started smoking in 89/ smokes about 3 per day     Alcohol use No     Drug use: No     Sexual activity: Not Currently     Partners: Male     Birth control/ protection: IUD      Comment: tubes tide     Other Topics Concern     Parent/Sibling W/ Cabg, Mi Or Angioplasty Before 65f 55m? No     Social History Narrative    Balanced Diet - Yes    Osteoporosis Prevention Measures - Dairy servings per day: 3 servings daily    Regular  "Exercise -  Yes Describe hand weights 20 minutes daily    Dental Exam - YES - Date: 3/10    Eye Exam - YES - Date: 1-2008    Self Breast Exam - Yes    Abuse: Current or Past (Physical, Sexual or Emotional)- No    Do you feel safe in your environment - Yes    Guns stored in the home - No    Sunscreen used - Yes    Seatbelts used - Yes    Lipids -  YES - Date: 1/10    Glucose -  YES - Date: 12/09    Colon Cancer Screening - No    Hemoccults - NO    Pap Test -  YES - Date: 6/19/08    Do you have any concerns about STD's -  No    Mammography - NO    DEXA - NO    Immunizations reviewed and up to date - Yes, last TD was 11-22-04    3/11/10    M. GLORY Burrell                                     Physical Exam  BP 98/61  Temp 97.6  F (36.4  C) (Oral)  Resp 14  Ht 1.727 m (5' 8\")  Wt 56.9 kg (125 lb 7.1 oz)  SpO2 93%  BMI 19.07 kg/m2    General:  pleasant  resting in bed, in no distress.   HEENT: PER and anicteric, non-injected, oral mucous membranes dry   Lungs: non-labored  ABD: soft  Skin: warm and dry, dressings on bilateral feet  MSK: moving all extremites  Mental status:  alert, oriented x3, communicating clearly  Psych: calm, even mood, tearful at times    Laboratory  Recent Labs   Lab Test  06/08/18   0240  06/07/18   0649   NA  139  134   POTASSIUM  4.3  5.0   CHLORIDE  107  103   CO2  22  23   ANIONGAP  10  8   GLC  212*  191*   BUN  33*  33*   CR  1.46*  1.80*   RICK  8.1*  8.1*     CBC RESULTS:   Recent Labs   Lab Test  06/08/18   0240   WBC  24.5*   RBC  3.19*   HGB  8.0*   HCT  24.6*   MCV  77*   MCH  25.1*   MCHC  32.5   RDW  15.8*   PLT  210       Liver Function Studies -   Recent Labs   Lab Test  06/06/18   2215   PROTTOTAL  8.9*   ALBUMIN  2.5*   BILITOTAL  0.5   ALKPHOS  233*   AST  Canceled, Test credited   ALT  27       Active Medications  Current Facility-Administered Medications   Medication     acetaminophen (TYLENOL) tablet 1,000 mg     albuterol (PROAIR HFA/PROVENTIL HFA/VENTOLIN HFA) Inhaler 2 " puff     amitriptyline (ELAVIL) tablet 50 mg     aspirin chewable tablet 81 mg     glucose gel 15-30 g    Or     dextrose 50 % injection 25-50 mL    Or     glucagon injection 1 mg     fluticasone (FLONASE) 50 MCG/ACT spray 2 spray     heparin lock flush 10 UNIT/ML injection 2-5 mL     heparin lock flush 10 UNIT/ML injection 5-10 mL     heparin lock flush 10 UNIT/ML injection 5-10 mL     insulin aspart (NovoLOG) inj (RAPID ACTING)     insulin aspart (NovoLOG) inj (RAPID ACTING)     insulin glargine (LANTUS) injection 5 Units     [START ON 6/9/2018] levofloxacin (LEVAQUIN) infusion 750 mg     lidocaine (LMX4) kit     melatonin tablet 1 mg     methadone (DOLOPHINE-INTENSOL) 10 MG/ML (HIGH CONC) solution 70 mg     naloxone (NARCAN) injection 0.1-0.4 mg     nicotine (NICODERM CQ) 21 MG/24HR 24 hr patch 1 patch     nicotine Patch in Place     nicotine patch REMOVAL     piperacillin-tazobactam (ZOSYN) 3.375 g vial to attach to  mL bag     polyethylene glycol (MIRALAX/GLYCOLAX) Packet 17 g     pregabalin (LYRICA) capsule 75 mg     sodium chloride (PF) 0.9% PF flush 10-20 mL     sodium chloride 0.9 % infusion     sodium chloride 0.9% infusion     umeclidinium-vilanterol (ANORO ELLIPTA) 62.5-25 MCG/INH oral inhaler 1 puff     vancomycin (VANCOCIN) 1,500 mg in sodium chloride 0.9 % 250 mL intermittent infusion     No current outpatient prescriptions on file.       Current Diet    Active Diet Order      Combination Diet Regular Diet Adult; 0052-9520 Calories: Moderate Consistent CHO (4-6 CHO units/meal)      Assessment:  Alessandra Pak is a 52 year old female with history of  Chronic pancreatitis s/p pylorus sparing  whipple in 2001, secondary diabetes, complicated by CKD stage 3, peripheral neuropathy and diabetic foot ulcers, hypertension, COPD,  And chronic methadone use, presented to the emergency department with altered mental status found to be septic.    Secondary diabetes: Prior to admission on ambulatory pump.   With  improvement in hemoglobin A1c from 12.8% in February to 9.5% June 6/2018.  Alessandra becomes tearful when she realizes she has not been taking care of herself.  Alessandra has been under a lot of stress taking care of her mother with lung cancer and the recent death of ave-3-bask-old grandson.  Plan  Continue with Lantus 5 units daily  Start NovoLog 1 unit per 15 g of carbohydrates for meals and snacks  Continue with NovoLog medium intensity siding scale before meals and at bedtime  Monitor blood sugars before meals, bedtime and at 2 AM  Alessandra will need a pump assessment prior to resuming her ambulatory pump.  Concerned that Alessandra has lost a period of time, therefore will hold off on resuming her pump closer to discharge.  We will continue to follow    Michelle Jones, STEPH [phone]    Diabetes Management Team job code: [phone]

## 2018-06-08 NOTE — PROGRESS NOTES
Orthopaedic Surgery Progress Note 06/08/2018    E: No acute events overnight. Afebrile, now normotensive, intermittent O2 requirements, tachycardia resolved.    S: Having appropriate right foot pain 2/2 bedside I&D yesterday. Denies new numbness or tingling, chest pain, SOB. Plan of care reviewed and questions answered.    O:  Temp: 97.9  F (36.6  C) Temp src: Oral BP: 110/85   Heart Rate: 85 Resp: 12 SpO2: 91 % O2 Device: Nasal cannula Oxygen Delivery: 6 LPM    Exam:  Gen: No acute distress, alert, oriented, resting comfortably in bed, speaking in full sentences  Resp: Non-labored breathing  MSK:   RLE:  Inspection: Ace from distal leg to toes, no erythema  Strength: wiggles all toes, fires, hip flexors, quad, hamstings, gsc, TA, EHL, FHL  Sensation: baseline numbness in stocking distribution unchanged from prior exam  Circulation: toes wwp      Recent Labs  Lab 06/08/18  0240 06/07/18  0649 06/06/18  2215   WBC 24.5* 28.4* 28.2*   HGB 8.0* 8.4* 9.9*    232 317   .0* 310.0*  --          Assessment: Alessandra Pak is a 52 year old female admitted to ICU for sepsis with right foot diabetic ulcers vs pna as presumed source, now s/p bedside I&D right foot on 6/7. Sepsis resolved, mental status improved, WBC downtrending, CRP stable      Plan:  Medicine Primary  Activity: Elevate BLE  Weight bearing status: NWB BLE  Antibiotics: per primary, recommend ID consult  DVT prophylaxis: per primary team  Wound Care: per WOCN weekly dressing changes    All other cares per primary team and additional recommendations per ortho consult note    Shakeel Estrada MD 06/08/2018  Orthopaedic Surgery Resident, PGY-1  Pager: [phone]    For questions about this patient, please attempt to contact me at my pager prior to contacting the orthopaedics resident on call. Thank you!

## 2018-06-08 NOTE — PROGRESS NOTES
"CLINICAL NUTRITION SERVICES - ASSESSMENT NOTE     Nutrition Prescription    RECOMMENDATIONS FOR MDs/PROVIDERS TO ORDER:  Given h/o poor PO and foot wounds, recommend multivitamin/mineral daily to ensure micronutrient needs met.     Malnutrition Status:    Non-severe malnutrition in the context of chronic illness     Recommendations already ordered by Registered Dietitian (RD):  None at this time     Future/Additional Recommendations:  If PO intake remains poor and/or weight declines, order snacks/supplements per pt preference      REASON FOR ASSESSMENT  Alessandra Pak is a 52 year old female assessed by the dietitian for Admission Nutrition Risk Screen for stageable pressure injuries or large/non-healing wound or burn and new/uncontrolled diabetes; reduced oral intake over the past month    NUTRITION HISTORY  PMH includes: CKD stage III, chronic pancreatitis s/p pylorus sparing whipple (2001) with secondary DM c/b diabetic foot ulcers.     Pt reports low appetite for the past several months. She reports typically eating 2 meals/day with snacks in between meals (see dietary recall below). She denies following any dietary restrictions at home.    Breakfast: banana, cereal with milk    Lunch: Turkey sandwich w/ lettuce + tomato    Snacks: 1-2 servings of fruit     CURRENT NUTRITION ORDERS  Diet: Moderate Consistent Carbohydrate  Intake/Tolerance: Pt reports appetite remains low. She ate 1/2 an orange this morning.     LABS  Hgb A1C 12.8 (2/2/18)    MEDICATIONS  Medications reviewed    ANTHROPOMETRICS  Height: 172.7 cm (5' 8\")  Most Recent Weight: 56.9 kg (125 lb 7.1 oz)    IBW: 63.6 kg  BMI: Normal BMI (low-end)   Weight History: Per records, pt has lost ~14 kg (20%) in < 1 year.   Wt Readings from Last 10 Encounters:   06/08/18 56.9 kg (125 lb 7.1 oz)   02/23/18 63.5 kg (139 lb 15.9 oz)   02/16/18 63.5 kg (140 lb)   02/13/18 65.8 kg (145 lb)   02/05/18 66.4 kg (146 lb 6.4 oz)   02/02/18 64.8 kg (142 lb 12.8 oz) "   10/04/17 66.7 kg (147 lb)   08/04/17 68.3 kg (150 lb 9.6 oz)   06/28/17 70.3 kg (155 lb)   06/21/17 70.8 kg (156 lb)     Dosing Weight: 57 kg (actual, based on lowest weight this admit of 56.9 kg on 6/8/18)     ASSESSED NUTRITION NEEDS  Estimated Energy Needs: 1710 - 1995+ kcals/day (30 - 35 kcals/kg )  Justification: Increased needs with borderline low BMI and Wound healing  Estimated Protein Needs: 68 -  86 grams protein/day (1.2 - 1.5 grams of pro/kg)  Justification: Hypercatabolism with acute illness, CKD stage III, wound healing (pending renal status)   Estimated Fluid Needs:  1 mL/kcal  Justification: Maintenance    PHYSICAL FINDINGS  Right foot wound d/t diabetic ulcer      MALNUTRITION  % Intake: </=75% for >/= 1 month (severe)   % Weight Loss: Up to 20% in 1 year (non-severe)  Subcutaneous Fat Loss: Thoracic/intercostal:  Mild  Muscle Loss: Facial & jaw region:  Mild, Thoracic region (clavicle, acromium bone, deltoid, trapezius, pectoral)Upper arm (bicep, tricep) and Lower arm  (forearm): Moderate. *Unable to observe lower body*  Fluid Accumulation/Edema: None noted  Malnutrition Diagnosis: Non-severe malnutrition in the context of chronic illness     NUTRITION DIAGNOSIS  Inadequate oral intake related to reduced appetite as evidenced by eating meals BID with 1-2 snacks/day (suspect meeting < 75% needs per diet recall), 20% weight loss over the past 1 year, observed mild/moderate fat and muscle loss.     INTERVENTIONS  Implementation  Nutrition Education - Discussed increased protein needs in the setting of weight loss and wound healing. Reviewed sources of protein and examples, encouraged pt to consume a protein food at meals and snacks. Pt verbalized understanding.      Collaboration with other providers - Micronutrient recommendations for wound healing     Goals  Patient to consume % of nutritionally adequate meal trays TID, or the equivalent with supplements/snacks.      Monitoring/Evaluation  Progress toward goals will be monitored and evaluated per protocol.    Kristina Slade RD, LD   4C Pager: [phone]

## 2018-06-08 NOTE — PROGRESS NOTES
Therapy: IV ABX  Insurance: [employer] and [employer] (pt will have a co-pay per dispense for drug w Part-D and a spend down w [employer] LANDRY $393.00 to meet)  Ded: $ 3.15 monthly   Met: $     Co-Insurance:   Max Out of Pocket: $  Met: $    Please contact Intake with any questions, [phone] or In Basket pool, [facility] ([phone]).  In reference to admission date 6/6/18 to check IV ABX coverage

## 2018-06-08 NOTE — PLAN OF CARE
Problem: Patient Care Overview  Goal: Plan of Care/Patient Progress Review  Outcome: No Change  D:  Pt admitted with AMS, thought to be septic with elevated procal, WBC and open ulcerations on bilateral feet.     I/A: Alert/oriented x4, pleasant.  Tylenol 1000mg TID for pain.  Did have some severe breakthrough pain around 0500 this am, MD paged.  One time PRN order for 2.5 oxycodone PO which the patient states helped with the pain.  Also requested that MD restart patient home dose Lyrica 75 mg PO TID as patient states this helps her nerve pain. First dose scheduled for 0800. Patient NSR, no ectopy noted. Blood pressures stable throughout shift. EKG ordered for what looked like ST elevation on monitor.  EKG WNL. 4L NC with adequate sats.  Lungs coarse. Active bowel sounds throughout, no BM this shift.  Carter in place, patent but positional.  Adequate urine output.  Creatinine trending down. WBC trending down. K 4.3 this am. Will continue to closely monitor and assess for any changes or concerns.     P:  Pain management. Antibiotics.  Transfer to  when bed becomes available.

## 2018-06-08 NOTE — PHARMACY-VANCOMYCIN DOSING SERVICE
Pharmacy Vancomycin Note  Date of Service 2018  Patient's  1965   52 year old, female    Indication: Sepsis  Goal Trough Level: 15-20 mg/L  Day of Therapy: 2  Current Vancomycin regimen:  1500 mg IV given only once so far.    Current estimated CrCl = Estimated Creatinine Clearance: 34.6 mL/min (based on Cr of 1.8).    Creatinine for last 3 days  2018: 10:15 PM Creatinine 2.16 mg/dL  2018:  6:49 AM Creatinine 1.80 mg/dL    Recent Vancomycin Levels (past 3 days)  2018: 12:36 AM Vancomycin Level 12.3 mg/L    Vancomycin IV Administrations (past 72 hours)                   vancomycin (VANCOCIN) 1,500 mg in sodium chloride 0.9 % 250 mL intermittent infusion (mg) 1,500 mg New Bag 18 0110                Nephrotoxins and other renal medications (Future)    Start     Dose/Rate Route Frequency Ordered Stop    18 1316  vancomycin (VANCOCIN) 1,250 mg in sodium chloride 0.9 % 250 mL intermittent infusion      1,250 mg  over 90 Minutes Intravenous HOLD 18 1316      18 0630  piperacillin-tazobactam (ZOSYN) 3.375 g vial to attach to  mL bag      3.375 g  over 30 Minutes Intravenous EVERY 6 HOURS 18 0235               Contrast Orders - past 72 hours     None          Interpretation of levels and current regimen:  Trough level is  Subtherapeutic    Has serum creatinine changed > 50% in last 72 hours: No    Urine output:  good urine output    Renal Function: Improving    Plan:  1.  Increase Dose to 1500 mg IV q24h  2.  Pharmacy will check trough levels as appropriate in 1-3 Days.    3. Serum creatinine levels will be ordered daily for the first week of therapy and at least twice weekly for subsequent weeks.      Davi Alonzo        .

## 2018-06-08 NOTE — PHARMACY-PHARMACOTHERAPY NOTE
Methadone Clinic Information Note    Clinic Name: [facility]  Clinic Location (city): [place]  Phone Number: [phone]  Prescriber's Name: Dr. Rich Patel    Confirmed methadone dose 70mg po daily with VADIM Doran, PharmD, BCPS

## 2018-06-08 NOTE — PLAN OF CARE
Problem: Diabetes Comorbidity  Goal: Diabetes  Patient comorbidity will be monitored for signs and symptoms of hyperglycemia or hypoglycemia. Problems will be absent, minimized or managed by discharge/transition of care.   BG monitored and SSI utilized.     Problem: Renal Insufficiency Comorbidity  Goal: Renal Insufficiency  Patient comorbidity will be monitored for signs and symptoms of Renal Insufficiency (Chronic) condition.  Problems will be absent, minimized or managed by discharge/transition of care.   No new interventions.     Problem: Cardiac Disease Comorbidity  Goal: Cardiac Disease  Patient comorbidity will be monitored for signs and symptoms of Cardiac Disease.  Problems will be absent, minimized or managed by discharge/transition of care.   VSS. Seemingly elevated T-wave assessed by 12 lead EKG overnight, stable.     Problem: Chronic Respiratory Difficulty Comorbidity  Goal: Chronic Respiratory Difficulty  Patient comorbidity will be monitored for signs and symptoms of Respiratory Difficulty (Chronic) condition.  Problems will be absent, minimized or managed by discharge/transition of care.   No complaints of SOB. See above note.     Problem: Peripheral Vascular/Peripheral Neurovascular Disease Comorbidity  Goal: Peripheral Vascular/Peripheral Neurovascular Disease  Patient comorbidity will be monitored for signs and symptoms of Peripheral Vascular/Peripheral Neurovascular Disease condition.  Problems will be absent, minimized or managed by discharge/transition of care.   No changes. WOC following.     Problem: Patient Care Overview  Goal: Plan of Care/Patient Progress Review  D: Pt admitted with AMS  I/A: Pt alert and oriented. Pain under much better control, per patient, since restarting lyrica and methadone. Afebrile. VSS. SpO2 90-92% on 5L NC. Tolerating diet. No BM today but bowel sounds active and passing flatus. Carter d/guillaume at 16:00. Pivoted on weight bearing leg to chair with minimal assist. WOC  saw pt and re-dressed wounds.   P: Stable. Bladder scan if no void by 00:00. Notify team of changes.

## 2018-06-08 NOTE — PLAN OF CARE
Problem: Patient Care Overview  Goal: Plan of Care/Patient Progress Review  Outcome: Improving    Pt became somnolent and hypotensive this morning. PICC Line placed. 1L fluid bolus given, symptoms resolved.   Right foot ulcer debrided by orthopedics. WOCN consult ordered for dressing changes. Non weight bearing on RLE.     Neuro: Pt alert, oriented after symptoms above resolved. Complaining of foot pain, methadone held d/t lethargy earlier today, tylenol ordered.   CV: Normotensive after fluid bolus.  SR/ST 90-100s.  Resp: Lungs coarse. Weaned to 3L NC.   GI: No BM. Consistent carb diet.   : Retaining urine. Pt initially refused carvajal/straight cath. Placed carvajal this afternoon, 1200 mL returned.   Skin: Bilateral foot ulcers. Apparent pressure ulcer on right ear.   Access: R TL PICC, PIV

## 2018-06-09 ENCOUNTER — APPOINTMENT (OUTPATIENT)
Dept: CT IMAGING | Facility: CLINIC | Age: 53
DRG: 853 | End: 2018-06-09
Attending: PEDIATRICS
Payer: COMMERCIAL

## 2018-06-09 ENCOUNTER — APPOINTMENT (OUTPATIENT)
Dept: GENERAL RADIOLOGY | Facility: CLINIC | Age: 53
DRG: 853 | End: 2018-06-09
Attending: PEDIATRICS
Payer: COMMERCIAL

## 2018-06-09 ENCOUNTER — APPOINTMENT (OUTPATIENT)
Dept: GENERAL RADIOLOGY | Facility: CLINIC | Age: 53
DRG: 853 | End: 2018-06-09
Payer: COMMERCIAL

## 2018-06-09 PROBLEM — J96.90 RESPIRATORY FAILURE (H): Status: ACTIVE | Noted: 2018-06-09

## 2018-06-09 LAB
ANION GAP SERPL CALCULATED.3IONS-SCNC: 6 MMOL/L (ref 3–14)
ANION GAP SERPL CALCULATED.3IONS-SCNC: 8 MMOL/L (ref 3–14)
BASE DEFICIT BLDA-SCNC: 0.9 MMOL/L
BASE DEFICIT BLDV-SCNC: 0.7 MMOL/L
BASE DEFICIT BLDV-SCNC: 3.1 MMOL/L
BUN SERPL-MCNC: 26 MG/DL (ref 7–30)
BUN SERPL-MCNC: 30 MG/DL (ref 7–30)
CALCIUM SERPL-MCNC: 8 MG/DL (ref 8.5–10.1)
CALCIUM SERPL-MCNC: 8.4 MG/DL (ref 8.5–10.1)
CHLORIDE SERPL-SCNC: 108 MMOL/L (ref 94–109)
CHLORIDE SERPL-SCNC: 108 MMOL/L (ref 94–109)
CO2 SERPL-SCNC: 23 MMOL/L (ref 20–32)
CO2 SERPL-SCNC: 24 MMOL/L (ref 20–32)
CREAT SERPL-MCNC: 1.37 MG/DL (ref 0.52–1.04)
CREAT SERPL-MCNC: 1.45 MG/DL (ref 0.52–1.04)
CRP SERPL-MCNC: 230 MG/L (ref 0–8)
ERYTHROCYTE [DISTWIDTH] IN BLOOD BY AUTOMATED COUNT: 16.1 % (ref 10–15)
GFR SERPL CREATININE-BSD FRML MDRD: 38 ML/MIN/1.7M2
GFR SERPL CREATININE-BSD FRML MDRD: 41 ML/MIN/1.7M2
GLUCOSE BLDC GLUCOMTR-MCNC: 112 MG/DL (ref 70–99)
GLUCOSE BLDC GLUCOMTR-MCNC: 117 MG/DL (ref 70–99)
GLUCOSE BLDC GLUCOMTR-MCNC: 122 MG/DL (ref 70–99)
GLUCOSE BLDC GLUCOMTR-MCNC: 123 MG/DL (ref 70–99)
GLUCOSE SERPL-MCNC: 112 MG/DL (ref 70–99)
GLUCOSE SERPL-MCNC: 118 MG/DL (ref 70–99)
GRAM STN SPEC: ABNORMAL
GRAM STN SPEC: ABNORMAL
HCO3 BLD-SCNC: 24 MMOL/L (ref 21–28)
HCO3 BLDV-SCNC: 22 MMOL/L (ref 21–28)
HCO3 BLDV-SCNC: 25 MMOL/L (ref 21–28)
HCT VFR BLD AUTO: 25.7 % (ref 35–47)
HGB BLD-MCNC: 8.6 G/DL (ref 11.7–15.7)
MAGNESIUM SERPL-MCNC: 1.6 MG/DL (ref 1.6–2.3)
MCH RBC QN AUTO: 25.1 PG (ref 26.5–33)
MCHC RBC AUTO-ENTMCNC: 33.5 G/DL (ref 31.5–36.5)
MCV RBC AUTO: 75 FL (ref 78–100)
NT-PROBNP SERPL-MCNC: 5918 PG/ML (ref 0–900)
O2/TOTAL GAS SETTING VFR VENT: 100 %
O2/TOTAL GAS SETTING VFR VENT: 95 %
O2/TOTAL GAS SETTING VFR VENT: NORMAL %
PCO2 BLD: 39 MM HG (ref 35–45)
PCO2 BLDV: 40 MM HG (ref 40–50)
PCO2 BLDV: 45 MM HG (ref 40–50)
PH BLD: 7.39 PH (ref 7.35–7.45)
PH BLDV: 7.35 PH (ref 7.32–7.43)
PH BLDV: 7.36 PH (ref 7.32–7.43)
PLATELET # BLD AUTO: 259 10E9/L (ref 150–450)
PO2 BLD: 59 MM HG (ref 80–105)
PO2 BLDV: 33 MM HG (ref 25–47)
PO2 BLDV: 37 MM HG (ref 25–47)
POTASSIUM SERPL-SCNC: 4.1 MMOL/L (ref 3.4–5.3)
POTASSIUM SERPL-SCNC: 4.2 MMOL/L (ref 3.4–5.3)
RBC # BLD AUTO: 3.42 10E12/L (ref 3.8–5.2)
SODIUM SERPL-SCNC: 138 MMOL/L (ref 133–144)
SODIUM SERPL-SCNC: 140 MMOL/L (ref 133–144)
SPECIMEN SOURCE: ABNORMAL
WBC # BLD AUTO: 24.8 10E9/L (ref 4–11)

## 2018-06-09 PROCEDURE — 94640 AIRWAY INHALATION TREATMENT: CPT

## 2018-06-09 PROCEDURE — 71045 X-RAY EXAM CHEST 1 VIEW: CPT | Mod: 77

## 2018-06-09 PROCEDURE — 25000132 ZZH RX MED GY IP 250 OP 250 PS 637: Performed by: STUDENT IN AN ORGANIZED HEALTH CARE EDUCATION/TRAINING PROGRAM

## 2018-06-09 PROCEDURE — 25000128 H RX IP 250 OP 636

## 2018-06-09 PROCEDURE — 87040 BLOOD CULTURE FOR BACTERIA: CPT | Performed by: PEDIATRICS

## 2018-06-09 PROCEDURE — 80048 BASIC METABOLIC PNL TOTAL CA: CPT | Performed by: STUDENT IN AN ORGANIZED HEALTH CARE EDUCATION/TRAINING PROGRAM

## 2018-06-09 PROCEDURE — 36600 WITHDRAWAL OF ARTERIAL BLOOD: CPT

## 2018-06-09 PROCEDURE — 83735 ASSAY OF MAGNESIUM: CPT | Performed by: STUDENT IN AN ORGANIZED HEALTH CARE EDUCATION/TRAINING PROGRAM

## 2018-06-09 PROCEDURE — 12000008 ZZH R&B INTERMEDIATE UMMC

## 2018-06-09 PROCEDURE — 00000146 ZZHCL STATISTIC GLUCOSE BY METER IP

## 2018-06-09 PROCEDURE — 40000983 ZZH STATISTIC HFNC ADULT NON-CPAP

## 2018-06-09 PROCEDURE — 87070 CULTURE OTHR SPECIMN AEROBIC: CPT | Performed by: STUDENT IN AN ORGANIZED HEALTH CARE EDUCATION/TRAINING PROGRAM

## 2018-06-09 PROCEDURE — 83880 ASSAY OF NATRIURETIC PEPTIDE: CPT | Performed by: STUDENT IN AN ORGANIZED HEALTH CARE EDUCATION/TRAINING PROGRAM

## 2018-06-09 PROCEDURE — 25000128 H RX IP 250 OP 636: Performed by: PEDIATRICS

## 2018-06-09 PROCEDURE — 25000128 H RX IP 250 OP 636: Performed by: STUDENT IN AN ORGANIZED HEALTH CARE EDUCATION/TRAINING PROGRAM

## 2018-06-09 PROCEDURE — 87186 SC STD MICRODIL/AGAR DIL: CPT | Performed by: STUDENT IN AN ORGANIZED HEALTH CARE EDUCATION/TRAINING PROGRAM

## 2018-06-09 PROCEDURE — 82803 BLOOD GASES ANY COMBINATION: CPT | Performed by: INTERNAL MEDICINE

## 2018-06-09 PROCEDURE — 82803 BLOOD GASES ANY COMBINATION: CPT | Performed by: STUDENT IN AN ORGANIZED HEALTH CARE EDUCATION/TRAINING PROGRAM

## 2018-06-09 PROCEDURE — 99233 SBSQ HOSP IP/OBS HIGH 50: CPT | Mod: 25 | Performed by: PEDIATRICS

## 2018-06-09 PROCEDURE — 87205 SMEAR GRAM STAIN: CPT | Performed by: STUDENT IN AN ORGANIZED HEALTH CARE EDUCATION/TRAINING PROGRAM

## 2018-06-09 PROCEDURE — 25000125 ZZHC RX 250

## 2018-06-09 PROCEDURE — 99291 CRITICAL CARE FIRST HOUR: CPT | Performed by: PEDIATRICS

## 2018-06-09 PROCEDURE — 40000047 ZZH STATISTIC CTO2 CONT OXYGEN TECH TIME EA 90 MIN

## 2018-06-09 PROCEDURE — 99291 CRITICAL CARE FIRST HOUR: CPT | Mod: GC | Performed by: INTERNAL MEDICINE

## 2018-06-09 PROCEDURE — 40000281 ZZH STATISTIC TRANSPORT TIME EA 15 MIN

## 2018-06-09 PROCEDURE — 86140 C-REACTIVE PROTEIN: CPT | Performed by: STUDENT IN AN ORGANIZED HEALTH CARE EDUCATION/TRAINING PROGRAM

## 2018-06-09 PROCEDURE — 71260 CT THORAX DX C+: CPT

## 2018-06-09 PROCEDURE — 82803 BLOOD GASES ANY COMBINATION: CPT | Performed by: PEDIATRICS

## 2018-06-09 PROCEDURE — 40000275 ZZH STATISTIC RCP TIME EA 10 MIN

## 2018-06-09 PROCEDURE — 85027 COMPLETE CBC AUTOMATED: CPT | Performed by: STUDENT IN AN ORGANIZED HEALTH CARE EDUCATION/TRAINING PROGRAM

## 2018-06-09 PROCEDURE — 25000128 H RX IP 250 OP 636: Performed by: INTERNAL MEDICINE

## 2018-06-09 PROCEDURE — 87077 CULTURE AEROBIC IDENTIFY: CPT | Performed by: STUDENT IN AN ORGANIZED HEALTH CARE EDUCATION/TRAINING PROGRAM

## 2018-06-09 PROCEDURE — 71045 X-RAY EXAM CHEST 1 VIEW: CPT

## 2018-06-09 RX ORDER — MAGNESIUM SULFATE HEPTAHYDRATE 40 MG/ML
2 INJECTION, SOLUTION INTRAVENOUS ONCE
Status: COMPLETED | OUTPATIENT
Start: 2018-06-09 | End: 2018-06-09

## 2018-06-09 RX ORDER — HYDROMORPHONE HYDROCHLORIDE 1 MG/ML
0.5 INJECTION, SOLUTION INTRAMUSCULAR; INTRAVENOUS; SUBCUTANEOUS ONCE
Status: COMPLETED | OUTPATIENT
Start: 2018-06-09 | End: 2018-06-09

## 2018-06-09 RX ORDER — HYDROMORPHONE HYDROCHLORIDE 1 MG/ML
INJECTION, SOLUTION INTRAMUSCULAR; INTRAVENOUS; SUBCUTANEOUS
Status: COMPLETED
Start: 2018-06-09 | End: 2018-06-09

## 2018-06-09 RX ORDER — FUROSEMIDE 10 MG/ML
40 INJECTION INTRAMUSCULAR; INTRAVENOUS ONCE
Status: COMPLETED | OUTPATIENT
Start: 2018-06-09 | End: 2018-06-09

## 2018-06-09 RX ORDER — ALBUTEROL SULFATE 5 MG/ML
2.5 SOLUTION RESPIRATORY (INHALATION) ONCE
Status: DISCONTINUED | OUTPATIENT
Start: 2018-06-09 | End: 2018-06-11

## 2018-06-09 RX ORDER — MAGNESIUM HYDROXIDE 1200 MG/15ML
LIQUID ORAL
Status: DISCONTINUED
Start: 2018-06-09 | End: 2018-06-15 | Stop reason: HOSPADM

## 2018-06-09 RX ORDER — FUROSEMIDE 10 MG/ML
40 INJECTION INTRAMUSCULAR; INTRAVENOUS ONCE
Status: DISCONTINUED | OUTPATIENT
Start: 2018-06-09 | End: 2018-06-09

## 2018-06-09 RX ORDER — ALBUTEROL SULFATE 0.83 MG/ML
SOLUTION RESPIRATORY (INHALATION)
Status: COMPLETED
Start: 2018-06-09 | End: 2018-06-09

## 2018-06-09 RX ORDER — LEVOFLOXACIN 5 MG/ML
750 INJECTION, SOLUTION INTRAVENOUS
Status: DISCONTINUED | OUTPATIENT
Start: 2018-06-11 | End: 2018-06-12

## 2018-06-09 RX ORDER — NALOXONE HYDROCHLORIDE 0.4 MG/ML
.1-.4 INJECTION, SOLUTION INTRAMUSCULAR; INTRAVENOUS; SUBCUTANEOUS
Status: DISCONTINUED | OUTPATIENT
Start: 2018-06-09 | End: 2018-06-10

## 2018-06-09 RX ORDER — IOPAMIDOL 755 MG/ML
52 INJECTION, SOLUTION INTRAVASCULAR ONCE
Status: COMPLETED | OUTPATIENT
Start: 2018-06-09 | End: 2018-06-09

## 2018-06-09 RX ORDER — HYDROMORPHONE HYDROCHLORIDE 1 MG/ML
INJECTION, SOLUTION INTRAMUSCULAR; INTRAVENOUS; SUBCUTANEOUS
Status: DISCONTINUED
Start: 2018-06-09 | End: 2018-06-15 | Stop reason: HOSPADM

## 2018-06-09 RX ORDER — HYDROMORPHONE HYDROCHLORIDE 1 MG/ML
0.3 INJECTION, SOLUTION INTRAMUSCULAR; INTRAVENOUS; SUBCUTANEOUS
Status: COMPLETED | OUTPATIENT
Start: 2018-06-09 | End: 2018-06-09

## 2018-06-09 RX ADMIN — Medication 0.5 MG: at 10:30

## 2018-06-09 RX ADMIN — FUROSEMIDE 40 MG: 10 INJECTION, SOLUTION INTRAVENOUS at 16:38

## 2018-06-09 RX ADMIN — NALOXONE HYDROCHLORIDE 0.4 MG: 0.4 INJECTION, SOLUTION INTRAMUSCULAR; INTRAVENOUS; SUBCUTANEOUS at 10:13

## 2018-06-09 RX ADMIN — HYDROMORPHONE HYDROCHLORIDE 0.5 MG: 1 INJECTION, SOLUTION INTRAMUSCULAR; INTRAVENOUS; SUBCUTANEOUS at 10:30

## 2018-06-09 RX ADMIN — ACETAMINOPHEN 1000 MG: 500 TABLET, FILM COATED ORAL at 19:38

## 2018-06-09 RX ADMIN — ALBUTEROL SULFATE: 2.5 SOLUTION RESPIRATORY (INHALATION) at 10:53

## 2018-06-09 RX ADMIN — NICOTINE 1 PATCH: 21 PATCH TRANSDERMAL at 09:14

## 2018-06-09 RX ADMIN — ACETAMINOPHEN 1000 MG: 500 TABLET, FILM COATED ORAL at 07:17

## 2018-06-09 RX ADMIN — AMITRIPTYLINE HYDROCHLORIDE 50 MG: 50 TABLET, FILM COATED ORAL at 22:11

## 2018-06-09 RX ADMIN — MAGNESIUM SULFATE HEPTAHYDRATE 2 G: 40 INJECTION, SOLUTION INTRAVENOUS at 22:10

## 2018-06-09 RX ADMIN — VANCOMYCIN HYDROCHLORIDE 1500 MG: 10 INJECTION, POWDER, LYOPHILIZED, FOR SOLUTION INTRAVENOUS at 04:41

## 2018-06-09 RX ADMIN — SODIUM CHLORIDE, POTASSIUM CHLORIDE, SODIUM LACTATE AND CALCIUM CHLORIDE 1000 ML: 600; 310; 30; 20 INJECTION, SOLUTION INTRAVENOUS at 09:25

## 2018-06-09 RX ADMIN — HEPARIN SODIUM 5000 UNITS: 5000 INJECTION, SOLUTION INTRAVENOUS; SUBCUTANEOUS at 22:10

## 2018-06-09 RX ADMIN — PREGABALIN 75 MG: 75 CAPSULE ORAL at 07:17

## 2018-06-09 RX ADMIN — ASPIRIN 81 MG CHEWABLE TABLET 81 MG: 81 TABLET CHEWABLE at 07:17

## 2018-06-09 RX ADMIN — PIPERACILLIN SODIUM AND TAZOBACTAM SODIUM 3.38 G: 3; .375 INJECTION, POWDER, LYOPHILIZED, FOR SOLUTION INTRAVENOUS at 12:59

## 2018-06-09 RX ADMIN — ACETAMINOPHEN 1000 MG: 500 TABLET, FILM COATED ORAL at 16:08

## 2018-06-09 RX ADMIN — PIPERACILLIN SODIUM AND TAZOBACTAM SODIUM 3.38 G: 3; .375 INJECTION, POWDER, LYOPHILIZED, FOR SOLUTION INTRAVENOUS at 00:18

## 2018-06-09 RX ADMIN — PIPERACILLIN SODIUM AND TAZOBACTAM SODIUM 3.38 G: 3; .375 INJECTION, POWDER, LYOPHILIZED, FOR SOLUTION INTRAVENOUS at 18:53

## 2018-06-09 RX ADMIN — Medication 0.3 MG: at 05:10

## 2018-06-09 RX ADMIN — LEVOFLOXACIN 750 MG: 5 INJECTION, SOLUTION INTRAVENOUS at 01:01

## 2018-06-09 RX ADMIN — HEPARIN SODIUM 5000 UNITS: 5000 INJECTION, SOLUTION INTRAVENOUS; SUBCUTANEOUS at 09:25

## 2018-06-09 RX ADMIN — UMECLIDINIUM BROMIDE AND VILANTEROL TRIFENATATE 1 PUFF: 62.5; 25 POWDER RESPIRATORY (INHALATION) at 09:23

## 2018-06-09 RX ADMIN — FLUTICASONE PROPIONATE 2 SPRAY: 50 SPRAY, METERED NASAL at 09:23

## 2018-06-09 RX ADMIN — Medication 0.5 MG: at 07:25

## 2018-06-09 RX ADMIN — METHADONE HYDROCHLORIDE 70 MG: 10 CONCENTRATE ORAL at 07:17

## 2018-06-09 RX ADMIN — PREGABALIN 75 MG: 75 CAPSULE ORAL at 19:38

## 2018-06-09 RX ADMIN — INSULIN GLARGINE 5 UNITS: 100 INJECTION, SOLUTION SUBCUTANEOUS at 09:25

## 2018-06-09 RX ADMIN — IOPAMIDOL 52 ML: 755 INJECTION, SOLUTION INTRAVENOUS at 13:54

## 2018-06-09 RX ADMIN — FUROSEMIDE 40 MG: 10 INJECTION, SOLUTION INTRAVENOUS at 21:29

## 2018-06-09 RX ADMIN — PREGABALIN 75 MG: 75 CAPSULE ORAL at 16:08

## 2018-06-09 RX ADMIN — PIPERACILLIN SODIUM AND TAZOBACTAM SODIUM 3.38 G: 3; .375 INJECTION, POWDER, LYOPHILIZED, FOR SOLUTION INTRAVENOUS at 06:45

## 2018-06-09 ASSESSMENT — PAIN DESCRIPTION - DESCRIPTORS: DESCRIPTORS: ACHING;BURNING

## 2018-06-09 NOTE — CONSULTS
[facility]SULT[facility]     Patient:  Alessandra Pak   Date of birth 1965, Medical record number [id number]  Date of Visit:  06/09/2018  Date of Admission: 6/6/2018  Consult Requested by:Bairon Aguilar MD  Reason for Consult:  persistent sepsis.  known diabetic foot abscess, also with bilateral infiltrates.          Assessment and Recommendations:     Alessandra Pak is a 52 yr old female with a history of non ischemic cardiomyopathy  with ICD, CKD stage III, chronic pancreatitis s/p pylorus sparing Whipple (2001) with secondary DM c/b diabetic foot ulcers, COPD (not on home O2), HTN, and chronic methadone use presented to ER with altered mental status, persistent leukocytosis, increased pus/maldorous drainage from right foot wound, fever.      1. Sepsis from  Right diabetic foot ulcer , abscess with likely osteomyelitis  - previous culture in Feb 2018 grew MRSA   - not compliant with antibiotic therapy, follow-up in clinic    2. Pulmonary edema  3. Chronic kidney disease stage 3   4. Leukocytosis     RECOMMENDATION:  1.continue IV Vancomycin and Zosyn. Stop levofloxacin   2. Xray of right foot   3. Plan to  examine the foot tomorrow with next wound dressing change.        ID will follow     Thank you for allowing us to participate in the care of this patient    Cosme Hinkle MD, M.Med.Sc  Staff, Infectious Diseases   Pager : [phone]           History of Present Illness:   Alessandra Pak is a 52 yr old female with a history of non ischemic cardiomyopathy  with ICD, CKD stage III, chronic pancreatitis s/p pylorus sparing Whipple (2001) with secondary DM c/b diabetic foot ulcers, COPD (not on home O2), HTN, and chronic methadone use presented to ER with altered mental status. Per report, EMS found the patient in her bed. Her O2 sats were in the low 80s on room air, improved to the 90s on 6L of oxygen. Her blood pressure was in the 130s/80s and she was tachycardic to the  130s. Her blood glucose was 216. She also noticed bad odor and increased drainage from the wound. On admission, her temp was 102.5 F HR 120s . WBC was 28.2 and today still high ar 24.8.  Blood cultures negative x2 . Persistent hypoxia.     She was last hospitalized on 2/16-2/23/18 for sepsis due to right diabetic foot abscess/ cellulitis ( MRSA)  She was seen by ID ( Dr Drummond) at that time and felt that the source of infection was not controlled , she had repeat   I+D on 2/2/1/18, showing dimitri purulence. At that time, Ortho felt repeat I+D was not needed.  The plan was to treat with IV Vancomycin for 6 weeks  and follow-up in  ID clinic . However, vanco trough was high (30.2 on 2/26/18)  and vanco was discontinued and Daptomycin was started on 3/1/18. Patient did not show up in clinic on 3/12 and 3/19 and Dr Drummond ordered to discontinue  Daptomycin  and remove PICC and patient to follow-up with PCP.  She said she was taking oral antibiotic but I cant find that information .     Patient was seen by Ortho and s/p Bedside I&D/debridement on 6/7/2018   - Right plantar foot wounds:   1) Quarter-sized ulcer overlying first MTP joint: Purulent drainage visible.  Sharp debridement with a scalpel was carried down to the level of healthy bleeding muscle .  2) Quarter-sized ulcer overlying the distal fourth metatarsal: Purulent drainage visible.  Upon initiation of sharp debridement with a scalpel, an adjacent abscess tracking approximately 5 cm medially and 1 cm laterally around the lateral aspect of the foot was noted.  Abscess was incised and all surrounding necrotic tissue was debrided to the level of tendon and bleeding muscle.  Total wound dimensions approximately 8 cm wide x 4 cm long in maximal dimension x 1.5 cm deep.  -Wounds were irrigated with 500 cc of sterile normal saline  -4 x 4 gauze packing applied to soft tissue defect and sterile dressings applied    - no specimen was sent for culture on 6/7/18.   - blood  cx x 2 negative so far 6/6/18   -UC - negative 6/7/18     On 6/9/18 - exam by ortho  - Inspection: Ace from distal leg to toes, no erythema  -- removed for exam.    -Quarter size ulcer overlying 1st MTP, no purulent drainage visible. Clean wound bed with iodasorb removed from wound bed.  -8x4 cm wound w/ depth 1.5 cm with tendon visible on plantar and medial aspect of foot present with iodasorb removed from wound bed.  Slight purulent drainage expressed from medial aspect of the wound and sent for culture.  No other areas of purulent drainage, otherwise clean wound bed.  - wound specimen was obtained and gram stain shows many gram positive cocci , culture- pending     Imagings:  CT chest PE protocol 6/9/18   IMPRESSION:   1. Exam is negative for acute pulmonary embolism.   2. There is progression of the extensive reticular and groundglass  opacities throughout the upper lobes and patchy groundglass and  reticular opacities in a peribronchial vascular distribution through  the right lower lobe since 2/17/2018. Small left lung base peripheral  consolidation favoring atelectasis. Although this is superimposed on  moderate haziness changes of the lungs. Considerations include ILD  such as fibrotic type NSIP or hypersensitivity pneumonitis.  Superimposed infection however is a significant concern.  3. Newly enlarged mediastinal and hilar lymph nodes compared to  2/17/2018, likely reactive in nature.    CXR 6/9/18  Impression:  No significant change in mixed interstitial and patchy airspace opacities of both lung. Pulmonary edema and/or infection.    CXR 6/6/18  1. Suspicion of increasing mild interstitial edema.  2. Left greater than right, bibasilar mixed patchy airspace and interstitial opacities with silhouetting of the left hemidiaphragm. Differential includes atelectasis and/or infection, aspiration     MRI Right foot w/wo contrast 2/18/18  IMPRESSION:  1. Ulcer defect on the plantar aspect of the foot which appears  to communicate with a large fluid collection surrounding the second MTP joint and extending distally to the level of the distal aspect of the proximal phalanx and extending proximally to the proximal metatarsal diaphyseal region. It is difficult to sort out the joint capsule from  pericapsular fluid and there could be significant distention of the joint capsule or extensive surrounding fluid with lobular margins maintained by surrounding soft tissues. Infected contents of this fluid collection and this could be relatively easily sampled with  ultrasound guidance either from a dorsal or plantar approach.  2. Small joint effusions about the third through fifth MTP joints and more moderate joint effusion about the first MTP joint. Relatively unusual lack of enhancement about the synovium may be related to poor synovial vascularity but is nonspecific.  3. Cellulitis about the central compartment of the foot without other associated fluid collections. Edema/cellulitis about the dorsal soft tissues, medially from the dorsum of the first metatarsal and extending nearly to the plantar margin of the medial forefoot and laterally extending from the intertarsal region of the second and third metatarsals to the lateral margin of the foot almost to the plantar surface.  4. Fluid surrounding the extensor digitorum and flexor digitorum tendons, possibly reactive and/or tenosynovitis. The tendons themselves appear normal.  5. No MR evidence of osteomyelitis although this cannot be completely excluded about the second metatarsal head and MTP joint region. Questionable mild periosteal reaction about the plantar aspect of the proximal phalanx of the second toe, proximally at the joint margin on the comparison radiograph.  6. The small dorsal fracture off the base of the middle phalanx of the fourth toe is poorly visualized.    Recent culture results include:  Culture Micro   Date Value Ref Range Status   06/09/2018 PENDING   Preliminary   06/07/2018 No growth  Final   06/06/2018 No growth after 2 days  Preliminary   06/06/2018 No growth after 3 days  Preliminary   02/19/2018 No growth  Final   02/19/2018 No growth  Final   02/19/2018   Final    Canceled, Test credited  Incorrectly ordered by PCU/Clinic  Test reordered as correct code  Tissue culture     02/19/2018   Final    No anaerobes isolated  Since this specimen was not transported in the proper anaerobic transport media, the   absence of anaerobes in this culture does not rule out the presence of anaerobes in this   specimen.     02/19/2018 (A)  Final    Light growth  Methicillin resistant Staphylococcus aureus (MRSA)     02/19/2018 (A)  Final    Light growth  Coagulase negative Staphylococcus  Susceptibility testing not routinely done  This organism is part of normal jim, but on occasion, may be a true pathogen. Clinical   correlation must be applied to interpreting this microbiology result.     02/19/2018   Final    Critical Value/Significant Value called to and read back by  NESHA HORTON RN ([id number]).  02.21.18 2206 GJS            Review of Systems:   CONSTITUTIONAL:  See HPI   EYES: negative for icterus  ENT:  negative for hearing loss, tinnitus and sore throat  RESPIRATORY:  negative for cough with sputum and dyspnea  CARDIOVASCULAR:  negative for chest pain, dyspnea  GASTROINTESTINAL:  negative for nausea, vomiting, diarrhea and constipation  GENITOURINARY:  negative for dysuria  HEME:  No easy bruising  INTEGUMENT:  See HPI   NEURO:  See HPI          Past Medical History:     Past Medical History:   Diagnosis Date     Abdominal pain, right upper quadrant     sees Dr Mcclellan pain clinic at [facility]     ASCUS with positive high risk HPV 8/2013    + HPV 33, [place] - GAVIN I, ECC- atypia     Cardiomyopathy (H)     non ischemic cardiomyopathy with EF 15     Cervical high risk HPV (human papillomavirus) test positive 7/8/15, 7/25/16    NIL pap/+ HR HPV (not 16 or 18).      GAVIN III with  severe dysplasia 7/6/11    leep     Depressive disorder      Gastro-oesophageal reflux disease      Human papillomavirus in conditions classified elsewhere and of unspecified site 2/2012    + HPV 33     Hypertension      Profound impairment, one eye, impairment level not further specified     rt eye due to childhood injury     Systolic CHF (H) 3/12/2015     Tobacco abuse 5/18/2013     Type 2 diabetes mellitus without complications (H)      Uncomplicated asthma           Past Surgical History:     Past Surgical History:   Procedure Laterality Date     C NONSPECIFIC PROCEDURE  2001    cholecystectomy     C NONSPECIFIC PROCEDURE  as a child    tonsillectomy     C NONSPECIFIC PROCEDURE  2001    whipple procedure     CARDIAC SURGERY      defib     COLPOSCOPY,LOOP ELECTRD CERVIX EXCIS  2002, 2011    stage 2 dysplasia     ENDOBRONCHIAL ULTRASOUND FLEXIBLE N/A 2/19/2015    Procedure: ENDOBRONCHIAL ULTRASOUND FLEXIBLE;  Surgeon: Brenden Tamez MD;  Location: [facility]     LEEP TX, CERVICAL  2014    LEEP TX Cervical     RECESSION RESECTION WITH ADJUSTABLE SUTURE  12/13/2011    Procedure:RECESSION RESECTION WITH ADJUSTABLE SUTURE; Right Strabismus Repair with Adjustable Suture       TUBAL LIGATION  2007    essure             Family History:     Family History   Problem Relation Age of Onset     DIABETES Mother      diet controled     Hypertension Mother      Arthritis Mother      Lipids Mother      DIABETES Father      Hypertension Father      GASTROINTESTINAL DISEASE Father      gallbladder removed     Bipolar Disorder Brother      Thyroid Disease Brother      Obesity Other      Son     Respiratory Other      Son and Daughter; asthma     Depression Maternal Aunt      Anxiety Disorder Maternal Aunt             Social History:     Social History   Substance Use Topics     Smoking status: Former Smoker     Packs/day: 0.30     Years: 15.00     Types: Cigarettes     Smokeless tobacco: Former User     Quit date: 10/29/2014       Comment: Started smoking in 89/ smokes about 3 per day     Alcohol use No     History   Sexual Activity     Sexual activity: Not Currently     Partners: Male     Birth control/ protection: IUD     Comment: tubes tide            Current Medications (antimicrobials listed in bold):       acetaminophen  1,000 mg Oral TID     albuterol  2.5 mg Nebulization Once     amitriptyline  50 mg Oral At Bedtime     aspirin  81 mg Oral Daily     fluticasone  2 spray Left nostril Daily     heparin lock flush  5-10 mL Intracatheter Q24H     heparin  5,000 Units Subcutaneous Q12H     HYDROmorphone (PF)         insulin aspart   Subcutaneous TID w/meals     insulin aspart  1-3 Units Subcutaneous TID AC     insulin aspart  1-3 Units Subcutaneous At Bedtime     insulin glargine  5 Units Subcutaneous QAM AC     lactated ringers  1,000 mL Intravenous Once     levofloxacin  750 mg Intravenous Q48H     methadone  70 mg Oral Daily     nicotine  1 patch Transdermal Daily     nicotine   Transdermal Q8H     nicotine   Transdermal Daily     piperacillin-tazobactam  3.375 g Intravenous Q6H     pregabalin  75 mg Oral TID     sodium chloride 0.9% (bottle)         umeclidinium-vilanterol  1 puff Inhalation Daily     vancomycin (VANCOCIN) IV  1,500 mg Intravenous Q24H            Allergies:     Allergies   Allergen Reactions     No Known Drug Allergies             Physical Exam:   Vitals were reviewed  Patient Vitals for the past 24 hrs:   BP Temp Temp src Heart Rate Resp SpO2 Height   06/09/18 1053 - - - - - (!) 88 % -   06/09/18 1000 (!) 78/48 - - 107 15 (!) 85 % -   06/09/18 0900 110/57 - - 114 - (!) 89 % -   06/09/18 0800 122/69 98.4  F (36.9  C) Oral 116 12 (!) 88 % -   06/09/18 0700 142/73 - - 116 - (!) 87 % -   06/09/18 0600 132/83 - - 114 - (!) 86 % -   06/09/18 0500 111/68 - - 107 - 92 % -   06/09/18 0400 119/78 100.1  F (37.8  C) Oral 108 14 (!) 86 % -   06/09/18 0300 117/72 - - 102 - (!) 89 % -   06/09/18 0200 108/65 - - 99 12 (!) 89 % -    06/09/18 0125 - - - - - (!) 87 % -   06/09/18 0100 96/58 - - 98 - (!) 89 % -   06/09/18 0015 - - - - - (!) 87 % -   06/09/18 0000 93/55 98.8  F (37.1  C) Oral 100 12 (!) 87 % -   06/08/18 2300 106/71 - - 106 - 90 % -   06/08/18 2230 - - - - - 90 % -   06/08/18 2200 110/74 - - - - 92 % -   06/08/18 2000 98/58 97.7  F (36.5  C) Oral 98 14 90 % -   06/08/18 1900 (!) 88/61 - - 97 - (!) 89 % -   06/08/18 1800 (!) 79/52 - - 93 12 92 % -   06/08/18 1700 (!) 85/52 - - 93 - 92 % -   06/08/18 1600 93/57 97.6  F (36.4  C) Oral 96 14 92 % -   06/08/18 1500 98/61 - - 101 14 93 % -   06/08/18 1400 (!) 80/53 - - 96 12 90 % -   06/08/18 1300 (!) 86/58 - - 92 16 91 % -   06/08/18 1200 92/59 98.3  F (36.8  C) Oral 91 14 92 % -     Ranges for his vital signs:  Temp:  [97.6  F (36.4  C)-100.1  F (37.8  C)] 98.4  F (36.9  C)  Heart Rate:  [] 107  Resp:  [12-16] 15  BP: ()/(48-83) 78/48  FiO2 (%):  [100 %] 100 %  SpO2:  [85 %-93 %] 88 %    Physical Examination:  GENERAL: alert, oriented,  in bed in no acute distress. ( initially difficult to arouse)    HEENT:  Head is normocephalic, atraumatic   EYES:  Eyes have anicteric sclerae without conjunctival injection   ENT:  Oropharynx is moist without exudates or ulcers. Tongue is midline  NECK:  Supple. No  Cervical lymphadenopathy  LUNGS:  crackles in the bases   CARDIOVASCULAR:  Regular rate and rhythm with no murmurs  ABDOMEN:  Normal bowel sounds, soft, nontender.   SKIN:  Right foot ( wound dressing +)  NEUROLOGIC:  Grossly nonfocal. Active x4 extremities  Extremities : edema            Laboratory Data:     Inflammatory Markers    Recent Labs   Lab Test  06/09/18   0436  06/08/18   0240  06/07/18   0649  02/22/18   0636  02/20/18   0633  02/18/18   1321  02/17/18   0643  02/16/18   1528   02/15/15   0240   SED   --    --    --    --    --    --    --    --    --   132*   CRP  230.0*  310.0*  310.0*  200.0*  260.0*  290.0*  280.0*  290.0*   < >   --     < > = values in this  interval not displayed.       Hematology Studies  Recent Labs   Lab Test  06/09/18   0436  06/08/18   0240  06/07/18   0649  06/06/18 2215 02/22/18   0636  02/20/18   0633   02/16/18   1528   02/10/16   1229   02/15/15   0544   02/13/15   1244   WBC  24.8*  24.5*  28.4*  28.2*  13.8*  18.1*   < >  22.3*   < >  9.2   < >  11.1*   < >  13.3*   ANEU   --    --   24.5*  25.2*   --    --    --   19.6*   --   4.5   --   8.1   --   9.8*   AEOS   --    --   0.0  0.0   --    --    --   0.0   --   0.4   --   0.3   --   0.3   HGB  8.6*  8.0*  8.4*  9.9*  9.1*  9.8*   < >  11.9   < >  12.8   < >  10.1*   < >  11.0*   MCV  75*  77*  76*  76*  82  84   < >  85   < >  80   < >  83   < >  81   PLT  259  210  232  317  382  295   < >  288   < >  201   < >  184   < >  150    < > = values in this interval not displayed.       Immune Globulin Studies  Recent Labs   Lab Test  02/21/15   0652  02/15/15   1520   IGG  1330   --    IGE   --   46       Metabolic Studies   Recent Labs   Lab Test  06/09/18   0436  06/08/18   0240  06/07/18   0649  06/07/18   0043  06/06/18 2215 02/22/18   0636   NA  138  139  134   --   128*  138   POTASSIUM  4.1  4.3  5.0  4.9  5.9*  3.8   CHLORIDE  108  107  103   --   95  105   CO2  24  22  23   --   26  26   BUN  30  33*  33*   --   37*  17   CR  1.45*  1.46*  1.80*   --   2.16*  1.25*   GFRESTIMATED  38*  38*  30*   --   24*  45*       Hepatic Studies  Recent Labs   Lab Test  06/06/18 2215 02/16/18   1528  06/21/17   1528  02/10/16   1229  10/27/15   1106  06/24/15   1443   BILITOTAL  0.5  0.6  0.3  0.2  0.2  0.3   ALKPHOS  233*  212*  252*  217*  230*  186*   ALBUMIN  2.5*  2.8*  3.1*  3.2*  3.3*  2.8*   AST  Canceled, Test credited  22  25  22  38  38   ALT  27  14  38  36  42  34       Thyroid Studies    Recent Labs   Lab Test  01/13/17   1353  11/02/16   1359   03/28/10   0806   TSH  0.32*  0.34*   < >  0.33*   T4  1.06  1.02   < >  0.88   T3   --    --    --   107    < > = values in this  interval not displayed.       Microbiology:  Culture Micro   Date Value Ref Range Status   06/09/2018 PENDING  Preliminary   06/07/2018 No growth  Final   06/06/2018 No growth after 2 days  Preliminary   06/06/2018 No growth after 3 days  Preliminary   02/19/2018 No growth  Final   02/19/2018 No growth  Final   02/19/2018   Final    Canceled, Test credited  Incorrectly ordered by PCU/Clinic  Test reordered as correct code  Tissue culture     02/19/2018   Final    No anaerobes isolated  Since this specimen was not transported in the proper anaerobic transport media, the   absence of anaerobes in this culture does not rule out the presence of anaerobes in this   specimen.     02/19/2018 (A)  Final    Light growth  Methicillin resistant Staphylococcus aureus (MRSA)     02/19/2018 (A)  Final    Light growth  Coagulase negative Staphylococcus  Susceptibility testing not routinely done  This organism is part of normal jim, but on occasion, may be a true pathogen. Clinical   correlation must be applied to interpreting this microbiology result.     02/19/2018   Final    Critical Value/Significant Value called to and read back by  NESHA HORTON RN ([id number]).  02.21.18 2206 GJS     02/18/2018 No growth  Final   02/18/2018 No growth  Final   02/16/2018   Final    <10,000 colonies/mL  mixed urogenital jim  Susceptibility testing not routinely done     02/16/2018 No growth  Final   02/16/2018 No growth  Final   04/06/2016 (A)  Final    Heavy growth Staphylococcus aureus  Heavy growth Beta hemolytic Streptococcus group B This isolate DOES NOT   demonstrate inducible clindamycin resistance in vitro. Clindamycin is   susceptible and could be used when indicated, however, erythromycin is   resistant and should not be used.     06/24/2015   Final    <10,000 colonies/mL mixed urogenital jim Susceptibility testing not routinely   done     02/19/2015   Final    Culture negative for acid fast bacilli  Assayed at [facility] [address]     02/19/2015 (A)  Final    Normal respiratory jim  Light growth Candida albicans / dubliniensis Candida albicans and Candida   dubliniensis are not routinely speciated Susceptibility testing not routinely   done     02/19/2015 (A)  Final    Candida tropicalis isolated  Candida glabrata isolated  No additional fungi cultured after 4 weeks incubation     02/19/2015 No growth after 4 weeks  Final   02/16/2015 (A)  Final    Canceled, Test credited  >10 Squamous epithelial cells/low power field indicates oral contamination.   Please recollect.  Called to Cari on 4E, 2/16/15 14:10 CWi     02/15/2015 No growth  Final   02/15/2015 No growth  Final   02/13/2015 No growth  Final   02/13/2015 No growth  Final   10/31/2014 No growth  Final   10/29/2014   Final    Culture negative for acid fast bacilli  Assayed at [facility] [address]     10/29/2014 No growth  Final   10/29/2014 Culture negative after 4 weeks  Final   10/29/2014 No growth after 4 weeks  Final   10/29/2014 (A)  Final    Light growth Staphylococcus aureus  Light growth Cristela albicans / dubliniensis Candida albicans and Candida   dubliniensis are not routinely speciated     10/28/2014 No growth  Final   10/28/2014 No growth  Final   11/23/2009   Final    <10,000 colonies/mL Beta hemolytic Streptococcus group B     Comment:     10 to 50,000 colonies/mL Mixed gram positive jim Multiple species present,   probable perineal contamination.  Clindamycin and Erythromycin are not routinely prescribed for isolates from   the urinary tract.   03/04/2005   Final    <10,000 colonies/mL Staphylococcus species Susceptibility testing not routinely     Comment:      done     Urine Studies    Recent Labs   Lab Test  06/07/18   0031  02/16/18   2036  06/24/15   1612  02/14/15   1545  10/31/14   1335   LEUKEST  Small*  Large*  Moderate*  Duplicate request  SEE ACCN [id number]  *  Trace*  Negative   WBCU  0  60*   2  0  5*     Vancomycin Levels  Recent Labs   Lab Test  06/08/18   0036  02/22/18   2142  02/20/18   2041   VANCOMYCIN  12.3  24.6  9.8     Virology:  CMV viral loads    Recent Labs   Lab Test  02/19/15   1140  10/29/14   1500   CSPEC  Bronchoalveolar Lavage   Right   Middle  LOBE    Bronchoalveolar Lavage   CMQNT   --   <100     CMV Quantitative   Date Value Ref Range Status   10/29/2014 <100 <100 Copies/mL Final     Hepatitis B Testing Recent Labs   Lab Test  10/30/14   0445  08/06/13   1352   HBCAB  Negative   --    HEPBANG   --   Negative     Hepatitis C Testing   Hepatitis C Antibody   Date Value Ref Range Status   10/30/2014 Negative NEG Final   08/06/2013 Negative NEG Final

## 2018-06-09 NOTE — PROGRESS NOTES
Brief update (please see today's progress note)     CT chest with extensive reticular/ GGO throughout upper lobes, patchy GGO in peribronchial distribution.       Clinical course and imaging reviewed with Dr Araujo-- suggested DDx including ALI 2/2 infection, pulmonary edema, other (including ILD)  recommended diuresis, support with current respiratory status for now, BiPAP trial if worsens (recommended could try precedex if not tolerated, but this not typically done by hospitalists at [facility]-- discussed with swing attending given time of day and she also has not used recently, so will not plan on this).  If does not tolerate BiPAP then ICU team would accept.  Patient would want intubation.   Queried about steroids, and not recommended.    Full echo not obtained this AM due to pt's agitation.  Bedside with slightly reduced EF, large IVC.      Given recent contrast load with reduce GFR, wish to avoid over-vigorous diuresis to reduce risk of serious kidney injury, while also recognizing morbidity of intubation if pulm edema leads to unnecessary intubaiton.  Goal: net negative 0.5-1L signed out.  40 IV lasix given.  Discussed with daughter.  Requested to call grandmother as well.    Camron Gardner MD  Med-Peds Hospitalist

## 2018-06-09 NOTE — PROGRESS NOTES
[facility], [place]    Internal Medicine Progress Note - [facility]    Main Plans for Today   - resume methadone  - transfer to intermediate   - continue vanc/pip-tazo/levo  - attempt to obtain cultures  - start heparin ppx for immobility, infection  - consider ID consult in AM  - hold IVF given patient taking PO    Assessment & Plan   Alessandra Pak is a 52 yr old female with a history of NICM with ICD, CKD stage III, chronic pancreatitis s/p pylorus sparing Whipple (2001) with secondary DM c/b diabetic foot ulcers, COPD, HTN, and chronic methadone use admitted on 6/6/2018. She was admitted for severe sepsis.    #Severe sepsis   Patient has stabilized over the past 24 hours.  Orthopedics performed urgent debridement 6/7, with drainage of abscess.  No cultures sent.  Today asked orthopedics whether they thought there was any collection of pus left for culture.  Difficult to narrow antibiotics without culture data.  Patient very likely has osteomyelitis of the right foot.  Cannot obtain MRI due to below.   CT of the foot with IV contrast would be next best study, however given patient's NIKOLAY, would delay for the moment.  Source likely remains right infected diabetic foot wounds, however cannot rule out pneumonia given ongoing oxygen needs.  -Orthopedics consulted, appreciate assistance  -ID consult in AM  -hold IVF given patient taking PO  -Continue Vancomycin, Zosyn, levofloxacin  -Unable to MRI foot due to ICD, patient did have MRI performed in 2/2018, however per radiology department, patient's device is not MRI compatible  -qod CRP, monitor CBC  -Sputum culture   -Follow-up blood and urine cultures   -Cardiac monitoring   -Tylenol 650 mg q4H PRN      #Acute infectious encephalopathy, resolved  Patient's encephalopathy is resolved status post debridement,  IV antibiotics.  Patient denies illicit drug use, alcohol.  U tox negative.  Does not have significant metabolic  derangements.     #Acute Hypoxic Respiratory Failure, improving  #Hx of COPD (not on oxygen at home)   Patient required supplemental oxygen to maintain her O2 sats >90%. CXR showed findings concerning for possible pneumonia.   -Continuous pulse ox   -Provide supplemental oxygen as needed   -Continue PTA fluticasone 2 spray daily   -Continue Ipratropium-Albuterol 1 puff QID  -Albuterol inhaler q4H PRN      #Acute on Chronic Kidney Disease, improving  #Hyperkalemia, resolved  #Hyponatremia, resolved  Patient's sodium and AK improving following IV fluids and with p.o. intake given improved mental status.  -Monitor  -Hold home lasix 20 mg daily      #Urinary Retention   Status post Carter while encephalopathic.  Now that patient is alert, will attempt to allow patient to void spontaneously.  -Bladder scan PRN  -Strict intake/output      #Type 2 DM (poorly controlled)    -Insulin Glargine 5 U subq qAM (Patient receives an equivalent of about 10U basal at home)   -Insulin sliding scale   -Hypoglycemia protocol   -Glucose checks QID and at bedtime   -Endocrinology consult once patient is stabilized for significantly uncontrolled diabetes  -Hold home gabapentin gel and Lyrica 75 mg TID     #NICM with ICD:   Last interrogation 3/2018 - 5 NSVT episodes recorded. Last ECHO 2/2016 with EF of 50-55%.  Patient's weight down on admission, no evidence of hypervolemia.  -Continue PTA ASA 81 mg daily  -Reassess volume status frequently     Chronic Issues:   #HTN: Hold home lisinopril 10 mg daily and lasix 20 mg as noted above  #GERD: Hold home Zantac 300 mg daily   #Mental Health: Continue amitriptyline 50 mg qPM  #Smoking: Nicotine patch daily  #Hx of opioid abuse: resume PTA methadone 70 mg daily    # Pain Assessment:  Current Pain Score 6/8/2018   Patient currently in pain? no   Pain score (0-10) -   Pain location -   Pain descriptors -   CPOT pain score -   - Alessandra is experiencing pain due to neuropathy, debridement. Pain  management was discussed and the plan was created in a collaborative fashion.  Alessandra's response to the current recommendations: engaged  - Please see the plan for pain management as documented above        Diet: Combination Diet Regular Diet Adult; 3110-8958 Calories: Moderate Consistent CHO (4-6 CHO units/meal)  Fluids: PO  DVT Prophylaxis: Heparin SQ  Code Status: Full Code    Disposition Plan   Expected discharge: 2 - 3 days, recommended to transitional care unit once antibiotic plan established and SIRS/Sepsis treated.     Entered: Alireza Chaparro 06/08/2018, 9:00 PM   Information in the above section will display in the discharge planner report.      The patient's care was discussed with the Attending Physician, Dr. Gardner.    Alireza Chaparro  [facility]  [place]: 5  Pager: [phone]  Please see sticky note for cross cover information    Interval History   No acute events overnight.  Patient requested pain medications for right foot.  Received oxycodone.  She states that she is overall feeling better.  States that her breathing feels better.  Pharmacy inquires about restarting patient's methadone, patient states that she thinks that she is on this for her neuropathy.    Physical Exam   Vital Signs: Temp: 97.7  F (36.5  C) Temp src: Oral BP: (!) 79/52   Heart Rate: 93 Resp: 12 SpO2: 92 % O2 Device: Nasal cannula Oxygen Delivery: 5 LPM  Weight: 125 lbs 7.07 oz  General Appearance: Pleasant, no acute distress  Respiratory: Bibasilar crackles, breathing comfortably on nasal cannula  Cardiovascular: Regular rate and rhythm, no murmurs rubs or gallops  GI: Soft, nontender, nondistended, bowel sounds present  Skin: No rashes or jaundice noted  Other: No lower extremity edema, right foot wrapped        Data   Data     Recent Labs  Lab 06/08/18  0240 06/07/18  0649 06/07/18  0043 06/06/18  2215   WBC 24.5* 28.4*  --  28.2*   HGB 8.0* 8.4*  --  9.9*   MCV 77* 76*  --  76*    232  --  317    INR  --   --   --  1.46*    134  --  128*   POTASSIUM 4.3 5.0 4.9 5.9*   CHLORIDE 107 103  --  95   CO2 22 23  --  26   BUN 33* 33*  --  37*   CR 1.46* 1.80*  --  2.16*   ANIONGAP 10 8  --  6   RICK 8.1* 8.1*  --  9.3   * 191*  --  193*   ALBUMIN  --   --   --  2.5*   PROTTOTAL  --   --   --  8.9*   BILITOTAL  --   --   --  0.5   ALKPHOS  --   --   --  233*   ALT  --   --   --  27   AST  --   --   --  Canceled, Test credited   LIPASE  --   --   --  17*   TROPI  --   --   --  <0.015

## 2018-06-09 NOTE — PROGRESS NOTES
[facility] Internal Medicine Progress Note     Main Plans for Today:  -Echocardiogram  -VBG, BNP  -CT chest (with contrast)  -pain control (add tylenol, to reduce opiates given intermittent lethargy )  -hydrate  -Monitor respiratory status serially  -ID consult requested  -continue current abx for now  -meal time carb covered ordered by endo (appreciated)  -Correction scale decreased by endo (appreciated)    Assessment & Plan      Alessandra Pak is a 52 yr old female with a history of NICM with ICD, CKD stage III, chronic pancreatitis s/p pylorus sparing Whipple (2001) with secondary DM c/b diabetic foot ulcers, COPD, HTN, and chronic methadone use admitted on 6/6/2018. She was admitted for severe sepsis, suspicion that diabetic foot infection was primary (pus expressed x 2), but also with bilateral infiltrates, questioning      #Severe sepsis   Patient appeared to overall stabilize, but worse the last 24 hours again.  Orthopedics performed urgent debridement 6/7, with drainage of abscess; did drain small pus on 6/9 secondary debridement.  No cultures sent. .  Difficult to narrow antibiotics without culture data.  Patient very likely has osteomyelitis of the right foot.  Cannot obtain MRI due to below (called again and discussed with tech on 6/9).   CT of the foot with IV contrast would be next best study, however need to give contrast for CT (concern for PE) and do not want to give double dye load given reduced GFR  -Orthopedics consulted, appreciate assistance  -ID consult   -CT chest  -attempt to maintain euvolemia (heart rate does seem to improve with  fluid  -Continue Vancomycin, Zosyn, levofloxacin pending ID recs)  -qod CRP, monitor CBC  -Sputum culture   -Follow-up blood and urine cultures   -Cardiac monitoring   -Tylenol 650 mg q4H PRN     Patient's encephalopathy is resolved status post debridement,  IV antibiotics.  Patient denies illicit drug use, alcohol.  U tox negative.  Does not have  significant metabolic derangements.    #Acute Hypoxic Respiratory Failure, improving  #Hx of COPD (not on oxygen at home) --Patient did present with bilateral infiltrates, sepsis, altered mental status.    Treated broadly on same abx given for HCAP.  Initially improved (02 needs decreased) but not then worsened the AM of 6/8.  Had been on DVT ppx for last day (did not have first day of hospitalization).  CT-A pending.  Did have history of non-ischemic cardiomyopathy with EF of 15%, but then appeared to improve (55% in last known Echo in 2016)Had been fluid resuscitated and had lasix held, raising possibility of fluid overload.  HR and BP had improved with fluid and been needed at this time (had been close to pressors).  Wts are down  Does have history of COPD (on fluiticasone)and did have small response to nebs on 6/9, but does not appear most probable (and would not explain other pulm findings, though could be a secondary process.  VBG did not show major retention.  ARDS another consideration  -CT-A  -Echo  -ID consult  -continue   -Continue PTA fluticasone 2 spray daily   -Continue Ipratropium-Albuterol 1 puff QID  -Albuterol inhaler q4H PRN   -Monitor serially-- next step would be BiPAP.  Patient would accept intubation  -daily wts, strict I/O  -if not improving, may need pulm consult (awaiting CT and next 1-2 hours to decide)    Acute Metabolic Encephalopathy: Since admit, improving overall, alert and oriented, intermittently less responsive/ altered/ somonolent.  Possibilites include narcotics (likely contributing), sepsis, ICU delirium, and others.  Low suspicion of stroke, large metabolic component, C02 retention, or hypoxia  -minimize narcs as able (balancing with pain control).  Schedule tylenol  -treat sepsis  -blood cultures      #Acute on Chronic Kidney Disease, improving  #Hyperkalemia, resolved  #Hyponatremia, resolved  Patient's sodium and AK improving following IV fluids and with p.o. intake given  improved mental status.  -Monitor  -Hold home lasix 20 mg daily       #Urinary Retention   Status post Carter while encephalopathic.  Now that patient is alert, will attempt to allow patient to void spontaneously.  -Bladder scan PRN  -Strict intake/output       #Type 2 DM (poorly controlled)    -Insulin Glargine 5 U subq qAM (Patient receives an equivalent of about 10U basal at home)   -Insulin sliding scale   -Hypoglycemia protocol   -Glucose checks QID and at bedtime   -Endocrinology consult once patient is stabilized for significantly uncontrolled diabetes  -Hold home gabapentin gel and Lyrica 75 mg TID      #NICM with ICD:   Last interrogation 3/2018 - 5 NSVT episodes recorded. Last ECHO 2/2016 with EF of 50-55%.  Patient's weight down on admission, no evidence of hypervolemia.  -Continue PTA ASA 81 mg daily  -Reassess volume status frequently  -echo today      Chronic Issues:   #HTN: Hold home lisinopril 10 mg daily and lasix 20 mg as noted above  #GERD: Hold home Zantac 300 mg daily   #Mental Health: Continue amitriptyline 50 mg qPM  #Smoking: Nicotine patch daily  #Hx of opioid abuse: resume PTA methadone 70 mg daily     # Pain Assessment:  Current Pain Score 6/8/2018   Patient currently in pain? No (intermittent)-- did have this AM with dressing changes, prn ordern given   Pain score (0-10) -   Pain location -   Pain descriptors -   CPOT pain score -   - Alessandra is experiencing pain due to neuropathy, debridement. Pain management was discussed and the plan was created in a collaborative fashion.  Alessandra's response to the current recommendations: engaged  - Please see the plan for pain management as documented above        Diet: Combination Diet Regular Diet Adult; 4701-8339 Calories: Moderate Consistent CHO (4-6 CHO units/meal)  Fluids: PO  DVT Prophylaxis: Heparin SQ  Code Status: Full Code (reviewed on 6/9)              Interval History:   No events overnight  Nursing notes to be alert and oriented, but  "somnolent at time.    In AM, difficult pain control with debridement by ortho (appreciated)  After this, pt somnolent, nursing appropriately concerned about narcotics given, and so given narcan.  Pt very agitated.   Improved with dilaudid 0.5 mg    Increasing 02.  Pt does not notice.  Dry cough only.  No chest pain.      6 pt ROS negative except as above           /68  Temp 97.9  F (36.6  C) (Oral)  Resp 17  Ht 1.727 m (5' 8\")  Wt 56.9 kg (125 lb 7.1 oz)  SpO2 (!) 89%  BMI 19.07 kg/m2    Seen several times thus far     General Appearance:       Pleasant, no acute distress (except after narcan given)  Respiratory: No increased WOB Bibasilar crackles, fair air movement.  Expiratory phase slightly longer than inspiratory.  Fair movement.  Slightly improved aeration with nebs.  breathing comfortably on nasal cannula  Cardiovascular: Regular rate and rhythm, no murmurs rubs or gallops  GI: Soft, nontender, nondistended, bowel sounds present  Skin: No rashes or jaundice noted  Other:            No lower extremity edema, right foot wrapped          Data:     Results for orders placed or performed during the hospital encounter of 06/06/18 (from the past 24 hour(s))   Glucose by meter   Result Value Ref Range    Glucose 161 (H) 70 - 99 mg/dL   Glucose by meter   Result Value Ref Range    Glucose 141 (H) 70 - 99 mg/dL   CBC with platelets   Result Value Ref Range    WBC 24.8 (H) 4.0 - 11.0 10e9/L    RBC Count 3.42 (L) 3.8 - 5.2 10e12/L    Hemoglobin 8.6 (L) 11.7 - 15.7 g/dL    Hematocrit 25.7 (L) 35.0 - 47.0 %    MCV 75 (L) 78 - 100 fl    MCH 25.1 (L) 26.5 - 33.0 pg    MCHC 33.5 31.5 - 36.5 g/dL    RDW 16.1 (H) 10.0 - 15.0 %    Platelet Count 259 150 - 450 10e9/L   Basic metabolic panel   Result Value Ref Range    Sodium 138 133 - 144 mmol/L    Potassium 4.1 3.4 - 5.3 mmol/L    Chloride 108 94 - 109 mmol/L    Carbon Dioxide 24 20 - 32 mmol/L    Anion Gap 6 3 - 14 mmol/L    Glucose 112 (H) 70 - 99 mg/dL    Urea " Nitrogen 30 7 - 30 mg/dL    Creatinine 1.45 (H) 0.52 - 1.04 mg/dL    GFR Estimate 38 (L) >60 mL/min/1.7m2    GFR Estimate If Black 46 (L) >60 mL/min/1.7m2    Calcium 8.4 (L) 8.5 - 10.1 mg/dL   CRP inflammation   Result Value Ref Range    CRP Inflammation 230.0 (H) 0.0 - 8.0 mg/L   Nt probnp inpatient   Result Value Ref Range    N-Terminal Pro BNP Inpatient 5918 (H) 0 - 900 pg/mL   XR Chest Port 1 View    Narrative    Exam: XR CHEST PORT 1 VW, 6/9/2018 6:52 AM    Indication: increased O2 requirements in context of pneumonia on broad  spectrum abx;     Comparison: Radiograph on 6/7/2018    Findings:   Semiupright frontal view of chest.  Left chest wall AICD and transvenous lead. Right-sided PICC line tip  projects over the low SVC.  Stable cardiomediastinal silhouette. No significant change in mixed  interstitial and patchy airspace opacities of both lung. No pleural  effusion. No appreciable pneumothorax. Visualized upper abdomen is  unremarkable. No acute osseous abnormality.      Impression    Impression:  No significant change in mixed interstitial and patchy  airspace opacities of both lung. Pulmonary edema and/or infection.    I have personally reviewed the examination and initial interpretation  and I agree with the findings.    DINO GORDON MD ([place])   Wound Culture Aerobic Bacterial   Result Value Ref Range    Specimen Description Right Foot Wound     Culture Micro PENDING    Gram stain   Result Value Ref Range    Specimen Description Right Foot Wound     Gram Stain Many  Gram positive cocci   (A)     Gram Stain Many  WBC'S seen  predominantly PMN's      Glucose by meter   Result Value Ref Range    Glucose 117 (H) 70 - 99 mg/dL   Blood gas venous   Result Value Ref Range    Ph Venous 7.36 7.32 - 7.43 pH    PCO2 Venous 40 40 - 50 mm Hg    PO2 Venous 33 25 - 47 mm Hg    Bicarbonate Venous 22 21 - 28 mmol/L    Base Deficit Venous 3.1 mmol/L    FIO2 15L    XR Chest Port 1 View    Narrative    Exam: XR CHEST PORT  1 VW, 6/9/2018 11:43 AM    Indication: worsening hypoxia;     Comparison: Chest x-ray 6/9/2018    Findings:   Semiupright frontal view of the chest. Left chest wall cardiac device  with intracardiac leads. Right upper extremity PICC with tip  projecting over the cavoatrial junction. No pneumothorax. Increased  mixed interstitial and patchy airspace opacity in the lungs. Unchanged  cardiac silhouette. Lower lung volumes.      Impression    Impression: Increased bilateral mixed interstitial and patchy airspace  opacities, representing pulmonary edema versus infection.    I have personally reviewed the examination and initial interpretation  and I agree with the findings.    DINO GORDON MD ([place])     *Note: Due to a large number of results and/or encounters for the requested time period, some results have not been displayed. A complete set of results can be found in Results Review.        Camron Gardner MD     > 90 minutes so far today, including 45 face to face  30 minutes critical care time, when patient desaturating, increased respiratory support, VBG, Echo ordered.  At bed side during this time.

## 2018-06-09 NOTE — PROGRESS NOTES
Orthopaedic Surgery Progress Note 06/09/2018    E: Tmax 100.1 overnight. Intermittently tachycardic overnight.  6L NC O2.    S: Having right foot pain 2/2 bedside I&D 6/7, worse on proximal aspect of plantar foot. Denies new numbness or tingling, chest pain, SOB. Plan of care reviewed and questions answered.     O:  Temp: 100.1  F (37.8  C) Temp src: Oral BP: 132/83   Heart Rate: 114 Resp: 14 SpO2: (!) 86 % O2 Device: Oxi Plus Oxygen Delivery: 6 LPM    Exam:  Gen: No acute distress, alert, oriented, resting comfortably in bed, speaking in full sentences  Resp: Non-labored breathing  MSK:   RLE:  Inspection: Ace from distal leg to toes, no erythema  -- removed for exam.    -Quarter size ulcer overlying 1st MTP, no purulent drainage visible. Clean wound bed with iodasorb removed from wound bed.  -8x4 cm wound w/ depth 1.5 cm with tendon visible on plantar and medial aspect of foot present with iodasorb removed from wound bed.  Slight purulent drainage expressed from medial aspect of the wound and sent for culture.  No other areas of purulent drainage, otherwise clean wound bed.  Strength: wiggles all toes, fires, hip flexors, quad, hamstings, gsc, TA, EHL, FHL  Sensation: baseline numbness in stocking distribution unchanged from prior exam  Circulation: toes wwp      Recent Labs  Lab 06/09/18  0436 06/08/18  0240 06/07/18  0649   WBC 24.8* 24.5* 28.4*   HGB 8.6* 8.0* 8.4*    210 232   .0* 310.0* 310.0*       Assessment: Alessandra Pak is a 52 year old female admitted to ICU for sepsis with right foot diabetic ulcers vs pna as presumed source, now s/p bedside I&D right foot on 6/7. No mental status changes.  WBC stable, CRP down trending.  Clinical course stable, continue to monitor today.    Plan:  Medicine Primary  Activity: Elevate BLE  Weight bearing status: NWB BLE  Antibiotics: per primary, recommend ID consult  DVT prophylaxis: per primary team  Wound Care: per WOCN weekly dressing  changes  Culture: sent culture 6/9     Will continue to monitor clinical course.  Appears stable today.  William Starks MD 06/09/2018  Orthopaedic Surgery Resident, PGY-1  Pager: [phone]    For questions about this patient, please attempt to contact me at my pager prior to contacting the orthopaedics resident on call. Thank you!

## 2018-06-09 NOTE — PROGRESS NOTES
Diabetes Consult Daily Progress Note    Assessment/Plan:      There was no meal time carb coverage ordered yesterday    Plan  Add meal time carb coverage 1 unit per 30 g for meal and snacks (ordered)  Decrease correction scale from 1/50 to 1/100 (ordered)  Continue with Lantus 5 units daily  Monitor blood sugars before meals, bedtime and at 2 AM  Alessandra will need a pump assessment prior to resuming her ambulatory pump.  Concerned that Alessandra has lost a period of time, therefore will hold off on resuming her pump closer to discharge.  We will continue to follow     Patient was seen with Dr. Hayes.     Mauri Johnson MD  Endocrine Fellow  [phone]     Interval History:    Alessandra Pak is a 52 year old female with history of  Chronic pancreatitis s/p pylorus sparing  whipple in 2001, secondary diabetes, complicated by CKD stage 3, peripheral neuropathy and diabetic foot ulcers, hypertension, COPD,  And chronic methadone use, presented to the emergency department with altered mental status found to be septic.     Secondary diabetes: Prior to admission on ambulatory pump.  With  improvement in hemoglobin A1c from 12.8% in February to 9.5% June 6/2018.  Alessandra becomes tearful when she realizes she has not been taking care of herself.  Alessandra has been under a lot of stress taking care of her mother with lung cancer and the recent death of [id number]-old grandson.    There was no meal time carb coverage ordered yesterday    Current Diabetes Medications:   Lantus 5 and medium SSI 1/50      Active Diet Order    Active Diet Order      Combination Diet Regular Diet Adult; 0928-7124 Calories: Moderate Consistent CHO (4-6 CHO units/meal)      Exam:      B/P: 78/48, T: 98.4, P: Data Unavailable, R: 15    General: NAD.   HEENT: NC/AT, mucous membranes are moist.  Resp: Unlabored breathing.   Neuro: Sleeping when seen on rounds today.     Data:        Recent Labs  Lab 06/09/18  0921 06/09/18  0436 06/08/18  2229 06/08/18  1720  06/08/18  1259 06/08/18  0933 06/08/18  0240 06/07/18  2114  06/07/18  0649  06/06/18  2215   GLC  --  112*  --   --   --   --  212*  --   --  191*  --  193*   *  --  141* 161* 244* 264*  --  228*  < >  --   < >  --    < > = values in this interval not displayed.  Lab Results   Component Value Date    A1C 9.5 06/06/2018    A1C 12.8 02/16/2018    A1C 11.8 06/21/2017    A1C 11.5 11/02/2016    A1C >15.0  Results confirmed by repeat test   07/25/2016           Physician Attestation   I, Brynn Hayes, saw this patient with Dr. Johnson and agree with the findings and plan of care as documented in the note.      I personally reviewed medications and labs.    Brynn Hayes MD  Endocrinology and Diabetes   [phone]    Date of Service (when I saw the patient): 06/09/18

## 2018-06-09 NOTE — PLAN OF CARE
Problem: Patient Care Overview  Goal: Plan of Care/Patient Progress Review  Outcome: No Change  D- Patient with septic shock 2/2 non healing foot ulcers.  I/A- O2 needs increasing overnight, started at 5L and has been between 6-10L oxymask overnight. Sats goal >88% with hx COPD. MD paged overnight d/t increased O2 needs, saw patient at bedside. Minimal cough, lungs clear/diminished. IS given to patient for use. Sinus tach HR 90-100s. Voided x1 post carvajal removal. A&Ox4. Up with assist x1 to commode.  P- Continue to monitor pain and oxygenation.

## 2018-06-10 ENCOUNTER — APPOINTMENT (OUTPATIENT)
Dept: GENERAL RADIOLOGY | Facility: CLINIC | Age: 53
DRG: 853 | End: 2018-06-10
Attending: PEDIATRICS
Payer: COMMERCIAL

## 2018-06-10 ENCOUNTER — APPOINTMENT (OUTPATIENT)
Dept: CARDIOLOGY | Facility: CLINIC | Age: 53
DRG: 853 | End: 2018-06-10
Attending: PEDIATRICS
Payer: COMMERCIAL

## 2018-06-10 ENCOUNTER — APPOINTMENT (OUTPATIENT)
Dept: CT IMAGING | Facility: CLINIC | Age: 53
DRG: 853 | End: 2018-06-10
Attending: STUDENT IN AN ORGANIZED HEALTH CARE EDUCATION/TRAINING PROGRAM
Payer: COMMERCIAL

## 2018-06-10 ENCOUNTER — ANESTHESIA (OUTPATIENT)
Dept: INTENSIVE CARE | Facility: CLINIC | Age: 53
DRG: 853 | End: 2018-06-10
Payer: COMMERCIAL

## 2018-06-10 ENCOUNTER — ANESTHESIA EVENT (OUTPATIENT)
Dept: INTENSIVE CARE | Facility: CLINIC | Age: 53
DRG: 853 | End: 2018-06-10
Payer: COMMERCIAL

## 2018-06-10 ENCOUNTER — APPOINTMENT (OUTPATIENT)
Dept: GENERAL RADIOLOGY | Facility: CLINIC | Age: 53
DRG: 853 | End: 2018-06-10
Payer: COMMERCIAL

## 2018-06-10 LAB
ANION GAP SERPL CALCULATED.3IONS-SCNC: 8 MMOL/L (ref 3–14)
ANISOCYTOSIS BLD QL SMEAR: SLIGHT
ANISOCYTOSIS BLD QL SMEAR: SLIGHT
BASE DEFICIT BLDA-SCNC: 1.8 MMOL/L
BASE DEFICIT BLDA-SCNC: 2.3 MMOL/L
BASE DEFICIT BLDV-SCNC: 0.8 MMOL/L
BASOPHILS # BLD AUTO: 0.2 10E9/L (ref 0–0.2)
BASOPHILS # BLD AUTO: 0.2 10E9/L (ref 0–0.2)
BASOPHILS NFR BLD AUTO: 0.8 %
BASOPHILS NFR BLD AUTO: 0.9 %
BUN SERPL-MCNC: 26 MG/DL (ref 7–30)
CALCIUM SERPL-MCNC: 7.9 MG/DL (ref 8.5–10.1)
CHLORIDE SERPL-SCNC: 108 MMOL/L (ref 94–109)
CO2 SERPL-SCNC: 24 MMOL/L (ref 20–32)
CREAT SERPL-MCNC: 1.55 MG/DL (ref 0.52–1.04)
DIFFERENTIAL METHOD BLD: ABNORMAL
DIFFERENTIAL METHOD BLD: ABNORMAL
EOSINOPHIL # BLD AUTO: 0.2 10E9/L (ref 0–0.7)
EOSINOPHIL # BLD AUTO: 0.4 10E9/L (ref 0–0.7)
EOSINOPHIL NFR BLD AUTO: 0.8 %
EOSINOPHIL NFR BLD AUTO: 1.8 %
ERYTHROCYTE [DISTWIDTH] IN BLOOD BY AUTOMATED COUNT: 16.2 % (ref 10–15)
ERYTHROCYTE [DISTWIDTH] IN BLOOD BY AUTOMATED COUNT: 16.3 % (ref 10–15)
FERRITIN SERPL-MCNC: 375 NG/ML (ref 8–252)
GFR SERPL CREATININE-BSD FRML MDRD: 35 ML/MIN/1.7M2
GLUCOSE BLDC GLUCOMTR-MCNC: 105 MG/DL (ref 70–99)
GLUCOSE BLDC GLUCOMTR-MCNC: 150 MG/DL (ref 70–99)
GLUCOSE BLDC GLUCOMTR-MCNC: 86 MG/DL (ref 70–99)
GLUCOSE BLDC GLUCOMTR-MCNC: 87 MG/DL (ref 70–99)
GLUCOSE BLDC GLUCOMTR-MCNC: 90 MG/DL (ref 70–99)
GLUCOSE BLDC GLUCOMTR-MCNC: 93 MG/DL (ref 70–99)
GLUCOSE BLDC GLUCOMTR-MCNC: 93 MG/DL (ref 70–99)
GLUCOSE SERPL-MCNC: 60 MG/DL (ref 70–99)
HCO3 BLD-SCNC: 25 MMOL/L (ref 21–28)
HCO3 BLD-SCNC: 25 MMOL/L (ref 21–28)
HCO3 BLDV-SCNC: 25 MMOL/L (ref 21–28)
HCT VFR BLD AUTO: 22.7 % (ref 35–47)
HCT VFR BLD AUTO: 23.8 % (ref 35–47)
HGB BLD-MCNC: 7.5 G/DL (ref 11.7–15.7)
HGB BLD-MCNC: 7.9 G/DL (ref 11.7–15.7)
IRON SATN MFR SERPL: 7 % (ref 15–46)
IRON SERPL-MCNC: 8 UG/DL (ref 35–180)
L PNEUMO1 AG UR QL IA: NORMAL
LYMPHOCYTES # BLD AUTO: 1.5 10E9/L (ref 0.8–5.3)
LYMPHOCYTES # BLD AUTO: 4.4 10E9/L (ref 0.8–5.3)
LYMPHOCYTES NFR BLD AUTO: 19.3 %
LYMPHOCYTES NFR BLD AUTO: 6.8 %
MCH RBC QN AUTO: 24.8 PG (ref 26.5–33)
MCH RBC QN AUTO: 25.2 PG (ref 26.5–33)
MCHC RBC AUTO-ENTMCNC: 33 G/DL (ref 31.5–36.5)
MCHC RBC AUTO-ENTMCNC: 33.2 G/DL (ref 31.5–36.5)
MCV RBC AUTO: 75 FL (ref 78–100)
MCV RBC AUTO: 76 FL (ref 78–100)
MONOCYTES # BLD AUTO: 0.8 10E9/L (ref 0–1.3)
MONOCYTES # BLD AUTO: 0.8 10E9/L (ref 0–1.3)
MONOCYTES NFR BLD AUTO: 3.4 %
MONOCYTES NFR BLD AUTO: 3.5 %
NEUTROPHILS # BLD AUTO: 16.9 10E9/L (ref 1.6–8.3)
NEUTROPHILS # BLD AUTO: 19.7 10E9/L (ref 1.6–8.3)
NEUTROPHILS NFR BLD AUTO: 74.5 %
NEUTROPHILS NFR BLD AUTO: 88.2 %
O2/TOTAL GAS SETTING VFR VENT: 100 %
O2/TOTAL GAS SETTING VFR VENT: 60 %
O2/TOTAL GAS SETTING VFR VENT: 65 %
PCO2 BLD: 53 MM HG (ref 35–45)
PCO2 BLD: 56 MM HG (ref 35–45)
PCO2 BLDV: 46 MM HG (ref 40–50)
PH BLD: 7.26 PH (ref 7.35–7.45)
PH BLD: 7.28 PH (ref 7.35–7.45)
PH BLDV: 7.34 PH (ref 7.32–7.43)
PLATELET # BLD AUTO: 220 10E9/L (ref 150–450)
PLATELET # BLD AUTO: 242 10E9/L (ref 150–450)
PLATELET # BLD EST: ABNORMAL 10*3/UL
PLATELET # BLD EST: ABNORMAL 10*3/UL
PO2 BLD: 51 MM HG (ref 80–105)
PO2 BLD: 82 MM HG (ref 80–105)
PO2 BLDV: 38 MM HG (ref 25–47)
POTASSIUM SERPL-SCNC: 3.9 MMOL/L (ref 3.4–5.3)
PROCALCITONIN SERPL-MCNC: 5.67 NG/ML
RBC # BLD AUTO: 3.03 10E12/L (ref 3.8–5.2)
RBC # BLD AUTO: 3.14 10E12/L (ref 3.8–5.2)
RETICS # AUTO: 28.3 10E9/L (ref 25–95)
RETICS/RBC NFR AUTO: 0.9 % (ref 0.5–2)
S PNEUM AG SPEC QL: NORMAL
SODIUM SERPL-SCNC: 140 MMOL/L (ref 133–144)
SPECIMEN SOURCE: NORMAL
SPECIMEN SOURCE: NORMAL
TIBC SERPL-MCNC: 102 UG/DL (ref 240–430)
TRANSFERRIN SERPL-MCNC: 90 MG/DL (ref 210–360)
VANCOMYCIN SERPL-MCNC: 22 MG/L
WBC # BLD AUTO: 22.3 10E9/L (ref 4–11)
WBC # BLD AUTO: 22.7 10E9/L (ref 4–11)

## 2018-06-10 PROCEDURE — 25000132 ZZH RX MED GY IP 250 OP 250 PS 637: Performed by: STUDENT IN AN ORGANIZED HEALTH CARE EDUCATION/TRAINING PROGRAM

## 2018-06-10 PROCEDURE — 94002 VENT MGMT INPAT INIT DAY: CPT

## 2018-06-10 PROCEDURE — 73630 X-RAY EXAM OF FOOT: CPT | Mod: RT

## 2018-06-10 PROCEDURE — 82803 BLOOD GASES ANY COMBINATION: CPT | Performed by: STUDENT IN AN ORGANIZED HEALTH CARE EDUCATION/TRAINING PROGRAM

## 2018-06-10 PROCEDURE — 84145 PROCALCITONIN (PCT): CPT | Performed by: PEDIATRICS

## 2018-06-10 PROCEDURE — 36600 WITHDRAWAL OF ARTERIAL BLOOD: CPT

## 2018-06-10 PROCEDURE — 80048 BASIC METABOLIC PNL TOTAL CA: CPT | Performed by: PEDIATRICS

## 2018-06-10 PROCEDURE — 87899 AGENT NOS ASSAY W/OPTIC: CPT | Performed by: STUDENT IN AN ORGANIZED HEALTH CARE EDUCATION/TRAINING PROGRAM

## 2018-06-10 PROCEDURE — 80202 ASSAY OF VANCOMYCIN: CPT | Performed by: PEDIATRICS

## 2018-06-10 PROCEDURE — 25000128 H RX IP 250 OP 636: Performed by: STUDENT IN AN ORGANIZED HEALTH CARE EDUCATION/TRAINING PROGRAM

## 2018-06-10 PROCEDURE — 25800025 ZZH RX 258: Performed by: STUDENT IN AN ORGANIZED HEALTH CARE EDUCATION/TRAINING PROGRAM

## 2018-06-10 PROCEDURE — 40000611 ZZHCL STATISTIC MORPHOLOGY W/INTERP HEMEPATH TC 85060: Performed by: STUDENT IN AN ORGANIZED HEALTH CARE EDUCATION/TRAINING PROGRAM

## 2018-06-10 PROCEDURE — 85045 AUTOMATED RETICULOCYTE COUNT: CPT | Performed by: STUDENT IN AN ORGANIZED HEALTH CARE EDUCATION/TRAINING PROGRAM

## 2018-06-10 PROCEDURE — 93306 TTE W/DOPPLER COMPLETE: CPT | Mod: 26 | Performed by: INTERNAL MEDICINE

## 2018-06-10 PROCEDURE — 40000983 ZZH STATISTIC HFNC ADULT NON-CPAP

## 2018-06-10 PROCEDURE — 40000275 ZZH STATISTIC RCP TIME EA 10 MIN

## 2018-06-10 PROCEDURE — 40000986 XR CHEST PORT 1 VW

## 2018-06-10 PROCEDURE — 87040 BLOOD CULTURE FOR BACTERIA: CPT | Performed by: STUDENT IN AN ORGANIZED HEALTH CARE EDUCATION/TRAINING PROGRAM

## 2018-06-10 PROCEDURE — 25000128 H RX IP 250 OP 636: Performed by: ANESTHESIOLOGY

## 2018-06-10 PROCEDURE — 82803 BLOOD GASES ANY COMBINATION: CPT | Performed by: INTERNAL MEDICINE

## 2018-06-10 PROCEDURE — 25000128 H RX IP 250 OP 636: Performed by: INTERNAL MEDICINE

## 2018-06-10 PROCEDURE — 84466 ASSAY OF TRANSFERRIN: CPT | Performed by: STUDENT IN AN ORGANIZED HEALTH CARE EDUCATION/TRAINING PROGRAM

## 2018-06-10 PROCEDURE — 36415 COLL VENOUS BLD VENIPUNCTURE: CPT | Performed by: STUDENT IN AN ORGANIZED HEALTH CARE EDUCATION/TRAINING PROGRAM

## 2018-06-10 PROCEDURE — 20000004 ZZH R&B ICU UMMC

## 2018-06-10 PROCEDURE — 85025 COMPLETE CBC W/AUTO DIFF WBC: CPT | Performed by: STUDENT IN AN ORGANIZED HEALTH CARE EDUCATION/TRAINING PROGRAM

## 2018-06-10 PROCEDURE — 85025 COMPLETE CBC W/AUTO DIFF WBC: CPT | Performed by: PEDIATRICS

## 2018-06-10 PROCEDURE — 00000146 ZZHCL STATISTIC GLUCOSE BY METER IP

## 2018-06-10 PROCEDURE — 74018 RADEX ABDOMEN 1 VIEW: CPT

## 2018-06-10 PROCEDURE — 25000128 H RX IP 250 OP 636

## 2018-06-10 PROCEDURE — 71045 X-RAY EXAM CHEST 1 VIEW: CPT

## 2018-06-10 PROCEDURE — 93306 TTE W/DOPPLER COMPLETE: CPT

## 2018-06-10 PROCEDURE — 83540 ASSAY OF IRON: CPT | Performed by: STUDENT IN AN ORGANIZED HEALTH CARE EDUCATION/TRAINING PROGRAM

## 2018-06-10 PROCEDURE — 82728 ASSAY OF FERRITIN: CPT | Performed by: STUDENT IN AN ORGANIZED HEALTH CARE EDUCATION/TRAINING PROGRAM

## 2018-06-10 PROCEDURE — 40000671 ZZH STATISTIC ANESTHESIA CASE

## 2018-06-10 PROCEDURE — 5A1955Z RESPIRATORY VENTILATION, GREATER THAN 96 CONSECUTIVE HOURS: ICD-10-PCS | Performed by: EMERGENCY MEDICINE

## 2018-06-10 PROCEDURE — 99291 CRITICAL CARE FIRST HOUR: CPT | Mod: GC | Performed by: INTERNAL MEDICINE

## 2018-06-10 PROCEDURE — 83550 IRON BINDING TEST: CPT | Performed by: STUDENT IN AN ORGANIZED HEALTH CARE EDUCATION/TRAINING PROGRAM

## 2018-06-10 PROCEDURE — 70450 CT HEAD/BRAIN W/O DYE: CPT

## 2018-06-10 PROCEDURE — 40000281 ZZH STATISTIC TRANSPORT TIME EA 15 MIN

## 2018-06-10 RX ORDER — PROPOFOL 10 MG/ML
INJECTION, EMULSION INTRAVENOUS PRN
Status: DISCONTINUED | OUTPATIENT
Start: 2018-06-10 | End: 2018-06-10

## 2018-06-10 RX ORDER — FUROSEMIDE 10 MG/ML
40 INJECTION INTRAMUSCULAR; INTRAVENOUS ONCE
Status: COMPLETED | OUTPATIENT
Start: 2018-06-10 | End: 2018-06-10

## 2018-06-10 RX ORDER — FENTANYL CITRATE 50 UG/ML
50-100 INJECTION, SOLUTION INTRAMUSCULAR; INTRAVENOUS
Status: DISCONTINUED | OUTPATIENT
Start: 2018-06-10 | End: 2018-06-12

## 2018-06-10 RX ORDER — SENNOSIDES 8.6 MG
8.6 TABLET ORAL 2 TIMES DAILY PRN
Status: DISCONTINUED | OUTPATIENT
Start: 2018-06-10 | End: 2018-07-10 | Stop reason: HOSPADM

## 2018-06-10 RX ORDER — NALOXONE HYDROCHLORIDE 0.4 MG/ML
.1-.4 INJECTION, SOLUTION INTRAMUSCULAR; INTRAVENOUS; SUBCUTANEOUS
Status: DISCONTINUED | OUTPATIENT
Start: 2018-06-10 | End: 2018-06-28

## 2018-06-10 RX ORDER — PROPOFOL 10 MG/ML
INJECTION, EMULSION INTRAVENOUS
Status: COMPLETED
Start: 2018-06-10 | End: 2018-06-10

## 2018-06-10 RX ORDER — CEFAZOLIN SODIUM 1 G/50ML
1250 SOLUTION INTRAVENOUS EVERY 24 HOURS
Status: DISCONTINUED | OUTPATIENT
Start: 2018-06-11 | End: 2018-06-12

## 2018-06-10 RX ORDER — PROPOFOL 10 MG/ML
5-75 INJECTION, EMULSION INTRAVENOUS CONTINUOUS
Status: DISCONTINUED | OUTPATIENT
Start: 2018-06-10 | End: 2018-06-13

## 2018-06-10 RX ADMIN — ROCURONIUM BROMIDE 50 MG: 10 INJECTION INTRAVENOUS at 14:35

## 2018-06-10 RX ADMIN — DEXTROSE MONOHYDRATE 25 ML: 500 INJECTION PARENTERAL at 04:52

## 2018-06-10 RX ADMIN — METHADONE HYDROCHLORIDE 70 MG: 10 CONCENTRATE ORAL at 12:27

## 2018-06-10 RX ADMIN — PIPERACILLIN SODIUM AND TAZOBACTAM SODIUM 3.38 G: 3; .375 INJECTION, POWDER, LYOPHILIZED, FOR SOLUTION INTRAVENOUS at 06:51

## 2018-06-10 RX ADMIN — ROCURONIUM BROMIDE 100 MG: 10 INJECTION INTRAVENOUS at 14:34

## 2018-06-10 RX ADMIN — FUROSEMIDE 40 MG: 10 INJECTION, SOLUTION INTRAVENOUS at 09:16

## 2018-06-10 RX ADMIN — HEPARIN SODIUM 5000 UNITS: 5000 INJECTION, SOLUTION INTRAVENOUS; SUBCUTANEOUS at 21:38

## 2018-06-10 RX ADMIN — AMITRIPTYLINE HYDROCHLORIDE 50 MG: 50 TABLET, FILM COATED ORAL at 21:38

## 2018-06-10 RX ADMIN — PIPERACILLIN SODIUM AND TAZOBACTAM SODIUM 3.38 G: 3; .375 INJECTION, POWDER, LYOPHILIZED, FOR SOLUTION INTRAVENOUS at 18:02

## 2018-06-10 RX ADMIN — NICOTINE 1 PATCH: 21 PATCH TRANSDERMAL at 08:02

## 2018-06-10 RX ADMIN — PROPOFOL 10 MCG/KG/MIN: 10 INJECTION, EMULSION INTRAVENOUS at 14:24

## 2018-06-10 RX ADMIN — PREGABALIN 75 MG: 75 CAPSULE ORAL at 21:38

## 2018-06-10 RX ADMIN — PIPERACILLIN SODIUM AND TAZOBACTAM SODIUM 3.38 G: 3; .375 INJECTION, POWDER, LYOPHILIZED, FOR SOLUTION INTRAVENOUS at 12:18

## 2018-06-10 RX ADMIN — PROPOFOL 50 MG: 10 INJECTION, EMULSION INTRAVENOUS at 14:33

## 2018-06-10 RX ADMIN — PIPERACILLIN SODIUM AND TAZOBACTAM SODIUM 3.38 G: 3; .375 INJECTION, POWDER, LYOPHILIZED, FOR SOLUTION INTRAVENOUS at 00:29

## 2018-06-10 RX ADMIN — VANCOMYCIN HYDROCHLORIDE 1500 MG: 10 INJECTION, POWDER, LYOPHILIZED, FOR SOLUTION INTRAVENOUS at 05:10

## 2018-06-10 RX ADMIN — PROPOFOL 35 MCG/KG/MIN: 10 INJECTION, EMULSION INTRAVENOUS at 22:00

## 2018-06-10 RX ADMIN — HEPARIN SODIUM 5000 UNITS: 5000 INJECTION, SOLUTION INTRAVENOUS; SUBCUTANEOUS at 09:16

## 2018-06-10 RX ADMIN — ACETAMINOPHEN 1000 MG: 500 TABLET, FILM COATED ORAL at 21:37

## 2018-06-10 NOTE — PROGRESS NOTES
Diabetes Consult Daily Progress Note    Assessment/Plan:      52 year old woman with history of chronic pancreatitis s/p pylorus sparing Whipple in 2001, secondary diabetes, CKD stage 3, peripheral neuropathy and diabetic foot ulcers, hypertension, COPD, and chronic methadone use. Mild hypoglycemia overnight. She is now NPO and the plan is for intubation today.     Plan  Decrease Lantus to 2 units daily (ordered, will start tomorrow)  Low intensity correction scale q4 hours (ordered)  Glucose check q4 h and cont hypoglycemia protocol (ordered)     Patient was seen with Dr. Hayes.     Mauri Johnson MD  Endocrine Fellow  [phone]     Interval History:    Alessandra Pak is a 52 year old female with history of  Chronic pancreatitis s/p pylorus sparing  whipple in 2001, secondary diabetes, complicated by CKD stage 3, peripheral neuropathy and diabetic foot ulcers, hypertension, COPD,  And chronic methadone use, presented to the emergency department with altered mental status found to be septic.     Secondary diabetes: Prior to admission on ambulatory pump.  With  improvement in hemoglobin A1c from 12.8% in February to 9.5% June 6/2018.  Alessandra becomes tearful when she realizes she has not been taking care of herself.  Alessandra has been under a lot of stress taking care of her mother with lung cancer and the recent death of [id number] grandson.    Pt was transferred to MICU last night, may intubate today. NPO    Current Diabetes Medications:   Lantus 5 units daily  Aspart meal time carb coverage 1 unit per 30 g for meal and snacks  correction scale 1/100         Active Diet Order    Active Diet Order      NPO for Medical/Clinical Reasons Except for: Meds      Exam:      B/P: 78/48, T: 98.4, P: Data Unavailable, R: 15    General: lying in bed, on high flow  Neuro: awake, agitated     Data:        Recent Labs  Lab 06/10/18  1017 06/10/18  0747 06/10/18  0513 06/10/18  0400 06/09/18  2216 06/09/18  1806 06/09/18  1643  06/09/18  1405  06/09/18  0436  06/08/18  0240  06/07/18  0649  06/06/18  2215   GLC  --   --   --  60*  --  118*  --   --   --  112*  --  212*  --  191*  --  193*   BGM 87 86 150*  --  112*  --  123* 122*  < >  --   < >  --   < >  --   < >  --    < > = values in this interval not displayed.  Lab Results   Component Value Date    A1C 9.5 06/06/2018    A1C 12.8 02/16/2018    A1C 11.8 06/21/2017    A1C 11.5 11/02/2016    A1C >15.0  Results confirmed by repeat test   07/25/2016           Physician Attestation   I saw this patient with Dr. Johnson and agree with the findings and plan of care as documented in the note, which I have reviewed and edited.     I personally reviewed medications and labs.     Brynn Hayes MD  Endocrinology and Diabetes   [phone]    Date of Service (when I saw the patient): 6/10/2018

## 2018-06-10 NOTE — PROGRESS NOTES
Orthopaedic Surgery Progress Note 06/10/2018    E: Transferred to MICU status for worsening hypoxia, respiratory concerns. No fevers overnight. Continues to be intermittently tachycardic..    S: Having right foot pain 2/2 bedside I&D 6/7, but it is improved today. Denies new numbness or tingling. Plan of care reviewed and questions answered.    O:  Temp: 98.2  F (36.8  C) Temp src: Oral BP: 125/71   Heart Rate: 105 Resp: 16 SpO2: 90 % O2 Device: Other (Comments) (vapotherm) Oxygen Delivery: 15 LPM    Exam:  Gen: No acute distress, alert, oriented, resting comfortably in bed, speaking in full sentences   Resp: Non-labored breathing, but on vapotherm at 15L   MSK:   RLE:  Inspection: Ace from distal leg to toes, no erythema   Strength: wiggles all toes, fires, hip flexors, quad, hamstings, gsc, TA, EHL, FHL  Sensation: baseline numbness in stocking distribution unchanged from prior exam  Circulation: toes wwp      Recent Labs  Lab 06/10/18  0500 06/10/18  0400 06/09/18  0436 06/08/18  0240 06/07/18  0649   WBC 22.7* 22.3* 24.8* 24.5* 28.4*   HGB 7.9* 7.5* 8.6* 8.0* 8.4*    242 259 210 232   CRP  --   --  230.0* 310.0* 310.0*     Wound Culture: GPCs on Gram Stain, culture pending      Assessment: Alessandra Pak is a 52 year old female admitted sepsis with right foot diabetic ulcers vs pna as presumed source, now s/p bedside I&D right foot on 6/7. Encephalopathy improved.  WBC downtrending, CRP down trending, on vanc/zosyn. Respiratory status declining, requiring repeat admission to MICU 6/9/2018. Will continue to monitor clinical course.  Appears clinically stable today from right foot standpoint with decreasing WBC and no encephalopathy, but worsening respiratory status.  No acute interventions but ortho at this point. Follow cultures, and clinical status.    Plan:  Medicine Primary  Activity: Elevate BLE  Weight bearing status: NWB BLE  Antibiotics: per primary/ID, vanc/zosyn  DVT prophylaxis: per primary  team  Wound Care: per WOCN daily dressing changes  Culture: sent culture 6/9, GPCs        William Starks MD 06/10/2018  Orthopaedic Surgery Resident, PGY-1  Pager: [phone]    For questions about this patient, please attempt to contact me at my pager prior to contacting the orthopaedics resident on call. Thank you!

## 2018-06-10 NOTE — PLAN OF CARE
Problem: Diabetes Comorbidity  Goal: Diabetes  Patient comorbidity will be monitored for signs and symptoms of hyperglycemia or hypoglycemia. Problems will be absent, minimized or managed by discharge/transition of care.   BG monitored overnight, 25mL D50 given for hypoglycemia, resolved.    Problem: Renal Insufficiency Comorbidity  Goal: Renal Insufficiency  Patient comorbidity will be monitored for signs and symptoms of Renal Insufficiency (Chronic) condition.  Problems will be absent, minimized or managed by discharge/transition of care.   Monitoring urine output overnight, straight cathed x1 for 850mL urine d/t retention.     Problem: Chronic Respiratory Difficulty Comorbidity  Goal: Chronic Respiratory Difficulty  Patient comorbidity will be monitored for signs and symptoms of Respiratory Difficulty (Chronic) condition.  Problems will be absent, minimized or managed by discharge/transition of care.   Remains on vapotherm % FiO2. Refusing Bipap despite education. ABG drawn, refused repeat this AM. Continuing to monitor closely, may require intubation.     Problem: Infection, Risk/Actual (Adult)  Goal: Identify Related Risk Factors and Signs and Symptoms  Related risk factors and signs and symptoms are identified upon initiation of Human Response Clinical Practice Guideline (CPG).   Outcome: Declining  Transferred to MICU service overnight. Patient remains on vapotherm % FiO2 with sats mostly 88-92%. ABG drawn, PaO2 59. Patient refusing bipap despite education and offering precedex for anxiety.  40mg lasix given with no change in respiratory status. Unable to void, straight cathed for 850mL urine post lasix. Refused repeat ABG. Somnolent but arousable and oriented x4. Afebrile. BPs soft overnight. HR sinus tach 90-100s. BG 60 this AM, 25mL D50 given with recheck 150. NPO except meds. Continue to monitor respiratory status, possible intubation if respiratory status worsens.

## 2018-06-10 NOTE — H&P
"      MICU History and Physicial  Alessandra Pak MRN: [id number]  1965  Date of Admission:6/6/2018  Primary care provider: Brionna Calabrese      Assessment and Plan:     Cici Pak is a 51 yo female with PMH significant for chronic pancreatitis s/p whipple with secondary diabetes mellitus c/b diabetic foot ulcers, CKD stage III, NICM s/p ICD, HTN, COPD, and chronic methadone use who presented to [facility] with AMS and was found to be septic. She was admitted to medicine for broad spectrum abx and fluids, before worsening respiratory status and hypoxia necessitated transfer to MICU. CT chest findings are concerning for pulmonary edema vs. ALI vs. ILD.     PLAN:  ===NEURO===  # Acute metabolic encephalopathy, resolved  Patient initially presented with AMS presumed secondary to sepsis and narcotic effect and has improved with infection source control, antibiotics and fluids. During this hospitalization patient was found to be lethargic and given narcan with resultant agitation and improvement after 0.5mg dilaudid. Patient continues to be alert and oriented.  -- Sepsis treatment as below  -- Minimize narcotic doses    # Chronic narcotic use  # Neuropathy  Patient on methadone as an outpatient. Became very agitated after narcan treatment during this hospitalization.   -- Methadone 70mg qday  -- Pregabalin 75mg TID    # H/o anxiety  # H/o depression  -- Continue amitriptyline 50mg    ===CARDIOVASCULAR===  # NICM   # H/o HFrEF with recovered EF s/p ICD  Last device interrogation 3/15/18 with 5 NSVT episodes. Echo 2/2016 EF 50-55%, history of EF 15% in 10/2014. Bedside US 6/9/18 by medicine team showed \"slightly reduced EF and large IVC\" per their interpretation. Formal echo ordered, not yet completed due to patient's agitation and anxiety. Has crackles on exam and possible pulmonary edema on CT, no peripheral edema. NTpro-BNP elevated, but has been significantly higher in the past.  -- BB: Previously on " metoprolol tartrate 100mg qam & 50mg qpm; not currently taking for unclear reasons. Should be restarted when appropriate  -- ACEi: Hold lisinopril 10mg due to NIKOLAY  -- Volume: Lasix 40mg IV BID (home dose 20mg PO qday)  -- GARCIA: Not indicated  -- CRT: ICD in place  -- Continue ASA 81mg   -- Echocardiogram    # HTN  -- Hold lisinopril, lasix as below    ===PULMONARY===  # Acute hypoxic respiratory failure  # Chronic restrictive lung disease with emphysema and fibrosis  Multi-year history of similar respiratory presentations when hospitalized in past (hypoxic, CT w/ similar findings). Pulmonary has previously been involved, and Ddx in past has included ALI 2/2 infxn in setting of underlying emphysema, ILD of unknown etiology (DIP vs RBILD), sarcoid (unlikely given lack of LAD in past), chronic lung disease with emphysema and fibrosis, IgG4 disease due to chronic panc (prev IgG levels nml), HSP. Previous workup included bronch w/ BAL and EBUS w/ bx of mediastinal LN. BAL unrevealing, LN bx was unsatisfactory for evaluation. As noted IgG subclasses have been normal in past. A-1-AT was normal in 2015. 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 While at previous presentations, she was treated empirically with broad spectrum abx, she was also treated with high dose steroids for several weeks and notes indicate she was more steroid responsive than abx responsive. PFTs in 2015 demonstrated a restrictive pattern with a severely reduced DLCO at 48% predicted, though this was uncorrected for pt's hgb. Per PFT interpretation, this indicates a severe loss of functional alveolar capillary surface; the diffusion defect and reduced lung volumes suggest an early parenchymal process. Unfortunately, patient never followed up in pulmonary clinic. At this time, CT findings show further worsened GGOs and reticular opacities concerning for ILD such as fibrotic type NSIP or HSP, though could not r/o superimposed infxn.  -- Diurese as  above  -- Vapotherm for now, titrate to SaO2 88-92%  -- Consider BiPAP if remains hypoxemic on Vapotherm, may need to start Precedex gtt to help with anxiety to tolerate BiPAP  -- Low threshold to intubate if not tolerating non-invasive measures  -- Abx as below  -- Continue PTA inhalers/nebs    ===GASTROINTESTINAL===  No acute issues    # Nutrition:   - NPO    ===RENAL===  # NIKOLAY on CKD stage III  # Hyponatremia, resolved  # Hyperkalemia, resolved  Creatinine initially elevated presumed 2/2 sepsis/pre-renal, but back to a baseline of 1.3-1.4. Patient now appears volume up as noted above.  -- Hold lisinopril in context of NIKOLAY and relative hypotension  -- Diuresis as above  -- Track I/Os  -- Avoid nephrotoxic agents as able    # Urinary retention  Patient continues to have urinary retention treated with straight catheterization. Could be side effect of amitriptyline use or other anticholinergic side effect. Patient denied drug use and utox in ED was negative.   -- Bladder scan with straight cath prn    # Fluids: None    ===HEME/ONC===  # Leukocytosis  WBC continues to be elevated in setting of sepsis.   -- Trend    # Microcytic anemia  Baseline Hgb around 10-11. Patient's MCV usually >80. Reasonable explanation would be ACD. No evidence of bleeding. No recent iron studies in chart.   -- Trend  -- Iron studies  -- Peripheral smear & retic count    ===ENDOCRINE===  # Chronic pancreatitis s/p pylorus-sparing Whipple procedure  # Secondary insulin dependent diabetes (poorly controlled)  # Diabetic foot ulcer  Last A1c 9.5% 6/7/18. Has had improvement of A1c since February. Home regimen: ambulatory pump, 5U lantus before breakfast, low dose sliding scale, and carb coverage.  -- Blood Glucose checks  -- 5U lantus before breakfast  -- Novolog 1U per 30g carbohydrates  -- Low dose sliding scale  -- Endocrine consulted  -- Diabetes educator consult    ===INFECTIOUS DISEASE===  # Severe sepsis  # Diabetic foot ulcer with  purulent drainage  Patient initially found to be septic with likely source multiple diabetic foot ulcers. Initially improved with abx and source control by ortho (s/p I&D on 6/7/18 and 6/9/18), but also concerned for CAP due to imaging findings. No culture on initial debridement of wound, but wound has been cultured since. Patient's AMS improved but continues to have elevated white count, CRP starting to trend down.   -- Abx as below   -- Continue broad spectrum for foot wounds until cultures result   -- Continue levofloxacin for atypical coverage given pulmonary findings  -- Follow cultures  -- Ortho following  -- Trend CRP QOD    # Antimicrobials:  Zosyn: 6/7/18 --> current  Vancomycin: 6/7/18 --> current  Levofloxacin: 6/7/18 --> current    # Cultures:  Wound cultures:    - Right foot 6/9/18: Many GPCs, culture pending  Blood cultures:    - 6/6/18 x2: NGTD   - 6/9/18 x1: NGTD  Urine cultures: No growth  Sputum culture: Not collected    ===SKIN/MSK===  # Diabetic foot ulcers  Patient with multiple diabetic ulcers on right foot, debrided by orthopedics with purulent material expressed.   -- Wound care consulted     Prophylaxis:  DVT: SubQ heparin   GI: Bowel regimen prn  Disposition: Patient requires elevation in cares to MICU team due to hypoxic respiratory failure on vapotherm, potential for BiPAP or intubation.    Code Status: FULL CODE     Patient was seen and discussed with Dr. Barnhart, PGY-2 and attending physician Dr. Cardenas, who agrees with above assessment and plan.    Rafael Garcia, MS4    I saw the patient with the medical student, and performed my own independent history and physical exam. This note reflects our joint assessment and plan. Patient was also discussed with attending physician, Dr. Cardenas, who agrees with above.     Nevin Barnhart MD  Internal Medicine PGY-2  P: [phone]         Chief Complaint:     Respiratory failure         History of Present Illness:   History obtained via chart review    Icci  Mellisa is a 51 yo female with PMH significant for NICM with ICD, CKD stage III, chronic pancreatitis s/p pylorus-sparing Whipple (2001) with secondary DM c/b diabetic foot ulcers, COPD, HTN and chronic methadone use who presented to [facility] via EMS for altered mental status on 6/7/18. EMS reports initially O2 sats were in 80s on room air, improved to 90s on 6L of O2, BP 130s/80s, tachy in 130s, blood glucose= 216.    Patient's daughter reported worsening mental status over 2 days prior to presentation, normal prior to this decline. Decreased PO intake without taking insulin. Foul smell was noted from patient's foot and found to have diabetic ulcer on right foot. Patient was found to be septic with WBC of 28.2, tachycardia, procal 5.36, lactate 1.7, with a concern for source being foot ulcer or possible PNA with bilateral infiltrates. Patient was started on vancomycin, zosyn, levofloxacin, given 3L of fluid. Orthopedics performed debridement, no cultures at time of debridement to narrow abx.     Patient was initially stabilized with above regimen with improvement of mental status. Over last 24 hours patient has had increased oxygen need. Diuresis attempted with some improvement. Chest CT showed extensive reticular and ground glass opacities in upper lobes and peribronchial vascular distribution, concern for ILD vs. pulmonary edema vs. ALI. Attempted BiPAP which patient did not tolerate. Patient switched to vapotherm satting at 92%, ABG showed continued hypoxemia (pO2 59). The patient was transferred to MICU team for diuresis and a trial of precedex and BiPAP with potential for intubation.           Review of Systems:   Unable to perform due to patient condition.          Past Medical History:   Medical History reviewed.   Past Medical History:   Diagnosis Date     Abdominal pain, right upper quadrant     sees Dr Mcclellan pain clinic at [facility]     ASCUS with positive high risk HPV 8/2013    + HPV 33, [place] - GAVIN I, ECC-  atypia     Cardiomyopathy (H)     non ischemic cardiomyopathy with EF 15     Cervical high risk HPV (human papillomavirus) test positive 7/8/15, 7/25/16    NIL pap/+ HR HPV (not 16 or 18).      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   Past Surgical History:   Procedure Laterality Date     C NONSPECIFIC PROCEDURE  2001    cholecystectomy     C NONSPECIFIC PROCEDURE  as a child    tonsillectomy     C NONSPECIFIC PROCEDURE  2001    whipple procedure     CARDIAC SURGERY      defib     COLPOSCOPY,LOOP ELECTRD CERVIX EXCIS  2002, 2011    stage 2 dysplasia     ENDOBRONCHIAL ULTRASOUND FLEXIBLE N/A 2/19/2015    Procedure: ENDOBRONCHIAL ULTRASOUND FLEXIBLE;  Surgeon: Brenden Tamez MD;  Location: [facility]     LEEP TX, CERVICAL  2014    LEEP TX Cervical     RECESSION RESECTION WITH ADJUSTABLE SUTURE  12/13/2011    Procedure:RECESSION RESECTION WITH ADJUSTABLE SUTURE; Right Strabismus Repair with Adjustable Suture       TUBAL LIGATION  2007    essure              Social History:   Social History reviewed.  Social History   Substance Use Topics     Smoking status: Former Smoker     Packs/day: 0.30     Years: 15.00     Types: Cigarettes     Smokeless tobacco: Former User     Quit date: 10/29/2014      Comment: Started smoking in 89/ smokes about 3 per day     Alcohol use No             Family History:   Family History reviewed.   Family History   Problem Relation Age of Onset     DIABETES Mother      diet controled      Hypertension Mother      Arthritis Mother      Lipids Mother      DIABETES Father      Hypertension Father      GASTROINTESTINAL DISEASE Father      gallbladder removed     Bipolar Disorder Brother      Thyroid Disease Brother      Obesity Other      Son     Respiratory Other      Son and Daughter; asthma     Depression Maternal Aunt      Anxiety Disorder Maternal Aunt              Allergies:     Allergies   Allergen Reactions     No Known Drug Allergies              Medications:   Medications Reviewed.   Current Facility-Administered Medications   Medication     acetaminophen (TYLENOL) tablet 1,000 mg     albuterol (PROAIR HFA/PROVENTIL HFA/VENTOLIN HFA) Inhaler 2 puff     albuterol (PROVENTIL) neb solution 2.5 mg     amitriptyline (ELAVIL) tablet 50 mg     aspirin chewable tablet 81 mg     [START ON 6/10/2018] dexmedetomidine (PRECEDEX) 400 mcg in sodium chloride 0.9 % 100 mL infusion     glucose gel 15-30 g    Or     dextrose 50 % injection 25-50 mL    Or     glucagon injection 1 mg     fluticasone (FLONASE) 50 MCG/ACT spray 2 spray     heparin lock flush 10 UNIT/ML injection 2-5 mL     heparin lock flush 10 UNIT/ML injection 5-10 mL     heparin lock flush 10 UNIT/ML injection 5-10 mL     heparin sodium PF injection 5,000 Units     insulin aspart (NovoLOG) inj (RAPID ACTING)     insulin aspart (NovoLOG) inj (RAPID ACTING)     insulin aspart (NovoLOG) inj (RAPID ACTING)     insulin aspart (NovoLOG) inj (RAPID ACTING)     insulin glargine (LANTUS) injection 5 Units     [START ON 6/11/2018] levofloxacin (LEVAQUIN) infusion 750 mg     lidocaine (LMX4) kit     melatonin tablet 1 mg     methadone (DOLOPHINE-INTENSOL) 10 MG/ML (HIGH CONC) solution 70 mg     naloxone (NARCAN) injection 0.1-0.4 mg     naloxone (NARCAN) injection 0.1-0.4 mg     nicotine (NICODERM CQ) 21 MG/24HR 24 hr patch 1 patch     nicotine Patch in Place     nicotine patch REMOVAL     piperacillin-tazobactam (ZOSYN) 3.375 g vial to attach to  mL  "bag     polyethylene glycol (MIRALAX/GLYCOLAX) Packet 17 g     pregabalin (LYRICA) capsule 75 mg     sodium chloride (PF) 0.9% PF flush 10-20 mL     umeclidinium-vilanterol (ANORO ELLIPTA) 62.5-25 MCG/INH oral inhaler 1 puff     vancomycin (VANCOCIN) 1,500 mg in sodium chloride 0.9 % 250 mL intermittent infusion             Physical Exam:   Vitals were reviewed.  BP 95/60  Temp 98.2  F (36.8  C) (Oral)  Resp 15  Ht 1.727 m (5' 8\")  Wt 56.9 kg (125 lb 7.1 oz)  SpO2 96%  BMI 19.07 kg/m2    General: Asleep comfortably in NAD with vapotherm on  Skin: Diabetic foot ulcers on plantar aspect of right foot, bandaged  HEENT: NC/NT, no eye exam or mucous membrane exam due to patient sleeping, not willing to awake  CV: RRR, normal S1S2, no murmur, clicks, rubs  Resp: Course breath sounds with crackles b/l, no wheezing  Abd: Soft, non-tender, BS+, no masses appreciated  Extremities: warm and well perfused, right extremity bandaged with plantar foot wounds (see medicine H&P for pictures)  Neuro: Asleep, does vocalize that she would not like to participate in exam, moves all extremities, nonfocal         Data:   I/O last 3 completed shifts:  In: 2460 [P.O.:480; I.V.:980; IV Piggyback:1000]  Out: 1175 [Urine:1175]    ROUTINE LABS (Last four results)  CMP  Recent Labs  Lab 06/09/18  1806 06/09/18  0436 06/08/18  0240 06/07/18  0649  06/06/18  2215    138 139 134  --  128*   POTASSIUM 4.2 4.1 4.3 5.0  < > 5.9*   CHLORIDE 108 108 107 103  --  95   CO2 23 24 22 23  --  26   ANIONGAP 8 6 10 8  --  6   * 112* 212* 191*  --  193*   BUN 26 30 33* 33*  --  37*   CR 1.37* 1.45* 1.46* 1.80*  --  2.16*   GFRESTIMATED 41* 38* 38* 30*  --  24*   GFRPARAG 49* 46* 46* 36*  --  29*   RICK 8.0* 8.4* 8.1* 8.1*  --  9.3   MAG 1.6  --   --   --   --   --    PROTTOTAL  --   --   --   --   --  8.9*   ALBUMIN  --   --   --   --   --  2.5*   BILITOTAL  --   --   --   --   --  0.5   ALKPHOS  --   --   --   --   --  233*   AST  --   --  "  --   --   --  Canceled, Test credited   ALT  --   --   --   --   --  27   < > = values in this interval not displayed.  CBC  Recent Labs  Lab 06/09/18  0436 06/08/18  0240 06/07/18  0649 06/06/18  2215   WBC 24.8* 24.5* 28.4* 28.2*   RBC 3.42* 3.19* 3.35* 3.90   HGB 8.6* 8.0* 8.4* 9.9*   HCT 25.7* 24.6* 25.5* 29.7*   MCV 75* 77* 76* 76*   MCH 25.1* 25.1* 25.1* 25.4*   MCHC 33.5 32.5 32.9 33.3   RDW 16.1* 15.8* 15.9* 15.8*    210 232 317     INR  Recent Labs  Lab 06/06/18  2215   INR 1.46*     Arterial Blood Gas  Recent Labs  Lab 06/09/18  2152 06/09/18  1800 06/09/18  1050 06/07/18  1320   PH 7.39  --   --   --    PCO2 39  --   --   --    PO2 59*  --   --   --    HCO3 24  --   --   --    O2PER 100 95.0 15L 21     Imaging:  CT chest PE protocol 6/9/18   IMPRESSION:   1. Exam is negative for acute pulmonary embolism.   2. There is progression of the extensive reticular and groundglass  opacities throughout the upper lobes and patchy groundglass and  reticular opacities in a peribronchial vascular distribution through  the right lower lobe since 2/17/2018. Small left lung base peripheral  consolidation favoring atelectasis. Although this is superimposed on  moderate haziness changes of the lungs. Considerations include ILD  such as fibrotic type NSIP or hypersensitivity pneumonitis.  Superimposed infection however is a significant concern.  3. Newly enlarged mediastinal and hilar lymph nodes compared to  2/17/2018, likely reactive in nature.    CXR 6/9/18  Impression:  No significant change in mixed interstitial and patchy airspace opacities of both lung. Pulmonary edema and/or infection.

## 2018-06-10 NOTE — PROGRESS NOTES
[facility]  MICU Progress Note    Alessandra Pak MRN# [id number]   Age: 52 year old YOB: 1965     Date of Admission: 6/6/2018  Date of Service: 6/10/2018   Hospital Day #3         Assessment and Plan:   Alessandra Pak is a 52 year old female with history significant for chronic pancreatitis s/p Whipple with secondary diabetes mellitus c/b diabetic foot ulcers and CKD stage III, chronic restrictive lung disease with emphysema and fibrosis, NICM s/p ICD, and chronic methadone transferred to [facility] MICU from medicine floor for acute hypoxic respiratory failure due likely ARDS following treatment for sepsis 2/2 diabetic foot ulcers.     Brief Summary:  Alessandra Pak is a 52 year old female with history significant for chronic pancreatitis s/p Whipple with secondary diabetes mellitus c/b diabetic foot ulcers and CKD stage III, chronic restrictive lung disease with emphysema and fibrosis, NICM s/p ICD, and chronic methadone use who presented to [facility] 6/6 with AMS and hypoxia found to be septic 2/2 diabetic foot ulcers with possible osteo. Treated with levo/vanc/Zosyn, fluids, and debridement x2. Bilateral infiltrates on repeat CXRs and diffuse GGO/interstitial markings on CT 6/9. Worsening respiratory status and hypoxia necessitated transfer to MICU overnight 6/9 for ARDS. Unable to tolerate BiPAP. Hypoxemia requiring 100% FiO2 on HFNC and oxymask. Intubated 6/10.     Changes Today:  -- Intubated: 20/380/10/65  -- Echo completed, not read  -- Wound cultures: R foot 6/9/18: Moderate growth Enterococcus faecalis  -- Lantus decreased to 2 units daily with NPO status  -- Carter placed    ===NEURO===  Acute metabolic encephalopathy - resolved  AMS presumed 2/2 sepsis and narcotic effect on presentation has improved with source control, antibiotics and fluids. Patient continued to be somnolent, likely due to hypoxemia, but overall alert and oriented prior to intubation.  -- Sepsis  treatment as below  -- Minimize narcotic doses    Anisocoria  Noted evening 6/10. 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 Seen by neuro crit 6/10 who requested head CT to r/o hemorrhagic stroke, showed no acute intracraneal lesions.     Chronic narcotic use  Neuropathy  H/o anxiety, depression  Patient on methadone as an outpatient. Became very agitated after narcan treatment during this hospitalization. Received one dose methadone 6/10 AM. Holding future meds with NPO status.  -- Hold PTA methadone, pregabalin, amitriptyline     Sedation/Pain:  -- RASS goal -1 to -2   -- Propofol  -- Fentanyl 50 mcg q1h PRN    ===CARDIOVASCULAR===   NICM   H/o HFrEF s/p ICD   Last echo 2/2016 EF 50-55%, history of EF 15% in 10/2014. Bedside US 6/9/18 by medicine team showed slightly reduced EF and large IVC per their interpretation. Formal echo completed but not read. B/l crackles on exam and possible pulmonary edema on CT, no peripheral edema. BNP elevated, but has been significantly higher in the past.  -- Hold PTA lisinopril, metoprolol (unclear if taking at home)  -- Diuresis with IV Lasix 40 mg x1   -- Hold ASA 81mg with w/u for anisocoria as above     HTN  -- Hold lisinopril, lasix as below    ===PULMONARY===  ARDS  Acute hypoxic respiratory failure  ARDS appears to be most likely with persistent b/l infiltrates, P/F ratio <300, and non-cardiogenic edema. Echo completed but not read. CXRs have persistently shown b/l diffuse mixed interstitial and airspace opacities superimposed on chronic fibrosis. CT PE study 6/9 w/o PE but with extensive b/l reticular and GG opacities concerning for ILD (fibrotic type NSIP), HSP, or superimposed infection. Patient was unable to tolerate BiPAP. Continued hypoxemia requiring 100% FiO2 on HFNC and oxymask resulted in intubation 6/10. Initial ABG 7.26/56/51. Repeat ABG 7.28/53/82 on FiO2 65%. Plateaus increased >30 so PEEP decreased to 10.   -- Intubated: Current  vent settings: 20/380/12/65  - Continue lung protective ventilation  -- Conditional VBGs ordered  -- ABGs PRN  -- Consider bronch in AM  -- Consider Flolan, prone pending clinical course  -- Diuresed as above  -- Abx as below      Recent Labs  Lab 06/10/18  1705 06/10/18  1533 06/10/18  0750  06/09/18  1800 06/09/18  1050 06/07/18  1320 06/06/18  2218 06/06/18  2215   PH 7.28* 7.26*  --   < >  --   --   --   --   --    PCO2 53* 56*  --   < >  --   --   --   --   --    PO2 82 51*  --   < >  --   --   --   --   --    O2PER 65.0 60.0 100.0  < > 95.0 15L 21  --  6L   PHV  --   --  7.34  --  7.35 7.36 7.30* 7.42 7.36   PCO2V  --   --  46  --  45 40 46 47 50   < > = values in this interval not displayed.  Ventilation Mode: CMV/AC  (Continuous Mandatory Ventilation/ Assist Control)  FiO2 (%): 65 %  Rate Set (breaths/minute): 20 breaths/min  Tidal Volume Set (mL): 380 mL  PEEP (cm H2O): 10 cmH2O  Oxygen Concentration (%): 65 %  Resp: 19    H/o chronic restrictive lung disease with emphysema and fibrosis  PFTs in 2015 demonstrated a restrictive pattern with a severely reduced DLCO (48% predicted) uncorrected for hemoglobin suggestive of an early parenchymal process per read. Some concern for CFPE. Patient has not attended outpatient pulmonology f/u. Previous presentations for hypoxia with pulmonology w/u including bronch w/ BAL and EBUS w/ bx of mediastinal LN. BAL unrevealing, LN bx was unsatisfactory for evaluation. IgG subclasses have been normal in past. A-1-AT was normal in 2015. HSP panel positive for Aspergillus flavus antibody.  -- Noted    ===GASTROINTESTINAL===  No acute issues    Nutrition: NPO with respiratory status    Recent Labs   Lab Test  06/06/18   2215  02/16/18   1528  06/21/17   1528   AST  Canceled, Test credited  22  25   ALT  27  14  38   ALKPHOS  233*  212*  252*   BILITOTAL  0.5  0.6  0.3   ALBUMIN  2.5*  2.8*  3.1*       ===RENAL===  NIKOLAY on CKD stage III  Creatinine initially elevated presumed 2/2  sepsis/pre-renal but returned to baseline. Creatinine elevated again in the setting of diuresis and dye load with CT PE study. Patient likely volume up, diuresis as above.   -- Hold lisinopril in context of NIKOLAY and relative hypotension  -- Diuresis as above  -- Track I/Os  -- Avoid nephrotoxic agents as able     Urinary retention  Retention treated with straight catheterization. Could be side effect of amitriptyline use or other anticholinergic side effect. Patient denied drug use and utox in ED was negative. Carter placed.   -- Carter catheter     Fluids: None    Baseline creatinine: 1.3-1.4  Recent Labs   Lab Test  06/10/18   0400  06/09/18   1806  06/09/18   0436  06/08/18   0240   BUN  26  26  30  33*   CR  1.55*  1.37*  1.45*  1.46*      I/O last 3 completed shifts:  In: 1210 [P.O.:340; I.V.:870]  Out: 4575 [Urine:4575]   Vitals:    06/06/18 2206 06/07/18 0622 06/08/18 0400   Weight: 61.3 kg (135 lb 2.3 oz) 58.7 kg (129 lb 6.6 oz) 56.9 kg (125 lb 7.1 oz)        Recent Labs   Lab Test  06/10/18   0400  06/09/18   1806   02/21/15   0652   02/16/15   0426   10/31/14   0330   NA  140  140   < >  141   < >  139   < >  144   POTASSIUM  3.9  4.2   < >  3.4   < >  3.8   < >  4.3   MAG   --   1.6   --   2.1   < >  2.0   < >  1.9   RICK  7.9*  8.0*   < >  8.9   < >  8.4*   < >  8.2*   PHOS   --    --    --    --    --   2.5   --   2.2*    < > = values in this interval not displayed.       ===HEME/ONC===  Leukocytosis  WBC continues to be elevated in setting of sepsis. Improving.   -- Trend     Microcytic anemia  Baseline Hgb around 10-11. Patient's MCV usually >80. Retic count wnl. No evidence of bleeding. Appears to be due to iron deficiency.  -- Trend hemoglobin  -- Peripheral smear pending     ===ENDOCRINE===  Chronic pancreatitis s/p pylorus-sparing Whipple procedure  Secondary uncontrolled IDDM  Last A1c 9.5% 6/7/18 which was improved from 12.8% in 2/2018. Home regimen: ambulatory pump, 5U lantus before breakfast, low  dose sliding scale, and carb coverage.  -- Endocrine consulted, appreciate recs  -- POC glucose checks q4h  -- Lantus decreased to 2 units daily with NPO status  -- Low SSI  -- Hypoglycemia protocol  -- Diabetes educator consult    ===INFECTIOUS DISEASE===  Severe sepsis  Diabetic foot ulcers with purulent drainage  Patient initially found to be septic on presentation 6/6/18 with likely source multiple diabetic foot ulcers. Initially improved with abx and source control by ortho (s/p I&D on 6/7/18 and 6/9/18). Some concern for CAP due to bilateral infiltrates. Cultures collected on 2nd debridement 6/9. XR R foot 6/10 with concern for osteomyelitis. Wound culture growing E faecalis. Patient's metabolic encephalopathy appears improved but continues to have elevated yet downtrending WBC/CRP.   -- Abx as below                           -- Continue broad spectrum (Zosyn, vancomycin) for foot wounds until cultures result                           -- Continue levofloxacin for atypical coverage given pulmonary findings  -- Follow cultures  -- Ortho following, appreciate recs  -- Trend CRP QOD    Antimicrobials:   Zosyn: 6/7/18 --> current  Vancomycin: 6/7/18 --> current  Levofloxacin: 6/7/18 --> current    Cultures/ID workup:  Wound cultures:                            - Right foot 6/9/18: Moderate growth Enterococcus faecalis     - Gram stain: Many GPCs  Blood cultures:                            - 6/6/18 x2: NGTD     - 6/9/18 x 1: NGTD                           - 6/10/18 x1: NGTD  Urine cultures: No growth  Sputum culture: Not collected    ===SKIN/MSK===  Diabetic foot ulcers with purulent drainage  Patient with multiple diabetic ulcers on right foot, debrided by orthopedics with purulent material expressed.   -- Wound care consulted   -- Antibiotics per ID above    Tubes/Lines/Devices:   Peripheral IV 06/07/18 Right (Active)   Site Assessment WDL 6/10/2018 12:00 PM   Line Status Saline locked 6/10/2018 12:00 PM    Phlebitis Scale 0-->no symptoms 6/10/2018 12:00 PM   Infiltration Scale 0 6/10/2018 12:00 PM   Extravasation? No 6/10/2018 12:00 PM   Number of days:3       PICC Single Lumen 02/22/18 Right Basilic (Active)   Number of days:108       PICC Triple Lumen 06/07/18 Right Basilic Access (Active)   Site Assessment WDL 6/10/2018 12:00 PM   External Cath Length (cm) 3 cm 6/6/2018 12:00 AM   Extremity Circumference (cm) 25 cm 6/6/2018 12:00 AM   Dressing Intervention Chlorhexidine patch;Transparent 6/9/2018  4:00 AM   Dressing Change Due 06/10/18 6/10/2018 12:00 PM   Number of days:3       PPX: DVT - subcutaneous heparin  GI - PPI  Family: Updated at bedside. SonJonny, is primary contact/decision maker.   Code status: Full  Dispo: Remain in ICU     Patient seen and discussed with Dr. Araujo.     Pablo Chawla  [facility]  Pager: [phone]    I saw this patient together with MS-Pablo Kimmargiematt and was present during his physical exam and performed an independent exam at the same time which confirmed findings.  I have edited this note as appropriate to reflect team plan for today.  Patient was seen and discussed with attending physician, Dr. Bowles, who is also in agreement with above.    Cipriano Amin MD  [id number]  P: [phone]    ==================================================  24 HOUR EVENTS/INTERVAL HISTORY:   -- Nursing notes reviewed  -- Currently requiring HFNC with FiO2 100% in addition to oxymask  -- Refusing intubation overnight/this AM  -- Son, primary contact and decision maker, convinced patient to intubate on arrival to hospital  ===================================================  PHYSICAL EXAM  Temp:  [98  F (36.7  C)-99  F (37.2  C)] 98  F (36.7  C)  Heart Rate:  [] 96  Resp:  [13-25] 19  BP: ()/(41-93) 100/63  FiO2 (%):  [60 %-100 %] 65 %  SpO2:  [85 %-100 %] 98 %  Ventilation Mode: CMV/AC  (Continuous Mandatory Ventilation/ Assist Control)  FiO2 (%): 65 %  Rate Set  (breaths/minute): 20 breaths/min  Tidal Volume Set (mL): 380 mL  PEEP (cm H2O): 10 cmH2O  Oxygen Concentration (%): 65 %  Resp: 19    General: Somnolent but arousable and oriented. NAD.   HEENT: NC/AT, PERRL, sclera anicteric, EOMI. Vapoterm and oxymask in place.   Chest/Resp: Coarse lung sounds and crackles b/l. No wheeze.   Heart/CV: Tachycardic with regular rhythm, normal S1 and S2, no murmurs, 2+ left radial and pedal pulses. R bandaged for plantar foot wounds.  Abdomen/GI: Soft, NTND, BS present, no masses  Extremities/MSK: No LE edema, WWP  Skin: Warm and dry. Right foot bandaged for diabetic foot ulcers on plantar aspect (Photos in medicine H&P)  Neuro: CN II-XII grossly intact. No focal deficits. MAEE.      =====================================================  LABORATORY DATA  ROUTINE ICU LABS (Last four results)  CMP  Recent Labs  Lab 06/10/18  0400 06/09/18  1806 06/09/18  0436 06/08/18  0240  06/06/18  2215    140 138 139  < > 128*   POTASSIUM 3.9 4.2 4.1 4.3  < > 5.9*   CHLORIDE 108 108 108 107  < > 95   CO2 24 23 24 22  < > 26   ANIONGAP 8 8 6 10  < > 6   GLC 60* 118* 112* 212*  < > 193*   BUN 26 26 30 33*  < > 37*   CR 1.55* 1.37* 1.45* 1.46*  < > 2.16*   GFRESTIMATED 35* 41* 38* 38*  < > 24*   GFRESTBLACK 43* 49* 46* 46*  < > 29*   RICK 7.9* 8.0* 8.4* 8.1*  < > 9.3   MAG  --  1.6  --   --   --   --    PROTTOTAL  --   --   --   --   --  8.9*   ALBUMIN  --   --   --   --   --  2.5*   BILITOTAL  --   --   --   --   --  0.5   ALKPHOS  --   --   --   --   --  233*   AST  --   --   --   --   --  Canceled, Test credited   ALT  --   --   --   --   --  27   < > = values in this interval not displayed.  CBC    Recent Labs  Lab 06/10/18  0500 06/10/18  0400 06/09/18  0436 06/08/18  0240   WBC 22.7* 22.3* 24.8* 24.5*   RBC 3.14* 3.03* 3.42* 3.19*   HGB 7.9* 7.5* 8.6* 8.0*   HCT 23.8* 22.7* 25.7* 24.6*   MCV 76* 75* 75* 77*   MCH 25.2* 24.8* 25.1* 25.1*   MCHC 33.2 33.0 33.5 32.5   RDW 16.3* 16.2* 16.1*  15.8*    242 259 210     INR    Recent Labs  Lab 06/06/18  2215   INR 1.46*     Arterial Blood Gas    Recent Labs  Lab 06/10/18  1705 06/10/18  1533 06/10/18  0750 06/09/18  2152   PH 7.28* 7.26*  --  7.39   PCO2 53* 56*  --  39   PO2 82 51*  --  59*   HCO3 25 25  --  24   O2PER 65.0 60.0 100.0 100     Venous Blood Gas   Recent Labs  Lab 06/10/18  1705 06/10/18  1533 06/10/18  0750 06/09/18  2152 06/09/18  1800 06/09/18  1050 06/07/18  1320  06/06/18  2215   PHV  --   --  7.34  --  7.35 7.36 7.30*  < > 7.36   PCO2V  --   --  46  --  45 40 46  < > 50   PO2V  --   --  38  --  37 33 36  < > 17*   HCO3V  --   --  25  --  25 22 23  < > 28   JERMAIN  --   --   --   --   --   --   --   --  2.1   O2PER 65.0 60.0 100.0 100 95.0 15L 21  --  6L   < > = values in this interval not displayed.      Intake/Output Summary (Last 24 hours) at 06/10/18 1949  Last data filed at 06/10/18 1900   Gross per 24 hour   Intake          1159.64 ml   Output             3890 ml   Net         -2730.36 ml       Microbiology:    Recent Labs   Lab Test  06/10/18   1020  06/10/18   1000  06/10/18   0941  06/09/18   1600  06/09/18   0730  06/07/18   0031  06/06/18   2334  06/06/18   2301   CULT  No growth after 9 hours   --   No growth after 6 hours  No growth after 1 day  Moderate growth  Enterococcus faecalis  *  Culture in progress  No growth  No growth after 3 days  No growth after 4 days   SDES  Blood Red port  Urine  Urine  Blood Left Hand  Blood PICC  Right Foot Wound  Right Foot Wound  Catheterized Urine  Blood Right Hand  Blood Left Arm       Imaging:  Recent Results (from the past 24 hour(s))   XR Foot Port Right 3 Views    Narrative    3 views right foot radiographs 6/10/2018 7:33 AM    History: diabetic foot abscess (drained x 2).   Suspected osteo;     Comparison: MRI and radiographs 2/18/2018    Findings:    AP, oblique, and lateral  views of the right foot were obtained.     Since comparison study, there is new osteolysis and  periostitis at the  fibular aspect of the second proximal phalangeal base. Given the  previous presence of regional soft tissue change on MRI, finding is  most concerning for osteomyelitis.    Dressing and apparent soft tissue defect of the lateral aspect of the  forefoot at the level of the fifth metatarsal without underlying  suspicious osteolysis.    Presumed bipartite tibial hallux sesamoid.    Lisfranc articulation alignment is congruent on this non-weight  bearing images.    Achilles tendon insertional and plantar calcaneal enthesopathy.       Impression    Impression:  1. Since February 2018, new periostitis and osteolysis at the fibular  aspect of second proximal phalangeal base, most consistent with  osteomyelitis in the provided clinical setting.  2. Plantar soft tissue defect at the lateral aspect of forefoot at the  level of metatarsals without underlying suspicious osteolysis to  radiographically suggest osteomyelitis.    CRUZITO ADAMS   XR Chest Port 1 View    Narrative    Exam:  XR CHEST PORT 1 VW, 6/10/2018 7:25 AM    History: persistent hypoxia.;     Comparison:  CT chest 6/9/2018, chest radiograph 6/9/2018.    Findings:  Single AP view of the chest. Right upper extremity PICC tip  projects over the superior atriocaval junction. Left chest AICD lead  projects over the right ventricle.    Stable enlargement of the cardiac silhouette. No significant change in  diffuse bilateral patchy mixed interstitial and airspace opacities  including dense left retrocardiac opacity superimposed on chronic  fibrotic change. No pneumothorax. Visualized upper abdomen and bones  are unremarkable.      Impression    Impression:    No significant change in diffuse bilateral mixed interstitial and  airspace opacities superimposed on chronic fibrosis.    I have personally reviewed the examination and initial interpretation  and I agree with the findings.    JAYANT SALAS MD   XR Abdomen Port 1 View    Narrative     Exam: XR ABDOMEN PORT 1 VW, 6/10/2018 3:14 PM    Indication: OG placement;     Comparison: CT of the abdomen dated 2/21/2015, chest radiographs dated  6/10/2018.    Findings:   Single supine AP view of the abdomen. The lower abdomen and pelvis are  collimated out of view. New enteric tube in place with the tip  projecting over the stomach. The sidehole is not well visualized due  to motion artifact. A right approach PICC line is partially visualized  with the tip projecting at the level of the lower thoracic SVC.  Additional venous line noted with the tip projecting over the right  atrium. Left chest wall cardiac implantable cardiac defibrillator  again noted. Lead stable in position. Additional lines presumed  exterior to the patient.    Surgical clips noted in the right upper quadrant. Moderate stool  burden noted in the descending colon. No abnormally distended loops of  large or small bowel visualized. No pneumatosis, portal venous gas.  Bilateral mixed interstitial and airspace opacities are not  significantly changed compared to prior chest radiograph. No acute  osseous pathology.      Impression    Impression:   1. New enteric tube in place with the tip projecting over the stomach.  Sidehole not well visualized due to motion artifact.  2. Additional support devices as detailed above.  3. Unchanged bilateral mixed interstitial and airspace opacities.    I have personally reviewed the examination and initial interpretation  and I agree with the findings.    CHANTAL GALEANO MD   XR Chest Port 1 View    Narrative    Exam: XR CHEST PORT 1 VW, 6/10/2018 3:15 PM    Indication: Endotracheal tube positioning;     Comparison: Chest x-ray 6/10/2018    Findings:   Frontal chest x-ray. Endotracheal tube is seen extending 5 cm above  the kali. Left chest wall cardiac device with lead projecting over  right ventricle. Right upper extremity PICC with tip projecting over  the cavoatrial junction. Enteric tube is seen  coursing below the  diaphragm with tip collimated off the field of view. Unchanged  bilateral mixed opacities, slightly decreased in the left lower zone.  No pneumothorax. Unchanged cardiac silhouette. Visualized bones and  upper abdomen is unremarkable.      Impression    Impression:   1. Endotracheal tube is seen projecting 5 cm above the kali.  2. Persistent bilateral mixed airspace and interstitial opacities,  decreased in the left lower zone.    I have personally reviewed the examination and initial interpretation  and I agree with the findings.    CHANTAL GALEANO MD   CT Head w/o Contrast    Narrative    CT HEAD W/O CONTRAST 6/10/2018 8:34 PM    History: concern for vascular lesion;     Comparison: Chest CT dated 10/28/2014.    Technique: Using multidetector thin collimation helical acquisition  technique, axial, coronal and sagittal CT images from the skull base  to the vertex were obtained without intravenous contrast.     Findings:    No intracranial hemorrhage, mass effect, or midline shift. The  ventricles are proportionate to the cerebral sulci. The gray to white  matter differentiation of the cerebral hemispheres is preserved. The  basal cisterns are patent.    The visualized paranasal sinuses are clear. The mastoid air cells are  clear.       Impression    Impression:  No acute intracranial pathology.    I have personally reviewed the examination and initial interpretation  and I agree with the findings.    MICHAELA MASCORRO MD     Attending statement:    The patient was seen and examined by me with house staff.  The case was discussed at length.  Vitals, lab results and imaging from today were reviewed.  The note reflects our joint assessment and plan. Briefly, prior work up in Lung nodule clinic and bronchoscopy non-diagnostic. Has empysema and restricted PFT with minimal fibrotic changes. Admitted with dibetic foot infection and now with Acute respiratory failure (meets ARDS criteria, echo pending).  Will continue with lung protective ventilation and obtain cultures as needed    cct 35 minutes    Larry Araujo MD  Pulmonary, Critical Care Medicine

## 2018-06-10 NOTE — PROGRESS NOTES
This evening I was called to the bedside of Ms. Pak due to her worsening respiratory status.  Over the course the day the patient has required increased FiO2 on her high flow nasal cannula, and is currently 100%.  Earlier today the patient was given 40 mg IV Lasix and I repeated this after approximately 5 hours of the original dose; fortunately her urine output has not been very marked and she continues to require significant oxygen.  I obtained an ABG notable for hypoxemia of 59 mmHg.  The patient and I discussed trialing BiPAP with the patient, but due to claustrophobia we would need some sedation, such as a continuous infusion of Precedex.  I discussed her case with the ICU staff who was willing to accept the patient so that Precedex can be attempted to allow for BiPAP.    Charli So  [id number] [id number]

## 2018-06-10 NOTE — PLAN OF CARE
Problem: Diabetes Comorbidity  Goal: Diabetes  Patient comorbidity will be monitored for signs and symptoms of hyperglycemia or hypoglycemia. Problems will be absent, minimized or managed by discharge/transition of care.   Patient on carb coverage insulin; patient not eating and was not given any carb coverage insulin. Patient received 5 units of lantus this am. Blood sugars 100-120s today.    Problem: Renal Insufficiency Comorbidity  Goal: Renal Insufficiency  Patient comorbidity will be monitored for signs and symptoms of Renal Insufficiency (Chronic) condition.  Problems will be absent, minimized or managed by discharge/transition of care.   Patient voiding spontaneously. Patient had 500mL urine output around 1200. Patient given 40mg of Lasix with 700mL urine output.     Problem: Chronic Respiratory Difficulty Comorbidity  Goal: Chronic Respiratory Difficulty  Patient comorbidity will be monitored for signs and symptoms of Respiratory Difficulty (Chronic) condition.  Problems will be absent, minimized or managed by discharge/transition of care.   Patient had increasing oxygen requirements today. Patient on oxymask at 15L and switched to vapotherm around 1100. Patient on 100% FiO2 most of day and weaned to 90% this evening.     Problem: Patient Care Overview  Goal: Plan of Care/Patient Progress Review  Patient somnolent/lethargic and arousing to voice; patient oriented x4. HRs 100-110s and BPs within normal limits. Patient given 1000mL LR bolus. Limited ECHO done on patient today. Patient had increasing oxygen requirements today and started on vapotherm around 1100. Patient on 100% FiO2 for most of the day. Chest CT done today. Patient had poor appetite today and had no intake. Patient given 40mg of Lasix this afternoon with about 700mL output.   Plan: Continue to monitor neurologic and respiratory status and follow plan of care.

## 2018-06-10 NOTE — PHARMACY-VANCOMYCIN DOSING SERVICE
Pharmacy Vancomycin Note  Date of Service Janki 10, 2018  Patient's  [id number]   52 year old, female    Indication: Sepsis  Goal Trough Level: 15-20 mg/L  Day of Therapy: 4  Current Vancomycin regimen:  1500 mg IV q24h    Current estimated CrCl = Estimated Creatinine Clearance: 39 mL/min (based on Cr of 1.55).    Creatinine for last 3 days  2018:  2:40 AM Creatinine 1.46 mg/dL  2018:  4:36 AM Creatinine 1.45 mg/dL;  6:06 PM Creatinine 1.37 mg/dL  6/10/2018:  4:00 AM Creatinine 1.55 mg/dL    Recent Vancomycin Levels (past 3 days)  2018: 12:36 AM Vancomycin Level 12.3 mg/L  6/10/2018:  4:00 AM Vancomycin Level 22.0 mg/L    Vancomycin IV Administrations (past 72 hours)                   vancomycin (VANCOCIN) 1,500 mg in sodium chloride 0.9 % 250 mL intermittent infusion (mg) 1,500 mg New Bag 06/10/18 0510     1,500 mg New Bag 18 0441     1,500 mg New Bag 18 0440                Nephrotoxins and other renal medications (Future)    Start     Dose/Rate Route Frequency Ordered Stop    18 0500  vancomycin (VANCOCIN) 1,250 mg in sodium chloride 0.9 % 250 mL intermittent infusion      1,250 mg  over 90 Minutes Intravenous EVERY 24 HOURS 06/10/18 0726      18 0630  piperacillin-tazobactam (ZOSYN) 3.375 g vial to attach to  mL bag      3.375 g  over 30 Minutes Intravenous EVERY 6 HOURS 18 0235               Contrast Orders - past 72 hours (72h ago through future)    Start     Dose/Rate Route Frequency Ordered Stop    18 1230  iopamidol (ISOVUE-370) solution 52 mL      52 mL Intravenous ONCE 18 1215 18 1354          Interpretation of levels and current regimen:  24 hour Trough level is slightly Supratherapeutic at 22.0 mg/L    Has serum creatinine changed > 50% in last 72 hours: No, but decreased from 1.8 to 1.5    Urine output:  good urine output    Renal Function: Improving    Plan:  1.  Decrease Dose to 1250 mg IV q24h  2.  Pharmacy will check trough levels as  appropriate in 3-5 Days.    3. Serum creatinine levels will be ordered daily for the first week of therapy and at least twice weekly for subsequent weeks.      Adelaide Vuong, PharmD   Pager [phone]        .

## 2018-06-10 NOTE — PROGRESS NOTES
Assisted with endotracheal intubation for respiratory failure/airway protection. A #7.0 ETT was placed and secured at 24 cm @ lip. Positive color change noted with CO2 detector, breath sounds were clear, diminished. Patient placed on full vent support, labs and CXR pending.    Anupam Garcia, RT  6/10/2018 2:57 PM

## 2018-06-11 LAB
ANION GAP SERPL CALCULATED.3IONS-SCNC: 9 MMOL/L (ref 3–14)
BASE DEFICIT BLDA-SCNC: 2 MMOL/L
BUN SERPL-MCNC: 27 MG/DL (ref 7–30)
CALCIUM SERPL-MCNC: 8.1 MG/DL (ref 8.5–10.1)
CHLORIDE SERPL-SCNC: 109 MMOL/L (ref 94–109)
CO2 SERPL-SCNC: 25 MMOL/L (ref 20–32)
COPATH REPORT: NORMAL
CREAT SERPL-MCNC: 1.69 MG/DL (ref 0.52–1.04)
CRP SERPL-MCNC: 290 MG/L (ref 0–8)
ERYTHROCYTE [DISTWIDTH] IN BLOOD BY AUTOMATED COUNT: 16.5 % (ref 10–15)
GFR SERPL CREATININE-BSD FRML MDRD: 32 ML/MIN/1.7M2
GLUCOSE BLDC GLUCOMTR-MCNC: 120 MG/DL (ref 70–99)
GLUCOSE BLDC GLUCOMTR-MCNC: 55 MG/DL (ref 70–99)
GLUCOSE BLDC GLUCOMTR-MCNC: 75 MG/DL (ref 70–99)
GLUCOSE BLDC GLUCOMTR-MCNC: 81 MG/DL (ref 70–99)
GLUCOSE BLDC GLUCOMTR-MCNC: 81 MG/DL (ref 70–99)
GLUCOSE BLDC GLUCOMTR-MCNC: 83 MG/DL (ref 70–99)
GLUCOSE BLDC GLUCOMTR-MCNC: 86 MG/DL (ref 70–99)
GLUCOSE BLDC GLUCOMTR-MCNC: 87 MG/DL (ref 70–99)
GLUCOSE SERPL-MCNC: 88 MG/DL (ref 70–99)
GRAM STN SPEC: NORMAL
GRAM STN SPEC: NORMAL
HCO3 BLD-SCNC: 24 MMOL/L (ref 21–28)
HCT VFR BLD AUTO: 22.1 % (ref 35–47)
HGB BLD-MCNC: 7.2 G/DL (ref 11.7–15.7)
Lab: NORMAL
MCH RBC QN AUTO: 24.6 PG (ref 26.5–33)
MCHC RBC AUTO-ENTMCNC: 32.6 G/DL (ref 31.5–36.5)
MCV RBC AUTO: 75 FL (ref 78–100)
MRSA DNA SPEC QL NAA+PROBE: POSITIVE
O2/TOTAL GAS SETTING VFR VENT: 60 %
OXYHGB MFR BLD: 92 % (ref 92–100)
PCO2 BLD: 48 MM HG (ref 35–45)
PH BLD: 7.31 PH (ref 7.35–7.45)
PLATELET # BLD AUTO: 259 10E9/L (ref 150–450)
PO2 BLD: 80 MM HG (ref 80–105)
POTASSIUM SERPL-SCNC: 3.6 MMOL/L (ref 3.4–5.3)
RBC # BLD AUTO: 2.93 10E12/L (ref 3.8–5.2)
SODIUM SERPL-SCNC: 142 MMOL/L (ref 133–144)
SPECIMEN SOURCE: ABNORMAL
SPECIMEN SOURCE: NORMAL
WBC # BLD AUTO: 21.5 10E9/L (ref 4–11)

## 2018-06-11 PROCEDURE — 00000146 ZZHCL STATISTIC GLUCOSE BY METER IP

## 2018-06-11 PROCEDURE — 87633 RESP VIRUS 12-25 TARGETS: CPT | Performed by: STUDENT IN AN ORGANIZED HEALTH CARE EDUCATION/TRAINING PROGRAM

## 2018-06-11 PROCEDURE — 25000128 H RX IP 250 OP 636: Performed by: STUDENT IN AN ORGANIZED HEALTH CARE EDUCATION/TRAINING PROGRAM

## 2018-06-11 PROCEDURE — 87641 MR-STAPH DNA AMP PROBE: CPT | Performed by: STUDENT IN AN ORGANIZED HEALTH CARE EDUCATION/TRAINING PROGRAM

## 2018-06-11 PROCEDURE — 25000132 ZZH RX MED GY IP 250 OP 250 PS 637: Performed by: STUDENT IN AN ORGANIZED HEALTH CARE EDUCATION/TRAINING PROGRAM

## 2018-06-11 PROCEDURE — 87205 SMEAR GRAM STAIN: CPT | Performed by: INTERNAL MEDICINE

## 2018-06-11 PROCEDURE — 25000132 ZZH RX MED GY IP 250 OP 250 PS 637: Performed by: INTERNAL MEDICINE

## 2018-06-11 PROCEDURE — 94003 VENT MGMT INPAT SUBQ DAY: CPT

## 2018-06-11 PROCEDURE — 3E043XZ INTRODUCTION OF VASOPRESSOR INTO CENTRAL VEIN, PERCUTANEOUS APPROACH: ICD-10-PCS | Performed by: EMERGENCY MEDICINE

## 2018-06-11 PROCEDURE — 87106 FUNGI IDENTIFICATION YEAST: CPT | Performed by: INTERNAL MEDICINE

## 2018-06-11 PROCEDURE — 20000004 ZZH R&B ICU UMMC

## 2018-06-11 PROCEDURE — 99291 CRITICAL CARE FIRST HOUR: CPT | Mod: GC | Performed by: INTERNAL MEDICINE

## 2018-06-11 PROCEDURE — 85027 COMPLETE CBC AUTOMATED: CPT | Performed by: STUDENT IN AN ORGANIZED HEALTH CARE EDUCATION/TRAINING PROGRAM

## 2018-06-11 PROCEDURE — 87640 STAPH A DNA AMP PROBE: CPT | Performed by: STUDENT IN AN ORGANIZED HEALTH CARE EDUCATION/TRAINING PROGRAM

## 2018-06-11 PROCEDURE — 25000125 ZZHC RX 250: Performed by: STUDENT IN AN ORGANIZED HEALTH CARE EDUCATION/TRAINING PROGRAM

## 2018-06-11 PROCEDURE — 87070 CULTURE OTHR SPECIMN AEROBIC: CPT | Performed by: INTERNAL MEDICINE

## 2018-06-11 PROCEDURE — 25000128 H RX IP 250 OP 636: Performed by: INTERNAL MEDICINE

## 2018-06-11 PROCEDURE — 82810 BLOOD GASES O2 SAT ONLY: CPT | Performed by: STUDENT IN AN ORGANIZED HEALTH CARE EDUCATION/TRAINING PROGRAM

## 2018-06-11 PROCEDURE — 82803 BLOOD GASES ANY COMBINATION: CPT | Performed by: STUDENT IN AN ORGANIZED HEALTH CARE EDUCATION/TRAINING PROGRAM

## 2018-06-11 PROCEDURE — 40000275 ZZH STATISTIC RCP TIME EA 10 MIN

## 2018-06-11 PROCEDURE — 86140 C-REACTIVE PROTEIN: CPT | Performed by: STUDENT IN AN ORGANIZED HEALTH CARE EDUCATION/TRAINING PROGRAM

## 2018-06-11 PROCEDURE — 25800025 ZZH RX 258

## 2018-06-11 PROCEDURE — 80048 BASIC METABOLIC PNL TOTAL CA: CPT | Performed by: STUDENT IN AN ORGANIZED HEALTH CARE EDUCATION/TRAINING PROGRAM

## 2018-06-11 RX ORDER — SODIUM CHLORIDE 9 MG/ML
INJECTION, SOLUTION INTRAVENOUS
Status: DISCONTINUED
Start: 2018-06-11 | End: 2018-06-15 | Stop reason: HOSPADM

## 2018-06-11 RX ORDER — ASPIRIN 81 MG/1
81 TABLET, CHEWABLE ORAL DAILY
Status: CANCELLED | OUTPATIENT
Start: 2018-06-11

## 2018-06-11 RX ORDER — FUROSEMIDE 10 MG/ML
40 INJECTION INTRAMUSCULAR; INTRAVENOUS ONCE
Status: COMPLETED | OUTPATIENT
Start: 2018-06-11 | End: 2018-06-11

## 2018-06-11 RX ORDER — DEXTROSE MONOHYDRATE 25 G/50ML
INJECTION, SOLUTION INTRAVENOUS
Status: COMPLETED
Start: 2018-06-11 | End: 2018-06-11

## 2018-06-11 RX ADMIN — ACETAMINOPHEN 1000 MG: 500 TABLET, FILM COATED ORAL at 08:38

## 2018-06-11 RX ADMIN — PREGABALIN 75 MG: 75 CAPSULE ORAL at 19:32

## 2018-06-11 RX ADMIN — ACETAMINOPHEN 1000 MG: 500 TABLET, FILM COATED ORAL at 13:31

## 2018-06-11 RX ADMIN — PREGABALIN 75 MG: 75 CAPSULE ORAL at 13:32

## 2018-06-11 RX ADMIN — PIPERACILLIN SODIUM AND TAZOBACTAM SODIUM 3.38 G: 3; .375 INJECTION, POWDER, LYOPHILIZED, FOR SOLUTION INTRAVENOUS at 12:37

## 2018-06-11 RX ADMIN — AMITRIPTYLINE HYDROCHLORIDE 50 MG: 50 TABLET, FILM COATED ORAL at 22:05

## 2018-06-11 RX ADMIN — PIPERACILLIN SODIUM AND TAZOBACTAM SODIUM 3.38 G: 3; .375 INJECTION, POWDER, LYOPHILIZED, FOR SOLUTION INTRAVENOUS at 23:48

## 2018-06-11 RX ADMIN — PROPOFOL 30 MCG/KG/MIN: 10 INJECTION, EMULSION INTRAVENOUS at 23:49

## 2018-06-11 RX ADMIN — FENTANYL CITRATE 100 MCG: 50 INJECTION INTRAMUSCULAR; INTRAVENOUS at 15:40

## 2018-06-11 RX ADMIN — FENTANYL CITRATE 50 MCG: 50 INJECTION INTRAMUSCULAR; INTRAVENOUS at 09:07

## 2018-06-11 RX ADMIN — NICOTINE 1 PATCH: 21 PATCH TRANSDERMAL at 08:38

## 2018-06-11 RX ADMIN — PIPERACILLIN SODIUM AND TAZOBACTAM SODIUM 3.38 G: 3; .375 INJECTION, POWDER, LYOPHILIZED, FOR SOLUTION INTRAVENOUS at 18:45

## 2018-06-11 RX ADMIN — PREGABALIN 75 MG: 75 CAPSULE ORAL at 08:38

## 2018-06-11 RX ADMIN — FENTANYL CITRATE 50 MCG: 50 INJECTION INTRAMUSCULAR; INTRAVENOUS at 06:37

## 2018-06-11 RX ADMIN — FENTANYL CITRATE 100 MCG: 50 INJECTION INTRAMUSCULAR; INTRAVENOUS at 20:06

## 2018-06-11 RX ADMIN — VANCOMYCIN HYDROCHLORIDE 1250 MG: 10 INJECTION, POWDER, LYOPHILIZED, FOR SOLUTION INTRAVENOUS at 13:15

## 2018-06-11 RX ADMIN — HEPARIN SODIUM 5000 UNITS: 5000 INJECTION, SOLUTION INTRAVENOUS; SUBCUTANEOUS at 22:05

## 2018-06-11 RX ADMIN — Medication 20 MG: at 19:31

## 2018-06-11 RX ADMIN — LEVOFLOXACIN 750 MG: 5 INJECTION, SOLUTION INTRAVENOUS at 01:59

## 2018-06-11 RX ADMIN — OMEPRAZOLE 20 MG: 20 CAPSULE, DELAYED RELEASE ORAL at 08:47

## 2018-06-11 RX ADMIN — DEXTROSE MONOHYDRATE 25 ML: 500 INJECTION PARENTERAL at 19:38

## 2018-06-11 RX ADMIN — FENTANYL CITRATE 100 MCG: 50 INJECTION INTRAMUSCULAR; INTRAVENOUS at 22:20

## 2018-06-11 RX ADMIN — FLUTICASONE PROPIONATE 2 SPRAY: 50 SPRAY, METERED NASAL at 09:18

## 2018-06-11 RX ADMIN — FUROSEMIDE 40 MG: 10 INJECTION, SOLUTION INTRAVENOUS at 18:46

## 2018-06-11 RX ADMIN — NOREPINEPHRINE BITARTRATE 0.03 MCG/KG/MIN: 1 INJECTION INTRAVENOUS at 01:56

## 2018-06-11 RX ADMIN — PIPERACILLIN SODIUM AND TAZOBACTAM SODIUM 3.38 G: 3; .375 INJECTION, POWDER, LYOPHILIZED, FOR SOLUTION INTRAVENOUS at 00:16

## 2018-06-11 RX ADMIN — ACETAMINOPHEN 1000 MG: 500 TABLET, FILM COATED ORAL at 19:31

## 2018-06-11 RX ADMIN — PROPOFOL 25 MCG/KG/MIN: 10 INJECTION, EMULSION INTRAVENOUS at 16:27

## 2018-06-11 RX ADMIN — METHADONE HYDROCHLORIDE 70 MG: 10 CONCENTRATE ORAL at 08:48

## 2018-06-11 RX ADMIN — SENNOSIDES 8.6 MG: 8.6 TABLET, FILM COATED ORAL at 22:05

## 2018-06-11 RX ADMIN — HEPARIN SODIUM 5000 UNITS: 5000 INJECTION, SOLUTION INTRAVENOUS; SUBCUTANEOUS at 11:08

## 2018-06-11 RX ADMIN — PIPERACILLIN SODIUM AND TAZOBACTAM SODIUM 3.38 G: 3; .375 INJECTION, POWDER, LYOPHILIZED, FOR SOLUTION INTRAVENOUS at 06:13

## 2018-06-11 RX ADMIN — SODIUM CHLORIDE 500 ML: 9 INJECTION, SOLUTION INTRAVENOUS at 00:54
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
## 2018-06-11 NOTE — PLAN OF CARE
Problem: Diabetes Comorbidity  Goal: Diabetes  Patient comorbidity will be monitored for signs and symptoms of hyperglycemia or hypoglycemia. Problems will be absent, minimized or managed by discharge/transition of care.   BG monitored q4h, SSI available but not needed overnight.    Problem: Patient Care Overview  Goal: Plan of Care/Patient Progress Review  Outcome: No Change  D- Patient intubated likely ARDS following sepsis from diabetic foot ulcers.   I/A- Remains intubated CMV 60% FiO2. ABG drawn, PaO2 80%. Thin clear secretions with ETT suctioning. R eye remains sluggish, head CT done with no acute changes. Propofol 20/hr, will wake up and follow commands. Fentanyl given x1 for pain. OG for meds. HR NSR 80-100s, ECHO pending. Levo started to keep MAPs >65.   P- Continue to monitor and follow plan of care.

## 2018-06-11 NOTE — PROGRESS NOTES
[facility]  MICU Progress Note    Alessandra Pak MRN# [id number]   Age: 52 year old YOB: 1965     Date of Admission: 6/6/2018  Date of Service: 6/10/2018   Hospital Day #4         Assessment and Plan:   Brief Summary:  Alessandra Pak is a 52 year old female with history significant for chronic pancreatitis s/p Whipple with secondary diabetes mellitus c/b diabetic foot ulcers and CKD stage III, chronic restrictive lung disease with emphysema and fibrosis, NICM s/p ICD, and chronic methadone use who presented to [facility] 6/6 with AMS and hypoxia found to be septic 2/2 diabetic foot ulcers with possible osteo. Treated with levo/vanc/Zosyn, fluids, and debridement x2. Bilateral infiltrates on repeat CXRs and diffuse GGO/interstitial markings on CT 6/9. Worsening respiratory status and hypoxia necessitated transfer to MICU overnight 6/9 for ARDS. Unable to tolerate BiPAP. Hypoxemia requiring 100% FiO2 on HFNC and oxymask. Intubated 6/10. She has remained stable on vent settings 20/380/10/60. Attempting to wean FiO2 as able.      Changes Today:  -- Nasal swab + MRSA, already on vanc  -- Sputum cx pending, Gram stain w/ low PMNs and no organisms seen  -- RVP ordered  -- Echo read pending  -- Starting TF  --Starting levophed    ===NEURO===  Anisocoria  Noted evening 6/10. 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 Seen by neuro crit 6/10 who requested head CT to r/o hemorrhagic stroke, showed no acute intracranial lesions.  -- Monitor for changes    Sedation/Pain:  -- RASS goal -1 to -2   -- Propofol   -- Fentanyl 50 mcg q1h PRN     Chronic narcotic use  Neuropathy  H/o anxiety, depression  -- PTA methadone, pregabalin, amitriptyline       ===CARDIOVASCULAR===   NICM   H/o HFrEF s/p ICD   Last echo 2/2016 EF 50-55%, h/o EF 15% in 10/2014. Bedside US 6/9/18 by medicine team showed slightly reduced EF and large IVC per their interpretation. Formal echo completed  but not read. B/l crackles on exam and possible pulmonary edema on CT, no peripheral edema.   -- Hold PTA lisinopril, metoprolol (unclear if taking at home)  -- ASA 81mg   -- Echo read pending     Hypotension, improved  Intermittent hypotension with propofol. Previously required Levophed to maintain BP with sedation. Nursing titrating propofol PRN.   -- Adjust propofol to MAP > 65  -- Hold lisinopril, lasix as below      ===PULMONARY===  Acute hypoxic respiratory failure 2/2 ARDS  R/o pneumonia  ARDS in the setting of sepsis 2/2 diabetic foot ulcers appears most likely diagnosis with b/l infiltrates, low p/f ratio, and non-cardiogenic edema. Intubated 6/10 with tidal volumes set to 6 cc/kg (380 cc). Remained stable with appropriate plateaus through 6/11 on 20/380/10/60 but unable to wean FiO2 much, related with ongoing infection? Pneumonia?  -- Respiratory culture and RVP ordered  -- Conditional VBGs ordered  -- CXR/ABGs PRN  -- Consider Flolan, prone pending clinical course  -- Continue with zosyn/vanc/levo    Recent Labs  Lab 06/11/18  0114 06/10/18  1705  06/10/18  0750  06/09/18  1800 06/09/18  1050 06/07/18  1320 06/06/18  2218 06/06/18  2215   PH 7.31* 7.28*  < >  --   < >  --   --   --   --   --    PCO2 48* 53*  < >  --   < >  --   --   --   --   --    PO2 80 82  < >  --   < >  --   --   --   --   --    O2PER 60.0 65.0  < > 100.0  < > 95.0 15L 21  --  6L   PHV  --   --   --  7.34  --  7.35 7.36 7.30* 7.42 7.36   PCO2V  --   --   --  46  --  45 40 46 47 50   < > = values in this interval not displayed.  Ventilation Mode: CMV/AC  (Continuous Mandatory Ventilation/ Assist Control)  FiO2 (%): 60 %  Rate Set (breaths/minute): 20 breaths/min  Tidal Volume Set (mL): 380 mL  PEEP (cm H2O): 10 cmH2O  Oxygen Concentration (%): 60 %  Resp: 20    H/o chronic restrictive lung disease with emphysema and fibrosis  PFTs in 2015 demonstrated a restrictive pattern with a severely reduced DLCO (48% predicted) uncorrected for  hemoglobin suggestive of an early parenchymal process per read. Some concern for CFPE. Patient has not attended outpatient pulmonology f/u. Previous presentations for hypoxia with pulmonology w/u including bronch w/ BAL and EBUS w/ bx of mediastinal LN. BAL unrevealing, LN bx was unsatisfactory for evaluation. IgG subclasses have been normal in past. A-1-AT was normal in 2015. HSP panel positive for Aspergillus flavus antibody.  -- Noted      ===GASTROINTESTINAL===  Nutrition:   Starting TF through OG tube      ===RENAL===  NIKOLAY on CKD stage III  Creatinine elevated on initial presentation presumed 2/2 sepsis/pre-renal but returned to baseline. Creatinine trending up again in the setting of recent diuresis, dye on CT PE, and more recent hypotension. UOP decreased overnight to 20-30 cc/hour but does not meet criteria for oliguria over last 24 hours. CVP 14 and echo read pending.  -- Track I/Os  -- Hold lisinopril in context of NIKOLAY and intermittent hypotension  -- Avoid nephrotoxic agents as able       Urinary retention   Present on MICU arrival. Possible anticholinergic adverse effect of her TCA.  -- Carter catheter     Fluids: None    Baseline creatinine: 1.3-1.4  Recent Labs   Lab Test  06/11/18   0427  06/10/18   0400  06/09/18   1806 06/09/18   0436   BUN  27  26  26  30   CR  1.69*  1.55*  1.37*  1.45*      I/O last 3 completed shifts:  In: 1587.44 [I.V.:1517.44; NG/GT:70]  Out: 3385 [Urine:3385]   Vitals:    06/06/18 2206 06/07/18 0622 06/08/18 0400   Weight: 61.3 kg (135 lb 2.3 oz) 58.7 kg (129 lb 6.6 oz) 56.9 kg (125 lb 7.1 oz)        Recent Labs   Lab Test  06/11/18   0427  06/10/18   0400  06/09/18   1806   02/21/15   0652   02/16/15   0426   10/31/14   0330   NA  142  140  140   < >  141   < >  139   < >  144   POTASSIUM  3.6  3.9  4.2   < >  3.4   < >  3.8   < >  4.3   MAG   --    --   1.6   --   2.1   < >  2.0   < >  1.9   RICK  8.1*  7.9*  8.0*   < >  8.9   < >  8.4*   < >  8.2*   PHOS   --    --    --     --    --    --   2.5   --   2.2*    < > = values in this interval not displayed.       ===HEME/ONC===  Leukocytosis, improving  WBC continues to be elevated in setting of sepsis. Improving slowly.   -- Trend     Microcytic anemia  Baseline Hgb around 10-11. Patient's MCV usually >80. Hemoglobin trending down slowly to low 7's. Retic count wnl. No evidence of bleeding. Appears to be due to iron deficiency.  -- Trend hemoglobin  -- Peripheral smear pending   -- Consider hemolysis labs      ===ENDOCRINE===  Chronic pancreatitis s/p pylorus-sparing Whipple procedure  Secondary DM2  Last A1c 9.5% 6/7/18 which was improved from 12.8% in 2/2018. Home regimen: ambulatory pump, 5U lantus before breakfast, low dose sliding scale, and carb coverage.  -- D/c Lantus with previous NPO status. Will re-evaluate with initiation of TFs.   -- Hypoglycemia protocol  -- Diabetes educator, endocrinology consulted      ===INFECTIOUS DISEASE===  Severe sepsis  Diabetic foot ulcers with purulent drainage  Patient initially found to be septic on presentation 6/6/18 with likely source multiple diabetic foot ulcers. Initially improved with abx and source control by ortho (s/p I&D on 6/7/18 and 6/9/18). Some concern for CAP due to bilateral infiltrates. Covering for atypicals and RVP ordered. Cultures collected on 2nd debridement 6/9. XR R foot 6/10 with concern for osteomyelitis. Wound culture growing E faecalis with susceptibilities in progress. Patient's metabolic encephalopathy resolved but continues to have elevated yet downtrending WBC/CRP.   -- Abx as below                           -- Continue broad spectrum (Zosyn, vancomycin) for foot wounds until cultures result                           -- Levofloxacin for atypicals with b/l infiltrates.   -- Wound culture: Enterococcus faecalis, susceptibility in progress  -- Ortho following, appreciate recs. No further intervention planned  -- WOCN consult    Antimicrobials:   Zosyn: 6/7/18 -->  current  Vancomycin: 6/7/18 --> current  Levofloxacin: 6/7/18 --> current    Cultures/ID workup:  Wound:    -- Culture: R foot 6/9/18: Moderate growth Enterococcus faecalis, susceptibility in progress   -- Gram stain: Many GPCs  Blood cultures:    -- 6/6/18 x2: NGTD   -- 6/9/18 x 1: NGTD   -- 6/10/18 x1: NGTD  Urine culture: 6/7/18 x1: No growth  Sputum   -- Culture: Pending   -- Gram stain: < PMNs/low power field, no organisms  RVP: Pending  Nasal swab: MRSA +    ===SKIN/MSK===  Diabetic foot ulcers with purulent drainage  Patient with multiple diabetic ulcers on right foot, debrided by orthopedics with purulent material expressed. No further interventions planned per ortho.   -- Wound care consulted, daily dressing changes  -- Antibiotics per ID above    Tubes/Lines/Devices:   Peripheral IV 06/07/18 Right (Active)   Site Assessment [facility] 6/10/2018 12:00 PM   Line Status Saline locked 6/10/2018 12:00 PM   Phlebitis Scale 0-->no symptoms 6/10/2018 12:00 PM   Infiltration Scale 0 6/10/2018 12:00 PM   Extravasation? No 6/10/2018 12:00 PM   Number of days:3       PICC Single Lumen 02/22/18 Right Basilic (Active)   Number of days:108       PICC Triple Lumen 06/07/18 Right Basilic Access (Active)   Site Assessment [facility] 6/10/2018 12:00 PM   External Cath Length (cm) 3 cm 6/6/2018 12:00 AM   Extremity Circumference (cm) 25 cm 6/6/2018 12:00 AM   Dressing Intervention Chlorhexidine patch;Transparent 6/9/2018  4:00 AM   Dressing Change Due 06/10/18 6/10/2018 12:00 PM   Number of days:3       PPX: DVT - subcutaneous heparin  GI - PPI  Family: Mother and daughter updated at bedside. SonJonny, is primary contact/decision maker.   Code status: Full  Dispo: Remain in ICU     Patient seen and discussed with Dr. Theresa Chawla  [employer]  Pager: [phone]    I saw this patient together with Douglas Chawla and was present during his physical exam and performed an independent exam at the same time which  confirmed findings.  I have edited this note as appropriate to reflect team plan for today.  Patient was seen and discussed with attending physician, Dr. Cardenas, who is also in agreement with above.    Cipriano Amin MD  [id number]  P: [phone]    ==================================================  24 HOUR EVENTS/INTERVAL HISTORY:   -- Nursing notes reviewed  -- Intubated 6/10. Vent settings 20/380/10/60. FiO2 unable to be weaned much  -- Decreased propofol to 20 mcg/kg/min with hypotension (MAPs 50s). Did not improve with 500cc bolus, so started on levophed early in the day and then in the afternoon. No reports of fever   ===================================================  PHYSICAL EXAM  Temp:  [96.6  F (35.9  C)-98.1  F (36.7  C)] 97.4  F (36.3  C)  Heart Rate:  [] 85  Resp:  [12-23] (P) 20  BP: ()/(41-93) 87/50  FiO2 (%):  [60 %-80 %] 60 %  SpO2:  [91 %-100 %] 96 %  Ventilation Mode: CMV/AC  (Continuous Mandatory Ventilation/ Assist Control)  FiO2 (%): 60 %  Rate Set (breaths/minute): 20 breaths/min  Tidal Volume Set (mL): 380 mL  PEEP (cm H2O): 10 cmH2O  Oxygen Concentration (%): 60 %  Resp: 20    General: Intubated and sedated. NAD.   HEENT: NC/AT, stable anisocoria, sclera anicteric, EOMI.   Chest/Resp: Coarse lung sounds and with bibasilar crackles. Symmetric air entry. No wheeze.   Heart/CV: RRR, normal S1 and S2, no murmurs, 2+ left radial and pedal pulses. R bandaged for plantar foot wounds.  Abdomen/GI: Soft, NTND, BS present, no masses  Extremities/MSK: No LE edema, WWP  Skin: Warm and dry. Right foot bandaged for diabetic foot ulcers on plantar aspect (Photos in medicine H&P)  Neuro: Follow commands. No focal deficits. MAEE.      =====================================================  LABORATORY DATA  ROUTINE ICU LABS (Last four results)  CMP  Recent Labs  Lab 06/11/18  0427 06/10/18  0400 06/09/18  1806 06/09/18  0436  06/06/18  2215    140 140 138  < > 128*   POTASSIUM 3.6 3.9  4.2 4.1  < > 5.9*   CHLORIDE 109 108 108 108  < > 95   CO2 25 24 23 24  < > 26   ANIONGAP 9 8 8 6  < > 6   GLC 88 60* 118* 112*  < > 193*   BUN 27 26 26 30  < > 37*   CR 1.69* 1.55* 1.37* 1.45*  < > 2.16*   GFRESTIMATED 32* 35* 41* 38*  < > 24*   GFRESTBLACK 39* 43* 49* 46*  < > 29*   RICK 8.1* 7.9* 8.0* 8.4*  < > 9.3   MAG  --   --  1.6  --   --   --    PROTTOTAL  --   --   --   --   --  8.9*   ALBUMIN  --   --   --   --   --  2.5*   BILITOTAL  --   --   --   --   --  0.5   ALKPHOS  --   --   --   --   --  233*   AST  --   --   --   --   --  Canceled, Test credited   ALT  --   --   --   --   --  27   < > = values in this interval not displayed.  CBC    Recent Labs  Lab 06/11/18  0427 06/10/18  0500 06/10/18  0400 06/09/18  0436   WBC 21.5* 22.7* 22.3* 24.8*   RBC 2.93* 3.14* 3.03* 3.42*   HGB 7.2* 7.9* 7.5* 8.6*   HCT 22.1* 23.8* 22.7* 25.7*   MCV 75* 76* 75* 75*   MCH 24.6* 25.2* 24.8* 25.1*   MCHC 32.6 33.2 33.0 33.5   RDW 16.5* 16.3* 16.2* 16.1*    220 242 259     INR    Recent Labs  Lab 06/06/18  2215   INR 1.46*     Arterial Blood Gas    Recent Labs  Lab 06/11/18  0114 06/10/18  1705 06/10/18  1533 06/10/18  0750 06/09/18  2152   PH 7.31* 7.28* 7.26*  --  7.39   PCO2 48* 53* 56*  --  39   PO2 80 82 51*  --  59*   HCO3 24 25 25  --  24   O2PER 60.0 65.0 60.0 100.0 100     Venous Blood Gas   Recent Labs  Lab 06/11/18  0114 06/10/18  1705 06/10/18  1533 06/10/18  0750  06/09/18  1800 06/09/18  1050 06/07/18  1320  06/06/18  2215   PHV  --   --   --  7.34  --  7.35 7.36 7.30*  < > 7.36   PCO2V  --   --   --  46  --  45 40 46  < > 50   PO2V  --   --   --  38  --  37 33 36  < > 17*   HCO3V  --   --   --  25  --  25 22 23  < > 28   JERMAIN  --   --   --   --   --   --   --   --   --  2.1   O2PER 60.0 65.0 60.0 100.0  < > 95.0 15L 21  --  6L   < > = values in this interval not displayed.      Intake/Output Summary (Last 24 hours) at 06/11/18 1425  Last data filed at 06/11/18 1400   Gross per 24 hour   Intake           1932.53 ml   Output              715 ml   Net          1217.53 ml       Microbiology:    Recent Labs   Lab Test  06/11/18   0936  06/10/18   1020  06/10/18   1000  06/10/18   0941  06/09/18   1600  06/09/18   0730  06/07/18   0031  06/06/18   2334   CULT  PENDING  No growth after 20 hours   --   No growth after 17 hours  No growth after 2 days  Moderate growth  Enterococcus faecalis  Susceptibility testing in progress  *  Culture in progress  No growth  No growth after 4 days   SDES  Sputum  Nares  Sputum  Blood Red port  Urine  Urine  Blood Left Hand  Blood PICC  Right Foot Wound  Right Foot Wound  Catheterized Urine  Blood Right Hand       Imaging:  Recent Results (from the past 24 hour(s))   XR Abdomen Port 1 View    Narrative    Exam: XR ABDOMEN PORT 1 VW, 6/10/2018 3:14 PM    Indication: OG placement;     Comparison: CT of the abdomen dated 2/21/2015, chest radiographs dated  6/10/2018.    Findings:   Single supine AP view of the abdomen. The lower abdomen and pelvis are  collimated out of view. New enteric tube in place with the tip  projecting over the stomach. The sidehole is not well visualized due  to motion artifact. A right approach PICC line is partially visualized  with the tip projecting at the level of the lower thoracic SVC.  Additional venous line noted with the tip projecting over the right  atrium. Left chest wall cardiac implantable cardiac defibrillator  again noted. Lead stable in position. Additional lines presumed  exterior to the patient.    Surgical clips noted in the right upper quadrant. Moderate stool  burden noted in the descending colon. No abnormally distended loops of  large or small bowel visualized. No pneumatosis, portal venous gas.  Bilateral mixed interstitial and airspace opacities are not  significantly changed compared to prior chest radiograph. No acute  osseous pathology.      Impression    Impression:   1. New enteric tube in place with the tip projecting over the  stomach.  Sidehole not well visualized due to motion artifact.  2. Additional support devices as detailed above.  3. Unchanged bilateral mixed interstitial and airspace opacities.    I have personally reviewed the examination and initial interpretation  and I agree with the findings.    CHANTAL GALEANO MD   XR Chest Port 1 View    Narrative    Exam: XR CHEST PORT 1 VW, 6/10/2018 3:15 PM    Indication: Endotracheal tube positioning;     Comparison: Chest x-ray 6/10/2018    Findings:   Frontal chest x-ray. Endotracheal tube is seen extending 5 cm above  the kali. Left chest wall cardiac device with lead projecting over  right ventricle. Right upper extremity PICC with tip projecting over  the cavoatrial junction. Enteric tube is seen coursing below the  diaphragm with tip collimated off the field of view. Unchanged  bilateral mixed opacities, slightly decreased in the left lower zone.  No pneumothorax. Unchanged cardiac silhouette. Visualized bones and  upper abdomen is unremarkable.      Impression    Impression:   1. Endotracheal tube is seen projecting 5 cm above the kali.  2. Persistent bilateral mixed airspace and interstitial opacities,  decreased in the left lower zone.    I have personally reviewed the examination and initial interpretation  and I agree with the findings.    CHANTAL GALEANO MD   CT Head w/o Contrast    Narrative    CT HEAD W/O CONTRAST 6/10/2018 8:34 PM    History: concern for vascular lesion;     Comparison: Chest CT dated 10/28/2014.    Technique: Using multidetector thin collimation helical acquisition  technique, axial, coronal and sagittal CT images from the skull base  to the vertex were obtained without intravenous contrast.     Findings:    No intracranial hemorrhage, mass effect, or midline shift. The  ventricles are proportionate to the cerebral sulci. The gray to white  matter differentiation of the cerebral hemispheres is preserved. The  basal cisterns are patent.    The visualized  paranasal sinuses are clear. The mastoid air cells are  clear.       Impression    Impression:  No acute intracranial pathology.    I have personally reviewed the examination and initial interpretation  and I agree with the findings.    MICHAELA MASCORRO MD

## 2018-06-11 NOTE — PROGRESS NOTES
Orthopaedic Surgery Progress Note 06/11/2018    E: Remains in ICU. Stroke code called overnight for pupil asymmetry; HCT negative, unlikely stroke per Neuro Crit Care. Afebrile.    S: Having right foot pain. Denies new numbness or tingling. Plan of care reviewed and questions answered.    O:  Temp: 97.4  F (36.3  C) Temp src: Axillary BP: 110/63   Heart Rate: 96 Resp: 23 SpO2: 94 % O2 Device: Mechanical Ventilator Oxygen Delivery: 15 LPM    Exam:  Gen: No acute distress, intubated and sedated  Resp: Breathing with vent  MSK:   RLE:  Inspection: Ace from distal leg to toes, no erythema   Strength: wiggles all toes, grossly fires, hip flexors, quad, hamstings, gsc, TA, EHL, FHL  Sensation: baseline numbness in stocking distribution unchanged from prior exam  Circulation: toes wwp      Recent Labs  Lab 06/11/18  0427 06/10/18  0500 06/10/18  0400 06/09/18  0436 06/08/18  0240 06/07/18  0649   WBC 21.5* 22.7* 22.3* 24.8* 24.5* 28.4*   HGB 7.2* 7.9* 7.5* 8.6* 8.0* 8.4*    220 242 259 210 232   CRP  --   --   --  230.0* 310.0* 310.0*     Wound Culture: E faecalis, sensitivities pending  Wound gram stain: GPCs    Assessment: Alessandra Pak is a 52 year old female admitted sepsis with right foot diabetic ulcers vs pna as presumed source, now s/p bedside I&D right foot on 6/7. Encephalopathy improved.   Appears stable from right foot standpoint with decreasing WBC and CRP, afebrile, no encephalopathy.  No further acute interventions planned by ortho at this point.  Respiratory status declining, requiring repeat admission to MICU 6/9/2018, chronic interstitial lung disease, concern for superimposed infection based on CT scans. Follow cultures and clinical status.     Plan:  Medicine Primary  Activity: Elevate BLE  Weight bearing status: NWB BLE  Antibiotics: per primary/ID, vanc/zosyn  DVT prophylaxis: per primary team  Wound Care: per WOCN daily dressing changes  Culture: sent culture 6/9, GPCs        Shakeel  MD Sean 06/11/2018  Orthopaedic Surgery Resident, PGY-1  Pager: [phone]    For questions about this patient, please attempt to contact me at my pager prior to contacting the orthopaedics resident on call. Thank you!

## 2018-06-11 NOTE — PROGRESS NOTES
[facility] : PROGRESS NOTE  Alessandra Pak : 1965 Sex: female:   Medical record number [id number] Attending Physician: Erwin Cardenas*  Date of Service: Janki 10, 2018    ASSESSMENT :  Alessandra Pak is a 52 yr old female with a history of non ischemic cardiomyopathy  with ICD, CKD stage III, chronic pancreatitis s/p pylorus sparing Whipple () with secondary DM c/b diabetic foot ulcers, COPD (not on home O2), HTN, and chronic methadone use presented to ER with altered mental status, persistent leukocytosis, increased pus/maldorous drainage from right foot wound, fever.       1. Sepsis from  Right diabetic foot ulcer , abscess with  Osteomyelitis ( second proximal phalangeal base , lateral aspect of forefoot? Osteomyelitis )   - wound cx 18 - moderate growth of Enterococci faecalis   - wound culture in 2018 grew MRSA   - history of non compliance   - blood cx - , , - negative     Xray of right foot 6/10/18   1. Since 2018, new periostitis and osteolysis at the fibular aspect of second proximal phalangeal base, most consistent with osteomyelitis in the provided clinical setting.  2. Plantar soft tissue defect at the lateral aspect of forefoot at the evel of metatarsals without underlying suspicious osteolysis to radiographically suggest osteomyelitis.    2. Pulmonary edema  3. Chronic kidney disease stage 3   4. Leukocytosis   5. Acute respiratory failure - requiring mechanical ventilator (6/10/18)    Recommendation:  - continue IV Vancomycin and Zosyn. Stop levofloxacin . Adjust per culture/susceptibility results  - I+D of right foot , nw that she is intubated, to control source of infection     ID will continue to follow. Dr Martines/Janusz ID will resume care next week     Cosme Hinkle MD, M.Med.Sc.  Staff, Infectious Diseases  Pager: [phone]     SUBJECTIVE:   Intubated, sedated    ROS:  A five-point review of systems was obtained and  "was negative with the exception of that which is described above.  Allergies   Allergen Reactions     No Known Drug Allergies    CURRENT ANTI-INFECTIVES:   Vanco   Zosyn   Levofloxacin     EXAMINATION:  Vital Signs: BP (!) 87/62  Temp 98  F (36.7  C) (Axillary)  Resp 20  Ht 1.727 m (5' 8\")  Wt 56.9 kg (125 lb 7.1 oz)  SpO2 91%  BMI 19.07 kg/m2   GENERAL: sedated, intubated  HEENT:  Head is normocephalic, atraumatic   EYES:  Eyes have anicteric sclerae without conjunctival injection   ENT:  ETT  NECK:  Supple. No  Cervical lymphadenopathy  LUNGS:  crackles in the bases   CARDIOVASCULAR:  Regular rate and rhythm with no murmurs  ABDOMEN:  Normal bowel sounds, soft, nontender.   SKIN:  Right foot ( wound dressing +)  NEUROLOGIC:  Grossly nonfocal. Active x4 extremities  Extremities : edema      NEW DATA/RESULTS:   Culture Micro   Date Value Ref Range Status   06/10/2018 No growth after 9 hours  Preliminary   06/10/2018 No growth after 6 hours  Preliminary   06/09/2018 No growth after 1 day  Preliminary   06/09/2018 Moderate growth  Enterococcus faecalis   (A)  Preliminary   06/09/2018 Culture in progress  Preliminary       Recent Labs   Lab Test  06/09/18   0436  06/08/18   0240  06/07/18   0649  02/22/18   0636  02/20/18   0633  02/18/18   1321   CRP  230.0*  310.0*  310.0*  200.0*  260.0*  290.0*     Recent Labs   Lab Test  06/10/18   0500  06/10/18   0400  06/09/18   0436  06/08/18   0240  06/07/18   0649  06/06/18   2215   WBC  22.7*  22.3*  24.8*  24.5*  28.4*  28.2*     Recent Labs   Lab Test  06/10/18   0400  06/09/18   1806  06/09/18   0436  06/08/18   0240   CR  1.55*  1.37*  1.45*  1.46*   GFRESTIMATED  35*  41*  38*  38*     Hematology Studies  Recent Labs   Lab Test  06/10/18   0500  06/10/18   0400  06/09/18   0436  06/08/18   0240  06/07/18   0649  06/06/18   2215   02/16/18   1528   02/10/16   1229   WBC  22.7*  22.3*  24.8*  24.5*  28.4*  28.2*   < >  22.3*   < >  9.2   ANEU  16.9*  19.7*   --    " --   24.5*  25.2*   --   19.6*   --   4.5   AEOS  0.4  0.2   --    --   0.0  0.0   --   0.0   --   0.4   HCT  23.8*  22.7*  25.7*  24.6*  25.5*  29.7*   < >  35.6   < >  37.2   PLT  220  242  259  210  232  317   < >  288   < >  201    < > = values in this interval not displayed.     Metabolic  Recent Labs   Lab Test  06/10/18   0400  06/09/18   1806  06/09/18   0436   NA  140  140  138   BUN  26  26  30   CO2  24  23  24   CR  1.55*  1.37*  1.45*   GFRESTIMATED  35*  41*  38*     Hepatic Studies  Recent Labs   Lab Test  06/06/18   2215  02/16/18   1528  06/21/17   1528   BILITOTAL  0.5  0.6  0.3   ALKPHOS  233*  212*  252*   ALBUMIN  2.5*  2.8*  3.1*   AST  Canceled, Test credited  22  25   ALT  27  14  38     Immunologlobulins  Recent Labs   Lab Test  02/21/15   0652  02/15/15   1520  02/15/15   0240   IGG  1330   --    --    IGE   --   46   --    SED   --    --   132*

## 2018-06-11 NOTE — PLAN OF CARE
Problem: Patient Care Overview  Goal: Plan of Care/Patient Progress Review  Patient arousing to voice, able to follow commands, and oriented x4. HRs 100s-120s.  Patient continued on Vapotherm at 100% FiO2; patient desaturated to low 80s and Oxymask at 15L was added. Patient intubated around 1445. Patient currently on CMV settings PEEP of 10 and at 65% FiO2. Patient sedated on propofol and was unresponsive until around 1800. Around 1815, BP was 180/120s. Pupils were checked and R>L. Left pupil brisk and Right pupil minimally responsive; MD notified. Neurocrit consulted and STAT head CT ordered.   Plan: Continue to monitor respiratory and neurologic status and follow plan of care.

## 2018-06-12 ENCOUNTER — APPOINTMENT (OUTPATIENT)
Dept: GENERAL RADIOLOGY | Facility: CLINIC | Age: 53
DRG: 853 | End: 2018-06-12
Payer: COMMERCIAL

## 2018-06-12 ENCOUNTER — APPOINTMENT (OUTPATIENT)
Dept: PHYSICAL THERAPY | Facility: CLINIC | Age: 53
DRG: 853 | End: 2018-06-12
Attending: STUDENT IN AN ORGANIZED HEALTH CARE EDUCATION/TRAINING PROGRAM
Payer: COMMERCIAL

## 2018-06-12 LAB
ANION GAP SERPL CALCULATED.3IONS-SCNC: 9 MMOL/L (ref 3–14)
BACTERIA SPEC CULT: ABNORMAL
BACTERIA SPEC CULT: ABNORMAL
BACTERIA SPEC CULT: NO GROWTH
BASE DEFICIT BLDA-SCNC: 0.6 MMOL/L
BASE DEFICIT BLDA-SCNC: 1 MMOL/L
BASE DEFICIT BLDA-SCNC: 1.4 MMOL/L
BASE DEFICIT BLDA-SCNC: 2 MMOL/L
BASOPHILS # BLD AUTO: 0 10E9/L (ref 0–0.2)
BASOPHILS NFR BLD AUTO: 0.2 %
BUN SERPL-MCNC: 29 MG/DL (ref 7–30)
CALCIUM SERPL-MCNC: 8.3 MG/DL (ref 8.5–10.1)
CHLORIDE SERPL-SCNC: 111 MMOL/L (ref 94–109)
CO2 SERPL-SCNC: 25 MMOL/L (ref 20–32)
CREAT SERPL-MCNC: 1.75 MG/DL (ref 0.52–1.04)
DIFFERENTIAL METHOD BLD: ABNORMAL
EOSINOPHIL # BLD AUTO: 1 10E9/L (ref 0–0.7)
EOSINOPHIL NFR BLD AUTO: 5.2 %
ERYTHROCYTE [DISTWIDTH] IN BLOOD BY AUTOMATED COUNT: 16.8 % (ref 10–15)
FLUAV H1 2009 PAND RNA SPEC QL NAA+PROBE: NEGATIVE
FLUAV H1 RNA SPEC QL NAA+PROBE: NEGATIVE
FLUAV H3 RNA SPEC QL NAA+PROBE: NEGATIVE
FLUAV RNA SPEC QL NAA+PROBE: NEGATIVE
FLUBV RNA SPEC QL NAA+PROBE: NEGATIVE
GFR SERPL CREATININE-BSD FRML MDRD: 31 ML/MIN/1.7M2
GLUCOSE BLDC GLUCOMTR-MCNC: 102 MG/DL (ref 70–99)
GLUCOSE BLDC GLUCOMTR-MCNC: 104 MG/DL (ref 70–99)
GLUCOSE BLDC GLUCOMTR-MCNC: 113 MG/DL (ref 70–99)
GLUCOSE BLDC GLUCOMTR-MCNC: 134 MG/DL (ref 70–99)
GLUCOSE BLDC GLUCOMTR-MCNC: 141 MG/DL (ref 70–99)
GLUCOSE BLDC GLUCOMTR-MCNC: 145 MG/DL (ref 70–99)
GLUCOSE BLDC GLUCOMTR-MCNC: 146 MG/DL (ref 70–99)
GLUCOSE BLDC GLUCOMTR-MCNC: 68 MG/DL (ref 70–99)
GLUCOSE BLDC GLUCOMTR-MCNC: 71 MG/DL (ref 70–99)
GLUCOSE BLDC GLUCOMTR-MCNC: 96 MG/DL (ref 70–99)
GLUCOSE SERPL-MCNC: 84 MG/DL (ref 70–99)
HADV DNA SPEC QL NAA+PROBE: NEGATIVE
HADV DNA SPEC QL NAA+PROBE: NEGATIVE
HCO3 BLD-SCNC: 24 MMOL/L (ref 21–28)
HCO3 BLD-SCNC: 24 MMOL/L (ref 21–28)
HCO3 BLD-SCNC: 25 MMOL/L (ref 21–28)
HCO3 BLD-SCNC: 25 MMOL/L (ref 21–28)
HCT VFR BLD AUTO: 22.1 % (ref 35–47)
HGB BLD-MCNC: 7.2 G/DL (ref 11.7–15.7)
HMPV RNA SPEC QL NAA+PROBE: NEGATIVE
HPIV1 RNA SPEC QL NAA+PROBE: NEGATIVE
HPIV2 RNA SPEC QL NAA+PROBE: NEGATIVE
HPIV3 RNA SPEC QL NAA+PROBE: NEGATIVE
IMM GRANULOCYTES # BLD: 0.1 10E9/L (ref 0–0.4)
IMM GRANULOCYTES NFR BLD: 0.5 %
LYMPHOCYTES # BLD AUTO: 2.7 10E9/L (ref 0.8–5.3)
LYMPHOCYTES NFR BLD AUTO: 13.9 %
Lab: NORMAL
MCH RBC QN AUTO: 24.6 PG (ref 26.5–33)
MCHC RBC AUTO-ENTMCNC: 32.6 G/DL (ref 31.5–36.5)
MCV RBC AUTO: 75 FL (ref 78–100)
MICROBIOLOGIST REVIEW: NORMAL
MONOCYTES # BLD AUTO: 1.2 10E9/L (ref 0–1.3)
MONOCYTES NFR BLD AUTO: 6.2 %
NEUTROPHILS # BLD AUTO: 14.2 10E9/L (ref 1.6–8.3)
NEUTROPHILS NFR BLD AUTO: 74 %
NRBC # BLD AUTO: 0 10*3/UL
NRBC BLD AUTO-RTO: 0 /100
O2/TOTAL GAS SETTING VFR VENT: 50 %
O2/TOTAL GAS SETTING VFR VENT: 55 %
O2/TOTAL GAS SETTING VFR VENT: 55 %
O2/TOTAL GAS SETTING VFR VENT: 60 %
PCO2 BLD: 43 MM HG (ref 35–45)
PCO2 BLD: 45 MM HG (ref 35–45)
PCO2 BLD: 46 MM HG (ref 35–45)
PCO2 BLD: 48 MM HG (ref 35–45)
PH BLD: 7.33 PH (ref 7.35–7.45)
PH BLD: 7.37 PH (ref 7.35–7.45)
PLATELET # BLD AUTO: 279 10E9/L (ref 150–450)
PLATELET # BLD EST: ABNORMAL 10*3/UL
PO2 BLD: 111 MM HG (ref 80–105)
PO2 BLD: 52 MM HG (ref 80–105)
PO2 BLD: 59 MM HG (ref 80–105)
PO2 BLD: 92 MM HG (ref 80–105)
POTASSIUM SERPL-SCNC: 3.4 MMOL/L (ref 3.4–5.3)
RBC # BLD AUTO: 2.93 10E12/L (ref 3.8–5.2)
RHINOVIRUS RNA SPEC QL NAA+PROBE: NEGATIVE
RSV RNA SPEC QL NAA+PROBE: NEGATIVE
RSV RNA SPEC QL NAA+PROBE: NEGATIVE
SODIUM SERPL-SCNC: 144 MMOL/L (ref 133–144)
SPECIMEN SOURCE: ABNORMAL
SPECIMEN SOURCE: NORMAL
SPECIMEN SOURCE: NORMAL
TRIGL SERPL-MCNC: 327 MG/DL
WBC # BLD AUTO: 19.2 10E9/L (ref 4–11)

## 2018-06-12 PROCEDURE — 25000128 H RX IP 250 OP 636: Performed by: INTERNAL MEDICINE

## 2018-06-12 PROCEDURE — 25800025 ZZH RX 258: Performed by: STUDENT IN AN ORGANIZED HEALTH CARE EDUCATION/TRAINING PROGRAM

## 2018-06-12 PROCEDURE — 25000132 ZZH RX MED GY IP 250 OP 250 PS 637: Performed by: STUDENT IN AN ORGANIZED HEALTH CARE EDUCATION/TRAINING PROGRAM

## 2018-06-12 PROCEDURE — 40000014 ZZH STATISTIC ARTERIAL MONITORING DAILY

## 2018-06-12 PROCEDURE — 36620 INSERTION CATHETER ARTERY: CPT | Mod: GC | Performed by: INTERNAL MEDICINE

## 2018-06-12 PROCEDURE — 27210429 ZZH NUTRITION PRODUCT INTERMEDIATE LITER

## 2018-06-12 PROCEDURE — 40000986 XR CHEST PORT 1 VW

## 2018-06-12 PROCEDURE — 80048 BASIC METABOLIC PNL TOTAL CA: CPT | Performed by: STUDENT IN AN ORGANIZED HEALTH CARE EDUCATION/TRAINING PROGRAM

## 2018-06-12 PROCEDURE — 25000128 H RX IP 250 OP 636: Performed by: STUDENT IN AN ORGANIZED HEALTH CARE EDUCATION/TRAINING PROGRAM

## 2018-06-12 PROCEDURE — 25000125 ZZHC RX 250: Performed by: RADIOLOGY

## 2018-06-12 PROCEDURE — 36600 WITHDRAWAL OF ARTERIAL BLOOD: CPT

## 2018-06-12 PROCEDURE — 71045 X-RAY EXAM CHEST 1 VIEW: CPT

## 2018-06-12 PROCEDURE — 84478 ASSAY OF TRIGLYCERIDES: CPT | Performed by: STUDENT IN AN ORGANIZED HEALTH CARE EDUCATION/TRAINING PROGRAM

## 2018-06-12 PROCEDURE — 25000132 ZZH RX MED GY IP 250 OP 250 PS 637: Performed by: INTERNAL MEDICINE

## 2018-06-12 PROCEDURE — 99291 CRITICAL CARE FIRST HOUR: CPT | Mod: 25 | Performed by: INTERNAL MEDICINE

## 2018-06-12 PROCEDURE — 82803 BLOOD GASES ANY COMBINATION: CPT | Performed by: STUDENT IN AN ORGANIZED HEALTH CARE EDUCATION/TRAINING PROGRAM

## 2018-06-12 PROCEDURE — 82803 BLOOD GASES ANY COMBINATION: CPT

## 2018-06-12 PROCEDURE — 00000146 ZZHCL STATISTIC GLUCOSE BY METER IP

## 2018-06-12 PROCEDURE — 99207 ZZC NO CHARGE CURBSIDE PS: CPT | Performed by: PHYSICIAN ASSISTANT

## 2018-06-12 PROCEDURE — 82803 BLOOD GASES ANY COMBINATION: CPT | Performed by: INTERNAL MEDICINE

## 2018-06-12 PROCEDURE — 25000128 H RX IP 250 OP 636

## 2018-06-12 PROCEDURE — 94003 VENT MGMT INPAT SUBQ DAY: CPT

## 2018-06-12 PROCEDURE — 25800025 ZZH RX 258

## 2018-06-12 PROCEDURE — 40000193 ZZH STATISTIC PT WARD VISIT

## 2018-06-12 PROCEDURE — 85025 COMPLETE CBC W/AUTO DIFF WBC: CPT | Performed by: STUDENT IN AN ORGANIZED HEALTH CARE EDUCATION/TRAINING PROGRAM

## 2018-06-12 PROCEDURE — 40000196 ZZH STATISTIC RAPCV CVP MONITORING

## 2018-06-12 PROCEDURE — 20000004 ZZH R&B ICU UMMC

## 2018-06-12 PROCEDURE — 40000275 ZZH STATISTIC RCP TIME EA 10 MIN

## 2018-06-12 PROCEDURE — 97162 PT EVAL MOD COMPLEX 30 MIN: CPT | Mod: GP

## 2018-06-12 PROCEDURE — 27211246 XR FEEDING TUBE PLACEMENT

## 2018-06-12 PROCEDURE — 97530 THERAPEUTIC ACTIVITIES: CPT | Mod: GP

## 2018-06-12 PROCEDURE — 40000556 ZZH STATISTIC PERIPHERAL IV START W US GUIDANCE

## 2018-06-12 RX ORDER — OXYCODONE HYDROCHLORIDE 10 MG/1
10 TABLET ORAL EVERY 4 HOURS PRN
Status: DISCONTINUED | OUTPATIENT
Start: 2018-06-12 | End: 2018-06-12

## 2018-06-12 RX ORDER — POTASSIUM CHLORIDE 20MEQ/15ML
40 LIQUID (ML) ORAL ONCE
Status: COMPLETED | OUTPATIENT
Start: 2018-06-12 | End: 2018-06-12

## 2018-06-12 RX ORDER — MAGNESIUM HYDROXIDE 1200 MG/15ML
LIQUID ORAL
Status: DISCONTINUED
Start: 2018-06-12 | End: 2018-06-15 | Stop reason: HOSPADM

## 2018-06-12 RX ORDER — ASCORBIC ACID 500 MG
500 TABLET ORAL DAILY
Status: COMPLETED | OUTPATIENT
Start: 2018-06-12 | End: 2018-06-21

## 2018-06-12 RX ORDER — FENTANYL CITRATE 50 UG/ML
25-50 INJECTION, SOLUTION INTRAMUSCULAR; INTRAVENOUS
Status: DISCONTINUED | OUTPATIENT
Start: 2018-06-12 | End: 2018-06-14

## 2018-06-12 RX ORDER — BISACODYL 10 MG
10 SUPPOSITORY, RECTAL RECTAL DAILY PRN
Status: DISCONTINUED | OUTPATIENT
Start: 2018-06-12 | End: 2018-07-10 | Stop reason: HOSPADM

## 2018-06-12 RX ORDER — OXYCODONE HYDROCHLORIDE 10 MG/1
10 TABLET ORAL EVERY 4 HOURS
Status: DISCONTINUED | OUTPATIENT
Start: 2018-06-12 | End: 2018-06-12

## 2018-06-12 RX ORDER — AMINO ACIDS/PROTEIN HYDROLYS 11G-40/45
1 LIQUID IN PACKET (ML) ORAL 2 TIMES DAILY
Status: DISCONTINUED | OUTPATIENT
Start: 2018-06-12 | End: 2018-07-02

## 2018-06-12 RX ORDER — FENTANYL CITRATE 50 UG/ML
50 INJECTION, SOLUTION INTRAMUSCULAR; INTRAVENOUS ONCE
Status: COMPLETED | OUTPATIENT
Start: 2018-06-12 | End: 2018-06-12

## 2018-06-12 RX ORDER — FUROSEMIDE 10 MG/ML
40 INJECTION INTRAMUSCULAR; INTRAVENOUS DAILY
Status: DISCONTINUED | OUTPATIENT
Start: 2018-06-12 | End: 2018-06-13

## 2018-06-12 RX ORDER — OXYCODONE HYDROCHLORIDE 10 MG/1
10 TABLET ORAL
Status: DISCONTINUED | OUTPATIENT
Start: 2018-06-12 | End: 2018-06-14

## 2018-06-12 RX ORDER — DEXTROSE MONOHYDRATE 100 MG/ML
INJECTION, SOLUTION INTRAVENOUS
Status: COMPLETED
Start: 2018-06-12 | End: 2018-06-12

## 2018-06-12 RX ORDER — OXYCODONE HYDROCHLORIDE 5 MG/1
5-10 TABLET ORAL EVERY 4 HOURS PRN
Status: DISCONTINUED | OUTPATIENT
Start: 2018-06-12 | End: 2018-06-12

## 2018-06-12 RX ORDER — DEXTROSE MONOHYDRATE 100 MG/ML
INJECTION, SOLUTION INTRAVENOUS CONTINUOUS
Status: DISCONTINUED | OUTPATIENT
Start: 2018-06-12 | End: 2018-06-12

## 2018-06-12 RX ADMIN — HEPARIN SODIUM 5000 UNITS: 5000 INJECTION, SOLUTION INTRAVENOUS; SUBCUTANEOUS at 21:50

## 2018-06-12 RX ADMIN — OXYCODONE HYDROCHLORIDE 10 MG: 5 TABLET ORAL at 12:12

## 2018-06-12 RX ADMIN — ACETAMINOPHEN 1000 MG: 500 TABLET, FILM COATED ORAL at 19:48

## 2018-06-12 RX ADMIN — MULTIVITAMIN 15 ML: LIQUID ORAL at 12:13

## 2018-06-12 RX ADMIN — FENTANYL CITRATE 50 MCG: 50 INJECTION INTRAMUSCULAR; INTRAVENOUS at 07:07

## 2018-06-12 RX ADMIN — FUROSEMIDE 40 MG: 10 INJECTION, SOLUTION INTRAVENOUS at 10:07

## 2018-06-12 RX ADMIN — LIDOCAINE HYDROCHLORIDE 5 ML: 20 SOLUTION ORAL; TOPICAL at 14:49

## 2018-06-12 RX ADMIN — PIPERACILLIN SODIUM AND TAZOBACTAM SODIUM 3.38 G: 3; .375 INJECTION, POWDER, LYOPHILIZED, FOR SOLUTION INTRAVENOUS at 18:43

## 2018-06-12 RX ADMIN — PREGABALIN 75 MG: 75 CAPSULE ORAL at 08:10

## 2018-06-12 RX ADMIN — POTASSIUM CHLORIDE 40 MEQ: 40 SOLUTION ORAL at 06:10

## 2018-06-12 RX ADMIN — PIPERACILLIN SODIUM AND TAZOBACTAM SODIUM 3.38 G: 3; .375 INJECTION, POWDER, LYOPHILIZED, FOR SOLUTION INTRAVENOUS at 23:37

## 2018-06-12 RX ADMIN — FENTANYL CITRATE 100 MCG: 50 INJECTION INTRAMUSCULAR; INTRAVENOUS at 01:00

## 2018-06-12 RX ADMIN — DEXTROSE MONOHYDRATE 1000 ML: 100 INJECTION, SOLUTION INTRAVENOUS at 01:43

## 2018-06-12 RX ADMIN — OXYCODONE HYDROCHLORIDE 10 MG: 10 TABLET ORAL at 19:56

## 2018-06-12 RX ADMIN — Medication 20 MG: at 19:49

## 2018-06-12 RX ADMIN — FENTANYL CITRATE 50 MCG: 50 INJECTION INTRAMUSCULAR; INTRAVENOUS at 15:13

## 2018-06-12 RX ADMIN — OXYCODONE HYDROCHLORIDE AND ACETAMINOPHEN 500 MG: 500 TABLET ORAL at 15:13

## 2018-06-12 RX ADMIN — AMITRIPTYLINE HYDROCHLORIDE 50 MG: 50 TABLET, FILM COATED ORAL at 21:50

## 2018-06-12 RX ADMIN — Medication 220 MG: at 12:13

## 2018-06-12 RX ADMIN — FENTANYL CITRATE 100 MCG: 50 INJECTION INTRAMUSCULAR; INTRAVENOUS at 04:09

## 2018-06-12 RX ADMIN — POLYETHYLENE GLYCOL 3350 17 G: 17 POWDER, FOR SOLUTION ORAL at 08:10

## 2018-06-12 RX ADMIN — SENNOSIDES 8.6 MG: 8.6 TABLET, FILM COATED ORAL at 08:10

## 2018-06-12 RX ADMIN — PROPOFOL 30 MCG/KG/MIN: 10 INJECTION, EMULSION INTRAVENOUS at 06:11

## 2018-06-12 RX ADMIN — PIPERACILLIN SODIUM AND TAZOBACTAM SODIUM 3.38 G: 3; .375 INJECTION, POWDER, LYOPHILIZED, FOR SOLUTION INTRAVENOUS at 12:09

## 2018-06-12 RX ADMIN — FENTANYL CITRATE 100 MCG: 50 INJECTION INTRAMUSCULAR; INTRAVENOUS at 10:21

## 2018-06-12 RX ADMIN — ACETAMINOPHEN 1000 MG: 500 TABLET, FILM COATED ORAL at 08:10

## 2018-06-12 RX ADMIN — METHADONE HYDROCHLORIDE 70 MG: 10 CONCENTRATE ORAL at 10:21

## 2018-06-12 RX ADMIN — HEPARIN SODIUM 5000 UNITS: 5000 INJECTION, SOLUTION INTRAVENOUS; SUBCUTANEOUS at 10:07

## 2018-06-12 RX ADMIN — ACETAMINOPHEN 1000 MG: 500 TABLET, FILM COATED ORAL at 15:13

## 2018-06-12 RX ADMIN — DEXTROSE MONOHYDRATE 25 ML: 500 INJECTION PARENTERAL at 01:10

## 2018-06-12 RX ADMIN — BISACODYL 10 MG: 10 SUPPOSITORY RECTAL at 15:13

## 2018-06-12 RX ADMIN — PREGABALIN 75 MG: 75 CAPSULE ORAL at 15:14

## 2018-06-12 RX ADMIN — NICOTINE 1 PATCH: 21 PATCH TRANSDERMAL at 08:10

## 2018-06-12 RX ADMIN — PREGABALIN 75 MG: 75 CAPSULE ORAL at 19:49

## 2018-06-12 RX ADMIN — FENTANYL CITRATE 100 MCG: 50 INJECTION INTRAMUSCULAR; INTRAVENOUS at 08:56

## 2018-06-12 RX ADMIN — FENTANYL CITRATE 100 MCG: 50 INJECTION INTRAMUSCULAR; INTRAVENOUS at 06:51

## 2018-06-12 RX ADMIN — Medication 1 PACKET: at 15:16

## 2018-06-12 RX ADMIN — MIDAZOLAM 2 MG: 1 INJECTION INTRAMUSCULAR; INTRAVENOUS at 12:20

## 2018-06-12 RX ADMIN — PIPERACILLIN SODIUM AND TAZOBACTAM SODIUM 3.38 G: 3; .375 INJECTION, POWDER, LYOPHILIZED, FOR SOLUTION INTRAVENOUS at 06:10

## 2018-06-12 NOTE — PROCEDURES
Procedure/Surgery Information   [facility], [place]    Bedside Procedure Note  Date of Service (when I performed the procedure): 06/12/2018    Alessandra Pak is a 52 year old female patient.  1. Sepsis, due to unspecified organism (H)    2. Pneumonia of both lungs due to infectious organism, unspecified part of lung    3. Hypoxia    4. Acute renal injury (H)    5. Hyponatremia    6. Altered mental status, unspecified altered mental status type    7. Hypoxemia    8. Health Care Home    9. Skin ulcer of right heel with fat layer exposed (H)      Past Medical History:   Diagnosis Date     Abdominal pain, right upper quadrant     [place] [facility] [place] with positive high risk HPV 8/2013    + HPV 33, [place] - GAVIN I, ECC- atypia     Cardiomyopathy (H)     non ischemic cardiomyopathy with EF 15     Cervical high risk HPV (human papillomavirus) test positive 7/8/15, 7/25/16    NIL pap/+ HR HPV (not 16 or 18).      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    leep     Depressive disorder      Gastro-oesophageal reflux disease      Human papillomavirus in conditions classified elsewhere and of unspecified site 2/2012    + HPV 33     Hypertension      Profound impairment, one eye, impairment level not further specified     rt eye due to childhood injury     Systolic CHF (H) 3/12/2015     Tobacco abuse 5/18/2013     Type 2 diabetes mellitus without complications (H)      Uncomplicated asthma      Temp: 99.3  F (37.4  C) Temp src: Oral BP: 94/56   Heart Rate: 98 Resp: 23 SpO2: 100 % O2 Device: Mechanical Ventilator      Insert arterial line  Date/Time: 6/12/2018 1:40 PM  Performed by: BRIDGETTE COVARRUBIAS  Authorized by: LEYDA SNOW   Consent: Verbal consent obtained.  Risks and benefits: risks, benefits and alternatives were discussed  Consent given by: power of   Patient understanding: patient states understanding of the procedure being  "performed  Patient consent: the patient's understanding of the procedure matches consent given  Patient identity confirmed: arm band  Time out: Immediately prior to procedure a \"time out\" was called to verify the correct patient, procedure, equipment, support staff and site/side marked as required.  Preparation: Patient was prepped and draped in the usual sterile fashion.  Indications: multiple ABGs and hemodynamic monitoring  Location: right radial  Anesthesia: local infiltration    Anesthesia:  Local Anesthetic: lidocaine 1% without epinephrine  Number of attempts: 2  Post-procedure: line sutured and dressing applied        Cipriano Amin MD  [id number]  P: [phone]  "

## 2018-06-12 NOTE — PROGRESS NOTES
Orthopaedic Surgery Progress Note 06/12/2018    E/S: Remains intubated in ICU. Afebrile. Weaned off pressors overnight. Having right foot pain.    O:  Temp: 98.8  F (37.1  C) Temp src: Axillary BP: 108/60   Heart Rate: 96 Resp: 24 SpO2: 94 % O2 Device: Mechanical Ventilator      Exam:  Gen: No acute distress, intubated and sedated. Follows commands and nods yes/no  Resp: Breathing with vent  MSK:   RLE:  Inspection: Ace from distal leg to toes, no erythema   Strength: wiggles all toes, grossly fires, hip flexors, quad, hamstings, gsc, TA, EHL, FHL  Sensation: baseline numbness in stocking distribution unchanged from prior exam  Circulation: toes wwp      Recent Labs  Lab 06/12/18  0420 06/11/18  1636 06/11/18  0427 06/10/18  0500  06/09/18  0436 06/08/18  0240   WBC 19.2*  --  21.5* 22.7*  < > 24.8* 24.5*   HGB 7.2*  --  7.2* 7.9*  < > 8.6* 8.0*     --  259 220  < > 259 210   CRP  --  290.0*  --   --   --  230.0* 310.0*   < > = values in this interval not displayed.  Wound Culture: E faecalis, sensitivities pending  Wound gram stain: GPCs    Procedures:  Bedside right foot I&D/debridement performed 6/12/2018:   - Right plantar foot wounds:                           1) Quarter-sized ulcer overlying first MTP joint: Minimal visible purulence.  Sharp debridement with #11 scalpel was carried down to the level of healthy bleeding muscle, approximately 2mm or circular wound edges debrided.                          2) lateral forefoot/midfoot wound: Minimal visible purulence. Approximately 2mm of wound edges debrided sharply with #11 scalpel to healthy bleeding muscle. A 5mm x 3mm proximal extension of necrotic tissue was also sharply debrided to bleeding muscle.   -Wounds were irrigated with 1000 cc of sterile normal saline  -4 x 4 gauze packing applied to soft tissue defect and sterile dressings applied    Assessment: Alessandra Pak is a 52 year old female admitted sepsis with right foot diabetic ulcers vs pna as  presumed source, now s/p bedside I&D right foot on 6/7 and 6/12. Encephalopathy improved.   Appears stable from right foot standpoint with decreasing WBC, afebrile, no encephalopathy.  No further acute interventions planned by ortho at this point.  Repeat admission to MICU 6/9/2018 for hypoxic respiratory failure, likely ARDS + chronic interstitial lung disease, concern for superimposed infection based on CT scans.    Plan:  Medicine Primary  Activity: Elevate BLE  Weight bearing status: NWB BLE  Antibiotics: per primary/ID, vanc/zosyn  Labs: repeat CRP q48hrs (next 6/13; ordered)  DVT prophylaxis: per primary team  Wound Care: recommend daily dressing changes by WOCN  Culture: E faecalis, sensitivities pending      Shakeel Estrada MD 06/12/2018  Orthopaedic Surgery Resident, PGY-1  Pager: [phone]    For questions about this patient, please attempt to contact me at my pager prior to contacting the orthopaedics resident on call. Thank you!

## 2018-06-12 NOTE — PLAN OF CARE
Problem: Patient Care Overview  Goal: Plan of Care/Patient Progress Review  Outcome: No Change  D: Hypoxic respiratory failure. I/A: Pt remains intubated on PEEP 10 and FiO2 50-60%. Sputum sample sent. Levo gtt restarted and titrated to maintain MAP>65. CVP 14. Diuresed with 40mg lasix at end of shift. Nutrition consult placed by MDs. Poor UOP from carvajal, MDs aware. Pupils same as 6/10 with R>L and right pupil sluggish with L brisk. Fentanyl given PRN x2 for pain. Following commands and calm but endorses anxiety so propofol gtt increased to goal RASS -1 to -2 per order. P: Monitor for changes. Follow-up on diuresis effect.

## 2018-06-12 NOTE — PHARMACY-CONSULT NOTE
Pharmacy Tube Feeding Consult    Medication reviewed for administration by feeding tube and for potential food/drug interactions.  Recommendation: No changes are needed at this time.   Pharmacy will continue to follow as new medications are ordered.    Lashanda Grijalva, PharmD, BCCCP

## 2018-06-12 NOTE — PLAN OF CARE
Problem: Patient Care Overview  Goal: Plan of Care/Patient Progress Review  OT/4C - Per chart review and discussion with PT and RN, pt likely only has need for 1 therapy discipline initially to reduce risk of duplicating therapy services. Pt intubated, sedated, and activity orders state NWB BLE (per ortho note). PT to initiate and OT will hold. PT to notify OT when needs arise.

## 2018-06-12 NOTE — PROGRESS NOTES
WOC Consult    Spoke with Ortho Resident Shakeel Estrada regarding continued wound care for right plantar foot wounds. Wounds were debrided at bedside today 6/12/18 by Dr Estrada with instructions for WOC to perform daily dressing changes. Clarified with Dr Estrada and bedside RN that WO does not perform daily dressings but bedside RNs can do this. WOC will follow weekly to monitor effectiveness of dressings, healing, and wound measurements.     Orders for Iodasorb gel daily dressing changes written. WO will follow up with patient on Friday 6/15/18.    Madison Page, RN, CWOCN

## 2018-06-12 NOTE — PLAN OF CARE
Problem: Diabetes Comorbidity  Goal: Diabetes  Patient comorbidity will be monitored for signs and symptoms of hyperglycemia or hypoglycemia. Problems will be absent, minimized or managed by discharge/transition of care.   Outcome: No Change  BG monitored closely overnight. D50 given x2 and D10 gtt at 25mL/hr to keep BG >100.     Problem: Patient Care Overview  Goal: Plan of Care/Patient Progress Review  Outcome: No Change  Patient remains intubated 55% CMV, AM ABG PaO2 52, PEEP increased to 12. NSR with HR 80-100s. Levo turned off around 2000 with MAPs remaining >65. 40mEq k+ replaced. Urine output about 50mL/hr. No BM since admission, senna given x1. BG dropped overnight, 25mL D50 given x2 and D10 gtt started to keep BG >10- now at 25mL/hr. RASS -1, follows commands, arouses to voice. R eye remains sluggish with no new changes, able to move all extremities. Patient in pain with CPOT assessment, fentanyl given x3.   P- Follow up ABG, continue with plan of care.

## 2018-06-12 NOTE — PROGRESS NOTES
"CLINICAL NUTRITION SERVICES - REASSESSMENT NOTE     Nutrition Prescription    RECOMMENDATIONS FOR MDs/PROVIDERS TO ORDER:  Suppository for laxation. Continue scheduled bowel meds.  Monitor lytes, replace PRN if low. Pt is at risk for refeeding syndrome.  If pt requires post pyloric enteral access, please order XR feeding tube for Radiologist to place a small bore FT (she is s/p whipple and this is a contraindication for Cortrak FT placement).    Malnutrition Status:    Severe malnutrition in the context of acute illness.     Recommendations already ordered by Registered Dietitian (RD):  Ordered daily multivit/min, as well as 10 day courses of vitamin C and Zinc per wound healing protocol.    Nutren 1.5 @ 50 mL/hr to provide 1800 kcals (32 kcal/kg/day), 82 g PRO (1.4 g/kg/day), 912 mL H2O, 211 g CHO and no fiber daily.    Ordered Prosource protein modular (2 pkt/day) to bring protein provision to a total of 104 g PRO/day or 1.8 g PRO/kg/day. It also provides an additional 80 kcal daily.    Future/Additional Recommendations:  If pt remains on the vent, may consider ordering a metabolic cart study to better evaluate REE (kcal) needs and approp of energy provisions.       EVALUATION OF THE PROGRESS TOWARD GOALS   Diet: no diet order (assume NPO)  Nutrition Support: none  Intake: only 1 meal was charted during hospital stay on Shift Assessment acordian and it was \"poor.\"  Pt had been on a mod carb diet.     NEW FINDINGS   Wounds are significant on feet. A WOC RN is following. Will increase PRO goals for pt to 1.5-2 g/kg/day. This =  g PRO/day    Pt has not had a BM documented while hospitalized.  Pt was last seen by BIANKA ARORA in outpt DM clinic in Dec of 2016.    Plan may be to prone pt d/t ARDS.    Pt's disla skin is very dry, scaly and gets thicker around her ankles. It appears that pt has corkscrew hairs on her shins which can indicate scurvy. Her feet are wrapped d/t her wounds. Pt is edentulous but her gums do not " appear reddened.     MALNUTRITION  % Intake: </= 50% for >/= 5 days  % Weight Loss: None noted over the past 3 days.  Subcutaneous Fat Loss: None observed  Muscle Loss: Scapular bone: moderate, Thoracic region (clavicle, acromium bone, deltoid, trapezius, pectoral): moderate, Upper arm (bicep, tricep): moderate, Lower arm (forearm): moderate, Dorsal hand: severe, Upper leg (quadricep, hamstring): moderate, Patellar region: mild and Posterior calf: severe  Fluid Accumulation/Edema: Does not meet criteria  Malnutrition Diagnosis: Severe malnutrition in the context of acute illness.     Previous Goals   Patient to consume % of nutritionally adequate meal trays TID, or the equivalent with supplements/snacks.  Evaluation: Not met    Previous Nutrition Diagnosis  Inadequate oral intake  Evaluation: No longer applicable, nutrition diagnosis changed below    CURRENT NUTRITION DIAGNOSIS  Inadequate protein-energy intake related to resp status inhibiting ability to take PO, poor appetite and large diabetic foot ulcers as evidenced by pt NPO x 2 days, was eating poorly prior to intubation, significant wounds noted per MD note and WOC RN evals with presumed micronutrient deficiencies with possible scurvy found on nutrition-focused physical assessment; pt also with severe malnutrition with muscle wasting.    INTERVENTIONS  Implementation  Collaboration with other providers- bedside RN, MICU team  Enteral Nutrition - Initiate TF  Multivitamin/mineral supplement therapy- multivit/min, vit C, and Zinc ordered for wound healing.    Goals  Total avg nutritional intake to meet a minimum of 30 kcal/kg and 1.5 g PRO/kg daily (per dosing wt 57 kg).    Monitoring/Evaluation  Progress toward goals will be monitored and evaluated per protocol.    April Nicole, BIANKA, LD  (MICU dietitian, pgr- [phone])

## 2018-06-12 NOTE — PROGRESS NOTES
[facility]  MICU Progress Note    Alessandra Pak MRN# [id number]   Age: 52 year old YOB: 1965     Date of Admission: 6/6/2018  Date of Service: 6/10/2018   Hospital Day #5         Assessment and Plan:   Alessandra Pak is a 52F with h/o chronic pancreatitis s/p Whipple with secondary DM c/b diabetic foot ulcers and CKD, restrictive lung disease with emphysema and fibrosis, NICM s/p ICD, and chronic methadone use who presented to [facility] 6/6 with AMS and hypoxia found to be septic 2/2 diabetic foot ulcers. Treated with levo/vanc/Zosyn, fluids, and debridement x2. Transfer to MICU overnight 6/9 for ARDS.    Changes Today:  -- Art line placed  -- IV fentanyl 25-50 mcg q2h + oxycodone 10 mg q3h  -- Levophed intermittently for hypotension w/ propofol  -- Wound culture: E faecalis pan-sensitive  -- D/c vancomycin + levofloxacin  -- RVP negative  -- TFs started    ===NEURO===  Anisocoria  Pupils R > L noted evening 6/10. 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 Seen by neuro crit 6/10 who requested head CT to r/o hemorrhagic stroke, showed no acute intracranial lesions.  -- Stable    Sedation  -- RASS goal -1 to -2   -- Propofol gtt as BP allows    Pain  Chronic narcotic use  Discussed by phone with pain team.   -- IV fentanyl 25-50 mcg q2h  -- Oxycodone 10 mg q3h  -- PTA methadone 70 mg     Neuropathy  H/o anxiety, depression  -- PTA pregabalin, amitriptyline       ===CARDIOVASCULAR===   NICM   H/o HFrEF s/p ICD   Echo 6/10/18 with EF 50% and normal RV function, pulmonary artery pressure, and IVC. Holding diuresis with CVP 11 in setting of recent sepsis and borderline hypotension.   -- CVP monitoring BID, follow I&Os  -- Hold PTA Lasix, lisinopril, metoprolol (unclear if patient taking)  -- ASA 81 mg     Hypotension, improved  Intermittent hypotension with propofol and Levophed used as needed to maintain MAP > 65. Nursing titrating propofol PRN.   -- Adjust  propofol, Levophed to MAP > 65  -- Hold lisinopril, lasix as above      ===PULMONARY===  Acute hypoxic respiratory failure 2/2 ARDS  R/o pneumonia  ARDS in the setting of sepsis 2/2 diabetic foot ulcers appears most likely diagnosis with b/l infiltrates, low p/f ratio, and non-cardiogenic edema. Intubated 6/10 with tidal volumes set to 6 cc/kg (380 cc). A-line placed 6/12. Difficulty weaning FiO2 since intubation. ET tube projects around 5 cm above kali, tried to reposition, but still in the same location. BS are asymmetric in both lungs (R>L), but no changes in oxygen requirements, so will follow her clinically. Evaluated for PNA. RVP negative. Sputum culture in progress. Gram stain w/o organisms.   -- Sputum culture in progress.   -- CXR/ABGs PRN  -- Consider paralysis/prone pending clinical course  -- Antibiotics per ID below     Recent Labs  Lab 06/12/18  1725 06/12/18  1330  06/10/18  0750  06/09/18  1800 06/09/18  1050 06/07/18  1320 06/06/18  2218 06/06/18  2215   PH 7.33* 7.33*  < >  --   < >  --   --   --   --   --    PCO2 45 46*  < >  --   < >  --   --   --   --   --    PO2 92 111*  < >  --   < >  --   --   --   --   --    O2PER 50 60.0  < > 100.0  < > 95.0 15L 21  --  6L   PHV  --   --   --  7.34  --  7.35 7.36 7.30* 7.42 7.36   PCO2V  --   --   --  46  --  45 40 46 47 50   < > = values in this interval not displayed.  Ventilation Mode: CMV/AC  (Continuous Mandatory Ventilation/ Assist Control)  FiO2 (%): 50 %  Rate Set (breaths/minute): 20 breaths/min  Tidal Volume Set (mL): 380 mL  PEEP (cm H2O): 10 cmH2O (@ 4:40)  Oxygen Concentration (%): 55 %  Resp: 23    H/o chronic restrictive lung disease with emphysema and fibrosis  PFTs in 2015 demonstrated a restrictive pattern with a severely reduced DLCO (48% predicted) uncorrected for hemoglobin suggestive of an early parenchymal process per read. Some concern for CFPE. Patient has not attended outpatient pulmonology f/u. Previous presentations for hypoxia  with pulmonology w/u including bronch w/ BAL and EBUS w/ bx of mediastinal LN. BAL unrevealing, LN bx was unsatisfactory for evaluation. IgG subclasses have been normal in past. A-1-AT was normal in 2015. HSP panel positive for Aspergillus flavus antibody.  -- Noted      ===GASTROINTESTINAL===  No acute issues    Nutrition: TFs w/ free water flushes 30 mL q4h      ===RENAL===  NIKOLAY on CKD  Creatinine elevated on initial presentation presumed 2/2 sepsis/pre-renal but returned to baseline. Creatinine again elevated in the setting of diuresis, vancomycin tx, and intermittent hypotension. Had been receiving daily diuresis but holding now with most recent CVP 11.   -- Trend Cr  -- Track I/Os  -- Hold lisinopril in context of NIKOLAY and intermittent hypotension  -- Avoid nephrotoxic agents as able     Fluids: None    Baseline creatinine: 1.0-1.1  Recent Labs   Lab Test  06/12/18   0420  06/11/18   0427  06/10/18   0400  06/09/18   1806   BUN  29  27  26  26   CR  1.75*  1.69*  1.55*  1.37*      I/O last 3 completed shifts:  In: 1496.93 [I.V.:1116.93; NG/GT:380]  Out: 1105 [Urine:1105]   Vitals:    06/07/18 0622 06/08/18 0400 06/12/18 0400   Weight: 58.7 kg (129 lb 6.6 oz) 56.9 kg (125 lb 7.1 oz) 60.6 kg (133 lb 9.6 oz)        Recent Labs   Lab Test  06/12/18   0420 06/11/18 0427 06/09/18   1806   02/21/15   0652   02/16/15   0426   10/31/14   0330   NA  144  142   < >  140   < >  141   < >  139   < >  144   POTASSIUM  3.4  3.6   < >  4.2   < >  3.4   < >  3.8   < >  4.3   MAG   --    --    --   1.6   --   2.1   < >  2.0   < >  1.9   RICK  8.3*  8.1*   < >  8.0*   < >  8.9   < >  8.4*   < >  8.2*   PHOS   --    --    --    --    --    --    --   2.5   --   2.2*    < > = values in this interval not displayed.       ===HEME/ONC===  Leukocytosis, improving  WBC continues to be elevated in setting of sepsis. Improving slowly.   -- Trend WBC  -- Abx per ID section below     Normochromic, microcytic anemia  Baseline Hgb around  10-11. Patient's MCV usually >80. Hemoglobin trending down slowly to low 7's. Retic count wnl. No evidence of bleeding. Appears to be due to iron deficiency. Stable.   -- Trend hemoglobin      ===ENDOCRINE===  Chronic pancreatitis s/p pylorus-sparing Whipple procedure  Secondary DM2  Last A1c 9.5% 6/7/18 which was improved from 12.8% in 2/2018. Home regimen: ambulatory pump, 5U lantus before breakfast, low dose sliding scale, and carb coverage. D10 gtt required overnight w/o nutrition, TFs started.  -- D/c Lantus with previous NPO status. Will re-evaluate with initiation of TFs.   -- Hypoglycemia protocol  -- Diabetes educator, endocrinology consulted previously      ===INFECTIOUS DISEASE===  Severe sepsis  Diabetic foot ulcers with purulent drainage  Patient initially found to be septic on presentation 6/6/18 with likely source multiple diabetic foot ulcers. Initially improved with abx and source control by ortho (s/p I&D on 6/7/18 and 6/9/18). Cultures collected on 2nd debridement 6/9. XR R foot 6/10 with concern for osteomyelitis. Wound culture growing E faecalis that is pan-sensitive. Ortho eval 6/12 noted improvement in wound. Early c/f PNA w/ bilateral infiltrates. RVP negative and sputum cultures in progress.  Patient's metabolic encephalopathy resolved but continues to have elevated yet downtrending WBC.  -- Continue Zosyn  -- D/c vancomycin + levofloxacin  -- Wound culture: Enterococcus faecalis - pan sensitive  -- Ortho following, appreciate recs. No further intervention planned  -- WOCN consult    Antimicrobials:   Zosyn: 6/7/18 --> current  Vancomycin: 6/7 --> 6/12/18  Levofloxacin: 6/7 -->6/12/18    Cultures/ID workup:  Wound:    -- Culture: R foot 6/9/18: Moderate growth Enterococcus faecalis - pansensitive, light growth Streptococcus mitis group   -- Gram stain: Many GPCs  Blood cultures:   -- 6/6/18 x2: NGTD   -- 6/9/18 x 1: NGTD   -- 6/10/18 x1: NGTD  Urine culture: 6/7/18 x1: No  growth  Sputum:   -- Culture: Pending   -- Gram stain: < PMNs/low power field, no organisms  RVP: Negative  Nasal swab: MRSA +      ===SKIN/MSK===  Diabetic foot ulcers with purulent drainage  Patient with multiple diabetic ulcers on right foot, debrided by orthopedics with purulent material expressed. No further interventions planned per ortho.   -- Wound care following, daily dressing changes per nursing  -- Antibiotics per ID above    Tubes/Lines/Devices:   Peripheral IV 06/07/18 Right (Active)   Site Assessment WDL 6/10/2018 12:00 PM   Line Status Saline locked 6/10/2018 12:00 PM   Phlebitis Scale 0-->no symptoms 6/10/2018 12:00 PM   Infiltration Scale 0 6/10/2018 12:00 PM   Extravasation? No 6/10/2018 12:00 PM   Number of days:3       PICC Single Lumen 02/22/18 Right Basilic (Active)   Number of days:108       PICC Triple Lumen 06/07/18 Right Basilic Access (Active)   Site Assessment WDL 6/10/2018 12:00 PM   External Cath Length (cm) 3 cm 6/6/2018 12:00 AM   Extremity Circumference (cm) 25 cm 6/6/2018 12:00 AM   Dressing Intervention Chlorhexidine patch;Transparent 6/9/2018  4:00 AM   Dressing Change Due 06/10/18 6/10/2018 12:00 PM   Number of days:3     A-line (6/12)    PPX: DVT - subcutaneous heparin  GI - PPI  Family: Ahsan Fuller updated via telephone.   Code status: Full  Dispo: Remain in ICU     Patient seen and discussed with Dr. Theresa Chawla  [facility]  Pager: [phone]    I saw this patient together with Douglas Chawla and was present during his physical exam and performed an independent exam at the same time which confirmed findings.  I have edited this note as appropriate to reflect team plan for today.  Patient was seen and discussed with attending physician, Dr. Cardenas, who is also in agreement with above.    Cipriano Amin MD  [id number]  P: [phone]    ==================================================  24 HOUR EVENTS/INTERVAL HISTORY:   -- Nursing notes reviewed  --  Off Levophed overnight, restarted this afternoon with hypotension  -- Requiring hourly IV fentanyl with h/o chronic narcotic use, pain consult  -- A-line placed  -- D10 gtt required overnight w/o nutrition, TFs started  ===================================================  PHYSICAL EXAM  Temp:  [98.2  F (36.8  C)-99.3  F (37.4  C)] 98.6  F (37  C)  Heart Rate:  [] 95  Resp:  [20-24] 23  BP: ()/(50-76) 94/54  MAP:  [53 mmHg-80 mmHg] 55 mmHg  Arterial Line BP: ()/(30-57) 81/40  FiO2 (%):  [50 %-60 %] 50 %  SpO2:  [89 %-100 %] 97 %  Ventilation Mode: CMV/AC  (Continuous Mandatory Ventilation/ Assist Control)  FiO2 (%): 50 %  Rate Set (breaths/minute): 20 breaths/min  Tidal Volume Set (mL): 380 mL  PEEP (cm H2O): 10 cmH2O (@ 4:40)  Oxygen Concentration (%): 55 %  Resp: 23    General: Intubated and sedated. NAD.   HEENT: NC/AT, stable anisocoria, sclera anicteric, EOMI.   Chest/Resp: Coarse lung sounds and with bibasilar crackles. Symmetric air entry. No wheeze.   Heart/CV: RRR, normal S1 and S2, no murmurs, 2+ left radial and pedal pulses.  Abdomen/GI: Soft, NTND, BS present, no masses  Extremities/MSK: No LE edema, WWP  Skin: Warm and dry. Right foot dressing being changed during exam. Improving per ortho. Minimal drainage. (Photos in medicine H&P)  Neuro: Follows commands. No focal deficits. MAEE.      =====================================================  LABORATORY DATA  ROUTINE ICU LABS (Last four results)  CMP  Recent Labs  Lab 06/12/18  0420 06/11/18  0427 06/10/18  0400 06/09/18  1806  06/06/18  2215    142 140 140  < > 128*   POTASSIUM 3.4 3.6 3.9 4.2  < > 5.9*   CHLORIDE 111* 109 108 108  < > 95   CO2 25 25 24 23  < > 26   ANIONGAP 9 9 8 8  < > 6   GLC 84 88 60* 118*  < > 193*   BUN 29 27 26 26  < > 37*   CR 1.75* 1.69* 1.55* 1.37*  < > 2.16*   GFRESTIMATED 31* 32* 35* 41*  < > 24*   GFRESTBLACK 37* 39* 43* 49*  < > 29*   RICK 8.3* 8.1* 7.9* 8.0*  < > 9.3   MAG  --   --   --  1.6  --   --     PROTTOTAL  --   --   --   --   --  8.9*   ALBUMIN  --   --   --   --   --  2.5*   BILITOTAL  --   --   --   --   --  0.5   ALKPHOS  --   --   --   --   --  233*   AST  --   --   --   --   --  Canceled, Test credited   ALT  --   --   --   --   --  27   < > = values in this interval not displayed.  CBC    Recent Labs  Lab 06/12/18  0420 06/11/18  0427 06/10/18  0500 06/10/18  0400   WBC 19.2* 21.5* 22.7* 22.3*   RBC 2.93* 2.93* 3.14* 3.03*   HGB 7.2* 7.2* 7.9* 7.5*   HCT 22.1* 22.1* 23.8* 22.7*   MCV 75* 75* 76* 75*   MCH 24.6* 24.6* 25.2* 24.8*   MCHC 32.6 32.6 33.2 33.0   RDW 16.8* 16.5* 16.3* 16.2*    259 220 242     INR    Recent Labs  Lab 06/06/18  2215   INR 1.46*     Arterial Blood Gas    Recent Labs  Lab 06/12/18  1725 06/12/18  1330 06/12/18  0829 06/12/18  0435   PH 7.33* 7.33* 7.37 7.33*   PCO2 45 46* 43 48*   PO2 92 111* 59* 52*   HCO3 24 24 25 25   O2PER 50 60.0 55.0 55.0     Venous Blood Gas   Recent Labs  Lab 06/12/18  1725 06/12/18  1330 06/12/18  0829 06/12/18  0435  06/10/18  0750  06/09/18  1800 06/09/18  1050 06/07/18  1320  06/06/18  2215   PHV  --   --   --   --   --  7.34  --  7.35 7.36 7.30*  < > 7.36   PCO2V  --   --   --   --   --  46  --  45 40 46  < > 50   PO2V  --   --   --   --   --  38  --  37 33 36  < > 17*   HCO3V  --   --   --   --   --  25  --  25 22 23  < > 28   JERMAIN  --   --   --   --   --   --   --   --   --   --   --  2.1   O2PER 50 60.0 55.0 55.0  < > 100.0  < > 95.0 15L 21  --  6L   < > = values in this interval not displayed.      Intake/Output Summary (Last 24 hours) at 06/11/18 1425  Last data filed at 06/11/18 1400   Gross per 24 hour   Intake          1932.53 ml   Output              715 ml   Net          1217.53 ml       Microbiology:    Recent Labs   Lab Test  06/11/18   0936  06/10/18   1020  06/10/18   1000  06/10/18   0941  06/09/18   1600  06/09/18   0730  06/07/18   0031  06/06/18   2334   CULT  Culture in progress  No growth after 2 days   --   No growth  after 2 days  No growth after 3 days  Moderate growth  Enterococcus faecalis  *  Light growth  Streptococcus mitis group  Susceptibility testing not routinely done  *  No growth  No growth after 5 days   SDES  Sputum  Sputum  Nares  Blood Red port  Urine  Urine  Blood Left Hand  Blood PICC  Right Foot Wound  Right Foot Wound  Catheterized Urine  Blood Right Hand       Imaging:  Recent Results (from the past 24 hour(s))   XR Chest Port 1 View    Narrative    XR CHEST PORT 1 VW  6/12/2018 6:18 AM    History:  r/o pulmonary edema; .     Comparison: Chest radiograph dated 6/10/2018    Findings:   Semiupright portable AP chest radiograph. Endotracheal tube tip  projects 6.0 cm above kali. Stable position of left chest wall  implantable cardiac defibrillator. Right upper extremity PICC tip  projects over distal SVC. Cardiac silhouette is within normal limits.  No significant change in diffuse interstitial opacities. Increased  left basilar opacities. No pneumothorax. Small bilateral pleural  effusions. The visualized upper abdomen is unremarkable.      Impression    IMPRESSION:  1. Diffuse interstitial opacities are unchanged, pulmonary edema  versus infection.  2. Increased patchy left basilar opacities, atelectasis versus  infection.    I have personally reviewed the examination and initial interpretation  and I agree with the findings.    DINO GORDON MD (Brandon)   XR Chest Port 1 View    Narrative    Exam: XR CHEST PORT 1 VW, 6/12/2018 1:56 PM    Indication: ET tube advanced, eval new position;     Comparison: Radiograph of the chest 6/12/2018    Findings:   AP view of the chest. Endotracheal tube tip projects over the upper  thoracic trachea, 5.6 cm above the kali. Implant cardiac device with  lead in stable position. Right PICC tip projects over the low SVC.  Gastric tube sidehole projects over the stomach, and the tip outside  the inferior field of view. Cardiac silhouette is within normal  limits.  Bilateral mixed airspace opacities. No pneumothorax. No  pleural effusion. Upper abdomen is unremarkable.      Impression    Impression:   1. Endotracheal tube tip has been slightly advanced to 5.6 cm above  the kali.  2. Unchanged mixed airspace opacities, most likely pulmonary edema or  infection.    I have personally reviewed the examination and initial interpretation  and I agree with the findings.    BERNADETTE CONTEH MD   XR Feeding Tube Placement    Narrative    Exam: XR FEEDING TUBE PLACEMENT, 6/12/2018 3:27 PM    Indication: ARDS, prior pylorus-sparing whipple.  Please place  post-pyloric tube prior to possible proning later today.;     Comparison:  None    History: Feeding tube needed for nutrition.     Fluoro time: .8 minutes     Technique: After injection of Xylocaine gel into the right nostril, a  feeding tube was advanced under fluoroscopic guidance.    Findings: The feeding tube is advanced with the tip in the postpyloric  position. A small amount of barium was injected to define anatomy.  Feeding tube was secured with a bridal retention device.       Impression    Impression: Uncomplicated feeding tube placement with tip in the  postpyloric position.     I, DINO GORDON MD (Brandon), attest that I was present for all critical  portions of the procedure and was immediately available to provide  guidance and assistance during the remainder of the procedure.    I have personally reviewed the examination and initial interpretation  and I agree with the findings.    DINO GORDON MD (Brandon)   XR Chest Port 1 View    Narrative    Exam: XR CHEST PORT 1 VW, 6/12/2018 5:06 PM    Indication: ET advanced.  Eval new position;     Comparison: Chest radiograph obtained 6/12/2018 at 1348    Findings:   Single AP view of the chest. ET tube tip projects over the mid  trachea. Right upper extremity PICC tip projects over the low SVC.  Stable automatic implantable cardiac defibrillator positioning.  Feeding tube and  gastric tube extend below the diaphragm, tips not  included in the field-of-view. Heart size is normal. Stable bilateral  interstitial opacities and hazy left retrocardiac opacities. No  pleural effusion or pneumothorax. Contrast in the biliary tree is  probably refluxed from same-day feeding tube placement (patient has  history of Whipple procedure).      Impression    Impression:   1. ET tube tip projects over the mid trachea at the level of the  clavicles.  2. Stable interstitial/airspace opacities, better seen on CT 6/9/2018.    I have personally reviewed the examination and initial interpretation  and I agree with the findings.    GLNE ALVARADO MD

## 2018-06-13 ENCOUNTER — APPOINTMENT (OUTPATIENT)
Dept: CT IMAGING | Facility: CLINIC | Age: 53
DRG: 853 | End: 2018-06-13
Payer: COMMERCIAL

## 2018-06-13 ENCOUNTER — APPOINTMENT (OUTPATIENT)
Dept: PHYSICAL THERAPY | Facility: CLINIC | Age: 53
DRG: 853 | End: 2018-06-13
Payer: COMMERCIAL

## 2018-06-13 ENCOUNTER — APPOINTMENT (OUTPATIENT)
Dept: ULTRASOUND IMAGING | Facility: CLINIC | Age: 53
DRG: 853 | End: 2018-06-13
Payer: COMMERCIAL

## 2018-06-13 LAB
ANION GAP SERPL CALCULATED.3IONS-SCNC: 9 MMOL/L (ref 3–14)
APTT PPP: 55 SEC (ref 22–37)
BACTERIA SPEC CULT: ABNORMAL
BACTERIA SPEC CULT: ABNORMAL
BACTERIA SPEC CULT: NO GROWTH
BASE DEFICIT BLDA-SCNC: 1.8 MMOL/L
BLD PROD TYP BPU: NORMAL
BLD UNIT ID BPU: 0
BLOOD PRODUCT CODE: NORMAL
BPU ID: NORMAL
BUN SERPL-MCNC: 33 MG/DL (ref 7–30)
BUN SERPL-MCNC: 33 MG/DL (ref 7–30)
BUN SERPL-MCNC: 35 MG/DL (ref 7–30)
CALCIUM SERPL-MCNC: 7.7 MG/DL (ref 8.5–10.1)
CALCIUM SERPL-MCNC: 7.8 MG/DL (ref 8.5–10.1)
CALCIUM SERPL-MCNC: 7.9 MG/DL (ref 8.5–10.1)
CHLORIDE SERPL-SCNC: 109 MMOL/L (ref 94–109)
CHLORIDE SERPL-SCNC: 110 MMOL/L (ref 94–109)
CHLORIDE SERPL-SCNC: 110 MMOL/L (ref 94–109)
CO2 SERPL-SCNC: 23 MMOL/L (ref 20–32)
CO2 SERPL-SCNC: 23 MMOL/L (ref 20–32)
CO2 SERPL-SCNC: 24 MMOL/L (ref 20–32)
CORTIS SERPL-MCNC: 13.3 UG/DL (ref 4–22)
CREAT SERPL-MCNC: 1.74 MG/DL (ref 0.52–1.04)
CREAT SERPL-MCNC: 1.82 MG/DL (ref 0.52–1.04)
CREAT SERPL-MCNC: 1.93 MG/DL (ref 0.52–1.04)
CRP SERPL-MCNC: 220 MG/L (ref 0–8)
ERYTHROCYTE [DISTWIDTH] IN BLOOD BY AUTOMATED COUNT: 16.9 % (ref 10–15)
FIBRINOGEN PPP-MCNC: 852 MG/DL (ref 200–420)
GFR SERPL CREATININE-BSD FRML MDRD: 27 ML/MIN/1.7M2
GFR SERPL CREATININE-BSD FRML MDRD: 29 ML/MIN/1.7M2
GFR SERPL CREATININE-BSD FRML MDRD: 31 ML/MIN/1.7M2
GLUCOSE BLDC GLUCOMTR-MCNC: 147 MG/DL (ref 70–99)
GLUCOSE BLDC GLUCOMTR-MCNC: 151 MG/DL (ref 70–99)
GLUCOSE BLDC GLUCOMTR-MCNC: 195 MG/DL (ref 70–99)
GLUCOSE BLDC GLUCOMTR-MCNC: 238 MG/DL (ref 70–99)
GLUCOSE BLDC GLUCOMTR-MCNC: 243 MG/DL (ref 70–99)
GLUCOSE BLDC GLUCOMTR-MCNC: 329 MG/DL (ref 70–99)
GLUCOSE BLDC GLUCOMTR-MCNC: 372 MG/DL (ref 70–99)
GLUCOSE BLDC GLUCOMTR-MCNC: 376 MG/DL (ref 70–99)
GLUCOSE SERPL-MCNC: 146 MG/DL (ref 70–99)
GLUCOSE SERPL-MCNC: 163 MG/DL (ref 70–99)
GLUCOSE SERPL-MCNC: 352 MG/DL (ref 70–99)
HCO3 BLD-SCNC: 24 MMOL/L (ref 21–28)
HCT VFR BLD AUTO: 20.2 % (ref 35–47)
HGB BLD-MCNC: 6.7 G/DL (ref 11.7–15.7)
HGB BLD-MCNC: 6.8 G/DL (ref 11.7–15.7)
HGB BLD-MCNC: 7.2 G/DL (ref 11.7–15.7)
INR PPP: 1.33 (ref 0.86–1.14)
LACTATE BLD-SCNC: 0.8 MMOL/L (ref 0.7–2)
Lab: ABNORMAL
Lab: NORMAL
MAGNESIUM SERPL-MCNC: 2 MG/DL (ref 1.6–2.3)
MCH RBC QN AUTO: 24.8 PG (ref 26.5–33)
MCHC RBC AUTO-ENTMCNC: 33.2 G/DL (ref 31.5–36.5)
MCV RBC AUTO: 75 FL (ref 78–100)
O2/TOTAL GAS SETTING VFR VENT: 55 %
PCO2 BLD: 45 MM HG (ref 35–45)
PH BLD: 7.33 PH (ref 7.35–7.45)
PHOSPHATE SERPL-MCNC: 4.1 MG/DL (ref 2.5–4.5)
PLATELET # BLD AUTO: 294 10E9/L (ref 150–450)
PO2 BLD: 88 MM HG (ref 80–105)
POTASSIUM SERPL-SCNC: 3.8 MMOL/L (ref 3.4–5.3)
POTASSIUM SERPL-SCNC: 4 MMOL/L (ref 3.4–5.3)
POTASSIUM SERPL-SCNC: 4.4 MMOL/L (ref 3.4–5.3)
RBC # BLD AUTO: 2.7 10E12/L (ref 3.8–5.2)
SODIUM SERPL-SCNC: 142 MMOL/L (ref 133–144)
SODIUM SERPL-SCNC: 142 MMOL/L (ref 133–144)
SODIUM SERPL-SCNC: 143 MMOL/L (ref 133–144)
SPECIMEN SOURCE: ABNORMAL
SPECIMEN SOURCE: NORMAL
TRANSFUSION STATUS PATIENT QL: NORMAL
TRANSFUSION STATUS PATIENT QL: NORMAL
WBC # BLD AUTO: 20 10E9/L (ref 4–11)

## 2018-06-13 PROCEDURE — 97530 THERAPEUTIC ACTIVITIES: CPT | Mod: GP

## 2018-06-13 PROCEDURE — 85027 COMPLETE CBC AUTOMATED: CPT | Performed by: STUDENT IN AN ORGANIZED HEALTH CARE EDUCATION/TRAINING PROGRAM

## 2018-06-13 PROCEDURE — 25000132 ZZH RX MED GY IP 250 OP 250 PS 637: Performed by: STUDENT IN AN ORGANIZED HEALTH CARE EDUCATION/TRAINING PROGRAM

## 2018-06-13 PROCEDURE — 99291 CRITICAL CARE FIRST HOUR: CPT | Mod: GC | Performed by: INTERNAL MEDICINE

## 2018-06-13 PROCEDURE — 20000004 ZZH R&B ICU UMMC

## 2018-06-13 PROCEDURE — 25000132 ZZH RX MED GY IP 250 OP 250 PS 637: Performed by: INTERNAL MEDICINE

## 2018-06-13 PROCEDURE — 80048 BASIC METABOLIC PNL TOTAL CA: CPT | Performed by: STUDENT IN AN ORGANIZED HEALTH CARE EDUCATION/TRAINING PROGRAM

## 2018-06-13 PROCEDURE — 25000128 H RX IP 250 OP 636: Performed by: STUDENT IN AN ORGANIZED HEALTH CARE EDUCATION/TRAINING PROGRAM

## 2018-06-13 PROCEDURE — 40000196 ZZH STATISTIC RAPCV CVP MONITORING

## 2018-06-13 PROCEDURE — 82533 TOTAL CORTISOL: CPT | Performed by: INTERNAL MEDICINE

## 2018-06-13 PROCEDURE — 86140 C-REACTIVE PROTEIN: CPT | Performed by: STUDENT IN AN ORGANIZED HEALTH CARE EDUCATION/TRAINING PROGRAM

## 2018-06-13 PROCEDURE — 97110 THERAPEUTIC EXERCISES: CPT | Mod: GP

## 2018-06-13 PROCEDURE — P9016 RBC LEUKOCYTES REDUCED: HCPCS | Performed by: INTERNAL MEDICINE

## 2018-06-13 PROCEDURE — 85384 FIBRINOGEN ACTIVITY: CPT | Performed by: INTERNAL MEDICINE

## 2018-06-13 PROCEDURE — 25000128 H RX IP 250 OP 636: Performed by: INTERNAL MEDICINE

## 2018-06-13 PROCEDURE — 85730 THROMBOPLASTIN TIME PARTIAL: CPT | Performed by: INTERNAL MEDICINE

## 2018-06-13 PROCEDURE — 85610 PROTHROMBIN TIME: CPT | Performed by: INTERNAL MEDICINE

## 2018-06-13 PROCEDURE — 80048 BASIC METABOLIC PNL TOTAL CA: CPT | Performed by: INTERNAL MEDICINE

## 2018-06-13 PROCEDURE — 25000125 ZZHC RX 250: Performed by: INTERNAL MEDICINE

## 2018-06-13 PROCEDURE — 76882 US LMTD JT/FCL EVL NVASC XTR: CPT | Mod: RT

## 2018-06-13 PROCEDURE — 0H9MXZZ DRAINAGE OF RIGHT FOOT SKIN, EXTERNAL APPROACH: ICD-10-PCS | Performed by: ORTHOPAEDIC SURGERY

## 2018-06-13 PROCEDURE — 83605 ASSAY OF LACTIC ACID: CPT | Performed by: INTERNAL MEDICINE

## 2018-06-13 PROCEDURE — 83735 ASSAY OF MAGNESIUM: CPT | Performed by: STUDENT IN AN ORGANIZED HEALTH CARE EDUCATION/TRAINING PROGRAM

## 2018-06-13 PROCEDURE — 82803 BLOOD GASES ANY COMBINATION: CPT | Performed by: INTERNAL MEDICINE

## 2018-06-13 PROCEDURE — 40000014 ZZH STATISTIC ARTERIAL MONITORING DAILY

## 2018-06-13 PROCEDURE — 40000281 ZZH STATISTIC TRANSPORT TIME EA 15 MIN

## 2018-06-13 PROCEDURE — 86923 COMPATIBILITY TEST ELECTRIC: CPT | Performed by: INTERNAL MEDICINE

## 2018-06-13 PROCEDURE — 85027 COMPLETE CBC AUTOMATED: CPT | Performed by: INTERNAL MEDICINE

## 2018-06-13 PROCEDURE — 00000146 ZZHCL STATISTIC GLUCOSE BY METER IP

## 2018-06-13 PROCEDURE — 87040 BLOOD CULTURE FOR BACTERIA: CPT | Performed by: INTERNAL MEDICINE

## 2018-06-13 PROCEDURE — 86901 BLOOD TYPING SEROLOGIC RH(D): CPT | Performed by: INTERNAL MEDICINE

## 2018-06-13 PROCEDURE — 86850 RBC ANTIBODY SCREEN: CPT | Performed by: INTERNAL MEDICINE

## 2018-06-13 PROCEDURE — 85018 HEMOGLOBIN: CPT | Performed by: INTERNAL MEDICINE

## 2018-06-13 PROCEDURE — 25000125 ZZHC RX 250: Performed by: STUDENT IN AN ORGANIZED HEALTH CARE EDUCATION/TRAINING PROGRAM

## 2018-06-13 PROCEDURE — 40000193 ZZH STATISTIC PT WARD VISIT

## 2018-06-13 PROCEDURE — 94003 VENT MGMT INPAT SUBQ DAY: CPT

## 2018-06-13 PROCEDURE — 73700 CT LOWER EXTREMITY W/O DYE: CPT | Mod: RT

## 2018-06-13 PROCEDURE — 40000275 ZZH STATISTIC RCP TIME EA 10 MIN

## 2018-06-13 PROCEDURE — 84100 ASSAY OF PHOSPHORUS: CPT | Performed by: STUDENT IN AN ORGANIZED HEALTH CARE EDUCATION/TRAINING PROGRAM

## 2018-06-13 PROCEDURE — 86900 BLOOD TYPING SEROLOGIC ABO: CPT | Performed by: INTERNAL MEDICINE

## 2018-06-13 RX ORDER — DEXTROSE MONOHYDRATE 25 G/50ML
25-50 INJECTION, SOLUTION INTRAVENOUS
Status: DISCONTINUED | OUTPATIENT
Start: 2018-06-13 | End: 2018-06-15

## 2018-06-13 RX ORDER — PROPOFOL 10 MG/ML
5-75 INJECTION, EMULSION INTRAVENOUS CONTINUOUS
Status: DISCONTINUED | OUTPATIENT
Start: 2018-06-13 | End: 2018-06-15

## 2018-06-13 RX ORDER — CEFAZOLIN SODIUM 1 G/50ML
1250 SOLUTION INTRAVENOUS ONCE
Status: DISCONTINUED | OUTPATIENT
Start: 2018-06-13 | End: 2018-06-13

## 2018-06-13 RX ORDER — DIPHENHYDRAMINE HCL 25 MG
25 CAPSULE ORAL EVERY 6 HOURS PRN
Status: DISCONTINUED | OUTPATIENT
Start: 2018-06-13 | End: 2018-06-28

## 2018-06-13 RX ORDER — NICOTINE POLACRILEX 4 MG
15-30 LOZENGE BUCCAL
Status: DISCONTINUED | OUTPATIENT
Start: 2018-06-13 | End: 2018-06-15

## 2018-06-13 RX ORDER — CEFAZOLIN SODIUM 1 G/50ML
1250 SOLUTION INTRAVENOUS EVERY 24 HOURS
Status: DISCONTINUED | OUTPATIENT
Start: 2018-06-14 | End: 2018-06-22

## 2018-06-13 RX ORDER — NICOTINE 21 MG/24HR
1 PATCH, TRANSDERMAL 24 HOURS TRANSDERMAL EVERY 24 HOURS
Status: DISCONTINUED | OUTPATIENT
Start: 2018-06-13 | End: 2018-06-13

## 2018-06-13 RX ADMIN — OXYCODONE HYDROCHLORIDE AND ACETAMINOPHEN 500 MG: 500 TABLET ORAL at 07:54

## 2018-06-13 RX ADMIN — HYDROCORTISONE SODIUM SUCCINATE 50 MG: 100 INJECTION, POWDER, FOR SOLUTION INTRAMUSCULAR; INTRAVENOUS at 14:27

## 2018-06-13 RX ADMIN — MULTIVITAMIN 15 ML: LIQUID ORAL at 07:58

## 2018-06-13 RX ADMIN — HEPARIN SODIUM 5000 UNITS: 5000 INJECTION, SOLUTION INTRAVENOUS; SUBCUTANEOUS at 22:21

## 2018-06-13 RX ADMIN — OXYCODONE HYDROCHLORIDE 10 MG: 10 TABLET ORAL at 06:34

## 2018-06-13 RX ADMIN — PIPERACILLIN SODIUM AND TAZOBACTAM SODIUM 3.38 G: 3; .375 INJECTION, POWDER, LYOPHILIZED, FOR SOLUTION INTRAVENOUS at 12:40

## 2018-06-13 RX ADMIN — VANCOMYCIN HYDROCHLORIDE 1500 MG: 10 INJECTION, POWDER, LYOPHILIZED, FOR SOLUTION INTRAVENOUS at 16:41

## 2018-06-13 RX ADMIN — ACETAMINOPHEN 1000 MG: 500 TABLET, FILM COATED ORAL at 20:06

## 2018-06-13 RX ADMIN — NICOTINE 1 PATCH: 21 PATCH TRANSDERMAL at 19:00

## 2018-06-13 RX ADMIN — NOREPINEPHRINE BITARTRATE 0.03 MCG/KG/MIN: 1 INJECTION INTRAVENOUS at 00:37

## 2018-06-13 RX ADMIN — FUROSEMIDE 40 MG: 10 INJECTION, SOLUTION INTRAVENOUS at 07:54

## 2018-06-13 RX ADMIN — Medication 20 MG: at 20:11

## 2018-06-13 RX ADMIN — Medication 220 MG: at 07:55

## 2018-06-13 RX ADMIN — PIPERACILLIN SODIUM AND TAZOBACTAM SODIUM 3.38 G: 3; .375 INJECTION, POWDER, LYOPHILIZED, FOR SOLUTION INTRAVENOUS at 19:00

## 2018-06-13 RX ADMIN — PREGABALIN 25 MG: 20 SOLUTION ORAL at 07:54

## 2018-06-13 RX ADMIN — ACETAMINOPHEN 1000 MG: 500 TABLET, FILM COATED ORAL at 14:27

## 2018-06-13 RX ADMIN — ACETAMINOPHEN 1000 MG: 500 TABLET, FILM COATED ORAL at 07:54

## 2018-06-13 RX ADMIN — FENTANYL CITRATE 50 MCG: 50 INJECTION INTRAMUSCULAR; INTRAVENOUS at 19:12

## 2018-06-13 RX ADMIN — NICOTINE 1 PATCH: 21 PATCH TRANSDERMAL at 07:55

## 2018-06-13 RX ADMIN — PREGABALIN 25 MG: 20 SOLUTION ORAL at 20:06

## 2018-06-13 RX ADMIN — PIPERACILLIN SODIUM AND TAZOBACTAM SODIUM 3.38 G: 3; .375 INJECTION, POWDER, LYOPHILIZED, FOR SOLUTION INTRAVENOUS at 06:07

## 2018-06-13 RX ADMIN — METHADONE HYDROCHLORIDE 70 MG: 10 CONCENTRATE ORAL at 08:55

## 2018-06-13 RX ADMIN — Medication 1 PACKET: at 08:03

## 2018-06-13 RX ADMIN — PREGABALIN 25 MG: 20 SOLUTION ORAL at 14:27

## 2018-06-13 RX ADMIN — Medication 1 PACKET: at 17:31

## 2018-06-13 RX ADMIN — HEPARIN SODIUM 5000 UNITS: 5000 INJECTION, SOLUTION INTRAVENOUS; SUBCUTANEOUS at 10:25

## 2018-06-13 RX ADMIN — HUMAN INSULIN 5 UNITS/HR: 100 INJECTION, SOLUTION SUBCUTANEOUS at 22:29

## 2018-06-13 RX ADMIN — HYDROCORTISONE SODIUM SUCCINATE 50 MG: 100 INJECTION, POWDER, FOR SOLUTION INTRAMUSCULAR; INTRAVENOUS at 20:07

## 2018-06-13 RX ADMIN — FENTANYL CITRATE 50 MCG: 50 INJECTION INTRAMUSCULAR; INTRAVENOUS at 13:58

## 2018-06-13 RX ADMIN — AMITRIPTYLINE HYDROCHLORIDE 50 MG: 50 TABLET, FILM COATED ORAL at 22:17

## 2018-06-13 NOTE — PLAN OF CARE
Problem: Diabetes Comorbidity  Goal: Diabetes  Patient comorbidity will be monitored for signs and symptoms of hyperglycemia or hypoglycemia. Problems will be absent, minimized or managed by discharge/transition of care.   Outcome: No Change  Blood sugar checks Q4H, D10 decreased to 15 ml/hr, continue to wean off as increasing tube feeding.     Problem: Renal Insufficiency Comorbidity  Goal: Renal Insufficiency  Patient comorbidity will be monitored for signs and symptoms of Renal Insufficiency (Chronic) condition.  Problems will be absent, minimized or managed by discharge/transition of care.   Outcome: Improving  Urine output 35-50 ml/hr. 40 mg of IV lasix given today.     Problem: Chronic Respiratory Difficulty Comorbidity  Goal: Chronic Respiratory Difficulty  Patient comorbidity will be monitored for signs and symptoms of Respiratory Difficulty (Chronic) condition.  Problems will be absent, minimized or managed by discharge/transition of care.   Outcome: Improving  CMV settings, FiO2 50% PEEP decreased to 10.     Problem: Peripheral Vascular/Peripheral Neurovascular Disease Comorbidity  Goal: Peripheral Vascular/Peripheral Neurovascular Disease  Patient comorbidity will be monitored for signs and symptoms of Peripheral Vascular/Peripheral Neurovascular Disease condition.  Problems will be absent, minimized or managed by discharge/transition of care.   Outcome: Improving  Right foot ulcers debrided by Orthopedics. Dressing changes daily, see WOC orders.     Problem: Patient Care Overview  Goal: Plan of Care/Patient Progress Review  Outcome: Improving  D: Following commands. Rt > Lf pupil, baseline. On propofol at 15 and PRN fentanyl and oxycodone for pain. SR on monitor, HR 's. Afebrile. Levophed restarted this afternoon for MAP <65. No BM since admission, suppository given without results, bowel sounds present. Tube feedings started at 10 ml/hr.   I/A: ART line placed PEEP decreased from 12 to 10, FiO2  50% ABG 7.33 CO2 45 PaO2 92 Bicarb 24. O2 sats > 92%  40 mg of lasix given, urine output 35-50 ml/hr.  last CVP 8.  ETT advanced, remains > 5cm above kali, no room on ETT  for advancement.   P: Continue to wean FiO2 and vent settings as tolerated.
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
## 2018-06-13 NOTE — PHARMACY-VANCOMYCIN DOSING SERVICE
"Pharmacy Vancomycin Consult Initial Note    Date of Service 2018  Patient's  1965  52 year old, female    Indication: Sepsis and Skin and Soft Tissue Infection    Current estimated CrCl = Estimated Creatinine Clearance: 35.7 mL/min (based on Cr of 1.82).    Creatinine for last 3 days  2018:  4:27 AM Creatinine 1.69 mg/dL  2018:  4:20 AM Creatinine 1.75 mg/dL  2018:  3:35 AM Creatinine 1.93 mg/dL;  9:10 AM Creatinine 1.82 mg/dL    Recent vancomycin level(s) for last 3 days  No results found for requested labs within last 72 hours.    Body mass index is 20.51 kg/(m^2).  Height is 5' 8\".   Wt Readings from Last 2 Encounters:   18 61.2 kg (134 lb 14.7 oz)   18 63.5 kg (139 lb 15.9 oz)         Vancomycin IV Administrations (past 72 hours)                   vancomycin (VANCOCIN) 1,500 mg in sodium chloride 0.9 % 250 mL intermittent infusion (mg) 1,500 mg New Bag 18 1641    vancomycin (VANCOCIN) 1,250 mg in sodium chloride 0.9 % 250 mL intermittent infusion (mg) 1,250 mg New Bag 18 1315              Nephrotoxins and other renal medications (Future)    Start     Dose/Rate Route Frequency Ordered Stop    18 1600  vancomycin (VANCOCIN) 1,250 mg in sodium chloride 0.9 % 250 mL intermittent infusion      1,250 mg  over 90 Minutes Intravenous EVERY 24 HOURS 18 1543      18 0100  norepinephrine (LEVOPHED) 16 mg in D5W 250 mL infusion      0.03-0.4 mcg/kg/min × 56.9 kg  1.6-21.3 mL/hr  Intravenous CONTINUOUS 18 0049      18 0630  piperacillin-tazobactam (ZOSYN) 3.375 g vial to attach to  mL bag      3.375 g  over 30 Minutes Intravenous EVERY 6 HOURS 18 0235          Contrast Orders - past 72 hours (72h ago through future)    Start     Dose/Rate Route Frequency Ordered Stop    18 1415  barium sulfate (EZ PAQUE) oral suspension 96%       Per Feeding Tube ONCE 18 1401 18 1449          Plan:  1.  Start vancomycin 1500 mg " IV x 1 followed by maintenance dose of vancomycin 1250 mg IV q24h. If renal function continues to decline, I would check an early vancomycin level to determine if q36h or q48h dosing interval needed.  2.  Goal Trough Level: 15-20 mg/L   3.  Pharmacy will check trough levels as appropriate in 1-3 Days.    4.  Serum creatinine levels will be ordered daily for the first week of therapy and at least twice weekly for subsequent weeks.    5.  Lockwood method utilized to dose vancomycin therapy: previous dosing history.    Stacy Velasco, PharmD  June 13, 2018

## 2018-06-13 NOTE — PLAN OF CARE
Problem: Patient Care Overview  Goal: Plan of Care/Patient Progress Review    4C / Discharge Planner PT   Patient plan for discharge: unknown  Current status: PT evaluation completed and treatment initiated. Patient orally intubated, lethargic, A&O to person, month, disoriented to place. Endorses pain in R foot (I&D completed this AM), per orthopedic team patient to be NWB in BLE 2/2 chronic diabetic ulcers in bilateral feet. Patient completes BRIANA rolling with mod Ax1-2, dependent transfer to EOB with ceiling lift, tolerates sitting EOB ~5 min with min/mod A for balance. RN requesting patient return to bed post-PT session. VSS throughout, orally intubated on VC-AC FiO2 55% PEEP 10.  Barriers to return to prior living situation: Medical status, O2 needs, AMS, non-weightbearing BLE, caregiver dependence  Recommendations for discharge: rehab  Rationale for recommendations: current level of function, BLE NWB status

## 2018-06-13 NOTE — PROGRESS NOTES
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 Treated with levo/vanc/Zosyn, fluids, and debridement x2. Transfer to MICU overnight 6/9 for ARDS. Intubated 6/10.     Changes Today:  -- Transfused 1u pRBCs   -- Hydrocortisone started with low serum cortisol  -- US R foot per ortho  -- Using systolic for BP goals with diastolic discrepancy b/w cuff and A-line    ===NEURO===  Anisocoria/R afferent pupillary defect  Pupils R > L noted evening 6/10. 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 Seen by neuro crit 6/10 who requested head CT to r/o hemorrhagic stroke, showed no acute intracranial lesions.  -- Stable, appreciate neurology involvement.  Continue to monitor.    Sedation  -- RASS goal 0 to -1  -- Propofol gtt as BP allows    Pain  Chronic narcotic use  Discussed by phone with pain team.   -- IV fentanyl 25-50 mcg q2h  -- Oxycodone 10 mg q3h  -- PTA methadone 70 mg     Neuropathy  H/o anxiety, depression  -- PTA pregabalin, amitriptyline       ===CARDIOVASCULAR===   NICM   H/o HFrEF s/p ICD   Echo 6/10/18 with EF 50% and normal RV function, pulmonary artery pressure, and IVC. Holding further diuresis with CVP 7-8 in setting of recent sepsis and borderline hypotension.   -- CVP monitoring BID, follow I&Os  -- Hold PTA Lasix, lisinopril, metoprolol (unclear if patient taking)  -- ASA 81 mg     Hypotension  Intermittent hypotension with propofol and Levophed used as needed. Using systolic blood pressure for BP goals as there has been  discrepancy in diastolic pressure b/w cuff and A-line. Serum cortisol 13.3 and hydrocortisone initiated.   -- Lactic acid this afternoon after lower BP goal  -- Adjust propofol, Levophed to SBP > 90  -- Hold lisinopril, lasix as above      ===PULMONARY===  Acute hypoxic respiratory failure 2/2 ARDS  C/f pneumonia  ARDS in the setting of sepsis 2/2 diabetic foot ulcers appears most likely diagnosis with b/l infiltrates, low p/f ratio, and non-cardiogenic edema. Intubated 6/10 with tidal volumes set to 6 cc/kg (380 cc). A-line placed 6/12. Difficulty weaning FiO2 since intubation. ET tube projects around 5 cm above kali, tried to reposition, but still in the same location. BS are asymmetric in both lungs (R>L), but no changes in oxygen requirements, so will follow her clinically. Evaluated for PNA. RVP negative. Sputum culture in progress. Gram stain w/o organisms. Hydrocortisione started 6/13. Unable to tolerate weaning FiO2 or PEEP much.   -- Hydrocortisone 50 mg q6h  -- Sputum culture in progress  -- CXR/ABGs PRN  -- Consider paralysis/prone pending clinical course  -- Antibiotics per ID below     H/o chronic restrictive lung disease with emphysema and fibrosis  PFTs in 2015 demonstrated a restrictive pattern with a severely reduced DLCO (48% predicted) uncorrected for hemoglobin suggestive of an early parenchymal process per read. Some concern for CFPE. Patient has not attended outpatient pulmonology f/u. Previous presentations for hypoxia with pulmonology w/u including bronch w/ BAL and EBUS w/ bx of mediastinal LN. BAL unrevealing, LN bx was unsatisfactory for evaluation. IgG subclasses have been normal in past. A-1-AT was normal in 2015. HSP panel positive for Aspergillus flavus antibody.  -- Will need outpatient pulm f/u with HRCT after through acute illness      ===GASTROINTESTINAL===  Constipation  Suppository produced BM overnight.   -- PRN bowel regimen    Nutrition: TFs w/ free water flushes 30 mL  q4h      ===RENAL===  NIKOLAY on CKD  Creatinine elevated on initial presentation presumed 2/2 sepsis/pre-renal but returned to baseline. Creatinine again elevated in the setting of diuresis, vancomycin tx, and intermittent hypotension. Had been receiving daily diuresis but holding now with most recent CVPs 7-8.   -- Trend Cr  -- Track I/Os  -- Hold further diuresis  -- Hold lisinopril in context of NIKOLAY and intermittent hypotension  -- Avoid nephrotoxic agents as able     Fluids/Volume: No fluids, hold further diuretics    ===HEME/ONC===  Leukocytosis, stable  WBC continues to be elevated in setting of sepsis. Improving slowly.   -- Trend WBC  -- Abx per ID section below     Normochromic, microcytic anemia  Baseline Hgb around 10-11. Patient's MCV usually >80. Hemoglobin trending down slowly to low 7's. Retic count wnl. Dropped to 6.7 6/13 with some evidence for bleeding in oropharynx. Appears to due to biting inside of her cheek per nursing. BM non-bloody. Some blood loss from R foot wounds.     -- Transfuse 1u pRBCs   -- Hemoglobin check   -- Trend hemoglobin      ===ENDOCRINE===  Chronic pancreatitis s/p pylorus-sparing Whipple procedure  Secondary DM2  Last A1c 9.5% 6/7/18 which was improved from 12.8% in 2/2018. Home regimen: ambulatory pump, 5U lantus before breakfast, low dose sliding scale, and carb coverage. D10 gtt weaned with initiation of TFs. Glucose stable with SSI only. Will continue to evaluate need for additional insulin with increasing TFs.   -- Low SSI, no basal currently  -- Hypoglycemia protocol  -- Diabetes educator, endocrinology consulted previously    Adrenal insufficiency  Serum cortisol 13.3 and hydrocortisone started 6/13.  -- Hydrocortisone 50 mg q6h      ===ID/SKIN/MSK===  Severe sepsis  Diabetic foot ulcers with purulent drainage  Patient initially found to be septic on presentation 6/6/18 with likely source multiple diabetic foot ulcers. Initially improved with abx and source control by  ortho (s/p I&D on 6/7/18 and 6/9/18). Cultures collected on 2nd debridement 6/9. XR R foot 6/10 with concern for osteomyelitis. Wound culture growing E faecalis that is pan-sensitive. Ortho bedside I&D 6/12 with minimal visible purulence noted. Ortho recommended imaging of R foot 6/13 to assess for abscess given lack of clinical improvement. Deferred MRI but US ordered. Also some early c/f PNA w/ bilateral infiltrates. RVP negative and sputum cultures in progress.   -- Continue Zosyn  -- Wound culture: Enterococcus faecalis - pan sensitive  -- Ortho following, appreciate recs  -- US R foot to assess for abscess  -- Wound care following, daily dressing changes per nursing    Antimicrobials:   Zosyn: 6/7/18 --> current  Vancomycin: 6/7 --> 6/12/18  Levofloxacin: 6/7 -->6/12/18    Cultures/ID workup:  Wound:    -- Culture: R foot 6/9/18: Moderate growth Enterococcus faecalis - pansensitive, light growth Streptococcus mitis group   -- Gram stain: Many GPCs  Blood cultures:   -- 6/6/18 x2: NGTD   -- 6/9/18 x 1: NGTD   -- 6/10/18 x1: NGTD  Urine culture: 6/7/18 x1: No growth  Sputum:   -- Culture: Pending   -- Gram stain: < PMNs/low power field, no organisms  RVP: Negative  Nasal swab: MRSA +      PPX: DVT - subcutaneous heparin  GI - PPI  Family: Son Jonny ([phone]) primary contact/decision maker. He was updated at bedside.  Code status: Full  Dispo: Remain in ICU     Patient seen and discussed with Dr. Theresa Chawla  [facility]  Pager: [phone]    I saw this patient together with MS-4 Pablo Chawla and was present during his physical exam and performed an independent exam at the same time which confirmed findings.  I have edited this note as appropriate to reflect team plan for today.  Patient was seen and discussed with attending physician, Dr. Cardenas, who is also in agreement with above.    Vincenzo Cervantes MD, PhD  PGY-2, Internal Medicine  Pager:  [phone]      ==================================================  24 HOUR EVENTS/INTERVAL HISTORY:   -- Nursing notes reviewed  -- Intermittently requiring Levophed for hypotension  -- UOP 20-30 cc/hour  -- Suppository given w/ 1 BM  -- Interactive this AM, endorses pain in R > L feet.  Denies dyspnea or chest pain.    -- Attempted to contact son multiple times this AM to consent for blood, but unable to reach him until ~1:30pm at which time consent was obtained.  Given clinical stability and no large volume ongoing bleeding, did not feel mildly worsening anemia warranted emergency/presumed consent process.    ===================================================  PHYSICAL EXAM    Vitals: Reviewed    General: Intubated and sedated. NAD. Alert, interactive, appropriate when sedation lightened  HEENT: NC/AT, stable anisocoria/afferent pupillary defect, sclera anicteric, EOMI.   Chest/Resp: Coarse lung sounds and with bibasilar crackles. Symmetric air entry on exam. No wheeze.   Heart/CV: RRR, normal S1 and S2, no murmurs  Abdomen/GI: Soft, NTND, BS present  Extremities/MSK: No LE edema, WWP.  R foot with some blood staining on dressing.  Skin: Warm and dry. R foot wrapped.   Neuro: Follows commands, nods yes/no, MAEE and to command.    Labs/Meds/Imaging: Reviewed

## 2018-06-13 NOTE — PROGRESS NOTES
CLINICAL NUTRITION SERVICES - BRIEF NOTE    A small bore FT was placed by radiologist yesterday and is in the small bowel. Pt's oxygenation improved and she was not proned overnight. TF are advancing towards goal; lytes remain WNL so far.    INTERVENTIONS  Recommendations / Nutrition Prescription  Updated TF and water flush orders to reflect new enteral access.     Implementation  Updated TF order set as above.  Collaboration with other providers- MICU rounds      April Nicole, BIANKA, LD  (MICU dietitian, pgr- [phone])

## 2018-06-13 NOTE — PLAN OF CARE
Problem: Diabetes Comorbidity  Goal: Diabetes  Patient comorbidity will be monitored for signs and symptoms of hyperglycemia or hypoglycemia. Problems will be absent, minimized or managed by discharge/transition of care.   Outcome: No Change  's, SSI, 1 unit insulin given overnight.     Problem: Renal Insufficiency Comorbidity  Goal: Renal Insufficiency  Patient comorbidity will be monitored for signs and symptoms of Renal Insufficiency (Chronic) condition.  Problems will be absent, minimized or managed by discharge/transition of care.   Outcome: Declining  UO slowing decreasing,  Q 2 hr, creatinine starting to increase. Will continue to monitor closely.     Problem: Patient Care Overview  Goal: Plan of Care/Patient Progress Review  Outcome: No Change   06/13/18 0749   OTHER   Plan Of Care Reviewed With patient   Plan of Care Review   Progress no change     D: pt admitted with AMS, sepsis, and ARF.   I/A: Pt temps overnight in the 99's, sinus tachycardic, HR 's, BP MAP > 65, levophed off to 0.03 mcg/hr at times throughout the night, CVP 7-8, pt c/o throat pain this morning, 10 mg oxycodone given, UO decreasing  Q 2 hours, creatinine increasing, hgb this am 6.7, MD paged, still need consent, and T&S. Will continue POC.   P: Continue to wean PEEP.

## 2018-06-13 NOTE — PLAN OF CARE
Problem: Patient Care Overview  Goal: Plan of Care/Patient Progress Review  Discharge Planner PT   Patient plan for discharge: Unknown  Current status: RN requested we remain in bed due to low systolic BPs (<90 mmHg). Perform active and passive ROM in bed with fair participation. Mainly limited by cognitive status. Cued some mobility in bed.  Barriers to return to prior living situation: Medical status, cognition  Recommendations for discharge: TCU  Rationale for recommendations: Current level of function       Entered by: Marco Antonio Mccauley 06/13/2018 11:50 AM

## 2018-06-13 NOTE — PROGRESS NOTES
Orthopaedic Surgery Progress Note 06/13/2018    Chart reviewed    E/S: Remains intubated in ICU. Afebrile.  Intermittently on pressors. Having right foot pain.    O:  Temp: 98.6  F (37  C) Temp src: Axillary BP: 124/70 Pulse: 92 Heart Rate: 108 Resp: 18 SpO2: 91 % O2 Device: Mechanical Ventilator          Recent Labs  Lab 06/13/18  0910 06/13/18  0815 06/13/18  0335 06/12/18  0420 06/11/18  1636 06/11/18  0427  06/09/18  0436   WBC  --   --  20.0* 19.2*  --  21.5*  < > 24.8*   HGB  --  6.8* 6.7* 7.2*  --  7.2*  < > 8.6*   PLT  --   --  294 279  --  259  < > 259   .0*  --   --   --  290.0*  --   --  230.0*   < > = values in this interval not displayed.  Wound Culture: E faecalis, pansensitive  Wound gram stain: GPCs    Procedures:  Bedside right foot I&D/debridement performed 6/12/2018:   - Right plantar foot wounds:                           1) Quarter-sized ulcer overlying first MTP joint: Minimal visible purulence.  Sharp debridement with #11 scalpel was carried down to the level of healthy bleeding muscle, approximately 2mm or circular wound edges debrided.                          2) lateral forefoot/midfoot wound: Minimal visible purulence. Approximately 2mm of wound edges debrided sharply with #11 scalpel to healthy bleeding muscle. A 5mm x 3mm proximal extension of necrotic tissue was also sharply debrided to bleeding muscle.   -Wounds were irrigated with 1000 cc of sterile normal saline  -4 x 4 gauze packing applied to soft tissue defect and sterile dressings applied    Assessment: Alessandra Pak is a 52 year old female admitted sepsis with right foot diabetic ulcers vs pna as presumed source, now s/p bedside I&D right foot on 6/7 and 6/12. Encephalopathy improved.  Stable WBC, afebrile, CRP downtrending, no encephalopathy.  Remains intubated for hypoxic respiratory failure, rising creatinine, and intermittently requiring pressors.  Repeat admission to MICU 6/9/2018 for hypoxic respiratory failure,  likely ARDS + chronic interstitial lung disease, concern for superimposed infection based on CT scans.    Plan:  Medicine Primary  Activity: Elevate BLE  Weight bearing status: NWB BLE  Antibiotics: per primary/ID  Labs: repeat CRP q48hrs (next 6/13; ordered)  Imaging: Given lack of improvement in respiratory status, rising creatinine, and intermittent hypotension, recommend additional imaging to evaluate for deeper abscess that is not clinically apparent.  Ideally would obtain right foot MRI with contrast.  If unable to receive contrast, MRI may still be helpful with appropriate protocol.  If unable to obtain MRI, recommend ultrasound.  DVT prophylaxis: per primary team  Wound Care: recommend daily dressing by bedside RN, weekly WOCN dressing changes  Culture: E faecalis, pansensitive      Shakeel Estrada MD 06/13/2018  Orthopaedic Surgery Resident, PGY-1  Pager: [phone]    For questions about this patient, please attempt to contact me at my pager prior to contacting the orthopaedics resident on call. Thank you!

## 2018-06-13 NOTE — SIGNIFICANT EVENT
BRIEF CROSS COVER NOTE    Primary diagnosis: ARDS & sepsis due to diabetic foot wound osteomyelitis with rapid progression. Source control necessitates BKA. Case discussed with ortho colleagues.     EKG 6/8/18  Normal sinus rhythm    TTE 6/10/18  Left ventricular size is normal. The LVEF is 50 %.  The right ventricle is normal size. Global right ventricular function is normal.  Pulmonary artery systolic pressure is normal.  The inferior vena cava is normal. Estimated mean right atrial pressure is 3 mmHg.  No pericardial effusion is present.  This study was compared with the study from 2/17/2016. There has been no change.    Coronary Angiogram 10/25/18  Mid LAD 10%, otherwise no angiographic evidence of obstructive disease    PFTs 4/2015  FEV1%Pred 66  FVC%Pred 68  FEV/FVC 78  DLCO%Pred 48    Evaluation of prior cardiac studies shows no focal wall deficits or obstructive coronary disease.     Estimated risk probability for perioperative myocardial infarction or cardiac arrest: 5.84%    Acknowledge poor current functional capacity (<4 METS wthile ventilated), however the pt is in the ICU because of her rapidly progressing foot infection foot with visible pus. While surgery is not emergent, it is likely the pt will need BKA for source control before impending disseminated disease causes further organ dysfunction. Hence, pt is okay for orthopedic surgery.     Orthopedics discussed case with patient and patient's son on 6/13/18. Pt's son will think about it and orthopedics will touch base with him on 6/14/18 given OR availability on 6/14/18.     - will hold tube feeds at midnight in case patient and patient's family elect to proceed with BKA on 6/14/18      Tommy Dennison PGY2  Internal Medicine

## 2018-06-14 LAB
ABO + RH BLD: NORMAL
ABO + RH BLD: NORMAL
ANION GAP SERPL CALCULATED.3IONS-SCNC: 9 MMOL/L (ref 3–14)
BASE DEFICIT BLDA-SCNC: 2.2 MMOL/L
BLD GP AB SCN SERPL QL: NORMAL
BLD PROD TYP BPU: NORMAL
BLD PROD TYP BPU: NORMAL
BLD UNIT ID BPU: 0
BLOOD BANK CMNT PATIENT-IMP: NORMAL
BLOOD PRODUCT CODE: NORMAL
BPU ID: NORMAL
BUN SERPL-MCNC: 39 MG/DL (ref 7–30)
C DIFF TOX B STL QL: NEGATIVE
CALCIUM SERPL-MCNC: 8 MG/DL (ref 8.5–10.1)
CHLORIDE SERPL-SCNC: 112 MMOL/L (ref 94–109)
CO2 SERPL-SCNC: 23 MMOL/L (ref 20–32)
CREAT SERPL-MCNC: 1.63 MG/DL (ref 0.52–1.04)
ERYTHROCYTE [DISTWIDTH] IN BLOOD BY AUTOMATED COUNT: 18.2 % (ref 10–15)
ERYTHROCYTE [DISTWIDTH] IN BLOOD BY AUTOMATED COUNT: 18.7 % (ref 10–15)
GFR SERPL CREATININE-BSD FRML MDRD: 33 ML/MIN/1.7M2
GLUCOSE BLDC GLUCOMTR-MCNC: 112 MG/DL (ref 70–99)
GLUCOSE BLDC GLUCOMTR-MCNC: 121 MG/DL (ref 70–99)
GLUCOSE BLDC GLUCOMTR-MCNC: 122 MG/DL (ref 70–99)
GLUCOSE BLDC GLUCOMTR-MCNC: 128 MG/DL (ref 70–99)
GLUCOSE BLDC GLUCOMTR-MCNC: 134 MG/DL (ref 70–99)
GLUCOSE BLDC GLUCOMTR-MCNC: 136 MG/DL (ref 70–99)
GLUCOSE BLDC GLUCOMTR-MCNC: 137 MG/DL (ref 70–99)
GLUCOSE BLDC GLUCOMTR-MCNC: 141 MG/DL (ref 70–99)
GLUCOSE BLDC GLUCOMTR-MCNC: 145 MG/DL (ref 70–99)
GLUCOSE BLDC GLUCOMTR-MCNC: 149 MG/DL (ref 70–99)
GLUCOSE BLDC GLUCOMTR-MCNC: 160 MG/DL (ref 70–99)
GLUCOSE BLDC GLUCOMTR-MCNC: 161 MG/DL (ref 70–99)
GLUCOSE BLDC GLUCOMTR-MCNC: 167 MG/DL (ref 70–99)
GLUCOSE BLDC GLUCOMTR-MCNC: 196 MG/DL (ref 70–99)
GLUCOSE BLDC GLUCOMTR-MCNC: 201 MG/DL (ref 70–99)
GLUCOSE BLDC GLUCOMTR-MCNC: 254 MG/DL (ref 70–99)
GLUCOSE BLDC GLUCOMTR-MCNC: 266 MG/DL (ref 70–99)
GLUCOSE BLDC GLUCOMTR-MCNC: 309 MG/DL (ref 70–99)
GLUCOSE BLDC GLUCOMTR-MCNC: 314 MG/DL (ref 70–99)
GLUCOSE SERPL-MCNC: 195 MG/DL (ref 70–99)
HCO3 BLD-SCNC: 23 MMOL/L (ref 21–28)
HCT VFR BLD AUTO: 22 % (ref 35–47)
HCT VFR BLD AUTO: 24.9 % (ref 35–47)
HGB BLD-MCNC: 7 G/DL (ref 11.7–15.7)
HGB BLD-MCNC: 8.2 G/DL (ref 11.7–15.7)
MAGNESIUM SERPL-MCNC: 2.3 MG/DL (ref 1.6–2.3)
MCH RBC QN AUTO: 25 PG (ref 26.5–33)
MCH RBC QN AUTO: 26.5 PG (ref 26.5–33)
MCHC RBC AUTO-ENTMCNC: 31.8 G/DL (ref 31.5–36.5)
MCHC RBC AUTO-ENTMCNC: 32.9 G/DL (ref 31.5–36.5)
MCV RBC AUTO: 79 FL (ref 78–100)
MCV RBC AUTO: 80 FL (ref 78–100)
NUM BPU REQUESTED: 2
O2/TOTAL GAS SETTING VFR VENT: 40 %
PCO2 BLD: 43 MM HG (ref 35–45)
PH BLD: 7.35 PH (ref 7.35–7.45)
PHOSPHATE SERPL-MCNC: 3.7 MG/DL (ref 2.5–4.5)
PLATELET # BLD AUTO: 281 10E9/L (ref 150–450)
PLATELET # BLD AUTO: 311 10E9/L (ref 150–450)
PO2 BLD: 89 MM HG (ref 80–105)
POTASSIUM SERPL-SCNC: 3.4 MMOL/L (ref 3.4–5.3)
RBC # BLD AUTO: 2.8 10E12/L (ref 3.8–5.2)
RBC # BLD AUTO: 3.1 10E12/L (ref 3.8–5.2)
SODIUM SERPL-SCNC: 144 MMOL/L (ref 133–144)
SPECIMEN EXP DATE BLD: NORMAL
SPECIMEN SOURCE: NORMAL
TRANSFUSION STATUS PATIENT QL: NORMAL
TRANSFUSION STATUS PATIENT QL: NORMAL
WBC # BLD AUTO: 17.5 10E9/L (ref 4–11)
WBC # BLD AUTO: 17.9 10E9/L (ref 4–11)

## 2018-06-14 PROCEDURE — 99291 CRITICAL CARE FIRST HOUR: CPT | Mod: GC | Performed by: INTERNAL MEDICINE

## 2018-06-14 PROCEDURE — 25000125 ZZHC RX 250: Performed by: STUDENT IN AN ORGANIZED HEALTH CARE EDUCATION/TRAINING PROGRAM

## 2018-06-14 PROCEDURE — 27211040 ZZH CONTINUOUS NEBULIZER MICRO PUMP

## 2018-06-14 PROCEDURE — 25800025 ZZH RX 258: Performed by: STUDENT IN AN ORGANIZED HEALTH CARE EDUCATION/TRAINING PROGRAM

## 2018-06-14 PROCEDURE — 25000128 H RX IP 250 OP 636: Performed by: INTERNAL MEDICINE

## 2018-06-14 PROCEDURE — 94640 AIRWAY INHALATION TREATMENT: CPT | Mod: 76

## 2018-06-14 PROCEDURE — 84100 ASSAY OF PHOSPHORUS: CPT | Performed by: STUDENT IN AN ORGANIZED HEALTH CARE EDUCATION/TRAINING PROGRAM

## 2018-06-14 PROCEDURE — 25000132 ZZH RX MED GY IP 250 OP 250 PS 637: Performed by: INTERNAL MEDICINE

## 2018-06-14 PROCEDURE — 40000014 ZZH STATISTIC ARTERIAL MONITORING DAILY

## 2018-06-14 PROCEDURE — 25000132 ZZH RX MED GY IP 250 OP 250 PS 637: Performed by: STUDENT IN AN ORGANIZED HEALTH CARE EDUCATION/TRAINING PROGRAM

## 2018-06-14 PROCEDURE — 82803 BLOOD GASES ANY COMBINATION: CPT | Performed by: STUDENT IN AN ORGANIZED HEALTH CARE EDUCATION/TRAINING PROGRAM

## 2018-06-14 PROCEDURE — 83735 ASSAY OF MAGNESIUM: CPT | Performed by: STUDENT IN AN ORGANIZED HEALTH CARE EDUCATION/TRAINING PROGRAM

## 2018-06-14 PROCEDURE — 40000275 ZZH STATISTIC RCP TIME EA 10 MIN

## 2018-06-14 PROCEDURE — 25000128 H RX IP 250 OP 636: Performed by: STUDENT IN AN ORGANIZED HEALTH CARE EDUCATION/TRAINING PROGRAM

## 2018-06-14 PROCEDURE — P9016 RBC LEUKOCYTES REDUCED: HCPCS | Performed by: INTERNAL MEDICINE

## 2018-06-14 PROCEDURE — 85027 COMPLETE CBC AUTOMATED: CPT | Performed by: STUDENT IN AN ORGANIZED HEALTH CARE EDUCATION/TRAINING PROGRAM

## 2018-06-14 PROCEDURE — 20000004 ZZH R&B ICU UMMC

## 2018-06-14 PROCEDURE — 40000196 ZZH STATISTIC RAPCV CVP MONITORING

## 2018-06-14 PROCEDURE — 94003 VENT MGMT INPAT SUBQ DAY: CPT

## 2018-06-14 PROCEDURE — 87493 C DIFF AMPLIFIED PROBE: CPT | Performed by: STUDENT IN AN ORGANIZED HEALTH CARE EDUCATION/TRAINING PROGRAM

## 2018-06-14 PROCEDURE — 00000146 ZZHCL STATISTIC GLUCOSE BY METER IP

## 2018-06-14 PROCEDURE — 27210429 ZZH NUTRITION PRODUCT INTERMEDIATE LITER

## 2018-06-14 PROCEDURE — 94640 AIRWAY INHALATION TREATMENT: CPT

## 2018-06-14 PROCEDURE — 80048 BASIC METABOLIC PNL TOTAL CA: CPT | Performed by: STUDENT IN AN ORGANIZED HEALTH CARE EDUCATION/TRAINING PROGRAM

## 2018-06-14 RX ORDER — POTASSIUM CHLORIDE 750 MG/1
20-40 TABLET, EXTENDED RELEASE ORAL
Status: DISCONTINUED | OUTPATIENT
Start: 2018-06-14 | End: 2018-07-10 | Stop reason: HOSPADM

## 2018-06-14 RX ORDER — ALBUTEROL SULFATE 5 MG/ML
2.5 SOLUTION RESPIRATORY (INHALATION) EVERY 4 HOURS
Status: DISCONTINUED | OUTPATIENT
Start: 2018-06-14 | End: 2018-06-15

## 2018-06-14 RX ORDER — POTASSIUM CL/LIDO/0.9 % NACL 10MEQ/0.1L
10 INTRAVENOUS SOLUTION, PIGGYBACK (ML) INTRAVENOUS
Status: DISCONTINUED | OUTPATIENT
Start: 2018-06-14 | End: 2018-07-10 | Stop reason: HOSPADM

## 2018-06-14 RX ORDER — ALBUTEROL SULFATE 0.83 MG/ML
2.5 SOLUTION RESPIRATORY (INHALATION)
Status: DISCONTINUED | OUTPATIENT
Start: 2018-06-14 | End: 2018-06-15

## 2018-06-14 RX ORDER — POTASSIUM CHLORIDE 29.8 MG/ML
20 INJECTION INTRAVENOUS
Status: DISCONTINUED | OUTPATIENT
Start: 2018-06-14 | End: 2018-06-14 | Stop reason: RX

## 2018-06-14 RX ORDER — POTASSIUM CHLORIDE 1.5 G/1.58G
20-40 POWDER, FOR SOLUTION ORAL
Status: DISCONTINUED | OUTPATIENT
Start: 2018-06-14 | End: 2018-07-10 | Stop reason: HOSPADM

## 2018-06-14 RX ORDER — FENTANYL CITRATE 50 UG/ML
25-50 INJECTION, SOLUTION INTRAMUSCULAR; INTRAVENOUS
Status: DISCONTINUED | OUTPATIENT
Start: 2018-06-14 | End: 2018-06-15

## 2018-06-14 RX ORDER — POTASSIUM CHLORIDE 7.45 MG/ML
10 INJECTION INTRAVENOUS
Status: DISCONTINUED | OUTPATIENT
Start: 2018-06-14 | End: 2018-07-10 | Stop reason: HOSPADM

## 2018-06-14 RX ADMIN — HYDROCORTISONE SODIUM SUCCINATE 50 MG: 100 INJECTION, POWDER, FOR SOLUTION INTRAMUSCULAR; INTRAVENOUS at 01:47

## 2018-06-14 RX ADMIN — ALBUTEROL SULFATE 2.5 MG: 2.5 SOLUTION RESPIRATORY (INHALATION) at 12:36

## 2018-06-14 RX ADMIN — HYDROCORTISONE SODIUM SUCCINATE 50 MG: 100 INJECTION, POWDER, FOR SOLUTION INTRAMUSCULAR; INTRAVENOUS at 15:16

## 2018-06-14 RX ADMIN — ALBUTEROL SULFATE 2.5 MG: 2.5 SOLUTION RESPIRATORY (INHALATION) at 16:00

## 2018-06-14 RX ADMIN — PIPERACILLIN SODIUM AND TAZOBACTAM SODIUM 3.38 G: 3; .375 INJECTION, POWDER, LYOPHILIZED, FOR SOLUTION INTRAVENOUS at 06:44

## 2018-06-14 RX ADMIN — HUMAN INSULIN 13 UNITS/HR: 100 INJECTION, SOLUTION SUBCUTANEOUS at 01:33

## 2018-06-14 RX ADMIN — Medication 20 MG: at 19:51

## 2018-06-14 RX ADMIN — PIPERACILLIN SODIUM AND TAZOBACTAM SODIUM 3.38 G: 3; .375 INJECTION, POWDER, LYOPHILIZED, FOR SOLUTION INTRAVENOUS at 18:15

## 2018-06-14 RX ADMIN — Medication 1 PACKET: at 12:04

## 2018-06-14 RX ADMIN — HYDROCORTISONE SODIUM SUCCINATE 50 MG: 100 INJECTION, POWDER, FOR SOLUTION INTRAMUSCULAR; INTRAVENOUS at 19:51

## 2018-06-14 RX ADMIN — FLUTICASONE PROPIONATE 2 SPRAY: 50 SPRAY, METERED NASAL at 08:24

## 2018-06-14 RX ADMIN — METHADONE HYDROCHLORIDE 70 MG: 10 CONCENTRATE ORAL at 08:26

## 2018-06-14 RX ADMIN — PROPOFOL 30 MCG/KG/MIN: 10 INJECTION, EMULSION INTRAVENOUS at 01:53

## 2018-06-14 RX ADMIN — Medication 220 MG: at 08:26

## 2018-06-14 RX ADMIN — ALBUTEROL SULFATE 2.5 MG: 2.5 SOLUTION RESPIRATORY (INHALATION) at 20:00

## 2018-06-14 RX ADMIN — AMITRIPTYLINE HYDROCHLORIDE 50 MG: 50 TABLET, FILM COATED ORAL at 21:03

## 2018-06-14 RX ADMIN — SENNOSIDES 8.6 MG: 8.6 TABLET, FILM COATED ORAL at 15:39

## 2018-06-14 RX ADMIN — FENTANYL CITRATE 50 MCG/HR: 50 INJECTION, SOLUTION INTRAMUSCULAR; INTRAVENOUS at 12:15

## 2018-06-14 RX ADMIN — ACETAMINOPHEN 1000 MG: 500 TABLET, FILM COATED ORAL at 08:23

## 2018-06-14 RX ADMIN — HYDROCORTISONE SODIUM SUCCINATE 50 MG: 100 INJECTION, POWDER, FOR SOLUTION INTRAMUSCULAR; INTRAVENOUS at 08:24

## 2018-06-14 RX ADMIN — HUMAN INSULIN 14 UNITS/HR: 100 INJECTION, SOLUTION SUBCUTANEOUS at 04:09

## 2018-06-14 RX ADMIN — OXYCODONE HYDROCHLORIDE AND ACETAMINOPHEN 500 MG: 500 TABLET ORAL at 08:24

## 2018-06-14 RX ADMIN — VANCOMYCIN HYDROCHLORIDE 1250 MG: 10 INJECTION, POWDER, LYOPHILIZED, FOR SOLUTION INTRAVENOUS at 15:15

## 2018-06-14 RX ADMIN — DEXTROSE MONOHYDRATE: 100 INJECTION, SOLUTION INTRAVENOUS at 00:06

## 2018-06-14 RX ADMIN — PREGABALIN 25 MG: 20 SOLUTION ORAL at 08:33

## 2018-06-14 RX ADMIN — ACETAMINOPHEN 1000 MG: 500 TABLET, FILM COATED ORAL at 19:51

## 2018-06-14 RX ADMIN — Medication 1 PACKET: at 08:26

## 2018-06-14 RX ADMIN — PIPERACILLIN SODIUM AND TAZOBACTAM SODIUM 3.38 G: 3; .375 INJECTION, POWDER, LYOPHILIZED, FOR SOLUTION INTRAVENOUS at 12:04

## 2018-06-14 RX ADMIN — PROPOFOL 25 MCG/KG/MIN: 10 INJECTION, EMULSION INTRAVENOUS at 21:03

## 2018-06-14 RX ADMIN — PREGABALIN 25 MG: 20 SOLUTION ORAL at 15:16

## 2018-06-14 RX ADMIN — MULTIVITAMIN 15 ML: LIQUID ORAL at 08:26

## 2018-06-14 RX ADMIN — POTASSIUM CHLORIDE 20 MEQ: 1.5 POWDER, FOR SOLUTION ORAL at 06:43

## 2018-06-14 RX ADMIN — PIPERACILLIN SODIUM AND TAZOBACTAM SODIUM 3.38 G: 3; .375 INJECTION, POWDER, LYOPHILIZED, FOR SOLUTION INTRAVENOUS at 00:01

## 2018-06-14 RX ADMIN — POLYETHYLENE GLYCOL 3350 17 G: 17 POWDER, FOR SOLUTION ORAL at 15:39

## 2018-06-14 RX ADMIN — FENTANYL CITRATE 50 MCG: 50 INJECTION INTRAMUSCULAR; INTRAVENOUS at 10:55

## 2018-06-14 RX ADMIN — PROPOFOL 30 MCG/KG/MIN: 10 INJECTION, EMULSION INTRAVENOUS at 09:50

## 2018-06-14 RX ADMIN — PREGABALIN 25 MG: 20 SOLUTION ORAL at 19:51

## 2018-06-14 RX ADMIN — HUMAN INSULIN 4 UNITS/HR: 100 INJECTION, SOLUTION SUBCUTANEOUS at 08:44

## 2018-06-14 RX ADMIN — FENTANYL CITRATE 50 MCG: 50 INJECTION, SOLUTION INTRAMUSCULAR; INTRAVENOUS at 14:45

## 2018-06-14 RX ADMIN — ACETAMINOPHEN 1000 MG: 500 TABLET, FILM COATED ORAL at 15:16

## 2018-06-14 NOTE — PROGRESS NOTES
"Discussed with Son Jonny at  per patient request as patient is intubated.  Discussed worsening wound and need for definitive management which would include BKA.  Discussed alternatives including continuing critical care with local wound cares.  Explained that nonop management has been attempted for the past week and she has not improved.  Explained that patient is stable now but could decline at any moment.  Explained that we have reviewed case with ICU team and they believe right foot infection is responsible for intubtation/ARDS and intermittent pressor support.  At present, son/family do not wish to proceed with amputation.  Explained urgency again and that we have tentatively reserved a space for surgery tomorrow at 4pm.  Jonny understands and states he will discuss with his family regarding whether or not to proceed with BKA tomorrow but for now would like to avoid amputation \"at all costs\".  Reviewed risk of death and patients son understands.  Patient son will be at bedside tomorrow am/afternoon with decision for surgery.    Dax Ibrahim MD MS  Orthopedic Surgery, PGY4  [phone]    "

## 2018-06-14 NOTE — PLAN OF CARE
Problem: Patient Care Overview  Goal: Plan of Care/Patient Progress Review  Outcome: Declining  D: At change of shift ortho  Debrided right foot, purulent drainage. Continuous bleeding from R foot. Pt had pain during procedure. Significant bleeding from right foot wound. Non-productive cough with coarse lung sounds. BP was labile. High blood glucose. Urine output decreased.      IA: Gave PRN fent for pain. Changed right foot dressing 2 times. Wound continues to drain blood. Bag suctioned. Patient had scant very thick secretions. Titrate o2 to 40%. Started insulin drip. Tube feed stopped at midnight. Switched to D10. Levo titrated accordingly. Gave unit of RBC and 20 mEq K.    P: possible BKA today, pending family approval. Monitor BP.

## 2018-06-14 NOTE — PLAN OF CARE
Problem: Patient Care Overview  Goal: Plan of Care/Patient Progress Review  Outcome: No Change  D: Pt w/sepsis likely r/o R) foot ulcers, intubated for hypoxic respiratory failure on 6/10.  A/I: Afebrile, VSS.  Peep decreased to 8 from 10, tolerating well w/ no other vent changes.  Rass -1, Propofol decreased to 20 mcg/kg/min, Fentanyl gtt started at 50 mcg/hr for pain control w/improvement per patient report.  PRN Fentanyl given for pre-dressing.  C/o abdominal pain, abdomen more distended. Diarrhea noted x2, C. Diff neg.  Later pt endorsed feeling constipated - PRN Miralax & Senna given.  Continues w/Insulin gtt, dropped to Alg 3.  TF restarted at previous rate of 40 mL/hr w/plans to advance to goal at 2000.  D10 gtt stopped.  Dressing change completed to R) foot x3 w/frequent bleeding noted.  No family at bedside until this later evening.  P: Continue to wean from ventilator, ongoing discussions with family regarding surgery. Maintain comfort & adequate pain control.

## 2018-06-14 NOTE — PLAN OF CARE
Problem: Patient Care Overview  Goal: Plan of Care/Patient Progress Review  Outcome: Declining  Pt w/labile VS through shift; BP, HR, and O2 sats variable with turns and even spontaneously.  Levo gtt from off to 0.06 to maintain SBP>90.  Propofol gtt 15-30 to maintain RASS -1.  Unable to decrease PEEP as planned because O2 sats would decrease to 90% at times so still on 55% with PEEP 10.  Dressing changed at 1100 for RLE and wounds had large amt sangouinous drainage.  WOC orders followed, one quarter sized dark spot present on malleolous.  Ultrasound arrived approx 1500 for RLE US so dressing removed.  Huge blister-type, dark area noticed over entire inner aspect of foot.  MICU notified, ortho notified, CT ordered.  Ortho f/u at 1900, please see note for details.  1 unit PRBC given this afternoon for Hg 6.8, recheck 7.2.  Son present for short visit this afternoon, consent given for blood.  Contacted per ortho this evening re: potential BKA tomorrow given degree of infection.  Continue levo for BP support and propfol for sedation, monitor VS closely as pt has large potential for severe sepsis.  BC sent x2.

## 2018-06-14 NOTE — PROGRESS NOTES
Orthopaedic Surgery Progress Note 06/14/2018    E/S: Bedside I&D of medial right ankle last night with 30cc purulent fluid. Remains intubated in ICU. Afebrile. On minimal Norepi for most of last night, weaned off this morning and maintaining BPs. Having right foot pain.    O:  Temp: 97.4  F (36.3  C) Temp src: Tympanic BP: 100/62 Pulse: 87 Heart Rate: 83 Resp: 21 SpO2: 99 % O2 Device: Mechanical Ventilator      Exam:  Gen: No acute distress, intubated and sedated. Follows commands and nods yes/no  Resp: Breathing with vent  MSK:   RLE:  Inspection: Ace from distal leg to toes, no erythema   Strength: wiggles all toes, grossly fires, hip flexors, quad, hamstings, gsc, TA, EHL, FHL  Sensation: baseline numbness in stocking distribution unchanged from prior exam  Circulation: toes wwp      Recent Labs  Lab 06/14/18  0450 06/13/18  2207 06/13/18  1815 06/13/18  0910  06/13/18  0335  06/11/18  1636  06/09/18  0436   WBC 17.5* 17.9*  --   --   --  20.0*  < >  --   < > 24.8*   HGB 8.2* 7.0* 7.2*  --   < > 6.7*  < >  --   < > 8.6*    311  --   --   --  294  < >  --   < > 259   CRP  --   --   --  220.0*  --   --   --  290.0*  --  230.0*   < > = values in this interval not displayed.  Wound Culture: E faecalis, pansensitive  Wound gram stain: GPCs    Assessment: Alessandra Pak is a 52 year old female admitted sepsis with right foot diabetic ulcers vs pna as presumed source, now s/p bedside I&D right foot on 6/7, 6/12, and 6/13. Encephalopathy improved.  WBC decreasing , afebrile, CRP downtrending, no encephalopathy.  Remains intubated for ARDS/hypoxic respiratory failure, creatinine improving, and intermittently requiring pressors.      Plan:  Medicine Primary  Activity: Elevate BLE  Weight bearing status: NWB BLE  Antibiotics: per primary/ID  Labs: repeat CRP q48hrs (ordered)  DVT prophylaxis: per primary team  Wound Care: recommend daily dressing by bedside RN, weekly WOCN dressing changes  Culture: E faecalis,  pansensitive  OR:  tentatively scheduled for right BKA today at 4PM while patient's family discusses decision about surgery.      Shakeel Estrada MD 06/14/2018  Orthopaedic Surgery Resident, PGY-1  Pager: [phone]    For questions about this patient, please attempt to contact me at my pager prior to contacting the orthopaedics resident on call. Thank you!

## 2018-06-14 NOTE — PROGRESS NOTES
[facility] SERVICE: ONGOING CONSULTATION   Alessandra Pak : 1965 Sex: female:   Medical record number [id number] Attending Physician: Erwin Cardenas  Date of Service: 2018    PROBLEM LIST:   1. Sepsis from right diabetic foot ulcer, osteomyelitis (second proximal phalangeal base, lateral aspect of forefoot); wound cx 18 - moderate growth of Enterococcus faecalis (amp sensitive) and Streptococcus mitis; wound culture in 2018 grew MRSA; history of non compliance; blood cx - negative; declining recommended BKA   2. Pulmonary edema  3. Chronic kidney disease stage 3   4. Leukocytosis   5. Acute respiratory failure - requiring mechanical ventilator (6/10/18)    RECOMMENDATIONS:   1. Agree with empiric coverage with vancomycin for the possibility of MRSA involvement (prior infection)  2. Agree with continuing Zosyn for coverage of Enterococcus and Streptococcus spp as well as potential GNRs not isolated (cultures obtained 2 days post antibiotics)  3. Ultimately source control is the major barrier to cure; BKA would provide source control. Current therapy is geared towards minimizing the possibility of developing sepsis due to her infected foot    DISCUSSION:   52 yr old female with a history of non ischemic cardiomyopathy  with ICD, CKD stage III, chronic pancreatitis s/p pylorus sparing Whipple () with secondary DM c/b diabetic foot ulcers, COPD (not on home O2), HTN, and chronic methadone use presented to ER with altered mental status, persistent leukocytosis, increased pus/maldorous drainage from right foot wound, fever. On empiric Zosyn and vancomycin for the foot since 2018. Levofloxacin was added for empiric atypical coverage of pneumonia. Orthopedics has followed and evaluated patient; recommending BKA for source control and to minimize development of sepsis in light of minimal improvement with antibiotic therapy. Current vancomycin and Zosyn is appropriate to  "cover the isolated organisms, as well as the possibility of the presence of hospital-associated GNRs and MRSA. As identified by orthopedics, source control is the fundamental problem; current antibiotic therapies are only minimizing the development of sepsis from her wounds; not anticipated to be curative.      ID will continue to follow.     NETTIE Martines M.D.  [facility] ID Service Staff  [phone]    CHIEF INFECTIOUS DISEASES COMPLAINT:  Sepsis from right diabetic foot ulcer, osteomyelitis    INTERVAL HISTORY: (Extended HPI requires four HPI elements or the status of three chronic problems)  Scheduled for BKA today; declined. Low-grade fever yesterday; WBCs slowly declining. Team working towards extubation. Bedside I&D of medial right ankle last night with 30cc purulent fluid. The patient has had conservative care with bedside treatments daily and has had repeat limited I&D without clearance of the infection. Arousable and apprears comfortable.    ROS: (Recommend ? 2systems)   A five-point review of systems was obtained and was negative with the exception of that which is described above.  Allergies   Allergen Reactions     No Known Drug Allergies      Allergies were reviewed.  No current outpatient prescriptions on file.       CURRENT ANTI-INFECTIVES:   Vancomycin, Zosyn  EXAMINATION: (Recommend at least 12 bullets from any organ systems)   Vital Signs: /61  Pulse 87  Temp 96.7  F (35.9  C) (Tympanic)  Resp 21  Ht 1.727 m (5' 8\")  Wt 61.5 kg (135 lb 9.3 oz)  SpO2 (!) 89%  BMI 20.62 kg/m2   GENERAL:  well-developed, well-nourished, intubated, arousable, bed in no acute distress.   EYES:  Eyes have anicteric sclerae without conjunctival injection  ENT:  Atraumatic. Oral intubation  NECK:  Supple.  LUNGS:  Clear to auscultation bilateral. Respiratory effort is normal; white/clear sputum per nursing  CARDIOVASCULAR:  Regular rate and rhythm with no murmurs, gallops or rubs.  ABDOMEN:  Normal bowel " sounds, soft, nontender. No appreciable hepatosplenomegaly  SKIN:  No acute rashes.  Line(s) are in place without any surrounding erythema or exudate. Right foot well wrapped; pulses present per nursing; WOC images reviewed in detail  NEUROLOGIC:  Grossly nonfocal. Active x4 extremities  PSYCH: Unable to fully assess    NEW DATA/RESULTS:   All interval basic labs, microbiology results and imaging were personally reviewed.  Reviewed medicine test (PFTs, EKG, cardiac echo or cath): NO    Culture Micro   Date Value Ref Range Status   06/13/2018 No growth after 15 hours  Preliminary   06/13/2018 No growth after 15 hours  Preliminary   06/11/2018 Light growth  Candida glabrata   (A)  Final   06/11/2018 Susceptibility testing not routinely done  Final   06/10/2018 No growth after 4 days  Preliminary       Recent Labs   Lab Test  06/13/18   0910  06/11/18   1636  06/09/18   0436  06/08/18   0240  06/07/18   0649  02/22/18   0636   CRP  220.0*  290.0*  230.0*  310.0*  310.0*  200.0*     Recent Labs   Lab Test  06/14/18   0450  06/13/18 2207  06/13/18   0335  06/12/18   0420  06/11/18   0427  06/10/18   0500   WBC  17.5*  17.9*  20.0*  19.2*  21.5*  22.7*     Recent Labs   Lab Test  06/14/18   0450  06/13/18 2207  06/13/18   0910  06/13/18   0335   CR  1.63*  1.74*  1.82*  1.93*   GFRESTIMATED  33*  31*  29*  27*       Hematology Studies  Recent Labs   Lab Test  06/14/18   0450  06/13/18   2207  06/13/18   0335  06/12/18   0420  06/11/18   0427  06/10/18   0500  06/10/18   0400   06/07/18   0649  06/06/18   2215   02/16/18   1528   WBC  17.5*  17.9*  20.0*  19.2*  21.5*  22.7*  22.3*   < >  28.4*  28.2*   < >  22.3*   ANEU   --    --    --   14.2*   --   16.9*  19.7*   --   24.5*  25.2*   --   19.6*   AEOS   --    --    --   1.0*   --   0.4  0.2   --   0.0  0.0   --   0.0   HCT  24.9*  22.0*  20.2*  22.1*  22.1*  23.8*  22.7*   < >  25.5*  29.7*   < >  35.6   PLT  281  311  294  279  259  220  242   < >  232  317   < >   288    < > = values in this interval not displayed.       Metabolic  Recent Labs   Lab Test  06/14/18   0450  06/13/18   2207  06/13/18   0910   NA  144  142  143   BUN  39*  35*  33*   CO2  23  23  24   CR  1.63*  1.74*  1.82*   GFRESTIMATED  33*  31*  29*       Hepatic Studies  Recent Labs   Lab Test  06/06/18   2215  02/16/18   1528  06/21/17   1528   BILITOTAL  0.5  0.6  0.3   ALKPHOS  233*  212*  252*   ALBUMIN  2.5*  2.8*  3.1*   AST  Canceled, Test credited  22  25   ALT  27  14  38       Immunologlobulins  Recent Labs   Lab Test  02/21/15   0652  02/15/15   1520  02/15/15   0240   IGG  1330   --    --    IGE   --   46   --    SED   --    --   132*       IMAGING RESULTS  The following imaging studies were independently reviewed:    6/13/2018 CT Foot:  Impression:  1. Redemonstration of erosion/osteolysis and periostitis involving the  second proximal phalangeal base, in correlating with previous MRI  findings, this is concerning for osteomyelitis until proven otherwise.   a. Redemonstration of suspected fluid around the second MTP joint  though this is difficult to assess by CT especially without contrast.  2. Focal periostitis involving the fourth metatarsal shaft distally  (image 317 series 10 without associated osteolysis, similar to recent  radiographs from 6/10/2018 but new since 2/2018. This is nonspecific  and may be secondary to stress reaction as there appears to be  relative lack of deep soft tissue defect extension to this level.  Though osteomyelitis cannot be entirely excluded.  3. Plantar ulcers especially under the third and forth metatarsal  shafts and first MTP joint.

## 2018-06-14 NOTE — PROGRESS NOTES
[facility]  MICU Progress Note    Alessandra Pak MRN# [id number]   Age: 52 year old YOB: 1965     Date of Admission: 6/6/2018         Assessment and Plan:   Alessandra Pak is a 52F with h/o chronic pancreatitis s/p Whipple with secondary DM c/b diabetic foot ulcers and CKD, restrictive lung disease with emphysema and fibrosis, NICM s/p ICD, and chronic methadone use who presented to [facility] 6/6 with AMS and hypoxia found to be septic 2/2 diabetic foot ulcers. Treated with levo/vanc/Zosyn, fluids, and debridement x2. Transfer to MICU overnight 6/9 for ARDS. Intubated 6/10.      Changes Today:  - Declined BKA. Discussed with patient, family, and ortho at bedside again this evening.   - Good progress with weaning vent   - Loose stools, C diff negative  - Patient and family still declining BKA, discussed with ortho in person this evening  - Made progress with weaning vent    ===NEURO===  Anisocoria/R afferent pupillary defect, stable  Pupils R > L noted evening 6/10. 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 Seen by neuro crit 6/10 who requested head CT to r/o hemorrhagic stroke, showed no acute intracranial lesions.  - Stable, appreciate neurology involvement. Continue to monitor.     Sedation  - RASS goal 0 to -1  - Propofol gtt as BP allows     Pain  Chronic narcotic use  Fentanyl gtt restarted 6/14 with increased pain in feet R > L.   - Fentanyl gtt + bumps PRN  - PTA methadone 70 mg      Neuropathy  H/o anxiety, depression  - PTA pregabalin, amitriptyline       ===CARDIOVASCULAR===   NICM   H/o HFrEF s/p ICD   Echo 6/10/18 with EF 50% and normal RV function, pulmonary artery pressure, and IVC. Holding further diuresis with CVP 7-8 in setting of recent sepsis and borderline hypotension.   - Follow I&Os  - Hold PTA Lasix, lisinopril, metoprolol      Hypotension  Intermittent hypotension with propofol and Levophed used as needed. Using systolic blood  pressure for BP goals as there has been discrepancy in diastolic pressure b/w cuff and A-line. Serum cortisol 13.3 and hydrocortisone initiated.   - Hydrocortisone 50 mg q6h through 6/16  - Adjust propofol, Levophed to SBP > 90  - Hold lisinopril, lasix as above    ===PULMONARY===  Acute hypoxic respiratory failure 2/2 ARDS  ARDS in the setting of sepsis 2/2 diabetic foot ulcers appears most likely diagnosis with b/l infiltrates, low p/f ratio, and non-cardiogenic edema. Intubated 6/10 with tidal volumes set to 6 cc/kg (380 cc). A-line placed 6/12. ET tube projects around 5 cm above kali, tried to reposition. B/l oxygen entry. Evaluation for PNA negative. Hydrocortisione started 6/13 and subsequently have been able to make good progress on weaning vent up to FiO2 40% and PEEP 5 this evening   - Weaning vent as able  - Hydrocortisone 50 mg q6h through 6/16  - Sputum culture: light growth Candida  - CXR/ABGs PRN  - Consider paralysis/prone pending clinical course  - Antibiotics per ID below      H/o chronic restrictive lung disease with emphysema and fibrosis  PFTs in 2015 demonstrated a restrictive pattern with a severely reduced DLCO (48% predicted) uncorrected for hemoglobin suggestive of an early parenchymal process per read. Some concern for CFPE. Patient has not attended outpatient pulmonology f/u. Previous presentations for hypoxia with pulmonology w/u including bronch w/ BAL and EBUS w/ bx of mediastinal LN. BAL unrevealing, LN bx was unsatisfactory for evaluation. IgG subclasses have been normal in past. A-1-AT was normal in 2015. HSP panel positive for Aspergillus flavus antibody.  - Will need outpatient pulm f/u with HRCT after through acute illness    Recent Labs  Lab 06/13/18  0335 06/12/18  1725  06/10/18  0750  06/09/18  1800 06/09/18  1050 06/07/18  1320   PH 7.33* 7.33*  < >  --   < >  --   --   --    PCO2 45 45  < >  --   < >  --   --   --    PO2 88 92  < >  --   < >  --   --   --    O2PER 55.0 50   < > 100.0  < > 95.0 15L 21   PHV  --   --   --  7.34  --  7.35 7.36 7.30*   PCO2V  --   --   --  46  --  45 40 46   < > = values in this interval not displayed.  Ventilation Mode: CMV/AC  (Continuous Mandatory Ventilation/ Assist Control)  FiO2 (%): 40 %  Rate Set (breaths/minute): 20 breaths/min  Tidal Volume Set (mL): 380 mL  PEEP (cm H2O): 8 cmH2O  Oxygen Concentration (%): 40 %  Resp: 21    ===GASTROINTESTINAL===  Diarrhea  Frequent loose stools started 6/14. C diff negative.  - Continue to monitor     Nutrition  - TFs w/ free water flushes 30 mL q4h    ===RENAL===  NIKOLAY on CKD, improving  Creatinine elevated on initial presentation presumed 2/2 sepsis/pre-renal but returned to baseline. Creatinine again elevated in the setting of diuresis, vancomycin tx, and intermittent hypotension. Had been receiving daily diuresis but holding now. Trending down now.   - Trend Cr  - Track I/Os  - Hold further diuresis  - Hold lisinopril in context of NIKOLAY and intermittent hypotension  - Hold nephrotoxic agents as able      Fluids/Volume: No fluids, hold further diuretics    Baseline creatinine: 1.0-1.1  Recent Labs   Lab Test  06/14/18   0450  06/13/18   2207  06/13/18   0910  06/13/18   0335   BUN  39*  35*  33*  33*   CR  1.63*  1.74*  1.82*  1.93*      I/O last 3 completed shifts:  In: 2285.08 [I.V.:675.08; NG/GT:460]  Out: 1645 [Urine:1645]       ===HEME/ONC===  Leukocytosis, stable  WBC continues to be elevated in setting of sepsis. Improving slowly.   - Trend WBC  - Abx per ID section below      Normochromic, microcytic anemia  Baseline Hgb around 10-11. Patient's MCV usually >80. Hemoglobin trending down slowly to low 7's early in MICU admission. Retic count wnl. Dropped to 6.7 6/13 with  increased bleeding from R foot wounds. Transfused 2 units pRBCs 6/13. BMs non-bloody.     - Trend hemoglobin  - Transfuse pRBCs for hgb < 7      ===ENDOCRINE===  Chronic pancreatitis s/p pylorus-sparing Whipple procedure  Secondary  DM2  Last A1c 9.5% 6/7/18 which was improved from 12.8% in 2/2018. Home regimen: ambulatory pump, 5U lantus before breakfast, low dose sliding scale, and carb coverage. Elevated blood glucoses with initiation of hydrocortisone and switched to insulin gtt. Resumed TFs after being NPO overnight.   - Insulin gtt  - Hypoglycemia protocol  - Diabetes educator, endocrinology consulted previously     Adrenal insufficiency  Serum cortisol 13.3 and hydrocortisone started 6/13.  - Hydrocortisone 50 mg q6h for 3 days, through 6/16        ===ID/SKIN/MSK===  Severe sepsis  Diabetic foot ulcers with purulent drainage  Patient initially found to be septic on presentation 6/6/18 with likely source multiple diabetic foot ulcers. Initially improved with abx and source control by ortho (s/p several I&Ds). Wound culture with pansensitive E faecalis. Imaging (US/CT) notable for persistent concern for osteo and fluid collection. Ortho bedside I&D 6/13 with significant bleeding and purulent drainage. Several discussions involving ortho, MICU team, patient, and patient's family regarding worsening R foot infection and indication for BKA. BKA originally scheduled for 6/14 but, patient and family declined procedure despite knowing risks. Patient was determined to have capacity despite sedation. BKA appears inevitable as the foot is not salvageable per ortho but not a medical emergency at this time with her HD stability and improving respiratory status. Planning to work towards extubation prior to further discussion so the patient may fully participate.   - Continue Zosyn, vancomycin  - Requested further recs from ID, appreciate involvement  - Ortho will continue to follow, appreciate recs  - Wound care following, daily dressing changes per nursing  - Further discussion on BKA with patient and family at future date     Antimicrobials:   Zosyn: 6/7/18 --> current  Vancomycin: 6/7 --> 6/12/18,  6/13 --> current  Levofloxacin: 6/7  -->6/12/18     Cultures/ID workup:  Wound:                         - Culture: R foot 6/9/18: Moderate growth Enterococcus faecalis - pansensitive, light growth Streptococcus mitis group                        - Gram stain: Many GPCs  Blood cultures:                        - 6/6/18 x2: NGTD                        - 6/9/18 x 1: NGTD                        - 6/10/18 x1: NGTD  Urine culture: 6/7/18 x1: No growth  Sputum:                        - Culture: Light growth Candida                        - Gram stain: < PMNs/low power field, no organisms  RVP: Negative  Nasal swab: MRSA +      FEN: None  Lines:  PICC, PIV, A-line  PPX: DVT - subcutaneous heparin  GI - PPI  Family: Family updated at bedside with ortho present as well.  Code status: Full  Dispo: Remain in ICU pending extubation and stability of respiratory/infectious status    Patient seen and discussed with Dr. Cardenas.     Pablo Chawla  MICU Medical Student  Pager: [phone]    I saw this patient together with MS-Pablo Cammy and was present during his physical exam and performed an independent exam at the same time which confirmed findings.  I have edited this note as appropriate to reflect team plan for today.  Patient was seen and discussed with attending physician, Dr. Cardenas, who is also in agreement with above.    Cipriano Fnotanez MD  [id number]  P: [phone]    ==================================================  24 HOUR EVENTS/INTERVAL HISTORY:   - Nursing notes reviewed  - Several discussions involving ortho, MICU team, patient, and patient's family regarding worsening right foot infection and indication for BKA. BKA originally scheduled for 1600 today but patient and family declined procedure despite knowing risks. She was determined to have capacity despite sedation. BKA appears inevitable as the foot is not salvageable per ortho but not a medical emergency at this time. Planning to work towards extubation prior to further discussion so the  patient may fully participate.   - Encouraging family to come in person to hospital tonight to see foot wounds and further discuss  - Endorses pain in her feet, requiring continuous pain medication  - Intermittently on Levo for BP overnight. Off this morning  - 1 unit pRBC yesterday afternoon and 1 unit overnight, hgb stable  - Increasing loose stools, some abdominal pain late morning  ===================================================  PHYSICAL EXAM  Temp:  [96.5  F (35.8  C)-99.1  F (37.3  C)] 96.7  F (35.9  C)  Pulse:  [87] 87  Heart Rate:  [] 79  Resp:  [21-25] 21  BP: (100-141)/(59-79) 133/61  MAP:  [56 mmHg-91 mmHg] 88 mmHg  Arterial Line BP: ()/(20-66) 130/61  FiO2 (%):  [40 %-55 %] 40 %  SpO2:  [94 %-100 %] 94 %  Ventilation Mode: CMV/AC  (Continuous Mandatory Ventilation/ Assist Control)  FiO2 (%): 40 %  Rate Set (breaths/minute): 20 breaths/min  Tidal Volume Set (mL): 380 mL  PEEP (cm H2O): 8 cmH2O  Oxygen Concentration (%): 40 %  Resp: 21    General: Intubated and sedated. NAD. Alert, interactive, appropriate when sedation lightened  HEENT: NC/AT, stable anisocoria/afferent pupillary defect, sclera anicteric, EOMI.   Chest/Resp: Coarse lung sounds and with bibasilar crackles. B/l air entry on exam. No wheeze.   Heart/CV: RRR, normal S1 and S2, no murmurs  Abdomen/GI: Soft, NTND, BS present  Extremities/MSK: No LE edema, WWP. R foot with some blood staining on dressing.  Skin: Warm and dry. R foot wrapped.   Neuro: Follows commands, nods yes/no, MAEE and to command.     =====================================================  LABORATORY DATA  ROUTINE ICU LABS (Last four results)  CMP  Recent Labs  Lab 06/14/18  0450 06/13/18  2207 06/13/18  0910 06/13/18  0335  06/09/18  1806    142 143 142  < > 140   POTASSIUM 3.4 4.4 3.8 4.0  < > 4.2   CHLORIDE 112* 109 110* 110*  < > 108   CO2 23 23 24 23  < > 23   ANIONGAP 9 9 9 9  < > 8   * 352* 163* 146*  < > 118*   BUN 39* 35* 33* 33*  < > 26    CR 1.63* 1.74* 1.82* 1.93*  < > 1.37*   GFRESTIMATED 33* 31* 29* 27*  < > 41*   GFRESTBLACK 40* 37* 35* 33*  < > 49*   RICK 8.0* 7.7* 7.8* 7.9*  < > 8.0*   MAG 2.3  --   --  2.0  --  1.6   PHOS 3.7  --   --  4.1  --   --    < > = values in this interval not displayed.  CBC  Recent Labs  Lab 06/14/18  0450 06/13/18  2207 06/13/18  1815 06/13/18  0815 06/13/18  0335 06/12/18  0420   WBC 17.5* 17.9*  --   --  20.0* 19.2*   RBC 3.10* 2.80*  --   --  2.70* 2.93*   HGB 8.2* 7.0* 7.2* 6.8* 6.7* 7.2*   HCT 24.9* 22.0*  --   --  20.2* 22.1*   MCV 80 79  --   --  75* 75*   MCH 26.5 25.0*  --   --  24.8* 24.6*   MCHC 32.9 31.8  --   --  33.2 32.6   RDW 18.7* 18.2*  --   --  16.9* 16.8*    311  --   --  294 279     INR  Recent Labs  Lab 06/13/18  1645   INR 1.33*     Arterial Blood Gas  Recent Labs  Lab 06/13/18  0335 06/12/18  1725 06/12/18  1330 06/12/18  0829   PH 7.33* 7.33* 7.33* 7.37   PCO2 45 45 46* 43   PO2 88 92 111* 59*   HCO3 24 24 24 25   O2PER 55.0 50 60.0 55.0     Venous Blood Gas   Recent Labs  Lab 06/13/18  0335 06/12/18  1725 06/12/18  1330 06/12/18  0829  06/10/18  0750  06/09/18  1800 06/09/18  1050 06/07/18  1320   PHV  --   --   --   --   --  7.34  --  7.35 7.36 7.30*   PCO2V  --   --   --   --   --  46  --  45 40 46   PO2V  --   --   --   --   --  38  --  37 33 36   HCO3V  --   --   --   --   --  25  --  25 22 23   O2PER 55.0 50 60.0 55.0  < > 100.0  < > 95.0 15L 21   < > = values in this interval not displayed.    Intake/Output Summary (Last 24 hours) at 06/14/18 1313  Last data filed at 06/14/18 1200   Gross per 24 hour   Intake          2350.55 ml   Output             1235 ml   Net          1115.55 ml       Microbiology:  Recent Labs   Lab Test  06/13/18   1530  06/11/18   0936  06/10/18   1020  06/10/18   1000  06/10/18   0941  06/09/18   1600  06/09/18   0730  06/07/18   0031   CULT  No growth after 15 hours  No growth after 15 hours  Light growth  Candida glabrata  *  Susceptibility testing  not routinely done  No growth after 4 days   --   No growth after 4 days  No growth after 5 days  Moderate growth  Enterococcus faecalis  *  Light growth  Streptococcus mitis group  Susceptibility testing not routinely done  *  No growth   SDES  Blood Arterial line  Blood PICC  Sputum  Sputum  Nares  Blood Red port  Urine  Urine  Blood Left Hand  Blood PICC  Right Foot Wound  Right Foot Wound  Catheterized Urine       Imaging:  Recent Results (from the past 24 hour(s))   US Extremity Non Vascular Right    Narrative    Exam: TEMPORARY, 6/13/2018 3:10 PM    Indication: Evaluate for abscess    Comparison: X-ray 6/10/2018, MRI 2/18/2018    Findings:   Focused ultrasound by technologists was obtained of the dorsum of the  right foot medially. There is a superficial predominantly anechoic  fluid collection without some echogenic internal debris/septations  measuring 4.2 x 5.3 x 1.0 cm. There is no associated wall thickening  or peripheral hyperemia. There are multiple small linear echogenic  foci with associated posterior shadowing located deep to the  superficial collection along the fascia.    Deep to this collection and deep to fascia, also at the dorsum of the  foot, there is a more complex appearing collection with multiple areas  of increased echogenicity and wall thickening. There is no vascularity  within the collection, however there is peripheral vascularity. This  collection measures 10.5 x 4.1 x 0.8 cm.    There is no definite communication between these collections, which is  best demonstrated on the last cine image.       Impression    Impression:   1. Complex fluid collection located deep to the fascia along the  dorsum of the medial foot measuring 10.5 x 4.1 x 0.8 cm, suspicious  for developing abscess or hemorrhage. Additional more simple appearing  fluid collection located more superficially along the dorsum of the  foot measuring 4.2 x 5.3 x 1.0 cm, although may represent a seroma or  hematoma,  additional abscess is not excluded. Compared to MRI from  2/18/2018, there has been increase in size of fluid collections at the  dorsum of the foot. Consider repeat MRI to further evaluate the extent  of disease.  2. Multiple linear echogenic foci between the superficial and deep  fluid collections may represent surgical material, foreign bodies or  subcutaneous soft tissue air. On the x-ray from 6/10/2018 there was no  evidence for foreign bodies or surgical material at this location.  Consider repeat x-ray to assess for soft tissue gas given the incision  and drainage performed on 6/12/2018.     I have personally reviewed the examination and initial interpretation  and I agree with the findings.    ARELIS BERMUDEZ MD   CT Foot Right w/o Contrast    Narrative    CT right foot without contrast    History: Worsening of right foot wound. Evaluate for soft tissue gas,  osteomyelitis or abscess.    Techniques: Helical acquisition of images through the right foot was  obtained without administration of contrast.    DLP: 474 mGy-cm    Comparison: Janki 10, 2018, 2/16/2018, prior MRI from February 18, 2018.    Findings:    There is redemonstration of relatively large soft tissue defect  involving the plantar aspect of forefoot at the level of the third and  fourth metatarsal shaft. The defect appears to extend down to the  level of the plantar musculature. There is no definite evidence of  underlying mass or apparent loculated fluid collection though lack of  contrast compromising assessment.    There is also additional soft tissue defect plantar and medial aspect  of forefoot at the level of first metatarsophalangeal joint.  Additional smaller irregularity at the plantar aspect of foot are also  present. There is redemonstration of erosion/osteolysis involving the  second proximal phalangeal base, in correlating with previous MRI  findings, this is concerning for osteomyelitis. There is  redemonstration of suspected  fluid around the second MTP joint  including first interspace though this is difficult to assess by CT  especially without contrast.    There is focal periostitis involving the fourth metatarsal shaft  distally (image 317 series 10 without associated osteolysis, similar  to recent radiographs from 6/10/2018 but new since 2/2018. This is  nonspecific and may be secondary to stress reaction as there appears  to be relative lack of deep soft tissue defect extension to this  level.    Internal derangement of joint is not well assessed with CT technique.    Horizontal cleft of the tibial hallux sesamoid, likely bipartite  variant. There is apparent ankylosis of the fourth proximal  interphalangeal joint.    Achilles tendon insertional and plantar calcaneal enthesopathy. Soft  tissue swelling particularly over the medial malleolus. Apparent  diffuse muscle atrophy, likely related to  microangiopathy/polyneuropathy.      Impression    Impression:  1. Redemonstration of erosion/osteolysis and periostitis involving the  second proximal phalangeal base, in correlating with previous MRI  findings, this is concerning for osteomyelitis until proven otherwise.   a. Redemonstration of suspected fluid around the second MTP joint  though this is difficult to assess by CT especially without contrast.  2. Focal periostitis involving the fourth metatarsal shaft distally  (image 317 series 10 without associated osteolysis, similar to recent  radiographs from 6/10/2018 but new since 2/2018. This is nonspecific  and may be secondary to stress reaction as there appears to be  relative lack of deep soft tissue defect extension to this level.  Though osteomyelitis cannot be entirely excluded.  3. Plantar ulcers especially under the third and forth metatarsal  shafts and first MTP joint.    CRUZITO ADAMS

## 2018-06-14 NOTE — SIGNIFICANT EVENT
"Our team has been rounding on Alessandra and has performed daily wound cares and repeated beside I&D of her right diabetic foot infection.  I do not think the foot is salvageable.  Her scans show osteomyelitis and there is a significant soft tissue defect.  She has been intubated with ARDS thought to be due to a combination of possible pneumonia as well as systemic inflammation from sepsis.  Given that her foot is thought to be contributing to ARDS I recommended a BKA for local infection control.  Although the patient is intubated, she is awake and responds non-verbally with nods and fascial expressions.  We have asked her permission to proceed with BKA and she vigorously refused.  We also called her son and he said he wanted to avoid BKA at \"all costs.\"  I have recommended against this approach as I think the foot will continue to be a source of inflammation and infection contributing to her sepsis and ARDS. I explained that this could make it impossible to extubate her or result in death in a worst case scenario.  Even if the patient overcomes this period of acute infection, I do not think the foot will be functional, and that her function would likely be better with a prosthesis.  Nonetheless, the patient and family have refused.    I called and spoke with the ICU staff this AM, Dr. Erwin Cardenas.  He feels that she is indeed consentable and capable of making her own decisions.  Furthermore, she is medically stable, and her pulmonary and systemic factors seem to be improving.  Therefore there is no emergent indication to proceed with the BKA against the patient's wishes and furthermore she is capable of understanding the risks involved.  Thus, although medically I think a BKA is indicated, we will not proceed since it is against the patient and family wishes, and they understand clearly the risks of allowing this ongoing infection source to remain inadequately treated.    We will continue to discuss with the family.  " I think she should remain on broad spectrum antibiotics and I expressed this to the ICU team.  Furthermore, we will continue to provide daily wound cares and local wound treatment as much as possible.  If the patient changes her mind regarding the BKA we can revisit this at any time.      Ambrose King MD

## 2018-06-14 NOTE — PROGRESS NOTES
Attempted to contact patient's son, Jonny at [phone], as well as patient's daughter, Jb, to discuss the patient's condition and whether or not they wish to proceed with BKA today.  Left message for Jonny, and Jb's voice mailbox was full.  Spoke further with the patient regarding her condition in the presence of nursing staff.  Specifically, discussed our concerns about the progression of her right foot infection despite broad spectrum antibiotics, lack of significant improvement in her respiratory status with conservative wound management, and concerns that her infection could be life-threatening.  The patient nodded to indicate that she understood these concerns.  Again, explained that our recommendation would be for right below knee amputation to obtain source control.  When asked if she would be amenable to below-knee amputation, the patient shook her head no.  Nursing staff again asked the patient if she understood that the infection could be life-threatening, and the patient nodded yes.     I discussed this conversation with the MICU resident and fellow.  MICU team informed me that patient's family usually arrives around 12 PM and that they were planning on having further discussion with the patient's family about their recommendations and showing the family the patient's right foot when they arrive today.  Given that the patient's current answers regarding amputation are consistent with brief pre-intubation discussions about the potential need for below-knee amputation, as well as initial indications from the family, the MICU's current plan is to work towards extubating the patient so that she may participate further in this discussion, as they feel she is close to being ready for extubation.    Discussed the above conversations with Dr. King.    Signed,  Shakeel Estrada MD  PGY-1, Orthopaedic Surgery

## 2018-06-14 NOTE — PROGRESS NOTES
Met with patient's son, Jonny, and Jonny's girlfriend at bedside. MICU team present for discussion.  Discussed the patient's clinical course with Jonny.  Discussed that daily wound cares, repeated bedside I&Ds of her right foot, and broad-spectrum IV antibiotics have been unsuccessful thus far in obtaining source control for her infection.  Discussed that MICU team has performed thorough evaluation of other potential causes of her ARDS, however her right foot infection continues to appear to be the most likely cause of her systemic illness.  I again reiterated the risk of further local progression, systemic infection and risk of death without adequate infection control. Discussed that it is the opinion of all medical staff involved in her care that below knee amputation is the recommended care to obtain infection control.  I also explained that, given the extent of the soft tissue injury/deficits, it would be unlikely for her to have a functional right foot if she were to recover.  Expressed our opinion that a prosthetic foot following below-knee amputation would likely provide a better functional status.  Discussed continued daily wound cares, antibiotics, and serial debridements as another treatment option. I informed them that we feel it would be unlikely to obtain source control with this treatment option as it has proven unsuccessful thus far.    I ensured the patient and her son that we would continue to provide care for her regardless of their decision. Jonny expressed the desire to continue to have discussions with other family members as well as his mother before making a decision.  I assured him that the orthopedics team would be available should any further questions arise.  All questions were answered.  Orthopedics team will continue to provide daily cares and recommends continued broad-spectrum IV antibiotics.  The MICU team expressed that the patient has been stable, her respiratory status is  showing signs of improvement, and that they are working towards extubation so the patient may participate further in these discussions.    Signed,  Shakeel Estrada MD  PGY-1, Orthopaedic Surgery  [phone]

## 2018-06-14 NOTE — PROGRESS NOTES
Brief ortho note    Called to see patient due to increasing drainage from right foot wound, vital otherwise unchanged.    Intubated but responsive to commands  Off pressors  Sig desquamation of medial mid foot, ulceration to bone of 1st mtp, ulceration to fascia of lateral midfoot, large fluctuant mass, 5x8cm, underlying medial midfoot desquamation, incised, revealed ~30cc purulence, wound then thoroughly irrigated with 250cc saline until only SS drainage, wound dressed with routine sterile dressing    Discussed infection with patient and likely need for BKA this week.  Patient OKd to contact family in regard as she is intubated.  Attempted to contact all numbers in demographic section of patient chart and all disconnected.  Discussed case with Dr King staff surgeon who recommends for BKA tomorrow pending medical clearance.  Discussed with ICU team who feels patient is clear for OR tomorrow and also feel that foot infection is source of sepsis/ards.  Therefore, will tentatively plan for OR tomorrow at ~4pm.    NWB RLE  Dressing change daily and prn saturation  abx per primary  Appreciate ICU cares  NPO at MN  Hold chem dvt ppx  Plan for OR tomorrow for BKA with Dr Fernando Ibrahim MD MS  Orthopedic Surgery, PGY4  [phone]

## 2018-06-15 ENCOUNTER — APPOINTMENT (OUTPATIENT)
Dept: PHYSICAL THERAPY | Facility: CLINIC | Age: 53
DRG: 853 | End: 2018-06-15
Payer: COMMERCIAL

## 2018-06-15 ENCOUNTER — APPOINTMENT (OUTPATIENT)
Dept: GENERAL RADIOLOGY | Facility: CLINIC | Age: 53
DRG: 853 | End: 2018-06-15
Payer: COMMERCIAL

## 2018-06-15 LAB
ANION GAP SERPL CALCULATED.3IONS-SCNC: 11 MMOL/L (ref 3–14)
BACTERIA SPEC CULT: NO GROWTH
BASE DEFICIT BLDA-SCNC: 1.1 MMOL/L
BASE DEFICIT BLDA-SCNC: 2.7 MMOL/L
BUN SERPL-MCNC: 44 MG/DL (ref 7–30)
CALCIUM SERPL-MCNC: 8.2 MG/DL (ref 8.5–10.1)
CHLORIDE SERPL-SCNC: 114 MMOL/L (ref 94–109)
CO2 SERPL-SCNC: 21 MMOL/L (ref 20–32)
CREAT SERPL-MCNC: 1.44 MG/DL (ref 0.52–1.04)
CRP SERPL-MCNC: 110 MG/L (ref 0–8)
ERYTHROCYTE [DISTWIDTH] IN BLOOD BY AUTOMATED COUNT: 19 % (ref 10–15)
GFR SERPL CREATININE-BSD FRML MDRD: 38 ML/MIN/1.7M2
GLUCOSE BLDC GLUCOMTR-MCNC: 110 MG/DL (ref 70–99)
GLUCOSE BLDC GLUCOMTR-MCNC: 117 MG/DL (ref 70–99)
GLUCOSE BLDC GLUCOMTR-MCNC: 129 MG/DL (ref 70–99)
GLUCOSE BLDC GLUCOMTR-MCNC: 140 MG/DL (ref 70–99)
GLUCOSE BLDC GLUCOMTR-MCNC: 155 MG/DL (ref 70–99)
GLUCOSE BLDC GLUCOMTR-MCNC: 165 MG/DL (ref 70–99)
GLUCOSE BLDC GLUCOMTR-MCNC: 174 MG/DL (ref 70–99)
GLUCOSE BLDC GLUCOMTR-MCNC: 178 MG/DL (ref 70–99)
GLUCOSE BLDC GLUCOMTR-MCNC: 179 MG/DL (ref 70–99)
GLUCOSE BLDC GLUCOMTR-MCNC: 195 MG/DL (ref 70–99)
GLUCOSE BLDC GLUCOMTR-MCNC: 198 MG/DL (ref 70–99)
GLUCOSE BLDC GLUCOMTR-MCNC: 205 MG/DL (ref 70–99)
GLUCOSE BLDC GLUCOMTR-MCNC: 267 MG/DL (ref 70–99)
GLUCOSE BLDC GLUCOMTR-MCNC: 356 MG/DL (ref 70–99)
GLUCOSE BLDC GLUCOMTR-MCNC: 404 MG/DL (ref 70–99)
GLUCOSE SERPL-MCNC: 185 MG/DL (ref 70–99)
HCO3 BLD-SCNC: 23 MMOL/L (ref 21–28)
HCO3 BLD-SCNC: 24 MMOL/L (ref 21–28)
HCT VFR BLD AUTO: 22.6 % (ref 35–47)
HGB BLD-MCNC: 7.3 G/DL (ref 11.7–15.7)
HGB BLD-MCNC: 7.7 G/DL (ref 11.7–15.7)
LACTATE BLD-SCNC: 1.1 MMOL/L (ref 0.7–2)
Lab: NORMAL
MCH RBC QN AUTO: 25.9 PG (ref 26.5–33)
MCHC RBC AUTO-ENTMCNC: 32.3 G/DL (ref 31.5–36.5)
MCV RBC AUTO: 80 FL (ref 78–100)
O2/TOTAL GAS SETTING VFR VENT: 50 %
O2/TOTAL GAS SETTING VFR VENT: 50 %
PCO2 BLD: 43 MM HG (ref 35–45)
PCO2 BLD: 44 MM HG (ref 35–45)
PH BLD: 7.33 PH (ref 7.35–7.45)
PH BLD: 7.36 PH (ref 7.35–7.45)
PLATELET # BLD AUTO: 306 10E9/L (ref 150–450)
PLATELET # BLD AUTO: 360 10E9/L (ref 150–450)
PO2 BLD: 83 MM HG (ref 80–105)
PO2 BLD: 98 MM HG (ref 80–105)
POTASSIUM SERPL-SCNC: 3.5 MMOL/L (ref 3.4–5.3)
RBC # BLD AUTO: 2.82 10E12/L (ref 3.8–5.2)
SODIUM SERPL-SCNC: 146 MMOL/L (ref 133–144)
SPECIMEN SOURCE: NORMAL
WBC # BLD AUTO: 16.1 10E9/L (ref 4–11)

## 2018-06-15 PROCEDURE — 80048 BASIC METABOLIC PNL TOTAL CA: CPT | Performed by: STUDENT IN AN ORGANIZED HEALTH CARE EDUCATION/TRAINING PROGRAM

## 2018-06-15 PROCEDURE — 40000275 ZZH STATISTIC RCP TIME EA 10 MIN

## 2018-06-15 PROCEDURE — 25000128 H RX IP 250 OP 636: Performed by: INTERNAL MEDICINE

## 2018-06-15 PROCEDURE — 27210429 ZZH NUTRITION PRODUCT INTERMEDIATE LITER

## 2018-06-15 PROCEDURE — 85018 HEMOGLOBIN: CPT | Performed by: INTERNAL MEDICINE

## 2018-06-15 PROCEDURE — G0463 HOSPITAL OUTPT CLINIC VISIT: HCPCS

## 2018-06-15 PROCEDURE — 25000132 ZZH RX MED GY IP 250 OP 250 PS 637: Performed by: STUDENT IN AN ORGANIZED HEALTH CARE EDUCATION/TRAINING PROGRAM

## 2018-06-15 PROCEDURE — 94003 VENT MGMT INPAT SUBQ DAY: CPT

## 2018-06-15 PROCEDURE — 83605 ASSAY OF LACTIC ACID: CPT | Performed by: INTERNAL MEDICINE

## 2018-06-15 PROCEDURE — 25000132 ZZH RX MED GY IP 250 OP 250 PS 637: Performed by: INTERNAL MEDICINE

## 2018-06-15 PROCEDURE — 36592 COLLECT BLOOD FROM PICC: CPT | Performed by: INTERNAL MEDICINE

## 2018-06-15 PROCEDURE — 85027 COMPLETE CBC AUTOMATED: CPT | Performed by: STUDENT IN AN ORGANIZED HEALTH CARE EDUCATION/TRAINING PROGRAM

## 2018-06-15 PROCEDURE — 25000131 ZZH RX MED GY IP 250 OP 636 PS 637: Performed by: STUDENT IN AN ORGANIZED HEALTH CARE EDUCATION/TRAINING PROGRAM

## 2018-06-15 PROCEDURE — 71045 X-RAY EXAM CHEST 1 VIEW: CPT

## 2018-06-15 PROCEDURE — 40000014 ZZH STATISTIC ARTERIAL MONITORING DAILY

## 2018-06-15 PROCEDURE — 97530 THERAPEUTIC ACTIVITIES: CPT | Mod: GP

## 2018-06-15 PROCEDURE — 40000193 ZZH STATISTIC PT WARD VISIT

## 2018-06-15 PROCEDURE — 85049 AUTOMATED PLATELET COUNT: CPT | Performed by: STUDENT IN AN ORGANIZED HEALTH CARE EDUCATION/TRAINING PROGRAM

## 2018-06-15 PROCEDURE — 82803 BLOOD GASES ANY COMBINATION: CPT | Performed by: INTERNAL MEDICINE

## 2018-06-15 PROCEDURE — 85018 HEMOGLOBIN: CPT | Performed by: STUDENT IN AN ORGANIZED HEALTH CARE EDUCATION/TRAINING PROGRAM

## 2018-06-15 PROCEDURE — 12000001 ZZH R&B MED SURG/OB UMMC

## 2018-06-15 PROCEDURE — 86140 C-REACTIVE PROTEIN: CPT | Performed by: STUDENT IN AN ORGANIZED HEALTH CARE EDUCATION/TRAINING PROGRAM

## 2018-06-15 PROCEDURE — 94640 AIRWAY INHALATION TREATMENT: CPT | Mod: 76

## 2018-06-15 PROCEDURE — 25000125 ZZHC RX 250: Performed by: STUDENT IN AN ORGANIZED HEALTH CARE EDUCATION/TRAINING PROGRAM

## 2018-06-15 PROCEDURE — 99233 SBSQ HOSP IP/OBS HIGH 50: CPT | Mod: GC | Performed by: INTERNAL MEDICINE

## 2018-06-15 PROCEDURE — 25000128 H RX IP 250 OP 636: Performed by: STUDENT IN AN ORGANIZED HEALTH CARE EDUCATION/TRAINING PROGRAM

## 2018-06-15 PROCEDURE — 00000146 ZZHCL STATISTIC GLUCOSE BY METER IP

## 2018-06-15 PROCEDURE — 25000128 H RX IP 250 OP 636

## 2018-06-15 PROCEDURE — 36592 COLLECT BLOOD FROM PICC: CPT | Performed by: STUDENT IN AN ORGANIZED HEALTH CARE EDUCATION/TRAINING PROGRAM

## 2018-06-15 RX ORDER — PIPERACILLIN SODIUM, TAZOBACTAM SODIUM 4; .5 G/20ML; G/20ML
4.5 INJECTION, POWDER, LYOPHILIZED, FOR SOLUTION INTRAVENOUS EVERY 6 HOURS
Status: DISCONTINUED | OUTPATIENT
Start: 2018-06-15 | End: 2018-06-24

## 2018-06-15 RX ORDER — OXYCODONE HYDROCHLORIDE 5 MG/1
5-10 TABLET ORAL
Status: DISCONTINUED | OUTPATIENT
Start: 2018-06-15 | End: 2018-06-18

## 2018-06-15 RX ORDER — HEPARIN SODIUM 5000 [USP'U]/.5ML
5000 INJECTION, SOLUTION INTRAVENOUS; SUBCUTANEOUS EVERY 12 HOURS
Status: DISCONTINUED | OUTPATIENT
Start: 2018-06-15 | End: 2018-06-27

## 2018-06-15 RX ORDER — ALBUTEROL SULFATE 5 MG/ML
2.5 SOLUTION RESPIRATORY (INHALATION) EVERY 4 HOURS PRN
Status: DISCONTINUED | OUTPATIENT
Start: 2018-06-15 | End: 2018-06-30

## 2018-06-15 RX ORDER — LISINOPRIL 5 MG/1
5 TABLET ORAL DAILY
Status: DISCONTINUED | OUTPATIENT
Start: 2018-06-15 | End: 2018-06-17

## 2018-06-15 RX ORDER — SODIUM CHLORIDE 9 MG/ML
INJECTION, SOLUTION INTRAVENOUS
Status: COMPLETED
Start: 2018-06-15 | End: 2018-06-15

## 2018-06-15 RX ORDER — DEXTROSE MONOHYDRATE 25 G/50ML
25-50 INJECTION, SOLUTION INTRAVENOUS
Status: DISCONTINUED | OUTPATIENT
Start: 2018-06-15 | End: 2018-07-10 | Stop reason: HOSPADM

## 2018-06-15 RX ORDER — NICOTINE POLACRILEX 4 MG
15-30 LOZENGE BUCCAL
Status: DISCONTINUED | OUTPATIENT
Start: 2018-06-15 | End: 2018-07-10 | Stop reason: HOSPADM

## 2018-06-15 RX ADMIN — INSULIN ASPART 10 UNITS: 100 INJECTION, SOLUTION INTRAVENOUS; SUBCUTANEOUS at 19:13

## 2018-06-15 RX ADMIN — HYDROCORTISONE SODIUM SUCCINATE 50 MG: 100 INJECTION, POWDER, FOR SOLUTION INTRAMUSCULAR; INTRAVENOUS at 07:56

## 2018-06-15 RX ADMIN — PIPERACILLIN SODIUM AND TAZOBACTAM SODIUM 3.38 G: 3; .375 INJECTION, POWDER, LYOPHILIZED, FOR SOLUTION INTRAVENOUS at 06:00

## 2018-06-15 RX ADMIN — HEPARIN SODIUM 5000 UNITS: 5000 INJECTION, SOLUTION INTRAVENOUS; SUBCUTANEOUS at 20:03

## 2018-06-15 RX ADMIN — Medication 20 MG: at 20:00

## 2018-06-15 RX ADMIN — PREGABALIN 25 MG: 20 SOLUTION ORAL at 20:00

## 2018-06-15 RX ADMIN — SODIUM CHLORIDE 1000 ML: 9 INJECTION, SOLUTION INTRAVENOUS at 01:30

## 2018-06-15 RX ADMIN — HUMAN INSULIN 11 UNITS/HR: 100 INJECTION, SOLUTION SUBCUTANEOUS at 05:31

## 2018-06-15 RX ADMIN — ALBUTEROL SULFATE 2.5 MG: 2.5 SOLUTION RESPIRATORY (INHALATION) at 04:36

## 2018-06-15 RX ADMIN — OXYCODONE HYDROCHLORIDE 10 MG: 5 TABLET ORAL at 23:38

## 2018-06-15 RX ADMIN — ALBUTEROL SULFATE 2.5 MG: 2.5 SOLUTION RESPIRATORY (INHALATION) at 08:33

## 2018-06-15 RX ADMIN — OXYCODONE HYDROCHLORIDE 5 MG: 5 TABLET ORAL at 17:55

## 2018-06-15 RX ADMIN — PIPERACILLIN SODIUM,TAZOBACTAM SODIUM 4.5 G: 4; .5 INJECTION, POWDER, FOR SOLUTION INTRAVENOUS at 12:01

## 2018-06-15 RX ADMIN — PREGABALIN 25 MG: 20 SOLUTION ORAL at 14:14

## 2018-06-15 RX ADMIN — Medication 1 PACKET: at 12:12

## 2018-06-15 RX ADMIN — AMITRIPTYLINE HYDROCHLORIDE 50 MG: 50 TABLET, FILM COATED ORAL at 20:45

## 2018-06-15 RX ADMIN — PIPERACILLIN SODIUM AND TAZOBACTAM SODIUM 3.38 G: 3; .375 INJECTION, POWDER, LYOPHILIZED, FOR SOLUTION INTRAVENOUS at 00:47

## 2018-06-15 RX ADMIN — ACETAMINOPHEN 1000 MG: 500 TABLET, FILM COATED ORAL at 14:16

## 2018-06-15 RX ADMIN — LISINOPRIL 5 MG: 5 TABLET ORAL at 17:55

## 2018-06-15 RX ADMIN — POTASSIUM CHLORIDE 20 MEQ: 1.5 POWDER, FOR SOLUTION ORAL at 06:16

## 2018-06-15 RX ADMIN — PIPERACILLIN SODIUM,TAZOBACTAM SODIUM 4.5 G: 4; .5 INJECTION, POWDER, FOR SOLUTION INTRAVENOUS at 23:38

## 2018-06-15 RX ADMIN — METHADONE HYDROCHLORIDE 70 MG: 10 CONCENTRATE ORAL at 08:08

## 2018-06-15 RX ADMIN — ALBUTEROL SULFATE 2.5 MG: 2.5 SOLUTION RESPIRATORY (INHALATION) at 00:00

## 2018-06-15 RX ADMIN — HYDROCORTISONE SODIUM SUCCINATE 50 MG: 100 INJECTION, POWDER, FOR SOLUTION INTRAMUSCULAR; INTRAVENOUS at 20:04

## 2018-06-15 RX ADMIN — HUMAN INSULIN 8 UNITS/HR: 100 INJECTION, SOLUTION SUBCUTANEOUS at 01:24

## 2018-06-15 RX ADMIN — Medication 220 MG: at 08:05

## 2018-06-15 RX ADMIN — VANCOMYCIN HYDROCHLORIDE 1250 MG: 10 INJECTION, POWDER, LYOPHILIZED, FOR SOLUTION INTRAVENOUS at 15:39

## 2018-06-15 RX ADMIN — HYDROCORTISONE SODIUM SUCCINATE 50 MG: 100 INJECTION, POWDER, FOR SOLUTION INTRAMUSCULAR; INTRAVENOUS at 14:17

## 2018-06-15 RX ADMIN — PIPERACILLIN SODIUM,TAZOBACTAM SODIUM 4.5 G: 4; .5 INJECTION, POWDER, FOR SOLUTION INTRAVENOUS at 17:55

## 2018-06-15 RX ADMIN — OXYCODONE HYDROCHLORIDE AND ACETAMINOPHEN 500 MG: 500 TABLET ORAL at 07:56

## 2018-06-15 RX ADMIN — FLUTICASONE PROPIONATE 2 SPRAY: 50 SPRAY, METERED NASAL at 08:06

## 2018-06-15 RX ADMIN — HYDROCORTISONE SODIUM SUCCINATE 50 MG: 100 INJECTION, POWDER, FOR SOLUTION INTRAMUSCULAR; INTRAVENOUS at 03:25

## 2018-06-15 RX ADMIN — ACETAMINOPHEN 1000 MG: 500 TABLET, FILM COATED ORAL at 07:56

## 2018-06-15 RX ADMIN — PREGABALIN 25 MG: 20 SOLUTION ORAL at 07:56

## 2018-06-15 RX ADMIN — MULTIVITAMIN 15 ML: LIQUID ORAL at 08:07

## 2018-06-15 RX ADMIN — Medication 1 PACKET: at 08:06

## 2018-06-15 RX ADMIN — HUMAN INSULIN 6 UNITS/HR: 100 INJECTION, SOLUTION SUBCUTANEOUS at 08:27

## 2018-06-15 RX ADMIN — ACETAMINOPHEN 1000 MG: 500 TABLET, FILM COATED ORAL at 20:01

## 2018-06-15 RX ADMIN — ALBUTEROL SULFATE 2.5 MG: 2.5 SOLUTION RESPIRATORY (INHALATION) at 12:45

## 2018-06-15 RX ADMIN — PROPOFOL 25 MCG/KG/MIN: 10 INJECTION, EMULSION INTRAVENOUS at 06:07

## 2018-06-15 RX ADMIN — OXYCODONE HYDROCHLORIDE 10 MG: 5 TABLET ORAL at 20:45

## 2018-06-15 RX ADMIN — INSULIN GLARGINE 5 UNITS: 100 INJECTION, SOLUTION SUBCUTANEOUS at 15:14

## 2018-06-15 NOTE — PROGRESS NOTES
Orthopaedic Surgery Progress Note 06/15/2018    E/S: Extubated this AM, on nasal cannula O2. Afebrile. Off Norepi, BP's stable.     O:  Temp: 97.9  F (36.6  C) Temp src: Tympanic BP: 149/67   Heart Rate: 98 Resp: 13 SpO2: 100 % O2 Device: Mechanical Ventilator      Exam:  Gen: No acute distress, alert  Resp: Breathing with vent  MSK:   RLE:  Inspection: Ace from distal leg to toes, no erythema   Strength: wiggles all toes, grossly fires, hip flexors, quad, hamstings, gsc, TA, EHL, FHL  Sensation: baseline numbness in stocking distribution unchanged from prior exam  Circulation: toes wwp      Recent Labs  Lab 06/15/18  0334 06/14/18  0450 06/13/18  2207  06/13/18  0910  06/11/18  1636   WBC 16.1* 17.5* 17.9*  --   --   < >  --    HGB 7.3* 8.2* 7.0*  < >  --   < >  --     281 311  --   --   < >  --    .0*  --   --   --  220.0*  --  290.0*   < > = values in this interval not displayed.  Wound Culture: E faecalis, pansensitive  Wound gram stain: GPCs    Assessment: Alessandra Pak is a 52 year old female admitted sepsis with right foot diabetic ulcers vs pna as presumed source, now s/p bedside I&D right foot on 6/7, 6/12, and 6/13.  WBC decreasing , afebrile, CRP downtrending, no encephalopathy.  Intubated for ARDS/hypoxic respiratory failure on 6/10, now extubated 6/15, creatinine improving, and off pressors. No surgery planned per patient and family wishes.     Plan:  MICU Primary  Activity: Elevate BLE  Weight bearing status: NWB BLE  Antibiotics: per primary/ID  Labs: repeat CRP q48hrs (ordered)  DVT prophylaxis: per primary team  Wound Care: recommend daily dressing by bedside RN, weekly WOCN dressing changes  Culture: E faecalis, pansensitive      Shakeel Estrada MD 06/15/2018  Orthopaedic Surgery Resident, PGY-1  Pager: [phone]    For questions about this patient, please attempt to contact me at my pager prior to contacting the orthopaedics resident on call. Thank you!

## 2018-06-15 NOTE — PROGRESS NOTES
[facility] Nurse Inpatient Wound Assessment   Reason for consultation: Evaluate and treat bilateral foot wounds    Assessment  Right foot and left foot wounds due to Diabetic Ulcer  Status: Follow up assessment  Orthopedic team has been following the wound, BKA has been recommended but non urgent and daily dressing changes.    Treatment Plan  POC for Right plantar and lateral foot wounds every other day and PRN: Remove dressing and apply Vashe (order #[id number]) soaked gauze into wound beds for 10 minutes. Remove and do not rinse.   Apply Iodosorb gel (order #[id number]) to wound beds. Pack lateral aspect with a gauze (place a puja thick amount of iodosorb gel to the tip of the gauze and pack with Q-tip)  Pack gently with dry 4x4. Cover with ABD and secure with Kerlix and ACE wrap.       POC for wound located on left heel, and medial foot- Cleanse the area with NS and pat dry.    Cover wound with Mepilex.    Change dressing Q 3 days.    Orders Written  Recommended provider order: ortho is already following  [facility] Nurse follow-up plan:weekly   Nursing to notify the Provider(s) and re-consult the [facility] Nurse if wound(s) deteriorates or new skin concern.    Patient History  According to provider note(s):  Alessandra Pak is a 52 year old female with PMH NICM with ICD, CKD3, chronic pancreatitis status post pylorus sparing Whipple (2001) with secondary DM c/b recurrent diabetic foot ulcers, COPD, HTN, and chronic methadone use who has been admitted for sepsis and longstanding history of recurrent b/l diabteic foot ulcers. Patient is unsure exactly when she first noticed her bilateral foot ulcers, however present for a period time.  2 days ago the patient's daughter noticed a foul smell coming from her mother's feet, increased confusion, and decreased p.o. Intake.      Of note, she was seen by orthopedics during an admission in February 2018 at which time she underwent bedside irrigation and debridement of a right foot abscess and ulcer  and was recommended to follow-up in clinic.  Patient was lost to follow-up until her current presentation.  She reports chronic bilateral numbness in a stocking glove distribution, however denies any new numbness or tingling.  She follows with Dr. Lewis with Podiatry as an outpatient, however notes her last visit was approximately 3 months ago.    Objective Data  Containment of urine/stool: Continent of bowel and Continent of bladder    Active Diet Order  None      Output:   I/O last 3 completed shifts:  In: 3792.53 [I.V.:2081.53; NG/GT:881]  Out: 1280 [Urine:1205; Emesis/NG output:75]    Risk Assessment:   Sensory Perception: 3-->slightly limited  Moisture: 4-->rarely moist  Activity: 2-->chairfast  Mobility: 3-->slightly limited  Nutrition: 2-->probably inadequate  Friction and Shear: 2-->potential problem  Kye Score: 16                          Labs:   Recent Labs  Lab 06/15/18  0334  06/13/18  1645   HGB 7.3*  < >  --    INR  --   --  1.33*   WBC 16.1*  < >  --    .0*  --   --    < > = values in this interval not displayed.    Physical Exam  Skin inspection: focused right foot    Wound Location:  Right plantar 1st MPJ  Date of last photo 6/15/2018      6/8/18                                                                       6/15/18      Wound History: See above. Present on admission. Bedside I and D on 6/12/2018  Measurements (length x width x depth, in cm) 2 cm x 2 cm  x  0.5 cm   Wound Base:  100% pink tissue  Tunneling N/A  Undermining N/A  Palpation of the wound bed: firm   Periwound skin: hyperkeratosis- hard callus  Color: pale  Temperature: normal   Drainage:, moderate  Description of drainage: serosanguinous  Odor: strong  Pain: during dressing change, facial distress     Wound Location:  Right plantar to lateral foot  Date of last photo 6/15/2018        Wound History: See above. Present on admission. I and D on 6/12/2018  Measurements (length x width x depth, in cm) 8 cm x 7 cm  x  1.5  cm   Wound Base:  Mix of moist slough, tendon, and pink tissue  Tunneling: none noted, however, as slough is washed away, tunnels may be noted  Undermining N/A  Palpation of the wound bed: firm   Periwound skin: pale hard callously tissue  Color: pale and pink  Temperature: normal   Drainage:, moderate  Description of drainage: serosanguinous  Odor: mild  Pain: aching and painful during dressing change    Wound Location:  Left lateral heel  Date of last photo 6/15/2018        Wound History: See above. Present on admission.   Measurements (length x width x depth, in cm) 2.2 cm x 3 cm  x  0.3 cm   Wound Base:  100% pink tissue  Tunneling N/A  Undermining N/A  Palpation of the wound bed: firm   Periwound skin: hyperkeratosis- hard callus  Color: pale  Temperature: normal   Drainage:, moderate  Description of drainage: serosanguinous  Odor: strong  Pain: during dressing change, facial distress     Wound Location:  Right medial to great toe  Date of last photo 6/15/2018        Wound History: See above. Present on admission.   Measurements (length x width x depth, in cm) 1.5 cm x 1.2 cm  x  0.2 cm   Wound Base:  100% dark black hard callously tissue  Tunneling N/A  Undermining N/A  Palpation of the wound bed: firm   Periwound skin: hyperkeratosis- hard callus  Color: pale  Temperature: normal   Drainage:, none  Description of drainage: serosanguinous  Odor: strong  Pain: during dressing change, facial distress    other- There is another wound on right medial foot with tunneled wound which is packed with gauze, RN asked WOC not to remove this packing per ortho orders. So, left the packing in place. Did not see any active orders. Waiting for Ortho to round this morning for clarification.    Interventions  Current support surface: Standard  Low air loss mattress  Current off-loading measures: Pillows under calves  Visual inspection of wound(s) completed  Wound Care: done per plan of care  Supplies: ordered: Vashe and  Iodasorb gel at the bedside  Education provided: plan of care and wound progress and clarify orders with ortho team.  Discussed plan of care with Patient and Nurse    Gardenia Dubon RN

## 2018-06-15 NOTE — PLAN OF CARE
Problem: Patient Care Overview  Goal: Plan of Care/Patient Progress Review  Outcome: Improving  Alert and oriented x 4 - was tearful this am but mood improved throughout the afternoon.   Pt was extubated this am 0845, now on 4 L NC O2 sats > 92%. Strong cough - able to clear secretions  -160's - lisinopril restarted this evening. ART line removed.   NJ infusing at 50 ml/hr with 30 ml FW flushes q 4 hr. Pt passed bedside swallow this afternoon and is on clear liquid diet.   BM x 4 this shift - brown/tan loose stools.   Carter removed this am - voiding adequately.   Multiple skin issues including chronic diabetic ulcers - see flowsheet for details.     TKO infusing through PICC.     Pt transferred to 5th floor.     Comments: Report given and belongings sent with patient.

## 2018-06-15 NOTE — PROGRESS NOTES
"Brief Medicine Acceptance Note    Patient was initially admitted 6/7/18 for sepsis due to likely right diabetic foot infection, acute hypoxic respiratory failure, NIKOLAY on CKD, encephalopathy.  Patient had prior swab or foot wounds which grew MRSA. Patient became more encephalopathic and hypotensive on day of admission, stat ortho consult obtained, I&D of large right foot abscess performed, treated with IVF with improvement due to concerns for osteomyelitis, MRI attempted, however patient not a candidate due to MRI noncompatible ICD. Patient became increasingly hypoxic on 6/9  despite diuresis patient required HFNC and subsequently intubation likely due to ARDS (no change with diuresis, resp viral panel neg).  Patient underwent repeat I&D 6/7, cultures grew pansensitive E faecalis and strep mitis.  CT foot 6/13 concerning for 2nd toe osteomyelitis. ID consulted, agreed with broad coverage with vancomycin given prior MRSA and Zosyn for broad gram-negative coverage.  Ortho recommending BKA, patient's family was not clear the patient would want this.  Patient extubated 6/15 in the a.m., requiring 4 L.  Continuing to attempt suppressive IV antibiotics.    Today, patient is very tearful when discussing possibility of amputation, states \"I am only 52 years old\".  Discussed severity of patient's illness which could be life-threatening if source controlled not obtained.  Collaboratively planned on discussions with amputee rehab and health psychology to work through these issues.    /67  Pulse 87  Temp 97.3  F (36.3  C) (Tympanic)  Resp 13  Ht 1.727 m (5' 8\")  Wt 62.8 kg (138 lb 7.2 oz)  SpO2 96%  BMI 21.05 kg/m2    General: tearful, no acute distress  HEENT: Normocephalic, atraumatic, mucous membranes moist  CV: Regular rate and rhythm, no murmurs rubs gallops  Pulm: Bibasilar crackles, breathing comfortably  Abdomen: Soft, nontender, nondistended, hypoactive bowel sounds  Extremities: Right foot and left ankle " wrapped, patient lacks sensation to light touch in right and left feet, intermittently to mid shin    A/P  -Agree with MICU plan  -Ongoing vancomycin/Zosyn for broad coverage for osteomyelitis associated with diabetic foot ulcer  -Continue discussions regarding amputation, health psychology consult and continue discussions with rehab

## 2018-06-15 NOTE — PROGRESS NOTES
[facility], [place]  Procedure Note          Extubation:       Alessandra Pak  MRN# [id number]   Janki 15, 2018, 10:35 AM         Patient extubated at: Janki 15, 2018, 8:45 AM   Supplemental Oxygen: Via nasal cannula at 4 liters per minute   Cough: The cough is good   Secretion Mode: Able to clear   Secretion Amount: Moderate amount, moderately thick and white / yellow in color   Respiratory Exam:: Breath sounds: good aeration     Location: bilaterally   Skin Exam:: Patient color: natural   Patient Status: Currently appears comfortable   Arterial Blood Gasses: pH Arterial (pH)   Date Value   06/15/2018 7.36     pO2 Arterial (mm Hg)   Date Value   06/15/2018 83     pCO2 Arterial (mm Hg)   Date Value   06/15/2018 43     Bicarbonate Arterial (mmol/L)   Date Value   06/15/2018 24            Recorded by Shannan Garcia

## 2018-06-15 NOTE — PLAN OF CARE
Problem: Patient Care Overview  Goal: Plan of Care/Patient Progress Review  Outcome: Improving  D: Patient's PEEP was turn to 5 from 8. Tolerated change in setting well. Patient continues to have thick secretions with non-productive cough. At 0400 patient was unable to clear thick secretions and increased work breathing. Patient was placed on SBT 7/5 at 0545.  BP labile. SBP from 85's to 130. K was low in the AM.    IA: Did bag suctions with lavage at 0400 and was able to get small secretions out. Lungs sounded clearer after this. Patient tolerating SBT well. Replaced K at 0600.    P: Possible extubation.

## 2018-06-15 NOTE — PROGRESS NOTES
[facility]  MICU Transfer Note    Alessandra Pak MRN# [id number]   Age: 52 year old YOB: 1965     Date of Admission: 6/6/2018    Brief Summary:  Alessandra Pak is a 52F with history notable for chronic pancreatitis s/p Whipple with secondary DM c/b diabetic foot ulcers and CKD, restrictive lung disease with emphysema and fibrosis, NICM s/p ICD, and chronic methadone use who presented to [facility] 6/6 with AMS and hypoxia found to be septic 2/2 diabetic foot ulcers. Treated with levo/vanc/Zosyn, fluids, and debridement x2. Transfer to MICU overnight 6/9 for ARDS. Intubated 6/10 through to morning of 6/15. Pneumonia work-up negative and blood cultures w/o growth. Required Levophed intermittently to maintain MAPs with propofol for majority of intubation. Respiratory status improved following initiation of steroids 6/13. Weaning of ventilator was slow initially and complicated by progression of R foot ulcers/wounds. Ortho has performed several bedside I&Ds. Several discussions involving ortho, MICU team, patient, and patient's family regarding worsening R foot infection and indication for BKA. BKA originally scheduled for 6/14 but, patient and family declined procedure. Patient was determined to have capacity despite sedation/intubation. Per ortho, BKA appears inevitable as the foot is not salvageable but has not become a medical emergency, especially with her more recent HD stability and improved respiratory status. Plan to have future conversations with patient and family now that she is extubated and can participate fully. She has remained HD stable on nasal cannula since extubation. Transfer to medicine for continuing antibiotic therapy and further discussion regarding BKA.          Assessment and Plan:     ===NEURO===  Anisocoria/R afferent pupillary defect, stable  Pupils R > L noted evening 6/10. 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 Seen by  neuro crit 6/10 who requested head CT to r/o hemorrhagic stroke, showed no acute intracranial lesions.  - Stable, appreciate neurology involvement. Continue to monitor.     Sedation  Off sedation 6/15 with extubation.      Pain  Chronic narcotic use  History of opioid addiction treated with daily methadone. She has endorsed persistent pain in feet R > L during MICU stay. Previously on fentanyl gtt while intubated/sedated. Now with oxycodone PRN.   - Oxycodone 5-10 mg q3h PRN  - Continue PTA methadone 70 mg      Neuropathy  H/o anxiety, depression  - PTA pregabalin, amitriptyline       ===CARDIOVASCULAR===   NICM   H/o HFrEF s/p ICD   Echo 6/10/18 with EF 50% and normal RV function, pulmonary artery pressure, and IVC. Recent diuresis held in the due to NIKOLAY and CVP 7-8 in setting of recent sepsis and borderline hypotension. Sepsis and NIKOLAY improved.   - Follow I&Os  - Holding PTA Lasix, lisinopril. Consider restarting soon     Hypotension, resolved  Hypertension  Much improved with resolving sepsis. Serum cortisol 13.3 and hydrocortisone initiated 6/13 - 6/16. BPs more recently have been hypertensive.   - Hydrocortisone 50 mg q6h through 6/16  - Holding lisinopril, Lasix but could consider restarting if persistently hypertensive      ===PULMONARY===  Acute hypoxic respiratory failure 2/2 ARDS, improved  Presented with ARDS in the setting of sepsis 2/2 diabetic foot ulcers as most likely source. Evaluation for PNA negative. Hydrocortisione started 6/13 and subsequently able to make good progress on weaning vent. Did well on pressure support trial and extubated early on 6/15. Stable on NC.   - Weaning supplemental O2 as able  - Hydrocortisone 50 mg q6h through 6/16  - Antibiotics per ID below      H/o chronic restrictive lung disease with emphysema and fibrosis  PFTs in 2015 demonstrated a restrictive pattern with a severely reduced DLCO (48% predicted) uncorrected for hemoglobin suggestive of an early parenchymal  process per read. Some concern for CFPE. Patient has not attended outpatient pulmonology f/u. Previous presentations for hypoxia with pulmonology w/u including bronch w/ BAL and EBUS w/ bx of mediastinal LN. BAL unrevealing, LN bx was unsatisfactory for evaluation. IgG subclasses have been normal in past. A-1-AT was normal in 2015. HSP panel positive for Aspergillus flavus antibody.  - Will need outpatient pulm f/u with HRCT after through acute illness      ===GASTROINTESTINAL===  Diarrhea, resolved  Frequent loose stools started 6/14. C diff negative.  - Continue to monitor     Nutrition  TFs switching to regular diet. Passed bedside swallow evaluation.   - Regular diet, progress as tolerated  - Wean TFs and diet progresses      ===RENAL===  NIKOLAY on CKD, improving  Creatinine elevated on initial presentation presumed 2/2 sepsis/pre-renal but returned to baseline. Creatinine again elevated in the setting of diuresis, vancomycin tx, and intermittent hypotension. Trending down now.   - Trend Cr  - Track I/Os  - Holding diuresis  - Consider restarting lisinopril as creatinine and BP continues to improve  - Hold nephrotoxic agents as able      Fluids/Volume: No fluids, hold further diuretics    Baseline creatinine: 1.0-1.1  Recent Labs   Lab Test  06/15/18   0334  06/14/18   0450  06/13/18   2207  06/13/18   0910   BUN  44*  39*  35*  33*   CR  1.44*  1.63*  1.74*  1.82*      I/O last 3 completed shifts:  In: 3792.53 [I.V.:2081.53; NG/GT:881]  Out: 1280 [Urine:1205; Emesis/NG output:75]       ===HEME/ONC===  Leukocytosis, improving  WBC continues to be elevated in setting of sepsis. Improving now.   - Trend WBC  - Abx per ID section below      Normochromic, microcytic anemia  Baseline Hgb around 10-11. Patient's MCV usually >80. Hemoglobin trending down slowly to low 7's early in MICU admission. Retic count wnl. Dropped to 6.7 6/13 with increased bleeding from R foot wounds and transfused 2 units pRBCs. BMs non-bloody.  Light bleeding from R foot wounds is persistent. Hemoglobin remains low but stable.         - Daily CBC  - Transfuse pRBCs for hgb < 7      ===ENDOCRINE===  Chronic pancreatitis s/p pylorus-sparing Whipple procedure  Secondary DM2  Last A1c 9.5% 6/7/18 which was improved from 12.8% in 2/2018. Home regimen: ambulatory pump, 5U Lantus before breakfast, low dose sliding scale, and carb coverage. Elevated blood glucoses with initiation of hydrocortisone and switched to insulin gtt. Should monitor for changing insulin needs with initiation of regular diet and discontinuation of steroids 6/16.   - Insulin gtt --> 5u basal + high SSI  - Hypoglycemia protocol  - Diabetes educator, endocrinology consulted previously     Adrenal insufficiency  Serum cortisol 13.3 and hydrocortisone started 6/13.  - Hydrocortisone 50 mg q6h for 3 days, through 6/16        ===ID/SKIN/MSK===  Severe sepsis, improved  Diabetic foot ulcers with purulent drainage  Patient initially found to be septic on presentation 6/6/18 with likely source multiple diabetic foot ulcers. Initially improved with abx and source control by ortho (s/p several I&Ds). Wound culture with pansensitive E faecalis. Imaging (US/CT) notable for persistent concern for osteo and fluid collection. Ortho bedside I&D 6/13 with significant bleeding and purulent drainage. Several discussions involving ortho, MICU team, patient, and patient's family regarding worsening R foot infection and indication for BKA. BKA originally scheduled for 6/14 but, patient and family declined procedure. Patient was determined to have capacity despite sedation/intubation. BKA appears inevitable as the foot is not salvageable per ortho but not a medical emergency at this time with her HD stability and improved respiratory status. Plan to have future conversations with patient and family now that she is extubated and can participate fully.    - Continue Zosyn, vancomycin for now  - ID supports current  antibiotic regimen, recommend source control with BKA, need to discuss end date  - Ortho will continue to follow, appreciate recs  - Wound care following, daily dressing changes per nursing  - Continue discussions on BKA with patient/family and ortho      Antimicrobials:   Zosyn: 6/7/18 --> current  Vancomycin: 6/7 --> 6/12/18,  6/13 --> current  Levofloxacin: 6/7 -->6/12/18     Cultures/ID workup:  Wound:                         - Culture: R foot 6/9/18: Moderate growth Enterococcus faecalis - pansensitive, light growth Streptococcus mitis group                        - Gram stain: Many GPCs  Blood cultures:                        - 6/6/18 x2: NGTD                        - 6/9/18 x 1: NGTD                        - 6/10/18 x1: NGTD  Urine culture: 6/7/18 x1: No growth  Sputum:                        - Culture: Light growth Candida                        - Gram stain: < PMNs/low power field, no organisms  RVP: Negative  Nasal swab: MRSA +      FEN: None  Lines:  PICC, PIV  PPX: DVT - SCDs  GI - PPI  Family: Son, Jonny, has been primary contact.  Code status: Full  Dispo: Transfer to floor    Patient seen and discussed with Dr. Cardenas.     Pablo Cammy  [facility]  Pager: [phone]    I saw this patient together with Douglas Chawla and was present during his physical exam and performed an independent exam at the same time which confirmed findings.  I have edited this note as appropriate to reflect team plan for today.  Patient was seen and discussed with attending physician, Dr. Cardenas, who is also in agreement with above.    Cipriano Fontanez MD  [id number]  P: [phone]    ==================================================  24 HOUR EVENTS/INTERVAL HISTORY:   - Nursing notes reviewed  - Discussion with patient/family and ortho last evening. Patient and family still declining BKA for now but willing to continue discussions  - ID recommended continuing the Zosyn and vancomycin as well as source  control if patient agrees  - Endorses pain in her feet, requiring continuous pain medication  - Remained off Levophed with improved BPs  - Loose stools improved  - Pressure support trial this morning and then extubated  ===================================================  PHYSICAL EXAM  Temp: 97.3  F (36.3  C) Temp src: Tympanic BP: 149/67   Heart Rate: 100 Resp: 13 SpO2: 96 % O2 Device: Oxymask Oxygen Delivery: 4 LPM  General: NAD. Alert, interactive, responding appropriately. On nasal cannula.   HEENT: NC/AT, stable anisocoria/afferent pupillary defect, sclera anicteric, EOMI.   Chest/Resp: Coarse lung sounds and with bibasilar crackles. B/l air entry on exam. No wheeze.   Heart/CV: RRR, normal S1 and S2, no murmurs  Abdomen/GI: Soft, NTND, BS present  Extremities/MSK: No LE edema, WWP. R foot with some blood staining on dressing.  Skin: Warm and dry. R foot wrapped.   Neuro: No focal deficits. MAEE      =====================================================  LABORATORY DATA  ROUTINE ICU LABS (Last four results)  CMP  Recent Labs  Lab 06/15/18  0334 06/14/18  0450 06/13/18  2207 06/13/18  0910 06/13/18  0335  06/09/18  1806   * 144 142 143 142  < > 140   POTASSIUM 3.5 3.4 4.4 3.8 4.0  < > 4.2   CHLORIDE 114* 112* 109 110* 110*  < > 108   CO2 21 23 23 24 23  < > 23   ANIONGAP 11 9 9 9 9  < > 8   * 195* 352* 163* 146*  < > 118*   BUN 44* 39* 35* 33* 33*  < > 26   CR 1.44* 1.63* 1.74* 1.82* 1.93*  < > 1.37*   GFRESTIMATED 38* 33* 31* 29* 27*  < > 41*   GFRESTBLACK 46* 40* 37* 35* 33*  < > 49*   RICK 8.2* 8.0* 7.7* 7.8* 7.9*  < > 8.0*   MAG  --  2.3  --   --  2.0  --  1.6   PHOS  --  3.7  --   --  4.1  --   --    < > = values in this interval not displayed.  CBC  Recent Labs  Lab 06/15/18  0334 06/14/18  0450 06/13/18  2207 06/13/18  1815  06/13/18  0335   WBC 16.1* 17.5* 17.9*  --   --  20.0*   RBC 2.82* 3.10* 2.80*  --   --  2.70*   HGB 7.3* 8.2* 7.0* 7.2*  < > 6.7*   HCT 22.6* 24.9* 22.0*  --   --  20.2*    MCV 80 80 79  --   --  75*   MCH 25.9* 26.5 25.0*  --   --  24.8*   MCHC 32.3 32.9 31.8  --   --  33.2   RDW 19.0* 18.7* 18.2*  --   --  16.9*    281 311  --   --  294   < > = values in this interval not displayed.  INR    Recent Labs  Lab 06/13/18  1645   INR 1.33*     Arterial Blood Gas    Recent Labs  Lab 06/15/18  0750 06/15/18  0334 06/14/18 1815 06/13/18  0335   PH 7.36 7.33* 7.35 7.33*   PCO2 43 44 43 45   PO2 83 98 89 88   HCO3 24 23 23 24   O2PER 50.0 50.0 40 55.0     Venous Blood Gas   Recent Labs  Lab 06/15/18  0750 06/15/18  0334 06/14/18 1815 06/13/18  0335  06/10/18  0750  06/09/18  1800 06/09/18  1050   PHV  --   --   --   --   --  7.34  --  7.35 7.36   PCO2V  --   --   --   --   --  46  --  45 40   PO2V  --   --   --   --   --  38  --  37 33   HCO3V  --   --   --   --   --  25  --  25 22   O2PER 50.0 50.0 40 55.0  < > 100.0  < > 95.0 15L   < > = values in this interval not displayed.     Microbiology:  Recent Labs   Lab Test  06/14/18   1110  06/13/18   1530  06/11/18   0936  06/10/18   1020  06/10/18   1000  06/10/18   0941  06/09/18   1600  06/09/18   0730  06/07/18   0031   CULT   --   No growth after 2 days  No growth after 2 days  Light growth  Candida glabrata  *  Susceptibility testing not routinely done  No growth after 5 days   --   No growth after 5 days  No growth  Moderate growth  Enterococcus faecalis  *  Light growth  Streptococcus mitis group  Susceptibility testing not routinely done  *  No growth   SDES  Feces  Blood Arterial line  Blood PICC  Sputum  Sputum  Nares  Blood Red port  Urine  Urine  Blood Left Hand  Blood PICC  Right Foot Wound  Right Foot Wound  Catheterized Urine       Imaging:  Recent Results (from the past 24 hour(s))   XR Chest Port 1 View    Narrative    XR CHEST PORT 1 VW  6/15/2018 4:33 AM    History:  Increased secretions, r/o new infiltrate; .     Comparison: Chest radiograph dated 6/12/2018    Findings:   Semiupright AP chest radiograph.  Endotracheal tube with the tip  projects at 4.5 cm above kali. Stable position of left chest wall  implantable cardiac defibrillator, right PICC line, gastric and  feeding tubes. Cardiomegaly. Pulmonary vasculature is mildly  indistinct. Increased left retrocardiac opacities. No appreciable  pneumothorax. Possible small left pleural effusion.      Impression    IMPRESSION:  1.  Stable supportive lines and tubes.  2.  Increased left retrocardiac consolidative changes, atelectasis  versus infection.  3.  Possible small left pleural effusion.    I have personally reviewed the examination and initial interpretation  and I agree with the findings.    IZZY HARMON MD

## 2018-06-15 NOTE — PROGRESS NOTES
Patient admitted from . Patient has her cell phone clothes shoes and insulin pump and a necklace in a cup in her bag. Patient has scabbed areas on both ears and has pressure ulcers on both of her feet. Patient is alert and orientated. Continues on tube feed at 50cc. On vanco iv. P:cont to monitor

## 2018-06-16 ENCOUNTER — APPOINTMENT (OUTPATIENT)
Dept: PHYSICAL THERAPY | Facility: CLINIC | Age: 53
DRG: 853 | End: 2018-06-16
Payer: COMMERCIAL

## 2018-06-16 LAB
ANION GAP SERPL CALCULATED.3IONS-SCNC: 9 MMOL/L (ref 3–14)
BACTERIA SPEC CULT: NO GROWTH
BACTERIA SPEC CULT: NO GROWTH
BUN SERPL-MCNC: 36 MG/DL (ref 7–30)
CALCIUM SERPL-MCNC: 8.3 MG/DL (ref 8.5–10.1)
CHLORIDE SERPL-SCNC: 112 MMOL/L (ref 94–109)
CO2 SERPL-SCNC: 25 MMOL/L (ref 20–32)
CREAT SERPL-MCNC: 1.01 MG/DL (ref 0.52–1.04)
GFR SERPL CREATININE-BSD FRML MDRD: 58 ML/MIN/1.7M2
GLUCOSE BLDC GLUCOMTR-MCNC: 218 MG/DL (ref 70–99)
GLUCOSE BLDC GLUCOMTR-MCNC: 251 MG/DL (ref 70–99)
GLUCOSE BLDC GLUCOMTR-MCNC: 259 MG/DL (ref 70–99)
GLUCOSE BLDC GLUCOMTR-MCNC: 286 MG/DL (ref 70–99)
GLUCOSE BLDC GLUCOMTR-MCNC: 296 MG/DL (ref 70–99)
GLUCOSE BLDC GLUCOMTR-MCNC: 339 MG/DL (ref 70–99)
GLUCOSE SERPL-MCNC: 369 MG/DL (ref 70–99)
Lab: NORMAL
Lab: NORMAL
MAGNESIUM SERPL-MCNC: 2.1 MG/DL (ref 1.6–2.3)
PHOSPHATE SERPL-MCNC: 1.7 MG/DL (ref 2.5–4.5)
POTASSIUM SERPL-SCNC: 3.2 MMOL/L (ref 3.4–5.3)
SODIUM SERPL-SCNC: 146 MMOL/L (ref 133–144)
SPECIMEN SOURCE: NORMAL
SPECIMEN SOURCE: NORMAL
VANCOMYCIN SERPL-MCNC: 19.7 MG/L

## 2018-06-16 PROCEDURE — 25000132 ZZH RX MED GY IP 250 OP 250 PS 637: Performed by: INTERNAL MEDICINE

## 2018-06-16 PROCEDURE — 36592 COLLECT BLOOD FROM PICC: CPT | Performed by: STUDENT IN AN ORGANIZED HEALTH CARE EDUCATION/TRAINING PROGRAM

## 2018-06-16 PROCEDURE — 25000132 ZZH RX MED GY IP 250 OP 250 PS 637: Performed by: STUDENT IN AN ORGANIZED HEALTH CARE EDUCATION/TRAINING PROGRAM

## 2018-06-16 PROCEDURE — 25000125 ZZHC RX 250: Performed by: STUDENT IN AN ORGANIZED HEALTH CARE EDUCATION/TRAINING PROGRAM

## 2018-06-16 PROCEDURE — 80048 BASIC METABOLIC PNL TOTAL CA: CPT | Performed by: STUDENT IN AN ORGANIZED HEALTH CARE EDUCATION/TRAINING PROGRAM

## 2018-06-16 PROCEDURE — 25000128 H RX IP 250 OP 636: Performed by: INTERNAL MEDICINE

## 2018-06-16 PROCEDURE — 25000128 H RX IP 250 OP 636: Performed by: STUDENT IN AN ORGANIZED HEALTH CARE EDUCATION/TRAINING PROGRAM

## 2018-06-16 PROCEDURE — 97110 THERAPEUTIC EXERCISES: CPT | Mod: GP

## 2018-06-16 PROCEDURE — 84100 ASSAY OF PHOSPHORUS: CPT | Performed by: STUDENT IN AN ORGANIZED HEALTH CARE EDUCATION/TRAINING PROGRAM

## 2018-06-16 PROCEDURE — 97530 THERAPEUTIC ACTIVITIES: CPT | Mod: GP

## 2018-06-16 PROCEDURE — 40000193 ZZH STATISTIC PT WARD VISIT

## 2018-06-16 PROCEDURE — 00000146 ZZHCL STATISTIC GLUCOSE BY METER IP

## 2018-06-16 PROCEDURE — 27210429 ZZH NUTRITION PRODUCT INTERMEDIATE LITER

## 2018-06-16 PROCEDURE — 80202 ASSAY OF VANCOMYCIN: CPT | Performed by: INTERNAL MEDICINE

## 2018-06-16 PROCEDURE — 83735 ASSAY OF MAGNESIUM: CPT | Performed by: STUDENT IN AN ORGANIZED HEALTH CARE EDUCATION/TRAINING PROGRAM

## 2018-06-16 PROCEDURE — 12000008 ZZH R&B INTERMEDIATE UMMC

## 2018-06-16 RX ORDER — LANOLIN ALCOHOL/MO/W.PET/CERES
6 CREAM (GRAM) TOPICAL AT BEDTIME
Status: DISCONTINUED | OUTPATIENT
Start: 2018-06-16 | End: 2018-07-10 | Stop reason: HOSPADM

## 2018-06-16 RX ORDER — LISINOPRIL 10 MG/1
10 TABLET ORAL DAILY
Status: DISCONTINUED | OUTPATIENT
Start: 2018-06-16 | End: 2018-06-27

## 2018-06-16 RX ORDER — MAGNESIUM SULFATE HEPTAHYDRATE 40 MG/ML
4 INJECTION, SOLUTION INTRAVENOUS EVERY 4 HOURS PRN
Status: DISCONTINUED | OUTPATIENT
Start: 2018-06-16 | End: 2018-07-10 | Stop reason: HOSPADM

## 2018-06-16 RX ORDER — METHADONE HYDROCHLORIDE 5 MG/5ML
70 SOLUTION ORAL DAILY
Status: DISCONTINUED | OUTPATIENT
Start: 2018-06-16 | End: 2018-07-10 | Stop reason: HOSPADM

## 2018-06-16 RX ADMIN — Medication 70 MG: at 08:33

## 2018-06-16 RX ADMIN — PIPERACILLIN SODIUM,TAZOBACTAM SODIUM 4.5 G: 4; .5 INJECTION, POWDER, FOR SOLUTION INTRAVENOUS at 12:20

## 2018-06-16 RX ADMIN — HYDROCORTISONE SODIUM SUCCINATE 50 MG: 100 INJECTION, POWDER, FOR SOLUTION INTRAMUSCULAR; INTRAVENOUS at 08:38

## 2018-06-16 RX ADMIN — INSULIN ASPART 10 UNITS: 100 INJECTION, SOLUTION INTRAVENOUS; SUBCUTANEOUS at 08:38

## 2018-06-16 RX ADMIN — PREGABALIN 25 MG: 20 SOLUTION ORAL at 08:48

## 2018-06-16 RX ADMIN — FLUTICASONE PROPIONATE 2 SPRAY: 50 SPRAY, METERED NASAL at 08:37

## 2018-06-16 RX ADMIN — Medication 1 MG: at 02:34

## 2018-06-16 RX ADMIN — Medication 1 PACKET: at 20:45

## 2018-06-16 RX ADMIN — POTASSIUM CHLORIDE 40 MEQ: 1.5 POWDER, FOR SOLUTION ORAL at 09:02

## 2018-06-16 RX ADMIN — OXYCODONE HYDROCHLORIDE 10 MG: 5 TABLET ORAL at 02:34

## 2018-06-16 RX ADMIN — OXYCODONE HYDROCHLORIDE AND ACETAMINOPHEN 500 MG: 500 TABLET ORAL at 08:38

## 2018-06-16 RX ADMIN — INSULIN ASPART 5 UNITS: 100 INJECTION, SOLUTION INTRAVENOUS; SUBCUTANEOUS at 12:19

## 2018-06-16 RX ADMIN — POTASSIUM CHLORIDE 20 MEQ: 1.5 POWDER, FOR SOLUTION ORAL at 12:22

## 2018-06-16 RX ADMIN — HEPARIN SODIUM 5000 UNITS: 5000 INJECTION, SOLUTION INTRAVENOUS; SUBCUTANEOUS at 20:46

## 2018-06-16 RX ADMIN — DIPHENHYDRAMINE HYDROCHLORIDE 25 MG: 25 CAPSULE ORAL at 02:34

## 2018-06-16 RX ADMIN — SODIUM CHLORIDE, PRESERVATIVE FREE 5 ML: 5 INJECTION INTRAVENOUS at 06:04

## 2018-06-16 RX ADMIN — Medication 20 MG: at 20:47

## 2018-06-16 RX ADMIN — MULTIVITAMIN 15 ML: LIQUID ORAL at 08:37

## 2018-06-16 RX ADMIN — VANCOMYCIN HYDROCHLORIDE 1250 MG: 10 INJECTION, POWDER, LYOPHILIZED, FOR SOLUTION INTRAVENOUS at 16:01

## 2018-06-16 RX ADMIN — POTASSIUM PHOSPHATE, MONOBASIC AND POTASSIUM PHOSPHATE, DIBASIC 20 MMOL: 224; 236 INJECTION, SOLUTION INTRAVENOUS at 15:45

## 2018-06-16 RX ADMIN — Medication 1 PACKET: at 08:38

## 2018-06-16 RX ADMIN — Medication 220 MG: at 08:38

## 2018-06-16 RX ADMIN — INSULIN ASPART 4 UNITS: 100 INJECTION, SOLUTION INTRAVENOUS; SUBCUTANEOUS at 15:45

## 2018-06-16 RX ADMIN — ACETAMINOPHEN 1000 MG: 500 TABLET, FILM COATED ORAL at 08:39

## 2018-06-16 RX ADMIN — HEPARIN SODIUM 5000 UNITS: 5000 INJECTION, SOLUTION INTRAVENOUS; SUBCUTANEOUS at 08:38

## 2018-06-16 RX ADMIN — SODIUM CHLORIDE, PRESERVATIVE FREE 5 ML: 5 INJECTION INTRAVENOUS at 12:19

## 2018-06-16 RX ADMIN — PREGABALIN 75 MG: 20 SOLUTION ORAL at 15:42

## 2018-06-16 RX ADMIN — OXYCODONE HYDROCHLORIDE 10 MG: 5 TABLET ORAL at 05:25

## 2018-06-16 RX ADMIN — LISINOPRIL 5 MG: 5 TABLET ORAL at 08:38

## 2018-06-16 RX ADMIN — PIPERACILLIN SODIUM,TAZOBACTAM SODIUM 4.5 G: 4; .5 INJECTION, POWDER, FOR SOLUTION INTRAVENOUS at 18:56

## 2018-06-16 RX ADMIN — HYDROCORTISONE SODIUM SUCCINATE 50 MG: 100 INJECTION, POWDER, FOR SOLUTION INTRAMUSCULAR; INTRAVENOUS at 02:33

## 2018-06-16 RX ADMIN — PIPERACILLIN SODIUM,TAZOBACTAM SODIUM 4.5 G: 4; .5 INJECTION, POWDER, FOR SOLUTION INTRAVENOUS at 06:16

## 2018-06-16 RX ADMIN — AMITRIPTYLINE HYDROCHLORIDE 50 MG: 50 TABLET, FILM COATED ORAL at 22:53

## 2018-06-16 RX ADMIN — PREGABALIN 75 MG: 20 SOLUTION ORAL at 20:47

## 2018-06-16 RX ADMIN — ACETAMINOPHEN 1000 MG: 500 TABLET, FILM COATED ORAL at 18:56

## 2018-06-16 RX ADMIN — Medication 6 MG: at 22:53

## 2018-06-16 NOTE — PLAN OF CARE
Problem: Patient Care Overview  Goal: Plan of Care/Patient Progress Review  Outcome: No Change  Assumed cares at 1900. Pt more & more anxious and tearful throughout night- see previous note. Has not slept despite multiple interventions. On 4 L NC O2 sats > 92%. NJ infusing TF at 50 ml/hr with 30 ml FW flushes q 4 hr. BM x 1 this shift - brown/tan loose stools & voiding adequately. Pt needs reinforcement of non-weight bearing status. Multiple skin issues including chronic diabetic ulcers - see flowsheet for details.

## 2018-06-16 NOTE — PHARMACY-VANCOMYCIN DOSING SERVICE
Pharmacy Vancomycin Note  Date of Service 2018  Patient's  1965   52 year old, female    Indication: Sepsis, Soft Tissue Infection  Goal Trough Level: 15-20 mg/L  Day of Therapy: 3  Current Vancomycin regimen:  1250 mg IV q24h    Current estimated CrCl = Estimated Creatinine Clearance: 66.2 mL/min (based on Cr of 1.01).    Creatinine for last 3 days  2018: 10:07 PM Creatinine 1.74 mg/dL  2018:  4:50 AM Creatinine 1.63 mg/dL  6/15/2018:  3:34 AM Creatinine 1.44 mg/dL  2018:  6:04 AM Creatinine 1.01 mg/dL    Recent Vancomycin Levels (past 3 days)  2018:  2:21 PM Vancomycin Level 19.7 mg/L : This was a 22.67 hr level in a 24 hour regimen, True trough extrapolates to ~ 18.5    Vancomycin IV Administrations (past 72 hours)                   vancomycin (VANCOCIN) 1,250 mg in sodium chloride 0.9 % 250 mL intermittent infusion (mg) 1,250 mg New Bag 18 1601     1,250 mg New Bag 06/15/18 1539     1,250 mg New Bag 18 1515                Nephrotoxins and other renal medications (Future)    Start     Dose/Rate Route Frequency Ordered Stop    18 1145  lisinopril (PRINIVIL/ZESTRIL) tablet 10 mg      10 mg Oral DAILY 18 1132      06/15/18 1700  lisinopril (PRINIVIL/ZESTRIL) tablet 5 mg      5 mg Oral DAILY 06/15/18 1528      06/15/18 1230  piperacillin-tazobactam (ZOSYN) 4.5 g vial to attach to  mL bag      4.5 g  over 30 Minutes Intravenous EVERY 6 HOURS 06/15/18 0711      18 1600  vancomycin (VANCOCIN) 1,250 mg in sodium chloride 0.9 % 250 mL intermittent infusion      1,250 mg  over 90 Minutes Intravenous EVERY 24 HOURS 18 1543               Contrast Orders - past 72 hours     None          Interpretation of levels and current regimen:  Trough level is  Therapeutic    Has serum creatinine changed > 50% in last 72 hours: Yes    Renal Function: Improving    Plan:  1.  Continue Current Dose  2.  Pharmacy will check trough levels as appropriate in 1-3  Days.    3. Serum creatinine levels will be ordered daily for the first week of therapy and at least twice weekly for subsequent weeks.      Dom Francois PharmD, BCPS    [phone]  Pager [phone]          .

## 2018-06-16 NOTE — PLAN OF CARE
Problem: Patient Care Overview  Goal: Plan of Care/Patient Progress Review  Patient anxiety much better this afternoon after she slept some. Wound dressings changed. Patient noncompliant with no weight bearing and have to reducate patient why the need to be nonweight  Bearing. Patient blood sugars improving. Tube feeding at goal. k was replaced and phos will be replaced. Patient on calorie counts. P;cont to monitor

## 2018-06-16 NOTE — PLAN OF CARE
Problem: Patient Care Overview  Goal: Plan of Care/Patient Progress Review  Discharge Planner PT   Patient plan for discharge: Unknown  Current status: Patient motivated to work with PT. Increased time spent reviewing WB restrictions in B LEs second to MD orders per chart review and patient fully WB through B LEs since last session. Patient verbalized understanding of WB restriction review. Patient performed slide board transfer bed to/from w/c with min A x 2 but required cueing by 2nd person at L foot to WB only through forefoot only. Significant fatigue by end of session.   **Patient would benefit from orthotics consult re: heel offloading boot for slide board transfer as patient has limited insight into how much she WBs through L foot and boot could improve independence with transfers**  Barriers to return to prior living situation: WB status B LEs, general weakness, decreased activity tolerance, increased dependence for all mrADLs  Recommendations for discharge: TCU vs ARU pending activity tolerance at disch  Rationale for recommendations: Skilled PT is indicated to improve strength, activity tolerance, maintaining WB restrictions and transfers.       Entered by: Cathy Shaw 06/16/2018 5:02 PM

## 2018-06-16 NOTE — PROGRESS NOTES
[facility], [place]    Internal Medicine Progress Note - Marilynn Service    Main Plans for Today   -hydrocortisone taper  -start subq glargine  -Ongoing discussion surrounding amputation  -begin PO feeds, taper TFs  -Continue vancomycin/Zosyn  -electrolyte protocol  -resume lisinopril    Assessment & Plan   Alessandra Pak is a 52 year old female admitted on 6/6/2018. 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    # Severe sepsis, improving  # R diabetic foot infection, abscess s/p I&D x 3, osteomyelitis  Patient has had I&D to right foot ×3.  Orthopedics doubtful that source control will be able to be obtained with further I&D's.  Suggesting right BKA.  Patient currently on suppressive antibiotics with vancomycin/Zosyn.  She is currently clinically improved on these antibiotics.  Concern remains that she will never obtain source control and that this osteomyelitis and right foot infection will result in another life-threatening event in the future as it was during current admission leading to ARDS.  Continuing to have ongoing discussions regarding plan for amputation with patient.  Patient is currently very anxious and panics at the thought of amputation.  Plan to have ongoing discussions with therapy regarding expectations post amputation, health psychology, orthopedics, primary team to assist patient in making decision.  -> 110.  -Continue vancomycin/Zosyn  -Appreciate ID and orthopedic assistance  -Ongoing discussions about amputation with patient, family, health psychology, therapy  -qod CRP  -monitor CBC  -CIS  -ongoing wound cares per WOCN    Antimicrobials:   Zosyn: 6/7/18 --> current  Vancomycin: 6/7 -->  "6/12/18,  6/13 --> current  Levofloxacin: 6/7 -->6/12/18    Cultures/ID workup:  Wound:   - Culture: R foot 6/9/18: Moderate growth Enterococcus faecalis - pansensitive, light growth Streptococcus mitis group  - Gram stain: Many GPCs  Blood cultures:  - 6/6/18 x2: No growth, final  - 6/9/18 x 1: No growth, final  - 6/10/18 x1: NGTD  Urine culture: 6/7/18 x1: No growth  Sputum:  - Culture: Light growth Candida  - Gram stain: < PMNs/low power field, no organisms  RVP: Negative  Nasal swab: MRSA +    # Anxiety  Patient having high levels of anxiety and \"panic attacks\" when thinking about or discussing amputation per family.  Patient generally calms with talking, redirection.  Her family appears to be a soothing presents for her.    -At this point would prefer not to medicate patient for this, can consider hydroxyzine in the future if needed.  -ongoing family and staff support  -health psychology referral  -continue non-medical therapies (music, calm/quiet environment, massage)    # Type 2 diabetes c/b CKD, neuropathy, diabetic foot ulcers  # Hyperglycemia  Last A1c 9.5% 6/7/18 which was improved from 12.8% in 2/2018. Home regimen: ambulatory pump, 5U Lantus before breakfast, low dose sliding scale, and carb coverage. Patient hyperglycemic to 300s likely due to cessation of insulin gtt and ongoing stress dose steroids which will be tapered as below, needs will decrease.  Total daily insulin dose 72 units per last 4h on insulin gtt. Should be able to tolerate 36u glargine, will start lower to lower risk of hypoglycemia.  -Glargine 30 units daily  -SSI  -Hypoglycemia protocol  -continue PTA pregabalin and amitriptyline  -Patient getting p.o. diet, tapering off tube feeds, 4 times daily FSBG    # Pain  # Chronic narcotic use  Patient has ongoing pain 2/2 neuropathy and post-surgical pain.  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  Prior workup include bronch and E which were unrevealing BUS.  Normal IgG subclasses, normal alpha-1 antitrypsin, positive aspergillus flavus antibody.  -Outpatient pulmonology follow-up with high-res CT    # Adrenal insufficiency  Serum cortisol 13.3 and hydrocortisone started 6/13.  Patient now hypertensive.  Plan to wean off stress dose steroids.  -Hydrocortisone 50 mg every 12 hours 6/16 -> hydrocortisone 50 mg daily 6/17 -> off    # NICM   # H/o HF w/ recovered EF (50%) s/p ICD  Diuresed in the ICU.  No signs of fluid overload.  -Daily weights, strict I/O  -Resume home lisinopril given hypertension  -continue to hold furosemide    # NIKOLAY on CKD, resolved  Petechiae due to sepsis and hypovolemia, resolved, worsening due to diuresis/vancomycin/hypotension, now resolved again.  -monitor Cr  -avoid nephrotoxic agents    # Acute on chronic normochromic, microcytic anemia  Admitted at baseline, hemoglobin to 6.7 on 6/13 with bleeding from repeat I&D's.  Received 2 units PRBCs.  No evidence of other bleeding, hemoglobin has remained stable.  -monitor for bleeding  -monitor hgb    # Anisocoria/R afferent pupillary defect, stable  Pupils R > L noted evening 6/10. 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 Seen by neuro crit 6/10 who requested head CT to r/o hemorrhagic stroke, showed no acute intracranial lesions.  - Stable, appreciate neurology involvement. Continue to monitor.    # Diarrhea, resolved  Frequent loose stools started 6/14. C diff negative.  - Continue to monitor    # Pain Assessment:  Current Pain Score 6/16/2018   Patient currently in pain? yes   Pain score (0-10) -   Pain location Foot   Pain descriptors Pins and needles   CPOT pain score -   - Alessandra is experiencing pain due to neuropathy and post-surgical. Pain management was  discussed and the plan was created in a collaborative fashion.  Alessandra's response to the current recommendations: compliant  - Please see the plan for pain management as documented above      Diet: Adult Formula Drip Feeding: Continuous Nutren 1.5; Nasoduodenal tube; Goal Rate: 50; mL/hr; Medication - Tube Feeding Flush Frequency: At least 15-30 mL water before and after medication administration and with tube clogging; Amount to Send (Nutri...  Regular Diet Adult  Calorie Counts  Fluids: PO  DVT Prophylaxis: Pneumatic Compression Devices  Code Status: Full Code    Disposition Plan   Expected discharge: 4 - 7 days, recommended to transitional care unit once antibiotic plan established and surgical plan.     Entered: Alireza Chaparro 06/16/2018, 10:29 AM   Information in the above section will display in the discharge planner report.      The patient's care was discussed with the Attending Physician, Dr. MUSA.    Alireza Chaparro  [facility]  [place]: 5  Pager: [phone]  Please see sticky note for cross cover information    Interval History   Patient overall difficult night, significant anxiety surrounding amputation.  Patient's family to bedside, patient calmed.  She states that she continues to have right greater than left foot pain due to surgery as well as underlying neuropathy.  She does continue to have difficulty processing recommendations regarding amputation.     Physical Exam   Vital Signs: Temp: 97.3  F (36.3  C) Temp src: Oral BP: 164/80 Pulse: 99 Heart Rate: 93 Resp: 16 SpO2: 99 % O2 Device: Nasal cannula Oxygen Delivery: 4 LPM  Weight: 138 lbs 11.2 oz  General Appearance: Anxious  Respiratory: Bibasilar crackles (improving), breathing comfortably on room air  Cardiovascular: regular rate and rhythm, no m/r/g  GI: Soft, nontender, nondistended, bowel sounds present  Skin: No rashes or jaundice noted, triple-lumen PICC in right brachial vein CDI  Other: Speech fluent, alert, intermittently  tearful, no lower extremity edema, right foot wrapped        Data   Data     Recent Labs  Lab 06/16/18  0604 06/15/18  1747 06/15/18  1408 06/15/18  0334 06/14/18  0450 06/13/18  2207  06/13/18  1645   WBC  --   --   --  16.1* 17.5* 17.9*  --   --    HGB  --   --  7.7* 7.3* 8.2* 7.0*  < >  --    MCV  --   --   --  80 80 79  --   --    PLT  --  360  --  306 281 311  --   --    INR  --   --   --   --   --   --   --  1.33*   *  --   --  146* 144 142  --   --    POTASSIUM 3.2*  --   --  3.5 3.4 4.4  --   --    CHLORIDE 112*  --   --  114* 112* 109  --   --    CO2 25  --   --  21 23 23  --   --    BUN 36*  --   --  44* 39* 35*  --   --    CR 1.01  --   --  1.44* 1.63* 1.74*  --   --    ANIONGAP 9  --   --  11 9 9  --   --    RICK 8.3*  --   --  8.2* 8.0* 7.7*  --   --    *  --   --  185* 195* 352*  --   --    < > = values in this interval not displayed.

## 2018-06-16 NOTE — PROVIDER NOTIFICATION
"0220 Text page to 6010: Pt extremely anxious. Can't sleep. One-time dose of something? Also, 0200 .  Intervention: Cross-cover (Asha CHIN) returned call and the following plan was made: Recheck BG in 4 hrs. For anxiety/sleep- benadryl & melatonin. Will reassess & notify MD if needed.  RN provided the following options to facilitate sleep/lower anxiety: back/hand massage, essential oils, calm station on TV, lights turned off. \"Tucked in\" pt several times but pt would get out of bed mins later. Very tearful & anxious.  Pt states nothing helps her at home except the methadone.   0400 Text page to 4901: Pt more anxious & tearful- wants methadone early and to \"run up out of here\" Can you come see pt?  Intervention: Asha CHIN & writer spent 20mins with pt providing emotional support & ensuring pt did not have plan to leave hospital/harm herself. Asha CHIN consulted w/ pharmacy- ultimately, not safe to give methadone early. Mother & brother arrived at 0600- currently, family seems to be a good distraction.     Mother encouraging BKA- would like to speak to MD team this am to discuss this  "

## 2018-06-16 NOTE — PLAN OF CARE
Problem: Patient Care Overview  Goal: Plan of Care/Patient Progress Review    PT 4C / Discharge Planner PT   Patient plan for discharge: Not stated. PT initiated discussion of likely need for rehab upon discharge.  Current status: Patient alert, engaged in therapy session, receptive to education on weightbearing status. Per discussion with orthopedic surgery resident Shakeel Estrada, patient is to be strict NWB through RLE and L heel, okay to bear weight through L forefoot ONLY to complete pivot transfers. Patient instructed in proper transfer technique in order to maintain precautions, practiced scooting at EOB before initiating squat pivot transfer with Ax2, unable to maintain precautions during transfer therefore discussed trialing use of slideboard during next session with patient agreeable. Recommend nursing use ceiling lift for all transfers until patient able to demonstrate safe pivot transfers during therapy sessions.  Barriers to return to prior living situation: medical status, weightbearing status, DME needs, stairs at home, caregiver dependence, potential R BKA pending pt agreement  Recommendations for discharge: pending medical course, anticipate TCU vs ARU  Rationale for recommendations: Patient with new non-weightbearing status in [facility], will need to demonstrate competence in slide board transfers and/or pivot transfers, toilet transfers, and be able to navigate 3 steps within restrictions. Pt will likely have multiple DME needs (specific equipment unknown at this time but at minimum will require a wheelchair). Patient would benefit from ongoing skilled therapy to increase strength, activity tolerance, and functional independence within new activity restrictions.

## 2018-06-16 NOTE — PROGRESS NOTES
Orthopaedic Surgery Progress Note 06/16/2018    E/S: Transferred out of ICU. AFVSS. Having right foot pain. States methadone not ordered correctly. Patient refused RLE dressing change by ortho this morning.    O:  Temp: 98.4  F (36.9  C) Temp src: Oral BP: 133/62 Pulse: 99 Heart Rate: 105 Resp: 16 SpO2: 95 % O2 Device: Nasal cannula Oxygen Delivery: 3 LPM    Exam:  Gen: No acute distress, alert  Resp: Breathing comfortably, 3LPM O2 via nc  MSK:   RLE:  Inspection: Ace from distal leg to toes, no erythema   Strength: wiggles all toes, grossly fires, hip flexors, quad, hamstings, gsc, TA, EHL, FHL  Sensation: baseline numbness in stocking distribution unchanged from prior exam  Circulation: toes wwp      Recent Labs  Lab 06/15/18  1747 06/15/18  1408 06/15/18  0334 06/14/18  0450 06/13/18  2207  06/13/18  0910  06/11/18  1636   WBC  --   --  16.1* 17.5* 17.9*  --   --   < >  --    HGB  --  7.7* 7.3* 8.2* 7.0*  < >  --   < >  --      --  306 281 311  --   --   < >  --    CRP  --   --  110.0*  --   --   --  220.0*  --  290.0*   < > = values in this interval not displayed.  Wound Culture: E faecalis, pansensitive  Wound gram stain: GPCs    Assessment: Alessandra Pak is a 52 year old female admitted sepsis with right foot diabetic ulcers vs pna as presumed source, now s/p bedside I&D right foot on 6/7, 6/12, and 6/13.  WBC decreasing , afebrile, CRP downtrending, no encephalopathy.  Intubated for ARDS/hypoxic respiratory failure on 6/10, extubated 6/15, TTF 6/15. No surgery planned per patient and family wishes.     Plan:  Medicine Primary  Activity: Elevate BLE as much as possible   Weight bearing status: NWB RLE, okay for minimal WB with left forefoot for transfers to chair or commode.   Antibiotics: per primary/ID  Labs: repeat CRP q48hrs (ordered)  DVT prophylaxis: per primary team  Wound Care: daily dressing by bedside RN, weekly WOCN dressing changes  Culture: E faecalis, pansensitive      Shakeel Estrada,  MD 06/16/2018  Orthopaedic Surgery Resident, PGY-1  Pager: [phone]    For questions about this patient, please attempt to contact me at my pager prior to contacting the orthopaedics resident on call. Thank you!

## 2018-06-16 NOTE — PLAN OF CARE
Problem: Patient Care Overview  Goal: Plan of Care/Patient Progress Review  OT eval orders received, multiple attempts to see pt this am, pt with MD, then with other providers, then requests to hold on OT eval this date as she has not slept. Pt requesting to rest and attempt tomorrow.

## 2018-06-16 NOTE — PROGRESS NOTES
Patient had extreme anxiety and anxious this am unavbel to sleep during night. Patient givne meds and rehooked to tubve feeding and is now sleeping comfortable

## 2018-06-17 ENCOUNTER — APPOINTMENT (OUTPATIENT)
Dept: OCCUPATIONAL THERAPY | Facility: CLINIC | Age: 53
DRG: 853 | End: 2018-06-17
Attending: STUDENT IN AN ORGANIZED HEALTH CARE EDUCATION/TRAINING PROGRAM
Payer: COMMERCIAL

## 2018-06-17 LAB
ABO + RH BLD: NORMAL
ABO + RH BLD: NORMAL
ANION GAP SERPL CALCULATED.3IONS-SCNC: 9 MMOL/L (ref 3–14)
BLD GP AB SCN SERPL QL: NORMAL
BLD PROD TYP BPU: NORMAL
BLD PROD TYP BPU: NORMAL
BLD UNIT ID BPU: 0
BLOOD BANK CMNT PATIENT-IMP: NORMAL
BLOOD PRODUCT CODE: NORMAL
BPU ID: NORMAL
BUN SERPL-MCNC: 27 MG/DL (ref 7–30)
CALCIUM SERPL-MCNC: 8.2 MG/DL (ref 8.5–10.1)
CHLORIDE SERPL-SCNC: 114 MMOL/L (ref 94–109)
CO2 SERPL-SCNC: 27 MMOL/L (ref 20–32)
CREAT SERPL-MCNC: 0.97 MG/DL (ref 0.52–1.04)
CRP SERPL-MCNC: 37 MG/L (ref 0–8)
ERYTHROCYTE [DISTWIDTH] IN BLOOD BY AUTOMATED COUNT: 19.6 % (ref 10–15)
GFR SERPL CREATININE-BSD FRML MDRD: 60 ML/MIN/1.7M2
GLUCOSE BLDC GLUCOMTR-MCNC: 276 MG/DL (ref 70–99)
GLUCOSE BLDC GLUCOMTR-MCNC: 293 MG/DL (ref 70–99)
GLUCOSE BLDC GLUCOMTR-MCNC: 321 MG/DL (ref 70–99)
GLUCOSE BLDC GLUCOMTR-MCNC: 324 MG/DL (ref 70–99)
GLUCOSE SERPL-MCNC: 177 MG/DL (ref 70–99)
HCT VFR BLD AUTO: 21.5 % (ref 35–47)
HGB BLD-MCNC: 6.9 G/DL (ref 11.7–15.7)
HGB BLD-MCNC: 8.1 G/DL (ref 11.7–15.7)
MCH RBC QN AUTO: 26 PG (ref 26.5–33)
MCHC RBC AUTO-ENTMCNC: 32.1 G/DL (ref 31.5–36.5)
MCV RBC AUTO: 81 FL (ref 78–100)
NUM BPU REQUESTED: 1
PHOSPHATE SERPL-MCNC: 2.6 MG/DL (ref 2.5–4.5)
PLATELET # BLD AUTO: 398 10E9/L (ref 150–450)
POTASSIUM SERPL-SCNC: 3.5 MMOL/L (ref 3.4–5.3)
RBC # BLD AUTO: 2.65 10E12/L (ref 3.8–5.2)
SODIUM SERPL-SCNC: 146 MMOL/L (ref 133–144)
SODIUM SERPL-SCNC: 151 MMOL/L (ref 133–144)
SPECIMEN EXP DATE BLD: NORMAL
TRANSFUSION STATUS PATIENT QL: NORMAL
TRANSFUSION STATUS PATIENT QL: NORMAL
WBC # BLD AUTO: 13.3 10E9/L (ref 4–11)

## 2018-06-17 PROCEDURE — 25000132 ZZH RX MED GY IP 250 OP 250 PS 637: Performed by: STUDENT IN AN ORGANIZED HEALTH CARE EDUCATION/TRAINING PROGRAM

## 2018-06-17 PROCEDURE — 27210429 ZZH NUTRITION PRODUCT INTERMEDIATE LITER

## 2018-06-17 PROCEDURE — 25000128 H RX IP 250 OP 636: Performed by: INTERNAL MEDICINE

## 2018-06-17 PROCEDURE — 25000132 ZZH RX MED GY IP 250 OP 250 PS 637: Performed by: INTERNAL MEDICINE

## 2018-06-17 PROCEDURE — 40000802 ZZH SITE CHECK

## 2018-06-17 PROCEDURE — 80048 BASIC METABOLIC PNL TOTAL CA: CPT | Performed by: STUDENT IN AN ORGANIZED HEALTH CARE EDUCATION/TRAINING PROGRAM

## 2018-06-17 PROCEDURE — 86901 BLOOD TYPING SEROLOGIC RH(D): CPT | Performed by: INTERNAL MEDICINE

## 2018-06-17 PROCEDURE — 99233 SBSQ HOSP IP/OBS HIGH 50: CPT | Performed by: INTERNAL MEDICINE

## 2018-06-17 PROCEDURE — 86850 RBC ANTIBODY SCREEN: CPT | Performed by: INTERNAL MEDICINE

## 2018-06-17 PROCEDURE — 36592 COLLECT BLOOD FROM PICC: CPT | Performed by: INTERNAL MEDICINE

## 2018-06-17 PROCEDURE — 00000146 ZZHCL STATISTIC GLUCOSE BY METER IP

## 2018-06-17 PROCEDURE — 86140 C-REACTIVE PROTEIN: CPT | Performed by: STUDENT IN AN ORGANIZED HEALTH CARE EDUCATION/TRAINING PROGRAM

## 2018-06-17 PROCEDURE — 25000128 H RX IP 250 OP 636: Performed by: STUDENT IN AN ORGANIZED HEALTH CARE EDUCATION/TRAINING PROGRAM

## 2018-06-17 PROCEDURE — 97535 SELF CARE MNGMENT TRAINING: CPT | Mod: GO

## 2018-06-17 PROCEDURE — 85018 HEMOGLOBIN: CPT | Performed by: INTERNAL MEDICINE

## 2018-06-17 PROCEDURE — P9016 RBC LEUKOCYTES REDUCED: HCPCS | Performed by: INTERNAL MEDICINE

## 2018-06-17 PROCEDURE — 36592 COLLECT BLOOD FROM PICC: CPT | Performed by: STUDENT IN AN ORGANIZED HEALTH CARE EDUCATION/TRAINING PROGRAM

## 2018-06-17 PROCEDURE — 84295 ASSAY OF SERUM SODIUM: CPT | Performed by: INTERNAL MEDICINE

## 2018-06-17 PROCEDURE — 12000008 ZZH R&B INTERMEDIATE UMMC

## 2018-06-17 PROCEDURE — 84100 ASSAY OF PHOSPHORUS: CPT | Performed by: INTERNAL MEDICINE

## 2018-06-17 PROCEDURE — 40000133 ZZH STATISTIC OT WARD VISIT

## 2018-06-17 PROCEDURE — 86923 COMPATIBILITY TEST ELECTRIC: CPT | Performed by: INTERNAL MEDICINE

## 2018-06-17 PROCEDURE — 97166 OT EVAL MOD COMPLEX 45 MIN: CPT | Mod: GO

## 2018-06-17 PROCEDURE — 85027 COMPLETE CBC AUTOMATED: CPT | Performed by: STUDENT IN AN ORGANIZED HEALTH CARE EDUCATION/TRAINING PROGRAM

## 2018-06-17 PROCEDURE — 40000558 ZZH STATISTIC CVC DRESSING CHANGE

## 2018-06-17 PROCEDURE — 86900 BLOOD TYPING SEROLOGIC ABO: CPT | Performed by: INTERNAL MEDICINE

## 2018-06-17 RX ADMIN — OXYCODONE HYDROCHLORIDE 10 MG: 5 TABLET ORAL at 22:10

## 2018-06-17 RX ADMIN — HEPARIN SODIUM 5000 UNITS: 5000 INJECTION, SOLUTION INTRAVENOUS; SUBCUTANEOUS at 08:48

## 2018-06-17 RX ADMIN — HEPARIN SODIUM 5000 UNITS: 5000 INJECTION, SOLUTION INTRAVENOUS; SUBCUTANEOUS at 20:46

## 2018-06-17 RX ADMIN — FLUTICASONE PROPIONATE 2 SPRAY: 50 SPRAY, METERED NASAL at 08:48

## 2018-06-17 RX ADMIN — PIPERACILLIN SODIUM,TAZOBACTAM SODIUM 4.5 G: 4; .5 INJECTION, POWDER, FOR SOLUTION INTRAVENOUS at 12:38

## 2018-06-17 RX ADMIN — ACETAMINOPHEN 1000 MG: 500 TABLET, FILM COATED ORAL at 00:56

## 2018-06-17 RX ADMIN — LISINOPRIL 10 MG: 10 TABLET ORAL at 08:52

## 2018-06-17 RX ADMIN — Medication 20 MG: at 20:47

## 2018-06-17 RX ADMIN — Medication 1 PACKET: at 09:12

## 2018-06-17 RX ADMIN — PIPERACILLIN SODIUM,TAZOBACTAM SODIUM 4.5 G: 4; .5 INJECTION, POWDER, FOR SOLUTION INTRAVENOUS at 20:44

## 2018-06-17 RX ADMIN — Medication 1 PACKET: at 16:42

## 2018-06-17 RX ADMIN — Medication 6 MG: at 20:47

## 2018-06-17 RX ADMIN — INSULIN ASPART 7 UNITS: 100 INJECTION, SOLUTION INTRAVENOUS; SUBCUTANEOUS at 08:54

## 2018-06-17 RX ADMIN — HYDROCORTISONE SODIUM SUCCINATE 50 MG: 100 INJECTION, POWDER, FOR SOLUTION INTRAMUSCULAR; INTRAVENOUS at 06:39

## 2018-06-17 RX ADMIN — VANCOMYCIN HYDROCHLORIDE 1250 MG: 10 INJECTION, POWDER, LYOPHILIZED, FOR SOLUTION INTRAVENOUS at 16:41

## 2018-06-17 RX ADMIN — PIPERACILLIN SODIUM,TAZOBACTAM SODIUM 4.5 G: 4; .5 INJECTION, POWDER, FOR SOLUTION INTRAVENOUS at 06:38

## 2018-06-17 RX ADMIN — Medication 220 MG: at 08:53

## 2018-06-17 RX ADMIN — INSULIN ASPART 4 UNITS: 100 INJECTION, SOLUTION INTRAVENOUS; SUBCUTANEOUS at 12:39

## 2018-06-17 RX ADMIN — PREGABALIN 75 MG: 20 SOLUTION ORAL at 22:09

## 2018-06-17 RX ADMIN — OXYCODONE HYDROCHLORIDE AND ACETAMINOPHEN 500 MG: 500 TABLET ORAL at 08:48

## 2018-06-17 RX ADMIN — PANCRELIPASE 24000 UNITS: 60000; 12000; 38000 CAPSULE, DELAYED RELEASE PELLETS ORAL at 20:46

## 2018-06-17 RX ADMIN — ACETAMINOPHEN 1000 MG: 500 TABLET, FILM COATED ORAL at 16:41

## 2018-06-17 RX ADMIN — PREGABALIN 75 MG: 20 SOLUTION ORAL at 09:12

## 2018-06-17 RX ADMIN — AMITRIPTYLINE HYDROCHLORIDE 50 MG: 50 TABLET, FILM COATED ORAL at 22:10

## 2018-06-17 RX ADMIN — ACETAMINOPHEN 1000 MG: 500 TABLET, FILM COATED ORAL at 08:48

## 2018-06-17 RX ADMIN — PANCRELIPASE 24000 UNITS: 60000; 12000; 38000 CAPSULE, DELAYED RELEASE PELLETS ORAL at 12:49

## 2018-06-17 RX ADMIN — SODIUM CHLORIDE, PRESERVATIVE FREE 5 ML: 5 INJECTION INTRAVENOUS at 03:31

## 2018-06-17 RX ADMIN — ACETAMINOPHEN 1000 MG: 500 TABLET, FILM COATED ORAL at 22:10

## 2018-06-17 RX ADMIN — PIPERACILLIN SODIUM,TAZOBACTAM SODIUM 4.5 G: 4; .5 INJECTION, POWDER, FOR SOLUTION INTRAVENOUS at 00:54

## 2018-06-17 RX ADMIN — INSULIN ASPART 8 UNITS: 100 INJECTION, SOLUTION INTRAVENOUS; SUBCUTANEOUS at 20:45

## 2018-06-17 RX ADMIN — Medication 70 MG: at 07:51

## 2018-06-17 RX ADMIN — PREGABALIN 75 MG: 20 SOLUTION ORAL at 16:42

## 2018-06-17 RX ADMIN — MULTIVITAMIN 15 ML: LIQUID ORAL at 08:47

## 2018-06-17 ASSESSMENT — ACTIVITIES OF DAILY LIVING (ADL): PREVIOUS_RESPONSIBILITIES: MEAL PREP;MEDICATION MANAGEMENT

## 2018-06-17 NOTE — PLAN OF CARE
Problem: Patient Care Overview  Goal: Plan of Care/Patient Progress Review  Patient hd one unit of red blood cells with no reaction. Continues on tube feeding. Patient up in chair with therapy. Patient tring to eat but is too full from tube feeding and is not a big eater to begin with. Patient started creon. Patient contiues on iv abx. P;cont to monitor

## 2018-06-17 NOTE — PROGRESS NOTES
06/17/18 1300   Quick Adds   Type of Visit Initial Occupational Therapy Evaluation   Living Environment   Lives With parent(s);sibling(s);child(kristina), adult   Living Arrangements house   Home Accessibility bed and bath on same level   Number of Stairs to Enter Home 1   Number of Stairs Within Home 12  (to basement where laundry)   Stair Railings at Home none   Transportation Available family or friend will provide   Living Environment Comment pt on disability since 2014   Self-Care   Dominant Hand right   Usual Activity Tolerance fair   Current Activity Tolerance fair   Regular Exercise no   Activity/Exercise/Self-Care Comment was ind with all ADLs and IADLS   Functional Level Prior   Ambulation 0-->independent   Transferring 0-->independent   Toileting 0-->independent   Bathing 0-->independent   Dressing 0-->independent   Eating 0-->independent   Communication 0-->understands/communicates without difficulty   Swallowing 0-->swallows foods/liquids without difficulty   Cognition 1 - attention or memory deficits   Fall history within last six months no   Which of the above functional risks had a recent onset or change? ambulation;transferring;toileting;dressing   Prior Functional Level Comment pt states she had been ind and in usual state o health until after prior hospitalization when she reports difficulty focusing/fatigue and was receieving home care nursing       Present no   General Information   Onset of Illness/Injury or Date of Surgery - Date 06/06/18   Referring Physician Magaly Hyde   Patient/Family Goals Statement return to home   Additional Occupational Profile Info/Pertinent History of Current Problem Alessandra Pak is a 52 year old female admitted on 6/6/2018. 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 Hospital course was complicated by respiratory failure due to ARDS requiring intubation (6/10-15), extubated and transferred to medicine for further cares   Precautions/Limitations other (see comments)  (NWB RLE, L forefoot wb for tx only)   Weight-Bearing Status - LLE toe touch weight-bearing   Weight-Bearing Status - RLE nonweight-bearing   General Observations up with assist   Cognitive Status Examination   Orientation orientation to person, place and time   Level of Consciousness alert   Able to Follow Commands WNL/WFL   Personal Safety (Cognitive) WNL/WFL   Memory intact   Attention Reports problems attending   Organization/Problem Solving Reports problems with organization   Cognitive Comment pt reports difficulty with recall and organization of thoughts since last hospital stay   Visual Perception   Visual Perception Wears glasses   Sensory Examination   Sensory Comments B hands and feet neuropathy   Pain Assessment   Patient Currently in Pain No   Integumentary/Edema   Integumentary/Edema no deficits were identifed   Posture   Posture not impaired   Range of Motion (ROM)   ROM Quick Adds No deficits were identified   Coordination   Upper Extremity Coordination No deficits were identified   Transfer Skill: Bed to Chair/Chair to Bed   Level of [place]: Bed to Chair minimum assist (75% patients effort)   Lower Body Dressing   Level of [place]: Dress Lower Body maximum assist (25% patients effort)   Toileting   Level of [place]: Toilet moderate assist (50% patients effort)   Grooming   Level of [place]: Grooming stand-by assist   Eating/Self Feeding   Level of [place]: Eating independent   Instrumental Activities of Daily Living (IADL)   Previous Responsibilities meal prep;medication management   IADL Comments lives with family who assists as needed   Activities of Daily Living Analysis   Impairments Contributing to Impaired Activities  "of Daily Living sensation decreased;strength decreased  (WB restrictions 2/2 wounds)   General Therapy Interventions   Planned Therapy Interventions ADL retraining;IADL retraining;balance training;strengthening;transfer training;progressive activity/exercise   Clinical Impression   Criteria for Skilled Therapeutic Interventions Met yes, treatment indicated   OT Diagnosis ADL deficits   Influenced by the following impairments precautions, weakness   Assessment of Occupational Performance 3-5 Performance Deficits   Identified Performance Deficits dressing, toileting, bathing, home management, social skills   Clinical Decision Making (Complexity) Moderate complexity   Therapy Frequency 5 times/wk   Predicted Duration of Therapy Intervention (days/wks) 1 week   Anticipated Equipment Needs at Discharge tub bench;wheelchair;sliding board;bedside commode  (drop arm commode)   Anticipated Discharge Disposition Home with Home Therapy   Risks and Benefits of Treatment have been explained. Yes   Patient, Family & other staff in agreement with plan of care Yes   [facility] TM \"6 Clicks\"   2016, Trustees of [facility], under license to [employer].  All rights reserved.   6 Clicks Short Forms Daily Activity Inpatient Short Form   [facility]  \"6 Clicks\" Daily Activity Inpatient Short Form   1. Putting on and taking off regular lower body clothing? 2 - A Lot   2. Bathing (including washing, rinsing, drying)? 2 - A Lot   3. Toileting, which includes using toilet, bedpan or urinal? 3 - A Little   4. Putting on and taking off regular upper body clothing? 3 - A Little   5. Taking care of personal grooming such as brushing teeth? 3 - A Little   6. Eating meals? 4 - None   Daily Activity Raw Score (Score out of 24.Lower scores equate to lower levels of function) 17   Total Evaluation Time   Total Evaluation Time (Minutes) 10     "

## 2018-06-17 NOTE — PROGRESS NOTES
Calorie Counts  Intake recorded for: 6/16                  Kcals: 0                      Protein: 0g  # Meals Recorded: 1 meal ordered from kitchen, no intake recorded.   # Supplements Recorded: no intake recorded.

## 2018-06-17 NOTE — PLAN OF CARE
Problem: Patient Care Overview  Goal: Plan of Care/Patient Progress Review  OT/5B: Attempted OT eval, pt receiving blood at this time, will retry later in day when transfusion completed.

## 2018-06-17 NOTE — PROGRESS NOTES
Orthopaedic Surgery Progress Note 06/17/2018    E/S: AFVSS. Having stable right foot pain. Has been reviewing resources about BKA however states she would like to discuss further at home with family.    O:  Temp: 97.1  F (36.2  C) Temp src: Oral BP: 126/67 Pulse: 95 Heart Rate: 89 Resp: 16 SpO2: 93 % O2 Device: Nasal cannula Oxygen Delivery: 1.5 LPM    Exam:  Gen: No acute distress, alert  Resp: Breathing comfortably, 3LPM O2 via nc  MSK:   RLE:  Inspection: Ace from distal leg to toes, no erythema   Strength: wiggles all toes, grossly fires, hip flexors, quad, hamstings, gsc, TA, EHL, FHL  Sensation: baseline numbness in stocking distribution unchanged from prior exam  Circulation: toes wwp      Recent Labs  Lab 06/17/18  0551 06/15/18  1747 06/15/18  1408 06/15/18  0334 06/14/18  0450  06/13/18  0910   WBC 13.3*  --   --  16.1* 17.5*  < >  --    HGB 6.9*  --  7.7* 7.3* 8.2*  < >  --     360  --  306 281  < >  --    CRP 37.0*  --   --  110.0*  --   --  220.0*   < > = values in this interval not displayed.  Wound Culture: E faecalis, pansensitive  Wound gram stain: GPCs    Assessment: Alessandra Pak is a 52 year old female admitted sepsis with right foot diabetic ulcers vs pna as presumed source, now s/p bedside I&D right foot on 6/7, 6/12, and 6/13.  WBC decreasing , afebrile, CRP downtrending, no encephalopathy.  Intubated for ARDS/hypoxic respiratory failure on 6/10, extubated 6/15, TTF 6/15. No surgery planned per patient and family wishes.     Plan:  Medicine Primary  Activity: Elevate BLE as much as possible   Weight bearing status: NWB RLE, okay for minimal WB with left forefoot for transfers to chair or commode.   Antibiotics: per primary/ID  Labs: repeat CRP q48hrs (ordered)  DVT prophylaxis: per primary team  Wound Care: dressing changed at bedside this AM. daily dressing by bedside RN, weekly WOCN dressing changes      Shakeel Estrada MD 06/17/2018  Orthopaedic Surgery Resident, PGY-1  Pager:  [phone]    For questions about this patient, please attempt to contact me at my pager prior to contacting the orthopaedics resident on call. Thank you!

## 2018-06-17 NOTE — PLAN OF CARE
Problem: Patient Care Overview  Goal: Plan of Care/Patient Progress Review  5B PT: Consulted with OT after OT evaluation in PM. Pt declining PT this date second to fatigue from OT evjohan Rodriguez.

## 2018-06-17 NOTE — PLAN OF CARE
Problem: Patient Care Overview  Goal: Plan of Care/Patient Progress Review  Outcome: Therapy, unable to show any progress toward functional goals  Patient conversational and appropriate. RBC x 1 transfusion for low hemoglobin in AM. Foot ulcers covered, received every other day dressing change yesterday. Continuing with tube feedings, patient eating by mouth small quantities as able. Tolerating tube feeding well. Blood sugar remains high. Actively drinking water throughout the day. Creon ordered to help improve loose stool. Discussed with MD her reflections on the proposed surgery, still processing the decision.

## 2018-06-17 NOTE — PLAN OF CARE
Problem: Patient Care Overview  Goal: Plan of Care/Patient Progress Review  Discharge Planner OT   Patient plan for discharge: home  Current status: OT eval completed, Pt sliding boards bed <>w/c with CGA and extended time for set up and management of lines. Reports unable to slide board to commode due to no drop arm commode available. Education on POC and DME for home.  Barriers to return to prior living situation: precautions, level of assist  Recommendations for discharge: ARU vs. TCU  Rationale for recommendations: if tolerance for therapies improves over inpt stay, pt would benefit from ARU to progress to home        Entered by: Sonya Amador 06/17/2018 2:53 PM

## 2018-06-17 NOTE — PLAN OF CARE
Problem: Patient Care Overview  Goal: Plan of Care/Patient Progress Review  AOx4.  Tachycardia, all other VSS on 2L via NC.  A+ 1/2 to bedside commode.  Wound dressings CDI.  Phos replaced- recheck:.  IV ABX overnight.  HS - treated per protocol.  0200 BG- 321, one time order for 6 units Novolog placed.  Pt calm, pleasant, and able to make needs known.  Pt slept between cares and assessments and is able to make needs known.     AM lab draw:  Critical Hgb: 6.9.

## 2018-06-17 NOTE — PLAN OF CARE
Problem: Patient Care Overview  Goal: Plan of Care/Patient Progress Review  5B PT: Per consult with OT, patient's hgb low and transfused in AM. Will attempt in PM schedule permitting otherwise cx.

## 2018-06-18 ENCOUNTER — APPOINTMENT (OUTPATIENT)
Dept: OCCUPATIONAL THERAPY | Facility: CLINIC | Age: 53
DRG: 853 | End: 2018-06-18
Payer: COMMERCIAL

## 2018-06-18 ENCOUNTER — APPOINTMENT (OUTPATIENT)
Dept: PHYSICAL THERAPY | Facility: CLINIC | Age: 53
DRG: 853 | End: 2018-06-18
Payer: COMMERCIAL

## 2018-06-18 LAB
ANION GAP SERPL CALCULATED.3IONS-SCNC: 7 MMOL/L (ref 3–14)
BUN SERPL-MCNC: 24 MG/DL (ref 7–30)
CALCIUM SERPL-MCNC: 8.1 MG/DL (ref 8.5–10.1)
CHLORIDE SERPL-SCNC: 111 MMOL/L (ref 94–109)
CO2 SERPL-SCNC: 27 MMOL/L (ref 20–32)
CREAT SERPL-MCNC: 0.88 MG/DL (ref 0.52–1.04)
ERYTHROCYTE [DISTWIDTH] IN BLOOD BY AUTOMATED COUNT: 20.1 % (ref 10–15)
GFR SERPL CREATININE-BSD FRML MDRD: 67 ML/MIN/1.7M2
GLUCOSE BLDC GLUCOMTR-MCNC: 152 MG/DL (ref 70–99)
GLUCOSE BLDC GLUCOMTR-MCNC: 157 MG/DL (ref 70–99)
GLUCOSE BLDC GLUCOMTR-MCNC: 175 MG/DL (ref 70–99)
GLUCOSE BLDC GLUCOMTR-MCNC: 176 MG/DL (ref 70–99)
GLUCOSE BLDC GLUCOMTR-MCNC: 333 MG/DL (ref 70–99)
GLUCOSE BLDC GLUCOMTR-MCNC: 337 MG/DL (ref 70–99)
GLUCOSE SERPL-MCNC: 258 MG/DL (ref 70–99)
HCT VFR BLD AUTO: 24.5 % (ref 35–47)
HGB BLD-MCNC: 8 G/DL (ref 11.7–15.7)
LACTATE BLD-SCNC: 0.7 MMOL/L (ref 0.4–1.9)
MAGNESIUM SERPL-MCNC: 2.3 MG/DL (ref 1.6–2.3)
MCH RBC QN AUTO: 26.9 PG (ref 26.5–33)
MCHC RBC AUTO-ENTMCNC: 32.7 G/DL (ref 31.5–36.5)
MCV RBC AUTO: 83 FL (ref 78–100)
PHOSPHATE SERPL-MCNC: 3.1 MG/DL (ref 2.5–4.5)
PLATELET # BLD AUTO: 419 10E9/L (ref 150–450)
POTASSIUM SERPL-SCNC: 3.7 MMOL/L (ref 3.4–5.3)
RBC # BLD AUTO: 2.97 10E12/L (ref 3.8–5.2)
SODIUM SERPL-SCNC: 146 MMOL/L (ref 133–144)
VANCOMYCIN SERPL-MCNC: 17 MG/L
WBC # BLD AUTO: 12.4 10E9/L (ref 4–11)

## 2018-06-18 PROCEDURE — 25000132 ZZH RX MED GY IP 250 OP 250 PS 637: Performed by: STUDENT IN AN ORGANIZED HEALTH CARE EDUCATION/TRAINING PROGRAM

## 2018-06-18 PROCEDURE — 25000128 H RX IP 250 OP 636: Performed by: STUDENT IN AN ORGANIZED HEALTH CARE EDUCATION/TRAINING PROGRAM

## 2018-06-18 PROCEDURE — 40000193 ZZH STATISTIC PT WARD VISIT

## 2018-06-18 PROCEDURE — 97530 THERAPEUTIC ACTIVITIES: CPT | Mod: GP

## 2018-06-18 PROCEDURE — 97110 THERAPEUTIC EXERCISES: CPT | Mod: GO

## 2018-06-18 PROCEDURE — 83735 ASSAY OF MAGNESIUM: CPT | Performed by: INTERNAL MEDICINE

## 2018-06-18 PROCEDURE — 25000132 ZZH RX MED GY IP 250 OP 250 PS 637: Performed by: INTERNAL MEDICINE

## 2018-06-18 PROCEDURE — 99233 SBSQ HOSP IP/OBS HIGH 50: CPT | Mod: GC | Performed by: INTERNAL MEDICINE

## 2018-06-18 PROCEDURE — 80048 BASIC METABOLIC PNL TOTAL CA: CPT | Performed by: INTERNAL MEDICINE

## 2018-06-18 PROCEDURE — 40000133 ZZH STATISTIC OT WARD VISIT

## 2018-06-18 PROCEDURE — 84100 ASSAY OF PHOSPHORUS: CPT | Performed by: INTERNAL MEDICINE

## 2018-06-18 PROCEDURE — 36592 COLLECT BLOOD FROM PICC: CPT | Performed by: INTERNAL MEDICINE

## 2018-06-18 PROCEDURE — 83605 ASSAY OF LACTIC ACID: CPT | Performed by: INTERNAL MEDICINE

## 2018-06-18 PROCEDURE — 80202 ASSAY OF VANCOMYCIN: CPT | Performed by: INTERNAL MEDICINE

## 2018-06-18 PROCEDURE — 97110 THERAPEUTIC EXERCISES: CPT | Mod: GP

## 2018-06-18 PROCEDURE — 85027 COMPLETE CBC AUTOMATED: CPT | Performed by: INTERNAL MEDICINE

## 2018-06-18 PROCEDURE — 25000128 H RX IP 250 OP 636: Performed by: INTERNAL MEDICINE

## 2018-06-18 PROCEDURE — 00000146 ZZHCL STATISTIC GLUCOSE BY METER IP

## 2018-06-18 PROCEDURE — 12000001 ZZH R&B MED SURG/OB UMMC

## 2018-06-18 PROCEDURE — 36415 COLL VENOUS BLD VENIPUNCTURE: CPT | Performed by: INTERNAL MEDICINE

## 2018-06-18 RX ORDER — FUROSEMIDE 40 MG
40 TABLET ORAL ONCE
Status: COMPLETED | OUTPATIENT
Start: 2018-06-18 | End: 2018-06-18

## 2018-06-18 RX ORDER — PREGABALIN 25 MG/1
25 CAPSULE ORAL ONCE
Status: COMPLETED | OUTPATIENT
Start: 2018-06-18 | End: 2018-06-18

## 2018-06-18 RX ADMIN — Medication 220 MG: at 07:28

## 2018-06-18 RX ADMIN — HEPARIN SODIUM 5000 UNITS: 5000 INJECTION, SOLUTION INTRAVENOUS; SUBCUTANEOUS at 20:02

## 2018-06-18 RX ADMIN — MULTIVITAMIN 15 ML: LIQUID ORAL at 07:28

## 2018-06-18 RX ADMIN — LISINOPRIL 10 MG: 10 TABLET ORAL at 07:28

## 2018-06-18 RX ADMIN — ACETAMINOPHEN 1000 MG: 500 TABLET, FILM COATED ORAL at 07:28

## 2018-06-18 RX ADMIN — AMITRIPTYLINE HYDROCHLORIDE 50 MG: 50 TABLET, FILM COATED ORAL at 21:24

## 2018-06-18 RX ADMIN — PIPERACILLIN SODIUM,TAZOBACTAM SODIUM 4.5 G: 4; .5 INJECTION, POWDER, FOR SOLUTION INTRAVENOUS at 18:11

## 2018-06-18 RX ADMIN — PANCRELIPASE 24000 UNITS: 60000; 12000; 38000 CAPSULE, DELAYED RELEASE PELLETS ORAL at 07:28

## 2018-06-18 RX ADMIN — Medication 1 PACKET: at 15:52

## 2018-06-18 RX ADMIN — ACETAMINOPHEN 1000 MG: 500 TABLET, FILM COATED ORAL at 15:47

## 2018-06-18 RX ADMIN — ACETAMINOPHEN 1000 MG: 500 TABLET, FILM COATED ORAL at 21:24

## 2018-06-18 RX ADMIN — VANCOMYCIN HYDROCHLORIDE 1250 MG: 10 INJECTION, POWDER, LYOPHILIZED, FOR SOLUTION INTRAVENOUS at 15:47

## 2018-06-18 RX ADMIN — Medication 20 MG: at 20:02

## 2018-06-18 RX ADMIN — PREGABALIN 75 MG: 20 SOLUTION ORAL at 20:01

## 2018-06-18 RX ADMIN — PREGABALIN 75 MG: 20 SOLUTION ORAL at 13:46

## 2018-06-18 RX ADMIN — Medication 1 PACKET: at 07:31

## 2018-06-18 RX ADMIN — HYDROCORTISONE SODIUM SUCCINATE 50 MG: 100 INJECTION, POWDER, FOR SOLUTION INTRAMUSCULAR; INTRAVENOUS at 06:36

## 2018-06-18 RX ADMIN — PANCRELIPASE 24000 UNITS: 60000; 12000; 38000 CAPSULE, DELAYED RELEASE PELLETS ORAL at 18:11

## 2018-06-18 RX ADMIN — HEPARIN SODIUM 5000 UNITS: 5000 INJECTION, SOLUTION INTRAVENOUS; SUBCUTANEOUS at 07:28

## 2018-06-18 RX ADMIN — FUROSEMIDE 40 MG: 40 TABLET ORAL at 13:45

## 2018-06-18 RX ADMIN — PREGABALIN 25 MG: 25 CAPSULE ORAL at 22:45

## 2018-06-18 RX ADMIN — PREGABALIN 75 MG: 20 SOLUTION ORAL at 07:27

## 2018-06-18 RX ADMIN — INSULIN ASPART 2 UNITS: 100 INJECTION, SOLUTION INTRAVENOUS; SUBCUTANEOUS at 19:19

## 2018-06-18 RX ADMIN — Medication 70 MG: at 07:27

## 2018-06-18 RX ADMIN — PIPERACILLIN SODIUM,TAZOBACTAM SODIUM 4.5 G: 4; .5 INJECTION, POWDER, FOR SOLUTION INTRAVENOUS at 06:35

## 2018-06-18 RX ADMIN — OXYCODONE HYDROCHLORIDE AND ACETAMINOPHEN 500 MG: 500 TABLET ORAL at 07:28

## 2018-06-18 RX ADMIN — POTASSIUM CHLORIDE 20 MEQ: 1.5 POWDER, FOR SOLUTION ORAL at 07:27

## 2018-06-18 RX ADMIN — INSULIN ASPART 8 UNITS: 100 INJECTION, SOLUTION INTRAVENOUS; SUBCUTANEOUS at 13:46

## 2018-06-18 RX ADMIN — PIPERACILLIN SODIUM,TAZOBACTAM SODIUM 4.5 G: 4; .5 INJECTION, POWDER, FOR SOLUTION INTRAVENOUS at 13:45

## 2018-06-18 RX ADMIN — Medication 6 MG: at 21:24

## 2018-06-18 RX ADMIN — FLUTICASONE PROPIONATE 2 SPRAY: 50 SPRAY, METERED NASAL at 07:28

## 2018-06-18 RX ADMIN — INSULIN ASPART 8 UNITS: 100 INJECTION, SOLUTION INTRAVENOUS; SUBCUTANEOUS at 09:41

## 2018-06-18 NOTE — PLAN OF CARE
Problem: Patient Care Overview  Goal: Plan of Care/Patient Progress Review  Discharge Planner PT  5B  Patient plan for discharge: Home  Current status: Pt performs slide board transfer from bed<>wc x3 reps with Iggy-CGA and another person present for line management. Frequent cues on weight bearing status (NWB R LE, TTWB L LE) throughout session, with therapist placing foot under L heel for physical cueing. Frequent reminders on this required throughout as pt has difficulty following precautions due to UE weakness/fatigue.   Barriers to return to prior living situation: WB status, weakness, low endurance, medical status, level of assist  Recommendations for discharge: TCU vs ARU pending activity tolerance at discharge   Rationale for recommendations: Pt would benefit from continued therapy services to address impairments and progress independence with functional mobility.        Entered by: Mallika Frank 06/18/2018 10:39 AM

## 2018-06-18 NOTE — PLAN OF CARE
Problem: Patient Care Overview  Goal: Plan of Care/Patient Progress Review  OT5B:   Discharge Planner OT   Patient plan for discharge: Not discussed   Current status: Pt completed B UE strengthening exercises with yellow theraband, x10 reps each exercise. Pt needing rest break between exercises and VCs intermittently to use proper form with completion of exercises.   Barriers to return to prior living situation: medical status, decreased endurance, poor functional mobility, decreased activity tolerance   Recommendations for discharge: TCU   Rationale for recommendations: progress pt strength and endurance and facilitate increased level of IND with I/ADLs in home environment        Entered by: Tanya Nolasco 06/18/2018 1:18 PM

## 2018-06-18 NOTE — PROGRESS NOTES
Orthopaedic Surgery Progress Note 06/18/2018    Chart reviewed    E/S: AFVSS. WBC downtrending. CRP downtrending. Persistent right foot pain. Has been reviewing resources about BKA however states she would like to discuss further at home with family.    O:  Temp: 96.6  F (35.9  C) Temp src: Oral BP: 134/78 Pulse: 93 Heart Rate: 98 Resp: 16 SpO2: 92 % O2 Device: Nasal cannula Oxygen Delivery: 2 LPM        Recent Labs  Lab 06/18/18  0603 06/17/18  2033 06/17/18  0551 06/15/18  1747  06/15/18  0334  06/13/18  0910   WBC 12.4*  --  13.3*  --   --  16.1*  < >  --    HGB 8.0* 8.1* 6.9*  --   < > 7.3*  < >  --      --  398 360  --  306  < >  --    CRP  --   --  37.0*  --   --  110.0*  --  220.0*   < > = values in this interval not displayed.  Wound Culture: E faecalis, pansensitive  Wound gram stain: GPCs    Assessment: Alessandra Pak is a 52 year old female admitted sepsis with right foot diabetic ulcers vs pna as presumed source, now s/p bedside I&D right foot on 6/7, 6/12, and 6/13.  WBC decreasing , afebrile, CRP downtrending, no encephalopathy.  Intubated for ARDS/hypoxic respiratory failure on 6/10, extubated 6/15, TTF 6/15. No surgery planned per patient and family wishes.     Plan:  Medicine Primary  Activity: Elevate BLE as much as possible   Weight bearing status: NWB RLE, okay for minimal WB with left forefoot for transfers to chair or commode.   Antibiotics: per primary/ID  Labs: repeat CRP q48hrs (ordered)  DVT prophylaxis: per primary team  Wound Care: daily dressing by bedside RN, weekly WOCN dressing changes      Shakeel Estrada MD 06/18/2018  Orthopaedic Surgery Resident, PGY-1  Pager: [phone]    For questions about this patient, please attempt to contact me at my pager prior to contacting the orthopaedics resident on call. Thank you!

## 2018-06-18 NOTE — PLAN OF CARE
"Problem: Patient Care Overview  Goal: Plan of Care/Patient Progress Review  1900-0730.  AOx4.  Tachycardia, all other VSS on 2L via NC.  A+ 1/2 to bedside commode.  Wound dressings CDI.  Hgb recheck: 8.1.  Na: 146.  IV ABX overnight.  , 157, and 176- treated per protocol.  Writer and pt talked in length about possible amputation.  Writer reiterated the severity of her situation and the possible outcomes that could happen should her infection progress.  Pt stated that she is \"coming around to the idea\" and that surgery is \"not off the table.\"  She stated that she needed time to think and see the whole picture, saying \"prosthetics have come a long way and I know I wouldn't be home or bed bound which is my biggest fear.\"  She was encouraged to keep asking questions as they arise.  Sepsis protocol triggered- Lact result: 0.7.  Pt slept between cares and assessments and is able to make needs known.   "

## 2018-06-18 NOTE — PHARMACY-VANCOMYCIN DOSING SERVICE
Pharmacy Vancomycin Note  Date of Service 2018  Patient's  1965   52 year old, female    Indication: Osteomyelitis, Diabetic Foot Infection, MRSA history  Goal Trough Level: 15-20 mg/L  Day of Therapy: 5  Current Vancomycin regimen:  1250 mg IV q24h    Current estimated CrCl = Estimated Creatinine Clearance: 79.1 mL/min (based on Cr of 0.88).    Creatinine for last 3 days  2018:  6:04 AM Creatinine 1.01 mg/dL  2018:  5:51 AM Creatinine 0.97 mg/dL  2018:  6:03 AM Creatinine 0.88 mg/dL    Recent Vancomycin Levels (past 3 days)  2018:  2:21 PM Vancomycin Level 19.7 mg/L  2018:  4:00 PM Vancomycin Level 17.0 mg/L 23 hour trough in a 24 hour dosing regimen- good trough    Vancomycin IV Administrations (past 72 hours)                   vancomycin (VANCOCIN) 1,250 mg in sodium chloride 0.9 % 250 mL intermittent infusion (mg) 1,250 mg New Bag 18 1547     1,250 mg New Bag 18 1641     1,250 mg New Bag 18 1601                Nephrotoxins and other renal medications (Future)    Start     Dose/Rate Route Frequency Ordered Stop    18 1145  lisinopril (PRINIVIL/ZESTRIL) tablet 10 mg      10 mg Oral DAILY 18 1132      06/15/18 1230  piperacillin-tazobactam (ZOSYN) 4.5 g vial to attach to  mL bag      4.5 g  over 30 Minutes Intravenous EVERY 6 HOURS 06/15/18 0711      18 1600  vancomycin (VANCOCIN) 1,250 mg in sodium chloride 0.9 % 250 mL intermittent infusion      1,250 mg  over 90 Minutes Intravenous EVERY 24 HOURS 18 1543               Contrast Orders - past 72 hours     None          Interpretation of levels and current regimen:  Trough level is  Therapeutic    Has serum creatinine changed > 50% in last 72 hours: No    Renal Function: Improving    Plan:  1.  Continue Current Dose - took level today as it seems renal function pretty much back to baseline  2.  Pharmacy will check trough levels as appropriate in 3-5 Days.    3. Serum  creatinine levels will be ordered daily for the first week of therapy and at least twice weekly for subsequent weeks.      Dom Francois PharmD, [facility]    [phone]  Pager [phone]          .

## 2018-06-18 NOTE — CONSULTS
Health Psychology                  Clinic    Department of Medicine  Karmen Weiss, Ph.D., L.P. [phone]                          Clinics and Surgery Center  [facility] Herman Haney, Ph.D.,  L.P. [phone]                 3rd Floor  [place] Mail [address]   Violet Teixeira, Ph.D., L.P. [phone]    [address] Haley Carrillo, Ph.D., L.P. [phone]            [place] [address]          Erwin Lugo, Ph.D., A.B.P.P., L.P. [phone]      Beatriz Winchester, Ph.D., L.P. [phone]      Inpatient Health Psychology*    Short contact with Ms Pak to introduce myself and Health Psychology services.    She reports history of depression and anxiety with anxiety feeling more prominent in terms of negative impact on functioning. Currently using only amitriptyline at bedtime. Rates mood currently at 6/10 with 1 being least positive mood and 10 being most positive mood. Has found psychotherapy very helpful in past.     Very eager to meet with Health Psychology but busy with rehabilitation therapies. Prefers to meet tomorrow morning vs this afternoon after 5:00 pm, the two options I offered.    Will meet tomorrow morning 6/19 at 10 am.    Karmen Weiss, PhD, LP  [phone]        *In accordance with the Rules of the [place] [employer], it is noted that psychological descriptions and scientific procedures underlying psychological evaluations have limitations.  Absolute predictions cannot be made based on information in this report.

## 2018-06-18 NOTE — PROGRESS NOTES
Calorie Counts  Intake recorded for: 6/17  Kcals: 0  Protein: 0g  # Meals Recorded: 2 meals ordered from kitchen, no intake recorded.   # Supplements Recorded: no intake recorded.

## 2018-06-18 NOTE — PROGRESS NOTES
[facility], [place]    Internal Medicine Progress Note - [facility] Service    Main Plans for Today   - d/c oxycodone  - furosemide 40 mg x 1  - start 1/15g CHO coverage  - Therapy to discuss post-amputation recovery today    Assessment & Plan   Alessandra Pak is a 52 year old female admitted on 6/6/2018. 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 Hospital course was complicated by respiratory failure due to ARDS requiring intubation (6/10-15), extubated and transferred to medicine for further cares.    # Hypernatremia, improving  Improved with increased  cc q2h (146).  -monitor    # Normocytic Anemia  # Anemia of chronic disease  Insufficient reticulocyte response on 6/10 noted.  Ferritin elevated.  Status post 3 units PRBC (6/17, 6/14, 6/13). Appropriate response. No bleeding observed.  -continue to monitor    # Diarrhea  Patient notes mild improvement with resumption of Creon.  Previously negative for C. difficile.  3-4 BMs per day.  -Continue to monitor    # Severe Sepsis, improving  # R diabetic foot infection, s/p I&D x 3, Ongoing Osteomyelitis  Patient more open to possibility of amputation today.  States that her major concern surrounded being bedbound following amputation.  Has psychology attempted to discuss with patient this morning though she was busy with rehab.  They will revisit with her 6/19 at 10 AM. Therapy to discuss expectation with patient today.  -continue vanc/zosyn  -ongoing discussions re: amputation, pt coming around  -qod CRP  -appreciate ortho and ID input    # Severe Malnutrition in the setting of acute illness  Pt was in ICU intubated fo >5d. Currently on tube feeds. Calorie count ongoing. Patient cleared for PO diet, transition  off of TFs as PO intake increases.    # Anxiety  Pt with panic attacks and anxiety especially with talking about amputation. This has significantly improved. Remains redirectable by family and staff, avoid medicating if possible.    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  Patient on broad-spectrum coverage for left diabetic foot infection.  Weight up 4 kg since admission.  Diuretics being held due to previous hypotension.  -40 mg furosemide ×1 (home dose 20 mg daily)  -wean O2 as tolerated, goal >90%    # Adrenal insufficiency  Quick taper for ongoing steroids, taper complete 6/18.    # NIKOLAY on CKD  Resolved  Now Cr is 0.97, suspect that she has low muscle mass    # Anisocoria/R afferent pupillary defect  - stable      # Pain Assessment:  Current Pain Score 6/18/2018   Patient currently in pain? denies   Pain score (0-10) -   Pain location -   Pain descriptors -   CPOT pain score -   - Alessandra is experiencing pain due to Chronic pain as well as recent surgical procedure. Pain management was discussed and the plan was created in a collaborative fashion.  Alessandra's response to the current recommendations: engaged  - Please see the plan for pain management as documented above        Diet: Adult Formula Drip Feeding: Continuous Nutren  "1.5; Nasoduodenal tube; Goal Rate: 50; mL/hr; Medication - Tube Feeding Flush Frequency: At least 15-30 mL water before and after medication administration and with tube clogging; Amount to Send (Nutri...  Regular Diet Adult  Calorie Counts  Fluids: Free water flushes as aboe  DVT Prophylaxis: Heparin SQ  Code Status: Full Code    Disposition Plan   Expected discharge: 4 - 7 days, recommended to transitional care unit once antibiotic plan established, hemoglobin stable, O2 use less than 1 liters/minute and SIRS/Sepsis treated.     Entered: Alireza Chaparro 06/18/2018, 12:02 PM   Information in the above section will display in the discharge planner report.      The patient's care was discussed with the Bedside Nurse and Patient.    Alireza Chaparro  [facility]: 5  Pager: [phone]  Please see sticky note for cross cover information    Interval History   Patient overall feels better today.  Triggered sepsis protocol, lactate 0.7. States that her breathing continues to improve.  She is eating approximately \"40%\" of her meals.  She also states that she is \"leaning more towards a procedure\" than not.  She states that her major concern was surrounded worry about becoming immobile following amputation.  She states that her anxiety has also improved significantly.  She has not yet spoken with health psychology.  She states that her diarrhea has improved slightly.    Physical Exam   Vital Signs: Temp: 96.6  F (35.9  C) Temp src: Oral BP: 139/75 Pulse: 93 Heart Rate: 94 Resp: 16 SpO2: 92 % O2 Device: Nasal cannula Oxygen Delivery: 2 LPM  Weight: 144 lbs 6.4 oz  General Appearance: Pleasant female, sitting up in chair, in no acute distress, eating, smiling  Respiratory: Crackles in the bases bilaterally, breathing comfortably on room air  Cardiovascular: Normal rate, regular rhythm, no murmur rubs or gallops  GI: Abdomen is soft, nontender, nondistended no acute changes noted.  Skin: Lower extremities are " dressed, 1+ LE edema b/l  Other: Alert and oriented x4        Data   Medications     IV fluid REPLACEMENT ONLY Stopped (06/14/18 1319)     IV fluid REPLACEMENT ONLY Stopped (06/12/18 2205)       acetaminophen  1,000 mg Oral TID     amitriptyline  50 mg Oral At Bedtime     amylase-lipase-protease  2 capsule Oral TID w/meals     ascorbic acid  500 mg Oral or Feeding Tube Daily     fluticasone  2 spray Left nostril Daily     furosemide  40 mg Oral Once     heparin lock flush  5-10 mL Intracatheter Q24H     heparin  5,000 Units Subcutaneous Q12H     insulin aspart  1-10 Units Subcutaneous TID AC     insulin aspart  1-7 Units Subcutaneous At Bedtime     insulin glargine  30 Units Subcutaneous Daily     lisinopril  10 mg Oral Daily     melatonin  6 mg Oral At Bedtime     methadone  70 mg Oral Daily     multivitamins with minerals  15 mL Per Feeding Tube Daily     omeprazole  20 mg Oral or Feeding Tube Daily     piperacillin-tazobactam  4.5 g Intravenous Q6H     pregabalin  75 mg Oral or Feeding Tube TID     protein modular  1 packet Per Feeding Tube BID 09 12     vancomycin (VANCOCIN) IV  1,250 mg Intravenous Q24H     zinc sulfate  220 mg Oral or Feeding Tube Daily     Data     Recent Labs  Lab 06/18/18  0603 06/17/18  2033 06/17/18  0551 06/16/18  0604 06/15/18  1747  06/15/18  0334  06/13/18  1645   WBC 12.4*  --  13.3*  --   --   --  16.1*  < >  --    HGB 8.0* 8.1* 6.9*  --   --   < > 7.3*  < >  --    MCV 83  --  81  --   --   --  80  < >  --      --  398  --  360  --  306  < >  --    INR  --   --   --   --   --   --   --   --  1.33*   * 146* 151* 146*  --   --  146*  < >  --    POTASSIUM 3.7  --  3.5 3.2*  --   --  3.5  < >  --    CHLORIDE 111*  --  114* 112*  --   --  114*  < >  --    CO2 27  --  27 25  --   --  21  < >  --    BUN 24  --  27 36*  --   --  44*  < >  --    CR 0.88  --  0.97 1.01  --   --  1.44*  < >  --    ANIONGAP 7  --  9 9  --   --  11  < >  --    RICK 8.1*  --  8.2* 8.3*  --   --  8.2*   < >  --    *  --  177* 369*  --   --  185*  < >  --    < > = values in this interval not displayed.  No results found for this or any previous visit (from the past 24 hour(s)).

## 2018-06-19 ENCOUNTER — APPOINTMENT (OUTPATIENT)
Dept: PHYSICAL THERAPY | Facility: CLINIC | Age: 53
DRG: 853 | End: 2018-06-19
Payer: COMMERCIAL

## 2018-06-19 LAB
ANION GAP SERPL CALCULATED.3IONS-SCNC: 8 MMOL/L (ref 3–14)
BACTERIA SPEC CULT: NO GROWTH
BACTERIA SPEC CULT: NO GROWTH
BUN SERPL-MCNC: 23 MG/DL (ref 7–30)
CALCIUM SERPL-MCNC: 8.1 MG/DL (ref 8.5–10.1)
CHLORIDE SERPL-SCNC: 108 MMOL/L (ref 94–109)
CO2 SERPL-SCNC: 28 MMOL/L (ref 20–32)
CREAT SERPL-MCNC: 0.88 MG/DL (ref 0.52–1.04)
CRP SERPL-MCNC: 17 MG/L (ref 0–8)
ERYTHROCYTE [DISTWIDTH] IN BLOOD BY AUTOMATED COUNT: 20.7 % (ref 10–15)
GFR SERPL CREATININE-BSD FRML MDRD: 68 ML/MIN/1.7M2
GLUCOSE BLDC GLUCOMTR-MCNC: 100 MG/DL (ref 70–99)
GLUCOSE BLDC GLUCOMTR-MCNC: 109 MG/DL (ref 70–99)
GLUCOSE BLDC GLUCOMTR-MCNC: 110 MG/DL (ref 70–99)
GLUCOSE BLDC GLUCOMTR-MCNC: 115 MG/DL (ref 70–99)
GLUCOSE BLDC GLUCOMTR-MCNC: 122 MG/DL (ref 70–99)
GLUCOSE BLDC GLUCOMTR-MCNC: 128 MG/DL (ref 70–99)
GLUCOSE BLDC GLUCOMTR-MCNC: 134 MG/DL (ref 70–99)
GLUCOSE BLDC GLUCOMTR-MCNC: 56 MG/DL (ref 70–99)
GLUCOSE BLDC GLUCOMTR-MCNC: 59 MG/DL (ref 70–99)
GLUCOSE BLDC GLUCOMTR-MCNC: 69 MG/DL (ref 70–99)
GLUCOSE BLDC GLUCOMTR-MCNC: 88 MG/DL (ref 70–99)
GLUCOSE SERPL-MCNC: 129 MG/DL (ref 70–99)
HCT VFR BLD AUTO: 24.2 % (ref 35–47)
HGB BLD-MCNC: 7.9 G/DL (ref 11.7–15.7)
Lab: NORMAL
Lab: NORMAL
MAGNESIUM SERPL-MCNC: 2.2 MG/DL (ref 1.6–2.3)
MCH RBC QN AUTO: 27.4 PG (ref 26.5–33)
MCHC RBC AUTO-ENTMCNC: 32.6 G/DL (ref 31.5–36.5)
MCV RBC AUTO: 84 FL (ref 78–100)
PHOSPHATE SERPL-MCNC: 3.4 MG/DL (ref 2.5–4.5)
PLATELET # BLD AUTO: 416 10E9/L (ref 150–450)
POTASSIUM SERPL-SCNC: 3.8 MMOL/L (ref 3.4–5.3)
RBC # BLD AUTO: 2.88 10E12/L (ref 3.8–5.2)
SODIUM SERPL-SCNC: 143 MMOL/L (ref 133–144)
SPECIMEN SOURCE: NORMAL
SPECIMEN SOURCE: NORMAL
WBC # BLD AUTO: 13.3 10E9/L (ref 4–11)

## 2018-06-19 PROCEDURE — 40000193 ZZH STATISTIC PT WARD VISIT

## 2018-06-19 PROCEDURE — 25000128 H RX IP 250 OP 636: Performed by: STUDENT IN AN ORGANIZED HEALTH CARE EDUCATION/TRAINING PROGRAM

## 2018-06-19 PROCEDURE — 97530 THERAPEUTIC ACTIVITIES: CPT | Mod: GP

## 2018-06-19 PROCEDURE — 00000146 ZZHCL STATISTIC GLUCOSE BY METER IP

## 2018-06-19 PROCEDURE — 83735 ASSAY OF MAGNESIUM: CPT | Performed by: STUDENT IN AN ORGANIZED HEALTH CARE EDUCATION/TRAINING PROGRAM

## 2018-06-19 PROCEDURE — 85027 COMPLETE CBC AUTOMATED: CPT | Performed by: STUDENT IN AN ORGANIZED HEALTH CARE EDUCATION/TRAINING PROGRAM

## 2018-06-19 PROCEDURE — 25000132 ZZH RX MED GY IP 250 OP 250 PS 637: Performed by: STUDENT IN AN ORGANIZED HEALTH CARE EDUCATION/TRAINING PROGRAM

## 2018-06-19 PROCEDURE — 25000128 H RX IP 250 OP 636: Performed by: INTERNAL MEDICINE

## 2018-06-19 PROCEDURE — 84100 ASSAY OF PHOSPHORUS: CPT | Performed by: STUDENT IN AN ORGANIZED HEALTH CARE EDUCATION/TRAINING PROGRAM

## 2018-06-19 PROCEDURE — 12000001 ZZH R&B MED SURG/OB UMMC

## 2018-06-19 PROCEDURE — 27210429 ZZH NUTRITION PRODUCT INTERMEDIATE LITER

## 2018-06-19 PROCEDURE — 86140 C-REACTIVE PROTEIN: CPT | Performed by: STUDENT IN AN ORGANIZED HEALTH CARE EDUCATION/TRAINING PROGRAM

## 2018-06-19 PROCEDURE — 99232 SBSQ HOSP IP/OBS MODERATE 35: CPT | Mod: GC | Performed by: HOSPITALIST

## 2018-06-19 PROCEDURE — 36592 COLLECT BLOOD FROM PICC: CPT | Performed by: STUDENT IN AN ORGANIZED HEALTH CARE EDUCATION/TRAINING PROGRAM

## 2018-06-19 PROCEDURE — 25000132 ZZH RX MED GY IP 250 OP 250 PS 637: Performed by: INTERNAL MEDICINE

## 2018-06-19 PROCEDURE — 25800025 ZZH RX 258: Performed by: STUDENT IN AN ORGANIZED HEALTH CARE EDUCATION/TRAINING PROGRAM

## 2018-06-19 PROCEDURE — 80048 BASIC METABOLIC PNL TOTAL CA: CPT | Performed by: STUDENT IN AN ORGANIZED HEALTH CARE EDUCATION/TRAINING PROGRAM

## 2018-06-19 PROCEDURE — 40000802 ZZH SITE CHECK

## 2018-06-19 RX ORDER — FUROSEMIDE 40 MG
40 TABLET ORAL DAILY
Status: DISCONTINUED | OUTPATIENT
Start: 2018-06-19 | End: 2018-06-21

## 2018-06-19 RX ADMIN — PREGABALIN 75 MG: 20 SOLUTION ORAL at 14:11

## 2018-06-19 RX ADMIN — PIPERACILLIN SODIUM,TAZOBACTAM SODIUM 4.5 G: 4; .5 INJECTION, POWDER, FOR SOLUTION INTRAVENOUS at 20:03

## 2018-06-19 RX ADMIN — PIPERACILLIN SODIUM,TAZOBACTAM SODIUM 4.5 G: 4; .5 INJECTION, POWDER, FOR SOLUTION INTRAVENOUS at 12:46

## 2018-06-19 RX ADMIN — PIPERACILLIN SODIUM,TAZOBACTAM SODIUM 4.5 G: 4; .5 INJECTION, POWDER, FOR SOLUTION INTRAVENOUS at 06:03

## 2018-06-19 RX ADMIN — Medication 70 MG: at 08:32

## 2018-06-19 RX ADMIN — VANCOMYCIN HYDROCHLORIDE 1250 MG: 10 INJECTION, POWDER, LYOPHILIZED, FOR SOLUTION INTRAVENOUS at 18:18

## 2018-06-19 RX ADMIN — DEXTROSE MONOHYDRATE 25 ML: 500 INJECTION PARENTERAL at 19:08

## 2018-06-19 RX ADMIN — SODIUM CHLORIDE, PRESERVATIVE FREE 5 ML: 5 INJECTION INTRAVENOUS at 12:28

## 2018-06-19 RX ADMIN — SODIUM CHLORIDE, PRESERVATIVE FREE 5 ML: 5 INJECTION INTRAVENOUS at 05:55

## 2018-06-19 RX ADMIN — HEPARIN SODIUM 5000 UNITS: 5000 INJECTION, SOLUTION INTRAVENOUS; SUBCUTANEOUS at 20:06

## 2018-06-19 RX ADMIN — POTASSIUM CHLORIDE 20 MEQ: 1.5 POWDER, FOR SOLUTION ORAL at 14:11

## 2018-06-19 RX ADMIN — AMITRIPTYLINE HYDROCHLORIDE 50 MG: 50 TABLET, FILM COATED ORAL at 20:23

## 2018-06-19 RX ADMIN — ACETAMINOPHEN 1000 MG: 500 TABLET, FILM COATED ORAL at 08:32

## 2018-06-19 RX ADMIN — Medication 220 MG: at 08:32

## 2018-06-19 RX ADMIN — Medication 1 PACKET: at 18:25

## 2018-06-19 RX ADMIN — OXYCODONE HYDROCHLORIDE AND ACETAMINOPHEN 500 MG: 500 TABLET ORAL at 08:32

## 2018-06-19 RX ADMIN — MULTIVITAMIN 15 ML: LIQUID ORAL at 08:32

## 2018-06-19 RX ADMIN — LISINOPRIL 10 MG: 10 TABLET ORAL at 08:32

## 2018-06-19 RX ADMIN — Medication 20 MG: at 20:22

## 2018-06-19 RX ADMIN — PREGABALIN 75 MG: 20 SOLUTION ORAL at 08:32

## 2018-06-19 RX ADMIN — HEPARIN SODIUM 5000 UNITS: 5000 INJECTION, SOLUTION INTRAVENOUS; SUBCUTANEOUS at 08:33

## 2018-06-19 RX ADMIN — Medication 6 MG: at 20:23

## 2018-06-19 RX ADMIN — Medication 1 PACKET: at 08:30

## 2018-06-19 RX ADMIN — FUROSEMIDE 40 MG: 40 TABLET ORAL at 12:28

## 2018-06-19 RX ADMIN — PIPERACILLIN SODIUM,TAZOBACTAM SODIUM 4.5 G: 4; .5 INJECTION, POWDER, FOR SOLUTION INTRAVENOUS at 00:31

## 2018-06-19 RX ADMIN — ACETAMINOPHEN 1000 MG: 500 TABLET, FILM COATED ORAL at 18:24

## 2018-06-19 RX ADMIN — PREGABALIN 75 MG: 20 SOLUTION ORAL at 20:03

## 2018-06-19 NOTE — PLAN OF CARE
Problem: Patient Care Overview  Goal: Plan of Care/Patient Progress Review  Pt A&O x4. VSS on 2L. Pt C/o pain in legs due to wounds. Described as throbbing pain. Pt had one time dose of lyrica. Pt on TF at goal rate of 50 mL/hr. Pt non weight bearing. ,152. Coverage give per orders. Pt continuing on IV abx.  Will continue to monitor.

## 2018-06-19 NOTE — PLAN OF CARE
Problem: Patient Care Overview  Goal: Plan of Care/Patient Progress Review  Discharge Planner PT  5B  Patient plan for discharge: Not discussed, pending pt's decision on amputation  Current status: Pt ind with bed mobility, performs slideboard transfer EOB<>wc x2 with SBA and verbal and physical cues for WB status (NWB R LE, TTWB L LE), improved following of precautions today though continues to have difficulty keeping weight off L heel. Provided extensive education on expectations of amputation including healing times, expected inpatient and outpatient rehab, proper positioning, and exercises with pt very appreciative and verbalizing understanding. Pt seems more open to amputation at this date, though still noting some uncertainty. Provided pt with educational handout on amputation expectations/terminology.  Barriers to return to prior living situation: Medical status, weight bearing status, level of assist, strength, endurance  Recommendations for discharge: Currently recommend TCU  Rationale for recommendations: Pt would benefit from continued skilled services to progress independence with functional mobility. If pt has amputation during this hospital stay, she may be appropriate for ARU pending activity tolerance and progression.       Entered by: Mallika Frank 06/19/2018 9:17 AM

## 2018-06-19 NOTE — PLAN OF CARE
Problem: Patient Care Overview  Goal: Plan of Care/Patient Progress Review  Patient had an episode of low blood sugars and recovered. Patient continues on tube feeding water flushes 50cc every two hours. Patient ate 25 percent of lunch. Patient no weight bearing on right foot. Patient had wound changed on rt foot. Up in chair for some today. Patient has decided to go with the surgery. Potassium was replaced and will be rechecked in am. P;cont to monitor

## 2018-06-19 NOTE — PLAN OF CARE
Problem: Patient Care Overview  Goal: Plan of Care/Patient Progress Review  Pt A&O x4. VSS on 2L. Denies pain. Pt on TF at goal rate of 50 mL/hr. Pt non weight bearing. Mepilex put of L foot ulcers. Scheduled antibiotics given. Will continue to monitor and follow POC.

## 2018-06-19 NOTE — PLAN OF CARE
Problem: Patient Care Overview  Goal: Plan of Care/Patient Progress Review  OT 5B: Cancel-pt with care provider first attempt, then sleeping upon next

## 2018-06-19 NOTE — PROGRESS NOTES
"CLINICAL NUTRITION SERVICES - REASSESSMENT NOTE     Nutrition Prescription    RECOMMENDATIONS FOR MDs/PROVIDERS TO ORDER:  Re-ordered calorie count. If results of calorie count collection shows average intake meeting ~ 1200 kcals and 50 gm protein per day, ( ~ 60% estimated higher liliana needs), may discontinue  ND TF support.     Malnutrition Status:    Severe malnutrition in the context of acute illness     Recommendations already ordered by Registered Dietitian (RD):  Calorie count     Future/Additional Recommendations:  None        EVALUATION OF THE PROGRESS TOWARD GOALS   Diet:   Regular, started 6/15      Nutrition Support: ( 6/12 - Now, no TF received on 6/17)  TF access: ND tube ( placed on 6/12),   Dosing wt: 57 kg ( actual , lowest wt on 6/8)    TF regimen: Nutren 1.5 @ 50 ml/hr, (1200 ml/day)  Provides: 1800 kcals (32+), 82 gm PRO (1.4+), 912 mL H2O, 211 gm CHO and 0 fiber.    Water flushes: 50 ml every 2 hours      Intake:   #Average 5 day TF provided: 534 ml/day,  Met 44% estimated goal TF volume  Provided patient with 792 kcal /day ( 14 kcal /kg ) and 36 gm protein /day ( 0.6 gm /kg).     #Calorie count:  6/18: Intake Not recorded, patient ordered 2 meals from the kitchen  6/17: 2 meals ordered from the kitchen, intake not recorded.   6/15: 1 meal ordered from the kitchen, intake not recorded.   Insufficient data per above.     Patient sleeping at the time of visit.       ASSESSED NUTRITION NEEDS per 57 kg actual wt:   Estimated Energy Needs: 1710 - 1995+ kcals/day (30 - 35 kcals/kg )  Justification: Increased needs with borderline low BMI and Wound healing  Estimated Protein Needs:  gm/day (1.5-2 gm/kg)  Justification:  wound healing      NEW FINDINGS   - Admitted on 6/6/2018,    - Per providers note, \" History of chronic pancreatitis s/p whipple, T2DM c/b diabetic foot wounds and ulcers, CKD, restrictive lung disease with emphysema and fibrosis, NICM s/p ICD, chronic methadone use and is admitted " "for severe sepsis due to Right foot abscess and osteomyelitis. Hospital course was complicated by respiratory failure due to ARDS requiring intubation (6/10-15), extubated and transferred to medicine for further cares\".    -  Possible plan of amputation of her right foot, secondary to nonhealing diabetic wounds.       MALNUTRITION  % Intake: Unable to assess  % Weight Loss: None noted since admit   Subcutaneous Fat Loss: Previously: None observed  Muscle Loss: Previously, Scapular bone: moderate, Thoracic region (clavicle, acromium bone, deltoid, trapezius, pectoral): moderate, Upper arm (bicep, tricep): moderate, Lower arm (forearm): moderate, Dorsal hand: severe, Upper leg (quadricep, hamstring): moderate, Patellar region: mild and Posterior calf: severe  Fluid Accumulation/Edema: None noted  Malnutrition Diagnosis: Severe malnutrition in the context of acute illness       Previous Goals   Total average nutritional intake to meet a minimum of 30 kcal/kg and 1.5 gm PRO/kg daily (per dosing wt 57 kg).  Evaluation: Not met    Previous Nutrition Diagnosis  Inadequate protein-energy intake related to resp status inhibiting ability to take PO, poor appetite and large diabetic foot ulcers as evidenced by pt NPO x 2 days, was eating poorly prior to intubation, significant wounds noted per MD note and WOC RN evals with presumed micronutrient deficiencies with possible scurvy found on nutrition-focused physical assessment; pt also with severe malnutrition with muscle wasting.  Evaluation: Improving    CURRENT NUTRITION DIAGNOSIS  Inadequate oral intake related to Poor appetite as evidenced by reliant on TF support to provide for full nutrition needs.       INTERVENTIONS  Implementation  Reordered calorie count     Goals  Patient to consume % of nutritionally adequate meal trays TID, or the equivalent with supplements/snacks.    Monitoring/Evaluation  Progress toward goals will be monitored and evaluated per " protocol.    Sudha Solo RD/OSIEL  Pager [phone]

## 2018-06-19 NOTE — PROGRESS NOTES
Low blood sugar. Tube feeding ran out and not a new bag available took blood sugar after it being off for hour and sugar 49 and patient starting to eat lunch had no symptons or feeling of low blood sugar. Patient able to glucose gel and apple juice and sugar came up. Continue to monitor

## 2018-06-19 NOTE — PROGRESS NOTES
Calorie Count  Intake recorded for: 6/18  Kcals: 0  Protein: 0g  # Meals Recorded: 2 meals ordered from kitchen, no intake recorded.   # Supplements Recorded: no intake recorded.
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
## 2018-06-19 NOTE — PROGRESS NOTES
[facility], [place]    Internal Medicine Progress Note - [facility] Service    Main Plans for Today   - furosemide 40 mg qd  - daily weights  - strict I/o  - Therapy to discuss post-amputation recovery today  - pt hopes to discuss amputation with ortho    Assessment & Plan   Alessandra Pak is a 52 year old female admitted on 6/6/2018. 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 Hospital course was complicated by respiratory failure due to ARDS requiring intubation (6/10-15), extubated and transferred to medicine for further cares.    # Type 2 Diabetes  # Hx of Chronic pancreatitis s/p whipple's  Insulin regimen 30 Units in the AM, SSI, 1/15g CHO. Total insulin dose yesterday 54u.  BG improved, no hypoglycemia.  - Monitor needs, adjust prn now that patient off of steroids 6/18    # Acute hypoxic respiratory failure  # ARDS 2/2 systemic response from osteo/foot infection  # Hx of chronic restrictive lung disease with emphysema and fibrosis  # HF w/ recovered EF (50%)  Weight up 3.5 kg since admission. S3 present, crackles. Diuretics had been held due to hypotension.  -40 mg furosemide daily (home dose 20 mg daily)  -wean O2 as tolerated, goal >90%    # Severe Sepsis, improving  # R diabetic foot infection, s/p I&D x 3, Ongoing Osteomyelitis  Patient continues to come around regarding amputation.  Hopes to discuss issues regarding surgery and postop period, the expected functional recovery with therapy, orthopedics.  Is looking forward to discussing issues with health psychology as well.  They plan to visit patient at 10 AM.  Portion point where plan regarding amputation versus likely long-term IV antibiotics must be made.  Discussed this with patient who stated that she  did not want PICC or prolonged IV antibiotics.  -continue vanc/zosyn  -ongoing discussions re: amputation, pt coming around  -pt hopes to discuss w/ ortho, therapy, health psychology  -qod CRP, downtrending  -appreciate ortho and ID input    # Normocytic Anemia  # Anemia of chronic disease  Insufficient reticulocyte response on 6/10 noted.  Ferritin elevated.  Status post 3 units PRBC (6/17, 6/14, 6/13). Appropriate response. No bleeding observed.  -continue to monitor    # Diarrhea  Patient notes mild improvement with resumption of Creon.  Previously negative for C. difficile.  3-4 BMs per day.  -Continue to monitor    # Severe Malnutrition in the setting of acute illness  Pt was in ICU intubated fo >5d. Currently on tube feeds. Calorie count ongoing. Patient cleared for PO diet, transition off of TFs as PO intake increases.    # Hypernatremia, resolved  Improved with increased  cc q2h (146).  -monitor    # Anxiety  Pt with panic attacks and anxiety especially with talking about amputation. This has significantly improved. Remains redirectable by family and staff, avoid medicating if possible.    # Chronic pain  Discussed patient's methadone with pharmacy we do not note interaction with patient's current antibiotics (though would have interaction with linezolid if used in the future).  No empiric changes to methadone for anticipated body weight changes following amputation at this time.  - Methadone 70 qday  - Pregabalin  - Amitriptyline  - d/c oxycodone, pt has not been using    # Adrenal insufficiency  S/p stress dose steroid taper, complete 6/18.    # NIKOLAY on CKD, resolved  - monitor    # Anisocoria/R afferent pupillary defect  - stable      # Pain Assessment:  Current Pain Score 6/19/2018   Patient currently in pain? denies   Pain score (0-10) -   Pain location -   Pain descriptors -   CPOT pain score -   - Alessandra is experiencing pain due to Chronic pain as well as recent surgical procedure. Pain  management was discussed and the plan was created in a collaborative fashion.  Alessandra's response to the current recommendations: engaged  - Please see the plan for pain management as documented above        Diet: Adult Formula Drip Feeding: Continuous Nutren 1.5; Nasoduodenal tube; Goal Rate: 50; mL/hr; Medication - Tube Feeding Flush Frequency: At least 15-30 mL water before and after medication administration and with tube clogging; Amount to Send (Nutri...  Regular Diet Adult  Room Service  Fluids: Free water flushes as aboe  DVT Prophylaxis: Heparin SQ  Code Status: Full Code    Disposition Plan   Expected discharge: 2 - 3 days, recommended to transitional care unit once antibiotic plan established, hemoglobin stable, O2 use less than 1 liters/minute and SIRS/Sepsis treated.     Entered: Alireza Chaparro 06/19/2018, 8:27 AM   Information in the above section will display in the discharge planner report.      The patient's care was discussed with the Attending Physician, Dr. Hairston, Bedside Nurse and Patient.    Alireza Chaparro  [facility]: 5  Pager: [phone]  Please see sticky note for cross cover information    Interval History   Patient continues to feel better, notes improvement in large pawel-edema.  Patient had 2 watery stools yesterday which is an improvement from previous.  Patient confirms that when she is on Creon she has more diarrhea.  She states that she continues to lean more towards getting surgery not.  She was not able to discuss post amputee rehab or expectations for functional recovery with therapy yesterday.  Is hoping to discuss surgical plan with surgeons today.  She states that she does not want long-term IV antibiotics.    Physical Exam   Vital Signs: Temp: 98  F (36.7  C) Temp src: Oral BP: 133/73 Pulse: 94 Heart Rate: 81 Resp: 12 SpO2: 95 % O2 Device: Nasal cannula Oxygen Delivery: 2 LPM  Weight: 142 lbs 14.4 oz  General Appearance: Pleasant female, sitting up in  chair, in no acute distress, eating, smiling  Respiratory: Crackles in the bases bilaterally, breathing comfortably on room air  Cardiovascular: Normal rate, regular rhythm, S3 present, no murmur rubs  GI: Abdomen is soft, nontender, nondistended no acute changes noted.  Skin: Lower extremities are dressed, 1+ LE edema b/l  Other: Alert and oriented x4        Data   Medications     IV fluid REPLACEMENT ONLY Stopped (06/14/18 1319)     IV fluid REPLACEMENT ONLY Stopped (06/12/18 2205)       acetaminophen  1,000 mg Oral TID     amitriptyline  50 mg Oral At Bedtime     amylase-lipase-protease  2 capsule Oral TID w/meals     ascorbic acid  500 mg Oral or Feeding Tube Daily     fluticasone  2 spray Left nostril Daily     heparin lock flush  5-10 mL Intracatheter Q24H     heparin  5,000 Units Subcutaneous Q12H     insulin aspart   Subcutaneous QAM AC     insulin aspart   Subcutaneous Daily with lunch     insulin aspart   Subcutaneous Daily with supper     insulin aspart  1-10 Units Subcutaneous TID AC     insulin aspart  1-7 Units Subcutaneous At Bedtime     insulin glargine  30 Units Subcutaneous Daily     lisinopril  10 mg Oral Daily     melatonin  6 mg Oral At Bedtime     methadone  70 mg Oral Daily     multivitamins with minerals  15 mL Per Feeding Tube Daily     omeprazole  20 mg Oral or Feeding Tube Daily     piperacillin-tazobactam  4.5 g Intravenous Q6H     pregabalin  75 mg Oral or Feeding Tube TID     protein modular  1 packet Per Feeding Tube BID 09 12     vancomycin (VANCOCIN) IV  1,250 mg Intravenous Q24H     zinc sulfate  220 mg Oral or Feeding Tube Daily     Data     Recent Labs  Lab 06/19/18  0600 06/18/18  0603 06/17/18  2033 06/17/18  0551  06/13/18  1645   WBC 13.3* 12.4*  --  13.3*  < >  --    HGB 7.9* 8.0* 8.1* 6.9*  < >  --    MCV 84 83  --  81  < >  --     419  --  398  < >  --    INR  --   --   --   --   --  1.33*    146* 146* 151*  < >  --    POTASSIUM 3.8 3.7  --  3.5  < >  --     CHLORIDE 108 111*  --  114*  < >  --    CO2 28 27  --  27  < >  --    BUN 23 24  --  27  < >  --    CR 0.88 0.88  --  0.97  < >  --    ANIONGAP 8 7  --  9  < >  --    RICK 8.1* 8.1*  --  8.2*  < >  --    * 258*  --  177*  < >  --    < > = values in this interval not displayed.  No results found for this or any previous visit (from the past 24 hour(s)).

## 2018-06-20 ENCOUNTER — APPOINTMENT (OUTPATIENT)
Dept: PHYSICAL THERAPY | Facility: CLINIC | Age: 53
DRG: 853 | End: 2018-06-20
Payer: COMMERCIAL

## 2018-06-20 ENCOUNTER — APPOINTMENT (OUTPATIENT)
Dept: OCCUPATIONAL THERAPY | Facility: CLINIC | Age: 53
DRG: 853 | End: 2018-06-20
Payer: COMMERCIAL

## 2018-06-20 LAB
ANION GAP SERPL CALCULATED.3IONS-SCNC: 7 MMOL/L (ref 3–14)
BUN SERPL-MCNC: 24 MG/DL (ref 7–30)
CALCIUM SERPL-MCNC: 8.1 MG/DL (ref 8.5–10.1)
CHLORIDE SERPL-SCNC: 106 MMOL/L (ref 94–109)
CO2 SERPL-SCNC: 29 MMOL/L (ref 20–32)
CREAT SERPL-MCNC: 0.98 MG/DL (ref 0.52–1.04)
GFR SERPL CREATININE-BSD FRML MDRD: 60 ML/MIN/1.7M2
GLUCOSE BLDC GLUCOMTR-MCNC: 106 MG/DL (ref 70–99)
GLUCOSE BLDC GLUCOMTR-MCNC: 129 MG/DL (ref 70–99)
GLUCOSE BLDC GLUCOMTR-MCNC: 167 MG/DL (ref 70–99)
GLUCOSE BLDC GLUCOMTR-MCNC: 177 MG/DL (ref 70–99)
GLUCOSE BLDC GLUCOMTR-MCNC: 56 MG/DL (ref 70–99)
GLUCOSE BLDC GLUCOMTR-MCNC: 65 MG/DL (ref 70–99)
GLUCOSE BLDC GLUCOMTR-MCNC: 71 MG/DL (ref 70–99)
GLUCOSE BLDC GLUCOMTR-MCNC: 74 MG/DL (ref 70–99)
GLUCOSE BLDC GLUCOMTR-MCNC: 76 MG/DL (ref 70–99)
GLUCOSE SERPL-MCNC: 76 MG/DL (ref 70–99)
MAGNESIUM SERPL-MCNC: 2.1 MG/DL (ref 1.6–2.3)
PHOSPHATE SERPL-MCNC: 4.2 MG/DL (ref 2.5–4.5)
POTASSIUM SERPL-SCNC: 4.3 MMOL/L (ref 3.4–5.3)
SODIUM SERPL-SCNC: 142 MMOL/L (ref 133–144)

## 2018-06-20 PROCEDURE — 12000001 ZZH R&B MED SURG/OB UMMC

## 2018-06-20 PROCEDURE — 40000802 ZZH SITE CHECK

## 2018-06-20 PROCEDURE — 00000146 ZZHCL STATISTIC GLUCOSE BY METER IP

## 2018-06-20 PROCEDURE — 80048 BASIC METABOLIC PNL TOTAL CA: CPT | Performed by: STUDENT IN AN ORGANIZED HEALTH CARE EDUCATION/TRAINING PROGRAM

## 2018-06-20 PROCEDURE — 40000193 ZZH STATISTIC PT WARD VISIT

## 2018-06-20 PROCEDURE — 25000132 ZZH RX MED GY IP 250 OP 250 PS 637: Performed by: STUDENT IN AN ORGANIZED HEALTH CARE EDUCATION/TRAINING PROGRAM

## 2018-06-20 PROCEDURE — 97110 THERAPEUTIC EXERCISES: CPT | Mod: GP

## 2018-06-20 PROCEDURE — 83735 ASSAY OF MAGNESIUM: CPT | Performed by: STUDENT IN AN ORGANIZED HEALTH CARE EDUCATION/TRAINING PROGRAM

## 2018-06-20 PROCEDURE — 40000133 ZZH STATISTIC OT WARD VISIT: Performed by: OCCUPATIONAL THERAPIST

## 2018-06-20 PROCEDURE — 97110 THERAPEUTIC EXERCISES: CPT | Mod: GO | Performed by: OCCUPATIONAL THERAPIST

## 2018-06-20 PROCEDURE — 25000128 H RX IP 250 OP 636: Performed by: STUDENT IN AN ORGANIZED HEALTH CARE EDUCATION/TRAINING PROGRAM

## 2018-06-20 PROCEDURE — 27210429 ZZH NUTRITION PRODUCT INTERMEDIATE LITER

## 2018-06-20 PROCEDURE — 25000132 ZZH RX MED GY IP 250 OP 250 PS 637: Performed by: INTERNAL MEDICINE

## 2018-06-20 PROCEDURE — 97530 THERAPEUTIC ACTIVITIES: CPT | Mod: GP

## 2018-06-20 PROCEDURE — 36592 COLLECT BLOOD FROM PICC: CPT | Performed by: STUDENT IN AN ORGANIZED HEALTH CARE EDUCATION/TRAINING PROGRAM

## 2018-06-20 PROCEDURE — 84100 ASSAY OF PHOSPHORUS: CPT | Performed by: STUDENT IN AN ORGANIZED HEALTH CARE EDUCATION/TRAINING PROGRAM

## 2018-06-20 PROCEDURE — 25000128 H RX IP 250 OP 636: Performed by: INTERNAL MEDICINE

## 2018-06-20 PROCEDURE — 99232 SBSQ HOSP IP/OBS MODERATE 35: CPT | Mod: GC | Performed by: HOSPITALIST

## 2018-06-20 PROCEDURE — 25800025 ZZH RX 258: Performed by: STUDENT IN AN ORGANIZED HEALTH CARE EDUCATION/TRAINING PROGRAM

## 2018-06-20 RX ORDER — FUROSEMIDE 10 MG/ML
40 INJECTION INTRAMUSCULAR; INTRAVENOUS ONCE
Status: COMPLETED | OUTPATIENT
Start: 2018-06-20 | End: 2018-06-20

## 2018-06-20 RX ADMIN — MULTIVITAMIN 15 ML: LIQUID ORAL at 08:41

## 2018-06-20 RX ADMIN — DEXTROSE MONOHYDRATE 25 ML: 500 INJECTION PARENTERAL at 18:20

## 2018-06-20 RX ADMIN — ACETAMINOPHEN 1000 MG: 500 TABLET, FILM COATED ORAL at 21:31

## 2018-06-20 RX ADMIN — PREGABALIN 75 MG: 20 SOLUTION ORAL at 08:40

## 2018-06-20 RX ADMIN — PIPERACILLIN SODIUM,TAZOBACTAM SODIUM 4.5 G: 4; .5 INJECTION, POWDER, FOR SOLUTION INTRAVENOUS at 20:57

## 2018-06-20 RX ADMIN — PREGABALIN 75 MG: 20 SOLUTION ORAL at 21:01

## 2018-06-20 RX ADMIN — Medication 1 PACKET: at 16:23

## 2018-06-20 RX ADMIN — VANCOMYCIN HYDROCHLORIDE 1250 MG: 10 INJECTION, POWDER, LYOPHILIZED, FOR SOLUTION INTRAVENOUS at 16:20

## 2018-06-20 RX ADMIN — ACETAMINOPHEN 1000 MG: 500 TABLET, FILM COATED ORAL at 00:02

## 2018-06-20 RX ADMIN — Medication 20 MG: at 21:00

## 2018-06-20 RX ADMIN — Medication 70 MG: at 08:40

## 2018-06-20 RX ADMIN — PIPERACILLIN SODIUM,TAZOBACTAM SODIUM 4.5 G: 4; .5 INJECTION, POWDER, FOR SOLUTION INTRAVENOUS at 08:42

## 2018-06-20 RX ADMIN — HEPARIN SODIUM 5000 UNITS: 5000 INJECTION, SOLUTION INTRAVENOUS; SUBCUTANEOUS at 21:05

## 2018-06-20 RX ADMIN — Medication 1 PACKET: at 08:42

## 2018-06-20 RX ADMIN — ACETAMINOPHEN 1000 MG: 500 TABLET, FILM COATED ORAL at 16:19

## 2018-06-20 RX ADMIN — ACETAMINOPHEN 1000 MG: 500 TABLET, FILM COATED ORAL at 08:41

## 2018-06-20 RX ADMIN — FUROSEMIDE 40 MG: 10 INJECTION, SOLUTION INTRAVENOUS at 14:28

## 2018-06-20 RX ADMIN — PREGABALIN 75 MG: 20 SOLUTION ORAL at 14:27

## 2018-06-20 RX ADMIN — PIPERACILLIN SODIUM,TAZOBACTAM SODIUM 4.5 G: 4; .5 INJECTION, POWDER, FOR SOLUTION INTRAVENOUS at 14:27

## 2018-06-20 RX ADMIN — AMITRIPTYLINE HYDROCHLORIDE 50 MG: 50 TABLET, FILM COATED ORAL at 21:32

## 2018-06-20 RX ADMIN — Medication 6 MG: at 21:32

## 2018-06-20 RX ADMIN — DEXTROSE 15 G: 15 GEL ORAL at 17:34

## 2018-06-20 RX ADMIN — OXYCODONE HYDROCHLORIDE AND ACETAMINOPHEN 500 MG: 500 TABLET ORAL at 08:41

## 2018-06-20 RX ADMIN — Medication 220 MG: at 08:41

## 2018-06-20 RX ADMIN — PIPERACILLIN SODIUM,TAZOBACTAM SODIUM 4.5 G: 4; .5 INJECTION, POWDER, FOR SOLUTION INTRAVENOUS at 02:45

## 2018-06-20 RX ADMIN — LISINOPRIL 10 MG: 10 TABLET ORAL at 08:41

## 2018-06-20 RX ADMIN — FUROSEMIDE 40 MG: 40 TABLET ORAL at 08:41

## 2018-06-20 RX ADMIN — HEPARIN SODIUM 5000 UNITS: 5000 INJECTION, SOLUTION INTRAVENOUS; SUBCUTANEOUS at 08:41

## 2018-06-20 NOTE — PLAN OF CARE
Problem: Renal Insufficiency Comorbidity  Goal: Renal Insufficiency  Patient comorbidity will be monitored for signs and symptoms of Renal Insufficiency (Chronic) condition.  Problems will be absent, minimized or managed by discharge/transition of care.   Outcome: No Change  Is voiding well. Gets up to bedside commode independently.

## 2018-06-20 NOTE — PLAN OF CARE
Problem: Patient Care Overview  Goal: Plan of Care/Patient Progress Review  Pt A&O x4. VSS on 2L. Denies pain. Pt on TF at rate of 55 mL/hr. Pt non weight bearing on R foot. Had 1 BM overnight. Scheduled antibiotic given. BG 75 in AM labs orange juice given to prevent hypoglycemia. Will continue to monitor pain and glycemic status.

## 2018-06-20 NOTE — PLAN OF CARE
Problem: Patient Care Overview  Goal: Plan of Care/Patient Progress Review  PT 5B: pt requesting PT return later in AM upon attempt. Will return at ~10:00.

## 2018-06-20 NOTE — PLAN OF CARE
Problem: Chronic Respiratory Difficulty Comorbidity  Goal: Chronic Respiratory Difficulty  Patient comorbidity will be monitored for signs and symptoms of Respiratory Difficulty (Chronic) condition.  Problems will be absent, minimized or managed by discharge/transition of care.   Outcome: No Change  Fine crackles noted left base. Patient is stable on 2 liters of oxygen.

## 2018-06-20 NOTE — PROGRESS NOTES
[facility], [place]    Internal Medicine Progress Note - [facility] Service    Main Plans for Today   - furosemide 40 mg IV  - daily weights  - strict I/o  - Tentative plan for surgery 6/27, orthopedic surgeons will be by to discuss plan with patient and family likely over the weekend  - decrease glargine to 25u, high -> med ssi  - EKG, stress imaging for preop    Assessment & Plan   Alessandra Pak is a 52 year old female admitted on 6/6/2018. 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 Hospital course was complicated by respiratory failure due to ARDS requiring intubation (6/10-15), extubated and transferred to medicine for further cares.    # Type 2 Diabetes  # Hx of Chronic pancreatitis s/p whipple's  2 episodes of a symptomatically hypoglycemia yesterday likely due to decreased insulin needs off steroids.  - decrease glargine to 25u, high -> med ssi, 1/15g CHO    # Acute hypoxic respiratory failure  # ARDS 2/2 systemic response from osteo/foot infection  # Hx of chronic restrictive lung disease with emphysema and fibrosis  # HF w/ recovered EF (50%)  Weight up 2kg since 6/18. S3 present, crackles despite doubling home dose furosemide.  -40 mg furosemide daily (home dose 20 mg daily)  -Furosemide 40 mg IV today  -Daily weights, strict I/O  -wean O2 as tolerated, goal >90%    # Severe Sepsis, improving  # R diabetic foot infection, s/p I&D x 3, Ongoing Osteomyelitis  Patient mentally ready for amputation, discussions with therapy and health psychology have been helpful.  Surgery not emergent or urgent per orthopedics.  Given life-threatening nature of diabetic foot infection and inconsistent follow-up, amputation would likely still be the best course as patient is  also opposed to long-term IV antibiotics and would need close follow-up.  -continue vanc/zosyn  -Tentative plan for surgery 6/27, orthopedic surgeons will be by to discuss plan with patient and family likely over the weekend  -qod CRP, downtrending  -appreciate ortho and ID input    # Preop  Patient is class III risk per revised cardiac index (CHF history, insulin use). Patient cannot perform 4 METS of activity at baseline.  TTE performed 6/9/18, EF 50%, no valvular abnormalities. Will require further evaluation with EKG as well as possible stress imaging (last angiogram with mild nonobstructive CAD in 2014).    # Normocytic Anemia  # Anemia of chronic disease  Insufficient reticulocyte response on 6/10 noted.  Ferritin elevated.  Status post 3 units PRBC (6/17, 6/14, 6/13). Appropriate response. No bleeding observed.  -continue to monitor    # Diarrhea  Patient notes mild improvement with resumption of Creon.  Previously negative for C. difficile.  3-4 BMs per day.  -Continue to monitor    # Severe Malnutrition in the setting of acute illness  Pt was in ICU intubated fo >5d. Currently on tube feeds. Calorie count ongoing. Patient cleared for PO diet, transition off of TFs as PO intake increases.    # Hypernatremia, resolved  Improved with increased  cc q2h (146).  -monitor    # Anxiety  Pt with panic attacks and anxiety especially with talking about amputation. This has significantly improved. Remains redirectable by family and staff, avoid medicating if possible.    # Chronic pain  Discussed patient's methadone with pharmacy we do not note interaction with patient's current antibiotics (though would have interaction with linezolid if used in the future).  No empiric changes to methadone for anticipated body weight changes following amputation at this time.  - Methadone 70 qday  - Pregabalin  - Amitriptyline  - d/c oxycodone, pt has not been using    # Adrenal insufficiency  S/p stress dose steroid taper,  complete 6/18.    # NIKOLAY on CKD, resolved  - monitor    # Anisocoria/R afferent pupillary defect  - stable      # Pain Assessment:  Current Pain Score 6/20/2018   Patient currently in pain? yes   Pain score (0-10) -   Pain location Foot   Pain descriptors -   CPOT pain score -   - Alessandra is experiencing pain due to Chronic pain as well as recent surgical procedure. Pain management was discussed and the plan was created in a collaborative fashion.  Alessandra's response to the current recommendations: engaged  - Please see the plan for pain management as documented above        Diet: Adult Formula Drip Feeding: Continuous Nutren 1.5; Nasoduodenal tube; Goal Rate: 50; mL/hr; Medication - Tube Feeding Flush Frequency: At least 15-30 mL water before and after medication administration and with tube clogging; Amount to Send (Nutri...  Regular Diet Adult  Room Service  Calorie Counts  Fluids: Free water flushes as aboe  DVT Prophylaxis: Heparin SQ  Code Status: Full Code    Disposition Plan   Expected discharge: 2 - 3 days, recommended to transitional care unit once antibiotic plan established, hemoglobin stable, O2 use less than 1 liters/minute and SIRS/Sepsis treated.     Entered: Alireza Chaparro 06/20/2018, 3:16 PM   Information in the above section will display in the discharge planner report.      The patient's care was discussed with the Attending Physician, Dr. Hairston, Bedside Nurse and Patient.    Alireza Chaparro  [facility]  Maroon: 5  Pager: [phone]  Please see sticky note for cross cover information    Interval History   Patient states that she continues to feel well, states that her breathing is stable.  Has not been eating  well due to poor poor appetite, tube feeds continue.  Notes episodes of hypoglycemia yesterday, did not have any symptoms which she states that she has experienced in the past.  She is confused as surgeons stopped by in the morning stated that she may not need  surgery.    Pre-op  Patient has previously had surgery, tolerated anesthesia.  No family history of reactions to anesthesia or bleeding or clotting issues.  Patient is not on anticoagulation aside from DVT prophylaxis.  Patient has known heart failure with recovered ejection fraction as well as undifferentiated restrictive lung disease.  Not on chronic home O2.  She states that she can walk approximately one half block before stopping for shortness of breath, is generally able to walk up 10 stairs without stopping but occasionally must stop due to shortness of breath.  Patient is an active smoker (5 cigarettes per day) though has not had a cigarette since admission.    Physical Exam   Vital Signs: Temp: 97.3  F (36.3  C) Temp src: Oral BP: 136/75   Heart Rate: 90 Resp: 16 SpO2: 95 % O2 Device: Nasal cannula Oxygen Delivery: 2 LPM  Weight: 147 lbs 3.2 oz  General Appearance: Pleasant female, sitting up in chair, in no acute distress, eating, smiling  Respiratory: Crackles in the bases bilaterally, breathing comfortably on room air  Cardiovascular: Normal rate, regular rhythm, S3 present, no murmur rubs  GI: Abdomen is soft, nontender, nondistended no acute changes noted.  Skin: Lower extremities are dressed, 1+ LE edema b/l  Other: Alert and oriented x4        Data   Medications     IV fluid REPLACEMENT ONLY Stopped (06/14/18 1319)     IV fluid REPLACEMENT ONLY Stopped (06/12/18 2205)       acetaminophen  1,000 mg Oral TID     amitriptyline  50 mg Oral At Bedtime     amylase-lipase-protease  2 capsule Oral TID w/meals     ascorbic acid  500 mg Oral or Feeding Tube Daily     fluticasone  2 spray Left nostril Daily     furosemide  40 mg Oral Daily     heparin lock flush  5-10 mL Intracatheter Q24H     heparin  5,000 Units Subcutaneous Q12H     insulin aspart  1-7 Units Subcutaneous TID AC     insulin aspart  1-5 Units Subcutaneous At Bedtime     insulin aspart   Subcutaneous QAM AC     insulin aspart   Subcutaneous  Daily with lunch     insulin aspart   Subcutaneous Daily with supper     insulin glargine  25 Units Subcutaneous Daily     lisinopril  10 mg Oral Daily     melatonin  6 mg Oral At Bedtime     methadone  70 mg Oral Daily     multivitamins with minerals  15 mL Per Feeding Tube Daily     omeprazole  20 mg Oral or Feeding Tube Daily     piperacillin-tazobactam  4.5 g Intravenous Q6H     pregabalin  75 mg Oral or Feeding Tube TID     protein modular  1 packet Per Feeding Tube BID 09 12     vancomycin (VANCOCIN) IV  1,250 mg Intravenous Q24H     zinc sulfate  220 mg Oral or Feeding Tube Daily     Data     Recent Labs  Lab 06/20/18  0529 06/19/18  0600 06/18/18  0603 06/17/18  2033 06/17/18  0551  06/13/18  1645   WBC  --  13.3* 12.4*  --  13.3*  < >  --    HGB  --  7.9* 8.0* 8.1* 6.9*  < >  --    MCV  --  84 83  --  81  < >  --    PLT  --  416 419  --  398  < >  --    INR  --   --   --   --   --   --  1.33*    143 146* 146* 151*  < >  --    POTASSIUM 4.3 3.8 3.7  --  3.5  < >  --    CHLORIDE 106 108 111*  --  114*  < >  --    CO2 29 28 27  --  27  < >  --    BUN 24 23 24  --  27  < >  --    CR 0.98 0.88 0.88  --  0.97  < >  --    ANIONGAP 7 8 7  --  9  < >  --    RICK 8.1* 8.1* 8.1*  --  8.2*  < >  --    GLC 76 129* 258*  --  177*  < >  --    < > = values in this interval not displayed.  No results found for this or any previous visit (from the past 24 hour(s)).

## 2018-06-20 NOTE — PROGRESS NOTES
[facility] ID SERVICE: ONGOING CONSULTATION   Alessandra Pak : 1965 Sex: female:   Medical record number [id number] Attending Physician: Erwin Cardenas  Date of Service: 2018    PROBLEM LIST:   1. Sepsis from right diabetic foot ulcer, osteomyelitis (second proximal phalangeal base, lateral aspect of forefoot); wound cx 18 - moderate growth of Enterococcus faecalis (amp sensitive) and Streptococcus mitis; wound culture in 2018 grew MRSA; history of non compliance; blood cx -   Per notes, patient reconsidering recommended BKA (had previously refused)  2. Pulmonary edema  3. Chronic kidney disease stage 3   4. Leukocytosis   5. Acute respiratory failure - requiring mechanical ventilator (6/10/18)    RECOMMENDATIONS:   1. Agree with empiric coverage with vancomycin for the possibility of MRSA involvement (prior infection)  2. Agree with continuing Zosyn for coverage of Enterococcus and Streptococcus spp as well as potential GNRs not isolated (cultures obtained 2 days post antibiotics)  3. Ultimately source control is the major barrier to cure; BKA would provide source control. Duration of therapy beyond this intervention will depend on surgical findings. Please notify ID team when definitive surgical plan is in place.    DISCUSSION:   52 yr old female with a history of non ischemic cardiomyopathy  with ICD, CKD stage III, chronic pancreatitis s/p pylorus sparing Whipple () with secondary DM c/b diabetic foot ulcers, COPD (not on home O2), HTN, and chronic methadone use presented to ER with altered mental status, persistent leukocytosis, increased pus/maldorous drainage from right foot wound, fever. On empiric Zosyn and vancomycin for the foot since 2018. Levofloxacin was added for empiric atypical coverage of pneumonia. Orthopedics has followed and evaluated patient; recommending BKA for source control and to minimize development of sepsis in light of minimal improvement  "with antibiotic therapy. Current vancomycin and Zosyn is appropriate to cover the isolated organisms, as well as the possibility of the presence of hospital-associated GNRs and MRSA. As identified by orthopedics, source control is the fundamental problem; current antibiotic therapies are only minimizing the development of sepsis from her wounds; not anticipated to be curative. Fortunately she is considering surgical options.      ID will continue to follow.   Marily Ahumdaa MD   of Medicine, Division of Infectious Diseases  [facility] [phone]      CHIEF INFECTIOUS DISEASES COMPLAINT:  Sepsis from right diabetic foot ulcer, osteomyelitis    INTERVAL HISTORY: (Extended HPI requires four HPI elements or the status of three chronic problems)  Reconsidering BKA, had previously declined.    ROS: (Recommend ? 2systems)   A five-point review of systems was obtained and was negative with the exception of that which is described above.  Allergies   Allergen Reactions     No Known Drug Allergies      Allergies were reviewed.  No current outpatient prescriptions on file.       CURRENT ANTI-INFECTIVES:   Vancomycin, Zosyn  EXAMINATION: (Recommend at least 12 bullets from any organ systems)   Vital Signs: /75  Pulse 94  Temp 97.3  F (36.3  C) (Oral)  Resp 16  Ht 1.727 m (5' 8\")  Wt 66.8 kg (147 lb 3.2 oz)  SpO2 95%  BMI 22.38 kg/m2   Pt not examined, chart reviewed.    NEW DATA/RESULTS:   All interval basic labs, microbiology results and imaging were personally reviewed.  Reviewed medicine test (PFTs, EKG, cardiac echo or cath): NO    Culture Micro   Date Value Ref Range Status   06/13/2018 No growth  Final   06/13/2018 No growth  Final   06/11/2018 Light growth  Candida glabrata   (A)  Final   06/11/2018 Susceptibility testing not routinely done  Final   06/10/2018 No growth  Final       Recent Labs   Lab Test  06/19/18   0600  06/17/18   0551  06/15/18   0334  06/13/18   0910  06/11/18   1636  " 06/09/18   0436   CRP  17.0*  37.0*  110.0*  220.0*  290.0*  230.0*     Recent Labs   Lab Test  06/19/18   0600  06/18/18   0603  06/17/18   0551  06/15/18   0334  06/14/18   0450  06/13/18   2207   WBC  13.3*  12.4*  13.3*  16.1*  17.5*  17.9*     Recent Labs   Lab Test  06/20/18   0529  06/19/18   0600  06/18/18   0603  06/17/18   0551   CR  0.98  0.88  0.88  0.97   GFRESTIMATED  60*  68  67  60*       Hematology Studies  Recent Labs   Lab Test  06/19/18   0600  06/18/18   0603  06/17/18   0551  06/15/18   1747  06/15/18   0334  06/14/18   0450  06/13/18   2207   06/12/18   0420   06/10/18   0500  06/10/18   0400   06/07/18   0649  06/06/18   2215   02/16/18   1528   WBC  13.3*  12.4*  13.3*   --   16.1*  17.5*  17.9*   < >  19.2*   < >  22.7*  22.3*   < >  28.4*  28.2*   < >  22.3*   ANEU   --    --    --    --    --    --    --    --   14.2*   --   16.9*  19.7*   --   24.5*  25.2*   --   19.6*   AEOS   --    --    --    --    --    --    --    --   1.0*   --   0.4  0.2   --   0.0  0.0   --   0.0   HCT  24.2*  24.5*  21.5*   --   22.6*  24.9*  22.0*   < >  22.1*   < >  23.8*  22.7*   < >  25.5*  29.7*   < >  35.6   PLT  416  419  398  360  306  281  311   < >  279   < >  220  242   < >  232  317   < >  288    < > = values in this interval not displayed.       Metabolic  Recent Labs   Lab Test  06/20/18   0529  06/19/18   0600  06/18/18   0603   NA  142  143  146*   BUN  24  23  24   CO2  29  28  27   CR  0.98  0.88  0.88   GFRESTIMATED  60*  68  67       Hepatic Studies  Recent Labs   Lab Test  06/06/18   2215  02/16/18   1528  06/21/17   1528   BILITOTAL  0.5  0.6  0.3   ALKPHOS  233*  212*  252*   ALBUMIN  2.5*  2.8*  3.1*   AST  Canceled, Test credited  22  25   ALT  27  14  38       Immunologlobulins  Recent Labs   Lab Test  02/21/15   0652  02/15/15   1520  02/15/15   0240   IGG  1330   --    --    IGE   --   46   --    SED   --    --   132*       IMAGING RESULTS  The following imaging studies were  independently reviewed:    6/13/2018 CT Foot:  Impression:  1. Redemonstration of erosion/osteolysis and periostitis involving the  second proximal phalangeal base, in correlating with previous MRI  findings, this is concerning for osteomyelitis until proven otherwise.   a. Redemonstration of suspected fluid around the second MTP joint  though this is difficult to assess by CT especially without contrast.  2. Focal periostitis involving the fourth metatarsal shaft distally  (image 317 series 10 without associated osteolysis, similar to recent  radiographs from 6/10/2018 but new since 2/2018. This is nonspecific  and may be secondary to stress reaction as there appears to be  relative lack of deep soft tissue defect extension to this level.  Though osteomyelitis cannot be entirely excluded.  3. Plantar ulcers especially under the third and forth metatarsal  shafts and first MTP joint.

## 2018-06-20 NOTE — PLAN OF CARE
Problem: Patient Care Overview  Goal: Plan of Care/Patient Progress Review  Discharge Planner PT 5B  Patient plan for discharge: pending medical course  Current status: pt encountered seated EOB, able to state precautions for BLE without cues however upon observation pt appears to load additional weight through BLE with transfers. Completes slideboard bed > chair with SBA; sets up board independently however needs cues to avoid placing weight through R LE. Tolerated exercises in chair well.  Barriers to return to prior living situation: medical complexity/course; WB status, functional mobility  Recommendations for discharge: rehab  Rationale for recommendations: pt likely to benefit from rehab stay; discharge recommendations subject to change pending medical course as pt will potentially require amputation procedure.        Entered by: Camron Mckeon 06/20/2018 10:49 AM

## 2018-06-20 NOTE — PLAN OF CARE
Problem: Peripheral Vascular/Peripheral Neurovascular Disease Comorbidity  Goal: Peripheral Vascular/Peripheral Neurovascular Disease  Patient comorbidity will be monitored for signs and symptoms of Peripheral Vascular/Peripheral Neurovascular Disease condition.  Problems will be absent, minimized or managed by discharge/transition of care.   Outcome: No Change  Continues with poor circulation to feet and decreased sensation to both feet. Has wounds on both feet. Not seen by writer as dressings were not due to be changed this shift.

## 2018-06-20 NOTE — PROGRESS NOTES
"SPIRITUAL HEALTH SERVICES  SPIRITUAL ASSESSMENT Progress Note  [facility] 5B     PRIMARY FOCUS:     Goals of care    Symptom/pain management    Emotional/spiritual/Adventism distress    Support for coping    REFERRAL SOURCE: [facility] Consult Order    ILLNESS CIRCUMSTANCES:   Reviewed documentation. Reflective conversation shared with Patient Alessandra which integrated elements of illness and family narratives.     Context of Serious Illness/Symptom(s) - Pt expressed that she \"had a big decision to make\" regarding her foot and wasn't sure how she was going to decide.    Resources for Support - Pt was thankful for the support that her mother and daughter provide and \"hoping\" they would stop by for a visit today.    DISTRESS:     Emotional/Spiritual/Existential Distress - Pt wants to \"get better\" so she can \"get home to the [facility] where I belong.\" Pt shared that she is having trouble navigating conflicting messages from her doctors surrounding the path forward for her foot and wishes \"they would just tell me definitively what I should do.\" Pt shared that she \"just feels like a mess today\".    Episcopalian Distress - Not discussed    Social/Cultural/Economic Distress - Due to the unanticipated nature of her hospital admission, pt shared that she had some \"loose ends, like bills and paperwork\" that she needed to tie up before she undergoes any procedure. Pt is relying on her daughter to bring that paperwork to her and \"hopes she can find it\".    SPIRITUAL/Spiritism COPING:     Anglican/Amelia - Pt has a connection to the [place] amelia but has not been as active as she would like due to her illness. I reassured her of God's understanding and continued presence.    Spiritual Practice(s) - Pt has been \"praying occasionally\" for \"help to see what I should do\". Pt asked me for a prayer for guidance, which I provided.    Emotional/Relational/Existential Connections - Pt's family and grandchildren provide a clear source of " "support.    GOALS OF CARE:    Goals of Care - Pt hopes that she will be better prepared to make a decision once she gets \"new information today.\"     Meaning/Sense-Making - Not discussed    PLAN: Pt requested continued SHS support which I will plan to provide as unit .    Derrell Huff   Intern  Pager [phone]    "

## 2018-06-20 NOTE — PLAN OF CARE
Problem: Patient Care Overview  Goal: Plan of Care/Patient Progress Review  Patient uses slider board to transfer to wheelchair. Has to use the pivot move on the left foot when using commode due to not havng commodes that side comes off.  Patient has not had any carb coverage for insulin. Sugars in the 70'x for lunch. Patient on tube feeding . Continues on abx. P:cont to monitor

## 2018-06-20 NOTE — PLAN OF CARE
Problem: Patient Care Overview  Goal: Plan of Care/Patient Progress Review  Discharge Planner OT   Patient plan for discharge: Did not verbalize  Current status: Patient focused on UB strengthening.  Patient completed yellow/red theraband exercises.  Placed order for drop arm commode to be used while inpatient.  Barriers to return to prior living situation: Limited bilateral weightbearing, weakness  Recommendations for discharge: TCU vs. ARU  Rationale for recommendations: Continued OT for maximum independence in ADLs/mobility       Entered by: Samina Kan 06/20/2018 11:24 AM

## 2018-06-20 NOTE — PROGRESS NOTES
Social Work Services Progress Note    Hospital Day: 15  Collaborated with:  Patient, primary team Marilynn Bernard    Data:  Pt is a 52-year-old female admitted for severe sepsis d/t right foot abscess and osteomyelitis.     Intervention:  Per medical team, pt has come to the decision to proceed with amputation. PT/OT has been working with pt and recommending TCU vs ARU. Recommendations for TCU vs ARU should become more clear after amputation, pending pt's activity tolerance and progression with therapies.    Spoke with pt regarding need for rehab post-amputation. Pt is aware that she will need rehab at discharge. Discussed that pt may be appropriate for ARU at d/c. Pt agreeable to referral to  ARU to determine if she is appropriate for ARU post-amputation. Paged  admissions liaison with referral.     Assessment:  Pt to have amputation this admission; referral to  ARU    Plan:    Anticipated Disposition:  Facility:  ARU    Barriers to d/c plan:  Medical course    Follow Up:  SW to follow for discharge planning    WILSON Rojas, LGSW  5A Unit   Pager [phone]  Phone [phone]

## 2018-06-20 NOTE — PROVIDER NOTIFICATION
Hypoglycemia:  Patient had been running low blood sugars on the day shift. Blood sugar was checked around 1600 and it was normal at 100. Patient received a supper tray at 1845 and blood sugar was checked before she started eating. It was noted to be 56. Patient was alert and talking and said she felt fine. She tried some oral glucose gel, but said she would rather have juice. 8 ounces of apple juice given with 5 packets of sugar mixed in. Encouraged patient to eat her supper. Her tube feeding was infusing continuously at 55 ml per hour also. Blood sugar was 59 15 minutes later. Patient did not  Eat more than a few bites of her supper. 1/2 amp of Iv dextrose was given by writer. Blood was checked 15 minutes later and was up to 122. Writer remained in patient's room during hypoglycemic episode. Dr. Margot Quesada notified of above at 1925. She will pass along info to primary team.

## 2018-06-20 NOTE — PLAN OF CARE
Problem: Infection, Risk/Actual (Adult)  Goal: Identify Related Risk Factors and Signs and Symptoms  Related risk factors and signs and symptoms are identified upon initiation of Human Response Clinical Practice Guideline (CPG).   Outcome: No Change  Patient is alert and oriented times 4. Pleasant and cooperative with cares. No appetite for supper. Has tube feeding at goal rate per [place] tube. Voiding well. Gets up to commode independently. Pain seems controlled on current medications. Plan to monitor blood sugar closely. Give Iv antibiotics as ordered.

## 2018-06-20 NOTE — PROGRESS NOTES
Orthopaedic Surgery Progress Note 06/20/2018    Chart reviewed    E/S: AFVSS. WBC downtrending. CRP downtrending appropriately. Persistent right foot pain. Interested in BKA if the best option.    O:  Temp: 97.7  F (36.5  C) Temp src: Oral BP: 134/73   Heart Rate: 94 Resp: 16 SpO2: 95 % O2 Device: Nasal cannula Oxygen Delivery: 2 LPM        Recent Labs  Lab 06/19/18  0600 06/18/18  0603 06/17/18  2033 06/17/18  0551  06/15/18  0334   WBC 13.3* 12.4*  --  13.3*  --  16.1*   HGB 7.9* 8.0* 8.1* 6.9*  < > 7.3*    419  --  398  < > 306   CRP 17.0*  --   --  37.0*  --  110.0*   < > = values in this interval not displayed.  Wound Culture: polymicrobial    Assessment: Alessandra Pak is a 52 year old female admitted sepsis with recurrent right foot diabetic ulcers as source, now s/p bedside I&D right foot on 6/7, 6/12, and 6/13.  Intubated for ARDS/hypoxic respiratory failure on 6/10, extubated 6/15, TTF 6/15. At this time all infectious markers and patient's status are appropriately improved and the acute infection is resolved.  Ongoing recurring foot ulcers in the setting of poorly controlled DM/malnutrition - if ulcers cannot be resolved with adherence to a good medical mgmt regimen, amputation may be a more durable option to prevent infections which have proven to be life-threatening.  No emergent need for this currently.     Plan:  Medicine Primary  Activity: Elevate BLE as much as possible   Weight bearing status: NWB RLE, okay for minimal WB with left forefoot for transfers to chair or commode.   Antibiotics: per primary/ID  Labs: repeat CRP q48hrs (ordered)  DVT prophylaxis: per primary team  Wound Care: daily dressing by bedside RN, weekly WOCN dressing changes      Marco Antonio Rios MD  PGY-4 Orthopaedic Surgery  [phone]

## 2018-06-21 ENCOUNTER — APPOINTMENT (OUTPATIENT)
Dept: OCCUPATIONAL THERAPY | Facility: CLINIC | Age: 53
DRG: 853 | End: 2018-06-21
Payer: COMMERCIAL

## 2018-06-21 ENCOUNTER — APPOINTMENT (OUTPATIENT)
Dept: PHYSICAL THERAPY | Facility: CLINIC | Age: 53
DRG: 853 | End: 2018-06-21
Payer: COMMERCIAL

## 2018-06-21 LAB
ANION GAP SERPL CALCULATED.3IONS-SCNC: 5 MMOL/L (ref 3–14)
BUN SERPL-MCNC: 30 MG/DL (ref 7–30)
CALCIUM SERPL-MCNC: 8.2 MG/DL (ref 8.5–10.1)
CHLORIDE SERPL-SCNC: 103 MMOL/L (ref 94–109)
CO2 SERPL-SCNC: 31 MMOL/L (ref 20–32)
CREAT SERPL-MCNC: 1.09 MG/DL (ref 0.52–1.04)
CRP SERPL-MCNC: 23 MG/L (ref 0–8)
ERYTHROCYTE [DISTWIDTH] IN BLOOD BY AUTOMATED COUNT: 22.3 % (ref 10–15)
GFR SERPL CREATININE-BSD FRML MDRD: 53 ML/MIN/1.7M2
GLUCOSE BLDC GLUCOMTR-MCNC: 109 MG/DL (ref 70–99)
GLUCOSE BLDC GLUCOMTR-MCNC: 126 MG/DL (ref 70–99)
GLUCOSE BLDC GLUCOMTR-MCNC: 126 MG/DL (ref 70–99)
GLUCOSE BLDC GLUCOMTR-MCNC: 131 MG/DL (ref 70–99)
GLUCOSE BLDC GLUCOMTR-MCNC: 78 MG/DL (ref 70–99)
GLUCOSE SERPL-MCNC: 106 MG/DL (ref 70–99)
HCT VFR BLD AUTO: 23.8 % (ref 35–47)
HGB BLD-MCNC: 7.8 G/DL (ref 11.7–15.7)
INTERPRETATION ECG - MUSE: NORMAL
MCH RBC QN AUTO: 27.4 PG (ref 26.5–33)
MCHC RBC AUTO-ENTMCNC: 32.8 G/DL (ref 31.5–36.5)
MCV RBC AUTO: 84 FL (ref 78–100)
PLATELET # BLD AUTO: 488 10E9/L (ref 150–450)
PLATELET # BLD AUTO: 582 10E9/L (ref 150–450)
POTASSIUM SERPL-SCNC: 4.4 MMOL/L (ref 3.4–5.3)
RBC # BLD AUTO: 2.85 10E12/L (ref 3.8–5.2)
SODIUM SERPL-SCNC: 140 MMOL/L (ref 133–144)
VANCOMYCIN SERPL-MCNC: 20.6 MG/L
WBC # BLD AUTO: 8.4 10E9/L (ref 4–11)

## 2018-06-21 PROCEDURE — 93010 ELECTROCARDIOGRAM REPORT: CPT | Performed by: INTERNAL MEDICINE

## 2018-06-21 PROCEDURE — 25000132 ZZH RX MED GY IP 250 OP 250 PS 637: Performed by: STUDENT IN AN ORGANIZED HEALTH CARE EDUCATION/TRAINING PROGRAM

## 2018-06-21 PROCEDURE — 00000146 ZZHCL STATISTIC GLUCOSE BY METER IP

## 2018-06-21 PROCEDURE — 97110 THERAPEUTIC EXERCISES: CPT | Mod: GP

## 2018-06-21 PROCEDURE — 80202 ASSAY OF VANCOMYCIN: CPT | Performed by: HOSPITALIST

## 2018-06-21 PROCEDURE — 12000001 ZZH R&B MED SURG/OB UMMC

## 2018-06-21 PROCEDURE — 85027 COMPLETE CBC AUTOMATED: CPT | Performed by: STUDENT IN AN ORGANIZED HEALTH CARE EDUCATION/TRAINING PROGRAM

## 2018-06-21 PROCEDURE — 25000128 H RX IP 250 OP 636: Performed by: INTERNAL MEDICINE

## 2018-06-21 PROCEDURE — 36592 COLLECT BLOOD FROM PICC: CPT | Performed by: STUDENT IN AN ORGANIZED HEALTH CARE EDUCATION/TRAINING PROGRAM

## 2018-06-21 PROCEDURE — 80048 BASIC METABOLIC PNL TOTAL CA: CPT | Performed by: STUDENT IN AN ORGANIZED HEALTH CARE EDUCATION/TRAINING PROGRAM

## 2018-06-21 PROCEDURE — 93005 ELECTROCARDIOGRAM TRACING: CPT

## 2018-06-21 PROCEDURE — 27210429 ZZH NUTRITION PRODUCT INTERMEDIATE LITER

## 2018-06-21 PROCEDURE — 25000132 ZZH RX MED GY IP 250 OP 250 PS 637: Performed by: HOSPITALIST

## 2018-06-21 PROCEDURE — 99232 SBSQ HOSP IP/OBS MODERATE 35: CPT | Mod: GC | Performed by: HOSPITALIST

## 2018-06-21 PROCEDURE — 40000193 ZZH STATISTIC PT WARD VISIT

## 2018-06-21 PROCEDURE — 25000132 ZZH RX MED GY IP 250 OP 250 PS 637: Performed by: INTERNAL MEDICINE

## 2018-06-21 PROCEDURE — 97530 THERAPEUTIC ACTIVITIES: CPT | Mod: GP

## 2018-06-21 PROCEDURE — 86140 C-REACTIVE PROTEIN: CPT | Performed by: STUDENT IN AN ORGANIZED HEALTH CARE EDUCATION/TRAINING PROGRAM

## 2018-06-21 PROCEDURE — 85049 AUTOMATED PLATELET COUNT: CPT | Performed by: STUDENT IN AN ORGANIZED HEALTH CARE EDUCATION/TRAINING PROGRAM

## 2018-06-21 PROCEDURE — 40000133 ZZH STATISTIC OT WARD VISIT

## 2018-06-21 PROCEDURE — 97110 THERAPEUTIC EXERCISES: CPT | Mod: GO

## 2018-06-21 PROCEDURE — 25000128 H RX IP 250 OP 636: Performed by: STUDENT IN AN ORGANIZED HEALTH CARE EDUCATION/TRAINING PROGRAM

## 2018-06-21 PROCEDURE — 36592 COLLECT BLOOD FROM PICC: CPT | Performed by: HOSPITALIST

## 2018-06-21 RX ORDER — PREGABALIN 75 MG/1
75 CAPSULE ORAL 3 TIMES DAILY
Status: DISCONTINUED | OUTPATIENT
Start: 2018-06-21 | End: 2018-07-10 | Stop reason: HOSPADM

## 2018-06-21 RX ORDER — FUROSEMIDE 10 MG/ML
40 INJECTION INTRAMUSCULAR; INTRAVENOUS ONCE
Status: COMPLETED | OUTPATIENT
Start: 2018-06-21 | End: 2018-06-21

## 2018-06-21 RX ADMIN — HEPARIN SODIUM 5000 UNITS: 5000 INJECTION, SOLUTION INTRAVENOUS; SUBCUTANEOUS at 09:05

## 2018-06-21 RX ADMIN — PIPERACILLIN SODIUM,TAZOBACTAM SODIUM 4.5 G: 4; .5 INJECTION, POWDER, FOR SOLUTION INTRAVENOUS at 02:19

## 2018-06-21 RX ADMIN — SODIUM CHLORIDE, PRESERVATIVE FREE 5 ML: 5 INJECTION INTRAVENOUS at 05:55

## 2018-06-21 RX ADMIN — Medication 70 MG: at 09:04

## 2018-06-21 RX ADMIN — ACETAMINOPHEN 1000 MG: 500 TABLET, FILM COATED ORAL at 15:49

## 2018-06-21 RX ADMIN — PIPERACILLIN SODIUM,TAZOBACTAM SODIUM 4.5 G: 4; .5 INJECTION, POWDER, FOR SOLUTION INTRAVENOUS at 20:27

## 2018-06-21 RX ADMIN — FUROSEMIDE 40 MG: 10 INJECTION, SOLUTION INTRAVENOUS at 14:16

## 2018-06-21 RX ADMIN — AMITRIPTYLINE HYDROCHLORIDE 50 MG: 50 TABLET, FILM COATED ORAL at 20:32

## 2018-06-21 RX ADMIN — PREGABALIN 75 MG: 75 CAPSULE ORAL at 20:33

## 2018-06-21 RX ADMIN — MULTIVITAMIN 15 ML: LIQUID ORAL at 14:15

## 2018-06-21 RX ADMIN — VANCOMYCIN HYDROCHLORIDE 1250 MG: 10 INJECTION, POWDER, LYOPHILIZED, FOR SOLUTION INTRAVENOUS at 15:49

## 2018-06-21 RX ADMIN — LISINOPRIL 10 MG: 10 TABLET ORAL at 09:03

## 2018-06-21 RX ADMIN — FUROSEMIDE 40 MG: 40 TABLET ORAL at 09:03

## 2018-06-21 RX ADMIN — SODIUM CHLORIDE, PRESERVATIVE FREE 5 ML: 5 INJECTION INTRAVENOUS at 09:03

## 2018-06-21 RX ADMIN — Medication 6 MG: at 20:34

## 2018-06-21 RX ADMIN — HEPARIN SODIUM 5000 UNITS: 5000 INJECTION, SOLUTION INTRAVENOUS; SUBCUTANEOUS at 20:30

## 2018-06-21 RX ADMIN — PREGABALIN 75 MG: 20 SOLUTION ORAL at 09:04

## 2018-06-21 RX ADMIN — PIPERACILLIN SODIUM,TAZOBACTAM SODIUM 4.5 G: 4; .5 INJECTION, POWDER, FOR SOLUTION INTRAVENOUS at 09:03

## 2018-06-21 RX ADMIN — PREGABALIN 75 MG: 20 SOLUTION ORAL at 14:15

## 2018-06-21 RX ADMIN — ACETAMINOPHEN 1000 MG: 500 TABLET, FILM COATED ORAL at 21:34

## 2018-06-21 RX ADMIN — Medication 1 PACKET: at 20:35

## 2018-06-21 RX ADMIN — OMEPRAZOLE 20 MG: 20 CAPSULE, DELAYED RELEASE ORAL at 20:33

## 2018-06-21 RX ADMIN — ACETAMINOPHEN 1000 MG: 500 TABLET, FILM COATED ORAL at 09:04

## 2018-06-21 RX ADMIN — PIPERACILLIN SODIUM,TAZOBACTAM SODIUM 4.5 G: 4; .5 INJECTION, POWDER, FOR SOLUTION INTRAVENOUS at 14:15

## 2018-06-21 RX ADMIN — OXYCODONE HYDROCHLORIDE AND ACETAMINOPHEN 500 MG: 500 TABLET ORAL at 09:03

## 2018-06-21 RX ADMIN — Medication 1 PACKET: at 08:00

## 2018-06-21 NOTE — PROGRESS NOTES
Brief ortho Update    Plan to have patient follow up in Dr. King's clinic on Tuesday 6/26 - will discuss possible surgery (BKA).  If wishing to proceed, would plan to perform on Wednesday 6/27.    Will plan to see patient/family while still inpatient to discuss this plan.    Marco Antonio Rios MD  PGY-4 Orthopaedic Surgery  [phone]

## 2018-06-21 NOTE — PROGRESS NOTES
[facility], [place]    Internal Medicine Progress Note - [facility] Service    Main Plans for Today   - furosemide 40 mg IV  - daily weights  - strict I/o  - Tentative plan for surgery 6/27, orthopedic surgeons will be by to discuss plan with patient and family likely over the weekend  - decrease glargine to 22u, low ssi, stop CHO coverage  - [facility] for preop    Assessment & Plan   Alessandra Pak is a 52 year old female admitted on 6/6/2018. 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 Hospital course was complicated by respiratory failure due to ARDS requiring intubation (6/10-15), extubated and transferred to medicine for further cares.    # Type 2 Diabetes  # Hx of Chronic pancreatitis s/p whipple's  1 episodes of asymptomatic hypoglycemia yesterday likely due to decreased insulin needs off steroids.   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 S3 still present, crackles. Do note uptrending bicarb.  -hold PO furosemide (home dose 20 mg daily)  -Furosemide 40 mg IV today  -Daily weights, strict I/O  -wean O2 as tolerated, goal >88%    # Severe Sepsis, improving  # R diabetic foot infection, s/p I&D x 3, Ongoing Osteomyelitis  Patient mentally ready for amputation, discussions with therapy and health psychology have been helpful.  Surgery not emergent or urgent per orthopedics.  Given life-threatening nature of diabetic foot infection and inconsistent follow-up, amputation would likely still be the best course as patient is also opposed to long-term IV  antibiotics and would need close follow-up.  -continue vanc/zosyn  -Tentative plan for surgery 6/27, orthopedic surgeons will be by to discuss plan with patient and family likely over the weekend  -qod CRP, downtrending  -appreciate ortho and ID input    # Preop  Patient is class III risk per revised cardiac index (CHF history, insulin use). Patient cannot perform 4 METS of activity at baseline.  TTE performed 6/9/18, EF 50%, no valvular abnormalities. Will require further evaluation with EKG as well as possible stress imaging (last angiogram with mild nonobstructive CAD in 2014).  - MPI for preop  - EKG unchanged  - ongoing volume status management    # Normocytic Anemia  # Anemia of chronic disease  Insufficient reticulocyte response on 6/10 noted.  Ferritin elevated.  Status post 3 units PRBC (6/17, 6/14, 6/13). Appropriate response. No bleeding observed.  -continue to monitor    # Diarrhea, resolved  Notes worsening with creon. Neg infectious studies.  -Continue to monitor    # Severe Malnutrition in the setting of acute illness  Pt was in ICU intubated fo >5d. Currently on tube feeds. Calorie count ongoing. Patient cleared for PO diet, transition off of TFs as PO intake increases.    # Hypernatremia, resolved  Improved with increased  cc q2h (146).  -monitor    # Anxiety  Pt with panic attacks and anxiety especially with talking about amputation. This has significantly improved. Remains redirectable by family and staff, avoid medicating if possible.    # Chronic pain  Discussed patient's methadone with pharmacy we do not note interaction with patient's current antibiotics (though would have interaction with linezolid if used in the future).  No empiric changes to methadone for anticipated body weight changes following amputation at this time.  - Methadone 70 qday  - Pregabalin  - Amitriptyline  - d/c oxycodone, pt has not been using    # Adrenal insufficiency  S/p stress dose steroid taper, complete  6/18.    # NIKOLAY on CKD, resolved  - monitor    # Anisocoria/R afferent pupillary defect  - stable      # Pain Assessment:  Current Pain Score 6/21/2018   Patient currently in pain? denies   Pain score (0-10) -   Pain location -   Pain descriptors -   CPOT pain score -   - Alessandra is experiencing pain due to Chronic pain as well as recent surgical procedure. Pain management was discussed and the plan was created in a collaborative fashion.  Alessandra's response to the current recommendations: engaged  - Please see the plan for pain management as documented above        Diet: Adult Formula Drip Feeding: Continuous Nutren 1.5; Nasoduodenal tube; Goal Rate: 50; mL/hr; Medication - Tube Feeding Flush Frequency: At least 15-30 mL water before and after medication administration and with tube clogging; Amount to Send (Nutri...  Regular Diet Adult  Room Service  Calorie Counts  Fluids: Free water flushes as aboe  DVT Prophylaxis: Heparin SQ  Code Status: Full Code    Disposition Plan   Expected discharge: 2 - 3 days, recommended to transitional care unit once antibiotic plan established, hemoglobin stable, O2 use less than 1 liters/minute and SIRS/Sepsis treated.     Entered: Alireza Chaparro 06/21/2018, 12:21 PM   Information in the above section will display in the discharge planner report.      The patient's care was discussed with the Attending Physician, Dr. Hairston, Bedside Nurse and Patient.    Alireza Chaparro  [facility]  Maroon: 5  Pager: [phone]  Please see sticky note for cross cover information    Interval History   No acute events.  Patient states that  furosemide was very effective and caused her to urinate a significant amount.  She states that her breathing feels slightly better and that her lower extremity edema has also improved.  She states that the pain in her right foot is doing better today.  She asks if she would be able to go home prior to surgery as she has some things that she would like to  take care of.  Updated patient with tentative plan for surgery 6/27.    Physical Exam   Vital Signs: Temp: 97.7  F (36.5  C) Temp src: Oral BP: 113/67   Heart Rate: 86 Resp: 16 SpO2: 91 % O2 Device: Nasal cannula Oxygen Delivery: 2 LPM  Weight: 147 lbs 3.2 oz  General Appearance: Pleasant female, lying comfortably in bed  Respiratory: Crackles in the bases bilaterally, breathing comfortably on room air  Cardiovascular: Normal rate, regular rhythm, S3 present, no murmur rubs  GI: Abdomen is soft, nontender, nondistended no acute changes noted.  Skin: Lower extremities are dressed, 1+ LE edema b/l  Other: Alert and oriented x4, speech fluent        Data   Medications     IV fluid REPLACEMENT ONLY Stopped (06/14/18 1319)     IV fluid REPLACEMENT ONLY Stopped (06/12/18 2205)       acetaminophen  1,000 mg Oral TID     amitriptyline  50 mg Oral At Bedtime     amylase-lipase-protease  2 capsule Oral TID w/meals     fluticasone  2 spray Left nostril Daily     furosemide  40 mg Intravenous Once     heparin lock flush  5-10 mL Intracatheter Q24H     heparin  5,000 Units Subcutaneous Q12H     insulin aspart  1-3 Units Subcutaneous TID AC     insulin aspart  1-3 Units Subcutaneous At Bedtime     insulin glargine  22 Units Subcutaneous Daily     lisinopril  10 mg Oral Daily     melatonin  6 mg Oral At Bedtime     methadone  70 mg Oral Daily     multivitamins with minerals  15 mL Per Feeding Tube Daily     omeprazole  20 mg Oral or Feeding Tube Daily     piperacillin-tazobactam  4.5 g Intravenous Q6H     pregabalin  75 mg Oral or Feeding Tube TID     protein modular  1 packet Per Feeding Tube BID 09 12     vancomycin (VANCOCIN) IV  1,250 mg Intravenous Q24H     zinc sulfate  220 mg Oral or Feeding Tube Daily     Data     Recent Labs  Lab 06/21/18  0555 06/20/18  0529 06/19/18  0600 06/18/18  0603   WBC 8.4  --  13.3* 12.4*   HGB 7.8*  --  7.9* 8.0*   MCV 84  --  84 83   *  --  416 419    142 143 146*   POTASSIUM 4.4  4.3 3.8 3.7   CHLORIDE 103 106 108 111*   CO2 31 29 28 27   BUN 30 24 23 24   CR 1.09* 0.98 0.88 0.88   ANIONGAP 5 7 8 7   RICK 8.2* 8.1* 8.1* 8.1*   * 76 129* 258*     No results found for this or any previous visit (from the past 24 hour(s)).

## 2018-06-21 NOTE — PLAN OF CARE
Problem: Patient Care Overview  Goal: Plan of Care/Patient Progress Review  Outcome: No Change  Pt appeared to sleep soundly between cares overnight. Is a/o x 4. Denies pain. TF infusing at 50cc/hr without issue. Gets up to commode with SBA. Christopher feet dressings intact. May need to adjust lantus dose today as pt had low sugars yesterday despite TF. Will continue to monitor pt.

## 2018-06-21 NOTE — PROGRESS NOTES
Calorie Count  Intake recorded for: 6/20  Kcals: 307  Protein: 15g  # Meals Recorded: 50% hamburger with cheese, lettuce, tomato and ketchup, 25% french fries   # Supplements Recorded: 0

## 2018-06-21 NOTE — PLAN OF CARE
Problem: Patient Care Overview  Goal: Plan of Care/Patient Progress Review  Patient plan for discharge: not stated  Current status: Pt performed seated upright in bed B UE exercises for increased strength using t-bands, following handouts. Pt tolerated 8-12 reps each exercises with rest breaks throughout. Provided education on grading exercises as strength progresses; increasing frequency, reps, adding resistance as well as alternative B UE exercises using own body weight-chair push ups, wall push ups. Reviewed precautions and weightbearing restrictions.   Barriers to return to prior living situation: Limited bilateral weightbearing, weakness  Recommendations for discharge: Per plan established by the OT, the recommendation for dc location is TCU vs. ARU  Rationale for recommendations: Continued OT for maximum independence in ADLs/mobility

## 2018-06-21 NOTE — CONSULTS
PLC order for PICC and IV medication cancelled.No discharge plan or date for discharge in place at this time. Patient care unit should reorder PLC when needed.

## 2018-06-21 NOTE — PLAN OF CARE
Problem: Patient Care Overview  Goal: Plan of Care/Patient Progress Review  Discharge Planner PT 5B  Patient plan for discharge: rehab  Current status: pt encountered seated EOB. Impulsively bears weight through bilateral LE when reaching for sock; pt initially denies bearing weight through RLE and only placing TTWB through LLE however through observation pt was completely supported through bilateral LE in standing when reaching significantly outside SHERI. Tolerates seated and supine LE strength exercises well with rest breaks provided. Will continue to need education and reinforcement for BLE weight bearing precautions.    Barriers to return to prior living situation: medical complexity/course, fatigue, weakness, functional mobility status  Recommendations for discharge: rehab  Rationale for recommendations: pt will benefit from rehab placement to progress functional mobility status; placement pending course of medical action with regard to pursuing RLE amputation. Will update recommendations as indicated.        Entered by: Camron Mckeon 06/21/2018 11:53 AM

## 2018-06-21 NOTE — PLAN OF CARE
Problem: Patient Care Overview  Goal: Plan of Care/Patient Progress Review  Patient had dressing on rt foot done.  Patient pivots to the commode received iv lasix this afternoon. Will have a heart nuc test tomorrow am lantus on hold for tomorrow and to be npo 4 hours prior to test including tube feed. Patient to use sliding board when getting into wheelchair. There is no commodes that have a drop side available in the hospital. Ortho is planning on surgery for wed. NG tube taped to other side of face to relief the pressure on the nose.  P;cont to monitor

## 2018-06-22 LAB
ANION GAP SERPL CALCULATED.3IONS-SCNC: 7 MMOL/L (ref 3–14)
BUN SERPL-MCNC: 29 MG/DL (ref 7–30)
CALCIUM SERPL-MCNC: 8.4 MG/DL (ref 8.5–10.1)
CHLORIDE SERPL-SCNC: 102 MMOL/L (ref 94–109)
CO2 SERPL-SCNC: 33 MMOL/L (ref 20–32)
CREAT SERPL-MCNC: 1.24 MG/DL (ref 0.52–1.04)
GFR SERPL CREATININE-BSD FRML MDRD: 46 ML/MIN/1.7M2
GLUCOSE BLDC GLUCOMTR-MCNC: 105 MG/DL (ref 70–99)
GLUCOSE BLDC GLUCOMTR-MCNC: 127 MG/DL (ref 70–99)
GLUCOSE BLDC GLUCOMTR-MCNC: 175 MG/DL (ref 70–99)
GLUCOSE BLDC GLUCOMTR-MCNC: 187 MG/DL (ref 70–99)
GLUCOSE BLDC GLUCOMTR-MCNC: 63 MG/DL (ref 70–99)
GLUCOSE BLDC GLUCOMTR-MCNC: 83 MG/DL (ref 70–99)
GLUCOSE BLDC GLUCOMTR-MCNC: 85 MG/DL (ref 70–99)
GLUCOSE SERPL-MCNC: 61 MG/DL (ref 70–99)
MAGNESIUM SERPL-MCNC: 2.2 MG/DL (ref 1.6–2.3)
PHOSPHATE SERPL-MCNC: 4.9 MG/DL (ref 2.5–4.5)
POTASSIUM SERPL-SCNC: 4.6 MMOL/L (ref 3.4–5.3)
SODIUM SERPL-SCNC: 142 MMOL/L (ref 133–144)

## 2018-06-22 PROCEDURE — 99232 SBSQ HOSP IP/OBS MODERATE 35: CPT | Mod: GC | Performed by: HOSPITALIST

## 2018-06-22 PROCEDURE — 25800025 ZZH RX 258: Performed by: STUDENT IN AN ORGANIZED HEALTH CARE EDUCATION/TRAINING PROGRAM

## 2018-06-22 PROCEDURE — 25000128 H RX IP 250 OP 636: Performed by: HOSPITALIST

## 2018-06-22 PROCEDURE — 25000132 ZZH RX MED GY IP 250 OP 250 PS 637: Performed by: STUDENT IN AN ORGANIZED HEALTH CARE EDUCATION/TRAINING PROGRAM

## 2018-06-22 PROCEDURE — 34300033 ZZH RX 343: Performed by: HOSPITALIST

## 2018-06-22 PROCEDURE — 25000128 H RX IP 250 OP 636: Performed by: STUDENT IN AN ORGANIZED HEALTH CARE EDUCATION/TRAINING PROGRAM

## 2018-06-22 PROCEDURE — 12000001 ZZH R&B MED SURG/OB UMMC

## 2018-06-22 PROCEDURE — 00000146 ZZHCL STATISTIC GLUCOSE BY METER IP

## 2018-06-22 PROCEDURE — A9502 TC99M TETROFOSMIN: HCPCS | Performed by: HOSPITALIST

## 2018-06-22 PROCEDURE — 40000901 ZZH STATISTIC WOC PT EDUCATION, 0-15 MIN

## 2018-06-22 PROCEDURE — 25000128 H RX IP 250 OP 636: Performed by: INTERNAL MEDICINE

## 2018-06-22 PROCEDURE — 25000132 ZZH RX MED GY IP 250 OP 250 PS 637: Performed by: HOSPITALIST

## 2018-06-22 PROCEDURE — 80048 BASIC METABOLIC PNL TOTAL CA: CPT | Performed by: STUDENT IN AN ORGANIZED HEALTH CARE EDUCATION/TRAINING PROGRAM

## 2018-06-22 PROCEDURE — 83735 ASSAY OF MAGNESIUM: CPT | Performed by: STUDENT IN AN ORGANIZED HEALTH CARE EDUCATION/TRAINING PROGRAM

## 2018-06-22 PROCEDURE — 84100 ASSAY OF PHOSPHORUS: CPT | Performed by: STUDENT IN AN ORGANIZED HEALTH CARE EDUCATION/TRAINING PROGRAM

## 2018-06-22 PROCEDURE — G0463 HOSPITAL OUTPT CLINIC VISIT: HCPCS

## 2018-06-22 PROCEDURE — 36592 COLLECT BLOOD FROM PICC: CPT | Performed by: STUDENT IN AN ORGANIZED HEALTH CARE EDUCATION/TRAINING PROGRAM

## 2018-06-22 PROCEDURE — 25000132 ZZH RX MED GY IP 250 OP 250 PS 637: Performed by: INTERNAL MEDICINE

## 2018-06-22 RX ORDER — DEXTROSE, SODIUM CHLORIDE, SODIUM LACTATE, POTASSIUM CHLORIDE, AND CALCIUM CHLORIDE 5; .6; .31; .03; .02 G/100ML; G/100ML; G/100ML; G/100ML; G/100ML
500 INJECTION, SOLUTION INTRAVENOUS ONCE
Status: COMPLETED | OUTPATIENT
Start: 2018-06-22 | End: 2018-06-22

## 2018-06-22 RX ORDER — FUROSEMIDE 40 MG
40 TABLET ORAL DAILY
Status: DISCONTINUED | OUTPATIENT
Start: 2018-06-23 | End: 2018-06-27

## 2018-06-22 RX ORDER — DEXTROSE MONOHYDRATE 50 MG/ML
INJECTION, SOLUTION INTRAVENOUS
Status: DISCONTINUED
Start: 2018-06-22 | End: 2018-06-26 | Stop reason: HOSPADM

## 2018-06-22 RX ORDER — VANCOMYCIN HYDROCHLORIDE 1 G/200ML
1000 INJECTION, SOLUTION INTRAVENOUS EVERY 24 HOURS
Status: DISCONTINUED | OUTPATIENT
Start: 2018-06-22 | End: 2018-06-28

## 2018-06-22 RX ADMIN — Medication 1 PACKET: at 21:11

## 2018-06-22 RX ADMIN — DEXTROSE MONOHYDRATE 500 ML: 50 INJECTION, SOLUTION INTRAVENOUS at 03:12

## 2018-06-22 RX ADMIN — SODIUM CHLORIDE, SODIUM LACTATE, POTASSIUM CHLORIDE, CALCIUM CHLORIDE AND DEXTROSE MONOHYDRATE 500 ML: 5; 600; 310; 30; 20 INJECTION, SOLUTION INTRAVENOUS at 08:03

## 2018-06-22 RX ADMIN — PIPERACILLIN SODIUM,TAZOBACTAM SODIUM 4.5 G: 4; .5 INJECTION, POWDER, FOR SOLUTION INTRAVENOUS at 10:34

## 2018-06-22 RX ADMIN — PREGABALIN 75 MG: 75 CAPSULE ORAL at 21:10

## 2018-06-22 RX ADMIN — Medication 6 MG: at 21:09

## 2018-06-22 RX ADMIN — HEPARIN SODIUM 5000 UNITS: 5000 INJECTION, SOLUTION INTRAVENOUS; SUBCUTANEOUS at 21:10

## 2018-06-22 RX ADMIN — ACETAMINOPHEN 1000 MG: 500 TABLET, FILM COATED ORAL at 21:09

## 2018-06-22 RX ADMIN — HEPARIN SODIUM 5000 UNITS: 5000 INJECTION, SOLUTION INTRAVENOUS; SUBCUTANEOUS at 10:15

## 2018-06-22 RX ADMIN — ACETAMINOPHEN 1000 MG: 500 TABLET, FILM COATED ORAL at 16:13

## 2018-06-22 RX ADMIN — VANCOMYCIN HYDROCHLORIDE 1000 MG: 1 INJECTION, SOLUTION INTRAVENOUS at 17:31

## 2018-06-22 RX ADMIN — FLUTICASONE PROPIONATE 2 SPRAY: 50 SPRAY, METERED NASAL at 10:14

## 2018-06-22 RX ADMIN — PREGABALIN 75 MG: 75 CAPSULE ORAL at 10:12

## 2018-06-22 RX ADMIN — DEXTROSE MONOHYDRATE 25 ML: 500 INJECTION PARENTERAL at 06:42

## 2018-06-22 RX ADMIN — ACETAMINOPHEN 1000 MG: 500 TABLET, FILM COATED ORAL at 10:12

## 2018-06-22 RX ADMIN — AMITRIPTYLINE HYDROCHLORIDE 50 MG: 50 TABLET, FILM COATED ORAL at 21:09

## 2018-06-22 RX ADMIN — PIPERACILLIN SODIUM,TAZOBACTAM SODIUM 4.5 G: 4; .5 INJECTION, POWDER, FOR SOLUTION INTRAVENOUS at 16:09

## 2018-06-22 RX ADMIN — MULTIVITAMIN 15 ML: LIQUID ORAL at 10:05

## 2018-06-22 RX ADMIN — TETROFOSMIN 9.5 MCI.: 1.38 INJECTION, POWDER, LYOPHILIZED, FOR SOLUTION INTRAVENOUS at 08:25

## 2018-06-22 RX ADMIN — PREGABALIN 75 MG: 75 CAPSULE ORAL at 15:01

## 2018-06-22 RX ADMIN — Medication 1 PACKET: at 10:18

## 2018-06-22 RX ADMIN — PIPERACILLIN SODIUM,TAZOBACTAM SODIUM 4.5 G: 4; .5 INJECTION, POWDER, FOR SOLUTION INTRAVENOUS at 03:13

## 2018-06-22 RX ADMIN — OMEPRAZOLE 20 MG: 20 CAPSULE, DELAYED RELEASE ORAL at 21:10

## 2018-06-22 RX ADMIN — LISINOPRIL 10 MG: 10 TABLET ORAL at 10:15

## 2018-06-22 RX ADMIN — Medication 70 MG: at 09:59

## 2018-06-22 RX ADMIN — PIPERACILLIN SODIUM,TAZOBACTAM SODIUM 4.5 G: 4; .5 INJECTION, POWDER, FOR SOLUTION INTRAVENOUS at 21:10

## 2018-06-22 RX ADMIN — SODIUM CHLORIDE, PRESERVATIVE FREE 5 ML: 5 INJECTION INTRAVENOUS at 10:33

## 2018-06-22 NOTE — PROGRESS NOTES
[facility], [place]    Internal Medicine Progress Note - [facility] Service    Main Plans for Today   -Decrease glargine to 15 units  -Resume p.o. furosemide tomorrow, hold on further IV for now  -Dobutamine stress echo  -Plans for surgery 6/27, ortho to discuss with patient and family over the weekend    Assessment & Plan   Alessandra Pak is a 52 year old female admitted on 6/6/2018. 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 Hospital course was complicated by respiratory failure due to ARDS requiring intubation (6/10-15), extubated and transferred to medicine for further cares.    # Type 2 Diabetes  # Hx of Chronic pancreatitis s/p whipple's  Hypoglycemia in the a.m. despite holding Lantus, patient's tube feeds were held for attempted Lexiscan.  Bridged with D5.  - decrease glargine to 15u, low ssi    # Acute hypoxic respiratory failure  # ARDS 2/2 systemic response from osteo/foot infection  # Hx of chronic restrictive lung disease with emphysema and fibrosis  # HF w/ recovered EF (50%)  Patient tolerating room air at rest.  Has contraction alkalosis and mild increasing creatinine.  Will hold on further aggressive diuresis for now.  Will resume maintenance p.o. furosemide in the a.m.  Home dose is 20 mg, however patient did gain some weight despite 40 mg p.o. furosemide.  Will resume 40 mg p.o. and increase as needed.  -resume PO furosemide in AM (home dose 20 mg daily)  -Daily weights, strict I/O  -wean O2 as tolerated, goal >88%    # Severe Sepsis, improving  # R diabetic foot infection, s/p I&D x 3, Ongoing Osteomyelitis  Patient mentally ready for amputation, discussions with therapy and health psychology have been helpful.  Surgery not emergent or urgent per  orthopedics.  Given life-threatening nature of diabetic foot infection and inconsistent follow-up, amputation would likely still be the best course as patient is also opposed to long-term IV antibiotics and would need close follow-up.  -continue vanc/zosyn  -Tentative plan for surgery 6/27, orthopedic surgeons will be by to discuss plan with patient and family likely over the weekend  -qod CRP, downtrending  -appreciate ortho and ID input    # Preop  Patient is class III risk per revised cardiac index (CHF history, insulin use). Patient cannot perform 4 METS of activity at baseline.  TTE performed 6/9/18, EF 50%, no valvular abnormalities. Will require further evaluation with EKG as well as possible stress imaging (last angiogram with mild nonobstructive CAD in 2014).  Patient did not tolerate Lexiscan due to claustrophobia, will proceed to be me stress echo in order to avoid needing to premedicate.  - dobutamine stress echo  - EKG unchanged  - ongoing volume status management as above    # Normocytic Anemia  # Anemia of chronic disease  Insufficient reticulocyte response on 6/10 noted.  Ferritin elevated.  Status post 3 units PRBC (6/17, 6/14, 6/13). Appropriate response. No bleeding observed.  -continue to monitor    # Diarrhea, resolved  Notes worsening with creon. Neg infectious studies.  -Continue to monitor    # Severe Malnutrition in the setting of acute illness  Pt was in ICU intubated fo >5d. Currently on tube feeds. Calorie count ongoing. Patient cleared for PO diet, transition off of TFs as PO intake increases.    # Hypernatremia, resolved  Improved with increased  cc q2h (146).  -monitor    # Anxiety  Pt with panic attacks and anxiety especially with talking about amputation. This has significantly improved. Remains redirectable by family and staff, avoid medicating if possible.    # Chronic pain  Discussed patient's methadone with pharmacy we do not note interaction with patient's current  antibiotics (though would have interaction with linezolid if used in the future).  No empiric changes to methadone for anticipated body weight changes following amputation at this time.  - Methadone 70 qday  - Pregabalin  - Amitriptyline  - d/c oxycodone, pt has not been using    # Adrenal insufficiency  S/p stress dose steroid taper, complete 6/18.    # NIKOLAY on CKD, resolved  - monitor    # Anisocoria/R afferent pupillary defect  - stable      # Pain Assessment:  Current Pain Score 6/22/2018   Patient currently in pain? yes   Pain score (0-10) -   Pain location Foot   Pain descriptors Sharp;Shooting   CPOT pain score -   - Alessandra is experiencing pain due to Chronic pain as well as recent surgical procedure. Pain management was discussed and the plan was created in a collaborative fashion.  Alessandra's response to the current recommendations: engaged  - Please see the plan for pain management as documented above        Diet: Adult Formula Drip Feeding: Continuous Nutren 1.5; Nasoduodenal tube; Goal Rate: 50; mL/hr; Medication - Tube Feeding Flush Frequency: At least 15-30 mL water before and after medication administration and with tube clogging; Amount to Send (Nutri...  Room Service  Calorie Counts  Regular Diet Adult  Fluids: Free water flushes as aboe  DVT Prophylaxis: Heparin SQ  Code Status: Full Code    Disposition Plan   Expected discharge: 4 - 7 days, recommended to transitional care unit once antibiotic plan established, safe disposition plan/ TCU bed available and post-amputation rehab needs established.     Entered: Alireza Chaparro 06/22/2018, 2:01 PM   Information in the above section will display in the discharge planner report.      The patient's care was discussed with the Attending Physician, Dr. Hairston, Bedside Nurse and Patient.    Alireza Chaparro  [facility]  Maroon: 5  Pager: [phone]  Please see sticky note for cross cover information    Interval History   No acute events.  Patient's  previous claustrophobia during Lexiscan, study was not completed.  Patient states today that her lower extremity edema has improved, states her breathing is better.  She had hoped to be able to go home prior to surgery, understands that she has developed significant weakness due to prolonged hospitalization.  She states that her family will likely be able to help her with the errands that she was hoping to complete prior to surgery.    Physical Exam   Vital Signs: Temp: 96.3  F (35.7  C) Temp src: Axillary BP: 138/73 Pulse: 94 Heart Rate: 109 Resp: 16 SpO2: 96 % O2 Device: None (Room air) Oxygen Delivery: 2 LPM  Weight: 141 lbs 9.6 oz  General Appearance: Pleasant female, lying comfortably in bed  Respiratory: Crackles in the bases bilaterally improved, breathing comfortably on room air  Cardiovascular: Normal rate, regular rhythm, S3 present, no murmur rubs  GI: Abdomen is soft, nontender, nondistended no acute changes noted.  Skin: Lower extremities are dressed, trace LE edema b/l  Other: Alert and oriented x4, speech fluent        Data   Medications     IV fluid REPLACEMENT ONLY Stopped (06/14/18 1319)     IV fluid REPLACEMENT ONLY Stopped (06/12/18 2205)       acetaminophen  1,000 mg Oral TID     amitriptyline  50 mg Oral At Bedtime     amylase-lipase-protease  2 capsule Oral TID w/meals     D5W         fluticasone  2 spray Left nostril Daily     heparin lock flush  5-10 mL Intracatheter Q24H     heparin  5,000 Units Subcutaneous Q12H     insulin aspart  1-3 Units Subcutaneous TID AC     insulin aspart  1-3 Units Subcutaneous At Bedtime     insulin glargine  22 Units Subcutaneous Daily     lisinopril  10 mg Oral Daily     melatonin  6 mg Oral At Bedtime     methadone  70 mg Oral Daily     multivitamins with minerals  15 mL Per Feeding Tube Daily     omeprazole  20 mg Oral Daily     piperacillin-tazobactam  4.5 g Intravenous Q6H     pregabalin  75 mg Oral TID     protein modular  1 packet Per Feeding Tube BID 09  12     sodium chloride (PF)  10 mL Intravenous Once     vancomycin (VANCOCIN) IV  1,000 mg Intravenous Q24H     Data     Recent Labs  Lab 06/22/18  0602 06/21/18  1659 06/21/18  0555 06/20/18  0529 06/19/18  0600 06/18/18  0603   WBC  --   --  8.4  --  13.3* 12.4*   HGB  --   --  7.8*  --  7.9* 8.0*   MCV  --   --  84  --  84 83   PLT  --  582* 488*  --  416 419     --  140 142 143 146*   POTASSIUM 4.6  --  4.4 4.3 3.8 3.7   CHLORIDE 102  --  103 106 108 111*   CO2 33*  --  31 29 28 27   BUN 29  --  30 24 23 24   CR 1.24*  --  1.09* 0.98 0.88 0.88   ANIONGAP 7  --  5 7 8 7   RICK 8.4*  --  8.2* 8.1* 8.1* 8.1*   GLC 61*  --  106* 76 129* 258*     No results found for this or any previous visit (from the past 24 hour(s)).

## 2018-06-22 NOTE — PLAN OF CARE
Problem: Patient Care Overview  Goal: Plan of Care/Patient Progress Review  PT5B: Pt declines therapy today due to feeling extremely overwhelmed and B foot pain. Pt educated to keep feet elevated to reduce swelling and help with pain. Pt agreeable to rescheduling session to initiate wheelchair training.

## 2018-06-22 NOTE — PHARMACY-VANCOMYCIN DOSING SERVICE
Pharmacy Vancomycin Note  Date of Service 2018  Patient's  1965   52 year old, female    Indication: Diabetic Foot Infection + History of MRSA  Goal Trough Level: 15-20 mg/L  Day of Therapy: 9  Current Vancomycin regimen:  1250 mg IV q24h    Current estimated CrCl = Estimated Creatinine Clearance: 55 mL/min (based on Cr of 1.24).    Creatinine for last 3 days  2018:  5:29 AM Creatinine 0.98 mg/dL  2018:  5:55 AM Creatinine 1.09 mg/dL  2018:  6:02 AM Creatinine 1.24 mg/dL    Recent Vancomycin Levels (past 3 days)  2018:  3:55 PM Vancomycin Level 20.6 mg/L    Vancomycin IV Administrations (past 72 hours)                   vancomycin (VANCOCIN) 1,250 mg in sodium chloride 0.9 % 250 mL intermittent infusion (mg) 1,250 mg New Bag 18 1549     1,250 mg New Bag 18 1620     1,250 mg New Bag 18 1818                Nephrotoxins and other renal medications (Future)    Start     Dose/Rate Route Frequency Ordered Stop    18 1145  lisinopril (PRINIVIL/ZESTRIL) tablet 10 mg      10 mg Oral DAILY 18 1132      06/15/18 1230  piperacillin-tazobactam (ZOSYN) 4.5 g vial to attach to  mL bag      4.5 g  over 30 Minutes Intravenous EVERY 6 HOURS 06/15/18 0711      18 1600  vancomycin (VANCOCIN) 1,250 mg in sodium chloride 0.9 % 250 mL intermittent infusion      1,250 mg  over 90 Minutes Intravenous EVERY 24 HOURS 18 1543               Contrast Orders - past 72 hours (72h ago through future)    Start     Dose/Rate Route Frequency Ordered Stop    18 0815  technetium Tc 99m tetrofosmin 2UD study (MYOVIEW) radioisotope injection 3-42 mCi  Status:  Discontinued      3-42 mCi Intravenous 2 TIMES DAILY PRN 18 0815 18 0904          Interpretation of levels and current regimen:  Trough level is  Supratherapeutic    Has serum creatinine changed > 50% in last 72 hours: No (%40 increase)  Urine output:  good urine output  Renal Function: Worsening    Patient is currently supratherapeutic at 20.7 mg/dl and requires a dose decrease to 1000mg/q24hr. To note the most recent level on 6/21 shows it was read @1555, and the medication was given @1549 however per nursing the level was taken before the vanco dose. In addition sCr has been uptrending 0.88 -> 0.98 -> 1.09 -> 1.24 and will require daily monitoring. Lastly the patient is given contrast on 6/22/18 which may increase sCr further and complicate the assessment in the future.     Plan:  1.  Decrease Dose to 1000mg IV q 24hr  2.  Pharmacy will check trough levels as appropriate in 1-3 Days.    3. Serum creatinine levels will be ordered Daily until next assessment.      Raman Owusu        .

## 2018-06-22 NOTE — PROVIDER NOTIFICATION
Notified Primary team re:  Anticipated Nuc Med testing tomorrow and  Plans for coverage while NPO and off TF as far as BS.  Pt has had hx of hypoglycemia.

## 2018-06-22 NOTE — PLAN OF CARE
Problem: Infection, Risk/Actual (Adult)  Goal: Identify Related Risk Factors and Signs and Symptoms  Related risk factors and signs and symptoms are identified upon initiation of Human Response Clinical Practice Guideline (CPG).   D.  Vitals stable.  Up to commode with standby assist.  Had a headache earlier in the shift but it got better.  Otherwise pain is at baseline.  Dressings on her feet are clean and dry.  Finger stickes were 126 both times.  No insulin given.    P. To have nuc med study tomorrow needs to be NPO at 3 am.  Possibly will need dextrose iv while NPO. Continue to monitor blood sugars.

## 2018-06-22 NOTE — PROGRESS NOTES
Orthopaedic Surgery Progress Note 06/22/2018    Chart reviewed    E/S: AFVSS. WBC downtrending. CRP generally downtrending appropriately, last CRP 23. Persistent right foot pain, well controlled. Interested in BKA if the best option.    O:  Temp: 96.3  F (35.7  C) Temp src: Axillary BP: 129/70 Pulse: 94 Heart Rate: 94 Resp: 16 SpO2: 98 % O2 Device: Nasal cannula Oxygen Delivery: 2 LPM        Recent Labs  Lab 06/21/18  1659 06/21/18  0555 06/19/18  0600 06/18/18  0603  06/17/18  0551   WBC  --  8.4 13.3* 12.4*  --  13.3*   HGB  --  7.8* 7.9* 8.0*  < > 6.9*   * 488* 416 419  --  398   CRP  --  23.0* 17.0*  --   --  37.0*   < > = values in this interval not displayed.  Wound Culture: polymicrobial    Assessment: Alessandra Pak is a 52 year old female admitted sepsis with recurrent right foot diabetic ulcers as source, now s/p bedside I&D right foot on 6/7, 6/12, and 6/13.  Intubated for ARDS/hypoxic respiratory failure on 6/10, extubated 6/15, TTF 6/15. At this time all infectious markers and patient's status are appropriately improved and the acute infection is resolved.  Ongoing recurring foot ulcers in the setting of poorly controlled DM/malnutrition - if ulcers cannot be resolved with adherence to a good medical mgmt regimen, amputation may be a more durable option to prevent infections which have proven to be life-threatening.  No emergent need for this currently.     Plan:  Medicine Primary  Activity: Elevate BLE as much as possible   Weight bearing status: NWB RLE, okay for minimal WB with left forefoot for transfers to chair or commode.   Antibiotics: per primary/ID  Labs: repeat CRP q48hrs (ordered)  DVT prophylaxis: per primary team  Wound Care: daily dressing by bedside RN, weekly WOCN dressing changes      Steven Zazueta MD  PGY-1 Orthopaedic Surgery  [phone]

## 2018-06-22 NOTE — PLAN OF CARE
"Problem: Patient Care Overview  Goal: Plan of Care/Patient Progress Review  Outcome: No Change      /70 (BP Location: Left arm)  Pulse 94  Temp 96.3  F (35.7  C) (Axillary)  Resp 16  Ht 1.727 m (5' 8\")  Wt 64.2 kg (141 lb 9.6 oz)  SpO2 98%  BMI 21.53 kg/m2   Full code.  Contact isolation for MRSA. Patient alert/oriented x4 and able to convey her needs. She received TF at 50cc/hr from 1447-4699 when it was stopped when she became NPO in anticipation of her nuclear cardiac procedure this morning.  At that time, her TF was flushed with 30cc water. Pt received IV Zosyn   And 500 ml bolus of Dextrose 5% at 50cc/hr infusing to prevent hypoglycemia before nuc med study. Patent Right Triple lumen.   Pt slept between cares.  RN  at bedside when Ortho came to do dressing change on left foot.  Per ortho MD, Pt is to continue to elevate BLE as much as possible & continue to be NWB RLE, ok for minimal WB with the left forefoot for transfers to chair or commode.  Pt will need repeat CRP q 48  Hours.  Nursing to continue with daily dressing changes and  WOCN to do weekly dressing changes.  Pt pleased with outcome of visit. Pt states she is interested in getting PCA services upon discharge. She currently receives SSDI.  Pt had episode of hypoglycemia with  Blood glucose of 61 at  0602 and was treated   with  Dextrose 50%  25 ml  Which brought BG to 127 at 0713.  See BS hx for shift below.  Plan:  Continue to monitor BS closely q 2 hours and anticipate needs.  Notify MD of changes. Plan for nuclear study this morning.    Results for TWIN SERRANO (MRN [id number]) as of 6/22/2018 09:56   Ref. Range 6/22/2018 02:03 6/22/2018 04:41 6/22/2018 06:02 6/22/2018 06:31 6/22/2018 07:13   Glucose Latest Ref Range: 70 - 99 mg/dL 105 (H) 83 61 (L) 63 (L) 127 (H)           "

## 2018-06-22 NOTE — PROGRESS NOTES
Calorie Counts  Intake Recorded for: 6/21 Kcal: 1058 Protein: 42g  # Meals Recorded: 2 meals (First: 100% fruit loops, orange juice, pineapple, slice of Sammarinese toast w/syrup & butter)      (Second: 80% Hot turkey, mashed potatoes w/gravy, corn, wheat roll w/butter, fruit cup, diet lemonade)  # Supplements: 0

## 2018-06-23 ENCOUNTER — APPOINTMENT (OUTPATIENT)
Dept: OCCUPATIONAL THERAPY | Facility: CLINIC | Age: 53
DRG: 853 | End: 2018-06-23
Payer: COMMERCIAL

## 2018-06-23 LAB
ANION GAP SERPL CALCULATED.3IONS-SCNC: 7 MMOL/L (ref 3–14)
BUN SERPL-MCNC: 29 MG/DL (ref 7–30)
CALCIUM SERPL-MCNC: 8.8 MG/DL (ref 8.5–10.1)
CHLORIDE SERPL-SCNC: 105 MMOL/L (ref 94–109)
CO2 SERPL-SCNC: 28 MMOL/L (ref 20–32)
CREAT SERPL-MCNC: 1.38 MG/DL (ref 0.52–1.04)
CRP SERPL-MCNC: 35 MG/L (ref 0–8)
GFR SERPL CREATININE-BSD FRML MDRD: 40 ML/MIN/1.7M2
GLUCOSE BLDC GLUCOMTR-MCNC: 130 MG/DL (ref 70–99)
GLUCOSE BLDC GLUCOMTR-MCNC: 143 MG/DL (ref 70–99)
GLUCOSE BLDC GLUCOMTR-MCNC: 149 MG/DL (ref 70–99)
GLUCOSE BLDC GLUCOMTR-MCNC: 168 MG/DL (ref 70–99)
GLUCOSE SERPL-MCNC: 109 MG/DL (ref 70–99)
MAGNESIUM SERPL-MCNC: 2.3 MG/DL (ref 1.6–2.3)
PHOSPHATE SERPL-MCNC: 4.5 MG/DL (ref 2.5–4.5)
POTASSIUM SERPL-SCNC: 5 MMOL/L (ref 3.4–5.3)
SODIUM SERPL-SCNC: 140 MMOL/L (ref 133–144)

## 2018-06-23 PROCEDURE — 00000146 ZZHCL STATISTIC GLUCOSE BY METER IP

## 2018-06-23 PROCEDURE — 99232 SBSQ HOSP IP/OBS MODERATE 35: CPT | Performed by: HOSPITALIST

## 2018-06-23 PROCEDURE — 25000128 H RX IP 250 OP 636: Performed by: HOSPITALIST

## 2018-06-23 PROCEDURE — 25000132 ZZH RX MED GY IP 250 OP 250 PS 637: Performed by: STUDENT IN AN ORGANIZED HEALTH CARE EDUCATION/TRAINING PROGRAM

## 2018-06-23 PROCEDURE — 27210429 ZZH NUTRITION PRODUCT INTERMEDIATE LITER

## 2018-06-23 PROCEDURE — 36592 COLLECT BLOOD FROM PICC: CPT | Performed by: STUDENT IN AN ORGANIZED HEALTH CARE EDUCATION/TRAINING PROGRAM

## 2018-06-23 PROCEDURE — 25000132 ZZH RX MED GY IP 250 OP 250 PS 637: Performed by: HOSPITALIST

## 2018-06-23 PROCEDURE — 84100 ASSAY OF PHOSPHORUS: CPT | Performed by: STUDENT IN AN ORGANIZED HEALTH CARE EDUCATION/TRAINING PROGRAM

## 2018-06-23 PROCEDURE — 25000128 H RX IP 250 OP 636: Performed by: INTERNAL MEDICINE

## 2018-06-23 PROCEDURE — 25000128 H RX IP 250 OP 636: Performed by: STUDENT IN AN ORGANIZED HEALTH CARE EDUCATION/TRAINING PROGRAM

## 2018-06-23 PROCEDURE — 40000133 ZZH STATISTIC OT WARD VISIT

## 2018-06-23 PROCEDURE — 12000001 ZZH R&B MED SURG/OB UMMC

## 2018-06-23 PROCEDURE — 97110 THERAPEUTIC EXERCISES: CPT | Mod: GO

## 2018-06-23 PROCEDURE — 25000132 ZZH RX MED GY IP 250 OP 250 PS 637: Performed by: INTERNAL MEDICINE

## 2018-06-23 PROCEDURE — 83735 ASSAY OF MAGNESIUM: CPT | Performed by: STUDENT IN AN ORGANIZED HEALTH CARE EDUCATION/TRAINING PROGRAM

## 2018-06-23 PROCEDURE — 86140 C-REACTIVE PROTEIN: CPT | Performed by: STUDENT IN AN ORGANIZED HEALTH CARE EDUCATION/TRAINING PROGRAM

## 2018-06-23 PROCEDURE — 80048 BASIC METABOLIC PNL TOTAL CA: CPT | Performed by: STUDENT IN AN ORGANIZED HEALTH CARE EDUCATION/TRAINING PROGRAM

## 2018-06-23 RX ADMIN — FLUTICASONE PROPIONATE 2 SPRAY: 50 SPRAY, METERED NASAL at 08:30

## 2018-06-23 RX ADMIN — PREGABALIN 75 MG: 75 CAPSULE ORAL at 16:14

## 2018-06-23 RX ADMIN — HEPARIN SODIUM 5000 UNITS: 5000 INJECTION, SOLUTION INTRAVENOUS; SUBCUTANEOUS at 20:43

## 2018-06-23 RX ADMIN — OMEPRAZOLE 20 MG: 20 CAPSULE, DELAYED RELEASE ORAL at 20:43

## 2018-06-23 RX ADMIN — FUROSEMIDE 40 MG: 40 TABLET ORAL at 08:31

## 2018-06-23 RX ADMIN — PIPERACILLIN SODIUM,TAZOBACTAM SODIUM 4.5 G: 4; .5 INJECTION, POWDER, FOR SOLUTION INTRAVENOUS at 20:43

## 2018-06-23 RX ADMIN — SODIUM CHLORIDE, PRESERVATIVE FREE 5 ML: 5 INJECTION INTRAVENOUS at 05:26

## 2018-06-23 RX ADMIN — Medication 6 MG: at 21:06

## 2018-06-23 RX ADMIN — PIPERACILLIN SODIUM,TAZOBACTAM SODIUM 4.5 G: 4; .5 INJECTION, POWDER, FOR SOLUTION INTRAVENOUS at 16:19

## 2018-06-23 RX ADMIN — MULTIVITAMIN 15 ML: LIQUID ORAL at 08:32

## 2018-06-23 RX ADMIN — ACETAMINOPHEN 1000 MG: 500 TABLET, FILM COATED ORAL at 16:14

## 2018-06-23 RX ADMIN — AMITRIPTYLINE HYDROCHLORIDE 50 MG: 50 TABLET, FILM COATED ORAL at 21:06

## 2018-06-23 RX ADMIN — Medication 70 MG: at 08:27

## 2018-06-23 RX ADMIN — Medication 1 PACKET: at 08:40

## 2018-06-23 RX ADMIN — ACETAMINOPHEN 1000 MG: 500 TABLET, FILM COATED ORAL at 21:06

## 2018-06-23 RX ADMIN — HEPARIN SODIUM 5000 UNITS: 5000 INJECTION, SOLUTION INTRAVENOUS; SUBCUTANEOUS at 08:47

## 2018-06-23 RX ADMIN — SODIUM CHLORIDE, PRESERVATIVE FREE 5 ML: 5 INJECTION INTRAVENOUS at 08:46

## 2018-06-23 RX ADMIN — VANCOMYCIN HYDROCHLORIDE 1000 MG: 1 INJECTION, SOLUTION INTRAVENOUS at 17:12

## 2018-06-23 RX ADMIN — LISINOPRIL 10 MG: 10 TABLET ORAL at 08:31

## 2018-06-23 RX ADMIN — PREGABALIN 75 MG: 75 CAPSULE ORAL at 08:28

## 2018-06-23 RX ADMIN — PIPERACILLIN SODIUM,TAZOBACTAM SODIUM 4.5 G: 4; .5 INJECTION, POWDER, FOR SOLUTION INTRAVENOUS at 03:28

## 2018-06-23 RX ADMIN — ACETAMINOPHEN 1000 MG: 500 TABLET, FILM COATED ORAL at 08:31

## 2018-06-23 RX ADMIN — Medication 1 PACKET: at 21:06

## 2018-06-23 RX ADMIN — PIPERACILLIN SODIUM,TAZOBACTAM SODIUM 4.5 G: 4; .5 INJECTION, POWDER, FOR SOLUTION INTRAVENOUS at 08:49

## 2018-06-23 RX ADMIN — PREGABALIN 75 MG: 75 CAPSULE ORAL at 20:43

## 2018-06-23 NOTE — PROGRESS NOTES
[facility] Nurse Inpatient Wound Assessment   Reason for consultation: Evaluate and treat bilateral foot wounds    Assessment  Right foot and left foot wounds due to Diabetic Ulcer-likely Neuropathic   Status: Follow up assessment  Orthopedic team has been following the wound, R BKA scheduled for 6/27/18.    Treatment Plan  POC for Right plantar and lateral foot, left and and left medial foot wounds every other day and PRN: Remove dressing and apply Vashe (order #[id number]) soaked gauze into wound beds for 10 minutes. Remove and do not rinse.   Apply Iodosorb gel (order #[id number]) to wound beds. Pack lateral aspect with a gauze (place a puja thick amount of iodosorb gel to the tip of the gauze and pack with Q-tip)  Pack gently with dry 4x4. Cover with ABD and secure with Kerlix and ACE wrap.     Orders revised    WO Nurse follow-up plan:weekly   Nursing to notify the Provider(s) and re-consult the [facility] Nurse if wound(s) deteriorates or new skin concern.    Patient History  According to provider note(s):  Alessandra Pak is a 52 year old female with PMH NICM with ICD, CKD3, chronic pancreatitis status post pylorus sparing Whipple (2001) with secondary DM c/b recurrent diabetic foot ulcers, COPD, HTN, and chronic methadone use who has been admitted for sepsis and longstanding history of recurrent b/l diabteic foot ulcers. Patient is unsure exactly when she first noticed her bilateral foot ulcers, however present for a period time.  2 days ago the patient's daughter noticed a foul smell coming from her mother's feet, increased confusion, and decreased p.o. Intake.      Of note, she was seen by orthopedics during an admission in February 2018 at which time she underwent bedside irrigation and debridement of a right foot abscess and ulcer and was recommended to follow-up in clinic.  Patient was lost to follow-up until her current presentation.  She reports chronic bilateral numbness in a stocking glove distribution, however denies  any new numbness or tingling.  She follows with Dr. Lewis with Podiatry as an outpatient, however notes her last visit was approximately 3 months ago.    Objective Data  Containment of urine/stool: Continent of bowel and Continent of bladder    Active Diet Order    Active Diet Order      Regular Diet Adult    Output:   I/O last 3 completed shifts:  In: 1160 [P.O.:400; I.V.:140; NG/GT:170]  Out: 800 [Urine:800]    Risk Assessment:   Sensory Perception: 3-->slightly limited  Moisture: 4-->rarely moist  Activity: 2-->chairfast  Mobility: 3-->slightly limited  Nutrition: 2-->probably inadequate  Friction and Shear: 2-->potential problem  Kye Score: 16                          Labs:     Recent Labs  Lab 06/21/18  0555   HGB 7.8*   WBC 8.4   CRP 23.0*       Physical Exam  Skin inspection: focused right foot    Wound Location:  Right plantar 1st MPJ  Date of last photo 6/22/2018      6/8/18                                                                       6/15/18      6/22/18      Wound History: See above. Present on admission. Bedside I and D on 6/12/2018  Measurements (length x width x depth, in cm) 1.8 cm x 2 cm  x  0.5 cm   Wound Base:  100% dark pink/grey tissue  Tunneling N/A  Undermining N/A  Palpation of the wound bed: firm   Periwound skin: hyperkeratosis- hard callus  Color: pale  Temperature: normal   Drainage:, small  Description of drainage: serosanguinous  Odor: strong  Pain: during dressing change,     Wound Location:  Right plantar to lateral foot  Date of last photo 6/22/2018        Wound History: See above. Present on admission. I and D on 6/12/2018  Measurements (length x width x depth, in cm) 8 cm x 7.5 cm  x  1.5 cm   Wound Base:  Mix of moist slough, tendon, and pink tissue  Tunneling: none noted, however, as slough is washed away, tunnels may be noted  Undermining N/A  Palpation of the wound bed: firm   Periwound skin: pale hard callous   Color: pale and pink  Temperature: normal    Drainage:, moderate  Description of drainage: serosanguinous  Odor: mild  Pain: aching and painful during dressing change    Wound Location:  Left lateral heel  Date of last photo 6/15/2018        Wound History: See above. Present on admission.   Measurements (length x width x depth, in cm) 2.2 cm x 3 cm  x  0.3 cm   Wound Base:  100% pink tissue  Tunneling N/A  Undermining N/A  Palpation of the wound bed: firm   Periwound skin: hyperkeratosis- hard callus  Color: pale  Temperature: normal   Drainage:, moderate  Description of drainage: serosanguinous  Odor: strong  Pain: during dressing change, facial distress     Wound Location:  Right medial to great toe  Date of last photo 6/15/2018        Wound History: See above. Present on admission.   Measurements (length x width x depth, in cm) 1.5 cm x 1.2 cm  x  0.5 cm   Wound Base:  20% smooth dark red moist tissue surrounded by moist dark grey necrotic tissue  Tunneling N/A  Undermining N/A  Palpation of the wound bed: normal  Periwound skin: hyperkeratosis- hard callus  Color: pale  Temperature: normal   Drainage:, none  Description of drainage: serosanguinous  Odor: strong  Pain: during dressing change,   Interventions  Current support surface: Standard    Current off-loading measures: Pillows under calves  Visual inspection of wound(s) completed  Wound Care: done per plan of care  Supplies: Vashe and Iodasorb gel at the bedside  Education provided: plan of care and wound progress  Discussed plan of care with Patient and Nurse

## 2018-06-23 NOTE — PLAN OF CARE
Problem: Patient Care Overview  Goal: Plan of Care/Patient Progress Review  Outcome: No Change  VSS, tachy. O2 weaned, breathing on room air.  A&Ox4. Up ad kyara.  & 175. D5 500 ml bolus given per orders. Patient NPO at various times throughout shift for stress test; which she was unable to tolerate/refused. TF currently @ goal rate of 50 ml/hr.  Fair appetite eating 50-75% of meals. Continues on calorie counts. Seen by WOMADYSON, RN who changed her dressings. Continues on zosyn. Plan pending ortho. Will report to oncoming staff.

## 2018-06-23 NOTE — PROGRESS NOTES
[facility], [place]    Internal Medicine Progress Note - [facility] Service    Main Plans for Today   -Decrease glargine to 5 units  -Continue PO furosemide  -Dobutamine stress echo on Monday  -Plans for surgery 6/27, ortho to discuss with patient and family over the weekend    Assessment & Plan   Alessandra Pak is a 52 year old female admitted on 6/6/2018. 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 Hospital course was complicated by respiratory failure due to ARDS requiring intubation (6/10-15), extubated and transferred to medicine for further cares.    # Type 2 Diabetes  # Hx of Chronic pancreatitis s/p whipple's  Blood sugars improved with decreasing lantus overnight. 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  Has contraction alkalosis and mild increasing creatinine. Slight increase this am but better with decrease is diuretics.  Will resume maintenance p.o. furosemide in the a.m.  Home dose is 20 mg, however patient did gain some weight despite 40 mg p.o. furosemide.  Will resume 40 mg p.o. and increase as needed. May need home O2 at night  -resume PO furosemide in AM (home dose 20 mg daily)  -Daily weights, strict I/O  -wean O2 as tolerated, goal >88%    # Severe Sepsis, improving  # R diabetic foot infection, s/p I&D x 3, Ongoing Osteomyelitis  Patient mentally ready for amputation, discussions with therapy and  health psychology have been helpful.  Surgery not emergent or urgent per orthopedics.  Given life-threatening nature of diabetic foot infection and inconsistent follow-up, amputation would likely still be the best course as patient is also opposed to long-term IV antibiotics and would need close follow-up.  -continue vanc/zosyn  -Tentative plan for surgery 6/27, orthopedic surgeons will be by to discuss plan with patient and family likely over the weekend  -qod CRP, downtrending  -appreciate ortho and ID input    # Preop  Patient is class III risk per revised cardiac index (CHF history, insulin use). Patient cannot perform 4 METS of activity at baseline.  TTE performed 6/9/18, EF 50%, no valvular abnormalities. Will require further evaluation with EKG as well as possible stress imaging (last angiogram with mild nonobstructive CAD in 2014).  Patient did not tolerate Lexiscan due to claustrophobia, will proceed to be me stress echo in order to avoid needing to premedicate.  - dobutamine stress echo - discussed with patient - plan for Monday  - EKG unchanged  - ongoing volume status management as above    # Normocytic Anemia  # Anemia of chronic disease  Insufficient reticulocyte response on 6/10 noted.  Ferritin elevated.  Status post 3 units PRBC (6/17, 6/14, 6/13). Appropriate response. No bleeding observed.  -continue to monitor    # Diarrhea, resolved  Notes worsening with creon. Neg infectious studies.  -Continue to monitor    # Severe Malnutrition in the setting of acute illness  Pt was in ICU intubated fo >5d. Currently on tube feeds. Calorie count ongoing. Patient cleared for PO diet, transition off of TFs as PO intake increases.    # Hypernatremia, resolved  Improved with increased  cc q2h (146).  -monitor    # Anxiety  Pt with panic attacks and anxiety especially with talking about amputation. This has significantly improved. Remains redirectable by family and staff, avoid medicating if possible.    #  Chronic pain  Discussed patient's methadone with pharmacy we do not note interaction with patient's current antibiotics (though would have interaction with linezolid if used in the future).  No empiric changes to methadone for anticipated body weight changes following amputation at this time.  - Methadone 70 qday  - Pregabalin  - Amitriptyline  - d/c oxycodone, pt has not been using    # Adrenal insufficiency  S/p stress dose steroid taper, complete 6/18.    # NIKOLAY on CKD, resolved  - monitor    # Anisocoria/R afferent pupillary defect  - stable      # Pain Assessment:  Current Pain Score 6/22/2018   Patient currently in pain? yes   Pain score (0-10) -   Pain location Foot   Pain descriptors Sharp;Shooting   CPOT pain score -   - Alessandra is experiencing pain due to Chronic pain as well as recent surgical procedure. Pain management was discussed and the plan was created in a collaborative fashion.  Alessandra's response to the current recommendations: engaged  - Please see the plan for pain management as documented above        Diet: Adult Formula Drip Feeding: Continuous Nutren 1.5; Nasoduodenal tube; Goal Rate: 50; mL/hr; Medication - Tube Feeding Flush Frequency: At least 15-30 mL water before and after medication administration and with tube clogging; Amount to Send (Nutri...  Room Service  Regular Diet Adult  Fluids: Free water flushes as aboe  DVT Prophylaxis: Heparin SQ  Code Status: Full Code    Disposition Plan   Expected discharge: 4 - 7 days, recommended to transitional care unit once antibiotic plan established, safe disposition plan/ TCU bed available and post-amputation rehab needs established.     Entered: Vic Hairston 06/23/2018, 8:34 AM   Information in the above section will display in the discharge planner report.      Vic Hairston  [facility]  Maroon: 5  Pager: [phone]  Please see sticky note for cross cover information    Interval History   Stable overnight without acute  events. No complaints this am. Was pleased with able to go outside yesterday. Endorses a little SOB particularly when laying flat and trying to sleep. Otherwise no fevers or chills, nausea or ovmiting.    Physical Exam   Vital Signs: Temp: 98.6  F (37  C) Temp src: Oral BP: 124/69 Pulse: 107 Heart Rate: 91 Resp: 20 SpO2: 95 % O2 Device: None (Room air) Oxygen Delivery: 2 LPM  Weight: 134 lbs 14.4 oz  General Appearance: Pleasant female, lying comfortably in bed  Respiratory: Crackles in the bases bilaterally improved, breathing comfortably  Cardiovascular: Normal rate, regular rhythm, S3 present, no murmur rubs  GI: Abdomen is soft, nontender, nondistended no acute changes noted.  Skin: Lower extremities are dressed,   Other: Alert and oriented x4, speech fluent        Data   Medications     IV fluid REPLACEMENT ONLY Stopped (06/14/18 1319)     IV fluid REPLACEMENT ONLY Stopped (06/12/18 2205)       acetaminophen  1,000 mg Oral TID     amitriptyline  50 mg Oral At Bedtime     amylase-lipase-protease  2 capsule Oral TID w/meals     fluticasone  2 spray Left nostril Daily     furosemide  40 mg Oral Daily     heparin lock flush  5-10 mL Intracatheter Q24H     heparin  5,000 Units Subcutaneous Q12H     insulin aspart  1-3 Units Subcutaneous TID AC     insulin aspart  1-3 Units Subcutaneous At Bedtime     insulin glargine  15 Units Subcutaneous Daily     lisinopril  10 mg Oral Daily     melatonin  6 mg Oral At Bedtime     methadone  70 mg Oral Daily     multivitamins with minerals  15 mL Per Feeding Tube Daily     omeprazole  20 mg Oral Daily     piperacillin-tazobactam  4.5 g Intravenous Q6H     pregabalin  75 mg Oral TID     protein modular  1 packet Per Feeding Tube BID 09 12     sodium chloride (PF)  10 mL Intravenous Once     vancomycin (VANCOCIN) IV  1,000 mg Intravenous Q24H     Data     Recent Labs  Lab 06/23/18  0530 06/22/18  0602 06/21/18  1659 06/21/18  0555  06/19/18  0600 06/18/18  0603   WBC  --   --   --   8.4  --  13.3* 12.4*   HGB  --   --   --  7.8*  --  7.9* 8.0*   MCV  --   --   --  84  --  84 83   PLT  --   --  582* 488*  --  416 419    142  --  140  < > 143 146*   POTASSIUM 5.0 4.6  --  4.4  < > 3.8 3.7   CHLORIDE 105 102  --  103  < > 108 111*   CO2 28 33*  --  31  < > 28 27   BUN 29 29  --  30  < > 23 24   CR 1.38* 1.24*  --  1.09*  < > 0.88 0.88   ANIONGAP 7 7  --  5  < > 8 7   RICK 8.8 8.4*  --  8.2*  < > 8.1* 8.1*   * 61*  --  106*  < > 129* 258*   < > = values in this interval not displayed.  No results found for this or any previous visit (from the past 24 hour(s)).

## 2018-06-23 NOTE — PROGRESS NOTES
Calorie Count  Intake recorded for 6/22/18. Kcals: 820  Protein: 36g  # Meals Recorded    First: 100% fruit loops, 8oz OJ, 50% 1% milk.    Second: 100% apple juice, carrots, 75% grilled chicken, 50% white rice, wheat roll, Rice Krispie Bar, 25% chicken quesadilla.   # Supplements Recorded 0

## 2018-06-23 NOTE — PROGRESS NOTES
Brief ortho update    FYI - anticipated surgery date changed to Thursday 6/28 d/t OR availability.  Would prefer to perform this on [facility] - would appreciate transfer of patient to [facility] 1 day prior to surgery if possible.    Would like to obtain vascular studies to help plan/ensure amputation viability (ie, will a BKA heal?).  -TcO2's  -? CT angio if patient is able to have contrast, will discuss with primary medicine team.    Marco nAtonio Rios MD  PGY-4 Orthopaedic Surgery  [phone]

## 2018-06-23 NOTE — PLAN OF CARE
Problem: Patient Care Overview  Goal: Plan of Care/Patient Progress Review  Outcome: No Change  Pt reporting no pain to diabetic foot wounds. Dressings to wounds CD&I. TF running at goal rate and patient tolerating. Blood sugars well controlled. IV vanco and zosyn. Per MD note ortho surgery expected 6/27. Continue POC.

## 2018-06-23 NOTE — PLAN OF CARE
Problem: Patient Care Overview  Goal: Plan of Care/Patient Progress Review  Discharge Planner OT 5B  Patient plan for discharge: Home  Current status: Pt continues to be NWB on RLE and TTWB on LLE.  Completed 5 BUE AROM resistance exercises with red theraband, 10 reps each.  Pt needing occasional break.  Pt declining further activity.  Educated pt on NWB status and AE needed upon discharge home  Barriers to return to prior living situation: Wounds, possible amputation in future, weight bearing status  Recommendations for discharge: Home with A and Home OT as needed if amputation occurring, as well as home safety evaluation.  Rationale for recommendations: Pt with significant wounds, affecting her ability to ambulate independently and complete ADLs and IADLs independently, decreasing her safety and risking further injury.       Entered by: Kailey Hopson 06/23/2018 11:10 AM

## 2018-06-24 ENCOUNTER — APPOINTMENT (OUTPATIENT)
Dept: OCCUPATIONAL THERAPY | Facility: CLINIC | Age: 53
DRG: 853 | End: 2018-06-24
Payer: COMMERCIAL

## 2018-06-24 LAB
ANION GAP SERPL CALCULATED.3IONS-SCNC: 8 MMOL/L (ref 3–14)
BUN SERPL-MCNC: 30 MG/DL (ref 7–30)
CALCIUM SERPL-MCNC: 8.4 MG/DL (ref 8.5–10.1)
CHLORIDE SERPL-SCNC: 104 MMOL/L (ref 94–109)
CO2 SERPL-SCNC: 26 MMOL/L (ref 20–32)
CREAT SERPL-MCNC: 1.4 MG/DL (ref 0.52–1.04)
GFR SERPL CREATININE-BSD FRML MDRD: 40 ML/MIN/1.7M2
GLUCOSE BLDC GLUCOMTR-MCNC: 164 MG/DL (ref 70–99)
GLUCOSE BLDC GLUCOMTR-MCNC: 202 MG/DL (ref 70–99)
GLUCOSE BLDC GLUCOMTR-MCNC: 215 MG/DL (ref 70–99)
GLUCOSE BLDC GLUCOMTR-MCNC: 230 MG/DL (ref 70–99)
GLUCOSE BLDC GLUCOMTR-MCNC: 273 MG/DL (ref 70–99)
GLUCOSE SERPL-MCNC: 188 MG/DL (ref 70–99)
PLATELET # BLD AUTO: 455 10E9/L (ref 150–450)
POTASSIUM SERPL-SCNC: 4.8 MMOL/L (ref 3.4–5.3)
SODIUM SERPL-SCNC: 138 MMOL/L (ref 133–144)
VANCOMYCIN SERPL-MCNC: 18 MG/L

## 2018-06-24 PROCEDURE — 36592 COLLECT BLOOD FROM PICC: CPT | Performed by: HOSPITALIST

## 2018-06-24 PROCEDURE — 97530 THERAPEUTIC ACTIVITIES: CPT | Mod: GO

## 2018-06-24 PROCEDURE — 99232 SBSQ HOSP IP/OBS MODERATE 35: CPT | Mod: GC | Performed by: HOSPITALIST

## 2018-06-24 PROCEDURE — 25000128 H RX IP 250 OP 636: Performed by: INTERNAL MEDICINE

## 2018-06-24 PROCEDURE — 40000133 ZZH STATISTIC OT WARD VISIT

## 2018-06-24 PROCEDURE — 25000128 H RX IP 250 OP 636: Performed by: HOSPITALIST

## 2018-06-24 PROCEDURE — 80048 BASIC METABOLIC PNL TOTAL CA: CPT | Performed by: HOSPITALIST

## 2018-06-24 PROCEDURE — 85049 AUTOMATED PLATELET COUNT: CPT | Performed by: STUDENT IN AN ORGANIZED HEALTH CARE EDUCATION/TRAINING PROGRAM

## 2018-06-24 PROCEDURE — 25000132 ZZH RX MED GY IP 250 OP 250 PS 637: Performed by: HOSPITALIST

## 2018-06-24 PROCEDURE — 25000132 ZZH RX MED GY IP 250 OP 250 PS 637: Performed by: STUDENT IN AN ORGANIZED HEALTH CARE EDUCATION/TRAINING PROGRAM

## 2018-06-24 PROCEDURE — 25000128 H RX IP 250 OP 636: Performed by: STUDENT IN AN ORGANIZED HEALTH CARE EDUCATION/TRAINING PROGRAM

## 2018-06-24 PROCEDURE — 80202 ASSAY OF VANCOMYCIN: CPT | Performed by: HOSPITALIST

## 2018-06-24 PROCEDURE — 00000146 ZZHCL STATISTIC GLUCOSE BY METER IP

## 2018-06-24 PROCEDURE — 40000558 ZZH STATISTIC CVC DRESSING CHANGE

## 2018-06-24 PROCEDURE — 12000001 ZZH R&B MED SURG/OB UMMC

## 2018-06-24 PROCEDURE — 25000132 ZZH RX MED GY IP 250 OP 250 PS 637: Performed by: INTERNAL MEDICINE

## 2018-06-24 RX ORDER — PIPERACILLIN SODIUM, TAZOBACTAM SODIUM 3; .375 G/15ML; G/15ML
3.38 INJECTION, POWDER, LYOPHILIZED, FOR SOLUTION INTRAVENOUS EVERY 6 HOURS
Status: DISCONTINUED | OUTPATIENT
Start: 2018-06-24 | End: 2018-06-25

## 2018-06-24 RX ADMIN — ACETAMINOPHEN 1000 MG: 500 TABLET, FILM COATED ORAL at 21:11

## 2018-06-24 RX ADMIN — PREGABALIN 75 MG: 75 CAPSULE ORAL at 08:51

## 2018-06-24 RX ADMIN — PIPERACILLIN SODIUM,TAZOBACTAM SODIUM 4.5 G: 4; .5 INJECTION, POWDER, FOR SOLUTION INTRAVENOUS at 08:55

## 2018-06-24 RX ADMIN — PREGABALIN 75 MG: 75 CAPSULE ORAL at 20:29

## 2018-06-24 RX ADMIN — Medication 1 PACKET: at 09:03

## 2018-06-24 RX ADMIN — PIPERACILLIN SODIUM,TAZOBACTAM SODIUM 4.5 G: 4; .5 INJECTION, POWDER, FOR SOLUTION INTRAVENOUS at 15:16

## 2018-06-24 RX ADMIN — Medication 1 PACKET: at 21:42

## 2018-06-24 RX ADMIN — SODIUM CHLORIDE, PRESERVATIVE FREE 5 ML: 5 INJECTION INTRAVENOUS at 09:01

## 2018-06-24 RX ADMIN — FLUTICASONE PROPIONATE 2 SPRAY: 50 SPRAY, METERED NASAL at 08:54

## 2018-06-24 RX ADMIN — Medication 70 MG: at 08:50

## 2018-06-24 RX ADMIN — HEPARIN SODIUM 5000 UNITS: 5000 INJECTION, SOLUTION INTRAVENOUS; SUBCUTANEOUS at 20:29

## 2018-06-24 RX ADMIN — LISINOPRIL 10 MG: 10 TABLET ORAL at 08:51

## 2018-06-24 RX ADMIN — BENZOCAINE AND MENTHOL 1 LOZENGE: 15; 3.6 LOZENGE ORAL at 15:34

## 2018-06-24 RX ADMIN — PIPERACILLIN SODIUM AND TAZOBACTAM SODIUM 3.38 G: 3; .375 INJECTION, POWDER, LYOPHILIZED, FOR SOLUTION INTRAVENOUS at 20:29

## 2018-06-24 RX ADMIN — ACETAMINOPHEN 1000 MG: 500 TABLET, FILM COATED ORAL at 15:21

## 2018-06-24 RX ADMIN — PIPERACILLIN SODIUM,TAZOBACTAM SODIUM 4.5 G: 4; .5 INJECTION, POWDER, FOR SOLUTION INTRAVENOUS at 02:26

## 2018-06-24 RX ADMIN — OMEPRAZOLE 20 MG: 20 CAPSULE, DELAYED RELEASE ORAL at 20:29

## 2018-06-24 RX ADMIN — SODIUM CHLORIDE, PRESERVATIVE FREE 5 ML: 5 INJECTION INTRAVENOUS at 08:37

## 2018-06-24 RX ADMIN — HEPARIN SODIUM 5000 UNITS: 5000 INJECTION, SOLUTION INTRAVENOUS; SUBCUTANEOUS at 09:08

## 2018-06-24 RX ADMIN — AMITRIPTYLINE HYDROCHLORIDE 50 MG: 50 TABLET, FILM COATED ORAL at 21:11

## 2018-06-24 RX ADMIN — VANCOMYCIN HYDROCHLORIDE 1000 MG: 1 INJECTION, SOLUTION INTRAVENOUS at 17:31

## 2018-06-24 RX ADMIN — PREGABALIN 75 MG: 75 CAPSULE ORAL at 13:48

## 2018-06-24 RX ADMIN — FUROSEMIDE 40 MG: 40 TABLET ORAL at 08:54

## 2018-06-24 RX ADMIN — Medication 6 MG: at 21:11

## 2018-06-24 RX ADMIN — ACETAMINOPHEN 1000 MG: 500 TABLET, FILM COATED ORAL at 08:51

## 2018-06-24 RX ADMIN — MULTIVITAMIN 15 ML: LIQUID ORAL at 09:03

## 2018-06-24 NOTE — PLAN OF CARE
Problem: Patient Care Overview  Goal: Plan of Care/Patient Progress Review  Discharge Planner OT   Patient plan for discharge: TCU  Current status: Pt demonstrating independence in bed mobility and supine to sit transfer.  Pt independent in sliding board transfer.  Pt with no questions regarding future rehab  Barriers to return to prior living situation: foot ulcers  Recommendations for discharge: TCU vs. Home with A pending medical POC  Rationale for recommendations: Pt possibly getting amputation, thus affecting her independence and safety in ADLs and IADLs.       Entered by: Kailey Hopson 06/24/2018 12:39 PM

## 2018-06-24 NOTE — PROGRESS NOTES
"[facility], [place]    Internal Medicine Progress Note - [facility]    Main Plans for Today   -Continue glargine 5 units  -encourage PO, if adequate intake (calorie counts, goal \"~ 1200 kcals and 50 gm protein per day\") and no hypoglycemia, plan to d/c TFs and remove NG  -Continue PO furosemide  -Dobutamine stress echo on Monday  -Plans for surgery 6/28, ortho to discuss with patient and family over the weekend, prefer pt to move to [facility] on 6/27  -ortho rec CTA, TCO2 to assess for wound healing. Given NIKOLAY, will likely plan on holding off until 6/26  -consider low dose hydroxyzine if necessary for anxiety, primarily provide reassurance    Assessment & Plan   Alessandra Pak is a 52 year old female admitted on 6/6/2018. 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 Hospital course was complicated by respiratory failure due to ARDS requiring intubation (6/10-15), extubated and transferred to medicine for further cares.    # Type 2 Diabetes  # Hypoglycemia, resolved  # Hx of Chronic pancreatitis s/p whipple's  # Severe malnutrition in the context of acute illness  Blood sugars improving, no hypoglycemia.  - decrease glargine to 5u, low ssi  -encourage PO, if adequate intake (calorie counts, goal \"~ 1200 kcals and 50 gm protein per day\") and no hypoglycemia, plan to d/c TFs and remove NG  - continue to monitor, ensure patient able to maintain normo to slight hyperglycemia on PO as weaning from TFs    TF regimen: Nutren 1.5 @ 50 ml/hr, (1200 ml/day)  Provides: 1800 kcals (32+), 82 gm PRO (1.4+), 912 mL H2O, 211 gm CHO and 0 fiber.   Water flushes: 50 ml every 2 hours    # Anxiety  Patient has ongoing anxiety related to prolonged hospital stay. Patient remains " redirectable and calms with reassurance. Patient on amitriptyline, prefer to avoid benzos, SSRIs unlikely to have benefit acutely. Health psychology has been helpful, continue visits.  -continue regular reassurance  -consider low dose hydroxyzine if necessary    # Severe Sepsis, improving  # R diabetic foot infection, s/p I&D x 3, Ongoing Osteomyelitis  Patient mentally ready for amputation, discussions with therapy and health psychology have been helpful.  Surgery not emergent or urgent per orthopedics.  Given life-threatening nature of diabetic foot infection and inconsistent follow-up, amputation would likely still be the best course as patient is also opposed to long-term IV antibiotics and would need close follow-up.  -appreciate ortho and ID input  -continue vanc/zosyn  -Plans for surgery 6/28, ortho to discuss with patient and family over the weekend, prefer pt to move to [facility] on 6/27  -ortho rec CTA, TCO2 to assess for wound healing. Given NIKOLAY, will likely plan on holding off until 6/26  -qod CRP, downtrending    # Preop  Patient is class III risk per revised cardiac index (CHF history, insulin use). Patient cannot perform 4 METS of activity at baseline.  TTE performed 6/9/18, EF 50%, no valvular abnormalities. Will require further evaluation with EKG as well as possible stress imaging (last angiogram with mild nonobstructive CAD in 2014).  Patient did not tolerate Lexiscan due to claustrophobia, will proceed to be me stress echo in order to avoid needing to premedicate.  - dobutamine stress echo - discussed with patient - plan for Monday  - EKG unchanged  - ongoing volume status management    # Acute hypoxic respiratory failure, resolved  # ARDS 2/2 systemic response from osteo/foot infection  # Hx of chronic restrictive lung disease with emphysema and fibrosis  # HF w/ recovered EF (50%)  Patient tolerating room air at rest.  Has contraction alkalosis and mild increasing creatinine. Slight increase  this am but better with decrease is diuretics.  Will resume maintenance p.o. furosemide in the a.m.  Home dose is 20 mg, however patient did gain some weight despite 40 mg p.o. furosemide.  Will resume 40 mg p.o. and increase as needed. May need home O2 at night  -resume PO furosemide in AM (home dose 20 mg daily)  -Daily weights, strict I/O  -wean O2 as tolerated, goal >88%    # Normocytic Anemia  # Anemia of chronic disease  Insufficient reticulocyte response on 6/10 noted.  Ferritin elevated.  Status post 3 units PRBC (6/17, 6/14, 6/13). Appropriate response. No bleeding observed.  -continue to monitor    # Diarrhea, resolved  Notes worsening with creon. Neg infectious studies.  -Continue to monitor    # Hypernatremia, resolved  Improved with increased  cc q2h (146).  -monitor    # Chronic pain  Discussed patient's methadone with pharmacy we do not note interaction with patient's current antibiotics (though would have interaction with linezolid if used in the future).  No empiric changes to methadone for anticipated body weight changes following amputation at this time.  - Methadone 70 qday  - Pregabalin  - Amitriptyline  - d/c oxycodone, pt has not been using    # Adrenal insufficiency  S/p stress dose steroid taper, complete 6/18.    # NIKOLAY on CKD, resolved  - monitor    # Anisocoria/R afferent pupillary defect  - stable      # Pain Assessment:  Current Pain Score 6/23/2018   Patient currently in pain? yes   Pain score (0-10) -   Pain location Foot   Pain descriptors -   CPOT pain score -   - Alessandra is experiencing pain due to Chronic pain as well as recent surgical procedure. Pain management was discussed and the plan was created in a collaborative fashion.  Alessandra's response to the current recommendations: engaged  - Please see the plan for pain management as documented above        Diet: Adult Formula Drip Feeding: Continuous Nutren 1.5; Nasoduodenal tube; Goal Rate: 50; mL/hr; Medication - Tube Feeding  Flush Frequency: At least 15-30 mL water before and after medication administration and with tube clogging; Amount to Send (Nutri...  Room Service  Regular Diet Adult  Fluids: Free water flushes as aboe  DVT Prophylaxis: Heparin SQ  Code Status: Full Code    Disposition Plan   Expected discharge: 4 - 7 days, recommended to transitional care unit once antibiotic plan established, safe disposition plan/ TCU bed available and post-amputation rehab needs established.     Entered: Alireza Chaparro 06/24/2018, 8:06 AM   Information in the above section will display in the discharge planner report.      Alireza Chaparro  [facility]  Maroon: 5  Pager: [phone]  Please see sticky note for cross cover information    Interval History   No acute events.  Patient expresses desire to return home prior to surgery.  She states that he does also go home because her hospital course has been so prolonged.  Long discussion with patient regarding this, she understands that the safest course would be to remain here in the hospital.  Asked patient what would make her stay here in the hospital more comfortable.  She states that  she would ideally like to NG removed.  Talked about ensuring adequate p.o. intake to keep normoglycemia as well as keep up with her nutritional needs in preparation for surgery.  Patient stated that she thought that she could do this.  Also recommended that patient have family bring in familiar items (blankets, pictures,etc) to make her room more comfortable.    Physical Exam   Vital Signs: Temp: 98.7  F (37.1  C) Temp src: Oral BP: 147/72 Pulse: 87 Heart Rate: 95 Resp: 18 SpO2: 90 % O2 Device: None (Room air) Oxygen Delivery: 2 LPM  Weight: 130 lbs 3.2 oz  General Appearance: Pleasant, lying in bed, appears anxious  Respiratory: Crackles in the bases bilaterally improved, breathing comfortably  Cardiovascular: Normal rate, regular rhythm, S3 present, no murmur rubs  GI: Abdomen is soft, nontender,  nondistended no acute changes noted.  Skin: trace LLE edema, R foot dressed  Other: Alert and oriented x4, speech fluent        Data   Medications     IV fluid REPLACEMENT ONLY Stopped (06/14/18 1319)     IV fluid REPLACEMENT ONLY Stopped (06/12/18 2205)       acetaminophen  1,000 mg Oral TID     amitriptyline  50 mg Oral At Bedtime     amylase-lipase-protease  2 capsule Oral TID w/meals     fluticasone  2 spray Left nostril Daily     furosemide  40 mg Oral Daily     heparin lock flush  5-10 mL Intracatheter Q24H     heparin  5,000 Units Subcutaneous Q12H     insulin aspart  1-3 Units Subcutaneous TID AC     insulin aspart  1-3 Units Subcutaneous At Bedtime     insulin glargine  5 Units Subcutaneous Daily     lisinopril  10 mg Oral Daily     melatonin  6 mg Oral At Bedtime     methadone  70 mg Oral Daily     multivitamins with minerals  15 mL Per Feeding Tube Daily     omeprazole  20 mg Oral Daily     piperacillin-tazobactam  4.5 g Intravenous Q6H     pregabalin  75 mg Oral TID     protein modular  1 packet Per Feeding Tube BID 09 12     sodium chloride (PF)  10 mL Intravenous Once     vancomycin (VANCOCIN) IV  1,000 mg Intravenous Q24H     Data     Recent Labs  Lab 06/23/18  0530 06/22/18  0602 06/21/18  1659 06/21/18  0555  06/19/18  0600 06/18/18  0603   WBC  --   --   --  8.4  --  13.3* 12.4*   HGB  --   --   --  7.8*  --  7.9* 8.0*   MCV  --   --   --  84  --  84 83   PLT  --   --  582* 488*  --  416 419    142  --  140  < > 143 146*   POTASSIUM 5.0 4.6  --  4.4  < > 3.8 3.7   CHLORIDE 105 102  --  103  < > 108 111*   CO2 28 33*  --  31  < > 28 27   BUN 29 29  --  30  < > 23 24   CR 1.38* 1.24*  --  1.09*  < > 0.88 0.88   ANIONGAP 7 7  --  5  < > 8 7   RICK 8.8 8.4*  --  8.2*  < > 8.1* 8.1*   * 61*  --  106*  < > 129* 258*   < > = values in this interval not displayed.  No results found for this or any previous visit (from the past 24 hour(s)).

## 2018-06-24 NOTE — PLAN OF CARE
Problem: Patient Care Overview  Goal: Plan of Care/Patient Progress Review  Outcome: No Change  No significant events. Patient ambulated in halls with family and encouraged to keep RLE non-weightbearing. Pt refused dressing change to R foot wounds and reports she changed dressings herself. Pt educated to let RN changed dressings. TF running at goal rate. Surgery on Thursday 6/28.

## 2018-06-24 NOTE — PROGRESS NOTES
Orthopaedic Surgery Progress Note 06/24/2018    E/S: AFVSS. CRP plateaued.  Persistent right foot pain, well controlled. Interested in BKA if the best option.    O:  Temp: 98.6  F (37  C) Temp src: Oral BP: 121/71 Pulse: 87 Heart Rate: 100 Resp: 18 SpO2: 93 % O2 Device: None (Room air)    Exam:  Gen: No acute distress, alert  Resp: Breathing comfortably  MSK:   RLE:  Inspection: Distal hallux plantar ulcer with dry eschar.  Plantar/lateral wound packed with wound care material, no drainage.  Proximal/medial wound with fibrinous tissue base, no drainage.  Surrounding tissues are all diffusely edematous.  Strength: wiggles all toes, grossly fires, hip flexors, quad, hamstings, gsc, TA, EHL, FHL  Sensation: baseline numbness in stocking distribution unchanged from prior exam  Circulation: toes wwp      Recent Labs  Lab 06/24/18  1556 06/23/18  0530 06/21/18  1659 06/21/18  0555 06/19/18  0600 06/18/18  0603   WBC  --   --   --  8.4 13.3* 12.4*   HGB  --   --   --  7.8* 7.9* 8.0*   *  --  582* 488* 416 419   CRP  --  35.0*  --  23.0* 17.0*  --      Wound Culture: polymicrobial    Assessment: Alessandra Pak is a 52 year old female admitted sepsis with recurrent right foot diabetic ulcers as source, now s/p bedside I&D right foot on 6/7, 6/12, and 6/13.  Intubated for ARDS/hypoxic respiratory failure on 6/10, extubated 6/15, TTF 6/15. At this time all infectious markers and patient's status are appropriately improved and the acute infection is resolved.  Ongoing recurring foot ulcers in the setting of poorly controlled DM/malnutrition - if ulcers cannot be resolved with adherence to a good medical mgmt regimen, amputation is a more durable option to prevent infections which have proven to be life-threatening.  No emergent need for this currently.     Plan:  Medicine Primary  Activity: Elevate BLE as much as possible   Weight bearing status: NWB RLE, okay for minimal WB with left forefoot for transfers to chair or  commode.   Antibiotics: per primary/ID  Labs: repeat CRP q48hrs (ordered)  DVT prophylaxis: per primary team  Wound Care: daily dressing by bedside RN, weekly WOCN dressing changes    -Anticipated surgery date Thursday 6/28.  This case still needs to be scheduled - schedulers are not available on weekends.    -Would prefer to perform this on [facility] - would appreciate transfer of patient to [facility] 8A 1 day prior to surgery if possible.    Would like to obtain vascular studies to help plan/ensure amputation viability (ie, will a BKA heal?).  -Obtain TcO2's if possible (can only be obtained at the [facility] vascular lab)  -CT angio w/contrast, primary medicine team has ok'd the use of contrast.      Marco Antonio iRos MD  PGY-4 Orthopaedic Surgery  [phone]

## 2018-06-24 NOTE — PHARMACY-VANCOMYCIN DOSING SERVICE
Pharmacy Vancomycin Note  Date of Service 2018  Patient's  1965   52 year old, female    Indication:  Diabetic Foot Infection + History of MRSA  Goal Trough Level: 15-20 mg/L  Day of Therapy: 11  Current Vancomycin regimen:  1000 mg IV q24h    Current estimated CrCl = Estimated Creatinine Clearance: 44.3 mL/min (based on Cr of 1.4).    Creatinine for last 3 days  2018:  6:02 AM Creatinine 1.24 mg/dL  2018:  5:30 AM Creatinine 1.38 mg/dL  2018:  8:38 AM Creatinine 1.40 mg/dL    Recent Vancomycin Levels (past 3 days)  2018:  3:56 PM Vancomycin Level 18.0 mg/L    Vancomycin IV Administrations (past 72 hours)                   vancomycin (VANCOCIN) 1000 mg in dextrose 5% 200 mL PREMIX (mg) 1,000 mg New Bag 18 1712     1,000 mg New Bag 18 1731                Nephrotoxins and other renal medications (Future)    Start     Dose/Rate Route Frequency Ordered Stop    18 2100  piperacillin-tazobactam (ZOSYN) 3.375 g vial to attach to  mL bag      3.375 g  over 30 Minutes Intravenous EVERY 6 HOURS 18 1615      18 0800  furosemide (LASIX) tablet 40 mg      40 mg Oral DAILY 18 1407      18 1600  vancomycin (VANCOCIN) 1000 mg in dextrose 5% 200 mL PREMIX      1,000 mg  200 mL/hr over 1 Hours Intravenous EVERY 24 HOURS 18 1056      18 1145  lisinopril (PRINIVIL/ZESTRIL) tablet 10 mg      10 mg Oral DAILY 18 1132               Contrast Orders - past 72 hours (72h ago through future)    Start     Dose/Rate Route Frequency Ordered Stop    18 0815  technetium Tc 99m tetrofosmin 2UD study (MYOVIEW) radioisotope injection 3-42 mCi  Status:  Discontinued      3-42 mCi Intravenous 2 TIMES DAILY PRN 18 0815 18 0904          Interpretation of levels and current regimen:  Trough level is  Therapeutic    Has serum creatinine changed > 50% in last 72 hours: No    Urine output:  unable to determine    Renal Function:  Worsening    Plan:  1.  Continue Current Dose, but monitor levels closely as renal function is getting worse  2.  Pharmacy will check trough levels as appropriate in 1-3 Days.    3. Serum creatinine levels will be ordered daily for the first week of therapy and at least twice weekly for subsequent weeks.      Thanks for the consult  Archana Bautista, Pharm.D, BCPS        .

## 2018-06-24 NOTE — PLAN OF CARE
Problem: Patient Care Overview  Goal: Plan of Care/Patient Progress Review  Outcome: No Change  VSS, tachy. Wears O2 on & off. A&Ox3. Needs reminders about non-weight bearing status. Worked w/ OT. TF @ goal rate of 50 ml/hr. Fair appetite eating 50-75% of meals. BG checks 109 & 149. Spent some time outside this afternoon off TF; okay per MD Yovanny. Continues on zosyn & vanco. Spent time visiting w/ family this afternoon. Plan for surgery 6/28/18. Will report to oncoming staff.     *Patient in poor spirits this evening. Wanting if she needs to remain inpatient until her surgery vs going home until procedure. Will want to speak w/ her team about options in AM.

## 2018-06-25 ENCOUNTER — APPOINTMENT (OUTPATIENT)
Dept: PHYSICAL THERAPY | Facility: CLINIC | Age: 53
DRG: 853 | End: 2018-06-25
Payer: COMMERCIAL

## 2018-06-25 LAB
ANION GAP SERPL CALCULATED.3IONS-SCNC: 7 MMOL/L (ref 3–14)
BUN SERPL-MCNC: 32 MG/DL (ref 7–30)
CALCIUM SERPL-MCNC: 8.9 MG/DL (ref 8.5–10.1)
CHLORIDE SERPL-SCNC: 104 MMOL/L (ref 94–109)
CO2 SERPL-SCNC: 28 MMOL/L (ref 20–32)
CREAT SERPL-MCNC: 1.29 MG/DL (ref 0.52–1.04)
CRP SERPL-MCNC: 32 MG/L (ref 0–8)
GFR SERPL CREATININE-BSD FRML MDRD: 44 ML/MIN/1.7M2
GLUCOSE BLDC GLUCOMTR-MCNC: 167 MG/DL (ref 70–99)
GLUCOSE BLDC GLUCOMTR-MCNC: 189 MG/DL (ref 70–99)
GLUCOSE BLDC GLUCOMTR-MCNC: 192 MG/DL (ref 70–99)
GLUCOSE BLDC GLUCOMTR-MCNC: 281 MG/DL (ref 70–99)
GLUCOSE BLDC GLUCOMTR-MCNC: 364 MG/DL (ref 70–99)
GLUCOSE SERPL-MCNC: 178 MG/DL (ref 70–99)
MAGNESIUM SERPL-MCNC: 2.4 MG/DL (ref 1.6–2.3)
PHOSPHATE SERPL-MCNC: 5 MG/DL (ref 2.5–4.5)
POTASSIUM SERPL-SCNC: 4.6 MMOL/L (ref 3.4–5.3)
SODIUM SERPL-SCNC: 139 MMOL/L (ref 133–144)

## 2018-06-25 PROCEDURE — 36592 COLLECT BLOOD FROM PICC: CPT | Performed by: STUDENT IN AN ORGANIZED HEALTH CARE EDUCATION/TRAINING PROGRAM

## 2018-06-25 PROCEDURE — 25000132 ZZH RX MED GY IP 250 OP 250 PS 637: Performed by: INTERNAL MEDICINE

## 2018-06-25 PROCEDURE — 25000132 ZZH RX MED GY IP 250 OP 250 PS 637: Performed by: STUDENT IN AN ORGANIZED HEALTH CARE EDUCATION/TRAINING PROGRAM

## 2018-06-25 PROCEDURE — 25000132 ZZH RX MED GY IP 250 OP 250 PS 637: Performed by: HOSPITALIST

## 2018-06-25 PROCEDURE — 97110 THERAPEUTIC EXERCISES: CPT | Mod: GP

## 2018-06-25 PROCEDURE — 84100 ASSAY OF PHOSPHORUS: CPT | Performed by: STUDENT IN AN ORGANIZED HEALTH CARE EDUCATION/TRAINING PROGRAM

## 2018-06-25 PROCEDURE — 40000193 ZZH STATISTIC PT WARD VISIT

## 2018-06-25 PROCEDURE — 25000128 H RX IP 250 OP 636: Performed by: STUDENT IN AN ORGANIZED HEALTH CARE EDUCATION/TRAINING PROGRAM

## 2018-06-25 PROCEDURE — 99232 SBSQ HOSP IP/OBS MODERATE 35: CPT | Mod: GC | Performed by: HOSPITALIST

## 2018-06-25 PROCEDURE — 25000128 H RX IP 250 OP 636: Performed by: HOSPITALIST

## 2018-06-25 PROCEDURE — 00000146 ZZHCL STATISTIC GLUCOSE BY METER IP

## 2018-06-25 PROCEDURE — 83735 ASSAY OF MAGNESIUM: CPT | Performed by: STUDENT IN AN ORGANIZED HEALTH CARE EDUCATION/TRAINING PROGRAM

## 2018-06-25 PROCEDURE — 12000001 ZZH R&B MED SURG/OB UMMC

## 2018-06-25 PROCEDURE — 80048 BASIC METABOLIC PNL TOTAL CA: CPT | Performed by: STUDENT IN AN ORGANIZED HEALTH CARE EDUCATION/TRAINING PROGRAM

## 2018-06-25 PROCEDURE — 97530 THERAPEUTIC ACTIVITIES: CPT | Mod: GP

## 2018-06-25 PROCEDURE — 86140 C-REACTIVE PROTEIN: CPT | Performed by: STUDENT IN AN ORGANIZED HEALTH CARE EDUCATION/TRAINING PROGRAM

## 2018-06-25 RX ORDER — PIPERACILLIN SODIUM, TAZOBACTAM SODIUM 4; .5 G/20ML; G/20ML
4.5 INJECTION, POWDER, LYOPHILIZED, FOR SOLUTION INTRAVENOUS EVERY 6 HOURS
Status: DISCONTINUED | OUTPATIENT
Start: 2018-06-25 | End: 2018-06-26

## 2018-06-25 RX ADMIN — MULTIVITAMIN 15 ML: LIQUID ORAL at 08:46

## 2018-06-25 RX ADMIN — PIPERACILLIN SODIUM AND TAZOBACTAM SODIUM 3.38 G: 3; .375 INJECTION, POWDER, LYOPHILIZED, FOR SOLUTION INTRAVENOUS at 15:22

## 2018-06-25 RX ADMIN — ACETAMINOPHEN 1000 MG: 500 TABLET, FILM COATED ORAL at 16:17

## 2018-06-25 RX ADMIN — Medication 1 PACKET: at 21:39

## 2018-06-25 RX ADMIN — Medication 6 MG: at 21:39

## 2018-06-25 RX ADMIN — PREGABALIN 75 MG: 75 CAPSULE ORAL at 21:39

## 2018-06-25 RX ADMIN — ACETAMINOPHEN 1000 MG: 500 TABLET, FILM COATED ORAL at 08:46

## 2018-06-25 RX ADMIN — HEPARIN SODIUM 5000 UNITS: 5000 INJECTION, SOLUTION INTRAVENOUS; SUBCUTANEOUS at 08:45

## 2018-06-25 RX ADMIN — PREGABALIN 75 MG: 75 CAPSULE ORAL at 08:46

## 2018-06-25 RX ADMIN — VANCOMYCIN HYDROCHLORIDE 1000 MG: 1 INJECTION, SOLUTION INTRAVENOUS at 16:17

## 2018-06-25 RX ADMIN — SODIUM CHLORIDE, PRESERVATIVE FREE 5 ML: 5 INJECTION INTRAVENOUS at 08:46

## 2018-06-25 RX ADMIN — Medication 1 PACKET: at 08:46

## 2018-06-25 RX ADMIN — ACETAMINOPHEN 1000 MG: 500 TABLET, FILM COATED ORAL at 21:39

## 2018-06-25 RX ADMIN — PREGABALIN 75 MG: 75 CAPSULE ORAL at 15:22

## 2018-06-25 RX ADMIN — AMITRIPTYLINE HYDROCHLORIDE 50 MG: 50 TABLET, FILM COATED ORAL at 21:39

## 2018-06-25 RX ADMIN — FLUTICASONE PROPIONATE 2 SPRAY: 50 SPRAY, METERED NASAL at 08:46

## 2018-06-25 RX ADMIN — Medication 70 MG: at 08:45

## 2018-06-25 RX ADMIN — FUROSEMIDE 40 MG: 40 TABLET ORAL at 08:46

## 2018-06-25 RX ADMIN — BENZOCAINE AND MENTHOL 1 LOZENGE: 15; 3.6 LOZENGE ORAL at 15:22

## 2018-06-25 RX ADMIN — PIPERACILLIN SODIUM AND TAZOBACTAM SODIUM 3.38 G: 3; .375 INJECTION, POWDER, LYOPHILIZED, FOR SOLUTION INTRAVENOUS at 03:00

## 2018-06-25 RX ADMIN — OMEPRAZOLE 20 MG: 20 CAPSULE, DELAYED RELEASE ORAL at 21:39

## 2018-06-25 RX ADMIN — HEPARIN SODIUM 5000 UNITS: 5000 INJECTION, SOLUTION INTRAVENOUS; SUBCUTANEOUS at 21:39

## 2018-06-25 RX ADMIN — PIPERACILLIN SODIUM,TAZOBACTAM SODIUM 4.5 G: 4; .5 INJECTION, POWDER, FOR SOLUTION INTRAVENOUS at 21:40

## 2018-06-25 RX ADMIN — LISINOPRIL 10 MG: 10 TABLET ORAL at 08:46

## 2018-06-25 RX ADMIN — PIPERACILLIN SODIUM AND TAZOBACTAM SODIUM 3.38 G: 3; .375 INJECTION, POWDER, LYOPHILIZED, FOR SOLUTION INTRAVENOUS at 08:48

## 2018-06-25 NOTE — PLAN OF CARE
Problem: Patient Care Overview  Goal: Plan of Care/Patient Progress Review  Outcome: No Change  VSS; wears O2 on & off. A&Ox3. Needs reminders about non-weight bearing status; MD Krysta went over with her . Worked w/ OT. TF @ goal rate of 50 ml/hr. Reporting sore throat; PRN lozenge provided. Started on calorie counts. Ate about 50% of breakfast & declined/skipped lunch.  BG checks 202 & 273. Continues on zosyn & vanco. More down/flat today, expressed sadness over prolonged hospital stay/desire to go home. Active listening utilized. Patient went outside this evening for some fresh air. Tentative plan for surgery 6/28/18 (yet to be officially scheduled). Will report to oncoming staff.

## 2018-06-25 NOTE — PLAN OF CARE
Problem: Patient Care Overview  Goal: Plan of Care/Patient Progress Review  Outcome: No Change  VSS, no c/o pain, up ad kyara in room. Encouraged patient to not bear weight on RLE. Dressing changes completed earlier in day to BLE wounds. TF running at goal rate of 50. Per day shift pt did not have much of an appetite and had poor oral intake. Pt did not eat anything on evening shift or overnight. Continues on calorie counts. No significant events. Plan for surgery on Thursday. Feeding tube out when oral intake improves.

## 2018-06-25 NOTE — PROGRESS NOTES
"  Care Coordinator Progress Note    Admission Date/Time:  6/6/2018  Attending MD:  Bairon Aguilar MD    Data  Chart reviewed, discussed with interdisciplinary team.   Patient was admitted for:    Pneumonia of both lungs due to infectious organism, unspecified part of lung  Hypoxia  Acute renal injury (H)  Hyponatremia  Altered mental status, unspecified altered mental status type  Hypoxemia.    Concerns with insurance coverage for discharge needs: None.  Current Living Situation: Patient lives alone.  Support System: Supportive and Involved; mom  Services Involved: none\  Transportation at Discharge: Family or friend will provide  Transportation to Medical Appointments:  - family or friend  Barriers to Discharge: transfer to [facility] ER 6/27 for planned amputation 6/28    Coordination of Care  6/25 @ 1520: CC received approval from  Ines Moulton for patient to be sent to [facility] for test: Ultrasound Segmental Pressures with TCO2 (Ortho Dr. Marco Antonio Rios and Marilynn Chaparro updated). [facility] Vascular Scheduling Spoke with Tamera Ph: [phone] Patient scheduled for procedure at [facility] tomorrow at 4pm. She must arrival at [facility] at 330pm on the 1st floor - Imaging Dept. [facility] transport arranged for wheelchair  from hospital at 3pm. The procedure takes 90 minutes. [facility] will provide Will-Call transport service back to the hospital when procedure is completed. 5B Charge Nurse notified    6/25 @ 8215: Ortho Dr. Marco Antonio Rios called [facility] requesting assistance to get pre-op test done: \"Ultrasound Segmental Pressures with TCO2\". Spoke with [facility] staff, On-call Vascular NP Sara Cuevas, [facility] Non-Invasive Vascular Lab staff, and Utilization Review in attempts to find reasonable solution to complete test on [facility], unfortunately no solution available. This test can only be completed at [facility] due to machinery required is only available in that facility. Requesting approval " from  (must be received before proceeding) due to possibility of insurance non-payment (inpatient status getting an outpatient procedure). (pending).    6/16: Patient will be primarily followed by SW as ARU vs TCU is expected after amputation    6/9-6/15: ICU    6/8:Received call from Sharla with [facility] and they are not going to take patient.  They are not able to provide WOC RN 3 times/week and they have not been able to find another agency that will take patient either.  Team updated and state to check with WOC RN and ortho to see if WOC really needs to see 3 day/wk or can they see patient for example, once a week and have RN do the other days.  Patient not going to d/c this weekend per Marilynn Bernard MD team.    6/8: D: Chart reviewed and plan of care discussed with MD team. Per Medical Team report patient will need many weeks (6?) of IV ABX at home. PICC to be placed, ordered yesterday 6/7. Presently patient has broad antibiotic coverage with Levaquin, Zosyn, and Vancomycin. No cultures and sensitivities yet.      IV ABX  I/A: Spoke with patient at bedside, verbal authorization received to call benefit check into [facility] for Home IV ABX. Explained that pt will attend Patient Learning Center class (ordered). Inquired if there are family members that would be able to learn with patient to assist with IV ABX administration; patient stated, no.     WOUND CARE  I/A: Patient requires wound care daily. Patient will need to complete wound care at home independently four days per week as Home Care visits can occur maximum three times per week. [facility] discussed this with patient; she feels comfortable with being trained and completing her own wound care at home. No preference for Home Care agency; orders for [facility] completed. Bedside nursing to teach patient wound care with every dressing change please.    P: Care Coordination is available on the weekends by paging job code  [phone].    Referrals: Provided patient with options for Home Care and Home Infusion.        [facility]  Phone # [phone]  Fax # [phone]   _________________________    [facility]  Phone  [phone]  Fax  [phone]    WOC RN visits three times per week    Wound Care Instructions:  Right plantar foot wounds: Daily: Remove dressing and apply Vashe (order #[id number]) soaked gauze into wound beds for 10 minutes. Remove and do not rinse. Apply Iodasorb gel (order #[id number]) to wound beds. Pack gently with dry fluffed 4x4. Cover with ABD and secure with Kerlix and ACE wrap.          Plan  Anticipated Discharge Date:  TBD pending amputation and placement  Anticipated Discharge Plan:  TRACY RODRIGEZN RN PHN  Patient Care Management Coordinator  Marilynn Bridges 5, and Gold 5  Phone: [phone] / Pager: [phone]

## 2018-06-25 NOTE — PLAN OF CARE
Problem: Patient Care Overview  Goal: Plan of Care/Patient Progress Review  Discharge Planner PT 5B  Patient plan for discharge: rehab  Current status: pt encountered with non-compliance with WB precautions; seen ambulating in room. Pt is aware of precautions upon re-education and acknowledges her non-compliance. Completes seated/supine UE/LE exercises with improved tolerance.   Barriers to return to prior living situation: medical course, anticipated LE amputation, weakness  Recommendations for discharge: TCU  Rationale for recommendations: anticipating need for TCU placement following scheduled BKA surgery on 6/28.        Entered by: Camron Mckeon 06/25/2018 4:02 PM

## 2018-06-25 NOTE — PROGRESS NOTES
Calorie Counts  Intake recorded for: 6/24 Kcals: 668  Protein: 16g  # Meals Recorded: 2 meals (First - 100% apple juice, fruit loops, 1 Croatian toast w/ syrup, 25% sausage, less than 25% iced tea)      (Second - 50% iced tea w/ sugar, wheat roll, less than 25% sweet & sour chicken stir sexton w/ vegetables)   # Supplements Recorded: 0

## 2018-06-25 NOTE — PLAN OF CARE
Problem: Patient Care Overview  Goal: Plan of Care/Patient Progress Review  Patient non compliant with the no weight bearing status of her foot. Educated and educated her on the weight bearing status. Her medicine team is aware. contiuues on tube feeding. Patient had some cereral for breakfast. Is not a big eater has not been for years. Plan to have ct angiogram tomorrow will need to be npo for four hours prior. P:cont to bobbi

## 2018-06-25 NOTE — PROGRESS NOTES
"[facility], [place]    Internal Medicine Progress Note - [facility] Service    Main Plans for Today   -Continue glargine 5 units, med ssi  -encourage PO, if adequate intake (calorie counts, goal \"~ 1200 kcals and 50 gm protein per day\") and no hypoglycemia, plan to d/c TFs and remove NG  -Continue PO furosemide  -Dobutamine stress echo on Monday  -Plans for surgery 6/28, prefer pt to move to [facility] on 6/27, pt pref to remain on [place]  -plan for CTA RLE 6/26  -pantoprazole 40 mg PO qd    Assessment & Plan   Alessandra Pak is a 52 year old female admitted on 6/6/2018. 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 Hospital course was complicated by respiratory failure due to ARDS requiring intubation (6/10-15), extubated and transferred to medicine for further cares.    # Type 2 Diabetes  # Hypoglycemia, resolved  # Hx of Chronic pancreatitis s/p whipple's  # Severe malnutrition in the context of acute illness  No hypoglycemia. 668 kcal yesterday, below goal (previously 307 kcal). Discomfort from NG, has been in place since 6/12. Would typically consider PEG, however, would be needed intermediate term (2-4 weeks) before needing removal. TPN high risk for candidemia and cholestasis and patient has not wanted PICC previously for long term abx.  -decrease glargine to 5u, med ssi  -encourage PO, if adequate intake (calorie counts, goal \"~ 1200 kcals and 50 gm protein per day\") and no hypoglycemia, plan to d/c TFs and remove NG  -continue to monitor, ensure patient able to maintain normo to slight hyperglycemia on PO as weaning from TFs  -pantoprazole PO qd  -throat lozenge and spray    TF regimen: Nutren 1.5 @ 50 ml/hr, (1200 ml/day)  Provides: 1800 kcals (32+), 82 gm " PRO (1.4+), 912 mL H2O, 211 gm CHO and 0 fiber.   Water flushes: 50 ml every 2 hours    # Anxiety  Anxiety improving. Patient remains redirectable and calms with reassurance. Patient on amitriptyline, prefer to avoid benzos, SSRIs unlikely to have benefit acutely. Health psychology has been helpful, continue visits.  -continue regular reassurance  -consider low dose hydroxyzine if necessary    # Severe Sepsis, resolved  # R diabetic foot infection, s/p I&D x 3, Ongoing Osteomyelitis  Patient mentally ready for amputation, discussions with therapy and health psychology have been helpful.  Surgery not emergent or urgent per orthopedics.  Given life-threatening nature of diabetic foot infection and inconsistent follow-up, amputation would likely still be the best course as patient is also opposed to long-term IV antibiotics and would need close follow-up.  -appreciate ortho and ID input  -continue vanc/zosyn  -Plans for surgery 6/28, prefer pt to move to [place] on 6/27. Pt used to work on [employer] and knows people there, would prefer to stay on [employer]  -plan for CTA RLE 6/26  -qod CRP, stabilized    # Preop  Patient is class III risk per revised cardiac index (CHF history, insulin use). Patient cannot perform 4 METS of activity at baseline.  TTE performed 6/9/18, EF 50%, no valvular abnormalities. Will require further evaluation with EKG as well as possible stress imaging (last angiogram with mild nonobstructive CAD in 2014).  Patient did not tolerate Lexiscan due to claustrophobia, will proceed to be me stress echo in order to avoid needing to premedicate.  - dobutamine stress echo - discussed with patient - plan for Monday  - EKG unchanged  - ongoing volume status management    # Acute hypoxic respiratory failure, resolved  # ARDS 2/2 systemic response from osteo/foot infection  # Hx of chronic restrictive lung disease with emphysema and fibrosis  # HF w/ recovered EF (50%)  Patient's weight downtrending.   Improved volume status on exam, crackles improved, S3 resolved, lower extremity edema resolved.  Patient continues to tolerate room air at rest.  -continue 40 mg PO furosemide daily  -Daily weights, strict I/O  -wean O2 as tolerated, goal >88%    # Normocytic Anemia  # Anemia of chronic disease  Insufficient reticulocyte response on 6/10 noted.  Ferritin elevated.  Status post 3 units PRBC (6/17, 6/14, 6/13). Appropriate response. No bleeding observed.  -continue to monitor    # Diarrhea, resolved  Notes worsening with creon. Neg infectious studies.  -Continue to monitor    # Hypernatremia, resolved  Improved with increased  cc q2h (146). Now with PO intake.  -monitor    # Chronic pain  Discussed patient's methadone with pharmacy we do not note interaction with patient's current antibiotics (though would have interaction with linezolid if used in the future).  No empiric changes to methadone for anticipated body weight changes following amputation at this time.  - Methadone 70 qday  - Pregabalin  - Amitriptyline  - d/c oxycodone, pt has not been using    # Adrenal insufficiency  S/p stress dose steroid taper, complete 6/18.    # NIKOLAY on CKD, resolved  - monitor    # Anisocoria/R afferent pupillary defect  - stable      # Pain Assessment:  Current Pain Score 6/24/2018   Patient currently in pain? yes   Pain score (0-10) -   Pain location Throat   Pain descriptors Sore   CPOT pain score -   - Alessandra is experiencing pain due to Chronic pain as well as recent surgical procedure, NG. Pain management was discussed and the plan was created in a collaborative fashion.  Alessandra's response to the current recommendations: engaged  - Please see the plan for pain management as documented above        Diet: Adult Formula Drip Feeding: Continuous Nutren 1.5; Nasoduodenal tube; Goal Rate: 50; mL/hr; Medication - Tube Feeding Flush Frequency: At least 15-30 mL water before and after medication administration and with tube  "clogging; Amount to Send (Nutri...  Room Service  Regular Diet Adult  Calorie Counts  Fluids: Free water flushes as aboe  DVT Prophylaxis: Heparin SQ  Code Status: Full Code    Disposition Plan   Expected discharge: 4 - 7 days, recommended to transitional care unit once antibiotic plan established, safe disposition plan/ TCU bed available and post-amputation rehab needs established.     Entered: Alireza Chaparro 06/25/2018, 8:37 AM   Information in the above section will display in the discharge planner report.      Alireza Chaparro  [facility]  Pager: [phone]  Please see sticky note for cross cover information    Interval History   No acute events.  She has some throat pain.  States that her appetite is poor, feels that she is satiated quickly.  She notes that her lower extremity edema is improved, breathing continues to feel \"good\". Would prefer to stay on [place], used to work on [employer] and knows staff there.    Physical Exam   Vital Signs: Temp: 98.1  F (36.7  C) Temp src: Oral BP: 152/74   Heart Rate: 91 Resp: 16 SpO2: 93 % O2 Device: Nasal cannula with humidification Oxygen Delivery: 2 LPM  Weight: 128 lbs 11.2 oz  General Appearance: Pleasant, lying in bed, appears anxious  Respiratory: Crackles in the bases bilaterally improved, breathing comfortably  Cardiovascular: Normal rate, regular rhythm, S3 resolved, no murmur rubs  GI: Abdomen is soft, nontender, nondistended no acute changes noted.  Skin: no rashes or jaundice, R foot dressed  Other: Alert and oriented x4, speech fluent, no LE edema, wwp        Data   Medications     IV fluid REPLACEMENT ONLY Stopped (06/14/18 1319)     IV fluid REPLACEMENT ONLY Stopped (06/12/18 2205)       acetaminophen  1,000 mg Oral TID     amitriptyline  50 mg Oral At Bedtime     amylase-lipase-protease  2 capsule Oral TID w/meals     fluticasone  2 spray Left nostril Daily     furosemide  40 mg Oral Daily     heparin lock flush  5-10 mL " Intracatheter Q24H     heparin  5,000 Units Subcutaneous Q12H     insulin aspart  1-3 Units Subcutaneous TID AC     insulin aspart  1-3 Units Subcutaneous At Bedtime     insulin glargine  5 Units Subcutaneous Daily     lisinopril  10 mg Oral Daily     melatonin  6 mg Oral At Bedtime     methadone  70 mg Oral Daily     multivitamins with minerals  15 mL Per Feeding Tube Daily     omeprazole  20 mg Oral Daily     piperacillin-tazobactam  3.375 g Intravenous Q6H     pregabalin  75 mg Oral TID     protein modular  1 packet Per Feeding Tube BID 09 12     sodium chloride (PF)  10 mL Intravenous Once     vancomycin (VANCOCIN) IV  1,000 mg Intravenous Q24H     Data     Recent Labs  Lab 06/25/18  0552 06/24/18  1556 06/24/18  0838 06/23/18  0530  06/21/18  1659 06/21/18  0555  06/19/18  0600   WBC  --   --   --   --   --   --  8.4  --  13.3*   HGB  --   --   --   --   --   --  7.8*  --  7.9*   MCV  --   --   --   --   --   --  84  --  84   PLT  --  455*  --   --   --  582* 488*  --  416     --  138 140  < >  --  140  < > 143   POTASSIUM 4.6  --  4.8 5.0  < >  --  4.4  < > 3.8   CHLORIDE 104  --  104 105  < >  --  103  < > 108   CO2 28  --  26 28  < >  --  31  < > 28   BUN 32*  --  30 29  < >  --  30  < > 23   CR 1.29*  --  1.40* 1.38*  < >  --  1.09*  < > 0.88   ANIONGAP 7  --  8 7  < >  --  5  < > 8   RICK 8.9  --  8.4* 8.8  < >  --  8.2*  < > 8.1*   *  --  188* 109*  < >  --  106*  < > 129*   < > = values in this interval not displayed.  No results found for this or any previous visit (from the past 24 hour(s)).

## 2018-06-26 ENCOUNTER — APPOINTMENT (OUTPATIENT)
Dept: CARDIOLOGY | Facility: CLINIC | Age: 53
DRG: 853 | End: 2018-06-26
Payer: COMMERCIAL

## 2018-06-26 ENCOUNTER — APPOINTMENT (OUTPATIENT)
Dept: GENERAL RADIOLOGY | Facility: CLINIC | Age: 53
DRG: 853 | End: 2018-06-26
Attending: STUDENT IN AN ORGANIZED HEALTH CARE EDUCATION/TRAINING PROGRAM
Payer: COMMERCIAL

## 2018-06-26 ENCOUNTER — APPOINTMENT (OUTPATIENT)
Dept: CT IMAGING | Facility: CLINIC | Age: 53
DRG: 853 | End: 2018-06-26
Payer: COMMERCIAL

## 2018-06-26 ENCOUNTER — ANESTHESIA EVENT (OUTPATIENT)
Dept: SURGERY | Facility: CLINIC | Age: 53
DRG: 853 | End: 2018-06-26
Payer: COMMERCIAL

## 2018-06-26 LAB
ALBUMIN SERPL-MCNC: 1.9 G/DL (ref 3.4–5)
ANION GAP SERPL CALCULATED.3IONS-SCNC: 8 MMOL/L (ref 3–14)
BUN SERPL-MCNC: 31 MG/DL (ref 7–30)
CALCIUM SERPL-MCNC: 8.6 MG/DL (ref 8.5–10.1)
CHLORIDE SERPL-SCNC: 104 MMOL/L (ref 94–109)
CO2 SERPL-SCNC: 27 MMOL/L (ref 20–32)
CREAT SERPL-MCNC: 1.25 MG/DL (ref 0.52–1.04)
GFR SERPL CREATININE-BSD FRML MDRD: 45 ML/MIN/1.7M2
GLUCOSE BLDC GLUCOMTR-MCNC: 154 MG/DL (ref 70–99)
GLUCOSE BLDC GLUCOMTR-MCNC: 166 MG/DL (ref 70–99)
GLUCOSE BLDC GLUCOMTR-MCNC: 175 MG/DL (ref 70–99)
GLUCOSE BLDC GLUCOMTR-MCNC: 228 MG/DL (ref 70–99)
GLUCOSE BLDC GLUCOMTR-MCNC: 303 MG/DL (ref 70–99)
GLUCOSE SERPL-MCNC: 230 MG/DL (ref 70–99)
MAGNESIUM SERPL-MCNC: 2.4 MG/DL (ref 1.6–2.3)
PHOSPHATE SERPL-MCNC: 4.1 MG/DL (ref 2.5–4.5)
POTASSIUM SERPL-SCNC: 4.4 MMOL/L (ref 3.4–5.3)
PREALB SERPL IA-MCNC: 29 MG/DL (ref 15–45)
SODIUM SERPL-SCNC: 138 MMOL/L (ref 133–144)
VANCOMYCIN SERPL-MCNC: 19.7 MG/L

## 2018-06-26 PROCEDURE — 27210429 ZZH NUTRITION PRODUCT INTERMEDIATE LITER

## 2018-06-26 PROCEDURE — 93321 DOPPLER ECHO F-UP/LMTD STD: CPT

## 2018-06-26 PROCEDURE — 25000132 ZZH RX MED GY IP 250 OP 250 PS 637: Performed by: HOSPITALIST

## 2018-06-26 PROCEDURE — 25000128 H RX IP 250 OP 636: Performed by: STUDENT IN AN ORGANIZED HEALTH CARE EDUCATION/TRAINING PROGRAM

## 2018-06-26 PROCEDURE — 93325 DOPPLER ECHO COLOR FLOW MAPG: CPT | Mod: 26 | Performed by: INTERNAL MEDICINE

## 2018-06-26 PROCEDURE — 36592 COLLECT BLOOD FROM PICC: CPT | Performed by: STUDENT IN AN ORGANIZED HEALTH CARE EDUCATION/TRAINING PROGRAM

## 2018-06-26 PROCEDURE — 36592 COLLECT BLOOD FROM PICC: CPT | Performed by: HOSPITALIST

## 2018-06-26 PROCEDURE — 93321 DOPPLER ECHO F-UP/LMTD STD: CPT | Mod: 26 | Performed by: INTERNAL MEDICINE

## 2018-06-26 PROCEDURE — 80202 ASSAY OF VANCOMYCIN: CPT | Performed by: HOSPITALIST

## 2018-06-26 PROCEDURE — 73706 CT ANGIO LWR EXTR W/O&W/DYE: CPT | Mod: RT

## 2018-06-26 PROCEDURE — 99233 SBSQ HOSP IP/OBS HIGH 50: CPT | Mod: GC | Performed by: HOSPITALIST

## 2018-06-26 PROCEDURE — 25000132 ZZH RX MED GY IP 250 OP 250 PS 637: Performed by: STUDENT IN AN ORGANIZED HEALTH CARE EDUCATION/TRAINING PROGRAM

## 2018-06-26 PROCEDURE — 80069 RENAL FUNCTION PANEL: CPT | Performed by: STUDENT IN AN ORGANIZED HEALTH CARE EDUCATION/TRAINING PROGRAM

## 2018-06-26 PROCEDURE — 12000001 ZZH R&B MED SURG/OB UMMC

## 2018-06-26 PROCEDURE — 25000132 ZZH RX MED GY IP 250 OP 250 PS 637: Performed by: INTERNAL MEDICINE

## 2018-06-26 PROCEDURE — 25500064 ZZH RX 255 OP 636: Performed by: INTERNAL MEDICINE

## 2018-06-26 PROCEDURE — 84134 ASSAY OF PREALBUMIN: CPT | Performed by: STUDENT IN AN ORGANIZED HEALTH CARE EDUCATION/TRAINING PROGRAM

## 2018-06-26 PROCEDURE — 25000128 H RX IP 250 OP 636: Performed by: RADIOLOGY

## 2018-06-26 PROCEDURE — 00000146 ZZHCL STATISTIC GLUCOSE BY METER IP

## 2018-06-26 PROCEDURE — 83735 ASSAY OF MAGNESIUM: CPT | Performed by: STUDENT IN AN ORGANIZED HEALTH CARE EDUCATION/TRAINING PROGRAM

## 2018-06-26 PROCEDURE — 73630 X-RAY EXAM OF FOOT: CPT | Mod: RT

## 2018-06-26 PROCEDURE — 25000128 H RX IP 250 OP 636: Performed by: HOSPITALIST

## 2018-06-26 PROCEDURE — 25000125 ZZHC RX 250: Performed by: STUDENT IN AN ORGANIZED HEALTH CARE EDUCATION/TRAINING PROGRAM

## 2018-06-26 PROCEDURE — 73590 X-RAY EXAM OF LOWER LEG: CPT | Mod: RT

## 2018-06-26 PROCEDURE — 93308 TTE F-UP OR LMTD: CPT | Mod: 26 | Performed by: INTERNAL MEDICINE

## 2018-06-26 RX ORDER — DOBUTAMINE HYDROCHLORIDE 200 MG/100ML
5-50 INJECTION INTRAVENOUS CONTINUOUS PRN
Status: DISCONTINUED | OUTPATIENT
Start: 2018-06-26 | End: 2018-06-26 | Stop reason: CLARIF

## 2018-06-26 RX ORDER — METOPROLOL TARTRATE 1 MG/ML
.5-15 INJECTION, SOLUTION INTRAVENOUS
Status: DISCONTINUED | OUTPATIENT
Start: 2018-06-26 | End: 2018-06-26 | Stop reason: CLARIF

## 2018-06-26 RX ORDER — IOPAMIDOL 755 MG/ML
120 INJECTION, SOLUTION INTRAVASCULAR ONCE
Status: COMPLETED | OUTPATIENT
Start: 2018-06-26 | End: 2018-06-26

## 2018-06-26 RX ADMIN — FLUTICASONE PROPIONATE 2 SPRAY: 50 SPRAY, METERED NASAL at 11:57

## 2018-06-26 RX ADMIN — PIPERACILLIN SODIUM,TAZOBACTAM SODIUM 4.5 G: 4; .5 INJECTION, POWDER, FOR SOLUTION INTRAVENOUS at 09:03

## 2018-06-26 RX ADMIN — Medication 1 PACKET: at 21:14

## 2018-06-26 RX ADMIN — Medication 1 PACKET: at 11:58

## 2018-06-26 RX ADMIN — PREGABALIN 75 MG: 75 CAPSULE ORAL at 21:14

## 2018-06-26 RX ADMIN — IOPAMIDOL 120 ML: 755 INJECTION, SOLUTION INTRAVENOUS at 09:41

## 2018-06-26 RX ADMIN — PERFLUTREN 10 ML: 6.52 INJECTION, SUSPENSION INTRAVENOUS at 15:25

## 2018-06-26 RX ADMIN — ACETAMINOPHEN 1000 MG: 500 TABLET, FILM COATED ORAL at 11:57

## 2018-06-26 RX ADMIN — METRONIDAZOLE 500 MG: 500 INJECTION, SOLUTION INTRAVENOUS at 19:12

## 2018-06-26 RX ADMIN — Medication 70 MG: at 09:02

## 2018-06-26 RX ADMIN — Medication 6 MG: at 21:14

## 2018-06-26 RX ADMIN — HEPARIN SODIUM 5000 UNITS: 5000 INJECTION, SOLUTION INTRAVENOUS; SUBCUTANEOUS at 11:57

## 2018-06-26 RX ADMIN — HEPARIN SODIUM 5000 UNITS: 5000 INJECTION, SOLUTION INTRAVENOUS; SUBCUTANEOUS at 21:14

## 2018-06-26 RX ADMIN — CEFEPIME HYDROCHLORIDE 2 G: 2 INJECTION, POWDER, FOR SOLUTION INTRAVENOUS at 18:32

## 2018-06-26 RX ADMIN — SODIUM CHLORIDE, PRESERVATIVE FREE 5 ML: 5 INJECTION INTRAVENOUS at 11:58

## 2018-06-26 RX ADMIN — AMITRIPTYLINE HYDROCHLORIDE 50 MG: 50 TABLET, FILM COATED ORAL at 21:14

## 2018-06-26 RX ADMIN — PIPERACILLIN SODIUM,TAZOBACTAM SODIUM 4.5 G: 4; .5 INJECTION, POWDER, FOR SOLUTION INTRAVENOUS at 04:04

## 2018-06-26 RX ADMIN — MULTIVITAMIN 15 ML: LIQUID ORAL at 11:57

## 2018-06-26 RX ADMIN — OMEPRAZOLE 20 MG: 20 CAPSULE, DELAYED RELEASE ORAL at 21:14

## 2018-06-26 RX ADMIN — FUROSEMIDE 40 MG: 40 TABLET ORAL at 11:57

## 2018-06-26 RX ADMIN — PREGABALIN 75 MG: 75 CAPSULE ORAL at 14:01

## 2018-06-26 RX ADMIN — LISINOPRIL 10 MG: 10 TABLET ORAL at 11:57

## 2018-06-26 RX ADMIN — ACETAMINOPHEN 1000 MG: 500 TABLET, FILM COATED ORAL at 17:06

## 2018-06-26 RX ADMIN — ACETAMINOPHEN 1000 MG: 500 TABLET, FILM COATED ORAL at 21:14

## 2018-06-26 RX ADMIN — VANCOMYCIN HYDROCHLORIDE 1000 MG: 1 INJECTION, SOLUTION INTRAVENOUS at 17:06

## 2018-06-26 RX ADMIN — METRONIDAZOLE 500 MG: 500 INJECTION, SOLUTION INTRAVENOUS at 14:01

## 2018-06-26 RX ADMIN — PREGABALIN 75 MG: 75 CAPSULE ORAL at 09:02

## 2018-06-26 NOTE — PLAN OF CARE
Problem: Patient Care Overview  Goal: Plan of Care/Patient Progress Review  Patient has had tube feeding off since 12noon.. Tube feeding will be cycled at night see nutrition note for the amount of increase. Out patient test at clinic cancelled due to not being able to leave hospital due to insurance reason. Patient now down in echo getting her dobutume stress test. Patient iv abx changed. Blood sugars high this am. Patient fair appetite and is trying to consume enough calories but is not a big eater and is pushing herself. Patient demo wound care today. Patient noncompliant with the non weight bearing on the rt foot.  Does use the wheelchair to go outside. Patient mood is down frustrated with all the different changes. Sick of her tube feed wants the NG out educated the reason patient still needs the nutrition. P;cont to monitor

## 2018-06-26 NOTE — PHARMACY-VANCOMYCIN DOSING SERVICE
Pharmacy Vancomycin Note  Date of Service 2018  Patient's  1965   52 year old, female    Indication: Diabetic foot infection and history of MRSA  Goal Trough Level: 15-20 mg/L  Day of Therapy: 13  Current Vancomycin regimen:  1000 mg IV q24h    Current estimated CrCl = Estimated Creatinine Clearance: 48.6 mL/min (based on Cr of 1.25).    Creatinine for last 3 days  2018:  8:38 AM Creatinine 1.40 mg/dL  2018:  5:52 AM Creatinine 1.29 mg/dL  2018:  5:40 AM Creatinine 1.25 mg/dL    Recent Vancomycin Levels (past 3 days)  2018:  3:56 PM Vancomycin Level 18.0 mg/L  2018:  3:39 PM Vancomycin Level 19.7 mg/L    Vancomycin IV Administrations (past 72 hours)                   vancomycin (VANCOCIN) 1000 mg in dextrose 5% 200 mL PREMIX (mg) 1,000 mg New Bag 18 1617     1,000 mg New Bag 18 1731     1,000 mg New Bag 18 1712                Nephrotoxins and other renal medications (Future)    Start     Dose/Rate Route Frequency Ordered Stop    18 0800  furosemide (LASIX) tablet 40 mg      40 mg Oral DAILY 18 1407      18 1600  vancomycin (VANCOCIN) 1000 mg in dextrose 5% 200 mL PREMIX      1,000 mg  200 mL/hr over 1 Hours Intravenous EVERY 24 HOURS 18 1056      18 1145  lisinopril (PRINIVIL/ZESTRIL) tablet 10 mg      10 mg Oral DAILY 18 1132               Contrast Orders - past 72 hours (72h ago through future)    Start     Dose/Rate Route Frequency Ordered Stop    18 1500  perflutren diluted in saline (DEFINITY) injection 10 mL      10 mL Intravenous ONCE 18 1454 18 1525    18 0945  iopamidol (ISOVUE-370) solution 120 mL      120 mL Intravenous ONCE 18 0939 18 0941          Interpretation of levels and current regimen:  Trough level is  Therapeutic    Has serum creatinine changed > 50% in last 72 hours: No    Urine output:  unable to determine, unmeasured    Renal Function: Stable    Plan:  1.   Continue Current Dose  2.  Pharmacy will check trough levels as appropriate in 3-5 Days.    3. Serum creatinine levels will be ordered daily for the first week of therapy and at least twice weekly for subsequent weeks.      Anthony BelleD        .

## 2018-06-26 NOTE — PROGRESS NOTES
Care Coordinator Progress Note    Admission Date/Time:  6/6/2018  Attending MD:  Bairon Aguilar MD    Data  Chart reviewed, discussed with interdisciplinary team.   Patient was admitted for:    Pneumonia of both lungs due to infectious organism, unspecified part of lung  Hypoxia  Acute renal injury (H)  Hyponatremia  Altered mental status, unspecified altered mental status type  Hypoxemia.    Concerns with insurance coverage for discharge needs: None.  Current Living Situation: Patient lives alone.  Support System: Supportive and Involved; mom  Services Involved: none\  Transportation at Discharge: Family or friend will provide  Transportation to Medical Appointments:  - family or friend  Barriers to Discharge: transfer to [facility] 6/27 for planned amputation 6/28    Coordination of Care  6/26 @ 1400: Call received from Imagining Manager Nicole Vuong at [facility]; Ultrasound Segmental Pressures with TCO2 can not occur, has been cancelled, for multiple reasons. (Bedside RN, Marilynn Chaparro and Ortho Dr Rios notified)    +Liability Issues, [employer]/[facility] are separate entities. The test places a [employer] inpatient in the care of P. The liability is simply too high.  +Insurance will likely not pay for an outpatient procedure while inpatient at a different facility. Patient will need to sign self payment quote. If patient is unable to pay [facility] is responsible for the bill. P is not contracted with [employer] (patients insurance).    6/25 @ 1520: RNCC received approval from  Ines Moulton for patient to be sent to [facility] for test: Ultrasound Segmental Pressures with TCO2 (Ortho Dr. Marco Antonio Rios and Marilynn Chaparro updated). [facility] Vascular Scheduling Spoke with Tamera Ph: [phone] Patient scheduled for procedure at [facility] tomorrow at 4pm. She must arrival at [facility] at 330pm on the 1st floor - Imaging Dept. [facility] transport arranged for wheelchair  from hospital at 3pm. The procedure  "takes 90 minutes. [facility] will provide Will-Call transport service back to the hospital when procedure is completed.  Charge Nurse notified    6/25 @ 0122: Ortho Dr. Marco Antonio Rios called RNCC requesting assistance to get pre-op test done: \"Ultrasound Segmental Pressures with TCO2\". Spoke with [facility] staff, On-call Vascular NP Sara Cuevas, [facility] staff, and Utilization Review in attempts to find reasonable solution to complete test on [facility], unfortunately no solution available. This test can only be completed at [facility] due to machinery required is only available in that facility. Requesting approval from  (must be received before proceeding) due to possibility of insurance non-payment (inpatient status getting an outpatient procedure). (pending).    6/16: Patient will be primarily followed by SW as ARU vs TCU is expected after amputation    6/9-6/15: ICU    6/8:Received call from Sharla with [facility] and they are not going to take patient.  They are not able to provide WOC RN 3 times/week and they have not been able to find another agency that will take patient either.  Team updated and state to check with WOC RN and ortho to see if WOC really needs to see 3 day/wk or can they see patient for example, once a week and have RN do the other days.  Patient not going to d/c this weekend per Marilynn Bernard MD team.    6/8: D: Chart reviewed and plan of care discussed with MD team. Per Medical Team report patient will need many weeks (6?) of IV ABX at home. PICC to be placed, ordered yesterday 6/7. Presently patient has broad antibiotic coverage with Levaquin, Zosyn, and Vancomycin. No cultures and sensitivities yet.      IV ABX  I/A: Spoke with patient at bedside, verbal authorization received to call benefit check into [facility] for Home IV ABX. Explained that pt will attend Patient Learning Center class (ordered). Inquired if there are family members " that would be able to learn with patient to assist with IV ABX administration; patient stated, no.     WOUND CARE  I/A: Patient requires wound care daily. Patient will need to complete wound care at home independently four days per week as Home Care visits can occur maximum three times per week. RNCC discussed this with patient; she feels comfortable with being trained and completing her own wound care at home. No preference for Home Care agency; orders for [facility] completed. Bedside nursing to teach patient wound care with every dressing change please.    P: Care Coordination is available on the weekends by paging job code [phone].    Referrals: Provided patient with options for Home Care and Home Infusion.        [facility]  Phone # [phone]  Fax # [phone]   _________________________    [facility]  Phone  [phone]  Fax  [phone]    WOC RN visits three times per week    Wound Care Instructions:  Right plantar foot wounds: Daily: Remove dressing and apply Vashe (order #[id number]) soaked gauze into wound beds for 10 minutes. Remove and do not rinse. Apply Iodasorb gel (order #[id number]) to wound beds. Pack gently with dry fluffed 4x4. Cover with ABD and secure with Kerlix and ACE wrap.          Plan  Anticipated Discharge Date:  TBD pending amputation and placement  Anticipated Discharge Plan:  TRACY RODRIGEZN RN PHN  Patient Care Management Coordinator  Marilynn Bridges 5, and [facility] 5  Phone: [phone] / Pager: [phone]

## 2018-06-26 NOTE — PROGRESS NOTES
CLINICAL NUTRITION SERVICES - REASSESSMENT NOTE     Nutrition Prescription    RECOMMENDATIONS FOR MDs/PROVIDERS TO ORDER:  - Please note that insulin regimens may need to be altered with TF running for 14 hr (8pm -10 am)     Malnutrition Status:    Severe malnutrition in the context of acute illness     Recommendations already ordered by Registered Dietitian (RD):  1. Continue Calorie counts   2. Cycled TF: Nutren 1.5 for a 14 hr cycle (8pm -10am) @ 80 mL/hr (1120 ml/day) to provide 1680 kcals (30 kcal/kg/day), 76 g PRO (1.3 g/kg/day), 851 mL H2O, 197 g CHO and no fiber daily.  - For first cycle only @ 8 pm advance TF to 60 ml/hr then continue to advance by 10 ml/hr Q4hr (pending pt tolerance) until goal of 80 ml/hr is reached. For second cycle @ 8pm start TF @ goal of 80 ml/hr and run for 14 hrs. Stop TF at 10 am.   - Continue 2 pkts of Prosource daily to provide an additional 80 kcals and 22 g PRO   3. Ordered magic cup as afternoon snack and ordered magic cup PRN.     Future/Additional Recommendations:  1. If results of calorie count collection shows average intake meeting ~ 1200 kcals and 50 gm protein per day, ( ~ 60% estimated higher liliana needs), may discontinue  ND TF support.  2. Monitor weight trends   3. Offer other scheduled snacks        EVALUATION OF THE PROGRESS TOWARD GOALS   Diet:   Regular, started 6/15    Nutrition Support:   TF access: ND tube ( placed on 6/12),   Dosing wt: 57 kg ( actual , lowest wt on 6/8)    TF regimen: Nutren 1.5 @ 50 ml/hr, (1200 ml/day)  Provides: 1800 kcals (32+), 82 gm PRO (1.4+), 912 mL H2O, 211 gm CHO and 0 fiber.  - 2 pkts of Prosource daily to provide an additional 80 kcals and 22 g PRO.     Water flushes: 50 ml every 2 hours    Intake:   TF:  Per I/Os no TF was received/recorded from 6/22-6/24. This is partially d/t heart nuc test on 6/22 when TF were held 4 hrs prior to the test and during the exam.  Per nursing notes pt did receive TF from 2325-2855 on 6/22, this  "equals 150 ml of formula. Per multiple nursing documentation TF were running at goal on ,  and .     7 day average per I/O's = 524 ml, which provides 786 kcal and 36 g PRO   - This seven day average is likely lower that what the pt actually received     Pt received 100% of her Prosource pkts over the past week, this provides an additional 80 kcals and 22 g PRO.        PO: Ongoing calorie counts.   : 307 kcals and 15 g PRO  : 1058 kcals and 42 g PRO   : 820 kcals and 36 g PRO   :668 kcals and 16 g PRO    : 1069 kcals and 39 g PRO   ------------------------------------------------------  5 day average = 784 kcals and 30 g PRO      Average documented TF + 2 Prosource pkts + Average PO intake  = 1404 kcals and 88 g PRO    - This is meeting 82 % of the pt's minimal energy needs and 100% of the pt's minimal protein needs.     Spoke with pt today, she states that her appetite is okay and endorses feeling full all the time. During assessment, pt was eating brunch as she was unable to eat breakfast d/t a scan this morning. Brunch included fruit loops with milk, 2 juices, sausage, hash browns and a banana.     After speaking with MD, discussed nutritional supplements with pt. She agreed to try a vanilla flavored Magic cup today.     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 Hospital course was complicated by respiratory failure due to ARDS requiring intubation (6/10-15), extubated and transferred to medicine for further cares\".      Meds: Creon 12,000 units 2 capsules with meals, Certavite, lasix 40 mg daily, Wound protocol of vit C 500 mg and zinc sulfate 220 mg was completed on .      Labs:  M.4 (H)    Wt: " Weight continues to fluctuate throughout pt's hospital stay, this is likely secondary to fluid status. Currently pt is on a downward trend towards pt's lowest wt since admission 56.9 kg on 6/8.     MALNUTRITION   % Intake: Decreased intake does not meet criteria at this time -  Pt is on TF to help meet her nutritional needs.   % Weight Loss: None noted since admit   Subcutaneous Fat Loss: Previously: Facial region: Moderate, Upper arm: Moderate   Muscle Loss: Previously, Scapular bone: moderate, Thoracic region (clavicle, acromium bone, deltoid, trapezius, pectoral): moderate, Upper arm (bicep, tricep): moderate, Lower arm (forearm): moderate, Dorsal hand: severe, Upper leg (quadricep, hamstring): moderate, Patellar region: mild and Posterior calf: severe  Fluid Accumulation/Edema: None noted  Malnutrition Diagnosis: Severe malnutrition in the context of acute illness       Previous Goals   Patient to consume % of nutritionally adequate meal trays TID, or the equivalent with supplements/snacks.  Evaluation: Not met     Previous Nutrition Diagnosis  Inadequate oral intake related to Poor appetite as evidenced by reliant on TF support to provide for full nutrition needs.  Evaluation: Continues     CURRENT NUTRITION DIAGNOSIS  Inadequate oral intake related to poor appetite secondary to early satiety as evidenced by 5 day average of PO intake 784 kcals (14 kcals/kg) and 30 g PRO (0.5 g PRO/kg) and reliant on TF support to provide full nutrition needs.     INTERVENTIONS  Implementation  1. Collaborated with other providers: Spoke with MD about cycling TF in order to attempt to increase appetite. Discussed ordering Magic Cup (high calorie high protein supplement) for pt. Discussed plan for cycling TF with nurse.   2. Ordered Magic cup scheduled and PRN     Goals  Total avg nutritional intake to meet a minimum of 30 kcal/kg and 1.5 g PRO/kg daily (per dosing wt 57 kg).    Monitoring/Evaluation  Progress toward goals  will be monitored and evaluated per protocol.    Diana Garcia MS, RD, LD  Pgr: [phone]

## 2018-06-26 NOTE — PROGRESS NOTES
"[facility], [place]    Internal Medicine Progress Note - [facility] Service    Main Plans for Today    -vanc/pip-tazo -> vanc/cefepime/metronidazole  -Increase glargine 7 units, med ssi  -cycle TFs to try to stimulate appetite, 1069 kcal yest  -Dobutamine stress echo 6/27  -Plans [place] 6[place] [facility] [place], tentative plan to transfer only for operation then transfer back  -s/p CTA RLE today, TCO2 @ 1500 6/26, [place] [facility] [place]    [place]essment & Plan   Alessandra Pak is a 52 year old female admitted on 6/6/2018. 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 Hospital course was complicated by respiratory failure due to ARDS requiring intubation (6/10-15), extubated and transferred to medicine for further cares.    # Type 2 Diabetes  # Hypoglycemia, resolved  # Hx of Chronic pancreatitis s/p whipple's  # Severe malnutrition in the context of acute illness  Patient's PO calories increasing 1069 <- 668 <- 307. D/w RD, will trial high navdeep boost \"magic cups\". Pt having some hyperglycemia now. Total daily insulin dose 6/25 14u.  -increase glargine to 7u, med ssi  -cycle TFs to try to stimulate appetite, calorie counts, goal \"~ 1200 kcals and 50 gm protein per day\"  -continue to monitor, ensure patient able to maintain normo to slight hyperglycemia on PO as weaning from TFs  -pantoprazole PO qd  -throat lozenge and spray    TF regimen: Nutren 1.5 @ 50 ml/hr, (1200 ml/day)  Provides: 1800 kcals (32+), 82 gm PRO (1.4+), 912 mL H2O, 211 gm CHO and 0 fiber.   Water flushes: 50 ml every 2 hours    # Anxiety  Anxiety improving. Patient remains redirectable and calms with reassurance. Patient on amitriptyline, prefer to avoid benzos, SSRIs unlikely to have benefit acutely. Health " psychology has been helpful, continue visits.  -continue regular reassurance  -consider low dose hydroxyzine if necessary    # Severe Sepsis, resolved  # R diabetic foot infection, s/p I&D x 3, Ongoing Osteomyelitis  Patient mentally ready for amputation, discussions with therapy and health psychology have been helpful.  Surgery not emergent or urgent per orthopedics.  Given life-threatening nature of diabetic foot infection and inconsistent follow-up, amputation would likely still be the best course as patient is also opposed to long-term IV antibiotics and would need close follow-up.  -appreciate ortho and ID input  -change to vanc/cefepime/metronidazole for renal protection  -Plans for [place] 6/28 on [facility] [place], tentative plan to transfer only for operation then transfer back  -s/p CTA RLE today, TCO2 @ 1500 6/26, [place] [facility] [place]  -qod CRP, stabilized    # Preop  Patient is class III risk per revised cardiac index (CHF history, insulin use). Patient cannot perform 4 METS of activity at baseline.  TTE performed 6/9/18, EF 50%, no valvular abnormalities. Will require further evaluation with EKG as well as possible stress imaging (last angiogram with mild nonobstructive CAD in 2014).  Patient did not tolerate Lexiscan due to claustrophobia, will proceed to be me stress echo in order to avoid needing to premedicate.  - dobutamine stress echo 6/27  - EKG unchanged  - ongoing volume status management    # Acute hypoxic respiratory failure, resolved  # ARDS 2/2 systemic response from osteo/foot infection  # Hx of chronic restrictive lung disease with emphysema and fibrosis  # HF w/ recovered EF (50%)  Patient's weight downtrending.  Improved volume status on exam, crackles improved, S3 resolved, lower extremity edema resolved.  Patient continues to tolerate room air at rest.  -continue 40 mg PO furosemide daily  -Daily weights, strict I/O  -wean O2 as tolerated, goal >88%    # Normocytic Anemia  # Anemia of chronic  disease  Insufficient reticulocyte response on 6/10 noted.  Ferritin elevated.  Status post 3 units PRBC (6/17, 6/14, 6/13). Appropriate response. No bleeding observed.  -continue to monitor    # Diarrhea, resolved  Notes worsening with creon. Neg infectious studies.  -Continue to monitor    # Hypernatremia, resolved  Improved with increased  cc q2h (146). Now with PO intake.  -monitor    # Chronic pain  Discussed patient's methadone with pharmacy we do not note interaction with patient's current antibiotics (though would have interaction with linezolid if used in the future).  No empiric changes to methadone for anticipated body weight changes following amputation at this time.  - Methadone 70 qday  - Pregabalin  - Amitriptyline  - d/c oxycodone, pt has not been using    # Adrenal insufficiency  S/p stress dose steroid taper, complete 6/18.    # NIKOLAY on CKD, resolved  - monitor  - abx change as above    # Anisocoria/R afferent pupillary defect  - stable      # Pain Assessment:  Current Pain Score 6/26/2018   Patient currently in pain? denies   Pain score (0-10) -   Pain location -   Pain descriptors -   CPOT pain score -   - Alessandra is experiencing pain due to Chronic pain as well as recent surgical procedure, NG. Pain management was discussed and the plan was created in a collaborative fashion.  Alessandra's response to the current recommendations: engaged  - Please see the plan for pain management as documented above        Diet: Adult Formula Drip Feeding: Continuous Nutren 1.5; Nasoduodenal tube; Goal Rate: 50; mL/hr; Medication - Tube Feeding Flush Frequency: At least 15-30 mL water before and after medication administration and with tube clogging; Amount to Send (Nutri...  Room Service  Regular Diet Adult  Calorie Counts  Fluids: Free water flushes as aboe  DVT Prophylaxis: Heparin SQ  Code Status: Full Code    Disposition Plan   Expected discharge: 4 - 7 days, recommended to transitional care unit once  antibiotic plan established, safe disposition plan/ TCU bed available and post-amputation rehab needs established.     Entered: Alireza Torresdaiana 06/26/2018, 12:53 PM   Information in the above section will display in the discharge planner report.      Alireza Chaparro  [facility]: 5  Pager: [phone]  Please see sticky note for cross cover information    Interval History   No acute events.  Ortho stopped by in the AM to chat with patient. Patient states that edema continues to improve, no sob. R foot pain well controlled.    Physical Exam   Vital Signs: Temp: 97.7  F (36.5  C) Temp src: Oral BP: 131/79   Heart Rate: 112 Resp: 18 SpO2: 92 % O2 Device: None (Room air)    Weight: 126 lbs 1.6 oz  General Appearance: Pleasant,  Sleeping, lying flat in bed  Respiratory: Crackles in the bases bilaterally improved, breathing comfortably  Cardiovascular: Normal rate, regular rhythm, no S3, no murmur rubs  GI: Abdomen is soft, nontender, nondistended no acute changes noted.  Skin: no rashes or jaundice, R foot dressed  Other: Alert and oriented x4, speech fluent, no LE edema, wwp        Data   Medications     IV fluid REPLACEMENT ONLY Stopped (06/14/18 1319)     IV fluid REPLACEMENT ONLY Stopped (06/12/18 2205)       acetaminophen  1,000 mg Oral TID     amitriptyline  50 mg Oral At Bedtime     amylase-lipase-protease  2 capsule Oral TID w/meals     ceFEPIme (MAXIPIME) IV  2 g Intravenous Q12H     fluticasone  2 spray Left nostril Daily     furosemide  40 mg Oral Daily     heparin lock flush  5-10 mL Intracatheter Q24H     heparin  5,000 Units Subcutaneous Q12H     insulin aspart  1-7 Units Subcutaneous TID AC     insulin aspart  1-5 Units Subcutaneous At Bedtime     insulin glargine  5 Units Subcutaneous Daily     lisinopril  10 mg Oral Daily     melatonin  6 mg Oral At Bedtime     methadone  70 mg Oral Daily     metroNIDAZOLE  500 mg Intravenous Q6H     multivitamins with minerals  15 mL Per Feeding Tube  Daily     omeprazole  20 mg Oral Daily     pregabalin  75 mg Oral TID     protein modular  1 packet Per Feeding Tube BID 09 12     vancomycin (VANCOCIN) IV  1,000 mg Intravenous Q24H     Data     Recent Labs  Lab 06/26/18  0540 06/25/18  0552 06/24/18  1556 06/24/18  0838  06/21/18  1659 06/21/18  0555   WBC  --   --   --   --   --   --  8.4   HGB  --   --   --   --   --   --  7.8*   MCV  --   --   --   --   --   --  84   PLT  --   --  455*  --   --  582* 488*    139  --  138  < >  --  140   POTASSIUM 4.4 4.6  --  4.8  < >  --  4.4   CHLORIDE 104 104  --  104  < >  --  103   CO2 27 28  --  26  < >  --  31   BUN 31* 32*  --  30  < >  --  30   CR 1.25* 1.29*  --  1.40*  < >  --  1.09*   ANIONGAP 8 7  --  8  < >  --  5   RICK 8.6 8.9  --  8.4*  < >  --  8.2*   * 178*  --  188*  < >  --  106*   ALBUMIN 1.9*  --   --   --   --   --   --    < > = values in this interval not displayed.  Recent Results (from the past 24 hour(s))   CT Lower Extremity Angio Right    Narrative    Exam: Computed tomographic angiography of the abdomen, pelvis, and  bilateral lower extremities with contrast dated 6/26/2018    Clinical information: Evaluate vasculature for planned right  below-knee amputation. Recurrent right foot diabetic ulcers.    Technique: Axial images obtained of the abdomen, pelvis, and lower  extremities through the feet obtained following the injection of  contrast media in the arterial phase. Source images reviewed as well  as 3D and multi-planar reconstructions.    Contrast: 120ml isovue 370    DLP: 1008 mGy*cm    Comparison: Right foot CT 6/13/2018, right foot MRI 2/18/2018, ABIs  4/25/2017. CT chest 6/9/2018, CT abdomen 2/21/2015.    FINDINGS:      Abdominal aorta:  Moderate atherosclerotic calcifications in the normal caliber  abdominal aorta. Replaced right hepatic artery arising from the  superior mesenteric artery. The origins of the celiac, SMA, renal, and  inferior mesenteric arteries are  patent.    Right pelvis and lower extremity:  Common iliac artery: Low-grade narrowing near the aortic bifurcation.  External iliac artery: Normal   Common femoral artery: Low-grade narrowing distally.   Profunda femoral artery: Normal  Superficial femoral artery: No significant stenosis  Popliteal artery: Normal  Tibioperoneal trunk: Normal  Anterior tibial artery: Patent through the dorsalis pedis.  Posterior: Patent through the plantar branches  Peroneal: Patent to the ankle    Left pelvis and lower extremity:  Common iliac artery: Near complete occlusion distally  External iliac artery: Normal   Common femoral artery: Normal   Profunda femoral artery: Normal  Superficial femoral artery: Normal  Popliteal artery: Normal  Tibioperoneal trunk: Normal  Anterior tibial artery: Patent through the dorsalis pedis  Posterior: Patent through the plantar branches  Peroneal: Patent to the ankle    Abdomen and pelvis:   Stable predominantly left-sided pneumobilia. Cholecystectomy clips.  Stable postoperative changes of Whipple procedure with diffuse  calcifications throughout the residual pancreatic tail. The adrenal  glands and right kidney are unremarkable. Area of cortical thinning in  the medial inferior pole of the left kidney suggestive of previous  infection/ischemia. Stable position of the intrauterine device and the  tubal ligation devices. The left-sided tubal ligation device appears  to be positioned within the endometrium, unchanged.    No intra-abdominal free air or free fluid. No dilated loops of bowel,  however there is substantial stool burden. The enteric tube tip is  positioned with the tip in the proximal small bowel. No  lymphadenopathy identified in the abdomen or pelvis.    Lower chest:  Heart size is at the upper limits of normal. Partially imaged  pacemaker/implantable cardiac defibrillator leads positioned in the  right ventricle. Bibasilar fibrosis/scarring without airspace  consolidation.    Bones  and soft tissues:  Small fat-containing umbilical hernia. Stable enhancement in the  thickening of the plantar skin of the right foot and medial ankle.  There is some confluent fluid deep to the medial ankle and plantar  skin of the right foot as well. Skin ulcers overlying the medial  aspect of the right midfoot, the lateral aspect of the distal plantar  forefoot, and along the lateral aspect of the distal fifth metatarsal.  Stable subtle erosions in the base of the second digit proximal  phalanx. There may be inflammatory fluid surrounding the second and  fifth metatarsophalangeal joints.        Impression    Impression:  1. 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  2. No significant femoral-popliteal disease in either lower extremity.  Three-vessel runoff bilaterally.  3. Mildly progressed right foot cellulitis with several ulcers and  subcutaneous fluid collections, likely representing abscess/phlegmon.  Suspected osteomyelitis involving the second metatarsophalangeal joint  and the fourth and fifth metatarsals distally.             XR Tibia & Fibula Right 2 Views    Narrative    Exam: 4 views of the right tibia and fibula dated 6/26/2018.    COMPARISON: None.    CLINICAL HISTORY: Preoperative planning for below-knee amputation.    FINDINGS: AP and lateral views of the right tibia and fibula were  obtained. The bones are well aligned. No displaced fractures. No  erosive changes.      Impression    IMPRESSION: No acute bone abnormality in the right tibia or fibula.    DORYS OROPEZA MD   XR Foot Right G/E 3 Views    Narrative    Exam: 3 views of the right foot dated 6/26/2018.    COMPARISON: Radiographs dated 6/11/2018, CT dated 6/13/2018, and MRI  dated 2/18/2018.    CLINICAL HISTORY: Right foot osteomyelitis.    FINDINGS: Soft tissue defect seen along the plantar aspect of the  right foot, at  the approximate level of the metatarsals. Mild  irregularity involving the base of the right second proximal phalanx,  better appreciated on the comparison CT scan of 6/13/2018. No new  erosive changes are noted.      Impression    IMPRESSION:  1. Soft tissue defect along the plantar aspect of the midfoot/forefoot  at the level of the metatarsals, much better seen on the comparison CT  scan.  2. Subtle lucency involving the base of the right second toe proximal  phalanx, this corresponds to an area of irregularity/osteolysis on the  comparison CT scan, correlate clinically.  3. No new erosive changes are noted.    DORYS OROPEZA MD

## 2018-06-26 NOTE — PROVIDER NOTIFICATION
Text page to [id number]: [address] Nohemy FIERRO [id number]. Stress echo tomorrow at 4pm. When should we stop TF? Let me know, thanks.

## 2018-06-26 NOTE — PROGRESS NOTES
Calorie Counts  Intake recorded for: 6/25  Kcals: 1069  Protein: 39g  # Meals Recorded: 2 meals (First - 100% fruit loops, chicken quesadilla, spaghetti from home)      (Second - 100% 2 rice krispy bars, 2 apple juices, 75% chicken stir sexton)  # Supplements Recorded: 0

## 2018-06-26 NOTE — PROGRESS NOTES
"Orthopaedic Daily Progress Note    S: No acute overnight events. Pain in foot controlled. Denies chest pain or shortness of breath. Appropriate questions about surgery.     O:  /83 (BP Location: Left arm)  Pulse 87  Temp 96.7  F (35.9  C) (Oral)  Resp 18  Ht 1.727 m (5' 8\")  Wt 57.2 kg (126 lb 1.6 oz)  SpO2 100%  BMI 19.17 kg/m2  Gen: No acute distress, alert  Resp: Breathing comfortably  MSK:   RLE:  Inspection: Distal hallux plantar ulcer with dry eschar.  Plantar/lateral wound packed with wound care material, no drainage.  Proximal/medial wound with fibrinous tissue base, no drainage.  Surrounding tissues are all diffusely edematous.  Strength: wiggles all toes, grossly fires, hip flexors, quad, hamstings, gsc, TA, EHL, FHL  Sensation: baseline numbness in stocking distribution unchanged from prior exam  Circulation: toes wwp, palpable DP pulse.     Recent Labs  Lab 06/26/18  0540 06/25/18  0552 06/24/18  1556 06/24/18  0838 06/23/18  0530  06/21/18  1659 06/21/18  0555   WBC  --   --   --   --   --   --   --  8.4   HGB  --   --   --   --   --   --   --  7.8*   PLT  --   --  455*  --   --   --  582* 488*   CR 1.25* 1.29*  --  1.40* 1.38*  < >  --  1.09*   < > = values in this interval not displayed.      Wound Culture: polymicrobial     Assessment: Alessandra Pak is a 52 year old female admitted sepsis with recurrent right foot diabetic ulcers as source, now s/p bedside I&D right foot on 6/7, 6/12, and 6/13.  Intubated for ARDS/hypoxic respiratory failure on 6/10, extubated 6/15, TTF 6/15. At this time all infectious markers and patient's status are appropriately improved and the acute infection is resolved.  Ongoing recurring foot ulcers in the setting of poorly controlled DM/malnutrition - if ulcers cannot be resolved with adherence to a good medical mgmt regimen, amputation is a more durable option to prevent infections which have proven to be life-threatening.  No emergent need for this " currently. Patient has elected to proceed with below knee amputation.       Plan:  Medicine Primary  Activity: Elevate BLE as much as possible   Weight bearing status: NWB RLE, okay for minimal WB with left forefoot for transfers to chair or commode.   Antibiotics: per primary/ID  Labs: pre op CBC, BMP, INR, request type and cross for 2 units for pre op  DVT prophylaxis: per primary team, hold for OR Thursday   Wound Care: daily dressing by bedside RN, weekly WOCN dressing changes     -Patient scheduled for R BKA on [facility] on Thursday 6/28/18 at 12:50 PM with Dr. King.   -Patient prefers her post op care on [place] - will discuss with primary team today, consider transfer to [facility] for surgery and immediate transfer back to [place] for post op cares per patient request. Dr. King does not have OR time on [place] and would not be able to do surgery Thursday if no transfer.     Would like to obtain vascular studies to help plan/ensure amputation viability (ie, will a BKA heal?).  -TcO2's and CTA RLE scheduled for today, appreciate workup.    Tian Ashby MD  Orthopaedic Surgery  PGY-4  Pager [phone]

## 2018-06-26 NOTE — PLAN OF CARE
Problem: Patient Care Overview  Goal: Plan of Care/Patient Progress Review  Outcome: No Change  [id number]: VSS, no c/o pain, up ad kyara in room. Encouraged patient to not bear weight on RLE. TF running at goal rate of 50. Plan for BKA on Thursday (6/28 on [facility] Doubutamine stress echo scheduled for 1600 today at [facility] transfer to pick pt up on 5B at 1500. No needs at this time, will report to oncoming shift.

## 2018-06-26 NOTE — PROGRESS NOTES
Brief Ortho Note    After discussions with multiple services, will plan for right BKA Thursday 6/28/18 with Dr. King on the [facility].     CTA reviewed with 3 vessel runoff bilaterally. TCO2s apparently have been canceled and logistically cannot be done.     Please have the patient NPO at midnight Wednesday for OR Thursday. Hold anticoagulation for OR Thursday. Will obtain consent today. Discussed poor nutritional status with medicine team given low albumin drawn this morning. Team has been attempting to optimize nutritional status. Given patient's ongoing infection, issues with follow up in the past, and severity of sepsis, benefit of surgery outweighs the risk to proceed with surgery Thursday.     Discussed with Dr. King who was in agreement with the above.     Tian Ashby MD  PGY-4  Orthopaedic Surgery  [phone]

## 2018-06-26 NOTE — PROGRESS NOTES
Pt here for dobutamine stress test.  Baseline images showed decreased EF from previous echo.  Per Dr. Burleson, dobutamine stress cancelled.  Pt returned to unit.

## 2018-06-26 NOTE — PLAN OF CARE
Problem: Patient Care Overview  Goal: Plan of Care/Patient Progress Review  Outcome: No Change  Pt AxO, VSS on RA, and no complaints of pain this shift. Pt noncompliant with non weight bearing order, walking around in room.  and 192. Ulcer dressing on foot changed today by pt and covered all shift. Pt reports numbness in her feet and her fingers. Vanco given and zosyn given per POC. Ate well tonight, all of her chicken quesidilla and spaghetti brought in by family members. TF running @ 50mL. To be stopped around 0400 for NPO for procedure tomorrow. Continue to monitor.

## 2018-06-27 ENCOUNTER — APPOINTMENT (OUTPATIENT)
Dept: PHYSICAL THERAPY | Facility: CLINIC | Age: 53
DRG: 853 | End: 2018-06-27
Payer: COMMERCIAL

## 2018-06-27 ENCOUNTER — APPOINTMENT (OUTPATIENT)
Dept: OCCUPATIONAL THERAPY | Facility: CLINIC | Age: 53
DRG: 853 | End: 2018-06-27
Payer: COMMERCIAL

## 2018-06-27 LAB
ABO + RH BLD: NORMAL
ABO + RH BLD: NORMAL
ANION GAP SERPL CALCULATED.3IONS-SCNC: 7 MMOL/L (ref 3–14)
BLD GP AB SCN SERPL QL: NORMAL
BLD PROD TYP BPU: NORMAL
BLOOD BANK CMNT PATIENT-IMP: NORMAL
BUN SERPL-MCNC: 31 MG/DL (ref 7–30)
CALCIUM SERPL-MCNC: 8.3 MG/DL (ref 8.5–10.1)
CHLORIDE SERPL-SCNC: 104 MMOL/L (ref 94–109)
CO2 SERPL-SCNC: 26 MMOL/L (ref 20–32)
CREAT SERPL-MCNC: 1.17 MG/DL (ref 0.52–1.04)
CRP SERPL-MCNC: 22 MG/L (ref 0–8)
ERYTHROCYTE [DISTWIDTH] IN BLOOD BY AUTOMATED COUNT: 22.9 % (ref 10–15)
GFR SERPL CREATININE-BSD FRML MDRD: 49 ML/MIN/1.7M2
GLUCOSE BLDC GLUCOMTR-MCNC: 139 MG/DL (ref 70–99)
GLUCOSE BLDC GLUCOMTR-MCNC: 148 MG/DL (ref 70–99)
GLUCOSE BLDC GLUCOMTR-MCNC: 189 MG/DL (ref 70–99)
GLUCOSE BLDC GLUCOMTR-MCNC: 338 MG/DL (ref 70–99)
GLUCOSE SERPL-MCNC: 187 MG/DL (ref 70–99)
HCT VFR BLD AUTO: 27.4 % (ref 35–47)
HGB BLD-MCNC: 9 G/DL (ref 11.7–15.7)
MCH RBC QN AUTO: 27.9 PG (ref 26.5–33)
MCHC RBC AUTO-ENTMCNC: 32.8 G/DL (ref 31.5–36.5)
MCV RBC AUTO: 85 FL (ref 78–100)
NUM BPU REQUESTED: 2
PLATELET # BLD AUTO: 376 10E9/L (ref 150–450)
PLATELET # BLD AUTO: 414 10E9/L (ref 150–450)
POTASSIUM SERPL-SCNC: 4.2 MMOL/L (ref 3.4–5.3)
RBC # BLD AUTO: 3.23 10E12/L (ref 3.8–5.2)
SODIUM SERPL-SCNC: 136 MMOL/L (ref 133–144)
SPECIMEN EXP DATE BLD: NORMAL
WBC # BLD AUTO: 7.6 10E9/L (ref 4–11)

## 2018-06-27 PROCEDURE — 86850 RBC ANTIBODY SCREEN: CPT | Performed by: INTERNAL MEDICINE

## 2018-06-27 PROCEDURE — 00000146 ZZHCL STATISTIC GLUCOSE BY METER IP

## 2018-06-27 PROCEDURE — 25000128 H RX IP 250 OP 636: Performed by: HOSPITALIST

## 2018-06-27 PROCEDURE — 86901 BLOOD TYPING SEROLOGIC RH(D): CPT | Performed by: INTERNAL MEDICINE

## 2018-06-27 PROCEDURE — 97535 SELF CARE MNGMENT TRAINING: CPT | Mod: GO

## 2018-06-27 PROCEDURE — 25000132 ZZH RX MED GY IP 250 OP 250 PS 637: Performed by: STUDENT IN AN ORGANIZED HEALTH CARE EDUCATION/TRAINING PROGRAM

## 2018-06-27 PROCEDURE — 86900 BLOOD TYPING SEROLOGIC ABO: CPT | Performed by: INTERNAL MEDICINE

## 2018-06-27 PROCEDURE — 86923 COMPATIBILITY TEST ELECTRIC: CPT | Performed by: INTERNAL MEDICINE

## 2018-06-27 PROCEDURE — 36592 COLLECT BLOOD FROM PICC: CPT | Performed by: STUDENT IN AN ORGANIZED HEALTH CARE EDUCATION/TRAINING PROGRAM

## 2018-06-27 PROCEDURE — 97110 THERAPEUTIC EXERCISES: CPT | Mod: GO

## 2018-06-27 PROCEDURE — 40000558 ZZH STATISTIC CVC DRESSING CHANGE

## 2018-06-27 PROCEDURE — 99233 SBSQ HOSP IP/OBS HIGH 50: CPT | Mod: GC | Performed by: HOSPITALIST

## 2018-06-27 PROCEDURE — 80048 BASIC METABOLIC PNL TOTAL CA: CPT | Performed by: STUDENT IN AN ORGANIZED HEALTH CARE EDUCATION/TRAINING PROGRAM

## 2018-06-27 PROCEDURE — 86140 C-REACTIVE PROTEIN: CPT | Performed by: STUDENT IN AN ORGANIZED HEALTH CARE EDUCATION/TRAINING PROGRAM

## 2018-06-27 PROCEDURE — 25000128 H RX IP 250 OP 636: Performed by: STUDENT IN AN ORGANIZED HEALTH CARE EDUCATION/TRAINING PROGRAM

## 2018-06-27 PROCEDURE — 25000132 ZZH RX MED GY IP 250 OP 250 PS 637: Performed by: INTERNAL MEDICINE

## 2018-06-27 PROCEDURE — 97530 THERAPEUTIC ACTIVITIES: CPT | Mod: GP

## 2018-06-27 PROCEDURE — 25000125 ZZHC RX 250: Performed by: STUDENT IN AN ORGANIZED HEALTH CARE EDUCATION/TRAINING PROGRAM

## 2018-06-27 PROCEDURE — 85049 AUTOMATED PLATELET COUNT: CPT | Performed by: STUDENT IN AN ORGANIZED HEALTH CARE EDUCATION/TRAINING PROGRAM

## 2018-06-27 PROCEDURE — 40000193 ZZH STATISTIC PT WARD VISIT

## 2018-06-27 PROCEDURE — 25000132 ZZH RX MED GY IP 250 OP 250 PS 637: Performed by: HOSPITALIST

## 2018-06-27 PROCEDURE — 85027 COMPLETE CBC AUTOMATED: CPT | Performed by: STUDENT IN AN ORGANIZED HEALTH CARE EDUCATION/TRAINING PROGRAM

## 2018-06-27 PROCEDURE — 99222 1ST HOSP IP/OBS MODERATE 55: CPT | Mod: GC | Performed by: INTERNAL MEDICINE

## 2018-06-27 PROCEDURE — 12000001 ZZH R&B MED SURG/OB UMMC

## 2018-06-27 RX ADMIN — OMEPRAZOLE 20 MG: 20 CAPSULE, DELAYED RELEASE ORAL at 20:26

## 2018-06-27 RX ADMIN — FLUTICASONE PROPIONATE 2 SPRAY: 50 SPRAY, METERED NASAL at 08:56

## 2018-06-27 RX ADMIN — Medication 1 PACKET: at 20:25

## 2018-06-27 RX ADMIN — HEPARIN SODIUM 5000 UNITS: 5000 INJECTION, SOLUTION INTRAVENOUS; SUBCUTANEOUS at 08:56

## 2018-06-27 RX ADMIN — ACETAMINOPHEN 1000 MG: 500 TABLET, FILM COATED ORAL at 21:37

## 2018-06-27 RX ADMIN — METRONIDAZOLE 500 MG: 500 INJECTION, SOLUTION INTRAVENOUS at 06:30

## 2018-06-27 RX ADMIN — ACETAMINOPHEN 1000 MG: 500 TABLET, FILM COATED ORAL at 17:12

## 2018-06-27 RX ADMIN — SODIUM CHLORIDE, PRESERVATIVE FREE 5 ML: 5 INJECTION INTRAVENOUS at 12:10

## 2018-06-27 RX ADMIN — LISINOPRIL 10 MG: 10 TABLET ORAL at 08:56

## 2018-06-27 RX ADMIN — PREGABALIN 75 MG: 75 CAPSULE ORAL at 20:26

## 2018-06-27 RX ADMIN — VANCOMYCIN HYDROCHLORIDE 1000 MG: 1 INJECTION, SOLUTION INTRAVENOUS at 17:06

## 2018-06-27 RX ADMIN — Medication 1 PACKET: at 08:56

## 2018-06-27 RX ADMIN — METRONIDAZOLE 500 MG: 500 INJECTION, SOLUTION INTRAVENOUS at 12:11

## 2018-06-27 RX ADMIN — SODIUM CHLORIDE, PRESERVATIVE FREE 5 ML: 5 INJECTION INTRAVENOUS at 21:05

## 2018-06-27 RX ADMIN — PREGABALIN 75 MG: 75 CAPSULE ORAL at 15:10

## 2018-06-27 RX ADMIN — PREGABALIN 75 MG: 75 CAPSULE ORAL at 08:56

## 2018-06-27 RX ADMIN — Medication 6 MG: at 21:02

## 2018-06-27 RX ADMIN — Medication 70 MG: at 08:56

## 2018-06-27 RX ADMIN — CEFEPIME HYDROCHLORIDE 2 G: 2 INJECTION, POWDER, FOR SOLUTION INTRAVENOUS at 18:19

## 2018-06-27 RX ADMIN — METRONIDAZOLE 500 MG: 500 INJECTION, SOLUTION INTRAVENOUS at 19:34

## 2018-06-27 RX ADMIN — MULTIVITAMIN 15 ML: LIQUID ORAL at 08:56

## 2018-06-27 RX ADMIN — AMITRIPTYLINE HYDROCHLORIDE 50 MG: 50 TABLET, FILM COATED ORAL at 21:02

## 2018-06-27 RX ADMIN — METRONIDAZOLE 500 MG: 500 INJECTION, SOLUTION INTRAVENOUS at 00:13

## 2018-06-27 RX ADMIN — CEFEPIME HYDROCHLORIDE 2 G: 2 INJECTION, POWDER, FOR SOLUTION INTRAVENOUS at 05:46

## 2018-06-27 RX ADMIN — ACETAMINOPHEN 1000 MG: 500 TABLET, FILM COATED ORAL at 05:48

## 2018-06-27 NOTE — PLAN OF CARE
Problem: Patient Care Overview  Goal: Plan of Care/Patient Progress Review  Outcome: No Change  [id number]: VSS, no c/o pain, up ad kyara in room but encouraged patient to not bear weight on RLE. TF increased throughout night- currently at goal of 80ml/hr (cycled- turn off at 1000). IV antibiotics infused per mar. Scheduled AM tylenol administered early for headache with relief. No needs at this time, will report to oncoming shift.

## 2018-06-27 NOTE — PLAN OF CARE
"Problem: Patient Care Overview  Goal: Plan of Care/Patient Progress Review  Outcome: No Change  Pt AxO, VSS on RA, and walking around unit and room, noncompliant with non weight bearing status. Pt met with surgeon and signed consent for surgery on Thursday. Pt tearful and frustrated with idea of ambulation. Writer provided emotional support. TF restarted at 2000 @ 60ml. To be increased by 10ml q4hrs until goal of 80ml. To be shut off at 1000.  and 175. No complaints of pain. Eating majority of dinner stating she feels \"more hungry\" without the TF running today. Continue to monitor.       "

## 2018-06-27 NOTE — PLAN OF CARE
Problem: Patient Care Overview  Goal: Plan of Care/Patient Progress Review  Patient plan for discharge: Home  Current status: Pt completes bed mobility supine to seated EOB ind. Completed several BUE strength exercises with red and green t-band x12-15 reps each, intermittent rest breaks.  Provided education on on stress management within scope of practice as pt states she is nervous and anxious re procedure. Discussed available resources/options for distraction and leisure activity; coloring sheets, word finds, crossword puzzles, card games, magazines, books, writing in journal. Pt receptive.  Barriers to return to prior living situation: Wounds, possible amputation in future, weight bearing status  Recommendations for discharge: Per plan established by the OT, the recommendation for dc location is Home with A and Home OT as needed if amputation occurring, as well as home safety evaluation.  Rationale for recommendations: Pt with significant wounds, affecting her ability to ambulate independently and complete ADLs and IADLs independently, decreasing her safety and risking further injury.

## 2018-06-27 NOTE — PROGRESS NOTES
CALORIE COUNTS    Intake Recorded for: 6/26/18   Kcals: 997 Protein: 38g    # Meals recorded: 2 meals (First: 100% fruit loops with 8 oz 1% milk, coffee with cream, banana, 8 oz apple juice, iced tea, 50% sausage gregorio)         (Second: 100% Rice krispie bar, 75% sweet and sour chicken stir sexton, carrots, 25% garden vegetable soup)    # Supplements recorded: 0

## 2018-06-27 NOTE — PROGRESS NOTES
"Orthopaedic Daily Progress Note    S: No acute overnight events. Pain controlled. No fevers or chills. Denies chest pain or shortness of breath. Still wants to move forward with BKA.     O:  /70  Pulse 87  Temp 97.3  F (36.3  C)  Resp 16  Ht 1.727 m (5' 8\")  Wt 56.2 kg (124 lb)  SpO2 95%  BMI 18.85 kg/m2  Gen: No acute distress, alert  Resp: Breathing comfortably  MSK:   RLE:  Inspection: Distal hallux plantar ulcer with dry eschar.  Plantar/lateral wound packed with wound care material, no drainage.  Proximal/medial wound with fibrinous tissue base, no drainage.  Surrounding tissues are all diffusely edematous.  Strength: wiggles all toes, grossly fires,  gsc, TA, EHL, FHL  Sensation: baseline numbness in stocking distribution unchanged from prior exam  Circulation: toes wwp, palpable DP pulse.       Recent Labs  Lab 06/27/18  0709 06/26/18  0540 06/25/18  0552 06/24/18  1556 06/24/18  0838  06/21/18  1659 06/21/18  0555   WBC 7.6  --   --   --   --   --   --  8.4   HGB 9.0*  --   --   --   --   --   --  7.8*     --   --  455*  --   --  582* 488*   CR 1.17* 1.25* 1.29*  --  1.40*  < >  --  1.09*   < > = values in this interval not displayed.      Wound Culture: polymicrobial      Assessment: Alessandra Pak is a 52 year old female admitted sepsis with recurrent right foot diabetic ulcers as source, now s/p bedside I&D right foot on 6/7, 6/12, and 6/13.  Intubated for ARDS/hypoxic respiratory failure on 6/10, extubated 6/15, TTF 6/15. At this time all infectious markers and patient's status are appropriately improved and the acute infection is resolved.  Ongoing recurring foot ulcers in the setting of poorly controlled DM/malnutrition - if ulcers cannot be resolved with adherence to a good medical mgmt regimen, amputation is a more durable option to prevent infections which have proven to be life-threatening. Per medicine, no reliable way to get patient off IV antibiotics. With extensive ulcers, " patient would like to proceed with BKA.     Had a long discussion with the patient about her options yesterday. Specifically, we discussed continued medical management with likely long course of IV antibiotics and serial debridements with no guarantee that this would cure her ulcers or infections. Given the location of her ulcers, likely no ray amputation would be curative. Given this, the best surgical option is BKA. The patient would like to proceed.     Per medicine team, unable to obtain stress echo yesterday. Question about patient being cleared for surgery, need further discussion with cardiology. Recommend anesthesia consult or discussion for clearance as well. Possible that this could be done under spinal anesthesia. BKA is not urgent at this point as patient is not acutely septic, and if any optimization needed could delay surgery.       Plan:  Medicine Primary  Activity: Elevate BLE as much as possible   Weight bearing status: NWB RLE, okay for minimal WB with left forefoot for transfers to chair or commode.   Antibiotics: per primary/ID  Labs: pre op CBC, BMP, INR, request type and cross for 2 units for pre op  DVT prophylaxis: per primary team, hold for OR Thursday   Wound Care: daily dressing by bedside RN, weekly WOCN dressing changes     -workup complete for OR apart from medical clearance  -patient scheduled for R BKA with Dr. King Thursday 6/28 on the [facility]. Will plan on proceeding unless no longer cleared by medicine  -consent signed. Hold anticoag for OR tomorrow. NPO at midnight for OR. Labs as above.     Discussed with Dr. King.     Tian Ashby MD  Orthopaedic Surgery  PGY-4  Pager [phone]

## 2018-06-27 NOTE — CONSULTS
History and Physical: Cardiology Service    Alessandra Pak MRN# [id number]   YOB: 1965 Age: 52 year old       Admission Date: 6/6/2018    Chief Complaint: Pre-Op clearance in patient with NICM    HPI:   Ms. Alessandra Pak is a 52 year-old female with a history of NICM s/p ICD with EF 10%-->55%, DM2 with chronic ulcers, chronic pancreatitis s/p Whipple, CKD, restrictive lung disease who was initially admitted on 6/6 with sepsis and osteomyelitis 2/2 right foot abscesses. Her course was complicated by respiratory failure 2/2 ARDS requiring intubation early in her course. Cardiology is consulted for pre-op clearance after TTE showed worsening TTE 50%-->30%.     Ms. Pak's EF had been as low as 10%-15% in 2015 and ICD was placed. She follows with Dr. Villarreal. Her EF subsequently improved to 50-55% and was stable at 50% on 6/10 TTE.  As an outpatient, her only heart failure medication was Furosemide 20 mg daily. She is scheduled for R BKA on Thursday, 6/28 and a Dobutamine stress test was ordered yesterday. However, this showed that LVEF had declined to 30% and the test was cancelled. She has previously not tolerated Lexiscan due to claustrophobia. Coronary angiogram in 10/2014 showed no CAD. EKG from 6/21 showed sinus rhythm with no ST or T wave changes to suggest ischemia.    Of note, during her admission, she was intermittently hypotensive requiring norepinephrine infusion. Weight increased from 125 lbs to 150 lbs during the admission, but she was subsequently started on Furosemide 40 mg and returned to her baseline weight.    On assessment today, Ms. Pak reports feeling well, but anxious about surgery tomorrow. She denies chest pain or pressure, lightheadedness, or dizziness. She is able to lie flat without difficulty for short periods of time, which is her baseline.    Past Medical History:   Diagnosis Date     Abdominal pain, right upper quadrant     sees Dr Mcclellan pain clinic at [facility]      ASCUS with positive high risk HPV 8/2013    + HPV 33, [place] - GAVIN I, ECC- atypia     Cardiomyopathy (H)     non ischemic cardiomyopathy with EF 15     Cervical high risk HPV (human papillomavirus) test positive 7/8/15, 7/25/16    NIL pap/+ HR HPV (not 16 or 18).      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    leep     Depressive disorder      Gastro-oesophageal reflux disease      Human papillomavirus in conditions classified elsewhere and of unspecified site 2/2012    + HPV 33     Hypertension      Profound impairment, one eye, impairment level not further specified     rt eye due to childhood injury     Systolic CHF (H) 3/12/2015     Tobacco abuse 5/18/2013     Type 2 diabetes mellitus without complications (H)      Uncomplicated asthma        Past Surgical History:   Procedure Laterality Date     C NONSPECIFIC PROCEDURE  2001    cholecystectomy     C NONSPECIFIC PROCEDURE  as a child    tonsillectomy     C NONSPECIFIC PROCEDURE  2001    whipple procedure     CARDIAC SURGERY      defib     COLPOSCOPY,LOOP ELECTRD CERVIX EXCIS  2002, 2011    stage 2 dysplasia     ENDOBRONCHIAL ULTRASOUND FLEXIBLE N/A 2/19/2015    Procedure: ENDOBRONCHIAL ULTRASOUND FLEXIBLE;  Surgeon: Brenden Tamez MD;  Location: [facility]     LEEP TX, CERVICAL  2014    LEEP TX Cervical     RECESSION RESECTION WITH ADJUSTABLE SUTURE  12/13/2011    Procedure:RECESSION RESECTION WITH ADJUSTABLE SUTURE; Right Strabismus Repair with Adjustable Suture       TUBAL LIGATION  2007    essure          No current facility-administered medications on file prior to encounter.   Current Outpatient Prescriptions on File Prior to Encounter:  acetone, Urine, test STRP 1 strip by In Vitro route as needed   albuterol (ALBUTEROL) 108 (90 BASE) MCG/ACT Inhaler Inhale 2 puffs into the lungs every 4 hours as needed for shortness of breath / dyspnea   amitriptyline (ELAVIL) 50 MG tablet Take 1 tablet (50 mg) by mouth At Bedtime   ASPIRIN 81 MG OR TABS 1 tab po QD (Once  per day)   blood glucose monitoring (HOLLIE CONTOUR NEXT) test strip Use to test blood sugar 10 times daily or as directed.  Ok to substitute alternative if insurance prefers.   blood glucose monitoring (HOLLIE MICROLET) lancets Use to test blood sugar 10 times daily or as directed.  Ok to substitute alternative if insurance prefers.   blood glucose monitoring (FREESTYLE) lancets 1 each See Admin Instructions test 3-4 times per day   fluticasone (FLONASE) 50 MCG/ACT spray Spray 2 sprays into left nostril daily   furosemide (LASIX) 20 MG tablet Take 1 tablet (20 mg) by mouth daily   insulin aspart (NOVOLOG VIAL) 100 UNITS/ML injection Use as directed in insulin pump. Patient using up to 60 units per day   insulin pen needle (B-D U/F) 31G X 5 MM Use 3 time(s) a day.   Lidocaine (LIDOCARE) 4 % Patch Place 3 patches onto the skin every 24 hours   lidocaine HCl 3 % cream Apply topically 3 times daily as needed (foot pain) OTC product   lisinopril (PRINIVIL/ZESTRIL) 10 MG tablet Take 1 tablet (10 mg) by mouth daily   LYRICA 75 MG capsule TAKE 1 CAPSULE BY MOUTH 3 TIMES DAILY   methadone (DOLPHINE-INTENSOL) 10 mg/mL CONC Take 7 mLs by mouth daily.   MIRENA 20 MCG/24HR IU IUD placed today (Patient taking differently: No sig reported)   oxyCODONE IR (ROXICODONE) 5 MG tablet Take 1 tablet (5 mg) by mouth every 6 hours as needed for moderate to severe pain   polyethylene glycol (MIRALAX/GLYCOLAX) Packet Take 17 g by mouth daily as needed for constipation   SM NICOTINE 21 MG/24HR 24 hr patch REMOVE OLD PATCH AND APPLY ONE NEW PATCH TO SKIN EVERY 24 HOURS   vancomycin 750 mg Inject 750 mg into the vein every 12 hours   gabapentin 8 % GEL topical PLO cream Apply 1 g topically every 8 hours   glucose 4 G CHEW Take 3-4 tablets to treat low blood sugar.   Ipratropium-Albuterol (COMBIVENT RESPIMAT)  MCG/ACT inhaler Inhale 1 puff into the lungs 4 times daily Do not exceed 6 doses/day.   multivitamin, therapeutic with minerals  "(THERA-VIT-M) TABS Take 1 tablet by mouth daily   ranitidine (ZANTAC) 300 MG tablet Take 1 tablet (300 mg) by mouth daily       Family History   Problem Relation Age of Onset     Diabetes Mother      diet controled     Hypertension Mother      Arthritis Mother      Lipids Mother      Diabetes Father      Hypertension Father      GASTROINTESTINAL DISEASE Father      gallbladder removed     Bipolar Disorder Brother      Thyroid Disease Brother      Obesity Other      Son     Respiratory Other      Son and Daughter; asthma     Depression Maternal Aunt      Anxiety Disorder Maternal Aunt        Social History   Substance Use Topics     Smoking status: Former Smoker     Packs/day: 0.30     Years: 15.00     Types: Cigarettes     Smokeless tobacco: Former User     Quit date: 10/29/2014      Comment: Started smoking in 89/ smokes about 3 per day     Alcohol use No       Allergies   Allergen Reactions     No Known Drug Allergies          ROS:   CONSTITUTIONAL:No report of fevers or chills  RESPIRATORY: No cough, wheezing, SOB, or hemoptysis  CARDIOVASCULAR: see HPI  MUSCULO-SKELETAL: No joint pain/swelling, no muslce pain  NEURO: No paresthesias, syncope, pre-syncope, light headness, dizziness or vertigo  ENDOCRINE: No temperature intolerance, no skin/hair changes  PSYCHIATRIC: No change in mood, feeling down/anxious, no change in sleep or appetite  GI: no melena or hematochezia, no change in bowel habits  : no hematuria or dysurea, no hesitancy, dribbling or incontinence  HEME: no easy bruising or bleeding, no history of anemia, no history of blood clots  SKIN: no rashes or sores, no unusual itching      Physical Examination:  Vitals: /74 (BP Location: Left arm)  Pulse 87  Temp 98  F (36.7  C) (Oral)  Resp 18  Ht 1.727 m (5' 8\")  Wt 56.2 kg (124 lb)  SpO2 91%  BMI 18.85 kg/m2  BMI= Body mass index is 18.85 kg/(m^2).    GENERAL APPEARANCE: Thin, alert and no distress  HEENT: no icterus, no xanthelasmas, normal " pupil size and reaction, normal palate, mucosa moist  NECK: No JVD, brisk carotid upstroke bilaterally  CHEST: lungs clear to auscultation without rales, rhonchi or wheezes, no use of accessory muscles, no retractions  CARDIOVASCULAR: regular rhythm, normal S1 and S2, no S3 or S4 and no murmur, click or rub, precordium quiet with normal PMI.  ABDOMEN: soft, non tender, without hepatosplenomegaly, no masses palpable, bowel sounds normal  EXTREMITIES: warm, No LE edema, DP/PT pulses 2+ bilaterally, no clubbing or cyanosis   NEURO: alert and oriented to person/place/time, normal speech, gait and affect  SKIN: no ecchymoses, no rashes      Laboratory:  CMP  Recent Labs  Lab 06/26/18  0540 06/25/18  0552 06/24/18  0838 06/23/18  0530 06/22/18  0602    139 138 140 142   POTASSIUM 4.4 4.6 4.8 5.0 4.6   CHLORIDE 104 104 104 105 102   CO2 27 28 26 28 33*   ANIONGAP 8 7 8 7 7   * 178* 188* 109* 61*   BUN 31* 32* 30 29 29   CR 1.25* 1.29* 1.40* 1.38* 1.24*   GFRESTIMATED 45* 44* 40* 40* 46*   GFRESTBLACK 55* 53* 48* 49* 55*   RICK 8.6 8.9 8.4* 8.8 8.4*   MAG 2.4* 2.4*  --  2.3 2.2   PHOS 4.1 5.0*  --  4.5 4.9*   ALBUMIN 1.9*  --   --   --   --      CBC  Recent Labs  Lab 06/27/18  0709 06/24/18  1556 06/21/18  1659 06/21/18  0555   WBC 7.6  --   --  8.4   RBC 3.23*  --   --  2.85*   HGB 9.0*  --   --  7.8*   HCT 27.4*  --   --  23.8*   MCV 85  --   --  84   MCH 27.9  --   --  27.4   MCHC 32.8  --   --  32.8   RDW 22.9*  --   --  22.3*    455* 582* 488*       Lab Results   Component Value Date    TROPI <0.015 06/06/2018    TROPI <0.015 02/16/2018    TROPI 0.082 (H) 02/18/2015    TROPONIN 0.16 (HH) 02/13/2015    TROPONIN 0.10 10/28/2014         EKG (6/21/2018):       TTE (6/27/2018):  Dobutamine stress echo was aborted due to LV dysfunction.  Moderately (EF 30-35%) reduced left ventricular function is present. LVEF 33%  based on biplane tracing. Global hypokinesis.  Left ventricular hypertrophy.  Normal right  ventricular size and systolic function.  No significant valve disease.    Assessment and plan:   Ms. Alessandra Pka is a 52 year-old female with a history of NICM s/p ICD with EF 10%-->55%, DM2 with chronic ulcers, chronic pancreatitis s/p Whipple, CKD, restrictive lung disease who was initially admitted on 6/6 with sepsis and osteomyelitis 2/2 right foot abscesses. Her course was complicated by respiratory failure 2/2 ARDS requiring intubation early in her course. Cardiology is consulted for pre-op clearance after TTE showed worsening TTE 50%-->30%. Dobutamine stress test was cancelled after baseline depressed EF and patient refused Lexiscan due to claustrophobia. With renal insufficiency and low suspicion for ischemic disease, we will not advise further imaging with coronary CT or coronary angiogram at this time given the risks of the contrast load. The most likely  of her reduced EF is a stress cardiomyopathy associated with her recent critical illness. She does not appear acutely decompensated on exam. Patient will be higher risk for surgery given cardiomyopathy, but the procedure is medically necessary and we recommend proceeding with no further cardiac testing. If a peripheral block is able to be performed that will limit the general anesthetic required, we would recommend that approach.      Patient seen and discussed with Dr. Heber Saeed MD  Cardiology Fellow, PGY-4  Pager #[phone]    I interviewed and examined the patient with the house staff.  I agree with the assessment and plan as documented.    Camron Buck MD, PhD  Professor of Medicine  Division of Cardiology

## 2018-06-27 NOTE — PLAN OF CARE
Problem: Patient Care Overview  Goal: Plan of Care/Patient Progress Review  Outcome: Improving  Patient noncompliant with no weight bearing status. Patient seen by cards. Patient to have surgery tomorrow. Ortho saw her this am. Patient trying to consume more to eat just not a big eater. Tube feeding cycled. Iv abx. P;cont to monitor

## 2018-06-27 NOTE — PROGRESS NOTES
"SPIRITUAL HEALTH SERVICES  SPIRITUAL ASSESSMENT Progress Note  [facility] ([place]) 5B      REFERRAL SOURCE: Follow up to visit on 6/20 per pt. Request for continued care    Patient Alessandra welcomed the visit and shared that she was \"pretty nervous\" for the procedure scheduled for tomorrow. She expressed confidence that it was \"the right choice\" since \"the other option had even more unknowns\" but feels trepidation at the change in lifestyle that will come from an amputation. Pt shared that \"everyone in this hospital has been so great to me\" but that she was ready to \"get out of here\" and be with her family again, especially her daughter who should have a child in the \"next few weeks\". We shared a reflective conversation around the procedure as a way to hopefully spend more time with her family in the long term. Pt shared that she's \"glad the procedure is tomorrow\" so she doesn't have to worry about it too long. Per pt's request, we shared a prayer for the medical team, her healing, and her daughter's pregnancy.    PLAN: If Pt returns to unit post-procedure, will continue to follow up at least 1x/week as unit . Pt is aware of [facility] availability throughout the hospital if she is transferred elsewhere.    Derrell Huff   Intern  Pager [phone]    "

## 2018-06-28 ENCOUNTER — ANESTHESIA (OUTPATIENT)
Dept: SURGERY | Facility: CLINIC | Age: 53
DRG: 853 | End: 2018-06-28
Payer: COMMERCIAL

## 2018-06-28 PROBLEM — Z89.519 S/P BELOW KNEE AMPUTATION (H): Status: ACTIVE | Noted: 2018-06-28

## 2018-06-28 LAB
ANION GAP SERPL CALCULATED.3IONS-SCNC: 8 MMOL/L (ref 3–14)
BUN SERPL-MCNC: 29 MG/DL (ref 7–30)
CALCIUM SERPL-MCNC: 8.9 MG/DL (ref 8.5–10.1)
CHLORIDE SERPL-SCNC: 105 MMOL/L (ref 94–109)
CO2 SERPL-SCNC: 25 MMOL/L (ref 20–32)
CREAT SERPL-MCNC: 1.13 MG/DL (ref 0.52–1.04)
GFR SERPL CREATININE-BSD FRML MDRD: 51 ML/MIN/1.7M2
GLUCOSE BLDC GLUCOMTR-MCNC: 151 MG/DL (ref 70–99)
GLUCOSE BLDC GLUCOMTR-MCNC: 178 MG/DL (ref 70–99)
GLUCOSE BLDC GLUCOMTR-MCNC: 235 MG/DL (ref 70–99)
GLUCOSE BLDC GLUCOMTR-MCNC: 241 MG/DL (ref 70–99)
GLUCOSE BLDC GLUCOMTR-MCNC: 275 MG/DL (ref 70–99)
GLUCOSE BLDC GLUCOMTR-MCNC: 86 MG/DL (ref 70–99)
GLUCOSE BLDC GLUCOMTR-MCNC: 92 MG/DL (ref 70–99)
GLUCOSE SERPL-MCNC: 229 MG/DL (ref 70–99)
POTASSIUM SERPL-SCNC: 4.1 MMOL/L (ref 3.4–5.3)
SODIUM SERPL-SCNC: 139 MMOL/L (ref 133–144)

## 2018-06-28 PROCEDURE — 25000132 ZZH RX MED GY IP 250 OP 250 PS 637: Performed by: HOSPITALIST

## 2018-06-28 PROCEDURE — 0Y6H0Z3 DETACHMENT AT RIGHT LOWER LEG, LOW, OPEN APPROACH: ICD-10-PCS | Performed by: ORTHOPAEDIC SURGERY

## 2018-06-28 PROCEDURE — 25000128 H RX IP 250 OP 636: Performed by: STUDENT IN AN ORGANIZED HEALTH CARE EDUCATION/TRAINING PROGRAM

## 2018-06-28 PROCEDURE — 25000565 ZZH ISOFLURANE, EA 15 MIN: Performed by: ORTHOPAEDIC SURGERY

## 2018-06-28 PROCEDURE — 36592 COLLECT BLOOD FROM PICC: CPT | Performed by: HOSPITALIST

## 2018-06-28 PROCEDURE — 25000132 ZZH RX MED GY IP 250 OP 250 PS 637: Performed by: INTERNAL MEDICINE

## 2018-06-28 PROCEDURE — 71000016 ZZH RECOVERY PHASE 1 LEVEL 3 FIRST HR: Performed by: ORTHOPAEDIC SURGERY

## 2018-06-28 PROCEDURE — 99233 SBSQ HOSP IP/OBS HIGH 50: CPT | Mod: GC | Performed by: HOSPITALIST

## 2018-06-28 PROCEDURE — 87075 CULTR BACTERIA EXCEPT BLOOD: CPT | Performed by: ORTHOPAEDIC SURGERY

## 2018-06-28 PROCEDURE — 25000125 ZZHC RX 250: Performed by: STUDENT IN AN ORGANIZED HEALTH CARE EDUCATION/TRAINING PROGRAM

## 2018-06-28 PROCEDURE — 36000059 ZZH SURGERY LEVEL 3 EA 15 ADDTL MIN UMMC: Performed by: ORTHOPAEDIC SURGERY

## 2018-06-28 PROCEDURE — 86850 RBC ANTIBODY SCREEN: CPT | Performed by: STUDENT IN AN ORGANIZED HEALTH CARE EDUCATION/TRAINING PROGRAM

## 2018-06-28 PROCEDURE — C9399 UNCLASSIFIED DRUGS OR BIOLOG: HCPCS | Performed by: NURSE ANESTHETIST, CERTIFIED REGISTERED

## 2018-06-28 PROCEDURE — 12000008 ZZH R&B INTERMEDIATE UMMC

## 2018-06-28 PROCEDURE — 80048 BASIC METABOLIC PNL TOTAL CA: CPT | Performed by: HOSPITALIST

## 2018-06-28 PROCEDURE — 87176 TISSUE HOMOGENIZATION CULTR: CPT | Performed by: ORTHOPAEDIC SURGERY

## 2018-06-28 PROCEDURE — 25000128 H RX IP 250 OP 636: Performed by: ANESTHESIOLOGY

## 2018-06-28 PROCEDURE — 27110038 ZZH RX 271: Performed by: STUDENT IN AN ORGANIZED HEALTH CARE EDUCATION/TRAINING PROGRAM

## 2018-06-28 PROCEDURE — 36592 COLLECT BLOOD FROM PICC: CPT | Performed by: STUDENT IN AN ORGANIZED HEALTH CARE EDUCATION/TRAINING PROGRAM

## 2018-06-28 PROCEDURE — 25000128 H RX IP 250 OP 636: Performed by: PHYSICIAN ASSISTANT

## 2018-06-28 PROCEDURE — 88307 TISSUE EXAM BY PATHOLOGIST: CPT | Performed by: ORTHOPAEDIC SURGERY

## 2018-06-28 PROCEDURE — 25000132 ZZH RX MED GY IP 250 OP 250 PS 637: Performed by: PHYSICIAN ASSISTANT

## 2018-06-28 PROCEDURE — 25800025 ZZH RX 258: Performed by: STUDENT IN AN ORGANIZED HEALTH CARE EDUCATION/TRAINING PROGRAM

## 2018-06-28 PROCEDURE — 86901 BLOOD TYPING SEROLOGIC RH(D): CPT | Performed by: STUDENT IN AN ORGANIZED HEALTH CARE EDUCATION/TRAINING PROGRAM

## 2018-06-28 PROCEDURE — 87070 CULTURE OTHR SPECIMN AEROBIC: CPT | Performed by: ORTHOPAEDIC SURGERY

## 2018-06-28 PROCEDURE — 40000170 ZZH STATISTIC PRE-PROCEDURE ASSESSMENT II: Performed by: ORTHOPAEDIC SURGERY

## 2018-06-28 PROCEDURE — 25000125 ZZHC RX 250: Performed by: NURSE ANESTHETIST, CERTIFIED REGISTERED

## 2018-06-28 PROCEDURE — 27210794 ZZH OR GENERAL SUPPLY STERILE: Performed by: ORTHOPAEDIC SURGERY

## 2018-06-28 PROCEDURE — 37000008 ZZH ANESTHESIA TECHNICAL FEE, 1ST 30 MIN: Performed by: ORTHOPAEDIC SURGERY

## 2018-06-28 PROCEDURE — 25000132 ZZH RX MED GY IP 250 OP 250 PS 637: Performed by: STUDENT IN AN ORGANIZED HEALTH CARE EDUCATION/TRAINING PROGRAM

## 2018-06-28 PROCEDURE — 86900 BLOOD TYPING SEROLOGIC ABO: CPT | Performed by: STUDENT IN AN ORGANIZED HEALTH CARE EDUCATION/TRAINING PROGRAM

## 2018-06-28 PROCEDURE — 27210429 ZZH NUTRITION PRODUCT INTERMEDIATE LITER

## 2018-06-28 PROCEDURE — 25000128 H RX IP 250 OP 636: Performed by: NURSE ANESTHETIST, CERTIFIED REGISTERED

## 2018-06-28 PROCEDURE — 36000057 ZZH SURGERY LEVEL 3 1ST 30 MIN - UMMC: Performed by: ORTHOPAEDIC SURGERY

## 2018-06-28 PROCEDURE — 00000146 ZZHCL STATISTIC GLUCOSE BY METER IP

## 2018-06-28 PROCEDURE — 37000009 ZZH ANESTHESIA TECHNICAL FEE, EACH ADDTL 15 MIN: Performed by: ORTHOPAEDIC SURGERY

## 2018-06-28 PROCEDURE — 71000017 ZZH RECOVERY PHASE 1 LEVEL 3 EA ADDTL HR: Performed by: ORTHOPAEDIC SURGERY

## 2018-06-28 RX ORDER — METOCLOPRAMIDE HYDROCHLORIDE 5 MG/ML
10 INJECTION INTRAMUSCULAR; INTRAVENOUS EVERY 6 HOURS PRN
Status: DISCONTINUED | OUTPATIENT
Start: 2018-06-28 | End: 2018-06-29

## 2018-06-28 RX ORDER — LIDOCAINE HYDROCHLORIDE 20 MG/ML
INJECTION, SOLUTION INFILTRATION; PERINEURAL PRN
Status: DISCONTINUED | OUTPATIENT
Start: 2018-06-28 | End: 2018-06-28

## 2018-06-28 RX ORDER — AMOXICILLIN 250 MG
2 CAPSULE ORAL 2 TIMES DAILY
Status: DISCONTINUED | OUTPATIENT
Start: 2018-06-28 | End: 2018-07-10 | Stop reason: HOSPADM

## 2018-06-28 RX ORDER — ONDANSETRON 2 MG/ML
4 INJECTION INTRAMUSCULAR; INTRAVENOUS EVERY 6 HOURS PRN
Status: DISCONTINUED | OUTPATIENT
Start: 2018-06-28 | End: 2018-07-10 | Stop reason: HOSPADM

## 2018-06-28 RX ORDER — DIPHENHYDRAMINE HCL 25 MG
25 CAPSULE ORAL EVERY 6 HOURS PRN
Status: DISCONTINUED | OUTPATIENT
Start: 2018-06-28 | End: 2018-06-29

## 2018-06-28 RX ORDER — FENTANYL CITRATE 50 UG/ML
25-50 INJECTION, SOLUTION INTRAMUSCULAR; INTRAVENOUS
Status: DISCONTINUED | OUTPATIENT
Start: 2018-06-28 | End: 2018-06-28 | Stop reason: HOSPADM

## 2018-06-28 RX ORDER — SODIUM CHLORIDE 9 MG/ML
INJECTION, SOLUTION INTRAVENOUS CONTINUOUS PRN
Status: DISCONTINUED | OUTPATIENT
Start: 2018-06-28 | End: 2018-06-28

## 2018-06-28 RX ORDER — HYDROMORPHONE HYDROCHLORIDE 1 MG/ML
.3-.5 INJECTION, SOLUTION INTRAMUSCULAR; INTRAVENOUS; SUBCUTANEOUS EVERY 5 MIN PRN
Status: DISCONTINUED | OUTPATIENT
Start: 2018-06-28 | End: 2018-06-28 | Stop reason: HOSPADM

## 2018-06-28 RX ORDER — ONDANSETRON 2 MG/ML
4 INJECTION INTRAMUSCULAR; INTRAVENOUS EVERY 30 MIN PRN
Status: DISCONTINUED | OUTPATIENT
Start: 2018-06-28 | End: 2018-06-28 | Stop reason: HOSPADM

## 2018-06-28 RX ORDER — NALOXONE HYDROCHLORIDE 0.4 MG/ML
.1-.4 INJECTION, SOLUTION INTRAMUSCULAR; INTRAVENOUS; SUBCUTANEOUS
Status: DISCONTINUED | OUTPATIENT
Start: 2018-06-28 | End: 2018-06-30

## 2018-06-28 RX ORDER — FLUMAZENIL 0.1 MG/ML
0.2 INJECTION, SOLUTION INTRAVENOUS
Status: DISCONTINUED | OUTPATIENT
Start: 2018-06-28 | End: 2018-06-28 | Stop reason: HOSPADM

## 2018-06-28 RX ORDER — LIDOCAINE 40 MG/G
CREAM TOPICAL
Status: DISCONTINUED | OUTPATIENT
Start: 2018-06-28 | End: 2018-07-10 | Stop reason: HOSPADM

## 2018-06-28 RX ORDER — PROPOFOL 10 MG/ML
INJECTION, EMULSION INTRAVENOUS PRN
Status: DISCONTINUED | OUTPATIENT
Start: 2018-06-28 | End: 2018-06-28

## 2018-06-28 RX ORDER — DIPHENHYDRAMINE HYDROCHLORIDE 50 MG/ML
25 INJECTION INTRAMUSCULAR; INTRAVENOUS EVERY 6 HOURS PRN
Status: DISCONTINUED | OUTPATIENT
Start: 2018-06-28 | End: 2018-06-29

## 2018-06-28 RX ORDER — AMOXICILLIN 250 MG
1 CAPSULE ORAL 2 TIMES DAILY
Status: DISCONTINUED | OUTPATIENT
Start: 2018-06-28 | End: 2018-07-10 | Stop reason: HOSPADM

## 2018-06-28 RX ORDER — LABETALOL HYDROCHLORIDE 5 MG/ML
10 INJECTION, SOLUTION INTRAVENOUS
Status: DISCONTINUED | OUTPATIENT
Start: 2018-06-28 | End: 2018-06-28 | Stop reason: HOSPADM

## 2018-06-28 RX ORDER — SODIUM CHLORIDE, SODIUM LACTATE, POTASSIUM CHLORIDE, CALCIUM CHLORIDE 600; 310; 30; 20 MG/100ML; MG/100ML; MG/100ML; MG/100ML
INJECTION, SOLUTION INTRAVENOUS CONTINUOUS
Status: DISCONTINUED | OUTPATIENT
Start: 2018-06-28 | End: 2018-06-28 | Stop reason: HOSPADM

## 2018-06-28 RX ORDER — CEFAZOLIN SODIUM 1 G/3ML
1 INJECTION, POWDER, FOR SOLUTION INTRAMUSCULAR; INTRAVENOUS EVERY 8 HOURS
Status: DISCONTINUED | OUTPATIENT
Start: 2018-06-28 | End: 2018-06-29 | Stop reason: ALTCHOICE

## 2018-06-28 RX ORDER — HYDRALAZINE HYDROCHLORIDE 20 MG/ML
2.5-5 INJECTION INTRAMUSCULAR; INTRAVENOUS EVERY 10 MIN PRN
Status: DISCONTINUED | OUTPATIENT
Start: 2018-06-28 | End: 2018-06-28 | Stop reason: HOSPADM

## 2018-06-28 RX ORDER — ONDANSETRON 4 MG/1
4 TABLET, ORALLY DISINTEGRATING ORAL EVERY 30 MIN PRN
Status: DISCONTINUED | OUTPATIENT
Start: 2018-06-28 | End: 2018-06-28 | Stop reason: HOSPADM

## 2018-06-28 RX ORDER — ONDANSETRON 4 MG/1
4 TABLET, ORALLY DISINTEGRATING ORAL EVERY 6 HOURS PRN
Status: DISCONTINUED | OUTPATIENT
Start: 2018-06-28 | End: 2018-07-10 | Stop reason: HOSPADM

## 2018-06-28 RX ORDER — NALOXONE HYDROCHLORIDE 0.4 MG/ML
.1-.4 INJECTION, SOLUTION INTRAMUSCULAR; INTRAVENOUS; SUBCUTANEOUS
Status: DISCONTINUED | OUTPATIENT
Start: 2018-06-28 | End: 2018-06-28 | Stop reason: HOSPADM

## 2018-06-28 RX ORDER — HEPARIN SODIUM 5000 [USP'U]/.5ML
5000 INJECTION, SOLUTION INTRAVENOUS; SUBCUTANEOUS EVERY 12 HOURS
Status: DISCONTINUED | OUTPATIENT
Start: 2018-06-28 | End: 2018-06-28

## 2018-06-28 RX ORDER — OXYCODONE HYDROCHLORIDE 5 MG/1
5-10 TABLET ORAL
Status: DISCONTINUED | OUTPATIENT
Start: 2018-06-28 | End: 2018-06-30

## 2018-06-28 RX ORDER — ONDANSETRON 2 MG/ML
INJECTION INTRAMUSCULAR; INTRAVENOUS PRN
Status: DISCONTINUED | OUTPATIENT
Start: 2018-06-28 | End: 2018-06-28

## 2018-06-28 RX ORDER — METOCLOPRAMIDE 10 MG/1
10 TABLET ORAL EVERY 6 HOURS PRN
Status: DISCONTINUED | OUTPATIENT
Start: 2018-06-28 | End: 2018-06-29

## 2018-06-28 RX ORDER — PROCHLORPERAZINE MALEATE 10 MG
10 TABLET ORAL EVERY 6 HOURS PRN
Status: DISCONTINUED | OUTPATIENT
Start: 2018-06-28 | End: 2018-07-10 | Stop reason: HOSPADM

## 2018-06-28 RX ORDER — CEFAZOLIN SODIUM 2 G/100ML
2 INJECTION, SOLUTION INTRAVENOUS
Status: COMPLETED | OUTPATIENT
Start: 2018-06-28 | End: 2018-06-28

## 2018-06-28 RX ORDER — ACETAMINOPHEN 325 MG/1
975 TABLET ORAL EVERY 8 HOURS
Status: DISCONTINUED | OUTPATIENT
Start: 2018-06-28 | End: 2018-06-28

## 2018-06-28 RX ORDER — NALOXONE HYDROCHLORIDE 0.4 MG/ML
.1-.4 INJECTION, SOLUTION INTRAMUSCULAR; INTRAVENOUS; SUBCUTANEOUS
Status: ACTIVE | OUTPATIENT
Start: 2018-06-28 | End: 2018-06-29

## 2018-06-28 RX ORDER — ACETAMINOPHEN 325 MG/1
650 TABLET ORAL EVERY 4 HOURS PRN
Status: DISCONTINUED | OUTPATIENT
Start: 2018-07-01 | End: 2018-06-28

## 2018-06-28 RX ORDER — BUPIVACAINE HYDROCHLORIDE AND EPINEPHRINE 2.5; 5 MG/ML; UG/ML
INJECTION, SOLUTION INFILTRATION; PERINEURAL PRN
Status: DISCONTINUED | OUTPATIENT
Start: 2018-06-28 | End: 2018-06-28

## 2018-06-28 RX ORDER — HYDROMORPHONE HYDROCHLORIDE 1 MG/ML
.3-.5 INJECTION, SOLUTION INTRAMUSCULAR; INTRAVENOUS; SUBCUTANEOUS
Status: DISCONTINUED | OUTPATIENT
Start: 2018-06-28 | End: 2018-06-30

## 2018-06-28 RX ORDER — DIAZEPAM 5 MG
5 TABLET ORAL EVERY 6 HOURS PRN
Status: DISCONTINUED | OUTPATIENT
Start: 2018-06-28 | End: 2018-06-29

## 2018-06-28 RX ORDER — CEFAZOLIN SODIUM 1 G/3ML
1 INJECTION, POWDER, FOR SOLUTION INTRAMUSCULAR; INTRAVENOUS SEE ADMIN INSTRUCTIONS
Status: DISCONTINUED | OUTPATIENT
Start: 2018-06-28 | End: 2018-06-28 | Stop reason: HOSPADM

## 2018-06-28 RX ORDER — FENTANYL CITRATE 50 UG/ML
INJECTION, SOLUTION INTRAMUSCULAR; INTRAVENOUS PRN
Status: DISCONTINUED | OUTPATIENT
Start: 2018-06-28 | End: 2018-06-28

## 2018-06-28 RX ADMIN — FENTANYL CITRATE 50 MCG: 50 INJECTION, SOLUTION INTRAMUSCULAR; INTRAVENOUS at 13:31

## 2018-06-28 RX ADMIN — PREGABALIN 75 MG: 75 CAPSULE ORAL at 20:08

## 2018-06-28 RX ADMIN — METRONIDAZOLE 500 MG: 500 INJECTION, SOLUTION INTRAVENOUS at 00:09

## 2018-06-28 RX ADMIN — DEXTROSE MONOHYDRATE: 100 INJECTION, SOLUTION INTRAVENOUS at 00:10

## 2018-06-28 RX ADMIN — PROPOFOL 100 MG: 10 INJECTION, EMULSION INTRAVENOUS at 13:31

## 2018-06-28 RX ADMIN — OMEPRAZOLE 20 MG: 20 CAPSULE, DELAYED RELEASE ORAL at 20:07

## 2018-06-28 RX ADMIN — CEFEPIME HYDROCHLORIDE 2 G: 2 INJECTION, POWDER, FOR SOLUTION INTRAVENOUS at 09:22

## 2018-06-28 RX ADMIN — ONDANSETRON 4 MG: 2 INJECTION INTRAMUSCULAR; INTRAVENOUS at 15:04

## 2018-06-28 RX ADMIN — ACETAMINOPHEN 1000 MG: 500 TABLET, FILM COATED ORAL at 09:22

## 2018-06-28 RX ADMIN — METRONIDAZOLE 500 MG: 500 INJECTION, SOLUTION INTRAVENOUS at 13:50

## 2018-06-28 RX ADMIN — Medication 1 PACKET: at 20:10

## 2018-06-28 RX ADMIN — SUGAMMADEX 120 MG: 100 INJECTION, SOLUTION INTRAVENOUS at 15:04

## 2018-06-28 RX ADMIN — SODIUM CHLORIDE: 9 INJECTION, SOLUTION INTRAVENOUS at 13:13

## 2018-06-28 RX ADMIN — METRONIDAZOLE 500 MG: 500 INJECTION, SOLUTION INTRAVENOUS at 07:02

## 2018-06-28 RX ADMIN — PREGABALIN 75 MG: 75 CAPSULE ORAL at 09:22

## 2018-06-28 RX ADMIN — MULTIVITAMIN 15 ML: LIQUID ORAL at 09:23

## 2018-06-28 RX ADMIN — PHENYLEPHRINE HYDROCHLORIDE 50 MCG: 10 INJECTION, SOLUTION INTRAMUSCULAR; INTRAVENOUS; SUBCUTANEOUS at 13:31

## 2018-06-28 RX ADMIN — FLUTICASONE PROPIONATE 2 SPRAY: 50 SPRAY, METERED NASAL at 09:23

## 2018-06-28 RX ADMIN — SODIUM CHLORIDE, POTASSIUM CHLORIDE, SODIUM LACTATE AND CALCIUM CHLORIDE: 600; 310; 30; 20 INJECTION, SOLUTION INTRAVENOUS at 16:15

## 2018-06-28 RX ADMIN — CEFAZOLIN SODIUM 2 G: 2 INJECTION, SOLUTION INTRAVENOUS at 13:44

## 2018-06-28 RX ADMIN — PHENYLEPHRINE HYDROCHLORIDE 50 MCG: 10 INJECTION, SOLUTION INTRAMUSCULAR; INTRAVENOUS; SUBCUTANEOUS at 14:35

## 2018-06-28 RX ADMIN — LIDOCAINE HYDROCHLORIDE 60 MG: 20 INJECTION, SOLUTION INFILTRATION; PERINEURAL at 13:31

## 2018-06-28 RX ADMIN — FENTANYL CITRATE 50 MCG: 50 INJECTION, SOLUTION INTRAMUSCULAR; INTRAVENOUS at 12:00

## 2018-06-28 RX ADMIN — PHENYLEPHRINE HYDROCHLORIDE 50 MCG: 10 INJECTION, SOLUTION INTRAMUSCULAR; INTRAVENOUS; SUBCUTANEOUS at 14:21

## 2018-06-28 RX ADMIN — SODIUM CHLORIDE, PRESERVATIVE FREE 5 ML: 5 INJECTION INTRAVENOUS at 07:14

## 2018-06-28 RX ADMIN — CEFAZOLIN 1 G: 330 INJECTION, POWDER, FOR SOLUTION INTRAMUSCULAR; INTRAVENOUS at 21:37

## 2018-06-28 RX ADMIN — Medication 70 MG: at 10:35

## 2018-06-28 RX ADMIN — ROCURONIUM BROMIDE 10 MG: 10 INJECTION INTRAVENOUS at 14:30

## 2018-06-28 RX ADMIN — PHENYLEPHRINE HYDROCHLORIDE 100 MCG: 10 INJECTION, SOLUTION INTRAMUSCULAR; INTRAVENOUS; SUBCUTANEOUS at 14:40

## 2018-06-28 RX ADMIN — MIDAZOLAM 1 MG: 1 INJECTION INTRAMUSCULAR; INTRAVENOUS at 12:00

## 2018-06-28 RX ADMIN — BUPIVACAINE HYDROCHLORIDE AND EPINEPHRINE BITARTRATE 30 ML: 2.5; .005 INJECTION, SOLUTION INFILTRATION; PERINEURAL at 13:00

## 2018-06-28 RX ADMIN — FENTANYL CITRATE 25 MCG: 50 INJECTION, SOLUTION INTRAMUSCULAR; INTRAVENOUS at 15:42

## 2018-06-28 RX ADMIN — FENTANYL CITRATE 50 MCG: 50 INJECTION, SOLUTION INTRAMUSCULAR; INTRAVENOUS at 15:47

## 2018-06-28 RX ADMIN — ROCURONIUM BROMIDE 40 MG: 10 INJECTION INTRAVENOUS at 13:31

## 2018-06-28 RX ADMIN — SENNOSIDES AND DOCUSATE SODIUM 1 TABLET: 8.6; 5 TABLET ORAL at 20:07

## 2018-06-28 RX ADMIN — Medication 14 ML/HR: at 16:23

## 2018-06-28 ASSESSMENT — COPD QUESTIONNAIRES: COPD: 1

## 2018-06-28 NOTE — ANESTHESIA PREPROCEDURE EVALUATION
Anesthesia Evaluation     . Pt has had prior anesthetic.            ROS/MED HX    ENT/Pulmonary:     (+)asthma COPD, , . .    Neurologic:  - neg neurologic ROS     Cardiovascular:     (+) hypertension----. : . CHF etiology: NICM Last EF: 30 . . :. . Previous cardiac testing Echodate:6/6/18results:Dobutamine stress echo was aborted due to LV dysfunction.  Moderately (EF 30-35%) reduced left ventricular function is present. LVEF 33%  based on biplane tracing. Global hypokinesis.  Left ventricular hypertrophy.  Normal right ventricular size and systolic function.  No significant valve disease.date: results: date: results: date: results:          METS/Exercise Tolerance:     Hematologic:         Musculoskeletal:  - neg musculoskeletal ROS       GI/Hepatic:     (+) GERD       Renal/Genitourinary:     (+) chronic renal disease,       Endo:     (+) type I DM, thyroid problem .      Psychiatric:     (+) psychiatric history anxiety      Infectious Disease:   (+) MRSA, Other Infectious Disease       Malignancy:      - no malignancy   Other:    - neg other ROS               ANESTHESIA PREOP EVALUATION    Procedure: Procedure(s):  Right Knee Ampuation Below Knee, Anesthesia Block - Wound Class: III-Contaminated    HPI: Alessandra Pak is a 52 year old female with a history of NICM s/p ICD with EF 10%-->30%, DM2 with chronic ulcers, chronic pancreatitis s/p Whipple, CKD, restrictive lung disease who was initially admitted on 6/6 with sepsis and osteomyelitis 2/2 right foot abscesses. Her course was complicated by respiratory failure 2/2 ARDS requiring intubation early in her course.    PMHx/PSHx/ROS:  Past Medical History:   Diagnosis Date     Abdominal pain, right upper quadrant     sees Dr Mcclellan pain clinic at [facility] [place]     ASCUS with positive high risk HPV 8/2013    + HPV 33, [place] - GAVIN I, ECC- atypia     Cardiomyopathy (H)     non ischemic cardiomyopathy with EF 15     Cervical high risk HPV (human papillomavirus) test  positive 7/8/15, 7/25/16    NIL pap/+ HR HPV (not 16 or 18).      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    leep     Depressive disorder      Gastro-oesophageal reflux disease      Human papillomavirus in conditions classified elsewhere and of unspecified site 2/2012    + HPV 33     Hypertension      Profound impairment, one eye, impairment level not further specified     rt eye due to childhood injury     Systolic CHF (H) 3/12/2015     Tobacco abuse 5/18/2013     Type 2 diabetes mellitus without complications (H)      Uncomplicated asthma        Past Surgical History:   Procedure Laterality Date     C NONSPECIFIC PROCEDURE  2001    cholecystectomy     C NONSPECIFIC PROCEDURE  as a child    tonsillectomy     C NONSPECIFIC PROCEDURE  2001    whipple procedure     CARDIAC SURGERY      defib     COLPOSCOPY,LOOP ELECTRD CERVIX EXCIS  2002, 2011    stage 2 dysplasia     ENDOBRONCHIAL ULTRASOUND FLEXIBLE N/A 2/19/2015    Procedure: ENDOBRONCHIAL ULTRASOUND FLEXIBLE;  Surgeon: Brenden Tamez MD;  Location: [facility]     LEEP TX, CERVICAL  2014    LEEP TX Cervical     RECESSION RESECTION WITH ADJUSTABLE SUTURE  12/13/2011    Procedure:RECESSION RESECTION WITH ADJUSTABLE SUTURE; Right Strabismus Repair with Adjustable Suture       TUBAL LIGATION  2007    essure          Past Anes Hx: No personal or family h/o anesthesia problems    Soc Hx:   Social History   Substance Use Topics     Smoking status: Former Smoker     Packs/day: 0.30     Years: 15.00     Types: Cigarettes     Smokeless tobacco: Former User     Quit date: 10/29/2014      Comment: Started smoking in 89/ smokes about 3 per day     Alcohol use No       Allergies:   Allergies   Allergen Reactions     No Known Drug Allergies        Meds:   Prescriptions Prior to Admission   Medication Sig Dispense Refill Last Dose     acetone, Urine, test STRP 1 strip by In Vitro route as needed 25 each 1 Past Month at Unknown time     albuterol (ALBUTEROL) 108 (90 BASE) MCG/ACT  Inhaler Inhale 2 puffs into the lungs every 4 hours as needed for shortness of breath / dyspnea 1 Inhaler 5 6/19/2018 at Unknown time     amitriptyline (ELAVIL) 50 MG tablet Take 1 tablet (50 mg) by mouth At Bedtime 90 tablet 3 6/19/2018 at Unknown time     ASPIRIN 81 MG OR TABS 1 tab po QD (Once per day) 100 3 6/20/2018 at Unknown time     blood glucose monitoring (HOLLIE CONTOUR NEXT) test strip Use to test blood sugar 10 times daily or as directed.  Ok to substitute alternative if insurance prefers. 300 each 12 Past Month at Unknown time     blood glucose monitoring (HOLLIE MICROLET) lancets Use to test blood sugar 10 times daily or as directed.  Ok to substitute alternative if insurance prefers. 1 Box prn Past Month at Unknown time     blood glucose monitoring (FREESTYLE) lancets 1 each See Admin Instructions test 3-4 times per day 3 Box 3 Past Month at Unknown time     fluticasone (FLONASE) 50 MCG/ACT spray Spray 2 sprays into left nostril daily 1 Bottle 11 6/19/2018 at Unknown time     furosemide (LASIX) 20 MG tablet Take 1 tablet (20 mg) by mouth daily 30 tablet 0 6/19/2018 at Unknown time     insulin aspart (NOVOLOG VIAL) 100 UNITS/ML injection Use as directed in insulin pump. Patient using up to 60 units per day 60 mL 3 Past Week at Unknown time     insulin pen needle (B-D U/F) 31G X 5 MM Use 3 time(s) a day. 3 each 3 Past Week at Unknown time     Lidocaine (LIDOCARE) 4 % Patch Place 3 patches onto the skin every 24 hours 30 patch 0 Past Week at Unknown time     lidocaine HCl 3 % cream Apply topically 3 times daily as needed (foot pain) OTC product   Past Week at Unknown time     lisinopril (PRINIVIL/ZESTRIL) 10 MG tablet Take 1 tablet (10 mg) by mouth daily 30 tablet 0 Past Week at Unknown time     LYRICA 75 MG capsule TAKE 1 CAPSULE BY MOUTH 3 TIMES DAILY 90 capsule 5 6/20/2018 at Unknown time     methadone (DOLPHINE-INTENSOL) 10 mg/mL CONC Take 7 mLs by mouth daily.   6/20/2018 at Unknown time     MIRENA  20 MCG/24HR IU IUD placed today (Patient taking differently: No sig reported) 1 0 6/20/2018 at Unknown time     oxyCODONE IR (ROXICODONE) 5 MG tablet Take 1 tablet (5 mg) by mouth every 6 hours as needed for moderate to severe pain 12 tablet 0 Past Week at Unknown time     polyethylene glycol (MIRALAX/GLYCOLAX) Packet Take 17 g by mouth daily as needed for constipation   Past Week at Unknown time     SM NICOTINE 21 MG/24HR 24 hr patch REMOVE OLD PATCH AND APPLY ONE NEW PATCH TO SKIN EVERY 24 HOURS 28 patch 1 Past Week at Unknown time     vancomycin 750 mg Inject 750 mg into the vein every 12 hours 35 Units 0 6/20/2018 at Unknown time     gabapentin 8 % GEL topical PLO cream Apply 1 g topically every 8 hours 50 g 0 Unknown at Unknown time     glucose 4 G CHEW Take 3-4 tablets to treat low blood sugar. 25 tablet 11 Unknown at Unknown time     Ipratropium-Albuterol (COMBIVENT RESPIMAT)  MCG/ACT inhaler Inhale 1 puff into the lungs 4 times daily Do not exceed 6 doses/day.   Unknown at Unknown time     multivitamin, therapeutic with minerals (THERA-VIT-M) TABS Take 1 tablet by mouth daily 30 each 11 Unknown at Unknown time     ranitidine (ZANTAC) 300 MG tablet Take 1 tablet (300 mg) by mouth daily 90 tablet 3 Unknown at Unknown time       No current outpatient prescriptions on file.       Physical Exam:  VS: T 98.7, P 98, /90, R 13, SpO2 100%, Weight   Wt Readings from Last 2 Encounters:   06/27/18 56.1 kg (123 lb 11.2 oz)   02/23/18 63.5 kg (139 lb 15.9 oz)           Labs:  UPT: No results found for: HCGQUANT      BMP:  Recent Labs   Lab Test  06/28/18   0713   NA  139   POTASSIUM  4.1   CHLORIDE  105   CO2  25   BUN  29   CR  1.13*   GLC  229*   RICK  8.9     CBC:   Recent Labs   Lab Test  06/27/18 2022 06/27/18   0709   WBC   --   7.6   RBC   --   3.23*   HGB   --   9.0*   HCT   --   27.4*   MCV   --   85   MCH   --   27.9   MCHC   --   32.8   RDW   --   22.9*   PLT  376  414     Coags:  Recent Labs    Lab Test  06/13/18   1645   INR  1.33*   PTT  55*   FIBR  852*       Assessment/Plan:  - ASA 3  - GETA with standard ASA monitors, IV induction, balanced anesthetic  - PIV  - arterial line  - Antibiotics per surgery  - PONV prophylaxis  - Blood products available, possible administration discussed with patient  - Relevant risks, benefits, alternatives and the anesthetic plan were discussed with patient/family or family representative.  All questions were answered and there was agreement to proceed.      Amisha Pinto MD    6/28/2018  2:37 PM                   Anesthesia Plan      History & Physical Review  History and physical reviewed and following examination; no interval change.    ASA Status:  3 .    NPO Status:  > 2 hours    Plan for General and ETT with Intravenous induction.   PONV prophylaxis:  Ondansetron (or other 5HT-3)  Additional equipment: Arterial Line and 2nd IV      Postoperative Care  Postoperative pain management:  Multi-modal analgesia.      Consents  Anesthetic plan, risks, benefits and alternatives discussed with:  Patient..                          .

## 2018-06-28 NOTE — SIGNIFICANT EVENT
I personally met, examined and counseled the patient about her options today.  She is previously had extensive discussions with my resident team as well as multiple counseling services.  In brief she has had multiple hospital admissions for sepsis due to her right diabetic foot ulcer.  In the past and has been treated with serial debridements and IV antibiotics.  During most recent hospitalization she was so severely septic that she had to be intubated for acute respiratory distress syndrome.  It took over a week to medically stabilize her and she was very critically ill and the illness was life-threatening.    It is the opinion of both myself as well as the intensive care unit staff that she is at risk for recurrent infection.  Imaging does show osteomyelitis in the foot and there is exposed bone due to her soft tissue defects on the foot.  Thus I think it it will be impossible to cure this problem medically.  If she does not have any amputation the infection is likely to recur and could make her critically ill a second or third time.  Furthermore, I think the function of the foot will be poor.  There is a large soft tissue defect with exposed tendon and bone and I think the function of her native limb will be less than that of a prosthesis.    In the past the patient has been uncertain whether or not she wanted an amputation.  She was actually offered an amputation at the time of her first hospital admission for this problem and refused, which led to the recurrent infection and subsequent critical illness.  She again was a bit uncertain initially.  She had multiple counseling visits, there was a family meeting, and she has had over a week to think about it.  She tells me today, she is now certain that she does want the foot off because she has been sick for so long that she cannot continue to live with this problem.  I think this is reasonable.  I told her the main risks of surgery are wound complication and risk  of phantom limb pain and risk of difficulty with the prosthesis in the future.  It is possible the amputation to need to be revised if there were subsequent wound or bone irritation problems.  It is possible could become infected which might need prolonged antibiotics or additional surgery.  Her nutritional status is very poor.  She has been on tube feeds to try and increase her serum albumin.  We did consult with the intensive care team.  It is felt that it would take months to improve her nutritional status.  Right now, she cannot be discharged from the hospital because she requires continuous IV antibiotics to keep the infection at bay.  She remains seriously ill.  Thus, I think delaying the amputation for months in the setting of this ongoing serious infection is not reasonable and would place her life at further risk for recurrent infection.  Thus, although her nutritional status is poor I think we will proceed with the amputation and if she does develop a wound healing problem I told her it might need a revision in the future.  She understands this is a significant risk.    I spent approximately 20 minutes counseling the patient at bedside and making sure she understood these risks today.  She would like to proceed with the procedure.    Ambroes King MD

## 2018-06-28 NOTE — PLAN OF CARE
Problem: Infection, Risk/Actual (Adult)  Goal: Identify Related Risk Factors and Signs and Symptoms  Related risk factors and signs and symptoms are identified upon initiation of Human Response Clinical Practice Guideline (CPG).   D. Pt. Has been ambulating in the halls at times.  No complaints of pain except had a headache earlier.  Tube feeds are running now till midnight.  Finger stickes were 139 and 336.  Received 3 units for the bedtime blood sugar.  Is to have surgery tomorrow on her Right leg.  To have below the knee amputation of the right foot. Surgery is not until 1300.  P. Continue with Plan of care and prepare for surgery

## 2018-06-28 NOTE — PLAN OF CARE
Problem: Patient Care Overview  Goal: Plan of Care/Patient Progress Review  Outcome: No Change  2434-1214: VSS, no c/o pain, up ad kyara in room but encouraged patient to not bear weight on RLE. TF stopped & switched over to D10 at 80/ml hr. 0200 - md aware. IV antibiotics infused per mar. BKA today. No needs at this time, will report to oncoming shift.

## 2018-06-28 NOTE — ANESTHESIA POSTPROCEDURE EVALUATION
Patient: Alessandra Pak    Procedure(s):  Right Knee Ampuation Below Knee, Anesthesia Block - Wound Class: III-Contaminated    Diagnosis:Right Foot Infection   Diagnosis Additional Information: No value filed.    Anesthesia Type:  No value filed.    Note:  Anesthesia Post Evaluation    Patient location during evaluation: PACU  Patient participation: Able to fully participate in evaluation  Level of consciousness: awake and alert  Pain management: adequate  Airway patency: patent  Cardiovascular status: blood pressure returned to baseline and hemodynamically stable  Respiratory status: acceptable and nasal cannula  Hydration status: acceptable  PONV: none     Anesthetic complications: None    Comments: On Q pumps hooked up         Last vitals:  Vitals:    06/28/18 1530 06/28/18 1545 06/28/18 1600   BP: (!) 162/106 (!) 157/102 (!) 151/96   Pulse:      Resp: 12 12 8   Temp: 36.6  C (97.8  F)  36.5  C (97.7  F)   SpO2: 98% 96% 93%         Electronically Signed By: Rene Amezcua MD  June 28, 2018  4:45 PM

## 2018-06-28 NOTE — BRIEF OP NOTE
[facility] Orthopedic Brief Operative Note    Pre-operative diagnosis: Right Foot Infection    Post-operative diagnosis: Same   Procedure: Procedure(s):  AMPUTATE LEG BELOW KNEE; right    Surgeon: Fernando    Assistant(s): Pablo Elizabeth MD; Ryan Ríos MS4   Anesthesia: General endotracheal anesthesia   Estimated blood loss: Less than 50 ml   Total IV fluids: (See anesthesia record)   Total urine output: Not measured   Drains: None   Specimens: Multiple specimens sent from the proximal tibial canal    Implants: None    Findings: See dictated operative report for full details   Complications: None   Disposition: Stable to PACU   Comments:  Plan:  See dictated operative report for full details  Non weight bearing RLE  Deep venous thrombosis prophylaxis - per primary team  Start physical therapy  Follow cultures   Dressing change on POD#3-5 with transition to stump  from prosthetics      Pablo Elizabeth MD   Orthopedic Surgery, PGY-4  Pager: [phone]  6/28/2018  3:22 PM

## 2018-06-28 NOTE — ANESTHESIA PROCEDURE NOTES
Peripheral Nerve Block Procedure Note    Staff:     Anesthesiologist:  FILIPE SNOWDEN    Resident/CRNA:  SARA TANNER    Block performed by resident/CRNA in the presence of a teaching physician    Location: Pre-op  Procedure Start/Stop TImes:      6/28/2018 12:00 PM     6/28/2018 12:43 PM    patient identified, IV checked, site marked, risks and benefits discussed, informed consent, monitors and equipment checked, pre-op evaluation, at physician/surgeon's request and post-op pain management      Correct Patient: Yes      Correct Position: Yes      Correct Site: Yes      Correct Procedure: Yes      Correct Laterality:  Yes    Site Marked:  Yes  Procedure details:     Procedure:  Sciatic and Femoral    ASA:  3    Laterality:  Right    Position:  Supine and Left Lateral Decubitus    Sterile Prep: chloraprep, mask and sterile gloves      Local skin infiltration:  1% lidocaine    amount (mL):  5    Needle:  Toclarita needle    Needle gauge:  17    Needle length (inches):  3.13    Catheter gauge:  19    Catheter threaded easily: Yes      Ultrasound: Yes      Ultrasound used to identify targeted nerve, plexus, or vascular structure and placed a needle adjacent to it      Permanent Image entered into patiient's record      Abnormal pain on injection: No      Blood Aspirated: No      Paresthesias:  No    Bleeding at site: No      Test dose (mL):  3    Test dose local:   Lidocaine 1.5% w/ 1:200,000 epinephrine    Test dose time:  12:35    Test dose negative for signs of intravascular injection: Yes      Bolus via:  Catheter    Infusion Method:  Continuous Infusion    Blood aspirated via catheter: No      Secured:  Dermabond and Tegaderm    Complications:  None  Assessment/Narrative:        -Right femoral and sciatic nerve catheters placed  -Tolerated procedure well  -Bolused with 15ml of 0.25% Buipvacaine with Epi (1:200k) in EACH catheter after placement (30ml total volume)

## 2018-06-28 NOTE — OR NURSING
Care taken over per writer,, Right sciatic nerve catheter  Placement started. Pt placed on L side.. Tolrating well.. VSS.

## 2018-06-28 NOTE — PROGRESS NOTES
"[facility], [place]    Internal Medicine Progress Note - [facility] Service    Main Plans for Today    -continue vanc/cefepime/metronidazole, d/c following surgery  -R BKA today, 6/28 on [place]    Assessment & Plan   Alessandra Pak is a 52 year old female admitted on 6/6/2018. 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 Hospital course was complicated by respiratory failure due to ARDS requiring intubation (6/10-15), extubated and transferred to medicine for further cares.    # Type 2 Diabetes  # Hypoglycemia, resolved  # Hx of Chronic pancreatitis s/p whipple's  # Severe malnutrition in the context of acute illness  Under control of hyperglycemia on 7 units, no hypoglycemia.  Will hold patient's afternoon glargine and ssi as patient will be prolonged n.p.o. for OR tomorrow.  Patient states that her hunger has improved with cycling tube feeds, taking ~1000 kcal per day at this time.  -hold glargine 7u, med ssi  -cycle TFs to try to stimulate appetite, calorie counts, goal \"~ 1200 kcals and 50 gm protein per day\"  -continue to monitor, ensure patient able to maintain normo to slight hyperglycemia on PO as weaning from TFs  -pantoprazole PO qd  -throat lozenge and spray    TF regimen: Nutren 1.5 @ 50 ml/hr, (1200 ml/day)  Provides: 1800 kcals (32+), 82 gm PRO (1.4+), 912 mL H2O, 211 gm CHO and 0 fiber.   Water flushes: 50 ml every 2 hours    # Anxiety  Anxiety improving. Patient remains redirectable and calms with reassurance. Patient on amitriptyline, prefer to avoid benzos, SSRIs unlikely to have benefit acutely. Health psychology has been helpful, continue visits.  -continue regular reassurance  -consider low dose hydroxyzine if necessary    # Severe Sepsis, " resolved  # R diabetic foot infection, s/p I&D x 3, Ongoing Osteomyelitis  Plan for surgery today ideally with spinal anesthesia.  -appreciate ortho and ID input  -change to vanc/cefepime/metronidazole for renal protection. Per ID, stop abx completely following amputation  -R BKA today, 6/28 on [place]  -qod CRP, stabilized    # Preop  Patient is class III risk per revised cardiac index (CHF history, insulin use). Patient cannot perform 4 METS of activity at baseline.  TTE performed 6/9/18, EF 50%, no valvular abnormalities. Will require further evaluation with EKG as well as possible stress imaging (last angiogram with mild nonobstructive CAD in 2014).   -Dobutamine echo cancelled due to EF 33% down from prior 55% earlier during admission (10-15% in 2015)  -Cardiology consulted for assistance with cardiovascular optimization prior to surgery, recommended Lexiscan, patient refused.  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  Improved volume status on exam, crackles improved, S3 resolved, lower extremity edema resolved.  Patient continues to tolerate room air at rest.  -hold 40 mg PO furosemide today, plan to resume tomorrow  -Daily weights, strict I/O  -wean O2 as tolerated, goal >88%    # Normocytic Anemia  # Anemia of chronic disease  Insufficient reticulocyte response on 6/10 noted.  Ferritin elevated.  Status post 3 units PRBC (6/17, 6/14, 6/13). Appropriate response. No bleeding observed.  -continue to monitor    # Diarrhea, resolved  Notes worsening with creon. Neg infectious studies.  -Continue to  monitor    # Hypernatremia, resolved  Improved with increased  cc q2h (146). Now with PO intake.  -monitor    # Chronic pain  Discussed patient's methadone with pharmacy we do not note interaction with patient's current antibiotics (though would have interaction with linezolid if used in the future).  No empiric changes to methadone for anticipated body weight changes following amputation at this time.  - Methadone 70 qday  - Pregabalin  - Amitriptyline  - d/c oxycodone, pt has not been using    # Adrenal insufficiency  S/p stress dose steroid taper, complete 6/18.    # NIKOLAY on CKD, resolved  - monitor  - abx change as above    # Anisocoria/R afferent pupillary defect  - stable      # Pain Assessment:  Current Pain Score 6/28/2018   Patient currently in pain? denies   Pain score (0-10) -   Pain location -   Pain descriptors -   CPOT pain score -   - Alessandra is experiencing pain due to Chronic pain as well as recent surgical procedure, NG. Pain management was discussed and the plan was created in a collaborative fashion.  Alessandra's response to the current recommendations: engaged  - Please see the plan for pain management as documented above        Diet: Room Service  NPO per Anesthesia Guidelines for Procedure/Surgery Except for: Meds  Adult Formula Drip Feeding: Continuous Nutren 1.5; Nasoduodenal tube; Goal Rate: 80; mL/hr; From: 8:00 PM; 10:00 AM; Medication - Tube Feeding Flush Frequency: At least 15-30 mL water before and after medication administration and with tube cloggi...  Snacks/Supplements Adult: Magic Cup; Between Meals  Snacks/Supplements Adult: Magic Cup; With Meals  Fluids: Free water flushes as aboe  DVT Prophylaxis: hold for surgery  Code Status: Full Code    Disposition Plan   Expected discharge: 4 - 7 days, recommended to transitional care unit once antibiotic plan established, safe disposition plan/ TCU bed available and post-amputation rehab needs established.     Entered: Alireza Chaparro  06/28/2018, 10:34 AM   Information in the above section will display in the discharge planner report.      Alireza Chaparro  [facility]: 5  Pager: [phone]  Please see sticky note for cross cover information    Interval History   No acute events.  Patient had poor night of sleep, some anxiety for surgery but state that she is ready. Denies pain, sob, edema. Enjoyed the magic cups.    Physical Exam   Vital Signs: Temp: 96  F (35.6  C) Temp src: Oral BP: 134/66 Pulse: 98 Heart Rate: 91 Resp: 16 SpO2: 90 % O2 Device: None (Room air)    Weight: 123 lbs 11.2 oz  General Appearance: Pleasant, mildly anxious, sitting up in bed  Respiratory: Crackles in the bases bilaterally improved, breathing comfortably  Cardiovascular: Normal rate, regular rhythm, no S3, no murmur rubs  GI: Abdomen is soft, nontender, nondistended no acute changes noted.  Skin: no rashes or jaundice, R foot dressed  Other: Alert and oriented x4, speech fluent, no LE edema, wwp        Data   Medications     IV fluid REPLACEMENT ONLY 80 mL/hr at 06/28/18 0010     IV fluid REPLACEMENT ONLY Stopped (06/12/18 2205)     Patient RECEIVING antibiotic to treat a different condition and it provides ADEQUATE COVERAGE for this surgical procedure.         acetaminophen  1,000 mg Oral TID     amitriptyline  50 mg Oral At Bedtime     ceFAZolin  1 g Intravenous See Admin Instructions     ceFAZolin  2 g Intravenous Pre-Op/Pre-procedure x 1 dose     ceFEPIme (MAXIPIME) IV  2 g Intravenous Q12H     fluticasone  2 spray Left nostril Daily     heparin lock flush  5-10 mL Intracatheter Q24H     insulin aspart  1-7 Units Subcutaneous TID AC     insulin aspart  1-5 Units Subcutaneous At Bedtime     melatonin  6 mg Oral At Bedtime     methadone  70 mg Oral Daily     metroNIDAZOLE  500 mg Intravenous Q6H     multivitamins with minerals  15 mL Per Feeding Tube Daily     omeprazole  20 mg Oral Daily     pregabalin  75 mg Oral TID     protein modular  1 packet  Per Feeding Tube BID 09 12     vancomycin (VANCOCIN) IV  1,000 mg Intravenous Q24H     Data     Recent Labs  Lab 06/28/18  0713 06/27/18 2022 06/27/18  0709 06/26/18  0540  06/24/18  1556   WBC  --   --  7.6  --   --   --    HGB  --   --  9.0*  --   --   --    MCV  --   --  85  --   --   --    PLT  --  376 414  --   --  455*     --  136 138  < >  --    POTASSIUM 4.1  --  4.2 4.4  < >  --    CHLORIDE 105  --  104 104  < >  --    CO2 25  --  26 27  < >  --    BUN 29  --  31* 31*  < >  --    CR 1.13*  --  1.17* 1.25*  < >  --    ANIONGAP 8  --  7 8  < >  --    RICK 8.9  --  8.3* 8.6  < >  --    *  --  187* 230*  < >  --    ALBUMIN  --   --   --  1.9*  --   --    < > = values in this interval not displayed.  No results found for this or any previous visit (from the past 24 hour(s)).

## 2018-06-28 NOTE — OR NURSING
Dr. Pinto notified of pre-op glucose of 241. She ordered that the patient be given 1 unit of regular insulin IV even though this is not the normal pre-op protocol.

## 2018-06-28 NOTE — PROGRESS NOTES
"Orthopaedic Daily Progress Note    S: No acute overnight events. Afebrile. Denies chest pain or shortness of breath. Ready for procedure today.     O:  /66 (BP Location: Left arm)  Pulse 98  Temp 96  F (35.6  C) (Oral)  Resp 16  Ht 1.727 m (5' 8\")  Wt 56.1 kg (123 lb 11.2 oz)  SpO2 90%  BMI 18.81 kg/m2  Gen: No acute distress, alert  Resp: Breathing comfortably  MSK:   RLE:  Wounds not examined today.   Strength: wiggles all toes, grossly fires,  gsc, TA, EHL, FHL  Sensation: baseline numbness in stocking distribution unchanged from prior exam  Circulation: toes wwp, palpable DP pulse.       Recent Labs  Lab 06/27/18 2022 06/27/18  0709 06/26/18  0540 06/25/18  0552 06/24/18  1556 06/24/18  0838  06/21/18  1659   WBC  --  7.6  --   --   --   --   --   --    HGB  --  9.0*  --   --   --   --   --   --     414  --   --  455*  --   --  582*   CR  --  1.17* 1.25* 1.29*  --  1.40*  < >  --    < > = values in this interval not displayed.      Wound Culture: polymicrobial      Assessment: Alessandra Pak is a 52 year old female admitted sepsis with recurrent right foot diabetic ulcers as source, now s/p bedside I&D right foot on 6/7, 6/12, and 6/13.  Intubated for ARDS/hypoxic respiratory failure on 6/10, extubated 6/15, TTF 6/15. At this time all infectious markers and patient's status are appropriately improved and the acute infection is resolved.  Ongoing recurring foot ulcers in the setting of poorly controlled DM/malnutrition - if ulcers cannot be resolved with adherence to a good medical mgmt regimen, amputation is a more durable option to prevent infections which have proven to be life-threatening. Per medicine, no reliable way to get patient off IV antibiotics. With extensive ulcers, patient would like to proceed with BKA.       Per medicine team, unable to obtain stress echo yesterday. Patient cleared and optimized per medicine and cardiology.       Plan:  Medicine Primary  Activity: Elevate " BLE as much as possible   Weight bearing status: NWB RLE, okay for minimal WB with left forefoot for transfers to chair or commode.   Antibiotics: per primary/ID  Labs: pre op CBC, BMP, INR, request type and cross for 2 units for pre op  DVT prophylaxis: hold for OR today    Wound Care: daily dressing by bedside RN, weekly WOCN dressing changes  -NPO for OR today  -dispo: pending operative intervention     Discussed with Dr. King.     Tian Ashby MD  Orthopaedic Surgery  PGY-4  Pager [phone]

## 2018-06-28 NOTE — OR NURSING
Dr Floresenge here to see pt- he looked over the OnQ catheters and left them at the current infusion rate of 7mL/hour each catheter. Pt VSS, now states her pain is under control, resting between cares. MDA gives the OK to send pt to floor, agrees to sign out of PACU.   Text page sent to Dr King to contact the patient's mother as she did not get contacted after surgery to hear how the OR case went. He was given her cell phone number in the body of the text.

## 2018-06-28 NOTE — ANESTHESIA CARE TRANSFER NOTE
Patient: Alessandra Pak    Procedure(s):  Right Knee Ampuation Below Knee, Anesthesia Block - Wound Class: III-Contaminated    Diagnosis: Right Foot Infection   Diagnosis Additional Information: No value filed.    Anesthesia Type:   No value filed.     Note:  Airway :Nasal Cannula  Patient transferred to:PACU  Comments: VSS, patent airway, report to RN.Handoff Report: Identifed the Patient, Identified the Reponsible Provider, Reviewed the pertinent medical history, Discussed the surgical course, Reviewed Intra-OP anesthesia mangement and issues during anesthesia, Set expectations for post-procedure period and Allowed opportunity for questions and acknowledgement of understanding      Vitals: (Last set prior to Anesthesia Care Transfer)    CRNA VITALS  6/28/2018 1450 - 6/28/2018 1536      6/28/2018             Pulse: 111    ART BP: (!)  0/0    ART Mean: 0    SpO2: 100 %    Resp Rate (observed): (!)  6                Electronically Signed By: GAVINO Barraza CRNA  June 28, 2018  3:36 PM

## 2018-06-28 NOTE — PLAN OF CARE
Problem: Patient Care Overview  Goal: Plan of Care/Patient Progress Review  Outcome: No Change  Patient alert and oriented x4, Patient vitally stable on room air. Patient complained of pain in feet and morning medications given including scheduled pain medication. Patient denies nausea. Patients family came to visit her before she went down for her procedure. Patient left the unit via litter for Right foot amputation at 10:50AM. The patient has not yet returned to the unit. Continue to monitor and notify MD of any changes.

## 2018-06-28 NOTE — OP NOTE
DATE OF SURGERY: 6/28/2018    PREOPERATIVE DIAGNOSIS: Right Foot Infection              POSTOPERATIVE DIAGNOSIS: Osteomyelitis of the right foot and diabetic foot ulceration    PROCEDURES:  1.  Right below-knee amputation    PRIMARY SURGEON: Ambrose King MD    FIRST ASSISTANT:Mindy Bui,  There is no qualified resident available.  The assistant was necessary for retraction, visualization and wound closure.    ANESTHESIA: General Endotracheal    COMPLICATIONS:  None.    SPECIMENS: None.    ESTIMATED BLOOD LOSS: 25cc    INDICATIONS:                          Alessandra Pak is a 52 year old female who elected surgical treatment, and understood the indications for this surgery, as well as its risks, benefits, and alternatives as documented in the pre-operative H&P.  Please see my note from earlier today for full details.  In brief, she has had multiple recurrent infections of the right foot most recently causing sepsis with acute respiratory distress syndrome resulting in prolonged intubation and intensive care unit stay with life-threatening critical illness.  We recommended an amputation for infection control.  She understood the primary risks were risk of wound complication or phantom limb pain.  I told her there were risks of the prosthesis risks of the recovery and difficulties with ambulation and mobility afterwards.  She understands these risks.  The main benefit of surgery is to try and prevent the infection from coming back or getting worse which might be life-threatening given that she was so critically ill this most recent time.  Furthermore she has had 2 recent hospitalizations for this and the wound is not getting any better so I do not think the problem will go away.  She understands this and wishes to proceed.    DESCRIPTION OF PROCEDURE:           Alessandra Pak was taken to the operating room, where the Anesthesiology Service induced satisfactory general anesthesia.  She was positioned  supine with a bump underneath the right hip and a tourniquet on the leg.  We prepped and draped in the usual fashion with a stockinette over the right foot.  We then held multidiscipline a timeout in which we verified the patient procedure and antibiotic plan.  All team members were in agreement.  She was continued on her antibiotics from the inpatient floor.    I began the procedure by marking out the skin.  I plan for a 15 cm distal tibial cut.  I marked for an adequate posterior gastrocnemius flap.  We then elevated the leg and brought the tourniquet up to 200 mmHg.  I then sharply incised the skin and skeletonized the distal tibia and preserved the posterior soft tissue flap as we went down.  We then obtained control of the anterior tibia and I cut this with an oscillating saw.  I then went about a centimeter proximal to this and cut the distal fibula again with the oscillating saw.  I then completed the amputation with an amputation knife and we sent the specimen to pathology.  I then took cultures of the distal tibia and this was sent for pathology to verify there is no residual infection of the distal tibia.  I then used the saw to plane off the inferior aspect of the tibia and create a smooth edge for weightbearing.  We then went about finding all of the vasculature and I tied off all of the major vessels with 0 silk ties.  We then let the tourniquet down and there was no significant bleeding.  Some light skin bleeding was controlled with cautery.  We then thoroughly irrigated the wound.  I then pulled the distal fascia up over the end of the tibia and a pants over vest fashion and this was sewn closed with 0 Vicryl.  We put multiple stitches until a tight fascial closure was obtained.  We then closed the fat layer again with Vicryl and then closed the dermis with 2-0 Monocryl and closed the skin with 3-0 nylon's.  I was happy with the appearance of the stump.  A fluff type dressing was placed and then an  ABD was wrapped over the wound.    There were no complications.    I was present and scrubbed for the entire procedure.    Postoperatively she will be admitted for continued medical care as given her recent critical status.  We will keep this dressing in place for 5 days at which time I would like her to be converted to a stump .  She did receive a block from the pain team preoperatively for pain control.  I will defer anticoagulation management to the medicine team.    Ambrose King MD

## 2018-06-29 ENCOUNTER — APPOINTMENT (OUTPATIENT)
Dept: GENERAL RADIOLOGY | Facility: CLINIC | Age: 53
DRG: 853 | End: 2018-06-29
Payer: COMMERCIAL

## 2018-06-29 LAB
ALBUMIN SERPL-MCNC: 2 G/DL (ref 3.4–5)
ALP SERPL-CCNC: 799 U/L (ref 40–150)
ALT SERPL W P-5'-P-CCNC: 58 U/L (ref 0–50)
ANION GAP SERPL CALCULATED.3IONS-SCNC: 8 MMOL/L (ref 3–14)
AST SERPL W P-5'-P-CCNC: 68 U/L (ref 0–45)
BILIRUB SERPL-MCNC: 0.2 MG/DL (ref 0.2–1.3)
BUN SERPL-MCNC: 22 MG/DL (ref 7–30)
CALCIUM SERPL-MCNC: 8.2 MG/DL (ref 8.5–10.1)
CHLORIDE SERPL-SCNC: 103 MMOL/L (ref 94–109)
CO2 SERPL-SCNC: 25 MMOL/L (ref 20–32)
CREAT SERPL-MCNC: 0.95 MG/DL (ref 0.52–1.04)
CREAT SERPL-MCNC: 0.95 MG/DL (ref 0.52–1.04)
CRP SERPL-MCNC: 42 MG/L (ref 0–8)
ERYTHROCYTE [DISTWIDTH] IN BLOOD BY AUTOMATED COUNT: 22.8 % (ref 10–15)
GFR SERPL CREATININE-BSD FRML MDRD: 62 ML/MIN/1.7M2
GFR SERPL CREATININE-BSD FRML MDRD: 62 ML/MIN/1.7M2
GLUCOSE BLDC GLUCOMTR-MCNC: 128 MG/DL (ref 70–99)
GLUCOSE BLDC GLUCOMTR-MCNC: 154 MG/DL (ref 70–99)
GLUCOSE BLDC GLUCOMTR-MCNC: 156 MG/DL (ref 70–99)
GLUCOSE BLDC GLUCOMTR-MCNC: 209 MG/DL (ref 70–99)
GLUCOSE BLDC GLUCOMTR-MCNC: 281 MG/DL (ref 70–99)
GLUCOSE BLDC GLUCOMTR-MCNC: 428 MG/DL (ref 70–99)
GLUCOSE BLDC GLUCOMTR-MCNC: 60 MG/DL (ref 70–99)
GLUCOSE BLDC GLUCOMTR-MCNC: 62 MG/DL (ref 70–99)
GLUCOSE BLDC GLUCOMTR-MCNC: 94 MG/DL (ref 70–99)
GLUCOSE SERPL-MCNC: 357 MG/DL (ref 70–99)
HCT VFR BLD AUTO: 30.3 % (ref 35–47)
HGB BLD-MCNC: 9.4 G/DL (ref 11.7–15.7)
LACTATE BLD-SCNC: 1.1 MMOL/L (ref 0.4–1.9)
MAGNESIUM SERPL-MCNC: 2 MG/DL (ref 1.6–2.3)
MCH RBC QN AUTO: 27.3 PG (ref 26.5–33)
MCHC RBC AUTO-ENTMCNC: 31 G/DL (ref 31.5–36.5)
MCV RBC AUTO: 88 FL (ref 78–100)
PHOSPHATE SERPL-MCNC: 2.2 MG/DL (ref 2.5–4.5)
PLATELET # BLD AUTO: 315 10E9/L (ref 150–450)
PLATELET # BLD AUTO: 345 10E9/L (ref 150–450)
POTASSIUM SERPL-SCNC: 4.5 MMOL/L (ref 3.4–5.3)
PROT SERPL-MCNC: 7.1 G/DL (ref 6.8–8.8)
RBC # BLD AUTO: 3.44 10E12/L (ref 3.8–5.2)
SODIUM SERPL-SCNC: 137 MMOL/L (ref 133–144)
WBC # BLD AUTO: 12.1 10E9/L (ref 4–11)

## 2018-06-29 PROCEDURE — 25000125 ZZHC RX 250: Performed by: STUDENT IN AN ORGANIZED HEALTH CARE EDUCATION/TRAINING PROGRAM

## 2018-06-29 PROCEDURE — G0463 HOSPITAL OUTPT CLINIC VISIT: HCPCS

## 2018-06-29 PROCEDURE — 84100 ASSAY OF PHOSPHORUS: CPT | Performed by: STUDENT IN AN ORGANIZED HEALTH CARE EDUCATION/TRAINING PROGRAM

## 2018-06-29 PROCEDURE — 25000132 ZZH RX MED GY IP 250 OP 250 PS 637: Performed by: HOSPITALIST

## 2018-06-29 PROCEDURE — 82565 ASSAY OF CREATININE: CPT | Performed by: STUDENT IN AN ORGANIZED HEALTH CARE EDUCATION/TRAINING PROGRAM

## 2018-06-29 PROCEDURE — 25000128 H RX IP 250 OP 636: Performed by: PHYSICIAN ASSISTANT

## 2018-06-29 PROCEDURE — 97110 THERAPEUTIC EXERCISES: CPT | Mod: GP

## 2018-06-29 PROCEDURE — 25000132 ZZH RX MED GY IP 250 OP 250 PS 637: Performed by: STUDENT IN AN ORGANIZED HEALTH CARE EDUCATION/TRAINING PROGRAM

## 2018-06-29 PROCEDURE — 85049 AUTOMATED PLATELET COUNT: CPT | Performed by: STUDENT IN AN ORGANIZED HEALTH CARE EDUCATION/TRAINING PROGRAM

## 2018-06-29 PROCEDURE — 83735 ASSAY OF MAGNESIUM: CPT | Performed by: STUDENT IN AN ORGANIZED HEALTH CARE EDUCATION/TRAINING PROGRAM

## 2018-06-29 PROCEDURE — 40000193 ZZH STATISTIC PT WARD VISIT

## 2018-06-29 PROCEDURE — 25000128 H RX IP 250 OP 636: Performed by: ORTHOPAEDIC SURGERY

## 2018-06-29 PROCEDURE — 40000802 ZZH SITE CHECK

## 2018-06-29 PROCEDURE — 36592 COLLECT BLOOD FROM PICC: CPT | Performed by: STUDENT IN AN ORGANIZED HEALTH CARE EDUCATION/TRAINING PROGRAM

## 2018-06-29 PROCEDURE — 36592 COLLECT BLOOD FROM PICC: CPT | Performed by: HOSPITALIST

## 2018-06-29 PROCEDURE — 97164 PT RE-EVAL EST PLAN CARE: CPT | Mod: GP

## 2018-06-29 PROCEDURE — 99233 SBSQ HOSP IP/OBS HIGH 50: CPT | Mod: GC | Performed by: HOSPITALIST

## 2018-06-29 PROCEDURE — 85027 COMPLETE CBC AUTOMATED: CPT | Performed by: STUDENT IN AN ORGANIZED HEALTH CARE EDUCATION/TRAINING PROGRAM

## 2018-06-29 PROCEDURE — 12000008 ZZH R&B INTERMEDIATE UMMC

## 2018-06-29 PROCEDURE — 71045 X-RAY EXAM CHEST 1 VIEW: CPT

## 2018-06-29 PROCEDURE — 25000132 ZZH RX MED GY IP 250 OP 250 PS 637: Performed by: INTERNAL MEDICINE

## 2018-06-29 PROCEDURE — 83605 ASSAY OF LACTIC ACID: CPT | Performed by: HOSPITALIST

## 2018-06-29 PROCEDURE — 25000128 H RX IP 250 OP 636: Performed by: STUDENT IN AN ORGANIZED HEALTH CARE EDUCATION/TRAINING PROGRAM

## 2018-06-29 PROCEDURE — 25000132 ZZH RX MED GY IP 250 OP 250 PS 637: Performed by: PHYSICIAN ASSISTANT

## 2018-06-29 PROCEDURE — 27210429 ZZH NUTRITION PRODUCT INTERMEDIATE LITER

## 2018-06-29 PROCEDURE — 97530 THERAPEUTIC ACTIVITIES: CPT | Mod: GP

## 2018-06-29 PROCEDURE — 00000146 ZZHCL STATISTIC GLUCOSE BY METER IP

## 2018-06-29 PROCEDURE — 87040 BLOOD CULTURE FOR BACTERIA: CPT | Performed by: ORTHOPAEDIC SURGERY

## 2018-06-29 PROCEDURE — 86140 C-REACTIVE PROTEIN: CPT | Performed by: STUDENT IN AN ORGANIZED HEALTH CARE EDUCATION/TRAINING PROGRAM

## 2018-06-29 PROCEDURE — 36415 COLL VENOUS BLD VENIPUNCTURE: CPT | Performed by: ORTHOPAEDIC SURGERY

## 2018-06-29 PROCEDURE — 80053 COMPREHEN METABOLIC PANEL: CPT | Performed by: STUDENT IN AN ORGANIZED HEALTH CARE EDUCATION/TRAINING PROGRAM

## 2018-06-29 PROCEDURE — 27110038 ZZH RX 271: Performed by: STUDENT IN AN ORGANIZED HEALTH CARE EDUCATION/TRAINING PROGRAM

## 2018-06-29 PROCEDURE — 85018 HEMOGLOBIN: CPT | Performed by: STUDENT IN AN ORGANIZED HEALTH CARE EDUCATION/TRAINING PROGRAM

## 2018-06-29 RX ORDER — ACETAMINOPHEN 500 MG
1000 TABLET ORAL ONCE
Status: COMPLETED | OUTPATIENT
Start: 2018-06-29 | End: 2018-06-29

## 2018-06-29 RX ORDER — NALOXONE HYDROCHLORIDE 0.4 MG/ML
.1-.4 INJECTION, SOLUTION INTRAMUSCULAR; INTRAVENOUS; SUBCUTANEOUS
Status: CANCELLED | OUTPATIENT
Start: 2018-06-29

## 2018-06-29 RX ORDER — CEFEPIME HYDROCHLORIDE 1 G/1
1 INJECTION, POWDER, FOR SOLUTION INTRAMUSCULAR; INTRAVENOUS EVERY 12 HOURS
Status: DISCONTINUED | OUTPATIENT
Start: 2018-06-29 | End: 2018-06-29

## 2018-06-29 RX ORDER — CEFAZOLIN SODIUM 1 G/50ML
1250 SOLUTION INTRAVENOUS EVERY 24 HOURS
Status: DISCONTINUED | OUTPATIENT
Start: 2018-06-30 | End: 2018-07-10 | Stop reason: HOSPADM

## 2018-06-29 RX ADMIN — CEFEPIME HYDROCHLORIDE 2 G: 2 INJECTION, POWDER, FOR SOLUTION INTRAVENOUS at 05:19

## 2018-06-29 RX ADMIN — OMEPRAZOLE 20 MG: 20 CAPSULE, DELAYED RELEASE ORAL at 19:09

## 2018-06-29 RX ADMIN — OXYCODONE HYDROCHLORIDE 10 MG: 5 TABLET ORAL at 16:46

## 2018-06-29 RX ADMIN — PREGABALIN 75 MG: 75 CAPSULE ORAL at 09:56

## 2018-06-29 RX ADMIN — HYDROMORPHONE HYDROCHLORIDE 0.5 MG: 1 INJECTION, SOLUTION INTRAMUSCULAR; INTRAVENOUS; SUBCUTANEOUS at 04:50

## 2018-06-29 RX ADMIN — HYDROMORPHONE HYDROCHLORIDE 0.5 MG: 1 INJECTION, SOLUTION INTRAMUSCULAR; INTRAVENOUS; SUBCUTANEOUS at 01:29

## 2018-06-29 RX ADMIN — VANCOMYCIN HYDROCHLORIDE 1500 MG: 10 INJECTION, POWDER, LYOPHILIZED, FOR SOLUTION INTRAVENOUS at 07:07

## 2018-06-29 RX ADMIN — METRONIDAZOLE 500 MG: 500 INJECTION, SOLUTION INTRAVENOUS at 13:12

## 2018-06-29 RX ADMIN — FLUTICASONE PROPIONATE 2 SPRAY: 50 SPRAY, METERED NASAL at 09:55

## 2018-06-29 RX ADMIN — SODIUM CHLORIDE, PRESERVATIVE FREE 10 ML: 5 INJECTION INTRAVENOUS at 10:06

## 2018-06-29 RX ADMIN — HYDROMORPHONE HYDROCHLORIDE 0.5 MG: 1 INJECTION, SOLUTION INTRAMUSCULAR; INTRAVENOUS; SUBCUTANEOUS at 14:09

## 2018-06-29 RX ADMIN — SENNOSIDES AND DOCUSATE SODIUM 2 TABLET: 8.6; 5 TABLET ORAL at 09:57

## 2018-06-29 RX ADMIN — ACETAMINOPHEN 1000 MG: 500 TABLET, FILM COATED ORAL at 16:46

## 2018-06-29 RX ADMIN — ENOXAPARIN SODIUM 40 MG: 40 INJECTION SUBCUTANEOUS at 10:26

## 2018-06-29 RX ADMIN — PREGABALIN 75 MG: 75 CAPSULE ORAL at 13:10

## 2018-06-29 RX ADMIN — Medication 1 PACKET: at 21:42

## 2018-06-29 RX ADMIN — ACETAMINOPHEN 1000 MG: 500 TABLET, FILM COATED ORAL at 09:56

## 2018-06-29 RX ADMIN — METRONIDAZOLE 500 MG: 500 INJECTION, SOLUTION INTRAVENOUS at 05:57

## 2018-06-29 RX ADMIN — Medication 1 PACKET: at 10:05

## 2018-06-29 RX ADMIN — PREGABALIN 75 MG: 75 CAPSULE ORAL at 19:08

## 2018-06-29 RX ADMIN — SODIUM CHLORIDE, PRESERVATIVE FREE 5 ML: 5 INJECTION INTRAVENOUS at 09:47

## 2018-06-29 RX ADMIN — ACETAMINOPHEN 1000 MG: 500 TABLET, FILM COATED ORAL at 05:18

## 2018-06-29 RX ADMIN — MULTIVITAMIN 15 ML: LIQUID ORAL at 09:56

## 2018-06-29 RX ADMIN — Medication 70 MG: at 10:02

## 2018-06-29 RX ADMIN — CEFEPIME HYDROCHLORIDE 2 G: 2 INJECTION, POWDER, FOR SOLUTION INTRAVENOUS at 16:47

## 2018-06-29 RX ADMIN — METRONIDAZOLE 500 MG: 500 INJECTION, SOLUTION INTRAVENOUS at 19:06

## 2018-06-29 RX ADMIN — SODIUM CHLORIDE, PRESERVATIVE FREE 5 ML: 5 INJECTION INTRAVENOUS at 19:15

## 2018-06-29 ASSESSMENT — PAIN DESCRIPTION - DESCRIPTORS
DESCRIPTORS: DULL;ACHING
DESCRIPTORS: SORE

## 2018-06-29 NOTE — PROGRESS NOTES
"[facility], [place]    Internal Medicine Progress Note - [facility] Service    Main Plans for Today    -resume vanc/cefepime/metronidazole  -follow cxs  -resume glargine 7u, med ssi  -if ongoing or worsening hyperglycemia, would start insulin gtt  -resume enoxaparin ppx    Assessment & Plan   Alessandra Pak is a 52 year old female admitted on 6/6/2018. 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 Hospital course was complicated by respiratory failure due to ARDS requiring intubation (6/10-15), extubated and transferred to medicine for further cares.    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 Low suspicion for ongoing osteo or other infection as patient is otherwise asymptomatic. 25 cc EBL, hgb stable. Pain controlled with home methadone, local anesthesia catheters, prn dilaudid/oxy.  -appreciate ortho and ID input  -restart vanc/cefepime/metronidazole for at least 1 day as continue to monitor fever curve and symptoms  -f/u bcx 6/29  -if ongoing fever, evaluate with CXR and/or UA/UC pending symptoms  -s/p R BCK  -qod CRP, stabilized  -monitor hgb, transfuse <7  -encourage IS    # Fall  No head trauma, mechanical. Cautioned patient to call for help in the future if she would like to get up.    # Type 2 Diabetes  # Hypoglycemia, resolved  # Hx of Chronic pancreatitis s/p whipple's  # Severe malnutrition in the context of acute illness  Hyperglycemia to 428 with TF resumed.  -resume glargine 7u, med ssi  -if ongoing or worsening hyperglycemia, would start insulin gtt  -cycle TFs to try to stimulate appetite, calorie counts, goal \"~ 1200 kcals and 50 gm protein per day\"  -continue to monitor, ensure patient " able to maintain normo to slight hyperglycemia on PO as weaning from TFs  -pantoprazole PO qd  -throat lozenge and spray    TF regimen: Nutren 1.5 @ 50 ml/hr, (1200 ml/day)  Provides: 1800 kcals (32+), 82 gm PRO (1.4+), 912 mL H2O, 211 gm CHO and 0 fiber.   Water flushes: 50 ml every 2 hours    # Anxiety  Anxiety improving. Patient remains redirectable and calms with reassurance. Patient on amitriptyline, prefer to avoid benzos, SSRIs unlikely to have benefit acutely. Health psychology has been helpful, continue visits.  -continue regular reassurance  -consider low dose hydroxyzine if necessary    # Acute hypoxic respiratory failure, resolved  # ARDS 2/2 systemic response from osteo/foot infection  # Hx of chronic restrictive lung disease with emphysema and fibrosis  # HF w/ recovered EF (50%)  Patient's weight downtrending.  Improved volume status on exam, crackles improved, S3 resolved, lower extremity edema resolved.  Patient continues to tolerate room air at rest.  -hold 40 mg PO furosemide today, plan to resume tomorrow  -Daily weights, strict I/O  -wean O2 as tolerated, goal >88%    # Normocytic Anemia  # Anemia of chronic disease  Insufficient reticulocyte response on 6/10 noted.  Ferritin elevated.  Status post 3 units PRBC (6/17, 6/14, 6/13). Appropriate response. No bleeding observed.  -continue to monitor    # Diarrhea, resolved  Notes worsening with creon. Neg infectious studies.  -Continue to monitor    # Hypernatremia, resolved  Improved with increased  cc q2h (146). Now with PO intake.  -monitor    # Chronic pain  Discussed patient's methadone with pharmacy we do not note interaction with patient's current antibiotics (though would have interaction with linezolid if used in the future).  No empiric changes to methadone for anticipated body weight changes following amputation at this time.  - Methadone 70 qday  - Pregabalin  - Amitriptyline  - d/c oxycodone, pt has not been using    #  Preop  Patient is class III risk per revised cardiac index (CHF history, insulin use). Patient cannot perform 4 METS of activity at baseline.  TTE performed 6/9/18, EF 50%, no valvular abnormalities. Will require further evaluation with EKG as well as possible stress imaging (last angiogram with mild nonobstructive CAD in 2014).   -Dobutamine echo cancelled due to EF 33% down from prior 55% earlier during admission (10-15% in 2015)  -Cardiology consulted for assistance with cardiovascular optimization prior to surgery, recommended Lexiscan, patient refused.  No further workup, no suggestion the patient has underlying ICM, no focal wall motions, clean coronaries in 2014  -Would further reduce risk with spinal anesthesia  - EKG unchanged  - ongoing volume status management    # Adrenal insufficiency  S/p stress dose steroid taper, complete 6/18.    # NIKOLAY on CKD, resolved  - monitor  - abx change as above    # Anisocoria/R afferent pupillary defect  - stable      # Pain Assessment:  Current Pain Score 6/29/2018   Patient currently in pain? yes   Pain score (0-10) -   Pain location Knee   Pain descriptors Sore   CPOT pain score -   - Alessandra is experiencing pain due to Chronic pain as well as recent surgical procedure, NG. Pain management was discussed and the plan was created in a collaborative fashion.  Alessandra's response to the current recommendations: engaged  - Please see the plan for pain management as documented above        Diet: Room Service  Adult Formula Drip Feeding: Continuous Nutren 1.5; Nasoduodenal tube; Goal Rate: 80; mL/hr; From: 8:00 PM; 10:00 AM; Medication - Tube Feeding Flush Frequency: At least 15-30 mL water before and after medication administration and with tube cloggi...  Snacks/Supplements Adult: Magic Cup; Between Meals  Snacks/Supplements Adult: Magic Cup; With Meals  Advance Diet as Tolerated: Clear Liquid Diet  Fluids: Free water flushes as aboe  DVT Prophylaxis: enoxaparin  Code Status: Full  Code    Disposition Plan   Expected discharge: 4 - 7 days, recommended to transitional care unit once antibiotic plan established, safe disposition plan/ TCU bed available and post-amputation rehab needs established.     Entered: Alireza Torresdaiana 06/29/2018, 9:13 AM   Information in the above section will display in the discharge planner report.      Alireza Torresmavisga  [facility]  Maroon: 5  Pager: [phone]  Please see sticky note for cross cover information    Interval History   Underwent R BKA under general anesthesia, samples sent for culture. Pain well controlled. Patient slid to the floor overnight because she didn't remember that she was missing her R leg. No head trauma, patient states that she does not have any resultant pain. Tmax 100.4, Bcx obtained, vanc/cefepime/metonidazole restarted. Patient denies subjective fever/chills, abd pain, nausea, emesis, dysuria, chest pain, sob, cough.    Physical Exam   Vital Signs: Temp: 99.5  F (37.5  C) Temp src: Oral BP: 144/74 Pulse: 115 Heart Rate: 118 Resp: 16 SpO2: 96 % O2 Device: Nasal cannula Oxygen Delivery: 3 LPM  Weight: 127 lbs 14.4 oz  General Appearance: Pleasant, calm, lying in bed  Respiratory: Crackles in the bases bilaterally improved, breathing comfortably  Cardiovascular: Normal rate, regular rhythm, no S3, no murmur rubs  GI: Abdomen is soft, nontender, nondistended no acute changes noted.  Skin: no rashes or jaundice, R foot dressed  Other: Alert and oriented x4, speech fluent, no LE edema, wwp        Data   Medications     - MEDICATION INSTRUCTIONS -       IV fluid REPLACEMENT ONLY 80 mL/hr at 06/28/18 0010     IV fluid REPLACEMENT ONLY Stopped (06/12/18 2205)       acetaminophen  1,000 mg Oral TID     amitriptyline  50 mg Oral At Bedtime     ceFEPIme (MAXIPIME) IV  2 g Intravenous Q12H     enoxaparin  40 mg Subcutaneous Q24H     fluticasone  2 spray Left nostril Daily     heparin lock flush  5-10 mL Intracatheter Q24H     insulin aspart   1-7 Units Subcutaneous TID AC     insulin aspart  1-5 Units Subcutaneous At Bedtime     insulin glargine  7 Units Subcutaneous QAM AC     melatonin  6 mg Oral At Bedtime     methadone  70 mg Oral Daily     metroNIDAZOLE  500 mg Intravenous Q6H     multivitamins with minerals  15 mL Per Feeding Tube Daily     omeprazole  20 mg Oral Daily     pregabalin  75 mg Oral TID     protein modular  1 packet Per Feeding Tube BID 09 12     disposable pump w/ anesthetic (select flow)  14 mL/hr Irrigation Elast Pump     senna-docusate  1 tablet Oral BID    Or     senna-docusate  2 tablet Oral BID     sodium chloride (PF)  3 mL Intracatheter Q8H     [START ON 6/30/2018] vancomycin (VANCOCIN) IV  1,250 mg Intravenous Q24H     Data     Recent Labs  Lab 06/29/18  0539 06/28/18  0713 06/27/18 2022 06/27/18  0709 06/26/18  0540   WBC 12.1*  --   --  7.6  --    HGB 9.4*  --   --  9.0*  --    MCV 88  --   --  85  --      --  376 414  --     139  --  136 138   POTASSIUM 4.5 4.1  --  4.2 4.4   CHLORIDE 103 105  --  104 104   CO2 25 25  --  26 27   BUN 22 29  --  31* 31*   CR 0.95 1.13*  --  1.17* 1.25*   ANIONGAP 8 8  --  7 8   RICK 8.2* 8.9  --  8.3* 8.6   * 229*  --  187* 230*   ALBUMIN 2.0*  --   --   --  1.9*   PROTTOTAL 7.1  --   --   --   --    BILITOTAL 0.2  --   --   --   --    ALKPHOS 799*  --   --   --   --    ALT 58*  --   --   --   --    AST 68*  --   --   --   --      No results found for this or any previous visit (from the past 24 hour(s)).

## 2018-06-29 NOTE — PROGRESS NOTES
06/29/18 1523   Quick Adds   Type of Visit PT Re-evaluation   Living Environment   Lives With parent(s);sibling(s);child(kristina), adult   Living Arrangements house   Home Accessibility bed and bath on same level   Number of Stairs to Enter Home 1   Number of Stairs Within Home 12  (to basement laundry)   Stair Railings at Home none   Transportation Available family or friend will provide   Living Environment Comment pt reports living in a home with her mother and daughter; has been on disability since 2014 per OT evaluation.    Self-Care   Dominant Hand right   Usual Activity Tolerance fair   Current Activity Tolerance poor   Regular Exercise no   Equipment Currently Used at Home none   Activity/Exercise/Self-Care Comment Pt IND at baseline, limited activity tolerance   Functional Level Prior   Ambulation 0-->independent   Transferring 0-->independent   Toileting 0-->independent   Bathing 0-->independent   Dressing 0-->independent   Eating 0-->independent   Communication 0-->understands/communicates without difficulty   Swallowing 0-->swallows foods/liquids without difficulty   Cognition 0 - no cognition issues reported   Fall history within last six months yes   Number of times patient has fallen within last six months 1   Which of the above functional risks had a recent onset or change? ambulation;transferring;toileting;bathing;dressing   Prior Functional Level Comment pt had been IND with mobility and ADLs prior to admission.    General Information   Onset of Illness/Injury or Date of Surgery - Date 06/28/18  (new eval Post op)   Referring Physician Mindy Bui, ROMIE   Patient/Family Goals Statement to improve mobility   Pertinent History of Current Problem (include personal factors and/or comorbidities that impact the POC) Alessandra Pak is a 52 year old female admitted on 6/6/2018. She has a history of chronic pancreatitis s/p whipple, T2DM c/b diabetic foot wounds and ulcers, CKD, restrictive lung  disease with emphysema and fibrosis, NICM s/p ICD, chronic methadone use and is admitted for severe sepsis due to Right foot abscess and osteomyelitis. Hospital course was complicated by respiratory failure due to ARDS requiring intubation (6/10-15), extubated and transferred to medicine for further cares.   Precautions/Limitations fall precautions;other (see comments)  (new R BKA)   Weight-Bearing Status - LLE (per Ortho notes - 'minimal WB L forefoot for transfers only')   Weight-Bearing Status - RLE nonweight-bearing   General Info Comments activity: up with assist; NWB RLE   Cognitive Status Examination   Orientation orientation to person, place and time   Level of Consciousness alert   Follows Commands and Answers Questions 75% of the time   Personal Safety and Judgment impaired   Memory intact   Cognitive Comment pt is alert and oriented; displays at risk behavior with limited compliance with WB precautions   Pain Assessment   Patient Currently in Pain No   Integumentary/Edema   Integumentary/Edema Comments LLE foot ulcers; R BKA stump wrapped   Posture    Posture Forward head position;Protracted shoulders   Range of Motion (ROM)   ROM Comment BLE/BUE wfl   Strength   Strength Comments >3+/5 BUE/BLE per observation of mobility, not formally assessed   Bed Mobility   Bed Mobility Comments SBA supine <> sit with HOB elevated; use of bed rail to roll.    Transfer Skills   Transfer Comments Completes slideboard bed <> chair with Min A and cues for minimal to TTWB to L forefoot during transfer. Completes squat-pivot to commode with minimal LLE WB and Mod-max A x 1   Gait   Gait Comments N/A   Balance   Balance Comments maintains sitting EOB with SBA, use of BUE   Sensory Examination   Sensory Perception Comments baseline N/T in stocking-glove distribution   General Therapy Interventions   Planned Therapy Interventions balance training;bed mobility training;neuromuscular re-education;strengthening;transfer  "training;home program guidelines;wheelchair management/propulsion training;risk factor education;stretching;ROM   Clinical Impression   Criteria for Skilled Therapeutic Intervention yes, treatment indicated   PT Diagnosis impaired functional mobility   Influenced by the following impairments weakness, fatigue, recent BKA, WB precautions   Functional limitations due to impairments bed mobility, transfers, gait and stairs   Clinical Presentation Evolving/Changing   Clinical Presentation Rationale clinical judgement, medical complexity, comorbidities   Clinical Decision Making (Complexity) Moderate complexity   Therapy Frequency` 5 times/week   Predicted Duration of Therapy Intervention (days/wks) 2 weeks   Anticipated Discharge Disposition Transitional Care Facility   Risk & Benefits of therapy have been explained Yes   Patient, Family & other staff in agreement with plan of care Yes   Clinical Impression Comments evaluation completed, treatment initiated   [facility] TM \"6 Clicks\"   2016, Trustees of [facility], under license to [employer].  All rights reserved.   6 Clicks Short Forms Basic Mobility Inpatient Short Form   [facility]  \"6 Clicks\" V.2 Basic Mobility Inpatient Short Form   1. Turning from your back to your side while in a flat bed without using bedrails? 4 - None   2. Moving from lying on your back to sitting on the side of a flat bed without using bedrails? 4 - None   3. Moving to and from a bed to a chair (including a wheelchair)? 3 - A Little   4. Standing up from a chair using your arms (e.g., wheelchair, or bedside chair)? 3 - A Little   5. To walk in hospital room? 1 - Total   6. Climbing 3-5 steps with a railing? 1 - Total   Basic Mobility Raw Score (Score out of 24.Lower scores equate to lower levels of function) 16   Total Evaluation Time   Total Evaluation Time (Minutes) 8     "

## 2018-06-29 NOTE — PLAN OF CARE
Problem: Infection, Risk/Actual (Adult)  Goal: Identify Related Risk Factors and Signs and Symptoms  Related risk factors and signs and symptoms are identified upon initiation of Human Response Clinical Practice Guideline (CPG).   D.  Back from Rt. Below the knee amputation surgery at 1700.  Is on capno.  Respirations 10 to 12.  On 3 liters of oxygen  Sating in the high 90s.  Right stump has dressings and an ace wrap.  Has 2 nerve block catheters in her right leg.  Pain is very minimal if any.  Has been sleeping the last hour.  Tube feeds are going at 80 cc an hour.  Tolerating well.  Finger stickes since she has come back were 86 and 92.  Hasn't had much to eat.  Has voided 2-3 times.  Vitals are stable.    P. Continue to monitor and continue plan of care.

## 2018-06-29 NOTE — PHARMACY-VANCOMYCIN DOSING SERVICE
Pharmacy Vancomycin Initial Note  Date of Service 2018  Patient's  1965  52 year old, female    Indication: Osteomyelitis    Current estimated CrCl = Estimated Creatinine Clearance: 54.5 mL/min (based on Cr of 1.13).    Creatinine for last 3 days  2018:  5:40 AM Creatinine 1.25 mg/dL  2018:  7:09 AM Creatinine 1.17 mg/dL  2018:  7:13 AM Creatinine 1.13 mg/dL    Recent Vancomycin Level(s) for last 3 days  2018:  3:39 PM Vancomycin Level 19.7 mg/L      Vancomycin IV Administrations (past 72 hours)                   vancomycin (VANCOCIN) 1000 mg in dextrose 5% 200 mL PREMIX (mg) 1,000 mg New Bag 18 1706     1,000 mg New Bag 18 1706                Nephrotoxins and other renal medications (Future)    Start     Dose/Rate Route Frequency Ordered Stop    18 0600  vancomycin (VANCOCIN) 1,250 mg in sodium chloride 0.9 % 250 mL intermittent infusion      1,250 mg  over 90 Minutes Intravenous EVERY 24 HOURS 18 0502      18 0515  vancomycin (VANCOCIN) 1,500 mg in sodium chloride 0.9 % 250 mL intermittent infusion      1,500 mg  over 90 Minutes Intravenous ONCE 18 0502            Contrast Orders - past 72 hours (72h ago through future)    Start     Dose/Rate Route Frequency Ordered Stop    18 1500  perflutren diluted in saline (DEFINITY) injection 10 mL      10 mL Intravenous ONCE 18 1454 18 1525    18 0945  iopamidol (ISOVUE-370) solution 120 mL      120 mL Intravenous ONCE 18 0939 18 0941                Plan:  1.  Give vancomycin 1500 mg IV x 1 then start vancomycin 1250 mg IV q24h.   2.  Goal Trough Level: 15-20 mg/L   3.  Pharmacy will check trough levels as appropriate in 1-3 Days.    4. Serum creatinine levels will be ordered daily for the first week of therapy and at least twice weekly for subsequent weeks.    5. [place] method utilized to dose vancomycin therapy:  Based on recent vanco doses/trough levels during  this admission    Connie Anthony

## 2018-06-29 NOTE — PROGRESS NOTES
"Orthopaedic Daily Progress Note    S: No acute overnight events. Recovering well post op. Local anesthetic catheters working well to control her pain. Denies chest pain or shortness of breath.     O:  /74 (BP Location: Left arm)  Pulse 115  Temp 99.5  F (37.5  C) (Oral)  Resp 16  Ht 1.727 m (5' 8\")  Wt 58 kg (127 lb 14.4 oz)  SpO2 96%  BMI 19.45 kg/m2  Gen: No acute distress, alert  Resp: Breathing comfortably  MSK:   RLE:  BKA; ace wrap c,d,i, left in place today       Recent Labs  Lab 06/29/18  0539 06/28/18  0713 06/27/18 2022 06/27/18  0709 06/26/18  0540 06/25/18  0552 06/24/18  1556   WBC 12.1*  --   --  7.6  --   --   --    HGB 9.4*  --   --  9.0*  --   --   --      --  376 414  --   --  455*   CR  --  1.13*  --  1.17* 1.25* 1.29*  --          Wound Culture: polymicrobial  Intra operative cultures from 6/28: NGTD       Assessment: Alessandra Pak is a 52 year old female admitted sepsis with recurrent right foot diabetic ulcers as source, now s/p bedside I&D right foot on 6/7, 6/12, and 6/13.  Intubated for ARDS/hypoxic respiratory failure on 6/10, extubated 6/15, TTF 6/15. At this time all infectious markers and patient's status are appropriately improved and the acute infection is resolved.  Ongoing recurring foot ulcers in the setting of poorly controlled DM/malnutrition, exhausted all medical options and patient opted to proceed with R below knee amputation, completed on 6/28.     Plan:  Medicine Primary  Activity: Elevate BLE as much as possible   Weight bearing status: NWB RLE, okay for minimal WB with left forefoot for transfers to chair or commode.   Antibiotics: per primary/ID  Labs: per primary team, will need to follow HGB post op  Follow cultures taken at the proximal tibia after BKA  DVT prophylaxis: per primary team  Wound Care: leave surgical dressing in place until Monday or Tuesday, then dressing change with ortho and prosthetics consult for stump   Dispo: pending, " possibly next week     Discussed with Dr. King.     Pablo Elizabeth MD  Orthopedic Surgery, PGY-4  Pager: [phone]

## 2018-06-29 NOTE — ANESTHESIA POST-OP FOLLOW-UP NOTE
"REGIONAL ANESTHESIA PAIN SERVICE CONTINUOUS NERVE INFUSION NOTE  Alessandra Pak is a 52 year old female POD #1 s/p AMPUTATE LEG BELOW KNEE  AMPUTATE LEG BELOW KNEE  AMPUTATE LEG BELOW KNEE and placement of right femoral and sciatic catheters for pain control.     SUBJECTIVE:  Interval History: Overnight no acute events.  Patient reports pain well controlled, states she has \"no pain\" with nerve block continuous infusion, acetaminophen scheduled, requested Dilaudid IV x 2 doses, last dose at 0450.  Has oxycodone available, but has not needed it.  Denies weakness, paresthesias, circumoral numbness, metallic taste or tinnitus. Patient has not been OOB yet.  Currently denies nausea or vomiting.         Antithrombotic/Thrombolytic Therapy ordered:    enoxaparin (LOVENOX) injection 40 mg 40 mg, SC, Q24H Given: 06/29 1026            Medications related to Pain Management (Future)    Start       Dose/Rate Route Frequency Ordered Stop     06/28/18 1733   senna-docusate (SENOKOT-S;PERICOLACE) 8.6-50 MG per tablet 1 tablet       1 tablet Oral 2 TIMES DAILY 06/28/18 1733        06/28/18 1733   senna-docusate (SENOKOT-S;PERICOLACE) 8.6-50 MG per tablet 2 tablet       2 tablet Oral 2 TIMES DAILY 06/28/18 1733        06/28/18 1733   lidocaine 1 % 1 mL       1 mL Other EVERY 1 HOUR PRN 06/28/18 1733        06/28/18 1733   lidocaine (LMX4) cream         Topical EVERY 1 HOUR PRN 06/28/18 1733        06/28/18 1733   oxyCODONE IR (ROXICODONE) tablet 5-10 mg       5-10 mg Oral EVERY 3 HOURS PRN 06/28/18 1733        06/28/18 1733   HYDROmorphone (PF) (DILAUDID) injection 0.3-0.5 mg       0.3-0.5 mg Intravenous EVERY 2 HOURS PRN 06/28/18 1733        06/28/18 1330   ROPivacaine 0.2% (NAROPIN) 750 mL in ON-Q C-Bloc select flow ([id number] holds 600-750 mL) dual cath disposable pump       14 mL/hr  14 mL/hr  Irrigation Elastomeric Pump 06/28/18 1316        06/21/18 2000   pregabalin (LYRICA) capsule 75 mg       75 mg Oral 3 TIMES DAILY 06/21/18 " 1821        06/16/18 0800   methadone (DOLPHINE) solution 70 mg       70 mg Oral DAILY 06/16/18 0734        06/12/18 1134   bisacodyl (DULCOLAX) Suppository 10 mg       10 mg Rectal DAILY PRN 06/12/18 1134        06/10/18 0029   sennosides (SENOKOT) tablet 8.6 mg       8.6 mg Oral 2 TIMES DAILY PRN 06/10/18 0029        06/07/18 1845   acetaminophen (TYLENOL) tablet 1,000 mg       1,000 mg Oral 3 TIMES DAILY 06/07/18 1832        06/07/18 0236   amitriptyline (ELAVIL) tablet 50 mg       50 mg Oral AT BEDTIME 06/07/18 0235        06/07/18 0235   polyethylene glycol (MIRALAX/GLYCOLAX) Packet 17 g       17 g Oral DAILY PRN 06/07/18 0235                OBJECTIVE:  Lab results      Recent Labs   Lab Test  06/29/18   0539   WBC  12.1*   RBC  3.44*   HGB  9.4*   HCT  30.3*   MCV  88   MCH  27.3   MCHC  31.0*   RDW  22.8*   PLT  345               Lab Results   Component Value Date     INR 1.33 06/13/2018     INR 1.46 06/06/2018     INR 1.30 02/21/2015            Vitals:              Temp:  [97.1  F (36.2  C)-100.4  F (38  C)] 99.5  F (37.5  C)  Pulse:  [115-119] 115  Heart Rate:  [] 118  Resp:  [8-16] 16  BP: (132-188)/() 144/74  SpO2:  [90 %-100 %] 96 %     Exam:   GEN: alert and no distress  NEURO/MSK: Strength BLE 5/5, able to straight leg raise, RLE residual limb ace wrapped  SKIN: right femoral and sciatic catheter sites with dressing c/d/i, no tenderness, erythema, heme, edema        ASSESSMENT/PLAN:    Patient is receiving adequate analgesia with current multimodal therapy including right femoral and sciatic catheter infusion Ropivacaine 0.2% at 7 mL/hr each catheter, total infusion 14mL/hour. Motor function intact and adequate sensory block, is meeting activity goals.  No evidence of adverse side effects related to local anesthetic.      - continue current Ropivacaine 0.2% at 7 mL/hr each catheter, total infusion rate 14mL/hour, can be maintained up until POD #7.   - patient can be evaluated to receive  local anesthetic bolus Q 12 hr PRN pain not controlled with continuous infusion.  Bedside nurse must page RAPS to request bolus  - expected change of next On-Q pump is tomorrow  - antithrombotic/thrombolytic therapy okay to continue enoxaparin 40 mg SC Q 24 hrs as ordered. Please contact [facility] (#[phone]) prior to any medication changes  - will continue to follow and adjust as needed     Hernesto Booker   Regional Anesthesia Pain Service  6/29/2018 5:00 PM      RAPS Contact Info (24 hour job code pager is the last 4 digits) For in-house use only:   [employer] phone: [place] [phone], [place] [phone], [facility] [place] [phone], then enter call-back number.    Text: Use [employer] on the Intranet <Paging/Directory> tab and enter Jobcode ID.   If no call back at any time, contact the hospital  and ask for RAPS attending or backup

## 2018-06-29 NOTE — PROGRESS NOTES
"REGIONAL ANESTHESIA PAIN SERVICE CONTINUOUS NERVE INFUSION NOTE  Alessandra Pak is a 52 year old female POD #1 s/p AMPUTATE LEG BELOW KNEE  AMPUTATE LEG BELOW KNEE  AMPUTATE LEG BELOW KNEE and placement of right femoral and sciatic catheters for pain control.    SUBJECTIVE:  Interval History: Overnight no acute events.  Patient reports pain well controlled, states she has \"no pain\" with nerve block continuous infusion, acetaminophen scheduled, requested Dilaudid IV x 2 doses, last dose at 0450.  Has oxycodone available, but has not needed it.  Denies weakness, paresthesias, circumoral numbness, metallic taste or tinnitus. Patient has not been OOB yet.  Currently denies nausea or vomiting.       Antithrombotic/Thrombolytic Therapy ordered:    enoxaparin (LOVENOX) injection 40 mg 40 mg, SC, Q24H Given: 06/29 1026         Medications related to Pain Management (Future)    Start     Dose/Rate Route Frequency Ordered Stop    06/28/18 1733  senna-docusate (SENOKOT-S;PERICOLACE) 8.6-50 MG per tablet 1 tablet      1 tablet Oral 2 TIMES DAILY 06/28/18 1733      06/28/18 1733  senna-docusate (SENOKOT-S;PERICOLACE) 8.6-50 MG per tablet 2 tablet      2 tablet Oral 2 TIMES DAILY 06/28/18 1733      06/28/18 1733  lidocaine 1 % 1 mL      1 mL Other EVERY 1 HOUR PRN 06/28/18 1733      06/28/18 1733  lidocaine (LMX4) cream       Topical EVERY 1 HOUR PRN 06/28/18 1733      06/28/18 1733  oxyCODONE IR (ROXICODONE) tablet 5-10 mg      5-10 mg Oral EVERY 3 HOURS PRN 06/28/18 1733      06/28/18 1733  HYDROmorphone (PF) (DILAUDID) injection 0.3-0.5 mg      0.3-0.5 mg Intravenous EVERY 2 HOURS PRN 06/28/18 1733      06/28/18 1330  ROPivacaine 0.2% (NAROPIN) 750 mL in ON-Q C-Bloc select flow ([id number] holds 600-750 mL) dual cath disposable pump      14 mL/hr  14 mL/hr  Irrigation Elastomeric Pump 06/28/18 1316      06/21/18 2000  pregabalin (LYRICA) capsule 75 mg      75 mg Oral 3 TIMES DAILY 06/21/18 1821      06/16/18 0800  methadone " (DOLPHINE) solution 70 mg      70 mg Oral DAILY 06/16/18 0734      06/12/18 1134  bisacodyl (DULCOLAX) Suppository 10 mg      10 mg Rectal DAILY PRN 06/12/18 1134      06/10/18 0029  sennosides (SENOKOT) tablet 8.6 mg      8.6 mg Oral 2 TIMES DAILY PRN 06/10/18 0029      06/07/18 1845  acetaminophen (TYLENOL) tablet 1,000 mg      1,000 mg Oral 3 TIMES DAILY 06/07/18 1832      06/07/18 0236  amitriptyline (ELAVIL) tablet 50 mg      50 mg Oral AT BEDTIME 06/07/18 0235      06/07/18 0235  polyethylene glycol (MIRALAX/GLYCOLAX) Packet 17 g      17 g Oral DAILY PRN 06/07/18 0235             OBJECTIVE:  Lab results  Recent Labs   Lab Test  06/29/18   0539   WBC  12.1*   RBC  3.44*   HGB  9.4*   HCT  30.3*   MCV  88   MCH  27.3   MCHC  31.0*   RDW  22.8*   PLT  345       Lab Results   Component Value Date    INR 1.33 06/13/2018    INR 1.46 06/06/2018    INR 1.30 02/21/2015         Vitals:    Temp:  [97.1  F (36.2  C)-100.4  F (38  C)] 99.5  F (37.5  C)  Pulse:  [115-119] 115  Heart Rate:  [] 118  Resp:  [8-16] 16  BP: (132-188)/() 144/74  SpO2:  [90 %-100 %] 96 %    Exam:   GEN: alert and no distress  NEURO/MSK: Strength BLE 5/5, able to straight leg raise, RLE residual limb ace wrapped  SKIN: right femoral and sciatic catheter sites with dressing c/d/i, no tenderness, erythema, heme, edema      ASSESSMENT/PLAN:    Patient is receiving adequate analgesia with current multimodal therapy including right femoral and sciatic catheter infusion Ropivacaine 0.2% at 7 mL/hr each catheter, total infusion 14mL/hour. Motor function intact and adequate sensory block, is meeting activity goals.  No evidence of adverse side effects related to local anesthetic.     - continue current Ropivacaine 0.2% at 7 mL/hr each catheter, total infusion rate 14mL/hour, can be maintained up until POD #7.   - patient can be evaluated to receive local anesthetic bolus Q 12 hr PRN pain not controlled with continuous infusion.  Bedside nurse  must page RAPS to request bolus  - expected change of next On-Q pump is tomorrow  - antithrombotic/thrombolytic therapy okay to continue enoxaparin 40 mg SC Q 24 hrs as ordered. Please contact RAPS (#[phone]) prior to any medication changes  - will continue to follow and adjust as needed    - discussed plan with attending anesthesiologist    GAVINO [facility]  Regional Anesthesia Pain Service  6/29/2018 11:46 AM    RAPS Contact Info (24 hour job code pager is the last 4 digits) For in-house use only:   [employer] phone: [place] [phone], [place] [phone], [place] [phone], then enter call-back number.    Text: Use [employer] on the Intranet <Paging/Directory> tab and enter Jobcode ID.   If no call back at any time, contact the hospital  and ask for RAPS attending or backup

## 2018-06-29 NOTE — PROGRESS NOTES
[facility] Nurse Inpatient Wound Assessment   Reason for consultation: Evaluate and treat bilateral foot wounds    Assessment   left foot wounds due to Diabetic Ulcer-likely Neuropathic   Status: Follow up assessment  S/p R BKA, Orthopedic team has been following the wound, no longer following     Treatment Plan  POC for left medial foot and heel wounds daily and PRN:   Cleanse wounds with microklenz spray and gauze.   Apply Iodosorb gel (order #[id number]) to wound beds. Apply puja thick amount of iodosorb gel to the wound beds.    Cover with dry 4x4.   secure with Kerlix     Orders revised    WO Nurse follow-up plan:weekly   Nursing to notify the Provider(s) and re-consult the [facility] Nurse if wound(s) deteriorates or new skin concern.    Patient History  According to provider note(s):  Alessandra Pak is a 52 year old female with PMH NICM with ICD, CKD3, chronic pancreatitis status post pylorus sparing Whipple (2001) with secondary DM c/b recurrent diabetic foot ulcers, COPD, HTN, and chronic methadone use who has been admitted for sepsis and longstanding history of recurrent b/l diabteic foot ulcers. Patient is unsure exactly when she first noticed her bilateral foot ulcers, however present for a period time.  2 days prior to admission the patient's daughter noticed a foul smell coming from her mother's feet, increased confusion, and decreased p.o. Intake.     s/p R below knee amputation, completed on 6/28  She follows with Dr. Lewis with Podiatry as an outpatient,    Objective Data  Containment of urine/stool: Continent of bowel and Continent of bladder    Active Diet Order    Active Diet Order      Regular Diet Adult    Output:   I/O last 3 completed shifts:  In: 1215 [P.O.:200; I.V.:415; NG/GT:40]  Out: 3065 [Urine:3065]    Risk Assessment:   Sensory Perception: 3-->slightly limited  Moisture: 4-->rarely moist  Activity: 2-->chairfast  Mobility: 3-->slightly limited  Nutrition: 2-->probably inadequate  Friction and  Shear: 2-->potential problem  Kye Score: 16                          Labs:     Recent Labs  Lab 06/29/18  0539  06/26/18  0540   ALBUMIN 2.0*  --  1.9*   PREALB  --   --  29   HGB 9.4*  < >  --    WBC 12.1*  < >  --    CRP 42.0*  < >  --    < > = values in this interval not displayed.    Physical Exam    Wound Location:  Left lateral heel  Date of last photo 6/15/2018      Wound History: . Present on admission.   Measurements (length x width x depth, in cm) 1.3 cm x 1.3 cm  x  0.2 cm   Wound Base:  100% pink tissue  Tunneling N/A  Undermining N/A  Palpation of the wound bed: firm   Periwound skin:  Pink unpigmented epithelium.   Color: pale  Temperature: normal   Drainage:, moderate  Description of drainage: serosanguinous  Odor: strong  Pain: during dressing change,      Wound Location: Left medial foot, to great toe      5/22/18 6/29/18    Wound History: See above. Present on admission.   Mepilex dressings not staying in place  Measurements (length x width x depth, in cm) 1.4 cm x 2.6 cm  x  0.3 cm   Wound Base:  20% smooth dark red moist tissue surrounded by dry dark grey and brown necrotic tissue  Tunneling N/A  Undermining N/A  Palpation of the wound bed: normal  Periwound skin:  Intact.    Color: pale  Temperature: normal   Drainage:, none  Description of drainage: serosanguinous  Odor: mild  Pain: during dressing change,   Interventions  Current support surface: Standard    Current off-loading measures: Pillows under calves  Visual inspection of wound(s) completed  Wound Care: done per plan of care  Supplies: Iodosorb gel at the bedside  Education provided: plan of care and wound progress  Discussed plan of care with Patient and Nurse

## 2018-06-29 NOTE — PLAN OF CARE
Problem: Patient Care Overview  Goal: Plan of Care/Patient Progress Review  Discharge Planner PT 5B re-evaluation   Patient plan for discharge: rehab  Current status: pt completes bed mobility with SBA and use of bed rails with HOB elevated. Pt educated in postoperative precautions, WB status, and POC. Completes slideboard transfer bed <> w/c with Min A-CGA for safety; pt placing L forefoot on floor and providing some assistance in transfer with L forefoot vs using only BUE; cued for maintaining 'minimal WB through L forefoot' as per Ortho recommendations. Due to inability to use slideboard with commode in room, pt completes squat-pivot transfer with Mod-max A x 1 to maintain minimal WB of L forefoot with this transfer. Pt will need frequent cues and reinforcement of WB status for LLE. Reviewed post operative BKA exercises to improve strength and maximize ROM for future use of prosthesis.   Barriers to return to prior living situation: recent BKA, functional mobility, fatigue, weakness  Recommendations for discharge: TCU  Rationale for recommendations: to progress independence with functional mobility while maintaining precautions post operatively. Pending pt performance and increased activity tolerance may benefit from ARU to increase independence with mobility.       Entered by: Camron Mckeon 06/29/2018 3:35 PM

## 2018-06-29 NOTE — PLAN OF CARE
Problem: Infection, Risk/Actual (Adult)  Goal: Identify Related Risk Factors and Signs and Symptoms  Related risk factors and signs and symptoms are identified upon initiation of [employer] Clinical Practice Guideline (CPG).    06/29/18 0054   Infection, Risk/Actual   Related Risk Factors (Infection, Risk/Actual) chronic illness/condition;tissue perfusion altered   Signs and Symptoms (Infection, Risk/Actual) blood glucose changes     Shift: 9775-6609  Neuro: A&Ox4, intact.    Cardiac: slightly tachy, 110s. T max of 100.4. Recheck 99.5 after 1 time dose of tylenol.   Respiratory: Capno in place, sating 96% on 3L NC. IPI 9, EtCO2 29. Denies SOB.   GI/: Voiding with BSC or BP. No BM this shift.   Diet/appetite: Tolerating regular diet. Denies nausea.   Activity: A1.     Pain: C/o pain in right knee where amputation was done. Gave IV diluadid x2 during the shift.   Skin: Intact, no new deficits noted.  Lines: PICC TL- TKO for abx.   Drains: None.     Pt fell around 0045, slid out of the bed and on to the floor, pt stated tried to use the bathroom. MDs were notified and came to see pt. On Q infusing running 14ml/hr total (7ml/hr from each catheter). Pt sleeping in between cares. . Calls appropriately and makes needs known. Will continue to monitor and follow POC.

## 2018-06-29 NOTE — PLAN OF CARE
Problem: Patient Care Overview  Goal: Plan of Care/Patient Progress Review  Outcome: Improving  Patient is alert and oriented x4, Patient is vitally stable on 3 L O2, Patient has denied pain and nausea. Patient has been pretty quiet this shift but is adjusting well. I did place the bed alarm on since she fell last night, I did not want her to forget and fall again. We are trying to wean the patient off of O2 she is still on Capno but we can d/c that as soon as we get to the 24 hour fletcher. Patient will be seen by PT shortly so I will be giving her Dilaudid to help with any pain she may experience during their session. Patient has been weaned off of oxygen. Continue to monitor and notify MD of any changes.

## 2018-06-29 NOTE — PLAN OF CARE
Problem: Patient Care Overview  Goal: Plan of Care/Patient Progress Review  OT/5B - OT HOLDING. PT will continue to follow to address functional mobility and slide board transfers. PT will notify OT if acute OT needs remain/arise to limit duplication of inpatient therapy services.

## 2018-06-30 LAB
ANION GAP SERPL CALCULATED.3IONS-SCNC: 8 MMOL/L (ref 3–14)
BUN SERPL-MCNC: 22 MG/DL (ref 7–30)
CALCIUM SERPL-MCNC: 8.3 MG/DL (ref 8.5–10.1)
CHLORIDE SERPL-SCNC: 103 MMOL/L (ref 94–109)
CO2 SERPL-SCNC: 24 MMOL/L (ref 20–32)
CREAT SERPL-MCNC: 0.83 MG/DL (ref 0.52–1.04)
CRP SERPL-MCNC: 150 MG/L (ref 0–8)
ERYTHROCYTE [DISTWIDTH] IN BLOOD BY AUTOMATED COUNT: 22.2 % (ref 10–15)
GFR SERPL CREATININE-BSD FRML MDRD: 72 ML/MIN/1.7M2
GLUCOSE BLDC GLUCOMTR-MCNC: 140 MG/DL (ref 70–99)
GLUCOSE BLDC GLUCOMTR-MCNC: 151 MG/DL (ref 70–99)
GLUCOSE BLDC GLUCOMTR-MCNC: 154 MG/DL (ref 70–99)
GLUCOSE BLDC GLUCOMTR-MCNC: 194 MG/DL (ref 70–99)
GLUCOSE BLDC GLUCOMTR-MCNC: 237 MG/DL (ref 70–99)
GLUCOSE SERPL-MCNC: 308 MG/DL (ref 70–99)
HCT VFR BLD AUTO: 28 % (ref 35–47)
HGB BLD-MCNC: 8.9 G/DL (ref 11.7–15.7)
LACTATE BLD-SCNC: 1.1 MMOL/L (ref 0.4–1.9)
MAGNESIUM SERPL-MCNC: 2.1 MG/DL (ref 1.6–2.3)
MCH RBC QN AUTO: 27.6 PG (ref 26.5–33)
MCHC RBC AUTO-ENTMCNC: 31.8 G/DL (ref 31.5–36.5)
MCV RBC AUTO: 87 FL (ref 78–100)
PHOSPHATE SERPL-MCNC: 1.9 MG/DL (ref 2.5–4.5)
PLATELET # BLD AUTO: 282 10E9/L (ref 150–450)
POTASSIUM SERPL-SCNC: 4.7 MMOL/L (ref 3.4–5.3)
RBC # BLD AUTO: 3.23 10E12/L (ref 3.8–5.2)
SODIUM SERPL-SCNC: 135 MMOL/L (ref 133–144)
TROPONIN I SERPL-MCNC: <0.015 UG/L (ref 0–0.04)
WBC # BLD AUTO: 12.3 10E9/L (ref 4–11)

## 2018-06-30 PROCEDURE — 25000128 H RX IP 250 OP 636: Performed by: ORTHOPAEDIC SURGERY

## 2018-06-30 PROCEDURE — 25000128 H RX IP 250 OP 636: Performed by: STUDENT IN AN ORGANIZED HEALTH CARE EDUCATION/TRAINING PROGRAM

## 2018-06-30 PROCEDURE — 93005 ELECTROCARDIOGRAM TRACING: CPT

## 2018-06-30 PROCEDURE — 36592 COLLECT BLOOD FROM PICC: CPT | Performed by: STUDENT IN AN ORGANIZED HEALTH CARE EDUCATION/TRAINING PROGRAM

## 2018-06-30 PROCEDURE — 93010 ELECTROCARDIOGRAM REPORT: CPT | Performed by: INTERNAL MEDICINE

## 2018-06-30 PROCEDURE — 25000132 ZZH RX MED GY IP 250 OP 250 PS 637: Performed by: STUDENT IN AN ORGANIZED HEALTH CARE EDUCATION/TRAINING PROGRAM

## 2018-06-30 PROCEDURE — 12000008 ZZH R&B INTERMEDIATE UMMC

## 2018-06-30 PROCEDURE — 25000128 H RX IP 250 OP 636: Performed by: SURGERY

## 2018-06-30 PROCEDURE — 25000132 ZZH RX MED GY IP 250 OP 250 PS 637: Performed by: HOSPITALIST

## 2018-06-30 PROCEDURE — 27210429 ZZH NUTRITION PRODUCT INTERMEDIATE LITER

## 2018-06-30 PROCEDURE — 36592 COLLECT BLOOD FROM PICC: CPT | Performed by: HOSPITALIST

## 2018-06-30 PROCEDURE — 83605 ASSAY OF LACTIC ACID: CPT | Performed by: HOSPITALIST

## 2018-06-30 PROCEDURE — 00000146 ZZHCL STATISTIC GLUCOSE BY METER IP

## 2018-06-30 PROCEDURE — 80048 BASIC METABOLIC PNL TOTAL CA: CPT | Performed by: STUDENT IN AN ORGANIZED HEALTH CARE EDUCATION/TRAINING PROGRAM

## 2018-06-30 PROCEDURE — 83735 ASSAY OF MAGNESIUM: CPT | Performed by: STUDENT IN AN ORGANIZED HEALTH CARE EDUCATION/TRAINING PROGRAM

## 2018-06-30 PROCEDURE — 84100 ASSAY OF PHOSPHORUS: CPT | Performed by: STUDENT IN AN ORGANIZED HEALTH CARE EDUCATION/TRAINING PROGRAM

## 2018-06-30 PROCEDURE — 25000132 ZZH RX MED GY IP 250 OP 250 PS 637: Performed by: INTERNAL MEDICINE

## 2018-06-30 PROCEDURE — 84484 ASSAY OF TROPONIN QUANT: CPT | Performed by: HOSPITALIST

## 2018-06-30 PROCEDURE — 99233 SBSQ HOSP IP/OBS HIGH 50: CPT | Performed by: HOSPITALIST

## 2018-06-30 PROCEDURE — 94640 AIRWAY INHALATION TREATMENT: CPT | Mod: 76

## 2018-06-30 PROCEDURE — 25000125 ZZHC RX 250: Performed by: STUDENT IN AN ORGANIZED HEALTH CARE EDUCATION/TRAINING PROGRAM

## 2018-06-30 PROCEDURE — 25000128 H RX IP 250 OP 636: Performed by: HOSPITALIST

## 2018-06-30 PROCEDURE — 85027 COMPLETE CBC AUTOMATED: CPT | Performed by: STUDENT IN AN ORGANIZED HEALTH CARE EDUCATION/TRAINING PROGRAM

## 2018-06-30 PROCEDURE — 86140 C-REACTIVE PROTEIN: CPT | Performed by: STUDENT IN AN ORGANIZED HEALTH CARE EDUCATION/TRAINING PROGRAM

## 2018-06-30 PROCEDURE — 25000128 H RX IP 250 OP 636: Performed by: PHYSICIAN ASSISTANT

## 2018-06-30 PROCEDURE — 25000125 ZZHC RX 250: Performed by: HOSPITALIST

## 2018-06-30 RX ORDER — NALOXONE HYDROCHLORIDE 0.4 MG/ML
.1-.4 INJECTION, SOLUTION INTRAMUSCULAR; INTRAVENOUS; SUBCUTANEOUS
Status: DISCONTINUED | OUTPATIENT
Start: 2018-06-30 | End: 2018-07-10 | Stop reason: HOSPADM

## 2018-06-30 RX ORDER — HYDROMORPHONE HYDROCHLORIDE 1 MG/ML
0.5 INJECTION, SOLUTION INTRAMUSCULAR; INTRAVENOUS; SUBCUTANEOUS ONCE
Status: COMPLETED | OUTPATIENT
Start: 2018-06-30 | End: 2018-06-30

## 2018-06-30 RX ORDER — HYDROMORPHONE HYDROCHLORIDE 2 MG/1
2 TABLET ORAL ONCE
Status: COMPLETED | OUTPATIENT
Start: 2018-06-30 | End: 2018-06-30

## 2018-06-30 RX ORDER — DOCUSATE SODIUM 100 MG/1
100 CAPSULE, LIQUID FILLED ORAL 2 TIMES DAILY
Status: DISCONTINUED | OUTPATIENT
Start: 2018-06-30 | End: 2018-07-10 | Stop reason: HOSPADM

## 2018-06-30 RX ORDER — HYDROMORPHONE HYDROCHLORIDE 1 MG/ML
0.5 INJECTION, SOLUTION INTRAMUSCULAR; INTRAVENOUS; SUBCUTANEOUS
Status: COMPLETED | OUTPATIENT
Start: 2018-06-30 | End: 2018-06-30

## 2018-06-30 RX ORDER — IPRATROPIUM BROMIDE AND ALBUTEROL SULFATE 2.5; .5 MG/3ML; MG/3ML
3 SOLUTION RESPIRATORY (INHALATION)
Status: DISCONTINUED | OUTPATIENT
Start: 2018-06-30 | End: 2018-07-01

## 2018-06-30 RX ORDER — FUROSEMIDE 40 MG
40 TABLET ORAL DAILY
Status: DISCONTINUED | OUTPATIENT
Start: 2018-06-30 | End: 2018-06-30

## 2018-06-30 RX ORDER — IPRATROPIUM BROMIDE AND ALBUTEROL SULFATE 2.5; .5 MG/3ML; MG/3ML
3 SOLUTION RESPIRATORY (INHALATION) 4 TIMES DAILY PRN
Status: DISCONTINUED | OUTPATIENT
Start: 2018-06-30 | End: 2018-07-01

## 2018-06-30 RX ORDER — HYDROMORPHONE HYDROCHLORIDE 2 MG/1
2 TABLET ORAL ONCE
Status: COMPLETED | OUTPATIENT
Start: 2018-07-01 | End: 2018-07-01

## 2018-06-30 RX ORDER — FUROSEMIDE 20 MG
20 TABLET ORAL DAILY
Status: DISCONTINUED | OUTPATIENT
Start: 2018-06-30 | End: 2018-07-05

## 2018-06-30 RX ADMIN — ACETAMINOPHEN 1000 MG: 500 TABLET, FILM COATED ORAL at 22:13

## 2018-06-30 RX ADMIN — HYDROMORPHONE HYDROCHLORIDE 0.5 MG: 1 INJECTION, SOLUTION INTRAMUSCULAR; INTRAVENOUS; SUBCUTANEOUS at 00:44

## 2018-06-30 RX ADMIN — PREGABALIN 75 MG: 75 CAPSULE ORAL at 08:59

## 2018-06-30 RX ADMIN — SODIUM CHLORIDE, PRESERVATIVE FREE 5 ML: 5 INJECTION INTRAVENOUS at 09:18

## 2018-06-30 RX ADMIN — ENOXAPARIN SODIUM 40 MG: 40 INJECTION SUBCUTANEOUS at 08:58

## 2018-06-30 RX ADMIN — Medication: at 20:29

## 2018-06-30 RX ADMIN — CEFEPIME HYDROCHLORIDE 2 G: 2 INJECTION, POWDER, FOR SOLUTION INTRAVENOUS at 18:27

## 2018-06-30 RX ADMIN — Medication: at 05:29

## 2018-06-30 RX ADMIN — Medication 0.5 MG: at 05:17

## 2018-06-30 RX ADMIN — Medication 0.5 MG: at 11:31

## 2018-06-30 RX ADMIN — Medication: at 22:27

## 2018-06-30 RX ADMIN — VANCOMYCIN HYDROCHLORIDE 1250 MG: 10 INJECTION, POWDER, LYOPHILIZED, FOR SOLUTION INTRAVENOUS at 05:45

## 2018-06-30 RX ADMIN — CEFEPIME HYDROCHLORIDE 2 G: 2 INJECTION, POWDER, FOR SOLUTION INTRAVENOUS at 04:57

## 2018-06-30 RX ADMIN — METRONIDAZOLE 500 MG: 500 INJECTION, SOLUTION INTRAVENOUS at 00:16

## 2018-06-30 RX ADMIN — FLUTICASONE PROPIONATE 2 SPRAY: 50 SPRAY, METERED NASAL at 08:58

## 2018-06-30 RX ADMIN — MULTIVITAMIN 15 ML: LIQUID ORAL at 08:59

## 2018-06-30 RX ADMIN — METRONIDAZOLE 500 MG: 500 INJECTION, SOLUTION INTRAVENOUS at 12:24

## 2018-06-30 RX ADMIN — Medication 6 MG: at 00:15

## 2018-06-30 RX ADMIN — FUROSEMIDE 20 MG: 20 TABLET ORAL at 13:41

## 2018-06-30 RX ADMIN — ACETAMINOPHEN 1000 MG: 500 TABLET, FILM COATED ORAL at 08:59

## 2018-06-30 RX ADMIN — OMEPRAZOLE 20 MG: 20 CAPSULE, DELAYED RELEASE ORAL at 19:31

## 2018-06-30 RX ADMIN — Medication 6 MG: at 22:08

## 2018-06-30 RX ADMIN — AMITRIPTYLINE HYDROCHLORIDE 50 MG: 50 TABLET, FILM COATED ORAL at 00:15

## 2018-06-30 RX ADMIN — IPRATROPIUM BROMIDE AND ALBUTEROL SULFATE 3 ML: .5; 3 SOLUTION RESPIRATORY (INHALATION) at 15:30

## 2018-06-30 RX ADMIN — Medication 0.5 MG: at 03:59

## 2018-06-30 RX ADMIN — SODIUM CHLORIDE, PRESERVATIVE FREE 5 ML: 5 INJECTION INTRAVENOUS at 14:15

## 2018-06-30 RX ADMIN — Medication 70 MG: at 08:58

## 2018-06-30 RX ADMIN — HYDROMORPHONE HYDROCHLORIDE 2 MG: 2 TABLET ORAL at 22:10

## 2018-06-30 RX ADMIN — Medication 1 PACKET: at 09:01

## 2018-06-30 RX ADMIN — PREGABALIN 75 MG: 75 CAPSULE ORAL at 19:31

## 2018-06-30 RX ADMIN — ACETAMINOPHEN 1000 MG: 500 TABLET, FILM COATED ORAL at 18:32

## 2018-06-30 RX ADMIN — IPRATROPIUM BROMIDE AND ALBUTEROL SULFATE 3 ML: .5; 3 SOLUTION RESPIRATORY (INHALATION) at 20:36

## 2018-06-30 RX ADMIN — HYDROMORPHONE HYDROCHLORIDE 0.5 MG: 1 INJECTION, SOLUTION INTRAMUSCULAR; INTRAVENOUS; SUBCUTANEOUS at 02:50

## 2018-06-30 RX ADMIN — METRONIDAZOLE 500 MG: 500 INJECTION, SOLUTION INTRAVENOUS at 05:54

## 2018-06-30 RX ADMIN — PREGABALIN 75 MG: 75 CAPSULE ORAL at 13:41

## 2018-06-30 RX ADMIN — AMITRIPTYLINE HYDROCHLORIDE 50 MG: 50 TABLET, FILM COATED ORAL at 22:09

## 2018-06-30 RX ADMIN — METRONIDAZOLE 500 MG: 500 INJECTION, SOLUTION INTRAVENOUS at 19:32

## 2018-06-30 RX ADMIN — ACETAMINOPHEN 1000 MG: 500 TABLET, FILM COATED ORAL at 00:15

## 2018-06-30 NOTE — PROVIDER NOTIFICATION
Contacted  RAPS Anesthesiology re:  On-Q pump function and uncontrolled pain requesting evaluation and possible bolus.

## 2018-06-30 NOTE — PROGRESS NOTES
REGIONAL ANESTHESIA PAIN SERVICE CONTINUOUS NERVE INFUSION NOTE  SUBJECTIVE:  Interval History: Between 0300 hours and this AM, catheter and dressings were inadvertently pulled out.  Patient was explained that it was likely due to postioning and adjustment of undergarments.   Pain has been tolerable on dilaudid PCA.           Clinically Aligned Pain Assessment (CAPA):   Comfort (How is your pain?): Tolerable with discomfort  Change in Pain (Since your last medication/intervention?): About the same  Pain Control (How are your pain treatments working?):  Partially effective pain control  Functioning (Are you able to do activities to get better?) : Pain keeps me from doing most of what I need to do  Sleep (Does your pain management allow you to sleep or rest?): Awake with occasional pain    Anticoagulation:  Enoxaparin 40 mg q24H      OBJECTIVE:    Diagnostic:  Lab Results   Component Value Date    WBC 12.3 06/30/2018     Lab Results   Component Value Date    RBC 3.23 06/30/2018     Lab Results   Component Value Date    HGB 8.9 06/30/2018     Lab Results   Component Value Date    HCT 28.0 06/30/2018     Lab Results   Component Value Date     06/30/2018         Vitals:    Temp:  [36  C (96.8  F)-37.5  C (99.5  F)] 36.8  C (98.2  F)  Pulse:  [117] 117  Heart Rate:  [106-114] 111  Resp:  [16-18] 18  BP: (121-157)/(72-77) 157/77  SpO2:  [90 %-96 %] 95 %    Exam:    Strength 5/5 and symmetric grossly in bilateral LE.  RLE residual limb ace wrapped.  R femoral and sciatic catheter and dressings essentially being held on to patient by her undergarments.      ASSESSMENT/PLAN:    Alessandra Pak is a 52 year old female POD #3 s/p   AMPUTATE LEG BELOW KNEE and placement of R femoral and sciatic catheters for analgesia.  Pt is receiving adequate analgesia now with hydromorphone PCA.      - patient tolerating pain with hydromorphone PCA   - Will continue to follow if there is any need for replacement of peripheral nerve  catheters.            Will Dhillon MD, PhD   Date of Service (when I saw the patient): 06/30/18    [address] [address]  [phone]  Dept: [phone]

## 2018-06-30 NOTE — PROVIDER NOTIFICATION
Contacted Primary Provider team re:  Break through  IV pain control during  Current situation with On-Q pump concerns.

## 2018-06-30 NOTE — PROGRESS NOTES
Perioperative Pain Service Cross Cover Note    S: Called by RN to assess patient as she is having increased pain that started around 11 pm. She was very comfortable prior to that and required minimal IV pain meds. Now she reports unbearable pain in the right BKA site.  Denies circumoral numbness, metallic taste, lightheadedness, visual and auditory disturbances, disorientation, or drowsiness.      O: Patient is alert and oriented complaining of right leg pain.    Catheter sites checked and intact. Dressing is somewhat loose but catheters appear to be in good position, no leakage, heme, erythema. No leakage of LA during bolus of either catheter.   Diminished sensation to sharp touch below the knee. Sensation intact above the knee.    Vitals: 144/76, T: 96.8, P: 115, R: 18    A/P: 51 yo POD#2 now with increased pain.     0240: Right femoral and sciatic catheters bolused with PF bupivacaine 0.25%, 5 mL each catheter incrementally with negative aspirate before and between each mL without complication, no symptoms of local anesthetic toxicity (LAST).  Remained with and assessed patient for 10 min post-injection.  Bedside nurse aware she should continue monitor BP/P, MAP Q 5 min x 30 min, and assess for any untoward effects to local anesthetic following injection and contact anesthesia for any questions or concerns.    0255: patient reports some improvement in pain after bolus (she also received 0.5 mg dilaudid). No LAST symptoms. Sensation diminished in lateral, posterior and medial aspects of the leg below the knee, but she still has full sensation over the anterior portion of the leg where most of her pain is. Right femoral catheter bolused with an additional 5 ml of 0.25% bupivacaine with no adverse effect or signs of LAST.     Maricarmen Zaidi MD  Anesthesiology Resident   [phone]    6/30/2018 2:47 AM

## 2018-06-30 NOTE — PLAN OF CARE
"Problem: Patient Care Overview  Goal: Plan of Care/Patient Progress Review  Outcome: No Change  /77 (BP Location: Left arm)  Pulse 117  Temp 98.2  F (36.8  C) (Oral)  Resp 18  Ht 1.727 m (5' 8\")  Wt 58 kg (127 lb 14.4 oz)  SpO2 95%  BMI 19.45 kg/m2 Alert/Contact isolation for MRSA.  , 308. Patient had significant pain from onset of shift.  She had slept for much of previous shift and awakened in pain during this shift squirming, wincing, moaning and requesting pain medications. She reported that she thought her Q C Block  Was not functioning as she could feel significant pain in her right leg.  Notified Anesthesiology re:  Uncontrolled pain and concern re:  Right femoral and sciatic catheters as to whether they were functioning and patent or were leaking? Requested bedside eval.  Anesthesiology  evaluated patient at bedside and  Administered  2 boluses with PF bupivacaine 0.25%, 5 ml  X 2  Into both the sciatic and Right femoral catheters which patient endorsed decreased her pain along with combination  Of additional doses of 4 PRN doses of IV dialaudid 0.5 mg administered by nursing.   Primary provider notified re: break through pain and patient not tolerating longer than 1hour of IV dilaudid 0.5 mg.  Provider ordered PCA pump and patient started on at  Dilaudid PCA 1mg/ml Opiod tolerant with loading dose of 0.3 mg, 10 min lock out, and 1.9 mg one hour dose limit. Pt  Had been rolling around on bed and flailing left leg over the bed and leaning on the right side over the Q C block. RN discussed with patient/providers concerns related to patient with dislodging the catheters with positioning. Pt endorsed decreased pain with adjunct PCA therapy.  Pt needs constant reminders to keep legs in bed and elevated and to not throw legs over the bed or pull at the Q C block. Pt also received Flagyl IV x2, Cefepime IV, and Vanco IV via triple lumen PICC.  Pt has patent NG TF infusing at goal rate of 80cc/hr. "  Pt had requested to have Elavil, tylenol, and melatonin (HS medications she missed earlier while sleeping during evening shift and was given those as well.) Pt needs assist of 1-2 to pivot safely to BSC. She is impulsive with mobility and tends to throw leg over the side of bed.  Plan:  Continue to monitor for pain,BP/MAP, swelling, and s/s   Of lightheadness, visual and auditory disturbances, metallic taste,  disorientation or drowsiness. Notify MD of changes. Pt will need to have catheter sit addressed in am. Ortho came to evaluate in am.

## 2018-06-30 NOTE — PROGRESS NOTES
Social Work Services Progress Note    Hospital Day: 25  Date of Initial Social Work Evaluation:  Not completed  Consulted with:  Mother and patient    Data:  Patient is a 52 year old female who is POD #2 BKA for osteomyelitis.     Intervention:  Received request from RN to call patient's mother. Spoke with mother by phone and she asked if there was assistance with parking, gas and food for them to come and see patient. We talked about a 1 time ye to help with parking - will leave voucher in envelope on 5B. She was appreciative of the assistance.    Spoke with patient by phone and explained her mother's question. Writer asked if patient had any transportation benefits through her insurance. Alessandra endorsed that when she had [employer] she used their Provide a Ride but now that she has [employer] she no longer has any transportation benefits. Writer explained that we will provide assistance for her family to visit but we were only able to provide a pass 1 time. She endorsed understanding of this.     Assessment:  Family asking for assistance in costs of visiting patient who has been hospitalized 25 days and will likely transfer to ARU or TCU on [facility]    Plan:    Anticipated Disposition:  Facility:  [facility]    Barriers to d/c plan:  Medical readiness    Follow Up:  Will leave voucher at main desk on 5B.    Kinga RACHEL LICSW  6/30/2018    ON CALL PAGER   0800 - 1600   [phone]    ON CALL COVERAGE AFTER 1600  [phone]

## 2018-06-30 NOTE — PLAN OF CARE
Problem: Patient Care Overview  Goal: Plan of Care/Patient Progress Review  5B PT: Attempted to see patient in PM. Patient appeared emotional and declined PT this date stating it was a rough day. Agreeable to PT tomorrow. Jennifer.

## 2018-06-30 NOTE — PROGRESS NOTES
"Orthopaedic Daily Progress Note    S: Pain increased at BKA stump overnight. Slightly improved with OnQ pump boluses per anesthesia, but still significant this AM. PCA ordered and starting this AM. Patient denies chest pain or shortness of breath. Understands restrictions.     O:  /77 (BP Location: Left arm)  Pulse 117  Temp 98.2  F (36.8  C) (Oral)  Resp 18  Ht 1.727 m (5' 8\")  Wt 58 kg (127 lb 14.4 oz)  SpO2 95%  BMI 19.45 kg/m2  Gen: appears uncomfortable, alert and oriented  Resp: Breathing comfortably  MSK:   RLE:  BKA; ace wrap c,d,i, without strikethrough, left in place today. No excessive tightness of dressing.  LLE: dressings in place, pictures from wound care nursing note 6/29 reviewed. Dressings not taken down. 1st MTP ulcer not dressed.        Recent Labs  Lab 06/30/18  0656 06/29/18  0952 06/29/18  0539 06/28/18  0713 06/27/18  2022 06/27/18  0709   WBC 12.3*  --  12.1*  --   --  7.6   HGB 8.9*  --  9.4*  --   --  9.0*    315 345  --  376 414   CR 0.83 0.95 0.95 1.13*  --  1.17*       Wound Culture: polymicrobial, on culture had MRSA.   Intra operative cultures from 6/28: GPC from bone      Assessment: Alessandra Pak is a 52 year old female admitted sepsis with recurrent right foot diabetic ulcers as source, now s/p bedside I&D right foot on 6/7, 6/12, and 6/13.  Intubated for ARDS/hypoxic respiratory failure on 6/10, extubated 6/15, TTF 6/15.  Ongoing recurring foot ulcers in the setting of poorly controlled DM/malnutrition, exhausted all medical options and patient opted to proceed with R below knee amputation, completed on 6/28. Intraoperative bone cultures with gram positive cocci, patient remains on cefepime, metronidazole, and vancomycin.        Plan:  Medicine Primary  Activity: Elevate BLE as much as possible   Weight bearing status: NWB RLE, okay for minimal WB with left forefoot for transfers to chair or commode. Bed to chair transfers only due to LLE ulcers.  "   Antibiotics: per primary/ID, will need to re-engage infectious disease given positive cultures from intra op. Will likely need long term IV antibiotics.   Labs: per primary team, will need to follow HGB post op  Follow cultures taken at the proximal tibia after BKA  DVT prophylaxis: per primary team  Wound Care: leave surgical dressing in place until Monday or Tuesday, then dressing change with ortho and prosthetics consult for stump   Dispo: pending, possibly next week      Discussed with Dr. King.     Tian Ashby MD  Orthopaedic Surgery  PGY-4  Pager [phone]

## 2018-06-30 NOTE — PROVIDER NOTIFICATION
Patient noted to be eating her clear liquid diet at 1552. Said she felt like her blood sugar was low. It was checked and noted to be 62. She was eating her supper and had some packets of sugar added to her apple juice. At 1605 blood sugar was 60. Writer stayed with patient another 15 minutes while she drank another juice with 3 packets of sugar. Blood sugar came up to 94 by 1620. At 1643 blood sugar was up to 154 and writer felt she was doing better. Jose CHIN, Dr. Alireza Chaparro,  for order to advance diet. Also notified him of low blood sugar.

## 2018-06-30 NOTE — PLAN OF CARE
Problem: Patient Care Overview  Goal: Plan of Care/Patient Progress Review  Outcome: No Change  D: Patient comfortable in early am, q pump off as it was dislodged. Pain to right amputation site increased several times today. New orders for pca started and more relief obtained. EKG done and labs as ordered, vitals more stable. P: Monitor pain control , labs.

## 2018-06-30 NOTE — PLAN OF CARE
Problem: Infection, Risk/Actual (Adult)  Goal: Identify Related Risk Factors and Signs and Symptoms  Related risk factors and signs and symptoms are identified upon initiation of Human Response Clinical Practice Guideline (CPG).   Outcome: No Change  Patient has stable vital signs, but has elevated WBC. She pivoted to commode with one to 2 assist around 4 pm. WOC RN changed the dressing to the left foot and said it should be a dialy dressing change. The right leg with ace wrap is intact. On-Q-Pump is intact. She requested pain medication once at 1645. She also requested something for heart burn once and evening medication for that was given early. She only had a few bites of mashed potatoes and so no insulin was given with dinner. She fell asleep around 1930 and slept until midnight when staff woke her at shift change. Tube feeding was started at 2145 at 80 ml per hour via NJ tube. Continue to assess and treat pain and monitor blood sugars closely.

## 2018-06-30 NOTE — PROVIDER NOTIFICATION
Dr. Sanchez paged now.  [employer] smart web had Krysta on duty.      Dr. Sanchez returned page, trops ordered.

## 2018-06-30 NOTE — PROGRESS NOTES
[facility], [place]    Internal Medicine Progress Note - [facility] Service    Main Plans for Today     Pain management   Infectious disease consult 7/1 for management of antibiotic therapy given OR cultures  Dressing change with ortho next week  Prosthesis consult next week with stump  per ortho    Assessment & Plan      Alessandra Pak is a 52 year old female admitted on 6/6/2018. 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 Hospital course was complicated by respiratory failure due to ARDS requiring intubation (6/10-15), extubated and transferred to medicine for further cares.   She underwent BKA for osteomyelitis on June 28, 2018.    Addendum:    - EKG obtained for tachycardia shows anterior infarct and possible STEMI.  - repeating EKG   - obtaining Troponin's   - patient remains stable apart from tachycardia  - obtaining echo to see if any wall motion abnormalities (addendum: appreciate cardiology assistance, no WM abnormalities seen on bedside echo).   - discussed with cardiology .    # Pain control   # Chronic pain   # Anxiety  # Post op tachycardia      - peripheral nerve blocks removed this am due to being dislodged   - currently on hydromorphone PCA - 0.4 mg q 10 min with no continuous rate   - continue Tylenol 1000 mg tid scheduled   - continue Amitriptyline 50 mg qhs   - continue methadone 70 mg daily   - continue Lyrica 75 mg tid   - good bowel regimen due to narcotics   - tachycardia is likely due to pain, will obtain EKG to ensure sinus     # Severe Sepsis, resolved  # R diabetic foot infection, s/p I&D x 3, R BKA 6/28    - no further fevers  - continuing on Vancomycin, cefepime and flagyl   - OR cultures thus far growing gram positive cocci -  will await further culture finalization   - appreciate orthopedics and ID recommendations   - follow up infectious workup   - follow CRP's intermittently  - incentive spirometer   - fall precautions   - transfers from bed to chair per ortho recommendations   - will consult prosthetics next week  - plan for dressing change next week with ortho     # Type 2 Diabetes  # Hx of Chronic pancreatitis s/p whipple's  # Severe malnutrition in the context of acute illness    - mildly hyperglycemic today  - will increase glargine to 9 units with SSI   - continue tube feeds and calorie counts - remove TF when patient has adequate PO  - continue pantoprazole PO qd  - continue throat lozenge and spray     TF regimen: Nutren 1.5 @ 50 ml/hr, (1200 ml/day)  Provides: 1800 kcals (32+), 82 gm PRO (1.4+), 912 mL H2O, 211 gm CHO and 0 fiber.   Water flushes: 50 ml every 2 hours     # Acute hypoxic respiratory failure, resolved  # ARDS 2/2 systemic response from osteo/foot infection  # Hx of chronic restrictive lung disease with emphysema and fibrosis  # chronic systolic heart failure with reduced EF of 35%     - resume Lasix 20 mg daily starting today  - Daily weights, strict I/O  - wean O2 as tolerated, goal >88%  - DuoNebs qid and qid prn      # Normocytic Anemia  # Anemia of chronic disease     - Insufficient reticulocyte response on 6/10 noted.   - monitor CBC     # Adrenal insufficiency  - S/p stress dose steroid taper, complete 6/18.     # Anisocoria/R afferent pupillary defect  - stable    # Pain Assessment:  Current Pain Score 6/30/2018   Patient currently in pain? yes   Pain score (0-10) -   Pain location Leg   Pain descriptors -   CPOT pain score -   - Alessandra is experiencing pain due to recent BKA. Pain management was discussed and the plan was created in a collaborative fashion.  Alessandra's response to the current recommendations: engaged  - Please see the plan for pain management as documented above    Diet: Room Service  Adult  Formula Drip Feeding: Continuous Nutren 1.5; Nasoduodenal tube; Goal Rate: 80; mL/hr; From: 8:00 PM; 10:00 AM; Medication - Tube Feeding Flush Frequency: At least 15-30 mL water before and after medication administration and with tube cloggi...  Snacks/Supplements Adult: Magic Cup; Between Meals  Snacks/Supplements Adult: Magic Cup; With Meals  Regular Diet Adult  Fluids: none   DVT Prophylaxis: Enoxaparin (Lovenox) SQ  Code Status: Full Code    Disposition Plan   Expected discharge: 2 - 3 days, recommended to transitional care unit once once post BKA management and infectious concerns are addressed..     Entered: Luis Carlos Sanchez 06/30/2018, 12:39 PM   Information in the above section will display in the discharge planner report.      The patient's care was discussed with the Bedside Nurse and Patient.    Luis Carlos Sanchez  [facility]  Maroon: 5  Pager: [phone]  Please see sticky note for cross cover information    Interval History     Ms. Pak is doing well today.  She had significant pain overnight and was placed on a PCA.  Today we titrate pain management.  No chest pain, dyspnea, abdominal pain, nausea, vomiting.  Tolerating tube feeds ok.     Physical Exam   Vital Signs: Temp: 99.4  F (37.4  C) Temp src: Oral BP: 161/82 Pulse: 117 Heart Rate: 111 Resp: 18 SpO2: 95 % O2 Device: Nasal cannula Oxygen Delivery: 2 LPM  Weight: 127 lbs 14.4 oz    General Appearance: Patient is in no acute distress  Respiratory: Lungs are clear to auscultation, no wheezing, rales, or rhonchi auscultated  Cardiovascular: Tachycardic, regular rhythm, no murmurs, no rubs, and no gallops   GI:  soft, not tender, not distended, bowel sounds present and normal, no masses appreciated   Skin: no rashes and no discolorations   Neurologic: Patient is alert and oriented to person, place, time, and situation  Psychiatric: normal mood and affect   Extremities: BKA site with dressing clean and dry.  Block site with no bleeding  noted.

## 2018-07-01 ENCOUNTER — APPOINTMENT (OUTPATIENT)
Dept: CARDIOLOGY | Facility: CLINIC | Age: 53
DRG: 853 | End: 2018-07-01
Attending: HOSPITALIST
Payer: COMMERCIAL

## 2018-07-01 LAB
ANION GAP SERPL CALCULATED.3IONS-SCNC: 10 MMOL/L (ref 3–14)
BLD PROD TYP BPU: NORMAL
BLD PROD TYP BPU: NORMAL
BLD UNIT ID BPU: 0
BLD UNIT ID BPU: 0
BLOOD PRODUCT CODE: NORMAL
BLOOD PRODUCT CODE: NORMAL
BPU ID: NORMAL
BPU ID: NORMAL
BUN SERPL-MCNC: 20 MG/DL (ref 7–30)
CALCIUM SERPL-MCNC: 8.8 MG/DL (ref 8.5–10.1)
CHLORIDE SERPL-SCNC: 106 MMOL/L (ref 94–109)
CO2 SERPL-SCNC: 23 MMOL/L (ref 20–32)
CREAT SERPL-MCNC: 0.87 MG/DL (ref 0.52–1.04)
ERYTHROCYTE [DISTWIDTH] IN BLOOD BY AUTOMATED COUNT: 22.5 % (ref 10–15)
GFR SERPL CREATININE-BSD FRML MDRD: 69 ML/MIN/1.7M2
GLUCOSE BLDC GLUCOMTR-MCNC: 103 MG/DL (ref 70–99)
GLUCOSE BLDC GLUCOMTR-MCNC: 103 MG/DL (ref 70–99)
GLUCOSE BLDC GLUCOMTR-MCNC: 122 MG/DL (ref 70–99)
GLUCOSE BLDC GLUCOMTR-MCNC: 137 MG/DL (ref 70–99)
GLUCOSE BLDC GLUCOMTR-MCNC: 143 MG/DL (ref 70–99)
GLUCOSE BLDC GLUCOMTR-MCNC: 247 MG/DL (ref 70–99)
GLUCOSE SERPL-MCNC: 105 MG/DL (ref 70–99)
HCT VFR BLD AUTO: 27.4 % (ref 35–47)
HGB BLD-MCNC: 9 G/DL (ref 11.7–15.7)
MAGNESIUM SERPL-MCNC: 2.4 MG/DL (ref 1.6–2.3)
MCH RBC QN AUTO: 27.5 PG (ref 26.5–33)
MCHC RBC AUTO-ENTMCNC: 32.8 G/DL (ref 31.5–36.5)
MCV RBC AUTO: 84 FL (ref 78–100)
PHOSPHATE SERPL-MCNC: 4.7 MG/DL (ref 2.5–4.5)
PLATELET # BLD AUTO: 275 10E9/L (ref 150–450)
POTASSIUM SERPL-SCNC: 4 MMOL/L (ref 3.4–5.3)
RBC # BLD AUTO: 3.27 10E12/L (ref 3.8–5.2)
SODIUM SERPL-SCNC: 139 MMOL/L (ref 133–144)
TRANSFUSION STATUS PATIENT QL: NORMAL
WBC # BLD AUTO: 8.4 10E9/L (ref 4–11)

## 2018-07-01 PROCEDURE — 00000146 ZZHCL STATISTIC GLUCOSE BY METER IP

## 2018-07-01 PROCEDURE — 25000128 H RX IP 250 OP 636: Performed by: STUDENT IN AN ORGANIZED HEALTH CARE EDUCATION/TRAINING PROGRAM

## 2018-07-01 PROCEDURE — 25000128 H RX IP 250 OP 636: Performed by: ORTHOPAEDIC SURGERY

## 2018-07-01 PROCEDURE — 25000132 ZZH RX MED GY IP 250 OP 250 PS 637: Performed by: STUDENT IN AN ORGANIZED HEALTH CARE EDUCATION/TRAINING PROGRAM

## 2018-07-01 PROCEDURE — 36592 COLLECT BLOOD FROM PICC: CPT | Performed by: HOSPITALIST

## 2018-07-01 PROCEDURE — 99233 SBSQ HOSP IP/OBS HIGH 50: CPT | Mod: GC | Performed by: INTERNAL MEDICINE

## 2018-07-01 PROCEDURE — 25000125 ZZHC RX 250: Performed by: HOSPITALIST

## 2018-07-01 PROCEDURE — 25000132 ZZH RX MED GY IP 250 OP 250 PS 637: Performed by: HOSPITALIST

## 2018-07-01 PROCEDURE — 93308 TTE F-UP OR LMTD: CPT | Mod: 26 | Performed by: INTERNAL MEDICINE

## 2018-07-01 PROCEDURE — 25000132 ZZH RX MED GY IP 250 OP 250 PS 637: Performed by: INTERNAL MEDICINE

## 2018-07-01 PROCEDURE — 85027 COMPLETE CBC AUTOMATED: CPT | Performed by: HOSPITALIST

## 2018-07-01 PROCEDURE — 84100 ASSAY OF PHOSPHORUS: CPT | Performed by: HOSPITALIST

## 2018-07-01 PROCEDURE — 80048 BASIC METABOLIC PNL TOTAL CA: CPT | Performed by: HOSPITALIST

## 2018-07-01 PROCEDURE — 93325 DOPPLER ECHO COLOR FLOW MAPG: CPT | Mod: 26 | Performed by: INTERNAL MEDICINE

## 2018-07-01 PROCEDURE — 93321 DOPPLER ECHO F-UP/LMTD STD: CPT | Mod: 26 | Performed by: INTERNAL MEDICINE

## 2018-07-01 PROCEDURE — 93308 TTE F-UP OR LMTD: CPT

## 2018-07-01 PROCEDURE — 25000128 H RX IP 250 OP 636: Performed by: HOSPITALIST

## 2018-07-01 PROCEDURE — 83735 ASSAY OF MAGNESIUM: CPT | Performed by: HOSPITALIST

## 2018-07-01 PROCEDURE — 25000125 ZZHC RX 250: Performed by: STUDENT IN AN ORGANIZED HEALTH CARE EDUCATION/TRAINING PROGRAM

## 2018-07-01 PROCEDURE — 94640 AIRWAY INHALATION TREATMENT: CPT | Mod: 76

## 2018-07-01 PROCEDURE — 12000008 ZZH R&B INTERMEDIATE UMMC

## 2018-07-01 PROCEDURE — 94640 AIRWAY INHALATION TREATMENT: CPT

## 2018-07-01 RX ORDER — HYDROMORPHONE HYDROCHLORIDE 2 MG/1
2 TABLET ORAL ONCE
Status: DISCONTINUED | OUTPATIENT
Start: 2018-07-01 | End: 2018-07-05

## 2018-07-01 RX ORDER — HYDROMORPHONE HYDROCHLORIDE 2 MG/1
2 TABLET ORAL ONCE
Status: COMPLETED | OUTPATIENT
Start: 2018-07-01 | End: 2018-07-01

## 2018-07-01 RX ORDER — HYDROMORPHONE HYDROCHLORIDE 5 MG/5ML
2 SOLUTION ORAL EVERY 4 HOURS PRN
Status: DISCONTINUED | OUTPATIENT
Start: 2018-07-01 | End: 2018-07-04

## 2018-07-01 RX ORDER — HYDROMORPHONE HYDROCHLORIDE 5 MG/5ML
2 SOLUTION ORAL ONCE
Status: DISCONTINUED | OUTPATIENT
Start: 2018-07-01 | End: 2018-07-01

## 2018-07-01 RX ORDER — IPRATROPIUM BROMIDE AND ALBUTEROL SULFATE 2.5; .5 MG/3ML; MG/3ML
3 SOLUTION RESPIRATORY (INHALATION) EVERY 4 HOURS PRN
Status: DISCONTINUED | OUTPATIENT
Start: 2018-07-01 | End: 2018-07-10 | Stop reason: HOSPADM

## 2018-07-01 RX ORDER — POLYETHYLENE GLYCOL 3350 17 G/17G
17 POWDER, FOR SOLUTION ORAL DAILY
Status: DISCONTINUED | OUTPATIENT
Start: 2018-07-02 | End: 2018-07-10 | Stop reason: HOSPADM

## 2018-07-01 RX ORDER — HYDROMORPHONE HYDROCHLORIDE 5 MG/5ML
2 SOLUTION ORAL ONCE
Status: DISCONTINUED | OUTPATIENT
Start: 2018-07-02 | End: 2018-07-01

## 2018-07-01 RX ADMIN — SODIUM PHOSPHATE, MONOBASIC, MONOHYDRATE AND SODIUM PHOSPHATE, DIBASIC, ANHYDROUS 20 MMOL: 276; 142 INJECTION, SOLUTION INTRAVENOUS at 01:00

## 2018-07-01 RX ADMIN — HYDROMORPHONE HYDROCHLORIDE 2 MG: 2 TABLET ORAL at 04:26

## 2018-07-01 RX ADMIN — ACETAMINOPHEN 1000 MG: 500 TABLET, FILM COATED ORAL at 22:07

## 2018-07-01 RX ADMIN — PREGABALIN 75 MG: 75 CAPSULE ORAL at 14:12

## 2018-07-01 RX ADMIN — AMITRIPTYLINE HYDROCHLORIDE 50 MG: 50 TABLET, FILM COATED ORAL at 22:07

## 2018-07-01 RX ADMIN — Medication 6 MG: at 22:07

## 2018-07-01 RX ADMIN — IPRATROPIUM BROMIDE AND ALBUTEROL SULFATE 3 ML: .5; 3 SOLUTION RESPIRATORY (INHALATION) at 11:53

## 2018-07-01 RX ADMIN — OMEPRAZOLE 20 MG: 20 CAPSULE, DELAYED RELEASE ORAL at 20:02

## 2018-07-01 RX ADMIN — IPRATROPIUM BROMIDE AND ALBUTEROL SULFATE 3 ML: .5; 3 SOLUTION RESPIRATORY (INHALATION) at 07:55

## 2018-07-01 RX ADMIN — Medication 70 MG: at 08:52

## 2018-07-01 RX ADMIN — METRONIDAZOLE 500 MG: 500 INJECTION, SOLUTION INTRAVENOUS at 17:52

## 2018-07-01 RX ADMIN — FUROSEMIDE 20 MG: 20 TABLET ORAL at 14:12

## 2018-07-01 RX ADMIN — ENOXAPARIN SODIUM 40 MG: 40 INJECTION SUBCUTANEOUS at 14:11

## 2018-07-01 RX ADMIN — CEFEPIME HYDROCHLORIDE 2 G: 2 INJECTION, POWDER, FOR SOLUTION INTRAVENOUS at 16:56

## 2018-07-01 RX ADMIN — Medication 1 PACKET: at 20:03

## 2018-07-01 RX ADMIN — VANCOMYCIN HYDROCHLORIDE 1250 MG: 10 INJECTION, POWDER, LYOPHILIZED, FOR SOLUTION INTRAVENOUS at 06:56

## 2018-07-01 RX ADMIN — METRONIDAZOLE 500 MG: 500 INJECTION, SOLUTION INTRAVENOUS at 01:19

## 2018-07-01 RX ADMIN — FLUTICASONE PROPIONATE 2 SPRAY: 50 SPRAY, METERED NASAL at 08:53

## 2018-07-01 RX ADMIN — HYDROMORPHONE HYDROCHLORIDE 2 MG: 2 TABLET ORAL at 22:07

## 2018-07-01 RX ADMIN — MULTIVITAMIN 15 ML: LIQUID ORAL at 14:12

## 2018-07-01 RX ADMIN — ACETAMINOPHEN 1000 MG: 500 TABLET, FILM COATED ORAL at 16:55

## 2018-07-01 RX ADMIN — METRONIDAZOLE 500 MG: 500 INJECTION, SOLUTION INTRAVENOUS at 05:16

## 2018-07-01 RX ADMIN — Medication: at 22:11

## 2018-07-01 RX ADMIN — CEFEPIME HYDROCHLORIDE 2 G: 2 INJECTION, POWDER, FOR SOLUTION INTRAVENOUS at 05:22

## 2018-07-01 RX ADMIN — METRONIDAZOLE 500 MG: 500 INJECTION, SOLUTION INTRAVENOUS at 12:06

## 2018-07-01 RX ADMIN — PREGABALIN 75 MG: 75 CAPSULE ORAL at 20:03

## 2018-07-01 NOTE — PLAN OF CARE
Problem: Infection, Risk/Actual (Adult)  Goal: Identify Related Risk Factors and Signs and Symptoms  Related risk factors and signs and symptoms are identified upon initiation of Human Response Clinical Practice Guideline (CPG).   Outcome: No Change  Patient is alert and oriented times 4. Pleasant. Mom, and daughter got her up in a wheelchair and took her outside. Mom gave her a bath. Patient and family asking multiple times if patient can have nerve block again. Writer did not know so paged the anesthesia pain service and a provider came and talked to the patient and family. The plan was to use the PCA overnight and have the peripheral nerve catheters placed in the am on Sunday. PCA was increased to a basal rate of 0.4 mg per  MD order. Patient did not eat supper and tube feeding is off tonight. Dr. Monreal; MD on call at  pager [phone] said we do not need to give IV dextrose overnight. Ace wrap to right BKA (POD #2) is C/D/I. Dressing to left foot done per order at 2200. Capnography on with basal rate on the PCA. O2 sats down to 89% while patient sleeping so oxygen placed at 2 liters. Blood sugars checked every 2-3 hours and were 140's-150's and no insulin was given as not eating and tube feeing off. Order received for phosphorus replacement and it was ordered from pharmacy. Patient is sleeping by 2230. Bed alarm on for safety. Used commode end of day shift and patient was not noted to get up to the commode this shift. Will monitor. Continue to be 110-120 heart rate.

## 2018-07-01 NOTE — PROGRESS NOTES
REGIONAL ANESTHESIA PAIN SERVICE CONTINUOUS NERVE INFUSION NOTE  SUBJECTIVE:  Interval History: Patient states she is doing quite well. Pain is best it has been and states she slept last night throughout the night which apparently hasn't happened yet. Discussed replacing Right femoral nerve catheter. She is reluctant given improvement in pain. Pain has been tolerable on dilaudid PCA.           Clinically Aligned Pain Assessment (CAPA):   Comfort (How is your pain?): Tolerable with discomfort  Change in Pain (Since your last medication/intervention?): About the same  Pain Control (How are your pain treatments working?):  Partially effective pain control  Functioning (Are you able to do activities to get better?) : Pain keeps me from doing most of what I need to do  Sleep (Does your pain management allow you to sleep or rest?): Awake with occasional pain    Anticoagulation:  Enoxaparin 40 mg q24H      OBJECTIVE:    Diagnostic:  Lab Results   Component Value Date    WBC 12.3 06/30/2018     Lab Results   Component Value Date    RBC 3.23 06/30/2018     Lab Results   Component Value Date    HGB 8.9 06/30/2018     Lab Results   Component Value Date    HCT 28.0 06/30/2018     Lab Results   Component Value Date     06/30/2018         Vitals:    Temp:  [36  C (96.8  F)-37.5  C (99.5  F)] 36.8  C (98.2  F)  Pulse:  [117] 117  Heart Rate:  [106-114] 111  Resp:  [16-18] 18  BP: (121-157)/(72-77) 157/77  SpO2:  [90 %-96 %] 95 %    Exam:    Strength 5/5 and symmetric grossly in bilateral LE.  RLE residual limb ace wrapped.  R femoral and sciatic catheter and dressings essentially being held on to patient by her undergarments.      ASSESSMENT/PLAN:    Alessandra Pak is a 52 year old female POD #3 s/p   AMPUTATE LEG BELOW KNEE and placement of R femoral and sciatic catheters for analgesia.  Pt is receiving adequate analgesia now with hydromorphone PCA. Her pain seems to be improving so will hold off on replacement of  catheter today as expected pain should be diminishing with each day. Patient aware that if her pain worsens that she is to have nursing staff call anesthesia to again evaluate for block.      - patient tolerating pain with hydromorphone PCA   - Will continue to follow if there is any need for replacement of peripheral nerve catheters.      Luisito Mascorro MD  CA-3/PGY-4

## 2018-07-01 NOTE — PLAN OF CARE
Problem: Patient Care Overview  Goal: Plan of Care/Patient Progress Review  Outcome: No Change  Patient is alert and oriented, calm and cooperative with cares. Patient has been utilizing dilaudid PCA appropriately for pain control. Reports good relief. Pt has been NPO since 0000 and plan is still for re-placement of nerve block in AM. TFs not infusing tonight - pt's BG checked, readings 143 and 122. Ace wrap to right BKA is C/D/I. Capnography WNL.  Phosphorous replacement infusion completed per electrolyte replacement protocol with re-check ordered. Patient has been sleeping soundly for most of the night. Bed alarm on for safety. Remains tachycardic in the low 100s, otherwise vitally stable. Continue to monitor closely.

## 2018-07-01 NOTE — PROGRESS NOTES
[facility], [place]    Internal Medicine Progress Note - [facility] Service    Main Plans for Today   Pain management   NPO for possible nerve block  Infectious disease consult for management of antibiotic therapy given OR cultures  Dressing change with ortho next week  Prosthesis consult next week with stump  per ortho    Assessment & Plan    Alessandra Pak is a 52 year old female admitted on 6/6/2018. 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 Hospital course was complicated by respiratory failure due to ARDS requiring intubation (6/10-15), extubated and transferred to medicine for further cares.   She underwent BKA for osteomyelitis on June 28, 2018.    # Pain control   # Chronic pain   # Anxiety  # Post op tachycardia   - possible peripheral nerve block later today if persistent pain despite PCA and prn PO pain meds.  - currently on hydromorphone PCA - 0.4 mg/hr with 0.4mg q10 min    - continue Tylenol 1000 mg tid scheduled   - continue Amitriptyline 50 mg qhs   - continue methadone 70 mg daily   - continue Lyrica 75 mg tid   - dilaudid 2mg scheduled at 2100 and 0400 for sleep  - good bowel regimen due to narcotics   - tachycardia is likely due to pain, will obtain EKG to ensure sinus     # Severe Sepsis, resolved  # R diabetic foot infection, s/p I&D x 3, R BKA 6/28  - no further fevers  - continuing on Vancomycin, cefepime and flagyl per ID  - ID consulted, recs appreciated  - OR cultures thus far growing gram positive cocci - will await further culture finalization   - appreciate orthopedics and ID recommendations   - follow CRP's intermittently  - incentive spirometer   - fall precautions   - transfers from bed to chair per ortho recommendations   -  plan for dressing change next week with ortho  - will consult prosthetics tomorrow    # Type 2 Diabetes  # Hx of Chronic pancreatitis s/p whipple's  # Severe malnutrition in the context of acute illness  - glargine 9 units with SSI, held this am due to NPO status  - continue tube feeds and calorie counts - remove TF when patient has adequate PO  - continue pantoprazole PO qd  - continue throat lozenge and spray     TF regimen: Nutren 1.5 @ 50 ml/hr, (1200 ml/day)  Provides: 1800 kcals (32+), 82 gm PRO (1.4+), 912 mL H2O, 211 gm CHO and 0 fiber.   Water flushes: 50 ml every 2 hours     # Acute hypoxic respiratory failure, resolved  # ARDS 2/2 systemic response from osteo/foot infection  # Hx of chronic restrictive lung disease with emphysema and fibrosis  # chronic systolic heart failure with reduced EF of 35%   - c/w Lasix 20 mg   - Daily weights, strict I/O  - wean O2 as tolerated, goal >88%  - DuoNebs qid and qid prn      # Normocytic Anemia  # Anemia of chronic disease   - Insufficient reticulocyte response on 6/10 noted.   - monitor CBC     # Adrenal insufficiency  - S/p stress dose steroid taper, complete 6/18.     # Anisocoria/R afferent pupillary defect  - stable    # Pain Assessment:  Current Pain Score 6/30/2018   Patient currently in pain? yes   Pain score (0-10) -   Pain location -   Pain descriptors -   CPOT pain score -   - Alessandra is experiencing pain due to recent BKA. Pain management was discussed and the plan was created in a collaborative fashion.  Alessandra's response to the current recommendations: engaged  - Please see the plan for pain management as documented above    Diet: NPO for now for possible nerve block  Fluids: none   DVT Prophylaxis: Enoxaparin (Lovenox) SQ  Code Status: Full Code    Disposition Plan   Expected discharge: 2 - 3 days, recommended to transitional care unit once once post BKA management and infectious concerns are addressed..     Entered: Shu Sharif 07/01/2018, 7:21 AM    "Information in the above section will display in the discharge planner report.      The patient's care was discussed with the Bedside Nurse and Patient.    Pt discussed with staff, Dr. Daniel Sharif MD  [facility]: 5  Pager: [phone]  Please see sticky note for cross cover information    Interval History   Overnight events and nursing notes reviewed.    This AM doing well in terms of pain. Would like to avoid nerve block if continues to do well but wanted to re-evaluate how she is doing this afternoon. No chest pain, dyspnea, abdominal pain, nausea, vomiting.      Physical Exam   /88 (BP Location: Left arm)  Pulse 117  Temp 96.8  F (36  C) (Oral)  Resp 16  Ht 1.727 m (5' 8\")  Wt 59.3 kg (130 lb 12.8 oz)  SpO2 93%  BMI 19.89 kg/m2    General Appearance: Patient is in no acute distress. Sleeping initially but woke up and interactive.   Respiratory: CTAB  Cardiovascular: Tachycardic, regular rhythm, no murmurs, no rubs, and no gallops   GI:  soft, not tender, not distended, bowel sounds present and normal, no masses appreciated   Skin: no rashes and no discolorations   Neurologic: Patient is alert and oriented x3  Psychiatric: normal mood and affect   Extremities: BKA site with dressing clean and dry. Left foot also with clean dressing.   "

## 2018-07-01 NOTE — PROGRESS NOTES
BLUE [facility] ID SERVICE: ONGOING CONSULTATION   Alessandra Pak : 1965 Sex: female:   Medical record number [id number] Attending Physician: Erwin Cardenas  Date of Service: 2018    ID PROBLEM LIST:   - Sepsis from right diabetic foot ulcer with underlying osteomyelitis (cx 18 w/amp sensitive Enterococcus faecalis and Streptococcus mitis; wound culture in 2018 w/MRSA)  - Status post right BKA . Intraoperative cx with GPC (failed to thrive for identification).     RECOMMENDATIONS:   - While awaiting intraoperative cx to be finalized, okay to continue current abx (vanco, flagyl, cefepime).  - Repeat CRP tomorrow.    - If no other organisms are isolated and cultures are finalized:   - Narrow to vancomycin alone given history of MRSA.    - Treat for total of 6 weeks.    - While on IV vanco, check CBC, CMP and CRP weekly and have results faxed to the [facility], attn. Dr. Downs   - Follow up in the ID clinic with Dr. Downs prior to completion of abx course.   - IF additional organisms are isolated from intraoperative cultures, please contact the on call ID provider for updated recommendations.     DISCUSSION:   52 yr old female with a history of non ischemic cardiomyopathy  with ICD, CKD stage III, chronic pancreatitis s/p pylorus sparing Whipple () with secondary DM c/b diabetic foot ulcers, COPD, HTN, and chronic methadone use presented with septic shock earlier this month due to chronic diabetic wound with underlying osteomyelitis. Wound cultures this admission grew out amp sensitive Enterococcus and Strep mitus, and prior cultures had MRSA. She is now status post right BKA on . Based on the op note, cultures of the distal tibia were sent intraoperative to assess for residual infection. These cultures have now grown a gram positive cocci but the organism unfortunately failed to thrive for identification. Since the culture was obtained from bone  "and she is at high risk for wound breakdown if she has an inadequately treated infection, I would support treatment with a 6 week course of abx. Assuming no additional cultures are positive, could treat with vancomycin with follow up as above.  If additional organisms are isolated, please contact the on call ID provider for additional recs.     ID will sign off but don't hesitate to call w/questions.  Recs discussed with primary team today.     Samina Downs MD  ID Staff  [phone]    CHIEF INFECTIOUS DISEASES COMPLAINT:  Sepsis from right diabetic foot ulcer, osteomyelitis    INTERVAL HISTORY:   Underwent uncomplicated BKA on 6/28. Pain much improved today.  Denies fevers, chills.  Nausea also improving. Denies SOB, chest pain.  Having some diarrhea but not severe - thinks it may be related to tube feeds.     ROS:   A five-point review of systems was obtained and was negative with the exception of that which is described above.    ALLERGIES: NKDA    CURRENT ANTI-INFECTIVES:   Vancomycin, Zosyn, flagyl    EXAMINATION:  Vital Signs: /80 (BP Location: Left arm)  Pulse 112  Temp 98.6  F (37  C) (Oral)  Resp 14  Ht 1.727 m (5' 8\")  Wt 59.3 kg (130 lb 12.8 oz)  SpO2 98%  BMI 19.89 kg/m2   GEN: Pleasant female in NAD.  HEENT: EOMI, no icterus or injection. OP moist and clear. NG in place.  NECK: Supple.    LUNGS: breathing comfortably on NC.    ABDOMEN: Nondistended  SKIN: Status post right BKA; wound bandaged. Surrounding skin healthy appearing.   EXT:  Status post RBKA as above. LLE without edema.   NEURO: Grossly intact.     NEW DATA/RESULTS:   All interval basic labs, microbiology results and imaging were personally reviewed.  Reviewed medicine test (PFTs, EKG, cardiac echo or cath): NO    Culture Micro   Date Value Ref Range Status   06/29/2018 No growth after 2 days  Preliminary   06/29/2018 No growth after 2 days  Preliminary   06/28/2018 Culture negative monitoring continues  Preliminary "   06/28/2018 Culture negative monitoring continues  Preliminary   06/28/2018 Culture negative monitoring continues  Preliminary   06/28/2018 (A)  Preliminary    Light growth  Gram positive cocci  Organism failed to thrive for identification and suceptibility testing         Recent Labs   Lab Test  06/30/18   0656  06/29/18   0539  06/27/18   0709  06/25/18   0552  06/23/18   0530  06/21/18   0555   CRP  150.0*  42.0*  22.0*  32.0*  35.0*  23.0*     Recent Labs   Lab Test  07/01/18   0637  06/30/18   0656  06/29/18   0539  06/27/18   0709  06/21/18   0555  06/19/18   0600   WBC  8.4  12.3*  12.1*  7.6  8.4  13.3*     Recent Labs   Lab Test  07/01/18   0637  06/30/18   0656  06/29/18   0952  06/29/18   0539   CR  0.87  0.83  0.95  0.95   GFRESTIMATED  69  72  62  62       Hematology Studies  Recent Labs   Lab Test  07/01/18   0637  06/30/18   0656  06/29/18   0952  06/29/18   0539   06/27/18   0709   06/21/18   0555  06/19/18   0600   06/12/18   0420   06/10/18   0500  06/10/18   0400   06/07/18   0649  06/06/18   2215   02/16/18   1528   WBC  8.4  12.3*   --   12.1*   --   7.6   --   8.4  13.3*   < >  19.2*   < >  22.7*  22.3*   < >  28.4*  28.2*   < >  22.3*   ANEU   --    --    --    --    --    --    --    --    --    --   14.2*   --   16.9*  19.7*   --   24.5*  25.2*   --   19.6*   AEOS   --    --    --    --    --    --    --    --    --    --   1.0*   --   0.4  0.2   --   0.0  0.0   --   0.0   HCT  27.4*  28.0*   --   30.3*   --   27.4*   --   23.8*  24.2*   < >  22.1*   < >  23.8*  22.7*   < >  25.5*  29.7*   < >  35.6   PLT  275  282  315  345   < >  414   < >  488*  416   < >  279   < >  220  242   < >  232  317   < >  288    < > = values in this interval not displayed.       Metabolic  Recent Labs   Lab Test  07/01/18   0637  06/30/18   0656  06/29/18   0952  06/29/18   0539   NA  139  135   --   137   BUN  20  22   --   22   CO2  23  24   --   25   CR  0.87  0.83  0.95  0.95   GFRESTIMATED  69  72  62  62        Hepatic Studies  Recent Labs   Lab Test  06/29/18   0539  06/26/18   0540  06/06/18   2215  02/16/18   1528   BILITOTAL  0.2   --   0.5  0.6   ALKPHOS  799*   --   233*  212*   ALBUMIN  2.0*  1.9*  2.5*  2.8*   AST  68*   --   Canceled, Test credited  22   ALT  58*   --   27  14       Immunologlobulins  Recent Labs   Lab Test  02/21/15   0652  02/15/15   1520  02/15/15   0240   IGG  1330   --    --    IGE   --   46   --    SED   --    --   132*       IMAGING RESULTS  CXR 6/29:  1.  Interval removal of endotracheal and gastric tubes. Otherwise  stable support devices.  2.  Mild interstitial pulmonary edema without acute airspace  opacities.  3.  Stable left retrocardiac opacities, atelectasis versus  consolidation.

## 2018-07-01 NOTE — ANESTHESIA POST-OP FOLLOW-UP NOTE
REGIONAL ANESTHESIA PAIN SERVICE CONTINUOUS NERVE INFUSION NOTE  SUBJECTIVE:  Interval History: Patient states she is doing quite well. Pain is best it has been and states she slept last night throughout the night which apparently hasn't happened yet. Discussed replacing Right femoral nerve catheter. She is reluctant given improvement in pain. Pain has been tolerable on dilaudid PCA.                                 Clinically Aligned Pain Assessment (CAPA):   Comfort (How is your pain?): Tolerable with discomfort  Change in Pain (Since your last medication/intervention?): About the same  Pain Control (How are your pain treatments working?):  Partially effective pain control  Functioning (Are you able to do activities to get better?) : Pain keeps me from doing most of what I need to do  Sleep (Does your pain management allow you to sleep or rest?): Awake with occasional pain     Anticoagulation:  Enoxaparin 40 mg q24H        OBJECTIVE:     Diagnostic:        Lab Results   Component Value Date     WBC 12.3 06/30/2018            Lab Results   Component Value Date     RBC 3.23 06/30/2018      Lab Results   Component Value Date     HGB 8.9 06/30/2018            Lab Results   Component Value Date     HCT 28.0 06/30/2018            Lab Results   Component Value Date      06/30/2018            Vitals:              Temp:  [36  C (96.8  F)-37.5  C (99.5  F)] 36.8  C (98.2  F)  Pulse:  [117] 117  Heart Rate:  [106-114] 111  Resp:  [16-18] 18  BP: (121-157)/(72-77) 157/77  SpO2:  [90 %-96 %] 95 %     Exam:                         Strength 5/5 and symmetric grossly in bilateral LE.  RLE residual limb ace wrapped.  R femoral and sciatic catheter and dressings essentially being held on to patient by her undergarments.       ASSESSMENT/PLAN:    Alessandra Pak is a 52 year old female POD #3 s/p   AMPUTATE LEG BELOW KNEE and placement of R femoral and sciatic catheters for analgesia.  Pt is receiving adequate analgesia now  with hydromorphone PCA. Her pain seems to be improving so will hold off on replacement of catheter today as expected pain should be diminishing with each day. Patient aware that if her pain worsens that she is to have nursing staff call anesthesia to again evaluate for block.       - patient tolerating pain with hydromorphone PCA   - Will continue to follow if there is any need for replacement of peripheral nerve catheters.           Will Dhillon MD, PhD  Date of Service (when I saw the patient): 07/01/18    [address] [address]  [phone]  Dept: [phone]

## 2018-07-01 NOTE — PROGRESS NOTES
"Orthopaedic Daily Progress Note    S: No acute overnight events. Pain control improved this AM. Denies chest pain or shortness of breath.     O:  /88 (BP Location: Left arm)  Pulse 117  Temp 96.8  F (36  C) (Oral)  Resp 16  Ht 1.727 m (5' 8\")  Wt 59.3 kg (130 lb 12.8 oz)  SpO2 96%  BMI 19.89 kg/m2  Gen: appears much more comfortable, alert and oriented  Resp: Breathing comfortably  MSK:   RLE:  BKA; ace wrap c,d,i, without strikethrough, left in place today. No excessive tightness of dressing.  LLE: dressings in place, pictures from wound care nursing note 6/29 reviewed. Dressings not taken down. 1st MTP ulcer not dressed.        Recent Labs  Lab 07/01/18  0637 06/30/18  0656 06/29/18  0952 06/29/18  0539  06/27/18  0709   WBC 8.4 12.3*  --  12.1*  --  7.6   HGB 9.0* 8.9*  --  9.4*  --  9.0*    282 315 345  < > 414   CR 0.87 0.83 0.95 0.95  < > 1.17*   < > = values in this interval not displayed.      Wound Culture from foot I&D: polymicrobial, one culture had MRSA.   Intra operative cultures from 6/28: GPC from bone, speciation pending      Assessment: Alessandra Pak is a 52 year old female admitted sepsis with recurrent right foot diabetic ulcers as source, now s/p bedside I&D right foot on 6/7, 6/12, and 6/13.  Intubated for ARDS/hypoxic respiratory failure on 6/10, extubated 6/15, TTF 6/15.  Ongoing recurring foot ulcers in the setting of poorly controlled DM/malnutrition, exhausted all medical options and patient opted to proceed with R below knee amputation, completed on 6/28. Intraoperative bone cultures with gram positive cocci, patient remains on cefepime, metronidazole, and vancomycin.         Plan:  Medicine Primary  Activity: Elevate BLE as much as possible   Weight bearing status: NWB RLE, okay for minimal WB with left forefoot for transfers to chair or commode. Bed to chair transfers only due to LLE ulcers.    Antibiotics: per primary/ID, will need to re-engage infectious disease " given positive cultures from intra op. Will likely need long term IV antibiotics.   Labs: per primary team, following Hgb post op  Follow cultures taken at the proximal tibia after BKA  DVT prophylaxis: per primary team  Wound Care: planning dressing change 7/2 AM, can then order prosthetics consult for stump   Dispo: pending placement, dressing change, final abx plan       Discussed with Dr. King.     Tian Ashby MD  Orthopaedic Surgery  PGY-4  Pager [phone]

## 2018-07-01 NOTE — ANESTHESIA POST-OP FOLLOW-UP NOTE
REGIONAL ANESTHESIA PAIN SERVICE CONTINUOUS NERVE INFUSION NOTE  SUBJECTIVE:  Interval History: Between 0300 hours and this AM, catheter and dressings were inadvertently pulled out.  Patient was explained that it was likely due to postioning and adjustment of undergarments.   Pain has been tolerable on dilaudid PCA.                                 Clinically Aligned Pain Assessment (CAPA):   Comfort (How is your pain?): Tolerable with discomfort  Change in Pain (Since your last medication/intervention?): About the same  Pain Control (How are your pain treatments working?):  Partially effective pain control  Functioning (Are you able to do activities to get better?) : Pain keeps me from doing most of what I need to do  Sleep (Does your pain management allow you to sleep or rest?): Awake with occasional pain     Anticoagulation:  Enoxaparin 40 mg q24H        OBJECTIVE:     Diagnostic:        Lab Results   Component Value Date     WBC 12.3 06/30/2018            Lab Results   Component Value Date     RBC 3.23 06/30/2018      Lab Results   Component Value Date     HGB 8.9 06/30/2018            Lab Results   Component Value Date     HCT 28.0 06/30/2018            Lab Results   Component Value Date      06/30/2018            Vitals:              Temp:  [36  C (96.8  F)-37.5  C (99.5  F)] 36.8  C (98.2  F)  Pulse:  [117] 117  Heart Rate:  [106-114] 111  Resp:  [16-18] 18  BP: (121-157)/(72-77) 157/77  SpO2:  [90 %-96 %] 95 %     Exam:                         Strength 5/5 and symmetric grossly in bilateral LE.  RLE residual limb ace wrapped.  R femoral and sciatic catheter and dressings essentially being held on to patient by her undergarments.       ASSESSMENT/PLAN:    Alessandra Pak is a 52 year old female POD #3 s/p   AMPUTATE LEG BELOW KNEE and placement of R femoral and sciatic catheters for analgesia.  Pt is receiving adequate analgesia now with hydromorphone PCA.       - patient tolerating pain with  hydromorphone PCA   - Will continue to follow if there is any need for replacement of peripheral nerve catheters.                Will Dhillon MD, PhD   Date of Service (when I saw the patient): 06/30/18     [address] [address]  [phone]  Dept: [phone]

## 2018-07-01 NOTE — PLAN OF CARE
Problem: Patient Care Overview  Goal: Plan of Care/Patient Progress Review  Outcome: No Change  D: Pain control has much improved today, she continue on rate of PCA that was on this am. She has gotten up to commode and diet just restarted after being NPO for possible nerve pain control. She slept most of day and respiratory status monitored closely due to PCA. All dressings intact, encouraged to turn on side when in bed a lot. BP and pulse have decreased that were higher rates when pain was more under controlled.P: Monitor comfort, activity, labs respiratory status, nutrition.

## 2018-07-02 ENCOUNTER — APPOINTMENT (OUTPATIENT)
Dept: PHYSICAL THERAPY | Facility: CLINIC | Age: 53
DRG: 853 | End: 2018-07-02
Payer: COMMERCIAL

## 2018-07-02 ENCOUNTER — DOCUMENTATION ONLY (OUTPATIENT)
Dept: ORTHOPEDICS | Facility: CLINIC | Age: 53
End: 2018-07-02

## 2018-07-02 LAB
ANION GAP SERPL CALCULATED.3IONS-SCNC: 8 MMOL/L (ref 3–14)
BUN SERPL-MCNC: 18 MG/DL (ref 7–30)
CALCIUM SERPL-MCNC: 8.1 MG/DL (ref 8.5–10.1)
CHLORIDE SERPL-SCNC: 103 MMOL/L (ref 94–109)
CO2 SERPL-SCNC: 26 MMOL/L (ref 20–32)
CREAT SERPL-MCNC: 0.88 MG/DL (ref 0.52–1.04)
CRP SERPL-MCNC: 120 MG/L (ref 0–8)
ERYTHROCYTE [DISTWIDTH] IN BLOOD BY AUTOMATED COUNT: 22.4 % (ref 10–15)
GFR SERPL CREATININE-BSD FRML MDRD: 68 ML/MIN/1.7M2
GLUCOSE BLDC GLUCOMTR-MCNC: 141 MG/DL (ref 70–99)
GLUCOSE BLDC GLUCOMTR-MCNC: 141 MG/DL (ref 70–99)
GLUCOSE BLDC GLUCOMTR-MCNC: 213 MG/DL (ref 70–99)
GLUCOSE BLDC GLUCOMTR-MCNC: 221 MG/DL (ref 70–99)
GLUCOSE BLDC GLUCOMTR-MCNC: 260 MG/DL (ref 70–99)
GLUCOSE BLDC GLUCOMTR-MCNC: 382 MG/DL (ref 70–99)
GLUCOSE SERPL-MCNC: 255 MG/DL (ref 70–99)
HCT VFR BLD AUTO: 27.5 % (ref 35–47)
HGB BLD-MCNC: 8.7 G/DL (ref 11.7–15.7)
INTERPRETATION ECG - MUSE: NORMAL
INTERPRETATION ECG - MUSE: NORMAL
MCH RBC QN AUTO: 27.3 PG (ref 26.5–33)
MCHC RBC AUTO-ENTMCNC: 31.6 G/DL (ref 31.5–36.5)
MCV RBC AUTO: 86 FL (ref 78–100)
PLATELET # BLD AUTO: 266 10E9/L (ref 150–450)
POTASSIUM SERPL-SCNC: 4.4 MMOL/L (ref 3.4–5.3)
RBC # BLD AUTO: 3.19 10E12/L (ref 3.8–5.2)
SODIUM SERPL-SCNC: 136 MMOL/L (ref 133–144)
VANCOMYCIN SERPL-MCNC: 17.2 MG/L
WBC # BLD AUTO: 6.8 10E9/L (ref 4–11)

## 2018-07-02 PROCEDURE — 97110 THERAPEUTIC EXERCISES: CPT | Mod: GP

## 2018-07-02 PROCEDURE — 25000125 ZZHC RX 250: Performed by: STUDENT IN AN ORGANIZED HEALTH CARE EDUCATION/TRAINING PROGRAM

## 2018-07-02 PROCEDURE — 97530 THERAPEUTIC ACTIVITIES: CPT | Mod: GP

## 2018-07-02 PROCEDURE — 00000146 ZZHCL STATISTIC GLUCOSE BY METER IP

## 2018-07-02 PROCEDURE — 25000132 ZZH RX MED GY IP 250 OP 250 PS 637: Performed by: INTERNAL MEDICINE

## 2018-07-02 PROCEDURE — 25000132 ZZH RX MED GY IP 250 OP 250 PS 637: Performed by: STUDENT IN AN ORGANIZED HEALTH CARE EDUCATION/TRAINING PROGRAM

## 2018-07-02 PROCEDURE — L8440 SHRINKER BELOW KNEE: HCPCS

## 2018-07-02 PROCEDURE — 25000128 H RX IP 250 OP 636: Performed by: STUDENT IN AN ORGANIZED HEALTH CARE EDUCATION/TRAINING PROGRAM

## 2018-07-02 PROCEDURE — 27210429 ZZH NUTRITION PRODUCT INTERMEDIATE LITER

## 2018-07-02 PROCEDURE — 85027 COMPLETE CBC AUTOMATED: CPT | Performed by: INTERNAL MEDICINE

## 2018-07-02 PROCEDURE — 99233 SBSQ HOSP IP/OBS HIGH 50: CPT | Mod: GC | Performed by: INTERNAL MEDICINE

## 2018-07-02 PROCEDURE — 25000128 H RX IP 250 OP 636: Performed by: ORTHOPAEDIC SURGERY

## 2018-07-02 PROCEDURE — 80048 BASIC METABOLIC PNL TOTAL CA: CPT | Performed by: INTERNAL MEDICINE

## 2018-07-02 PROCEDURE — 25000132 ZZH RX MED GY IP 250 OP 250 PS 637: Performed by: HOSPITALIST

## 2018-07-02 PROCEDURE — 40000193 ZZH STATISTIC PT WARD VISIT

## 2018-07-02 PROCEDURE — 80202 ASSAY OF VANCOMYCIN: CPT | Performed by: HOSPITALIST

## 2018-07-02 PROCEDURE — 12000001 ZZH R&B MED SURG/OB UMMC

## 2018-07-02 PROCEDURE — 86140 C-REACTIVE PROTEIN: CPT | Performed by: INTERNAL MEDICINE

## 2018-07-02 PROCEDURE — 40000802 ZZH SITE CHECK

## 2018-07-02 PROCEDURE — 36592 COLLECT BLOOD FROM PICC: CPT | Performed by: HOSPITALIST

## 2018-07-02 RX ORDER — AMINO ACIDS/PROTEIN HYDROLYS 11G-40/45
1 LIQUID IN PACKET (ML) ORAL
Status: DISCONTINUED | OUTPATIENT
Start: 2018-07-03 | End: 2018-07-06

## 2018-07-02 RX ORDER — LIDOCAINE 4 G/G
1 PATCH TOPICAL
Status: DISCONTINUED | OUTPATIENT
Start: 2018-07-02 | End: 2018-07-04

## 2018-07-02 RX ADMIN — ACETAMINOPHEN 1000 MG: 500 TABLET, FILM COATED ORAL at 21:22

## 2018-07-02 RX ADMIN — PREGABALIN 75 MG: 75 CAPSULE ORAL at 14:42

## 2018-07-02 RX ADMIN — HYDROMORPHONE HYDROCHLORIDE 2 MG: 1 SOLUTION ORAL at 05:37

## 2018-07-02 RX ADMIN — METRONIDAZOLE 500 MG: 500 INJECTION, SOLUTION INTRAVENOUS at 23:04

## 2018-07-02 RX ADMIN — Medication: at 15:36

## 2018-07-02 RX ADMIN — PREGABALIN 75 MG: 75 CAPSULE ORAL at 19:13

## 2018-07-02 RX ADMIN — METRONIDAZOLE 500 MG: 500 INJECTION, SOLUTION INTRAVENOUS at 16:58

## 2018-07-02 RX ADMIN — Medication 70 MG: at 08:14

## 2018-07-02 RX ADMIN — CEFEPIME HYDROCHLORIDE 2 G: 2 INJECTION, POWDER, FOR SOLUTION INTRAVENOUS at 17:13

## 2018-07-02 RX ADMIN — Medication 1 PACKET: at 08:15

## 2018-07-02 RX ADMIN — METRONIDAZOLE 500 MG: 500 INJECTION, SOLUTION INTRAVENOUS at 00:27

## 2018-07-02 RX ADMIN — FUROSEMIDE 20 MG: 20 TABLET ORAL at 08:08

## 2018-07-02 RX ADMIN — HYDROMORPHONE HYDROCHLORIDE 2 MG: 1 SOLUTION ORAL at 20:43

## 2018-07-02 RX ADMIN — MULTIVITAMIN 15 ML: LIQUID ORAL at 08:07

## 2018-07-02 RX ADMIN — METRONIDAZOLE 500 MG: 500 INJECTION, SOLUTION INTRAVENOUS at 05:07

## 2018-07-02 RX ADMIN — METRONIDAZOLE 500 MG: 500 INJECTION, SOLUTION INTRAVENOUS at 11:03

## 2018-07-02 RX ADMIN — ENOXAPARIN SODIUM 40 MG: 40 INJECTION SUBCUTANEOUS at 14:42

## 2018-07-02 RX ADMIN — ACETAMINOPHEN 1000 MG: 500 TABLET, FILM COATED ORAL at 08:08

## 2018-07-02 RX ADMIN — AMITRIPTYLINE HYDROCHLORIDE 50 MG: 50 TABLET, FILM COATED ORAL at 21:22

## 2018-07-02 RX ADMIN — PREGABALIN 75 MG: 75 CAPSULE ORAL at 08:14

## 2018-07-02 RX ADMIN — Medication 6 MG: at 21:22

## 2018-07-02 RX ADMIN — OMEPRAZOLE 20 MG: 20 CAPSULE, DELAYED RELEASE ORAL at 20:44

## 2018-07-02 RX ADMIN — ACETAMINOPHEN 1000 MG: 500 TABLET, FILM COATED ORAL at 15:59

## 2018-07-02 RX ADMIN — LIDOCAINE 1 PATCH: 560 PATCH PERCUTANEOUS; TOPICAL; TRANSDERMAL at 08:36

## 2018-07-02 RX ADMIN — CEFEPIME HYDROCHLORIDE 2 G: 2 INJECTION, POWDER, FOR SOLUTION INTRAVENOUS at 04:59

## 2018-07-02 RX ADMIN — FLUTICASONE PROPIONATE 2 SPRAY: 50 SPRAY, METERED NASAL at 08:15

## 2018-07-02 RX ADMIN — Medication: at 05:23

## 2018-07-02 RX ADMIN — VANCOMYCIN HYDROCHLORIDE 1250 MG: 10 INJECTION, POWDER, LYOPHILIZED, FOR SOLUTION INTRAVENOUS at 06:45

## 2018-07-02 NOTE — PLAN OF CARE
Problem: Patient Care Overview  Goal: Plan of Care/Patient Progress Review  Outcome: Improving  Patient is alert and oriented, calm and cooperative with cares. Patient has been utilizing dilaudid PCA appropriately for pain control. Reports fair to good relief. Syringe was changed on this shift. TFs infusing at 80mL/hr, no c/o discomfort, well-tolerated. BGs checked and elevated at 221 and 260, likely secondary to pt not receiving yesterday's dose of long-acting insulin as well as restart of TFs. Remains on q4 BG checks. Ace wrap to right BKA changed by provider on this shift after assessment of residual limb. Appears to be healing well, edges well-approximated and no signs of infectious process. Pt c/o pain during the dressing change and received PRN dilaudid 2mg PO. Antibiotics infusing as ordered. Capnography WNL. Patient has been sleeping soundly for most of the night. Bed alarm on for safety. Occasional tachycardia, otherwise vitally stable. Plan per provider is for consult with prosthetics team today to progress towards fitting. Continue to monitor closely.

## 2018-07-02 NOTE — PHARMACY-VANCOMYCIN DOSING SERVICE
Pharmacy Vancomycin Note  Date of Service 2018  Patient's  1965   52 year old, female    Indication: Osteomyelitis  Goal Trough Level: 15-20 mg/L  Day of Therapy: 19  Current Vancomycin regimen:  1250 mg IV q24h    Current estimated CrCl = Estimated Creatinine Clearance: 71.8 mL/min (based on Cr of 0.88).    Creatinine for last 3 days  2018:  6:56 AM Creatinine 0.83 mg/dL  2018:  6:37 AM Creatinine 0.87 mg/dL  2018:  5:20 AM Creatinine 0.88 mg/dL    Recent Vancomycin Levels (past 3 days)  2018:  5:20 AM Vancomycin Level 17.2 mg/L (trough of 16 mg/L extrapolated from a 22 hour level)    Vancomycin IV Administrations (past 72 hours)                   vancomycin (VANCOCIN) 1,250 mg in sodium chloride 0.9 % 250 mL intermittent infusion (mg) 1,250 mg New Bag 18 0645     1,250 mg New Bag 18 0656     1,250 mg New Bag 18 0545                Nephrotoxins and other renal medications (Future)    Start     Dose/Rate Route Frequency Ordered Stop    18 1300  furosemide (LASIX) tablet 20 mg      20 mg Oral DAILY 18 1255      18 0600  vancomycin (VANCOCIN) 1,250 mg in sodium chloride 0.9 % 250 mL intermittent infusion      1,250 mg  over 90 Minutes Intravenous EVERY 24 HOURS 18 0502               Contrast Orders - past 72 hours     None          Interpretation of levels and current regimen:  Trough level is  Therapeutic    Has serum creatinine changed > 50% in last 72 hours: No    Urine output:  good urine output    Renal Function: Stable    Plan:  1.  Continue Current Dose  2.  Pharmacy will check trough levels as appropriate in 3-5 days.    3. Serum creatinine levels will be ordered daily for the first week of therapy and at least twice weekly for subsequent weeks.      Pablo Owens        .

## 2018-07-02 NOTE — PROGRESS NOTES
CLINICAL NUTRITION SERVICES - REASSESSMENT NOTE     Nutrition Prescription    RECOMMENDATIONS FOR MDs/PROVIDERS TO ORDER:  Continue with TFs + PO diet + oral nutrition supplements. Patients PO intake is improving slowly. Calorie count in progress. May discontinue TF support if patient is able to consume ~ 1200 kcal and ~ 60 gm protein daily.     Malnutrition Status:    Non-severe malnutrition in the context of acute illness     Recommendations already ordered by Registered Dietitian (RD):  1. Adjusted Cycle TF's order set, discussed with patient and RN, Decreased rate to 70 ml/hr to run x 12 hours overnight:     Access: ND tube ( placed on 6/12),   Dosing wt: 58 kg most recent wt on 7/2    - Decreased TFs Cycle rate to 70 ml/hr and infuse x 12 hours from 8:00 pm - 8:00 am.     - Nutren 1.5 @ 70 ml/hr x 12 hours  to provide 1260 kcals (22 kcal/kg/day + pending PO ), 57 gm PRO (1.0 gm/kg/day), 638 mL H2O, 148 gm CHO ( 12.3 gm carb per hour overnight) and no Fiber daily.    2.  Increased Prosource protein Modules: 1 Packet TID, to provide an additional 120 kcals and 33 gm PRO.     - TF + Protein supplement to provide: 1380 kcal ( 24 kcal /kg + Pending PO) and 90 gm protein ( 1.6 gm/kg).     3. Boost with meals per patients requests    Future/Additional Recommendations:  - Pending Po intake, ongoing calorie count        EVALUATION OF THE PROGRESS TOWARD GOALS   Diet: Regular + magic cup between meals     Nutrition Support:   Access: ND tube ( placed on 6/12),   Dosing wt: 57 kg actual wt on 6/25 ( lower wt)   TF cycled on 6/26:  Nutren 1.5,  14 hr cycle (8pm -10am) @ 80 mL/hr.   - Provides:  (1120 ml/day), 1680 kcals (30 kcal/kg/day), 76 gm PRO (1.3 gm/kg/day), 851 mL H2O, 197 gm CHO and no fiber daily.    - Modules: 2 pkts of Prosource daily to provide an additional 80 kcals and 22 gm PRO   - Water flushes: 50 ml every 2 hours      Intake:   PO: Patient reports a gradual improve in po and appetite. Consuming 3 small  "meals and utilizing magic cups daily. Patient would like to try boost plus supplements.     Calorie count:  6/26: 997 kcal and 38 gm protein from 2 meals   6/25: 1069 kcal and 39 gm protein from 2 melas   6/24: 668 kcal and 16 gm protein from 2 meals     Average 3 day intake: 910 kcal and 31 gm protein.   Met 50% of estimated higher end kcal needs and 37% of higher end protein needs.       TF: Average 7 day Tf intake: 447 ml daily. 40% estimated goal volume met.   Provided patient with 672 kcal and 30 gm protein.    TF + po provided: 1584 kcal ( 28 kcal /kg) and 83 gm protein ( 1.5 gm/kg).   Met needs.       Updated 7/2: ASSESSED NUTRITION NEEDS per 58 kg most recent wt on 7/2:   Estimated Energy Needs: 1450 - 1740 kcals/day (25-30 ++ kcals/kg )  Justification: Maintenance, ++ for Wound healing post op status   Estimated Protein Needs: 87 - 116  gm/day (1.5-2 gm/kg)  Justification:  wound healing and post op BKA pending CKD 3      NEW FINDINGS   Admitted on 6/6/2018,  - Per providers note, \" History of chronic pancreatitis s/p whipple, T2DM c/b diabetic foot wounds and ulcers, CKD3, restrictive lung disease with emphysema and fibrosis, NICM s/p ICD, chronic methadone use.    - Admitted for severe sepsis due to Right foot abscess and osteomyelitis. Hospital course was complicated by respiratory failure due to ARDS requiring intubation (6/10-15), extubated and transferred to medicine for further cares\".    - Patient underwent R. BKA for osteomyelitis on June 28, 2018. R diabetic foot infection, s/p I&D x 3, R BKA 6/28    - Meds:   Certavite, Lasix, Insulin   Wound protocol of vit C 500 mg and zinc sulfate 220 mg was completed on 6/21.       Wt:  Current wt: 57.7 kg (127 lb 3.2 oz) on 7/2 --> admit wt 61.3 kg (135 lb 2.3 oz) on 6/6. Wt loss likely related to R. BKA       MALNUTRITION  % Intake: No decreased intake noted (TF + PO met estimated needs).   % Weight Loss: None noted  Subcutaneous Fat Loss: Previously: " Facial region: Moderate, Upper arm: Moderate   Muscle Loss: Scapular bone: moderate, Upper arm (bicep, tricep): moderate, Lower arm (forearm): moderate, Dorsal hand: severe, Upper leg (quadricep, hamstring): moderate, Patellar region: mild and Posterior calf: severe  Fluid Accumulation/Edema: None noted  Malnutrition Diagnosis: Non-severe malnutrition in the context of acute illness     Previous Goals   Total avg nutritional intake to meet a minimum of 30 kcal/kg and 1.5 gm PRO/kg daily (per dosing wt 57 kg).     Evaluation: Met    Previous Nutrition Diagnosis  Inadequate oral intake related to poor appetite secondary to early satiety as evidenced by 5 day average of PO intake 784 kcals (14 kcals/kg) and 30 gm PRO (0.5 gm PRO/kg) and reliant on TF support to provide full nutrition needs.     Evaluation: Improving      CURRENT NUTRITION DIAGNOSIS  Inadequate oral intake related to gradual increase in appetite and PO as evidenced by continues to rely on TF support to provide for maintenance needs.        INTERVENTIONS  Implementation  Enteral Nutrition -Decreased cycled TFs with recent improve in PO intake. Discussed with patient and RN. Text paged MD     Goals  Total avg nutritional intake to meet a minimum of 25 kcal/kg and 1.5 gm PRO/kg daily (per dosing wt 58 kg).       Monitoring/Evaluation  Progress toward goals will be monitored and evaluated per protocol.      Sudha Solo RD/OSIEL  Pager [phone]

## 2018-07-02 NOTE — PROGRESS NOTES
Social Work Services Progress Note     Hospital Day: 27  Consulted with:  Primary team Marilynn Byers, [facility] re[facility] admissions liaison     Data:  Patient is a 52 year old female, s/p BKA on 6/28/18.      Intervention:  Awaiting further PT evaluations for recommendation of ARU vs TCU. [facility] re[facility] admissions liaison met with pt today and discussed ARU and TCU. Will continue to follow and await recommendations for dc planning.     Assessment:  [facility] re[facility] admissions following for discharge disposition     Plan:    Anticipated Disposition:  Facility:  ARU vs TCU    Barriers to d/c plan:  Medical stability    Follow Up:  SW to follow for discharge planning     WILSON Rojas, LGSW  [address] Unit   Pager [phone]  Phone [phone]

## 2018-07-02 NOTE — PLAN OF CARE
Problem: Patient Care Overview  Goal: Plan of Care/Patient Progress Review  OT: Continue to hold, per discussion with interdisciplinary team, PT is likely able to address all acute rehab needs at this point, will continue to hold to determine if OT should continue with treatment.

## 2018-07-02 NOTE — PROGRESS NOTES
Rehab Admissions:  Met w/ Alessandra this afternoon to discuss [facility] as well as [facility]. Educated pt on setup, structure, and differences between the two levels of care. 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  We discussed her need for skilled PT and OT, as well as need for skilled RN for 3 IV Abx.  Discussed importance of maintaining fully ROM in R residual limb as pt resting w/ pillow below her knee.  Discussed rehab goals of being IND w/ sliding board transfers and stand pivot transfers, w/ wheelchair based mobility.  Pt states she has one stair at home in order to enter her home, then can remain on main level of home. Pt will require insurance authorization from [facility] for placement on either unit. Discussed barriers to discharge such as pt currently still requiring IV pain meds. Pt motivated, receptive to either level of care based on her needs.     Thank you for the referral, we will continue to follow this patient for post acute placement.     Determination of admission is based upon the patient's need for an intensive, interdisciplinary approach to rehabilitation, their ability to progress, their ability to tolerate intensive therapies, their need for daily physician supervision, their need for twenty four hour nursing assistance, and their ability and willingness to participate in such a program.    Tabby Tomas CM  Rehab Liaison/  [facility] and [facility]  7/2/2018    1:03 PM

## 2018-07-02 NOTE — PROGRESS NOTES
"Orthopaedic Daily Progress Note    S: No acute overnight events. Pain control continues to improve, did not get nerve block yesterday due to improved pain control. Denies chest pain or shortness of breath.     O:  /86 (BP Location: Left arm)  Pulse 112  Temp 98.2  F (36.8  C) (Oral)  Resp 18  Ht 1.727 m (5' 8\")  Wt 59.5 kg (131 lb 3.2 oz)  SpO2 99%  BMI 19.95 kg/m2  Gen: appears much more comfortable, alert and oriented  Resp: Breathing comfortably, NC in place.   MSK:   RLE:  BKA; ace wrap c,d,i, without strikethrough. Taken down and dried bloody drainage on adaptic. Incision is well approximated without erythema,drainage, or dehiscence. Dressing replaced.   LLE: dressings in place, pictures from wound care nursing note 6/29 reviewed. Dressings not taken down.       Recent Labs  Lab 07/02/18  0520 07/01/18  0637 06/30/18  0656 06/29/18  0952 06/29/18  0539   WBC 6.8 8.4 12.3*  --  12.1*   HGB 8.7* 9.0* 8.9*  --  9.4*    275 282 315 345   CR 0.88 0.87 0.83 0.95 0.95     Wound Culture from foot I&D: polymicrobial, one culture had MRSA.   Intra operative cultures from 6/28: GPC from bone, organism did not thrive so no speciation or susceptibility.       Assessment: Alessandra Pak is a 52 year old female admitted sepsis with recurrent right foot diabetic ulcers as source, now s/p bedside I&D right foot on 6/7, 6/12, and 6/13.  Intubated for ARDS/hypoxic respiratory failure on 6/10, extubated 6/15, TTF 6/15.  Ongoing recurring foot ulcers in the setting of poorly controlled DM/malnutrition, exhausted all medical options and patient opted to proceed with R below knee amputation, completed on 6/28. Intraoperative bone cultures with gram positive cocci, organism did not thrive so no speciation or susceptibility. Patient remains on cefepime, metronidazole, and vancomycin.         Plan:  Medicine Primary  Activity: Elevate BLE as much as possible   Weight bearing status: NWB RLE, okay for minimal WB with " left forefoot for transfers to chair or commode. Bed to chair transfers only due to LLE ulcers.    Antibiotics: per primary/ID, appreciate ID recs. Planning 6 weeks IV course of at least vancomycin, final course pending cultures.   Labs: per primary team, following Hgb post op  Follow cultures taken at the proximal tibia after BKA  DVT prophylaxis: per primary team  Wound Care: dressing changed by ortho 7/2, wound appearing well. Can be changed by nursing qday or every other day. Prosthetics consult for stump .   Recommend foam boot or PRAFO boot to LLE to protect heel ulcers, continue wound care  Dispo: pending stump , final abx plan, will need placement       Discussed with Dr. King.     Tian Ashby MD  Orthopaedic Surgery  PGY-4  Pager [phone]

## 2018-07-02 NOTE — PLAN OF CARE
Problem: Pain, Acute (Adult)  Goal: Identify Related Risk Factors and Signs and Symptoms  Related risk factors and signs and symptoms are identified upon initiation of Human Response Clinical Practice Guideline (CPG).   Outcome: Improving  Patient is alert and oriented times 4 and pleasant. As nerve block procedure was not done she was able to eat. She had a fair appetite for supper and tube feeding was started at 8 pm to run overnight via NJ tube at 80 ml per hr. Blood sugars were 103 and 247 this shift. HS blood sugar was covered at 2200. Will watch for hyperglycemia as patient did not receive lantus insulin today as NPO this am. She was up to the commode twice by pivoting. Dressing to left foot was done at 1830.  Right BKA has an ace wrap that is intact. Patient is still having post-op Right Leg pain. Ice pack was given. PCA with basal rate of 0.4mg and 0.4mg bumps of dilaudid is in use and is effective. She used 11 mg this 8 shift. Also received scheduled tylenol, lyrica, and oral dilauidid for pain control.  Has capnography machine in use and is wearing oxygen at 2 liters. Patient has been awake and alert all shift. Monitor pain, respiratory status, and blood sugars overnight. On 3 Iv antibiotics. Bed alarm on for safety at all times. Encouraged incentive spirometry use while in bed as lungs are more diminished today.

## 2018-07-02 NOTE — PLAN OF CARE
Problem: Patient Care Overview  Goal: Plan of Care/Patient Progress Review  Discharge Planner PT   Patient plan for discharge: Rehab  Current status: Pt completed supine LE strengthening exercises. Pt was able to complete all bed mobility with SBA-CGA. Pt completed pivot transfer bed>wheelchair with Min A, unable to fully maintain weight-bearing restrictions throughout transfer.   Barriers to return to prior living situation: Medical status-recent BKA, muscle weakness, decreased activity tolerance  Recommendations for discharge: Per discussion with the PT the recommendation location is TCU vs ARU  Rationale for recommendations: Depending on pt tolerance, pt would benefit from rehab to progress strength, activity tolerance, and overall functional mobility.        Entered by: Tanya Cuevas 07/02/2018 2:48 PM

## 2018-07-02 NOTE — PLAN OF CARE
Problem: Patient Care Overview  Goal: Plan of Care/Patient Progress Review  Outcome: Improving  Pt A&O x4, VS stable on RA. CAPNO on for PCA use, remained WDL. TF turned off at 1000, NJ tube clamped. Patient in a lot of pain after dressing change to residual limb this am, pressed PCA button and was given PO dilaudid x1. Gave scheduled tylenol and placed lidocaine patch on R leg with relief. Pt states pain is much better under control this afternoon. Dressing to L heel and foot changed. Triple lumen PICC lumens all infusing. No c/o nausea or SOB. Up with assist of one and pivot to commode, voiding spontaneously, no BM this shift. Reports having one early this morning. Fair appetite, declined breakfast, did eat 75% of noon meal. Will start calorie counts tomorrow.  and 213. Prosthetics staff came and fitted pt for compression sleeve. Waiting for note for further instruction of use. Wheeled self around in wheelchair with PT this afternoon, waiting on visit from family. Continue to monitor pain and update MD with changes.

## 2018-07-02 NOTE — PROGRESS NOTES
[facility], [place]    Internal Medicine Progress Note - [facility] Service    Main Plans for Today   Pain management, start lidocaine and menthol patches  PO intake    Assessment & Plan    Alessandra Pak is a 52 year old female admitted on 6/6/2018. 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 Hospital course was complicated by respiratory failure due to ARDS requiring intubation (6/10-15), extubated and transferred to medicine for further cares.   She underwent BKA for osteomyelitis on June 28, 2018.    # Pain control   # Chronic pain   # Anxiety  # Sinus tachycardia   Patient having ongoing difficulties with pain control which was exacerbated by dressing change today. Checked with patient, pain under better control later in the morning.  Discussed need for tapering off of PCA in order to prepare patient to transition to a rehab facility.  After dressing change, patient states that she is not ready today though stated that we will need to begin taper tomorrow. Sinus tachycardia 2/2 pain.  - currently on hydromorphone PCA - 0.4 mg/hr with 0.4mg q10 min.  Begin taper 7/3  - lidocaine and methol patches to alternate  - consider pain consult if unable to wean from IV  - continue Tylenol 1000 mg tid scheduled   - continue Amitriptyline 50 mg qhs   - continue methadone 70 mg daily   - continue Lyrica 75 mg tid   - dilaudid 2mg scheduled at 2100 and 0400 for sleep  - good bowel regimen due to narcotics    # Severe Sepsis, resolved  # R diabetic foot infection, s/p I&D x 3, R BKA 6/28  - CRP downtrending  - no further fevers  - prosthetics fitting patient for stump   - continuing on Vancomycin, cefepime and flagyl per ID.  Will narrow to vancomycin x 6wks if bone  cultures do not have further growth. Weekly CBC/CMP/CRP.  - ID consulted, recs appreciated  - OR cultures thus far growing gram positive cocci - will await further culture finalization   - appreciate orthopedics and ID recommendations   - follow CRP's intermittently  - incentive spirometer   - fall precautions   - transfers from bed to chair per ortho recommendations   - plan for dressing change next week with ortho  - will consult prosthetics tomorrow    # Type 2 Diabetes  # Hx of Chronic pancreatitis s/p whipple's  # Severe malnutrition in the context of acute illness  Some hyperglycemia given holding patient's glargine yesterday due to planned catheter placement which did not occur.  - glargine 9 units with SSI  - continue tube feeds and calorie counts - remove TF when patient has adequate PO  - continue pantoprazole PO qd  - continue throat lozenge and spray  - Reinstitute calorie counts and attempts to remove patient's NG if she is able to take adequate p.o.  Patient is aware that should she be unable to meet her daily nutritional needs with p.o., she will likely need a PEG for long-term tube feeds. She would like to avoid this.     TF regimen: Nutren 1.5 @ 50 ml/hr, (1200 ml/day)  Provides: 1800 kcals (32+), 82 gm PRO (1.4+), 912 mL H2O, 211 gm CHO and 0 fiber.   Water flushes: 50 ml every 2 hours     # Acute hypoxic respiratory failure, resolved  # ARDS 2/2 systemic response from osteo/foot infection  # Hx of chronic restrictive lung disease with emphysema and fibrosis  # chronic systolic heart failure with reduced EF of 35%   - c/w Lasix 20 mg   - Daily weights, strict I/O  - wean O2 as tolerated, goal >88%  - DuoNebs qid and qid prn      # Normocytic Anemia  # Anemia of chronic disease   - Insufficient reticulocyte response on 6/10 noted.   - monitor CBC     # Adrenal insufficiency  - S/p stress dose steroid taper, complete 6/18.     # Anisocoria/R afferent pupillary defect  - stable    # Pain  "Assessment:  Current Pain Score 7/2/2018   Patient currently in pain? yes   Pain score (0-10) -   Pain location Leg   Pain descriptors Shooting   CPOT pain score -   - Alessandra is experiencing pain due to recent BKA. Pain management was discussed and the plan was created in a collaborative fashion.  Alessandra's response to the current recommendations: engaged  - Please see the plan for pain management as documented above    Diet: NPO for now for possible nerve block  Fluids: none   DVT Prophylaxis: Enoxaparin (Lovenox) SQ  Code Status: Full Code    Disposition Plan   Expected discharge: 2 - 3 days, recommended to transitional care unit once once post BKA management and infectious concerns are addressed..     Entered: Alireza Chaparro 07/02/2018, 11:32 AM   Information in the above section will display in the discharge planner report.      The patient's care was discussed with the Attending Physician, Dr. Nugent, Bedside Nurse and Patient.    Alireza Chaparro MD  [facility]  Pager: [phone]  Please see sticky note for cross cover information    Interval History   No acute events overnight.  Patient's right stump was unwrapped by or so this morning.  Patient states that pain was previously well controlled, notes worsening leg pain, does note that she is having some pain as if something is rubbing over the top of her foot.  Patient states that her breathing feels stable.  Denies other pain or discomfort.    Physical Exam   /86 (BP Location: Left arm)  Pulse 112  Temp 98.2  F (36.8  C) (Oral)  Resp 18  Ht 1.727 m (5' 8\")  Wt 59.5 kg (131 lb 3.2 oz)  SpO2 99%  BMI 19.95 kg/m2    General Appearance: Sitting up in bed in mild to moderate pain  Respiratory: fine bibasilar crackles, breathing comfortably on room air  Cardiovascular: Tachycardic, regular rhythm, no murmurs, no rubs, and no gallops   GI:  soft, not tender, not distended, bowel sounds present and normal, no masses appreciated "   Skin: no rashes and no discolorations   Neurologic: Patient is alert and oriented x3, speech fluent, no focal deficits  Psychiatric: normal mood and affect   Extremities: BKA site with dressing clean and dry. Left foot also with clean dressing, small serous fluid lateral MTP

## 2018-07-03 ENCOUNTER — APPOINTMENT (OUTPATIENT)
Dept: PHYSICAL THERAPY | Facility: CLINIC | Age: 53
DRG: 853 | End: 2018-07-03
Payer: COMMERCIAL

## 2018-07-03 LAB
ANION GAP SERPL CALCULATED.3IONS-SCNC: 7 MMOL/L (ref 3–14)
BACTERIA SPEC CULT: ABNORMAL
BACTERIA SPEC CULT: NO GROWTH
BUN SERPL-MCNC: 22 MG/DL (ref 7–30)
CALCIUM SERPL-MCNC: 8.2 MG/DL (ref 8.5–10.1)
CHLORIDE SERPL-SCNC: 104 MMOL/L (ref 94–109)
CO2 SERPL-SCNC: 26 MMOL/L (ref 20–32)
CREAT SERPL-MCNC: 0.81 MG/DL (ref 0.52–1.04)
ERYTHROCYTE [DISTWIDTH] IN BLOOD BY AUTOMATED COUNT: 21.9 % (ref 10–15)
GFR SERPL CREATININE-BSD FRML MDRD: 74 ML/MIN/1.7M2
GLUCOSE BLDC GLUCOMTR-MCNC: 106 MG/DL (ref 70–99)
GLUCOSE BLDC GLUCOMTR-MCNC: 108 MG/DL (ref 70–99)
GLUCOSE BLDC GLUCOMTR-MCNC: 232 MG/DL (ref 70–99)
GLUCOSE BLDC GLUCOMTR-MCNC: 269 MG/DL (ref 70–99)
GLUCOSE BLDC GLUCOMTR-MCNC: 284 MG/DL (ref 70–99)
GLUCOSE SERPL-MCNC: 265 MG/DL (ref 70–99)
HCT VFR BLD AUTO: 28.2 % (ref 35–47)
HGB BLD-MCNC: 9 G/DL (ref 11.7–15.7)
MAGNESIUM SERPL-MCNC: 2 MG/DL (ref 1.6–2.3)
MCH RBC QN AUTO: 27.4 PG (ref 26.5–33)
MCHC RBC AUTO-ENTMCNC: 31.9 G/DL (ref 31.5–36.5)
MCV RBC AUTO: 86 FL (ref 78–100)
PHOSPHATE SERPL-MCNC: 2.6 MG/DL (ref 2.5–4.5)
PLATELET # BLD AUTO: 251 10E9/L (ref 150–450)
POTASSIUM SERPL-SCNC: 4.1 MMOL/L (ref 3.4–5.3)
RBC # BLD AUTO: 3.28 10E12/L (ref 3.8–5.2)
SODIUM SERPL-SCNC: 136 MMOL/L (ref 133–144)
SPECIMEN SOURCE: ABNORMAL
SPECIMEN SOURCE: NORMAL
WBC # BLD AUTO: 6.8 10E9/L (ref 4–11)

## 2018-07-03 PROCEDURE — 25000132 ZZH RX MED GY IP 250 OP 250 PS 637: Performed by: STUDENT IN AN ORGANIZED HEALTH CARE EDUCATION/TRAINING PROGRAM

## 2018-07-03 PROCEDURE — 99233 SBSQ HOSP IP/OBS HIGH 50: CPT | Mod: GC | Performed by: INTERNAL MEDICINE

## 2018-07-03 PROCEDURE — 83735 ASSAY OF MAGNESIUM: CPT | Performed by: STUDENT IN AN ORGANIZED HEALTH CARE EDUCATION/TRAINING PROGRAM

## 2018-07-03 PROCEDURE — 25000128 H RX IP 250 OP 636: Performed by: ORTHOPAEDIC SURGERY

## 2018-07-03 PROCEDURE — 25000132 ZZH RX MED GY IP 250 OP 250 PS 637: Performed by: INTERNAL MEDICINE

## 2018-07-03 PROCEDURE — 36592 COLLECT BLOOD FROM PICC: CPT | Performed by: STUDENT IN AN ORGANIZED HEALTH CARE EDUCATION/TRAINING PROGRAM

## 2018-07-03 PROCEDURE — 25000132 ZZH RX MED GY IP 250 OP 250 PS 637: Performed by: HOSPITALIST

## 2018-07-03 PROCEDURE — 25000128 H RX IP 250 OP 636: Performed by: STUDENT IN AN ORGANIZED HEALTH CARE EDUCATION/TRAINING PROGRAM

## 2018-07-03 PROCEDURE — 12000001 ZZH R&B MED SURG/OB UMMC

## 2018-07-03 PROCEDURE — 80048 BASIC METABOLIC PNL TOTAL CA: CPT | Performed by: STUDENT IN AN ORGANIZED HEALTH CARE EDUCATION/TRAINING PROGRAM

## 2018-07-03 PROCEDURE — 25000125 ZZHC RX 250: Performed by: STUDENT IN AN ORGANIZED HEALTH CARE EDUCATION/TRAINING PROGRAM

## 2018-07-03 PROCEDURE — 00000146 ZZHCL STATISTIC GLUCOSE BY METER IP

## 2018-07-03 PROCEDURE — 40000193 ZZH STATISTIC PT WARD VISIT

## 2018-07-03 PROCEDURE — 97530 THERAPEUTIC ACTIVITIES: CPT | Mod: GP

## 2018-07-03 PROCEDURE — L4396 STATIC OR DYNAMI AFO PRE CST: HCPCS

## 2018-07-03 PROCEDURE — 85027 COMPLETE CBC AUTOMATED: CPT | Performed by: STUDENT IN AN ORGANIZED HEALTH CARE EDUCATION/TRAINING PROGRAM

## 2018-07-03 PROCEDURE — 84100 ASSAY OF PHOSPHORUS: CPT | Performed by: STUDENT IN AN ORGANIZED HEALTH CARE EDUCATION/TRAINING PROGRAM

## 2018-07-03 PROCEDURE — 40000802 ZZH SITE CHECK

## 2018-07-03 PROCEDURE — 97110 THERAPEUTIC EXERCISES: CPT | Mod: GP

## 2018-07-03 RX ADMIN — FUROSEMIDE 20 MG: 20 TABLET ORAL at 08:01

## 2018-07-03 RX ADMIN — FLUTICASONE PROPIONATE 2 SPRAY: 50 SPRAY, METERED NASAL at 08:01

## 2018-07-03 RX ADMIN — Medication 1 PACKET: at 08:00

## 2018-07-03 RX ADMIN — CEFEPIME HYDROCHLORIDE 2 G: 2 INJECTION, POWDER, FOR SOLUTION INTRAVENOUS at 04:40

## 2018-07-03 RX ADMIN — AMITRIPTYLINE HYDROCHLORIDE 50 MG: 50 TABLET, FILM COATED ORAL at 21:53

## 2018-07-03 RX ADMIN — ACETAMINOPHEN 1000 MG: 500 TABLET, FILM COATED ORAL at 15:53

## 2018-07-03 RX ADMIN — ENOXAPARIN SODIUM 40 MG: 40 INJECTION SUBCUTANEOUS at 13:46

## 2018-07-03 RX ADMIN — ACETAMINOPHEN 1000 MG: 500 TABLET, FILM COATED ORAL at 08:02

## 2018-07-03 RX ADMIN — HYDROMORPHONE HYDROCHLORIDE 2 MG: 1 SOLUTION ORAL at 03:18

## 2018-07-03 RX ADMIN — MULTIVITAMIN 15 ML: LIQUID ORAL at 08:01

## 2018-07-03 RX ADMIN — PREGABALIN 75 MG: 75 CAPSULE ORAL at 19:05

## 2018-07-03 RX ADMIN — Medication 70 MG: at 08:00

## 2018-07-03 RX ADMIN — OMEPRAZOLE 20 MG: 20 CAPSULE, DELAYED RELEASE ORAL at 20:17

## 2018-07-03 RX ADMIN — LIDOCAINE 1 PATCH: 560 PATCH PERCUTANEOUS; TOPICAL; TRANSDERMAL at 08:00

## 2018-07-03 RX ADMIN — ACETAMINOPHEN 1000 MG: 500 TABLET, FILM COATED ORAL at 20:56

## 2018-07-03 RX ADMIN — HYDROMORPHONE HYDROCHLORIDE 2 MG: 1 SOLUTION ORAL at 22:55

## 2018-07-03 RX ADMIN — CEFEPIME HYDROCHLORIDE 2 G: 2 INJECTION, POWDER, FOR SOLUTION INTRAVENOUS at 15:53

## 2018-07-03 RX ADMIN — HYDROMORPHONE HYDROCHLORIDE 2 MG: 1 SOLUTION ORAL at 13:50

## 2018-07-03 RX ADMIN — Medication: at 06:48

## 2018-07-03 RX ADMIN — BENZOCAINE AND MENTHOL 1 LOZENGE: 15; 3.6 LOZENGE ORAL at 13:50

## 2018-07-03 RX ADMIN — METRONIDAZOLE 500 MG: 500 INJECTION, SOLUTION INTRAVENOUS at 04:42

## 2018-07-03 RX ADMIN — METRONIDAZOLE 500 MG: 500 INJECTION, SOLUTION INTRAVENOUS at 17:23

## 2018-07-03 RX ADMIN — PREGABALIN 75 MG: 75 CAPSULE ORAL at 08:01

## 2018-07-03 RX ADMIN — Medication 1 PACKET: at 02:56

## 2018-07-03 RX ADMIN — VANCOMYCIN HYDROCHLORIDE 1250 MG: 10 INJECTION, POWDER, LYOPHILIZED, FOR SOLUTION INTRAVENOUS at 06:05

## 2018-07-03 RX ADMIN — METRONIDAZOLE 500 MG: 500 INJECTION, SOLUTION INTRAVENOUS at 11:41

## 2018-07-03 RX ADMIN — HYDROMORPHONE HYDROCHLORIDE 2 MG: 1 SOLUTION ORAL at 19:05

## 2018-07-03 RX ADMIN — Medication 6 MG: at 21:53

## 2018-07-03 RX ADMIN — PREGABALIN 75 MG: 75 CAPSULE ORAL at 13:46

## 2018-07-03 NOTE — PROGRESS NOTES
[facility], [place]    Internal Medicine Progress Note - [facility] Service    Main Plans for Today    -D/c PCA, avoid IV narcotics  -calorie counts, encourage PO intake    Assessment & Plan    Alessandra Pak is a 52 year old female admitted on 6/6/2018. 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 Hospital course was complicated by respiratory failure due to ARDS requiring intubation (6/10-15), extubated and transferred to medicine for further cares.   She underwent BKA for osteomyelitis on June 28, 2018.    # Pain control   # Chronic pain   # Anxiety  # Sinus tachycardia   Patient's pain with improved control w/ addition of lidocaine and menthol patches. PCA discontinued. Patient's pain well tolerated.  - lidocaine and methol patches to alternate  - consider pain consult if unable to wean from IV  - OK for extra PO hydromorphone if needed, avoid restarting IV  - continue Tylenol 1000 mg tid scheduled   - continue Amitriptyline 50 mg qhs   - continue methadone 70 mg daily   - continue Lyrica 75 mg tid  - good bowel regimen due to narcotics    # Severe Sepsis, resolved  # R diabetic foot infection, s/p I&D x 3, R BKA 6/28  - CRP downtrending  - no further fevers  - OR cultures thus far growing gram positive cocci from residual tibia. Unable to ID, aerobic culture finalized, anaerobic culture likely finalized 7/4 currently ngtd  - patient fit for stump   - continuing on Vancomycin, cefepime and flagyl per ID.  Will narrow to vancomycin x 6wks if bone cultures do not have further growth. Weekly CBC/CMP/CRP.  - ID consulted, recs appreciated  - follow CRP's intermittently  - incentive spirometer   - fall precautions   - transfers from bed to chair per ortho  recommendations   - plan for dressing change next week with ortho    # Type 2 Diabetes  # Hx of Chronic pancreatitis s/p whipple's  # Severe malnutrition in the context of acute illness  BG improved control on glargine restart  - glargine 9 units with SSI  - continue pantoprazole PO qd  - continue throat lozenge and spray  - Reinstitute calorie counts and attempts to remove patient's NG if she is able to take adequate p.o.  Patient is aware that should she be unable to meet her daily nutritional needs with p.o., she will likely need a PEG for long-term tube feeds. She would like to avoid this.     TF regimen: Nutren 1.5 @ 50 ml/hr, (1200 ml/day)  Provides: 1800 kcals (32+), 82 gm PRO (1.4+), 912 mL H2O, 211 gm CHO and 0 fiber.   Water flushes: 50 ml every 2 hours     # Acute hypoxic respiratory failure, resolved  # ARDS 2/2 systemic response from osteo/foot infection  # Hx of chronic restrictive lung disease with emphysema and fibrosis  # chronic systolic heart failure with reduced EF of 35%   On room air  - c/w Lasix 20 mg   - Daily weights, strict I/O  - wean O2 as tolerated, goal >88%  - DuoNebs qid and qid prn      # Normocytic Anemia  # Anemia of chronic disease   - Insufficient reticulocyte response on 6/10 noted.   - monitor CBC     # Adrenal insufficiency  - S/p stress dose steroid taper, complete 6/18.     # Anisocoria/R afferent pupillary defect  - stable    # Pain Assessment:  Current Pain Score 7/3/2018   Patient currently in pain? yes   Pain score (0-10) -   Pain location Leg   Pain descriptors Aching   CPOT pain score -   - Alessandra is experiencing pain due to recent BKA. Pain management was discussed and the plan was created in a collaborative fashion.  Alessandra's response to the current recommendations: engaged  - Please see the plan for pain management as documented above    Diet: regular diet, TFs  Fluids: none   DVT Prophylaxis: Enoxaparin (Lovenox) SQ  Code Status: Full Code    Disposition Plan  "  Expected discharge: 2 - 3 days, recommended to transitional care unit once once post BKA management and infectious concerns are addressed..     Entered: Alireza Chaparro 07/03/2018, 3:14 PM   Information in the above section will display in the discharge planner report.      The patient's care was discussed with the Attending Physician, Dr. Nugent, Bedside Nurse and Patient.    Alireza Chaparro MD  [facility]: 5  Pager: [phone]  Please see sticky note for cross cover information    Interval History   No acute events overnight.  Patient's pain well controlled with addition of lidocaine and menthol patches. Patient ready to stop. States that she feels hungry, expresses desire for NG to be removed.    Physical Exam   BP (!) 169/99 (BP Location: Left arm)  Pulse 109  Temp 96.9  F (36.1  C) (Oral)  Resp 18  Ht 1.727 m (5' 8\")  Wt 57.7 kg (127 lb 3.2 oz)  SpO2 98%  BMI 19.34 kg/m2    General Appearance: sleeping, nad, wakes easily to voice  Respiratory: fine bibasilar crackles, breathing comfortably on room air  Cardiovascular: rrr, no murmurs, no rubs, and no gallops   GI:  soft, not tender, not distended, bowel sounds present and normal  Skin: no rashes and no discolorations   Neurologic: Patient is alert and oriented x3, speech fluent, no focal deficits  Psychiatric: normal mood and affect   Extremities: BKA site with dressing clean and dry. Left foot also with clean dressing, small serous fluid lateral MTP  "

## 2018-07-03 NOTE — PLAN OF CARE
Problem: Patient Care Overview  Goal: Plan of Care/Patient Progress Review  Discharge Planner PT   Patient plan for discharge: Rehab  Current status: Pt completed pivot transfer bed<>wheelchair with Min A, but continues to have difficulty maintaining weightbearing restrictions on L forefoot. Pt was in more pain today than previous sessions, and declined any out of room activity.   Barriers to return to prior living situation: Medical status-recent BKA, muscle weakness, decreased activity tolerance  Recommendations for discharge: Per plan established by the PT the recommendation location is TCU vs ARU  Rationale for recommendations: Pt would benefit from continued skilled therapy to progress functional independence and mobility.        Entered by: Tanya Cuevas 07/03/2018 9:49 AM

## 2018-07-03 NOTE — PLAN OF CARE
Problem: Patient Care Overview  Goal: Plan of Care/Patient Progress Review  Outcome: Improving  D/I: Pt is A&O x4, vitally stable on 2L oxygen and on Capno for PCA use. TF running at 70mL/hr. Has triple lumen PICC. Up with assist of one and pivot to commode. Will start calorie counts today. . Stump dressing intact and left foot dressing clean, dry, intact. Calm, and cooperative with cares. Dilaudid breakthrough given x 1.   P: Will continue to monitor and treat pain and monitor calorie intake. TF needs to be turned off at 0800. Pt would like Icy hot patches to be scheduled instead of prn.

## 2018-07-03 NOTE — PLAN OF CARE
Problem: Patient Care Overview  Goal: Plan of Care/Patient Progress Review  Outcome: Improving  Pt A&O x4, VSS. Capno for PCA use, WDL until near end of shift. Pt put on 2L d/t low O2 sats. TF turned on at 2000. Running at 70mL/hr, 30mL flush every 2 hours. TF needs to be turned off at 0800. Scheduled tylenol given along with dilaudid PRN. Pt has triple lumen PICC. Scheduled antibiotics given. Up with assist of one and pivot to commode. Will start calorie counts tomorrow.  x2. Pt went outside with family. Pt stated she ate lasagna and garlic bread for supper. Will continue to monitor and treat pain and monitor calorie intake.

## 2018-07-03 NOTE — PLAN OF CARE
Problem: Patient Care Overview  Goal: Plan of Care/Patient Progress Review  Outcome: Improving  Pt A&O x4, VS stable on RA during day. Tends to de-sat at night. Up assist of one to commode, voiding spontaneously, no BM this shift. Has grey brace on L foot for stability and pressure reduction. L foot dressing changed, R foot dressing CDI. PCA pump discontinued this afternoon, gave liquid dilaudid x1 with relief. Pt reporting her pain is well controlled. CAPNO turned off. No nausea or SOB reported. Good appetite, on calorie counts.  and 108, insulin given per orders. Gave cepacol lozenge for sore throat. 2 PICC lumens TKO, 3rd lumen saline locked. NJ tube clamped after TF turned off at 0800. Worked with PT this morning. Feeling more optimistic about feeling better. Continue to monitor and update MD with changes.

## 2018-07-04 LAB
ANION GAP SERPL CALCULATED.3IONS-SCNC: 8 MMOL/L (ref 3–14)
BUN SERPL-MCNC: 26 MG/DL (ref 7–30)
CALCIUM SERPL-MCNC: 8.4 MG/DL (ref 8.5–10.1)
CHLORIDE SERPL-SCNC: 107 MMOL/L (ref 94–109)
CO2 SERPL-SCNC: 23 MMOL/L (ref 20–32)
CREAT SERPL-MCNC: 0.92 MG/DL (ref 0.52–1.04)
CRP SERPL-MCNC: 45 MG/L (ref 0–8)
ERYTHROCYTE [DISTWIDTH] IN BLOOD BY AUTOMATED COUNT: 21.3 % (ref 10–15)
GFR SERPL CREATININE-BSD FRML MDRD: 64 ML/MIN/1.7M2
GLUCOSE BLDC GLUCOMTR-MCNC: 128 MG/DL (ref 70–99)
GLUCOSE BLDC GLUCOMTR-MCNC: 185 MG/DL (ref 70–99)
GLUCOSE BLDC GLUCOMTR-MCNC: 214 MG/DL (ref 70–99)
GLUCOSE BLDC GLUCOMTR-MCNC: 354 MG/DL (ref 70–99)
GLUCOSE BLDC GLUCOMTR-MCNC: 74 MG/DL (ref 70–99)
GLUCOSE SERPL-MCNC: 73 MG/DL (ref 70–99)
HCT VFR BLD AUTO: 33.6 % (ref 35–47)
HGB BLD-MCNC: 10.9 G/DL (ref 11.7–15.7)
MAGNESIUM SERPL-MCNC: 2.2 MG/DL (ref 1.6–2.3)
MCH RBC QN AUTO: 27.7 PG (ref 26.5–33)
MCHC RBC AUTO-ENTMCNC: 32.4 G/DL (ref 31.5–36.5)
MCV RBC AUTO: 85 FL (ref 78–100)
PHOSPHATE SERPL-MCNC: 4 MG/DL (ref 2.5–4.5)
PLATELET # BLD AUTO: 275 10E9/L (ref 150–450)
POTASSIUM SERPL-SCNC: 4 MMOL/L (ref 3.4–5.3)
RBC # BLD AUTO: 3.94 10E12/L (ref 3.8–5.2)
SODIUM SERPL-SCNC: 138 MMOL/L (ref 133–144)
WBC # BLD AUTO: 8.7 10E9/L (ref 4–11)

## 2018-07-04 PROCEDURE — 25000132 ZZH RX MED GY IP 250 OP 250 PS 637: Performed by: INTERNAL MEDICINE

## 2018-07-04 PROCEDURE — 25000125 ZZHC RX 250: Performed by: STUDENT IN AN ORGANIZED HEALTH CARE EDUCATION/TRAINING PROGRAM

## 2018-07-04 PROCEDURE — 83735 ASSAY OF MAGNESIUM: CPT | Performed by: STUDENT IN AN ORGANIZED HEALTH CARE EDUCATION/TRAINING PROGRAM

## 2018-07-04 PROCEDURE — 25000132 ZZH RX MED GY IP 250 OP 250 PS 637: Performed by: HOSPITALIST

## 2018-07-04 PROCEDURE — 99233 SBSQ HOSP IP/OBS HIGH 50: CPT | Mod: GC | Performed by: INTERNAL MEDICINE

## 2018-07-04 PROCEDURE — 36415 COLL VENOUS BLD VENIPUNCTURE: CPT | Performed by: STUDENT IN AN ORGANIZED HEALTH CARE EDUCATION/TRAINING PROGRAM

## 2018-07-04 PROCEDURE — 00000146 ZZHCL STATISTIC GLUCOSE BY METER IP

## 2018-07-04 PROCEDURE — 25000128 H RX IP 250 OP 636: Performed by: ORTHOPAEDIC SURGERY

## 2018-07-04 PROCEDURE — 40000558 ZZH STATISTIC CVC DRESSING CHANGE

## 2018-07-04 PROCEDURE — 25000128 H RX IP 250 OP 636: Performed by: HOSPITALIST

## 2018-07-04 PROCEDURE — 27210429 ZZH NUTRITION PRODUCT INTERMEDIATE LITER

## 2018-07-04 PROCEDURE — 86140 C-REACTIVE PROTEIN: CPT | Performed by: STUDENT IN AN ORGANIZED HEALTH CARE EDUCATION/TRAINING PROGRAM

## 2018-07-04 PROCEDURE — 84100 ASSAY OF PHOSPHORUS: CPT | Performed by: STUDENT IN AN ORGANIZED HEALTH CARE EDUCATION/TRAINING PROGRAM

## 2018-07-04 PROCEDURE — 25000128 H RX IP 250 OP 636: Performed by: STUDENT IN AN ORGANIZED HEALTH CARE EDUCATION/TRAINING PROGRAM

## 2018-07-04 PROCEDURE — 25000132 ZZH RX MED GY IP 250 OP 250 PS 637: Performed by: STUDENT IN AN ORGANIZED HEALTH CARE EDUCATION/TRAINING PROGRAM

## 2018-07-04 PROCEDURE — 85027 COMPLETE CBC AUTOMATED: CPT | Performed by: STUDENT IN AN ORGANIZED HEALTH CARE EDUCATION/TRAINING PROGRAM

## 2018-07-04 PROCEDURE — 80048 BASIC METABOLIC PNL TOTAL CA: CPT | Performed by: STUDENT IN AN ORGANIZED HEALTH CARE EDUCATION/TRAINING PROGRAM

## 2018-07-04 PROCEDURE — 12000001 ZZH R&B MED SURG/OB UMMC

## 2018-07-04 RX ORDER — HYDROMORPHONE HYDROCHLORIDE 5 MG/5ML
2 SOLUTION ORAL ONCE
Status: COMPLETED | OUTPATIENT
Start: 2018-07-04 | End: 2018-07-04

## 2018-07-04 RX ORDER — LIDOCAINE 4 G/G
1 PATCH TOPICAL
Status: DISCONTINUED | OUTPATIENT
Start: 2018-07-04 | End: 2018-07-10 | Stop reason: HOSPADM

## 2018-07-04 RX ORDER — HYDROMORPHONE HYDROCHLORIDE 1 MG/ML
2 SOLUTION ORAL EVERY 4 HOURS PRN
Status: DISCONTINUED | OUTPATIENT
Start: 2018-07-04 | End: 2018-07-04

## 2018-07-04 RX ORDER — OXYCODONE HCL 10 MG/1
10 TABLET, FILM COATED, EXTENDED RELEASE ORAL EVERY 12 HOURS PRN
Status: DISCONTINUED | OUTPATIENT
Start: 2018-07-04 | End: 2018-07-06

## 2018-07-04 RX ORDER — OXYCODONE HYDROCHLORIDE 15 MG/1
15 TABLET, FILM COATED, EXTENDED RELEASE ORAL EVERY 12 HOURS PRN
Status: DISCONTINUED | OUTPATIENT
Start: 2018-07-04 | End: 2018-07-04

## 2018-07-04 RX ADMIN — HYDROMORPHONE HYDROCHLORIDE 2 MG: 1 SOLUTION ORAL at 06:00

## 2018-07-04 RX ADMIN — ALTEPLASE 2 MG: 2.2 INJECTION, POWDER, LYOPHILIZED, FOR SOLUTION INTRAVENOUS at 20:29

## 2018-07-04 RX ADMIN — PREGABALIN 75 MG: 75 CAPSULE ORAL at 13:49

## 2018-07-04 RX ADMIN — FLUTICASONE PROPIONATE 2 SPRAY: 50 SPRAY, METERED NASAL at 08:13

## 2018-07-04 RX ADMIN — SODIUM CHLORIDE, PRESERVATIVE FREE 5 ML: 5 INJECTION INTRAVENOUS at 20:43

## 2018-07-04 RX ADMIN — HYDROMORPHONE HYDROCHLORIDE 2 MG: 1 SOLUTION ORAL at 18:22

## 2018-07-04 RX ADMIN — LIDOCAINE 1 PATCH: 560 PATCH PERCUTANEOUS; TOPICAL; TRANSDERMAL at 08:13

## 2018-07-04 RX ADMIN — OXYCODONE HYDROCHLORIDE 10 MG: 10 TABLET, FILM COATED, EXTENDED RELEASE ORAL at 13:49

## 2018-07-04 RX ADMIN — AMITRIPTYLINE HYDROCHLORIDE 50 MG: 50 TABLET, FILM COATED ORAL at 21:44

## 2018-07-04 RX ADMIN — METRONIDAZOLE 500 MG: 500 INJECTION, SOLUTION INTRAVENOUS at 17:05

## 2018-07-04 RX ADMIN — MULTIVITAMIN 15 ML: LIQUID ORAL at 08:08

## 2018-07-04 RX ADMIN — Medication 6 MG: at 21:44

## 2018-07-04 RX ADMIN — ALTEPLASE 2 MG: 2.2 INJECTION, POWDER, LYOPHILIZED, FOR SOLUTION INTRAVENOUS at 18:23

## 2018-07-04 RX ADMIN — METRONIDAZOLE 500 MG: 500 INJECTION, SOLUTION INTRAVENOUS at 11:16

## 2018-07-04 RX ADMIN — HYDROMORPHONE HYDROCHLORIDE 2 MG: 1 SOLUTION ORAL at 03:05

## 2018-07-04 RX ADMIN — CEFEPIME HYDROCHLORIDE 2 G: 2 INJECTION, POWDER, FOR SOLUTION INTRAVENOUS at 05:26

## 2018-07-04 RX ADMIN — SODIUM CHLORIDE, PRESERVATIVE FREE 5 ML: 5 INJECTION INTRAVENOUS at 09:20

## 2018-07-04 RX ADMIN — Medication 1 PACKET: at 08:16

## 2018-07-04 RX ADMIN — ACETAMINOPHEN 1000 MG: 500 TABLET, FILM COATED ORAL at 17:03

## 2018-07-04 RX ADMIN — SODIUM CHLORIDE, PRESERVATIVE FREE 5 ML: 5 INJECTION INTRAVENOUS at 16:52

## 2018-07-04 RX ADMIN — Medication 2 MG: at 07:29

## 2018-07-04 RX ADMIN — METRONIDAZOLE 500 MG: 500 INJECTION, SOLUTION INTRAVENOUS at 06:02

## 2018-07-04 RX ADMIN — PREGABALIN 75 MG: 75 CAPSULE ORAL at 19:16

## 2018-07-04 RX ADMIN — METRONIDAZOLE 500 MG: 500 INJECTION, SOLUTION INTRAVENOUS at 01:15

## 2018-07-04 RX ADMIN — SODIUM CHLORIDE, PRESERVATIVE FREE 5 ML: 5 INJECTION INTRAVENOUS at 20:55

## 2018-07-04 RX ADMIN — OMEPRAZOLE 20 MG: 20 CAPSULE, DELAYED RELEASE ORAL at 20:56

## 2018-07-04 RX ADMIN — FUROSEMIDE 20 MG: 20 TABLET ORAL at 08:07

## 2018-07-04 RX ADMIN — Medication 70 MG: at 08:06

## 2018-07-04 RX ADMIN — CEFEPIME HYDROCHLORIDE 2 G: 2 INJECTION, POWDER, FOR SOLUTION INTRAVENOUS at 17:05

## 2018-07-04 RX ADMIN — ACETAMINOPHEN 1000 MG: 500 TABLET, FILM COATED ORAL at 08:07

## 2018-07-04 RX ADMIN — ENOXAPARIN SODIUM 40 MG: 40 INJECTION SUBCUTANEOUS at 13:50

## 2018-07-04 RX ADMIN — LIDOCAINE 1 PATCH: 560 PATCH PERCUTANEOUS; TOPICAL; TRANSDERMAL at 19:17

## 2018-07-04 RX ADMIN — PREGABALIN 75 MG: 75 CAPSULE ORAL at 08:07

## 2018-07-04 RX ADMIN — Medication 1 PACKET: at 01:16

## 2018-07-04 RX ADMIN — VANCOMYCIN HYDROCHLORIDE 1250 MG: 10 INJECTION, POWDER, LYOPHILIZED, FOR SOLUTION INTRAVENOUS at 06:02

## 2018-07-04 RX ADMIN — SODIUM CHLORIDE, PRESERVATIVE FREE 5 ML: 5 INJECTION INTRAVENOUS at 19:23

## 2018-07-04 RX ADMIN — ACETAMINOPHEN 1000 MG: 500 TABLET, FILM COATED ORAL at 21:43

## 2018-07-04 NOTE — PLAN OF CARE
Problem: Patient Care Overview  Goal: Plan of Care/Patient Progress Review  Outcome: No Change  Vital signs stable on RA. Up with 1 and pivot to the commode. PRN medication given for leg pain. Left foot dressing intact and due to be changed 7/5. Right dressing intact and pt stated changed by ortho. Blood sugar stable. On calorie counts. Pt did not eat breakfast or lunch. NJ tube intact. Heparin placed in PICC and now able to draw back blood. Will continue to monitor.

## 2018-07-04 NOTE — PROGRESS NOTES
[facility], [place]    Internal Medicine Progress Note - [facility] Service    Main Plans for Today    -lidocaine patches at night  -prn dilaudid to oxycontin 10 mg q12h prn  -dose glargine at bedtime due to AM hyperglycemia and cycling TFs at night  -calorie counts, encourage PO intake  -f/u finalized bone anaerobic culture    Assessment & Plan    Alessandra Pak is a 52 year old female admitted on 6/6/2018. 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 Hospital course was complicated by respiratory failure due to ARDS requiring intubation (6/10-15), extubated and transferred to medicine for further cares.   She underwent BKA for osteomyelitis on June 28, 2018.    # Pain control   # Chronic pain   # Anxiety  # Sinus tachycardia   Overall patient's pain worse at night, has greater control of pain with lidocaine patch. Feels that medication does not last for a sufficient duration. Has tolerated off of IV >24h.  - lidocaine and methol patches to alternate, lidocaine patch at night  - switch prn hydromorphone to oxycodone ER prn  - OK for extra 2 mg PO hydromorphone if needed, avoid restarting IV  - continue Tylenol 1000 mg tid scheduled   - continue Amitriptyline 50 mg qhs   - continue methadone 70 mg daily   - continue Lyrica 75 mg tid  - good bowel regimen due to narcotics    # Severe Sepsis, resolved  # R diabetic foot infection, s/p I&D x 3, R BKA 6/28  - CRP downtrending  - no further fevers  - OR cultures thus far growing gram positive cocci from residual tibia. Unable to ID, aerobic culture finalized, anaerobic culture likely finalized 7/4 currently ngtd  - patient fit for stump   - continuing on Vancomycin, cefepime and flagyl per ID.  Will narrow to  vancomycin x 6wks if bone cultures do not have further growth. Weekly CBC/CMP/CRP.  - ID consulted, recs appreciated  - follow CRP's intermittently  - incentive spirometer   - fall precautions   - transfers from bed to chair per ortho recommendations   - plan for dressing change next week with ortho    # Type 2 Diabetes  # Hx of Chronic pancreatitis s/p whipple's  # Severe malnutrition in the context of acute illness  - glargine 9 units with SSI  -dose glargine at bedtime due to AM hyperglycemia and cycling TFs at night  - continue pantoprazole PO qd  - continue throat lozenge and spray  - Reinstitute calorie counts and attempts to remove patient's NG if she is able to take adequate p.o.  Patient is aware that should she be unable to meet her daily nutritional needs with p.o., she will likely need a PEG for long-term tube feeds. She would like to avoid this.     TF regimen: Nutren 1.5 @ 50 ml/hr, (1200 ml/day)  Provides: 1800 kcals (32+), 82 gm PRO (1.4+), 912 mL H2O, 211 gm CHO and 0 fiber.   Water flushes: 50 ml every 2 hours     # Acute hypoxic respiratory failure, resolved  # ARDS 2/2 systemic response from osteo/foot infection  # Hx of chronic restrictive lung disease with emphysema and fibrosis  # chronic systolic heart failure with reduced EF of 35%   On room air  - c/w Lasix 20 mg   - Daily weights, strict I/O  - wean O2 as tolerated, goal >88%  - DuoNebs qid and qid prn      # Normocytic Anemia  # Anemia of chronic disease   - Insufficient reticulocyte response on 6/10 noted.   - monitor CBC     # Adrenal insufficiency  - S/p stress dose steroid taper, complete 6/18.     # Anisocoria/R afferent pupillary defect  - stable    # Pain Assessment:  Current Pain Score 7/4/2018   Patient currently in pain? yes   Pain score (0-10) -   Pain location Leg   Pain descriptors Aching   CPOT pain score -   - Alessandra is experiencing pain due to recent BKA. Pain management was discussed and the plan was created in a  "collaborative fashion.  Alessandra's response to the current recommendations: engaged  - Please see the plan for pain management as documented above    Diet: regular diet, TFs  Fluids: none   DVT Prophylaxis: Enoxaparin (Lovenox) SQ  Code Status: Full Code    Disposition Plan   Expected discharge: 2 - 3 days, recommended to transitional care unit once once post BKA management and infectious concerns are addressed..     Entered: Alireza Chaparro 07/04/2018, 12:41 PM   Information in the above section will display in the discharge planner report.      The patient's care was discussed with the Attending Physician, Dr. Nugent, Bedside Nurse and Patient.    Alireza Chaparro MD  [facility]  Pager: [phone]  Please see sticky note for cross cover information    Interval History   Patient with significant stump pain overnight. Received 1x extra dose PO dilaudid. Patient states that pain relief lasts for 2 hours. Notes that pain is worst overnight. States that she feels that she has a good appetite today. Denies sob.    Physical Exam   /72 (BP Location: Left arm)  Pulse 106  Temp 97.8  F (36.6  C) (Oral)  Resp 18  Ht 1.727 m (5' 8\")  Wt 55.2 kg (121 lb 12.8 oz)  SpO2 93%  BMI 18.52 kg/m2    General Appearance: sleeping, nad, wakes easily to voice  Respiratory: bibasilar crackles, breathing comfortably on room air  Cardiovascular: rrr, S4 present, no murmurs, no rubs, and no gallops   GI:  soft, not tender, not distended, bowel sounds present and normal  Skin: no rashes and no discolorations   Neurologic: Patient is alert and oriented x3, speech fluent, no focal deficits  Psychiatric: normal mood and affect   Extremities: BKA site with dressing clean and dry. Left foot also with clean dressing, small serous fluid lateral MTP  "

## 2018-07-04 NOTE — PLAN OF CARE
Problem: Patient Care Overview  Goal: Plan of Care/Patient Progress Review  Outcome: Improving  D/I: Pt is A&O x4, vitally stable on 2L oxygen. RN walked into the room and saw pt crying and when asked stated that she was in severe pain in the stump. Pt further stated that the pain medication only lasts 2 hrs. Dr. Nicole Monreal notified and MD ordered Dilaudid 2mg oral solution for breakthrough. TF running at 70 ml/hr. Has triple lumen PICC with 2 lumens infusing TKO between antibiotics and the purple lumen saline locked. Up with assist of one and pivot to commode. . Stump dressing intact and left foot dressing clean, dry, intact. Calm, and cooperative with cares.  P: Will continue to monitor and treat pain and monitor calorie intake. TF needs to be turned off at 0800.

## 2018-07-04 NOTE — PLAN OF CARE
Problem: Patient Care Overview  Goal: Plan of Care/Patient Progress Review  Outcome: No Change  Pt a&o x 4. VSS ex tachycardia. Pt continues with difficult pain in stump, but improving overall per patient on PO dilaudid PRN pain plan. Discussed pain plan at length with patient. IV abx administered as ordered. TF started at 2015, rate of 70 ml/hr.  and 106. Pt up in wheelchair and outside with family. Voiding spontaneously, BM x 1 this evening. PICC intact, 2 lumens infusing TKO between abx. Continue plan of care.

## 2018-07-04 NOTE — PROVIDER NOTIFICATION
Paged and notified Marilynn cross cover that pt is complaining of severe pain in the stump. Pt states that pain medication only lasts 2 hrs.

## 2018-07-04 NOTE — PROGRESS NOTES
Calorie Count  Intake recorded for 7/3.  Kcals: 922  Protein: 41g  # Meals Recorded: 2   First- 100% Froot Loops, 8 oz. 1% milk, 8 oz. Apple juice, 75% hash browns, sausage gregorio, scrambled eggs   Second- 100% rice krispy bar, 50% grilled chicken w/ gravy, white rice, carrots  # Supplements Recorded: 0

## 2018-07-05 ENCOUNTER — HOSPITAL ENCOUNTER (INPATIENT)
Facility: CLINIC | Age: 53
End: 2018-07-05
Payer: COMMERCIAL

## 2018-07-05 ENCOUNTER — APPOINTMENT (OUTPATIENT)
Dept: PHYSICAL THERAPY | Facility: CLINIC | Age: 53
DRG: 853 | End: 2018-07-05
Payer: COMMERCIAL

## 2018-07-05 LAB
ANION GAP SERPL CALCULATED.3IONS-SCNC: 6 MMOL/L (ref 3–14)
BACTERIA SPEC CULT: NO GROWTH
BACTERIA SPEC CULT: NO GROWTH
BACTERIA SPEC CULT: NORMAL
BACTERIA SPEC CULT: NORMAL
BUN SERPL-MCNC: 26 MG/DL (ref 7–30)
CALCIUM SERPL-MCNC: 8.6 MG/DL (ref 8.5–10.1)
CHLORIDE SERPL-SCNC: 103 MMOL/L (ref 94–109)
CO2 SERPL-SCNC: 29 MMOL/L (ref 20–32)
CREAT SERPL-MCNC: 0.93 MG/DL (ref 0.52–1.04)
CRP SERPL-MCNC: 31 MG/L (ref 0–8)
ERYTHROCYTE [DISTWIDTH] IN BLOOD BY AUTOMATED COUNT: 21.7 % (ref 10–15)
GFR SERPL CREATININE-BSD FRML MDRD: 63 ML/MIN/1.7M2
GLUCOSE BLDC GLUCOMTR-MCNC: 150 MG/DL (ref 70–99)
GLUCOSE BLDC GLUCOMTR-MCNC: 204 MG/DL (ref 70–99)
GLUCOSE BLDC GLUCOMTR-MCNC: 213 MG/DL (ref 70–99)
GLUCOSE BLDC GLUCOMTR-MCNC: 213 MG/DL (ref 70–99)
GLUCOSE BLDC GLUCOMTR-MCNC: 354 MG/DL (ref 70–99)
GLUCOSE SERPL-MCNC: 270 MG/DL (ref 70–99)
HCT VFR BLD AUTO: 27.3 % (ref 35–47)
HGB BLD-MCNC: 8.9 G/DL (ref 11.7–15.7)
HGB BLD-MCNC: 8.9 G/DL (ref 11.7–15.7)
Lab: NORMAL
MCH RBC QN AUTO: 27.2 PG (ref 26.5–33)
MCHC RBC AUTO-ENTMCNC: 32.6 G/DL (ref 31.5–36.5)
MCV RBC AUTO: 84 FL (ref 78–100)
PLATELET # BLD AUTO: 287 10E9/L (ref 150–450)
POTASSIUM SERPL-SCNC: 4.4 MMOL/L (ref 3.4–5.3)
RBC # BLD AUTO: 3.27 10E12/L (ref 3.8–5.2)
SODIUM SERPL-SCNC: 138 MMOL/L (ref 133–144)
SPECIMEN SOURCE: NORMAL
WBC # BLD AUTO: 7.2 10E9/L (ref 4–11)

## 2018-07-05 PROCEDURE — 85027 COMPLETE CBC AUTOMATED: CPT | Performed by: STUDENT IN AN ORGANIZED HEALTH CARE EDUCATION/TRAINING PROGRAM

## 2018-07-05 PROCEDURE — 25000132 ZZH RX MED GY IP 250 OP 250 PS 637: Performed by: SURGERY

## 2018-07-05 PROCEDURE — 40000193 ZZH STATISTIC PT WARD VISIT

## 2018-07-05 PROCEDURE — 25000132 ZZH RX MED GY IP 250 OP 250 PS 637: Performed by: STUDENT IN AN ORGANIZED HEALTH CARE EDUCATION/TRAINING PROGRAM

## 2018-07-05 PROCEDURE — L2999 LOWER EXTREMITY ORTHOSIS NOS: HCPCS

## 2018-07-05 PROCEDURE — 99233 SBSQ HOSP IP/OBS HIGH 50: CPT | Mod: GC | Performed by: INTERNAL MEDICINE

## 2018-07-05 PROCEDURE — 25000128 H RX IP 250 OP 636: Performed by: STUDENT IN AN ORGANIZED HEALTH CARE EDUCATION/TRAINING PROGRAM

## 2018-07-05 PROCEDURE — 97530 THERAPEUTIC ACTIVITIES: CPT | Mod: GP

## 2018-07-05 PROCEDURE — 12000001 ZZH R&B MED SURG/OB UMMC

## 2018-07-05 PROCEDURE — 25000132 ZZH RX MED GY IP 250 OP 250 PS 637: Performed by: INTERNAL MEDICINE

## 2018-07-05 PROCEDURE — 25000132 ZZH RX MED GY IP 250 OP 250 PS 637: Performed by: PHYSICIAN ASSISTANT

## 2018-07-05 PROCEDURE — 86140 C-REACTIVE PROTEIN: CPT | Performed by: STUDENT IN AN ORGANIZED HEALTH CARE EDUCATION/TRAINING PROGRAM

## 2018-07-05 PROCEDURE — 27210429 ZZH NUTRITION PRODUCT INTERMEDIATE LITER

## 2018-07-05 PROCEDURE — 25000132 ZZH RX MED GY IP 250 OP 250 PS 637: Performed by: HOSPITALIST

## 2018-07-05 PROCEDURE — 40000802 ZZH SITE CHECK

## 2018-07-05 PROCEDURE — 00000146 ZZHCL STATISTIC GLUCOSE BY METER IP

## 2018-07-05 PROCEDURE — 25000125 ZZHC RX 250: Performed by: STUDENT IN AN ORGANIZED HEALTH CARE EDUCATION/TRAINING PROGRAM

## 2018-07-05 PROCEDURE — 85018 HEMOGLOBIN: CPT | Performed by: STUDENT IN AN ORGANIZED HEALTH CARE EDUCATION/TRAINING PROGRAM

## 2018-07-05 PROCEDURE — 80048 BASIC METABOLIC PNL TOTAL CA: CPT | Performed by: STUDENT IN AN ORGANIZED HEALTH CARE EDUCATION/TRAINING PROGRAM

## 2018-07-05 PROCEDURE — 25000128 H RX IP 250 OP 636: Performed by: ORTHOPAEDIC SURGERY

## 2018-07-05 PROCEDURE — 36592 COLLECT BLOOD FROM PICC: CPT | Performed by: STUDENT IN AN ORGANIZED HEALTH CARE EDUCATION/TRAINING PROGRAM

## 2018-07-05 RX ORDER — CEFAZOLIN SODIUM 1 G/50ML
1250 SOLUTION INTRAVENOUS EVERY 24 HOURS
DISCHARGE
Start: 2018-07-06 | End: 2018-07-10

## 2018-07-05 RX ORDER — PREGABALIN 75 MG/1
CAPSULE ORAL
Qty: 90 CAPSULE | Refills: 5 | Status: ON HOLD | DISCHARGE
Start: 2018-07-05 | End: 2018-07-20

## 2018-07-05 RX ORDER — MULTIPLE VITAMINS W/ MINERALS TAB 9MG-400MCG
1 TAB ORAL DAILY
Qty: 30 EACH | Refills: 11 | Status: ON HOLD | DISCHARGE
Start: 2018-07-05 | End: 2018-07-20

## 2018-07-05 RX ORDER — FLUTICASONE PROPIONATE 50 MCG
2 SPRAY, SUSPENSION (ML) NASAL DAILY
Qty: 1 BOTTLE | Refills: 11 | Status: ON HOLD | DISCHARGE
Start: 2018-07-05 | End: 2018-07-20

## 2018-07-05 RX ORDER — ACETAMINOPHEN 500 MG
1000 TABLET ORAL 3 TIMES DAILY
DISCHARGE
Start: 2018-07-05

## 2018-07-05 RX ORDER — FUROSEMIDE 20 MG
20 TABLET ORAL ONCE
Status: COMPLETED | OUTPATIENT
Start: 2018-07-05 | End: 2018-07-05

## 2018-07-05 RX ORDER — LISINOPRIL 10 MG/1
10 TABLET ORAL DAILY
Qty: 30 TABLET | Refills: 0 | Status: ON HOLD | DISCHARGE
Start: 2018-07-05 | End: 2018-07-20

## 2018-07-05 RX ORDER — HYDROMORPHONE HYDROCHLORIDE 1 MG/ML
2 SOLUTION ORAL ONCE
Status: COMPLETED | OUTPATIENT
Start: 2018-07-05 | End: 2018-07-05

## 2018-07-05 RX ORDER — HYDROMORPHONE HYDROCHLORIDE 1 MG/ML
2 SOLUTION ORAL DAILY PRN
Status: DISCONTINUED | OUTPATIENT
Start: 2018-07-05 | End: 2018-07-10 | Stop reason: HOSPADM

## 2018-07-05 RX ORDER — LIDOCAINE 4 G/G
2 PATCH TOPICAL EVERY 24 HOURS
Qty: 30 PATCH | Refills: 0 | Status: ON HOLD | DISCHARGE
Start: 2018-07-05 | End: 2018-07-20

## 2018-07-05 RX ORDER — FUROSEMIDE 40 MG
40 TABLET ORAL DAILY
Status: DISCONTINUED | OUTPATIENT
Start: 2018-07-06 | End: 2018-07-10 | Stop reason: HOSPADM

## 2018-07-05 RX ORDER — POLYETHYLENE GLYCOL 3350 17 G/17G
17 POWDER, FOR SOLUTION ORAL DAILY
Qty: 7 PACKET | Status: ON HOLD | DISCHARGE
Start: 2018-07-06 | End: 2018-07-20

## 2018-07-05 RX ORDER — BISACODYL 10 MG
10 SUPPOSITORY, RECTAL RECTAL DAILY PRN
Qty: 30 SUPPOSITORY | Status: ON HOLD | DISCHARGE
Start: 2018-07-05 | End: 2018-07-20

## 2018-07-05 RX ORDER — FUROSEMIDE 40 MG
40 TABLET ORAL DAILY
Qty: 30 TABLET | Status: ON HOLD | DISCHARGE
Start: 2018-07-06 | End: 2018-07-20

## 2018-07-05 RX ORDER — ALBUTEROL SULFATE 90 UG/1
2 AEROSOL, METERED RESPIRATORY (INHALATION) EVERY 4 HOURS PRN
Qty: 1 INHALER | Refills: 5 | Status: ON HOLD | DISCHARGE
Start: 2018-07-05 | End: 2018-07-20

## 2018-07-05 RX ORDER — SENNOSIDES 8.6 MG
2 TABLET ORAL 2 TIMES DAILY
Qty: 120 EACH | Status: ON HOLD | DISCHARGE
Start: 2018-07-05 | End: 2018-07-20

## 2018-07-05 RX ORDER — AMITRIPTYLINE HYDROCHLORIDE 50 MG/1
50 TABLET ORAL AT BEDTIME
Qty: 90 TABLET | Refills: 3 | Status: ON HOLD | DISCHARGE
Start: 2018-07-05 | End: 2018-07-20

## 2018-07-05 RX ORDER — LISINOPRIL 10 MG/1
10 TABLET ORAL DAILY
Status: DISCONTINUED | OUTPATIENT
Start: 2018-07-05 | End: 2018-07-10 | Stop reason: HOSPADM

## 2018-07-05 RX ADMIN — LIDOCAINE 1 PATCH: 560 PATCH PERCUTANEOUS; TOPICAL; TRANSDERMAL at 20:08

## 2018-07-05 RX ADMIN — Medication 6 MG: at 21:24

## 2018-07-05 RX ADMIN — PREGABALIN 75 MG: 75 CAPSULE ORAL at 20:08

## 2018-07-05 RX ADMIN — Medication 2 MG: at 05:32

## 2018-07-05 RX ADMIN — METRONIDAZOLE 500 MG: 500 INJECTION, SOLUTION INTRAVENOUS at 00:39

## 2018-07-05 RX ADMIN — MENTHOL 1 PATCH: 205.5 PATCH TOPICAL at 08:30

## 2018-07-05 RX ADMIN — VANCOMYCIN HYDROCHLORIDE 1250 MG: 10 INJECTION, POWDER, LYOPHILIZED, FOR SOLUTION INTRAVENOUS at 05:50

## 2018-07-05 RX ADMIN — DOCUSATE SODIUM 100 MG: 100 CAPSULE, LIQUID FILLED ORAL at 20:08

## 2018-07-05 RX ADMIN — FUROSEMIDE 20 MG: 20 TABLET ORAL at 15:27

## 2018-07-05 RX ADMIN — Medication 70 MG: at 08:48

## 2018-07-05 RX ADMIN — SODIUM CHLORIDE, PRESERVATIVE FREE 5 ML: 5 INJECTION INTRAVENOUS at 05:27

## 2018-07-05 RX ADMIN — SODIUM CHLORIDE, PRESERVATIVE FREE 10 ML: 5 INJECTION INTRAVENOUS at 08:28

## 2018-07-05 RX ADMIN — ENOXAPARIN SODIUM 40 MG: 40 INJECTION SUBCUTANEOUS at 15:27

## 2018-07-05 RX ADMIN — Medication 1 PACKET: at 01:50

## 2018-07-05 RX ADMIN — OMEPRAZOLE 20 MG: 20 CAPSULE, DELAYED RELEASE ORAL at 20:08

## 2018-07-05 RX ADMIN — METRONIDAZOLE 500 MG: 500 INJECTION, SOLUTION INTRAVENOUS at 05:34

## 2018-07-05 RX ADMIN — Medication 1 PACKET: at 08:26

## 2018-07-05 RX ADMIN — MULTIVITAMIN 15 ML: LIQUID ORAL at 08:26

## 2018-07-05 RX ADMIN — FLUTICASONE PROPIONATE 2 SPRAY: 50 SPRAY, METERED NASAL at 08:27

## 2018-07-05 RX ADMIN — OXYCODONE HYDROCHLORIDE 10 MG: 10 TABLET, FILM COATED, EXTENDED RELEASE ORAL at 08:28

## 2018-07-05 RX ADMIN — AMITRIPTYLINE HYDROCHLORIDE 50 MG: 50 TABLET, FILM COATED ORAL at 21:24

## 2018-07-05 RX ADMIN — ACETAMINOPHEN 1000 MG: 500 TABLET, FILM COATED ORAL at 21:24

## 2018-07-05 RX ADMIN — LISINOPRIL 10 MG: 10 TABLET ORAL at 15:26

## 2018-07-05 RX ADMIN — FUROSEMIDE 20 MG: 20 TABLET ORAL at 08:26

## 2018-07-05 RX ADMIN — PREGABALIN 75 MG: 75 CAPSULE ORAL at 15:26

## 2018-07-05 RX ADMIN — PREGABALIN 75 MG: 75 CAPSULE ORAL at 08:28

## 2018-07-05 RX ADMIN — SENNOSIDES AND DOCUSATE SODIUM 2 TABLET: 8.6; 5 TABLET ORAL at 20:08

## 2018-07-05 RX ADMIN — ACETAMINOPHEN 1000 MG: 500 TABLET, FILM COATED ORAL at 08:27

## 2018-07-05 RX ADMIN — OXYCODONE HYDROCHLORIDE 10 MG: 10 TABLET, FILM COATED, EXTENDED RELEASE ORAL at 20:12

## 2018-07-05 RX ADMIN — CEFEPIME HYDROCHLORIDE 2 G: 2 INJECTION, POWDER, FOR SOLUTION INTRAVENOUS at 05:49

## 2018-07-05 RX ADMIN — ACETAMINOPHEN 1000 MG: 500 TABLET, FILM COATED ORAL at 15:26

## 2018-07-05 NOTE — PROGRESS NOTES
CLINICAL NUTRITION SERVICES - Brief     Nutrition Prescription    RECOMMENDATIONS FOR MDs/PROVIDERS TO ORDER:  Ok to discontinue TFs. Intake is adequate. Patient in agreement.     Recommendations already ordered by Registered Dietitian (RD):  1. Ordered high protein snacks in between meals( yogurt with protein powder, String Cheese, Hard boiled eggs),   2. Will continue with oral boost supplements.          NEW FINDINGS   Visited with patient. Patient was eating her bkf at the time of visit. Per patient, her po intake has improved significantly. Tolerating her intake well. In agreement with discontinuation of TF support and trial of high protein snacks in between meals.     Sudha Solo RD/OSIEL  Pager [phone]

## 2018-07-05 NOTE — PROGRESS NOTES
"Orthopaedic Daily Progress Note    S: No acute overnight events. Pain control improving. Denies chest pain or shortness of breath.     O:  /81 (BP Location: Left arm)  Pulse 106  Temp 97.7  F (36.5  C) (Oral)  Resp 16  Ht 1.727 m (5' 8\")  Wt 54.1 kg (119 lb 4.8 oz)  SpO2 96%  BMI 18.14 kg/m2  Gen: appears much more comfortable, alert and oriented  Resp: Breathing comfortably, NC in place.   MSK:   RLE:  BKA; ace wrap c,d,i, without strikethrough. Taken down and no drainage on adaptic. Incision is well approximated without erythema,drainage, or dehiscence. Dressing replaced.   LLE: dressings in place, taken down and superficial ulcer over heel and 1st MTP joint with good pink granulation tissue in base. No erythema or purulence to suggest infection.         Recent Labs  Lab 07/05/18  0526 07/04/18  1710 07/03/18  0631 07/02/18  0520   WBC 7.2 8.7 6.8 6.8   HGB 8.9* 10.9* 9.0* 8.7*    275 251 266   CR 0.93 0.92 0.81 0.88         Wound Culture from foot I&D: polymicrobial, one culture had MRSA.   Intra operative cultures from 6/28: GPC from bone, organism did not thrive so no speciation or susceptibility.       Assessment: Alessandra Pak is a 52 year old female admitted sepsis with recurrent right foot diabetic ulcers as source, now s/p bedside I&D right foot on 6/7, 6/12, and 6/13.  Intubated for ARDS/hypoxic respiratory failure on 6/10, extubated 6/15, TTF 6/15.  Ongoing recurring foot ulcers in the setting of poorly controlled DM/malnutrition, exhausted all medical options and patient opted to proceed with R below knee amputation, completed on 6/28. Intraoperative bone cultures with gram positive cocci, organism did not thrive so no speciation or susceptibility. Patient remains on cefepime, metronidazole, and vancomycin.         Plan:  Medicine Primary  Activity: Elevate BLE as much as possible   Weight bearing status: NWB RLE, okay for minimal WB with left forefoot for transfers to chair or " commode. Bed to chair transfers only due to LLE ulcers.    Antibiotics: per primary/ID, appreciate ID recs. Planning 6 weeks IV course of at least vancomycin, final course pending cultures.   Labs: per primary team  Follow cultures taken at the proximal tibia after BKA  DVT prophylaxis: per primary team  Wound Care: dressing changed by ortho 7/2, wound appearing well. Can be changed by nursing qday or every other day. Prosthetics consult for stump .   Recommend foam boot or PRAFO boot to LLE to protect heel ulcers, continue wound care  Dispo: per primary, stable to discharge from ortho standpoint once placement found and abx plan in place    Follow up: 3 weeks with Dr. King for wound check and possible suture removal    Should follow up within 1-2 weeks with podiatrist for LLE ulcers      Discussed with Dr. King.     Tian Ashby MD  Orthopaedic Surgery  PGY-4  Pager [phone]

## 2018-07-05 NOTE — PLAN OF CARE
Problem: Patient Care Overview  Goal: Plan of Care/Patient Progress Review  Outcome: Improving  Pt a&o x 4. VSS. Administered IV abx as ordered. Pt was dissatisfied with pain control after pain plan was changed to 1 dose of oxycodone q12 hours with nothing in between. Provider called to bedside to discuss pain plan. Pt rec'd 1 additional dose of PO dilaudid and will continue with q12 hour dosing schedule after that. Used tPa to unclog 2 PICC lumens. All lumens now flush, return blood, and are heparin locked. Pt up in hallway and outside with family via wheelchair x 2. Good PO intake, voiding spontaneously.  BG 74 and 189. Continue plan of care.

## 2018-07-05 NOTE — PROGRESS NOTES
[facility], [place]    Internal Medicine Progress Note - [facility] Service    Main Plans for Today    -operative cultures finalized  -d/c cefepime/metronidazole  -continue IV vanc x 6 wks (end 8/9)  -continue oxycodone q12h, prn PO dilaudid for breakthrough  -d/c TFs, remove NG  -increase furosemide to 40 mg    Assessment & Plan    Alessandra Pak is a 52 year old female admitted on 6/6/2018. 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 Hospital course was complicated by respiratory failure due to ARDS requiring intubation (6/10-15), extubated and transferred to medicine for further cares.   She underwent BKA for osteomyelitis on June 28, 2018.    # Pain control   # Chronic pain   # Anxiety  # Sinus tachycardia   Overall patient's pain worse at night, pain improved compared to prior nights with interventions (ER oxycodone, lidocaine at night). Required extra dose of PO dilaudid overnight.  - lidocaine and methol patches to alternate, lidocaine patch at night  - oxycodone ER q12h prn  - OK for extra 2 mg PO hydromorphone if needed, avoid restarting IV   - continue Tylenol 1000 mg tid scheduled   - continue Amitriptyline 50 mg qhs   - continue methadone 70 mg daily   - continue Lyrica 75 mg tid  - good bowel regimen due to narcotics    # Severe Sepsis, resolved  # R diabetic foot infection, s/p I&D x 3, R BKA 6/28  - ID consulted, recs appreciated   - OR cultures finalized, GPCs unable to be speciated   - continue PO vanc x 6 wks (end 8/9)   - weekly CBC/CMP/CRP, fax results to [facility], attn. Dr. Downs   - d/c cefepime/metronidazole 7/5  - Appreciate ortho input   - NWB RLE, minimal WB L forefoot for transfers   - foam boot for LLE   - wound  dressing change qod per nursing   - Dr King f/u 3 wks   - Podiatry f/u 1-2 wks for LLE ulcers  - no further fevers  - patient fit for stump   - follow CRP's intermittently  - incentive spirometer   - fall precautions     # Type 2 Diabetes  # Hx of Chronic pancreatitis s/p whipple's  # Severe malnutrition in the context of acute illness  - glargine 9 units with SSI  - d/c TFs, remove NG  - continue pantoprazole PO qd  - continue throat lozenge and spray     # Acute hypoxic respiratory failure, resolved  # ARDS 2/2 systemic response from osteo/foot infection  # Hx of chronic restrictive lung disease with emphysema and fibrosis  # chronic systolic heart failure with reduced EF of 35%   On room air  - increase furosemide to 40 mg qd, increased fluid overload on exam   - Daily weights, strict I/O  - wean O2 as tolerated, goal >88%  - DuoNebs qid prn      # Normocytic Anemia  # Anemia of chronic disease   - Insufficient reticulocyte response on 6/10 noted.   - monitor CBC     # Adrenal insufficiency  - S/p stress dose steroid taper, complete 6/18.     # Anisocoria/R afferent pupillary defect  - stable    # Pain Assessment:  Current Pain Score 7/5/2018   Patient currently in pain? yes   Pain score (0-10) -   Pain location Leg   Pain descriptors Aching   CPOT pain score -   - Alessandra is experiencing pain due to recent BKA. Pain management was discussed and the plan was created in a collaborative fashion.  Alessandra's response to the current recommendations: engaged  - Please see the plan for pain management as documented above    Diet: regular diet, TFs  Fluids: none   DVT Prophylaxis: Enoxaparin (Lovenox) SQ  Code Status: Full Code    Disposition Plan   Expected discharge: 1-2 days, recommended to transitional care unit once safe disposition plan/ TCU bed available.     Entered: Alireza Chaparro 07/05/2018, 12:53 PM   Information in the above section will display in the discharge planner report.      The patient's care  "was discussed with the Attending Physician, Dr. Nugent, Bedside Nurse and Patient.    Alireza Chaparro MD  [facility]: 5  Pager: [phone]  Please see sticky note for cross cover information    Interval History   Required 2 mg dilaudid overnight. Pain improved overnight compared to previous. Breathing stable. Afebrile.    Physical Exam   /81 (BP Location: Left arm)  Pulse 106  Temp 99.3  F (37.4  C) (Oral)  Resp 16  Ht 1.727 m (5' 8\")  Wt 54.1 kg (119 lb 4.8 oz)  SpO2 98%  BMI 18.14 kg/m2    General Appearance: lying comfortably in bed  Respiratory: bibasilar crackles, breathing comfortably on room air  Cardiovascular: rrr, S3 present, no murmurs, no rubs, and no gallops   GI:  soft, not tender, not distended, bowel sounds present and normal  Skin: no rashes and no discolorations   Neurologic: Patient is alert and oriented x3, speech fluent, no focal deficits  Psychiatric: normal mood and affect   Extremities: BKA site with dressing clean and dry, stump  in place. Left foot also with clean dressing, small serous fluid lateral MTP  "

## 2018-07-05 NOTE — PLAN OF CARE
Problem: Patient Care Overview  Goal: Plan of Care/Patient Progress Review  Discharge Planner PT   Patient plan for discharge: rehab  Current status: Pt able to complete pivot transfer wheelchair<>bed independently. Discussed discharge recommendation with pt, and educated pt on TCU. Pt states that she is able to complete all ADL's independently, with the exception of showering. Pt open to recommendation of discharge to TCU.  Barriers to return to prior living situation: Medical status, recent BKA  Recommendations for discharge: Per discussion with the PT the recommendation location for discharge is TCU.  Rationale for recommendations: Pt would benefit from continued skilled therapy in order to progress functional and independent mobility.         Entered by: Tanya Cuevas 07/05/2018 5:00 PM

## 2018-07-05 NOTE — PLAN OF CARE
Problem: Patient Care Overview  Goal: Plan of Care/Patient Progress Review  Outcome: Improving  Vital signs stable on RA. Up with 1 and pivot. Outside this shift with family. PRN medication given for leg pain. Well controlled. NJ removed. Good appetite. Right picc intact. Possible d/c tomorrow pending placement. Will continue to monitor.

## 2018-07-05 NOTE — PLAN OF CARE
Problem: Patient Care Overview  Goal: Plan of Care/Patient Progress Review  Outcome: Improving  D/I: Pt is alert and oriented x4, vitally stable on 2L oxygen. Pt was given an extra dose of Dilaudid 2mg oral solution for breakthrough pain medication. TF running at 70 ml/hr. Has triple lumen PICC with 2 lumens infusing TKO between antibiotics and the purple lumen saline locked. Up with assist of one and pivot to commode. . Stump dressing intact and left foot dressing clean, dry, intact. Calm, and cooperative with cares.  P: Will continue to monitor and treat pain and monitor calorie intake. TF needs to be turned off at 0800. Possible discharge to TCU on Thursday.

## 2018-07-06 ENCOUNTER — APPOINTMENT (OUTPATIENT)
Dept: PHYSICAL THERAPY | Facility: CLINIC | Age: 53
DRG: 853 | End: 2018-07-06
Payer: COMMERCIAL

## 2018-07-06 LAB
ANION GAP SERPL CALCULATED.3IONS-SCNC: 5 MMOL/L (ref 3–14)
BUN SERPL-MCNC: 29 MG/DL (ref 7–30)
CALCIUM SERPL-MCNC: 8.8 MG/DL (ref 8.5–10.1)
CHLORIDE SERPL-SCNC: 105 MMOL/L (ref 94–109)
CO2 SERPL-SCNC: 29 MMOL/L (ref 20–32)
COPATH REPORT: NORMAL
CREAT SERPL-MCNC: 1.04 MG/DL (ref 0.52–1.04)
GFR SERPL CREATININE-BSD FRML MDRD: 56 ML/MIN/1.7M2
GLUCOSE BLDC GLUCOMTR-MCNC: 112 MG/DL (ref 70–99)
GLUCOSE BLDC GLUCOMTR-MCNC: 135 MG/DL (ref 70–99)
GLUCOSE BLDC GLUCOMTR-MCNC: 165 MG/DL (ref 70–99)
GLUCOSE BLDC GLUCOMTR-MCNC: 250 MG/DL (ref 70–99)
GLUCOSE BLDC GLUCOMTR-MCNC: 91 MG/DL (ref 70–99)
GLUCOSE BLDC GLUCOMTR-MCNC: 93 MG/DL (ref 70–99)
GLUCOSE SERPL-MCNC: 92 MG/DL (ref 70–99)
MAGNESIUM SERPL-MCNC: 2.3 MG/DL (ref 1.6–2.3)
PHOSPHATE SERPL-MCNC: 3.8 MG/DL (ref 2.5–4.5)
POTASSIUM SERPL-SCNC: 3.9 MMOL/L (ref 3.4–5.3)
SODIUM SERPL-SCNC: 139 MMOL/L (ref 133–144)
VANCOMYCIN SERPL-MCNC: 18.9 MG/L

## 2018-07-06 PROCEDURE — G0463 HOSPITAL OUTPT CLINIC VISIT: HCPCS

## 2018-07-06 PROCEDURE — 83735 ASSAY OF MAGNESIUM: CPT | Performed by: STUDENT IN AN ORGANIZED HEALTH CARE EDUCATION/TRAINING PROGRAM

## 2018-07-06 PROCEDURE — 84100 ASSAY OF PHOSPHORUS: CPT | Performed by: STUDENT IN AN ORGANIZED HEALTH CARE EDUCATION/TRAINING PROGRAM

## 2018-07-06 PROCEDURE — 80202 ASSAY OF VANCOMYCIN: CPT | Performed by: HOSPITALIST

## 2018-07-06 PROCEDURE — 00000146 ZZHCL STATISTIC GLUCOSE BY METER IP

## 2018-07-06 PROCEDURE — 99233 SBSQ HOSP IP/OBS HIGH 50: CPT | Mod: GC | Performed by: INTERNAL MEDICINE

## 2018-07-06 PROCEDURE — 97542 WHEELCHAIR MNGMENT TRAINING: CPT | Mod: GP

## 2018-07-06 PROCEDURE — 25000132 ZZH RX MED GY IP 250 OP 250 PS 637: Performed by: STUDENT IN AN ORGANIZED HEALTH CARE EDUCATION/TRAINING PROGRAM

## 2018-07-06 PROCEDURE — 25000132 ZZH RX MED GY IP 250 OP 250 PS 637: Performed by: INTERNAL MEDICINE

## 2018-07-06 PROCEDURE — 36592 COLLECT BLOOD FROM PICC: CPT | Performed by: HOSPITALIST

## 2018-07-06 PROCEDURE — 25000132 ZZH RX MED GY IP 250 OP 250 PS 637: Performed by: SURGERY

## 2018-07-06 PROCEDURE — 25000128 H RX IP 250 OP 636: Performed by: ORTHOPAEDIC SURGERY

## 2018-07-06 PROCEDURE — 25000128 H RX IP 250 OP 636: Performed by: STUDENT IN AN ORGANIZED HEALTH CARE EDUCATION/TRAINING PROGRAM

## 2018-07-06 PROCEDURE — 80048 BASIC METABOLIC PNL TOTAL CA: CPT | Performed by: STUDENT IN AN ORGANIZED HEALTH CARE EDUCATION/TRAINING PROGRAM

## 2018-07-06 PROCEDURE — 97116 GAIT TRAINING THERAPY: CPT | Mod: GP

## 2018-07-06 PROCEDURE — 12000001 ZZH R&B MED SURG/OB UMMC

## 2018-07-06 PROCEDURE — 25000132 ZZH RX MED GY IP 250 OP 250 PS 637: Performed by: PHYSICIAN ASSISTANT

## 2018-07-06 PROCEDURE — 40000193 ZZH STATISTIC PT WARD VISIT

## 2018-07-06 PROCEDURE — 25000132 ZZH RX MED GY IP 250 OP 250 PS 637: Performed by: HOSPITALIST

## 2018-07-06 RX ORDER — OXYCODONE HCL 10 MG/1
10 TABLET, FILM COATED, EXTENDED RELEASE ORAL EVERY 12 HOURS
Status: DISCONTINUED | OUTPATIENT
Start: 2018-07-07 | End: 2018-07-10 | Stop reason: HOSPADM

## 2018-07-06 RX ORDER — MULTIPLE VITAMINS W/ MINERALS TAB 9MG-400MCG
1 TAB ORAL DAILY
Status: DISCONTINUED | OUTPATIENT
Start: 2018-07-06 | End: 2018-07-10 | Stop reason: HOSPADM

## 2018-07-06 RX ADMIN — AMITRIPTYLINE HYDROCHLORIDE 50 MG: 50 TABLET, FILM COATED ORAL at 21:52

## 2018-07-06 RX ADMIN — SENNOSIDES AND DOCUSATE SODIUM 2 TABLET: 8.6; 5 TABLET ORAL at 19:42

## 2018-07-06 RX ADMIN — ACETAMINOPHEN 1000 MG: 500 TABLET, FILM COATED ORAL at 07:25

## 2018-07-06 RX ADMIN — LIDOCAINE 1 PATCH: 560 PATCH PERCUTANEOUS; TOPICAL; TRANSDERMAL at 19:41

## 2018-07-06 RX ADMIN — SODIUM CHLORIDE, PRESERVATIVE FREE 10 ML: 5 INJECTION INTRAVENOUS at 08:40

## 2018-07-06 RX ADMIN — PREGABALIN 75 MG: 75 CAPSULE ORAL at 07:25

## 2018-07-06 RX ADMIN — ENOXAPARIN SODIUM 40 MG: 40 INJECTION SUBCUTANEOUS at 13:37

## 2018-07-06 RX ADMIN — LISINOPRIL 10 MG: 10 TABLET ORAL at 08:40

## 2018-07-06 RX ADMIN — OXYCODONE HYDROCHLORIDE 10 MG: 10 TABLET, FILM COATED, EXTENDED RELEASE ORAL at 19:49

## 2018-07-06 RX ADMIN — VANCOMYCIN HYDROCHLORIDE 1250 MG: 10 INJECTION, POWDER, LYOPHILIZED, FOR SOLUTION INTRAVENOUS at 07:31

## 2018-07-06 RX ADMIN — DOCUSATE SODIUM 100 MG: 100 CAPSULE, LIQUID FILLED ORAL at 19:42

## 2018-07-06 RX ADMIN — OXYCODONE HYDROCHLORIDE 10 MG: 10 TABLET, FILM COATED, EXTENDED RELEASE ORAL at 07:25

## 2018-07-06 RX ADMIN — ACETAMINOPHEN 1000 MG: 500 TABLET, FILM COATED ORAL at 15:50

## 2018-07-06 RX ADMIN — PREGABALIN 75 MG: 75 CAPSULE ORAL at 19:42

## 2018-07-06 RX ADMIN — Medication 6 MG: at 21:51

## 2018-07-06 RX ADMIN — OMEPRAZOLE 20 MG: 20 CAPSULE, DELAYED RELEASE ORAL at 21:51

## 2018-07-06 RX ADMIN — FUROSEMIDE 40 MG: 40 TABLET ORAL at 08:40

## 2018-07-06 RX ADMIN — FLUTICASONE PROPIONATE 2 SPRAY: 50 SPRAY, METERED NASAL at 08:40

## 2018-07-06 RX ADMIN — Medication 70 MG: at 07:25

## 2018-07-06 RX ADMIN — PREGABALIN 75 MG: 75 CAPSULE ORAL at 13:37

## 2018-07-06 RX ADMIN — MENTHOL 1 PATCH: 205.5 PATCH TOPICAL at 08:43

## 2018-07-06 RX ADMIN — SODIUM CHLORIDE, PRESERVATIVE FREE 5 ML: 5 INJECTION INTRAVENOUS at 05:32

## 2018-07-06 RX ADMIN — ACETAMINOPHEN 1000 MG: 500 TABLET, FILM COATED ORAL at 21:51

## 2018-07-06 RX ADMIN — MULTIPLE VITAMINS W/ MINERALS TAB 1 TABLET: TAB at 15:50

## 2018-07-06 NOTE — PROGRESS NOTES
Social Work Services Progress Note      Hospital Day: 31  Consulted with:  Primary team Marilynn Byers, [facility] rehab admissions liaison, patient      Data:  Patient is a 52 year old female, s/p BKA on 6/28/18.       Intervention:  PT has reassessed pt and is recommending TCU at discharge. Provided pt with list of TCUs near her home that are in-network with her [employer] insurance. After reviewing list, pt would like to d/c to [facility] TCU for continuity of care. Updated [facility] TCU admissions and they will submit for insurance authorization today.       Assessment:  Pt to dc to [facility] TCU when insurance authorization received      Plan:    Anticipated Disposition:  Facility:  TCU    Barriers to d/c plan:  Placement- insurance authorization    Follow Up:  SW to follow for discharge planning      WILSON Rojas, SW  5A Unit   Pager [phone]  Phone [phone]

## 2018-07-06 NOTE — PLAN OF CARE
Problem: Patient Care Overview  Goal: Plan of Care/Patient Progress Review  Outcome: No Change  Pt a&o x 4. VSS ex tachycardia. Pain under control with current pain management plan, patient has no complaints at this time.  and 213, administered insulin as ordered. Pt doing well with transfers to and from wheelchair, took herself outside independently today via wheelchair. Up to commode with assist of 1 x 2 this evening. PT came by to make formal recommendation for discharge disposition. SW to continue discharge planning tomorrow. Continue plan of care.

## 2018-07-06 NOTE — PHARMACY-VANCOMYCIN DOSING SERVICE
Pharmacy Vancomycin Note  Date of Service 2018  Patient's  1965   52 year old, female    Indication: Osteomyelitis  Goal Trough Level: 15-20 mg/L  Day of Therapy: 23  Current Vancomycin regimen:  1250 mg IV q24h    Current estimated CrCl = Estimated Creatinine Clearance: 56.1 mL/min (based on Cr of 1.04).    Creatinine for last 3 days  2018:  5:10 PM Creatinine 0.92 mg/dL  2018:  5:26 AM Creatinine 0.93 mg/dL  2018:  5:32 AM Creatinine 1.04 mg/dL    Recent Vancomycin Levels (past 3 days)  2018:  5:32 AM Vancomycin Level 18.9 mg/L    Vancomycin IV Administrations (past 72 hours)                   vancomycin (VANCOCIN) 1,250 mg in sodium chloride 0.9 % 250 mL intermittent infusion (mg) 1,250 mg New Bag 18 0731     1,250 mg New Bag 18 0550     1,250 mg New Bag 18 0602                Nephrotoxins and other renal medications (Future)    Start     Dose/Rate Route Frequency Ordered Stop    18 0800  furosemide (LASIX) tablet 40 mg      40 mg Oral DAILY 18 1317      18 0000  furosemide (LASIX) 40 MG tablet      40 mg Oral DAILY 18 1750      18 0000  vancomycin 1,250 mg      1,250 mg  over 90 Minutes Intravenous EVERY 24 HOURS 18 1750 18 2359    18 1415  lisinopril (PRINIVIL/ZESTRIL) tablet 10 mg      10 mg Oral DAILY 18 1405      18 0000  lisinopril (PRINIVIL/ZESTRIL) 10 MG tablet      10 mg Oral DAILY 18 1750      18 0600  vancomycin (VANCOCIN) 1,250 mg in sodium chloride 0.9 % 250 mL intermittent infusion      1,250 mg  over 90 Minutes Intravenous EVERY 24 HOURS 18 0502               Contrast Orders - past 72 hours     None          Interpretation of levels and current regimen:  Trough level is  Therapeutic    Has serum creatinine changed > 50% in last 72 hours: No    Urine output:  good urine output    Renal Function: Stable    Plan:  1.  Continue Current Dose  2.  Pharmacy will check trough  levels as appropriate in 3-5 Days.    3. Serum creatinine levels will be ordered daily for the first week of therapy and at least twice weekly for subsequent weeks.      Pablo Owens        .

## 2018-07-06 NOTE — PROGRESS NOTES
[facility] Nurse Inpatient Wound Assessment   Reason for consultation: Evaluate and treat bilateral foot wounds    Assessment   left foot wounds due to Diabetic Ulcer-likely Neuropathic   Status: Follow up assessment  S/p R BKA, Orthopedic team has been following the wound, no longer following     Treatment Plan  POC for left medial foot and heel wounds daily and PRN:   Cleanse wounds with microklenz spray and gauze.   Apply Iodosorb gel (order #[id number]) to wound beds. Apply puja thick amount of iodosorb gel to the wound beds.    Cover with dry 4x4.   secure with Kerlix     Orders revised    WO Nurse follow-up plan:weekly   Nursing to notify the Provider(s) and re-consult the [facility] Nurse if wound(s) deteriorates or new skin concern.    Patient History  According to provider note(s):  Alessandra Pak is a 52 year old female with PMH NICM with ICD, CKD3, chronic pancreatitis status post pylorus sparing Whipple (2001) with secondary DM c/b recurrent diabetic foot ulcers, COPD, HTN, and chronic methadone use who has been admitted for sepsis and longstanding history of recurrent b/l diabteic foot ulcers. Patient is unsure exactly when she first noticed her bilateral foot ulcers, however present for a period time.  2 days prior to admission the patient's daughter noticed a foul smell coming from her mother's feet, increased confusion, and decreased p.o. Intake.     s/p R below knee amputation, completed on 6/28  She follows with Dr. Lewis with Podiatry as an outpatient,    Objective Data  Containment of urine/stool: Continent of bowel and Continent of bladder    Active Diet Order    Active Diet Order      Regular Diet Adult      Advance Diet as Tolerated    Output:   I/O last 3 completed shifts:  In: 920 [P.O.:830; I.V.:20]  Out: 1300 [Urine:1300]    Risk Assessment:   Sensory Perception: 3-->slightly limited  Moisture: 4-->rarely moist  Activity: 2-->chairfast  Mobility: 3-->slightly limited  Nutrition: 2-->probably  inadequate  Friction and Shear: 2-->potential problem  Kye Score: 16                          Labs:     Recent Labs  Lab 07/05/18  1000 07/05/18  0526   HGB 8.9* 8.9*   WBC  --  7.2   CRP  --  31.0*       Physical Exam    Wound Location:  Left lateral heel  Date of last photo 6/15/2018    7/6/18: no change in appearance  Wound History: . Present on admission.   Measurements (length x width x depth, in cm) 1.3 cm x 1.3 cm  x  0.2 cm   Wound Base:  100% pink tissue  Tunneling N/A  Undermining N/A  Palpation of the wound bed: firm   Periwound skin:  Pink unpigmented epithelium. Lateral heel skin peeling revealing intact unpigmented skin.  Trimmed.  Color: pale  Temperature: normal   Drainage:, moderate  Description of drainage: serosanguinous  Odor: strong  Pain: during dressing change,      Wound Location: Left medial foot, to great toe      5/22/18 6/29/18 7/6/18: No change in appearance  Wound History: See above. Present on admission.   Mepilex dressings not staying in place  Measurements (length x width x depth, in cm) 1.4 cm x 2.6 cm  x  0.3 cm   Wound Base:  20% smooth dark red moist tissue surrounded by dry dark grey and brown necrotic tissue  Tunneling N/A  Undermining N/A  Palpation of the wound bed: normal  Periwound skin:  Intact.    Color: pale  Temperature: normal   Drainage:, none  Description of drainage: serosanguinous  Odor: mild  Pain: during dressing change,   Interventions  Current support surface: Standard    Current off-loading measures: Pillows under calves  Visual inspection of wound(s) completed  Wound Care: done per plan of care  Supplies: Iodosorb gel at the bedside  Education provided: plan of care and wound progress  Discussed plan of care with Patient and Nurse

## 2018-07-06 NOTE — PLAN OF CARE
Problem: Patient Care Overview  Goal: Plan of Care/Patient Progress Review  Outcome: Improving  Vital signs stable on RA. Up with 1 and pivot to commode and wheelchair. Outside with family this shift. Blood sugar elevated 92 and 250. Dressings changed by wound nurse. Plan for d/c to TCU pending insurance approval. Will continue to monitor.

## 2018-07-06 NOTE — PLAN OF CARE
Problem: Patient Care Overview  Goal: Plan of Care/Patient Progress Review  Discharge Planner PT / 5B   Patient plan for discharge: Rehab.  Current status: Pt completes bed mobility independently. Pt completes squat-pivot transfer from bed<>wheelchair with SBA and maintains weightbearing restriction on L forefoot. Pt engaged in wheelchair propulsion in hallway - total of ~250ft with x3 short breaks 2/2 B UE fatigue.  Barriers to return to prior living situation: Medical status-recent BKA, muscle weakness, decreased activity tolerance.  Recommendations for discharge: TCU.  Rationale for recommendations: Current level of function. Would benefit from TCU stay to maximize IND/safety with functional mobility within L LE weightbearing restriction.

## 2018-07-06 NOTE — PROGRESS NOTES
[facility], [place]    Internal Medicine Progress Note - [facility] Service    Main Plans for Today    -continue furosemide 40 mg qd  -continue stable pain regimen  -continue insulin regimen  -await insurance authorization for TCU    Assessment & Plan    Alessandra Pak is a 52 year old female admitted on 6/6/2018. 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 Hospital course was complicated by respiratory failure due to ARDS requiring intubation (6/10-15), extubated and transferred to medicine for further cares.   She underwent BKA for osteomyelitis on June 28, 2018.    # Pain control   # Chronic pain   # Anxiety  # Sinus tachycardia   Pain well controlled with current regimen  - lidocaine and methol patches to alternate, lidocaine patch at night  - oxycodone ER q12h prn  - hydromorphone 2 mg daily prn  - continue Tylenol 1000 mg tid scheduled   - continue Amitriptyline 50 mg qhs   - continue methadone 70 mg daily   - continue Lyrica 75 mg tid  - good bowel regimen due to narcotics  - will wean as tolerated    # Severe Sepsis, resolved  # R diabetic foot infection, s/p I&D x 3, R BKA 6/28  - ID consulted, recs appreciated   - OR cultures finalized, GPCs unable to be speciated   - continue PO vanc x 6 wks (end 8/9)   - weekly CBC/CMP/CRP, fax results to [facility], attn. Dr. Downs   - d/c cefepime/metronidazole 7/5  - Appreciate ortho input   - NWB RLE, minimal WB L forefoot for transfers   - foam boot for LLE   - wound dressing change qod per nursing   - Dr King f/u 3 wks   - Podiatry f/u 1-2 wks for LLE ulcers  - no further fevers  - patient fit for stump   - follow CRP's intermittently  - incentive spirometer   - fall precautions     #  Type 2 Diabetes  # Hx of Chronic pancreatitis s/p whipple's  # Severe malnutrition in the context of acute illness  BG well controlled off of TFs. Improved appetite and PO intake.  - glargine 9 units with SSI  - d/c TFs, remove NG  - continue ppi PO qd     # Acute hypoxic respiratory failure, resolved  # ARDS 2/2 systemic response from osteo/foot infection  # Hx of chronic restrictive lung disease with emphysema and fibrosis  # chronic systolic heart failure with reduced EF of 35%   On room air. S3 resolved and crackles improved  - increase furosemide to 40 mg qd, monitor BMP, back off when contraction alkalosis or cr bump  - Daily weights, strict I/O  - wean O2 as tolerated, goal >88%  - DuoNebs qid prn      # Normocytic Anemia  # Anemia of chronic disease   - Insufficient reticulocyte response on 6/10 noted.   - monitor CBC     # Adrenal insufficiency  - S/p stress dose steroid taper, complete 6/18.     # Anisocoria/R afferent pupillary defect  - stable    # Pain Assessment:  Current Pain Score 7/6/2018   Patient currently in pain? yes   Pain score (0-10) -   Pain location Leg   Pain descriptors Aching   CPOT pain score -   - Alessandra is experiencing pain due to recent BKA. Pain management was discussed and the plan was created in a collaborative fashion.  Alessandra's response to the current recommendations: engaged  - Please see the plan for pain management as documented above    Diet: regular diet  Fluids: PO  DVT Prophylaxis: Enoxaparin (Lovenox) SQ  Code Status: Full Code    Disposition Plan   Expected discharge: 1-2 days, recommended to transitional care unit once safe disposition plan/ TCU bed available.     Entered: Alireza Chaparro 07/06/2018, 2:04 PM   Information in the above section will display in the discharge planner report.      The patient's care was discussed with the Attending Physician, Dr. Nugent, Bedside Nurse and Patient.    Alireza Chaparro MD  [facility]: 5  Pager:  [phone]  Please see sticky note for cross cover information    Interval History   Patient feels well. Pain well controlled on current regimen. BG under better control. Awaiting insurance auth for [facility] TCU.    Physical Exam   /80 (BP Location: Left arm)  Pulse 112  Temp 98.1  F (36.7  C) (Oral)  Resp 18  Ht 1.727 m (5' 8\")  Wt 54.9 kg (121 lb 1.6 oz)  SpO2 98%  BMI 18.41 kg/m2    General Appearance: pleasant, sitting up in wheelchair  Respiratory: bibasilar crackles improved, breathing comfortably on room air  Cardiovascular: rrr, S3 resolved, no murmurs, no rubs, and no gallops   GI:  soft, not tender, not distended, bowel sounds present and normal  Skin: no rashes and no discolorations   Neurologic: Patient is alert and oriented x3, speech fluent, no focal deficits  Psychiatric: normal mood and affect   Extremities: BKA site with stump  in place. Left foot in foam boot.  "

## 2018-07-06 NOTE — PROGRESS NOTES
Calorie Count  Intake recorded for 7/5. Kcals: 0  Protein: 0g  # Meals Recorded 0  # Supplements Recorded 0    NOTE: Patient had ordered 2 meals via room service on 7/5 but no food intake was recorded by RN or patient. Unable to calculate calories and protein intake.    JES NagelR  Nutrition Associate

## 2018-07-06 NOTE — PROGRESS NOTES
S: Patient was seen at the [facility], room 5223, for the fitting and delivery of two BK shrinkers for her right side on orders from Dr. Isma MD for the treatment of Dx: s/p BKA right    O: Patient presented sitting upright in her hospital bed at the time of my arrival. Patient's residual limb was covered with an ace bandage and her left foot was covered in gauze.     G: Patient's shrinkers will provided the patient with adequate compression to aid in venous return from her residual limb and to aid in controlling the post-operative swelling of her residual limb.    A: I measured the patient's limb in order to determine the proper size . Patient was determined to be best fit with a size 3 . Patient and I discussed care and use of the patient's new shrinkers. I suggested to the patient to not begin the use of her shrinkers until after her sutures have been removed or as her physician instructs her. Patient and I also discussed the next steps for her prosthetic care and I left her with some written information regarding the next steps in the prosthetic process.     P: I left the patient my card and asked her to call our office if there are any questions or concerns regarding the prosthetic process.

## 2018-07-06 NOTE — PLAN OF CARE
Problem: Patient Care Overview  Goal: Plan of Care/Patient Progress Review  Outcome: Improving  D/I: Pt is alert and oriented x4, sinus tachycardia at baseline, otherwise vitally stable. Pt was given an extra dose of Dilaudid 2mg oral solution for breakthrough pain medication. Has triple lumen PICC with 2 lumens saline locked between antibioticsd. Up with assist of one and pivot to commode. . Stump dressing intact and left foot dressing clean, dry, intact. Calm, and cooperative with cares.   P: Will continue to monitor and treat pain.  Possible discharge to TCU today.

## 2018-07-07 LAB
ANION GAP SERPL CALCULATED.3IONS-SCNC: 7 MMOL/L (ref 3–14)
BUN SERPL-MCNC: 23 MG/DL (ref 7–30)
CALCIUM SERPL-MCNC: 8.9 MG/DL (ref 8.5–10.1)
CHLORIDE SERPL-SCNC: 107 MMOL/L (ref 94–109)
CO2 SERPL-SCNC: 25 MMOL/L (ref 20–32)
CREAT SERPL-MCNC: 0.93 MG/DL (ref 0.52–1.04)
GFR SERPL CREATININE-BSD FRML MDRD: 64 ML/MIN/1.7M2
GLUCOSE BLDC GLUCOMTR-MCNC: 105 MG/DL (ref 70–99)
GLUCOSE BLDC GLUCOMTR-MCNC: 106 MG/DL (ref 70–99)
GLUCOSE BLDC GLUCOMTR-MCNC: 219 MG/DL (ref 70–99)
GLUCOSE BLDC GLUCOMTR-MCNC: 234 MG/DL (ref 70–99)
GLUCOSE BLDC GLUCOMTR-MCNC: 86 MG/DL (ref 70–99)
GLUCOSE SERPL-MCNC: 78 MG/DL (ref 70–99)
MAGNESIUM SERPL-MCNC: 2.3 MG/DL (ref 1.6–2.3)
PHOSPHATE SERPL-MCNC: 3.5 MG/DL (ref 2.5–4.5)
POTASSIUM SERPL-SCNC: 4.2 MMOL/L (ref 3.4–5.3)
SODIUM SERPL-SCNC: 140 MMOL/L (ref 133–144)

## 2018-07-07 PROCEDURE — 25000132 ZZH RX MED GY IP 250 OP 250 PS 637: Performed by: INTERNAL MEDICINE

## 2018-07-07 PROCEDURE — 25000128 H RX IP 250 OP 636: Performed by: ORTHOPAEDIC SURGERY

## 2018-07-07 PROCEDURE — 83735 ASSAY OF MAGNESIUM: CPT | Performed by: STUDENT IN AN ORGANIZED HEALTH CARE EDUCATION/TRAINING PROGRAM

## 2018-07-07 PROCEDURE — 25000132 ZZH RX MED GY IP 250 OP 250 PS 637: Performed by: SURGERY

## 2018-07-07 PROCEDURE — 40000802 ZZH SITE CHECK

## 2018-07-07 PROCEDURE — 25000132 ZZH RX MED GY IP 250 OP 250 PS 637: Performed by: STUDENT IN AN ORGANIZED HEALTH CARE EDUCATION/TRAINING PROGRAM

## 2018-07-07 PROCEDURE — 12000001 ZZH R&B MED SURG/OB UMMC

## 2018-07-07 PROCEDURE — 25000132 ZZH RX MED GY IP 250 OP 250 PS 637: Performed by: PHYSICIAN ASSISTANT

## 2018-07-07 PROCEDURE — 80048 BASIC METABOLIC PNL TOTAL CA: CPT | Performed by: STUDENT IN AN ORGANIZED HEALTH CARE EDUCATION/TRAINING PROGRAM

## 2018-07-07 PROCEDURE — 84100 ASSAY OF PHOSPHORUS: CPT | Performed by: STUDENT IN AN ORGANIZED HEALTH CARE EDUCATION/TRAINING PROGRAM

## 2018-07-07 PROCEDURE — 36592 COLLECT BLOOD FROM PICC: CPT | Performed by: STUDENT IN AN ORGANIZED HEALTH CARE EDUCATION/TRAINING PROGRAM

## 2018-07-07 PROCEDURE — 25000132 ZZH RX MED GY IP 250 OP 250 PS 637: Performed by: HOSPITALIST

## 2018-07-07 PROCEDURE — 00000146 ZZHCL STATISTIC GLUCOSE BY METER IP

## 2018-07-07 PROCEDURE — 25000132 ZZH RX MED GY IP 250 OP 250 PS 637: Performed by: PATHOLOGY

## 2018-07-07 PROCEDURE — 25000128 H RX IP 250 OP 636: Performed by: STUDENT IN AN ORGANIZED HEALTH CARE EDUCATION/TRAINING PROGRAM

## 2018-07-07 PROCEDURE — 99232 SBSQ HOSP IP/OBS MODERATE 35: CPT | Mod: GC | Performed by: INTERNAL MEDICINE

## 2018-07-07 RX ADMIN — VANCOMYCIN HYDROCHLORIDE 1250 MG: 10 INJECTION, POWDER, LYOPHILIZED, FOR SOLUTION INTRAVENOUS at 05:59

## 2018-07-07 RX ADMIN — LIDOCAINE 1 PATCH: 560 PATCH PERCUTANEOUS; TOPICAL; TRANSDERMAL at 20:25

## 2018-07-07 RX ADMIN — PREGABALIN 75 MG: 75 CAPSULE ORAL at 13:38

## 2018-07-07 RX ADMIN — Medication 70 MG: at 08:10

## 2018-07-07 RX ADMIN — OMEPRAZOLE 20 MG: 20 CAPSULE, DELAYED RELEASE ORAL at 20:25

## 2018-07-07 RX ADMIN — FUROSEMIDE 40 MG: 40 TABLET ORAL at 08:10

## 2018-07-07 RX ADMIN — ENOXAPARIN SODIUM 40 MG: 40 INJECTION SUBCUTANEOUS at 13:37

## 2018-07-07 RX ADMIN — OXYCODONE HYDROCHLORIDE 10 MG: 10 TABLET, FILM COATED, EXTENDED RELEASE ORAL at 20:24

## 2018-07-07 RX ADMIN — AMITRIPTYLINE HYDROCHLORIDE 50 MG: 50 TABLET, FILM COATED ORAL at 22:24

## 2018-07-07 RX ADMIN — ACETAMINOPHEN 1000 MG: 500 TABLET, FILM COATED ORAL at 18:37

## 2018-07-07 RX ADMIN — ACETAMINOPHEN 1000 MG: 500 TABLET, FILM COATED ORAL at 22:24

## 2018-07-07 RX ADMIN — LISINOPRIL 10 MG: 10 TABLET ORAL at 08:10

## 2018-07-07 RX ADMIN — FLUTICASONE PROPIONATE 2 SPRAY: 50 SPRAY, METERED NASAL at 08:10

## 2018-07-07 RX ADMIN — Medication 2 MG: at 00:19

## 2018-07-07 RX ADMIN — SENNOSIDES AND DOCUSATE SODIUM 2 TABLET: 8.6; 5 TABLET ORAL at 20:25

## 2018-07-07 RX ADMIN — PREGABALIN 75 MG: 75 CAPSULE ORAL at 20:25

## 2018-07-07 RX ADMIN — Medication 2 MG: at 09:50

## 2018-07-07 RX ADMIN — SODIUM CHLORIDE, PRESERVATIVE FREE 5 ML: 5 INJECTION INTRAVENOUS at 07:20

## 2018-07-07 RX ADMIN — ACETAMINOPHEN 1000 MG: 500 TABLET, FILM COATED ORAL at 08:10

## 2018-07-07 RX ADMIN — POTASSIUM CHLORIDE 20 MEQ: 750 TABLET, EXTENDED RELEASE ORAL at 00:22

## 2018-07-07 RX ADMIN — SODIUM CHLORIDE, PRESERVATIVE FREE 5 ML: 5 INJECTION INTRAVENOUS at 08:18

## 2018-07-07 RX ADMIN — Medication 6 MG: at 22:24

## 2018-07-07 RX ADMIN — MULTIPLE VITAMINS W/ MINERALS TAB 1 TABLET: TAB at 08:10

## 2018-07-07 RX ADMIN — PREGABALIN 75 MG: 75 CAPSULE ORAL at 08:10

## 2018-07-07 RX ADMIN — OXYCODONE HYDROCHLORIDE 10 MG: 10 TABLET, FILM COATED, EXTENDED RELEASE ORAL at 08:10

## 2018-07-07 RX ADMIN — DOCUSATE SODIUM 100 MG: 100 CAPSULE, LIQUID FILLED ORAL at 20:24

## 2018-07-07 NOTE — PLAN OF CARE
Problem: Patient Care Overview  Goal: Plan of Care/Patient Progress Review  Outcome: No Change  Pt alert and oriented. Vitally stable on ra. Up with assist of one; able to independently utilize bedside commode. Dressings to left LE and right stump CDI. C/o of pain located on right LE; prn dilaudid administered X1 and effective per pt. Slept through the night. BG check at 105. PICC HL. K+ replaced and recheck scheduled for this am. May d/c to TCU soon.   R: Continue to monitor and implement poc

## 2018-07-07 NOTE — PLAN OF CARE
Problem: Patient Care Overview  Goal: Plan of Care/Patient Progress Review  Patient transfers to chair with ax1 does most of the work herself just need sba. Patient had a bowel movement. Pain is adequately controlled. Patient wheels self outside in wheelcair. Patient tolerating diet. picc receiving iv abx, awaiting for placement over at [place] tcu. P;cont to monitor

## 2018-07-07 NOTE — PLAN OF CARE
Problem: Patient Care Overview  Goal: Plan of Care/Patient Progress Review  /94 at bedtime.  Other vitals stable.  Alert and oriented.  Scheduled Tylenol and Oxycontin for management of right leg pain with good relief.  Blood glucose 135 & 165.  Anticipating discharge to TCU soon.  Plan to monitor BP and notify MD of changes.

## 2018-07-08 LAB
GLUCOSE BLDC GLUCOMTR-MCNC: 136 MG/DL (ref 70–99)
GLUCOSE BLDC GLUCOMTR-MCNC: 136 MG/DL (ref 70–99)
GLUCOSE BLDC GLUCOMTR-MCNC: 146 MG/DL (ref 70–99)
GLUCOSE BLDC GLUCOMTR-MCNC: 312 MG/DL (ref 70–99)
GLUCOSE BLDC GLUCOMTR-MCNC: 337 MG/DL (ref 70–99)

## 2018-07-08 PROCEDURE — 25000132 ZZH RX MED GY IP 250 OP 250 PS 637: Performed by: STUDENT IN AN ORGANIZED HEALTH CARE EDUCATION/TRAINING PROGRAM

## 2018-07-08 PROCEDURE — 25000132 ZZH RX MED GY IP 250 OP 250 PS 637: Performed by: SURGERY

## 2018-07-08 PROCEDURE — 25000128 H RX IP 250 OP 636: Performed by: ORTHOPAEDIC SURGERY

## 2018-07-08 PROCEDURE — 25000132 ZZH RX MED GY IP 250 OP 250 PS 637: Performed by: INTERNAL MEDICINE

## 2018-07-08 PROCEDURE — 00000146 ZZHCL STATISTIC GLUCOSE BY METER IP

## 2018-07-08 PROCEDURE — 25000128 H RX IP 250 OP 636: Performed by: STUDENT IN AN ORGANIZED HEALTH CARE EDUCATION/TRAINING PROGRAM

## 2018-07-08 PROCEDURE — 25000132 ZZH RX MED GY IP 250 OP 250 PS 637: Performed by: PATHOLOGY

## 2018-07-08 PROCEDURE — 40000802 ZZH SITE CHECK

## 2018-07-08 PROCEDURE — 25000132 ZZH RX MED GY IP 250 OP 250 PS 637: Performed by: HOSPITALIST

## 2018-07-08 PROCEDURE — 99232 SBSQ HOSP IP/OBS MODERATE 35: CPT | Mod: GC | Performed by: INTERNAL MEDICINE

## 2018-07-08 PROCEDURE — 12000001 ZZH R&B MED SURG/OB UMMC

## 2018-07-08 PROCEDURE — 25000132 ZZH RX MED GY IP 250 OP 250 PS 637: Performed by: PHYSICIAN ASSISTANT

## 2018-07-08 RX ADMIN — PREGABALIN 75 MG: 75 CAPSULE ORAL at 20:39

## 2018-07-08 RX ADMIN — Medication 2 MG: at 04:23

## 2018-07-08 RX ADMIN — DOCUSATE SODIUM 100 MG: 100 CAPSULE, LIQUID FILLED ORAL at 20:39

## 2018-07-08 RX ADMIN — ENOXAPARIN SODIUM 40 MG: 40 INJECTION SUBCUTANEOUS at 13:49

## 2018-07-08 RX ADMIN — LIDOCAINE 1 PATCH: 560 PATCH PERCUTANEOUS; TOPICAL; TRANSDERMAL at 20:38

## 2018-07-08 RX ADMIN — MULTIPLE VITAMINS W/ MINERALS TAB 1 TABLET: TAB at 08:36

## 2018-07-08 RX ADMIN — ACETAMINOPHEN 1000 MG: 500 TABLET, FILM COATED ORAL at 22:51

## 2018-07-08 RX ADMIN — ACETAMINOPHEN 1000 MG: 500 TABLET, FILM COATED ORAL at 18:31

## 2018-07-08 RX ADMIN — PREGABALIN 75 MG: 75 CAPSULE ORAL at 13:49

## 2018-07-08 RX ADMIN — OXYCODONE HYDROCHLORIDE 10 MG: 10 TABLET, FILM COATED, EXTENDED RELEASE ORAL at 08:36

## 2018-07-08 RX ADMIN — SENNOSIDES AND DOCUSATE SODIUM 2 TABLET: 8.6; 5 TABLET ORAL at 20:39

## 2018-07-08 RX ADMIN — AMITRIPTYLINE HYDROCHLORIDE 50 MG: 50 TABLET, FILM COATED ORAL at 22:51

## 2018-07-08 RX ADMIN — SODIUM CHLORIDE, PRESERVATIVE FREE 5 ML: 5 INJECTION INTRAVENOUS at 08:36

## 2018-07-08 RX ADMIN — OMEPRAZOLE 20 MG: 20 CAPSULE, DELAYED RELEASE ORAL at 20:39

## 2018-07-08 RX ADMIN — Medication 6 MG: at 22:51

## 2018-07-08 RX ADMIN — VANCOMYCIN HYDROCHLORIDE 1250 MG: 10 INJECTION, POWDER, LYOPHILIZED, FOR SOLUTION INTRAVENOUS at 06:09

## 2018-07-08 RX ADMIN — DOCUSATE SODIUM 100 MG: 100 CAPSULE, LIQUID FILLED ORAL at 08:36

## 2018-07-08 RX ADMIN — MENTHOL 1 PATCH: 205.5 PATCH TOPICAL at 08:35

## 2018-07-08 RX ADMIN — FUROSEMIDE 40 MG: 40 TABLET ORAL at 08:36

## 2018-07-08 RX ADMIN — Medication 70 MG: at 08:35

## 2018-07-08 RX ADMIN — LISINOPRIL 10 MG: 10 TABLET ORAL at 08:36

## 2018-07-08 RX ADMIN — PREGABALIN 75 MG: 75 CAPSULE ORAL at 08:36

## 2018-07-08 RX ADMIN — OXYCODONE HYDROCHLORIDE 10 MG: 10 TABLET, FILM COATED, EXTENDED RELEASE ORAL at 20:39

## 2018-07-08 RX ADMIN — ACETAMINOPHEN 1000 MG: 500 TABLET, FILM COATED ORAL at 08:36

## 2018-07-08 RX ADMIN — FLUTICASONE PROPIONATE 2 SPRAY: 50 SPRAY, METERED NASAL at 08:36

## 2018-07-08 NOTE — PLAN OF CARE
Problem: Patient Care Overview  Goal: Plan of Care/Patient Progress Review  Outcome: No Change  Pt alert and oriented. Assist one of with cares. Up to bedside commode. C/o of right LE pain; limb elevated and prn dilaudid administered with relief per pt. Dressings to bilateral LEs intact. No significant changes overnight. Bg check at 136. Awaiting TCU placement. Continue to monitor and implement poc.

## 2018-07-08 NOTE — PROGRESS NOTES
[facility], [place]    Internal Medicine Progress Note - [facility] Service    Assessment & Plan    Alessandra Pak is a 52 year old female admitted on 6/6/2018. 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 Hospital course was complicated by respiratory failure due to ARDS requiring intubation (6/10-15), extubated and transferred to medicine for further cares.   She underwent BKA for osteomyelitis on June 28, 2018.    # Pain control   # Chronic pain   # Anxiety  # Sinus tachycardia   Pain well controlled with current regimen  - lidocaine and methol patches to alternate, lidocaine patch at night  - oxycodone ER q12h prn  - hydromorphone 2 mg daily prn  - continue Tylenol 1000 mg tid scheduled   - continue Amitriptyline 50 mg qhs   - continue methadone 70 mg daily   - continue Lyrica 75 mg tid  - good bowel regimen due to narcotics  - will wean as tolerated    # Severe Sepsis, resolved  # R diabetic foot infection, s/p I&D x 3, R BKA 6/28  - ID consulted, recs appreciated   - OR cultures finalized, GPCs unable to be speciated   - continue PO vanc x 6 wks (end 8/9)   - weekly CBC/CMP/CRP, fax results to [facility], attn. Dr. Downs   - d/c cefepime/metronidazole 7/5  - Appreciate ortho input   - NWB RLE, minimal WB L forefoot for transfers   - foam boot for LLE   - wound dressing change qod per nursing   - Dr King f/u 3 wks   - Podiatry f/u 1-2 wks for LLE ulcers  - no further fevers  - patient fit for stump   - follow CRP's intermittently  - incentive spirometer   - fall precautions     # Type 2 Diabetes  # Hx of Chronic pancreatitis s/p whipple's  # Severe malnutrition in the context of acute illness  BG well controlled off of TFs. Improved  appetite and PO intake.  - glargine 9 units with SSI  - d/c TFs, remove NG  - continue ppi PO qd     # Acute hypoxic respiratory failure, resolved  # ARDS 2/2 systemic response from osteo/foot infection  # Hx of chronic restrictive lung disease with emphysema and fibrosis  # chronic systolic heart failure with reduced EF of 35%   On room air. S3 resolved and crackles improved  - increase furosemide to 40 mg qd, monitor BMP, back off when contraction alkalosis or cr bump  - Daily weights, strict I/O  - wean O2 as tolerated, goal >88%  - DuoNebs qid prn      # Normocytic Anemia  # Anemia of chronic disease   - Insufficient reticulocyte response on 6/10 noted.   - monitor CBC     # Adrenal insufficiency  - S/p stress dose steroid taper, complete 6/18.     # Anisocoria/R afferent pupillary defect  - stable    # Pain Assessment:  Current Pain Score 7/7/2018   Patient currently in pain? yes   Pain score (0-10) -   Pain location -   Pain descriptors -   CPOT pain score -   - Alessandra is experiencing pain due to recent BKA. Pain management was discussed and the plan was created in a collaborative fashion.  Alessandra's response to the current recommendations: engaged  - Please see the plan for pain management as documented above    Diet: regular diet  Fluids: PO  DVT Prophylaxis: Enoxaparin (Lovenox) SQ  Code Status: Full Code    Disposition Plan   Expected discharge: 1-2 days, recommended to transitional care unit once safe disposition plan/ TCU bed available.     Entered: Ethan Rg 07/07/2018, 9:57 PM   Information in the above section will display in the discharge planner report.      The patient's care was discussed with the Attending Physician, Dr. Nugent, Bedside Nurse and Patient.    Ethan Rg MD  [facility]  [place]: 5  Pager: [phone]  Please see sticky note for cross cover information    Interval History   Patient feels well. Able to go outside. Tolerating regular diet. Awaiting insurance auth for  " TCU.    Physical Exam   /81 (BP Location: Left arm)  Pulse 117  Temp 97  F (36.1  C) (Oral)  Resp 16  Ht 1.727 m (5' 8\")  Wt 55.9 kg (123 lb 4.8 oz)  SpO2 95%  BMI 18.75 kg/m2    General Appearance: pleasant, sitting up in wheelchair  Respiratory: bibasilar crackles improved, breathing comfortably on room air  Cardiovascular: rrr, S3 resolved, no murmurs, no rubs, and no gallops   GI:  soft, not tender, not distended, bowel sounds present and normal  Skin: no rashes and no discolorations   Neurologic: Patient is alert and oriented x3, speech fluent, no focal deficits  Psychiatric: normal mood and affect   Extremities: BKA site with stump  in place. Left foot in foam boot.  "

## 2018-07-08 NOTE — PLAN OF CARE
Problem: Patient Care Overview  Goal: Plan of Care/Patient Progress Review  Patient up with sba. Patient able to toe touch on left foot with boot on.  Patient higher blood sugar this afternoon but had some sugar before. Patient awaiting for tcu placement. Eating 75 percent of her meals. P;cont to monitor

## 2018-07-08 NOTE — PROGRESS NOTES
[facility], [place]    Main plan  -no changes    Internal Medicine Progress Note - Marilynn Service    Assessment & Plan    Alessandra Pak is a 52 year old female admitted on 6/6/2018. 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 Hospital course was complicated by respiratory failure due to ARDS requiring intubation (6/10-15), extubated and transferred to medicine for further cares.   She underwent BKA for osteomyelitis on June 28, 2018.    # Pain control   # Chronic pain   # Anxiety  # Sinus tachycardia   Pain well controlled with current regimen  - lidocaine and methol patches to alternate, lidocaine patch at night  - oxycodone ER q12h prn  - hydromorphone 2 mg daily prn  - continue Tylenol 1000 mg tid scheduled   - continue Amitriptyline 50 mg qhs   - continue methadone 70 mg daily   - continue Lyrica 75 mg tid  - good bowel regimen due to narcotics  - will wean as tolerated    # Severe Sepsis, resolved  # R diabetic foot infection, s/p I&D x 3, R BKA 6/28  - ID consulted, recs appreciated   - OR cultures finalized, GPCs unable to be speciated   - continue PO vanc x 6 wks (end 8/9)   - weekly CBC/CMP/CRP, fax results to [facility], attn. Dr. Downs   - d/c cefepime/metronidazole 7/5  - Appreciate ortho input   - NWB RLE, minimal WB L forefoot for transfers   - foam boot for LLE   - wound dressing change qod per nursing   - Dr King f/u 3 wks   - Podiatry f/u 1-2 wks for LLE ulcers  - no further fevers  - patient fit for stump   - follow CRP's intermittently  - incentive spirometer   - fall precautions     # Type 2 Diabetes  # Hx of Chronic pancreatitis s/p whipple's  # Severe malnutrition in the context of acute illness  BG well  controlled off of TFs. Improved appetite and PO intake.  - glargine 9 units with SSI  - d/c TFs, remove NG  - continue ppi PO qd     # Acute hypoxic respiratory failure, resolved  # ARDS 2/2 systemic response from osteo/foot infection  # Hx of chronic restrictive lung disease with emphysema and fibrosis  # chronic systolic heart failure with reduced EF of 35%   On room air. S3 resolved and crackles improved  - increase furosemide to 40 mg qd, monitor BMP, back off when contraction alkalosis or cr bump  - Daily weights, strict I/O  - wean O2 as tolerated, goal >88%  - DuoNebs qid prn      # Normocytic Anemia  # Anemia of chronic disease   - Insufficient reticulocyte response on 6/10 noted.   - monitor CBC     # Adrenal insufficiency  - S/p stress dose steroid taper, complete 6/18.     # Anisocoria/R afferent pupillary defect  - stable    # Pain Assessment:  Current Pain Score 7/8/2018   Patient currently in pain? yes   Pain score (0-10) -   Pain location -   Pain descriptors -   CPOT pain score -   - Alessandra is experiencing pain due to recent BKA. Pain management was discussed and the plan was created in a collaborative fashion.  Alessandra's response to the current recommendations: engaged  - Please see the plan for pain management as documented above    Diet: regular diet  Fluids: PO  DVT Prophylaxis: Enoxaparin (Lovenox) SQ  Code Status: Full Code    Disposition Plan   Expected discharge: 1-2 days, recommended to transitional care unit once safe disposition plan/ TCU bed available.     Entered: Alireza Chaparro 07/08/2018, 11:45 AM   Information in the above section will display in the discharge planner report.      The patient's care was discussed with the Attending Physician, Dr. Nugent, Bedside Nurse and Patient.    Alireza Chaparro MD  [facility]  [place]: 5  Pager: [phone]  Please see sticky note for cross cover information    Interval History   Feels well, notes manageable burning pain in stump.  "Increased appetite. Awaiting insurance authorization for TCU. Pt's daughter visiting. Per RN, patient doing well with transfers.    Physical Exam   /75 (BP Location: Left arm)  Pulse 106  Temp 97.8  F (36.6  C) (Oral)  Resp 16  Ht 1.727 m (5' 8\")  Wt 55.9 kg (123 lb 4.8 oz)  SpO2 95%  BMI 18.75 kg/m2    General Appearance: pleasant, sitting up in bed  Respiratory: bibasilar crackles improved, breathing comfortably on room air  Cardiovascular: rrr, no S3, no murmurs, no rubs, and no gallops   GI:  soft, not tender, not distended, bowel sounds present and normal  Skin: no rashes and no discolorations   Neurologic: Patient is alert and oriented x3, speech fluent, no focal deficits  Psychiatric: normal mood and affect   Extremities: BKA site with stump  in place. Left foot in foam boot.    Internal Medicine Staff Addendum  Date of Service: 7/8/2018  I have seen and examined Ms Pak, reviewed the data and discussed the plan of care with the care team on rounds.  I agree with the above documentation with the additions/changes to the ROS, HPI, Exam or data (including my edits in italics):    I discussed pt's care with bedside RN, case management/social work today.  I personally reviewed, labs, medications and past 24 hr notes.  Assessment/Plan/Diagnoses: plan/dx as above, which contains my edits and reflects our joint medical decision-making.     Raman Gomez MD PhD  Internal Medicine Hospitalist & Staff Physician   of Internal Medicine   [facility]  Pager: [phone]  "

## 2018-07-08 NOTE — PLAN OF CARE
Problem: Patient Care Overview  Goal: Plan of Care/Patient Progress Review  [id number].  AOx4.  Pt able to self transfer to [facility] and , calls appropriately for SBA.  Pain well managed, no PRN analgesics administered.  , treated per protocol.  No significant events this shift.  Pt is able to make needs known.  Awaiting TCU placement.  Cont' per POC.

## 2018-07-09 ENCOUNTER — MEDICAL CORRESPONDENCE (OUTPATIENT)
Dept: HEALTH INFORMATION MANAGEMENT | Facility: CLINIC | Age: 53
End: 2018-07-09

## 2018-07-09 ENCOUNTER — APPOINTMENT (OUTPATIENT)
Dept: PHYSICAL THERAPY | Facility: CLINIC | Age: 53
DRG: 853 | End: 2018-07-09
Payer: COMMERCIAL

## 2018-07-09 LAB
ANION GAP SERPL CALCULATED.3IONS-SCNC: 8 MMOL/L (ref 3–14)
BUN SERPL-MCNC: 25 MG/DL (ref 7–30)
CALCIUM SERPL-MCNC: 8.8 MG/DL (ref 8.5–10.1)
CHLORIDE SERPL-SCNC: 106 MMOL/L (ref 94–109)
CO2 SERPL-SCNC: 24 MMOL/L (ref 20–32)
CREAT SERPL-MCNC: 1 MG/DL (ref 0.52–1.04)
GFR SERPL CREATININE-BSD FRML MDRD: 59 ML/MIN/1.7M2
GLUCOSE BLDC GLUCOMTR-MCNC: 159 MG/DL (ref 70–99)
GLUCOSE BLDC GLUCOMTR-MCNC: 224 MG/DL (ref 70–99)
GLUCOSE BLDC GLUCOMTR-MCNC: 230 MG/DL (ref 70–99)
GLUCOSE SERPL-MCNC: 141 MG/DL (ref 70–99)
POTASSIUM SERPL-SCNC: 4.2 MMOL/L (ref 3.4–5.3)
SODIUM SERPL-SCNC: 138 MMOL/L (ref 133–144)

## 2018-07-09 PROCEDURE — 36592 COLLECT BLOOD FROM PICC: CPT | Performed by: STUDENT IN AN ORGANIZED HEALTH CARE EDUCATION/TRAINING PROGRAM

## 2018-07-09 PROCEDURE — 99232 SBSQ HOSP IP/OBS MODERATE 35: CPT | Mod: GC | Performed by: INTERNAL MEDICINE

## 2018-07-09 PROCEDURE — 00000146 ZZHCL STATISTIC GLUCOSE BY METER IP

## 2018-07-09 PROCEDURE — 25000132 ZZH RX MED GY IP 250 OP 250 PS 637: Performed by: STUDENT IN AN ORGANIZED HEALTH CARE EDUCATION/TRAINING PROGRAM

## 2018-07-09 PROCEDURE — 12000001 ZZH R&B MED SURG/OB UMMC

## 2018-07-09 PROCEDURE — 25000132 ZZH RX MED GY IP 250 OP 250 PS 637: Performed by: SURGERY

## 2018-07-09 PROCEDURE — 97530 THERAPEUTIC ACTIVITIES: CPT | Mod: GP

## 2018-07-09 PROCEDURE — 40000193 ZZH STATISTIC PT WARD VISIT

## 2018-07-09 PROCEDURE — 25000132 ZZH RX MED GY IP 250 OP 250 PS 637: Performed by: PATHOLOGY

## 2018-07-09 PROCEDURE — 25000132 ZZH RX MED GY IP 250 OP 250 PS 637: Performed by: HOSPITALIST

## 2018-07-09 PROCEDURE — 25000128 H RX IP 250 OP 636: Performed by: STUDENT IN AN ORGANIZED HEALTH CARE EDUCATION/TRAINING PROGRAM

## 2018-07-09 PROCEDURE — 97542 WHEELCHAIR MNGMENT TRAINING: CPT | Mod: GP

## 2018-07-09 PROCEDURE — 25000128 H RX IP 250 OP 636: Performed by: ORTHOPAEDIC SURGERY

## 2018-07-09 PROCEDURE — 25000132 ZZH RX MED GY IP 250 OP 250 PS 637: Performed by: INTERNAL MEDICINE

## 2018-07-09 PROCEDURE — 80048 BASIC METABOLIC PNL TOTAL CA: CPT | Performed by: STUDENT IN AN ORGANIZED HEALTH CARE EDUCATION/TRAINING PROGRAM

## 2018-07-09 PROCEDURE — 40000802 ZZH SITE CHECK

## 2018-07-09 RX ADMIN — PREGABALIN 75 MG: 75 CAPSULE ORAL at 07:34

## 2018-07-09 RX ADMIN — Medication 70 MG: at 07:35

## 2018-07-09 RX ADMIN — LIDOCAINE 1 PATCH: 560 PATCH PERCUTANEOUS; TOPICAL; TRANSDERMAL at 20:58

## 2018-07-09 RX ADMIN — Medication 2 MG: at 03:06

## 2018-07-09 RX ADMIN — PREGABALIN 75 MG: 75 CAPSULE ORAL at 14:06

## 2018-07-09 RX ADMIN — OXYCODONE HYDROCHLORIDE 10 MG: 10 TABLET, FILM COATED, EXTENDED RELEASE ORAL at 07:35

## 2018-07-09 RX ADMIN — Medication 2 MG: at 17:50

## 2018-07-09 RX ADMIN — MULTIPLE VITAMINS W/ MINERALS TAB 1 TABLET: TAB at 07:35

## 2018-07-09 RX ADMIN — ACETAMINOPHEN 1000 MG: 500 TABLET, FILM COATED ORAL at 07:34

## 2018-07-09 RX ADMIN — LISINOPRIL 10 MG: 10 TABLET ORAL at 07:36

## 2018-07-09 RX ADMIN — OXYCODONE HYDROCHLORIDE 10 MG: 10 TABLET, FILM COATED, EXTENDED RELEASE ORAL at 20:57

## 2018-07-09 RX ADMIN — SODIUM CHLORIDE, PRESERVATIVE FREE 5 ML: 5 INJECTION INTRAVENOUS at 06:20

## 2018-07-09 RX ADMIN — PREGABALIN 75 MG: 75 CAPSULE ORAL at 20:57

## 2018-07-09 RX ADMIN — Medication 6 MG: at 22:16

## 2018-07-09 RX ADMIN — ENOXAPARIN SODIUM 40 MG: 40 INJECTION SUBCUTANEOUS at 14:06

## 2018-07-09 RX ADMIN — FUROSEMIDE 40 MG: 40 TABLET ORAL at 07:35

## 2018-07-09 RX ADMIN — OMEPRAZOLE 20 MG: 20 CAPSULE, DELAYED RELEASE ORAL at 20:57

## 2018-07-09 RX ADMIN — ACETAMINOPHEN 1000 MG: 500 TABLET, FILM COATED ORAL at 17:54

## 2018-07-09 RX ADMIN — FLUTICASONE PROPIONATE 2 SPRAY: 50 SPRAY, METERED NASAL at 07:35

## 2018-07-09 RX ADMIN — AMITRIPTYLINE HYDROCHLORIDE 50 MG: 50 TABLET, FILM COATED ORAL at 22:16

## 2018-07-09 RX ADMIN — VANCOMYCIN HYDROCHLORIDE 1250 MG: 10 INJECTION, POWDER, LYOPHILIZED, FOR SOLUTION INTRAVENOUS at 05:47

## 2018-07-09 RX ADMIN — ACETAMINOPHEN 1000 MG: 500 TABLET, FILM COATED ORAL at 22:16

## 2018-07-09 RX ADMIN — SODIUM CHLORIDE, PRESERVATIVE FREE 5 ML: 5 INJECTION INTRAVENOUS at 07:35

## 2018-07-09 RX ADMIN — DOCUSATE SODIUM 100 MG: 100 CAPSULE, LIQUID FILLED ORAL at 07:35

## 2018-07-09 NOTE — PROGRESS NOTES
[facility], [place]    Main plan  -increase glargine to 10u [facility]    Internal Medicine Progress Note - Marilynn Service    Assessment & Plan    Alessandra Pak is a 52 year old female admitted on 6/6/2018. 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 Hospital course was complicated by respiratory failure due to ARDS requiring intubation (6/10-15), extubated and transferred to medicine for further cares.   She underwent BKA for osteomyelitis on June 28, 2018.    # Pain control   # Chronic pain   # Anxiety  # Sinus tachycardia   Pain well controlled with current regimen  - lidocaine and methol patches to alternate, lidocaine patch at night  - oxycodone ER q12h prn  - hydromorphone 2 mg daily prn  - continue Tylenol 1000 mg tid scheduled   - continue Amitriptyline 50 mg qhs   - continue methadone 70 mg daily   - continue Lyrica 75 mg tid  - good bowel regimen due to narcotics  - will wean as tolerated    # Severe Sepsis, resolved  # R diabetic foot infection, s/p I&D x 3, R BKA 6/28  - ID consulted, recs appreciated   - OR cultures finalized, GPCs unable to be speciated   - continue PO vanc x 6 wks (end 8/9)   - weekly CBC/CMP/CRP, fax results to [facility], attn. Dr. Downs   - d/c cefepime/metronidazole 7/5  - Appreciate ortho input   - NWB RLE, minimal WB L forefoot for transfers   - foam boot for LLE   - wound dressing change qod per nursing   - Dr King f/u 3 wks   - Podiatry f/u 1-2 wks for LLE ulcers  - no further fevers  - patient fit for stump , to wear at all times  - follow CRP's intermittently  - incentive spirometer   - fall precautions    # Type 2 Diabetes  # Hx of Chronic pancreatitis s/p whipple's  # Severe malnutrition in the  context of acute illness  Having spikes in BG to 300s which respond to SSI. Patient's appetite has improved from prior.  - glargine 10 units with SSI  - d/c TFs, remove NG  - continue ppi PO qd     # Acute hypoxic respiratory failure, resolved  # ARDS 2/2 systemic response from osteo/foot infection  # Hx of chronic restrictive lung disease with emphysema and fibrosis  # chronic systolic heart failure with reduced EF of 35%   On room air. S3 resolved and crackles improved  - increase furosemide to 40 mg qd, monitor BMP, back off when contraction alkalosis or cr bump  - Daily weights, strict I/O  - wean O2 as tolerated, goal >88%  - DuoNebs qid prn      # Normocytic Anemia  # Anemia of chronic disease   - Insufficient reticulocyte response on 6/10 noted.   - monitor CBC     # Adrenal insufficiency  - S/p stress dose steroid taper, complete 6/18.     # Anisocoria/R afferent pupillary defect  - stable    # Pain Assessment:  Current Pain Score 7/9/2018   Patient currently in pain? yes   Pain score (0-10) -   Pain location Leg   Pain descriptors Throbbing   CPOT pain score -   - Alessandra is experiencing pain due to recent BKA. Pain management was discussed and the plan was created in a collaborative fashion.  Alessandra's response to the current recommendations: engaged  - Please see the plan for pain management as documented above    Diet: regular diet  Fluids: PO  DVT Prophylaxis: Enoxaparin (Lovenox) SQ  Code Status: Full Code    Disposition Plan   Expected discharge: 1-2 days, recommended to transitional care unit once safe disposition plan/ TCU bed available.     Entered: Alireza Chaparro 07/09/2018, 12:06 PM   Information in the above section will display in the discharge planner report.      The patient's care was discussed with the Attending Physician, Dr. Gomez, Bedside Nurse and Patient.    Alireza Chaparro MD  [facility]  [facility]  Pager: [phone]  Please see sticky note for cross cover  "information    Interval History   Feels well. Pain well controlled. Patient going out of room with family frequently in wheelchair. Patient admits feeling slightly bored. Notes good appetite.    Physical Exam   /70 (BP Location: Left arm)  Pulse 95  Temp 97.6  F (36.4  C) (Oral)  Resp 16  Ht 1.727 m (5' 8\")  Wt 56.2 kg (123 lb 12.8 oz)  SpO2 93%  BMI 18.82 kg/m2    General Appearance: pleasant, sitting up in bed  Respiratory: bibasilar crackles improved, breathing comfortably on room air  Cardiovascular: rrr, no S3, no murmurs, no rubs, and no gallops   GI:  soft, not tender, not distended, bowel sounds present and normal  Skin: no rashes and no discolorations   Neurologic: Patient is alert and oriented x3, speech fluent, no focal deficits  Psychiatric: normal mood and affect   Extremities: BKA site with stump  in place. Left foot in foam boot.    Internal Medicine Staff Addendum  Date of Service: 7/9/2018  I have seen and examined Ms Pak, reviewed the data and discussed the plan of care with the care team on rounds.  I agree with the above documentation with the additions/changes to the ROS, HPI, Exam or data (including my edits in italics):    I discussed pt's care with bedside RN, case management/social work today.  I personally reviewed, labs, medications and past 24 hr notes.  Assessment/Plan/Diagnoses: plan/dx as above, which contains my edits and reflects our joint medical decision-making.     Raman Gomez MD PhD  Internal Medicine Hospitalist & Staff Physician   of Internal Medicine   [facility]  Pager: [phone]  "

## 2018-07-09 NOTE — PLAN OF CARE
Problem: Patient Care Overview  Goal: Plan of Care/Patient Progress Review  PT: Pt IND with w/c<>bed transfers as well as w.c mobility. Pt has met all IP PT goals and will cont to progress with PT at TCU for higher level mobility to ensure safety at home.        Physical Therapy Discharge Summary    Reason for therapy discharge:    All goals and outcomes met, no further needs identified.    Progress towards therapy goal(s). See goals on Care Plan in [facility] electronic health record for goal details.  Goals met    Therapy recommendation(s):    Continued therapy is recommended.  Rationale/Recommendations:  To improve IND and safety with all mobility.

## 2018-07-09 NOTE — PLAN OF CARE
Problem: Patient Care Overview  Goal: Plan of Care/Patient Progress Review  [id number].  AOx4.  Tachycardic, all other VSS on RA.  Pt able to self transfer to commode and , calls appropriately for SBA.  Reported increased pain in RLE- well managed w/ scheduled Oxy.  , treated per protocol.  No significant events this shift.  Pt is able to make needs known.  Awaiting TCU placement.  Cont' per POC.

## 2018-07-09 NOTE — PROGRESS NOTES
"CLINICAL NUTRITION SERVICES - REASSESSMENT NOTE     Nutrition Prescription    RECOMMENDATIONS FOR MDs/PROVIDERS TO ORDER:  None at this time     Malnutrition Status:    Non-severe malnutrition in the context of acute illness     Recommendations already ordered by Registered Dietitian (RD):  1. Ordered calorie counts to ensure pt is eating enough kcals and protein to promote healing and weight stability.   2. Modified pt's snacks and supplements she would no longer like the hard boiled eggs for HS snack and would rather have Boost.     Future/Additional Recommendations:  - Pending Po intake, ongoing calorie count        EVALUATION OF THE PROGRESS TOWARD GOALS   Diet: Regular + magic cup between meals     Nutrition Support:   Nutrition support d/c'd on 7/5    Intake:   PO: Per Flowsheet documentation pt has been consuming 75% of her meals.     Calorie count:  7/3: 922 kcals and 41 g PRO  7/5: 0 kcals and 0 g PRO - 2 meal ordered and no intake recorded.   -----------------------------------------------------------------------------------  - Insufficient calorie counts documentation since last follow up note. Unable to clearly evaluate. Of note calorie counts on 7/3 are meeting 64% of the pt's minimal estimated energy needs and 47% of the pt's minimal estimated protein needs.     On 7/5 when the TF was d/c'd RD ordered scheduled snacks.     Spoke with pt today and she reports that she has a good appetite and states that she understands what she needs to do with her diabetes. Today pt was eating breakfast during the assessment she was having a hard boiled eggs, whole wheat toast, fruit loops with 1 % milk, peaches and coffee.     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    - Admitted for severe sepsis due to Right foot " "abscess and osteomyelitis. Hospital course was complicated by respiratory failure due to ARDS requiring intubation (6/10-15), extubated and transferred to medicine for further cares\".    -Patient underwent R. BKA for osteomyelitis on June 28, 2018. R diabetic foot infection, s/p I&D x 3, R BKA 6/28    Meds:   Thera-Vit-M, Lasix, Insulin   Wound protocol of vit C 500 mg and zinc sulfate 220 mg was completed on 6/21.       Wt:  Current wt: 56.2 kg on 7/2 --> admit wt 61.3 kg on 6/6. Wt loss likely related to R. BKA and lasix, However, new lowest weight on 7/6: 53.7 kg.     Updated 7/2: ASSESSED NUTRITION NEEDS per 54 kg per lowest wt on 7/6 :   Estimated Energy Needs: 1350 - 1620 kcals/day (25-30 ++ kcals/kg )  Justification: Maintenance, ++ for Wound healing post op status, Underweight   Estimated Protein Needs: 81 - 108  gm/day (1.5-2 gm/kg)  Justification:  wound healing and post op BKA pending CKD 3      MALNUTRITION  % Intake: No decreased intake noted  % Weight Loss: Does not meet criteria: weight loss is likely related to R BKA   Subcutaneous Fat Loss: Previously: Facial region: Moderate, Upper arm: Mild  Muscle Loss: Temporal: Moderate Scapular bone: moderate, Upper arm (bicep, tricep): moderate, Lower arm (forearm): moderate, Dorsal hand: Moderate, Posterior calf: severe  Fluid Accumulation/Edema: None noted  Malnutrition Diagnosis: Non-severe malnutrition in the context of acute illness     Previous Goals   Total avg nutritional intake to meet a minimum of 25 kcal/kg and 1.5 gm PRO/kg daily (per dosing wt 58 kg).     Evaluation: Unable to evaluate with incomplete calorie counts     Previous Nutrition Diagnosis  Inadequate oral intake related to gradual increase in appetite and PO as evidenced by continues to rely on TF support to provide for maintenance needs.    Evaluation: Improving      CURRENT NUTRITION DIAGNOSIS  Inadequate oral intake related to increased needs with recent BKA, gradual increase in " appetite and PO intake as evidenced by pt consuming 75% of meals but calorie counts show pt is meeting less than 75% of her estimated needs.      INTERVENTIONS  Implementation  1. Enteral Nutrition -D/C'd on 7/5  2. Modified scheduled snacks   3. Ordered Calorie counts     Goals  Total avg nutritional intake to meet a minimum of 25 kcal/kg and 1.5 gm PRO/kg daily (per dosing wt 58 kg).     Monitoring/Evaluation  Progress toward goals will be monitored and evaluated per protocol.    Diana Garcia, MS, RD, LD  Pgr: [phone]

## 2018-07-09 NOTE — PLAN OF CARE
Problem: Patient Care Overview  Goal: Plan of Care/Patient Progress Review  Outcome: No Change  Pt alert and oriented. Assist one of with cares. Up to bedside commode. C/o of right LE pain; limb elevated and prn dilaudid administered with relief per pt. Dressings to bilateral LEs intact. Bg check at 159. Awaiting TCU placement. Continue to monitor and implement poc.

## 2018-07-09 NOTE — PLAN OF CARE
Problem: Patient Care Overview  Goal: Plan of Care/Patient Progress Review  Patient sba with transfers into chair or unto the commode. Dressing done by patient while nurse watched, patient iv abx once a day. picc line flushed. eatign 75 percent of her meals. Patient has off loading boot on her left foot for transfers. Patient anxious to get home or to tcu awaiting for insurance to ok. P;cont to monitor

## 2018-07-10 ENCOUNTER — HOSPITAL ENCOUNTER (INPATIENT)
Facility: SKILLED NURSING FACILITY | Age: 53
LOS: 11 days | Discharge: HOME | DRG: 559 | End: 2018-07-21
Attending: INTERNAL MEDICINE | Admitting: INTERNAL MEDICINE
Payer: COMMERCIAL

## 2018-07-10 VITALS
HEART RATE: 118 BPM | DIASTOLIC BLOOD PRESSURE: 89 MMHG | TEMPERATURE: 98.4 F | RESPIRATION RATE: 16 BRPM | BODY MASS INDEX: 18.63 KG/M2 | OXYGEN SATURATION: 97 % | SYSTOLIC BLOOD PRESSURE: 156 MMHG | WEIGHT: 122.9 LBS | HEIGHT: 68 IN

## 2018-07-10 DIAGNOSIS — R06.02 SOB (SHORTNESS OF BREATH): ICD-10-CM

## 2018-07-10 DIAGNOSIS — J42 CHRONIC BRONCHITIS, UNSPECIFIED CHRONIC BRONCHITIS TYPE (H): Chronic | ICD-10-CM

## 2018-07-10 DIAGNOSIS — E11.22 TYPE 2 DIABETES MELLITUS WITH STAGE 3 CHRONIC KIDNEY DISEASE, WITH LONG-TERM CURRENT USE OF INSULIN (H): Chronic | ICD-10-CM

## 2018-07-10 DIAGNOSIS — Z79.4 TYPE 2 DIABETES MELLITUS WITH STAGE 3 CHRONIC KIDNEY DISEASE, WITH LONG-TERM CURRENT USE OF INSULIN (H): Chronic | ICD-10-CM

## 2018-07-10 DIAGNOSIS — J30.1 ACUTE NONSEASONAL ALLERGIC RHINITIS DUE TO POLLEN: ICD-10-CM

## 2018-07-10 DIAGNOSIS — N18.30 TYPE 2 DIABETES MELLITUS WITH STAGE 3 CHRONIC KIDNEY DISEASE, WITH LONG-TERM CURRENT USE OF INSULIN (H): Chronic | ICD-10-CM

## 2018-07-10 DIAGNOSIS — Z89.511 STATUS POST BELOW KNEE AMPUTATION OF RIGHT LOWER EXTREMITY (H): Primary | ICD-10-CM

## 2018-07-10 PROBLEM — M86.9 OSTEOMYELITIS (H): Status: ACTIVE | Noted: 2018-07-10

## 2018-07-10 LAB
CREAT SERPL-MCNC: 1.05 MG/DL (ref 0.52–1.04)
GFR SERPL CREATININE-BSD FRML MDRD: 55 ML/MIN/1.7M2
GLUCOSE BLDC GLUCOMTR-MCNC: 106 MG/DL (ref 70–99)
GLUCOSE BLDC GLUCOMTR-MCNC: 123 MG/DL (ref 70–99)
GLUCOSE BLDC GLUCOMTR-MCNC: 168 MG/DL (ref 70–99)
GLUCOSE BLDC GLUCOMTR-MCNC: 173 MG/DL (ref 70–99)
GLUCOSE BLDC GLUCOMTR-MCNC: 215 MG/DL (ref 70–99)
GLUCOSE BLDC GLUCOMTR-MCNC: 236 MG/DL (ref 70–99)
PLATELET # BLD AUTO: 418 10E9/L (ref 150–450)

## 2018-07-10 PROCEDURE — 25000132 ZZH RX MED GY IP 250 OP 250 PS 637: Performed by: SURGERY

## 2018-07-10 PROCEDURE — 25000128 H RX IP 250 OP 636: Performed by: ORTHOPAEDIC SURGERY

## 2018-07-10 PROCEDURE — 99239 HOSP IP/OBS DSCHRG MGMT >30: CPT | Mod: GC | Performed by: INTERNAL MEDICINE

## 2018-07-10 PROCEDURE — 85049 AUTOMATED PLATELET COUNT: CPT | Performed by: INTERNAL MEDICINE

## 2018-07-10 PROCEDURE — 25000128 H RX IP 250 OP 636: Performed by: INTERNAL MEDICINE

## 2018-07-10 PROCEDURE — 36415 COLL VENOUS BLD VENIPUNCTURE: CPT | Performed by: INTERNAL MEDICINE

## 2018-07-10 PROCEDURE — 25000128 H RX IP 250 OP 636: Performed by: STUDENT IN AN ORGANIZED HEALTH CARE EDUCATION/TRAINING PROGRAM

## 2018-07-10 PROCEDURE — 25000132 ZZH RX MED GY IP 250 OP 250 PS 637: Performed by: INTERNAL MEDICINE

## 2018-07-10 PROCEDURE — 00000146 ZZHCL STATISTIC GLUCOSE BY METER IP

## 2018-07-10 PROCEDURE — 12000022 ZZH R&B SNF

## 2018-07-10 PROCEDURE — 25000131 ZZH RX MED GY IP 250 OP 636 PS 637: Performed by: INTERNAL MEDICINE

## 2018-07-10 PROCEDURE — 25000132 ZZH RX MED GY IP 250 OP 250 PS 637: Performed by: HOSPITALIST

## 2018-07-10 PROCEDURE — 25000132 ZZH RX MED GY IP 250 OP 250 PS 637: Performed by: STUDENT IN AN ORGANIZED HEALTH CARE EDUCATION/TRAINING PROGRAM

## 2018-07-10 PROCEDURE — 82565 ASSAY OF CREATININE: CPT | Performed by: INTERNAL MEDICINE

## 2018-07-10 PROCEDURE — 25000132 ZZH RX MED GY IP 250 OP 250 PS 637: Performed by: PATHOLOGY

## 2018-07-10 RX ORDER — HYDROMORPHONE HYDROCHLORIDE 1 MG/ML
2 SOLUTION ORAL DAILY PRN
Status: DISCONTINUED | OUTPATIENT
Start: 2018-07-10 | End: 2018-07-21 | Stop reason: HOSPADM

## 2018-07-10 RX ORDER — HEPARIN SODIUM,PORCINE 10 UNIT/ML
5-10 VIAL (ML) INTRAVENOUS
Status: DISCONTINUED | OUTPATIENT
Start: 2018-07-10 | End: 2018-07-21 | Stop reason: HOSPADM

## 2018-07-10 RX ORDER — BISACODYL 10 MG
10 SUPPOSITORY, RECTAL RECTAL DAILY PRN
Status: DISCONTINUED | OUTPATIENT
Start: 2018-07-10 | End: 2018-07-21 | Stop reason: HOSPADM

## 2018-07-10 RX ORDER — OXYCODONE HCL 10 MG/1
10 TABLET, FILM COATED, EXTENDED RELEASE ORAL EVERY 12 HOURS
Status: DISCONTINUED | OUTPATIENT
Start: 2018-07-10 | End: 2018-07-12

## 2018-07-10 RX ORDER — LISINOPRIL 10 MG/1
10 TABLET ORAL DAILY
Status: DISCONTINUED | OUTPATIENT
Start: 2018-07-11 | End: 2018-07-21 | Stop reason: HOSPADM

## 2018-07-10 RX ORDER — FUROSEMIDE 20 MG
40 TABLET ORAL DAILY
Status: DISCONTINUED | OUTPATIENT
Start: 2018-07-11 | End: 2018-07-21 | Stop reason: HOSPADM

## 2018-07-10 RX ORDER — ACETAMINOPHEN 650 MG/1
650 SUPPOSITORY RECTAL EVERY 4 HOURS PRN
Status: DISCONTINUED | OUTPATIENT
Start: 2018-07-10 | End: 2018-07-21 | Stop reason: HOSPADM

## 2018-07-10 RX ORDER — LIDOCAINE 40 MG/G
CREAM TOPICAL
Status: DISCONTINUED | OUTPATIENT
Start: 2018-07-10 | End: 2018-07-21 | Stop reason: HOSPADM

## 2018-07-10 RX ORDER — MULTIPLE VITAMINS W/ MINERALS TAB 9MG-400MCG
1 TAB ORAL DAILY
Status: DISCONTINUED | OUTPATIENT
Start: 2018-07-10 | End: 2018-07-21 | Stop reason: HOSPADM

## 2018-07-10 RX ORDER — ASPIRIN 81 MG/1
81 TABLET, CHEWABLE ORAL DAILY
Status: DISCONTINUED | OUTPATIENT
Start: 2018-07-10 | End: 2018-07-21 | Stop reason: HOSPADM

## 2018-07-10 RX ORDER — HEPARIN SODIUM,PORCINE 10 UNIT/ML
5-10 VIAL (ML) INTRAVENOUS EVERY 24 HOURS
Status: DISCONTINUED | OUTPATIENT
Start: 2018-07-10 | End: 2018-07-21 | Stop reason: HOSPADM

## 2018-07-10 RX ORDER — CEFAZOLIN SODIUM 1 G/50ML
1250 SOLUTION INTRAVENOUS EVERY 24 HOURS
Status: ON HOLD | DISCHARGE
Start: 2018-07-10 | End: 2018-07-20

## 2018-07-10 RX ORDER — NICOTINE POLACRILEX 4 MG
15-30 LOZENGE BUCCAL
Status: DISCONTINUED | OUTPATIENT
Start: 2018-07-10 | End: 2018-07-21 | Stop reason: HOSPADM

## 2018-07-10 RX ORDER — AMITRIPTYLINE HYDROCHLORIDE 50 MG/1
50 TABLET ORAL AT BEDTIME
Status: DISCONTINUED | OUTPATIENT
Start: 2018-07-10 | End: 2018-07-21 | Stop reason: HOSPADM

## 2018-07-10 RX ORDER — DEXTROSE MONOHYDRATE 25 G/50ML
25-50 INJECTION, SOLUTION INTRAVENOUS
Status: DISCONTINUED | OUTPATIENT
Start: 2018-07-10 | End: 2018-07-21 | Stop reason: HOSPADM

## 2018-07-10 RX ORDER — SENNOSIDES 8.6 MG
2 TABLET ORAL 2 TIMES DAILY
Status: DISCONTINUED | OUTPATIENT
Start: 2018-07-10 | End: 2018-07-21 | Stop reason: HOSPADM

## 2018-07-10 RX ORDER — LIDOCAINE 4 G/G
2 PATCH TOPICAL EVERY 24 HOURS
Status: DISCONTINUED | OUTPATIENT
Start: 2018-07-10 | End: 2018-07-21 | Stop reason: HOSPADM

## 2018-07-10 RX ORDER — ALBUTEROL SULFATE 90 UG/1
2 AEROSOL, METERED RESPIRATORY (INHALATION) EVERY 4 HOURS PRN
Status: DISCONTINUED | OUTPATIENT
Start: 2018-07-10 | End: 2018-07-21 | Stop reason: HOSPADM

## 2018-07-10 RX ORDER — NALOXONE HYDROCHLORIDE 0.4 MG/ML
.1-.4 INJECTION, SOLUTION INTRAMUSCULAR; INTRAVENOUS; SUBCUTANEOUS
Status: DISCONTINUED | OUTPATIENT
Start: 2018-07-10 | End: 2018-07-21 | Stop reason: HOSPADM

## 2018-07-10 RX ORDER — FLUTICASONE PROPIONATE 50 MCG
2 SPRAY, SUSPENSION (ML) NASAL DAILY
Status: DISCONTINUED | OUTPATIENT
Start: 2018-07-11 | End: 2018-07-21 | Stop reason: HOSPADM

## 2018-07-10 RX ORDER — ACETAMINOPHEN 500 MG
1000 TABLET ORAL EVERY 8 HOURS PRN
Status: DISCONTINUED | OUTPATIENT
Start: 2018-07-10 | End: 2018-07-11

## 2018-07-10 RX ORDER — METOPROLOL TARTRATE 25 MG/1
12.5 TABLET, FILM COATED ORAL 2 TIMES DAILY
Status: ON HOLD | DISCHARGE
Start: 2018-07-10 | End: 2018-07-20

## 2018-07-10 RX ORDER — CEFAZOLIN SODIUM 1 G/50ML
1250 SOLUTION INTRAVENOUS EVERY 24 HOURS
Status: DISCONTINUED | OUTPATIENT
Start: 2018-07-11 | End: 2018-07-11

## 2018-07-10 RX ORDER — POLYETHYLENE GLYCOL 3350 17 G/17G
17 POWDER, FOR SOLUTION ORAL DAILY
Status: DISCONTINUED | OUTPATIENT
Start: 2018-07-10 | End: 2018-07-21 | Stop reason: HOSPADM

## 2018-07-10 RX ORDER — HYDROMORPHONE HYDROCHLORIDE 1 MG/ML
2 SOLUTION ORAL DAILY PRN
Qty: 0.4 ML | Refills: 0 | Status: ON HOLD | OUTPATIENT
Start: 2018-07-10 | End: 2018-07-20

## 2018-07-10 RX ORDER — HYDROMORPHONE HYDROCHLORIDE 1 MG/ML
2 SOLUTION ORAL DAILY PRN
Status: DISCONTINUED | OUTPATIENT
Start: 2018-07-10 | End: 2018-07-10

## 2018-07-10 RX ORDER — LANOLIN ALCOHOL/MO/W.PET/CERES
6 CREAM (GRAM) TOPICAL AT BEDTIME
Status: DISCONTINUED | OUTPATIENT
Start: 2018-07-10 | End: 2018-07-21 | Stop reason: HOSPADM

## 2018-07-10 RX ORDER — ACETAMINOPHEN 325 MG/1
650 TABLET ORAL EVERY 4 HOURS PRN
Status: DISCONTINUED | OUTPATIENT
Start: 2018-07-10 | End: 2018-07-11

## 2018-07-10 RX ORDER — PREGABALIN 75 MG/1
75 CAPSULE ORAL 3 TIMES DAILY
Status: DISCONTINUED | OUTPATIENT
Start: 2018-07-10 | End: 2018-07-21 | Stop reason: HOSPADM

## 2018-07-10 RX ORDER — OXYCODONE HCL 10 MG/1
10 TABLET, FILM COATED, EXTENDED RELEASE ORAL EVERY 12 HOURS
Qty: 4 TABLET | Refills: 0 | Status: ON HOLD | OUTPATIENT
Start: 2018-07-10 | End: 2018-07-20

## 2018-07-10 RX ADMIN — UMECLIDINIUM BROMIDE AND VILANTEROL TRIFENATATE 1 PUFF: 62.5; 25 POWDER RESPIRATORY (INHALATION) at 20:40

## 2018-07-10 RX ADMIN — MULTIPLE VITAMINS W/ MINERALS TAB 1 TABLET: TAB at 17:51

## 2018-07-10 RX ADMIN — ACETAMINOPHEN 1000 MG: 500 TABLET, FILM COATED ORAL at 09:10

## 2018-07-10 RX ADMIN — Medication 6 MG: at 20:36

## 2018-07-10 RX ADMIN — ASPIRIN 81 MG CHEWABLE TABLET 81 MG: 81 TABLET CHEWABLE at 17:51

## 2018-07-10 RX ADMIN — AMITRIPTYLINE HYDROCHLORIDE 50 MG: 50 TABLET, FILM COATED ORAL at 20:36

## 2018-07-10 RX ADMIN — SENNOSIDES 2 TABLET: 8.6 TABLET, FILM COATED ORAL at 20:36

## 2018-07-10 RX ADMIN — Medication 2 MG: at 04:26

## 2018-07-10 RX ADMIN — MENTHOL 1 PATCH: 205.5 PATCH TOPICAL at 09:09

## 2018-07-10 RX ADMIN — PREGABALIN 75 MG: 75 CAPSULE ORAL at 14:55

## 2018-07-10 RX ADMIN — SODIUM CHLORIDE, PRESERVATIVE FREE 5 ML: 5 INJECTION INTRAVENOUS at 09:08

## 2018-07-10 RX ADMIN — PREGABALIN 75 MG: 75 CAPSULE ORAL at 17:51

## 2018-07-10 RX ADMIN — FLUTICASONE PROPIONATE 2 SPRAY: 50 SPRAY, METERED NASAL at 09:10

## 2018-07-10 RX ADMIN — LISINOPRIL 10 MG: 10 TABLET ORAL at 09:10

## 2018-07-10 RX ADMIN — Medication 12.5 MG: at 20:36

## 2018-07-10 RX ADMIN — POLYETHYLENE GLYCOL 3350 17 G: 17 POWDER, FOR SOLUTION ORAL at 17:56

## 2018-07-10 RX ADMIN — OXYCODONE HYDROCHLORIDE 10 MG: 10 TABLET, FILM COATED, EXTENDED RELEASE ORAL at 17:52

## 2018-07-10 RX ADMIN — OXYCODONE HYDROCHLORIDE 10 MG: 10 TABLET, FILM COATED, EXTENDED RELEASE ORAL at 09:08

## 2018-07-10 RX ADMIN — FUROSEMIDE 40 MG: 40 TABLET ORAL at 09:10

## 2018-07-10 RX ADMIN — Medication 70 MG: at 09:08

## 2018-07-10 RX ADMIN — ENOXAPARIN SODIUM 40 MG: 40 INJECTION SUBCUTANEOUS at 14:55

## 2018-07-10 RX ADMIN — PREGABALIN 75 MG: 75 CAPSULE ORAL at 20:36

## 2018-07-10 RX ADMIN — SODIUM CHLORIDE, PRESERVATIVE FREE 5 ML: 5 INJECTION INTRAVENOUS at 21:38

## 2018-07-10 RX ADMIN — DOCUSATE SODIUM 100 MG: 100 CAPSULE, LIQUID FILLED ORAL at 09:09

## 2018-07-10 RX ADMIN — VANCOMYCIN HYDROCHLORIDE 1250 MG: 10 INJECTION, POWDER, LYOPHILIZED, FOR SOLUTION INTRAVENOUS at 06:43

## 2018-07-10 RX ADMIN — ACETAMINOPHEN 1000 MG: 500 TABLET ORAL at 20:36

## 2018-07-10 RX ADMIN — RANITIDINE 300 MG: 150 TABLET, FILM COATED ORAL at 17:52

## 2018-07-10 RX ADMIN — INSULIN GLARGINE 10 UNITS: 100 INJECTION, SOLUTION SUBCUTANEOUS at 21:40

## 2018-07-10 RX ADMIN — PREGABALIN 75 MG: 75 CAPSULE ORAL at 09:08

## 2018-07-10 RX ADMIN — MULTIPLE VITAMINS W/ MINERALS TAB 1 TABLET: TAB at 09:09

## 2018-07-10 NOTE — PLAN OF CARE
Problem: Patient Care Overview  Goal: Plan of Care/Patient Progress Review  Outcome: Improving  D/I  Uneventful shift, outside with family x 2.  Tolerated well.  Patient SBA with transfers into chair or onto the commode. Dressings CD & I.  TL picc line patent.Eating about 75 percent of her meals.  Calorie Counts to start in am x 3 days.  and 173.  Covered per orders.    Patient has off loading boot on her left foot for transfers. Patient anxious to get home or to TCU but awaiting for insurance approval.  Cont to monitor per POC.

## 2018-07-10 NOTE — IP AVS SNAPSHOT
[address] [address]    Phone:  [phone]                                       After Visit Summary   7/10/2018    Alessandra Pak    MRN: [id number]           After Visit Summary Signature Page     I have received my discharge instructions, and my questions have been answered. I have discussed any challenges I see with this plan with the nurse or doctor.    ..........................................................................................................................................  Patient/Patient Representative Signature      ..........................................................................................................................................  Patient Representative Print Name and Relationship to Patient    ..................................................               ................................................  Date                                            Time    ..........................................................................................................................................  Reviewed by Signature/Title    ...................................................              ..............................................  Date                                                            Time

## 2018-07-10 NOTE — DISCHARGE SUMMARY
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  Acute hypoxic respiratory failure  ARDS  Pulmonary edema  Hx of chronic restrictive lung disease with emphysema and fibrosis  Acute on chronic NICM (EF 35-40%) s/p ICD 2015  Pain control   Chronic pain   Anxiety  Sinus tachycardia   Type 2 Diabetes  Hx of Chronic pancreatitis s/p whipple  Severe malnutrition in the context of acute illness  Acute on chronic normocytic Anemia  Anemia of chronic disease   NIKOLAY  Adrenal insufficiency  Anisocoria/R afferent pupillary defect    Follow-ups Needed After Discharge   - ID follow up, Dr Samina Downs in 3-4 weeks, weekly CBC/CMP/CRP, fax results to [facility], attn. Dr. Downs  - Dr King ortho f/u 2-3 wks  - Podiatry f/u 1 wk for LLE ulcers  - primary care follow up following TCU stay for diabetes management  - Pulmonology follow up non urgently for further characterization of restrictive lung disease  - non urgent cardiology follow up for titration of CHF medication    Procedures  R BKA 6/28  I&D x 3: 6/7, 6/12, 6/13    Hospital Course   Alessandra Pak was admitted on 6/6/2018 for severe sepsis.  The following problems were addressed during her hospitalization:    # Severe Sepsis  # Infectious/hypoxic encephalopathy  # R diabetic foot infection & osteomyelitis, s/p I&D x 3, R BKA 6/28  Patient was admitted with severe sepsis secondary to diabetic foot infection versus pneumonia.  Was previously admitted 2/2018 with diabetic foot infection, did not present for podiatry follow up following this admission. Patient became hypotensive requiring urgent I&D which expressed  significant purulence of right foot.  Patient had 2 subsequent I&D's, imaging consistent with osteomyelitis of the right foot.  Patient was treated with vancomycin/Zosyn given history of MRSA from prior swabs.  Patient developed worsening respiratory status as noted below and required NE intermittently necessitating transfer to ICU. Pt transferred out of ICU 6/15.  Patient transitioned to vancomycin/cefepime/metronidazole. Underwent R BKA 6/28.  Operative cultures from residual distal tibia positive for GPC's which were unable to be speciated further.  ID consulted.  Patient was continued on vancomycin/cefepime/metronidazole until cultures finalized, continues on vancomycin monotherapy.  WBC within normal limits, CRP down trending.  - vancomycin (end 8/9)  - weekly CBC/CMP/CRP, fax results to [facility], attn. Dr. Downs  - Dr King f/u 2-3 wks  - Podiatry f/u 1 wk for LLE ulcers  - NWB RLE, minimal WB L forefoot for transfers  - foam boot for LLE  - wound cares  - stump  in place at all times except for dressing changes    Acute hypoxic respiratory failure  ARDS  Pulmonary edema  Hx of chronic restrictive lung disease with emphysema and fibrosis  Acute on chronic NICM (EF 35-40%) s/p ICD 2015  Patient developed acute hypoxic respiratory failure during admission thought secondary to ARDS,  on admission. Patient required intubation. On extubation, patient continued to require 3L O2. Patient given diuresis, quickly weaned to room air. Creatinine improved and remained stable. Patient had hx NICM (EF 15%), initial TTE with recovered EF (50%), subsequent TTE for preop reduced EF 35-40% with no wall motion abnormality. Cardiology consulted. No angiogram, pt declined lexiscan due to claustrophobia. Patient does have history of restrictive lung disease of unclear etiology which was being worked up during 2/2015 admission for respiratory failure, was lost to follow up.  - start furosemide 40  mg qd  - daily weights, adjust diuretics pending weekly CMP  - non urgent outpatient pulmonology f/u for further evaluation of restrictive lung disease  - last visit cardiology 2/2016, non urgent cardiology follow up for titration of CHF medication    # Acute on chronic pain   # Anxiety  # Sinus tachycardia   Patient on chronic methadone for hx opiate dependence. Pain needs increased following amputation. Utilized additional non-opiate as well as opiate pain regimen, stump pain manageable prior to discharge. Pt aware that she will need to titrate off of additional narcotics in the future.  - pain plan as below  - wean narcotics as tolerated     # Uncontrolled Type 2 Diabetes  # Hx of Chronic pancreatitis s/p whipple  # Severe malnutrition in the context of acute illness  Hgb a1c on admission 12.8. Patient required TFs for poor PO intake. NG removed and patient taking adequate PO by discharge. Briefly on insulin gtt due to stress dose steroids in ICU, transitioned to long-acting and SSI, BG stable at discharge.  - ongoing nutrition support at TCU  - glargine 10 units with SSI  - primary care follow up following TCU stay for diabetes management    # Acute on chronic normocytic Anemia  # Anemia of chronic disease  No bleeding. Required 2u pRBC. Insufficient reticulocyte response noted. Hgb remained stable, 8.9 on discharge.      # NIKOLAY  NIKOLAY to 1.93, resolved with resuscitation. Cr 1.0 on discharge.    # Adrenal insufficiency  Briefly on stress dose steroids in ICU, associated hyperglycemia. BP stable following taper.      # Anisocoria/R afferent pupillary defect  Noted while intubated. Neuro ICU consulted, CT head normal. Resolved spontaneously.    # Discharge Pain Plan:  - During her hospitalization, Alessandra experienced pain due to amputation pain, phantom limb pain.  The pain plan for discharge was discussed with Alessandra and the plan was created in a collaborative fashion.    - continue home regimen: methadone 70 mg,  Lyrica 75 mg tid, Amitriptyline 50 mg qhs   - follows with Dr Rich Patel at [facility] for methadone  - New on discharge: oxycodone ER 10 mg q12h prn, hydromorphone 2 mg daily prn for breakthrough, Tylenol 1000 mg tid scheduled, lidocaine and methol patches to alternate (lidocaine at night)  - rosy bowel regimen (miralax, senna), prn bisacodyl suppository  - Duration of opioids after discharge: Per CDC opioid prescribing guidelines, a 3 day prescription of opioids was provided.    Consultations This Hospital Stay   PHARMACY TO DOSE VANCO  PHARMACY TO DOSE VANCO  ENDOCRINE DIABETES ADULT IP CONSULT  PODIATRY IP CONSULT  VASCULAR ACCESS CARE ADULT IP CONSULT  VASCULAR ACCESS ADULT IP CONSULT  VASCULAR ACCESS ADULT IP CONSULT  ORTHOPAEDIC SURGERY ADULT/PEDS IP CONSULT  CNS DIABETES IP CONSULT  DIABETES EDUCATION IP CONSULT  WOUND OSTOMY CONTINENCE NURSE  IP CONSULT  PATIENT LEARNING CENTER IP CONSULT  INFECTIOUS DISEASE GENERAL ADULT IP CONSULT  INFECTIOUS DISEASE GENERAL ADULT IP CONSULT  NEUROLOGY STROKE ADULT IP CONSULT  NEUROLOGY STROKE ADULT IP CONSULT  OCCUPATIONAL THERAPY ADULT IP CONSULT  PHYSICAL THERAPY ADULT IP CONSULT  NUTRITION SERVICES ADULT IP CONSULT  VASCULAR ACCESS CARE ADULT IP CONSULT  PHARMACY IP CONSULT  PAIN MANAGEMENT ADULT IP CONSULT  PHARMACY TO DOSE VANCO  PHARMACY IP CONSULT  PSYCHOLOGY ADULT IP CONSULT  SPIRITUAL HEALTH SERVICES IP CONSULT  VASCULAR ACCESS CARE ADULT IP CONSULT  CARDIOLOGY GENERAL ADULT IP CONSULT  OCCUPATIONAL THERAPY ADULT IP CONSULT  PHYSICAL THERAPY ADULT IP CONSULT  PHARMACY TO DOSE VANCO  PROSTHETICS IP CONSULT  ORTHOSIS EXTREMITY LOWER REFERRAL IP CONSULT  PHYSICAL THERAPY ADULT IP CONSULT  OCCUPATIONAL THERAPY ADULT IP CONSULT  NUTRITION SERVICES ADULT IP CONSULT  INFECTIOUS DISEASE GENERAL ADULT IP CONSULT     Code Status   Full Code       The patient was discussed with Dr. Jason Chaaprro  [facility]  Health  Pager: [phone]  ______________________________________________________________________    Physical Exam   Vital Signs: Temp: 98.4  F (36.9  C) Temp src: Oral BP: 156/89 Pulse: 118 Heart Rate: 100 Resp: 16 SpO2: 97 % O2 Device: None (Room air)    Weight: 122 lbs 14.4 oz    General Appearance: Pleasant, nad, lying comfortably in bed  Respiratory: Clear to auscultation bilaterally, breathing comfortably on room air  Cardiovascular: Regular rate and rhythm, no murmurs rubs or gallops, no extra heart sounds  GI: Soft, nontender, nondistended, bowel sounds present, no rebound or guarding  Skin: No rashes or jaundice  Other: Alert, oriented, speech fluent, no focal deficits, no lower extremity edema. Right distal tibia dressed with stump  in place, menthol patch in place.    Significant Results and Procedures   Most Recent 3 CBC's:  Recent Labs   Lab Test  07/05/18   1000  07/05/18   0526  07/04/18   1710  07/03/18   0631   WBC   --   7.2  8.7  6.8   HGB  8.9*  8.9*  10.9*  9.0*   MCV   --   84  85  86   PLT   --   287  275  251     Most Recent 3 BMP's:  Recent Labs   Lab Test  07/09/18   0625  07/07/18   0726  07/06/18   0532   NA  138  140  139   POTASSIUM  4.2  4.2  3.9   CHLORIDE  106  107  105   CO2  24  25  29   BUN  25  23  29   CR  1.00  0.93  1.04   ANIONGAP  8  7  5   RICK  8.8  8.9  8.8   GLC  141*  78  92     Most Recent 3 INR's:  Recent Labs   Lab Test  06/13/18   1645  06/06/18   2215  02/21/15   0652   INR  1.33*  1.46*  1.30*     Most Recent 6 Bacteria Isolates From Any Culture (See EPIC Reports for Culture Details):  Recent Labs   Lab Test  06/29/18   0550  06/29/18   0539  06/28/18   1415  06/13/18   1530  06/11/18   0936  06/10/18   1020   CULT  No growth  No growth  No anaerobes isolated  No anaerobes isolated  Light growth  Gram positive cocci  Organism failed to thrive for identification and suceptibility testing  *  No growth  No growth  No growth  Light growth  Candida glabrata  *   Susceptibility testing not routinely done  No growth     Most Recent Hemoglobin A1c:  Recent Labs   Lab Test  06/06/18   2215   A1C  9.5*     Most Recent 6 glucoses:  Recent Labs   Lab Test  07/09/18   0625  07/07/18   0726  07/06/18   0532  07/05/18   0526  07/04/18   1710  07/03/18   0631   GLC  141*  78  92  270*  73  265*     Most Recent ESR & CRP:  Recent Labs   Lab Test  07/05/18   0526   02/15/15   0240   SED   --    --   132*   CRP  31.0*   < >   --     < > = values in this interval not displayed.   ,   Results for orders placed or performed during the hospital encounter of 06/06/18   XR Chest Port 1 View    Narrative    Exam: XR CHEST PORT 1 VW, 6/6/2018 10:28 PM    Indication: sob;     Comparison: Radiograph on 2/22/2018    Findings:   Single semiupright frontal view of chest.  Left chest wall pacemaker/AICD with transvenous leads.  Low lung volumes. Left greater than on right patchy bibasilar  opacities with silhouetting of the left hemidiaphragm. No  pneumothorax. No acute osseous abnormality. Visualized upper abdomen  is unremarkable.       Impression    Impression:     1. Suspicion of increasing mild interstitial edema.  2. Left greater than right, bibasilar mixed patchy airspace and  interstitial opacities with silhouetting of the left hemidiaphragm.  Differential includes atelectasis and/or infection, aspiration.    I have personally reviewed the examination and initial interpretation  and I agree with the findings.    ANRDEI RODRIGUES MD   XR Chest Port 1 View    Narrative    Exam: XR CHEST PORT 1 VW, 6/7/2018 11:24 AM    Indication: RN placed PICC - verify tip placement;     Comparison: Radiograph of the chest 6/6/2018    Findings:   AP view of the chest. Right PICC projects with the tip at the  cavoatrial junction. Left implanted cardiac device is in stable  position. Cardiomediastinal silhouette is stable. Retrocardiac opacity  and bilateral interstitial pulmonary opacities with curly B-lines.  No  pneumothorax or pleural effusion. Cholecystectomy clips. Otherwise the  upper abdomen is unremarkable.      Impression    Impression:   1. Right PICC tip projects over the cavoatrial junction.  2. Unchanged interstitial pulmonary edema and retrocardiac opacity.    I have personally reviewed the examination and initial interpretation  and I agree with the findings.    BERNADETTE CONTEH MD   XR Chest Port 1 View    Narrative    Exam: XR CHEST PORT 1 VW, 6/9/2018 6:52 AM    Indication: increased O2 requirements in context of pneumonia on broad  spectrum abx;     Comparison: Radiograph on 6/7/2018    Findings:   Semiupright frontal view of chest.  Left chest wall AICD and transvenous lead. Right-sided PICC line tip  projects over the low SVC.  Stable cardiomediastinal silhouette. No significant change in mixed  interstitial and patchy airspace opacities of both lung. No pleural  effusion. No appreciable pneumothorax. Visualized upper abdomen is  unremarkable. No acute osseous abnormality.      Impression    Impression:  No significant change in mixed interstitial and patchy  airspace opacities of both lung. Pulmonary edema and/or infection.    I have personally reviewed the examination and initial interpretation  and I agree with the findings.    DINO GORDON MD ([place])   XR Chest Port 1 View    Narrative    Exam: XR CHEST PORT 1 VW, 6/9/2018 11:43 AM    Indication: worsening hypoxia;     Comparison: Chest x-ray 6/9/2018    Findings:   Semiupright frontal view of the chest. Left chest wall cardiac device  with intracardiac leads. Right upper extremity PICC with tip  projecting over the cavoatrial junction. No pneumothorax. Increased  mixed interstitial and patchy airspace opacity in the lungs. Unchanged  cardiac silhouette. Lower lung volumes.      Impression    Impression: Increased bilateral mixed interstitial and patchy airspace  opacities, representing pulmonary edema versus infection.    I have personally reviewed  the examination and initial interpretation  and I agree with the findings.    DINO GORDON MD ([place])   CT Chest Pulmonary Embolism w Contrast    Narrative    EXAMINATION: CTA pulmonary angiogram, 6/9/2018 1:52 PM     COMPARISON: CT chest 2/17/2018, 4/3/2017, and 4/6/2016    HISTORY: Septic patient in ICU, much worse respiratory status in spite  of broad-spectrum antibiotics (foot ulcer and possible pneumonia).    TECHNIQUE: Volumetric helical acquisition of CT images of the chest  from the lung apices to the kidneys were acquired after the  administration of 80 mL of Isovue-370 IV contrast. Non-Flash technique  with shallow inspiratory breath hold technique.  Three-dimensional  (3D) post-processed angiographic images were reconstructed, archived  to PACS and used in interpretation of this study.     FINDINGS:  Contrast bolus is: adequate.  Exam is negative for acute  pulmonary embolism. Partial filling defect in the anterior basal  segment of the left pulmonary artery, which is in the area of  respiratory motion and is likely artifactual as the same defect is  seen in the adjacent pulmonary vein. The distal anterior basal  segmental pulmonary artery is well opacified.    Heart size is within normal limits. No pericardial effusion. The  thoracic aorta is normal caliber. The main pulmonary artery is mildly  dilated to 3.1 cm.    There is 1.5 x 3.5 cm prevascular lymphadenopathy on image 30 series 4  which previously measured 0.9 x 2.2 cm. 1.4 x 2.6 cm subcarinal lymph  node on image 45 series 4 previously measured 0.9 x 1.5 cm. There is  1.4 cm right hilar lymph node on image 44 series 4 and 1.3 cm left  hilar lymph node on image 46 series 4. These are not appreciated on  prior exam, which was a noncontrast study.    No suspicious thyroid nodule. Left implanted cardiac device with leads  in stable position.    Central airways are patent with mild central bronchial wall  thickening. No pleural effusion or  pneumothorax. Extensive  centrilobular and paraseptal emphysematous changes in the lung apices,  right greater than left. There is bibasilar atelectasis, slightly  greater on the left. Extensive reticular opacities throughout the  upper lobes, and peribronchovascular in the right lower lobe with  subpleural sparing. Minimal associated groundglass. No bronchiectasis  or honeycombing.     Upper abdomen unchanged extensive pneumobilia. Adrenal glands are  normal. No suspicious hepatic or splenic lesion is identified.    Bones: No aggressive osseous lesion.      Impression    IMPRESSION:   1. Exam is negative for acute pulmonary embolism.   2. There is progression of the extensive reticular and groundglass  opacities throughout the upper lobes and patchy groundglass and  reticular opacities in a peribronchial vascular distribution through  the right lower lobe since 2/17/2018. Small left lung base peripheral  consolidation favoring atelectasis. Although this is superimposed on  moderate haziness changes of the lungs. Considerations include ILD  such as fibrotic type NSIP or hypersensitivity pneumonitis.  Superimposed infection however is a significant concern.  3. Newly enlarged mediastinal and hilar lymph nodes compared to  2/17/2018, likely reactive in nature.    I have personally reviewed the examination and initial interpretation  and I agree with the findings.    CHANTAL GALEANO MD   XR Foot Port Right 3 Views    Narrative    3 views right foot radiographs 6/10/2018 7:33 AM    History: diabetic foot abscess (drained x 2).   Suspected osteo;     Comparison: MRI and radiographs 2/18/2018    Findings:    AP, oblique, and lateral  views of the right foot were obtained.     Since comparison study, there is new osteolysis and periostitis at the  fibular aspect of the second proximal phalangeal base. Given the  previous presence of regional soft tissue change on MRI, finding is  most concerning for  osteomyelitis.    Dressing and apparent soft tissue defect of the lateral aspect of the  forefoot at the level of the fifth metatarsal without underlying  suspicious osteolysis.    Presumed bipartite tibial hallux sesamoid.    Lisfranc articulation alignment is congruent on this non-weight  bearing images.    Achilles tendon insertional and plantar calcaneal enthesopathy.       Impression    Impression:  1. Since February 2018, new periostitis and osteolysis at the fibular  aspect of second proximal phalangeal base, most consistent with  osteomyelitis in the provided clinical setting.  2. Plantar soft tissue defect at the lateral aspect of forefoot at the  level of metatarsals without underlying suspicious osteolysis to  radiographically suggest osteomyelitis.    CRUZITO MENDOZAASHI   XR Chest Port 1 View    Narrative    Exam:  XR CHEST PORT 1 VW, 6/10/2018 7:25 AM    History: persistent hypoxia.;     Comparison:  CT chest 6/9/2018, chest radiograph 6/9/2018.    Findings:  Single AP view of the chest. Right upper extremity PICC tip  projects over the superior atriocaval junction. Left chest AICD lead  projects over the right ventricle.    Stable enlargement of the cardiac silhouette. No significant change in  diffuse bilateral patchy mixed interstitial and airspace opacities  including dense left retrocardiac opacity superimposed on chronic  fibrotic change. No pneumothorax. Visualized upper abdomen and bones  are unremarkable.      Impression    Impression:    No significant change in diffuse bilateral mixed interstitial and  airspace opacities superimposed on chronic fibrosis.    I have personally reviewed the examination and initial interpretation  and I agree with the findings.    JAYANT SALAS MD   XR Chest Port 1 View    Narrative    Exam: XR CHEST PORT 1 VW, 6/10/2018 3:15 PM    Indication: Endotracheal tube positioning;     Comparison: Chest x-ray 6/10/2018    Findings:   Frontal chest x-ray.  Endotracheal tube is seen extending 5 cm above  the kali. Left chest wall cardiac device with lead projecting over  right ventricle. Right upper extremity PICC with tip projecting over  the cavoatrial junction. Enteric tube is seen coursing below the  diaphragm with tip collimated off the field of view. Unchanged  bilateral mixed opacities, slightly decreased in the left lower zone.  No pneumothorax. Unchanged cardiac silhouette. Visualized bones and  upper abdomen is unremarkable.      Impression    Impression:   1. Endotracheal tube is seen projecting 5 cm above the kali.  2. Persistent bilateral mixed airspace and interstitial opacities,  decreased in the left lower zone.    I have personally reviewed the examination and initial interpretation  and I agree with the findings.    CHANTAL GALEANO MD   XR Abdomen Port 1 View    Narrative    Exam: XR ABDOMEN PORT 1 VW, 6/10/2018 3:14 PM    Indication: OG placement;     Comparison: CT of the abdomen dated 2/21/2015, chest radiographs dated  6/10/2018.    Findings:   Single supine AP view of the abdomen. The lower abdomen and pelvis are  collimated out of view. New enteric tube in place with the tip  projecting over the stomach. The sidehole is not well visualized due  to motion artifact. A right approach PICC line is partially visualized  with the tip projecting at the level of the lower thoracic SVC.  Additional venous line noted with the tip projecting over the right  atrium. Left chest wall cardiac implantable cardiac defibrillator  again noted. Lead stable in position. Additional lines presumed  exterior to the patient.    Surgical clips noted in the right upper quadrant. Moderate stool  burden noted in the descending colon. No abnormally distended loops of  large or small bowel visualized. No pneumatosis, portal venous gas.  Bilateral mixed interstitial and airspace opacities are not  significantly changed compared to prior chest radiograph. No acute  osseous  pathology.      Impression    Impression:   1. New enteric tube in place with the tip projecting over the stomach.  Sidehole not well visualized due to motion artifact.  2. Additional support devices as detailed above.  3. Unchanged bilateral mixed interstitial and airspace opacities.    I have personally reviewed the examination and initial interpretation  and I agree with the findings.    CHANTAL GALEANO MD   CT Head w/o Contrast    Narrative    CT HEAD W/O CONTRAST 6/10/2018 8:34 PM    History: concern for vascular lesion;     Comparison: Chest CT dated 10/28/2014.    Technique: Using multidetector thin collimation helical acquisition  technique, axial, coronal and sagittal CT images from the skull base  to the vertex were obtained without intravenous contrast.     Findings:    No intracranial hemorrhage, mass effect, or midline shift. The  ventricles are proportionate to the cerebral sulci. The gray to white  matter differentiation of the cerebral hemispheres is preserved. The  basal cisterns are patent.    The visualized paranasal sinuses are clear. The mastoid air cells are  clear.       Impression    Impression:  No acute intracranial pathology.    I have personally reviewed the examination and initial interpretation  and I agree with the findings.    MICHAELA MASCORRO MD   XR Chest Port 1 View    Narrative    XR CHEST PORT 1 VW  6/12/2018 6:18 AM    History:  r/o pulmonary edema; .     Comparison: Chest radiograph dated 6/10/2018    Findings:   Semiupright portable AP chest radiograph. Endotracheal tube tip  projects 6.0 cm above kali. Stable position of left chest wall  implantable cardiac defibrillator. Right upper extremity PICC tip  projects over distal SVC. Cardiac silhouette is within normal limits.  No significant change in diffuse interstitial opacities. Increased  left basilar opacities. No pneumothorax. Small bilateral pleural  effusions. The visualized upper abdomen is unremarkable.      Impression     IMPRESSION:  1. Diffuse interstitial opacities are unchanged, pulmonary edema  versus infection.  2. Increased patchy left basilar opacities, atelectasis versus  infection.    I have personally reviewed the examination and initial interpretation  and I agree with the findings.    DINO GORDON MD ([place])   XR Chest Port 1 View    Narrative    Exam: XR CHEST PORT 1 VW, 6/12/2018 1:56 PM    Indication: ET tube advanced, eval new position;     Comparison: Radiograph of the chest 6/12/2018    Findings:   AP view of the chest. Endotracheal tube tip projects over the upper  thoracic trachea, 5.6 cm above the kali. Implant cardiac device with  lead in stable position. Right PICC tip projects over the low SVC.  Gastric tube sidehole projects over the stomach, and the tip outside  the inferior field of view. Cardiac silhouette is within normal  limits. Bilateral mixed airspace opacities. No pneumothorax. No  pleural effusion. Upper abdomen is unremarkable.      Impression    Impression:   1. Endotracheal tube tip has been slightly advanced to 5.6 cm above  the kali.  2. Unchanged mixed airspace opacities, most likely pulmonary edema or  infection.    I have personally reviewed the examination and initial interpretation  and I agree with the findings.    BERNADETTE CONTEH MD   XR Feeding Tube Placement    Narrative    Exam: XR FEEDING TUBE PLACEMENT, 6/12/2018 3:27 PM    Indication: ARDS, prior pylorus-sparing whipple.  Please place  post-pyloric tube prior to possible proning later today.;     Comparison:  None    History: Feeding tube needed for nutrition.     Fluoro time: .8 minutes     Technique: After injection of Xylocaine gel into the right nostril, a  feeding tube was advanced under fluoroscopic guidance.    Findings: The feeding tube is advanced with the tip in the postpyloric  position. A small amount of barium was injected to define anatomy.  Feeding tube was secured with a bridal retention device.        Impression    Impression: Uncomplicated feeding tube placement with tip in the  postpyloric position.     I, DINO GORDON MD ([place]), attest that I was present for all critical  portions of the procedure and was immediately available to provide  guidance and assistance during the remainder of the procedure.    I have personally reviewed the examination and initial interpretation  and I agree with the findings.    DINO GORDON MD ([place])   XR Chest Port 1 View    Narrative    Exam: XR CHEST PORT 1 VW, 6/12/2018 5:06 PM    Indication: ET advanced.  Eval new position;     Comparison: Chest radiograph obtained 6/12/2018 at 1348    Findings:   Single AP view of the chest. ET tube tip projects over the mid  trachea. Right upper extremity PICC tip projects over the low SVC.  Stable automatic implantable cardiac defibrillator positioning.  Feeding tube and gastric tube extend below the diaphragm, tips not  included in the field-of-view. Heart size is normal. Stable bilateral  interstitial opacities and hazy left retrocardiac opacities. No  pleural effusion or pneumothorax. Contrast in the biliary tree is  probably refluxed from same-day feeding tube placement (patient has  history of Whipple procedure).      Impression    Impression:   1. ET tube tip projects over the mid trachea at the level of the  clavicles.  2. Stable interstitial/airspace opacities, better seen on CT 6/9/2018.    I have personally reviewed the examination and initial interpretation  and I agree with the findings.    GLEN ALVARADO MD   US Extremity Non Vascular Right    Narrative    Exam: TEMPORARY, 6/13/2018 3:10 PM    Indication: Evaluate for abscess    Comparison: X-ray 6/10/2018, MRI 2/18/2018    Findings:   Focused ultrasound by technologists was obtained of the dorsum of the  right foot medially. There is a superficial predominantly anechoic  fluid collection without some echogenic internal debris/septations  measuring 4.2 x 5.3 x 1.0 cm. There  is no associated wall thickening  or peripheral hyperemia. There are multiple small linear echogenic  foci with associated posterior shadowing located deep to the  superficial collection along the fascia.    Deep to this collection and deep to fascia, also at the dorsum of the  foot, there is a more complex appearing collection with multiple areas  of increased echogenicity and wall thickening. There is no vascularity  within the collection, however there is peripheral vascularity. This  collection measures 10.5 x 4.1 x 0.8 cm.    There is no definite communication between these collections, which is  best demonstrated on the last cine image.       Impression    Impression:   1. Complex fluid collection located deep to the fascia along the  dorsum of the medial foot measuring 10.5 x 4.1 x 0.8 cm, suspicious  for developing abscess or hemorrhage. Additional more simple appearing  fluid collection located more superficially along the dorsum of the  foot measuring 4.2 x 5.3 x 1.0 cm, although may represent a seroma or  hematoma, additional abscess is not excluded. Compared to MRI from  2/18/2018, there has been increase in size of fluid collections at the  dorsum of the foot. Consider repeat MRI to further evaluate the extent  of disease.  2. Multiple linear echogenic foci between the superficial and deep  fluid collections may represent surgical material, foreign bodies or  subcutaneous soft tissue air. On the x-ray from 6/10/2018 there was no  evidence for foreign bodies or surgical material at this location.  Consider repeat x-ray to assess for soft tissue gas given the incision  and drainage performed on 6/12/2018.     I have personally reviewed the examination and initial interpretation  and I agree with the findings.    ARELIS BERMUDEZ MD   CT Foot Right w/o Contrast    Narrative    CT right foot without contrast    History: Worsening of right foot wound. Evaluate for soft tissue gas,  osteomyelitis or  abscess.    Techniques: Helical acquisition of images through the right foot was  obtained without administration of contrast.    DLP: 474 mGy-cm    Comparison: Janki 10, 2018, 2/16/2018, prior MRI from February 18, 2018.    Findings:    There is redemonstration of relatively large soft tissue defect  involving the plantar aspect of forefoot at the level of the third and  fourth metatarsal shaft. The defect appears to extend down to the  level of the plantar musculature. There is no definite evidence of  underlying mass or apparent loculated fluid collection though lack of  contrast compromising assessment.    There is also additional soft tissue defect plantar and medial aspect  of forefoot at the level of first metatarsophalangeal joint.  Additional smaller irregularity at the plantar aspect of foot are also  present. There is redemonstration of erosion/osteolysis involving the  second proximal phalangeal base, in correlating with previous MRI  findings, this is concerning for osteomyelitis. There is  redemonstration of suspected fluid around the second MTP joint  including first interspace though this is difficult to assess by CT  especially without contrast.    There is focal periostitis involving the fourth metatarsal shaft  distally (image 317 series 10 without associated osteolysis, similar  to recent radiographs from 6/10/2018 but new since 2/2018. This is  nonspecific and may be secondary to stress reaction as there appears  to be relative lack of deep soft tissue defect extension to this  level.    Internal derangement of joint is not well assessed with CT technique.    Horizontal cleft of the tibial hallux sesamoid, likely bipartite  variant. There is apparent ankylosis of the fourth proximal  interphalangeal joint.    Achilles tendon insertional and plantar calcaneal enthesopathy. Soft  tissue swelling particularly over the medial malleolus. Apparent  diffuse muscle atrophy, likely related  to  microangiopathy/polyneuropathy.      Impression    Impression:  1. Redemonstration of erosion/osteolysis and periostitis involving the  second proximal phalangeal base, in correlating with previous MRI  findings, this is concerning for osteomyelitis until proven otherwise.   a. Redemonstration of suspected fluid around the second MTP joint  though this is difficult to assess by CT especially without contrast.  2. Focal periostitis involving the fourth metatarsal shaft distally  (image 317 series 10 without associated osteolysis, similar to recent  radiographs from 6/10/2018 but new since 2/2018. This is nonspecific  and may be secondary to stress reaction as there appears to be  relative lack of deep soft tissue defect extension to this level.  Though osteomyelitis cannot be entirely excluded.  3. Plantar ulcers especially under the third and forth metatarsal  shafts and first MTP joint.    CRUZITO ADAMS   XR Chest Port 1 View    Narrative    XR CHEST PORT 1 VW  6/15/2018 4:33 AM    History:  Increased secretions, r/o new infiltrate; .     Comparison: Chest radiograph dated 6/12/2018    Findings:   Semiupright AP chest radiograph. Endotracheal tube with the tip  projects at 4.5 cm above kali. Stable position of left chest wall  implantable cardiac defibrillator, right PICC line, gastric and  feeding tubes. Cardiomegaly. Pulmonary vasculature is mildly  indistinct. Increased left retrocardiac opacities. No appreciable  pneumothorax. Possible small left pleural effusion.      Impression    IMPRESSION:  1.  Stable supportive lines and tubes.  2.  Increased left retrocardiac consolidative changes, atelectasis  versus infection.  3.  Possible small left pleural effusion.    I have personally reviewed the examination and initial interpretation  and I agree with the findings.    IZZY HARMON MD   CT Lower Extremity Angio Right    Narrative    Exam: Computed tomographic angiography of the abdomen, pelvis,  and  bilateral lower extremities with contrast dated 6/26/2018    Clinical information: Evaluate vasculature for planned right  below-knee amputation. Recurrent right foot diabetic ulcers.    Technique: Axial images obtained of the abdomen, pelvis, and lower  extremities through the feet obtained following the injection of  contrast media in the arterial phase. Source images reviewed as well  as 3D and multi-planar reconstructions.    Contrast: 120ml isovue 370    DLP: 1008 mGy*cm    Comparison: Right foot CT 6/13/2018, right foot MRI 2/18/2018, ABIs  4/25/2017. CT chest 6/9/2018, CT abdomen 2/21/2015.    FINDINGS:      Abdominal aorta:    Moderate atherosclerotic calcifications in the normal caliber  abdominal aorta. Replaced right hepatic artery arising from the  superior mesenteric artery. The origins of the celiac, SMA, renal, and  inferior mesenteric arteries are patent.    Right pelvis and lower extremity:  Common iliac artery: Low-grade narrowing near the aortic bifurcation.  External iliac artery: Normal   Common femoral artery: Low-grade narrowing distally.   Profunda femoral artery: Normal  Superficial femoral artery: No significant stenosis  Popliteal artery: Normal  Tibioperoneal trunk: Normal  Anterior tibial artery: Patent through the dorsalis pedis.  Posterior: Patent through the plantar branches  Peroneal: Patent to the ankle    Left pelvis and lower extremity:  Common iliac artery: Near complete occlusion distally  External iliac artery: Normal   Common femoral artery: Normal   Profunda femoral artery: Normal  Superficial femoral artery: Normal  Popliteal artery: Normal  Tibioperoneal trunk: Normal  Anterior tibial artery: Patent through the dorsalis pedis  Posterior: Patent through the plantar branches  Peroneal: Patent to the ankle    Abdomen and pelvis:   Stable predominantly left-sided pneumobilia. Cholecystectomy clips.  Stable postoperative changes of Whipple procedure with  diffuse  calcifications throughout the residual pancreatic tail. The adrenal  glands and right kidney are unremarkable. Area of cortical thinning in  the medial inferior pole of the left kidney suggestive of previous  infection/ischemia. Stable position of the intrauterine device and the  tubal ligation devices. The left-sided tubal ligation device appears  to be positioned within the endometrium, unchanged.    No intra-abdominal free air or free fluid. No dilated loops of bowel,  however there is substantial stool burden. The enteric tube tip is  positioned with the tip in the proximal small bowel. No  lymphadenopathy identified in the abdomen or pelvis.    Lower chest:  Heart size is at the upper limits of normal. Partially imaged  pacemaker/implantable cardiac defibrillator leads positioned in the  right ventricle. Bibasilar fibrosis/scarring without airspace  consolidation.    Bones and soft tissues:  Small fat-containing umbilical hernia. Stable enhancement in the  thickening of the plantar skin of the right foot and medial ankle.  There is some confluent fluid deep to the medial ankle and plantar  skin of the right foot as well. Skin ulcers overlying the medial  aspect of the right midfoot, the lateral aspect of the distal plantar  forefoot, and along the lateral aspect of the distal fifth metatarsal.  Stable subtle erosions in the base of the second digit proximal  phalanx. There may be inflammatory fluid surrounding the second and  fifth metatarsophalangeal joints.      Impression    Impression:    1. 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    2. No significant femoral-popliteal disease in either lower extremity.  Three-vessel runoff bilaterally.    3. Mildly progressed right foot cellulitis with several ulcers and  subcutaneous fluid collections, likely representing  abscess/phlegmon.  Suspected osteomyelitis involving the second metatarsophalangeal joint  and the fourth and fifth metatarsals distally.          I have personally reviewed the examination and initial interpretation  and I agree with the findings.    EDGAR REYNOSO   XR Foot Right G/E 3 Views    Narrative    Exam: 3 views of the right foot dated 6/26/2018.    COMPARISON: Radiographs dated 6/11/2018, CT dated 6/13/2018, and MRI  dated 2/18/2018.    CLINICAL HISTORY: Right foot osteomyelitis.    FINDINGS: Soft tissue defect seen along the plantar aspect of the  right foot, at the approximate level of the metatarsals. Mild  irregularity involving the base of the right second proximal phalanx,  better appreciated on the comparison CT scan of 6/13/2018. No new  erosive changes are noted.      Impression    IMPRESSION:  1. Soft tissue defect along the plantar aspect of the midfoot/forefoot  at the level of the metatarsals, much better seen on the comparison CT  scan.  2. Subtle lucency involving the base of the right second toe proximal  phalanx, this corresponds to an area of irregularity/osteolysis on the  comparison CT scan, correlate clinically.  3. No new erosive changes are noted.    DORYS OROPEZA MD   XR Tibia & Fibula Right 2 Views    Narrative    Exam: 4 views of the right tibia and fibula dated 6/26/2018.    COMPARISON: None.    CLINICAL HISTORY: Preoperative planning for below-knee amputation.    FINDINGS: AP and lateral views of the right tibia and fibula were  obtained. The bones are well aligned. No displaced fractures. No  erosive changes.      Impression    IMPRESSION: No acute bone abnormality in the right tibia or fibula.    DORYS OROPEZA MD   XR Chest Port 1 View    Narrative    XR CHEST PORT 1 VW  6/29/2018 2:04 PM    History:  post op fever; .     Comparison: Chest radiograph dated 6/15/2018, CT chest dated 6/9/2018    Findings:   40 degree AP chest radiograph. Interval removal of endotracheal  and  gastric tube. Right PICC line with the tip projects over mid SVC.  Stable left chest wall implantable cardiac defibrillator. Stable  feeding tube with its tip collimated out of the field-of-view. Trachea  is in midline. Cardiac silhouette is within normal limits. Pulmonary  vasculature is mildly indistinct. No acute focal airspace opacities.  No pneumothorax or significant pleural effusion. Cholecystectomy  surgical clips projects over right upper quadrant.      Impression    IMPRESSION:  1.  Interval removal of endotracheal and gastric tubes. Otherwise  stable support devices.  2.  Mild interstitial pulmonary edema without acute airspace  opacities.  3.  Stable left retrocardiac opacities, atelectasis versus  consolidation.    I have personally reviewed the examination and initial interpretation  and I agree with the findings.    CATARINO MCDONALD MD     *Note: Due to a large number of results and/or encounters for the requested time period, some results have not been displayed. A complete set of results can be found in Results Review.       TTE 7/1:  Left Ventricle  Left ventricular size is normal. Mild concentric wall thickening consistent  with left ventricular hypertrophy is present. The Ejection Fraction is  estimated at 35-40%. Moderate diffuse hypokinesis is present.     Right Ventricle  The right ventricle is normal size. Global right ventricular function is  normal. A pacemaker lead is noted in the right ventricle.     Atria  Both atria appear normal.     Pericardium  No pericardial effusion is present.    TTE 6/26:  Dobutamine stress echo was aborted due to LV dysfunction.  Moderately (EF 30-35%) reduced left ventricular function is present. LVEF 33%  based on biplane tracing. Global hypokinesis.  Left ventricular hypertrophy.  Normal right ventricular size and systolic function.  No significant valve disease.    TTE 6/20:  Left ventricular size is normal. The LVEF is 50 %.  The right ventricle is  normal size. Global right ventricular function is  normal.  Pulmonary artery systolic pressure is normal.  The inferior vena cava is normal. Estimated mean right atrial pressure is 3  mmHg.  No pericardial effusion is present.  This study was compared with the study from 2/17/2016. There has been no  change.    Pending Results   These results will be followed up by  Unresulted Labs Ordered in the Past 30 Days of this Admission     No orders found from 4/7/2018 to 6/7/2018.             Primary Care Physician   Brionna Hernandez [place]    Discharge Disposition   Discharged to rehabilitation facility  Condition at discharge: Stable    Discharge Orders     Home care nursing referral     Home infusion referral     Medication Therapy Management Referral     Wound care and dressings   Instructions to care for your wound at home: daily dressing changes as directed below.    Wound Care Instructions:  Right plantar foot wounds: Daily: Remove dressing and apply Vashe (order #[id number]) soaked gauze into wound beds for 10 minutes. Remove and do not rinse. Apply Iodasorb gel (order #[id number]) to wound beds. Pack gently with dry fluffed 4x4. Cover with ABD and secure with Kerlix and ACE wrap.     MD face to face encounter   Documentation of Face to Face and Certification for Home Health Services    I certify that patient: Alessandra Pak is under my care and that I, or a nurse practitioner or physician's assistant working with me, had a face-to-face encounter that meets the physician face-to-face encounter requirements with this patient on: 6/8/2018.    This encounter with the patient was in whole, or in part, for the following medical condition, which is the primary reason for home health care: bilateral diabetic foot ulcers, right foot ulcers and abscess requiring bedside irrigation and debridement.    I certify that, based on my findings, the following services are medically necessary home health services: Nursing.    My clinical  findings support the need for the above services because: Nurse is needed: For complex aftercare of surgical procedures because the patient needs instruction and cannot perform care on their own due to: daily wound managment.    Further, I certify that my clinical findings support that this patient is homebound (i.e. absences from home require considerable and taxing effort and are for medical reasons or [facility] services or infrequently or of short duration when for other reasons) because: Patient is bedbound due to: Foot wound which requires dialy packing, immunosuppression    Based on the above findings. I certify that this patient is confined to the home and needs intermittent skilled nursing care, physical therapy and/or speech therapy.  The patient is under my care, and I have initiated the establishment of the plan of care.  This patient will be followed by a physician who will periodically review the plan of care.  Physician/Provider to provide follow up care: Brionna Calabrese    Attending hospital physician (the Medicare certified [facility] provider): Camron Gardner MD  Physician Signature: See electronic signature associated with these discharge orders.  Date: 6/8/2018     General info for SNF   Length of Stay Estimate: Short Term Care: Estimated # of Days <30  Condition at Discharge: Improving  Level of care:skilled   Rehabilitation Potential: Good  Admission H&P remains valid and up-to-date: Yes  Recent Chemotherapy: N/A  Use Nursing Home Standing Orders: Yes     Mantoux instructions   Give two-step Mantoux (PPD) Per Facility Policy Yes     Reason for your hospital stay   You were admitted for severe infection caused by a foot infection     Glucose monitor nursing POCT   Before meals and at bedtime     Daily weights   Call Provider for weight gain of more than 2 pounds per day or 5 pounds per week.     Wound care   Site:   RLE amputation  Instructions:  Every other day adaptic and 4x4.  Wrap with kerlix. Keep stump  on at all times except during dressing changes     Wound care (specify)   Site:   POC for left medial foot and heel wounds   Instructions:    Daily and PRN:   Cleanse wounds with microklenz spray and gauze.   Apply Iodosorb gel (order #[id number]) to wound beds. Apply puja thick amount of iodosorb gel to the wound beds.    Cover with dry 4x4.   secure with Kerlix     IV access   PICC.     Weight bearing status   NWB RLE  Minimal WB L forefoot for transfers     Activity - Up with assistive device     Additional Discharge Instructions   - take vancomycin daily until 8/9  - weekly CBC/CMP/CRP, fax results to [facility] clinic attn: Dr Samina Downs  - wean opiates as tolerated, methadone 70 mg is baseline     Follow Up and recommended labs and tests   Follow up with Nursing home physician.  Adjust diuretics pending weekly BMP results  Follow up with specialist, Dr King, in [facility] orthopedics clinic in 2-3 weeks.  No follow up labs or test are needed.  Follow up with specialist, in [facility] podiatry clinic in 1 week.  No follow up labs or test are needed.  Follow up with specialist, Dr Downs, in [facility] Infectious disease clinic in 3-4 weeks.  The following labs/tests are recommended: weekly CMP/CRP/CBC, fax results to [facility] Infectious disease clinic, attn Dr Samina Downs.    Pulmonology f/u post TCU stay for ongoing evaluation of restrictive lung disease    Cardiology f/u post TCU stay for ongoing management of non ischemic cardiomyopathy     Full Code     Physical Therapy Adult Consult   Evaluate and treat as clinically indicated.    Reason:  Post amputation     Occupational Therapy Adult Consult   Evaluate and treat as clinically indicated.    Reason:  Post amputation     Nutrition Services Adult IP Consult   Reason:  Ongoing post op nutritional support     Contact Isolation     Fall precautions     Advance Diet as Tolerated   Follow this diet  upon discharge:     Snacks/Supplements Adult: Magic Cup; Between Meals, With Meals     Snacks/Supplements Adult: Other; (10:00 am: yogurt with a packet of Bene protein),,,, (2:00 pm: string cheese x2),,, (HS snack: hard boiled egg and bowl of  canned peaches); Between Meals     Snacks/Supplements Adult: Boost Glucose Control; Between Meals     Regular Diet Adult       Discharge Medications   Current Discharge Medication List      START taking these medications    Details   acetaminophen (TYLENOL) 500 MG tablet Take 2 tablets (1,000 mg) by mouth 3 times daily    Associated Diagnoses: Status post below knee amputation of right lower extremity (H)      bisacodyl (DULCOLAX) 10 MG Suppository Place 1 suppository (10 mg) rectally daily as needed for constipation  Qty: 30 suppository    Associated Diagnoses: Constipation, unspecified constipation type      HYDROmorphone, HIGH CONC, (DILAUDID) 10 mg/mL LIQD oral Place 0.2 mLs (2 mg) under the tongue daily as needed  Qty: 0.4 mL, Refills: 0    Associated Diagnoses: Status post below knee amputation of right lower extremity (H)      !! insulin aspart (NOVOLOG PEN) 100 UNIT/ML injection Correction Scale - MEDIUM INSULIN RESISTANCE DOSING     Do Not give Correction Insulin if Pre-Meal BG less than 140.   For Pre-Meal  - 189 give 1 unit.   For Pre-Meal  - 239 give 2 units.   For Pre-Meal  - 289 give 3 units.   For Pre-Meal  - 339 give 4 units.   For Pre-Meal - 399 give 5 units.   For Pre-Meal -449 give 6 units  For Pre-Meal BG greater than or equal to 450 give 7 units.    Associated Diagnoses: Type 2 diabetes mellitus with complication, with long-term current use of insulin (H)      !! insulin aspart (NOVOLOG PEN) 100 UNIT/ML injection MEDIUM INSULIN RESISTANCE DOSING    Do Not give Bedtime Correction Insulin if BG less than  200.   For  - 249 give 1 units.   For  - 299 give 2 units.   For  - 349 give 3 units.   For   -399 give 4 units.   For BG greater than or equal to 400 give 5 units.  Notify provider if glucose greater than or equal to 350 mg/dL after administration of correction dose.    Associated Diagnoses: Type 2 diabetes mellitus with complication, with long-term current use of insulin (H)      insulin glargine (LANTUS SOLOSTAR) 100 UNIT/ML pen Inject 10 Units Subcutaneous At Bedtime    Associated Diagnoses: Type 2 diabetes mellitus with complication, with long-term current use of insulin (H)      menthol (ICY HOT) 5 % PTCH Apply 1 patch topically every 8 hours as needed for moderate pain  Refills: 0    Associated Diagnoses: Status post below knee amputation of right lower extremity (H)      metoprolol tartrate (LOPRESSOR) 25 MG tablet Take 0.5 tablets (12.5 mg) by mouth 2 times daily    Associated Diagnoses: NICM (nonischemic cardiomyopathy) (H)      oxyCODONE (OXYCONTIN) 10 MG 12 hr tablet Take 1 tablet (10 mg) by mouth every 12 hours  Qty: 4 tablet, Refills: 0    Associated Diagnoses: Status post below knee amputation of right lower extremity (H)      sennosides (SENOKOT) 8.6 MG tablet Take 2 tablets by mouth 2 times daily  Qty: 120 each    Comments: Hold for loose stool  Associated Diagnoses: Constipation, unspecified constipation type      vancomycin 1,250 mg Inject 1,250 mg into the vein every 24 hours    Associated Diagnoses: Osteomyelitis of right tibia, unspecified type (H)       !! - Potential duplicate medications found. Please discuss with provider.      CONTINUE these medications which have CHANGED    Details   albuterol (PROAIR HFA) 108 (90 Base) MCG/ACT Inhaler Inhale 2 puffs into the lungs every 4 hours as needed for shortness of breath / dyspnea  Qty: 1 Inhaler, Refills: 5    Associated Diagnoses: SOB (shortness of breath)      amitriptyline (ELAVIL) 50 MG tablet Take 1 tablet (50 mg) by mouth At Bedtime  Qty: 90 tablet, Refills: 3    Associated Diagnoses: Anxiety      aspirin 81 MG tablet Take 1 tablet  (81 mg) by mouth daily  Qty: 100 tablet, Refills: 3    Associated Diagnoses: Type 2 diabetes mellitus with complication, with long-term current use of insulin (H)      fluticasone (FLONASE) 50 MCG/ACT spray Spray 2 sprays into left nostril daily  Qty: 1 Bottle, Refills: 11    Associated Diagnoses: Nasal mass      furosemide (LASIX) 40 MG tablet Take 1 tablet (40 mg) by mouth daily  Qty: 30 tablet    Associated Diagnoses: Nonischemic cardiomyopathy (H)      glucose 4 g CHEW chewable tablet Take 3-4 tablets to treat low blood sugar.  Qty: 25 tablet, Refills: 11    Associated Diagnoses: Type 2 diabetes mellitus with complication, with long-term current use of insulin (H)      Ipratropium-Albuterol (COMBIVENT RESPIMAT)  MCG/ACT inhaler Inhale 1 puff into the lungs 4 times daily Do not exceed 6 doses/day.    Associated Diagnoses: Chronic obstructive pulmonary disease, unspecified COPD type (H)      levonorgestrel (MIRENA, 52 MG,) 20 MCG/24HR IUD placed today  Qty: 1 each, Refills: 0    Associated Diagnoses: Excessive or frequent menstruation      Lidocaine (LIDOCARE) 4 % Patch Place 2 patches onto the skin every 24 hours  Qty: 30 patch, Refills: 0    Comments: Evening application, 12h on, 12h off. Use menthol patch when no lidocaine.  Associated Diagnoses: Other chronic pain; Status post below knee amputation of right lower extremity (H)      lisinopril (PRINIVIL/ZESTRIL) 10 MG tablet Take 1 tablet (10 mg) by mouth daily  Qty: 30 tablet, Refills: 0    Associated Diagnoses: Hypertension goal BP (blood pressure) < 140/90      methadone (DOLPHINE-INTENSOL) 10 mg/mL CONC (HIGH CONC) solution Take 7 mLs (70 mg) by mouth daily  Qty: 14 mL, Refills: 0    Associated Diagnoses: Chemical dependency (H)      multivitamin, therapeutic with minerals (THERA-VIT-M) TABS tablet Take 1 tablet by mouth daily  Qty: 30 each, Refills: 11    Associated Diagnoses: Heart failure and kidney disease due to high blood pressure (H)       polyethylene glycol (MIRALAX/GLYCOLAX) Packet Take 17 g by mouth daily  Qty: 7 packet    Comments: Hold for loose stool  Associated Diagnoses: Constipation, unspecified constipation type      pregabalin (LYRICA) 75 MG capsule TAKE 1 CAPSULE BY MOUTH 3 TIMES DAILY  Qty: 90 capsule, Refills: 5    Associated Diagnoses: Controlled type 2 diabetes with neuropathy (H)      ranitidine (ZANTAC) 300 MG tablet Take 1 tablet (300 mg) by mouth daily  Qty: 90 tablet, Refills: 3    Associated Diagnoses: Gastroesophageal reflux disease without esophagitis         CONTINUE these medications which have NOT CHANGED    Details   acetone, Urine, test STRP 1 strip by In Vitro route as needed  Qty: 25 each, Refills: 1    Associated Diagnoses: Type 2 diabetes mellitus with diabetic neuropathy (H)      blood glucose monitoring (HOLLIE CONTOUR NEXT) test strip Use to test blood sugar 10 times daily or as directed.  Ok to substitute alternative if insurance prefers.  Qty: 300 each, Refills: 12    Associated Diagnoses: Diabetes mellitus type 1 (H)      !! blood glucose monitoring (HOLLIE MICROLET) lancets Use to test blood sugar 10 times daily or as directed.  Ok to substitute alternative if insurance prefers.  Qty: 1 Box, Refills: prn    Associated Diagnoses: Diabetes mellitus type 1 (H)      !! blood glucose monitoring (FREESTYLE) lancets 1 each See Admin Instructions test 3-4 times per day  Qty: 3 Box, Refills: 3    Associated Diagnoses: Type 2 diabetes, HbA1C goal < 8% (H)      insulin pen needle (B-D U/F) 31G X 5 MM Use 3 time(s) a day.  Qty: 3 each, Refills: 3    Associated Diagnoses: Type 2 diabetes, HbA1c goal < 7% (H)       !! - Potential duplicate medications found. Please discuss with provider.      STOP taking these medications       gabapentin 8 % GEL topical PLO cream Comments:   Reason for Stopping:         insulin aspart (NOVOLOG VIAL) 100 UNITS/ML injection Comments:   Reason for Stopping:         lidocaine HCl 3 % cream  Comments:   Reason for Stopping:         oxyCODONE IR (ROXICODONE) 5 MG tablet Comments:   Reason for Stopping:         SM NICOTINE 21 MG/24HR 24 hr patch Comments:   Reason for Stopping:         vancomycin 750 mg Comments:   Reason for Stopping:             Allergies   Allergies   Allergen Reactions     No Known Drug Allergies      Internal Medicine Staff Addendum  Date of Service: 7/10/2018  I have seen and examined Ms Pak, reviewed the data and discussed the plan of care with the care team on rounds.  I agree with the above documentation with the additions/changes to the ROS, HPI, Exam or data (including my edits in italics):    I discussed pt's care with bedside RN, case management/social work today.  I personally reviewed, labs, medications and past 24 hr notes.  Assessment/Plan/Diagnoses: plan/dx as above, which contains my edits and reflects our joint medical decision-making.   >50% of 60 minutes spent in direct patient care and coordinating discharge    Raman Gomez MD PhD  Internal Medicine Hospitalist & Staff Physician   of Internal Medicine   [facility]  Pager: [phone]

## 2018-07-10 NOTE — PROGRESS NOTES
Social Work Services Discharge Note      Patient Name:  Alessandra Pak     Anticipated Discharge Date:  7/10/18    Discharge Disposition:   TCU:  [facility]    Following MD:  Facility assignment     Pre-Admission Screening (PAS) online form has been completed.  The Level of Care (LOC) is:  Determined  Confirmation Code is:  [id number]     Additional Services/Equipment Arranged:  [employer] ( [phone]) wc van arranged for 3pm.      Patient / Family response to discharge plan:  Pt in agreement with plan.      Persons notified of above discharge plan:  Pt, primary team [facility] admissions, RN     Staff Discharge Instructions:  RN to call report to [phone].    CTS Handoff completed:  YES    WILSON Rojas, EMMASW  [address]   Pager [phone]  Phone [phone]

## 2018-07-10 NOTE — PLAN OF CARE
Problem: Patient Care Overview  Goal: Plan of Care/Patient Progress Review  Occupational Therapy Discharge Summary    Reason for therapy discharge:    Discharged to transitional care facility.    Progress towards therapy goal(s). See goals on Care Plan in [facility] electronic health record for goal details.  Goals partially met.  Barriers to achieving goals:   discharge from facility.    Therapy recommendation(s):    Continued therapy is recommended.  Rationale/Recommendations:  TCU to improve safety and IND with functional transfers, ADLs, IADLs, and mobility.

## 2018-07-10 NOTE — IP AVS SNAPSHOT
MRN:[id number]                      After Visit Summary   7/10/2018    Alessandra Pak    MRN: [id number]           Thank you!     Thank you for choosing [facility] for your care. Our goal is always to provide you with excellent care. Hearing back from our patients is one way we can continue to improve our services. Please take a few minutes to complete the written survey that you may receive in the mail after you visit with us. Thank you!        Patient Information     Date Of Birth          1965        Designated Caregiver       Most Recent Value    Caregiver    Will someone help with your care after discharge? yes    Name of designated caregiver self    Phone number of caregiver self    Caregiver address self      About your hospital stay     You were admitted on:  July 10, 2018 You last received care in the:  [facility]    You were discharged on:  July 21, 2018        Reason for your hospital stay       You were admitted to TCU for physical Rehab and continuation of IV antibiotics                  Who to Call     For medical emergencies, please call 911.  For non-urgent questions about your medical care, please call your primary care provider or clinic, [phone]          Attending Provider     Provider Specialty    Prashanth Ramesh MD Internal Medicine       Primary Care Provider Office Phone # Fax #    Brionna Coby Dc [phone]      After Care Instructions     Activity       Non weight bearing right lower ext   Minimal weight bearing left fore foot  Foam boot to LLE            Diet       Boost control in between meals/ regular adult diet , carbohydrate consistent ( 4-6 cho units per meal )            IV access       **Ordering Provider MUST call/page Care Coordinator/ to discuss arranging this service**    You are going home with the following vascular access device: PICC                  Follow-up Appointments     Adult [facility]  Follow-up and recommended labs and tests       PCP in one week   Cbc , CMP  , crp once a week and to be faxed to ID clinic . IV vancomycin to be continued till august 9th or as determined by ID MD Dr Tomas   ID follow up in 2-4 weeks  Cardiology follow for ICD check  And follow up 1-2 weeks  Orthopedic follow up 7/24  Methadone clinic follow up as scheduled     Appointments on [place] and/or [facility] (with [facility] or [facility] provider or service). Call [phone] if you haven't heard regarding these appointments within 7 days of discharge.            Follow Up and recommended labs and tests       Cbc, crp and cmp once a week and fax to ID clinic   Vancomycin level and kinetics to be monitored by pharmacy and ID MD                  Your next 10 appointments already scheduled     Jul 23, 2018  1:00 PM CDT   CT CHEST W/O CONTRAST with MGCT1   [facility]    [address] [place] [id number] [phone]           Please bring any scans or X-rays taken at other hospitals, if similar tests were done. Also bring a list of your medicines, including vitamins, minerals and over-the-counter drugs. It is safest to leave personal items at home.  Be sure to tell your doctor:   If you have any allergies.   If there s any chance you are pregnant.   If you are breastfeeding.  You do not need to do anything special to prepare for this exam.  Please wear loose clothing, such as a sweat suit or jogging clothes. Avoid snaps, zippers and other metal. We may ask you to undress and put on a hospital gown.            Jul 23, 2018  1:30 PM CDT   New Visit with Rene Swartz MD   [facility] ([facility]    [address] [place] [address] [phone]            Jul 24, 2018  9:20 AM CDT   (Arrive by 8:50 AM)   RETURN SPINE with Ambrose King MD   [facility] ([facility])    [address]  4th Floor  [facility] [address]   [phone]            Jul 25, 2018 10:00 AM CDT   (Arrive by 9:45 AM)   RETURN FOOT/ANKLE with Becky King PA-C   [facility] ([facility])    [address]  4th [address]  [facility] [address]   [phone]            Aug 30, 2018  3:00 PM CDT   (Arrive by 2:45 PM)   Return Visit with Samina Downs MD   [facility] ([facility])    [address]  [address]  [facility] [address]   [phone]              Additional Services     Home Care OT Referral for Hospital Discharge       OT to eval and treat ADL's/IADL's, home safety    Your provider has ordered home care - occupational therapy. If you have not been contacted within 2 days of your discharge please call the department phone number listed on the top of this document.            Home Care PT Referral for Hospital Discharge       PT to eval and treat strengthening, mobility, home safety    Your provider has ordered home care - physical therapy. If you have not been contacted within 2 days of your discharge please call the department phone number listed on the top of this document.            Home care nursing referral       RN skilled nursing visit. RN to assess vital signs and weight, respiratory and cardiac status, pain level and activity tolerance, incision for signs/symptoms of infection, hydration, nutrition and bowel status, home safety and PICC status and function, R stump incision, L foot wound, blood sugars.  RN to teach administration of IV medications, medication management and pain management and wound care.  RN to provide line care per agency protocol and lab draws.    Home care services to begin within 48 hours of DC from TCU    Your provider has ordered home care nursing services. If you have not been contacted within 2 days of your discharge  please call the inpatient department phone number at [phone] .            Home infusion referral       Your provider has referred you to: [facility]: [facility] [place] [phone]   [email or web address]    Local Address (if different from home address): N/A    Anticipated Length of Therapy: IV Vancomycin through 8/9/2018    Home Infusion Pharmacist to adjust therapy based on labs and clinical assessments: Yes    Labs:  May draw labs from Venous Catheter: Yes  Home Infusion Pharmacist to order labs based on therapy type and clinical assessments: Yes  Call/Fax Lab Results to: Dr. Downs, [facility]    Agency Staff to assess nursing needs for Infusion Therapy.    Access Device Management:  IV Access Type: PICC  Flush with Heparin and Normal Saline IVP PRN and routine site care (per agency protocol) to maintain access device? Yes                  Pending Results     No orders found from 7/8/2018 to 7/11/2018.            Statement of Approval     Ordered          07/21/18 1033  I have reviewed and agree with all the recommendations and orders detailed in this document.  EFFECTIVE NOW     Approved and electronically signed by:  Juliette Partida MD             Admission Information     Date & Time Provider Department Dept. Phone    7/10/2018 Prashanth Ramesh MD [facility] [phone]      Your Vitals Were     Blood Pressure Pulse Temperature Respirations Weight Pulse Oximetry    144/83 (BP Location: Left arm) 97 97.7  F (36.5  C) (Oral) 18 53.6 kg (118 lb 1.6 oz) 97%    BMI (Body Mass Index)                   17.96 kg/m2           [facility] Information     [email or web address] gives you secure access to your electronic health record. If you see a primary care provider, you can also send messages to your care team and make appointments. If you have questions, please call your primary care clinic.  If you do not have a primary care provider, please call  [phone] and they will assist you.        Care EveryWhere ID     This is your Care EveryWhere ID. This could be used by other organizations to access your [facility] medical records  [id number]        Equal Access to Services     NICK ROBLEDO : Hadii aad ku hadpoppystacey Mattsoncindiali, wagracieda luqadaha, qaybta kaalmada ayana, sanjay molinalucas lindsayrosaura wallace abner hoang. So [facility] [phone].    ATENCIÓN: Si habla español, tiene a nagy disposición servicios gratuitos de asistencia lingüística. Llame al [phone].    We comply with applicable federal civil rights laws and [place] laws. We do not discriminate on the basis of race, color, national origin, age, disability, sex, sexual orientation, or gender identity.               Review of your medicines      START taking        Dose / Directions    enoxaparin 40 MG/0.4ML injection   Commonly known as:  LOVENOX   Indication:  DVT Px   Used for:  Status post below knee amputation of right lower extremity (H)        Dose:  40 mg   Inject 0.4 mLs (40 mg) Subcutaneous every 24 hours   Quantity:  7 Syringe   Refills:  0       morphine 15 MG 12 hr tablet   Commonly known as:  MS CONTIN   Indication:  Pain   Used for:  Status post below knee amputation of right lower extremity (H)        Dose:  15 mg   Take 1 tablet (15 mg) by mouth every 12 hours   Quantity:  8 tablet   Refills:  0       umeclidinium-vilanterol 62.5-25 MCG/INH oral inhaler   Commonly known as:  ANORO ELLIPTA   Indication:  Chronic Obstructive Lung Disease   Used for:  Chronic bronchitis, unspecified chronic bronchitis type (H)   Replaces:  Ipratropium-Albuterol  MCG/ACT inhaler        Dose:  1 puff   Inhale 1 puff into the lungs daily   Quantity:  1 Inhaler   Refills:  0       vancomycin 750 mg   Indication:  Osteomyelitis   Used for:  Status post below knee amputation of right lower extremity (H)   Replaces:  vancomycin 1,250 mg        Dose:  750 mg   Inject 750 mg into the vein every 24 hours for 20 days    Quantity:  20 cartridge.   Refills:  0         CONTINUE these medicines which may have CHANGED, or have new prescriptions. If we are uncertain of the size of tablets/capsules you have at home, strength may be listed as something that might have changed.        Dose / Directions    * insulin aspart 100 UNIT/ML injection   Commonly known as:  NovoLOG PEN   Indication:  Type 2 Diabetes   This may have changed:    - how much to take  - how to take this  - when to take this  - additional instructions   Used for:  Type 2 diabetes mellitus with stage 3 chronic kidney disease, with long-term current use of insulin (H)        Dose:  1-7 Units   Inject 1-7 Units Subcutaneous 3 times daily (before meals)   Quantity:  6.3 mL   Refills:  0       * insulin aspart 100 UNIT/ML injection   Commonly known as:  NovoLOG PEN   Indication:  Type 2 Diabetes   This may have changed:    - how much to take  - how to take this  - when to take this  - additional instructions   Used for:  Type 2 diabetes mellitus with stage 3 chronic kidney disease, with long-term current use of insulin (H)        Dose:  1-5 Units   Inject 1-5 Units Subcutaneous At Bedtime   Quantity:  1.5 mL   Refills:  0       insulin glargine 100 UNIT/ML injection   Commonly known as:  LANTUS   Indication:  Type 2 Diabetes   This may have changed:  how much to take   Used for:  Type 2 diabetes mellitus with stage 3 chronic kidney disease, with long-term current use of insulin (H)        Dose:  8 Units   Inject 8 Units Subcutaneous At Bedtime   Quantity:  2.4 mL   Refills:  0       * methadone 10 mg/mL Conc (HIGH CONC) solution   Commonly known as:  DOLPHINE-INTENSOL   This may have changed:  Another medication with the same name was added. Make sure you understand how and when to take each.   Used for:  Chemical dependency (H)        Dose:  70 mg   Take 7 mLs (70 mg) by mouth daily   Quantity:  14 mL   Refills:  0       * methadone 10 mg/mL Conc (HIGH CONC) solution    Commonly known as:  DOLPHINE-INTENSOL   Indication:  pain   This may have changed:  You were already taking a medication with the same name, and this prescription was added. Make sure you understand how and when to take each.   Used for:  Status post below knee amputation of right lower extremity (H)        Dose:  70 mg   Take 7 mLs (70 mg) by mouth daily   Quantity:  70 mL   Refills:  0       pregabalin 75 MG capsule   Commonly known as:  LYRICA   Indication:  Diabetes with Nerve Disease   This may have changed:    - how much to take  - how to take this  - when to take this  - additional instructions   Used for:  Status post below knee amputation of right lower extremity (H)        Dose:  75 mg   Take 1 capsule (75 mg) by mouth 3 times daily   Quantity:  90 capsule   Refills:  0       * Notice:  This list has 4 medication(s) that are the same as other medications prescribed for you. Read the directions carefully, and ask your doctor or other care provider to review them with you.      CONTINUE these medicines which have NOT CHANGED        Dose / Directions    acetaminophen 500 MG tablet   Commonly known as:  TYLENOL   Used for:  Status post below knee amputation of right lower extremity (H)        Dose:  1000 mg   Take 2 tablets (1,000 mg) by mouth 3 times daily   Refills:  0       acetone (Urine) test Strp   Used for:  Type 2 diabetes mellitus with diabetic neuropathy (H)        Dose:  1 strip   1 strip by In Vitro route as needed   Quantity:  25 each   Refills:  1       albuterol 108 (90 Base) MCG/ACT Inhaler   Commonly known as:  PROAIR HFA   Used for:  SOB (shortness of breath)        Dose:  2 puff   Inhale 2 puffs into the lungs every 4 hours as needed for shortness of breath / dyspnea   Quantity:  1 Inhaler   Refills:  0       amitriptyline 50 MG tablet   Commonly known as:  ELAVIL   Indication:  Pain   Used for:  Status post below knee amputation of right lower extremity (H)        Dose:  50 mg   Take 1  tablet (50 mg) by mouth At Bedtime   Quantity:  60 tablet   Refills:  0       aspirin 81 MG tablet   Used for:  Type 2 diabetes mellitus with complication, with long-term current use of insulin (H)        Dose:  81 mg   Take 1 tablet (81 mg) by mouth daily   Quantity:  100 tablet   Refills:  3       * blood glucose monitoring lancets   Used for:  Type 2 diabetes, HbA1C goal < 8% (H)        Dose:  1 each   1 each See Admin Instructions test 3-4 times per day   Quantity:  3 Box   Refills:  3       * blood glucose monitoring lancets   Used for:  Diabetes mellitus type 1 (H)        Use to test blood sugar 10 times daily or as directed.  Ok to substitute alternative if insurance prefers.   Quantity:  1 Box   Refills:  prn       blood glucose monitoring test strip   Commonly known as:  HOLLIE CONTOUR NEXT   Used for:  Diabetes mellitus type 1 (H)        Use to test blood sugar 10 times daily or as directed.  Ok to substitute alternative if insurance prefers.   Quantity:  300 each   Refills:  12       fluticasone 50 MCG/ACT spray   Commonly known as:  FLONASE   Indication:  COPD   Used for:  Acute nonseasonal allergic rhinitis due to pollen        Dose:  2 spray   Spray 2 sprays into left nostril daily   Quantity:  1 Bottle   Refills:  0       furosemide 40 MG tablet   Commonly known as:  LASIX   Indication:  High Blood Pressure Disorder   Used for:  SOB (shortness of breath)        Dose:  40 mg   Take 1 tablet (40 mg) by mouth daily   Quantity:  30 tablet   Refills:  0       glucose 4 g Chew chewable tablet   Used for:  Type 2 diabetes mellitus with complication, with long-term current use of insulin (H)        Take 3-4 tablets to treat low blood sugar.   Quantity:  25 tablet   Refills:  11       insulin pen needle 31G X 5 MM   Commonly known as:  B-D U/F   Used for:  Type 2 diabetes, HbA1c goal < 7% (H)        Use 3 time(s) a day.   Quantity:  3 each   Refills:  3       levonorgestrel 20 MCG/24HR IUD   Commonly known as:   MIRENA (52 MG)   Used for:  Excessive or frequent menstruation        placed today   Quantity:  1 each   Refills:  0       Lidocaine 4 % Patch   Commonly known as:  LIDOCARE   Indication:  chronic pain   Used for:  Status post below knee amputation of right lower extremity (H)        Dose:  2 patch   Place 2 patches onto the skin every 24 hours   Quantity:  30 patch   Refills:  0       lisinopril 10 MG tablet   Commonly known as:  PRINIVIL/ZESTRIL   Indication:  High Blood Pressure Disorder   Used for:  Type 2 diabetes mellitus with stage 3 chronic kidney disease, with long-term current use of insulin (H)        Dose:  10 mg   Take 1 tablet (10 mg) by mouth daily   Quantity:  30 tablet   Refills:  0       metoprolol tartrate 25 MG tablet   Commonly known as:  LOPRESSOR   Indication:  High Blood Pressure Disorder   Used for:  Type 2 diabetes mellitus with stage 3 chronic kidney disease, with long-term current use of insulin (H)        Dose:  12.5 mg   Take 0.5 tablets (12.5 mg) by mouth 2 times daily   Quantity:  60 tablet   Refills:  0       multivitamin, therapeutic with minerals Tabs tablet   Indication:  nutritional suppliment   Used for:  Status post below knee amputation of right lower extremity (H)        Dose:  1 tablet   Take 1 tablet by mouth daily   Quantity:  30 each   Refills:  0       polyethylene glycol Packet   Commonly known as:  MIRALAX/GLYCOLAX   Indication:  Constipation   Used for:  Status post below knee amputation of right lower extremity (H)        Dose:  17 g   Take 17 g by mouth daily   Quantity:  7 packet   Refills:  0       ranitidine 300 MG tablet   Commonly known as:  ZANTAC   Indication:  Gastroesophageal reflux disease without esophagitis    Used for:  Status post below knee amputation of right lower extremity (H)        Dose:  300 mg   Take 1 tablet (300 mg) by mouth daily   Quantity:  60 tablet   Refills:  0       sennosides 8.6 MG tablet   Commonly known as:  SENOKOT   Indication:   Constipation   Used for:  Status post below knee amputation of right lower extremity (H)        Dose:  2 tablet   Take 2 tablets by mouth 2 times daily   Quantity:  60 each   Refills:  0       * Notice:  This list has 2 medication(s) that are the same as other medications prescribed for you. Read the directions carefully, and ask your doctor or other care provider to review them with you.      STOP taking     bisacodyl 10 MG Suppository   Commonly known as:  DULCOLAX           HYDROmorphone (HIGH CONC) 10 mg/mL Liqd oral   Commonly known as:  DILAUDID           Ipratropium-Albuterol  MCG/ACT inhaler   Commonly known as:  COMBIVENT RESPIMAT   Replaced by:  umeclidinium-vilanterol 62.5-25 MCG/INH oral inhaler           menthol 5 % Ptch   Commonly known as:  ICY HOT           oxyCODONE 10 MG 12 hr tablet   Commonly known as:  OxyCONTIN           vancomycin 1,250 mg   Replaced by:  vancomycin 750 mg                Where to get your medicines      These medications were sent to [facility] [address]  [address] [address]     Phone:  [phone]     albuterol 108 (90 Base) MCG/ACT Inhaler    amitriptyline 50 MG tablet    enoxaparin 40 MG/0.4ML injection    fluticasone 50 MCG/ACT spray    furosemide 40 MG tablet    insulin aspart 100 UNIT/ML injection    insulin aspart 100 UNIT/ML injection    insulin glargine 100 UNIT/ML injection    Lidocaine 4 % Patch    lisinopril 10 MG tablet    metoprolol tartrate 25 MG tablet    multivitamin, therapeutic with minerals Tabs tablet    polyethylene glycol Packet    ranitidine 300 MG tablet    sennosides 8.6 MG tablet    umeclidinium-vilanterol 62.5-25 MCG/INH oral inhaler         Some of these will need a paper prescription and others can be bought over the counter. Ask your nurse if you have questions.     Bring a paper prescription for each of these medications     methadone 10 mg/mL Conc (HIGH CONC) solution     morphine 15 MG 12 hr tablet    pregabalin 75 MG capsule    vancomycin 750 mg                Protect others around you: Learn how to safely use, store and throw away your medicines at www.disposemymeds.org.        ANTIBIOTIC INSTRUCTION     You've Been Prescribed an Antibiotic - Now What?  Your healthcare team thinks that you or your loved one might have an infection. Some infections can be treated with antibiotics, which are powerful, life-saving drugs. Like all medications, antibiotics have side effects and should only be used when necessary. There are some important things you should know about your antibiotic treatment.      Your healthcare team may run tests before you start taking an antibiotic.    Your team may take samples (e.g., from your blood, urine or other areas) to run tests to look for bacteria. These test can be important to determine if you need an antibiotic at all and, if you do, which antibiotic will work best.      Within a few days, your healthcare team might change or even stop your antibiotic.    Your team may start you on an antibiotic while they are working to find out what is making you sick.    Your team might change your antibiotic because test results show that a different antibiotic would be better to treat your infection.    In some cases, once your team has more information, they learn that you do not need an antibiotic at all. They may find out that you don't have an infection, or that the antibiotic you're taking won't work against your infection. For example, an infection caused by a virus can't be treated with antibiotics. Staying on an antibiotic when you don't need it is more likely to be harmful than helpful.      You may experience side effects from your antibiotic.    Like all medications, antibiotics have side effects. Some of these can be serious.    Let you healthcare team know if you have any known allergies when you are admitted to the hospital.    One significant side  effect of nearly all antibiotics is the risk of severe and sometimes deadly diarrhea caused by Clostridium difficile (C. Difficile). This occurs when a person takes antibiotics because some good germs are destroyed. Antibiotic use allows C. diificile to take over, putting patients at high risk for this serious infection.    As a patient or caregiver, it is important to understand your or your loved one's antibiotic treatment. It is especially important for caregivers to speak up when patients can't speak for themselves. Here are some important questions to ask your healthcare team.    What infection is this antibiotic treating and how do you know I have that infection?    What side effects might occur from this antibiotic?    How long will I need to take this antibiotic?    Is it safe to take this antibiotic with other medications or supplements (e.g., vitamins) that I am taking?     Are there any special directions I need to know about taking this antibiotic? For example, should I take it with food?    How will I be monitored to know whether my infection is responding to the antibiotic?    What tests may help to make sure the right antibiotic is prescribed for me?      Information provided by:  www.cdc.gov/getsmart  U.S. Department of Health and Human Services  Centers for disease Control and Prevention  National Center for Emerging and Zoonotic Infectious Diseases  Division of Healthcare Quality Promotion        Information about OPIOIDS     PRESCRIPTION OPIOIDS: WHAT YOU NEED TO KNOW   We gave you an opioid (narcotic) pain medicine. It is important to manage your pain, but opioids are not always the best choice. You should first try all the other options your care team gave you. Take this medicine for as short a time (and as few doses) as possible.     These medicines have risks:    DO NOT drive when on new or higher doses of pain medicine. These medicines can affect your alertness and reaction times, and you  could be arrested for driving under the influence (DUI). If you need to use opioids long-term, talk to your care team about driving.    DO NOT operate heave machinery    DO NOT do any other dangerous activities while taking these medicines.     DO NOT drink any alcohol while taking these medicines.      If the opioid prescribed includes acetaminophen, DO NOT take with any other medicines that contain acetaminophen. Read all labels carefully. Look for the word  acetaminophen  or  Tylenol.  Ask your pharmacist if you have questions or are unsure.    You can get addicted to pain medicines, especially if you have a history of addiction (chemical, alcohol or substance dependence). Talk to your care team about ways to reduce this risk.    Store your pills in a secure place, locked if possible. We will not replace any lost or stolen medicine. If you don t finish your medicine, please throw away (dispose) as directed by your pharmacist. The [place] [employer] has more information about safe disposal: https://www.pca.[facility].[place]/living-green/managing-unwanted-medications.     All opioids tend to cause constipation. Drink plenty of water and eat foods that have a lot of fiber, such as fruits, vegetables, prune juice, apple juice and high-fiber cereal. Take a laxative (Miralax, milk of magnesia, Colace, Senna) if you don t move your bowels at least every other day.              Medication List: This is a list of all your medications and when to take them. Check marks below indicate your daily home schedule. Keep this list as a reference.      Medications           Morning Afternoon Evening Bedtime As Needed    acetaminophen 500 MG tablet   Commonly known as:  TYLENOL   Take 2 tablets (1,000 mg) by mouth 3 times daily   Last time this was given:  1,000 mg on 7/20/2018  4:26 PM                                acetone (Urine) test Strp   1 strip by In Vitro route as needed                                albuterol  108 (90 Base) MCG/ACT Inhaler   Commonly known as:  PROAIR HFA   Inhale 2 puffs into the lungs every 4 hours as needed for shortness of breath / dyspnea   Last time this was given:  2 puffs on 7/15/2018  8:13 AM                                amitriptyline 50 MG tablet   Commonly known as:  ELAVIL   Take 1 tablet (50 mg) by mouth At Bedtime   Last time this was given:  50 mg on 7/20/2018  9:19 PM                                aspirin 81 MG tablet   Take 1 tablet (81 mg) by mouth daily                                * blood glucose monitoring lancets   1 each See Admin Instructions test 3-4 times per day                                * blood glucose monitoring lancets   Use to test blood sugar 10 times daily or as directed.  Ok to substitute alternative if insurance prefers.                                blood glucose monitoring test strip   Commonly known as:  Clinicbook CONTOUR NEXT   Use to test blood sugar 10 times daily or as directed.  Ok to substitute alternative if insurance prefers.                                enoxaparin 40 MG/0.4ML injection   Commonly known as:  LOVENOX   Inject 0.4 mLs (40 mg) Subcutaneous every 24 hours   Last time this was given:  40 mg on 7/20/2018  4:26 PM                                fluticasone 50 MCG/ACT spray   Commonly known as:  FLONASE   Spray 2 sprays into left nostril daily   Last time this was given:  2 sprays on 7/21/2018  8:58 AM                                furosemide 40 MG tablet   Commonly known as:  LASIX   Take 1 tablet (40 mg) by mouth daily   Last time this was given:  40 mg on 7/21/2018  8:58 AM                                glucose 4 g Chew chewable tablet   Take 3-4 tablets to treat low blood sugar.                                * insulin aspart 100 UNIT/ML injection   Commonly known as:  NovoLOG PEN   Inject 1-7 Units Subcutaneous 3 times daily (before meals)   Last time this was given:  1 Units on 7/21/2018  8:56 AM                                *  insulin aspart 100 UNIT/ML injection   Commonly known as:  NovoLOG PEN   Inject 1-5 Units Subcutaneous At Bedtime   Last time this was given:  1 Units on 7/21/2018  8:56 AM                                insulin glargine 100 UNIT/ML injection   Commonly known as:  LANTUS   Inject 8 Units Subcutaneous At Bedtime   Last time this was given:  8 Units on 7/20/2018  9:25 PM                                insulin pen needle 31G X 5 MM   Commonly known as:  B-D U/F   Use 3 time(s) a day.                                levonorgestrel 20 MCG/24HR IUD   Commonly known as:  MIRENA (52 MG)   placed today                                Lidocaine 4 % Patch   Commonly known as:  LIDOCARE   Place 2 patches onto the skin every 24 hours   Last time this was given:  2 patches on 7/21/2018  8:55 AM                                lisinopril 10 MG tablet   Commonly known as:  PRINIVIL/ZESTRIL   Take 1 tablet (10 mg) by mouth daily   Last time this was given:  10 mg on 7/21/2018  8:58 AM                                * methadone 10 mg/mL Conc (HIGH CONC) solution   Commonly known as:  DOLPHINE-INTENSOL   Take 7 mLs (70 mg) by mouth daily   Last time this was given:  70 mg on 7/21/2018  9:00 AM                                * methadone 10 mg/mL Conc (HIGH CONC) solution   Commonly known as:  DOLPHINE-INTENSOL   Take 7 mLs (70 mg) by mouth daily   Last time this was given:  70 mg on 7/21/2018  9:00 AM                                metoprolol tartrate 25 MG tablet   Commonly known as:  LOPRESSOR   Take 0.5 tablets (12.5 mg) by mouth 2 times daily   Last time this was given:  12.5 mg on 7/21/2018  8:58 AM                                morphine 15 MG 12 hr tablet   Commonly known as:  MS CONTIN   Take 1 tablet (15 mg) by mouth every 12 hours   Last time this was given:  15 mg on 7/21/2018  8:58 AM                                multivitamin, therapeutic with minerals Tabs tablet   Take 1 tablet by mouth daily   Last time this was given:  1  tablet on 7/21/2018  8:59 AM                                polyethylene glycol Packet   Commonly known as:  MIRALAX/GLYCOLAX   Take 17 g by mouth daily   Last time this was given:  17 g on 7/21/2018  8:57 AM                                pregabalin 75 MG capsule   Commonly known as:  LYRICA   Take 1 capsule (75 mg) by mouth 3 times daily   Last time this was given:  75 mg on 7/21/2018  9:00 AM                                ranitidine 300 MG tablet   Commonly known as:  ZANTAC   Take 1 tablet (300 mg) by mouth daily   Last time this was given:  300 mg on 7/21/2018  8:59 AM                                sennosides 8.6 MG tablet   Commonly known as:  SENOKOT   Take 2 tablets by mouth 2 times daily   Last time this was given:  2 tablets on 7/19/2018  8:36 AM                                umeclidinium-vilanterol 62.5-25 MCG/INH oral inhaler   Commonly known as:  ANORO ELLIPTA   Inhale 1 puff into the lungs daily   Last time this was given:  1 puff on 7/21/2018  9:01 AM                                vancomycin 750 mg   Inject 750 mg into the vein every 24 hours for 20 days   Last time this was given:  750 mg on 7/20/2018  5:50 PM                                * Notice:  This list has 6 medication(s) that are the same as other medications prescribed for you. Read the directions carefully, and ask your doctor or other care provider to review them with you.

## 2018-07-10 NOTE — PLAN OF CARE
Problem: Patient Care Overview  Goal: Plan of Care/Patient Progress Review  Outcome: No Change  Pt alert and oriented. Assist one of with cares. Up to bedside commode. C/o of right LE pain; prn dilaudid administered with relief per pt. Dressings to bilateral LEs intact. Bg check at 168. No significant changes overnight. Slept through the night. Awaiting TCU placement. Continue to monitor and implement poc.

## 2018-07-10 NOTE — PROGRESS NOTES
Report called to tcu. Patient leaving with picc. Patient gathered all belongings. Patient to go with [facility]. Patient had lunch before she left.
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
## 2018-07-11 ENCOUNTER — OFFICE VISIT (OUTPATIENT)
Dept: ORTHOPEDICS | Facility: CLINIC | Age: 53
End: 2018-07-11
Payer: COMMERCIAL

## 2018-07-11 ENCOUNTER — PATIENT OUTREACH (OUTPATIENT)
Dept: CARE COORDINATION | Facility: CLINIC | Age: 53
End: 2018-07-11

## 2018-07-11 DIAGNOSIS — N18.30 TYPE 2 DIABETES MELLITUS WITH STAGE 3 CHRONIC KIDNEY DISEASE, WITH LONG-TERM CURRENT USE OF INSULIN (H): Chronic | ICD-10-CM

## 2018-07-11 DIAGNOSIS — E11.621 DIABETIC ULCER OF LEFT MIDFOOT ASSOCIATED WITH TYPE 2 DIABETES MELLITUS, WITH FAT LAYER EXPOSED (H): Primary | ICD-10-CM

## 2018-07-11 DIAGNOSIS — L97.421 SKIN ULCER OF LEFT HEEL, LIMITED TO BREAKDOWN OF SKIN (H): Chronic | ICD-10-CM

## 2018-07-11 DIAGNOSIS — Z79.4 TYPE 2 DIABETES MELLITUS WITH STAGE 3 CHRONIC KIDNEY DISEASE, WITH LONG-TERM CURRENT USE OF INSULIN (H): Chronic | ICD-10-CM

## 2018-07-11 DIAGNOSIS — L97.422 DIABETIC ULCER OF LEFT MIDFOOT ASSOCIATED WITH TYPE 2 DIABETES MELLITUS, WITH FAT LAYER EXPOSED (H): Primary | ICD-10-CM

## 2018-07-11 DIAGNOSIS — E11.22 TYPE 2 DIABETES MELLITUS WITH STAGE 3 CHRONIC KIDNEY DISEASE, WITH LONG-TERM CURRENT USE OF INSULIN (H): Chronic | ICD-10-CM

## 2018-07-11 LAB
GLUCOSE BLDC GLUCOMTR-MCNC: 106 MG/DL (ref 70–99)
GLUCOSE BLDC GLUCOMTR-MCNC: 121 MG/DL (ref 70–99)
GLUCOSE BLDC GLUCOMTR-MCNC: 138 MG/DL (ref 70–99)
GLUCOSE BLDC GLUCOMTR-MCNC: 272 MG/DL (ref 70–99)
GLUCOSE BLDC GLUCOMTR-MCNC: 298 MG/DL (ref 70–99)

## 2018-07-11 PROCEDURE — 99309 SBSQ NF CARE MODERATE MDM 30: CPT | Performed by: INTERNAL MEDICINE

## 2018-07-11 PROCEDURE — 25000128 H RX IP 250 OP 636

## 2018-07-11 PROCEDURE — 25000128 H RX IP 250 OP 636: Performed by: INTERNAL MEDICINE

## 2018-07-11 PROCEDURE — 97165 OT EVAL LOW COMPLEX 30 MIN: CPT | Mod: GO | Performed by: OCCUPATIONAL THERAPIST

## 2018-07-11 PROCEDURE — 25000132 ZZH RX MED GY IP 250 OP 250 PS 637: Performed by: INTERNAL MEDICINE

## 2018-07-11 PROCEDURE — 00000146 ZZHCL STATISTIC GLUCOSE BY METER IP

## 2018-07-11 PROCEDURE — 99207 ZZC CDG-CODE CATEGORY CHANGED: CPT | Performed by: INTERNAL MEDICINE

## 2018-07-11 PROCEDURE — 40000133 ZZH STATISTIC OT WARD VISIT: Performed by: OCCUPATIONAL THERAPIST

## 2018-07-11 PROCEDURE — 90670 PCV13 VACCINE IM: CPT | Performed by: INTERNAL MEDICINE

## 2018-07-11 PROCEDURE — 97162 PT EVAL MOD COMPLEX 30 MIN: CPT | Mod: GP | Performed by: PHYSICAL THERAPIST

## 2018-07-11 PROCEDURE — 12000022 ZZH R&B SNF

## 2018-07-11 PROCEDURE — 40000193 ZZH STATISTIC PT WARD VISIT: Performed by: PHYSICAL THERAPIST

## 2018-07-11 PROCEDURE — 97116 GAIT TRAINING THERAPY: CPT | Mod: GP | Performed by: PHYSICAL THERAPIST

## 2018-07-11 PROCEDURE — 25000125 ZZHC RX 250: Performed by: INTERNAL MEDICINE

## 2018-07-11 PROCEDURE — 97110 THERAPEUTIC EXERCISES: CPT | Mod: GP | Performed by: PHYSICAL THERAPIST

## 2018-07-11 PROCEDURE — 97535 SELF CARE MNGMENT TRAINING: CPT | Mod: GO | Performed by: OCCUPATIONAL THERAPIST

## 2018-07-11 RX ORDER — HYDROMORPHONE HYDROCHLORIDE 1 MG/ML
2 SOLUTION ORAL ONCE
Status: DISCONTINUED | OUTPATIENT
Start: 2018-07-11 | End: 2018-07-11 | Stop reason: RX

## 2018-07-11 RX ORDER — SODIUM CHLORIDE 9 MG/ML
INJECTION, SOLUTION INTRAVENOUS
Status: COMPLETED
Start: 2018-07-11 | End: 2018-07-11

## 2018-07-11 RX ORDER — CEFAZOLIN SODIUM 1 G/50ML
1250 SOLUTION INTRAVENOUS EVERY 24 HOURS
Status: DISCONTINUED | OUTPATIENT
Start: 2018-07-12 | End: 2018-07-12

## 2018-07-11 RX ORDER — ACETAMINOPHEN 500 MG
1000 TABLET ORAL EVERY 8 HOURS PRN
Status: DISCONTINUED | OUTPATIENT
Start: 2018-07-11 | End: 2018-07-21 | Stop reason: HOSPADM

## 2018-07-11 RX ORDER — HYDROMORPHONE HYDROCHLORIDE 5 MG/5ML
2 SOLUTION ORAL ONCE
Status: COMPLETED | OUTPATIENT
Start: 2018-07-11 | End: 2018-07-11

## 2018-07-11 RX ADMIN — FUROSEMIDE 40 MG: 20 TABLET ORAL at 09:24

## 2018-07-11 RX ADMIN — MENTHOL 1 PATCH: 205.5 PATCH TOPICAL at 03:41

## 2018-07-11 RX ADMIN — FLUTICASONE PROPIONATE 2 SPRAY: 50 SPRAY, METERED NASAL at 09:25

## 2018-07-11 RX ADMIN — MENTHOL 1 PATCH: 205.5 PATCH TOPICAL at 19:29

## 2018-07-11 RX ADMIN — PREGABALIN 75 MG: 75 CAPSULE ORAL at 10:03

## 2018-07-11 RX ADMIN — RANITIDINE 300 MG: 150 TABLET, FILM COATED ORAL at 09:24

## 2018-07-11 RX ADMIN — SODIUM CHLORIDE, PRESERVATIVE FREE 10 ML: 5 INJECTION INTRAVENOUS at 21:59

## 2018-07-11 RX ADMIN — SENNOSIDES 2 TABLET: 8.6 TABLET, FILM COATED ORAL at 09:24

## 2018-07-11 RX ADMIN — SODIUM CHLORIDE, PRESERVATIVE FREE 5 ML: 5 INJECTION INTRAVENOUS at 10:14

## 2018-07-11 RX ADMIN — PNEUMOCOCCAL 13-VALENT CONJUGATE VACCINE 0.5 ML: 2.2; 2.2; 2.2; 2.2; 2.2; 4.4; 2.2; 2.2; 2.2; 2.2; 2.2; 2.2; 2.2 INJECTION, SUSPENSION INTRAMUSCULAR at 10:05

## 2018-07-11 RX ADMIN — HYDROMORPHONE HYDROCHLORIDE 2 MG: 1 SOLUTION ORAL at 19:56

## 2018-07-11 RX ADMIN — PREGABALIN 75 MG: 75 CAPSULE ORAL at 19:04

## 2018-07-11 RX ADMIN — ENOXAPARIN SODIUM 40 MG: 40 INJECTION SUBCUTANEOUS at 16:43

## 2018-07-11 RX ADMIN — SENNOSIDES 2 TABLET: 8.6 TABLET, FILM COATED ORAL at 21:56

## 2018-07-11 RX ADMIN — PREGABALIN 75 MG: 75 CAPSULE ORAL at 14:39

## 2018-07-11 RX ADMIN — INSULIN GLARGINE 10 UNITS: 100 INJECTION, SOLUTION SUBCUTANEOUS at 22:00

## 2018-07-11 RX ADMIN — UMECLIDINIUM BROMIDE AND VILANTEROL TRIFENATATE 1 PUFF: 62.5; 25 POWDER RESPIRATORY (INHALATION) at 09:26

## 2018-07-11 RX ADMIN — VANCOMYCIN HYDROCHLORIDE 1250 MG: 10 INJECTION, POWDER, LYOPHILIZED, FOR SOLUTION INTRAVENOUS at 07:17

## 2018-07-11 RX ADMIN — HYDROMORPHONE HYDROCHLORIDE 2 MG: 1 SOLUTION ORAL at 02:24

## 2018-07-11 RX ADMIN — OXYCODONE HYDROCHLORIDE 10 MG: 10 TABLET, FILM COATED, EXTENDED RELEASE ORAL at 03:43

## 2018-07-11 RX ADMIN — Medication 5 UNITS: at 09:31

## 2018-07-11 RX ADMIN — Medication 12.5 MG: at 21:57

## 2018-07-11 RX ADMIN — AMITRIPTYLINE HYDROCHLORIDE 50 MG: 50 TABLET, FILM COATED ORAL at 21:57

## 2018-07-11 RX ADMIN — MULTIPLE VITAMINS W/ MINERALS TAB 1 TABLET: TAB at 09:23

## 2018-07-11 RX ADMIN — ASPIRIN 81 MG CHEWABLE TABLET 81 MG: 81 TABLET CHEWABLE at 09:24

## 2018-07-11 RX ADMIN — Medication 6 MG: at 21:57

## 2018-07-11 RX ADMIN — POLYETHYLENE GLYCOL 3350 17 G: 17 POWDER, FOR SOLUTION ORAL at 09:22

## 2018-07-11 RX ADMIN — METHADONE HYDROCHLORIDE 70 MG: 10 CONCENTRATE ORAL at 10:02

## 2018-07-11 RX ADMIN — SODIUM CHLORIDE 500 ML: 9 INJECTION, SOLUTION INTRAVENOUS at 09:28

## 2018-07-11 RX ADMIN — LISINOPRIL 10 MG: 10 TABLET ORAL at 09:24

## 2018-07-11 RX ADMIN — Medication 12.5 MG: at 09:24

## 2018-07-11 RX ADMIN — OXYCODONE HYDROCHLORIDE 10 MG: 10 TABLET, FILM COATED, EXTENDED RELEASE ORAL at 16:43

## 2018-07-11 NOTE — PLAN OF CARE
Problem: Goal Outcome Summary  Goal: Goal Outcome Summary  Outcome: No Change  RN: New admit yesterday. Pt is alert and oriented. SBA  To pivot transfer to [facility]. Declined offer to use walker during transfer. Able to do own perineal cares. R knee pain comfortably managed with Dilaudid 2 mg sol x 1 and also has scheduled Oxycontin. PICC patent for IV abx x 1.BG at 0200= 106. Appear to be sleeping/resting between cares. Using call light appropriately. Continue with plan of care.

## 2018-07-11 NOTE — PATIENT INSTRUCTIONS
Wound care: perform daily.    1. Spay with Microklenz, pat dry.   2. Apply medihoney to heel and Iodosorb to left medial foot wound.   3. Wrap with gauze and kerlix.

## 2018-07-11 NOTE — PLAN OF CARE
Problem: Goal Outcome Summary  Goal: Goal Outcome Summary  Outcome: No Change  Pt is a new admit from yesterday. Acclimating well. Alert and oriented x 4. Able to communicate needs. Denies pain. Denies SOB. Stand by assist for transfer and mobility. Pivot transfer. Appetite good. Reported no problem with B/B. Left the unit at 1100 after PT session for orthopedic appointment and not back yet. Unable to do skin assessment. Will continue to monitor upon return.

## 2018-07-11 NOTE — PROGRESS NOTES
07/11/18 0841   Quick Adds   Type of Visit Initial Occupational Therapy Evaluation   Living Environment   Lives With parent(s)  (mom and daughter (24 y.o.))   Living Arrangements house   Home Accessibility bed and bath on same level   Number of Stairs to Enter Home 1   Number of Stairs Within Home 12  (to basement-laundry)   Transportation Available family or friend will provide   Self-Care   Dominant Hand right   Usual Activity Tolerance good   Current Activity Tolerance fair   Functional Level Prior   Ambulation 0-->independent   Transferring 0-->independent   Toileting 0-->independent   Bathing 0-->independent   Dressing 0-->independent   Eating 0-->independent   Communication 0-->understands/communicates without difficulty   Swallowing 0-->swallows foods/liquids without difficulty   Cognition 0 - no cognition issues reported   Fall history within last six months yes   Number of times patient has fallen within last six months 1   Which of the above functional risks had a recent onset or change? ambulation;transferring;toileting;bathing;dressing   Prior Functional Level Comment pt had been IND with mobility and ADLs prior to admission.    General Information   Onset of Illness/Injury or Date of Surgery - Date 07/10/18   Referring Physician Prashanth Ramesh MD   Patient/Family Goals Statement to return home, indep.   Additional Occupational Profile Info/Pertinent History of Current Problem R foot infection; s/p AMPUTATE LEG BELOW KNEE; right    Precautions/Limitations fall precautions   General Info Comments activity orders; up with A   Cognitive Status Examination   Cognitive Comment cognition appears baseline; WNL   Visual Perception   Visual Perception No deficits were identified;Wears glasses   Sensory Examination   Sensory Quick Adds (bilat hands, Lfoot)   Pain Assessment   Patient Currently in Pain No   Range of Motion (ROM)   ROM Quick Adds No deficits were identified   Strength   Manual Muscle Testing  Quick Adds (bilat UE grossly 4/5)   Transfer Skill: Bed to Chair/Chair to Bed   Level of [place]: Bed to Chair minimum assist (75% patients effort)   Physical Assist/Nonphysical Assist: Bed to Chair set-up required;1 person assist;verbal cues   Assistive Device - Transfer Skill Bed to Chair Chair to Bed Rehab Eval rolling walker   Transfer Skill: Toilet Transfer   Level of [place]: Toilet minimum assist (75% patients effort)  (commode)   Physical Assist/Nonphysical Assist: Toilet set-up required;1 person assist   Assistive Device rolling walker   Lower Body Dressing   Level of [place]: Dress Lower Body contact guard   Physical Assist/Nonphysical Assist: Dress Lower Body set-up required;1 person assist   Grooming   Level of [place]: Grooming stand-by assist  (from sitting)   Physical Assist/Nonphysical Assist: Grooming set-up required   Eating/Self Feeding   Level of [place]: Eating independent   Instrumental Activities of Daily Living (IADL)   Previous Responsibilities meal prep;medication management;finances;driving;shopping;laundry;housekeeping   Activities of Daily Living Analysis   Impairments Contributing to Impaired Activities of Daily Living balance impaired;post surgical precautions;strength decreased   General Therapy Interventions   Planned Therapy Interventions ADL retraining;IADL retraining;transfer training;home program guidelines;progressive activity/exercise   Clinical Impression   Criteria for Skilled Therapeutic Interventions Met yes, treatment indicated   OT Diagnosis impaired ADLs/mobility   Influenced by the following impairments s/p BKA   Assessment of Occupational Performance 3-5 Performance Deficits   Identified Performance Deficits toileting, bathing, dressing, home mgmt.   Clinical Decision Making (Complexity) Low complexity   Therapy Frequency other (see comments)  (6X/wk.)   Predicted Duration of Therapy Intervention (days/wks) ~ 2 weeks   Anticipated Equipment  Needs at Discharge (TBD)   Anticipated Discharge Disposition Home with Assist   Risks and Benefits of Treatment have been explained. Yes   Patient, Family & other staff in agreement with plan of care Yes   Total Evaluation Time   Total Evaluation Time (Minutes) 10

## 2018-07-11 NOTE — MR AVS SNAPSHOT
After Visit Summary   7/11/2018    Alessandra Pak    MRN: [id number]           Patient Information     Date Of Birth          1965        Visit Information        Provider Department      7/11/2018 12:20 PM Becky King PA-C [facility]        Care Instructions    Wound care: perform daily.    1. Spay with Microklenz, pat dry.   2. Apply medihoney to heel and Iodosorb to left medial foot wound.   3. Wrap with gauze and kerlix.          Follow-ups after your visit        Your next 10 appointments already scheduled     Jul 23, 2018  1:00 PM CDT   CT CHEST W/O CONTRAST [facility]   [facility]    [address] [place] [id number] [phone]           Please bring any scans or X-rays taken at other hospitals, if similar tests were done. Also bring a list of your medicines, including vitamins, minerals and over-the-counter drugs. It is safest to leave personal items at home.  Be sure to tell your doctor:   If you have any allergies.   If there s any chance you are pregnant.   If you are breastfeeding.  You do not need to do anything special to prepare for this exam.  Please wear loose clothing, such as a sweat suit or jogging clothes. Avoid snaps, zippers and other metal. We may ask you to undress and put on a hospital gown.            Jul 23, 2018  1:30 PM CDT   New Visit with Rene Swartz MD   [facility]    [address] [place] [id number] [phone]            Jul 24, 2018  9:20 AM CDT   (Arrive by 8:50 AM)   RETURN SPINE with Ambrose King MD   [facility] ([facility])    [address]  4th Floor  [facility] [address] [phone]            Jul 25, 2018 10:00 AM CDT   (Arrive by 9:45 AM)   RETURN FOOT/ANKLE with Becky King PA-C   [facility] ([facility])    [address]  4th [facility] [address] [phone]              Who to contact     Please call your clinic at [phone] to:    Ask questions about your health    Make or cancel appointments    Discuss your medicines    Learn about your test results    Speak to your doctor            Additional Information About Your Visit        MyChart Information     [employer] gives you secure access to your electronic health record. If you see a primary care provider, you can also send messages to your care team and make appointments. If you have questions, please call your primary care clinic.  If you do not have a primary care provider, please call [phone] and they will assist you.      [employer] is an electronic gateway that provides easy, online access to your medical records. With [employer], you can request a clinic appointment, read your test results, renew a prescription or communicate with your care team.     To access your existing account, please contact your [facility] or call [phone] for assistance.        Care EveryWhere ID     This is your Care EveryWhere ID. This could be used by other organizations to access your [place] medical records  [id number]         Blood Pressure from Last 3 Encounters:   07/11/18 133/73   07/10/18 156/89   02/23/18 109/88    Weight from Last 3 Encounters:   07/09/18 55.7 kg (122 lb 14.4 oz)   02/23/18 63.5 kg (139 lb 15.9 oz)   02/16/18 63.5 kg (140 lb)              Today, you had the following     No orders found for display         Today's Medication Changes      Notice     This visit is during an admission. Changes to the med list made in this visit will be reflected in the After Visit Summary of the admission.             Primary Care Provider Office Phone # Fax #    Brionna Dc [phone]       [id number]  [place] [facility] [address]        Equal Access to  Services     [facility]: Hadii aad ku hadpoppystacey Terrell, wagracieda luqadaha, qacristinata kayifanalbino piña, sanjay levine . So [facility] [phone].    ATENCIÓN: Si habla español, tiene a nagy disposición servicios gratuitos de asistencia lingüística. Llame al [phone].    We comply with applicable federal civil rights laws and [place] laws. We do not discriminate on the basis of race, color, national origin, age, disability, sex, sexual orientation, or gender identity.            Thank you!     Thank you for choosing [facility]  for your care. Our goal is always to provide you with excellent care. Hearing back from our patients is one way we can continue to improve our services. Please take a few minutes to complete the written survey that you may receive in the mail after your visit with us. Thank you!             Your Updated Medication List - Protect others around you: Learn how to safely use, store and throw away your medicines at www.disposemymeds.org.      Notice     This visit is during an admission. Changes to the med list made in this visit will be reflected in the After Visit Summary of the admission.

## 2018-07-11 NOTE — PROGRESS NOTES
Clinic Care Coordination Contact    Situation: Patient chart reviewed by care coordinator.    Background: RN CC received CTS hand off for patient.    Assessment: Patient was admitted to [facility] yesterday.    Plan/Recommendations: RN CC will monitor for patient discharge and then outreach to patient.    Melissa Behl BSN, RN, PHN  [facility] Care Coordinator  [phone]

## 2018-07-11 NOTE — PHARMACY-MEDICATION REGIMEN REVIEW
Pharmacy Medication Regimen Review  Alessandra Pak is a 52 year old female who is currently in the Transitional Care Unit.    Assessment: All medications have an appropriate indications, durations and no unnecessary use was found    Plan:   1. Continue to monitor patient for pain and consider tapering her off of the Oxycontin since she is on methadone for history of opiate dependence.     2. Monitor patient for symptoms of respiratory depression.     Attending provider will be sent naga dsouza for review.  If there are any emergent issues noted above, pharmacist will contact provider directly by phone.      Pharmacy will periodically review the resident's medication regimen for any PRN medications not administered in > 72 hours and discontinue them. The pharmacist will discuss gradual dose reductions of psychopharmacologic medications with interdisciplinary team on a regular basis.    Please contact pharmacy if the above does not answer specific medication questions/concerns.    Background:  A pharmacist has reviewed all medications and pertinent medical history today.  Medications were reviewed for appropriate use and any irregularities found are listed with recommendations.      Current Facility-Administered Medications:      [START ON 7/13/2018] - Skin Test Reading -, , Does not apply, Q21 Days, Prashanth Ramesh MD     acetaminophen (TYLENOL) Suppository 650 mg, 650 mg, Rectal, Q4H PRN, Prashanth Ramesh MD     acetaminophen (TYLENOL) tablet 1,000 mg, 1,000 mg, Oral, Q8H PRN, Prashanth Ramesh MD     albuterol (PROAIR HFA/PROVENTIL HFA/VENTOLIN HFA) Inhaler 2 puff, 2 puff, Inhalation, Q4H PRN, Prashanth Ramesh MD     amitriptyline (ELAVIL) tablet 50 mg, 50 mg, Oral, At Bedtime, Prashanth Ramesh MD, 50 mg at 07/10/18 2036     aspirin chewable tablet 81 mg, 81 mg, Oral, Daily, Prashanth Ramesh MD, 81 mg at 07/11/18 0924     bisacodyl (DULCOLAX) Suppository 10 mg, 10 mg, Rectal, Daily PRN, Prashanth Ramesh MD     glucose gel  15-30 g, 15-30 g, Oral, Q15 Min PRN **OR** dextrose 50 % injection 25-50 mL, 25-50 mL, Intravenous, Q15 Min PRN **OR** glucagon injection 1 mg, 1 mg, Subcutaneous, Q15 Min PRN, Prashanth Ramesh MD     enoxaparin (LOVENOX) injection 40 mg, 40 mg, Subcutaneous, Q24H, Prashanth Ramesh MD     fluticasone (FLONASE) 50 MCG/ACT spray 2 spray, 2 spray, Left nostril, Daily, Prashanth Ramesh MD, 2 spray at 07/11/18 0925     furosemide (LASIX) tablet 40 mg, 40 mg, Oral, Daily, Prashanth Ramesh MD, 40 mg at 07/11/18 0924     heparin lock flush 10 UNIT/ML injection 5-10 mL, 5-10 mL, Intracatheter, Q24H, Ryan Crump MD, 5 mL at 07/10/18 2138     heparin lock flush 10 UNIT/ML injection 5-10 mL, 5-10 mL, Intracatheter, Q1H PRN, Ryan Crump MD, 5 mL at 07/11/18 1014     HYDROmorphone (STANDARD CONC) (DILAUDID) liquid 2 mg, 2 mg, Oral, Daily PRN, Prashanth Ramesh MD, 2 mg at 07/11/18 0224     insulin aspart (NovoLOG) inj (RAPID ACTING), 1-7 Units, Subcutaneous, TID AC, Prashanth Ramesh MD     insulin aspart (NovoLOG) inj (RAPID ACTING), 1-5 Units, Subcutaneous, At Bedtime, Prashanth Ramesh MD, 1 Units at 07/10/18 2140     insulin glargine (LANTUS) injection 10 Units, 10 Units, Subcutaneous, At Bedtime, Prashanth Ramesh MD, 10 Units at 07/10/18 2140     Lidocaine (LIDOCARE) 4 % Patch 2 patch, 2 patch, Transdermal, Q24H, Prashanth Ramesh MD, 2 patch at 07/10/18 1755     lidocaine (LMX4) cream, , Topical, Q1H PRN, Ryan Crump MD     lidocaine 1 % 1 mL, 1 mL, Other, Q1H PRN, Ryan Crump MD     lisinopril (PRINIVIL/ZESTRIL) tablet 10 mg, 10 mg, Oral, Daily, Prashanth Ramesh MD, 10 mg at 07/11/18 0924     medication instructions, , Does not apply, Continuous PRN, Prashanth Ramesh MD     melatonin tablet 6 mg, 6 mg, Oral, At Bedtime, Prashanth Ramesh MD, 6 mg at 07/10/18 2036     menthol (ICY HOT) 5 % patch 1 patch, 1 patch, Topical, Q8H PRN, Prashanth Ramesh MD, 1 patch at 07/11/18 0341     menthol (ICY HOT)  Patch in Place, , Transdermal, Q8H, Prashanth Ramesh MD     menthol (ICY HOT) patch REMOVAL, , Transdermal, Q8H PRN, Prashanth Ramesh MD     methadone (DOLPHINE-INTENSOL) 10 mg/mL (HIGH CONC) solution 70 mg, 70 mg, Oral, Daily, Prashanth Ramesh MD, 70 mg at 07/11/18 1002     metoprolol tartrate (LOPRESSOR) half-tab 12.5 mg, 12.5 mg, Oral, BID, Prashanth Ramesh MD, 12.5 mg at 07/11/18 0924     multivitamin, therapeutic with minerals (THERA-VIT-M) tablet 1 tablet, 1 tablet, Oral, Daily, Prashanth Ramesh MD, 1 tablet at 07/11/18 0923     naloxone (NARCAN) injection 0.1-0.4 mg, 0.1-0.4 mg, Intravenous, Q2 Min PRN, Prashanth Ramesh MD     oxyCODONE (OxyCONTIN) 12 hr tablet 10 mg, 10 mg, Oral, Q12H, Prashanth Ramesh MD, 10 mg at 07/11/18 0343     polyethylene glycol (MIRALAX/GLYCOLAX) Packet 17 g, 17 g, Oral, Daily, Prashanth Ramesh MD, 17 g at 07/11/18 0922     pregabalin (LYRICA) capsule 75 mg, 75 mg, Oral, TID, Prashanth Ramesh MD, 75 mg at 07/11/18 1439     ranitidine (ZANTAC) tablet 300 mg, 300 mg, Oral, Daily, Prashanth Ramesh MD, 300 mg at 07/11/18 0924     sennosides (SENOKOT) tablet 2 tablet, 2 tablet, Oral, BID, Prashanth Ramesh MD, 2 tablet at 07/11/18 0924     sodium chloride (PF) 0.9% PF flush 10-20 mL, 10-20 mL, Intracatheter, Q1H PRN, Ryan Crump MD     tuberculin injection 5 Units, 5 Units, Intradermal, Q21 Days, Prashanth Ramesh MD, 5 Units at 07/11/18 0931     umeclidinium-vilanterol (ANORO ELLIPTA) 62.5-25 MCG/INH oral inhaler 1 puff, 1 puff, Inhalation, Daily, Prashanth Ramesh MD, 1 puff at 07/11/18 0926     [START ON 7/12/2018] vancomycin (VANCOCIN) 1,250 mg in sodium chloride 0.9 % 250 mL intermittent infusion, 1,250 mg, Intravenous, Q24H, Geena Menezes, LT[facility], [facility] within [facility]  No current outpatient prescriptions on file.   PMH: Severe Sepsis  Infectious/hypoxic encephalopathy  R diabetic foot infection & osteomyelitis, s/p I&D x 3, R BKA 6/28  Acute hypoxic respiratory failure  ARDS  Pulmonary edema  Hx of  chronic restrictive lung disease with emphysema and fibrosis  Acute on chronic NICM (EF 35-40%) s/p ICD 2015  Pain control   Chronic pain   Anxiety  Sinus tachycardia   Type 2 Diabetes  Hx of Chronic pancreatitis s/p whipple  Severe malnutrition in the context of acute illness  Acute on chronic normocytic Anemia  Anemia of chronic disease   NIKOLAY  Adrenal insufficiency  Anisocoria/R afferent pupillary defect      Geena Menezes, PharmD, BCPS

## 2018-07-11 NOTE — PROGRESS NOTES
Chief Complaint:   Chief Complaint   Patient presents with     Surgical Followup     Right BKA by Dr. King on 6-. Wound check.     Wound Check     Wounds, left foot.        Subjective: Alessandra is a 52 year old female who presents to the clinic today for evaluation of L foot diabetic ulcers.  Was admitted to [facility] 6/6 - 7/10/18 for sepsis, diagnosis R diabetic foot infection & osteomyelitis, s/p I&D x 3, R BKA 6/28. Currently at a TCU for rehab and care.    Alessandra is here today for management of her chronic L foot ulcerations. There is one on her heel and another on the medial, distal portion of the foot. The latter is only about 1 month old; started as a blister. She has been putting Iodosorb on the medial ulcer daily after cleaning. She has a offloading boot for heel ulcer. Continues to be NWB.    ROS: Denies redness, swelling, wound pain, odor, or calor. Denies fevers, chills, n/v, confusion.    Past Medical History:   Diagnosis Date     Abdominal pain, right upper quadrant     sees Dr Mcclellan [facility] clinic [facility]     ASCUS with positive high risk HPV 8/2013    + HPV 33, [place] - GAVIN I, ECC- atypia     Cardiomyopathy (H)     non ischemic cardiomyopathy with EF 15     Cervical high risk HPV (human papillomavirus) test positive 7/8/15, 7/25/16    NIL pap/+ HR HPV (not 16 or 18).      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    leep     Depressive disorder      Gastro-oesophageal reflux disease      Human papillomavirus in conditions classified elsewhere and of unspecified site 2/2012    + HPV 33     Hypertension      Profound impairment, one eye, impairment level not further specified     rt eye due to childhood injury     Systolic CHF (H) 3/12/2015     Tobacco abuse 5/18/2013     Type 2 diabetes mellitus without complications (H)      Uncomplicated asthma      Past Surgical History:   Procedure Laterality Date     AMPUTATE LEG BELOW KNEE Right 6/28/2018    Procedure: AMPUTATE LEG BELOW KNEE;  Right Knee Ampuation  Below Knee, Anesthesia Block;  Surgeon: Ambrose King MD;  Location: [facility] OR     C NONSPECIFIC PROCEDURE  2001    cholecystectomy     C NONSPECIFIC PROCEDURE  as a child    tonsillectomy     C NONSPECIFIC PROCEDURE  2001    whipple procedure     CARDIAC SURGERY      defib     COLPOSCOPY,LOOP ELECTRD CERVIX EXCIS  2002, 2011    stage 2 dysplasia     ENDOBRONCHIAL ULTRASOUND FLEXIBLE N/A 2/19/2015    Procedure: ENDOBRONCHIAL ULTRASOUND FLEXIBLE;  Surgeon: Brenden Tamez MD;  Location: [facility] GI     LEEP TX, CERVICAL  2014    LEEP TX Cervical     RECESSION RESECTION WITH ADJUSTABLE SUTURE  12/13/2011    Procedure:RECESSION RESECTION WITH ADJUSTABLE SUTURE; Right Strabismus Repair with Adjustable Suture       TUBAL LIGATION  2007    essure      Family History   Problem Relation Age of Onset     Diabetes Mother      diet controled     Hypertension Mother      Arthritis Mother      Lipids Mother      Diabetes Father      Hypertension Father      GASTROINTESTINAL DISEASE Father      gallbladder removed     Bipolar Disorder Brother      Thyroid Disease Brother      Obesity Other      Son     Respiratory Other      Son and Daughter; asthma     Depression Maternal Aunt      Anxiety Disorder Maternal Aunt      Social History   Substance Use Topics     Smoking status: Former Smoker     Packs/day: 0.30     Years: 15.00     Types: Cigarettes     Smokeless tobacco: Former User     Quit date: 10/29/2014      Comment: Started smoking in 89/ smokes about 3 per day     Alcohol use No     Allergies   Allergen Reactions     No Known Drug Allergies      No current outpatient prescriptions on file.       Objective:   There were no vitals taken for this visit.    General: Patient is well groomed, appears stated age, is alert, cooperative and in no acute distress.  MSK: Status post right BKA.  Skin: LE skin color, texture and turgor are normal. Hypopigmented scar tissue to medial L heel. Incision to R BKA is well coapted, no  areas of dehiscence or maceration.    Wound #1  A diabetic wound is noted at left  medial foot at first met head measuring  2.2 cm x 2.4 cm x 0.2 cm. Non-tender to palpation. See photo under media tab.  Tissue Depth: subcutaneous, tendon  Wound base: Pink  Yellow/Granulation  Slough, tendon  Edges: intact  Drainage: slight/serous  Odor: No   Undermining: No  Tunneling: No  Bone Exposure: No  Clinical Signs of Infection: No    Wound #2  A diabetic wound is noted at left  heel measuring  0.5 cm x 0.2 cm x 0.1 cm. Non-tender to palpation.  Tissue Depth: dermis  Wound base: Pink/Granulation  Edges: scab  Drainage: scant/serous  Odor: No   Undermining: No  Tunneling: No  Bone Exposure: No  Clinical Signs of Infection: No    Previous Laboratory Studies:   Lab Results   Component Value Date    A1C 9.5 06/06/2018    A1C 12.8 02/16/2018     Assessment:   - Diabetic L foot ulcerations  - DM type 2 with polyneuropathy  - S/p BKA right    Plan:   - Pt seen and evaluated. Diagnosis and treatment options were discussed.   - Wound #1 was debrided today. After obtaining patient consent, a #15 blade was used to excisionally debride the devitalized tissue of the wound. The wound edges and base were debrided to healthy, bleeding tissue into subcutaneous tissue at the deepest. No anesthesia was necessary for the procedure.   - Wound care instructions: 1. Clean wounds with wound cleanser and pat dry. 2. MediHoney to heel wound, Iodosorb to medial met wound. 3. Wrap with gauze. Perform dressing changes daily  - Off-loading boot to heel at all times.  - Pt to return to clinic in 2 weeks

## 2018-07-11 NOTE — PROGRESS NOTES
CLINICAL NUTRITION SERVICES - ASSESSMENT NOTE     Nutrition Prescription    RECOMMENDATIONS FOR MDs/PROVIDERS TO ORDER:  None today    Malnutrition Status:    Non-severe malnutrition in the context of acute illness    Recommendations already ordered by Registered Dietitian (RD):  Boost GC BID (lunch and PM snack, vanilla)    Future/Additional Recommendations:  Adjust supplements pending PO intakes and wt trends     REASON FOR ASSESSMENT  Alessandra Pak is a/an 52 year old female assessed by the dietitian for Admission Nutrition Risk Screen for stageable pressure injuries or large/non-healing wound or burn    History of chronic pancreatitis s/p whipple, T2DM c/b diabetic foot wounds and ulcers, CKD 3, restrictive lung disease with emphysema and fibrosis, NICM s/p ICD, chronic methadone use and s/p R BKA on 6/28 for osteomyelitis.     NUTRITION HISTORY  - PTA to [place], pt had a poor appetite and was eating meals BID. Usually she would eat breakfast (banana, cereal with milk) and lunch (Turkey sandwich w/ lettuce + tomato) with a snack (1-2 servings of fruit).   - Pt had TF at [facility] from 6/12-7/5 d/t being intubated from 6/10-6/15 and poor PO intakes. She was consuming Boost GC (liked) and Magic Cups (disliked) at [place] and prior to D/C reports her appetite had improved significantly.   - Wound care protocol of vitamin C and zinc sulfate was completed on 6/21. Pt had chronic wound on R foot/lower leg prior to amputation.     CURRENT NUTRITION ORDERS  Diet: Moderate Consistent Carbohydrate  Intake/Tolerance: pt reports eating 100% of breakfast today    LABS  Labs reviewed  - A1C 9.5 (6/6 - improved from 12.8 on 2/16)  - CRP 31.0 (7/5 - trending down from 150.0 on 6/30)  - Na+ 138 (7/9)  - K+ 4.2 (7/9)  - Phos 3.5 (7/7)  - Creat 1.05 (7/10)  - BG x24 hrs: 106-236    MEDICATIONS  Medications reviewed  - Lasix 40 mg qd  - Novolog (SS/correction)  - Lantus q HS  - Theravit-m    ANTHROPOMETRICS  Height: 0  "cm (Data Unavailable)  Ht Readings from Last 2 Encounters:   02/16/18 1.753 m (5' 9\")   02/16/18 1.753 m (5' 9\")   Most Recent Weight: 55.7 kg (122 lb 14.4 oz)     IBW: 59.8 kg (63.6 kg - 5.9% for R BKA)  BMI: 19.7 kg/m2 - Normal BMI  Weight History: no admit wt obtained yet. Pt appears to have lost 2 lbs over the last month, however is trending upwards. She previously lost ~23 lbs (16%) over the last 5 months. This is likely d/t in part R BKA as well as LBM with poor intakes.   Wt Readings from Last 20 Encounters:   07/09/18 07/06/18 06/08/18 55.7 kg (122 lb 14.4 oz)  53.7 kg (118 lb 6.4 oz)  56.9 kg (125 lb 7.1 oz)   02/23/18 63.5 kg (139 lb 15.9 oz)   02/16/18 63.5 kg (140 lb)   02/13/18 65.8 kg (145 lb)   02/05/18 66.4 kg (146 lb 6.4 oz)   02/02/18 64.8 kg (142 lb 12.8 oz)   10/04/17 66.7 kg (147 lb)     Dosing Weight: 54 kg - using lowest wt from recent hx    ASSESSED NUTRITION NEEDS  Estimated Energy Needs: 6067-1723 kcals/day (30 - 35 kcals/kg)  Justification: Wound healing  Estimated Protein Needs: 65-81 grams protein/day (1.2 - 1.5 grams of pro/kg)  Justification: Post-op, CKD 3, wound healing   Estimated Fluid Needs: 1 mL/kcal  Justification: Per provider pending fluid status    PHYSICAL FINDINGS  See malnutrition section below.  Per WOCN note on 7/6, wounds due to Diabetic Ulcer-likely Neuropathic on L lateral heel, medial foot and great toe    MALNUTRITION  % Intake: Decreased intake does not meet criteria  % Weight Loss: > 10% in 6 months (severe)  Subcutaneous Fat Loss: Facial region: mild  Muscle Loss: Temporal, Scapular bone, Upper arm (bicep, tricep), Lower arm (forearm), Dorsal hand and Posterior calf: mild  Fluid Accumulation/Edema: Does not meet criteria  Malnutrition Diagnosis: Non-severe malnutrition in the context of acute illness    NUTRITION DIAGNOSIS  Predicted protein-energy intake related to variable appetite as evidenced by pt reliant on PO intakes to meet 100% of nutritional needs " with potential for variation       INTERVENTIONS  Implementation  Nutrition Education: discussed adequacy of PO intakes and pt reports eating much better. Discussed current intakes and pt was also agreeable to drinking Boost GC BID.     Goals  Patient to consume % of nutritionally adequate meal trays TID, or the equivalent with supplements/snacks.     Monitoring/Evaluation  Progress toward goals will be monitored and evaluated per protocol.      Tg Chaivra RD, LD  Unit Pager: [phone]

## 2018-07-11 NOTE — PROGRESS NOTES
07/11/18 0807   Quick Adds   Quick Adds Certification   Type of Visit Initial PT Evaluation   Living Environment   Lives With parent(s);child(kristina), adult  (mother and 23 yo daughter)   Living Arrangements house   Home Accessibility bed and bath on same level;stairs to enter home;stairs within home   Number of Stairs to Enter Home 1   Number of Stairs Within Home 12   Stair Railings at Home inside, present on left side   Transportation Available car;family or friend will provide  (small SUV)   Self-Care   Dominant Hand right   Usual Activity Tolerance good   Current Activity Tolerance fair   Regular Exercise no   Equipment Currently Used at Home none   Activity/Exercise/Self-Care Comment IND with all mobility; could walk ~1/2 hr   Functional Level Prior   Ambulation 0-->independent   Transferring 0-->independent   Toileting 0-->independent   Bathing 0-->independent   Dressing 0-->independent   Eating 0-->independent   Communication 0-->understands/communicates without difficulty   Swallowing 0-->swallows foods/liquids without difficulty   Cognition 0 - no cognition issues reported   Fall history within last six months yes   Number of times patient has fallen within last six months 3  (winter--ice)   Which of the above functional risks had a recent onset or change? ambulation;transferring   General Information   Onset of Illness/Injury or Date of Surgery - Date 06/06/18   Referring Physician Prashanth Ramesh MD; Dr Camron Reyes   Patient/Family Goals Statement increase UE strength so can walk with walker, use w/c   Pertinent History of Current Problem (include personal factors and/or comorbidities that impact the POC) 52 year old female admitted on 6/6/2018. She has a history of chronic pancreatitis s/p whipple, T2DM c/b diabetic foot wounds and ulcers, CKD, restrictive lung disease with emphysema and fibrosis, NICM s/p ICD, chronic methadone use and is admitted for severe sepsis due to Right foot abscess and  osteomyelitis. Hospital course was complicated by respiratory failure due to ARDS requiring intubation (6/10-15), extubated and transferred to medicine for further cares.I & D x3 then R BKA 6/28   Heart Disease Risk Factors Diabetes;Lack of physical activity;Medical history   General Observations pt lying in bed, agreeable to PT   Cognitive Status Examination   Orientation orientation to person, place and time   Level of Consciousness alert   Follows Commands and Answers Questions 100% of the time   Personal Safety and Judgment intact   Memory intact   Pain Assessment   Patient Currently in Pain (occasional pain R LE)   Integumentary/Edema   Integumentary/Edema Comments R BKA wrapped, ; L heel offloading boot for compromised skin L heel (foot wrapped)   Posture    Posture Not impaired   Range of Motion (ROM)   ROM Comment R BKA; remainder LE WFL   Strength   Strength Comments hip flexion 3/5, ext and abd 4-/5; knee ext 4/5   Bed Mobility   Bed Mobility Comments IND sit<>supine   Transfer Skills   Transfer Comments SBA sit<>stand from bed   Gait   Gait Comments amb with FWW 20 ft; CGA; decreased step length and floor clearance L   Balance   Balance Comments UE support at all times in standing due to R BKA; IND sitting   Sensory Examination   Sensory Perception Comments phantom sensation R LE; numbness L toes   Coordination   Coordination no deficits were identified   Muscle Tone   Muscle Tone no deficits were identified   General Therapy Interventions   Planned Therapy Interventions gait training;groups;manual therapy;neuromuscular re-education;prosthetic fitting/training;strengthening;transfer training;wheelchair management/propulsion training;home program guidelines;progressive activity/exercise   Clinical Impression   Criteria for Skilled Therapeutic Intervention yes, treatment indicated   PT Diagnosis decreased functional mobility s/p R BKA   Influenced by the following impairments decreased strength,  balance, and activity tolerance; impaired skin integrity   Functional limitations due to impairments gait and transfers below prior IND level without AD   Clinical Presentation Evolving/Changing   Clinical Presentation Rationale functional mobility assessment; comorbidities; compromised skin integrity, neuropathy; participation limitations   Clinical Decision Making (Complexity) Moderate complexity   Therapy Frequency` other (see comments)  (6 days/wk)   Predicted Duration of Therapy Intervention (days/wks) 7/20/18   Anticipated Equipment Needs at Discharge front wheeled walker;wheelchair   Anticipated Discharge Disposition Home with Home Therapy   Risk & Benefits of therapy have been explained Yes   Patient, Family & other staff in agreement with plan of care Yes   Clinical Impression Comments pt s/p R BKA 2/2 osteomyelitis diabetic foot ulcer. previously IND without AD. CGA with FWW limited distance currently. Will benefit from skilled PT to maximize functional mobility for return home. Pt states w/c should work in home except possibly bathroom   Therapy Certification   Start of care date 07/11/18   Certification date from 07/11/18   Certification date to 08/07/18   Medical Diagnosis R BKA   Certification I certify the need for these services furnished under this plan of treatment and while under my care.  (Physician co-signature of this document indicates review and certification of the therapy plan).    Total Evaluation Time   Total Evaluation Time (Minutes) 15

## 2018-07-11 NOTE — PLAN OF CARE
Patient is a 52 year old female admitted to room 4312 via wheelchair.  Patient is alert and oriented X 4. See Epic for VS and assessment.  Patient is able to transfer A1 using walker. Patient was settled into their room, shown call light, tv, mealtimes etc. Oriented to unit. Will continue monitoring pain level and VS. Notifying MD with any concerns.  Follow MD orders for cares and medications.    Level of Schooling:college  Ethnicity: and -American  Marital Status:  Dentures: Yes  Hearing Aid: No  Smoker:  Yes  Glasses: Yes  Occupation: none  Falls 0-1 mo: 1 2-6 mo: 0   Advanced Care Directive Referral to Social Work? yes

## 2018-07-11 NOTE — PLAN OF CARE
Problem: Goal Outcome Summary  Goal: Goal Outcome Summary  Pt amb 20 ft with FWW, CGA--will limit amb to not compromise L LE. Will need w/c for home.

## 2018-07-11 NOTE — LETTER
7/11/2018       RE: Alessandra Pak  [address]  [facility] [address]     Dear Colleague,    Thank you for referring your patient, Alessandra Pak, to the [facility]. Please see a copy of my visit note below.    Chief Complaint:   Chief Complaint   Patient presents with     Surgical Followup     Right BKA by Dr. King on 6-. Wound check.     Wound Check     Wounds, left foot.        Subjective: Alessandra is a 52 year old female who presents to the clinic today for evaluation of L foot diabetic ulcers.  Was admitted to [facility] 6/6 - 7/10/18 for sepsis, diagnosis R diabetic foot infection & osteomyelitis, s/p I&D x 3, R BKA 6/28. Currently at a TCU for rehab and care.    Alessandra is here today for management of her chronic L foot ulcerations. There is one on her heel and another on the medial, distal portion of the foot. The latter is only about 1 month old; started as a blister. She has been putting Iodosorb on the medial ulcer daily after cleaning. She has a offloading boot for heel ulcer. Continues to be NWB.    ROS: Denies redness, swelling, wound pain, odor, or calor. Denies fevers, chills, n/v, confusion.    Past Medical History:   Diagnosis Date     Abdominal pain, right upper quadrant     sees Dr Mcclellan pain clinic at [facility]     ASCUS with positive high risk HPV 8/2013    + HPV 33, Norco - GAVIN I, ECC- atypia     Cardiomyopathy (H)     non ischemic cardiomyopathy with EF 15     Cervical high risk HPV (human papillomavirus) test positive 7/8/15, 7/25/16    NIL pap/+ HR HPV (not 16 or 18).      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    leep     Depressive disorder      Gastro-oesophageal reflux disease      Human papillomavirus in conditions classified elsewhere and of unspecified site 2/2012    + HPV 33     Hypertension      Profound impairment, one eye, impairment level not further specified     rt eye due to childhood injury     Systolic  CHF (H) 3/12/2015     Tobacco abuse 5/18/2013     Type 2 diabetes mellitus without complications (H)      Uncomplicated asthma      Past Surgical History:   Procedure Laterality Date     AMPUTATE LEG BELOW KNEE Right 6/28/2018    Procedure: AMPUTATE LEG BELOW KNEE;  Right Knee Ampuation Below Knee, Anesthesia Block;  Surgeon: Ambrose King MD;  Location: [facility] OR     C NONSPECIFIC PROCEDURE  2001    cholecystectomy     C NONSPECIFIC PROCEDURE  as a child    tonsillectomy     C NONSPECIFIC PROCEDURE  2001    whipple procedure     CARDIAC SURGERY      defib     COLPOSCOPY,LOOP ELECTRD CERVIX EXCIS  2002, 2011    stage 2 dysplasia     ENDOBRONCHIAL ULTRASOUND FLEXIBLE N/A 2/19/2015    Procedure: ENDOBRONCHIAL ULTRASOUND FLEXIBLE;  Surgeon: Brenden Tamez MD;  Location: [facility] GI     LEEP TX, CERVICAL  2014    LEEP TX Cervical     RECESSION RESECTION WITH ADJUSTABLE SUTURE  12/13/2011    Procedure:RECESSION RESECTION WITH ADJUSTABLE SUTURE; Right Strabismus Repair with Adjustable Suture       TUBAL LIGATION  2007    essure      Family History   Problem Relation Age of Onset     Diabetes Mother      diet controled     Hypertension Mother      Arthritis Mother      Lipids Mother      Diabetes Father      Hypertension Father      GASTROINTESTINAL DISEASE Father      gallbladder removed     Bipolar Disorder Brother      Thyroid Disease Brother      Obesity Other      Son     Respiratory Other      Son and Daughter; asthma     Depression Maternal Aunt      Anxiety Disorder Maternal Aunt      Social History   Substance Use Topics     Smoking status: Former Smoker     Packs/day: 0.30     Years: 15.00     Types: Cigarettes     Smokeless tobacco: Former User     Quit date: 10/29/2014      Comment: Started smoking in 89/ smokes about 3 per day     Alcohol use No     Allergies   Allergen Reactions     No Known Drug Allergies      No current outpatient prescriptions on file.       Objective:   There were no vitals  taken for this visit.    General: Patient is well groomed, appears stated age, is alert, cooperative and in no acute distress.  MSK: Status post right BKA.  Skin: LE skin color, texture and turgor are normal. Hypopigmented scar tissue to medial L heel. Incision to R BKA is well coapted, no areas of dehiscence or maceration.    Wound #1  A diabetic wound is noted at left  medial foot at first met head measuring  2.2 cm x 2.4 cm x 0.2 cm. Non-tender to palpation. See photo under media tab.  Tissue Depth: subcutaneous, tendon  Wound base: Pink  Yellow/Granulation  Slough, tendon  Edges: intact  Drainage: slight/serous  Odor: No   Undermining: No  Tunneling: No  Bone Exposure: No  Clinical Signs of Infection: No    Wound #2  A diabetic wound is noted at left  heel measuring  0.5 cm x 0.2 cm x 0.1 cm. Non-tender to palpation.  Tissue Depth: dermis  Wound base: Pink/Granulation  Edges: scab  Drainage: scant/serous  Odor: No   Undermining: No  Tunneling: No  Bone Exposure: No  Clinical Signs of Infection: No    Previous Laboratory Studies:   Lab Results   Component Value Date    A1C 9.5 06/06/2018    A1C 12.8 02/16/2018     Assessment:   - Diabetic L foot ulcerations  - DM type 2 with polyneuropathy  - S/p BKA right    Plan:   - Pt seen and evaluated. Diagnosis and treatment options were discussed.   - Wound #1 was debrided today. After obtaining patient consent, a #15 blade was used to excisionally debride the devitalized tissue of the wound. The wound edges and base were debrided to healthy, bleeding tissue into subcutaneous tissue at the deepest. No anesthesia was necessary for the procedure.   - Wound care instructions: 1. Clean wounds with wound cleanser and pat dry. 2. MediHoney to heel wound, Iodosorb to medial met wound. 3. Wrap with gauze. Perform dressing changes daily  - Off-loading boot to heel at all times.  - Pt to return to clinic in 2 weeks      Again, thank you for allowing me to participate in the care of  your patient.      Sincerely,    Becky King PA-C

## 2018-07-11 NOTE — NURSING NOTE
Reason For Visit:   Chief Complaint   Patient presents with     Surgical Followup     Right BKA by Dr. King on 6-. Wound check.     Wound Check     Wounds, left foot.        Pain Assessment  Patient Currently in Pain: Yes (Pt stated that she has phantom pain in her right foot. )  Pain Orientation: Left, Right  Pain Descriptors: Tingling, Numbness            No current outpatient prescriptions on file.          Allergies   Allergen Reactions     No Known Drug Allergies

## 2018-07-11 NOTE — H&P
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    - vancomycin (end 8/9)  - weekly CBC/CMP/CRP- if abnormal can contact Dr. Downs  - Dr King f/u 2-3 wks  - Podiatry f/u reviewed.   -  NWB RLE, minimal WB L forefoot for transfers  - foam boot for LLE  - wound cares  - stump  in place at all times except for dressing changes     Acute hypoxic respiratory failure  ARDS  Pulmonary edema  Hx of chronic restrictive lung disease with emphysema and fibrosis  Acute on chronic NICM (EF 35-40%) s/p ICD 2015  - BETTER  - [facility] hospital- Cardiology consulted. No angiogram, pt declined lexiscan due to claustrophobia. Patient does have history of restrictive lung disease of unclear etiology which was being worked up during 2/2015 admission for respiratory failure, was lost to follow up.  - Ct furosemide 40 mg qd  - daily weights, adjust diuretics f/u Cr  - non urgent outpatient pulmonology f/u for further evaluation of restrictive lung disease  - last visit cardiology 2/2016, non urgent cardiology follow up for titration of CHF medication     # Acute on chronic pain   # Anxiety  # Sinus tachycardia   Patient on chronic methadone for hx opiate dependence.  - pain plan as below  - wean narcotics as tolerated      # Uncontrolled Type 2 Diabetes  # Hx of Chronic pancreatitis s/p whipple  # Severe malnutrition in the context of acute illness  Hgb a1c: 12.8 on 2./2 will recheck  -  ongoing nutrition  "support at TCU  - glargine 10 units with SSI  - primary care follow up following TCU stay for diabetes management     # Acute on chronic normocytic Anemia  # Anemia of chronic disease  Had 2u pRBC in acute hospital. monitor.      # NIKOLAY NIKOLAY to 1.93, resolved   Lab Results   Component Value Date    CR 1.05 07/10/2018         # Anisocoria/R afferent pupillary defect  Noted while intubated. Neuro ICU consulted, CT head normal. Resolved spontaneously    # pain Mx: continue home regimen: methadone 70 mg, Lyrica 75 mg tid, Amitriptyline 50 mg qhs   - follows with Dr Rich Patel [facility] for methadone  - New before admission: oxycodone ER 10 mg q12h prn, hydromorphone 2 mg daily prn for breakthrough, Tylenol 1000 mg tid scheduled, lidocaine and methol patches to alternate (lidocaine at night)    Disposition: Several days  DVT ppx: Enxaparin   Pneumovac: Prevnar 13  Antipsychotics- none     Prashanth Ramesh MD (Pager- [phone])   Internal Medicine/ Hospitalist                  Chief Complaint:   Deconditioning after prolonged hospital stay and need for wound cares    History obtained from                     \"History is obtained from the patient\"         History of Present Illness:    53 yo female with PMH significant for chronic pancreatitis s/p whipple with secondary diabetes mellitus c/b diabetic foot ulcers, CKD stage III, NICM s/p ICD, HTN, COPD, and chronic methadone use who was admitted to [facility] from 6/6- 7/10 with AMS and was found to be septic. Had a complicated course (please refer to previous d/c summary for details). Now transferred to TCU for continued cares  Denies Chest pain/ SOB/ cough, Denies any N&V/ bowel problems  Denies urinary problems , Denies fever/chills/rigors   Says is improving.   Pain controlled               Past Medical History:     Past Medical History:   Diagnosis Date     Abdominal pain, right upper quadrant     sees Dr Mcclellan pain clinic at [facility]     ASCUS with " positive high risk HPV 8/2013    + HPV 33, [address] - GAVIN I, ECC- atypia     Cardiomyopathy (H)     non ischemic cardiomyopathy with EF 15     Cervical high risk HPV (human papillomavirus) test positive 7/8/15, 7/25/16    NIL pap/+ HR HPV (not 16 or 18).      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    leep     Depressive disorder      Gastro-oesophageal reflux disease      Human papillomavirus in conditions classified elsewhere and of unspecified site 2/2012    + HPV 33     Hypertension      Profound impairment, one eye, impairment level not further specified     rt eye due to childhood injury     Systolic CHF (H) 3/12/2015     Tobacco abuse 5/18/2013     Type 2 diabetes mellitus without complications (H)      Uncomplicated asthma              Past Surgical History:     Past Surgical History:   Procedure Laterality Date     AMPUTATE LEG BELOW KNEE Right 6/28/2018    Procedure: AMPUTATE LEG BELOW KNEE;  Right Knee Ampuation Below Knee, Anesthesia Block;  Surgeon: Ambrose King MD;  Location: [facility] OR     C NONSPECIFIC PROCEDURE  2001    cholecystectomy     C NONSPECIFIC PROCEDURE  as a child    tonsillectomy     C NONSPECIFIC PROCEDURE  2001    whipple procedure     CARDIAC SURGERY      defib     COLPOSCOPY,LOOP ELECTRD CERVIX EXCIS  2002, 2011    stage 2 dysplasia     ENDOBRONCHIAL ULTRASOUND FLEXIBLE N/A 2/19/2015    Procedure: ENDOBRONCHIAL ULTRASOUND FLEXIBLE;  Surgeon: Brenden Tamez MD;  Location: [facility] GI     LEEP TX, CERVICAL  2014    LEEP TX Cervical     RECESSION RESECTION WITH ADJUSTABLE SUTURE  12/13/2011    Procedure:RECESSION RESECTION WITH ADJUSTABLE SUTURE; Right Strabismus Repair with Adjustable Suture       TUBAL LIGATION  2007    essure              Social History:     Social History     Social History     Marital status: Single     Spouse name: N/A     Number of children: N/A     Years of education: N/A     Occupational History     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 No     Social History Narrative    Balanced Diet - Yes    Osteoporosis Prevention Measures - Dairy servings per day: 3 servings daily    Regular Exercise -  Yes Describe hand weights 20 minutes daily    Dental Exam - YES - Date: 3/10    Eye Exam - YES - Date: 1-2008    Self Breast Exam - Yes    Abuse: Current or Past (Physical, Sexual or Emotional)- No    Do you feel safe in your environment - Yes    Guns stored in the home - No    Sunscreen used - Yes    Seatbelts used - Yes    Lipids -  YES - Date: 1/10    Glucose -  YES - Date: 12/09    Colon Cancer Screening - No    Hemoccults - NO    Pap Test -  YES - Date: 6/19/08    Do you have any concerns about STD's -  No    Mammography - NO    DEXA - NO    Immunizations reviewed and up to date - Yes, last TD was 11-22-04    3/11/10    KATY Burrell CMA                                         Family History:     Family History   Problem Relation Age of Onset     Diabetes Mother      diet controled     Hypertension Mother      Arthritis Mother      Lipids Mother      Diabetes Father      Hypertension Father      GASTROINTESTINAL DISEASE Father      gallbladder removed     Bipolar Disorder Brother      Thyroid Disease Brother      Obesity Other      Son     Respiratory Other      Son and Daughter; asthma     Depression Maternal Aunt      Anxiety Disorder Maternal Aunt              Immunizations:     Immunization History   Administered Date(s) Administered     HepB 07/01/2014     Influenza (IIV3)  PF 10/24/2006, 02/01/2010, 10/01/2010, 10/01/2013, 09/29/2014, 10/01/2017     Influenza Vaccine IM 3yrs+ 4 Valent IIV4 10/05/2015, 11/02/2016     Mantoux Tuberculin Skin Test 07/11/2018     Pneumo Conj 13-V (2010&after) 07/11/2018     Pneumococcal 23 valent 11/02/2014     TD (ADULT, 7+) 11/22/2004     TDAP Vaccine (Adacel) 07/01/2014            Allergies:     Allergies   Allergen Reactions     No Known Drug Allergies              Medications:     Prescriptions Prior to Admission   Medication Sig Dispense Refill Last Dose     acetaminophen (TYLENOL) 500 MG tablet Take 2 tablets (1,000 mg) by mouth 3 times daily        acetone, Urine, test STRP 1 strip by In Vitro route as needed 25 each 1 Past Month at Unknown time     albuterol (PROAIR HFA) 108 (90 Base) MCG/ACT Inhaler Inhale 2 puffs into the lungs every 4 hours as needed for shortness of breath / dyspnea 1 Inhaler 5      amitriptyline (ELAVIL) 50 MG tablet Take 1 tablet (50 mg) by mouth At Bedtime 90 tablet 3      aspirin 81 MG tablet Take 1 tablet (81 mg) by mouth daily 100 tablet 3      bisacodyl (DULCOLAX) 10 MG Suppository Place 1 suppository (10 mg) rectally daily as needed for constipation 30 suppository       blood glucose monitoring (HOLLIE CONTOUR NEXT) test strip Use to test blood sugar 10 times daily or as directed.  Ok to substitute alternative if insurance prefers. 300 each 12 Past Month at Unknown time     blood glucose monitoring (HOLLIE MICROLET) lancets Use to test blood sugar 10 times daily or as directed.  Ok to substitute alternative if insurance prefers. 1 Box prn Past Month at Unknown time     blood glucose monitoring (FREESTYLE) lancets 1 each See Admin Instructions test 3-4 times per day 3 Box 3 Past Month at Unknown time     fluticasone (FLONASE) 50 MCG/ACT spray Spray 2 sprays into left nostril daily 1 Bottle 11      furosemide (LASIX) 40 MG tablet Take 1 tablet (40 mg) by mouth daily 30 tablet       glucose 4 g CHEW chewable tablet Take  3-4 tablets to treat low blood sugar. 25 tablet 11      HYDROmorphone, HIGH CONC, (DILAUDID) 10 mg/mL LIQD oral Place 0.2 mLs (2 mg) under the tongue daily as needed 0.4 mL 0      insulin aspart (NOVOLOG PEN) 100 UNIT/ML injection Correction Scale - MEDIUM INSULIN RESISTANCE DOSING     Do Not give Correction Insulin if Pre-Meal BG less than 140.   For Pre-Meal  - 189 give 1 unit.   For Pre-Meal  - 239 give 2 units.   For Pre-Meal  - 289 give 3 units.   For Pre-Meal  - 339 give 4 units.   For Pre-Meal - 399 give 5 units.   For Pre-Meal -449 give 6 units  For Pre-Meal BG greater than or equal to 450 give 7 units.        insulin aspart (NOVOLOG PEN) 100 UNIT/ML injection MEDIUM INSULIN RESISTANCE DOSING    Do Not give Bedtime Correction Insulin if BG less than  200.   For  - 249 give 1 units.   For  - 299 give 2 units.   For  - 349 give 3 units.   For  -399 give 4 units.   For BG greater than or equal to 400 give 5 units.  Notify provider if glucose greater than or equal to 350 mg/dL after administration of correction dose.        insulin glargine (LANTUS SOLOSTAR) 100 UNIT/ML pen Inject 10 Units Subcutaneous At Bedtime        insulin pen needle (B-D U/F) 31G X 5 MM Use 3 time(s) a day. 3 each 3 Past Week at Unknown time     Ipratropium-Albuterol (COMBIVENT RESPIMAT)  MCG/ACT inhaler Inhale 1 puff into the lungs 4 times daily Do not exceed 6 doses/day.        levonorgestrel (MIRENA, 52 MG,) 20 MCG/24HR IUD placed today 1 each 0      Lidocaine (LIDOCARE) 4 % Patch Place 2 patches onto the skin every 24 hours 30 patch 0      lisinopril (PRINIVIL/ZESTRIL) 10 MG tablet Take 1 tablet (10 mg) by mouth daily 30 tablet 0      menthol (ICY HOT) 5 % PTCH Apply 1 patch topically every 8 hours as needed for moderate pain  0      methadone (DOLPHINE-INTENSOL) 10 mg/mL CONC (HIGH CONC) solution Take 7 mLs (70 mg) by mouth daily 14 mL 0      metoprolol tartrate  (LOPRESSOR) 25 MG tablet Take 0.5 tablets (12.5 mg) by mouth 2 times daily        multivitamin, therapeutic with minerals (THERA-VIT-M) TABS tablet Take 1 tablet by mouth daily 30 each 11      oxyCODONE (OXYCONTIN) 10 MG 12 hr tablet Take 1 tablet (10 mg) by mouth every 12 hours 4 tablet 0      polyethylene glycol (MIRALAX/GLYCOLAX) Packet Take 17 g by mouth daily 7 packet       pregabalin (LYRICA) 75 MG capsule TAKE 1 CAPSULE BY MOUTH 3 TIMES DAILY 90 capsule 5      ranitidine (ZANTAC) 300 MG tablet Take 1 tablet (300 mg) by mouth daily 90 tablet 3      sennosides (SENOKOT) 8.6 MG tablet Take 2 tablets by mouth 2 times daily 120 each       vancomycin 1,250 mg Inject 1,250 mg into the vein every 24 hours                Review of Systems:   The 10 point Review of Systems is negative other than noted in the HPI      Vitals: /73 (BP Location: Left arm)  Pulse 103  Temp 97.2  F (36.2  C) (Oral)  Resp 18  SpO2 100%  BMI= There is no height or weight on file to calculate BMI.    GEN:    Very pleasant, in No acute distress  HEENT: Anicteric, extraocular muscles are intact with no nystagmus  NECK:  Supple   CVS:  s1s2 show a regular rate and rhythm with no murmurs, rubs or gallops,    CHEST: Bilaterally clear to auscultation with no rales, rhonchi or wheezes  ABDOMEN: Non-tender and non-distended   EXT:  Rt BKA and left foot covered with dressing  NEURO: Alert and oriented to person, place and time    Cranial nerves II-XII are intact  PSCYH: Normal affect         Data:     Latest Labs:     BMP  Recent Labs  Lab 07/10/18  1806 07/09/18  0625 07/07/18  0726 07/06/18  0532 07/05/18  0526   NA  --  138 140 139 138   POTASSIUM  --  4.2 4.2 3.9 4.4   CHLORIDE  --  106 107 105 103   RICK  --  8.8 8.9 8.8 8.6   CO2  --  24 25 29 29   BUN  --  25 23 29 26   CR 1.05* 1.00 0.93 1.04 0.93   GLC  --  141* 78 92 270*     CBC  Recent Labs  Lab 07/10/18  1806 07/05/18  1000 07/05/18  0526 07/04/18  1710   WBC  --   --  7.2 8.7    RBC  --   --  3.27* 3.94   HGB  --  8.9* 8.9* 10.9*   HCT  --   --  27.3* 33.6*   MCV  --   --  84 85   MCH  --   --  27.2 27.7   MCHC  --   --  32.6 32.4   RDW  --   --  21.7* 21.3*     --  287 275

## 2018-07-11 NOTE — PROGRESS NOTES
"SPIRITUAL HEALTH SERVICES  SPIRITUAL ASSESSMENT Progress Note  [facility] ([facility]) 4R     PRIMARY FOCUS:     Goals of care    Symptom/pain management    Emotional/spiritual/Synagogue distress    Support for coping    REFERRAL SOURCE: Pt request at admission    ILLNESS CIRCUMSTANCES:   Reviewed documentation. Reflective conversation shared with Alessandra which integrated elements of illness and family narratives.     Context of Serious Illness/Symptom(s) - Alessandra described the serious infection in her foot that resulted in amputation below the knee.  She said she thought it would get better, but \"maybe I waited a little too long because the infection spread into the bone, so they had to amputate a little further up.\"  She is anxious about being in here because she used to work here, and she \"has a lot of chatter in my head about what people are thinking.\"  She said she was uncomfortable when she first got here that the staff here could see everything about her.  She said it felt invasive.  She said she's feeling better about that, and that the staff is taking really great care of her.  When I entered her room, she was talking on the phone to her daughter, who is at [facility] today in labor with Alessandra's grandchild.  Alessandra said she really wanted to be there when her daughter delivered.  She is staying connected by phone.      Resources for Support - Alessandra lives with her daughter and her mother.  Her mother is currently receiving treatment for lung cancer, but Alessandra said she is doing really well.      DISTRESS:     Emotional/Spiritual/Existential Distress - Alessandra said that she was really angry when told that she would have to have amputation.  She said, \"don't I have any rights?\"  Then she accepted that it was necessary.  She wondered what it will be like to be back home - what things would be different?  She said she has to relearn balance, how to put on her clothes, everything.  She was a little weepy when she talked " "about these changes.  She is worried that she will be a burden on her family.  She said it will be hard to let people take care of her, but she knows that this is the time when she has to pay attention to getting better, and letting people help her.      Alevism Distress - Not discussed    Social/Cultural/Economic Distress - Not discussed    SPIRITUAL/Uatsdin COPING:     Restorationism/Amelia - [place].  Alessandra requested a prayer.      Spiritual Practice(s) - Not discussed    Emotional/Relational/Existential Connections - Alessandra said she is very close to her family.  She feels supported by them.  She said they have been here to visit.      GOALS OF CARE:    Goals of Care - Alessandra is looking forward to getting home.  She feels a little bit more like herself each time \"one of the needles or tubes is disconnected from her body.\"      Meaning/Sense-Making - Alessandra said she is going to slow down and take care of herself.  She wants to be able to be here for her children and grandchildren.      PLAN: follow up 7/13    Traci Crum   Intern  Pager [phone]  "

## 2018-07-11 NOTE — PLAN OF CARE
Problem: Goal Outcome Summary  Goal: Goal Outcome Summary  Discharge Planner OT   Patient plan for discharge: home with A from family prn  Current status: pt. Min/SBA with BADLs and functional transf.s; bed, commode. Pt. motivated to d/c home at max. Indep., tolerated session well.  Barriers to return to prior living situation: medical/ BKA  Recommendations for discharge: home with A, possible OP OT pending needs at time of d/c  Rationale for recommendations: to max. Safety/indep. With ADLs/iADLs and mobility in home/community setting.       Entered by: Leigha Escalona 07/11/2018 12:35 PM

## 2018-07-11 NOTE — PROGRESS NOTES
07/11/18 0807   Quick Adds   Quick Adds Certification   Type of Visit Initial PT Evaluation   Living Environment   Lives With parent(s);child(kristina), adult  (mother and 25 yo daughter)   Living Arrangements house   Home Accessibility bed and bath on same level;stairs to enter home;stairs within home   Number of Stairs to Enter Home 1   Number of Stairs Within Home 12   Stair Railings at Home inside, present on left side   Transportation Available car;family or friend will provide  (small SUV)   Living Environment Comment one level home; platform step in--pt not anticipating an issue; others can do laundry. Pt feels w/c would work in home   Self-Care   Dominant Hand right   Usual Activity Tolerance good   Current Activity Tolerance fair   Regular Exercise no   Equipment Currently Used at Home none   Activity/Exercise/Self-Care Comment IND with all mobility; could walk ~1/2 hr   Functional Level Prior   Ambulation 0-->independent   Transferring 0-->independent   Toileting 0-->independent   Bathing 0-->independent   Dressing 0-->independent   Eating 0-->independent   Communication 0-->understands/communicates without difficulty   Swallowing 0-->swallows foods/liquids without difficulty   Cognition 0 - no cognition issues reported   Fall history within last six months yes   Number of times patient has fallen within last six months 3  (winter--ice)   Which of the above functional risks had a recent onset or change? ambulation;transferring   Prior Functional Level Comment IND without AD prior   General Information   Onset of Illness/Injury or Date of Surgery - Date 06/06/18   Referring Physician Prashanth Ramesh MD; Dr Camron Reyes   Patient/Family Goals Statement increase UE strength so can walk with walker, use w/c   Pertinent History of Current Problem (include personal factors and/or comorbidities that impact the POC) 52 year old female admitted on 6/6/2018. She has a history of chronic pancreatitis s/p whipple, T2DM  c/b diabetic foot wounds and ulcers, CKD, restrictive lung disease with emphysema and fibrosis, NICM s/p ICD, chronic methadone use and is admitted for severe sepsis due to Right foot abscess and osteomyelitis. Hospital course was complicated by respiratory failure due to ARDS requiring intubation (6/10-15), extubated and transferred to medicine for further cares.I & D x3 then R BKA 6/28   Precautions/Limitations other (see comments)  (skin integrity L foot--heel off loading boot)   Heart Disease Risk Factors Diabetes;Lack of physical activity;Medical history   General Observations pt lying in bed, agreeable to PT   Cognitive Status Examination   Orientation orientation to person, place and time   Level of Consciousness alert   Follows Commands and Answers Questions 100% of the time   Personal Safety and Judgment intact   Memory intact   Pain Assessment   Patient Currently in Pain (occasional pain R LE)   Integumentary/Edema   Integumentary/Edema Comments R BKA wrapped, ; L heel offloading boot for compromised skin L heel (foot wrapped)   Posture    Posture Not impaired   Range of Motion (ROM)   ROM Comment R BKA; remainder LE WFL   Strength   Strength Comments hip flexion 3/5, ext and abd 4-/5; knee ext 4/5   Bed Mobility   Bed Mobility Comments IND sit<>supine   Transfer Skills   Transfer Comments SBA sit<>stand from bed   Gait   Gait Comments amb with FWW 20 ft; CGA; decreased step length and floor clearance L   Balance   Balance Comments UE support at all times in standing due to R BKA; IND sitting   Sensory Examination   Sensory Perception Comments phantom sensation R LE; numbness L toes   Coordination   Coordination no deficits were identified   Muscle Tone   Muscle Tone no deficits were identified   General Therapy Interventions   Planned Therapy Interventions gait training;groups;manual therapy;neuromuscular re-education;prosthetic fitting/training;strengthening;transfer training;wheelchair  management/propulsion training;home program guidelines;progressive activity/exercise   Clinical Impression   Criteria for Skilled Therapeutic Intervention yes, treatment indicated   PT Diagnosis decreased functional mobility s/p R BKA   Influenced by the following impairments decreased strength, balance, and activity tolerance; impaired skin integrity   Functional limitations due to impairments gait and transfers below prior IND level without AD   Clinical Presentation Evolving/Changing   Clinical Presentation Rationale functional mobility assessment; comorbidities; compromised skin integrity, neuropathy; participation limitations   Clinical Decision Making (Complexity) Moderate complexity   Therapy Frequency` other (see comments)  (6 days/wk)   Predicted Duration of Therapy Intervention (days/wks) 7/20/18   Anticipated Equipment Needs at Discharge front wheeled walker;wheelchair   Anticipated Discharge Disposition Home with Home Therapy   Risk & Benefits of therapy have been explained Yes   Patient, Family & other staff in agreement with plan of care Yes   Clinical Impression Comments pt s/p R BKA 2/2 osteomyelitis diabetic foot ulcer. previously IND without AD. CGA with FWW limited distance currently. Will benefit from skilled PT to maximize functional mobility for return home. Pt states w/c should work in home except possibly bathroom   Therapy Certification   Start of care date 07/11/18   Certification date from 07/11/18   Certification date to 08/07/18   Medical Diagnosis R BKA   Certification I certify the need for these services furnished under this plan of treatment and while under my care.  (Physician co-signature of this document indicates review and certification of the therapy plan).    Total Evaluation Time   Total Evaluation Time (Minutes) 15

## 2018-07-12 LAB
ANION GAP SERPL CALCULATED.3IONS-SCNC: 7 MMOL/L (ref 3–14)
BUN SERPL-MCNC: 23 MG/DL (ref 7–30)
CALCIUM SERPL-MCNC: 9.2 MG/DL (ref 8.5–10.1)
CHLORIDE SERPL-SCNC: 107 MMOL/L (ref 94–109)
CO2 SERPL-SCNC: 27 MMOL/L (ref 20–32)
CREAT SERPL-MCNC: 1.07 MG/DL (ref 0.52–1.04)
ERYTHROCYTE [DISTWIDTH] IN BLOOD BY AUTOMATED COUNT: 21.3 % (ref 10–15)
GFR SERPL CREATININE-BSD FRML MDRD: 54 ML/MIN/1.7M2
GLUCOSE BLDC GLUCOMTR-MCNC: 177 MG/DL (ref 70–99)
GLUCOSE BLDC GLUCOMTR-MCNC: 179 MG/DL (ref 70–99)
GLUCOSE BLDC GLUCOMTR-MCNC: 230 MG/DL (ref 70–99)
GLUCOSE BLDC GLUCOMTR-MCNC: 88 MG/DL (ref 70–99)
GLUCOSE BLDC GLUCOMTR-MCNC: 95 MG/DL (ref 70–99)
GLUCOSE SERPL-MCNC: 78 MG/DL (ref 70–99)
HCT VFR BLD AUTO: 30.1 % (ref 35–47)
HGB BLD-MCNC: 9.5 G/DL (ref 11.7–15.7)
MCH RBC QN AUTO: 26.6 PG (ref 26.5–33)
MCHC RBC AUTO-ENTMCNC: 31.6 G/DL (ref 31.5–36.5)
MCV RBC AUTO: 84 FL (ref 78–100)
PLATELET # BLD AUTO: 405 10E9/L (ref 150–450)
POTASSIUM SERPL-SCNC: 4.3 MMOL/L (ref 3.4–5.3)
RBC # BLD AUTO: 3.57 10E12/L (ref 3.8–5.2)
SODIUM SERPL-SCNC: 141 MMOL/L (ref 133–144)
VANCOMYCIN SERPL-MCNC: 23.6 MG/L
WBC # BLD AUTO: 9.3 10E9/L (ref 4–11)

## 2018-07-12 PROCEDURE — 36592 COLLECT BLOOD FROM PICC: CPT | Performed by: INTERNAL MEDICINE

## 2018-07-12 PROCEDURE — 80202 ASSAY OF VANCOMYCIN: CPT | Performed by: INTERNAL MEDICINE

## 2018-07-12 PROCEDURE — 97535 SELF CARE MNGMENT TRAINING: CPT | Mod: GO | Performed by: OCCUPATIONAL THERAPIST

## 2018-07-12 PROCEDURE — 85027 COMPLETE CBC AUTOMATED: CPT | Performed by: INTERNAL MEDICINE

## 2018-07-12 PROCEDURE — 25000128 H RX IP 250 OP 636

## 2018-07-12 PROCEDURE — 25000128 H RX IP 250 OP 636: Performed by: INTERNAL MEDICINE

## 2018-07-12 PROCEDURE — 80048 BASIC METABOLIC PNL TOTAL CA: CPT | Performed by: INTERNAL MEDICINE

## 2018-07-12 PROCEDURE — 40000133 ZZH STATISTIC OT WARD VISIT: Performed by: OCCUPATIONAL THERAPIST

## 2018-07-12 PROCEDURE — 25000131 ZZH RX MED GY IP 250 OP 636 PS 637: Performed by: INTERNAL MEDICINE

## 2018-07-12 PROCEDURE — 40000193 ZZH STATISTIC PT WARD VISIT: Performed by: PHYSICAL THERAPIST

## 2018-07-12 PROCEDURE — 12000022 ZZH R&B SNF

## 2018-07-12 PROCEDURE — 25000132 ZZH RX MED GY IP 250 OP 250 PS 637: Performed by: INTERNAL MEDICINE

## 2018-07-12 PROCEDURE — 97110 THERAPEUTIC EXERCISES: CPT | Mod: GP | Performed by: PHYSICAL THERAPIST

## 2018-07-12 PROCEDURE — 97530 THERAPEUTIC ACTIVITIES: CPT | Mod: GP | Performed by: PHYSICAL THERAPIST

## 2018-07-12 RX ORDER — MORPHINE SULFATE 15 MG/1
15 TABLET, FILM COATED, EXTENDED RELEASE ORAL EVERY 12 HOURS SCHEDULED
Status: DISCONTINUED | OUTPATIENT
Start: 2018-07-12 | End: 2018-07-21 | Stop reason: HOSPADM

## 2018-07-12 RX ORDER — VANCOMYCIN HYDROCHLORIDE 1 G/200ML
1000 INJECTION, SOLUTION INTRAVENOUS EVERY 24 HOURS
Status: DISCONTINUED | OUTPATIENT
Start: 2018-07-13 | End: 2018-07-16

## 2018-07-12 RX ADMIN — AMITRIPTYLINE HYDROCHLORIDE 50 MG: 50 TABLET, FILM COATED ORAL at 21:56

## 2018-07-12 RX ADMIN — SODIUM CHLORIDE, PRESERVATIVE FREE 5 ML: 5 INJECTION INTRAVENOUS at 08:13

## 2018-07-12 RX ADMIN — SENNOSIDES 2 TABLET: 8.6 TABLET, FILM COATED ORAL at 21:56

## 2018-07-12 RX ADMIN — VANCOMYCIN HYDROCHLORIDE 1250 MG: 10 INJECTION, POWDER, LYOPHILIZED, FOR SOLUTION INTRAVENOUS at 06:15

## 2018-07-12 RX ADMIN — ACETAMINOPHEN 1000 MG: 500 TABLET ORAL at 21:56

## 2018-07-12 RX ADMIN — POLYETHYLENE GLYCOL 3350 17 G: 17 POWDER, FOR SOLUTION ORAL at 08:13

## 2018-07-12 RX ADMIN — SODIUM CHLORIDE, PRESERVATIVE FREE 5 ML: 5 INJECTION INTRAVENOUS at 06:05

## 2018-07-12 RX ADMIN — INSULIN ASPART 1 UNITS: 100 INJECTION, SOLUTION INTRAVENOUS; SUBCUTANEOUS at 12:17

## 2018-07-12 RX ADMIN — INSULIN ASPART 2 UNITS: 100 INJECTION, SOLUTION INTRAVENOUS; SUBCUTANEOUS at 19:18

## 2018-07-12 RX ADMIN — Medication 6 MG: at 21:56

## 2018-07-12 RX ADMIN — Medication 12.5 MG: at 21:56

## 2018-07-12 RX ADMIN — OXYCODONE HYDROCHLORIDE 10 MG: 10 TABLET, FILM COATED, EXTENDED RELEASE ORAL at 03:08

## 2018-07-12 RX ADMIN — LISINOPRIL 10 MG: 10 TABLET ORAL at 08:12

## 2018-07-12 RX ADMIN — LIDOCAINE 2 PATCH: 560 PATCH PERCUTANEOUS; TOPICAL; TRANSDERMAL at 08:12

## 2018-07-12 RX ADMIN — METHADONE HYDROCHLORIDE 70 MG: 10 CONCENTRATE ORAL at 08:10

## 2018-07-12 RX ADMIN — HYDROMORPHONE HYDROCHLORIDE 2 MG: 1 SOLUTION ORAL at 08:10

## 2018-07-12 RX ADMIN — UMECLIDINIUM BROMIDE AND VILANTEROL TRIFENATATE 1 PUFF: 62.5; 25 POWDER RESPIRATORY (INHALATION) at 08:11

## 2018-07-12 RX ADMIN — Medication 12.5 MG: at 08:12

## 2018-07-12 RX ADMIN — FUROSEMIDE 40 MG: 20 TABLET ORAL at 08:13

## 2018-07-12 RX ADMIN — ENOXAPARIN SODIUM 40 MG: 40 INJECTION SUBCUTANEOUS at 17:39

## 2018-07-12 RX ADMIN — PREGABALIN 75 MG: 75 CAPSULE ORAL at 19:15

## 2018-07-12 RX ADMIN — ACETAMINOPHEN 1000 MG: 500 TABLET ORAL at 08:10

## 2018-07-12 RX ADMIN — FLUTICASONE PROPIONATE 2 SPRAY: 50 SPRAY, METERED NASAL at 08:11

## 2018-07-12 RX ADMIN — PREGABALIN 75 MG: 75 CAPSULE ORAL at 13:34

## 2018-07-12 RX ADMIN — MULTIPLE VITAMINS W/ MINERALS TAB 1 TABLET: TAB at 08:12

## 2018-07-12 RX ADMIN — PREGABALIN 75 MG: 75 CAPSULE ORAL at 08:11

## 2018-07-12 RX ADMIN — SENNOSIDES 2 TABLET: 8.6 TABLET, FILM COATED ORAL at 08:13

## 2018-07-12 RX ADMIN — RANITIDINE 300 MG: 150 TABLET, FILM COATED ORAL at 08:13

## 2018-07-12 RX ADMIN — ASPIRIN 81 MG CHEWABLE TABLET 81 MG: 81 TABLET CHEWABLE at 08:13

## 2018-07-12 RX ADMIN — MORPHINE SULFATE 15 MG: 15 TABLET, EXTENDED RELEASE ORAL at 19:15

## 2018-07-12 ASSESSMENT — PAIN DESCRIPTION - DESCRIPTORS: DESCRIPTORS: ACHING

## 2018-07-12 NOTE — PLAN OF CARE
Problem: Goal Outcome Summary  Goal: Goal Outcome Summary  Outcome: Improving  Alert and oriented x 4. Pain managed with scheduled Oxycontin, denies additional pain interventions. BG 88 at 0200. Voiding well into bedside commode. SBA to transfer. Stump site CDI. IV Vancomycin infusing in triple lumen PICC. Able to make needs known. No additional concerns.

## 2018-07-12 NOTE — PROVIDER NOTIFICATION
C/o severe pain to RBKA, pt has only dilaudid PRN daily, which was given on NOC shift. MD/MARTHA paged.

## 2018-07-12 NOTE — PLAN OF CARE
"Problem: Goal Outcome Summary  Goal: Goal Outcome Summary  Pt had ortho appt yesterday, debrided L foot medial first met head ulcer; new order NWB RLE, minimal WB L forefoot for transfers . Will defer ambulation and concentrate on transfers, w/c. Started measurement for w/c for discharge--16\" wide, need amputee pad for R BKA, 18\" height to accommodate a 2\" cushion, 16\"-18\" depth (will need further measurement). Pt doing well with transfers--SBA with FWW.       "

## 2018-07-12 NOTE — PROGRESS NOTES
"Rehabilitation Medicine Wheelchair Face to Face     Diagnosis: R BKA secondary to diabetic foot ulcer/osteomyelitis   Currently has limited mobility due to R below knee amputation, diabetic foot ulcers on L .  Current transfer status: SBA with wheeled walker.   Distance patient currently able to walk: weight bear on L LE for transfers only due to diabetic foot ulcers.Limited to a few feet amb with wheeled walker.    Therapy team has ruled out the following less costly options:   Cane due to R BKA, limited WB on L LE because of diabetic foot ulcers   Walker due to  R BKA, limited WB on L LE because of diabetic foot ulcers    Patient will use wheelchair to complete Mobility Related ADL's in the home including all mobility around home, toileting and self cares.   Without a wheelchair patient will be unable to safely move about their home.  This equipment will allow them to be out of bed, participate in home activities with their family, and it will be part of a fall prevention plan for safe mobility.   Patient's home will accommodate use of wheelchair.  Patient has a family member willing and able to assist as necessary with wheelchair.   Patient needs an amputee support pad on R. Patient needs a pawel-height chair due to small body stature in order to self propel with their feet.    Arm and leg strength: adequate UE strength to propel as evidenced by propelling w/c on TCU nursing unit    Gait/balance/coordination: gait limited by L foot ulcers, WB on forefoot for transfers    Length of need: 99 months    Current height: 5'8\"  Current weight:117  : 1965    Prashanth Ramesh    "

## 2018-07-12 NOTE — PHARMACY-VANCOMYCIN DOSING SERVICE
Pharmacy Vancomycin Note  Date of Service 2018  Patient's  1965   52 year old, female    Indication: Osteomyelitis  Goal Trough Level: 15-20 mg/L  Day of Therapy: since 18?  Current Vancomycin regimen:  1250 mg IV q24h    Current estimated CrCl = Estimated Creatinine Clearance: 52.6 mL/min (based on Cr of 1.07).    Creatinine for last 3 days  7/10/2018:  6:06 PM Creatinine 1.05 mg/dL  2018:  6:07 AM Creatinine 1.07 mg/dL    Recent Vancomycin Levels (past 3 days)  2018:  6:07 AM Vancomycin Level 23.6 mg/L    Vancomycin IV Administrations (past 72 hours)                   vancomycin (VANCOCIN) 1,250 mg in sodium chloride 0.9 % 250 mL intermittent infusion (mg) 1,250 mg New Bag 18 0615    vancomycin (VANCOCIN) 1,250 mg in sodium chloride 0.9 % 250 mL intermittent infusion (mg) 1,250 mg New Bag 18 0717    vancomycin (VANCOCIN) 1,250 mg in sodium chloride 0.9 % 250 mL intermittent infusion (mg) 1,250 mg New Bag 07/10/18 0643                Nephrotoxins and other renal medications (Future)    Start     Dose/Rate Route Frequency Ordered Stop    18 0800  vancomycin (VANCOCIN) 1000 mg in dextrose 5% 200 mL PREMIX      1,000 mg  200 mL/hr over 1 Hours Intravenous EVERY 24 HOURS 18 1034      18 0800  furosemide (LASIX) tablet 40 mg      40 mg Oral DAILY 07/10/18 1543      18 0800  lisinopril (PRINIVIL/ZESTRIL) tablet 10 mg      10 mg Oral DAILY 07/10/18 1543               Contrast Orders - past 72 hours     None          Interpretation of levels and current regimen:  Trough level is  Supratherapeutic    Has serum creatinine changed > 50% in last 72 hours: No, slight increase    Urine output:  unable to determine    Renal Function: Stable, slightly increasing though    Plan:  1.  Decrease Dose to 1000mg IV Q24H (18.9mg/kg/dose). Moved dose back a few hours too to allow for clearance.  2.  Pharmacy will check trough levels as appropriate in 1-3 Days.    3. Serum  creatinine levels will be ordered a minimum of twice weekly.      Delmi Reyes, AnthonyD, BCPS

## 2018-07-12 NOTE — PLAN OF CARE
Pt alert and oriented x, very pleasant and cooperative. SBA for transfers. C/o severe pain to MD TORY paged for  x 1 dose PRN dilaudid, & Icy hot applied too. Stump site CDI with sutures, no drainage or s/s of infection noted. L foot dressings changed at ortho/MD appointment, CDI. Call within reach.

## 2018-07-12 NOTE — PLAN OF CARE
Problem: Individualization  Goal: Patient Preferences  Outcome: Improving  Focus: Physio  D: Doing well this am. Dilaudid, methadone and oxycontin for pain. Dressing intact to stump on right leg. Transfers to wheelchair with assist of one. BG 95. P: Continue current plan of care.

## 2018-07-12 NOTE — PLAN OF CARE
Problem: Goal Outcome Summary  Goal: Goal Outcome Summary  OT: patient motivated to progress with mobility and self cares. Good effort during all TX activity. Patient reports phantom pain in R LE.

## 2018-07-12 NOTE — PROGRESS NOTES
Recent Labs  Lab 07/12/18  0821 07/12/18  0607 07/12/18  0213 07/11/18  2138 07/11/18  1847 07/11/18  1401 07/11/18  0811  07/09/18  0625  07/07/18  0726  07/06/18  0532   GLC  --  78  --   --   --   --   --   --  141*  --  78  --  92   BGM 95  --  88 272* 138* 298* 121*  < >  --   < >  --   < >  --    < > = values in this interval not displayed.    Decrease lantus to 8 U    * D/W Pharmacy james, Pharm D and patient- as Oxycontin will not be covered by insurance, changed to Morphine ER 15mg bid . Patient agreed

## 2018-07-13 LAB
GLUCOSE BLDC GLUCOMTR-MCNC: 106 MG/DL (ref 70–99)
GLUCOSE BLDC GLUCOMTR-MCNC: 131 MG/DL (ref 70–99)
GLUCOSE BLDC GLUCOMTR-MCNC: 164 MG/DL (ref 70–99)
GLUCOSE BLDC GLUCOMTR-MCNC: 247 MG/DL (ref 70–99)
GLUCOSE BLDC GLUCOMTR-MCNC: 76 MG/DL (ref 70–99)

## 2018-07-13 PROCEDURE — 40000193 ZZH STATISTIC PT WARD VISIT: Performed by: PHYSICAL THERAPIST

## 2018-07-13 PROCEDURE — 25000132 ZZH RX MED GY IP 250 OP 250 PS 637: Performed by: INTERNAL MEDICINE

## 2018-07-13 PROCEDURE — 97110 THERAPEUTIC EXERCISES: CPT | Mod: GP | Performed by: PHYSICAL THERAPIST

## 2018-07-13 PROCEDURE — 97110 THERAPEUTIC EXERCISES: CPT | Mod: GO | Performed by: OCCUPATIONAL THERAPIST

## 2018-07-13 PROCEDURE — 25000128 H RX IP 250 OP 636: Performed by: INTERNAL MEDICINE

## 2018-07-13 PROCEDURE — 97530 THERAPEUTIC ACTIVITIES: CPT | Mod: GP | Performed by: PHYSICAL THERAPIST

## 2018-07-13 PROCEDURE — 40000133 ZZH STATISTIC OT WARD VISIT: Performed by: OCCUPATIONAL THERAPIST

## 2018-07-13 PROCEDURE — 12000022 ZZH R&B SNF

## 2018-07-13 PROCEDURE — 97535 SELF CARE MNGMENT TRAINING: CPT | Mod: GO | Performed by: OCCUPATIONAL THERAPIST

## 2018-07-13 RX ADMIN — ALBUTEROL SULFATE 2 PUFF: 90 AEROSOL, METERED RESPIRATORY (INHALATION) at 08:55

## 2018-07-13 RX ADMIN — MORPHINE SULFATE 15 MG: 15 TABLET, EXTENDED RELEASE ORAL at 08:54

## 2018-07-13 RX ADMIN — MULTIPLE VITAMINS W/ MINERALS TAB 1 TABLET: TAB at 08:54

## 2018-07-13 RX ADMIN — ENOXAPARIN SODIUM 40 MG: 40 INJECTION SUBCUTANEOUS at 16:18

## 2018-07-13 RX ADMIN — PREGABALIN 75 MG: 75 CAPSULE ORAL at 20:43

## 2018-07-13 RX ADMIN — LIDOCAINE 2 PATCH: 560 PATCH PERCUTANEOUS; TOPICAL; TRANSDERMAL at 09:01

## 2018-07-13 RX ADMIN — Medication 12.5 MG: at 20:44

## 2018-07-13 RX ADMIN — Medication 6 MG: at 21:51

## 2018-07-13 RX ADMIN — FLUTICASONE PROPIONATE 2 SPRAY: 50 SPRAY, METERED NASAL at 08:56

## 2018-07-13 RX ADMIN — Medication 12.5 MG: at 08:54

## 2018-07-13 RX ADMIN — VANCOMYCIN HYDROCHLORIDE 1000 MG: 1 INJECTION, SOLUTION INTRAVENOUS at 09:04

## 2018-07-13 RX ADMIN — POLYETHYLENE GLYCOL 3350 17 G: 17 POWDER, FOR SOLUTION ORAL at 08:54

## 2018-07-13 RX ADMIN — PREGABALIN 75 MG: 75 CAPSULE ORAL at 09:12

## 2018-07-13 RX ADMIN — AMITRIPTYLINE HYDROCHLORIDE 50 MG: 50 TABLET, FILM COATED ORAL at 21:51

## 2018-07-13 RX ADMIN — INSULIN ASPART 3 UNITS: 100 INJECTION, SOLUTION INTRAVENOUS; SUBCUTANEOUS at 17:56

## 2018-07-13 RX ADMIN — RANITIDINE 300 MG: 150 TABLET, FILM COATED ORAL at 08:54

## 2018-07-13 RX ADMIN — ASPIRIN 81 MG CHEWABLE TABLET 81 MG: 81 TABLET CHEWABLE at 08:54

## 2018-07-13 RX ADMIN — SENNOSIDES 2 TABLET: 8.6 TABLET, FILM COATED ORAL at 20:43

## 2018-07-13 RX ADMIN — SENNOSIDES 2 TABLET: 8.6 TABLET, FILM COATED ORAL at 09:59

## 2018-07-13 RX ADMIN — PREGABALIN 75 MG: 75 CAPSULE ORAL at 13:07

## 2018-07-13 RX ADMIN — INSULIN ASPART 1 UNITS: 100 INJECTION, SOLUTION INTRAVENOUS; SUBCUTANEOUS at 13:07

## 2018-07-13 RX ADMIN — SODIUM CHLORIDE, PRESERVATIVE FREE 5 ML: 5 INJECTION INTRAVENOUS at 09:10

## 2018-07-13 RX ADMIN — METHADONE HYDROCHLORIDE 70 MG: 10 CONCENTRATE ORAL at 09:59

## 2018-07-13 RX ADMIN — LISINOPRIL 10 MG: 10 TABLET ORAL at 08:54

## 2018-07-13 RX ADMIN — FUROSEMIDE 40 MG: 20 TABLET ORAL at 08:54

## 2018-07-13 RX ADMIN — UMECLIDINIUM BROMIDE AND VILANTEROL TRIFENATATE 1 PUFF: 62.5; 25 POWDER RESPIRATORY (INHALATION) at 08:55

## 2018-07-13 RX ADMIN — MORPHINE SULFATE 15 MG: 15 TABLET, EXTENDED RELEASE ORAL at 20:43

## 2018-07-13 NOTE — PLAN OF CARE
Problem: Goal Outcome Summary  Goal: Goal Outcome Summary  PT: Pt is participating well with PT.  Pt 's w/c order to be faxed to [facility] today for 18 inches H x 18 inches deep x 16 inch wide wheelchair with cushion and R residual limb support. Pt needing A with wc legrests, will cont to work on pt's independence with managing wc.

## 2018-07-13 NOTE — PLAN OF CARE
Pt alert and oriented x 4, pleasant & cooperative. SBA for transfers. NWB to RLE, dressing changed to R BKA, CDI with sutures, no s/s of infection. Minimal WB to L foot, dressing changed per plan of care. Contact isolation maintained. Call within reach.

## 2018-07-13 NOTE — PLAN OF CARE
Problem: Goal Outcome Summary  Goal: Goal Outcome Summary  OT patient progressing as expected.

## 2018-07-13 NOTE — PROGRESS NOTES
TCU Care Coordinator Progress Note    Admission date: 7/10/2018    Data: Alessandra Pak is a 53 yo female on TCU for rehab and IV antibiotics following hospitalization for osteomyelitis and R BKA. She also has L foot wound.    Intervention: Met with patient to introduce the role of Care Coordinator and to begin discussion of anticipated DC planning needs. Initiated referral with [facility] for home IV Vancomycin, including home care needs of SN/PT/OT, per patient request. Ordered [facility] for IV antibiotics teaching.    Assessment: Patient will have above home care/home infusion/teaching needs. She states she has previously done home IV antibiotics earlier this year. She lives with her mother and adult daughter. She states she will be able to use her small sized wheelchair in her home. Wound cares are the R stump and the L foot, which she is willing and able to learn to manage at home. She is on insulin, which is not new, no new teaching needs. If she goes home on Lovenox injection she can also self-administer that.    Plan: Will continue to follow DC planning needs throughout TCU stay. Potential DC by the end of next week if she meets TCU therapy goals and remains medically stable.    Kelton Wei RN, BSN, Patient Care Management Coordinator  [facility]  [address], [address] [place] [address]  [email or web address]  [email or web address]   Desk: [phone] TCU Main [phone] Fax [phone] Pager [phone]

## 2018-07-13 NOTE — PROGRESS NOTES
Social Work: Initial Assessment with Discharge Plan    Patient Name: Alessandra Pak  : 1965  Age: 52 year old  MRN: [id number]  Completed assessment with: Pt  Admitted to TCU: 7/10/2018    Presenting Information   Date of SW assessment: 2018  Health Care Directive: Provided education  Primary Health Care Agent: Self  Secondary Health Care Agent: Pt's adult children.   Living Situation: Pt lives with her DTR and mother in [facility]  Previous Functional Status: Independent   DME available: See therapy notes  Patient and family understanding of hospitalization: Pt stated that she is here due to having an amputation and learning what her new independence base line will be.   Cultural/Language/Spiritual Considerations: English speaking.   Abuse concerns: No concerns from pt.   BIMS: Pt scored 15 on BIMS indicating cognitively   PHQ-9: Pt scored 04 on PHQ-9 indicating minimal depression   PAS: confirmation number- [id number]  Has there been a level II screen?  No  Were there any recommendations in the screen? N/A  If yes, will the recommendations we incorporated into the Plan of Care?  N/A  Physical Health  Reason for admission:   1. Status post below knee amputation of right lower extremity (H)        Provider Information   Primary Care Physician:Brionna Calabrese   : [facility]    Mental Health:   Diagnosis: Major Depressive Disorder, recurrent  Current Support/Services: Per pt she is on one medication but she thinks it is taken at night. No other medication or treatments at this time.   Previous Services: NA  Services Needed/Recommended: Pt will continue her one medication, Writer offered Health Psychology and pt was interested. Writer informed the provider of this as well.     Substance Use:  Diagnosis: Opiate withdrawal- chem dep.   Current Support/Services: Methadone   Previous Services: NA  Services Needed/Recommended: Pt will continue her Methadone treatment after her d/c  from the TCU.     Support System  Marital Status: Single   Family support: Pt has the support of her mother Martha  Other support available: Pt has her DTR and brother Hernesto for support as well.   Gaps in support system: No apparent gaps.     Community Resources  Current in home services: NA  Previous services: Home Care and Home Infusion.     Financial/Employment/Education  Employment Status: Not currently working.   Income Source: SSI  Education: College   Financial Concerns:  No concerns presented   Insurance: [employer] Medicare/ [place]      Discharge Plan   Patient and family discharge goal: Pt's goal is to return home with continued support.   Provided Education on discharge plan: YES  Patient agreeable to discharge plan:  YES  A list of Medicare Certified Facilities was provided to the patient and/or family to encourage patient choice. Based on location and rating, patient would like referrals made to: NA  General information regarding anticipated insurance coverage and possible out of pocket cost was discussed. Patient and patient's family are aware patient may incur the cost of transportation to the facility, pending insurance payment: YES  Barriers to discharge: Medical clearance, complete therapy goals    Discharge Recommendations   Disposition: Home with continued support.   Transportation Needs: Family will provide.   Name of Transportation Company and Phone: NA    Additional comments   Writer introduced self and role of SW. Pt and writer talked about her journey here and the pt was tearful talking about how sick she has been and going through her amputation. Pt is open to social work services while here on TCU, and pt was given her care plan goals and orders and there were no questions at this time. SW will continue to follow and assist as needed.       07/13/18 1300   Living Arrangements   Lives With child(kristina), adult;parent(s)   Living Arrangements house   Able to Return to Prior Living Arrangements yes   Home  Safety   Patient Feels Safe Living in Home? yes   Discharge Planning   Expected Discharge Date 07/19/18   Anticipated Discharge Disposition home with home health   Discharge Needs Assessment   Patient/family verbalizes understanding of discharge plan recommendations? Yes   Transportation Available car;family or friend will provide   Abuse Risk Screen   QUESTION TO PATIENT:  Has a member of your family or a partner(now or in the past) intimidated, hurt, manipulated, or controlled you in any way? no   QUESTION TO PATIENT: Do you feel safe going back to the place where you are living? yes   OBSERVATION: Is there reason to believe there has been maltreatment of a vulnerable adult (ie. Physical/Sexual/Emotional abuse, self neglect, lack of adequate food, shelter, medical care, or financial exploitation)? no   Mental Health Suicide Risk   Have you ever thought about hurting yourself now or in the past? no   Homicide Risk   Feels Like Hurting Others no   Coping/Stress   Major Change/Loss/Stressor housing concerns;illness       TIERA Turcios  [facility]   P: [phone]  Pgr: [phone]

## 2018-07-13 NOTE — PLAN OF CARE
Problem: Goal Outcome Summary  Goal: Goal Outcome Summary  Outcome: Improving  Alert and oriented. Slept comfortably throughout the night.  at 0200. SBA for transfers. R BKA ROBERTO. Call light within reach. No new concerns overnight.

## 2018-07-13 NOTE — PLAN OF CARE
Problem: Goal Outcome Summary  Goal: Goal Outcome Summary  Alert and verbally makes needs known. Participating in therapies. No complaints. Pleasant.

## 2018-07-14 LAB
CREAT SERPL-MCNC: 1.07 MG/DL (ref 0.52–1.04)
GFR SERPL CREATININE-BSD FRML MDRD: 54 ML/MIN/1.7M2
GLUCOSE BLDC GLUCOMTR-MCNC: 105 MG/DL (ref 70–99)
GLUCOSE BLDC GLUCOMTR-MCNC: 165 MG/DL (ref 70–99)
GLUCOSE BLDC GLUCOMTR-MCNC: 166 MG/DL (ref 70–99)
GLUCOSE BLDC GLUCOMTR-MCNC: 197 MG/DL (ref 70–99)
GLUCOSE BLDC GLUCOMTR-MCNC: 81 MG/DL (ref 70–99)

## 2018-07-14 PROCEDURE — 40000193 ZZH STATISTIC PT WARD VISIT: Performed by: PHYSICAL THERAPIST

## 2018-07-14 PROCEDURE — 00000146 ZZHCL STATISTIC GLUCOSE BY METER IP

## 2018-07-14 PROCEDURE — 97535 SELF CARE MNGMENT TRAINING: CPT | Mod: GO | Performed by: OCCUPATIONAL THERAPIST

## 2018-07-14 PROCEDURE — 25000128 H RX IP 250 OP 636: Performed by: INTERNAL MEDICINE

## 2018-07-14 PROCEDURE — 97530 THERAPEUTIC ACTIVITIES: CPT | Mod: GP | Performed by: PHYSICAL THERAPIST

## 2018-07-14 PROCEDURE — 12000022 ZZH R&B SNF

## 2018-07-14 PROCEDURE — 25000132 ZZH RX MED GY IP 250 OP 250 PS 637: Performed by: INTERNAL MEDICINE

## 2018-07-14 PROCEDURE — 25000128 H RX IP 250 OP 636

## 2018-07-14 PROCEDURE — 40000133 ZZH STATISTIC OT WARD VISIT: Performed by: OCCUPATIONAL THERAPIST

## 2018-07-14 PROCEDURE — 97110 THERAPEUTIC EXERCISES: CPT | Mod: GP | Performed by: PHYSICAL THERAPIST

## 2018-07-14 PROCEDURE — 36592 COLLECT BLOOD FROM PICC: CPT | Performed by: INTERNAL MEDICINE

## 2018-07-14 PROCEDURE — 82565 ASSAY OF CREATININE: CPT | Performed by: INTERNAL MEDICINE

## 2018-07-14 RX ORDER — SODIUM CHLORIDE 9 MG/ML
INJECTION, SOLUTION INTRAVENOUS
Status: COMPLETED
Start: 2018-07-14 | End: 2018-07-14

## 2018-07-14 RX ADMIN — FUROSEMIDE 40 MG: 20 TABLET ORAL at 08:26

## 2018-07-14 RX ADMIN — SODIUM CHLORIDE, PRESERVATIVE FREE 5 ML: 5 INJECTION INTRAVENOUS at 08:22

## 2018-07-14 RX ADMIN — INSULIN ASPART 1 UNITS: 100 INJECTION, SOLUTION INTRAVENOUS; SUBCUTANEOUS at 13:52

## 2018-07-14 RX ADMIN — SENNOSIDES 2 TABLET: 8.6 TABLET, FILM COATED ORAL at 08:26

## 2018-07-14 RX ADMIN — SODIUM CHLORIDE, PRESERVATIVE FREE 5 ML: 5 INJECTION INTRAVENOUS at 08:37

## 2018-07-14 RX ADMIN — MULTIPLE VITAMINS W/ MINERALS TAB 1 TABLET: TAB at 08:26

## 2018-07-14 RX ADMIN — Medication 12.5 MG: at 08:27

## 2018-07-14 RX ADMIN — Medication 12.5 MG: at 20:43

## 2018-07-14 RX ADMIN — SODIUM CHLORIDE 500 ML: 9 INJECTION, SOLUTION INTRAVENOUS at 08:38

## 2018-07-14 RX ADMIN — PREGABALIN 75 MG: 75 CAPSULE ORAL at 08:26

## 2018-07-14 RX ADMIN — ASPIRIN 81 MG CHEWABLE TABLET 81 MG: 81 TABLET CHEWABLE at 08:26

## 2018-07-14 RX ADMIN — PREGABALIN 75 MG: 75 CAPSULE ORAL at 13:52

## 2018-07-14 RX ADMIN — LISINOPRIL 10 MG: 10 TABLET ORAL at 08:27

## 2018-07-14 RX ADMIN — VANCOMYCIN HYDROCHLORIDE 1000 MG: 1 INJECTION, SOLUTION INTRAVENOUS at 08:39

## 2018-07-14 RX ADMIN — PREGABALIN 75 MG: 75 CAPSULE ORAL at 20:41

## 2018-07-14 RX ADMIN — HYDROMORPHONE HYDROCHLORIDE 2 MG: 1 SOLUTION ORAL at 18:30

## 2018-07-14 RX ADMIN — INSULIN ASPART 2 UNITS: 100 INJECTION, SOLUTION INTRAVENOUS; SUBCUTANEOUS at 18:21

## 2018-07-14 RX ADMIN — MORPHINE SULFATE 15 MG: 15 TABLET, EXTENDED RELEASE ORAL at 20:40

## 2018-07-14 RX ADMIN — ENOXAPARIN SODIUM 40 MG: 40 INJECTION SUBCUTANEOUS at 18:21

## 2018-07-14 RX ADMIN — MORPHINE SULFATE 15 MG: 15 TABLET, EXTENDED RELEASE ORAL at 08:26

## 2018-07-14 RX ADMIN — FLUTICASONE PROPIONATE 2 SPRAY: 50 SPRAY, METERED NASAL at 08:35

## 2018-07-14 RX ADMIN — POLYETHYLENE GLYCOL 3350 17 G: 17 POWDER, FOR SOLUTION ORAL at 08:27

## 2018-07-14 RX ADMIN — METHADONE HYDROCHLORIDE 70 MG: 10 CONCENTRATE ORAL at 08:32

## 2018-07-14 RX ADMIN — LIDOCAINE 2 PATCH: 560 PATCH PERCUTANEOUS; TOPICAL; TRANSDERMAL at 08:35

## 2018-07-14 RX ADMIN — AMITRIPTYLINE HYDROCHLORIDE 50 MG: 50 TABLET, FILM COATED ORAL at 23:08

## 2018-07-14 RX ADMIN — SODIUM CHLORIDE, PRESERVATIVE FREE 5 ML: 5 INJECTION INTRAVENOUS at 08:23

## 2018-07-14 RX ADMIN — RANITIDINE 300 MG: 150 TABLET, FILM COATED ORAL at 08:26

## 2018-07-14 RX ADMIN — ALBUTEROL SULFATE 2 PUFF: 90 AEROSOL, METERED RESPIRATORY (INHALATION) at 08:35

## 2018-07-14 RX ADMIN — Medication 6 MG: at 23:08

## 2018-07-14 RX ADMIN — UMECLIDINIUM BROMIDE AND VILANTEROL TRIFENATATE 1 PUFF: 62.5; 25 POWDER RESPIRATORY (INHALATION) at 08:35

## 2018-07-14 NOTE — PLAN OF CARE
Problem: Goal Outcome Summary  Goal: Goal Outcome Summary  Up and about in w/c much of the day. Makes needs known. Participating in therapies. No complaints. Pleasant.

## 2018-07-14 NOTE — PLAN OF CARE
Problem: Goal Outcome Summary  Goal: Goal Outcome Summary  Pt is alert and oriented. Denies shortness of breath/chest pain. Continent of bowel/bladder. LBM yesterday per pt. Dressings changed to RLE amputation site. LLE dressings changed per plan of care. Pain controlled with scheduled pain meds. Pt denied any new concerns.

## 2018-07-14 NOTE — PLAN OF CARE
Problem: Goal Outcome Summary  Goal: Goal Outcome Summary  PT: patient missed 1st session due to meal/nursing care. Patient requested to trial floor transfer; demonstrated and required min assist for transition back onto mat table due to LLE strength deficits. Patient requires facilitation for hip stability HEP; recommended continuation of exercises for preprosthetic exercise. Patient requested to attempt stair negotiation next week, however does not need to perform (only to basement, but family can assist with laundry at this time). Patient progressing very well and very motivated. Patient possibly wanting to d/c earlier

## 2018-07-14 NOTE — PLAN OF CARE
Problem: Goal Outcome Summary  Goal: Goal Outcome Summary  Outcome: Improving  RN: Pt has no c/o pain or discomfort so far this shift. No respiratory issue/ complain noted. BG at 0200= 105. R BKA incision and L foot wounds dressings CDI. Appear to be sleeping/resting. Call light within reach and is able to use it as needed. Continue with plan of care.

## 2018-07-15 LAB
GLUCOSE BLDC GLUCOMTR-MCNC: 101 MG/DL (ref 70–99)
GLUCOSE BLDC GLUCOMTR-MCNC: 170 MG/DL (ref 70–99)
GLUCOSE BLDC GLUCOMTR-MCNC: 208 MG/DL (ref 70–99)
GLUCOSE BLDC GLUCOMTR-MCNC: 220 MG/DL (ref 70–99)
GLUCOSE BLDC GLUCOMTR-MCNC: 238 MG/DL (ref 70–99)
GLUCOSE BLDC GLUCOMTR-MCNC: 63 MG/DL (ref 70–99)

## 2018-07-15 PROCEDURE — 25000128 H RX IP 250 OP 636: Performed by: INTERNAL MEDICINE

## 2018-07-15 PROCEDURE — 12000022 ZZH R&B SNF

## 2018-07-15 PROCEDURE — 99207 ZZC CDG-MDM COMPONENT: MEETS MODERATE - UP CODED: CPT | Performed by: INTERNAL MEDICINE

## 2018-07-15 PROCEDURE — 99309 SBSQ NF CARE MODERATE MDM 30: CPT | Performed by: INTERNAL MEDICINE

## 2018-07-15 PROCEDURE — 00000146 ZZHCL STATISTIC GLUCOSE BY METER IP

## 2018-07-15 PROCEDURE — 25000132 ZZH RX MED GY IP 250 OP 250 PS 637: Performed by: INTERNAL MEDICINE

## 2018-07-15 PROCEDURE — 25000128 H RX IP 250 OP 636

## 2018-07-15 RX ORDER — SODIUM CHLORIDE 9 MG/ML
INJECTION, SOLUTION INTRAVENOUS
Status: COMPLETED
Start: 2018-07-15 | End: 2018-07-15

## 2018-07-15 RX ADMIN — LIDOCAINE 2 PATCH: 560 PATCH PERCUTANEOUS; TOPICAL; TRANSDERMAL at 08:16

## 2018-07-15 RX ADMIN — Medication 12.5 MG: at 20:37

## 2018-07-15 RX ADMIN — MORPHINE SULFATE 15 MG: 15 TABLET, EXTENDED RELEASE ORAL at 08:14

## 2018-07-15 RX ADMIN — VANCOMYCIN HYDROCHLORIDE 1000 MG: 1 INJECTION, SOLUTION INTRAVENOUS at 08:17

## 2018-07-15 RX ADMIN — SODIUM CHLORIDE, PRESERVATIVE FREE 10 ML: 5 INJECTION INTRAVENOUS at 17:33

## 2018-07-15 RX ADMIN — SENNOSIDES 2 TABLET: 8.6 TABLET, FILM COATED ORAL at 20:34

## 2018-07-15 RX ADMIN — PREGABALIN 75 MG: 75 CAPSULE ORAL at 13:53

## 2018-07-15 RX ADMIN — MORPHINE SULFATE 15 MG: 15 TABLET, EXTENDED RELEASE ORAL at 20:34

## 2018-07-15 RX ADMIN — FLUTICASONE PROPIONATE 2 SPRAY: 50 SPRAY, METERED NASAL at 08:13

## 2018-07-15 RX ADMIN — ENOXAPARIN SODIUM 40 MG: 40 INJECTION SUBCUTANEOUS at 17:32

## 2018-07-15 RX ADMIN — PREGABALIN 75 MG: 75 CAPSULE ORAL at 08:14

## 2018-07-15 RX ADMIN — ACETAMINOPHEN 1000 MG: 500 TABLET ORAL at 20:33

## 2018-07-15 RX ADMIN — LISINOPRIL 10 MG: 10 TABLET ORAL at 08:21

## 2018-07-15 RX ADMIN — AMITRIPTYLINE HYDROCHLORIDE 50 MG: 50 TABLET, FILM COATED ORAL at 22:30

## 2018-07-15 RX ADMIN — ASPIRIN 81 MG CHEWABLE TABLET 81 MG: 81 TABLET CHEWABLE at 08:13

## 2018-07-15 RX ADMIN — INSULIN ASPART 2 UNITS: 100 INJECTION, SOLUTION INTRAVENOUS; SUBCUTANEOUS at 13:52

## 2018-07-15 RX ADMIN — SENNOSIDES 2 TABLET: 8.6 TABLET, FILM COATED ORAL at 08:14

## 2018-07-15 RX ADMIN — RANITIDINE 300 MG: 150 TABLET, FILM COATED ORAL at 08:14

## 2018-07-15 RX ADMIN — UMECLIDINIUM BROMIDE AND VILANTEROL TRIFENATATE 1 PUFF: 62.5; 25 POWDER RESPIRATORY (INHALATION) at 08:13

## 2018-07-15 RX ADMIN — SODIUM CHLORIDE, PRESERVATIVE FREE 5 ML: 5 INJECTION INTRAVENOUS at 08:15

## 2018-07-15 RX ADMIN — SODIUM CHLORIDE, PRESERVATIVE FREE 10 ML: 5 INJECTION INTRAVENOUS at 18:44

## 2018-07-15 RX ADMIN — PREGABALIN 75 MG: 75 CAPSULE ORAL at 20:34

## 2018-07-15 RX ADMIN — INSULIN ASPART 2 UNITS: 100 INJECTION, SOLUTION INTRAVENOUS; SUBCUTANEOUS at 18:52

## 2018-07-15 RX ADMIN — Medication 12.5 MG: at 08:21

## 2018-07-15 RX ADMIN — METHADONE HYDROCHLORIDE 70 MG: 10 CONCENTRATE ORAL at 08:14

## 2018-07-15 RX ADMIN — SODIUM CHLORIDE 500 ML: 9 INJECTION, SOLUTION INTRAVENOUS at 08:17

## 2018-07-15 RX ADMIN — ALBUTEROL SULFATE 2 PUFF: 90 AEROSOL, METERED RESPIRATORY (INHALATION) at 08:13

## 2018-07-15 RX ADMIN — MULTIPLE VITAMINS W/ MINERALS TAB 1 TABLET: TAB at 08:14

## 2018-07-15 RX ADMIN — Medication 6 MG: at 22:30

## 2018-07-15 RX ADMIN — FUROSEMIDE 40 MG: 20 TABLET ORAL at 08:14

## 2018-07-15 NOTE — PLAN OF CARE
Problem: Goal Outcome Summary  Goal: Goal Outcome Summary  Outcome: No Change  RN: Pt AA&Ox3, able to make needs known. Family visiting, up in w/c most of shift. RLE dsg changed, sutures CDI; LLE dsgs changed, heel x2 with Medihoney and greater toe area with Isobodore per pt. Sticky Note sent to update wound change orders. Very pleasant. Con't POC.

## 2018-07-15 NOTE — PLAN OF CARE
Problem: Goal Outcome Summary  Goal: Goal Outcome Summary  OT patient receptive to OT feedback regarding possible Ae DME for home. Simulated bathroom set up for PM OT training. Patient considering commode overlay for toilet and tub transfer bench. OT will assist patient with  how and where to find each item prior to OT DC

## 2018-07-15 NOTE — PLAN OF CARE
Problem: Goal Outcome Summary  Goal: Goal Outcome Summary  Outcome: No Change  RN: Pt has no c/o pain or discomfort so far this shift. No respiratory issue/ complain noted. BG at 0200= 101. R BKA incision and L foot wounds dressings CDI. Appear to be sleeping/resting. Call light within reach and is able to use it as needed. Continue with plan of care.

## 2018-07-15 NOTE — PLAN OF CARE
Problem: Goal Outcome Summary  Goal: Goal Outcome Summary  Independent with transfers in room. Good appetite. Pain controlled with scheduled pain meds.  BG this morning-63/drank apple juice/ on re-check. Goes outside and propels w/c in hallway and other areas in hospital. Very pleasant.

## 2018-07-16 LAB
ALBUMIN SERPL-MCNC: 2.6 G/DL (ref 3.4–5)
ALP SERPL-CCNC: 1163 U/L (ref 40–150)
ALT SERPL W P-5'-P-CCNC: 75 U/L (ref 0–50)
ANION GAP SERPL CALCULATED.3IONS-SCNC: 6 MMOL/L (ref 3–14)
AST SERPL W P-5'-P-CCNC: 59 U/L (ref 0–45)
BILIRUB SERPL-MCNC: 0.2 MG/DL (ref 0.2–1.3)
BUN SERPL-MCNC: 33 MG/DL (ref 7–30)
CALCIUM SERPL-MCNC: 8.9 MG/DL (ref 8.5–10.1)
CHLORIDE SERPL-SCNC: 108 MMOL/L (ref 94–109)
CO2 SERPL-SCNC: 28 MMOL/L (ref 20–32)
CREAT SERPL-MCNC: 1.23 MG/DL (ref 0.52–1.04)
CRP SERPL-MCNC: 27.8 MG/L (ref 0–8)
ERYTHROCYTE [DISTWIDTH] IN BLOOD BY AUTOMATED COUNT: 20.4 % (ref 10–15)
GFR SERPL CREATININE-BSD FRML MDRD: 46 ML/MIN/1.7M2
GLUCOSE BLDC GLUCOMTR-MCNC: 137 MG/DL (ref 70–99)
GLUCOSE BLDC GLUCOMTR-MCNC: 149 MG/DL (ref 70–99)
GLUCOSE BLDC GLUCOMTR-MCNC: 72 MG/DL (ref 70–99)
GLUCOSE SERPL-MCNC: 65 MG/DL (ref 70–99)
HCT VFR BLD AUTO: 30.7 % (ref 35–47)
HGB BLD-MCNC: 9.9 G/DL (ref 11.7–15.7)
MCH RBC QN AUTO: 27.4 PG (ref 26.5–33)
MCHC RBC AUTO-ENTMCNC: 32.2 G/DL (ref 31.5–36.5)
MCV RBC AUTO: 85 FL (ref 78–100)
PLATELET # BLD AUTO: 368 10E9/L (ref 150–450)
POTASSIUM SERPL-SCNC: 4.5 MMOL/L (ref 3.4–5.3)
PROT SERPL-MCNC: 7.5 G/DL (ref 6.8–8.8)
RBC # BLD AUTO: 3.61 10E12/L (ref 3.8–5.2)
SODIUM SERPL-SCNC: 142 MMOL/L (ref 133–144)
VANCOMYCIN SERPL-MCNC: 24.1 MG/L
WBC # BLD AUTO: 7.3 10E9/L (ref 4–11)

## 2018-07-16 PROCEDURE — 00000146 ZZHCL STATISTIC GLUCOSE BY METER IP

## 2018-07-16 PROCEDURE — 86140 C-REACTIVE PROTEIN: CPT | Performed by: INTERNAL MEDICINE

## 2018-07-16 PROCEDURE — 40000133 ZZH STATISTIC OT WARD VISIT

## 2018-07-16 PROCEDURE — 40000193 ZZH STATISTIC PT WARD VISIT

## 2018-07-16 PROCEDURE — 97110 THERAPEUTIC EXERCISES: CPT | Mod: GO

## 2018-07-16 PROCEDURE — 80053 COMPREHEN METABOLIC PANEL: CPT | Performed by: INTERNAL MEDICINE

## 2018-07-16 PROCEDURE — 25000128 H RX IP 250 OP 636: Performed by: INTERNAL MEDICINE

## 2018-07-16 PROCEDURE — 80202 ASSAY OF VANCOMYCIN: CPT | Performed by: INTERNAL MEDICINE

## 2018-07-16 PROCEDURE — 97110 THERAPEUTIC EXERCISES: CPT | Mod: GP

## 2018-07-16 PROCEDURE — 97535 SELF CARE MNGMENT TRAINING: CPT | Mod: GO

## 2018-07-16 PROCEDURE — 85027 COMPLETE CBC AUTOMATED: CPT | Performed by: INTERNAL MEDICINE

## 2018-07-16 PROCEDURE — 25000132 ZZH RX MED GY IP 250 OP 250 PS 637: Performed by: INTERNAL MEDICINE

## 2018-07-16 PROCEDURE — 12000022 ZZH R&B SNF

## 2018-07-16 PROCEDURE — 36592 COLLECT BLOOD FROM PICC: CPT | Performed by: INTERNAL MEDICINE

## 2018-07-16 RX ORDER — VANCOMYCIN HYDROCHLORIDE 1 G/200ML
1000 INJECTION, SOLUTION INTRAVENOUS
Status: DISCONTINUED | OUTPATIENT
Start: 2018-07-16 | End: 2018-07-17

## 2018-07-16 RX ADMIN — MORPHINE SULFATE 15 MG: 15 TABLET, EXTENDED RELEASE ORAL at 21:46

## 2018-07-16 RX ADMIN — FUROSEMIDE 40 MG: 20 TABLET ORAL at 08:36

## 2018-07-16 RX ADMIN — FLUTICASONE PROPIONATE 2 SPRAY: 50 SPRAY, METERED NASAL at 08:37

## 2018-07-16 RX ADMIN — POLYETHYLENE GLYCOL 3350 17 G: 17 POWDER, FOR SOLUTION ORAL at 08:35

## 2018-07-16 RX ADMIN — PREGABALIN 75 MG: 75 CAPSULE ORAL at 08:34

## 2018-07-16 RX ADMIN — SODIUM CHLORIDE, PRESERVATIVE FREE 5 ML: 5 INJECTION INTRAVENOUS at 07:53

## 2018-07-16 RX ADMIN — ACETAMINOPHEN 1000 MG: 500 TABLET ORAL at 21:45

## 2018-07-16 RX ADMIN — LISINOPRIL 10 MG: 10 TABLET ORAL at 08:36

## 2018-07-16 RX ADMIN — PREGABALIN 75 MG: 75 CAPSULE ORAL at 21:46

## 2018-07-16 RX ADMIN — MORPHINE SULFATE 15 MG: 15 TABLET, EXTENDED RELEASE ORAL at 08:34

## 2018-07-16 RX ADMIN — Medication 12.5 MG: at 21:47

## 2018-07-16 RX ADMIN — ASPIRIN 81 MG CHEWABLE TABLET 81 MG: 81 TABLET CHEWABLE at 08:34

## 2018-07-16 RX ADMIN — AMITRIPTYLINE HYDROCHLORIDE 50 MG: 50 TABLET, FILM COATED ORAL at 21:46

## 2018-07-16 RX ADMIN — ENOXAPARIN SODIUM 40 MG: 40 INJECTION SUBCUTANEOUS at 16:42

## 2018-07-16 RX ADMIN — LIDOCAINE 2 PATCH: 560 PATCH PERCUTANEOUS; TOPICAL; TRANSDERMAL at 08:33

## 2018-07-16 RX ADMIN — SODIUM CHLORIDE, PRESERVATIVE FREE 5 ML: 5 INJECTION INTRAVENOUS at 08:34

## 2018-07-16 RX ADMIN — Medication 6 MG: at 21:45

## 2018-07-16 RX ADMIN — Medication 12.5 MG: at 08:36

## 2018-07-16 RX ADMIN — INSULIN ASPART 1 UNITS: 100 INJECTION, SOLUTION INTRAVENOUS; SUBCUTANEOUS at 13:34

## 2018-07-16 RX ADMIN — MULTIPLE VITAMINS W/ MINERALS TAB 1 TABLET: TAB at 08:35

## 2018-07-16 RX ADMIN — SENNOSIDES 2 TABLET: 8.6 TABLET, FILM COATED ORAL at 21:45

## 2018-07-16 RX ADMIN — MENTHOL 1 PATCH: 205.5 PATCH TOPICAL at 21:49

## 2018-07-16 RX ADMIN — SENNOSIDES 2 TABLET: 8.6 TABLET, FILM COATED ORAL at 08:35

## 2018-07-16 RX ADMIN — UMECLIDINIUM BROMIDE AND VILANTEROL TRIFENATATE 1 PUFF: 62.5; 25 POWDER RESPIRATORY (INHALATION) at 08:39

## 2018-07-16 RX ADMIN — VANCOMYCIN HYDROCHLORIDE 1000 MG: 1 INJECTION, SOLUTION INTRAVENOUS at 08:33

## 2018-07-16 RX ADMIN — PREGABALIN 75 MG: 75 CAPSULE ORAL at 13:34

## 2018-07-16 RX ADMIN — RANITIDINE 300 MG: 150 TABLET, FILM COATED ORAL at 08:35

## 2018-07-16 RX ADMIN — METHADONE HYDROCHLORIDE 70 MG: 10 CONCENTRATE ORAL at 08:49

## 2018-07-16 NOTE — PLAN OF CARE
Problem: Goal Outcome Summary  Goal: Goal Outcome Summary  Outcome: No Change  Pleasant and cooperative with cares. Pain is being controlled by scheduled pain medications, declined the need for additional pain medications at this time. No respiratory distress noted. Triple lumen PICC on the right ac, IV Vancomycin was infused without difficulty. Vancomycin is currently on hold per pharmacy.

## 2018-07-16 NOTE — PHARMACY-VANCOMYCIN DOSING SERVICE
Pharmacy Vancomycin Note  Date of Service 2018  Patient's  1965   52 year old, female    Indication: Osteomyelitis  Goal Trough Level: 15-20 mg/L  Day of Therapy: since 18?  Current Vancomycin regimen:  1000 mg IV q24h    Current estimated CrCl = Estimated Creatinine Clearance: 45.8 mL/min (based on Cr of 1.23).    Creatinine for last 3 days  2018:  8:25 AM Creatinine 1.07 mg/dL  2018:  7:58 AM Creatinine 1.23 mg/dL    Recent Vancomycin Levels (past 3 days)  2018:  7:58 AM Vancomycin Level 24.1 mg/L    Vancomycin IV Administrations (past 72 hours)                   vancomycin (VANCOCIN) 1000 mg in dextrose 5% 200 mL PREMIX (mg) 1,000 mg New Bag 18 0833     1,000 mg New Bag 07/15/18 0817     1,000 mg New Bag 18 0839                Nephrotoxins and other renal medications (Future)    Start     Dose/Rate Route Frequency Ordered Stop    18 0915  vancomycin (VANCOCIN) 1000 mg in dextrose 5% 200 mL PREMIX      1,000 mg  200 mL/hr over 1 Hours Intravenous HOLD 18 0903      18 0800  furosemide (LASIX) tablet 40 mg      40 mg Oral DAILY 07/10/18 1543      18 0800  lisinopril (PRINIVIL/ZESTRIL) tablet 10 mg      10 mg Oral DAILY 07/10/18 1543               Contrast Orders - past 72 hours     None          Interpretation of levels and current regimen:  Trough level is  Supratherapeutic    Has serum creatinine changed > 50% in last 72 hours: No    Urine output:  unable to determine    Renal Function: Worsening    Plan:  1.  Vancomycin was given by RN at 0830 (I put vanco on hold but she had already given it)  2.  Pharmacy will check trough levels as appropriate in 1-3 Days.  Level timed for q18h possible dosing  3. Serum creatinine levels will be ordered a minimum of twice weekly.  I will order a creatinine level also for 1400.    Rohini Dixon        .

## 2018-07-16 NOTE — PLAN OF CARE
Problem: Goal Outcome Summary  Goal: Goal Outcome Summary  Outcome: No Change  RN: Pt AA&O x3.  and 238; pt self administers insulin when needed. PICC dsg and 3 caps changed and hep locked. Left heel dsgs changed; right leg dsg changed. Pt indep in w/c and goes outside frequently. Con't POC.

## 2018-07-16 NOTE — PLAN OF CARE
Problem: Goal Outcome Summary  Goal: Goal Outcome Summary  OT: Reviewed purchase options for recommended DME.  Pt to obtain measurements and photo of home bathroom set-up to determine equipment fit.  Pt wondering if insurance will cover BSC and/or tub bench.  Consulted  who informed that equipment may be covered, and pt has [facility] support as well.  Will continue to f/u with equipment coverage, otherwise provided pt with options for affordable purchase.

## 2018-07-16 NOTE — PROGRESS NOTES
Patient seen and examined (Late entry)    S- doing well. Pain controlled with MS contin    4 point ROS done and neg unless mentioned     O-   Gen- pleasant   NECK:                                 Supple   CVS:                                    s1s2 show a regular rate and rhythm with no murmurs, rubs or gallops,                  CHEST:                     Bilaterally clear to auscultation with no rales, rhonchi or wheezes  ABDOMEN:               Non-tender and non-distended   EXT:                                    Rt BKA and left foot covered with dressing  NEURO:                    Alert and oriented to person, place and time                                              Cranial nerves II-XII are intact  PSCYH:                     Normal affect     LABS:   BMP  Recent Labs  Lab 07/16/18  0758 07/14/18  0825 07/12/18  0607 07/10/18  1806     --  141  --    POTASSIUM 4.5  --  4.3  --    CHLORIDE 108  --  107  --    RICK 8.9  --  9.2  --    CO2 28  --  27  --    BUN 33*  --  23  --    CR 1.23* 1.07* 1.07* 1.05*   GLC 65*  --  78  --      CBC  Recent Labs  Lab 07/16/18  0758 07/12/18  0607  07/10/18  1806   WBC 7.3 9.3  --   --    RBC 3.61* 3.57*  --   --    HGB 9.9* 9.5*  < >  --    HCT 30.7* 30.1*  --   --    MCV 85 84  --   --    MCH 27.4 26.6  --   --    MCHC 32.2 31.6  --   --    RDW 20.4* 21.3*  --   --     405  --  418   < > = values in this interval not displayed.  INRNo lab results found in last 7 days.  LFTs  Recent Labs  Lab 07/16/18  0758   ALKPHOS 1163*   AST 59*   ALT 75*   BILITOTAL 0.2   PROTTOTAL 7.5   ALBUMIN 2.6*      A/P:7/15- ordered RUQ USS as inc in ALP (patient asymptomatic)     # Deconditioning- PT/OT  # Wound cares      # Severe Sepsis  # Infectious/hypoxic encephalopathy  # R diabetic foot infection & osteomyelitis, s/p I&D x 3, R BKA 6/28  Patient was continued on vancomycin/cefepime/metronidazole until cultures finalized, continues on vancomycin monotherapy.    - vancomycin  (end 8/9)  - weekly CBC/CMP/CRP- if abnormal can contact Dr. Downs  - Dr King f/u 2-3 wks  - Podiatry f/u reviewed.   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 hospital- Cardiology consulted. No angiogram, pt declined lexiscan due to claustrophobia. Patient does have history of restrictive lung disease of unclear etiology which was being worked up during 2/2015 admission for respiratory failure, was lost to follow up.  - Ct furosemide 40 mg qd  - daily weights, adjust diuretics f/u Cr  - non urgent outpatient pulmonology f/u for further evaluation of restrictive lung disease  - last visit cardiology 2/2016, non urgent cardiology follow up for titration of CHF medication     # Acute on chronic pain   # Anxiety  # Sinus tachycardia   Patient on chronic methadone for hx opiate dependence.  - pain plan as below  - wean narcotics as tolerated     # Uncontrolled Type 2 Diabetes  # Hx of Chronic pancreatitis s/p whipple  # Severe malnutrition in the context of acute illness  Hgb a1c: 12.8 on 2./2 will recheck  - glargine 8 units with SSI  - primary care follow up following TCU stay for diabetes management     # Acute on chronic normocytic Anemia  # Anemia of chronic disease  Had 2u pRBC in acute hospital. monitor.     # NIKOLAY NIKOLAY to 1.93, resolved         Lab Results   Component Value Date     CR 1.05 07/10/2018        # Anisocoria/R afferent pupillary defect  Noted while intubated. Neuro ICU consulted, CT head normal. Resolved spontaneously     # pain Mx: continue home regimen: methadone 70 mg, Lyrica 75 mg tid, Amitriptyline 50 mg qhs   - follows with Dr [facility]  for methadone  - New before admission: oxycodone ER 10 mg q12h prn, hydromorphone 2 mg daily prn for breakthrough, Tylenol 1000 mg tid scheduled, lidocaine and methol patches to alternate (lidocaine at night)  * Oxycodone changed to MS contin due to insurance reasons    Prashanth Ramesh MD (Pager- [phone])   Internal Medicine/ Hospitalist

## 2018-07-16 NOTE — PROGRESS NOTES
Pt slept well all NOC, did not complain of pain or discomfort. No SOB noted or reported. IND in room. NSG to continue monitoring.

## 2018-07-17 LAB
CREAT SERPL-MCNC: 1.16 MG/DL (ref 0.52–1.04)
GFR SERPL CREATININE-BSD FRML MDRD: 49 ML/MIN/1.7M2
GLUCOSE BLDC GLUCOMTR-MCNC: 115 MG/DL (ref 70–99)
GLUCOSE BLDC GLUCOMTR-MCNC: 175 MG/DL (ref 70–99)
GLUCOSE BLDC GLUCOMTR-MCNC: 179 MG/DL (ref 70–99)
GLUCOSE BLDC GLUCOMTR-MCNC: 205 MG/DL (ref 70–99)
GLUCOSE BLDC GLUCOMTR-MCNC: 227 MG/DL (ref 70–99)
GLUCOSE BLDC GLUCOMTR-MCNC: 234 MG/DL (ref 70–99)
VANCOMYCIN SERPL-MCNC: 21 MG/L

## 2018-07-17 PROCEDURE — 40000193 ZZH STATISTIC PT WARD VISIT

## 2018-07-17 PROCEDURE — 25000128 H RX IP 250 OP 636

## 2018-07-17 PROCEDURE — 97535 SELF CARE MNGMENT TRAINING: CPT | Mod: GO

## 2018-07-17 PROCEDURE — 25000132 ZZH RX MED GY IP 250 OP 250 PS 637: Performed by: INTERNAL MEDICINE

## 2018-07-17 PROCEDURE — 97530 THERAPEUTIC ACTIVITIES: CPT | Mod: GO

## 2018-07-17 PROCEDURE — 40000133 ZZH STATISTIC OT WARD VISIT

## 2018-07-17 PROCEDURE — 12000022 ZZH R&B SNF

## 2018-07-17 PROCEDURE — 80202 ASSAY OF VANCOMYCIN: CPT | Performed by: INTERNAL MEDICINE

## 2018-07-17 PROCEDURE — 82565 ASSAY OF CREATININE: CPT | Performed by: INTERNAL MEDICINE

## 2018-07-17 PROCEDURE — 00000146 ZZHCL STATISTIC GLUCOSE BY METER IP

## 2018-07-17 PROCEDURE — 00220000 ZZH SNF RUG CODE OPNP

## 2018-07-17 PROCEDURE — 25000128 H RX IP 250 OP 636: Performed by: INTERNAL MEDICINE

## 2018-07-17 PROCEDURE — 36592 COLLECT BLOOD FROM PICC: CPT | Performed by: INTERNAL MEDICINE

## 2018-07-17 PROCEDURE — 97535 SELF CARE MNGMENT TRAINING: CPT | Mod: GO | Performed by: OCCUPATIONAL THERAPIST

## 2018-07-17 PROCEDURE — 97110 THERAPEUTIC EXERCISES: CPT | Mod: GP

## 2018-07-17 PROCEDURE — 40000133 ZZH STATISTIC OT WARD VISIT: Performed by: OCCUPATIONAL THERAPIST

## 2018-07-17 RX ORDER — SODIUM CHLORIDE 9 MG/ML
INJECTION, SOLUTION INTRAVENOUS
Status: COMPLETED
Start: 2018-07-17 | End: 2018-07-17

## 2018-07-17 RX ADMIN — ASPIRIN 81 MG CHEWABLE TABLET 81 MG: 81 TABLET CHEWABLE at 08:17

## 2018-07-17 RX ADMIN — AMITRIPTYLINE HYDROCHLORIDE 50 MG: 50 TABLET, FILM COATED ORAL at 22:20

## 2018-07-17 RX ADMIN — SODIUM CHLORIDE, PRESERVATIVE FREE 5 ML: 5 INJECTION INTRAVENOUS at 16:27

## 2018-07-17 RX ADMIN — SODIUM CHLORIDE, PRESERVATIVE FREE 5 ML: 5 INJECTION INTRAVENOUS at 08:23

## 2018-07-17 RX ADMIN — FUROSEMIDE 40 MG: 20 TABLET ORAL at 08:17

## 2018-07-17 RX ADMIN — LISINOPRIL 10 MG: 10 TABLET ORAL at 08:17

## 2018-07-17 RX ADMIN — LIDOCAINE 2 PATCH: 560 PATCH PERCUTANEOUS; TOPICAL; TRANSDERMAL at 08:13

## 2018-07-17 RX ADMIN — MORPHINE SULFATE 15 MG: 15 TABLET, EXTENDED RELEASE ORAL at 22:21

## 2018-07-17 RX ADMIN — SENNOSIDES 2 TABLET: 8.6 TABLET, FILM COATED ORAL at 22:22

## 2018-07-17 RX ADMIN — SODIUM CHLORIDE, PRESERVATIVE FREE 5 ML: 5 INJECTION INTRAVENOUS at 12:01

## 2018-07-17 RX ADMIN — SODIUM CHLORIDE 500 ML: 9 INJECTION, SOLUTION INTRAVENOUS at 19:24

## 2018-07-17 RX ADMIN — ENOXAPARIN SODIUM 40 MG: 40 INJECTION SUBCUTANEOUS at 16:29

## 2018-07-17 RX ADMIN — SENNOSIDES 2 TABLET: 8.6 TABLET, FILM COATED ORAL at 08:18

## 2018-07-17 RX ADMIN — POLYETHYLENE GLYCOL 3350 17 G: 17 POWDER, FOR SOLUTION ORAL at 08:18

## 2018-07-17 RX ADMIN — MULTIPLE VITAMINS W/ MINERALS TAB 1 TABLET: TAB at 08:17

## 2018-07-17 RX ADMIN — MORPHINE SULFATE 15 MG: 15 TABLET, EXTENDED RELEASE ORAL at 08:17

## 2018-07-17 RX ADMIN — PREGABALIN 75 MG: 75 CAPSULE ORAL at 22:22

## 2018-07-17 RX ADMIN — FLUTICASONE PROPIONATE 2 SPRAY: 50 SPRAY, METERED NASAL at 08:16

## 2018-07-17 RX ADMIN — Medication 12.5 MG: at 22:25

## 2018-07-17 RX ADMIN — PREGABALIN 75 MG: 75 CAPSULE ORAL at 08:17

## 2018-07-17 RX ADMIN — RANITIDINE 300 MG: 150 TABLET, FILM COATED ORAL at 08:17

## 2018-07-17 RX ADMIN — METHADONE HYDROCHLORIDE 70 MG: 10 CONCENTRATE ORAL at 08:15

## 2018-07-17 RX ADMIN — PREGABALIN 75 MG: 75 CAPSULE ORAL at 13:53

## 2018-07-17 RX ADMIN — INSULIN ASPART 1 UNITS: 100 INJECTION, SOLUTION INTRAVENOUS; SUBCUTANEOUS at 19:24

## 2018-07-17 RX ADMIN — Medication 12.5 MG: at 08:18

## 2018-07-17 RX ADMIN — VANCOMYCIN HYDROCHLORIDE 750 MG: 10 INJECTION, POWDER, LYOPHILIZED, FOR SOLUTION INTRAVENOUS at 19:23

## 2018-07-17 RX ADMIN — Medication 6 MG: at 22:21

## 2018-07-17 RX ADMIN — UMECLIDINIUM BROMIDE AND VILANTEROL TRIFENATATE 1 PUFF: 62.5; 25 POWDER RESPIRATORY (INHALATION) at 08:16

## 2018-07-17 NOTE — UTILIZATION REVIEW
Pain Interview  Admission/DC    1. Have you had pain or hurting at any time in the last 5 days?  Yes  2. How much of the time have you experienced pain or hurting over the last 5 days? Occasionally  3. Over the past 5 days, has pain made it hard for you to sleep at night?  No  4. Over the past 5 days, have you limited your day-to-day activities because of pain?Yes  5. Rate pain intensity: Numeric 7    Where is your pain? Right lower leg   How would you describe it? Dull ache  What makes it better?medications, ice, and stretching  What makes it worse? Poor positioning, prolonged rest which causes stiffness

## 2018-07-17 NOTE — PLAN OF CARE
Problem: Goal Outcome Summary  Goal: Goal Outcome Summary  Outcome: No Change  RN: Pt has no c/o pain or discomfort so far this shift. No respiratory issue/ complain noted. BG at 1810=203. Independent. Appear to be sleeping/resting. Call light within reach and is able to use it as needed. Continue with plan of care.

## 2018-07-17 NOTE — PLAN OF CARE
Pt Independent. R BKA stump site dressing changed, incision CDI with stithces. Left foor wound care done, no drainage. Pain comfortably manageable with scheduled pain meds. Call within reach.

## 2018-07-17 NOTE — PROGRESS NOTES
07/17/18 1400   General Information   Patient Profile Review See Profile for full history and prior level of function   Daily Contact with Relatives or Friends Phone call;Visit   Pets Cat   Community Involvement   Community Involvement Disabled   Spiritual Practice [employer]   Media   TV / Movies TV;Movie list   Impression   Open to Socializing with Others Independent   Barriers to Leisure No barriers / independent   Patient, family and / or staff in agreement with Plan of Care Yes   Treatment Plan   Independent Activities Pt. is Indep on unit with wheelchair and is aware of use of leisure rooms   Structured Groups Game groups;Social performances   Type of Intervention Independent with activity;1:1 intervention;Structured groups   One to One Therapeutic Intervention Hand massage;Volunteer   Equipment and Supplies While on Unit Movies   Assessment Assessment completed. Pt. is  interested in group activites during stay on unit will continue to invite during stay. Pt. will be offered hand massage as well durign stay on unit. Pt. does not plan to be on unit more than few more days and will notify staff of any needs

## 2018-07-17 NOTE — PLAN OF CARE
Problem: Goal Outcome Summary  Goal: Goal Outcome Summary  Outcome: Therapy, progress toward functional goals as expected  PTA: W/c to be delivered to unit on Friday 7/20. Spoke to Joann this am to confirm this, per pt request.

## 2018-07-17 NOTE — PLAN OF CARE
"7/17/18 Patient(pt)returned triple lumen open ended PICC saline/heparin flushes,home pump skills using  [facility] supplies on PLC model several times.Patient has the concepts of the skills but some difficulty with hand dexterity \"due to arthritis and some neuropathy.\"Pt.asked a few questions,able to answer and demonstrate \"teach back \" questions and verbalized understanding of content presented.Continue to reinforce information.Patient stated her mother or daughter also could help her if needed.I gave the patient a PICC extension piece and saline flush to continue to practice.I also covered PICC troubleshooting.Pt.states she has done IV meds at home before.Above information given to the Care Coordinator.Also see education flow sheet.   Written material given and reviewed at today's appointment:Caring For Your PICC,Getting Ready For Your PICC, PICC Flush Instructions,End Piece Change,Home Pump Skills,Hand washing, PLC card     "

## 2018-07-17 NOTE — PHARMACY-VANCOMYCIN DOSING SERVICE
Pharmacy Vancomycin Note  Date of Service 2018  Patient's  1965   52 year old, female    Indication: Osteomyelitis  Goal Trough Level: 15-20 mg/L  Day of Therapy: Since 18  Current Vancomycin regimen:  1000 mg IV q24h    Current estimated CrCl = Estimated Creatinine Clearance: 45.8 mL/min (based on Cr of 1.23).    Creatinine for last 3 days  2018:  7:58 AM Creatinine 1.23 mg/dL    Recent Vancomycin Levels (past 3 days)  2018:  7:58 AM Vancomycin Level 24.1 mg/L  2018: 12:04 PM Vancomycin Level 21.0 mg/L    Vancomycin IV Administrations (past 72 hours)                   vancomycin (VANCOCIN) 1000 mg in dextrose 5% 200 mL PREMIX (mg) 1,000 mg New Bag 18 0833     1,000 mg New Bag 07/15/18 0817                Nephrotoxins and other renal medications (Future)    Start     Dose/Rate Route Frequency Ordered Stop    18 0915  vancomycin (VANCOCIN) 1000 mg in dextrose 5% 200 mL PREMIX      1,000 mg  200 mL/hr over 1 Hours Intravenous HOLD 18 0903      18 0800  furosemide (LASIX) tablet 40 mg      40 mg Oral DAILY 07/10/18 1543      18 0800  lisinopril (PRINIVIL/ZESTRIL) tablet 10 mg      10 mg Oral DAILY 07/10/18 1543               Contrast Orders - past 72 hours     None          Interpretation of levels and current regimen:  Trough level is  Supratherapeutic-this level was drawn 26 hours after the dose was given.     Has serum creatinine changed > 50% in last 72 hours: No    Urine output:  unable to determine    Renal Function: Worsening    Plan:  1.  Decrease Dose to 750 mg q24h. Will start the dose @ 1800 tonight.   2.  Pharmacy will check trough levels as appropriate in 1-3 Days.    3. Serum creatinine levels will be ordered a minimum of twice weekly.      Geena Menezes, PharmD, BCPS          .

## 2018-07-17 NOTE — PLAN OF CARE
Problem: Goal Outcome Summary  Goal: Goal Outcome Summary  OT: Pt. Progressing with OT goals. OT will continue to assist with AE/DME needs for home.

## 2018-07-17 NOTE — PLAN OF CARE
Problem: Goal Outcome Summary  Goal: Goal Outcome Summary  RN: Vanco level rechecked 1200, 20 is level. Vancomycin new order to restart  mg at 1800 today, creatinine check tomorrow morning. 1400 PLC for IV/PICC teaching. Pt had BG 76 at 1350, declines lunch until after PLC complete, had 2 apple juice at this time, no insulin given per parameters.

## 2018-07-18 DIAGNOSIS — I42.8 NONISCHEMIC CARDIOMYOPATHY (H): ICD-10-CM

## 2018-07-18 LAB
CREAT SERPL-MCNC: 1.01 MG/DL (ref 0.52–1.04)
GFR SERPL CREATININE-BSD FRML MDRD: 58 ML/MIN/1.7M2
GLUCOSE BLDC GLUCOMTR-MCNC: 100 MG/DL (ref 70–99)
GLUCOSE BLDC GLUCOMTR-MCNC: 118 MG/DL (ref 70–99)
GLUCOSE BLDC GLUCOMTR-MCNC: 123 MG/DL (ref 70–99)
GLUCOSE BLDC GLUCOMTR-MCNC: 172 MG/DL (ref 70–99)
GLUCOSE BLDC GLUCOMTR-MCNC: 76 MG/DL (ref 70–99)

## 2018-07-18 PROCEDURE — 40000193 ZZH STATISTIC PT WARD VISIT: Performed by: PHYSICAL THERAPIST

## 2018-07-18 PROCEDURE — 36592 COLLECT BLOOD FROM PICC: CPT | Performed by: INTERNAL MEDICINE

## 2018-07-18 PROCEDURE — 82565 ASSAY OF CREATININE: CPT | Performed by: INTERNAL MEDICINE

## 2018-07-18 PROCEDURE — 25000128 H RX IP 250 OP 636: Performed by: INTERNAL MEDICINE

## 2018-07-18 PROCEDURE — 40000133 ZZH STATISTIC OT WARD VISIT: Performed by: OCCUPATIONAL THERAPIST

## 2018-07-18 PROCEDURE — 97110 THERAPEUTIC EXERCISES: CPT | Mod: GO | Performed by: OCCUPATIONAL THERAPIST

## 2018-07-18 PROCEDURE — 97530 THERAPEUTIC ACTIVITIES: CPT | Mod: GO | Performed by: OCCUPATIONAL THERAPIST

## 2018-07-18 PROCEDURE — 25000132 ZZH RX MED GY IP 250 OP 250 PS 637: Performed by: INTERNAL MEDICINE

## 2018-07-18 PROCEDURE — 97110 THERAPEUTIC EXERCISES: CPT | Mod: GP | Performed by: PHYSICAL THERAPIST

## 2018-07-18 PROCEDURE — 25000128 H RX IP 250 OP 636

## 2018-07-18 PROCEDURE — 12000022 ZZH R&B SNF

## 2018-07-18 PROCEDURE — 97535 SELF CARE MNGMENT TRAINING: CPT | Mod: GO | Performed by: OCCUPATIONAL THERAPIST

## 2018-07-18 PROCEDURE — 00000146 ZZHCL STATISTIC GLUCOSE BY METER IP

## 2018-07-18 RX ADMIN — POLYETHYLENE GLYCOL 3350 17 G: 17 POWDER, FOR SOLUTION ORAL at 08:41

## 2018-07-18 RX ADMIN — LISINOPRIL 10 MG: 10 TABLET ORAL at 08:38

## 2018-07-18 RX ADMIN — VANCOMYCIN HYDROCHLORIDE 750 MG: 10 INJECTION, POWDER, LYOPHILIZED, FOR SOLUTION INTRAVENOUS at 18:47

## 2018-07-18 RX ADMIN — ENOXAPARIN SODIUM 40 MG: 40 INJECTION SUBCUTANEOUS at 16:26

## 2018-07-18 RX ADMIN — UMECLIDINIUM BROMIDE AND VILANTEROL TRIFENATATE 1 PUFF: 62.5; 25 POWDER RESPIRATORY (INHALATION) at 08:45

## 2018-07-18 RX ADMIN — SODIUM CHLORIDE, PRESERVATIVE FREE 5 ML: 5 INJECTION INTRAVENOUS at 08:47

## 2018-07-18 RX ADMIN — AMITRIPTYLINE HYDROCHLORIDE 50 MG: 50 TABLET, FILM COATED ORAL at 22:26

## 2018-07-18 RX ADMIN — ASPIRIN 81 MG CHEWABLE TABLET 81 MG: 81 TABLET CHEWABLE at 08:38

## 2018-07-18 RX ADMIN — PREGABALIN 75 MG: 75 CAPSULE ORAL at 14:24

## 2018-07-18 RX ADMIN — RANITIDINE 300 MG: 150 TABLET, FILM COATED ORAL at 08:38

## 2018-07-18 RX ADMIN — Medication 12.5 MG: at 20:38

## 2018-07-18 RX ADMIN — SENNOSIDES 2 TABLET: 8.6 TABLET, FILM COATED ORAL at 20:38

## 2018-07-18 RX ADMIN — SENNOSIDES 2 TABLET: 8.6 TABLET, FILM COATED ORAL at 08:38

## 2018-07-18 RX ADMIN — MORPHINE SULFATE 15 MG: 15 TABLET, EXTENDED RELEASE ORAL at 20:38

## 2018-07-18 RX ADMIN — MORPHINE SULFATE 15 MG: 15 TABLET, EXTENDED RELEASE ORAL at 08:37

## 2018-07-18 RX ADMIN — Medication 6 MG: at 22:25

## 2018-07-18 RX ADMIN — MULTIPLE VITAMINS W/ MINERALS TAB 1 TABLET: TAB at 08:38

## 2018-07-18 RX ADMIN — METHADONE HYDROCHLORIDE 70 MG: 10 CONCENTRATE ORAL at 08:36

## 2018-07-18 RX ADMIN — LIDOCAINE 2 PATCH: 560 PATCH PERCUTANEOUS; TOPICAL; TRANSDERMAL at 08:36

## 2018-07-18 RX ADMIN — PREGABALIN 75 MG: 75 CAPSULE ORAL at 08:37

## 2018-07-18 RX ADMIN — INSULIN ASPART 1 UNITS: 100 INJECTION, SOLUTION INTRAVENOUS; SUBCUTANEOUS at 14:24

## 2018-07-18 RX ADMIN — ACETAMINOPHEN 1000 MG: 500 TABLET ORAL at 19:12

## 2018-07-18 RX ADMIN — FLUTICASONE PROPIONATE 2 SPRAY: 50 SPRAY, METERED NASAL at 08:46

## 2018-07-18 RX ADMIN — FUROSEMIDE 40 MG: 20 TABLET ORAL at 08:39

## 2018-07-18 RX ADMIN — Medication 12.5 MG: at 08:39

## 2018-07-18 RX ADMIN — PREGABALIN 75 MG: 75 CAPSULE ORAL at 19:08

## 2018-07-18 RX ADMIN — SODIUM CHLORIDE, PRESERVATIVE FREE 10 ML: 5 INJECTION INTRAVENOUS at 20:37

## 2018-07-18 RX ADMIN — SODIUM CHLORIDE, PRESERVATIVE FREE 5 ML: 5 INJECTION INTRAVENOUS at 06:30

## 2018-07-18 NOTE — PLAN OF CARE
Problem: Goal Outcome Summary  Goal: Goal Outcome Summary  RN: No new concerns or issues. VSS. Wound dressing changed as ordered. IV abx infused without issues, PICC dressing CDI. Pain is well controlled with scheduled medication. Pt has no complaints. Continue with POC.

## 2018-07-18 NOTE — PLAN OF CARE
Problem: Goal Outcome Summary  Goal: Goal Outcome Summary  RN: Pt has no c/o pain or discomfort so far this shift. Independent. BG at 1883=248. Appear to be sleeping/resting. Continue with plan of care.

## 2018-07-18 NOTE — PLAN OF CARE
Problem: Goal Outcome Summary  Goal: Goal Outcome Summary  Pt unsteady using ww for pivot transfers; states feels more confident without in/out of w/c. Progressing per POC; home w/c should come here on 7/20

## 2018-07-18 NOTE — PLAN OF CARE
"Problem: Goal Outcome Summary  Goal: Goal Outcome Summary  RN: Pt declines meal and BG check until 1330. \"i usually only eat 2 meals per day, with light snacking between\".    Pain controlled with scheduled meds. Very pleasant and makes needs known. BLE dressings clean dry and intact.    "

## 2018-07-18 NOTE — TELEPHONE ENCOUNTER
"Requested Prescriptions   Pending Prescriptions Disp Refills     furosemide (LASIX) 20 MG tablet [Pharmacy Med Name: FUROSEMIDE 20 MG TABLET]  Last Written Prescription Date:  01/02/18  Last Fill Quantity: 90,  # refills: 1   Last Office Visit with G, P or [facility] prescribing provider:  02/05/18   Future Office Visit:    90 tablet 1     Sig: TAKE 1 TABLET (20 MG) BY MOUTH DAILY    Diuretics (Including Combos) Protocol Failed    7/18/2018  1:31 AM       Failed - Normal serum creatinine on file in past 12 months    Recent Labs   Lab Test  07/18/18   0631   CR  1.01             Passed - Blood pressure under 140/90 in past 12 months    BP Readings from Last 3 Encounters:   07/17/18 135/71   07/10/18 156/89   02/23/18 109/88                Passed - Recent (12 mo) or future (30 days) visit within the authorizing provider's specialty    Patient had office visit in the last 12 months or has a visit in the next 30 days with authorizing provider or within the authorizing provider's specialty.  See \"Patient Info\" tab in inbasket, or \"Choose Columns\" in Meds & Orders section of the refill encounter.           Passed - Patient is age 18 or older       Passed - No active pregancy on record       Passed - Normal serum potassium on file in past 12 months    Recent Labs   Lab Test  07/16/18   0758   POTASSIUM  4.5                   Passed - Normal serum sodium on file in past 12 months    Recent Labs   Lab Test  07/16/18   0758   NA  142             Passed - No positive pregnancy test in past 12 months          "

## 2018-07-18 NOTE — PLAN OF CARE
Problem: Goal Outcome Summary  Goal: Goal Outcome Summary  OT: Intervention focused on obtaining information to get bedside commode ordered per [facility] this will be covered.  Pt. Will order transfer tub bench and additional walker or contact [facility] to see if any AE available.

## 2018-07-19 LAB
ANION GAP SERPL CALCULATED.3IONS-SCNC: 5 MMOL/L (ref 3–14)
BUN SERPL-MCNC: 29 MG/DL (ref 7–30)
CALCIUM SERPL-MCNC: 9.4 MG/DL (ref 8.5–10.1)
CHLORIDE SERPL-SCNC: 107 MMOL/L (ref 94–109)
CO2 SERPL-SCNC: 29 MMOL/L (ref 20–32)
CREAT SERPL-MCNC: 1.06 MG/DL (ref 0.52–1.04)
ERYTHROCYTE [DISTWIDTH] IN BLOOD BY AUTOMATED COUNT: 19.3 % (ref 10–15)
GFR SERPL CREATININE-BSD FRML MDRD: 55 ML/MIN/1.7M2
GLUCOSE BLDC GLUCOMTR-MCNC: 110 MG/DL (ref 70–99)
GLUCOSE BLDC GLUCOMTR-MCNC: 118 MG/DL (ref 70–99)
GLUCOSE BLDC GLUCOMTR-MCNC: 134 MG/DL (ref 70–99)
GLUCOSE BLDC GLUCOMTR-MCNC: 162 MG/DL (ref 70–99)
GLUCOSE BLDC GLUCOMTR-MCNC: 177 MG/DL (ref 70–99)
GLUCOSE BLDC GLUCOMTR-MCNC: 197 MG/DL (ref 70–99)
GLUCOSE SERPL-MCNC: 90 MG/DL (ref 70–99)
HCT VFR BLD AUTO: 31.8 % (ref 35–47)
HGB BLD-MCNC: 10.3 G/DL (ref 11.7–15.7)
MCH RBC QN AUTO: 27.6 PG (ref 26.5–33)
MCHC RBC AUTO-ENTMCNC: 32.4 G/DL (ref 31.5–36.5)
MCV RBC AUTO: 85 FL (ref 78–100)
PLATELET # BLD AUTO: 338 10E9/L (ref 150–450)
POTASSIUM SERPL-SCNC: 4.1 MMOL/L (ref 3.4–5.3)
RBC # BLD AUTO: 3.73 10E12/L (ref 3.8–5.2)
SODIUM SERPL-SCNC: 141 MMOL/L (ref 133–144)
VANCOMYCIN SERPL-MCNC: 16.7 MG/L
WBC # BLD AUTO: 7.3 10E9/L (ref 4–11)

## 2018-07-19 PROCEDURE — 25000128 H RX IP 250 OP 636

## 2018-07-19 PROCEDURE — 12000022 ZZH R&B SNF

## 2018-07-19 PROCEDURE — 97110 THERAPEUTIC EXERCISES: CPT | Mod: GO | Performed by: OCCUPATIONAL THERAPIST

## 2018-07-19 PROCEDURE — 36592 COLLECT BLOOD FROM PICC: CPT | Performed by: INTERNAL MEDICINE

## 2018-07-19 PROCEDURE — 25000132 ZZH RX MED GY IP 250 OP 250 PS 637: Performed by: INTERNAL MEDICINE

## 2018-07-19 PROCEDURE — 80048 BASIC METABOLIC PNL TOTAL CA: CPT | Performed by: INTERNAL MEDICINE

## 2018-07-19 PROCEDURE — 85027 COMPLETE CBC AUTOMATED: CPT | Performed by: INTERNAL MEDICINE

## 2018-07-19 PROCEDURE — 80202 ASSAY OF VANCOMYCIN: CPT | Performed by: INTERNAL MEDICINE

## 2018-07-19 PROCEDURE — 97535 SELF CARE MNGMENT TRAINING: CPT | Mod: GO | Performed by: OCCUPATIONAL THERAPIST

## 2018-07-19 PROCEDURE — 25000128 H RX IP 250 OP 636: Performed by: INTERNAL MEDICINE

## 2018-07-19 PROCEDURE — 40000193 ZZH STATISTIC PT WARD VISIT

## 2018-07-19 PROCEDURE — 97110 THERAPEUTIC EXERCISES: CPT | Mod: GP

## 2018-07-19 PROCEDURE — 40000133 ZZH STATISTIC OT WARD VISIT: Performed by: OCCUPATIONAL THERAPIST

## 2018-07-19 PROCEDURE — 97530 THERAPEUTIC ACTIVITIES: CPT | Mod: GP

## 2018-07-19 PROCEDURE — 00000146 ZZHCL STATISTIC GLUCOSE BY METER IP

## 2018-07-19 RX ADMIN — Medication 12.5 MG: at 08:35

## 2018-07-19 RX ADMIN — MORPHINE SULFATE 15 MG: 15 TABLET, EXTENDED RELEASE ORAL at 21:17

## 2018-07-19 RX ADMIN — FLUTICASONE PROPIONATE 2 SPRAY: 50 SPRAY, METERED NASAL at 08:42

## 2018-07-19 RX ADMIN — ENOXAPARIN SODIUM 40 MG: 40 INJECTION SUBCUTANEOUS at 16:43

## 2018-07-19 RX ADMIN — ACETAMINOPHEN 1000 MG: 500 TABLET ORAL at 16:52

## 2018-07-19 RX ADMIN — RANITIDINE 300 MG: 150 TABLET, FILM COATED ORAL at 08:37

## 2018-07-19 RX ADMIN — MULTIPLE VITAMINS W/ MINERALS TAB 1 TABLET: TAB at 08:37

## 2018-07-19 RX ADMIN — POLYETHYLENE GLYCOL 3350 17 G: 17 POWDER, FOR SOLUTION ORAL at 08:43

## 2018-07-19 RX ADMIN — INSULIN ASPART 1 UNITS: 100 INJECTION, SOLUTION INTRAVENOUS; SUBCUTANEOUS at 12:34

## 2018-07-19 RX ADMIN — AMITRIPTYLINE HYDROCHLORIDE 50 MG: 50 TABLET, FILM COATED ORAL at 21:18

## 2018-07-19 RX ADMIN — PREGABALIN 75 MG: 75 CAPSULE ORAL at 08:34

## 2018-07-19 RX ADMIN — MORPHINE SULFATE 15 MG: 15 TABLET, EXTENDED RELEASE ORAL at 08:34

## 2018-07-19 RX ADMIN — VANCOMYCIN HYDROCHLORIDE 750 MG: 10 INJECTION, POWDER, LYOPHILIZED, FOR SOLUTION INTRAVENOUS at 18:21

## 2018-07-19 RX ADMIN — FUROSEMIDE 40 MG: 20 TABLET ORAL at 08:38

## 2018-07-19 RX ADMIN — ACETAMINOPHEN 1000 MG: 500 TABLET ORAL at 05:46

## 2018-07-19 RX ADMIN — METHADONE HYDROCHLORIDE 70 MG: 10 CONCENTRATE ORAL at 08:33

## 2018-07-19 RX ADMIN — SODIUM CHLORIDE, PRESERVATIVE FREE 5 ML: 5 INJECTION INTRAVENOUS at 07:03

## 2018-07-19 RX ADMIN — LISINOPRIL 10 MG: 10 TABLET ORAL at 08:37

## 2018-07-19 RX ADMIN — SODIUM CHLORIDE, PRESERVATIVE FREE 5 ML: 5 INJECTION INTRAVENOUS at 19:56

## 2018-07-19 RX ADMIN — PREGABALIN 75 MG: 75 CAPSULE ORAL at 19:58

## 2018-07-19 RX ADMIN — SENNOSIDES 2 TABLET: 8.6 TABLET, FILM COATED ORAL at 08:36

## 2018-07-19 RX ADMIN — Medication 12.5 MG: at 21:17

## 2018-07-19 RX ADMIN — MENTHOL 1 PATCH: 205.5 PATCH TOPICAL at 21:42

## 2018-07-19 RX ADMIN — ASPIRIN 81 MG CHEWABLE TABLET 81 MG: 81 TABLET CHEWABLE at 08:34

## 2018-07-19 RX ADMIN — Medication 6 MG: at 21:17

## 2018-07-19 RX ADMIN — LIDOCAINE 2 PATCH: 560 PATCH PERCUTANEOUS; TOPICAL; TRANSDERMAL at 08:39

## 2018-07-19 RX ADMIN — PREGABALIN 75 MG: 75 CAPSULE ORAL at 14:34

## 2018-07-19 NOTE — PLAN OF CARE
Problem: Goal Outcome Summary  Goal: Goal Outcome Summary  Outcome: No Change  Alert and oriented x 4. C/o pain gave schedule meds and tylenol around 1900 and it was effective. Good appetite and fluid intake. B/G was 118 dinner time and 118 at bed time s/s insulin needed. Independent in room and hallway. Went outside with her family x 2. PICC line dressing intact and patent flushed with heparin and NS. Changed wound dressing BLE. Surgical incision on R leg not intact.  Gave IV antibiotic x 1.

## 2018-07-19 NOTE — PLAN OF CARE
Problem: Goal Outcome Summary  Goal: Goal Outcome Summary  OT: Pt. Has progressed with OT goals and has met all goals at this time. OT to discharge at this time and patient in agreement with plan. Pt. Ordered transfer tub bench and will have commode covered by insurance per [facility]  and issued prior to discharge from TCU.

## 2018-07-19 NOTE — PLAN OF CARE
Problem: Goal Outcome Summary  Goal: Goal Outcome Summary  Outcome: No Change  A&O x4. Pt given PRN Tylenol x 1 for left foot pain this shift. Pt denied nausea or SOB. Pt has baseline neuropathy in extremities. Pt independent and uses wheelchair. 0200 BG was 162. Pt slept majority of shift. Call light within reach and pt able to make needs known.

## 2018-07-19 NOTE — PLAN OF CARE
Problem: Goal Outcome Summary  Goal: Goal Outcome Summary  Outcome: No Change  Pt alert, oriented, able to make needs known. VSS. Denied SOB. Stated pain present in both feet, primarily the left foot; scheduled pain medications provided relief. Changed dsg on left foot medial wound. , 177. No BM reported this shift. Pt self transfers into wc; independent in wc. Con't w/ POC.

## 2018-07-19 NOTE — PHARMACY-VANCOMYCIN DOSING SERVICE
Pharmacy Vancomycin Note  Date of Service 2018  Patient's  1965   53 year old, female    Indication: Osteomyelitis  Goal Trough Level: 15-20 mg/L  Day of Therapy: since 18  Current Vancomycin regimen:  750 mg IV q24h    Current estimated CrCl = Estimated Creatinine Clearance: 53.1 mL/min (based on Cr of 1.06).    Creatinine for last 3 days  2018:  4:29 PM Creatinine 1.16 mg/dL  2018:  6:31 AM Creatinine 1.01 mg/dL  2018:  7:10 AM Creatinine 1.06 mg/dL    Recent Vancomycin Levels (past 3 days)  2018: 12:04 PM Vancomycin Level 21.0 mg/L  2018:  6:19 PM Vancomycin Level 16.7 mg/L    Vancomycin IV Administrations (past 72 hours)                   vancomycin (VANCOCIN) 750 mg in sodium chloride 0.9 % 250 mL intermittent infusion (mg) 750 mg New Bag 18 1821     750 mg New Bag 18 1847     750 mg New Bag 18 1923                Nephrotoxins and other renal medications (Future)    Start     Dose/Rate Route Frequency Ordered Stop    18 1800  vancomycin (VANCOCIN) 750 mg in sodium chloride 0.9 % 250 mL intermittent infusion      750 mg  over 90 Minutes Intravenous EVERY 24 HOURS 18 1407      18 0800  furosemide (LASIX) tablet 40 mg      40 mg Oral DAILY 07/10/18 1543      18 0800  lisinopril (PRINIVIL/ZESTRIL) tablet 10 mg      10 mg Oral DAILY 07/10/18 1543               Contrast Orders - past 72 hours     None          Interpretation of levels and current regimen:  Trough level is  Therapeutic    Has serum creatinine changed > 50% in last 72 hours: No      Renal Function: Stable    Plan:  1.  Continue Current Dose  2.  Pharmacy will check trough levels as appropriate in 3-5 Days    3. Serum creatinine levels will be ordered a minimum of twice weekly.      Chelita Galvan, PharmD, BCPS 2018

## 2018-07-19 NOTE — PLAN OF CARE
Problem: Goal Outcome Summary  Goal: Goal Outcome Summary  OT DC today, all goals met. Will continue HEP

## 2018-07-19 NOTE — PLAN OF CARE
Problem: Goal Outcome Summary  Goal: Goal Outcome Summary  Occupational Therapy Discharge Summary    Reason for therapy discharge:    Discharged to home with home therapy.    Progress towards therapy goal(s). See goals on Care Plan in [facility] electronic health record for goal details.  Goals met    Therapy recommendation(s):    Continue home exercise program.

## 2018-07-20 LAB
GLUCOSE BLDC GLUCOMTR-MCNC: 241 MG/DL (ref 70–99)
GLUCOSE BLDC GLUCOMTR-MCNC: 82 MG/DL (ref 70–99)

## 2018-07-20 PROCEDURE — 25000128 H RX IP 250 OP 636: Performed by: INTERNAL MEDICINE

## 2018-07-20 PROCEDURE — 12000022 ZZH R&B SNF

## 2018-07-20 PROCEDURE — 97530 THERAPEUTIC ACTIVITIES: CPT | Mod: GP

## 2018-07-20 PROCEDURE — 40000193 ZZH STATISTIC PT WARD VISIT

## 2018-07-20 PROCEDURE — 25000128 H RX IP 250 OP 636

## 2018-07-20 PROCEDURE — 25000132 ZZH RX MED GY IP 250 OP 250 PS 637: Performed by: INTERNAL MEDICINE

## 2018-07-20 PROCEDURE — 00000146 ZZHCL STATISTIC GLUCOSE BY METER IP

## 2018-07-20 RX ORDER — MULTIPLE VITAMINS W/ MINERALS TAB 9MG-400MCG
1 TAB ORAL DAILY
Qty: 30 EACH | Refills: 0 | Status: SHIPPED | OUTPATIENT
Start: 2018-07-21

## 2018-07-20 RX ORDER — FUROSEMIDE 40 MG
40 TABLET ORAL DAILY
Qty: 30 TABLET | Refills: 0 | Status: SHIPPED | OUTPATIENT
Start: 2018-07-21 | End: 2019-01-01

## 2018-07-20 RX ORDER — PREGABALIN 75 MG/1
75 CAPSULE ORAL 3 TIMES DAILY
Qty: 90 CAPSULE | Refills: 0 | Status: SHIPPED | OUTPATIENT
Start: 2018-07-20 | End: 2018-01-01

## 2018-07-20 RX ORDER — METOPROLOL TARTRATE 25 MG/1
12.5 TABLET, FILM COATED ORAL 2 TIMES DAILY
Qty: 60 TABLET | Refills: 0 | Status: SHIPPED | OUTPATIENT
Start: 2018-07-20 | End: 2018-08-15 | Stop reason: ALTCHOICE

## 2018-07-20 RX ORDER — ALBUTEROL SULFATE 90 UG/1
2 AEROSOL, METERED RESPIRATORY (INHALATION) EVERY 4 HOURS PRN
Qty: 1 INHALER | Refills: 0 | Status: SHIPPED | OUTPATIENT
Start: 2018-07-20

## 2018-07-20 RX ORDER — AMITRIPTYLINE HYDROCHLORIDE 50 MG/1
50 TABLET ORAL AT BEDTIME
Qty: 60 TABLET | Refills: 0 | Status: SHIPPED | OUTPATIENT
Start: 2018-07-20 | End: 2019-01-01

## 2018-07-20 RX ORDER — LIDOCAINE 4 G/G
2 PATCH TOPICAL EVERY 24 HOURS
Qty: 30 PATCH | Refills: 0 | Status: SHIPPED | OUTPATIENT
Start: 2018-07-21

## 2018-07-20 RX ORDER — LISINOPRIL 10 MG/1
10 TABLET ORAL DAILY
Qty: 30 TABLET | Refills: 0 | Status: SHIPPED | OUTPATIENT
Start: 2018-07-21 | End: 2019-01-01

## 2018-07-20 RX ORDER — MORPHINE SULFATE 15 MG/1
15 TABLET, FILM COATED, EXTENDED RELEASE ORAL EVERY 12 HOURS
Qty: 8 TABLET | Refills: 0 | Status: SHIPPED | OUTPATIENT
Start: 2018-07-20 | End: 2018-07-29

## 2018-07-20 RX ORDER — POLYETHYLENE GLYCOL 3350 17 G/17G
17 POWDER, FOR SOLUTION ORAL DAILY
Qty: 7 PACKET | Refills: 0 | Status: SHIPPED | OUTPATIENT
Start: 2018-07-21

## 2018-07-20 RX ORDER — FLUTICASONE PROPIONATE 50 MCG
2 SPRAY, SUSPENSION (ML) NASAL DAILY
Qty: 1 BOTTLE | Refills: 0 | Status: SHIPPED | OUTPATIENT
Start: 2018-07-21

## 2018-07-20 RX ORDER — SENNOSIDES 8.6 MG
2 TABLET ORAL 2 TIMES DAILY
Qty: 60 EACH | Refills: 0 | Status: SHIPPED | OUTPATIENT
Start: 2018-07-20 | End: 2018-07-29

## 2018-07-20 RX ORDER — SODIUM CHLORIDE 9 MG/ML
INJECTION, SOLUTION INTRAVENOUS
Status: COMPLETED
Start: 2018-07-20 | End: 2018-07-20

## 2018-07-20 RX ADMIN — Medication 6 MG: at 21:19

## 2018-07-20 RX ADMIN — VANCOMYCIN HYDROCHLORIDE 750 MG: 10 INJECTION, POWDER, LYOPHILIZED, FOR SOLUTION INTRAVENOUS at 17:50

## 2018-07-20 RX ADMIN — Medication 12.5 MG: at 21:21

## 2018-07-20 RX ADMIN — UMECLIDINIUM BROMIDE AND VILANTEROL TRIFENATATE 1 PUFF: 62.5; 25 POWDER RESPIRATORY (INHALATION) at 08:39

## 2018-07-20 RX ADMIN — LIDOCAINE 2 PATCH: 560 PATCH PERCUTANEOUS; TOPICAL; TRANSDERMAL at 08:37

## 2018-07-20 RX ADMIN — LISINOPRIL 10 MG: 10 TABLET ORAL at 08:36

## 2018-07-20 RX ADMIN — MENTHOL 1 PATCH: 205.5 PATCH TOPICAL at 18:12

## 2018-07-20 RX ADMIN — AMITRIPTYLINE HYDROCHLORIDE 50 MG: 50 TABLET, FILM COATED ORAL at 21:19

## 2018-07-20 RX ADMIN — SODIUM CHLORIDE 500 ML: 9 INJECTION, SOLUTION INTRAVENOUS at 17:54

## 2018-07-20 RX ADMIN — PREGABALIN 75 MG: 75 CAPSULE ORAL at 14:16

## 2018-07-20 RX ADMIN — POLYETHYLENE GLYCOL 3350 17 G: 17 POWDER, FOR SOLUTION ORAL at 08:40

## 2018-07-20 RX ADMIN — FUROSEMIDE 40 MG: 20 TABLET ORAL at 08:36

## 2018-07-20 RX ADMIN — MORPHINE SULFATE 15 MG: 15 TABLET, EXTENDED RELEASE ORAL at 21:19

## 2018-07-20 RX ADMIN — ACETAMINOPHEN 1000 MG: 500 TABLET ORAL at 16:26

## 2018-07-20 RX ADMIN — MORPHINE SULFATE 15 MG: 15 TABLET, EXTENDED RELEASE ORAL at 08:36

## 2018-07-20 RX ADMIN — PREGABALIN 75 MG: 75 CAPSULE ORAL at 19:25

## 2018-07-20 RX ADMIN — PREGABALIN 75 MG: 75 CAPSULE ORAL at 08:36

## 2018-07-20 RX ADMIN — INSULIN ASPART 3 UNITS: 100 INJECTION, SOLUTION INTRAVENOUS; SUBCUTANEOUS at 14:17

## 2018-07-20 RX ADMIN — FLUTICASONE PROPIONATE 2 SPRAY: 50 SPRAY, METERED NASAL at 08:39

## 2018-07-20 RX ADMIN — RANITIDINE 300 MG: 150 TABLET, FILM COATED ORAL at 08:36

## 2018-07-20 RX ADMIN — MULTIPLE VITAMINS W/ MINERALS TAB 1 TABLET: TAB at 08:36

## 2018-07-20 RX ADMIN — SODIUM CHLORIDE, PRESERVATIVE FREE 5 ML: 5 INJECTION INTRAVENOUS at 08:39

## 2018-07-20 RX ADMIN — ASPIRIN 81 MG CHEWABLE TABLET 81 MG: 81 TABLET CHEWABLE at 08:36

## 2018-07-20 RX ADMIN — ENOXAPARIN SODIUM 40 MG: 40 INJECTION SUBCUTANEOUS at 16:26

## 2018-07-20 RX ADMIN — METHADONE HYDROCHLORIDE 70 MG: 10 CONCENTRATE ORAL at 08:37

## 2018-07-20 RX ADMIN — Medication 12.5 MG: at 08:39

## 2018-07-20 NOTE — PROGRESS NOTES
CLINICAL NUTRITION SERVICES - REASSESSMENT NOTE     Nutrition Prescription    RECOMMENDATIONS FOR MDs/PROVIDERS TO ORDER:  None today    Malnutrition Status:    Non-severe malnutrition in the context of acute illness    Recommendations already ordered by Registered Dietitian (RD):  Continue Boost GC BID    Future/Additional Recommendations:  None today     EVALUATION OF THE PROGRESS TOWARD GOALS   Diet: Moderate Consistent Carbohydrate, Boost GC BID    Intake: 75% of meals per flowsheet along with some snacking. On average, pt is ordering 1950 kcal and 98 g protein daily per [employer]. This is likely meeting % minimum energy and 100% protein needs.     NEW FINDINGS   - Pt has gained 1 lb over the last 9 days. Overall, she has lost 31 lbs (21%) over the last 1 month; suspect in part d/t fluid shifts as well as lost off LBM with R BKA.   - BG x24 hrs:     MALNUTRITION  % Intake: Decreased intake does not meet criteria  % Weight Loss: > 5% in 1 month (severe)  Subcutaneous Fat Loss: Facial region: mild  Muscle Loss: Temporal, Scapular bone, Lower arm (forearm), Dorsal hand and Posterior calf: all mild  Fluid Accumulation/Edema: Does not meet criteria  Malnutrition Diagnosis: Non-severe malnutrition in the context of acute illness    Previous Goals   Patient to consume % of nutritionally adequate meal trays TID, or the equivalent with supplements/snacks.  Evaluation: Met    Previous Nutrition Diagnosis  Predicted protein-energy intake related to variable appetite as evidenced by pt reliant on PO intakes to meet 100% of nutritional needs with potential for variation     Evaluation: No change    CURRENT NUTRITION DIAGNOSIS  Predicted protein-energy intake related to variable appetite as evidenced by pt reliant on PO intakes to meet 100% of nutritional needs with potential for variation         INTERVENTIONS  Implementation  Discussed pts POC during interdisciplinary morning rounds    Goals  Patient to  consume % of nutritionally adequate meal trays TID, or the equivalent with supplements/snacks.    Monitoring/Evaluation  Progress toward goals will be monitored and evaluated per protocol.      Tg Chavira RD, LD  Unit Pager: [phone]

## 2018-07-20 NOTE — PLAN OF CARE
Problem: Goal Outcome Summary  Goal: Goal Outcome Summary  Outcome: No Change  Alert and oriented x 4. C/o pain on L leg gave tylenol 1000mg around 1700 and applied icy patch at bed time. B/G was 110 dinner time and 197 at bed time no insulin needed. Independent in her room. Changed surgical incision on R leg and scant serosanguinous drainage noted. Skin is not intact around the incision. Changed L leg dressing no drainage noted. Encouraged pt to use her boot during ambulation. Taught her how to give Lovenox and pt did good.

## 2018-07-20 NOTE — PLAN OF CARE
Problem: Goal Outcome Summary  Goal: Goal Outcome Summary  Outcome: No Change  RN: Pt has no c/o pain or discomfort so far this shift. Independent with transfer and locomotion via wheelchair. BG at 0200= 137. Noted R BKA has stump  in place and LLE wounds has kerlix wrap.. Per report, pt sometimes removes dressing- CDI as of last rounds. Appear to be sleeping/resting. Continue with plan of care.

## 2018-07-21 ENCOUNTER — ALLIED HEALTH/NURSE VISIT (OUTPATIENT)
Dept: CARDIOLOGY | Facility: CLINIC | Age: 53
End: 2018-07-21
Attending: INTERNAL MEDICINE
Payer: COMMERCIAL

## 2018-07-21 ENCOUNTER — HOME INFUSION (PRE-WILLOW HOME INFUSION) (OUTPATIENT)
Dept: PHARMACY | Facility: CLINIC | Age: 53
End: 2018-07-21

## 2018-07-21 VITALS
HEART RATE: 97 BPM | TEMPERATURE: 97.7 F | OXYGEN SATURATION: 97 % | RESPIRATION RATE: 18 BRPM | SYSTOLIC BLOOD PRESSURE: 144 MMHG | WEIGHT: 118.1 LBS | BODY MASS INDEX: 17.96 KG/M2 | DIASTOLIC BLOOD PRESSURE: 83 MMHG

## 2018-07-21 DIAGNOSIS — I42.8 NONISCHEMIC CARDIOMYOPATHY (H): Primary | Chronic | ICD-10-CM

## 2018-07-21 LAB
GLUCOSE BLDC GLUCOMTR-MCNC: 129 MG/DL (ref 70–99)
GLUCOSE BLDC GLUCOMTR-MCNC: 172 MG/DL (ref 70–99)
GLUCOSE BLDC GLUCOMTR-MCNC: 177 MG/DL (ref 70–99)
GLUCOSE BLDC GLUCOMTR-MCNC: 227 MG/DL (ref 70–99)

## 2018-07-21 PROCEDURE — 25000128 H RX IP 250 OP 636: Performed by: INTERNAL MEDICINE

## 2018-07-21 PROCEDURE — 25000128 H RX IP 250 OP 636

## 2018-07-21 PROCEDURE — 25000132 ZZH RX MED GY IP 250 OP 250 PS 637: Performed by: INTERNAL MEDICINE

## 2018-07-21 PROCEDURE — 99316 NF DSCHRG MGMT 30 MIN+: CPT | Performed by: INTERNAL MEDICINE

## 2018-07-21 PROCEDURE — 93295 DEV INTERROG REMOTE 1/2/MLT: CPT | Performed by: INTERNAL MEDICINE

## 2018-07-21 PROCEDURE — 00000146 ZZHCL STATISTIC GLUCOSE BY METER IP

## 2018-07-21 PROCEDURE — 93296 REM INTERROG EVL PM/IDS: CPT | Mod: ZF

## 2018-07-21 RX ORDER — SODIUM CHLORIDE 9 MG/ML
INJECTION, SOLUTION INTRAVENOUS
Status: COMPLETED
Start: 2018-07-21 | End: 2018-07-21

## 2018-07-21 RX ADMIN — LISINOPRIL 10 MG: 10 TABLET ORAL at 08:58

## 2018-07-21 RX ADMIN — INSULIN ASPART 1 UNITS: 100 INJECTION, SOLUTION INTRAVENOUS; SUBCUTANEOUS at 08:56

## 2018-07-21 RX ADMIN — POLYETHYLENE GLYCOL 3350 17 G: 17 POWDER, FOR SOLUTION ORAL at 08:57

## 2018-07-21 RX ADMIN — SODIUM CHLORIDE 500 ML: 9 INJECTION, SOLUTION INTRAVENOUS at 13:36

## 2018-07-21 RX ADMIN — Medication 12.5 MG: at 08:58

## 2018-07-21 RX ADMIN — LIDOCAINE 2 PATCH: 560 PATCH PERCUTANEOUS; TOPICAL; TRANSDERMAL at 08:55

## 2018-07-21 RX ADMIN — ASPIRIN 81 MG CHEWABLE TABLET 81 MG: 81 TABLET CHEWABLE at 08:59

## 2018-07-21 RX ADMIN — FLUTICASONE PROPIONATE 2 SPRAY: 50 SPRAY, METERED NASAL at 08:58

## 2018-07-21 RX ADMIN — ACETAMINOPHEN 1000 MG: 500 TABLET ORAL at 13:46

## 2018-07-21 RX ADMIN — VANCOMYCIN HYDROCHLORIDE 750 MG: 10 INJECTION, POWDER, LYOPHILIZED, FOR SOLUTION INTRAVENOUS at 13:46

## 2018-07-21 RX ADMIN — PREGABALIN 75 MG: 75 CAPSULE ORAL at 09:00

## 2018-07-21 RX ADMIN — MORPHINE SULFATE 15 MG: 15 TABLET, EXTENDED RELEASE ORAL at 08:58

## 2018-07-21 RX ADMIN — SODIUM CHLORIDE, PRESERVATIVE FREE 5 ML: 5 INJECTION INTRAVENOUS at 08:59

## 2018-07-21 RX ADMIN — METHADONE HYDROCHLORIDE 70 MG: 10 CONCENTRATE ORAL at 09:00

## 2018-07-21 RX ADMIN — MULTIPLE VITAMINS W/ MINERALS TAB 1 TABLET: TAB at 08:59

## 2018-07-21 RX ADMIN — UMECLIDINIUM BROMIDE AND VILANTEROL TRIFENATATE 1 PUFF: 62.5; 25 POWDER RESPIRATORY (INHALATION) at 09:01

## 2018-07-21 RX ADMIN — FUROSEMIDE 40 MG: 20 TABLET ORAL at 08:58

## 2018-07-21 RX ADMIN — PREGABALIN 75 MG: 75 CAPSULE ORAL at 13:35

## 2018-07-21 RX ADMIN — RANITIDINE 300 MG: 150 TABLET, FILM COATED ORAL at 08:59

## 2018-07-21 NOTE — PLAN OF CARE
Problem: Goal Outcome Summary  Goal: Goal Outcome Summary  Physical Therapy Discharge Summary    Reason for therapy discharge:    Discharged to home with home therapy.  All goals and outcomes met, no further needs identified. Home 7/21    Progress towards therapy goal(s). See goals on Care Plan in [facility] electronic health record for goal details.  Goals met Pt received manual w/c--pt IND managing brakes, positioning w/c for transfers, propelling    Therapy recommendation(s):    home PT safety eval, strengthening, functional mobility Home infusion.

## 2018-07-21 NOTE — PLAN OF CARE
Problem: Goal Outcome Summary  Goal: Goal Outcome Summary  Outcome: Adequate for Discharge Date Met: 07/21/18  All discharge medication and instruction were reviewed with pt.  Discharge medication from pharmacy given to pt.  Dressings at right stump and left foot were changed. No new concern noted.   Iv vancomycin given per order and all three lines were flushed with no difficulties.  Pt will go home with [facility] transportation which has been set up by .   Continue to monitor.

## 2018-07-21 NOTE — PLAN OF CARE
Problem: Goal Outcome Summary  Goal: Goal Outcome Summary  Outcome: No Change  Patient sleeping between cares, no complained of pain, indep in room, blood sugar was 227 at 0200. Plan for patient to discharge home today, continue plan of care

## 2018-07-21 NOTE — MR AVS SNAPSHOT
After Visit Summary   7/21/2018    Alessandra Pak    MRN: [id number]           Patient Information     Date Of Birth          1965        Visit Information        Provider Department      7/21/2018 6:00 AM  ICD REMOTE [facility]        Today's Diagnoses     Nonischemic cardiomyopathy (H)    -  1       Follow-ups after your visit        Your next 10 appointments already scheduled     Aug 08, 2018 10:00 AM CDT   TELEMEDICINE with Debbie Stahl [facility] ([facility])    [address] [place] [id number] [phone]           Note: this is not an onsite visit; there is no need to come to the facility.            Aug 21, 2018 11:00 AM CDT   (Arrive by 10:45 AM)   [facility]    [address]  4th Floor  [facility] [id number] [phone]            Aug 30, 2018  3:00 PM CDT   (Arrive by 2:45 PM)   Return Visit with Samina Downs MD   [facility] ([facility])    [address]  [address] [place] [id number] [phone]            Sep 18, 2018 10:40 AM CDT   (Arrive by 10:10 AM)   RETURN SPINE with Ambrose King MD   [facility] ([facility])    [address] [place] [address]  4th [facility] [id number] [phone]              Who to contact     If you have questions or need follow up information about today's clinic visit or your schedule please contact [facility] directly at [phone].  Normal or non-critical lab and imaging results will be communicated to you by MyChart, letter or phone within 4 business days after the clinic has received the results. If you do not hear from us within 7 days, please contact the clinic through MyChart or phone. If you have a  critical or abnormal lab result, we will notify you by phone as soon as possible.  Submit refill requests through [employer] or call your pharmacy and they will forward the refill request to us. Please allow 3 business days for your refill to be completed.          Additional Information About Your Visit        MyChart Information     [employer] gives you secure access to your electronic health record. If you see a primary care provider, you can also send messages to your care team and make appointments. If you have questions, please call your primary care clinic.  If you do not have a primary care provider, please call [phone] and they will assist you.        Care EveryWhere ID     This is your Care EveryWhere ID. This could be used by other organizations to access your [facility] medical records  [id number]         Blood Pressure from Last 3 Encounters:   08/01/18 102/68   07/29/18 126/87   07/28/18 104/77    Weight from Last 3 Encounters:   08/01/18 51.1 kg (112 lb 9.6 oz)   07/28/18 54.4 kg (120 lb)   07/19/18 53.6 kg (118 lb 1.6 oz)              We Performed the Following     INTERROGATION DEVICE EVAL REMOTE, PACER/ICD          Today's Medication Changes      Notice     This visit is during an admission. Changes to the med list made in this visit will be reflected in the After Visit Summary of the admission.             Primary Care Provider Office Phone # Fax #    Brionna Coby Dc [phone]       [id number]  [facility] [id number]        Goals        General    #1 Financial Wellbeing (pt-stated)     Notes - Note created  7/31/2018  2:08 PM by Behl, Melissa K, RN    Goal Statement: I will call [place] [employer] [phone]  Measure of Success: Patient will call [place] [employer] and receive local food assistance resources.  Supportive Steps to Achieve: RNCC provided patient with phone number  Barriers: none known at this time  Strengths: family support, home  care, engaged in care coordination  Date to Achieve By: 8/30/18       #2 Transportation (pt-stated)     Notes - Note created  7/31/2018  2:09 PM by Behl, Melissa K, RN    Goal Statement: I will call the city and/or police department to request handicap parking near my residence  Measure of Success: Patient will call and receive appropriate assistance/direction in obtaining handicap parking near her residence  Supportive Steps to Achieve: RNCC advised patient to contact her city/police department to request handicap parking near her residence  Barriers: none known at this time  Strengths: family support, home care, engaged in care coordination  Date to Achieve By: 8/30/18       #3 Medical (pt-stated)     Notes - Note created  7/31/2018  2:10 PM by Behl, Melissa K, RN    Goal Statement: I will test my blood sugar 3 times/day  Measure of Success: Patient will consistent test blood sugar 3 times/day  Supportive Steps to Achieve: RNCC sending refill request for test strips to PCP  Barriers: does not have test strips  Strengths: family support, home care, engaged in care coordination  Date to Achieve By: 8/30/18         Equal Access to Services     [facility]: Hadii andres bolivar hadasho Soomaali, waaxda luqadaha, qaybta kaalmada adekenyatta, sanjay levine . So [facility] [phone].    ATENCIÓN: Si habla español, tiene a nagy disposición servicios gratuitos de asistencia lingüística. Llame al [phone].    We comply with applicable federal civil rights laws and [place] laws. We do not discriminate on the basis of race, color, national origin, age, disability, sex, sexual orientation, or gender identity.            Thank you!     Thank you for choosing [facility]  for your care. Our goal is always to provide you with excellent care. Hearing back from our patients is one way we can continue to improve our services. Please take a few minutes to complete the written survey that you may receive in  the mail after your visit with us. Thank you!             Your Updated Medication List - Protect others around you: Learn how to safely use, store and throw away your medicines at www.disposemymeds.org.      Notice     This visit is during an admission. Changes to the med list made in this visit will be reflected in the After Visit Summary of the admission.

## 2018-07-21 NOTE — PLAN OF CARE
Problem: Goal Outcome Summary  Goal: Goal Outcome Summary  Outcome: No Change  Temp was 100.3, otherwise VSS. Tylenol was given for pain and temp. Latest temp was 97.4. Sticky note written to MD of pt's temp. Alert and oriented. Denies any SOB or chest pain. Verbalized pain to Left foot and R BKA, Tylenol and Menthol patch applied to L ankle with some relief. Independent in room. Triple lumen PICC patent, dressing CDI. Able to propel self using her w/c for locomotion. Continent of bowel and bladder. Dressings to R BKA changed, sutures intact, no drainage. Dressing to L heel wound changed, no drainage. Able to inject self with Lovenox well.  Blood sugar prior supper was 82 and at bedtime was 173  Will continue to monitor pt.

## 2018-07-21 NOTE — DISCHARGE SUMMARY
"  Medicine Discharge Summary       Alessandra Pak MRN# [id number]   YOB: 1965 Age: 53 year old     Date of Admission:  7/10/2018  Date of Discharge:                      7/21  Admitting Physician:  Prashanth Ramesh MD  Discharge Physician:  Juliette Partida  Discharging Service:  Internal Medicine     Primary Provider: Brionna Calabrese           Discharge Diagnosis:   Deconditioning   Right Diabetic foot infection s/p right BKA 6/28  IDDM  Respiratory failure resolved   Opiate Dependence on methadone  Chronic pancreatitis s/p whipple in past   Severe malnutrition in context of acute illness   Anxiety   Sinus Tachycardia  Cardiomyopathy s/P ICD 2015  Emphysema   Resitrictive lung disease  Anemia acute on chronic         Rehab Course by Problem:      Patient was admitted to TCU for rehab after amputation . She was on IV vanco, which is due to be completed aug 9th . She got teaching for her PICC line during her TCU stay .  Her pain was managed with po OxyContin and one a day po oral dilaudid with activity  . Her home dose of methadone was kept the same  .   Her BG was managed with lantus and sliding scale insulin .  Her Hbaic was 12.8 on 2/2/18 .  She was maintained on 40 mg lasix and lisinopril for her NICM . She had refused lexiscan due to claustrophobia when hospitalized .  She participated in therapies and they recommend Dc to home so patient is being dc home today .  Ortho NP was contacted and was advised to continue lovenox s/c for DVT ppx till 7/28 .    On Exam ;   Alert and oriented . No acute distress  Vital signs:  Temp: 97.7  F (36.5  C) Temp src: Oral BP: 144/83 Pulse: 97 Heart Rate: 94 Resp: 18 SpO2: 97 % O2 Device: None (Room air)     Weight: 53.6 kg (118 lb 1.6 oz) (with boot)  Estimated body mass index is 17.96 kg/(m^2) as calculated from the following:    Height as of 6/6/18: 1.727 m (5' 8\").    Weight as of this encounter: 53.6 kg (118 lb 1.6 oz).  Neck ; supple , no JVD  Chest " ; equal BS bilaterally , no rales or rhonchi   CVS; RRR, no murmur /rubs or gallops  GI ; soft abdomen, non tender, BS positive  Ext ; right BKA       ROUTINE IP LABS (Last four results)  BMP  Recent Labs  Lab 07/19/18  0710 07/18/18  0631 07/17/18  1629 07/16/18  0758     --   --  142   POTASSIUM 4.1  --   --  4.5   CHLORIDE 107  --   --  108   RICK 9.4  --   --  8.9   CO2 29  --   --  28   BUN 29  --   --  33*   CR 1.06* 1.01 1.16* 1.23*   GLC 90  --   --  65*     CBC  Recent Labs  Lab 07/19/18  0710 07/16/18  0758   WBC 7.3 7.3   RBC 3.73* 3.61*   HGB 10.3* 9.9*   HCT 31.8* 30.7*   MCV 85 85   MCH 27.6 27.4   MCHC 32.4 32.2   RDW 19.3* 20.4*    368     INRNo lab results found in last 7 days.           Discharge Medications:     Current Discharge Medication List      START taking these medications    Details   enoxaparin (LOVENOX) 40 MG/0.4ML injection Inject 0.4 mLs (40 mg) Subcutaneous every 24 hours  Qty: 7 Syringe, Refills: 0    Associated Diagnoses: Status post below knee amputation of right lower extremity (H)      morphine (MS CONTIN) 15 MG 12 hr tablet Take 1 tablet (15 mg) by mouth every 12 hours  Qty: 8 tablet, Refills: 0    Associated Diagnoses: Status post below knee amputation of right lower extremity (H)      umeclidinium-vilanterol (ANORO ELLIPTA) 62.5-25 MCG/INH oral inhaler Inhale 1 puff into the lungs daily  Qty: 1 Inhaler, Refills: 0    Associated Diagnoses: Chronic bronchitis, unspecified chronic bronchitis type (H)      vancomycin 750 mg Inject 750 mg into the vein every 24 hours for 20 days  Qty: 20 cartridge., Refills: 0    Associated Diagnoses: Status post below knee amputation of right lower extremity (H)         CONTINUE these medications which have CHANGED    Details   albuterol (PROAIR HFA) 108 (90 Base) MCG/ACT Inhaler Inhale 2 puffs into the lungs every 4 hours as needed for shortness of breath / dyspnea  Qty: 1 Inhaler, Refills: 0    Associated Diagnoses: SOB (shortness  of breath)      amitriptyline (ELAVIL) 50 MG tablet Take 1 tablet (50 mg) by mouth At Bedtime  Qty: 60 tablet, Refills: 0    Associated Diagnoses: Status post below knee amputation of right lower extremity (H)      fluticasone (FLONASE) 50 MCG/ACT spray Spray 2 sprays into left nostril daily  Qty: 1 Bottle, Refills: 0    Associated Diagnoses: Acute nonseasonal allergic rhinitis due to pollen      furosemide (LASIX) 40 MG tablet Take 1 tablet (40 mg) by mouth daily  Qty: 30 tablet, Refills: 0    Associated Diagnoses: SOB (shortness of breath)      !! insulin aspart (NOVOLOG PEN) 100 UNIT/ML injection Inject 1-7 Units Subcutaneous 3 times daily (before meals)  Qty: 6.3 mL, Refills: 0    Comments: For Pre-Meal -189=1 unit, 190-239=2 units, 240-289=3 units, 290-339=4 units, 340-399=5 units, 400-449=6 units, BG = or >450=7 units.  Associated Diagnoses: Type 2 diabetes mellitus with stage 3 chronic kidney disease, with long-term current use of insulin (H)      !! insulin aspart (NOVOLOG PEN) 100 UNIT/ML injection Inject 1-5 Units Subcutaneous At Bedtime  Qty: 1.5 mL, Refills: 0    Comments: For bedtime -249=1 unit, 250-299=2 units, 300-349=3 units, 350-399=4 units, BG = or >400=5 units.  Associated Diagnoses: Type 2 diabetes mellitus with stage 3 chronic kidney disease, with long-term current use of insulin (H)      insulin glargine (LANTUS SOLOSTAR) 100 UNIT/ML pen Inject 8 Units Subcutaneous At Bedtime  Qty: 2.4 mL, Refills: 0    Associated Diagnoses: Type 2 diabetes mellitus with stage 3 chronic kidney disease, with long-term current use of insulin (H)      Lidocaine (LIDOCARE) 4 % Patch Place 2 patches onto the skin every 24 hours  Qty: 30 patch, Refills: 0    Associated Diagnoses: Status post below knee amputation of right lower extremity (H)      lisinopril (PRINIVIL/ZESTRIL) 10 MG tablet Take 1 tablet (10 mg) by mouth daily  Qty: 30 tablet, Refills: 0    Associated Diagnoses: Type 2 diabetes mellitus  with stage 3 chronic kidney disease, with long-term current use of insulin (H)      !! methadone (DOLPHINE-INTENSOL) 10 mg/mL CONC (HIGH CONC) solution Take 7 mLs (70 mg) by mouth daily  Qty: 70 mL, Refills: 0    Associated Diagnoses: Status post below knee amputation of right lower extremity (H)      metoprolol tartrate (LOPRESSOR) 25 MG tablet Take 0.5 tablets (12.5 mg) by mouth 2 times daily  Qty: 60 tablet, Refills: 0    Associated Diagnoses: Type 2 diabetes mellitus with stage 3 chronic kidney disease, with long-term current use of insulin (H)      multivitamin, therapeutic with minerals (THERA-VIT-M) TABS tablet Take 1 tablet by mouth daily  Qty: 30 each, Refills: 0    Associated Diagnoses: Status post below knee amputation of right lower extremity (H)      polyethylene glycol (MIRALAX/GLYCOLAX) Packet Take 17 g by mouth daily  Qty: 7 packet, Refills: 0    Associated Diagnoses: Status post below knee amputation of right lower extremity (H)      pregabalin (LYRICA) 75 MG capsule Take 1 capsule (75 mg) by mouth 3 times daily  Qty: 90 capsule, Refills: 0    Associated Diagnoses: Status post below knee amputation of right lower extremity (H)      ranitidine (ZANTAC) 300 MG tablet Take 1 tablet (300 mg) by mouth daily  Qty: 60 tablet, Refills: 0    Associated Diagnoses: Status post below knee amputation of right lower extremity (H)      sennosides (SENOKOT) 8.6 MG tablet Take 2 tablets by mouth 2 times daily  Qty: 60 each, Refills: 0    Associated Diagnoses: Status post below knee amputation of right lower extremity (H)       !! - Potential duplicate medications found. Please discuss with provider.      CONTINUE these medications which have NOT CHANGED    Details   acetaminophen (TYLENOL) 500 MG tablet Take 2 tablets (1,000 mg) by mouth 3 times daily    Associated Diagnoses: Status post below knee amputation of right lower extremity (H)      acetone, Urine, test STRP 1 strip by In Vitro route as needed  Qty: 25  each, Refills: 1    Associated Diagnoses: Type 2 diabetes mellitus with diabetic neuropathy (H)      aspirin 81 MG tablet Take 1 tablet (81 mg) by mouth daily  Qty: 100 tablet, Refills: 3    Associated Diagnoses: Type 2 diabetes mellitus with complication, with long-term current use of insulin (H)      blood glucose monitoring (HOLLIE CONTOUR NEXT) test strip Use to test blood sugar 10 times daily or as directed.  Ok to substitute alternative if insurance prefers.  Qty: 300 each, Refills: 12    Associated Diagnoses: Diabetes mellitus type 1 (H)      !! blood glucose monitoring (HOLLIE MICROLET) lancets Use to test blood sugar 10 times daily or as directed.  Ok to substitute alternative if insurance prefers.  Qty: 1 Box, Refills: prn    Associated Diagnoses: Diabetes mellitus type 1 (H)      !! blood glucose monitoring (FREESTYLE) lancets 1 each See Admin Instructions test 3-4 times per day  Qty: 3 Box, Refills: 3    Associated Diagnoses: Type 2 diabetes, HbA1C goal < 8% (H)      glucose 4 g CHEW chewable tablet Take 3-4 tablets to treat low blood sugar.  Qty: 25 tablet, Refills: 11    Associated Diagnoses: Type 2 diabetes mellitus with complication, with long-term current use of insulin (H)      insulin pen needle (B-D U/F) 31G X 5 MM Use 3 time(s) a day.  Qty: 3 each, Refills: 3    Associated Diagnoses: Type 2 diabetes, HbA1c goal < 7% (H)      levonorgestrel (MIRENA, 52 MG,) 20 MCG/24HR IUD placed today  Qty: 1 each, Refills: 0    Associated Diagnoses: Excessive or frequent menstruation      !! methadone (DOLPHINE-INTENSOL) 10 mg/mL CONC (HIGH CONC) solution Take 7 mLs (70 mg) by mouth daily  Qty: 14 mL, Refills: 0    Associated Diagnoses: Chemical dependency (H)       !! - Potential duplicate medications found. Please discuss with provider.      STOP taking these medications       bisacodyl (DULCOLAX) 10 MG Suppository Comments:   Reason for Stopping:         HYDROmorphone, HIGH CONC, (DILAUDID) 10 mg/mL LIQD oral  Comments:   Reason for Stopping:         Ipratropium-Albuterol (COMBIVENT RESPIMAT)  MCG/ACT inhaler Comments:   Reason for Stopping:         menthol (ICY HOT) 5 % PTCH Comments:   Reason for Stopping:         oxyCODONE (OXYCONTIN) 10 MG 12 hr tablet Comments:   Reason for Stopping:         vancomycin 1,250 mg Comments:   Reason for Stopping:                    Discharge Instructions and Follow-Up:     Discharge Procedure Orders  Home care nursing referral   Referral Type: Home Health Therapies & Aides     Home infusion referral     Home Care PT Referral for Hospital Discharge   Referral Type: Home Health Therapies & Aides     Home Care OT Referral for Hospital Discharge     MD face to face encounter   Order Comments: Documentation of Face to Face and Certification for Home Health Services    I certify that patient: Alessandra Pak is under my care and that I, or a nurse practitioner or physician's assistant working with me, had a face-to-face encounter that meets the physician face-to-face encounter requirements with this patient on: 7/19/2018.    This encounter with the patient was in whole, or in part, for the following medical condition, which is the primary reason for home health care: debilitation .    I certify that, based on my findings, the following services are medically necessary home health services: Nursing.PT and OT and COMMODE     My clinical findings support the need for the above services because: Occupational Therapy Services are needed to assess and treat cognitive ability and address ADL safety due to impairment in deconditioning .    Further, I certify that my clinical findings support that this patient is homebound (i.e. absences from home require considerable and taxing effort and are for medical reasons or Spiritism services or infrequently or of short duration when for other reasons) because: {Homebound - Must include information about the medical condition and why it is a  considerable and taxing effort for the patient to leave the home. Per CMS; diagnoses alone do not substantiate deconditioning     Based on the above findings. I certify that this patient is confined to the home and needs intermittent skilled nursing care, physical therapy and/or speech therapy.  The patient is under my care, and I have initiated the establishment of the plan of care.  This patient will be followed by a physician who will periodically review the plan of care.  Physician/Provider to provide follow up care: Brionna Calabrese    Attending hospital physician (the Medicare certified PECOS provider): Juliette Partida  Physician Signature: See electronic signature associated with these discharge orders.  Date: 7/19/2018     Reason for your hospital stay   Order Comments: You were admitted to TCU for physical Rehab and continuation of IV antibiotics     Follow Up and recommended labs and tests   Order Comments: Cbc, crp and cmp once a week and fax to ID clinic   Vancomycin level and kinetics to be monitored by pharmacy and ID MD     IV access   Order Comments: **Ordering Provider MUST call/page Care Coordinator/ to discuss arranging this service**    You are going home with the following vascular access device: PICC     Adult [facility] Follow-up and recommended labs and tests   Order Comments: PCP in one week   Cbc , CMP  , crp once a week and to be faxed to ID clinic . IV vancomycin to be continued till august 9th or as determined by ID MD Dr Tomas   ID follow up in 2-4 weeks  Cardiology follow for ICD check  And follow up 1-2 weeks  Orthopedic follow up 7/24  Methadone clinic follow up as scheduled     Appointments on [place] and/or [facility] (with [facility] or [facility] provider or service). Call [phone] if you haven't heard regarding these appointments within 7 days of discharge.     Activity   Order Comments: Non weight bearing right lower ext   Minimal weight  bearing left fore foot  Foam boot to LLE   Order Specific Question Answer Comments   Is discharge order? Yes      Diet   Order Comments: Boost control in between meals/ regular adult diet , carbohydrate consistent ( 4-6 cho units per meal )   Order Specific Question Answer Comments   Is discharge order? Yes                  Discharge Disposition:   45  minutes spent in discharge, including >50% in counseling and coordination of care, medication review and plan of care recommended on follow up. Questions were answered to satisfaction       Juliette Partida  Internal Medicine Hospitalist & Staff Physician  [facility]

## 2018-07-22 LAB
GLUCOSE BLDC GLUCOMTR-MCNC: 174 MG/DL (ref 70–99)
GLUCOSE BLDC GLUCOMTR-MCNC: 241 MG/DL (ref 70–99)

## 2018-07-23 ENCOUNTER — TELEPHONE (OUTPATIENT)
Dept: FAMILY MEDICINE | Facility: CLINIC | Age: 53
End: 2018-07-23

## 2018-07-23 ENCOUNTER — TELEPHONE (OUTPATIENT)
Dept: PULMONOLOGY | Facility: CLINIC | Age: 53
End: 2018-07-23

## 2018-07-23 RX ORDER — FUROSEMIDE 20 MG
TABLET ORAL
Qty: 90 TABLET | Refills: 1 | OUTPATIENT
Start: 2018-07-23

## 2018-07-23 NOTE — TELEPHONE ENCOUNTER
This writer attempted to contact Kailey on 07/23/18      Reason for call home care order and left message.      If patient calls back:   Patient contacted by a Registered Nurse. Inform patient that someone from the RN group will contact them, document that pt called and route to P DYAD 3 RN POOL [[id number]]        Margot Munoz RN

## 2018-07-23 NOTE — TELEPHONE ENCOUNTER
This writer attempted to contact Kailey on 07/23/18      Reason for call clarify request, give home care verbal orders and unable to leave message, line is busy.      If patient calls back:   Patient contacted by a Registered Nurse. Inform patient that someone from the RN group will contact them, document that pt called and route to P DYAD 3 RN POOL [[id number]]        Margot Munoz RN

## 2018-07-23 NOTE — TELEPHONE ENCOUNTER
Routing refill request to provider for review/approval because:  Labs out of range:  Creatinine    Yvan Aguirre RN, BSN

## 2018-07-23 NOTE — TELEPHONE ENCOUNTER
Reason for Call:  Other     Detailed comments: [facility] is asking if Dr Mary Dc will follow patient through phone and fax for home care?    Phone Number Patient can be reached at: Other phone number:    [facility] (HOME CARE) [phone]         Best Time: any    Can we leave a detailed message on this number? YES    Call taken on 7/23/2018 at 9:40 AM by Jovita Yousif

## 2018-07-23 NOTE — TELEPHONE ENCOUNTER
Patient missed appointment today with Dr. Swartz.  Left message to call back to reschedule.     Carol Westholter

## 2018-07-24 ENCOUNTER — OFFICE VISIT (OUTPATIENT)
Dept: ORTHOPEDICS | Facility: CLINIC | Age: 53
End: 2018-07-24
Payer: COMMERCIAL

## 2018-07-24 VITALS — HEIGHT: 68 IN

## 2018-07-24 DIAGNOSIS — E11.621 DIABETIC ULCER OF RIGHT MIDFOOT ASSOCIATED WITH TYPE 2 DIABETES MELLITUS, WITH FAT LAYER EXPOSED (H): Primary | ICD-10-CM

## 2018-07-24 DIAGNOSIS — L97.412 DIABETIC ULCER OF RIGHT MIDFOOT ASSOCIATED WITH TYPE 2 DIABETES MELLITUS, WITH FAT LAYER EXPOSED (H): Primary | ICD-10-CM

## 2018-07-24 DIAGNOSIS — E11.621 DIABETIC ULCER OF LEFT MIDFOOT ASSOCIATED WITH TYPE 2 DIABETES MELLITUS, WITH FAT LAYER EXPOSED (H): ICD-10-CM

## 2018-07-24 DIAGNOSIS — L97.422 DIABETIC ULCER OF LEFT MIDFOOT ASSOCIATED WITH TYPE 2 DIABETES MELLITUS, WITH FAT LAYER EXPOSED (H): ICD-10-CM

## 2018-07-24 DIAGNOSIS — S88.111A AMPUTATED BELOW KNEE, RIGHT (H): ICD-10-CM

## 2018-07-24 NOTE — TELEPHONE ENCOUNTER
Called and spoke to the patient and scheduled a Hospital follow up appointment with Dr Mary Dc on 8/1/18 at 11:20 am.  Beatriz Alfredo MA/  For [employer] and Kristen

## 2018-07-24 NOTE — TELEPHONE ENCOUNTER
This writer attempted to contact Kailey at [facility]. intake on 07/24/18      Reason for call provider will follow patient on home care and left detailed message.      If patient calls back:   Relay message Dr. Peterson will follow patient while on home care services, (read verbatim), document that pt called and close encounter.    Yvan Aguirre RN, BSN

## 2018-07-24 NOTE — PROGRESS NOTES
This is a recent snapshot of the patient's [facility] medical record.  For current drug dose and complete information and questions, call [phone] or In Basket pool, fv home infusion ([id number])  CSN Number:  [id number]

## 2018-07-24 NOTE — PROGRESS NOTES
Spine Surgery Return Clinic Visit      Chief Complaint:   Surgical Followup of the Right Leg      Interval HPI:  Symptom Profile Including: location of symptoms, onset, severity, exacerbating/alleviating factors, previous treatments:        Alessandra Pak is a 53 year old female who underwent a right below-knee amputation for chronic osteomyelitis of the right midfoot which had resulted in sepsis.  She also has a left diabetic heel ulcer which is being followed by her podiatry team.  This is her first postoperative visit.    Today she reports she is having some phantom pains.  She has not noticed any wound issues.            Past Medical History:     Past Medical History:   Diagnosis Date     Abdominal pain, right upper quadrant     sees Dr Mcclellan pain clinic at [facility]     ASCUS with positive high risk HPV 8/2013    + HPV 33, [place] - GAVIN I, ECC- atypia     Cardiomyopathy (H)     non ischemic cardiomyopathy with EF 15     Cervical high risk HPV (human papillomavirus) test positive 7/8/15, 7/25/16    NIL pap/+ HR HPV (not 16 or 18).      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    leep     Depressive disorder      Gastro-oesophageal reflux disease      Human papillomavirus in conditions classified elsewhere and of unspecified site 2/2012    + HPV 33     Hypertension      Profound impairment, one eye, impairment level not further specified     rt eye due to childhood injury     Systolic CHF (H) 3/12/2015     Tobacco abuse 5/18/2013     Type 2 diabetes mellitus without complications (H)      Uncomplicated asthma             Past Surgical History:     Past Surgical History:   Procedure Laterality Date     AMPUTATE LEG BELOW KNEE Right 6/28/2018    Procedure: AMPUTATE LEG BELOW KNEE;  Right Knee Ampuation Below Knee, Anesthesia Block;  Surgeon: Ambrose King MD;  Location: [facility] OR     C NONSPECIFIC PROCEDURE  2001    cholecystectomy     C NONSPECIFIC PROCEDURE  as a child    tonsillectomy     C NONSPECIFIC PROCEDURE   2001    whipple procedure     CARDIAC SURGERY      defib     COLPOSCOPY,LOOP ELECTRD CERVIX EXCIS  2002, 2011    stage 2 dysplasia     ENDOBRONCHIAL ULTRASOUND FLEXIBLE N/A 2/19/2015    Procedure: ENDOBRONCHIAL ULTRASOUND FLEXIBLE;  Surgeon: Brenden Tamez MD;  Location: [facility] GI     LEEP TX, CERVICAL  2014    LEEP TX Cervical     RECESSION RESECTION WITH ADJUSTABLE SUTURE  12/13/2011    Procedure:RECESSION RESECTION WITH ADJUSTABLE SUTURE; Right Strabismus Repair with Adjustable Suture       [facility]             Social History:     Social History   Substance Use Topics     Smoking status: Former Smoker     Packs/day: 0.30     Years: 15.00     Types: Cigarettes     Smokeless tobacco: Former User     Quit date: 10/29/2014      Comment: Started smoking in 89/ smokes about 3 per day     Alcohol use No            Family History:     Family History   Problem Relation Age of Onset     Diabetes Mother      diet controled     Hypertension Mother      Arthritis Mother      Lipids Mother      Diabetes Father      Hypertension Father      GASTROINTESTINAL DISEASE Father      gallbladder removed     Bipolar Disorder Brother      Thyroid Disease Brother      Obesity Other      Son     Respiratory Other      Son and Daughter; asthma     Depression Maternal Aunt      Anxiety Disorder Maternal Aunt             Allergies:     Allergies   Allergen Reactions     No Known Drug Allergies             Medications:     Current Outpatient Prescriptions   Medication     acetaminophen (TYLENOL) 500 MG tablet     acetone, Urine, test STRP     albuterol (PROAIR HFA) 108 (90 Base) MCG/ACT Inhaler     amitriptyline (ELAVIL) 50 MG tablet     aspirin 81 MG tablet     blood glucose monitoring (HOLLIE CONTOUR NEXT) test strip     blood glucose monitoring (HOLLIE MICROLET) lancets     blood glucose monitoring (FREESTYLE) lancets     enoxaparin (LOVENOX) 40 MG/0.4ML injection     fluticasone (FLONASE) 50 MCG/ACT spray      "furosemide (LASIX) 40 MG tablet     glucose 4 g CHEW chewable tablet     insulin aspart (NOVOLOG PEN) 100 UNIT/ML injection     insulin aspart (NOVOLOG PEN) 100 UNIT/ML injection     insulin glargine (LANTUS SOLOSTAR) 100 UNIT/ML pen     insulin pen needle (B-D U/F) 31G X 5 MM     levonorgestrel (MIRENA, 52 MG,) 20 MCG/24HR IUD     Lidocaine (LIDOCARE) 4 % Patch     lisinopril (PRINIVIL/ZESTRIL) 10 MG tablet     methadone (DOLPHINE-INTENSOL) 10 mg/mL CONC (HIGH CONC) solution     methadone (DOLPHINE-INTENSOL) 10 mg/mL CONC (HIGH CONC) solution     metoprolol tartrate (LOPRESSOR) 25 MG tablet     morphine (MS CONTIN) 15 MG 12 hr tablet     multivitamin, therapeutic with minerals (THERA-VIT-M) TABS tablet     polyethylene glycol (MIRALAX/GLYCOLAX) Packet     pregabalin (LYRICA) 75 MG capsule     ranitidine (ZANTAC) 300 MG tablet     sennosides (SENOKOT) 8.6 MG tablet     umeclidinium-vilanterol (ANORO ELLIPTA) 62.5-25 MCG/INH oral inhaler     vancomycin 750 mg     No current facility-administered medications for this visit.              Review of Systems:   A focused musculoskeletal and neurologic ROS was performed with pertinent positives and negatives noted in the HPI.  Additional systems were also reviewed and are documented at the bottom of the note.         Physical Exam:   Vitals: Ht 1.727 m (5' 8\")  Musculoskeletal, Neurologic, and Spine:     The right skin wound is healing well.  There is a small amount of skin Ab over the lateral aspect otherwise looks like it is healing well.           Assessment and Plan:     53 year old female with bilateral diabetic foot ulceration status post right below-knee amputation.    I stressed the importance of continued diabetic foot care and she does have follow-up with podiatry for this.  I am going to refer her to prosthetics to be fit for the below-knee prosthesis.  Given that there is a small amount of eschar over the right side of her wound going to leave the sutures " in for 2 weeks after which they can be removed.  So long as the skin looks like it is healing at that time I think she can begin to weight-bear through the prosthesis.  Follow-up with me in 6 weeks so I can assess her progress.     Respectfully,  Ambrose King MD  Spine Surgery  [facility]

## 2018-07-24 NOTE — MR AVS SNAPSHOT
After Visit Summary   7/24/2018    Alessandra Pak    MRN: [id number]           Patient Information     Date Of Birth          1965        Visit Information        Provider Department      7/24/2018 9:20 AM Ambrose King MD [facility]        Today's Diagnoses     Diabetic ulcer of right midfoot associated with type 2 diabetes mellitus, with fat layer exposed (H)    -  1    Diabetic ulcer of left midfoot associated with type 2 diabetes mellitus, with fat layer exposed (H)        Amputated below knee, right (H)           Follow-ups after your visit        Additional Services     ORTHOTICS REFERRAL (external)       **This referral order prints off in the [facility]  (Orthotics & Prosthetics) Central Scheduling Office**    The [facility] Central Scheduling Staff will contact the patient to schedule appointments.     Central Scheduling Contact Information: [phone] ([place])    Prosthetics Below Knee: Consult: Below knee amputation, Right Prosthesis, Right Rigid Dressing and Right     Please be aware that coverage of these services is subject to the terms and limitations of your health insurance plan.  Call member services at your health plan with any benefit or coverage questions.      Please bring the following to your appointment:    >>   Any x-rays, CTs or MRIs which have been performed.  Contact the facility where they were done to arrange for  prior to your scheduled appointment.    >>   List of current medications   >>   This referral request   >>   Any documents/labs given to you for this referral                  Your next 10 appointments already scheduled     Jul 25, 2018 10:00 AM CDT   (Arrive by 9:45 AM)   RETURN FOOT/ANKLE with Becky King PA-C   [facility] ([facility])    [address] [place] [id number] [phone]            Aug 07, 2018 10:00  AM CDT   (Arrive by 9:45 AM)   Post-Op with Paulette U Ortho Nurse   [facility] ([facility] [place])    [address]  [address]  [facility] [address] [phone]            Aug 30, 2018  3:00 PM CDT   (Arrive by 2:45 PM)   Return Visit with Samina Downs MD   [facility] ([facility] [place])    [address]  [address]  [facility] [id number] [phone]            Sep 18, 2018 10:40 AM CDT   (Arrive by 10:10 AM)   RETURN SPINE with Ambrose King MD   [facility] ([facility] [place])    [address]  [address]  [facility] [id number] [phone]              Who to contact     Please call your clinic at [phone] to:    Ask questions about your health    Make or cancel appointments    Discuss your medicines    Learn about your test results    Speak to your doctor            Additional Information About Your Visit        [employer] Information     [employer] gives you secure access to your electronic health record. If you see a primary care provider, you can also send messages to your care team and make appointments. If you have questions, please call your primary care clinic.  If you do not have a primary care provider, please call [phone] and they will assist you.      [employer] is an electronic gateway that provides easy, online access to your medical records. With [employer], you can request a clinic appointment, read your test results, renew a prescription or communicate with your care team.     To access your existing account, please contact your [facility] or call [phone] for assistance.        Care EveryWhere ID     This is your Care EveryWhere ID. This could be used by other organizations to access your [facility] medical records  [id number]        Your Vitals Were     Height                   1.727 m  "(5' 8\")            Blood Pressure from Last 3 Encounters:   07/21/18 144/83   07/10/18 156/89   02/23/18 109/88    Weight from Last 3 Encounters:   07/19/18 53.6 kg (118 lb 1.6 oz)   07/09/18 55.7 kg (122 lb 14.4 oz)   02/23/18 63.5 kg (139 lb 15.9 oz)              We Performed the Following     ORTHOTICS REFERRAL (external)        Primary Care Provider Office Phone # Fax #    Brionna Tesfayelucas Dc [phone]       [address]  [facility] [address]        Equal Access to Services     NATALIE ROBLEDO : Hadii andres bolivar hadasho Soomaali, waaxda luqadaha, qaybta kaalmada adeegyada, sanjay levine . So [facility] [phone].    ATENCIÓN: Si habla español, tiene a nagy disposición servicios gratuitos de asistencia lingüística. Llame al [phone].    We comply with applicable federal civil rights laws and [place] laws. We do not discriminate on the basis of race, color, national origin, age, disability, sex, sexual orientation, or gender identity.            Thank you!     Thank you for choosing [facility]  for your care. Our goal is always to provide you with excellent care. Hearing back from our patients is one way we can continue to improve our services. Please take a few minutes to complete the written survey that you may receive in the mail after your visit with us. Thank you!             Your Updated Medication List - Protect others around you: Learn how to safely use, store and throw away your medicines at www.disposemymeds.org.          This list is accurate as of 7/24/18  9:59 AM.  Always use your most recent med list.                   Brand Name Dispense Instructions for use Diagnosis    acetaminophen 500 MG tablet    TYLENOL     Take 2 tablets (1,000 mg) by mouth 3 times daily    Status post below knee amputation of right lower extremity (H)       acetone (Urine) test Strp     25 each    1 strip by In Vitro route as needed    Type 2 " diabetes mellitus with diabetic neuropathy (H)       albuterol 108 (90 Base) MCG/ACT Inhaler    PROAIR HFA    1 Inhaler    Inhale 2 puffs into the lungs every 4 hours as needed for shortness of breath / dyspnea    SOB (shortness of breath)       amitriptyline 50 MG tablet    ELAVIL    60 tablet    Take 1 tablet (50 mg) by mouth At Bedtime    Status post below knee amputation of right lower extremity (H)       aspirin 81 MG tablet     100 tablet    Take 1 tablet (81 mg) by mouth daily    Type 2 diabetes mellitus with complication, with long-term current use of insulin (H)       * blood glucose monitoring lancets     3 Box    1 each See Admin Instructions test 3-4 times per day    Type 2 diabetes, HbA1C goal < 8% (H)       * blood glucose monitoring lancets     1 Box    Use to test blood sugar 10 times daily or as directed.  Ok to substitute alternative if insurance prefers.    Diabetes mellitus type 1 (H)       blood glucose monitoring test strip    HOLLIE CONTOUR NEXT    300 each    Use to test blood sugar 10 times daily or as directed.  Ok to substitute alternative if insurance prefers.    Diabetes mellitus type 1 (H)       enoxaparin 40 MG/0.4ML injection    LOVENOX    7 Syringe    Inject 0.4 mLs (40 mg) Subcutaneous every 24 hours    Status post below knee amputation of right lower extremity (H)       fluticasone 50 MCG/ACT spray    FLONASE    1 Bottle    Spray 2 sprays into left nostril daily    Acute nonseasonal allergic rhinitis due to pollen       furosemide 40 MG tablet    LASIX    30 tablet    Take 1 tablet (40 mg) by mouth daily    SOB (shortness of breath)       glucose 4 g Chew chewable tablet     25 tablet    Take 3-4 tablets to treat low blood sugar.    Type 2 diabetes mellitus with complication, with long-term current use of insulin (H)       * insulin aspart 100 UNIT/ML injection    NovoLOG PEN    6.3 mL    Inject 1-7 Units Subcutaneous 3 times daily (before meals)    Type 2 diabetes mellitus with  stage 3 chronic kidney disease, with long-term current use of insulin (H)       * insulin aspart 100 UNIT/ML injection    NovoLOG PEN    1.5 mL    Inject 1-5 Units Subcutaneous At Bedtime    Type 2 diabetes mellitus with stage 3 chronic kidney disease, with long-term current use of insulin (H)       insulin glargine 100 UNIT/ML injection    LANTUS    2.4 mL    Inject 8 Units Subcutaneous At Bedtime    Type 2 diabetes mellitus with stage 3 chronic kidney disease, with long-term current use of insulin (H)       insulin pen needle 31G X 5 MM    B-D U/F    3 each    Use 3 time(s) a day.    Type 2 diabetes, HbA1c goal < 7% (H)       levonorgestrel 20 MCG/24HR IUD    MIRENA (52 MG)    1 each    placed today    Excessive or frequent menstruation       Lidocaine 4 % Patch    LIDOCARE    30 patch    Place 2 patches onto the skin every 24 hours    Status post below knee amputation of right lower extremity (H)       lisinopril 10 MG tablet    PRINIVIL/ZESTRIL    30 tablet    Take 1 tablet (10 mg) by mouth daily    Type 2 diabetes mellitus with stage 3 chronic kidney disease, with long-term current use of insulin (H)       * methadone 10 mg/mL Conc (HIGH CONC) solution    DOLPHINE-INTENSOL    14 mL    Take 7 mLs (70 mg) by mouth daily    Chemical dependency (H)       * methadone 10 mg/mL Conc (HIGH CONC) solution    DOLPHINE-INTENSOL    70 mL    Take 7 mLs (70 mg) by mouth daily    Status post below knee amputation of right lower extremity (H)       metoprolol tartrate 25 MG tablet    LOPRESSOR    60 tablet    Take 0.5 tablets (12.5 mg) by mouth 2 times daily    Type 2 diabetes mellitus with stage 3 chronic kidney disease, with long-term current use of insulin (H)       morphine 15 MG 12 hr tablet    MS CONTIN    8 tablet    Take 1 tablet (15 mg) by mouth every 12 hours    Status post below knee amputation of right lower extremity (H)       multivitamin, therapeutic with minerals Tabs tablet     30 each    Take 1 tablet by  mouth daily    Status post below knee amputation of right lower extremity (H)       polyethylene glycol Packet    MIRALAX/GLYCOLAX    7 packet    Take 17 g by mouth daily    Status post below knee amputation of right lower extremity (H)       pregabalin 75 MG capsule    LYRICA    90 capsule    Take 1 capsule (75 mg) by mouth 3 times daily    Status post below knee amputation of right lower extremity (H)       ranitidine 300 MG tablet    ZANTAC    60 tablet    Take 1 tablet (300 mg) by mouth daily    Status post below knee amputation of right lower extremity (H)       sennosides 8.6 MG tablet    SENOKOT    60 each    Take 2 tablets by mouth 2 times daily    Status post below knee amputation of right lower extremity (H)       umeclidinium-vilanterol 62.5-25 MCG/INH oral inhaler    ANORO ELLIPTA    1 Inhaler    Inhale 1 puff into the lungs daily    Chronic bronchitis, unspecified chronic bronchitis type (H)       vancomycin 750 mg     20 cartridge.    Inject 750 mg into the vein every 24 hours for 20 days    Status post below knee amputation of right lower extremity (H)       * Notice:  This list has 6 medication(s) that are the same as other medications prescribed for you. Read the directions carefully, and ask your doctor or other care provider to review them with you.

## 2018-07-24 NOTE — LETTER
7/24/2018       RE: Alessandra Pak  [address]  [facility] [address]     Dear Colleague,    Thank you for referring your patient, Alessandra Pak, to the [facility]. Please see a copy of my visit note below.    Spine Surgery Return Clinic Visit      Chief Complaint:   Surgical Followup of the Right Leg      Interval HPI:  Symptom Profile Including: location of symptoms, onset, severity, exacerbating/alleviating factors, previous treatments:        Alessandra Pak is a 53 year old female who underwent a right below-knee amputation for chronic osteomyelitis of the right midfoot which had resulted in sepsis.  She also has a left diabetic heel ulcer which is being followed by her podiatry team.  This is her first postoperative visit.    Today she reports she is having some phantom pains.  She has not noticed any wound issues.            Past Medical History:     Past Medical History:   Diagnosis Date     Abdominal pain, right upper quadrant     sees Dr Mcclellan pain clinic at [facility]     ASCUS with positive high risk HPV 8/2013    + HPV 33, [place] - GAVIN I, ECC- atypia     Cardiomyopathy (H)     non ischemic cardiomyopathy with EF 15     Cervical high risk HPV (human papillomavirus) test positive 7/8/15, 7/25/16    NIL pap/+ HR HPV (not 16 or 18).      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    leep     Depressive disorder      Gastro-oesophageal reflux disease      Human papillomavirus in conditions classified elsewhere and of unspecified site 2/2012    + HPV 33     Hypertension      Profound impairment, one eye, impairment level not further specified     rt eye due to childhood injury     Systolic CHF (H) 3/12/2015     Tobacco abuse 5/18/2013     Type 2 diabetes mellitus without complications (H)      Uncomplicated asthma             Past Surgical History:     Past Surgical History:   Procedure Laterality Date     AMPUTATE LEG BELOW KNEE Right  6/28/2018    Procedure: AMPUTATE LEG BELOW KNEE;  Right Knee Ampuation Below Knee, Anesthesia Block;  Surgeon: Ambrose King MD;  Location: [facility] OR     C NONSPECIFIC PROCEDURE  2001    cholecystectomy     C NONSPECIFIC PROCEDURE  as a child    tonsillectomy     C NONSPECIFIC PROCEDURE  2001    whipple procedure     CARDIAC SURGERY      defib     COLPOSCOPY,LOOP ELECTRD CERVIX EXCIS  2002, 2011    stage 2 dysplasia     ENDOBRONCHIAL ULTRASOUND FLEXIBLE N/A 2/19/2015    Procedure: ENDOBRONCHIAL ULTRASOUND FLEXIBLE;  Surgeon: Brenden Tamez MD;  Location: [facility] GI     LEEP TX, CERVICAL  2014    LEEP TX Cervical     RECESSION RESECTION WITH ADJUSTABLE SUTURE  12/13/2011    Procedure:RECESSION RESECTION WITH ADJUSTABLE SUTURE; Right Strabismus Repair with Adjustable Suture       [facility]             Social History:     Social History   Substance Use Topics     Smoking status: Former Smoker     Packs/day: 0.30     Years: 15.00     Types: Cigarettes     Smokeless tobacco: Former User     Quit date: 10/29/2014      Comment: Started smoking in 89/ smokes about 3 per day     Alcohol use No            Family History:     Family History   Problem Relation Age of Onset     Diabetes Mother      diet controled     Hypertension Mother      Arthritis Mother      Lipids Mother      Diabetes Father      Hypertension Father      GASTROINTESTINAL DISEASE Father      gallbladder removed     Bipolar Disorder Brother      Thyroid Disease Brother      Obesity Other      Son     Respiratory Other      Son and Daughter; asthma     Depression Maternal Aunt      Anxiety Disorder Maternal Aunt             Allergies:     Allergies   Allergen Reactions     No Known Drug Allergies             Medications:     Current Outpatient Prescriptions   Medication     acetaminophen (TYLENOL) 500 MG tablet     acetone, Urine, test STRP     albuterol (PROAIR HFA) 108 (90 Base) MCG/ACT Inhaler     amitriptyline (ELAVIL) 50  "MG tablet     aspirin 81 MG tablet     blood glucose monitoring (HOLLIE CONTOUR NEXT) test strip     blood glucose monitoring (HOLLIE MICROLET) lancets     blood glucose monitoring (FREESTYLE) lancets     enoxaparin (LOVENOX) 40 MG/0.4ML injection     fluticasone (FLONASE) 50 MCG/ACT spray     furosemide (LASIX) 40 MG tablet     glucose 4 g CHEW chewable tablet     insulin aspart (NOVOLOG PEN) 100 UNIT/ML injection     insulin aspart (NOVOLOG PEN) 100 UNIT/ML injection     insulin glargine (LANTUS SOLOSTAR) 100 UNIT/ML pen     insulin pen needle (B-D U/F) 31G X 5 MM     levonorgestrel (MIRENA, 52 MG,) 20 MCG/24HR IUD     Lidocaine (LIDOCARE) 4 % Patch     lisinopril (PRINIVIL/ZESTRIL) 10 MG tablet     methadone (DOLPHINE-INTENSOL) 10 mg/mL CONC (HIGH CONC) solution     methadone (DOLPHINE-INTENSOL) 10 mg/mL CONC (HIGH CONC) solution     metoprolol tartrate (LOPRESSOR) 25 MG tablet     morphine (MS CONTIN) 15 MG 12 hr tablet     multivitamin, therapeutic with minerals (THERA-VIT-M) TABS tablet     polyethylene glycol (MIRALAX/GLYCOLAX) Packet     pregabalin (LYRICA) 75 MG capsule     ranitidine (ZANTAC) 300 MG tablet     sennosides (SENOKOT) 8.6 MG tablet     umeclidinium-vilanterol (ANORO ELLIPTA) 62.5-25 MCG/INH oral inhaler     vancomycin 750 mg     No current facility-administered medications for this visit.              Review of Systems:   A focused musculoskeletal and neurologic ROS was performed with pertinent positives and negatives noted in the HPI.  Additional systems were also reviewed and are documented at the bottom of the note.         Physical Exam:   Vitals: Ht 1.727 m (5' 8\")  Musculoskeletal, Neurologic, and Spine:     The right skin wound is healing well.  There is a small amount of skin Ab over the lateral aspect otherwise looks like it is healing well.           Assessment and Plan:     53 year old female with bilateral diabetic foot ulceration status post right below-knee amputation.    I " stressed the importance of continued diabetic foot care and she does have follow-up with podiatry for this.  I am going to refer her to prosthetics to be fit for the below-knee prosthesis.  Given that there is a small amount of eschar over the right side of her wound going to leave the sutures in for 2 weeks after which they can be removed.  So long as the skin looks like it is healing at that time I think she can begin to weight-bear through the prosthesis.  Follow-up with me in 6 weeks so I can assess her progress.     Respectfully,  Ambrose King MD  Spine Surgery  [facility]

## 2018-07-24 NOTE — NURSING NOTE
"Reason For Visit:   Chief Complaint   Patient presents with     Right Leg - Surgical Followup       Primary MD: Brionna Calabrese  Ref. MD: Luisa Nugent    ?  No  Occupation Disability.  Currently working? No.  Work status?  On disability.  Date of surgery: 6/28/18  Type of surgery: Right below the knee amputation.  Smoker: No  Request smoking cessation information: No    Ht 1.727 m (5' 8\")    Pain Assessment  Patient Currently in Pain: Yes  0-10 Pain Scale: 7  Primary Pain Location: Leg  Pain Orientation: Right  Pain Descriptors: Aching, Constant    Oswestry (STEVEN) Questionnaire    No flowsheet data found.         Neck Disability Index (NDI) Questionnaire    No flowsheet data found.                Promis 10 Assessment    No flowsheet data found.             Lois Nichols, ATC  "

## 2018-07-25 ENCOUNTER — HOME INFUSION (PRE-WILLOW HOME INFUSION) (OUTPATIENT)
Dept: PHARMACY | Facility: CLINIC | Age: 53
End: 2018-07-25

## 2018-07-25 ENCOUNTER — OFFICE VISIT (OUTPATIENT)
Dept: ORTHOPEDICS | Facility: CLINIC | Age: 53
End: 2018-07-25
Payer: COMMERCIAL

## 2018-07-25 DIAGNOSIS — E11.621 DIABETIC ULCER OF LEFT MIDFOOT ASSOCIATED WITH TYPE 2 DIABETES MELLITUS, WITH NECROSIS OF MUSCLE (H): Primary | ICD-10-CM

## 2018-07-25 DIAGNOSIS — L97.423 DIABETIC ULCER OF LEFT MIDFOOT ASSOCIATED WITH TYPE 2 DIABETES MELLITUS, WITH NECROSIS OF MUSCLE (H): Primary | ICD-10-CM

## 2018-07-25 NOTE — MR AVS SNAPSHOT
After Visit Summary   7/25/2018    Alessandra Pak    MRN: [id number]           Patient Information     Date Of Birth          1965        Visit Information        Provider Department      7/25/2018 10:00 AM Becky King PA-[facility]         Follow-ups after your visit        Your next 10 appointments already scheduled     Aug 01, 2018 11:20 AM CDT   [facility]c [facility]   [facility]    [address] [place] [id number] [phone]           Bring a current list of meds and any records pertaining to this visit. For Physicals, please bring immunization records and any forms needing to be filled out. Please arrive 10 minutes early to complete paperwork.            Aug 07, 2018 10:00 AM CDT   (Arrive by 9:45 AM)   Post-Op with  U Ortho Nurse   [facility] ([facility])    [address] [place] [address]  [address] [facility] [id number] [phone]            Aug 07, 2018 11:00 AM CDT   (Arrive by 10:45 AM)   RETURN FOOT/ANKLE with Barrington Lewis DPM   [facility] ([facility])    [address] [place] [address]  4th [facility] [id number] [phone]            Aug 30, 2018  3:00 PM CDT   (Arrive by 2:45 PM)   Return Visit with Samina Downs MD   [facility] ([facility])    [address] [place] [address]  [address]  [facility] [address] [phone]            Sep 18, 2018 10:40 AM CDT   (Arrive by 10:10 AM)   RETURN SPINE with Ambrose King MD   [facility] ([facility])    [address] [place] [address]  4th [facility] [id number] [phone]              Who to contact     Please call your clinic at [phone] to:    Ask questions about your health    Make or cancel  appointments    Discuss your medicines    Learn about your test results    Speak to your doctor            Additional Information About Your Visit        mSpokehart Information     Cellworks gives you secure access to your electronic health record. If you see a primary care provider, you can also send messages to your care team and make appointments. If you have questions, please call your primary care clinic.  If you do not have a primary care provider, please call [phone] and they will assist you.      Cellworks is an electronic gateway that provides easy, online access to your medical records. With [employer]works, you can request a clinic appointment, read your test results, renew a prescription or communicate with your care team.     To access your existing account, please contact your [facility] or call [phone] for assistance.        Care EveryWhere ID     This is your Care EveryWhere ID. This could be used by other organizations to access your [facility] medical records  [id number]         Blood Pressure from Last 3 Encounters:   07/21/18 144/83   07/10/18 156/89   02/23/18 109/88    Weight from Last 3 Encounters:   07/19/18 53.6 kg (118 lb 1.6 oz)   07/09/18 55.7 kg (122 lb 14.4 oz)   02/23/18 63.5 kg (139 lb 15.9 oz)              Today, you had the following     No orders found for display       Primary Care Provider Office Phone # Fax #    Brionna Cobylucas Dc [phone]       [address]  [facility] [address]        Equal Access to Services     [facility] : Hadii aad ku hadasho Soomaali, waaxda luqadaha, qaybta kaalmada adeegyada, sanjay martinezin hayjosefinan kate murdockarakai phillips'samuel . So [facility] [phone].    ATENCIÓN: Si habla español, tiene a nagy disposición servicios gratuitos de asistencia lingüística. Llame al [phone].    We comply with applicable federal civil rights laws and [place] laws. We do not discriminate on the basis of race,  color, national origin, age, disability, sex, sexual orientation, or gender identity.            Thank you!     Thank you for choosing [facility]  for your care. Our goal is always to provide you with excellent care. Hearing back from our patients is one way we can continue to improve our services. Please take a few minutes to complete the written survey that you may receive in the mail after your visit with us. Thank you!             Your Updated Medication List - Protect others around you: Learn how to safely use, store and throw away your medicines at www.disposemymeds.org.          This list is accurate as of 7/25/18 10:47 AM.  Always use your most recent med list.                   Brand Name Dispense Instructions for use Diagnosis    acetaminophen 500 MG tablet    TYLENOL     Take 2 tablets (1,000 mg) by mouth 3 times daily    Status post below knee amputation of right lower extremity (H)       acetone (Urine) test Strp     25 each    1 strip by In Vitro route as needed    Type 2 diabetes mellitus with diabetic neuropathy (H)       albuterol 108 (90 Base) MCG/ACT Inhaler    PROAIR HFA    1 Inhaler    Inhale 2 puffs into the lungs every 4 hours as needed for shortness of breath / dyspnea    SOB (shortness of breath)       amitriptyline 50 MG tablet    ELAVIL    60 tablet    Take 1 tablet (50 mg) by mouth At Bedtime    Status post below knee amputation of right lower extremity (H)       aspirin 81 MG tablet     100 tablet    Take 1 tablet (81 mg) by mouth daily    Type 2 diabetes mellitus with complication, with long-term current use of insulin (H)       * blood glucose monitoring lancets     3 Box    1 each See Admin Instructions test 3-4 times per day    Type 2 diabetes, HbA1C goal < 8% (H)       * blood glucose monitoring lancets     1 Box    Use to test blood sugar 10 times daily or as directed.  Ok to substitute alternative if insurance prefers.    Diabetes mellitus type 1 (H)       blood  glucose monitoring test strip    HOLLIE CONTOUR NEXT    300 each    Use to test blood sugar 10 times daily or as directed.  Ok to substitute alternative if insurance prefers.    Diabetes mellitus type 1 (H)       enoxaparin 40 MG/0.4ML injection    LOVENOX    7 Syringe    Inject 0.4 mLs (40 mg) Subcutaneous every 24 hours    Status post below knee amputation of right lower extremity (H)       fluticasone 50 MCG/ACT spray    FLONASE    1 Bottle    Spray 2 sprays into left nostril daily    Acute nonseasonal allergic rhinitis due to pollen       furosemide 40 MG tablet    LASIX    30 tablet    Take 1 tablet (40 mg) by mouth daily    SOB (shortness of breath)       glucose 4 g Chew chewable tablet     25 tablet    Take 3-4 tablets to treat low blood sugar.    Type 2 diabetes mellitus with complication, with long-term current use of insulin (H)       * insulin aspart 100 UNIT/ML injection    NovoLOG PEN    6.3 mL    Inject 1-7 Units Subcutaneous 3 times daily (before meals)    Type 2 diabetes mellitus with stage 3 chronic kidney disease, with long-term current use of insulin (H)       * insulin aspart 100 UNIT/ML injection    NovoLOG PEN    1.5 mL    Inject 1-5 Units Subcutaneous At Bedtime    Type 2 diabetes mellitus with stage 3 chronic kidney disease, with long-term current use of insulin (H)       insulin glargine 100 UNIT/ML injection    LANTUS    2.4 mL    Inject 8 Units Subcutaneous At Bedtime    Type 2 diabetes mellitus with stage 3 chronic kidney disease, with long-term current use of insulin (H)       insulin pen needle 31G X 5 MM    B-D U/F    3 each    Use 3 time(s) a day.    Type 2 diabetes, HbA1c goal < 7% (H)       levonorgestrel 20 MCG/24HR IUD    MIRENA (52 MG)    1 each    placed today    Excessive or frequent menstruation       Lidocaine 4 % Patch    LIDOCARE    30 patch    Place 2 patches onto the skin every 24 hours    Status post below knee amputation of right lower extremity (H)       lisinopril 10  MG tablet    PRINIVIL/ZESTRIL    30 tablet    Take 1 tablet (10 mg) by mouth daily    Type 2 diabetes mellitus with stage 3 chronic kidney disease, with long-term current use of insulin (H)       * methadone 10 mg/mL Conc (HIGH CONC) solution    DOLPHINE-INTENSOL    14 mL    Take 7 mLs (70 mg) by mouth daily    Chemical dependency (H)       * methadone 10 mg/mL Conc (HIGH CONC) solution    DOLPHINE-INTENSOL    70 mL    Take 7 mLs (70 mg) by mouth daily    Status post below knee amputation of right lower extremity (H)       metoprolol tartrate 25 MG tablet    LOPRESSOR    60 tablet    Take 0.5 tablets (12.5 mg) by mouth 2 times daily    Type 2 diabetes mellitus with stage 3 chronic kidney disease, with long-term current use of insulin (H)       morphine 15 MG 12 hr tablet    MS CONTIN    8 tablet    Take 1 tablet (15 mg) by mouth every 12 hours    Status post below knee amputation of right lower extremity (H)       multivitamin, therapeutic with minerals Tabs tablet     30 each    Take 1 tablet by mouth daily    Status post below knee amputation of right lower extremity (H)       polyethylene glycol Packet    MIRALAX/GLYCOLAX    7 packet    Take 17 g by mouth daily    Status post below knee amputation of right lower extremity (H)       pregabalin 75 MG capsule    LYRICA    90 capsule    Take 1 capsule (75 mg) by mouth 3 times daily    Status post below knee amputation of right lower extremity (H)       ranitidine 300 MG tablet    ZANTAC    60 tablet    Take 1 tablet (300 mg) by mouth daily    Status post below knee amputation of right lower extremity (H)       sennosides 8.6 MG tablet    SENOKOT    60 each    Take 2 tablets by mouth 2 times daily    Status post below knee amputation of right lower extremity (H)       umeclidinium-vilanterol 62.5-25 MCG/INH oral inhaler    ANORO ELLIPTA    1 Inhaler    Inhale 1 puff into the lungs daily    Chronic bronchitis, unspecified chronic bronchitis type (H)       vancomycin  750 mg     20 cartridge.    Inject 750 mg into the vein every 24 hours for 20 days    Status post below knee amputation of right lower extremity (H)       * Notice:  This list has 6 medication(s) that are the same as other medications prescribed for you. Read the directions carefully, and ask your doctor or other care provider to review them with you.

## 2018-07-25 NOTE — PROGRESS NOTES
Chief Complaint:   Chief Complaint   Patient presents with     Wound Check     Wounds, left foot.        Subjective: Alessandra is a 53 year old female who presents to the clinic today for follow up of left foot ulcerations. She has moved back home from the rehab center on Saturday and is doing okay. She is finding it difficult to perform transfers while being NWB to left foot as well. Continues MediHoney to heel ulcer, which she believes has healed, and Iodosorb to medial foot ulcer. Admits intermittent burning pain to the lateral malleolus and noticed the skin is beginning to break down there. Has been covering with a bandage. Also present during this encounter is her Prosthetist Jeremías Bond. He has questions about weight bearing and tentative timeline for wound healing. He would like to move forward with right leg prosthesis so that she is able to transfer better at home. Wearing a stump protector at home. Jeremías's phone is [phone].    ROS: Denies purulent drainage, wound odor, foot or LE redness, increased swelling, new wounds.     Allergies   Allergen Reactions     No Known Drug Allergies      Current Outpatient Rx   Medication Sig Dispense Refill     acetaminophen (TYLENOL) 500 MG tablet Take 2 tablets (1,000 mg) by mouth 3 times daily       acetone, Urine, test STRP 1 strip by In Vitro route as needed 25 each 1     albuterol (PROAIR HFA) 108 (90 Base) MCG/ACT Inhaler Inhale 2 puffs into the lungs every 4 hours as needed for shortness of breath / dyspnea 1 Inhaler 0     amitriptyline (ELAVIL) 50 MG tablet Take 1 tablet (50 mg) by mouth At Bedtime 60 tablet 0     aspirin 81 MG tablet Take 1 tablet (81 mg) by mouth daily 100 tablet 3     blood glucose monitoring (HOLLIE CONTOUR NEXT) test strip Use to test blood sugar 10 times daily or as directed.  Ok to substitute alternative if insurance prefers. 300 each 12     blood glucose monitoring (HOLLIE MICROLET) lancets Use to test blood sugar 10 times daily or as  directed.  Ok to substitute alternative if insurance prefers. 1 Box prn     blood glucose monitoring (FREESTYLE) lancets 1 each See Admin Instructions test 3-4 times per day 3 Box 3     enoxaparin (LOVENOX) 40 MG/0.4ML injection Inject 0.4 mLs (40 mg) Subcutaneous every 24 hours 7 Syringe 0     fluticasone (FLONASE) 50 MCG/ACT spray Spray 2 sprays into left nostril daily 1 Bottle 0     furosemide (LASIX) 40 MG tablet Take 1 tablet (40 mg) by mouth daily 30 tablet 0     glucose 4 g CHEW chewable tablet Take 3-4 tablets to treat low blood sugar. 25 tablet 11     insulin aspart (NOVOLOG PEN) 100 UNIT/ML injection Inject 1-7 Units Subcutaneous 3 times daily (before meals) 6.3 mL 0     insulin aspart (NOVOLOG PEN) 100 UNIT/ML injection Inject 1-5 Units Subcutaneous At Bedtime 1.5 mL 0     insulin glargine (LANTUS SOLOSTAR) 100 UNIT/ML pen Inject 8 Units Subcutaneous At Bedtime 2.4 mL 0     insulin pen needle (B-D U/F) 31G X 5 MM Use 3 time(s) a day. 3 each 3     levonorgestrel (MIRENA, 52 MG,) 20 MCG/24HR IUD placed today 1 each 0     Lidocaine (LIDOCARE) 4 % Patch Place 2 patches onto the skin every 24 hours 30 patch 0     lisinopril (PRINIVIL/ZESTRIL) 10 MG tablet Take 1 tablet (10 mg) by mouth daily 30 tablet 0     methadone (DOLPHINE-INTENSOL) 10 mg/mL CONC (HIGH CONC) solution Take 7 mLs (70 mg) by mouth daily 70 mL 0     methadone (DOLPHINE-INTENSOL) 10 mg/mL CONC (HIGH CONC) solution Take 7 mLs (70 mg) by mouth daily 14 mL 0     metoprolol tartrate (LOPRESSOR) 25 MG tablet Take 0.5 tablets (12.5 mg) by mouth 2 times daily 60 tablet 0     morphine (MS CONTIN) 15 MG 12 hr tablet Take 1 tablet (15 mg) by mouth every 12 hours 8 tablet 0     multivitamin, therapeutic with minerals (THERA-VIT-M) TABS tablet Take 1 tablet by mouth daily 30 each 0     polyethylene glycol (MIRALAX/GLYCOLAX) Packet Take 17 g by mouth daily 7 packet 0     pregabalin (LYRICA) 75 MG capsule Take 1 capsule (75 mg) by mouth 3 times daily 90  capsule 0     ranitidine (ZANTAC) 300 MG tablet Take 1 tablet (300 mg) by mouth daily 60 tablet 0     sennosides (SENOKOT) 8.6 MG tablet Take 2 tablets by mouth 2 times daily 60 each 0     umeclidinium-vilanterol (ANORO ELLIPTA) 62.5-25 MCG/INH oral inhaler Inhale 1 puff into the lungs daily 1 Inhaler 0     vancomycin 750 mg Inject 750 mg into the vein every 24 hours for 20 days 20 cartridge. 0       Objective:   There were no vitals taken for this visit.    General: Patient is well groomed, appears stated age, is alert and cooperative, and is in no acute distress.  Skin:     Wound #1  A diabetic wound is noted at left  medial foot at first met head measuring  2.5 cm x 3 cm x 0.4 cm. Non-tender to palpation.   Tissue Depth: subcutaneous, tendon  Wound base: Pink, Yellow/Granulation, Slough, tendon  Edges: intact  Drainage: slight/serous  Odor: No   Undermining: Yes, plantar portion of wound within subq  Tunneling: No  Bone Exposure: No  Clinical Signs of Infection: No     Wound #2  A diabetic wound is noted at left  heel - completely scabbed over today without fluctuance or drainage.    Assessment:   - Diabetic L foot ulcerations  - DM type 2 with polyneuropathy  - S/p BKA right    Plan:   - Heel ulcer healed, but still need to be cautious. Medial ulcer has tendon exposed and movement would likely delay healing. Recommend continued NWB to left foot. Agree with plan to move forward with fitting of right prosthesis.  - Devitalized tissue of wound was excisionally debrided with #15 blade to level of subcutaneous tissue at the deepest. No bleeding occurred, no anesthesia necessary.  - Wound care instructions: Clean with wound cleanser, apply MediHoney to wound, covered with gauze or bandage. Perform daily.  - Suggest Mepilex to lateral malleolus and heel daily  - RTC 2 weeks for further debridement and care. Already has appointment on 8/7, patient will schedule with Dr. Lewis that day to coordinate.

## 2018-07-25 NOTE — LETTER
7/25/2018       RE: Alessandra Pak  [address]  [facility] [address]     Dear Colleague,    Thank you for referring your patient, Alessandra Pak, to the [facility]. Please see a copy of my visit note below.    Chief Complaint:   Chief Complaint   Patient presents with     Wound Check     Wounds, left foot.        Subjective: Alessandra is a 53 year old female who presents to the clinic today for follow up of left foot ulcerations. She has moved back home from the rehab center on Saturday and is doing okay. She is finding it difficult to perform transfers while being NWB to left foot as well. Continues MediHoney to heel ulcer, which she believes has healed, and Iodosorb to medial foot ulcer. Admits intermittent burning pain to the lateral malleolus and noticed the skin is beginning to break down there. Has been covering with a bandage. Also present during this encounter is her Prosthetist Jeremías Bond. He has questions about weight bearing and tentative timeline for wound healing. He would like to move forward with right leg prosthesis so that she is able to transfer better at home. Wearing a stump protector at home. Jeremías's phone is [phone].    ROS: Denies purulent drainage, wound odor, foot or LE redness, increased swelling, new wounds.     Allergies   Allergen Reactions     No Known Drug Allergies      Current Outpatient Rx   Medication Sig Dispense Refill     acetaminophen (TYLENOL) 500 MG tablet Take 2 tablets (1,000 mg) by mouth 3 times daily       acetone, Urine, test STRP 1 strip by In Vitro route as needed 25 each 1     albuterol (PROAIR HFA) 108 (90 Base) MCG/ACT Inhaler Inhale 2 puffs into the lungs every 4 hours as needed for shortness of breath / dyspnea 1 Inhaler 0     amitriptyline (ELAVIL) 50 MG tablet Take 1 tablet (50 mg) by mouth At Bedtime 60 tablet 0     aspirin 81 MG tablet Take 1 tablet (81 mg) by mouth daily 100  tablet 3     blood glucose monitoring (HOLLIE CONTOUR NEXT) test strip Use to test blood sugar 10 times daily or as directed.  Ok to substitute alternative if insurance prefers. 300 each 12     blood glucose monitoring (HOLLIE MICROLET) lancets Use to test blood sugar 10 times daily or as directed.  Ok to substitute alternative if insurance prefers. 1 Box prn     blood glucose monitoring (FREESTYLE) lancets 1 each See Admin Instructions test 3-4 times per day 3 Box 3     enoxaparin (LOVENOX) 40 MG/0.4ML injection Inject 0.4 mLs (40 mg) Subcutaneous every 24 hours 7 Syringe 0     fluticasone (FLONASE) 50 MCG/ACT spray Spray 2 sprays into left nostril daily 1 Bottle 0     furosemide (LASIX) 40 MG tablet Take 1 tablet (40 mg) by mouth daily 30 tablet 0     glucose 4 g CHEW chewable tablet Take 3-4 tablets to treat low blood sugar. 25 tablet 11     insulin aspart (NOVOLOG PEN) 100 UNIT/ML injection Inject 1-7 Units Subcutaneous 3 times daily (before meals) 6.3 mL 0     insulin aspart (NOVOLOG PEN) 100 UNIT/ML injection Inject 1-5 Units Subcutaneous At Bedtime 1.5 mL 0     insulin glargine (LANTUS SOLOSTAR) 100 UNIT/ML pen Inject 8 Units Subcutaneous At Bedtime 2.4 mL 0     insulin pen needle (B-D U/F) 31G X 5 MM Use 3 time(s) a day. 3 each 3     levonorgestrel (MIRENA, 52 MG,) 20 MCG/24HR IUD placed today 1 each 0     Lidocaine (LIDOCARE) 4 % Patch Place 2 patches onto the skin every 24 hours 30 patch 0     lisinopril (PRINIVIL/ZESTRIL) 10 MG tablet Take 1 tablet (10 mg) by mouth daily 30 tablet 0     methadone (DOLPHINE-INTENSOL) 10 mg/mL CONC (HIGH CONC) solution Take 7 mLs (70 mg) by mouth daily 70 mL 0     methadone (DOLPHINE-INTENSOL) 10 mg/mL CONC (HIGH CONC) solution Take 7 mLs (70 mg) by mouth daily 14 mL 0     metoprolol tartrate (LOPRESSOR) 25 MG tablet Take 0.5 tablets (12.5 mg) by mouth 2 times daily 60 tablet 0     morphine (MS CONTIN) 15 MG 12 hr tablet Take 1 tablet (15 mg) by mouth every 12 hours 8 tablet  0     multivitamin, therapeutic with minerals (THERA-VIT-M) TABS tablet Take 1 tablet by mouth daily 30 each 0     polyethylene glycol (MIRALAX/GLYCOLAX) Packet Take 17 g by mouth daily 7 packet 0     pregabalin (LYRICA) 75 MG capsule Take 1 capsule (75 mg) by mouth 3 times daily 90 capsule 0     ranitidine (ZANTAC) 300 MG tablet Take 1 tablet (300 mg) by mouth daily 60 tablet 0     sennosides (SENOKOT) 8.6 MG tablet Take 2 tablets by mouth 2 times daily 60 each 0     umeclidinium-vilanterol (ANORO ELLIPTA) 62.5-25 MCG/INH oral inhaler Inhale 1 puff into the lungs daily 1 Inhaler 0     vancomycin 750 mg Inject 750 mg into the vein every 24 hours for 20 days 20 cartridge. 0       Objective:   There were no vitals taken for this visit.    General: Patient is well groomed, appears stated age, is alert and cooperative, and is in no acute distress.  Skin:     Wound #1  A diabetic wound is noted at left  medial foot at first met head measuring  2. 5 cm x 3 cm x 0.4 cm. Non-tender to palpation.   Tissue Depth: subcutaneous, tendon  Wound base: Pink, Yellow/Granulation, Slough, tendon  Edges: intact  Drainage: slight/serous  Odor: No   Undermining: Yes, plantar portion of wound within subq  Tunneling: No  Bone Exposure: No  Clinical Signs of Infection: No     Wound #2  A diabetic wound is noted at left  heel  - completely scabbed over today without fluctuance or drainage.    Assessment:   - Diabetic L foot ulcerations  - DM type 2 with polyneuropathy  - S/p BKA right    Plan:   - Heel ulcer healed, but still need to be cautious. Medial ulcer has tendon exposed and movement would likely delay healing. Recommend continued NWB to left foot. Agree with plan to move forward with fitting of right prosthesis.  - Devitalized tissue of wound was excisionally debrided with #15 blade to level of subcutaneous tissue at the deepest. No bleeding occurred, no anesthesia necessary.  - Wound care instructions: Clean with wound cleanser,  apply MediHoney to wound, covered with gauze or bandage. Perform daily.  - Suggest Mepilex to lateral malleolus and heel daily  - RTC 2 weeks for further debridement and care. Already has appointment on 8/7, patient will schedule with Dr. Lewis that day to coordinate.    Again, thank you for allowing me to participate in the care of your patient.      Sincerely,    Becky King PA-C

## 2018-07-25 NOTE — NURSING NOTE
Reason For Visit:   Chief Complaint   Patient presents with     Wound Check     Wounds, left foot.        Pain Assessment  Patient Currently in Pain: Denies  Primary Pain Location: Foot  Pain Orientation: Left  Pain Descriptors: Numbness, Tingling           Current Outpatient Prescriptions   Medication Sig Dispense Refill     acetaminophen (TYLENOL) 500 MG tablet Take 2 tablets (1,000 mg) by mouth 3 times daily       acetone, Urine, test STRP 1 strip by In Vitro route as needed 25 each 1     albuterol (PROAIR HFA) 108 (90 Base) MCG/ACT Inhaler Inhale 2 puffs into the lungs every 4 hours as needed for shortness of breath / dyspnea 1 Inhaler 0     amitriptyline (ELAVIL) 50 MG tablet Take 1 tablet (50 mg) by mouth At Bedtime 60 tablet 0     aspirin 81 MG tablet Take 1 tablet (81 mg) by mouth daily 100 tablet 3     blood glucose monitoring (HOLLIE CONTOUR NEXT) test strip Use to test blood sugar 10 times daily or as directed.  Ok to substitute alternative if insurance prefers. 300 each 12     blood glucose monitoring (HOLLIE MICROLET) lancets Use to test blood sugar 10 times daily or as directed.  Ok to substitute alternative if insurance prefers. 1 Box prn     blood glucose monitoring (FREESTYLE) lancets 1 each See Admin Instructions test 3-4 times per day 3 Box 3     enoxaparin (LOVENOX) 40 MG/0.4ML injection Inject 0.4 mLs (40 mg) Subcutaneous every 24 hours 7 Syringe 0     fluticasone (FLONASE) 50 MCG/ACT spray Spray 2 sprays into left nostril daily 1 Bottle 0     furosemide (LASIX) 40 MG tablet Take 1 tablet (40 mg) by mouth daily 30 tablet 0     glucose 4 g CHEW chewable tablet Take 3-4 tablets to treat low blood sugar. 25 tablet 11     insulin aspart (NOVOLOG PEN) 100 UNIT/ML injection Inject 1-7 Units Subcutaneous 3 times daily (before meals) 6.3 mL 0     insulin aspart (NOVOLOG PEN) 100 UNIT/ML injection Inject 1-5 Units Subcutaneous At Bedtime 1.5 mL 0     insulin glargine (LANTUS SOLOSTAR) 100 UNIT/ML pen  Inject 8 Units Subcutaneous At Bedtime 2.4 mL 0     insulin pen needle (B-D U/F) 31G X 5 MM Use 3 time(s) a day. 3 each 3     levonorgestrel (MIRENA, 52 MG,) 20 MCG/24HR IUD placed today 1 each 0     Lidocaine (LIDOCARE) 4 % Patch Place 2 patches onto the skin every 24 hours 30 patch 0     lisinopril (PRINIVIL/ZESTRIL) 10 MG tablet Take 1 tablet (10 mg) by mouth daily 30 tablet 0     methadone (DOLPHINE-INTENSOL) 10 mg/mL CONC (HIGH CONC) solution Take 7 mLs (70 mg) by mouth daily 70 mL 0     methadone (DOLPHINE-INTENSOL) 10 mg/mL CONC (HIGH CONC) solution Take 7 mLs (70 mg) by mouth daily 14 mL 0     metoprolol tartrate (LOPRESSOR) 25 MG tablet Take 0.5 tablets (12.5 mg) by mouth 2 times daily 60 tablet 0     morphine (MS CONTIN) 15 MG 12 hr tablet Take 1 tablet (15 mg) by mouth every 12 hours 8 tablet 0     multivitamin, therapeutic with minerals (THERA-VIT-M) TABS tablet Take 1 tablet by mouth daily 30 each 0     polyethylene glycol (MIRALAX/GLYCOLAX) Packet Take 17 g by mouth daily 7 packet 0     pregabalin (LYRICA) 75 MG capsule Take 1 capsule (75 mg) by mouth 3 times daily 90 capsule 0     ranitidine (ZANTAC) 300 MG tablet Take 1 tablet (300 mg) by mouth daily 60 tablet 0     sennosides (SENOKOT) 8.6 MG tablet Take 2 tablets by mouth 2 times daily 60 each 0     umeclidinium-vilanterol (ANORO ELLIPTA) 62.5-25 MCG/INH oral inhaler Inhale 1 puff into the lungs daily 1 Inhaler 0     vancomycin 750 mg Inject 750 mg into the vein every 24 hours for 20 days 20 cartridge. 0          Allergies   Allergen Reactions     No Known Drug Allergies

## 2018-07-27 ENCOUNTER — HOME INFUSION (PRE-WILLOW HOME INFUSION) (OUTPATIENT)
Dept: PHARMACY | Facility: CLINIC | Age: 53
End: 2018-07-27

## 2018-07-28 ENCOUNTER — HOSPITAL ENCOUNTER (EMERGENCY)
Facility: CLINIC | Age: 53
Discharge: HOME | End: 2018-07-28
Attending: EMERGENCY MEDICINE | Admitting: EMERGENCY MEDICINE
Payer: COMMERCIAL

## 2018-07-28 ENCOUNTER — HOME INFUSION (PRE-WILLOW HOME INFUSION) (OUTPATIENT)
Dept: PHARMACY | Facility: CLINIC | Age: 53
End: 2018-07-28

## 2018-07-28 ENCOUNTER — TELEPHONE (OUTPATIENT)
Dept: NURSING | Facility: CLINIC | Age: 53
End: 2018-07-28

## 2018-07-28 VITALS
BODY MASS INDEX: 18.25 KG/M2 | SYSTOLIC BLOOD PRESSURE: 104 MMHG | HEART RATE: 95 BPM | TEMPERATURE: 98 F | WEIGHT: 120 LBS | OXYGEN SATURATION: 96 % | RESPIRATION RATE: 15 BRPM | DIASTOLIC BLOOD PRESSURE: 77 MMHG

## 2018-07-28 DIAGNOSIS — Z45.2 PIC LINE (PERIPHERALLY INSERTED CENTRAL CATHETER) REMOVAL: ICD-10-CM

## 2018-07-28 DIAGNOSIS — Z89.511 STATUS POST BELOW KNEE AMPUTATION OF RIGHT LOWER EXTREMITY (H): Primary | ICD-10-CM

## 2018-07-28 LAB
ANION GAP SERPL CALCULATED.3IONS-SCNC: 8 MMOL/L (ref 3–14)
BASOPHILS # BLD AUTO: 0 10E9/L (ref 0–0.2)
BASOPHILS NFR BLD AUTO: 0.5 %
BUN SERPL-MCNC: 30 MG/DL (ref 7–30)
CALCIUM SERPL-MCNC: 9.5 MG/DL (ref 8.5–10.1)
CHLORIDE SERPL-SCNC: 100 MMOL/L (ref 94–109)
CO2 SERPL-SCNC: 29 MMOL/L (ref 20–32)
CREAT SERPL-MCNC: 1.17 MG/DL (ref 0.52–1.04)
DIFFERENTIAL METHOD BLD: ABNORMAL
EOSINOPHIL # BLD AUTO: 0.3 10E9/L (ref 0–0.7)
EOSINOPHIL NFR BLD AUTO: 3.9 %
ERYTHROCYTE [DISTWIDTH] IN BLOOD BY AUTOMATED COUNT: 18.2 % (ref 10–15)
GFR SERPL CREATININE-BSD FRML MDRD: 49 ML/MIN/1.7M2
GLUCOSE SERPL-MCNC: 258 MG/DL (ref 70–99)
HCT VFR BLD AUTO: 37.7 % (ref 35–47)
HGB BLD-MCNC: 12.2 G/DL (ref 11.7–15.7)
IMM GRANULOCYTES # BLD: 0 10E9/L (ref 0–0.4)
IMM GRANULOCYTES NFR BLD: 0.1 %
LYMPHOCYTES # BLD AUTO: 3.4 10E9/L (ref 0.8–5.3)
LYMPHOCYTES NFR BLD AUTO: 44.8 %
MCH RBC QN AUTO: 27.4 PG (ref 26.5–33)
MCHC RBC AUTO-ENTMCNC: 32.4 G/DL (ref 31.5–36.5)
MCV RBC AUTO: 85 FL (ref 78–100)
MONOCYTES # BLD AUTO: 0.5 10E9/L (ref 0–1.3)
MONOCYTES NFR BLD AUTO: 6.4 %
NEUTROPHILS # BLD AUTO: 3.3 10E9/L (ref 1.6–8.3)
NEUTROPHILS NFR BLD AUTO: 44.3 %
NRBC # BLD AUTO: 0 10*3/UL
NRBC BLD AUTO-RTO: 0 /100
PLATELET # BLD AUTO: 215 10E9/L (ref 150–450)
POTASSIUM SERPL-SCNC: 4.6 MMOL/L (ref 3.4–5.3)
POTASSIUM SERPL-SCNC: 5.5 MMOL/L (ref 3.4–5.3)
RBC # BLD AUTO: 4.45 10E12/L (ref 3.8–5.2)
SODIUM SERPL-SCNC: 137 MMOL/L (ref 133–144)
VANCOMYCIN SERPL-MCNC: 14.1 MG/L
WBC # BLD AUTO: 7.5 10E9/L (ref 4–11)

## 2018-07-28 PROCEDURE — 80202 ASSAY OF VANCOMYCIN: CPT | Performed by: EMERGENCY MEDICINE

## 2018-07-28 PROCEDURE — 25000128 H RX IP 250 OP 636

## 2018-07-28 PROCEDURE — 25000128 H RX IP 250 OP 636: Performed by: EMERGENCY MEDICINE

## 2018-07-28 PROCEDURE — 99284 EMERGENCY DEPT VISIT MOD MDM: CPT | Mod: 25 | Performed by: EMERGENCY MEDICINE

## 2018-07-28 PROCEDURE — 96365 THER/PROPH/DIAG IV INF INIT: CPT | Performed by: EMERGENCY MEDICINE

## 2018-07-28 PROCEDURE — 80048 BASIC METABOLIC PNL TOTAL CA: CPT | Performed by: EMERGENCY MEDICINE

## 2018-07-28 PROCEDURE — 84132 ASSAY OF SERUM POTASSIUM: CPT | Performed by: EMERGENCY MEDICINE

## 2018-07-28 PROCEDURE — 96361 HYDRATE IV INFUSION ADD-ON: CPT | Performed by: EMERGENCY MEDICINE

## 2018-07-28 PROCEDURE — 99284 EMERGENCY DEPT VISIT MOD MDM: CPT | Mod: Z6 | Performed by: EMERGENCY MEDICINE

## 2018-07-28 PROCEDURE — 85025 COMPLETE CBC W/AUTO DIFF WBC: CPT | Performed by: EMERGENCY MEDICINE

## 2018-07-28 RX ORDER — VANCOMYCIN HYDROCHLORIDE 1 G/200ML
1000 INJECTION, SOLUTION INTRAVENOUS ONCE
Status: COMPLETED | OUTPATIENT
Start: 2018-07-28 | End: 2018-07-28

## 2018-07-28 RX ORDER — SODIUM CHLORIDE 9 MG/ML
INJECTION, SOLUTION INTRAVENOUS
Status: COMPLETED
Start: 2018-07-28 | End: 2018-07-28

## 2018-07-28 RX ADMIN — Medication 250 ML: at 16:58

## 2018-07-28 RX ADMIN — SODIUM CHLORIDE 250 ML: 9 INJECTION, SOLUTION INTRAVENOUS at 16:58

## 2018-07-28 RX ADMIN — VANCOMYCIN HYDROCHLORIDE 1000 MG: 1 INJECTION, SOLUTION INTRAVENOUS at 16:57

## 2018-07-28 ASSESSMENT — ENCOUNTER SYMPTOMS
COUGH: 0
WOUND: 1
JOINT SWELLING: 0
FEVER: 0
SHORTNESS OF BREATH: 0

## 2018-07-28 NOTE — PHARMACY-VANCOMYCIN DOSING SERVICE
Pharmacy Vancomycin Note  Date of Service 2018  Patient's  1965   53 year old, female    Indication: Osteomyelitis  Goal Trough Level: 15-20 mg/L  Day of Therapy: since 18  Current Vancomycin regimen:  750 mg IV q24h--->verified with [facility] Pharmacist 18    Current estimated CrCl = Estimated Creatinine Clearance: 48.9 mL/min (based on Cr of 1.17).    Creatinine for last 3 days  2018:  3:39 PM Creatinine 1.17 mg/dL    Recent Vancomycin Levels (past 3 days)  2018:  3:39 PM Vancomycin Level 14.1 mg/L    Vancomycin IV Administrations (past 72 hours)      No vancomycin orders with administrations in past 72 hours.                Nephrotoxins and other renal medications (Future)    Start     Dose/Rate Route Frequency Ordered Stop    18 1619  vancomycin (VANCOCIN) 875 mg in sodium chloride 0.9 % 250 mL intermittent infusion      875 mg  over 90 Minutes Intravenous ONCE 18 1619               Contrast Orders - past 72 hours     None          Interpretation of levels and current regimen:  Trough level is  Subtherapeutic. Last dose was given yesterday around 1600 per patient, so level is appropriately drawn. Patient reports she has not missed any doses of vancomycin this week.    Has serum creatinine changed > 50% in last 72 hours: unknown. Last creat was on 18    Urine output:  unable to determine    Renal Function: Slight uptrend in creatinine today compared to 18    Plan:  1.  Increase Dose to 1000mg IV x 1 dose. This dose was chosen based on a few factors. Ideally, I recommend the patient being on 875mg IV Q24H. However, the patient has a few doses of 750mg at home still and cannot get new vanco doses at least until Monday when she can call home infusion. I decided to give 1000mg today and then the patient can get 750mg tomorrow = 875mg on average daily. I did not want to increase to 1000mg daily because this dosing resulted in a supratherapeutic  level of 24.1 on 7/16/18 and today's creatinine was slightly higher than at discharge. The vanco level in goal range (16.7) on 7/19/18 was drawn after 2 doses of 750mg, so the patient was probably not on steady state yet with that dose = why subtherapeutic today. 875mg was the middle dose of the two doses of 750mg and 1000mg that resulted in too high and too low of levels.   2.  Pharmacy will check trough levels as appropriate in 3 days.  3. Serum creatinine levels will be ordered a minimum of twice weekly.      Delmi Reyes, PharmD, BCPS

## 2018-07-28 NOTE — DISCHARGE INSTRUCTIONS
Return to the [facility] emergency department tomorrow by 10 AM to have PICC line replaced and received next dose of vancomycin.

## 2018-07-28 NOTE — ED PROVIDER NOTES
History     Chief Complaint   Patient presents with     Vascular Access Problem     right arm PICC line pulled part way out     HPI  Alessandra Pak is a 53 year old female with a history of below knee leg amputate for chronic osteomyelitis of right midfoot (6/28/2018) who presents to the Emergency Department for the evaluation of vascular access problem. Patient states she was reaching for something in her closet when her PICC line in her right upper arm caught on to something and pulled the line out, almost completely. Patient uses PICC line for IV vancomycin daily. Patient's last dose was yesterday at 1:00 pm and has not had her dose today due to access problem. Patient states her nurse advised her to get blood work done prior to getting today's dose. Patient states she otherwise feels fine. She states she has an ulcer on her left foot though this is watched closely by her nurse and has not had any new lesions or drainage.     I have reviewed the Medications, Allergies, Past Medical and Surgical History, and Social History in the [employer] system.  Past Medical History:   Diagnosis Date     Abdominal pain, right upper quadrant     sees Dr Mcclellan pain clinic at [facility]     ASCUS with positive high risk HPV 8/2013    + HPV 33, [place] - GAVIN I, ECC- atypia     Cardiomyopathy (H)     non ischemic cardiomyopathy with EF 15     Cervical high risk HPV (human papillomavirus) test positive 7/8/15, 7/25/16    NIL pap/+ HR HPV (not 16 or 18).      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    leep     Depressive disorder      Gastro-oesophageal reflux disease      Human papillomavirus in conditions classified elsewhere and of unspecified site 2/2012    + HPV 33     Hypertension      Profound impairment, one eye, impairment level not further specified     rt eye due to childhood injury     Systolic CHF (H) 3/12/2015     Tobacco abuse 5/18/2013     Type 2 diabetes mellitus without complications (H)      Uncomplicated asthma        Past  Surgical History:   Procedure Laterality Date     AMPUTATE LEG BELOW KNEE Right 6/28/2018    Procedure: AMPUTATE LEG BELOW KNEE;  Right Knee Ampuation Below Knee, Anesthesia Block;  Surgeon: Ambrose King MD;  Location: [facility] OR     C NONSPECIFIC PROCEDURE  2001    cholecystectomy     C NONSPECIFIC PROCEDURE  as a child    tonsillectomy     C NONSPECIFIC PROCEDURE  2001    whipple procedure     CARDIAC SURGERY      defib     COLPOSCOPY,LOOP ELECTRD CERVIX EXCIS  2002, 2011    stage 2 dysplasia     ENDOBRONCHIAL ULTRASOUND FLEXIBLE N/A 2/19/2015    Procedure: ENDOBRONCHIAL ULTRASOUND FLEXIBLE;  Surgeon: Brenden Tamez MD;  Location: [facility] GI     LEEP TX, CERVICAL  2014    LEEP TX Cervical     RECESSION RESECTION WITH ADJUSTABLE SUTURE  12/13/2011    Procedure:RECESSION RESECTION WITH ADJUSTABLE SUTURE; Right Strabismus Repair with Adjustable Suture       TUBAL LIGATION  2007    essure        Family History   Problem Relation Age of Onset     Diabetes Mother      diet controled     Hypertension Mother      Arthritis Mother      Lipids Mother      Diabetes Father      Hypertension Father      GASTROINTESTINAL DISEASE Father      gallbladder removed     Bipolar Disorder Brother      Thyroid Disease Brother      Obesity Other      Son     Respiratory Other      Son and Daughter; asthma     Depression Maternal Aunt      Anxiety Disorder Maternal Aunt        Social History   Substance Use Topics     Smoking status: Former Smoker     Packs/day: 0.30     Years: 15.00     Types: Cigarettes     Smokeless tobacco: Former User     Quit date: 10/29/2014      Comment: Started smoking in 89/ smokes about 3 per day     Alcohol use No     No current facility-administered medications for this encounter.      Current Outpatient Prescriptions   Medication     acetaminophen (TYLENOL) 500 MG tablet     albuterol (PROAIR HFA) 108 (90 Base) MCG/ACT Inhaler     amitriptyline (ELAVIL) 50 MG tablet     aspirin 81 MG tablet      enoxaparin (LOVENOX) 40 MG/0.4ML injection     fluticasone (FLONASE) 50 MCG/ACT spray     furosemide (LASIX) 40 MG tablet     insulin aspart (NOVOLOG PEN) 100 UNIT/ML injection     insulin aspart (NOVOLOG PEN) 100 UNIT/ML injection     insulin glargine (LANTUS SOLOSTAR) 100 UNIT/ML pen     Lidocaine (LIDOCARE) 4 % Patch     lisinopril (PRINIVIL/ZESTRIL) 10 MG tablet     methadone (DOLPHINE-INTENSOL) 10 mg/mL CONC (HIGH CONC) solution     methadone (DOLPHINE-INTENSOL) 10 mg/mL CONC (HIGH CONC) solution     metoprolol tartrate (LOPRESSOR) 25 MG tablet     morphine (MS CONTIN) 15 MG 12 hr tablet     multivitamin, therapeutic with minerals (THERA-VIT-M) TABS tablet     polyethylene glycol (MIRALAX/GLYCOLAX) Packet     pregabalin (LYRICA) 75 MG capsule     ranitidine (ZANTAC) 300 MG tablet     sennosides (SENOKOT) 8.6 MG tablet     umeclidinium-vilanterol (ANORO ELLIPTA) 62.5-25 MCG/INH oral inhaler     vancomycin 750 mg     acetone, Urine, test STRP     blood glucose monitoring (HOLLIE CONTOUR NEXT) test strip     blood glucose monitoring (HOLLIE MICROLET) lancets     blood glucose monitoring (FREESTYLE) lancets     glucose 4 g CHEW chewable tablet     insulin pen needle (B-D U/F) 31G X 5 MM     levonorgestrel (MIRENA, 52 MG,) 20 MCG/24HR IUD      No Known Allergies    Review of Systems   Constitutional: Negative for fever.   Respiratory: Negative for cough and shortness of breath.    Cardiovascular: Negative for chest pain.   Musculoskeletal: Negative for joint swelling.   Skin: Positive for wound.   All other systems reviewed and are negative.      Physical Exam   BP: 122/82  Pulse: 104  Temp: 98.5  F (36.9  C)  Resp: 16  Weight: 54.4 kg (120 lb)  SpO2: 90 %      Physical Exam  General: patient is alert and oriented and in no acute distress   Head: atraumatic and normocephalic   EENT: moist mucus membranes, sclera anicteric  Neck: supple   Cardiovascular: regular rate and rhythm, extremities warm and well  perfused  Pulmonary: No respiratory distress   musculoskeletal: Right BKA, left foot in Unna boot  Neurological: alert and oriented, moving all extremities symmetrically   Skin: warm, dry     ED Course   2:09 PM  The patient was seen and examined by Vannesa Caldwell MD in Room 6.     ED Course     Procedures             Critical Care time:  none             Labs Ordered and Resulted from Time of ED Arrival Up to the Time of Departure from the ED - No data to display         Assessments & Plan (with Medical Decision Making)   This is medical decision make you for Alessandra Pak in room 6 the patient is a 53-year-old female who has a history of type 2 diabetes mellitus, chronic kidney disease, nonischemic cardiomyopathy, congestive heart failure and recent diabetic foot wound with osteomyelitis status post right BKA. On daily vancomycin infusions via PICC line who presents with a displaced PICC line. Her line was caught on her clothes when she was changing today and pulled nearly out of the right upper extremity.  Her last dose of vancomycin was yesterday at 1 PM.  She was due to have labs drawn today including a vancomycin trough level however this was not completed and was referred to the emergency department.  She did have a BMP and vancomycin level drawn in the ED. patient received her vancomycin dose through peripheral IV in the ED today.  Discussed with vascular access regarding possible replacement of her PICC line and unfortunately they are not able to do this today.  They are able to replace her PICC line tomorrow at 10 AM.  She will come to the [facility] emergency department tomorrow for PICC line replacement and repeat vancomycin infusion.  Patient's potassium today is 5.5.  She was given a small to 250 cc fluid bolus along with vancomycin.  Will plan to recheck potassium and if within normal limits discharged home with follow-up tomorrow.  Patient signed out to evening provider pending vancomycin infusion,  fluids and potassium recheck.    I have reviewed the nursing notes.    I have reviewed the findings, diagnosis, plan and need for follow up with the patient.    New Prescriptions    No medications on file       Final diagnoses:   PIC line (peripherally inserted central catheter) removal     I, Dejan Esqueda, am serving as a trained medical scribe to document services personally performed by Vannesa Caldwell MD, based on the provider's statements to me.   I, Vannesa Caldwell MD, was physically present and have reviewed and verified the accuracy of this note documented by Dejan Esqueda.    7/28/2018   [facility], [place], EMERGENCY DEPARTMENT     Vannesa Caldwell MD  07/28/18 [id number]       Vannesa Caldwell MD  07/28/18 [id number]

## 2018-07-28 NOTE — ED AVS SNAPSHOT
[facility], Emergency Department    [address]    [place] [address]    Phone:  [phone]    Fax:  [phone]                                       Alessandra Pak   MRN: [id number]    Department:  [facility], Emergency Department   Date of Visit:  7/28/2018           Patient Information     Date Of Birth          1965        Your diagnoses for this visit were:     PIC line (peripherally inserted central catheter) removal        You were seen by Vannesa Caldwell MD and Joe Escoto MD.        Discharge Instructions       Return to the [facility] emergency department tomorrow by 10 AM to have PICC line replaced and received next dose of vancomycin.      Your next 10 appointments already scheduled     Aug 01, 2018 11:20 AM CDT   Office Visit with Brionna Dc MD   [facility] ([facility])    [address] [address] [phone]           Bring a current list of meds and any records pertaining to this visit. For Physicals, please bring immunization records and any forms needing to be filled out. Please arrive 10 minutes early to complete paperwork.            Aug 01, 2018  2:30 PM CDT   TELEMEDICINE with Debbie Stahl [facility] ([facility])    [address] [place] [address] [phone]           Note: this is not an onsite visit; there is no need to come to the facility.            Aug 07, 2018 10:00 AM CDT   (Arrive by 9:45 AM)   Post-Op with [facility] Nurse   [facility] ([facility])    [address] [place] [address] [phone]            Aug 07, 2018 11:00 AM CDT   (Arrive by 10:45 AM)   RETURN FOOT/ANKLE with Barrington Lewis DPM   [facility] ([facility])    [address] [place] [address]  [address]   [phone]            Aug 30, 2018  3:00 PM CDT   (Arrive by 2:45 PM)   Return Visit with Samina Downs MD   [facility] ([facility] [place])    [address]  [address]  [place] [facility] [place] [address]   [phone]            Sep 18, 2018 10:40 AM CDT   (Arrive by 10:10 AM)   RETURN SPINE with Ambrose King MD   [facility] ([facility])    [address]  [address]  [facility] [facility] [address]   [phone]              24 Hour Appointment Hotline       To make an appointment at any [facility], call [phone]). If you don't have a family doctor or clinic, we will help you find one. [facility] are conveniently located to serve the needs of you and your family.             Review of your medicines      Our records show that you are taking the medicines listed below. If these are incorrect, please call your family doctor or clinic.        Dose / Directions Last dose taken    acetaminophen 500 MG tablet   Commonly known as:  TYLENOL   Dose:  1000 mg        Take 2 tablets (1,000 mg) by mouth 3 times daily   Refills:  0        acetone (Urine) test Strp   Dose:  1 strip   Quantity:  25 each        1 strip by In Vitro route as needed   Refills:  1        albuterol 108 (90 Base) MCG/ACT Inhaler   Commonly known as:  PROAIR HFA   Dose:  2 puff   Quantity:  1 Inhaler        Inhale 2 puffs into the lungs every 4 hours as needed for shortness of breath / dyspnea   Refills:  0        amitriptyline 50 MG tablet   Commonly known as:  ELAVIL   Dose:  50 mg   Indication:  Pain   Quantity:  60 tablet        Take 1 tablet (50 mg) by mouth At Bedtime   Refills:  0        aspirin 81 MG tablet   Dose:  81 mg   Quantity:  100 tablet        Take 1 tablet (81 mg) by mouth daily   Refills:  3        * blood glucose monitoring lancets   Dose:  1 each   Quantity:  3 Box         1 each See Admin Instructions test 3-4 times per day   Refills:  3        * blood glucose monitoring lancets   Quantity:  1 Box        Use to test blood sugar 10 times daily or as directed.  Ok to substitute alternative if insurance prefers.   Refills:  prn        blood glucose monitoring test strip   Commonly known as:  HOLLIE CONTOUR NEXT   Quantity:  300 each        Use to test blood sugar 10 times daily or as directed.  Ok to substitute alternative if insurance prefers.   Refills:  12        enoxaparin 40 MG/0.4ML injection   Commonly known as:  LOVENOX   Dose:  40 mg   Indication:  DVT Px   Quantity:  7 Syringe        Inject 0.4 mLs (40 mg) Subcutaneous every 24 hours   Refills:  0        fluticasone 50 MCG/ACT spray   Commonly known as:  FLONASE   Dose:  2 spray   Indication:  COPD   Quantity:  1 Bottle        Spray 2 sprays into left nostril daily   Refills:  0        furosemide 40 MG tablet   Commonly known as:  LASIX   Dose:  40 mg   Indication:  High Blood Pressure Disorder   Quantity:  30 tablet        Take 1 tablet (40 mg) by mouth daily   Refills:  0        glucose 4 g Chew chewable tablet   Quantity:  25 tablet        Take 3-4 tablets to treat low blood sugar.   Refills:  11        * insulin aspart 100 UNIT/ML injection   Commonly known as:  NovoLOG PEN   Dose:  1-7 Units   Indication:  Type 2 Diabetes   Quantity:  6.3 mL        Inject 1-7 Units Subcutaneous 3 times daily (before meals)   Refills:  0        * insulin aspart 100 UNIT/ML injection   Commonly known as:  NovoLOG PEN   Dose:  1-5 Units   Indication:  Type 2 Diabetes   Quantity:  1.5 mL        Inject 1-5 Units Subcutaneous At Bedtime   Refills:  0        insulin glargine 100 UNIT/ML injection   Commonly known as:  LANTUS   Dose:  8 Units   Indication:  Type 2 Diabetes   Quantity:  2.4 mL        Inject 8 Units Subcutaneous At Bedtime   Refills:  0        insulin pen needle 31G X 5 MM   Commonly known as:  B-D U/F   Quantity:  3 each        Use  3 time(s) a day.   Refills:  3        levonorgestrel 20 MCG/24HR IUD   Commonly known as:  MIRENA (52 MG)   Quantity:  1 each        placed today   Refills:  0        Lidocaine 4 % Patch   Commonly known as:  LIDOCARE   Dose:  2 patch   Indication:  chronic pain   Quantity:  30 patch        Place 2 patches onto the skin every 24 hours   Refills:  0        lisinopril 10 MG tablet   Commonly known as:  PRINIVIL/ZESTRIL   Dose:  10 mg   Indication:  High Blood Pressure Disorder   Quantity:  30 tablet        Take 1 tablet (10 mg) by mouth daily   Refills:  0        * methadone 10 mg/mL Conc (HIGH CONC) solution   Commonly known as:  DOLPHINE-INTENSOL   Dose:  70 mg   Quantity:  14 mL        Take 7 mLs (70 mg) by mouth daily   Refills:  0        * methadone 10 mg/mL Conc (HIGH CONC) solution   Commonly known as:  DOLPHINE-INTENSOL   Dose:  70 mg   Indication:  pain   Quantity:  70 mL        Take 7 mLs (70 mg) by mouth daily   Refills:  0        metoprolol tartrate 25 MG tablet   Commonly known as:  LOPRESSOR   Dose:  12.5 mg   Indication:  High Blood Pressure Disorder   Quantity:  60 tablet        Take 0.5 tablets (12.5 mg) by mouth 2 times daily   Refills:  0        morphine 15 MG 12 hr tablet   Commonly known as:  MS CONTIN   Dose:  15 mg   Indication:  Pain   Quantity:  8 tablet        Take 1 tablet (15 mg) by mouth every 12 hours   Refills:  0        multivitamin, therapeutic with minerals Tabs tablet   Dose:  1 tablet   Indication:  nutritional suppliment   Quantity:  30 each        Take 1 tablet by mouth daily   Refills:  0        polyethylene glycol Packet   Commonly known as:  MIRALAX/GLYCOLAX   Dose:  17 g   Indication:  Constipation   Quantity:  7 packet        Take 17 g by mouth daily   Refills:  0        pregabalin 75 MG capsule   Commonly known as:  LYRICA   Dose:  75 mg   Indication:  Diabetes with Nerve Disease   Quantity:  90 capsule        Take 1 capsule (75 mg) by mouth 3 times daily   Refills:  0         ranitidine 300 MG tablet   Commonly known as:  ZANTAC   Dose:  300 mg   Indication:  Gastroesophageal reflux disease without esophagitis    Quantity:  60 tablet        Take 1 tablet (300 mg) by mouth daily   Refills:  0        sennosides 8.6 MG tablet   Commonly known as:  SENOKOT   Dose:  2 tablet   Indication:  Constipation   Quantity:  60 each        Take 2 tablets by mouth 2 times daily   Refills:  0        umeclidinium-vilanterol 62.5-25 MCG/INH oral inhaler   Commonly known as:  ANORO ELLIPTA   Dose:  1 puff   Indication:  Chronic Obstructive Lung Disease   Quantity:  1 Inhaler        Inhale 1 puff into the lungs daily   Refills:  0        vancomycin 750 mg   Dose:  750 mg   Indication:  Osteomyelitis   Quantity:  20 cartridge.        Inject 750 mg into the vein every 24 hours for 20 days   Refills:  0        * Notice:  This list has 6 medication(s) that are the same as other medications prescribed for you. Read the directions carefully, and ask your doctor or other care provider to review them with you.            Procedures and tests performed during your visit     Basic metabolic panel    CBC with platelets differential    Pharmacy to dose vancomycin    Potassium    Vancomycin level      Orders Needing Specimen Collection     None      Pending Results     No orders found from 7/26/2018 to 7/29/2018.            Pending Culture Results     No orders found from 7/26/2018 to 7/29/2018.            Pending Results Instructions     If you had any lab results that were not finalized at the time of your Discharge, you can call the ED Lab Result RN at [phone]. You will be contacted by this team for any positive Lab results or changes in treatment. The nurses are available 7 days a week from 10A to 6:30P.  You can leave a message 24 hours per day and they will return your call.        Thank you for choosing [facility]       Thank you for choosing [facility] for your care. Our goal is always to provide you with  excellent care. Hearing back from our patients is one way we can continue to improve our services. Please take a few minutes to complete the written survey that you may receive in the mail after you visit with us. Thank you!        [facility] Information     [facility] gives you secure access to your electronic health record. If you see a primary care provider, you can also send messages to your care team and make appointments. If you have questions, please call your primary care clinic.  If you do not have a primary care provider, please call [phone] and they will assist you.        Care EveryWhere ID     This is your Care EveryWhere ID. This could be used by other organizations to access your [facility] records  [id number]        Equal Access to Services     NICK ROBLEDO : Chiqui Terrell, andres murray, alyssa piña, sanjay hoang. So [facility] [phone].    ATENCIÓN: Si habla español, tiene a nagy disposición servicios gratuitos de asistencia lingüística. Llame al [phone].    We comply with applicable federal civil rights laws and [place] laws. We do not discriminate on the basis of race, color, national origin, age, disability, sex, sexual orientation, or gender identity.            After Visit Summary       This is your record. Keep this with you and show to your community pharmacist(s) and doctor(s) at your next visit.

## 2018-07-28 NOTE — TELEPHONE ENCOUNTER
On call for JOSH Santiago calling asks that I contact ER at [place] [facility] where patient was sent because PICC was pulled out this morning. He has pended some lab orders and asks that they be drawn when the line gets reinserted with results going to Dr. ASHER Ramesh.. I spoke with Dr. Justin in the ER at [facility] and related this message to her.  Padmini Nielsen RN  [facility]

## 2018-07-29 ENCOUNTER — APPOINTMENT (OUTPATIENT)
Dept: GENERAL RADIOLOGY | Facility: CLINIC | Age: 53
End: 2018-07-29
Attending: EMERGENCY MEDICINE
Payer: COMMERCIAL

## 2018-07-29 ENCOUNTER — HOSPITAL ENCOUNTER (EMERGENCY)
Facility: CLINIC | Age: 53
Discharge: HOME | End: 2018-07-29
Attending: EMERGENCY MEDICINE | Admitting: EMERGENCY MEDICINE
Payer: COMMERCIAL

## 2018-07-29 ENCOUNTER — HOME INFUSION (PRE-WILLOW HOME INFUSION) (OUTPATIENT)
Dept: PHARMACY | Facility: CLINIC | Age: 53
End: 2018-07-29

## 2018-07-29 VITALS
DIASTOLIC BLOOD PRESSURE: 87 MMHG | OXYGEN SATURATION: 99 % | HEIGHT: 69 IN | RESPIRATION RATE: 16 BRPM | TEMPERATURE: 97.5 F | SYSTOLIC BLOOD PRESSURE: 126 MMHG | BODY MASS INDEX: 17.72 KG/M2

## 2018-07-29 DIAGNOSIS — M86.9: ICD-10-CM

## 2018-07-29 DIAGNOSIS — Z95.828 S/P PICC CENTRAL LINE PLACEMENT: ICD-10-CM

## 2018-07-29 DIAGNOSIS — E11.69: ICD-10-CM

## 2018-07-29 DIAGNOSIS — M86.272 SUBACUTE OSTEOMYELITIS OF LEFT FOOT (H): ICD-10-CM

## 2018-07-29 DIAGNOSIS — Z79.4 ENCOUNTER FOR LONG-TERM (CURRENT) USE OF INSULIN (H): ICD-10-CM

## 2018-07-29 PROCEDURE — 25000128 H RX IP 250 OP 636

## 2018-07-29 PROCEDURE — 99284 EMERGENCY DEPT VISIT MOD MDM: CPT | Mod: 25 | Performed by: EMERGENCY MEDICINE

## 2018-07-29 PROCEDURE — 36569 INSJ PICC 5 YR+ W/O IMAGING: CPT

## 2018-07-29 PROCEDURE — 96365 THER/PROPH/DIAG IV INF INIT: CPT | Performed by: EMERGENCY MEDICINE

## 2018-07-29 PROCEDURE — 40000986 XR CHEST PORT 1 VW

## 2018-07-29 PROCEDURE — 99284 EMERGENCY DEPT VISIT MOD MDM: CPT | Mod: Z6 | Performed by: EMERGENCY MEDICINE

## 2018-07-29 PROCEDURE — C1769 GUIDE WIRE: HCPCS

## 2018-07-29 PROCEDURE — C1751 CATH, INF, PER/CENT/MIDLINE: HCPCS

## 2018-07-29 RX ORDER — HEPARIN SODIUM,PORCINE 10 UNIT/ML
2-5 VIAL (ML) INTRAVENOUS
Status: DISCONTINUED | OUTPATIENT
Start: 2018-07-29 | End: 2018-07-29 | Stop reason: HOSPADM

## 2018-07-29 RX ORDER — LIDOCAINE 40 MG/G
CREAM TOPICAL
Status: DISCONTINUED | OUTPATIENT
Start: 2018-07-29 | End: 2018-07-29 | Stop reason: HOSPADM

## 2018-07-29 RX ADMIN — VANCOMYCIN HYDROCHLORIDE 750 MG: 1 INJECTION, POWDER, LYOPHILIZED, FOR SOLUTION INTRAVENOUS at 12:59

## 2018-07-29 ASSESSMENT — ENCOUNTER SYMPTOMS
FEVER: 0
SHORTNESS OF BREATH: 0

## 2018-07-29 NOTE — ED NOTES
Emergency Department Patient Sign-out       Brief HPI:  This is a 53 year old female signed out to me by Dr. Steel .  See initial ED Provider note for details of the presentation.           Significant Events prior to my assuming care: Patient is a 53-year-old female who is on long-term antibiotics due to osteo-myelitis.  Today her PICC line was pulled out.  Plan to have PICC line replaced tomorrow at the [facility].  Here getting dose of vancomycin.  Also need to repeat potassium, due to elevation at 5.5.      Exam:   Patient Vitals for the past 24 hrs:   BP Temp Temp src Pulse Resp SpO2 Weight   07/28/18 1353 122/82 98.5  F (36.9  C) Oral 104 16 90 % 54.4 kg (120 lb)           ED RESULTS:   Results for orders placed or performed during the hospital encounter of 07/28/18 (from the past 24 hour(s))   Basic metabolic panel     Status: Abnormal    Collection Time: 07/28/18  3:39 PM   Result Value Ref Range    Sodium 137 133 - 144 mmol/L    Potassium 5.5 (H) 3.4 - 5.3 mmol/L    Chloride 100 94 - 109 mmol/L    Carbon Dioxide 29 20 - 32 mmol/L    Anion Gap 8 3 - 14 mmol/L    Glucose 258 (H) 70 - 99 mg/dL    Urea Nitrogen 30 7 - 30 mg/dL    Creatinine 1.17 (H) 0.52 - 1.04 mg/dL    GFR Estimate 49 (L) >60 mL/min/1.7m2    GFR Estimate If Black 59 (L) >60 mL/min/1.7m2    Calcium 9.5 8.5 - 10.1 mg/dL   Vancomycin level     Status: None    Collection Time: 07/28/18  3:39 PM   Result Value Ref Range    Vancomycin Level 14.1 mg/L   CBC with platelets differential     Status: Abnormal    Collection Time: 07/28/18  3:39 PM   Result Value Ref Range    WBC 7.5 4.0 - 11.0 10e9/L    RBC Count 4.45 3.8 - 5.2 10e12/L    Hemoglobin 12.2 11.7 - 15.7 g/dL    Hematocrit 37.7 35.0 - 47.0 %    MCV 85 78 - 100 fl    MCH 27.4 26.5 - 33.0 pg    MCHC 32.4 31.5 - 36.5 g/dL    RDW 18.2 (H) 10.0 - 15.0 %    Platelet Count 215 150 - 450 10e9/L    Diff Method Automated Method     % Neutrophils 44.3 %    % Lymphocytes 44.8 %    % Monocytes 6.4 %     % Eosinophils 3.9 %    % Basophils 0.5 %    % Immature Granulocytes 0.1 %    Nucleated RBCs 0 0 /100    Absolute Neutrophil 3.3 1.6 - 8.3 10e9/L    Absolute Lymphocytes 3.4 0.8 - 5.3 10e9/L    Absolute Monocytes 0.5 0.0 - 1.3 10e9/L    Absolute Eosinophils 0.3 0.0 - 0.7 10e9/L    Absolute Basophils 0.0 0.0 - 0.2 10e9/L    Abs Immature Granulocytes 0.0 0 - 0.4 10e9/L    Absolute Nucleated RBC 0.0    Potassium     Status: None    Collection Time: 07/28/18  6:40 PM   Result Value Ref Range    Potassium 4.6 3.4 - 5.3 mmol/L       ED MEDICATIONS:   Medications   vancomycin (VANCOCIN) 1000 mg in dextrose 5% 200 mL PREMIX (0 mg Intravenous Stopped 7/28/18 1814)   0.9% sodium chloride BOLUS (0 mLs Intravenous Stopped 7/28/18 1849)         Impression:    ICD-10-CM    1. PIC line (peripherally inserted central catheter) removal Z45.2 Potassium       Plan:    Pending studies include potassium.    Patient was given a dose of vancomycin.  Repeat potassium obtained and within normal limits.  Given her normal potassium, patient discharged home.  [facility].  She remained stable during her entire stay in the emergency department.      Joe Mcelroy MD  07/28/18 0528

## 2018-07-29 NOTE — ED AVS SNAPSHOT
[facility] [place], Emergency Department    [address] [place] [address]    Phone:  [phone]                                       Alessandra Pak   MRN: [id number]    Department:  [facility], Emergency Department   Date of Visit:  7/29/2018           After Visit Summary Signature Page     I have received my discharge instructions, and my questions have been answered. I have discussed any challenges I see with this plan with the nurse or doctor.    ..........................................................................................................................................  Patient/Patient Representative Signature      ..........................................................................................................................................  Patient Representative Print Name and Relationship to Patient    ..................................................               ................................................  Date                                            Time    ..........................................................................................................................................  Reviewed by Signature/Title    ...................................................              ..............................................  Date                                                            Time

## 2018-07-29 NOTE — ED TRIAGE NOTES
Alessandra comes in today to have PICC placed, currently infuses Vanco daily and PICC was removed yesterday.

## 2018-07-29 NOTE — PHARMACY-VANCOMYCIN DOSING SERVICE
Pharmacy Vancomycin Initial Note  Date of Service 2018  Patient's  1965  53 year old, female    Indication: Osteomyelitis    Current estimated CrCl = Estimated Creatinine Clearance: 48.9 mL/min (based on Cr of 1.17).    Creatinine for last 3 days  2018:  3:39 PM Creatinine 1.17 mg/dL    Recent Vancomycin Level(s) for last 3 days  2018:  3:39 PM Vancomycin Level 14.1 mg/L      Vancomycin IV Administrations (past 72 hours)                   vancomycin (VANCOCIN) 1000 mg in dextrose 5% 200 mL PREMIX (mg) 1,000 mg New Bag 18 1657                Nephrotoxins and other renal medications (Future)    Start     Dose/Rate Route Frequency Ordered Stop    18 1200  vancomycin (VANCOCIN) 750 mg in sodium chloride 0.9 % 250 mL intermittent infusion      750 mg  over 90 Minutes Intravenous EVERY 24 HOURS 18 1126            Contrast Orders - past 72 hours     None                Plan:  1.  Start vancomycin  750 mg IV q24h.   2.  Goal Trough Level: 15-20 mg/L   3.  Pharmacy will check trough levels as appropriate in 1-3 Days.    4. Serum creatinine levels will be ordered daily for the first week of therapy and at least twice weekly for subsequent weeks.    5. Cal[place]oun method utilized to dose vancomycin therapy: Method 2    Nandini Hook, AnthonyD

## 2018-07-29 NOTE — ED PROVIDER NOTES
[place] EMERGENCY DEPARTMENT ([facility])  7/29/18   History     Chief Complaint   Patient presents with     Vascular Access Problem     HPI  Alessandra Pak is a 53 year old female with a history of type II diabetes mellitus, chronic kidney disease, nonischemic cardiomyopathy, congestive heart failure and recent diabetic foot wound with osteomyelitis status-post right BKA (06/28/2018). Patient presents to the Emergency Department due to a vascular access problem. Per chart, patient was seen in the ED yesterday (7/28) after her PICC line was pulled out. At that time, it was noted that they were unable to place the PICC line until today. Patient is on daily IV vancomycin. Her last dose was yesterday afternoon at approximately 2:30 PM. Patient states that she otherwise feels good and denies fever, cough, chest pain or shortness of breath. No new foot wounds or new drainage.     No current facility-administered medications for this encounter.      Current Outpatient Prescriptions   Medication     acetaminophen (TYLENOL) 500 MG tablet     albuterol (PROAIR HFA) 108 (90 Base) MCG/ACT Inhaler     amitriptyline (ELAVIL) 50 MG tablet     aspirin 81 MG tablet     fluticasone (FLONASE) 50 MCG/ACT spray     furosemide (LASIX) 40 MG tablet     insulin aspart (NOVOLOG PEN) 100 UNIT/ML injection     insulin aspart (NOVOLOG PEN) 100 UNIT/ML injection     insulin glargine (LANTUS SOLOSTAR) 100 UNIT/ML pen     levonorgestrel (MIRENA, 52 MG,) 20 MCG/24HR IUD     lisinopril (PRINIVIL/ZESTRIL) 10 MG tablet     methadone (DOLPHINE-INTENSOL) 10 mg/mL CONC (HIGH CONC) solution     methadone (DOLPHINE-INTENSOL) 10 mg/mL CONC (HIGH CONC) solution     metoprolol tartrate (LOPRESSOR) 25 MG tablet     multivitamin, therapeutic with minerals (THERA-VIT-M) TABS tablet     polyethylene glycol (MIRALAX/GLYCOLAX) Packet     pregabalin (LYRICA) 75 MG capsule     ranitidine (ZANTAC) 300 MG tablet     vancomycin 750 mg     acetone, Urine,  test STRP     blood glucose monitoring (HOLLIE CONTOUR NEXT) test strip     blood glucose monitoring (HOLLIE MICROLET) lancets     blood glucose monitoring (FREESTYLE) lancets     glucose 4 g CHEW chewable tablet     insulin pen needle (B-D U/F) 31G X 5 MM     Lidocaine (LIDOCARE) 4 % Patch     umeclidinium-vilanterol (ANORO ELLIPTA) 62.5-25 MCG/INH oral inhaler     Past Medical History:   Diagnosis Date     Abdominal pain, right upper quadrant     sees Dr Mcclellan pain clinic at [facility]     ASCUS with positive high risk HPV 8/2013    + HPV 33, [place] - GAVIN I, ECC- atypia     Cardiomyopathy (H)     non ischemic cardiomyopathy with EF 15     Cervical high risk HPV (human papillomavirus) test positive 7/8/15, 7/25/16    NIL pap/+ HR HPV (not 16 or 18).      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    leep     Depressive disorder      Gastro-oesophageal reflux disease      Human papillomavirus in conditions classified elsewhere and of unspecified site 2/2012    + HPV 33     Hypertension      Profound impairment, one eye, impairment level not further specified     rt eye due to childhood injury     Systolic CHF (H) 3/12/2015     Tobacco abuse 5/18/2013     Type 2 diabetes mellitus without complications (H)      Uncomplicated asthma        Past Surgical History:   Procedure Laterality Date     AMPUTATE LEG BELOW KNEE Right 6/28/2018    Procedure: AMPUTATE LEG BELOW KNEE;  Right Knee Ampuation Below Knee, Anesthesia Block;  Surgeon: Ambrose King MD;  Location: [facility]     C NONSPECIFIC PROCEDURE  2001    cholecystectomy     C NONSPECIFIC PROCEDURE  as a child    tonsillectomy     C NONSPECIFIC PROCEDURE  2001    whipple procedure     CARDIAC SURGERY      defib     COLPOSCOPY,LOOP ELECTRD CERVIX EXCIS  2002, 2011    stage 2 dysplasia     ENDOBRONCHIAL ULTRASOUND FLEXIBLE N/A 2/19/2015    Procedure: ENDOBRONCHIAL ULTRASOUND FLEXIBLE;  Surgeon: Brenden Tamez MD;  Location: [facility]     LEEP TX, CERVICAL  2014    LEEP  "TX Cervical     RECESSION RESECTION WITH ADJUSTABLE SUTURE  12/13/2011    Procedure:RECESSION RESECTION WITH ADJUSTABLE SUTURE; Right Strabismus Repair with Adjustable Suture       TUBAL LIGATION  2007    essure        Family History   Problem Relation Age of Onset     Diabetes Mother      diet controled     Hypertension Mother      Arthritis Mother      Lipids Mother      Diabetes Father      Hypertension Father      GASTROINTESTINAL DISEASE Father      gallbladder removed     Bipolar Disorder Brother      Thyroid Disease Brother      Obesity Other      Son     Respiratory Other      Son and Daughter; asthma     Depression Maternal Aunt      Anxiety Disorder Maternal Aunt        Social History   Substance Use Topics     Smoking status: Former Smoker     Packs/day: 0.30     Years: 15.00     Types: Cigarettes     Smokeless tobacco: Former User     Quit date: 10/29/2014      Comment: Started smoking in 89/ smokes about 3 per day     Alcohol use No     No Known Allergies     I have reviewed the Medications, Allergies, Past Medical and Surgical History, and Social History in the Epic system.    Review of Systems   Constitutional: Negative for fever.   Respiratory: Negative for shortness of breath.    Cardiovascular: Negative for chest pain.   All other systems reviewed and are negative.      Physical Exam   BP: 90/67  Heart Rate: 114  Temp: 98.3  F (36.8  C)  Resp: 16  Height: 175.3 cm (5' 9\")  SpO2: 98 %      Physical Exam  General: patient is alert and oriented and in no acute distress   Head: atraumatic and normocephalic   EENT: moist mucus membranes, sclera anicteric  Neck: supple   Cardiovascular: regular rate and rhythm, extremities warm and well perfused  Pulmonary: no respiratory distress  Musculoskeletal: normal range of motion, right BKA  Neurological: alert and oriented, moving extremities symmetrically, gait normal   Skin: warm, dry     ED Course   10:23 AM  The patient was seen and examined by Vannesa Caldwell, " MD in Room 8.   ED Course     Procedures             Critical Care time:  none             Labs Ordered and Resulted from Time of ED Arrival Up to the Time of Departure from the ED - No data to display         Assessments & Plan (with Medical Decision Making)   Ms. Pak is a 53-year-old female with a history of diabetes, chronic kidney disease, recent osteomyelitis status-post right BKA and left diabetic foot wound on daily vancomycin infusions who presents for PICC line replacement. I did see the patient in the Emergency Department yesterday after her PICC line had accidentally gotten pulled and necessitated removal. Attempted to have PICC line placement yesterday however, this could not be accommodated. She did receive vancomycin via peripheral IV yesterday with plans to return to the Emergency Department today for vascular access to replace her PICC line. She subsequently denies any new symptoms. Blood pressure initially was noted to be low however, was an inaccurate cuff size. Upon recheck, her blood pressure is within normal limits as well as heart rate. She denies any recent fevers, cough, shortness of breath, chest pain or new foot wounds or new drainage. Vascular Access was consulted in PICC line was replaced in the Emergency Department. She did receive her dose of vancomycin in the Emergency Department today. She will follow-up in the outpatient setting as scheduled.    This part of the medical record was transcribed by Kelly Penn Medical Scribe, from a dictation done by Vannesa Caldwell MD.     I have reviewed the nursing notes.    I have reviewed the findings, diagnosis, plan and need for follow up with the patient.    New Prescriptions    No medications on file       Final diagnoses:   S/P PICC central line placement   Subacute osteomyelitis of left foot (H)   I, Kelly Penn, am serving as a trained medical scribe to document services personally performed by Vannesa Caldwell MD, based on the  provider's statements to me.   I, Vannesa Caldwell MD, was physically present and have reviewed and verified the accuracy of this note documented by Kelly Penn.    7/29/2018   [facility], [place], EMERGENCY DEPARTMENT     Vannesa Caldwell MD  07/29/18 [phone]

## 2018-07-29 NOTE — ED AVS SNAPSHOT
[facility], Emergency Department    [address], [place] [address]    Phone:  [phone]                                       Alessandra Pak   MRN: [id number]    Department:  [facility], Emergency Department   Date of Visit:  7/29/2018           Patient Information     Date Of Birth          1965        Your diagnoses for this visit were:     S/P PICC central line placement     Subacute osteomyelitis of left foot (H)        You were seen by Vannesa Caldwell MD.        Discharge Instructions       Please follow up with Your Primary Care Provider as scheduled.            Your next 10 appointments already scheduled     Aug 01, 2018 11:20 AM CDT   Office Visit with Brionna Dc MD   [facility] ([facility]    [address] [place] [address] [phone]           Bring a current list of meds and any records pertaining to this visit. For Physicals, please bring immunization records and any forms needing to be filled out. Please arrive 10 minutes early to complete paperwork.            Aug 01, 2018  2:30 PM CDT   TELEMEDICINE with Debbie Stahl [facility] ([facility])    [address] [place] [address] [phone]           Note: this is not an onsite visit; there is no need to come to the facility.            Aug 07, 2018 10:00 AM CDT   (Arrive by 9:45 AM)   Post-Op with [facility] Nurse   [facility] ([facility])    [address] [place] [address] [phone]            Aug 07, 2018 11:00 AM CDT   (Arrive by 10:45 AM)   RETURN FOOT/ANKLE with Barrington Lewis DPM   [facility] ([facility])    [address] [place] [address] [phone]            Aug 30, 2018  3:00 PM CDT   (Arrive by 2:45 PM)   Return Visit  with Samina Downs MD   [facility] ([facility])    [address]  [address]  [facility] [id number] [phone]            Sep 18, 2018 10:40 AM CDT   (Arrive by 10:10 AM)   RETURN SPINE with Ambrose King MD   [facility] ([facility])    [address]  4th Floor  [facility] [address] [phone]              24 Hour Appointment Hotline       To make an appointment at any [facility], call [phone] ([phone]). If you don't have a family doctor or clinic, we will help you find one. [facility] clinics are conveniently located to serve the needs of you and your family.             Review of your medicines      Our records show that you are taking the medicines listed below. If these are incorrect, please call your family doctor or clinic.        Dose / Directions Last dose taken    acetaminophen 500 MG tablet   Commonly known as:  TYLENOL   Dose:  1000 mg        Take 2 tablets (1,000 mg) by mouth 3 times daily   Refills:  0        acetone (Urine) test Strp   Dose:  1 strip   Quantity:  25 each        1 strip by In Vitro route as needed   Refills:  1        albuterol 108 (90 Base) MCG/ACT Inhaler   Commonly known as:  PROAIR HFA   Dose:  2 puff   Quantity:  1 Inhaler        Inhale 2 puffs into the lungs every 4 hours as needed for shortness of breath / dyspnea   Refills:  0        amitriptyline 50 MG tablet   Commonly known as:  ELAVIL   Dose:  50 mg   Indication:  Pain   Quantity:  60 tablet        Take 1 tablet (50 mg) by mouth At Bedtime   Refills:  0        aspirin 81 MG tablet   Dose:  81 mg   Quantity:  100 tablet        Take 1 tablet (81 mg) by mouth daily   Refills:  3        * blood glucose monitoring lancets   Dose:  1 each   Quantity:  3 Box        1 each See Admin Instructions test 3-4 times per day   Refills:  3        * blood glucose  monitoring lancets   Quantity:  1 Box        Use to test blood sugar 10 times daily or as directed.  Ok to substitute alternative if insurance prefers.   Refills:  prn        blood glucose monitoring test strip   Commonly known as:  HOLLIE CONTOUR NEXT   Quantity:  300 each        Use to test blood sugar 10 times daily or as directed.  Ok to substitute alternative if insurance prefers.   Refills:  12        fluticasone 50 MCG/ACT spray   Commonly known as:  FLONASE   Dose:  2 spray   Indication:  COPD   Quantity:  1 Bottle        Spray 2 sprays into left nostril daily   Refills:  0        furosemide 40 MG tablet   Commonly known as:  LASIX   Dose:  40 mg   Indication:  High Blood Pressure Disorder   Quantity:  30 tablet        Take 1 tablet (40 mg) by mouth daily   Refills:  0        glucose 4 g Chew chewable tablet   Quantity:  25 tablet        Take 3-4 tablets to treat low blood sugar.   Refills:  11        * insulin aspart 100 UNIT/ML injection   Commonly known as:  NovoLOG PEN   Dose:  1-7 Units   Indication:  Type 2 Diabetes   Quantity:  6.3 mL        Inject 1-7 Units Subcutaneous 3 times daily (before meals)   Refills:  0        * insulin aspart 100 UNIT/ML injection   Commonly known as:  NovoLOG PEN   Dose:  1-5 Units   Indication:  Type 2 Diabetes   Quantity:  1.5 mL        Inject 1-5 Units Subcutaneous At Bedtime   Refills:  0        insulin glargine 100 UNIT/ML injection   Commonly known as:  LANTUS   Dose:  8 Units   Indication:  Type 2 Diabetes   Quantity:  2.4 mL        Inject 8 Units Subcutaneous At Bedtime   Refills:  0        insulin pen needle 31G X 5 MM   Commonly known as:  B-D U/F   Quantity:  3 each        Use 3 time(s) a day.   Refills:  3        levonorgestrel 20 MCG/24HR IUD   Commonly known as:  MIRENA (52 MG)   Quantity:  1 each        placed today   Refills:  0        Lidocaine 4 % Patch   Commonly known as:  LIDOCARE   Dose:  2 patch   Indication:  chronic pain   Quantity:  30 patch         Place 2 patches onto the skin every 24 hours   Refills:  0        lisinopril 10 MG tablet   Commonly known as:  PRINIVIL/ZESTRIL   Dose:  10 mg   Indication:  High Blood Pressure Disorder   Quantity:  30 tablet        Take 1 tablet (10 mg) by mouth daily   Refills:  0        * methadone 10 mg/mL Conc (HIGH CONC) solution   Commonly known as:  DOLPHINE-INTENSOL   Dose:  70 mg   Quantity:  14 mL        Take 7 mLs (70 mg) by mouth daily   Refills:  0        * methadone 10 mg/mL Conc (HIGH CONC) solution   Commonly known as:  DOLPHINE-INTENSOL   Dose:  70 mg   Indication:  pain   Quantity:  70 mL        Take 7 mLs (70 mg) by mouth daily   Refills:  0        metoprolol tartrate 25 MG tablet   Commonly known as:  LOPRESSOR   Dose:  12.5 mg   Indication:  High Blood Pressure Disorder   Quantity:  60 tablet        Take 0.5 tablets (12.5 mg) by mouth 2 times daily   Refills:  0        multivitamin, therapeutic with minerals Tabs tablet   Dose:  1 tablet   Indication:  nutritional suppliment   Quantity:  30 each        Take 1 tablet by mouth daily   Refills:  0        polyethylene glycol Packet   Commonly known as:  MIRALAX/GLYCOLAX   Dose:  17 g   Indication:  Constipation   Quantity:  7 packet        Take 17 g by mouth daily   Refills:  0        pregabalin 75 MG capsule   Commonly known as:  LYRICA   Dose:  75 mg   Indication:  Diabetes with Nerve Disease   Quantity:  90 capsule        Take 1 capsule (75 mg) by mouth 3 times daily   Refills:  0        ranitidine 300 MG tablet   Commonly known as:  ZANTAC   Dose:  300 mg   Indication:  Gastroesophageal reflux disease without esophagitis    Quantity:  60 tablet        Take 1 tablet (300 mg) by mouth daily   Refills:  0        umeclidinium-vilanterol 62.5-25 MCG/INH oral inhaler   Commonly known as:  ANORO ELLIPTA   Dose:  1 puff   Indication:  Chronic Obstructive Lung Disease   Quantity:  1 Inhaler        Inhale 1 puff into the lungs daily   Refills:  0        vancomycin 750  mg   Dose:  750 mg   Indication:  Osteomyelitis   Quantity:  20 cartridge.        Inject 750 mg into the vein every 24 hours for 20 days   Refills:  0        * Notice:  This list has 6 medication(s) that are the same as other medications prescribed for you. Read the directions carefully, and ask your doctor or other care provider to review them with you.            Procedures and tests performed during your visit     Procedure/Test Number of Times Performed    Vascular Access Adult IP Consult 2    Vascular Access Care Adult IP Consult 2    XR Chest Port 1 View 1      Orders Needing Specimen Collection     None      Pending Results     No orders found from 7/27/2018 to 7/30/2018.            Pending Culture Results     No orders found from 7/27/2018 to 7/30/2018.            Pending Results Instructions     If you had any lab results that were not finalized at the time of your Discharge, you can call the ED Lab Result RN at [phone]. You will be contacted by this team for any positive Lab results or changes in treatment. The nurses are available 7 days a week from 10A to 6:30P.  You can leave a message 24 hours per day and they will return your call.        Thank you for choosing [facility]       Thank you for choosing [facility] for your care. Our goal is always to provide you with excellent care. Hearing back from our patients is one way we can continue to improve our services. Please take a few minutes to complete the written survey that you may receive in the mail after you visit with us. Thank you!        [facility] Information     [facility] [facility] gives you secure access to your electronic health record. If you see a primary care provider, you can also send messages to your care team and make appointments. If you have questions, please call your primary care clinic.  If you do not have a primary care provider, please call [phone] and they will assist you.        Care EveryWhere ID     This is your Care EveryWhere ID.  This could be used by other organizations to access your [facility] medical records  [id number]        Equal Access to Services     NICK ROBLEDO : Hadii andres Terrell, andres murray, sanjay yao. So [facility] [phone].    ATENCIÓN: Si habla español, tiene a nagy disposición servicios gratuitos de asistencia lingüística. Llame al [phone].    We comply with applicable federal civil rights laws and [place] laws. We do not discriminate on the basis of race, color, national origin, age, disability, sex, sexual orientation, or gender identity.            After Visit Summary       This is your record. Keep this with you and show to your community pharmacist(s) and doctor(s) at your next visit.

## 2018-07-30 ENCOUNTER — MEDICAL CORRESPONDENCE (OUTPATIENT)
Dept: HEALTH INFORMATION MANAGEMENT | Facility: CLINIC | Age: 53
End: 2018-07-30

## 2018-07-30 ENCOUNTER — HOME INFUSION (PRE-WILLOW HOME INFUSION) (OUTPATIENT)
Dept: PHARMACY | Facility: CLINIC | Age: 53
End: 2018-07-30

## 2018-07-30 ENCOUNTER — HOSPITAL ENCOUNTER (EMERGENCY)
Facility: CLINIC | Age: 53
End: 2018-07-30
Payer: COMMERCIAL

## 2018-07-30 DIAGNOSIS — J44.9 CHRONIC OBSTRUCTIVE PULMONARY DISEASE, UNSPECIFIED COPD TYPE (H): ICD-10-CM

## 2018-07-30 LAB
ANION GAP SERPL CALCULATED.3IONS-SCNC: 6 MMOL/L (ref 3–14)
BUN SERPL-MCNC: 28 MG/DL (ref 7–30)
CALCIUM SERPL-MCNC: 9.5 MG/DL (ref 8.5–10.1)
CHLORIDE SERPL-SCNC: 104 MMOL/L (ref 94–109)
CO2 SERPL-SCNC: 28 MMOL/L (ref 20–32)
CREAT SERPL-MCNC: 1.16 MG/DL (ref 0.52–1.04)
CRP SERPL-MCNC: 34.3 MG/L (ref 0–8)
ERYTHROCYTE [SEDIMENTATION RATE] IN BLOOD BY WESTERGREN METHOD: 109 MM/H (ref 0–30)
GFR SERPL CREATININE-BSD FRML MDRD: 49 ML/MIN/1.7M2
GLUCOSE SERPL-MCNC: 82 MG/DL (ref 70–99)
POTASSIUM SERPL-SCNC: 5.1 MMOL/L (ref 3.4–5.3)
SODIUM SERPL-SCNC: 138 MMOL/L (ref 133–144)
VANCOMYCIN SERPL-MCNC: 16.1 MG/L

## 2018-07-30 PROCEDURE — 86140 C-REACTIVE PROTEIN: CPT

## 2018-07-30 PROCEDURE — 80202 ASSAY OF VANCOMYCIN: CPT

## 2018-07-30 PROCEDURE — 80048 BASIC METABOLIC PNL TOTAL CA: CPT

## 2018-07-30 PROCEDURE — 85652 RBC SED RATE AUTOMATED: CPT

## 2018-07-30 NOTE — PROGRESS NOTES
This is a recent snapshot of the patient's [facility] medical record.  For current drug dose and complete information and questions, call [phone] or In [facility] pool, fv home infusion ([id number])  CSN Number:  [id number]

## 2018-07-30 NOTE — TELEPHONE ENCOUNTER
"Requested Prescriptions   Pending Prescriptions Disp Refills     COMBIVENT RESPIMAT  MCG/ACT inhaler [Pharmacy Med Name: COMBIVENT RESPIMAT INHAL SPRAY]  Last Written Prescription Date:  na  Last Fill Quantity: na,  # refills: na   Last Office Visit with [facility], [facility] or [facility] prescribing provider:  02/05/18   Future Office Visit:    Next 5 appointments (look out 90 days)     Aug 01, 2018 11:20 AM CDT   Office Visit with Brionna Dc MD   [facility]    [address] [place] [id number] [phone]                 2     Sig: INHALE 1 PUFF INTO THE LUNGS 4 TIMES DAILY NOT TO EXCEED 6 DOSES PER DAY.    Asthma Maintenance Inhalers - Anticholinergics Failed    7/30/2018  1:02 AM       Failed - Asthma control assessment score within normal limits in last 6 months    Please review ACT score.          Passed - Patient is age 12 years or older       Passed - Recent (6 mo) or future (30 days) visit within the authorizing provider's specialty    Patient had office visit in the last 6 months or has a visit in the next 30 days with authorizing provider or within the authorizing provider's specialty.  See \"Patient Info\" tab in inbasket, or \"Choose Columns\" in Meds & Orders section of the refill encounter.              "

## 2018-07-30 NOTE — PROGRESS NOTES
This is a recent snapshot of the patient's [facility] medical record.  For current drug dose and complete information and questions, call [phone] or In Basket pool, fv home infusion ([id number])  CSN Number:  [id number]

## 2018-07-31 ENCOUNTER — PATIENT OUTREACH (OUTPATIENT)
Dept: CARE COORDINATION | Facility: CLINIC | Age: 53
End: 2018-07-31

## 2018-07-31 DIAGNOSIS — E11.40 TYPE 2 DIABETES MELLITUS WITH DIABETIC NEUROPATHY (H): Primary | Chronic | ICD-10-CM

## 2018-07-31 ASSESSMENT — ACTIVITIES OF DAILY LIVING (ADL): DEPENDENT_IADLS:: CLEANING;COOKING;LAUNDRY;SHOPPING;MEAL PREPARATION;MONEY MANAGEMENT;TRANSPORTATION

## 2018-07-31 NOTE — PROGRESS NOTES
This is a recent snapshot of the patient's [facility] medical record.  For current drug dose and complete information and questions, call [phone] or In Basket pool, fv home infusion ([id number])  CSN Number:  [id number]

## 2018-07-31 NOTE — TELEPHONE ENCOUNTER
During RNCC assessment patient reported being out of test strips and unable to test her blood glucose.    Melissa Behl BSN, RN, N  [facility] Care Coordinator  [phone]

## 2018-07-31 NOTE — LETTER
[place] CARE COORDINATION  [address] [address]    July 31, 2018    Alessandra Pak  [address]  [facility] [address]      Dear Alessandra,    I am a clinic care coordinator who works with Brionna Dc MD at [facility]. I wanted to thank you for spending the time to talk with me.  I wanted to introduce myself and provide you with my contact information so that you can call me with questions or concerns about your health care. Below is a description of clinic care coordination and how I can further assist you.     The clinic care coordinator is a registered nurse and/or  who understand the health care system. The goal of clinic care coordination is to help you manage your health and improve access to the [facility] system in the most efficient manner. The registered nurse can assist you in meeting your health care goals by providing education, coordinating services, and strengthening the communication among your providers. The  can assist you with financial, behavioral, psychosocial, chemical dependency, counseling, and/or psychiatric resources.    Please feel free to contact me at [phone], with any questions or concerns. We at [facility] are focused on providing you with the highest-quality healthcare experience possible and that all starts with you.     Sincerely,     Melissa Behl BSN, RN, N  [facility] Care Coordinator  [phone]     Enclosed: I have enclosed a copy of the Complex Care Plan. This has helpful information and goals that we have talked about. Please keep this in an easy to access place to use as needed.

## 2018-07-31 NOTE — PROGRESS NOTES
Clinic Care Coordination Contact    Clinic Care Coordination Contact  OUTREACH    Referral Information:  Referral Source: IP Handoff    Primary Diagnosis: SIRS/Sepsis    Chief Complaint   Patient presents with     Clinic Care Coordination - Initial     RN        Universal Utilization: Patient is followed by infectious disease, orthopedics, pulmonology, cardiology, endocrinology  Clinic Utilization  Difficulty keeping appointments:: No  Utilization    Last refreshed: 7/31/2018  2:26 PM:  No Show Count (past year) 15       Last refreshed: 7/31/2018  2:26 PM:  ED visits 2       Last refreshed: 7/31/2018  2:26 PM:  Hospital admissions 2          Current as of: 7/31/2018  2:26 PM             Clinical Concerns:  Current Medical Concerns:  Patient was at [facility] 6/6/18-7/10/18 and [facility]   Patient was discharged home with "[employer] [facility]. Deconditioning   Right Diabetic foot infection s/p right BKA 6/28  IDDM  Respiratory failure resolved   Opiate Dependence on methadone  Chronic pancreatitis s/p whipple in past   Severe malnutrition in context of acute illness   Anxiety   Sinus Tachycardia  Cardiomyopathy s/P ICD 2015  Emphysema   Resitrictive lung disease  Anemia acute on chronic  Patient does not recall if a nurse has been out, but states she has had 2 home care PT visits.  Patient states there is a nurse coming out to help with her infusions.  Patient has a PICC in place which was pulled on accident this past weekend.  Patient was seen in the ED x2 to have PICC placed.  Patient has all f/u appointments scheduled at this time.  Patient states she ran out of test strips for her glucometer.  RNCC will send request to care team.  Patient states she had 1 reading of 333 since she has been home, otherwise her blood glucose has been running around 160.    Current Behavioral Concerns: Patient has diagnoses of anxiety and depression managed by PCP.   Education Provided to  patient: RNCC reviewed hospital/TCU discharge instructions, home care and educated patient on care coordination services.   Pain  Chronic pain (GOAL):: No  Health Maintenance Reviewed: Due/Overdue   Health Maintenance Due   Topic Date Due     COPD ACTION PLAN Q1 YR  1965     EYE EXAM Q1 YEAR  09/23/2016     COLON CANCER SCREEN (SYSTEM ASSIGNED)  07/18/2017     DEPRESSION ACTION PLAN Q1 YR  07/25/2017     PAP Q6 MOS DIAGNOSTIC  12/28/2017     PHQ-9 Q6 MONTHS  05/24/2018      Clinical Pathway: None    Medication Management:  Patient states she takes her medications out of bottles at prescribed times.  Patient reports understanding and compliance of her medication regimen. Patient will see PCP and MTM on 8/1/18.     Functional Status:  Dependent ADLs:: Wheelchair-with assist, Bathing, Dressing, Positioning, Transfers, Toileting  Dependent IADLs:: Cleaning, Cooking, Laundry, Shopping, Meal Preparation, Money Management, Transportation  Bed or wheelchair confined:: Yes  Mobility Status: Dependent/Assisted by Another  Fallen 2 or more times in the past year?: No  Any fall with injury in the past year?: No    Living Situation:  Current living arrangement:: I live in a private home with family  Type of residence:: Private home - no stairs    Diet/Exercise/Sleep:  Diet:: Diabetic diet  Inadequate nutrition (GOAL):: Yes  Food Insecurity: Yes  In the last twelve months: We worried whether food would run out before we had money to buy more. : Sometimes True  In the last twelve months: The food we bought just didn't last and we didn't have money to buy more.: Sometimes True  Tube Feeding: No  Exercise:: Currently not exercising  Inadequate activity/exercise (GOAL):: No  Significant changes in sleep pattern (GOAL): No    Transportation:  Transportation concerns (GOAL):: No  Transportation means:: Family, Regular car, Medical transport  Patient states there is public parking by her home and she needs to have a designated  handicap parking spot due to her wheelchair needs.  RNCC advised patient to contact the city or police department to request a handicap parking spot be designated near her residence.     Psychosocial:  Baptism or spiritual beliefs that impact treatment:: No  Mental health DX:: Yes  Mental health DX how managed:: Medication  Mental health management concern (GOAL):: No  Informal Support system:: Family     Financial/Insurance:   Financial/Insurance concerns (GOAL):: Yes  Patient states finances are tight.  She is currently receiving disability and food stamps.  Patient states [employer] sent out 10 meals.  RNCC provided patient with the [place] Food Helpline to determine if other local food shelves are an option for patient.     Resources and Interventions:  Current Resources:   List of home care services:: Skilled Nursing, Physicial Therapy;   Community Resources: Home Infusion, Home Care, Drug Abuse     Equipment Currently Used at Home: none    Advance Care Plan/Directive  Advanced Care Plans/Directives on file:: No    Referrals Placed: [facility] Resources, Community Resources     Goals:   Goals        General    #1 Financial Wellbeing (pt-stated)     Notes - Note created  7/31/2018  2:08 PM by Behl, Melissa K, RN    Goal Statement: I will call [place] [employer] [phone]  Measure of Success: Patient will call [place] [employer] Helpline and receive local food assistance resources.  Supportive Steps to Achieve: RNCC provided patient with phone number  Barriers: none known at this time  Strengths: family support, home care, engaged in care coordination  Date to Achieve By: 8/30/18       #2 Transportation (pt-stated)     Notes - Note created  7/31/2018  2:09 PM by Behl, Melissa K, RN    Goal Statement: I will call the city and/or police department to request handicap parking near my residence  Measure of Success: Patient will call and receive appropriate assistance/direction in obtaining handicap parking near her  residence  Supportive Steps to Achieve: RNCC advised patient to contact her city/police department to request handicap parking near her residence  Barriers: none known at this time  Strengths: family support, home care, engaged in care coordination  Date to Achieve By: 8/30/18       #3 Medical (pt-stated)     Notes - Note created  7/31/2018  2:10 PM by Behl, Melissa K, RN    Goal Statement: I will test my blood sugar 3 times/day  Measure of Success: Patient will consistent test blood sugar 3 times/day  Supportive Steps to Achieve: RNCC sending refill request for test strips to PCP  Barriers: does not have test strips  Strengths: family support, home care, engaged in care coordination  Date to Achieve By: 8/30/18               Patient/Caregiver understanding: Patient has fair understanding of her current plan of care and is uncertain who her nurse  is and if she has a home care nurse other than the [facility] nursing staff.     Home Care Contact:              Home Care Agency: [facility].   Contact name () and phone number: [phone], RN Case Manager [facility]              Care Coordination contacted home care: patient is uncertain, but does confirm that she has had 2 PT visits              Anticipated start of care date: not certain, RNCC awaiting call back from [facility]    Outreach Frequency: 2 weeks  Future Appointments              Tomorrow Brionna Calabrese MD [facility]    Tomorrow Debbie Stahl [facility] [email or web address], HW    In 1 week Nurse, Paulette ORTEZ [facility] Orthopaedic [facility]    In 1 week Barrington Lewis DPM [facility]opaedic [facility]    In 1 month Samina Downs MD [facility] and Infectious Diseases, [facility]    In 1 month Ambrose King MD [facility]          Plan:   1. RNCC will await call back from home care RN Case  Manager to determine start of care date and to verify patient is receiving skilled nursing services.  2. Patient will receive test strips and begin testing blood glucose 3 times/day (see 7/31/18 refill encounter).  3. Patient will call city and/or police department to request handicap parking spot near her residence.  4. Patient will call [place] Food Helpline to determine if other food shelves/food assistance is available in her area.  5. RNCC will follow up with patient in 2 weeks.    Melissa Behl BSN, RN, N  [facility] Care Coordinator  [phone]
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
## 2018-08-01 ENCOUNTER — OFFICE VISIT (OUTPATIENT)
Dept: FAMILY MEDICINE | Facility: CLINIC | Age: 53
End: 2018-08-01
Payer: COMMERCIAL

## 2018-08-01 ENCOUNTER — TELEPHONE (OUTPATIENT)
Dept: FAMILY MEDICINE | Facility: CLINIC | Age: 53
End: 2018-08-01

## 2018-08-01 VITALS
HEART RATE: 106 BPM | OXYGEN SATURATION: 96 % | SYSTOLIC BLOOD PRESSURE: 102 MMHG | HEIGHT: 69 IN | BODY MASS INDEX: 16.68 KG/M2 | TEMPERATURE: 99 F | DIASTOLIC BLOOD PRESSURE: 68 MMHG | WEIGHT: 112.6 LBS | RESPIRATION RATE: 16 BRPM

## 2018-08-01 DIAGNOSIS — N18.30 TYPE 2 DIABETES MELLITUS WITH STAGE 3 CHRONIC KIDNEY DISEASE, WITH LONG-TERM CURRENT USE OF INSULIN (H): Primary | Chronic | ICD-10-CM

## 2018-08-01 DIAGNOSIS — Z12.4 SCREENING FOR MALIGNANT NEOPLASM OF CERVIX: ICD-10-CM

## 2018-08-01 DIAGNOSIS — Z79.4 TYPE 2 DIABETES MELLITUS WITH DIABETIC NEUROPATHY, WITH LONG-TERM CURRENT USE OF INSULIN (H): Chronic | ICD-10-CM

## 2018-08-01 DIAGNOSIS — E11.22 TYPE 2 DIABETES MELLITUS WITH STAGE 3 CHRONIC KIDNEY DISEASE, WITH LONG-TERM CURRENT USE OF INSULIN (H): Primary | Chronic | ICD-10-CM

## 2018-08-01 DIAGNOSIS — Z89.511 STATUS POST BELOW KNEE AMPUTATION OF RIGHT LOWER EXTREMITY (H): Primary | ICD-10-CM

## 2018-08-01 DIAGNOSIS — Z45.2 PICC (PERIPHERALLY INSERTED CENTRAL CATHETER) IN PLACE: ICD-10-CM

## 2018-08-01 DIAGNOSIS — Z79.4 TYPE 2 DIABETES MELLITUS WITH STAGE 3 CHRONIC KIDNEY DISEASE, WITH LONG-TERM CURRENT USE OF INSULIN (H): Primary | Chronic | ICD-10-CM

## 2018-08-01 DIAGNOSIS — E11.40 TYPE 2 DIABETES MELLITUS WITH DIABETIC NEUROPATHY, WITH LONG-TERM CURRENT USE OF INSULIN (H): Chronic | ICD-10-CM

## 2018-08-01 PROCEDURE — 99214 OFFICE O/P EST MOD 30 MIN: CPT | Performed by: FAMILY MEDICINE

## 2018-08-01 RX ORDER — IPRATROPIUM BROMIDE AND ALBUTEROL 20; 100 UG/1; UG/1
SPRAY, METERED RESPIRATORY (INHALATION)
Qty: 4 G | Refills: 11 | Status: SHIPPED | OUTPATIENT
Start: 2018-08-01 | End: 2018-08-15 | Stop reason: ALTCHOICE

## 2018-08-01 ASSESSMENT — PAIN SCALES - GENERAL: PAINLEVEL: NO PAIN (0)

## 2018-08-01 ASSESSMENT — ANXIETY QUESTIONNAIRES
2. NOT BEING ABLE TO STOP OR CONTROL WORRYING: SEVERAL DAYS
IF YOU CHECKED OFF ANY PROBLEMS ON THIS QUESTIONNAIRE, HOW DIFFICULT HAVE THESE PROBLEMS MADE IT FOR YOU TO DO YOUR WORK, TAKE CARE OF THINGS AT HOME, OR GET ALONG WITH OTHER PEOPLE: NOT DIFFICULT AT ALL
5. BEING SO RESTLESS THAT IT IS HARD TO SIT STILL: SEVERAL DAYS
3. WORRYING TOO MUCH ABOUT DIFFERENT THINGS: MORE THAN HALF THE DAYS
1. FEELING NERVOUS, ANXIOUS, OR ON EDGE: SEVERAL DAYS
GAD7 TOTAL SCORE: 7
6. BECOMING EASILY ANNOYED OR IRRITABLE: NOT AT ALL
7. FEELING AFRAID AS IF SOMETHING AWFUL MIGHT HAPPEN: NOT AT ALL

## 2018-08-01 ASSESSMENT — PATIENT HEALTH QUESTIONNAIRE - PHQ9: 5. POOR APPETITE OR OVEREATING: MORE THAN HALF THE DAYS
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
## 2018-08-01 NOTE — PROGRESS NOTES
Clinic Care Coordination Contact  Care Team Conversations    RNCC received call back from patient's home care RN Case Manager Sangeeta who states she has seen patient on 7/28/18 and 7/30/18 and scheduled to go back on 8/2/18.  Sangeeta will update RNCC when she is ready to discharge patient from home care services.    Melissa Behl BSN, RN, N  [facility] Care Coordinator  [phone]

## 2018-08-01 NOTE — TELEPHONE ENCOUNTER
Requested Prescriptions   Pending Prescriptions Disp Refills     blood glucose monitoring (NO BRAND SPECIFIED) test strip 100 strip 11     Sig: Use to test blood sugars 10 times daily or as directed. Accu-chek    There is no refill protocol information for this order        blood glucose (NO BRAND SPECIFIED) lancets standard 100 each 11     Sig: Use to test blood sugar 10 times daily or as directed. Accu-check    There is no refill protocol information for this order        blood glucose monitoring (NO BRAND SPECIFIED) meter device kit 1 kit 0     Sig: Use to test blood sugar 10 times daily or as directed. Accu-check    There is no refill protocol information for this order        Prescription approved per [facility].    Yvan Aguirre RN, BSN

## 2018-08-01 NOTE — TELEPHONE ENCOUNTER
"Requested Prescriptions   Pending Prescriptions Disp Refills     COMBIVENT RESPIMAT  MCG/ACT inhaler [Pharmacy Med Name: COMBIVENT RESPIMAT INHAL SPRAY]  2     Sig: INHALE 1 PUFF INTO THE LUNGS 4 TIMES DAILY NOT TO EXCEED 6 DOSES PER DAY.    Asthma Maintenance Inhalers - Anticholinergics Failed    8/1/2018  3:13 PM       Failed - Asthma control assessment score within normal limits in last 6 months    Please review ACT score.          Passed - Patient is age 12 years or older       Passed - Recent (6 mo) or future (30 days) visit within the authorizing provider's specialty    Patient had office visit in the last 6 months or has a visit in the next 30 days with authorizing provider or within the authorizing provider's specialty.  See \"Patient Info\" tab in inbasket, or \"Choose Columns\" in Meds & Orders section of the refill encounter.            Routing refill request to provider for review/approval because:  Protocol failed.     Yvan Aguirre RN, BSN          "

## 2018-08-01 NOTE — PATIENT INSTRUCTIONS
At [facility], we strive to deliver an exceptional experience to you, every time we see you.  If you receive a survey in the mail, please send us back your thoughts. We really do value your feedback.    Based on your medical history, these are the current health maintenance/preventive care services that you are due for (some may have been done at this visit.)  Health Maintenance Due   Topic Date Due     COPD ACTION PLAN Q1 YR  1965     EYE EXAM Q1 YEAR  09/23/2016     DEPRESSION ACTION PLAN Q1 YR  07/25/2017     PAP Q6 MOS DIAGNOSTIC  12/28/2017     PHQ-9 Q6 MONTHS  05/24/2018       Suggested websites for health information:  Www.[facility].org : Up to date and easily searchable information on multiple topics.  Www.medlineplus.gov : medication info, interactive tutorials, watch real surgeries online  Www.familydoctor.org : good info from the Academy of Family Physicians  Www.cdc.gov : public health info, travel advisories, epidemics (H1N1)  Www.aap.org : children's health info, normal development, vaccinations  Www.health.[facility].[place].us : [place] dept of health, public health issues in [place], N1N1    Your care team:                            Family Medicine Internal Medicine   MD Chandana Mcdaniel MD Shantel Branch-Fleming, MD Katya Georgiev PA-C Megan Hill, APRN STEPH Walton MD Pediatrics   Tommy Escoto, ROMIE Diaz, MD Terri Javier APRN CNP   MD Sulma Toure MD Deborah Mielke, MD Kim Thein, APRN [facility]      Clinic hours: Monday - Thursday 7 am-7 pm; Fridays 7 am-5 pm.   Urgent care: Monday - Friday 11 am-9 pm; Saturday and Sunday 9 am-5 pm.  Pharmacy : Monday -Thursday 8 am-8 pm; Friday 8 am-6 pm; Saturday and Sunday 9 am-5 pm.     Clinic: [phone]   Pharmacy: [phone]

## 2018-08-01 NOTE — MR AVS SNAPSHOT
After Visit Summary   8/1/2018    Alessandra Pak    MRN: [id number]           Patient Information     Date Of Birth          1965        Visit Information        Provider Department      8/1/2018 11:20 AM Brionna Calabrese MD [facility]        Today's Diagnoses     Status post below knee amputation of right lower extremity (H)    -  1    PICC (peripherally inserted central catheter) in place        Type 2 diabetes mellitus with diabetic neuropathy, with long-term current use of insulin (H)        Screening for malignant neoplasm of cervix          Care Instructions    At [facility], we strive to deliver an exceptional experience to you, every time we see you.  If you receive a survey in the mail, please send us back your thoughts. We really do value your feedback.    Based on your medical history, these are the current health maintenance/preventive care services that you are due for (some may have been done at this visit.)  Health Maintenance Due   Topic Date Due     COPD ACTION PLAN Q1 YR  1965     EYE EXAM Q1 YEAR  09/23/2016     DEPRESSION ACTION PLAN Q1 YR  07/25/2017     PAP Q6 MOS DIAGNOSTIC  12/28/2017     PHQ-9 Q6 MONTHS  05/24/2018       Suggested websites for health information:  Www.[facility].org : Up to date and easily searchable information on multiple topics.  Www.medlineplus.gov : medication info, interactive tutorials, watch real surgeries online  Www.familydoctor.org : good info from the Academy of Family Physicians  Www.cdc.gov : public health info, travel advisories, epidemics (H1N1)  Www.aap.org : children's health info, normal development, vaccinations  Www.health.state[place]us : [place] dept of health, public health issues in [place], N1N1    Your care team:                            Family Medicine Internal Medicine   MD Chandana Mcdaniel MD Shantel Branch-Fleming, MD Katya Georgiev PA-C Megan Hill, APRN CNP     Richmond Walton MD Pediatrics   ROMIE Colby CNP Paula Brito, MD Amelia Massimini APRN MD Sulma Ordoñez MD Deborah Mielke, MD Kim Thein, APRN CNP      Clinic hours: Monday - Thursday 7 am-7 pm; Fridays 7 am-5 pm.   Urgent care: Monday - Friday 11 am-9 pm; Saturday and Sunday 9 am-5 pm.  Pharmacy : Monday -Thursday 8 am-8 pm; Friday 8 am-6 pm; Saturday and Sunday 9 am-5 pm.     Clinic: [phone]   Pharmacy: [phone]             Follow-ups after your visit        Your next 10 appointments already scheduled     Aug 01, 2018  2:30 PM CDT   TELEMEDICINE with Debbie Stahl [facility] ([facility])    [address] [place] [id number] [phone]           Note: this is not an onsite visit; there is no need to come to the facility.            Aug 07, 2018 10:00 AM CDT   (Arrive by 9:45 AM)   Post-Op with [facility] Nurse   [facility] ([facility])    [address] [place] [id number] [phone]            Aug 07, 2018 11:00 AM CDT   (Arrive by 10:45 AM)   RETURN FOOT/ANKLE with Barrington Lewis DPM   [facility]    [address] [place] [id number] [phone]            Aug 30, 2018  3:00 PM CDT   (Arrive by 2:45 PM)   Return Visit with Samina Downs MD   [facility] ([facility])    [address] [place] [id number] [phone]            Sep 18, 2018 10:40 AM CDT   (Arrive by 10:10 AM)   RETURN SPINE with Ambrose King MD   [facility] ([facility])    [address] [place] [id number] [phone]              Who to contact     If you have questions or need follow up  "information about today's clinic visit or your schedule please contact [facility] directly at [phone].  Normal or non-critical lab and imaging results will be communicated to you by BrightNesthart, letter or phone within 4 business days after the clinic has received the results. If you do not hear from us within 7 days, please contact the clinic through [employer] or phone. If you have a critical or abnormal lab result, we will notify you by phone as soon as possible.  Submit refill requests through Hotalot or call your pharmacy and they will forward the refill request to us. Please allow 3 business days for your refill to be completed.          Additional Information About Your Visit        BrightNestharVeriTweet Information     Hotalot gives you secure access to your electronic health record. If you see a primary care provider, you can also send messages to your care team and make appointments. If you have questions, please call your primary care clinic.  If you do not have a primary care provider, please call [phone] and they will assist you.        Care EveryWhere ID     This is your Care EveryWhere ID. This could be used by other organizations to access your [place] medical records  [id number]        Your Vitals Were     Pulse Temperature Respirations Height Pulse Oximetry Breastfeeding?    106 99  F (37.2  C) (Oral) 16 5' 9\" (1.753 m) 96% No    BMI (Body Mass Index)                   16.63 kg/m2            Blood Pressure from Last 3 Encounters:   08/01/18 102/68   07/29/18 126/87   07/28/18 104/77    Weight from Last 3 Encounters:   08/01/18 112 lb 9.6 oz (51.1 kg)   07/28/18 120 lb (54.4 kg)   07/19/18 118 lb 1.6 oz (53.6 kg)              Today, you had the following     No orders found for display         Today's Medication Changes          These changes are accurate as of 8/1/18  1:38 PM.  If you have any questions, ask your nurse or doctor.               Start taking these medicines.        " Dose/Directions    blood glucose lancets standard   Commonly known as:  no brand specified   Used for:  Type 2 diabetes mellitus with stage 3 chronic kidney disease, with long-term current use of insulin (H)   Started by:  Brionna Calabrese MD        Use to test blood sugar 10 times daily or as directed. Accu-check   Quantity:  100 each   Refills:  11       blood glucose monitoring meter device kit   Commonly known as:  no brand specified   Used for:  Type 2 diabetes mellitus with stage 3 chronic kidney disease, with long-term current use of insulin (H)   Started by:  Brionna Calabrese MD        Use to test blood sugar 10 times daily or as directed. Accu-check   Quantity:  1 kit   Refills:  0       blood glucose monitoring test strip   Commonly known as:  no brand specified   Used for:  Type 2 diabetes mellitus with stage 3 chronic kidney disease, with long-term current use of insulin (H)   Started by:  Brionna Calabrese MD        Use to test blood sugars 10 times daily or as directed. Accu-chek   Quantity:  100 strip   Refills:  11            Where to get your medicines      These medications were sent to [id number] - [place], [place] - [address]  [address] [facility] [address]     Phone:  [phone]     blood glucose lancets standard    blood glucose monitoring meter device kit    blood glucose monitoring test strip                Primary Care Provider Office Phone # Fax #    Brionna Dc [phone]       [address]  [facility] [address]        Goals        General    #1 Financial Wellbeing (pt-stated)     Notes - Note created  7/31/2018  2:08 PM by Behl, Melissa K, RN    Goal Statement: I will call [place] [employer] [phone]  Measure of Success: Patient will call [place] [facility] and receive local food assistance resources.  Supportive Steps to Achieve: RNCC provided  patient with phone number  Barriers: none known at this time  Strengths: family support, home care, engaged in care coordination  Date to Achieve By: 8/30/18       #2 Transportation (pt-stated)     Notes - Note created  7/31/2018  2:09 PM by Behl, Melissa K, RN    Goal Statement: I will call the city and/or police department to request handicap parking near my residence  Measure of Success: Patient will call and receive appropriate assistance/direction in obtaining handicap parking near her residence  Supportive Steps to Achieve: RNCC advised patient to contact her city/police department to request handicap parking near her residence  Barriers: none known at this time  Strengths: family support, home care, engaged in care coordination  Date to Achieve By: 8/30/18       #3 Medical (pt-stated)     Notes - Note created  7/31/2018  2:10 PM by Behl, Melissa K, RN    Goal Statement: I will test my blood sugar 3 times/day  Measure of Success: Patient will consistent test blood sugar 3 times/day  Supportive Steps to Achieve: RNCC sending refill request for test strips to PCP  Barriers: does not have test strips  Strengths: family support, home care, engaged in care coordination  Date to Achieve By: 8/30/18         Equal Access to Services     NATALIE ROBLEDO AH: Hadii andres bolivar hadasho Soomaali, waaxda luqadaha, qaybta kaalmada adeegyada, sanjay levine . So [facility] [phone].    ATENCIÓN: Si habla español, tiene a nagy disposición servicios gratuitos de asistencia lingüística. Llame al [phone].    We comply with applicable federal civil rights laws and [place] laws. We do not discriminate on the basis of race, color, national origin, age, disability, sex, sexual orientation, or gender identity.            Thank you!     Thank you for choosing [facility]  for your care. Our goal is always to provide you with excellent care. Hearing back from our patients is one way we can continue  to improve our services. Please take a few minutes to complete the written survey that you may receive in the mail after your visit with us. Thank you!             Your Updated Medication List - Protect others around you: Learn how to safely use, store and throw away your medicines at www.disposemymeds.org.          This list is accurate as of 8/1/18  1:38 PM.  Always use your most recent med list.                   Brand Name Dispense Instructions for use Diagnosis    acetaminophen 500 MG tablet    TYLENOL     Take 2 tablets (1,000 mg) by mouth 3 times daily    Status post below knee amputation of right lower extremity (H)       acetone (Urine) test Strp     25 each    1 strip by In Vitro route as needed    Type 2 diabetes mellitus with diabetic neuropathy (H)       albuterol 108 (90 Base) MCG/ACT Inhaler    PROAIR HFA    1 Inhaler    Inhale 2 puffs into the lungs every 4 hours as needed for shortness of breath / dyspnea    SOB (shortness of breath)       amitriptyline 50 MG tablet    ELAVIL    60 tablet    Take 1 tablet (50 mg) by mouth At Bedtime    Status post below knee amputation of right lower extremity (H)       aspirin 81 MG tablet     100 tablet    Take 1 tablet (81 mg) by mouth daily    Type 2 diabetes mellitus with complication, with long-term current use of insulin (H)       blood glucose lancets standard    no brand specified    100 each    Use to test blood sugar 10 times daily or as directed. Accu-check    Type 2 diabetes mellitus with stage 3 chronic kidney disease, with long-term current use of insulin (H)       blood glucose monitoring meter device kit    no brand specified    1 kit    Use to test blood sugar 10 times daily or as directed. Accu-check    Type 2 diabetes mellitus with stage 3 chronic kidney disease, with long-term current use of insulin (H)       blood glucose monitoring test strip    no brand specified    100 strip    Use to test blood sugars 10 times daily or as directed.  Accu-chek    Type 2 diabetes mellitus with stage 3 chronic kidney disease, with long-term current use of insulin (H)       fluticasone 50 MCG/ACT spray    FLONASE    1 Bottle    Spray 2 sprays into left nostril daily    Acute nonseasonal allergic rhinitis due to pollen       furosemide 40 MG tablet    LASIX    30 tablet    Take 1 tablet (40 mg) by mouth daily    SOB (shortness of breath)       glucose 4 g Chew chewable tablet     25 tablet    Take 3-4 tablets to treat low blood sugar.    Type 2 diabetes mellitus with complication, with long-term current use of insulin (H)       * insulin aspart 100 UNIT/ML injection    NovoLOG PEN    6.3 mL    Inject 1-7 Units Subcutaneous 3 times daily (before meals)    Type 2 diabetes mellitus with stage 3 chronic kidney disease, with long-term current use of insulin (H)       * insulin aspart 100 UNIT/ML injection    NovoLOG PEN    1.5 mL    Inject 1-5 Units Subcutaneous At Bedtime    Type 2 diabetes mellitus with stage 3 chronic kidney disease, with long-term current use of insulin (H)       insulin glargine 100 UNIT/ML injection    LANTUS    2.4 mL    Inject 8 Units Subcutaneous At Bedtime    Type 2 diabetes mellitus with stage 3 chronic kidney disease, with long-term current use of insulin (H)       insulin pen needle 31G X 5 MM    B-D U/F    3 each    Use 3 time(s) a day.    Type 2 diabetes, HbA1c goal < 7% (H)       levonorgestrel 20 MCG/24HR IUD    MIRENA (52 MG)    1 each    placed today    Excessive or frequent menstruation       Lidocaine 4 % Patch    LIDOCARE    30 patch    Place 2 patches onto the skin every 24 hours    Status post below knee amputation of right lower extremity (H)       lisinopril 10 MG tablet    PRINIVIL/ZESTRIL    30 tablet    Take 1 tablet (10 mg) by mouth daily    Type 2 diabetes mellitus with stage 3 chronic kidney disease, with long-term current use of insulin (H)       * methadone 10 mg/mL Conc (HIGH CONC) solution    DOLPHINE-INTENSOL    14 mL     Take 7 mLs (70 mg) by mouth daily    Chemical dependency (H)       * methadone 10 mg/mL Conc (HIGH CONC) solution    DOLPHINE-INTENSOL    70 mL    Take 7 mLs (70 mg) by mouth daily    Status post below knee amputation of right lower extremity (H)       metoprolol tartrate 25 MG tablet    LOPRESSOR    60 tablet    Take 0.5 tablets (12.5 mg) by mouth 2 times daily    Type 2 diabetes mellitus with stage 3 chronic kidney disease, with long-term current use of insulin (H)       multivitamin, therapeutic with minerals Tabs tablet     30 each    Take 1 tablet by mouth daily    Status post below knee amputation of right lower extremity (H)       polyethylene glycol Packet    MIRALAX/GLYCOLAX    7 packet    Take 17 g by mouth daily    Status post below knee amputation of right lower extremity (H)       pregabalin 75 MG capsule    LYRICA    90 capsule    Take 1 capsule (75 mg) by mouth 3 times daily    Status post below knee amputation of right lower extremity (H)       ranitidine 300 MG tablet    ZANTAC    60 tablet    Take 1 tablet (300 mg) by mouth daily    Status post below knee amputation of right lower extremity (H)       umeclidinium-vilanterol 62.5-25 MCG/INH oral inhaler    ANORO ELLIPTA    1 Inhaler    Inhale 1 puff into the lungs daily    Chronic bronchitis, unspecified chronic bronchitis type (H)       vancomycin 750 mg     20 cartridge.    Inject 750 mg into the vein every 24 hours for 20 days    Status post below knee amputation of right lower extremity (H)       * Notice:  This list has 4 medication(s) that are the same as other medications prescribed for you. Read the directions carefully, and ask your doctor or other care provider to review them with you.

## 2018-08-02 LAB
ANION GAP SERPL CALCULATED.3IONS-SCNC: 8 MMOL/L (ref 3–14)
BUN SERPL-MCNC: 26 MG/DL (ref 7–30)
CALCIUM SERPL-MCNC: 9 MG/DL (ref 8.5–10.1)
CHLORIDE SERPL-SCNC: 106 MMOL/L (ref 94–109)
CO2 SERPL-SCNC: 27 MMOL/L (ref 20–32)
CREAT SERPL-MCNC: 1.35 MG/DL (ref 0.52–1.04)
CRP SERPL-MCNC: 70.9 MG/L (ref 0–8)
ERYTHROCYTE [SEDIMENTATION RATE] IN BLOOD BY WESTERGREN METHOD: 86 MM/H (ref 0–30)
GFR SERPL CREATININE-BSD FRML MDRD: 41 ML/MIN/1.7M2
GLUCOSE SERPL-MCNC: 184 MG/DL (ref 70–99)
POTASSIUM SERPL-SCNC: 4.6 MMOL/L (ref 3.4–5.3)
SODIUM SERPL-SCNC: 141 MMOL/L (ref 133–144)
VANCOMYCIN SERPL-MCNC: 16.4 MG/L

## 2018-08-02 PROCEDURE — 80202 ASSAY OF VANCOMYCIN: CPT | Performed by: FAMILY MEDICINE

## 2018-08-02 PROCEDURE — 85652 RBC SED RATE AUTOMATED: CPT | Performed by: FAMILY MEDICINE

## 2018-08-02 PROCEDURE — 80048 BASIC METABOLIC PNL TOTAL CA: CPT | Performed by: FAMILY MEDICINE

## 2018-08-02 PROCEDURE — 86140 C-REACTIVE PROTEIN: CPT | Performed by: FAMILY MEDICINE

## 2018-08-02 ASSESSMENT — ANXIETY QUESTIONNAIRES: GAD7 TOTAL SCORE: 7

## 2018-08-02 ASSESSMENT — PATIENT HEALTH QUESTIONNAIRE - PHQ9: SUM OF ALL RESPONSES TO PHQ QUESTIONS 1-9: 9

## 2018-08-02 NOTE — PROGRESS NOTES
This is a recent snapshot of the patient's [facility] medical record.  For current drug dose and complete information and questions, call [phone] or In Basket pool, [facility] home [facility] ([id number])  CSN Number:  [id number]

## 2018-08-03 ENCOUNTER — TELEPHONE (OUTPATIENT)
Dept: FAMILY MEDICINE | Facility: CLINIC | Age: 53
End: 2018-08-03

## 2018-08-03 ENCOUNTER — HOME INFUSION (PRE-WILLOW HOME INFUSION) (OUTPATIENT)
Dept: PHARMACY | Facility: CLINIC | Age: 53
End: 2018-08-03

## 2018-08-03 NOTE — TELEPHONE ENCOUNTER
Spoke to home care nurse. She reports patient has new PIC line placed Saturday. Home care nurse went to change dressing and no concern then. When home care nurse changed PIC line dressing yesterday, drainage was present and was mucous like and home care nurse was notified by patient today that PIC line dressing was saturated. Home care nurse advised patient to go to ED and patient refused. This RN tried contacting patient and she did not answer her phone. This RN told home care nurse clinic would attempt contact patient  2 more times by phone. This RN told home care nurse that patient does need to go to ED for PIC line drainage assessment.  Eun Barnes RN

## 2018-08-03 NOTE — PROGRESS NOTES
Not ordered or managed by me.  Who is managing her antibiotics and PICC line?      They should be send these results.    Brionna Seaman M.D.

## 2018-08-03 NOTE — TELEPHONE ENCOUNTER
This writer attempted to contact Alessandra  on 08/03/18      Reason for call triage/sent to ER for PIC line if pus  and left message.    Please be advised. Phone message left below has daughter Martha's phone number listed.No consent to communicate on file for Martha. Apparently home care nurse called and left message. Daughter Martha did not know why this RN was calling her and this RN did not disclose that home care nurse had called clinic.     If patient calls back:   Patient contacted by a Registered Nurse. Inform patient that someone from the RN group will contact them, document that pt called and route to P DYAD 3 RN POOL [[id number]]        Eun Barnes, RN

## 2018-08-03 NOTE — TELEPHONE ENCOUNTER
Reason for Call:  Other     Detailed comments: Home health nurse says patient dallas not go to ER for small amount of mucus coming out of PIC line. Wants our RN to call her back and advise. Said send HIGH Priority message.    Phone Number Patient can be reached at: Home number on file [phone] (home)    Best Time: any    Can we leave a detailed message on this number? No the home health asked you to speak to the patient and advise her.    Call taken on 8/3/2018 at 3:20 PM by Leelee Bush

## 2018-08-04 ENCOUNTER — TELEPHONE (OUTPATIENT)
Dept: PHARMACY | Facility: CLINIC | Age: 53
End: 2018-08-04

## 2018-08-04 NOTE — TELEPHONE ENCOUNTER
[facility] PRIOR AUTHORIZATION REQUEST     Drug: Vancomycin  J Code:    NDC: [id number]  ICD 10 code: L97.412    Date(s) of Service: 07/2/2018    Provider:  Samina Downs  Provider NPI: [id number]      [facility]  NPI: [id number]    *What is needed from Intake: Patient is getting a quantity of 0.075 for a 1 day supply. For whatever reason for this drug, they do not allow for a decimal quantity. I normally change it to the closest solid number and it pays. But since this is only a 1 day supply, when i change it to a quantity of 1, it rejects as a high dose. I called [employer] directly and the only option is to do a PA for a high dose for this patient. 1 for 1. Please obtain. Thank you.

## 2018-08-06 ENCOUNTER — TELEPHONE (OUTPATIENT)
Dept: FAMILY MEDICINE | Facility: CLINIC | Age: 53
End: 2018-08-06

## 2018-08-06 ENCOUNTER — HOME INFUSION (PRE-WILLOW HOME INFUSION) (OUTPATIENT)
Dept: PHARMACY | Facility: CLINIC | Age: 53
End: 2018-08-06

## 2018-08-06 NOTE — TELEPHONE ENCOUNTER
..Reason for Call:  SAM    Detailed comments: REGISTERED NURSE stopped by Patient's home to draw labs today 08-, Patient was not home;   She will reach out to patient again tomorrow. Thank you    Phone Number Patient can be reached at: Other phone number:  [phone]    Best Time: anytime    Can we leave a detailed message on this number? YES    Call taken on 8/6/2018 at 12:48 PM by Jaqueline Leger

## 2018-08-06 NOTE — TELEPHONE ENCOUNTER
This writer attempted to contact Alessandra on 08/06/18      Reason for call triage PICC site and left message.      If patient calls back:   Patient contacted by a Registered Nurse. Inform patient that someone from the RN group will contact them, document that pt called and route to P DYAD 3 RN POOL [[id number]]    Yvan Aguirre RN, BSN

## 2018-08-06 NOTE — TELEPHONE ENCOUNTER
PA Initiation    Medication: Vancomycin  Insurance Company: [employer] - Phone [phone] Fax [phone]  Pharmacy Filling the Rx: [facility]  Filling Pharmacy Phone: [phone]  Filling Pharmacy Fax:    Start Date: 8/6/2018    Central Prior Authorization Team   Phone: [phone]

## 2018-08-07 ENCOUNTER — OFFICE VISIT (OUTPATIENT)
Dept: ORTHOPEDICS | Facility: CLINIC | Age: 53
End: 2018-08-07
Payer: COMMERCIAL

## 2018-08-07 DIAGNOSIS — Z89.511 STATUS POST BELOW KNEE AMPUTATION OF RIGHT LOWER EXTREMITY (H): ICD-10-CM

## 2018-08-07 DIAGNOSIS — N18.30 TYPE 2 DIABETES MELLITUS WITH STAGE 3 CHRONIC KIDNEY DISEASE, WITH LONG-TERM CURRENT USE OF INSULIN (H): Primary | Chronic | ICD-10-CM

## 2018-08-07 DIAGNOSIS — E11.22 TYPE 2 DIABETES MELLITUS WITH STAGE 3 CHRONIC KIDNEY DISEASE, WITH LONG-TERM CURRENT USE OF INSULIN (H): Primary | Chronic | ICD-10-CM

## 2018-08-07 DIAGNOSIS — S88.111A AMPUTATED BELOW KNEE, RIGHT (H): Primary | ICD-10-CM

## 2018-08-07 DIAGNOSIS — Z79.4 TYPE 2 DIABETES MELLITUS WITH STAGE 3 CHRONIC KIDNEY DISEASE, WITH LONG-TERM CURRENT USE OF INSULIN (H): Primary | Chronic | ICD-10-CM

## 2018-08-07 DIAGNOSIS — L97.423 DIABETIC ULCER OF LEFT MIDFOOT ASSOCIATED WITH TYPE 2 DIABETES MELLITUS, WITH NECROSIS OF MUSCLE (H): ICD-10-CM

## 2018-08-07 DIAGNOSIS — Z79.4 TYPE 2 DIABETES MELLITUS WITH DIABETIC NEUROPATHY, WITH LONG-TERM CURRENT USE OF INSULIN (H): Chronic | ICD-10-CM

## 2018-08-07 DIAGNOSIS — E11.40 TYPE 2 DIABETES MELLITUS WITH DIABETIC NEUROPATHY, WITH LONG-TERM CURRENT USE OF INSULIN (H): Chronic | ICD-10-CM

## 2018-08-07 DIAGNOSIS — E11.621 DIABETIC ULCER OF LEFT MIDFOOT ASSOCIATED WITH TYPE 2 DIABETES MELLITUS, WITH NECROSIS OF MUSCLE (H): ICD-10-CM

## 2018-08-07 NOTE — NURSING NOTE
Alessandra underwent right below leg amputation on 6/28/18, last seen with Dr King on 7/24/18.  Alessandra came into clinic for suture removal.  She was using a  sock, it was removed, incision line well healing, no drainage, no redness.  On the proximal end there was a dry scab.  The area was cleaned, sutures were removed and the area was covered with sterile dressings and  sock applied.  Pt was advised to keep the area clean and dry for 24 hours, then ok to shower.  She was instructed to not immerse in water while the scab was still present, and she verbalized agreement.  Alessandra has appt on 9/18/18 with Dr King.  If questions arise between now and then, she will give us a call.  Lilia Barrera RN

## 2018-08-07 NOTE — PROGRESS NOTES
Preliminary Device Interrogation Results.  Final physician signed paceart report to be scanned and attached.    Scheduled [employer], single chamber ICD, remote transmission received and reviewed. Device transmission sent per MD orders. Her presenting rhythm is  bpm. 6 NSVT episodes recorded. Device logbook reports episodes lasting 5-37 seconds. The available EGMs show episodes lasting up to 8 beats. Normal device function. = 0%. Lead trends appear stable. Battery estimates 10 years to MARGI. Pt notified of transmission results. Plan for pt to send another transmission in 3 months as scheduled.  Remote ICD transmission

## 2018-08-07 NOTE — TELEPHONE ENCOUNTER
This writer attempted to contact Home care RN  on 08/07/18      Reason for call have home care update on pt status regarding picc line-has home care re-assessed since Friday? Did pt go to ER? Have attempted to contact pt but not getting through and not returning messages  and left detailed message for home care RN to please update clinic, if able.       If patient calls back:   Patient contacted by a Registered Nurse. Inform patient that someone from the RN group will contact them, document that pt called and route to P DYAD 3 RN NITIN [[id number]]        Charla Gilliland RN

## 2018-08-07 NOTE — MR AVS SNAPSHOT
After Visit Summary   8/7/2018    Alessandra Pak    MRN: [id number]           Patient Information     Date Of Birth          1965        Visit Information        Provider Department      8/7/2018 11:00 AM Barrington Lewis DPM [facility]        Today's Diagnoses     Type 2 diabetes mellitus with stage 3 chronic kidney disease, with long-term current use of insulin (H)    -  1    Status post below knee amputation of right lower extremity (H)        Type 2 diabetes mellitus with diabetic neuropathy, with long-term current use of insulin (H)        Diabetic ulcer of left midfoot associated with type 2 diabetes mellitus, with necrosis of muscle (H)           Follow-ups after your visit        Your next 10 appointments already scheduled     Aug 08, 2018 10:00 AM CDT   TELEMEDICINE with Debbie Stahl [facility] ([facility])    [address] [place] [address] [phone]           Note: this is not an onsite visit; there is no need to come to the facility.            Aug 21, 2018 11:00 AM CDT   (Arrive by 10:45 AM)   RETURN FOOT/ANKLE with Barrington Lewis DPM   [facility] ([facility])    [address]  [address]  [facility] [id number] [phone]            Aug 30, 2018  3:00 PM CDT   (Arrive by 2:45 PM)   Return Visit with Samina Downs MD   [facility] ([facility])    [address]  [address]  [facility] [id number] [phone]            Sep 18, 2018 10:40 AM CDT   (Arrive by 10:10 AM)   RETURN SPINE with Ambrose King MD   [facility] ([facility])    [address]  [address]th [address]  [facility] [id number] [phone]              Who to contact     Please call your clinic at [phone] to:    Ask questions about  your health    Make or cancel appointments    Discuss your medicines    Learn about your test results    Speak to your doctor            Additional Information About Your Visit        [employer] Information     [employer] gives you secure access to your electronic health record. If you see a primary care provider, you can also send messages to your care team and make appointments. If you have questions, please call your primary care clinic.  If you do not have a primary care provider, please call [phone] and they will assist you.      [employer] is an electronic gateway that provides easy, online access to your medical records. With [employer], you can request a clinic appointment, read your test results, renew a prescription or communicate with your care team.     To access your existing account, please contact your [facility] or call [phone] for assistance.        Care EveryWhere ID     This is your Care EveryWhere ID. This could be used by other organizations to access your [facility] medical records  [id number]         Blood Pressure from Last 3 Encounters:   08/01/18 102/68   07/29/18 126/87   07/28/18 104/77    Weight from Last 3 Encounters:   08/01/18 51.1 kg (112 lb 9.6 oz)   07/28/18 54.4 kg (120 lb)   07/19/18 53.6 kg (118 lb 1.6 oz)              We Performed the Following     DEBRIDE SKIN/SUBQ TISSUE        Primary Care Provider Office Phone # Fax #    Brionna Cobylucas Dc [phone]       [address]  [facility] [address]        Goals        General    #1 Financial Wellbeing (pt-stated)     Notes - Note created  7/31/2018  2:08 PM by Behl, Melissa K, RN    Goal Statement: I will call [place] [employer] [phone]  Measure of Success: Patient will call [place] [employer] and receive local food assistance resources.  Supportive Steps to Achieve: RNCC provided patient with phone number  Barriers: none known at this time  Strengths:  family support, home care, engaged in care coordination  Date to Achieve By: 8/30/18       #2 Transportation (pt-stated)     Notes - Note created  7/31/2018  2:09 PM by Behl, Melissa K, RN    Goal Statement: I will call the city and/or police department to request handicap parking near my residence  Measure of Success: Patient will call and receive appropriate assistance/direction in obtaining handicap parking near her residence  Supportive Steps to Achieve: RNCC advised patient to contact her city/police department to request handicap parking near her residence  Barriers: none known at this time  Strengths: family support, home care, engaged in care coordination  Date to Achieve By: 8/30/18       #3 Medical (pt-stated)     Notes - Note created  7/31/2018  2:10 PM by Behl, Melissa K, VADIM    Goal Statement: I will test my blood sugar 3 times/day  Measure of Success: Patient will consistent test blood sugar 3 times/day  Supportive Steps to Achieve: RNCC sending refill request for test strips to PCP  Barriers: does not have test strips  Strengths: family support, home care, engaged in care coordination  Date to Achieve By: 8/30/18         Equal Access to Services     NATALIE ROBLEDO : Hadii andres bolivar hadreymundo Sotyler, waaxda luqadaha, qaybta kaaljing piña, sanjay levine . So [facility] [phone].    ATENCIÓN: Si habla español, tiene a nagy disposición servicios gratuitos de asistencia lingüística. Llame al [phone].    We comply with applicable federal civil rights laws and [place] laws. We do not discriminate on the basis of race, color, national origin, age, disability, sex, sexual orientation, or gender identity.            Thank you!     Thank you for choosing [facility]  for your care. Our goal is always to provide you with excellent care. Hearing back from our patients is one way we can continue to improve our services. Please take a few minutes to complete the written  survey that you may receive in the mail after your visit with us. Thank you!             Your Updated Medication List - Protect others around you: Learn how to safely use, store and throw away your medicines at www.disposemymeds.org.          This list is accurate as of 8/7/18 11:38 AM.  Always use your most recent med list.                   Brand Name Dispense Instructions for use Diagnosis    acetaminophen 500 MG tablet    TYLENOL     Take 2 tablets (1,000 mg) by mouth 3 times daily    Status post below knee amputation of right lower extremity (H)       acetone (Urine) test Strp     25 each    1 strip by In Vitro route as needed    Type 2 diabetes mellitus with diabetic neuropathy (H)       albuterol 108 (90 Base) MCG/ACT Inhaler    PROAIR HFA    1 Inhaler    Inhale 2 puffs into the lungs every 4 hours as needed for shortness of breath / dyspnea    SOB (shortness of breath)       amitriptyline 50 MG tablet    ELAVIL    60 tablet    Take 1 tablet (50 mg) by mouth At Bedtime    Status post below knee amputation of right lower extremity (H)       aspirin 81 MG tablet     100 tablet    Take 1 tablet (81 mg) by mouth daily    Type 2 diabetes mellitus with complication, with long-term current use of insulin (H)       blood glucose lancets standard    no brand specified    100 each    Use to test blood sugar 10 times daily or as directed. Accu-check    Type 2 diabetes mellitus with stage 3 chronic kidney disease, with long-term current use of insulin (H)       blood glucose monitoring meter device kit    no brand specified    1 kit    Use to test blood sugar 10 times daily or as directed. Accu-check    Type 2 diabetes mellitus with stage 3 chronic kidney disease, with long-term current use of insulin (H)       blood glucose monitoring test strip    no brand specified    100 strip    Use to test blood sugars 10 times daily or as directed. Accu-chek    Type 2 diabetes mellitus with stage 3 chronic kidney disease, with  long-term current use of insulin (H)       COMBIVENT RESPIMAT  MCG/ACT inhaler   Generic drug:  Ipratropium-Albuterol     4 g    INHALE 1 PUFF INTO THE LUNGS 4 TIMES DAILY NOT TO EXCEED 6 DOSES PER DAY.    Chronic obstructive pulmonary disease, unspecified COPD type (H)       fluticasone 50 MCG/ACT spray    FLONASE    1 Bottle    Spray 2 sprays into left nostril daily    Acute nonseasonal allergic rhinitis due to pollen       furosemide 40 MG tablet    LASIX    30 tablet    Take 1 tablet (40 mg) by mouth daily    SOB (shortness of breath)       glucose 4 g Chew chewable tablet     25 tablet    Take 3-4 tablets to treat low blood sugar.    Type 2 diabetes mellitus with complication, with long-term current use of insulin (H)       * insulin aspart 100 UNIT/ML injection    NovoLOG PEN    6.3 mL    Inject 1-7 Units Subcutaneous 3 times daily (before meals)    Type 2 diabetes mellitus with stage 3 chronic kidney disease, with long-term current use of insulin (H)       * insulin aspart 100 UNIT/ML injection    NovoLOG PEN    1.5 mL    Inject 1-5 Units Subcutaneous At Bedtime    Type 2 diabetes mellitus with stage 3 chronic kidney disease, with long-term current use of insulin (H)       insulin glargine 100 UNIT/ML injection    LANTUS    2.4 mL    Inject 8 Units Subcutaneous At Bedtime    Type 2 diabetes mellitus with stage 3 chronic kidney disease, with long-term current use of insulin (H)       insulin pen needle 31G X 5 MM    B-D U/F    3 each    Use 3 time(s) a day.    Type 2 diabetes, HbA1c goal < 7% (H)       levonorgestrel 20 MCG/24HR IUD    MIRENA (52 MG)    1 each    placed today    Excessive or frequent menstruation       Lidocaine 4 % Patch    LIDOCARE    30 patch    Place 2 patches onto the skin every 24 hours    Status post below knee amputation of right lower extremity (H)       lisinopril 10 MG tablet    PRINIVIL/ZESTRIL    30 tablet    Take 1 tablet (10 mg) by mouth daily    Type 2 diabetes mellitus  with stage 3 chronic kidney disease, with long-term current use of insulin (H)       * methadone 10 mg/mL Conc (HIGH CONC) solution    DOLPHINE-INTENSOL    14 mL    Take 7 mLs (70 mg) by mouth daily    Chemical dependency (H)       * methadone 10 mg/mL Conc (HIGH CONC) solution    DOLPHINE-INTENSOL    70 mL    Take 7 mLs (70 mg) by mouth daily    Status post below knee amputation of right lower extremity (H)       metoprolol tartrate 25 MG tablet    LOPRESSOR    60 tablet    Take 0.5 tablets (12.5 mg) by mouth 2 times daily    Type 2 diabetes mellitus with stage 3 chronic kidney disease, with long-term current use of insulin (H)       multivitamin, therapeutic with minerals Tabs tablet     30 each    Take 1 tablet by mouth daily    Status post below knee amputation of right lower extremity (H)       polyethylene glycol Packet    MIRALAX/GLYCOLAX    7 packet    Take 17 g by mouth daily    Status post below knee amputation of right lower extremity (H)       pregabalin 75 MG capsule    LYRICA    90 capsule    Take 1 capsule (75 mg) by mouth 3 times daily    Status post below knee amputation of right lower extremity (H)       ranitidine 300 MG tablet    ZANTAC    60 tablet    Take 1 tablet (300 mg) by mouth daily    Status post below knee amputation of right lower extremity (H)       umeclidinium-vilanterol 62.5-25 MCG/INH oral inhaler    ANORO ELLIPTA    1 Inhaler    Inhale 1 puff into the lungs daily    Chronic bronchitis, unspecified chronic bronchitis type (H)       vancomycin 750 mg     20 cartridge.    Inject 750 mg into the vein every 24 hours for 20 days    Status post below knee amputation of right lower extremity (H)       * Notice:  This list has 4 medication(s) that are the same as other medications prescribed for you. Read the directions carefully, and ask your doctor or other care provider to review them with you.

## 2018-08-07 NOTE — PROGRESS NOTES
Chief Complaints and History of Present Illnesses   Patient presents with     RECHECK     2 week follow up. Left foot.           No Known Allergies      Subjective: Alessandra is a 53 year old female who presents to the clinic today for a follow up of left foot wound. Saw our PABecky 2 weeks ago. At that visit, the heel wound was crusted over. She continues to change the dressing every day with Iodosorb.  She presents today with her mother.  She relates that she is not having any pain in the foot.  It is getting easier for her to transfer around her house.    Objective  Lab Results   Component Value Date    A1C 9.5 06/06/2018    A1C 12.8 02/16/2018    A1C 11.8 06/21/2017    A1C 11.5 11/02/2016    A1C >15.0  Results confirmed by repeat test   07/25/2016         Wound #1    A diabetic wound is noted at left  medial foot at first met head measuring  2.5 cm x 3 cm x 0.4 cm. Non-tender to palpation.     Lozano Classification: III    Wound base: Pink, Yellow/Granulation, Slough, tendon    Edges: intact    Drainage: slight/serous    Odor: No     Undermining: Yes, plantar portion of wound within subq    Tunneling: No    Bone Exposure: No    Clinical Signs of Infection: No      After obtaining patient consent, the wound was irrigated with copious amounts of saline. A scalpel was then used to debride the wound into subcutaneous tissue. The wound edges were debrided back to healthy, bleeding tissue. The wound base exhibited healthy bleeding. Given the patient's lack of sensation, no anesthesia was necessary for the procedure.    Assessment: DM2 with severe neuropathy, right BKA, and left 1st met head ulceration.     Plan:   - Pt seen and evaluated  - Wound was debrided as described.   - Because of the small amount of undermining, Recommend continuing with Iodosorb. I would recommend a switch to something like Santyl in 2 weeks as a debriding agent.NWB except for transfers as much as possible.   - Pt to return to clinic in  2 weeks.

## 2018-08-07 NOTE — PROGRESS NOTES
This is a recent snapshot of the patient's [facility] medical record.  For current drug dose and complete information and questions, call [phone] or In Basket pool, fv home infusion ([id number])  CSN Number:  [id number]

## 2018-08-07 NOTE — LETTER
8/7/2018       RE: Alessandra Pak  [address]  [facility] [address]     Dear Colleague,    Thank you for referring your patient, Alessandra Pak, to the [facility]. Please see a copy of my visit note below.    Chief Complaints and History of Present Illnesses   Patient presents with     RECHECK     2 week follow up. Left foot.           No Known Allergies      Subjective: Alessandra is a 53 year old female who presents to the clinic today for a follow up of left foot wound. Saw our PABecky 2 weeks ago. At that visit, the heel wound was crusted over. She continues to change the dressing every day with Iodosorb.  She presents today with her mother.  She relates that she is not having any pain in the foot.  It is getting easier for her to transfer around her house.    Objective  Lab Results   Component Value Date    A1C 9.5 06/06/2018    A1C 12.8 02/16/2018    A1C 11.8 06/21/2017    A1C 11.5 11/02/2016    A1C >15.0  Results confirmed by repeat test   07/25/2016         Wound #1    A diabetic wound is noted at left  medial foot at first met head measuring  2.5 cm x 3 cm x 0.4 cm. Non-tender to palpation.     Lozano Classification: III    Wound base: Pink, Yellow/Granulation, Slough, tendon    Edges: intact    Drainage: slight/serous    Odor: No     Undermining: Yes, plantar portion of wound within subq    Tunneling: No    Bone Exposure: No    Clinical Signs of Infection: No      After obtaining patient consent, the wound was irrigated with copious amounts of saline. A scalpel was then used to debride the wound into subcutaneous tissue. The wound edges were debrided back to healthy, bleeding tissue. The wound base exhibited healthy bleeding. Given the patient's lack of sensation, no anesthesia was necessary for the procedure.    Assessment: DM2 with severe neuropathy, right BKA, and left 1st met head ulceration.     Plan:   - Pt seen and  evaluated  - Wound was debrided as described.   - Because of the small amount of undermining, Recommend continuing with Iodosorb. I would recommend a switch to something like Santyl in 2 weeks as a debriding agent.NWB except for transfers as much as possible.   - Pt to return to clinic in 2 weeks.         Again, thank you for allowing me to participate in the care of your patient.      Sincerely,    Barrington Lewis DPM

## 2018-08-07 NOTE — TELEPHONE ENCOUNTER
Prior Authorization Approval    Authorization Effective Date: 8/7/2018  Authorization Expiration Date: 12/31/2018  Medication: Vancomycin  Approved Dose/Quantity: 1/1 day  Reference #: [id number]    Insurance Company: [employer] - IPDIA [phone] Fax [phone]  Which Pharmacy is filling the prescription (Not needed for infusion/clinic administered): [facility]  Pharmacy Notified: Yes  Patient Notified: No

## 2018-08-08 ENCOUNTER — HOME INFUSION (PRE-WILLOW HOME INFUSION) (OUTPATIENT)
Dept: PHARMACY | Facility: CLINIC | Age: 53
End: 2018-08-08

## 2018-08-08 LAB
ANION GAP SERPL CALCULATED.3IONS-SCNC: 8 MMOL/L (ref 3–14)
BUN SERPL-MCNC: 28 MG/DL (ref 7–30)
CALCIUM SERPL-MCNC: 8.9 MG/DL (ref 8.5–10.1)
CHLORIDE SERPL-SCNC: 105 MMOL/L (ref 94–109)
CO2 SERPL-SCNC: 28 MMOL/L (ref 20–32)
CREAT SERPL-MCNC: 1.15 MG/DL (ref 0.52–1.04)
CRP SERPL-MCNC: 69.4 MG/L (ref 0–8)
ERYTHROCYTE [SEDIMENTATION RATE] IN BLOOD BY WESTERGREN METHOD: 46 MM/H (ref 0–30)
GFR SERPL CREATININE-BSD FRML MDRD: 50 ML/MIN/1.7M2
GLUCOSE SERPL-MCNC: 89 MG/DL (ref 70–99)
POTASSIUM SERPL-SCNC: 4.6 MMOL/L (ref 3.4–5.3)
SODIUM SERPL-SCNC: 141 MMOL/L (ref 133–144)
VANCOMYCIN SERPL-MCNC: 20.8 MG/L

## 2018-08-08 PROCEDURE — 85652 RBC SED RATE AUTOMATED: CPT | Performed by: FAMILY MEDICINE

## 2018-08-08 PROCEDURE — 86140 C-REACTIVE PROTEIN: CPT | Performed by: FAMILY MEDICINE

## 2018-08-08 PROCEDURE — 80202 ASSAY OF VANCOMYCIN: CPT | Performed by: FAMILY MEDICINE

## 2018-08-08 PROCEDURE — 80048 BASIC METABOLIC PNL TOTAL CA: CPT | Performed by: FAMILY MEDICINE

## 2018-08-09 ENCOUNTER — HOME INFUSION (PRE-WILLOW HOME INFUSION) (OUTPATIENT)
Dept: PHARMACY | Facility: CLINIC | Age: 53
End: 2018-08-09

## 2018-08-09 NOTE — PROGRESS NOTES
This is a recent snapshot of the patient's [facility] medical record.  For current drug dose and complete information and questions, call [phone] or In Basket pool, fv home infusion ([id number])  CSN Number:  [id number]

## 2018-08-09 NOTE — TELEPHONE ENCOUNTER
This writer attempted to contact patient on 08/09/18      Reason for call advise to go to ER and left message.      If patient calls back:   Patient contacted by a Registered Nurse. Inform patient that someone from the RN group will contact them, document that pt called and route to P DYAD 3 RN POOL [[id number]]        Margot Munoz RN

## 2018-08-10 NOTE — PROGRESS NOTES
This is a recent snapshot of the patient's [facility] medical record.  For current drug dose and complete information and questions, call [phone] or In Basket pool, fv home infusion ([id number])  CSN Number:  [id number]

## 2018-08-14 ENCOUNTER — HOME INFUSION (PRE-WILLOW HOME INFUSION) (OUTPATIENT)
Dept: PHARMACY | Facility: CLINIC | Age: 53
End: 2018-08-14

## 2018-08-14 LAB
ALBUMIN SERPL-MCNC: 2.5 G/DL (ref 3.4–5)
ALP SERPL-CCNC: 354 U/L (ref 40–150)
ALT SERPL W P-5'-P-CCNC: 46 U/L (ref 0–50)
ANION GAP SERPL CALCULATED.3IONS-SCNC: 8 MMOL/L (ref 3–14)
AST SERPL W P-5'-P-CCNC: 35 U/L (ref 0–45)
BASOPHILS # BLD AUTO: 0 10E9/L (ref 0–0.2)
BASOPHILS NFR BLD AUTO: 0.7 %
BILIRUB SERPL-MCNC: 0.2 MG/DL (ref 0.2–1.3)
BUN SERPL-MCNC: 36 MG/DL (ref 7–30)
CALCIUM SERPL-MCNC: 9.1 MG/DL (ref 8.5–10.1)
CHLORIDE SERPL-SCNC: 98 MMOL/L (ref 94–109)
CO2 SERPL-SCNC: 27 MMOL/L (ref 20–32)
CREAT SERPL-MCNC: 1.35 MG/DL (ref 0.52–1.04)
CRP SERPL-MCNC: 76.7 MG/L (ref 0–8)
DIFFERENTIAL METHOD BLD: ABNORMAL
EOSINOPHIL # BLD AUTO: 0.2 10E9/L (ref 0–0.7)
EOSINOPHIL NFR BLD AUTO: 2.7 %
ERYTHROCYTE [DISTWIDTH] IN BLOOD BY AUTOMATED COUNT: 16.8 % (ref 10–15)
GFR SERPL CREATININE-BSD FRML MDRD: 41 ML/MIN/1.7M2
GLUCOSE SERPL-MCNC: 477 MG/DL (ref 70–99)
HCT VFR BLD AUTO: 31.3 % (ref 35–47)
HGB BLD-MCNC: 10.2 G/DL (ref 11.7–15.7)
IMM GRANULOCYTES # BLD: 0 10E9/L (ref 0–0.4)
IMM GRANULOCYTES NFR BLD: 0.2 %
LYMPHOCYTES # BLD AUTO: 2.2 10E9/L (ref 0.8–5.3)
LYMPHOCYTES NFR BLD AUTO: 37.5 %
MCH RBC QN AUTO: 27 PG (ref 26.5–33)
MCHC RBC AUTO-ENTMCNC: 32.6 G/DL (ref 31.5–36.5)
MCV RBC AUTO: 83 FL (ref 78–100)
MONOCYTES # BLD AUTO: 0.3 10E9/L (ref 0–1.3)
MONOCYTES NFR BLD AUTO: 5.8 %
NEUTROPHILS # BLD AUTO: 3.1 10E9/L (ref 1.6–8.3)
NEUTROPHILS NFR BLD AUTO: 53.1 %
NRBC # BLD AUTO: 0 10*3/UL
NRBC BLD AUTO-RTO: 0 /100
PLATELET # BLD AUTO: 243 10E9/L (ref 150–450)
POTASSIUM SERPL-SCNC: 6.1 MMOL/L (ref 3.4–5.3)
PROT SERPL-MCNC: 7.4 G/DL (ref 6.8–8.8)
RBC # BLD AUTO: 3.78 10E12/L (ref 3.8–5.2)
SODIUM SERPL-SCNC: 133 MMOL/L (ref 133–144)
WBC # BLD AUTO: 5.9 10E9/L (ref 4–11)

## 2018-08-14 PROCEDURE — 85025 COMPLETE CBC W/AUTO DIFF WBC: CPT | Performed by: FAMILY MEDICINE

## 2018-08-14 PROCEDURE — 80053 COMPREHEN METABOLIC PANEL: CPT | Performed by: FAMILY MEDICINE

## 2018-08-14 PROCEDURE — 86140 C-REACTIVE PROTEIN: CPT | Performed by: FAMILY MEDICINE

## 2018-08-15 ENCOUNTER — TELEPHONE (OUTPATIENT)
Dept: FAMILY MEDICINE | Facility: CLINIC | Age: 53
End: 2018-08-15

## 2018-08-15 ENCOUNTER — ALLIED HEALTH/NURSE VISIT (OUTPATIENT)
Dept: PHARMACY | Facility: CLINIC | Age: 53
End: 2018-08-15
Attending: INTERNAL MEDICINE
Payer: COMMERCIAL

## 2018-08-15 ENCOUNTER — HOME INFUSION (PRE-WILLOW HOME INFUSION) (OUTPATIENT)
Dept: PHARMACY | Facility: CLINIC | Age: 53
End: 2018-08-15

## 2018-08-15 DIAGNOSIS — I50.22 CHRONIC SYSTOLIC CONGESTIVE HEART FAILURE (H): Chronic | ICD-10-CM

## 2018-08-15 DIAGNOSIS — J33.9 NASAL POLYP: ICD-10-CM

## 2018-08-15 DIAGNOSIS — K21.9 GERD (GASTROESOPHAGEAL REFLUX DISEASE): ICD-10-CM

## 2018-08-15 DIAGNOSIS — J42 CHRONIC BRONCHITIS, UNSPECIFIED CHRONIC BRONCHITIS TYPE (H): Chronic | ICD-10-CM

## 2018-08-15 DIAGNOSIS — K59.00 CONSTIPATION, UNSPECIFIED CONSTIPATION TYPE: ICD-10-CM

## 2018-08-15 DIAGNOSIS — G47.00 INSOMNIA: ICD-10-CM

## 2018-08-15 DIAGNOSIS — E11.40 TYPE 2 DIABETES MELLITUS WITH DIABETIC NEUROPATHY (H): Primary | ICD-10-CM

## 2018-08-15 DIAGNOSIS — G47.00 INSOMNIA, UNSPECIFIED TYPE: ICD-10-CM

## 2018-08-15 DIAGNOSIS — G89.29 OTHER CHRONIC PAIN: ICD-10-CM

## 2018-08-15 DIAGNOSIS — I10 HYPERTENSION GOAL BP (BLOOD PRESSURE) < 140/90: Primary | Chronic | ICD-10-CM

## 2018-08-15 DIAGNOSIS — K21.9 GASTROESOPHAGEAL REFLUX DISEASE, ESOPHAGITIS PRESENCE NOT SPECIFIED: Chronic | ICD-10-CM

## 2018-08-15 DIAGNOSIS — G89.29 CHRONIC PAIN: ICD-10-CM

## 2018-08-15 DIAGNOSIS — K59.00 CONSTIPATION: ICD-10-CM

## 2018-08-15 LAB
ANION GAP SERPL CALCULATED.3IONS-SCNC: 6 MMOL/L (ref 3–14)
BUN SERPL-MCNC: 37 MG/DL (ref 7–30)
CALCIUM SERPL-MCNC: 9.1 MG/DL (ref 8.5–10.1)
CHLORIDE SERPL-SCNC: 102 MMOL/L (ref 94–109)
CO2 SERPL-SCNC: 28 MMOL/L (ref 20–32)
CREAT SERPL-MCNC: 1.31 MG/DL (ref 0.52–1.04)
GFR SERPL CREATININE-BSD FRML MDRD: 43 ML/MIN/1.7M2
GLUCOSE SERPL-MCNC: 357 MG/DL (ref 70–99)
POTASSIUM SERPL-SCNC: 5.2 MMOL/L (ref 3.4–5.3)
SODIUM SERPL-SCNC: 136 MMOL/L (ref 133–144)

## 2018-08-15 PROCEDURE — 80048 BASIC METABOLIC PNL TOTAL CA: CPT | Performed by: COUNSELOR

## 2018-08-15 PROCEDURE — 99607 MTMS BY PHARM ADDL 15 MIN: CPT | Performed by: PHARMACIST

## 2018-08-15 PROCEDURE — 99605 MTMS BY PHARM NP 15 MIN: CPT | Performed by: PHARMACIST

## 2018-08-15 RX ORDER — METOPROLOL SUCCINATE 25 MG/1
25 TABLET, EXTENDED RELEASE ORAL DAILY
Qty: 90 TABLET | Refills: 1 | Status: SHIPPED | OUTPATIENT
Start: 2018-08-15 | End: 2019-01-01

## 2018-08-15 NOTE — TELEPHONE ENCOUNTER
Notes Recorded by Cathryn Swartz, APRN CNP on 8/15/2018 at 7:49 AM  Left message for patient to return my call ASAP.  Cathryn MANCIA, STEPH    Huddle with provider: need to find out if the on call directed the patient on the results. Nothing noted. If patient has not been seen Cathryn Swartz CNP would like the patient seen in the ER for fluid therapy for potassium and management of 477 glucose level.    Stacy Nair RN, [facility] Triage

## 2018-08-15 NOTE — TELEPHONE ENCOUNTER
This writer attempted to contact Alessandra on 08/15/18 on mobile number.      Reason for call abnormal results and left message.      If patient calls back:   Chacorta Nair, RN

## 2018-08-15 NOTE — PATIENT INSTRUCTIONS
Recommendations from today's MTM visit:                                                    MTM (medication therapy management) is a service provided by a clinical pharmacist designed to help you get the most of out of your medicines.   Today we reviewed what your medicines are for, how to know if they are working, that your medicines are safe and how to make your medicine regimen as easy as possible.     1. Stop the combivent and in its place start using the Anoro 1 puff every day. When you inhale the Anoro you should be doing it with a quick inhalation.     2. Reschedule with Dr. Swartz the lung specialist to follow up on your COPD.     3. Try to use some saline nasal spray to reduce nasal dryness and bloody nose.    4. Schedule follow up with Dr. Villarreal regarding your heart.    5. We are switching you to the once daily formulation of metoprolol. The new once daily formulation is called metoprolol succinate and you will take 25 mg once daily in the evening. Stop taking the metoprolol tartrate when you start this new once daily formulation.     6. Schedule follow up with Dr. Nuno regarding your diabetes.     7. Start setting an alarm three times daily to help you remember to take you medicines.     8. You are doing a great job trying to get all your medical needs met. Take one day at a time and schedule those follow ups with your providers now that you have transportation.  Keep up the great work!    Next MTM visit: 8/29/17    To schedule another MTM appointment, please call the clinic directly or you may call the MTM scheduling line at [phone] or toll-free at [phone].     My Clinical Pharmacist's contact information:                                                      It was a pleasure seeing you today!  Please feel free to contact me with any questions or concerns you have.      Jovita Stahl, PharmD  Medication Therapy Management Pharmacist  [facility] - Monday and Wednesday 7:30 - 4:00  Phone:  [phone] - direct clinic line    You may receive a survey about the MTM services you received.  I would appreciate your feedback to help me serve you better in the future. Please fill it out and return it when you can. Your comments will be anonymous.

## 2018-08-15 NOTE — PROGRESS NOTES
This is a recent snapshot of the patient's [facility] medical record.  For current drug dose and complete information and questions, call [phone] or In Basket pool, fv home infusion ([id number])  CSN Number:  [id number]

## 2018-08-15 NOTE — MR AVS SNAPSHOT
After Visit Summary   8/15/2018    Alessandra Pak    MRN: [id number]           Patient Information     Date Of Birth          1965        Visit Information        Provider Department      8/15/2018 2:00 PM Debbie Stahl [facility] MTM        Today's Diagnoses     Hypertension goal BP (blood pressure) < 140/90    -  1    Chronic systolic congestive heart failure (H)        Chronic bronchitis, unspecified chronic bronchitis type (H)          Care Instructions    Recommendations from today's MTM visit:                                                    MTM (medication therapy management) is a service provided by a clinical pharmacist designed to help you get the most of out of your medicines.   Today we reviewed what your medicines are for, how to know if they are working, that your medicines are safe and how to make your medicine regimen as easy as possible.     1. Stop the combivent and in its place start using the Anoro 1 puff every day. When you inhale the Anoro you should be doing it with a quick inhalation.     2. Reschedule with Dr. Swartz the lung specialist to follow up on your COPD.     3. Try to use some saline nasal spray to reduce nasal dryness and bloody nose.    4. Schedule follow up with Dr. Villarreal regarding your heart.    5. We are switching you to the once daily formulation of metoprolol. The new once daily formulation is called metoprolol succinate and you will take 25 mg once daily in the evening. Stop taking the metoprolol tartrate when you start this new once daily formulation.     6. Schedule follow up with Dr. Nuno regarding your diabetes.     7. Start setting an alarm three times daily to help you remember to take you medicines.     8. You are doing a great job trying to get all your medical needs met. Take one day at a time and schedule those follow ups with your providers now that you have transportation.  Keep up the great work!    Next MTM visit:  8/29/17    To schedule another MTM appointment, please call the clinic directly or you may call the MTM scheduling line at [phone] or toll-free at [phone].     My Clinical Pharmacist's contact information:                                                      It was a pleasure seeing you today!  Please feel free to contact me with any questions or concerns you have.      Jovita Stahl PharmD  Medication Therapy Management Pharmacist  [facility] - Monday and Wednesday 7:30 - 4:00  Phone: [phone] - direct clinic line    You may receive a survey about the [facility] services you received.  I would appreciate your feedback to help me serve you better in the future. Please fill it out and return it when you can. Your comments will be anonymous.                    Follow-ups after your visit        Your next 10 appointments already scheduled     Aug 21, 2018 11:00 AM CDT   (Arrive by 10:45 AM)   [facility]    [address] [place] [address]  4th Floor  [facility] [address] [phone]            Aug 29, 2018  1:00 PM CDT   Office Visit with Debbie Stahl [facility] ([facility])    [address] [place] [address] [phone]           Bring a current list of meds and any records pertaining to this visit. For Physicals, please bring immunization records and any forms needing to be filled out. Please arrive 10 minutes early to complete paperwork.            Aug 30, 2018  3:00 PM CDT   (Arrive by 2:45 PM)   Return Visit with Samina Downs MD   [facility] ([facility])    [address] [place] [address]  [address]  [facility] [address] [phone]            Sep 18, 2018 10:40 AM CDT   (Arrive by 10:10 AM)   RETURN SPINE with Ambrose King MD   [facility]  ([facility])    [address]  4th Floor  [facility] [id number] [phone]              Who to contact     If you have questions or need follow up information about today's clinic visit or your schedule please contact [facility] directly at [phone].  Normal or non-critical lab and imaging results will be communicated to you by MyChart, letter or phone within 4 business days after the clinic has received the results. If you do not hear from us within 7 days, please contact the clinic through iYogihart or phone. If you have a critical or abnormal lab result, we will notify you by phone as soon as possible.  Submit refill requests through [employer] or call your pharmacy and they will forward the refill request to us. Please allow 3 business days for your refill to be completed.          Additional Information About Your Visit        iYogihart Information     [employer] gives you secure access to your electronic health record. If you see a primary care provider, you can also send messages to your care team and make appointments. If you have questions, please call your primary care clinic.  If you do not have a primary care provider, please call [phone] and they will assist you.        Care EveryWhere ID     This is your Care EveryWhere ID. This could be used by other organizations to access your [place] medical records  [id number]         Blood Pressure from Last 3 Encounters:   08/01/18 102/68   07/29/18 126/87   07/28/18 104/77    Weight from Last 3 Encounters:   08/01/18 112 lb 9.6 oz (51.1 kg)   07/28/18 120 lb (54.4 kg)   07/19/18 118 lb 1.6 oz (53.6 kg)              Today, you had the following     No orders found for display         Today's Medication Changes          These changes are accurate as of 8/15/18  4:32 PM.  If you have any questions, ask your nurse or doctor.               Start taking these medicines.        Dose/Directions    metoprolol  succinate 25 MG 24 hr tablet   Commonly known as:  TOPROL-XL   Used for:  Hypertension goal BP (blood pressure) < 140/90, Chronic systolic congestive heart failure (H)   Started by:  Debbie Stahl RPH        Dose:  25 mg   Take 1 tablet (25 mg) by mouth daily   Quantity:  90 tablet   Refills:  1         Stop taking these medicines if you haven't already. Please contact your care team if you have questions.     COMBIVENT RESPIMAT  MCG/ACT inhaler   Generic drug:  Ipratropium-Albuterol   Stopped by:  Debbie Stahl RPH           metoprolol tartrate 25 MG tablet   Commonly known as:  LOPRESSOR   Stopped by:  Debbie Stahl RPH                Where to get your medicines      These medications were sent to [place] [place] [id number] - [facility], [place] - [address]  [address] [facility] [address]     Phone:  [phone]     metoprolol succinate 25 MG 24 hr tablet    umeclidinium-vilanterol 62.5-25 MCG/INH oral inhaler                Primary Care Provider Office Phone # Fax #    Brionna Coby Dc [phone]       [address]  [facility] [address]        Goals        General    #1 Financial Wellbeing (pt-stated)     Notes - Note created  7/31/2018  2:08 PM by Behl, Melissa K, RN    Goal Statement: I will call [place] [facility] [phone]  Measure of Success: Patient will call [place] [employer] and receive local food assistance resources.  Supportive Steps to Achieve: RNCC provided patient with phone number  Barriers: none known at this time  Strengths: family support, home care, engaged in care coordination  Date to Achieve By: 8/30/18       #2 Transportation (pt-stated)     Notes - Note created  7/31/2018  2:09 PM by Behl, Melissa K, RN    Goal Statement: I will call the city and/or police department to request handicap parking near my residence  Measure of Success: Patient will call and receive appropriate assistance/direction  in obtaining handicap parking near her residence  Supportive Steps to Achieve: RNCC advised patient to contact her city/police department to request handicap parking near her residence  Barriers: none known at this time  Strengths: family support, home care, engaged in care coordination  Date to Achieve By: 8/30/18       #3 Medical (pt-stated)     Notes - Note created  7/31/2018  2:10 PM by Behl, Melissa K, RN    Goal Statement: I will test my blood sugar 3 times/day  Measure of Success: Patient will consistent test blood sugar 3 times/day  Supportive Steps to Achieve: RNCC sending refill request for test strips to PCP  Barriers: does not have test strips  Strengths: family support, home care, engaged in care coordination  Date to Achieve By: 8/30/18         Equal Access to Services     NICK ROBLEDO : Chiqui palacioo Sotyler, waaxda luqadaha, qaybta kaalmada adeegyada, sanjay levine . So [facility] [phone].    ATENCIÓN: Si habla español, tiene a nagy disposición servicios gratuitos de asistencia lingüística. Llame al [phone].    We comply with applicable federal civil rights laws and [place] laws. We do not discriminate on the basis of race, color, national origin, age, disability, sex, sexual orientation, or gender identity.            Thank you!     Thank you for choosing [facility]  for your care. Our goal is always to provide you with excellent care. Hearing back from our patients is one way we can continue to improve our services. Please take a few minutes to complete the written survey that you may receive in the mail after your visit with us. Thank you!             Your Updated Medication List - Protect others around you: Learn how to safely use, store and throw away your medicines at www.disposemymeds.org.          This list is accurate as of 8/15/18  4:32 PM.  Always use your most recent med list.                   Brand Name Dispense Instructions for  use Diagnosis    acetaminophen 500 MG tablet    TYLENOL     Take 2 tablets (1,000 mg) by mouth 3 times daily    Status post below knee amputation of right lower extremity (H)       acetone (Urine) test Strp     25 each    1 strip by In Vitro route as needed    Type 2 diabetes mellitus with diabetic neuropathy (H)       albuterol 108 (90 Base) MCG/ACT inhaler    PROAIR HFA    1 Inhaler    Inhale 2 puffs into the lungs every 4 hours as needed for shortness of breath / dyspnea    SOB (shortness of breath)       amitriptyline 50 MG tablet    ELAVIL    60 tablet    Take 1 tablet (50 mg) by mouth At Bedtime    Status post below knee amputation of right lower extremity (H)       aspirin 81 MG tablet     100 tablet    Take 1 tablet (81 mg) by mouth daily    Type 2 diabetes mellitus with complication, with long-term current use of insulin (H)       blood glucose lancets standard    no brand specified    100 each    Use to test blood sugar 10 times daily or as directed. Accu-check    Type 2 diabetes mellitus with stage 3 chronic kidney disease, with long-term current use of insulin (H)       blood glucose monitoring meter device kit    no brand specified    1 kit    Use to test blood sugar 10 times daily or as directed. Accu-check    Type 2 diabetes mellitus with stage 3 chronic kidney disease, with long-term current use of insulin (H)       blood glucose monitoring test strip    no brand specified    100 strip    Use to test blood sugars 10 times daily or as directed. Accu-chek    Type 2 diabetes mellitus with stage 3 chronic kidney disease, with long-term current use of insulin (H)       fluticasone 50 MCG/ACT spray    FLONASE    1 Bottle    Spray 2 sprays into left nostril daily    Acute nonseasonal allergic rhinitis due to pollen       furosemide 40 MG tablet    LASIX    30 tablet    Take 1 tablet (40 mg) by mouth daily    SOB (shortness of breath)       glucose 4 g Chew chewable tablet     25 tablet    Take 3-4 tablets  to treat low blood sugar.    Type 2 diabetes mellitus with complication, with long-term current use of insulin (H)       insulin aspart 100 UNIT/ML injection    NovoLOG PEN    6.3 mL    Inject 1-7 Units Subcutaneous 3 times daily (before meals)    Type 2 diabetes mellitus with stage 3 chronic kidney disease, with long-term current use of insulin (H)       insulin glargine 100 UNIT/ML injection    LANTUS    2.4 mL    Inject 8 Units Subcutaneous At Bedtime    Type 2 diabetes mellitus with stage 3 chronic kidney disease, with long-term current use of insulin (H)       insulin pen needle 31G X 5 MM    B-D U/F    3 each    Use 3 time(s) a day.    Type 2 diabetes, HbA1c goal < 7% (H)       levonorgestrel 20 MCG/24HR IUD    MIRENA (52 MG)    1 each    placed today    Excessive or frequent menstruation       Lidocaine 4 % Patch    LIDOCARE    30 patch    Place 2 patches onto the skin every 24 hours    Status post below knee amputation of right lower extremity (H)       lisinopril 10 MG tablet    PRINIVIL/ZESTRIL    30 tablet    Take 1 tablet (10 mg) by mouth daily    Type 2 diabetes mellitus with stage 3 chronic kidney disease, with long-term current use of insulin (H)       methadone 10 mg/mL Conc (HIGH CONC) solution    DOLPHINE-INTENSOL    70 mL    Take 7 mLs (70 mg) by mouth daily    Status post below knee amputation of right lower extremity (H)       metoprolol succinate 25 MG 24 hr tablet    TOPROL-XL    90 tablet    Take 1 tablet (25 mg) by mouth daily    Hypertension goal BP (blood pressure) < 140/90, Chronic systolic congestive heart failure (H)       multivitamin, therapeutic with minerals Tabs tablet     30 each    Take 1 tablet by mouth daily    Status post below knee amputation of right lower extremity (H)       polyethylene glycol Packet    MIRALAX/GLYCOLAX    7 packet    Take 17 g by mouth daily    Status post below knee amputation of right lower extremity (H)       pregabalin 75 MG capsule    LYRICA    90  capsule    Take 1 capsule (75 mg) by mouth 3 times daily    Status post below knee amputation of right lower extremity (H)       ranitidine 300 MG tablet    ZANTAC    60 tablet    Take 1 tablet (300 mg) by mouth daily    Status post below knee amputation of right lower extremity (H)       umeclidinium-vilanterol 62.5-25 MCG/INH oral inhaler    ANORO ELLIPTA    1 Inhaler    Inhale 1 puff into the lungs daily    Chronic bronchitis, unspecified chronic bronchitis type (H)

## 2018-08-15 NOTE — Clinical Note
I spoke with Alessandra today for a transition of care visit after her ER visit last month. Please see my plan for changes made today and let me know if you disagree with anything. I will be following up with her in 2 weeks to discuss her diabetes and see how we can get that better controlled.  Jovita Stahl, PharmD Medication Therapy Management Pharmacist [facility] - Monday and Wednesday 7:30 - 4:00 Phone: [phone] - direct clinic line

## 2018-08-15 NOTE — LETTER
My COPD Action Plan     Name: Alessandra Pak    YOB: 1965   Date: 8/20/2018    My doctor: Debbie Stahl [facility]   My clinic: [address] [address] [phone]  My Controller Medicine: Vilanterol/umeclidinium (Anoro Ellipta)   Dose: 1 puff once daily     My Rescue Medicine: Albuterol (Proair/Ventolin/Proventil) inhaler   Dose: 2 puffs into lungs every 4 hours as needed for shortness of breath or wheezing.       My COPD Severity: Moderate = FeV1 < 79% -50%         Make sure you've had your pneumonia   vaccines.          GREEN ZONE       Doing well today      Usual level of activity and exercise    Usual amount of cough and mucus    No shortness of breath    Usual level of health (thinking clearly, sleeping well, feel like eating) Actions:      Take daily medicines    Use oxygen as prescribed    Follow regular exercise and diet plan    Avoid cigarette smoke and other irritants that harm the lungs           YELLOW ZONE          Having a bad day or flare up      Short of breath more than usual    A lot more sputum (mucus) than usual    Sputum looks yellow, green, tan, brown or bloody    More coughing or wheezing    Fever or chills    Less energy; trouble completing activities    Trouble thinking or focusing    Using quick relief inhaler or nebulizer more often    Poor sleep; symptoms wake me up    Do not feel like eating Actions:      Get plenty of rest    Take daily medicines    Use quick relief inhaler every 4 hours    If you use oxygen, call you doctor to see if you should adjust your oxygen    Do breathing exercises or other things to help you relax    Let a loved one, friend or neighbor know you are feeling worse    Call your care team if you have 2 or more symptoms.  Start taking steroids or antibiotics if directed by your care team           RED ZONE       Need medical care now      Severe shortness of breath (feel you can't  breathe)    Fever, chills    Not enough breath to do any activity    Trouble coughing up mucus, walking or talking    Blood in mucus    Frequent coughing   Rescue medicines are not working    Not able to sleep because of breathing    Feel confused or drowsy    Chest pain    Actions:      Call your health care team.  If you cannot reach your care team, call 911 or go to the emergency room.        Annual Reminders:  Meet with Care Team, Flu Shot every Fall  Pharmacy:    [facility] [address] [place], [place] - [address]  [place], [place] - [address]

## 2018-08-15 NOTE — PROGRESS NOTES
SUBJECTIVE/OBJECTIVE:                Alessandra Pak is a 53 year old female called for a transitions of care visit.  She was discharged from [facility] ED on 7/29/18 for PICC replacement.     Chief Complaint: Referred for LUZ, no complaints today. She has missed many appointments in the past but says she has transportation for doctors visits now so she should be better.     Allergies/ADRs: Reviewed in Epic  Tobacco: No tobacco use   Alcohol: not currently using  Caffeine: 1 cups/day of coffee  Activity: she is doing physical therapy exercises to strengthening arms and legs so she can get her prosthesis.   PMH: Reviewed in Epic - no history of heart attack or stroke.     Medication Adherence/Access:  Per patient, misses medication 2 times per week. - She will forget but take later     Pain:  Current medications include: acetaminophen 1000 mg as needed, using 2-3 times per week now, methadone 70 mg once daily, lidocaine patches 12 hours on and 12 hours off, Lyrica 75 mg three times daily.  This does help with the phantom pain and body aches. She is followed by Dr. Rich Sheriff at the methadone clinic in  [place].    COPD: Current medications: Albuterol MDI and (Pt reports using albuterol 3 times per week) and Albuterol+Ipratropium (Pt reports using albuterol 1 times per day). She was seeing Dr. Dancer at the [facility] but he left so she has not followed up with the lung specialist.   Pt is experiencing the following side effects: dry mouth  Pt reports the following symptoms: increased SOB upon exertion  and recent hospitalization in June.  Pt does not have an COPD Action Plan on file.   Has spirometry been completed: Yes     Insomnia: Current medications include: amitriptyline 50 mg every night at bedtime. She feels this works well, she gets about 6-8 hours at night. Pt reports trouble staying asleep but with the medicine better. She feels a little drowsiness the next day. She does have some dry mouth at  times as well.    Diabetes:  Pt currently taking Novolog 1-7 units after meals and Lantus 8 units at bedtime. Pt is not experiencing side effects.  SMBG: we did not review readings today.  Patient is not experiencing hypoglycemia  Recent symptoms of high blood sugar? none  Eye exam: due  Foot exam: up to date  ACEi/ARB: Yes: lisinopril 10 mg daily.   Urine Albumin:   Lab Results   Component Value Date    UMALCR 66.46 (H) 02/05/2018      Aspirin: Taking 81mg daily and denies side effects  Diet/Exercise: Did not discuss today due to time. Follow up scheduled to address.    Nasal polyps:  Current medications include: fluticasone nasal spray 2 spays in left nostril daily. This works well. She does get some bloody nose on the left side and it is dry.     HFrEF/HTN: Current medications include metoprolol tartrate 12.5 mg twice daily, lisinopril 10 mg once daily and furosemide 40 mg daily.  Patient reports no current medication side effects. Pt reported home blood pressure readings: 118/70 and 133/78 with pulses over 100. She is followed by Dr. Villarreal but has not seen him in over a year - last seen in 2016.  ECHO:  Date 6/6/18, EF 35-40%.   Pt is not complaining of sx of HF.    Unsure if pt is measuring weights daily.  Pt is following a low sodium diet, is avoiding EtOH.    Constipation:  Current medications include: Miralax 17 gm daily and this works well.      GERD: Current medications include: Zantac (ranitidine) 300 mg once daily. Pt c/o no current symptoms.  Patient feels that current regimen is effective.    Today's Vitals: There were no vitals taken for this visit. - phone visit    ASSESSMENT:                 Current medications were reviewed today.      Medication Adherence: fair, see plan for suggestion    Pain:  Stable    COPD: Needs Improvement. Patient would benefit from stopping Combivent and starting back on Anoro once daily for maintenance.  Will also do a COPD Action Plan to day and mail to her. Reviewed  with her that the albuterol is her rescue inhaler and it should be inhaled slow and deep.  The Anoro is a dry powder inhaler and it should be inhaled quickly.  Recommended she follow up with her pulmonologist.    Insomnia: Stable    Diabetes: Needs Improvement. Patient is not meeting A1c goal of < 7%. Microalbumin is not at goal < 30 mg/g. Taking an ACE-inhibitor not at max dose. Pts blood pressure at last check was on lower end and since pt is not in clinic today will not increase but should consider at future visits.  Due for annual eye exam.  Aspirin therapy is safe and appropriate. I did talk to her about the importance of getting her blood sugars under better control especially with the infections she has been getting and to try to prevent any further amputations. I recommended that she schedule with her endocrinologist who she has not followed up with in over 6 months. We did not have time today to adjust her insulin or discuss diet but scheduled a follow up visit to do this in the mean time before she gets in to see her endocrinologist.     Nasal polyps:  Stable however suggested she use a saline nasal spray to prevent dryness and epistaxis.     HFrEF: Needs Improvement. Patient is meeting BP goal of < 140/90mmHg.  EF 35% - 39%. Pt is on appropriate ACE/ARB (could be increased if bp allows at future PCP visit) and  is not on appropriate beta blocker: changed to succinate today. Pt is appropriately avoiding NSAID's, diltiazem/verapamil, and pioglitazone. Pt would benefit from beta-blocker: changed from metoprolol tartrate to the extended formulation succinate which is the appropriate formulation for heart failure. I also recommended that she follow up with her cardiologist as it has been over 2 years since she has seen him.    Constipation:  Stable    GERD: Stable.      PLAN:                1. Stop the combivent and in its place start using the Anoro 1 puff every day. When you inhale the Anoro you should be  doing it with a quick inhalation.     2. Reschedule with Dr. Swartz the lung specialist to follow up on your COPD.     3. Try to use some saline nasal spray to reduce nasal dryness and bloody nose.    4. Schedule follow up with Dr. Villarreal regarding your heart.    5. We are switching you to the once daily formulation of metoprolol. The new once daily formulation is called metoprolol succinate and you will take 25 mg once daily in the evening. Stop taking the metoprolol tartrate when you start this new once daily formulation.     6. Schedule follow up with Dr. Nuno regarding your diabetes.     7. Start setting an alarm three times daily to help you remember to take you medicines.    Consideration for PCP   1. Try to maximize beta blocker and ACE inhibitor dose as much as blood pressure will allow for best results when it comes to CHF.    I spent 60 minutes with this patient today. All changes were made via collaborative practice agreement with Dr. Mary Dc. A copy of the visit note was provided to the patient's primary care provider.    Will follow up in 2 weeks.    The patient was mailed a summary of these recommendations as an after visit summary.    Jovita Stahl, Tom  Medication Therapy Management Pharmacist    Chart documentation was completed in part with Dragon voice-recognition software. Even though reviewed, some grammatical, spelling, and word errors may remain.

## 2018-08-16 ENCOUNTER — MEDICAL CORRESPONDENCE (OUTPATIENT)
Dept: HEALTH INFORMATION MANAGEMENT | Facility: CLINIC | Age: 53
End: 2018-08-16

## 2018-08-16 NOTE — PROGRESS NOTES
This is a recent snapshot of the patient's [facility] medical record.  For current drug dose and complete information and questions, call [phone] or In Basket pool, [facility] ([id number])  CSN Number:  [id number]

## 2018-08-16 NOTE — TELEPHONE ENCOUNTER
This writer attempted to contact Alessandra on 08/16/18      Reason for call results and left message.      If patient calls back:   Patient contacted by a Registered Nurse. Inform patient that someone from the RN group will contact them, document that pt called and route to P DYAD 3 RN POOL [[id number]]        Eun Barnes, RN

## 2018-08-17 DIAGNOSIS — K21.9 GASTROESOPHAGEAL REFLUX DISEASE WITHOUT ESOPHAGITIS: ICD-10-CM

## 2018-08-17 NOTE — TELEPHONE ENCOUNTER
"Requested Prescriptions   Pending Prescriptions Disp Refills     ranitidine (ZANTAC) 300 MG tablet [Pharmacy Med Name: RANITIDINE 300 MG TABLET] 90 tablet 3    Last Written Prescription Date:  7/21/18  Last Fill Quantity: 60,  # refills: 0   Last Office Visit with [facility], [facility] or [facility] prescribing provider:  8/1/2018     Future Office Visit:    Next 5 appointments (look out 90 days)     Aug 20, 2018 11:20 AM CDT   [facility] with Br[facility]na Dc MD   [facility]    [address] [facility] [id number] [phone]            Aug 29, 2018  1:00 PM CDT   Office Visit with Debbie Stahl RPH   [facility]    [address] [place] [id number] [phone]                  Sig: TAKE 1 TABLET (300 MG) BY MOUTH DAILY    H2 Blockers Protocol Passed    8/17/2018 12:49 PM       Passed - Patient is age 12 or older       Passed - Recent (12 mo) or future (30 days) visit within the authorizing provider's specialty    Patient had office visit in the last 12 months or has a visit in the next 30 days with authorizing provider or within the authorizing provider's specialty.  See \"Patient Info\" tab in inbasket, or \"Choose Columns\" in Meds & Orders section of the refill encounter.              "

## 2018-08-17 NOTE — TELEPHONE ENCOUNTER
Spoke with patient, she said she would need to return call to clinic in 10-15 minutes as she was not home yet.     Patient stated that she was never contacted by a provider regarding results. This was from 8/15/18. Routing to provider to see how to proceed, if patient should still be advised to go to ED, at this time plan/advisement is unclear.    Camilla Mueller RN    This writer attempted to contact Alessandra on 08/17/18      Reason for call results and spoke with patient, she said she would call clinic back.      If patient calls back:   Chacorta Mueller RN

## 2018-08-17 NOTE — TELEPHONE ENCOUNTER
With potassium now in normal range, emergency room visit not necessary though I believe patient should be seen today to evaluate for any symptoms regarding her most recent labs.  If she is experiencing any symptoms like chest pain or shortness of breath, she should go to emergency room. If not, please schedule patient to be seen today.  GAVINO Minor CNP

## 2018-08-17 NOTE — TELEPHONE ENCOUNTER
Noted. Called and spoke with Alessandra. She states she cannot get a ride to the clinic today due not having a car and being dependent on family members for a ride. Writer will connect patient with  through [employer] to help patient find resources for transportation - referral placed.     Patient states that she will be able to come in for office visit with PCP on Monday to address labs/current issues - she has been scheduled on Monday 8/20/18.    Patient states that she is feeling well today. She is denying shortness of breath, chest pain, abdominal pain, confusion, or any distress.     She did comment that her BG fasting earlier this AM was 279, she then took her sliding scale Lantus insulin 3 units as directed:    Disp Refills Start End RADHA   insulin glargine (LANTUS SOLOSTAR) 100 UNIT/ML pen 2.4 mL 0 7/20/2018 8/19/2018 No   Sig - Route: Inject 8 Units Subcutaneous At Bedtime - Subcutaneous   Class: E-Prescribe   Order: [id number]   E-Prescribing Status: Receipt confirmed by pharmacy (7/20/2018 11:51 AM CDT)     Patient rechecked BG while on phone and it went down to 212. She denies feeling symptomatic with BG - denying decreased level of consciousness, confusion, excessive thirst, dry mouth, or frequent urination. She also denies deep/rapid breathing, fruity smelling breath, fever, or weakness, fatigue, and drowsiness. Denies seizures, severe dehydration, persistent diarrhea or vomiting.      Writer advised patient that providers may advise patient call 911 to be evaluated in ED since she is unable to get a ride to clinic but patient requesting that update be sent to providers. Writer advised on emergent signs and symptoms that would warrant patient calling 911 immediately to be evaluated, she states understanding.     Camilla Mueller RN

## 2018-08-20 ENCOUNTER — PATIENT OUTREACH (OUTPATIENT)
Dept: CARE COORDINATION | Facility: CLINIC | Age: 53
End: 2018-08-20

## 2018-08-20 ENCOUNTER — TELEPHONE (OUTPATIENT)
Dept: FAMILY MEDICINE | Facility: CLINIC | Age: 53
End: 2018-08-20

## 2018-08-20 ASSESSMENT — ACTIVITIES OF DAILY LIVING (ADL): DEPENDENT_IADLS:: CLEANING;COOKING;LAUNDRY;SHOPPING;MEAL PREPARATION;MONEY MANAGEMENT;TRANSPORTATION

## 2018-08-20 NOTE — PROGRESS NOTES
Clinic Care Coordination Contact  [facility]/Voicemail    Referral Source: IP Handoff  Clinical Data: Care Coordinator Outreach  Outreach attempted x 1, at home and on her cell phone.  Left message on voicemail with call back information and requested return call.  Plan: Care Coordinator mailed out care coordination introduction letter on 7/31/18. Care Coordinator will try to reach patient again in 3-5 business days.      Care Team Conversations    RN CC spoke with patient's home care RN Case Manager Sangeeta [phone] who states she went to patient's home today to see her and patient was not there.  VADIM STARKEY informed Sangeeta of patient's recent high potassium readings, of triage attempting to contact patient (see 8/15/18 and 8/20/18 telephone encounters) and of her No Show appointment today in clinic.  Sangeeta states she will attempt to see patient again tomorrow and will then update writer.    Melissa Behl BSN, RN, N  [facility] Care Coordinator  [phone]

## 2018-08-20 NOTE — TELEPHONE ENCOUNTER
This writer attempted to contact Alessandra on 08/20/18      Reason for call triage/assist with scheduling appointment and left message.      If patient calls back:   Patient contacted by a Registered Nurse. Inform patient that someone from the RN group will contact them, document that pt called and route to P DYAD 3 RN POOL [[id number]]        Eun Barnes RN

## 2018-08-20 NOTE — TELEPHONE ENCOUNTER
Notes Recorded by Ginny Mera MA on 8/20/2018 at 9:38 AM  Routing to VADIM suarez.  Ginny Mera MA 8/20/2018    ------    Notes Recorded by Brionna Calabrese MD on 8/20/2018 at 9:33 AM  Please call patient.    Potassium is very high.    Brionna Seaman M.D.  ------    Notes Recorded by Cathryn Swartz APRN CNP on 8/15/2018 at 7:49 AM  Left message for patient to return my call ASAP.  Cathryn MANCIA, STEPH          Newer results are available. Click               Provider,   Please see telephone encounter from 8/15/18 - is this regarding the same lab (was routed high priority to you)? Patient was advised to be seen on Friday 8/17/18 - discussed in that telephone encounter that patient was unable. She is scheduled with you for OV today 8/20/18 at 11:20 AM.     Please let us know if there is anything you would like RN's to do or advise differently.    Camilla Mueller, RN

## 2018-08-20 NOTE — PROGRESS NOTES
Clinic Care Coordination Contact  [facility]/Voicemail       Clinical Data: Care Coordinator Outreach  Outreach attempted x 1.  Left message on voicemail with call back information and requested return call.  Plan: Care Coordinator mailed out care coordination introduction letter on 7-31-18. Care Coordinator will try to reach patient again in 3-5 business days.  TIERA Abdul, Clinic Care Coordinator 8/20/2018   1:28 PM  [phone]

## 2018-08-21 ENCOUNTER — TELEPHONE (OUTPATIENT)
Dept: ORTHOPEDICS | Facility: CLINIC | Age: 53
End: 2018-08-21

## 2018-08-21 ENCOUNTER — OFFICE VISIT (OUTPATIENT)
Dept: ORTHOPEDICS | Facility: CLINIC | Age: 53
End: 2018-08-21
Payer: COMMERCIAL

## 2018-08-21 DIAGNOSIS — E11.40 TYPE 2 DIABETES MELLITUS WITH DIABETIC NEUROPATHY, WITH LONG-TERM CURRENT USE OF INSULIN (H): Primary | Chronic | ICD-10-CM

## 2018-08-21 DIAGNOSIS — Z79.4 TYPE 2 DIABETES MELLITUS WITH DIABETIC NEUROPATHY, WITH LONG-TERM CURRENT USE OF INSULIN (H): Primary | Chronic | ICD-10-CM

## 2018-08-21 DIAGNOSIS — L97.423 DIABETIC ULCER OF LEFT MIDFOOT ASSOCIATED WITH TYPE 2 DIABETES MELLITUS, WITH NECROSIS OF MUSCLE (H): ICD-10-CM

## 2018-08-21 DIAGNOSIS — E11.621 DIABETIC ULCER OF LEFT MIDFOOT ASSOCIATED WITH TYPE 2 DIABETES MELLITUS, WITH NECROSIS OF MUSCLE (H): ICD-10-CM

## 2018-08-21 NOTE — TELEPHONE ENCOUNTER
[facility] called re: where to send patient's wound vac per the call center. Call center told to send it to the patient's listed home address per Dr. Lewis.    Meng Vuong

## 2018-08-21 NOTE — TELEPHONE ENCOUNTER
This writer attempted to contact Alessandra on 08/21/18      Reason for call abnormal lab results, reschedule appointment and left message.      If patient calls back:   Patient contacted by a Registered Nurse. Inform patient that someone from the RN group will contact them, document that pt called and route to P DYAD 3 RN POOL [[id number]]    Yvan Aguirre RN, BSN

## 2018-08-21 NOTE — PROGRESS NOTES
Chief Complaints and History of Present Illnesses   Patient presents with     Wound Check     Wounds, left foot. Pt is requesting a different boot.           No Known Allergies      Subjective: Alessandra is a 53 year old female who presents to the clinic today for a follow up of left foot ulceration. She continues to use the Iodosorb as directed. No pain to the area. Waiting for the wound to heal in order to get the prosthetic.    Objective  A diabetic wound is noted at left  medial foot at first met head measuring  2.4 cm x 2 cm x 0.3 cm. Non-tender to palpation.      Lozano Classification: III     Wound base: Pink, Yellow/Granulation, Slough, tendon     Edges: intact     Drainage: slight/serous     Odor: No      Undermining: Yes, plantar portion of wound within subq     Tunneling: No     Bone Exposure: No     Clinical Signs of Infection: No        After obtaining patient consent, the wound was irrigated with copious amounts of saline. No sharp debridement performed today.      Assessment: DM2 with severe neuropathy, right BKA, and left 1st met head ulceration.      Plan:   - Pt seen and evaluated  - Recommend a wound VAC for the area. I have ordered this for her through [facility].   - Pt to return to clinic in 1 week for VAC placement. Cont Iodosorb until then.

## 2018-08-21 NOTE — NURSING NOTE
Reason For Visit:   Chief Complaint   Patient presents with     Wound Check     Wounds, left foot. Pt is requesting a different boot.        Pain Assessment  Patient Currently in Pain: Denies               Current Outpatient Prescriptions   Medication Sig Dispense Refill     acetaminophen (TYLENOL) 500 MG tablet Take 2 tablets (1,000 mg) by mouth 3 times daily       acetone, Urine, test STRP 1 strip by In Vitro route as needed 25 each 1     albuterol (PROAIR HFA) 108 (90 Base) MCG/ACT Inhaler Inhale 2 puffs into the lungs every 4 hours as needed for shortness of breath / dyspnea 1 Inhaler 0     amitriptyline (ELAVIL) 50 MG tablet Take 1 tablet (50 mg) by mouth At Bedtime 60 tablet 0     aspirin 81 MG tablet Take 1 tablet (81 mg) by mouth daily 100 tablet 3     blood glucose (NO BRAND SPECIFIED) lancets standard Use to test blood sugar 10 times daily or as directed. Accu-check 100 each 11     blood glucose monitoring (NO BRAND SPECIFIED) meter device kit Use to test blood sugar 10 times daily or as directed. Accu-check 1 kit 0     blood glucose monitoring (NO BRAND SPECIFIED) test strip Use to test blood sugars 10 times daily or as directed. Accu-chek 100 strip 11     fluticasone (FLONASE) 50 MCG/ACT spray Spray 2 sprays into left nostril daily 1 Bottle 0     furosemide (LASIX) 40 MG tablet Take 1 tablet (40 mg) by mouth daily 30 tablet 0     glucose 4 g CHEW chewable tablet Take 3-4 tablets to treat low blood sugar. 25 tablet 11     insulin aspart (NOVOLOG PEN) 100 UNIT/ML injection Inject 1-7 Units Subcutaneous 3 times daily (before meals) 6.3 mL 0     insulin glargine (LANTUS SOLOSTAR) 100 UNIT/ML pen Inject 8 Units Subcutaneous At Bedtime 2.4 mL 0     insulin pen needle (B-D U/F) 31G X 5 MM Use 3 time(s) a day. 3 each 3     levonorgestrel (MIRENA, 52 MG,) 20 MCG/24HR IUD placed today 1 each 0     Lidocaine (LIDOCARE) 4 % Patch Place 2 patches onto the skin every 24 hours (Patient not taking: Reported on  8/15/2018) 30 patch 0     lisinopril (PRINIVIL/ZESTRIL) 10 MG tablet Take 1 tablet (10 mg) by mouth daily 30 tablet 0     methadone (DOLPHINE-INTENSOL) 10 mg/mL CONC (HIGH CONC) solution Take 7 mLs (70 mg) by mouth daily 70 mL 0     metoprolol succinate (TOPROL-XL) 25 MG 24 hr tablet Take 1 tablet (25 mg) by mouth daily 90 tablet 1     multivitamin, therapeutic with minerals (THERA-VIT-M) TABS tablet Take 1 tablet by mouth daily 30 each 0     polyethylene glycol (MIRALAX/GLYCOLAX) Packet Take 17 g by mouth daily 7 packet 0     pregabalin (LYRICA) 75 MG capsule Take 1 capsule (75 mg) by mouth 3 times daily 90 capsule 0     ranitidine (ZANTAC) 300 MG tablet Take 1 tablet (300 mg) by mouth daily 60 tablet 0     umeclidinium-vilanterol (ANORO ELLIPTA) 62.5-25 MCG/INH oral inhaler Inhale 1 puff into the lungs daily 1 Inhaler 2        No Known Allergies

## 2018-08-21 NOTE — MR AVS SNAPSHOT
After Visit Summary   8/21/2018    Alessandra Pak    MRN: [id number]           Patient Information     Date Of Birth          1965        Visit Information        Provider Department      8/21/2018 11:00 AM [facility]         Follow-ups after your visit        Your next 10 appointments already scheduled     Aug 28, 2018 12:00 PM CDT   (Arrive by 11:45 AM)   RETURN FOOT/ANKLE with Barrington Lewis DPM   [facility] ([facility] [place])    [address] [place] [address] [id number] [phone]            Aug 29, 2018  1:00 PM CDT   Office Visit with Debbie Stahl [facility] ([facility])    [address] [place] [id number] [phone]           Bring a current list of meds and any records pertaining to this visit. For Physicals, please bring immunization records and any forms needing to be filled out. Please arrive 10 minutes early to complete paperwork.            Aug 30, 2018  3:00 PM CDT   (Arrive by 2:45 PM)   Return Visit with Samina Downs MD   [facility] ([facility])    [address] [place] [id number] [phone]            Sep 18, 2018 10:40 AM CDT   (Arrive by 10:10 AM)   RETURN SPINE with Ambrose King MD   [facility] ([facility])    [address] [place] [id number] [phone]              Who to contact     Please call your clinic at [phone] to:    Ask questions about your health    Make or cancel appointments    Discuss your medicines    Learn about your test results    Speak to your doctor            Additional Information About Your Visit        MyChart Information     MyChart gives you secure access to your electronic health record. If you  see a primary care provider, you can also send messages to your care team and make appointments. If you have questions, please call your primary care clinic.  If you do not have a primary care provider, please call [phone] and they will assist you.      [employer] is an electronic gateway that provides easy, online access to your medical records. With [employer], you can request a clinic appointment, read your test results, renew a prescription or communicate with your care team.     To access your existing account, please contact your [facility] or call [phone] for assistance.        Care EveryWhere ID     This is your Care EveryWhere ID. This could be used by other organizations to access your [facility] medical records  [id number]         Blood Pressure from Last 3 Encounters:   08/01/18 102/68   07/29/18 126/87   07/28/18 104/77    Weight from Last 3 Encounters:   08/01/18 51.1 kg (112 lb 9.6 oz)   07/28/18 54.4 kg (120 lb)   07/19/18 53.6 kg (118 lb 1.6 oz)              Today, you had the following     No orders found for display       Primary Care Provider Office Phone # Fax #    Brionna Coby Dc [phone]       [address]  [facility] [id number]        Goals        General    #1 Financial Wellbeing (pt-stated)     Notes - Note created  7/31/2018  2:08 PM by Behl, Melissa K, RN    Goal Statement: I will call [place] [employer] [phone]  Measure of Success: Patient will call [place] [employer] and receive local food assistance resources.  Supportive Steps to Achieve: RNCC provided patient with phone number  Barriers: none known at this time  Strengths: family support, home care, engaged in care coordination  Date to Achieve By: 8/30/18       #2 Transportation (pt-stated)     Notes - Note created  7/31/2018  2:09 PM by Behl, Melissa K, RN    Goal Statement: I will call the city and/or police department to request handicap  parking near my residence  Measure of Success: Patient will call and receive appropriate assistance/direction in obtaining handicap parking near her residence  Supportive Steps to Achieve: RNCC advised patient to contact her city/police department to request handicap parking near her residence  Barriers: none known at this time  Strengths: family support, home care, engaged in care coordination  Date to Achieve By: 8/30/18       #3 Medical (pt-stated)     Notes - Note created  7/31/2018  2:10 PM by Behl, Melissa K, RN    Goal Statement: I will test my blood sugar 3 times/day  Measure of Success: Patient will consistent test blood sugar 3 times/day  Supportive Steps to Achieve: RNCC sending refill request for test strips to PCP  Barriers: does not have test strips  Strengths: family support, home care, engaged in care coordination  Date to Achieve By: 8/30/18         Equal Access to Services     NICK ROBLEDO : Hadivis palacioo Sotyler, waaxda luqadaha, qaybta kaalmada aderosaurayaalbino, sanjay levine . So [facility] [phone].    ATENCIÓN: Si habla español, tiene a nagy disposición servicios gratuitos de asistencia lingüística. Llame al [phone].    We comply with applicable federal civil rights laws and [place] laws. We do not discriminate on the basis of race, color, national origin, age, disability, sex, sexual orientation, or gender identity.            Thank you!     Thank you for choosing [facility]  for your care. Our goal is always to provide you with excellent care. Hearing back from our patients is one way we can continue to improve our services. Please take a few minutes to complete the written survey that you may receive in the mail after your visit with us. Thank you!             Your Updated Medication List - Protect others around you: Learn how to safely use, store and throw away your medicines at www.disposemymeds.org.          This list is accurate as of  8/21/18 11:28 AM.  Always use your most recent med list.                   Brand Name Dispense Instructions for use Diagnosis    acetaminophen 500 MG tablet    TYLENOL     Take 2 tablets (1,000 mg) by mouth 3 times daily    Status post below knee amputation of right lower extremity (H)       acetone (Urine) test Strp     25 each    1 strip by In Vitro route as needed    Type 2 diabetes mellitus with diabetic neuropathy (H)       albuterol 108 (90 Base) MCG/ACT inhaler    PROAIR HFA    1 Inhaler    Inhale 2 puffs into the lungs every 4 hours as needed for shortness of breath / dyspnea    SOB (shortness of breath)       amitriptyline 50 MG tablet    ELAVIL    60 tablet    Take 1 tablet (50 mg) by mouth At Bedtime    Status post below knee amputation of right lower extremity (H)       aspirin 81 MG tablet     100 tablet    Take 1 tablet (81 mg) by mouth daily    Type 2 diabetes mellitus with complication, with long-term current use of insulin (H)       blood glucose lancets standard    no brand specified    100 each    Use to test blood sugar 10 times daily or as directed. Accu-check    Type 2 diabetes mellitus with stage 3 chronic kidney disease, with long-term current use of insulin (H)       blood glucose monitoring meter device kit    no brand specified    1 kit    Use to test blood sugar 10 times daily or as directed. Accu-check    Type 2 diabetes mellitus with stage 3 chronic kidney disease, with long-term current use of insulin (H)       blood glucose monitoring test strip    no brand specified    100 strip    Use to test blood sugars 10 times daily or as directed. Accu-chek    Type 2 diabetes mellitus with stage 3 chronic kidney disease, with long-term current use of insulin (H)       fluticasone 50 MCG/ACT spray    FLONASE    1 Bottle    Spray 2 sprays into left nostril daily    Acute nonseasonal allergic rhinitis due to pollen       furosemide 40 MG tablet    LASIX    30 tablet    Take 1 tablet (40 mg) by  mouth daily    SOB (shortness of breath)       glucose 4 g Chew chewable tablet     25 tablet    Take 3-4 tablets to treat low blood sugar.    Type 2 diabetes mellitus with complication, with long-term current use of insulin (H)       insulin aspart 100 UNIT/ML injection    NovoLOG PEN    6.3 mL    Inject 1-7 Units Subcutaneous 3 times daily (before meals)    Type 2 diabetes mellitus with stage 3 chronic kidney disease, with long-term current use of insulin (H)       insulin glargine 100 UNIT/ML injection    LANTUS    2.4 mL    Inject 8 Units Subcutaneous At Bedtime    Type 2 diabetes mellitus with stage 3 chronic kidney disease, with long-term current use of insulin (H)       insulin pen needle 31G X 5 MM    B-D U/F    3 each    Use 3 time(s) a day.    Type 2 diabetes, HbA1c goal < 7% (H)       levonorgestrel 20 MCG/24HR IUD    MIRENA (52 MG)    1 each    placed today    Excessive or frequent menstruation       Lidocaine 4 % Patch    LIDOCARE    30 patch    Place 2 patches onto the skin every 24 hours    Status post below knee amputation of right lower extremity (H)       lisinopril 10 MG tablet    PRINIVIL/ZESTRIL    30 tablet    Take 1 tablet (10 mg) by mouth daily    Type 2 diabetes mellitus with stage 3 chronic kidney disease, with long-term current use of insulin (H)       methadone 10 mg/mL Conc (HIGH CONC) solution    DOLPHINE-INTENSOL    70 mL    Take 7 mLs (70 mg) by mouth daily    Status post below knee amputation of right lower extremity (H)       metoprolol succinate 25 MG 24 hr tablet    TOPROL-XL    90 tablet    Take 1 tablet (25 mg) by mouth daily    Hypertension goal BP (blood pressure) < 140/90, Chronic systolic congestive heart failure (H)       multivitamin, therapeutic with minerals Tabs tablet     30 each    Take 1 tablet by mouth daily    Status post below knee amputation of right lower extremity (H)       polyethylene glycol Packet    MIRALAX/GLYCOLAX    7 packet    Take 17 g by mouth daily     Status post below knee amputation of right lower extremity (H)       pregabalin 75 MG capsule    LYRICA    90 capsule    Take 1 capsule (75 mg) by mouth 3 times daily    Status post below knee amputation of right lower extremity (H)       ranitidine 300 MG tablet    ZANTAC    60 tablet    Take 1 tablet (300 mg) by mouth daily    Status post below knee amputation of right lower extremity (H)       umeclidinium-vilanterol 62.5-25 MCG/INH oral inhaler    ANORO ELLIPTA    1 Inhaler    Inhale 1 puff into the lungs daily    Chronic bronchitis, unspecified chronic bronchitis type (H)

## 2018-08-21 NOTE — LETTER
8/21/2018       RE: Alessandra Pak  [address]  [facility] [id number]     Dear Colleague,    Thank you for referring your patient, Alessandra Pak, to the [facility]. Please see a copy of my visit note below.    Chief Complaints and History of Present Illnesses   Patient presents with     Wound Check     Wounds, left foot. Pt is requesting a different boot.           No Known Allergies      Subjective: Alessandra is a 53 year old female who presents to the clinic today for a follow up of left foot ulceration. She continues to use the Iodosorb as directed. No pain to the area. Waiting for the wound to heal in order to get the prosthetic.    Objective  A diabetic wound is noted at left  medial foot at first met head measuring  2.4 cm x 2 cm x 0.3 cm. Non-tender to palpation.      Lozano Classification: III     Wound base: Pink, Yellow/Granulation, Slough, tendon     Edges: intact     Drainage: slight/serous     Odor: No      Undermining: Yes, plantar portion of wound within subq     Tunneling: No     Bone Exposure: No     Clinical Signs of Infection: No        After obtaining patient consent, the wound was irrigated with copious amounts of saline. No sharp debridement performed today.      Assessment: DM2 with severe neuropathy, right BKA, and left 1st met head ulceration.      Plan:   - Pt seen and evaluated  - Recommend a wound VAC for the area. I have ordered this for her through [facility].   - Pt to return to clinic in 1 week for VAC placement. Cont Iodosorb until then.          Again, thank you for allowing me to participate in the care of your patient.      Sincerely,    Barrington Lewis DPM

## 2018-08-22 NOTE — TELEPHONE ENCOUNTER
This writer attempted to contact Alessandra on 08/22/18      Reason for call abnormal results/reschedule appointment and left detailed message.      If patient calls back:   Patient contacted by a Registered Nurse. Inform patient that someone from the RN group will contact them, document that pt called and route to P DYAD 3 RN POOL [[id number]]    Yvan Aguirre RN, BSN    Three attempts have been made to contact patient with no callback. Would you like letter sent at this time?

## 2018-08-22 NOTE — TELEPHONE ENCOUNTER
Patient was contacted and no showed appt.  Attempted to contact again to schedule follow up but unable.  No further contact at this time. Patient saw ortho yesterday as well.    Brionna Seaman M.D.

## 2018-08-22 NOTE — PROGRESS NOTES
Clinic Care Coordination Contact  Care Team Conversations    Rn/CCC is involved with pt and will attempt future outreaches.  She will notify Sw if assistance is needed.  Until then Sw will not attempt any further calls.       TIERA Abdul, Clinic Care Coordinator 8/22/2018   9:03 AM  [phone]

## 2018-08-22 NOTE — PROGRESS NOTES
"Rehabilitation Medicine Wheelchair Face to Face     Diagnosis: R BKA secondary to diabetic foot ulcer/osteomyelitis   Currently has limited mobility due to R below knee amputation, diabetic foot ulcers on L .  Current transfer status: SBA with wheeled walker.   Distance patient currently able to walk: weight bear on L LE for transfers only due to diabetic foot ulcers.Limited to a few feet amb with wheeled walker.    Therapy team has ruled out the following less costly options:   Cane due to R BKA, limited WB on L LE because of diabetic foot ulcers   Walker due to  R BKA, limited WB on L LE because of diabetic foot ulcers    Patient will use wheelchair to complete Mobility Related ADL's in the home including all mobility around home, toileting and self cares.   Without a wheelchair patient will be unable to safely move about their home.  This equipment will allow them to be out of bed, participate in home activities with their family, and it will be part of a fall prevention plan for safe mobility.   Patient's home will accommodate use of wheelchair.  Patient has a family member willing and able to assist as necessary with wheelchair.   Patient needs an amputee support pad on R. Patient needs a pawel-height chair due to small body stature in order to self propel with their feet.    Arm and leg strength: adequate UE strength to propel as evidenced by propelling w/c on [facility]CU nursing unit    Gait/balance/coordination: gait limited by L foot ulcers, WB on forefoot for transfers    Length of need: 99 months    Current height: 5'8\"  Current weight:117  : 1965    Prashanth Ramesh MD  #[id number]        "

## 2018-08-23 LAB
ALBUMIN SERPL-MCNC: 2.8 G/DL (ref 3.4–5)
ALP SERPL-CCNC: 283 U/L (ref 40–150)
ALT SERPL W P-5'-P-CCNC: 42 U/L (ref 0–50)
ANION GAP SERPL CALCULATED.3IONS-SCNC: 8 MMOL/L (ref 3–14)
AST SERPL W P-5'-P-CCNC: 24 U/L (ref 0–45)
BASOPHILS # BLD AUTO: 0 10E9/L (ref 0–0.2)
BASOPHILS NFR BLD AUTO: 0.2 %
BILIRUB SERPL-MCNC: 0.2 MG/DL (ref 0.2–1.3)
BUN SERPL-MCNC: 34 MG/DL (ref 7–30)
CALCIUM SERPL-MCNC: 9.1 MG/DL (ref 8.5–10.1)
CHLORIDE SERPL-SCNC: 104 MMOL/L (ref 94–109)
CO2 SERPL-SCNC: 27 MMOL/L (ref 20–32)
CREAT SERPL-MCNC: 1.29 MG/DL (ref 0.52–1.04)
CRP SERPL-MCNC: 59.1 MG/L (ref 0–8)
DIFFERENTIAL METHOD BLD: ABNORMAL
EOSINOPHIL # BLD AUTO: 0.2 10E9/L (ref 0–0.7)
EOSINOPHIL NFR BLD AUTO: 3 %
ERYTHROCYTE [DISTWIDTH] IN BLOOD BY AUTOMATED COUNT: 16.2 % (ref 10–15)
GFR SERPL CREATININE-BSD FRML MDRD: 44 ML/MIN/1.7M2
GLUCOSE SERPL-MCNC: 88 MG/DL (ref 70–99)
HCT VFR BLD AUTO: 32.1 % (ref 35–47)
HGB BLD-MCNC: 10.4 G/DL (ref 11.7–15.7)
IMM GRANULOCYTES # BLD: 0 10E9/L (ref 0–0.4)
IMM GRANULOCYTES NFR BLD: 0.1 %
LYMPHOCYTES # BLD AUTO: 3.6 10E9/L (ref 0.8–5.3)
LYMPHOCYTES NFR BLD AUTO: 44 %
MCH RBC QN AUTO: 26.7 PG (ref 26.5–33)
MCHC RBC AUTO-ENTMCNC: 32.4 G/DL (ref 31.5–36.5)
MCV RBC AUTO: 82 FL (ref 78–100)
MONOCYTES # BLD AUTO: 0.4 10E9/L (ref 0–1.3)
MONOCYTES NFR BLD AUTO: 4.6 %
NEUTROPHILS # BLD AUTO: 3.9 10E9/L (ref 1.6–8.3)
NEUTROPHILS NFR BLD AUTO: 48.1 %
NRBC # BLD AUTO: 0 10*3/UL
NRBC BLD AUTO-RTO: 0 /100
PLATELET # BLD AUTO: 301 10E9/L (ref 150–450)
POTASSIUM SERPL-SCNC: 4.6 MMOL/L (ref 3.4–5.3)
PROT SERPL-MCNC: 8 G/DL (ref 6.8–8.8)
RBC # BLD AUTO: 3.9 10E12/L (ref 3.8–5.2)
SODIUM SERPL-SCNC: 139 MMOL/L (ref 133–144)
WBC # BLD AUTO: 8.1 10E9/L (ref 4–11)

## 2018-08-23 PROCEDURE — 80053 COMPREHEN METABOLIC PANEL: CPT | Performed by: COLON & RECTAL SURGERY

## 2018-08-23 PROCEDURE — 86140 C-REACTIVE PROTEIN: CPT | Performed by: COLON & RECTAL SURGERY

## 2018-08-23 PROCEDURE — 85025 COMPLETE CBC W/AUTO DIFF WBC: CPT | Performed by: COLON & RECTAL SURGERY

## 2018-08-23 NOTE — TELEPHONE ENCOUNTER
[facility] Call Center    Phone Message    May a detailed message be left on voicemail: no    Reason for Call: Other: [employer] called and they are unable to reach the pt to set up delivery of the wound vac. They need to know if they should deliver it to the clinic?     Action Taken: Message routed to:  [facility] clinic

## 2018-08-23 NOTE — TELEPHONE ENCOUNTER
Patient has not been able to be reached to deliver her wound vac from [facility]. Dr. Lewis was consulted and stated he would like it to be sent to the clinic. [facility] notified of this and given delivery information.

## 2018-08-24 ENCOUNTER — HOME INFUSION (PRE-WILLOW HOME INFUSION) (OUTPATIENT)
Dept: PHARMACY | Facility: CLINIC | Age: 53
End: 2018-08-24

## 2018-08-27 ENCOUNTER — PATIENT OUTREACH (OUTPATIENT)
Dept: CARE COORDINATION | Facility: CLINIC | Age: 53
End: 2018-08-27

## 2018-08-27 ASSESSMENT — ACTIVITIES OF DAILY LIVING (ADL): DEPENDENT_IADLS:: CLEANING;COOKING;LAUNDRY;SHOPPING;MEAL PREPARATION;MONEY MANAGEMENT;TRANSPORTATION

## 2018-08-27 NOTE — PROGRESS NOTES
"Clinic Care Coordination Contact  Care Team Conversations    RN CC called patient's home care RN Sangeeta and was informed patient has a new home care RN Case Manager Nolan, [phone].  VADIM STARKEY spoke with Nolan who states she saw patient 8/23/18 and reported patient \"looked fine.\"  Nolan will be seeing patient again today.  VADIM STARKEY requested for Nolan to inform patient that the clinic had been trying to get a hold of her and that she missed a PCP appointment last week.  Nolan stated she would update patient with this information.    RN CC will follow up with patient in 1-2 week.    Melissa Behl BSN, RN, N  [facility] Care Coordinator  [phone]       "

## 2018-08-27 NOTE — PROGRESS NOTES
This is a recent snapshot of the patient's [facility] medical record.  For current drug dose and complete information and questions, call [phone] or In Basket pool, [facility]v home infusion ([id number])  CSN Number:  [id number]

## 2018-08-28 ENCOUNTER — OFFICE VISIT (OUTPATIENT)
Dept: ORTHOPEDICS | Facility: CLINIC | Age: 53
End: 2018-08-28
Payer: COMMERCIAL

## 2018-08-28 ENCOUNTER — TELEPHONE (OUTPATIENT)
Dept: INFECTIOUS DISEASES | Facility: CLINIC | Age: 53
End: 2018-08-28

## 2018-08-28 DIAGNOSIS — E11.621 DIABETIC ULCER OF LEFT MIDFOOT ASSOCIATED WITH TYPE 2 DIABETES MELLITUS, WITH NECROSIS OF MUSCLE (H): Primary | ICD-10-CM

## 2018-08-28 DIAGNOSIS — Z79.4 TYPE 2 DIABETES MELLITUS WITH STAGE 3 CHRONIC KIDNEY DISEASE, WITH LONG-TERM CURRENT USE OF INSULIN (H): Chronic | ICD-10-CM

## 2018-08-28 DIAGNOSIS — N18.30 TYPE 2 DIABETES MELLITUS WITH STAGE 3 CHRONIC KIDNEY DISEASE, WITH LONG-TERM CURRENT USE OF INSULIN (H): Chronic | ICD-10-CM

## 2018-08-28 DIAGNOSIS — E11.22 TYPE 2 DIABETES MELLITUS WITH STAGE 3 CHRONIC KIDNEY DISEASE, WITH LONG-TERM CURRENT USE OF INSULIN (H): Chronic | ICD-10-CM

## 2018-08-28 DIAGNOSIS — L97.423 DIABETIC ULCER OF LEFT MIDFOOT ASSOCIATED WITH TYPE 2 DIABETES MELLITUS, WITH NECROSIS OF MUSCLE (H): Primary | ICD-10-CM

## 2018-08-28 LAB
ALBUMIN SERPL-MCNC: 2.8 G/DL (ref 3.4–5)
ALP SERPL-CCNC: 263 U/L (ref 40–150)
ALT SERPL W P-5'-P-CCNC: 33 U/L (ref 0–50)
ANION GAP SERPL CALCULATED.3IONS-SCNC: 8 MMOL/L (ref 3–14)
AST SERPL W P-5'-P-CCNC: 20 U/L (ref 0–45)
BASOPHILS # BLD AUTO: 0 10E9/L (ref 0–0.2)
BASOPHILS NFR BLD AUTO: 0.3 %
BILIRUB SERPL-MCNC: 0.3 MG/DL (ref 0.2–1.3)
BUN SERPL-MCNC: 33 MG/DL (ref 7–30)
CALCIUM SERPL-MCNC: 9.1 MG/DL (ref 8.5–10.1)
CHLORIDE SERPL-SCNC: 106 MMOL/L (ref 94–109)
CO2 SERPL-SCNC: 25 MMOL/L (ref 20–32)
CREAT SERPL-MCNC: 1.1 MG/DL (ref 0.52–1.04)
CRP SERPL-MCNC: 52.1 MG/L (ref 0–8)
DIFFERENTIAL METHOD BLD: ABNORMAL
EOSINOPHIL # BLD AUTO: 0.2 10E9/L (ref 0–0.7)
EOSINOPHIL NFR BLD AUTO: 3.1 %
ERYTHROCYTE [DISTWIDTH] IN BLOOD BY AUTOMATED COUNT: 16.3 % (ref 10–15)
GFR SERPL CREATININE-BSD FRML MDRD: 52 ML/MIN/1.7M2
GLUCOSE SERPL-MCNC: 167 MG/DL (ref 70–99)
HCT VFR BLD AUTO: 32.6 % (ref 35–47)
HGB BLD-MCNC: 10.5 G/DL (ref 11.7–15.7)
IMM GRANULOCYTES # BLD: 0 10E9/L (ref 0–0.4)
IMM GRANULOCYTES NFR BLD: 0.1 %
LYMPHOCYTES # BLD AUTO: 3.1 10E9/L (ref 0.8–5.3)
LYMPHOCYTES NFR BLD AUTO: 41.8 %
MCH RBC QN AUTO: 26.7 PG (ref 26.5–33)
MCHC RBC AUTO-ENTMCNC: 32.2 G/DL (ref 31.5–36.5)
MCV RBC AUTO: 83 FL (ref 78–100)
MONOCYTES # BLD AUTO: 0.3 10E9/L (ref 0–1.3)
MONOCYTES NFR BLD AUTO: 4.3 %
NEUTROPHILS # BLD AUTO: 3.8 10E9/L (ref 1.6–8.3)
NEUTROPHILS NFR BLD AUTO: 50.4 %
NRBC # BLD AUTO: 0 10*3/UL
NRBC BLD AUTO-RTO: 0 /100
PLATELET # BLD AUTO: 273 10E9/L (ref 150–450)
POTASSIUM SERPL-SCNC: 5 MMOL/L (ref 3.4–5.3)
PROT SERPL-MCNC: 7.7 G/DL (ref 6.8–8.8)
RBC # BLD AUTO: 3.93 10E12/L (ref 3.8–5.2)
SODIUM SERPL-SCNC: 139 MMOL/L (ref 133–144)
WBC # BLD AUTO: 7.4 10E9/L (ref 4–11)

## 2018-08-28 PROCEDURE — 85025 COMPLETE CBC W/AUTO DIFF WBC: CPT | Performed by: COLON & RECTAL SURGERY

## 2018-08-28 PROCEDURE — 86140 C-REACTIVE PROTEIN: CPT | Performed by: COLON & RECTAL SURGERY

## 2018-08-28 PROCEDURE — 80053 COMPREHEN METABOLIC PANEL: CPT | Performed by: COLON & RECTAL SURGERY

## 2018-08-28 NOTE — TELEPHONE ENCOUNTER
Patient contacted and reminded of upcoming appointment.  Patient confirmed they will be attending.  Patient instructed to bring updated medications list to appointment.  Levar Small MA

## 2018-08-28 NOTE — LETTER
8/28/2018       RE: Alessandra Pak  [address]  [facility] [address]     Dear Colleague,    Thank you for referring your patient, Alessandra Pak, to the [facility]. Please see a copy of my visit note below.    Chief Complaint:   Chief Complaint   Patient presents with     Wound Check     1 week follow up. Wound, left foot. Vac placement.         No Known Allergies      Subjective: Alessandra is a 53 year old female who presents to the clinic today for a follow up of left foot ulcer. She continues with the Iodosorb daily, She is NWB.     Objective  A diabetic wound is noted at left  medial foot at first met head measuring  2.5 cm x 2.4 cm x 0.3 cm. Non-tender to palpation.       Lozano Classification: III      Wound base: Pink, Yellow/Granulation, Slough, tendon      Edges: intact      Drainage: slight/serous      Odor: No       Undermining: Yes, plantar portion of wound within subq      Tunneling: No      Bone Exposure: No      Clinical Signs of Infection: No      After obtaining patient consent, the wound was irrigated with copious amounts of saline. A scalpel was then used to debride the wound into subcutaneous tissue. The wound edges were debrided back to healthy, bleeding tissue. The wound base exhibited healthy bleeding. Given the patient's lack of sensation, no anesthesia was necessary for the procedure.    Assessment: DM2 with severe neuropathy, right BKA, and left 1st met head ulceration.       Plan:   - Pt seen and evaluated.  - Wound debrided as described. VAC was applied to the wound. 125mmHg continuous.   - Home nurse to change MWF.   - Pt to return to clinic in 1 week.       Again, thank you for allowing me to participate in the care of your patient.      Sincerely,    Barrington Lewis DPM

## 2018-08-28 NOTE — MR AVS SNAPSHOT
After Visit Summary   8/28/2018    Alessandra Pak    MRN: [id number]           Patient Information     Date Of Birth          1965        Visit Information        Provider Department      8/28/2018 12:00 PM Barrington Lewis DPM [facility]        Today's Diagnoses     Diabetic ulcer of left midfoot associated with type 2 diabetes mellitus, with necrosis of muscle (H)    -  1    Type 2 diabetes mellitus with stage 3 chronic kidney disease, with long-term current use of insulin (H)           Follow-ups after your visit        Your next 10 appointments already scheduled     Aug 29, 2018  1:00 PM CDT   Office Visit with Debbie Stahl [facility] ([facility])    [address] [place] [id number] [phone]           Bring a current list of meds and any records pertaining to this visit. For Physicals, please bring immunization records and any forms needing to be filled out. Please arrive 10 minutes early to complete paperwork.            Aug 30, 2018  3:00 PM CDT   (Arrive by 2:45 PM)   Return Visit with Samina Downs MD   [facility] ([facility])    [address] [place] [address]  [address] [place] [id number] [phone]            Sep 04, 2018 12:00 PM CDT   (Arrive by 11:45 AM)   RETURN FOOT/ANKLE with Barrington Lewis DPM   [facility] ([facility])    [address] [place] [address]  [address] [facility] [address] [phone]            Sep 18, 2018 10:40 AM CDT   (Arrive by 10:10 AM)   RETURN SPINE with Amrbose King MD   [facility] ([facility])    [address] [place] [address]  [address] [facility] [address] [phone]              Who to contact     Please call your clinic at [phone] to:    Ask questions about your health    Make or cancel  appointments    Discuss your medicines    Learn about your test results    Speak to your doctor            Additional Information About Your Visit        [facility] Information     [employer] gives you secure access to your electronic health record. If you see a primary care provider, you can also send messages to your care team and make appointments. If you have questions, please call your primary care clinic.  If you do not have a primary care provider, please call [phone] and they will assist you.      [employer] is an electronic gateway that provides easy, online access to your medical records. With [employer], you can request a clinic appointment, read your test results, renew a prescription or communicate with your care team.     To access your existing account, please contact your [facility] or call [phone] for assistance.        Care EveryWhere ID     This is your Care EveryWhere ID. This could be used by other organizations to access your [facility] medical records  [id number]         Blood Pressure from Last 3 Encounters:   08/01/18 102/68   07/29/18 126/87   07/28/18 104/77    Weight from Last 3 Encounters:   08/01/18 51.1 kg (112 lb 9.6 oz)   07/28/18 54.4 kg (120 lb)   07/19/18 53.6 kg (118 lb 1.6 oz)              We Performed the Following     DEBRIDE SKIN/SUBQ TISSUE     NEGATIVE PRESSURE WOUND THERAPY < 50 CM        Primary Care Provider Office Phone # Fax #    Brionna Cobylucas Dc [phone]       [id number]  [facility] [address]        Goals        General    #1 Financial Wellbeing (pt-stated)     Notes - Note created  7/31/2018  2:08 PM by Behl, Melissa K, RN    Goal Statement: I will call [place] [employer] [phone]  Measure of Success: Patient will call [place] [employer] and receive local food assistance resources.  Supportive Steps to Achieve: RNCC provided patient with phone number  Barriers: none known at this  time  Strengths: family support, home care, engaged in care coordination  Date to Achieve By: 8/30/18       #2 Transportation (pt-stated)     Notes - Note created  7/31/2018  2:09 PM by Behl, Melissa K, RN    Goal Statement: I will call the city and/or police department to request handicap parking near my residence  Measure of Success: Patient will call and receive appropriate assistance/direction in obtaining handicap parking near her residence  Supportive Steps to Achieve: RNCC advised patient to contact her city/police department to request handicap parking near her residence  Barriers: none known at this time  Strengths: family support, home care, engaged in care coordination  Date to Achieve By: 8/30/18       #3 Medical (pt-stated)     Notes - Note created  7/31/2018  2:10 PM by Behl, Melissa K, VADIM    Goal Statement: I will test my blood sugar 3 times/day  Measure of Success: Patient will consistent test blood sugar 3 times/day  Supportive Steps to Achieve: RNCC sending refill request for test strips to PCP  Barriers: does not have test strips  Strengths: family support, home care, engaged in care coordination  Date to Achieve By: 8/30/18         Equal Access to Services     NICK ROBLEDO : Hadii andres bolivar hadreymundo Sotyler, waaxda luqadaha, qaybta kaalmaalbino piña, sanjay levine . So [facility] [phone].    ATENCIÓN: Si habla español, tiene a nagy disposición servicios gratuitos de asistencia lingüística. Llame al [phone].    We comply with applicable federal civil rights laws and [place] laws. We do not discriminate on the basis of race, color, national origin, age, disability, sex, sexual orientation, or gender identity.            Thank you!     Thank you for choosing [facility]  for your care. Our goal is always to provide you with excellent care. Hearing back from our patients is one way we can continue to improve our services. Please take a few minutes to  complete the written survey that you may receive in the mail after your visit with us. Thank you!             Your Updated Medication List - Protect others around you: Learn how to safely use, store and throw away your medicines at www.disposemymeds.org.          This list is accurate as of 8/28/18 12:30 PM.  Always use your most recent med list.                   Brand Name Dispense Instructions for use Diagnosis    acetaminophen 500 MG tablet    TYLENOL     Take 2 tablets (1,000 mg) by mouth 3 times daily    Status post below knee amputation of right lower extremity (H)       acetone (Urine) test Strp     25 each    1 strip by In Vitro route as needed    Type 2 diabetes mellitus with diabetic neuropathy (H)       albuterol 108 (90 Base) MCG/ACT inhaler    PROAIR HFA    1 Inhaler    Inhale 2 puffs into the lungs every 4 hours as needed for shortness of breath / dyspnea    SOB (shortness of breath)       amitriptyline 50 MG tablet    ELAVIL    60 tablet    Take 1 tablet (50 mg) by mouth At Bedtime    Status post below knee amputation of right lower extremity (H)       aspirin 81 MG tablet     100 tablet    Take 1 tablet (81 mg) by mouth daily    Type 2 diabetes mellitus with complication, with long-term current use of insulin (H)       blood glucose lancets standard    no brand specified    100 each    Use to test blood sugar 10 times daily or as directed. Accu-check    Type 2 diabetes mellitus with stage 3 chronic kidney disease, with long-term current use of insulin (H)       blood glucose monitoring meter device kit    no brand specified    1 kit    Use to test blood sugar 10 times daily or as directed. Accu-check    Type 2 diabetes mellitus with stage 3 chronic kidney disease, with long-term current use of insulin (H)       blood glucose monitoring test strip    no brand specified    100 strip    Use to test blood sugars 10 times daily or as directed. Accu-chek    Type 2 diabetes mellitus with stage 3 chronic  kidney disease, with long-term current use of insulin (H)       fluticasone 50 MCG/ACT spray    FLONASE    1 Bottle    Spray 2 sprays into left nostril daily    Acute nonseasonal allergic rhinitis due to pollen       furosemide 40 MG tablet    LASIX    30 tablet    Take 1 tablet (40 mg) by mouth daily    SOB (shortness of breath)       glucose 4 g Chew chewable tablet     25 tablet    Take 3-4 tablets to treat low blood sugar.    Type 2 diabetes mellitus with complication, with long-term current use of insulin (H)       insulin aspart 100 UNIT/ML injection    NovoLOG PEN    6.3 mL    Inject 1-7 Units Subcutaneous 3 times daily (before meals)    Type 2 diabetes mellitus with stage 3 chronic kidney disease, with long-term current use of insulin (H)       insulin glargine 100 UNIT/ML injection    LANTUS    2.4 mL    Inject 8 Units Subcutaneous At Bedtime    Type 2 diabetes mellitus with stage 3 chronic kidney disease, with long-term current use of insulin (H)       insulin pen needle 31G X 5 MM    B-D U/F    3 each    Use 3 time(s) a day.    Type 2 diabetes, HbA1c goal < 7% (H)       levonorgestrel 20 MCG/24HR IUD    MIRENA (52 MG)    1 each    placed today    Excessive or frequent menstruation       Lidocaine 4 % Patch    LIDOCARE    30 patch    Place 2 patches onto the skin every 24 hours    Status post below knee amputation of right lower extremity (H)       lisinopril 10 MG tablet    PRINIVIL/ZESTRIL    30 tablet    Take 1 tablet (10 mg) by mouth daily    Type 2 diabetes mellitus with stage 3 chronic kidney disease, with long-term current use of insulin (H)       methadone 10 mg/mL Conc (HIGH CONC) solution    DOLPHINE-INTENSOL    70 mL    Take 7 mLs (70 mg) by mouth daily    Status post below knee amputation of right lower extremity (H)       metoprolol succinate 25 MG 24 hr tablet    TOPROL-XL    90 tablet    Take 1 tablet (25 mg) by mouth daily    Hypertension goal BP (blood pressure) < 140/90, Chronic systolic  congestive heart failure (H)       multivitamin, therapeutic with minerals Tabs tablet     30 each    Take 1 tablet by mouth daily    Status post below knee amputation of right lower extremity (H)       polyethylene glycol Packet    MIRALAX/GLYCOLAX    7 packet    Take 17 g by mouth daily    Status post below knee amputation of right lower extremity (H)       pregabalin 75 MG capsule    LYRICA    90 capsule    Take 1 capsule (75 mg) by mouth 3 times daily    Status post below knee amputation of right lower extremity (H)       ranitidine 300 MG tablet    ZANTAC    60 tablet    Take 1 tablet (300 mg) by mouth daily    Status post below knee amputation of right lower extremity (H)       umeclidinium-vilanterol 62.5-25 MCG/INH oral inhaler    ANORO ELLIPTA    1 Inhaler    Inhale 1 puff into the lungs daily    Chronic bronchitis, unspecified chronic bronchitis type (H)

## 2018-08-28 NOTE — PROGRESS NOTES
Chief Complaint:   Chief Complaint   Patient presents with     Wound Check     1 week follow up. Wound, left foot. Vac placement.         No Known Allergies      Subjective: Alessandra is a 53 year old female who presents to the clinic today for a follow up of left foot ulcer. She continues with the Iodosorb daily, She is NWB.     Objective  A diabetic wound is noted at left  medial foot at first met head measuring  2.5 cm x 2.4 cm x 0.3 cm. Non-tender to palpation.       Lozano Classification: III      Wound base: Pink, Yellow/Granulation, Slough, tendon      Edges: intact      Drainage: slight/serous      Odor: No       Undermining: Yes, plantar portion of wound within subq      Tunneling: No      Bone Exposure: No      Clinical Signs of Infection: No      After obtaining patient consent, the wound was irrigated with copious amounts of saline. A scalpel was then used to debride the wound into subcutaneous tissue. The wound edges were debrided back to healthy, bleeding tissue. The wound base exhibited healthy bleeding. Given the patient's lack of sensation, no anesthesia was necessary for the procedure.    Assessment: DM2 with severe neuropathy, right BKA, and left 1st met head ulceration.       Plan:   - Pt seen and evaluated.  - Wound debrided as described. VAC was applied to the wound. 125mmHg continuous.   - Home nurse to change MWF.   - Pt to return to clinic in 1 week.

## 2018-08-29 ENCOUNTER — TELEPHONE (OUTPATIENT)
Dept: ORTHOPEDICS | Facility: CLINIC | Age: 53
End: 2018-08-29

## 2018-08-29 ENCOUNTER — OFFICE VISIT (OUTPATIENT)
Dept: PHARMACY | Facility: CLINIC | Age: 53
End: 2018-08-29
Payer: COMMERCIAL

## 2018-08-29 DIAGNOSIS — Z13.6 CARDIOVASCULAR SCREENING; LDL GOAL LESS THAN 100: ICD-10-CM

## 2018-08-29 DIAGNOSIS — Z79.4 TYPE 2 DIABETES MELLITUS WITH DIABETIC NEUROPATHY, WITH LONG-TERM CURRENT USE OF INSULIN (H): Primary | ICD-10-CM

## 2018-08-29 DIAGNOSIS — I10 HYPERTENSION GOAL BP (BLOOD PRESSURE) < 140/90: ICD-10-CM

## 2018-08-29 DIAGNOSIS — J42 CHRONIC BRONCHITIS, UNSPECIFIED CHRONIC BRONCHITIS TYPE (H): ICD-10-CM

## 2018-08-29 DIAGNOSIS — I50.22 CHRONIC SYSTOLIC CONGESTIVE HEART FAILURE (H): ICD-10-CM

## 2018-08-29 DIAGNOSIS — E11.40 TYPE 2 DIABETES MELLITUS WITH DIABETIC NEUROPATHY, WITH LONG-TERM CURRENT USE OF INSULIN (H): Primary | ICD-10-CM

## 2018-08-29 PROCEDURE — 99606 MTMS BY PHARM EST 15 MIN: CPT | Performed by: PHARMACIST

## 2018-08-29 PROCEDURE — 99607 MTMS BY PHARM ADDL 15 MIN: CPT | Performed by: PHARMACIST

## 2018-08-29 NOTE — TELEPHONE ENCOUNTER
[facility] Call Center    Phone Message    May a detailed message be left on voicemail: yes    Reason for Call: Other: HNP and Progress notes on the wound for left foot wound.  Please fax [phone] re# [phone].     Action Taken: Message routed to:  [facility]

## 2018-08-29 NOTE — MR AVS SNAPSHOT
After Visit Summary   8/29/2018    Alessandra Pak    MRN: [id number]           Patient Information     Date Of Birth          1965        Visit Information        Provider Department      8/29/2018 1:00 PM Debbie Stahl [facility] MTM        Care Instructions    Recommendations from today's MTM visit:                                                      1. Choose less starchy vegetables.     2. Try to limit your carbs for each meal to 45-60 grams of carbs per meal. Right now some of you meals can be close to 100 gms of carbs!!    3. Your snacks should be 15-30 grams of carbs and for sure no more than 30 grams of carbs.     4. Include protein with each meal and snack.    5. Only drink 4 oz of orange juice and recheck 15 minutes later to make sure it went up and then you can have more if still less than 70.     6. Schedule with your endocrinologist to follow up and get set up with a pump an also see a dietician to discuss diet. Do this as soon as possible. The number is [phone]    7. Keep working hard at lowering your blood sugars. I know you can do it!    Next MTM visit: at your request. Any future visits you would have to pay out of pocket unless your insurance changes and they cover MTM visits.    To schedule another MTM appointment, please call the clinic directly or you may call the MTM scheduling line at [phone] or toll-free at [phone].     My Clinical Pharmacist's contact information:                                                      It was a pleasure seeing you today!  Please feel free to contact me with any questions or concerns you have.      Jovita Stahl, PharmD  Medication Therapy Management Pharmacist  [facility] - Monday and Wednesday 7:30 - 4:00  Phone: [phone] - direct clinic line    You may receive a survey about the MTM services you received.  I would appreciate your feedback to help me serve you better in the future. Please fill  it out and return it when you can. Your comments will be anonymous.      My healthcare goals:                                                      Diabetes Goals:    Home Monitoring of Blood Sugars:Fasting  mg/dL and 2 hours after a meal less than 150 mg/dL.    Hemoglobin A1C: Less than 7%. Yours is   Lab Results   Component Value Date    A1C 9.5 06/06/2018   .    Blood Pressure: Less than 140/90mmHg.     Cholesterol: You are taking not taking a statin to help decrease the risk of heart disease but you should be. Please talk to your primary doctor about this.    Things you can do to help lower your blood sugars:    Diet: 3 meals and 1-2 snacks per day with 45-60 grams of carbohydrates per meal and 15-30 grams of carbohydrates per snack. Try to fill your plate at least half-full with vegetables, fill one-quarter full with lean meats or protein, and also make sure you get at least some carbohydrate with every meal.    Exercise: 30 minutes per day of anything that will increase your heart rate and make you break a sweat! Gardening, walking, cleaning the house, changing the oil in your car, etc. If you feel like 30 minutes per day is too much, start small. Even lifting canned foods or working your arms with a resistance band in front of the TV can help.                  Follow-ups after your visit        Your next 10 appointments already scheduled     Aug 30, 2018  3:00 PM CDT   (Arrive by 2:45 PM)   Return Visit with Samina Downs MD   [facility] ([facility])    [address] [place] [address]  [address]  [facility] [address]   [phone]            Sep 04, 2018 12:00 PM CDT   (Arrive by 11:45 AM)   [facility]    [address] [place] [address]  4th Floor  [facility] [address]   [phone]            Sep 18, 2018 10:40 AM CDT   (Arrive by 10:10  AM)   RETURN SPINE with Ambrose King MD   [facility] ([facility])    [address]  4th Floor  [facility] [address] [phone]              Who to contact     If you have questions or need follow up information about today's clinic visit or your schedule please contact [facility] directly at [phone].  Normal or non-critical lab and imaging results will be communicated to you by [employer], letter or phone within 4 business days after the clinic has received the results. If you do not hear from us within 7 days, please contact the clinic through [employer] or phone. If you have a critical or abnormal lab result, we will notify you by phone as soon as possible.  Submit refill requests through [employer] or call your pharmacy and they will forward the refill request to us. Please allow 3 business days for your refill to be completed.          Additional Information About Your Visit        [employer] Information     [employer] gives you secure access to your electronic health record. If you see a primary care provider, you can also send messages to your care team and make appointments. If you have questions, please call your primary care clinic.  If you do not have a primary care provider, please call [phone] and they will assist you.        Care EveryWhere ID     This is your Care EveryWhere ID. This could be used by other organizations to access your [place] medical records  [id number]         Blood Pressure from Last 3 Encounters:   08/01/18 102/68   07/29/18 126/87   07/28/18 104/77    Weight from Last 3 Encounters:   08/01/18 112 lb 9.6 oz (51.1 kg)   07/28/18 120 lb (54.4 kg)   07/19/18 118 lb 1.6 oz (53.6 kg)              Today, you had the following     No orders found for display         Today's Medication Changes          These changes are accurate as of 8/29/18  3:33 PM.  If you have any questions, ask your nurse or doctor.                These medicines have changed or have updated prescriptions.        Dose/Directions    insulin aspart 100 UNIT/ML injection   Commonly known as:  NovoLOG PEN   Indication:  Type 2 Diabetes   This may have changed:  how much to take   Used for:  Type 2 diabetes mellitus with stage 3 chronic kidney disease, with long-term current use of insulin (H)        Dose:  1-7 Units   Inject 1-7 Units Subcutaneous 3 times daily (before meals)   Quantity:  6.3 mL   Refills:  0                Primary Care Provider Office Phone # Fax #    Brionna Tenorio [facility] Dc [phone]       [address]  [facility] [address]        Goals        General    #1 Financial Wellbeing (pt-stated)     Notes - Note created  7/31/2018  2:08 PM by Behl, Melissa K, RN    Goal Statement: I will call [place] [employer] [phone]  Measure of Success: Patient will call [place] [employer] and receive local food assistance resources.  Supportive Steps to Achieve: RNCC provided patient with phone number  Barriers: none known at this time  Strengths: family support, home care, engaged in care coordination  Date to Achieve By: 8/30/18       #2 Transportation (pt-stated)     Notes - Note created  7/31/2018  2:09 PM by Behl, Melissa K, RN    Goal Statement: I will call the city and/or police department to request handicap parking near my residence  Measure of Success: Patient will call and receive appropriate assistance/direction in obtaining handicap parking near her residence  Supportive Steps to Achieve: RNCC advised patient to contact her city/police department to request handicap parking near her residence  Barriers: none known at this time  Strengths: family support, home care, engaged in care coordination  Date to Achieve By: 8/30/18       #3 Medical (pt-stated)     Notes - Note created  7/31/2018  2:10 PM by Behl, Melissa K, RN    Goal Statement: I will test my blood sugar 3 times/day  Measure of  Success: Patient will consistent test blood sugar 3 times/day  Supportive Steps to Achieve: RNCC sending refill request for test strips to PCP  Barriers: does not have test strips  Strengths: family support, home care, engaged in care coordination  Date to Achieve By: 8/30/18         Equal Access to Services     NICK ROBLEDO : Hadii aad ku hadpoppystacey Xander, wagracieda luqadaha, qacristinata kagrabiel angélicakenyatta, sanjay schuler jesselucas murdockkristiankai hoang. So [facility] [phone].    ATENCIÓN: Si habla español, tiene a nagy disposición servicios gratuitos de asistencia lingüística. Llame al [phone].    We comply with applicable federal civil rights laws and [place] laws. We do not discriminate on the basis of race, color, national origin, age, disability, sex, sexual orientation, or gender identity.            Thank you!     Thank you for choosing [facility]  for your care. Our goal is always to provide you with excellent care. Hearing back from our patients is one way we can continue to improve our services. Please take a few minutes to complete the written survey that you may receive in the mail after your visit with us. Thank you!             Your Updated Medication List - Protect others around you: Learn how to safely use, store and throw away your medicines at www.disposemymeds.org.          This list is accurate as of 8/29/18  3:33 PM.  Always use your most recent med list.                   Brand Name Dispense Instructions for use Diagnosis    acetaminophen 500 MG tablet    TYLENOL     Take 2 tablets (1,000 mg) by mouth 3 times daily    Status post below knee amputation of right lower extremity (H)       acetone (Urine) test Strp     25 each    1 strip by In Vitro route as needed    Type 2 diabetes mellitus with diabetic neuropathy (H)       albuterol 108 (90 Base) MCG/ACT inhaler    PROAIR HFA    1 Inhaler    Inhale 2 puffs into the lungs every 4 hours as needed for shortness of breath / dyspnea    SOB  (shortness of breath)       amitriptyline 50 MG tablet    ELAVIL    60 tablet    Take 1 tablet (50 mg) by mouth At Bedtime    Status post below knee amputation of right lower extremity (H)       aspirin 81 MG tablet     100 tablet    Take 1 tablet (81 mg) by mouth daily    Type 2 diabetes mellitus with complication, with long-term current use of insulin (H)       blood glucose lancets standard    no brand specified    100 each    Use to test blood sugar 10 times daily or as directed. Accu-check    Type 2 diabetes mellitus with stage 3 chronic kidney disease, with long-term current use of insulin (H)       blood glucose monitoring meter device kit    no brand specified    1 kit    Use to test blood sugar 10 times daily or as directed. Accu-check    Type 2 diabetes mellitus with stage 3 chronic kidney disease, with long-term current use of insulin (H)       blood glucose monitoring test strip    no brand specified    100 strip    Use to test blood sugars 10 times daily or as directed. Accu-chek    Type 2 diabetes mellitus with stage 3 chronic kidney disease, with long-term current use of insulin (H)       fluticasone 50 MCG/ACT spray    FLONASE    1 Bottle    Spray 2 sprays into left nostril daily    Acute nonseasonal allergic rhinitis due to pollen       furosemide 40 MG tablet    LASIX    30 tablet    Take 1 tablet (40 mg) by mouth daily    SOB (shortness of breath)       glucose 4 g Chew chewable tablet     25 tablet    Take 3-4 tablets to treat low blood sugar.    Type 2 diabetes mellitus with complication, with long-term current use of insulin (H)       insulin aspart 100 UNIT/ML injection    NovoLOG PEN    6.3 mL    Inject 1-7 Units Subcutaneous 3 times daily (before meals)    Type 2 diabetes mellitus with stage 3 chronic kidney disease, with long-term current use of insulin (H)       insulin glargine 100 UNIT/ML injection    LANTUS    2.4 mL    Inject 8 Units Subcutaneous At Bedtime    Type 2 diabetes mellitus  with stage 3 chronic kidney disease, with long-term current use of insulin (H)       insulin pen needle 31G X 5 MM    B-D U/F    3 each    Use 3 time(s) a day.    Type 2 diabetes, HbA1c goal < 7% (H)       levonorgestrel 20 MCG/24HR IUD    MIRENA (52 MG)    1 each    placed today    Excessive or frequent menstruation       Lidocaine 4 % Patch    LIDOCARE    30 patch    Place 2 patches onto the skin every 24 hours    Status post below knee amputation of right lower extremity (H)       lisinopril 10 MG tablet    PRINIVIL/ZESTRIL    30 tablet    Take 1 tablet (10 mg) by mouth daily    Type 2 diabetes mellitus with stage 3 chronic kidney disease, with long-term current use of insulin (H)       methadone 10 mg/mL Conc (HIGH CONC) solution    DOLPHINE-INTENSOL    70 mL    Take 7 mLs (70 mg) by mouth daily    Status post below knee amputation of right lower extremity (H)       metoprolol succinate 25 MG 24 hr tablet    TOPROL-XL    90 tablet    Take 1 tablet (25 mg) by mouth daily    Hypertension goal BP (blood pressure) < 140/90, Chronic systolic congestive heart failure (H)       multivitamin, therapeutic with minerals Tabs tablet     30 each    Take 1 tablet by mouth daily    Status post below knee amputation of right lower extremity (H)       polyethylene glycol Packet    MIRALAX/GLYCOLAX    7 packet    Take 17 g by mouth daily    Status post below knee amputation of right lower extremity (H)       pregabalin 75 MG capsule    LYRICA    90 capsule    Take 1 capsule (75 mg) by mouth 3 times daily    Status post below knee amputation of right lower extremity (H)       ranitidine 300 MG tablet    ZANTAC    60 tablet    Take 1 tablet (300 mg) by mouth daily    Status post below knee amputation of right lower extremity (H)       umeclidinium-vilanterol 62.5-25 MCG/INH oral inhaler    ANORO ELLIPTA    1 Inhaler    Inhale 1 puff into the lungs daily    Chronic bronchitis, unspecified chronic bronchitis type (H)

## 2018-08-29 NOTE — PROGRESS NOTES
SUBJECTIVE/OBJECTIVE:                Alessandra Pak is a 53 year old female called for follow up from our first transitions of care visit on 8/15/18.     Chief Complaint: No specific concerns today.    Allergies/ADRs: None  Tobacco: No tobacco use   Alcohol: not currently using  Caffeine: 1 cups/day of coffee, 1 spite zero/day  Activity: She is doing therapy on her legs right now since the amputation.   PMH: Reviewed in Epic    Medication Adherence/Access:  Patient uses pill box(es).  Per patient, misses Novolog insulin 2 times per week, once or twice a week she will forget the Lantus.  Medication barriers: none.   The patient fills medications at [place]: NO, fills medications at [facility] due to location. She may be interested in [facility].     Diabetes:  Pt currently taking Novolog 2-5 units before meals and Lantus 8 units at bedtime. She use to use a pump but now she doesn't because she doesn't think her insurance will cover it. She does NOT use the Novolog with snacking between meals. Pt is not experiencing side effects.  SMBG: three times daily before each meal. Pt reported AM fastin,   Patient is experiencing hypoglycemia, she gets shaky but not until after she is low and starts eating. She will drink 12 oz. of orange juice when her blood sugar gets low. She says that this happens 1-2 times a week.   Recent symptoms of high blood sugar? shaky  Eye exam: due  Foot exam: up to date  ACEi/ARB: Yes: lisinopril 10 mg daily.   Urine Albumin:   Lab Results   Component Value Date    UMALCR 66.46 (H) 2018      Lab Results   Component Value Date    A1C 9.5 2018    A1C 12.8 2018    A1C 11.8 2017    A1C 11.5 2016    A1C >15.0  Results confirmed by repeat test   2016     Aspirin: Taking 81mg daily and denies side effects  Diet/Exercise: In the morning when she wakes she will have cup of coffee an maybe a banana and then snacks through the morning and has first meal  around noon. Her noon meal will be a bowl of cereal (cherrios, frosted flakes, slice of english muffin bread and boiled egg and glass of orange juice (29 gms carbs in 8oz). She will snack on some fruit between noon meal and evening meal. She will have dinner around 3 or 4 pm. Dinner usually consists of a meat, carb (mash potatoes size of hand), and a vegetable (carrots/peas, green beans, corn). She doesn't know which are starchy vegetables. When she has a bedtime snack it will be a bowl of cereal and piece of fruit. She doesn't really eat a lot of baked goods. She does eat sugar candies and popsicles.     Date FBG/ 2hours post Lunch/2hours post Dinner /2hours post bedtime   8/29 180 at 8:30 295 - snacked between breakfast and lunch     8/28  180 212 128 at 7:30pm   8/27 117 62 at recheck an hour later after meal and juice it was 242     8/26  295     8/25 172  180      Hyperlipidemia: Current therapy includes no current medications.  Unsure why statin was stopped. We did not have time to discuss today.  The 10-year ASCVD risk score ([facility], et al., 2013) is: 4.6%    Values used to calculate the score:      Age: 53 years      Sex: Female      Is Non- : Yes      Diabetic: Yes      Tobacco smoker: No      Systolic Blood Pressure: 102 mmHg      Is BP treated: Yes      HDL Cholesterol: 35 mg/dL      Total Cholesterol: 138 mg/dL     Recent Labs   Lab Test  06/12/18   0420  02/05/18   1234  07/25/16   1803  07/08/15   1052  02/01/13   0920   CHOL   --   138   --   154  155   HDL   --   35*   --   32*  37*   LDL   --   56  77  49  75   TRIG  327*  236*   --   367*  219*   CHOLHDLRATIO   --    --    --   4.8  4.2       COPD: Current medications: Albuterol MDI (has not had to use at all since switching from combivent to Anoro) and Anoro one puff daily. She was seeing Dr. Dancer at the [facility] but he left so she has not followed up with the lung specialist. She has yet to schedule.  Pt is  experiencing the following side effects: dry mouth  Pt reports the following symptoms: none - improved shortness of breath  Pt does have an COPD Action Plan on file.   Has spirometry been completed: Yes     HFrEF/HTN: Current medications include metoprolol succinate 25 mg once daily (changed from tartrate 12.5 mg twice daily at last visit), lisinopril 10 mg once daily and furosemide 40 mg daily.  Patient reports no current medication side effects. She is followed by Dr. Villarreal but has not seen him in over a year - last seen in 2016. She still has yet to schedule with him.  ECHO:  Date 6/6/18, EF 35-40%.   Pt is not complaining of sx of HF.    Unsure if pt is measuring weights daily.  Pt is following a low sodium diet, is avoiding EtOH.    Today's Vitals: There were no vitals taken for this visit. - phone visit    ASSESSMENT:                 Current medications were reviewed today.      Medication Adherence: fair, needs improvement - see below    Diabetes: Needs Improvement. Patient is not meeting A1c goal of < 7%. Self monitoring of blood glucose is not at goal of fasting  mg/dL and post prandial < 150 mg/dL. Pt would benefit from following up with Endo, watching carb portion sizes as she is eating a lot of carbs and meeting with a diabetes educator/dieticain to help with meal planning.  Will not adjust insulin today as she will not be following up and I dont have many readings to go off of. I will send her some resources on portion sizes and healthy eating. Reviewed appropriate way to treat hypoglycemia. Microalbumin is not at goal < 30 mg/g. Taking an ACE-inhibitor not at max dose. I will not change today since I am not checking her blood pressure but PCP should consider increasing lisinopril at next visit for renal protection. Due for annual foot exam. Aspirin therapy is safe and appropriate.    Hyperlipidemia: Needs Improvement. Pt is not on moderate intensity statin which is indicated based on 2013  ACC/AHA guidelines for lipid management. We did not discuss her statin history today so will not start anything today but PCP should consider starting statin as long as patient can tolerate.    COPD: Improved. Patient would benefit from no changes at this time.    HFrEF/HTN: Stable.    PLAN:                For PCP to consider:  1.  Increasing lisinopril at next visit for renal protection.  2.  Starting statin as long as patient can tolerate.    For patient:  1. Choose less starchy vegetables.     2. Try to limit your carbs for each meal to 45-60 grams of carbs per meal. Right now some of you meals can be close to 100 gms of carbs!!    3. Your snacks should be 15-30 grams of carbs and for sure no more than 30 grams of carbs.     4. Include protein with each meal and snack.    5. Only drink 4 oz of orange juice and recheck 15 minutes later to make sure it went up and then you can have more if still less than 70.     6. Schedule with your endocrinologist to follow up and get set up with a pump an also see a dietician to discuss diet. Do this as soon as possible.    7. Keep working hard at lowering your blood sugars. I know you can do it!    I spent 60 minutes with this patient today. I offer these suggestions for consideration by PCP. A copy of the visit note was provided to the patient's primary care provider.    Will follow up at patients request as her insurance does not cover MTM and she has had 2 post discharge visits now.    The patient was mailed a summary of these recommendations as an after visit summary.    Jovita Stahl, AnthonyD  Medication Therapy Management Pharmacist    Chart documentation was completed in part with Dragon voice-recognition software. Even though reviewed, some grammatical, spelling, and word errors may remain.

## 2018-08-29 NOTE — PATIENT INSTRUCTIONS
Recommendations from today's MTM visit:                                                      1. Choose less starchy vegetables.     2. Try to limit your carbs for each meal to 45-60 grams of carbs per meal. Right now some of you meals can be close to 100 gms of carbs!!    3. Your snacks should be 15-30 grams of carbs and for sure no more than 30 grams of carbs.     4. Include protein with each meal and snack.    5. Only drink 4 oz of orange juice and recheck 15 minutes later to make sure it went up and then you can have more if still less than 70.     6. Schedule with your endocrinologist to follow up and get set up with a pump an also see a dietician to discuss diet. Do this as soon as possible. The number is [phone]    7. Keep working hard at lowering your blood sugars. I know you can do it!    Next MTM visit: at your request. Any future visits you would have to pay out of pocket unless your insurance changes and they cover MTM visits.    To schedule another MTM appointment, please call the clinic directly or you may call the MTM scheduling line at [phone] or toll-free at [phone].     My Clinical Pharmacist's contact information:                                                      It was a pleasure seeing you today!  Please feel free to contact me with any questions or concerns you have.      Jovita Stahl, PharmD  Medication Therapy Management Pharmacist  [facility] - Monday and Wednesday 7:30 - 4:00  Phone: [phone] - direct clinic line    You may receive a survey about the MT services you received.  I would appreciate your feedback to help me serve you better in the future. Please fill it out and return it when you can. Your comments will be anonymous.      My healthcare goals:                                                      Diabetes Goals:    Home Monitoring of Blood Sugars:Fasting  mg/dL and 2 hours after a meal less than 150 mg/dL.    Hemoglobin A1C: Less than 7%. Yours  is   Lab Results   Component Value Date    A1C 9.5 06/06/2018   .    Blood Pressure: Less than 140/90mmHg.     Cholesterol: You are taking not taking a statin to help decrease the risk of heart disease but you should be. Please talk to your primary doctor about this.    Things you can do to help lower your blood sugars:    Diet: 3 meals and 1-2 snacks per day with 45-60 grams of carbohydrates per meal and 15-30 grams of carbohydrates per snack. Try to fill your plate at least half-full with vegetables, fill one-quarter full with lean meats or protein, and also make sure you get at least some carbohydrate with every meal.    Exercise: 30 minutes per day of anything that will increase your heart rate and make you break a sweat! Gardening, walking, cleaning the house, changing the oil in your car, etc. If you feel like 30 minutes per day is too much, start small. Even lifting canned foods or working your arms with a resistance band in front of the TV can help.

## 2018-08-29 NOTE — Clinical Note
No changes made at today's follow up visit but she said her breathing is much improved now that she is using the Anoro. I strongly encouraged her to schedule follow up with Endo ASAP. See note for what we discussed today. Thank you!  Jovita Sathl, PharmD Medication Therapy Management Pharmacist [facility] - Monday and Wednesday 7:30 - 4:00 Phone: [phone] - direct clinic line

## 2018-08-30 ENCOUNTER — OFFICE VISIT (OUTPATIENT)
Dept: INFECTIOUS DISEASES | Facility: CLINIC | Age: 53
End: 2018-08-30
Attending: COLON & RECTAL SURGERY
Payer: COMMERCIAL

## 2018-08-30 ENCOUNTER — HOME INFUSION (PRE-WILLOW HOME INFUSION) (OUTPATIENT)
Dept: PHARMACY | Facility: CLINIC | Age: 53
End: 2018-08-30

## 2018-08-30 VITALS
OXYGEN SATURATION: 96 % | HEIGHT: 69 IN | HEART RATE: 103 BPM | TEMPERATURE: 94.6 F | SYSTOLIC BLOOD PRESSURE: 93 MMHG | DIASTOLIC BLOOD PRESSURE: 61 MMHG

## 2018-08-30 DIAGNOSIS — M86.261 SUBACUTE OSTEOMYELITIS OF RIGHT TIBIA (H): Primary | ICD-10-CM

## 2018-08-30 PROCEDURE — G0463 HOSPITAL OUTPT CLINIC VISIT: HCPCS | Mod: ZF

## 2018-08-30 ASSESSMENT — PAIN SCALES - GENERAL: PAINLEVEL: NO PAIN (0)

## 2018-08-30 NOTE — LETTER
8/30/2018    RE: Alessandra Pak  [address]  [facility] [address]       CHIEF COMPLAINT:  Sepsis from right diabetic foot ulcer, osteomyelitis, now s/p R BKA on 06/28/2018    HISTORY OF PRESENT ILLNESS:   53 yr old female with a history of non ischemic cardiomyopathy  with ICD, CKD stage III, chronic pancreatitis s/p pylorus sparing Whipple (2001) with secondary DM c/b diabetic foot ulcers, COPD, HTN, and chronic methadone use who initially presented in early June with septic shock due to chronic right foot diabetic wound with underlying osteomyelitis. She underwent several I&Ds but given ongoing infection, the decision was made to pursue right BKA on 6/28. Cultures of the distal tibia obtained intraoperatively to assess for residual infection grew a gram positive cocci but the organism unfortunately failed to thrive for identification. Since the culture was obtained from bone and she was at high risk for wound breakdown if she had an inadequately treated infection, she was started on treatment with a 6 week course of Vancomycin which she completed on 8/10/2018. Today she returns for a follow up.    Today she reports overall doing well.  Her only concern is that after sutures were recently removed from her stump the area seemed to open up a bit. This worsened after she fell on the stump a few days ago. Since yesterday has been noticing some yellowish discharge from the wound. This is not associated with pain, erythema, fevers, chills.     She has also been having some issues with a new ulcer on the left foot. This is being followed by podiatry regularly and a wound vac was placed earlier this week.     Of note, her right sided PICC remains in place with no redness or pain around the site.    ROS:   HEENT: No changes in vision, hearing, sore throat, nose or sinus issues  CVS: No CP or palpitations  RS: No cough, or SOB  Abd; No abdominal pain, nausea, vomiting, diarrhea  CNS: No headache,  "dizziness  Skin: As above  Endocrine: DM  Psych: No depression or anxiety  Constitutional: No fevers, chills, fatigue, weight loss  Heme: No clots or abnormal bleeding    PERTINENT PAST MEDICAL HISTORY:  Chronic pancreatitis s/p pylorus sparing Whipple (2001)  Post pancreatectomy DM  Diabetic peripheral neuropathy   CKD Stage 3  NICM s/p AICD  COPD  HTN  Lumbar radiculopathy  MDD  Anxiety  GERD  GAVIN III with severe dysplasia  Ovarian cyst    ALLERGIES: NKDA    MEDICATIONS:  Extensive medication list reviewed.  She completed a 6 week course of vancomycin on 8/10/18.    SOCIAL HISTORY:  Former smoker, 4.5 pack years, quit in 2014.   Chronic Methadone use  No alcohol abuse    FAMILY HISTORY:  Reviewed and noncontributory    EXAMINATION:  Vital Signs: BP 93/61  Pulse 103  Temp 94.6  F (34.8  C) (Oral)  Ht 1.753 m (5' 9\")  SpO2 96%   GEN: Pleasant female in NAD.  HEENT: EOMI, no icterus or injection.Moist mucus membranes  LUNGS: CTAB, No wheeze, or crackles  ABDOMEN: Soft, NTND, BS+  CNS: A&Ox3. No FNDs  EXT:  RLE s/p BKA. Some yellow discharge on dressing+  Approximately 5 x 2 cm ulcer on stump, about 4 cm deep with slough and pale granulation at base. No tenderness, warmth, erythema or fluctuance in surrounding skin.  See photo below.   Left first metatarsal head ulcer with wound vac. No edema. Sensation decreased, pulses felt.  Skin: PICC on right upper arm, looks clean, with no surrounding redness, or discharge.          LABS:  Reviewed.   Pertinent for persistently elevated CRP (52 on 8/28), normal WBC count, and Cr of 1.1.     Most recent pertinent micro data: Right intraoperative distal tibia culture obtained 6/28 with GPCs (failed to thrive for identification).       ASSESSMENT AND PLAN:  Ms Pak is a 53 year old female who developed sepsis in 6/2018 from a right diabetic foot ulcer with underlying osteomyelitis, now status post right BKA on 6/28; intraoperative bone culture had GPCs which failed to thrive " for identification so she was empirically treated with 6 weeks of Vancomycin (completed 8/10). Although she is overall doing well, I am concerned that her stump wound remains open and her CRP is still quite high (although she has other reasons to explain this, including possible infection in her left foot).  At this point, I would like to obtain xrays of both her right stump and left foot to assess for osteomyelitis.  I will not restart abx today but will leave in PICC in case we need to restart therapy. I will also touch base with both her podiatrist and orthopedic surgeon to get their perspective on the possibility of persistent infection. I will contact Ms. Pak once I have additional information.     This patient was evaluated under the supervision of Dr Haley Downs, and the management plan was discussed with her.     Lucas Blevins MD  PGY4 Infectious Disease Fellow  Pager: [phone]    Attending Attestation:  I evaluated Ms. Pak with fellow Dr. Blevins.  I have reviewed pertinent labs, vitals and imaging results and have edited this note to reflect our joint findings, assessment and recommendations.    Samina Downs MD  [phone]

## 2018-08-30 NOTE — PROGRESS NOTES
CHIEF COMPLAINT:  Sepsis from right diabetic foot ulcer, osteomyelitis, now s/p R BKA on 06/28/2018    HISTORY OF PRESENT ILLNESS:   53 yr old female with a history of non ischemic cardiomyopathy  with ICD, CKD stage III, chronic pancreatitis s/p pylorus sparing Whipple (2001) with secondary DM c/b diabetic foot ulcers, COPD, HTN, and chronic methadone use who initially presented in early June with septic shock due to chronic right foot diabetic wound with underlying osteomyelitis. She underwent several I&Ds but given ongoing infection, the decision was made to pursue right BKA on 6/28. Cultures of the distal tibia obtained intraoperatively to assess for residual infection grew a gram positive cocci but the organism unfortunately failed to thrive for identification. Since the culture was obtained from bone and she was at high risk for wound breakdown if she had an inadequately treated infection, she was started on treatment with a 6 week course of Vancomycin which she completed on 8/10/2018. Today she returns for a follow up.    Today she reports overall doing well.  Her only concern is that after sutures were recently removed from her stump the area seemed to open up a bit. This worsened after she fell on the stump a few days ago. Since yesterday has been noticing some yellowish discharge from the wound. This is not associated with pain, erythema, fevers, chills.     She has also been having some issues with a new ulcer on the left foot. This is being followed by podiatry regularly and a wound vac was placed earlier this week.     Of note, her right sided PICC remains in place with no redness or pain around the site.    ROS:   HEENT: No changes in vision, hearing, sore throat, nose or sinus issues  CVS: No CP or palpitations  RS: No cough, or SOB  Abd; No abdominal pain, nausea, vomiting, diarrhea  CNS: No headache, dizziness  Skin: As above  Endocrine: DM  Psych: No depression or anxiety  Constitutional: No  "fevers, chills, fatigue, weight loss  Heme: No clots or abnormal bleeding    PERTINENT PAST MEDICAL HISTORY:  Chronic pancreatitis s/p pylorus sparing Whipple (2001)  Post pancreatectomy DM  Diabetic peripheral neuropathy   CKD Stage 3  NICM s/p AICD  COPD  HTN  Lumbar radiculopathy  MDD  Anxiety  GERD  GAVIN III with severe dysplasia  Ovarian cyst    ALLERGIES: NKDA    MEDICATIONS:  Extensive medication list reviewed.  She completed a 6 week course of vancomycin on 8/10/18.    SOCIAL HISTORY:  Former smoker, 4.5 pack years, quit in 2014.   Chronic Methadone use  No alcohol abuse    FAMILY HISTORY:  Reviewed and noncontributory    EXAMINATION:  Vital Signs: BP 93/61  Pulse 103  Temp 94.6  F (34.8  C) (Oral)  Ht 1.753 m (5' 9\")  SpO2 96%   GEN: Pleasant female in NAD.  HEENT: EOMI, no icterus or injection.Moist mucus membranes  LUNGS: CTAB, No wheeze, or crackles  ABDOMEN: Soft, NTND, BS+  CNS: A&Ox3. No FNDs  EXT:  RLE s/p BKA. Some yellow discharge on dressing+  Approximately 5 x 2 cm ulcer on stump, about 4 cm deep with slough and pale granulation at base. No tenderness, warmth, erythema or fluctuance in surrounding skin.  See photo below.   Left first metatarsal head ulcer with wound vac. No edema. Sensation decreased, pulses felt.  Skin: PICC on right upper arm, looks clean, with no surrounding redness, or discharge.          LABS:  Reviewed.   Pertinent for persistently elevated CRP (52 on 8/28), normal WBC count, and Cr of 1.1.     Most recent pertinent micro data: Right intraoperative distal tibia culture obtained 6/28 with GPCs (failed to thrive for identification).       ASSESSMENT AND PLAN:  Ms Pak is a 53 year old female who developed sepsis in 6/2018 from a right diabetic foot ulcer with underlying osteomyelitis, now status post right BKA on 6/28; intraoperative bone culture had GPCs which failed to thrive for identification so she was empirically treated with 6 weeks of Vancomycin (completed " 8/10). Although she is overall doing well, I am concerned that her stump wound remains open and her CRP is still quite high (although she has other reasons to explain this, including possible infection in her left foot).  At this point, I would like to obtain xrays of both her right stump and left foot to assess for osteomyelitis.  I will not restart abx today but will leave in PICC in case we need to restart therapy. I will also touch base with both her podiatrist and orthopedic surgeon to get their perspective on the possibility of persistent infection. I will contact Ms. Pak once I have additional information.     This patient was evaluated under the supervision of Dr Haley Downs, and the management plan was discussed with her.     Lucas Blevins MD  PGY4 Infectious Disease Fellow  Pager: [phone]    Attending Attestation:  I evaluated Ms. Pak with fellow Dr. Blevins.  I have reviewed pertinent labs, vitals and imaging results and have edited this note to reflect our joint findings, assessment and recommendations.    Samina Downs MD  [phone]

## 2018-08-30 NOTE — MR AVS SNAPSHOT
After Visit Summary   8/30/2018    Alessandra Pak    MRN: [id number]           Patient Information     Date Of Birth          1965        Visit Information        Provider Department      8/30/2018 3:00 PM Samina Downs MD [facility]        Today's Diagnoses     Subacute osteomyelitis of right tibia (H)    -  1       Follow-ups after your visit        Your next 10 appointments already scheduled     Sep 04, 2018 12:00 PM CDT   (Arrive by 11:45 AM)   RETURN FOOT/ANKLE with Barrington Lewis DPM   [facility] ([facility])    [address] [place] [id number] [phone]            Sep 18, 2018 10:40 AM CDT   (Arrive by 10:10 AM)   RETURN SPINE with Ambrose King MD   [facility] ([facility])    [address] [place] [id number] [phone]              Who to contact     If you have questions or need follow up information about today's clinic visit or your schedule please contact [facility] directly at [phone].  Normal or non-critical lab and imaging results will be communicated to you by MyChart, letter or phone within 4 business days after the clinic has received the results. If you do not hear from us within 7 days, please contact the clinic through MyChart or phone. If you have a critical or abnormal lab result, we will notify you by phone as soon as possible.  Submit refill requests through [facility] or call your pharmacy and they will forward the refill request to us. Please allow 3 business days for your refill to be completed.          Additional Information About Your Visit        MyChart Information     [facility] gives you secure access to your electronic health record. If you see a primary care provider, you can also send messages to your care team and  "make appointments. If you have questions, please call your primary care clinic.  If you do not have a primary care provider, please call [phone] and they will assist you.        Care EveryWhere ID     This is your Care EveryWhere ID. This could be used by other organizations to access your [place] medical records  [id number]        Your Vitals Were     Pulse Temperature Height Pulse Oximetry          103 94.6  F (34.8  C) (Oral) 1.753 m (5' 9\") 96%         Blood Pressure from Last 3 Encounters:   08/30/18 93/61   08/01/18 102/68   07/29/18 126/87    Weight from Last 3 Encounters:   08/01/18 51.1 kg (112 lb 9.6 oz)   07/28/18 54.4 kg (120 lb)   07/19/18 53.6 kg (118 lb 1.6 oz)              We Performed the Following     HC X-RAY TIBIA & FIBULA 2 VIEWS          Today's Medication Changes          These changes are accurate as of 8/30/18  3:54 PM.  If you have any questions, ask your nurse or doctor.               These medicines have changed or have updated prescriptions.        Dose/Directions    insulin aspart 100 UNIT/ML injection   Commonly known as:  NovoLOG PEN   Indication:  Type 2 Diabetes   This may have changed:  how much to take   Used for:  Type 2 diabetes mellitus with stage 3 chronic kidney disease, with long-term current use of insulin (H)        Dose:  1-7 Units   Inject 1-7 Units Subcutaneous 3 times daily (before meals)   Quantity:  6.3 mL   Refills:  0                Primary Care Provider Office Phone # Fax #    Brionna Cobylucas Dc -[phone]       [id number]  [facility] [id number]        Goals        General    #1 Financial Wellbeing (pt-stated)     Notes - Note created  7/31/2018  2:08 PM by Behl, Melissa K, RN    Goal Statement: I will call [place] [employer] [phone]  Measure of Success: Patient will call [place] [employer] and receive local food assistance resources.  Supportive Steps to Achieve: RNCC provided patient with phone " number  Barriers: none known at this time  Strengths: family support, home care, engaged in care coordination  Date to Achieve By: 8/30/18       #2 Transportation (pt-stated)     Notes - Note created  7/31/2018  2:09 PM by Behl, Melissa K, RN    Goal Statement: I will call the city and/or police department to request handicap parking near my residence  Measure of Success: Patient will call and receive appropriate assistance/direction in obtaining handicap parking near her residence  Supportive Steps to Achieve: RNCC advised patient to contact her city/police department to request handicap parking near her residence  Barriers: none known at this time  Strengths: family support, home care, engaged in care coordination  Date to Achieve By: 8/30/18       #3 Medical (pt-stated)     Notes - Note created  7/31/2018  2:10 PM by Behl, Melissa K, RN    Goal Statement: I will test my blood sugar 3 times/day  Measure of Success: Patient will consistent test blood sugar 3 times/day  Supportive Steps to Achieve: RNCC sending refill request for test strips to PCP  Barriers: does not have test strips  Strengths: family support, home care, engaged in care coordination  Date to Achieve By: 8/30/18         Equal Access to Services     NATALIE ROBLEDO AH: Hadii andres bolivar hadasho Socindiali, waaxda luqadaha, qaybta kaalmada adeegyada, sanjay levine . So [facility] [phone].    ATENCIÓN: Si habla español, tiene a nagy disposición servicios gratuitos de asistencia lingüística. Llame al [phone].    We comply with applicable federal civil rights laws and [place] laws. We do not discriminate on the basis of race, color, national origin, age, disability, sex, sexual orientation, or gender identity.            Thank you!     Thank you for choosing [facility] INFECT[facility] DISEAS[facility]  for your care. Our goal is always to provide you with excellent care. Hearing back from our patients is one way we can continue  to improve our services. Please take a few minutes to complete the written survey that you may receive in the mail after your visit with us. Thank you!             Your Updated Medication List - Protect others around you: Learn how to safely use, store and throw away your medicines at www.disposemymeds.org.          This list is accurate as of 8/30/18  3:54 PM.  Always use your most recent med list.                   Brand Name Dispense Instructions for use Diagnosis    acetaminophen 500 MG tablet    TYLENOL     Take 2 tablets (1,000 mg) by mouth 3 times daily    Status post below knee amputation of right lower extremity (H)       acetone (Urine) test Strp     25 each    1 strip by In Vitro route as needed    Type 2 diabetes mellitus with diabetic neuropathy (H)       albuterol 108 (90 Base) MCG/ACT inhaler    PROAIR HFA    1 Inhaler    Inhale 2 puffs into the lungs every 4 hours as needed for shortness of breath / dyspnea    SOB (shortness of breath)       amitriptyline 50 MG tablet    ELAVIL    60 tablet    Take 1 tablet (50 mg) by mouth At Bedtime    Status post below knee amputation of right lower extremity (H)       aspirin 81 MG tablet     100 tablet    Take 1 tablet (81 mg) by mouth daily    Type 2 diabetes mellitus with complication, with long-term current use of insulin (H)       blood glucose lancets standard    no brand specified    100 each    Use to test blood sugar 10 times daily or as directed. Accu-check    Type 2 diabetes mellitus with stage 3 chronic kidney disease, with long-term current use of insulin (H)       blood glucose monitoring meter device kit    no brand specified    1 kit    Use to test blood sugar 10 times daily or as directed. Accu-check    Type 2 diabetes mellitus with stage 3 chronic kidney disease, with long-term current use of insulin (H)       blood glucose monitoring test strip    no brand specified    100 strip    Use to test blood sugars 10 times daily or as directed.  Accu-chek    Type 2 diabetes mellitus with stage 3 chronic kidney disease, with long-term current use of insulin (H)       fluticasone 50 MCG/ACT spray    FLONASE    1 Bottle    Spray 2 sprays into left nostril daily    Acute nonseasonal allergic rhinitis due to pollen       furosemide 40 MG tablet    LASIX    30 tablet    Take 1 tablet (40 mg) by mouth daily    SOB (shortness of breath)       glucose 4 g Chew chewable tablet     25 tablet    Take 3-4 tablets to treat low blood sugar.    Type 2 diabetes mellitus with complication, with long-term current use of insulin (H)       insulin aspart 100 UNIT/ML injection    NovoLOG PEN    6.3 mL    Inject 1-7 Units Subcutaneous 3 times daily (before meals)    Type 2 diabetes mellitus with stage 3 chronic kidney disease, with long-term current use of insulin (H)       insulin glargine 100 UNIT/ML injection    LANTUS    2.4 mL    Inject 8 Units Subcutaneous At Bedtime    Type 2 diabetes mellitus with stage 3 chronic kidney disease, with long-term current use of insulin (H)       insulin pen needle 31G X 5 MM    B-D U/F    3 each    Use 3 time(s) a day.    Type 2 diabetes, HbA1c goal < 7% (H)       levonorgestrel 20 MCG/24HR IUD    MIRENA (52 MG)    1 each    placed today    Excessive or frequent menstruation       Lidocaine 4 % Patch    LIDOCARE    30 patch    Place 2 patches onto the skin every 24 hours    Status post below knee amputation of right lower extremity (H)       lisinopril 10 MG tablet    PRINIVIL/ZESTRIL    30 tablet    Take 1 tablet (10 mg) by mouth daily    Type 2 diabetes mellitus with stage 3 chronic kidney disease, with long-term current use of insulin (H)       methadone 10 mg/mL Conc (HIGH CONC) solution    DOLPHINE-INTENSOL    70 mL    Take 7 mLs (70 mg) by mouth daily    Status post below knee amputation of right lower extremity (H)       metoprolol succinate 25 MG 24 hr tablet    TOPROL-XL    90 tablet    Take 1 tablet (25 mg) by mouth daily     Hypertension goal BP (blood pressure) < 140/90, Chronic systolic congestive heart failure (H)       multivitamin, therapeutic with minerals Tabs tablet     30 each    Take 1 tablet by mouth daily    Status post below knee amputation of right lower extremity (H)       polyethylene glycol Packet    MIRALAX/GLYCOLAX    7 packet    Take 17 g by mouth daily    Status post below knee amputation of right lower extremity (H)       pregabalin 75 MG capsule    LYRICA    90 capsule    Take 1 capsule (75 mg) by mouth 3 times daily    Status post below knee amputation of right lower extremity (H)       ranitidine 300 MG tablet    ZANTAC    60 tablet    Take 1 tablet (300 mg) by mouth daily    Status post below knee amputation of right lower extremity (H)       umeclidinium-vilanterol 62.5-25 MCG/INH oral inhaler    ANORO ELLIPTA    1 Inhaler    Inhale 1 puff into the lungs daily    Chronic bronchitis, unspecified chronic bronchitis type (H)

## 2018-08-30 NOTE — NURSING NOTE
Chief Complaint   Patient presents with     RECHECK     [facility] hospital follow up     Levar Small MA

## 2018-09-01 ENCOUNTER — APPOINTMENT (OUTPATIENT)
Dept: LAB | Facility: CLINIC | Age: 53
End: 2018-09-01
Attending: COLON & RECTAL SURGERY
Payer: COMMERCIAL

## 2018-09-04 ENCOUNTER — TELEPHONE (OUTPATIENT)
Dept: ORTHOPEDICS | Facility: CLINIC | Age: 53
End: 2018-09-04

## 2018-09-04 ENCOUNTER — TELEPHONE (OUTPATIENT)
Dept: INFECTIOUS DISEASES | Facility: CLINIC | Age: 53
End: 2018-09-04

## 2018-09-04 ENCOUNTER — OFFICE VISIT (OUTPATIENT)
Dept: ORTHOPEDICS | Facility: CLINIC | Age: 53
End: 2018-09-04
Payer: COMMERCIAL

## 2018-09-04 ENCOUNTER — HOME INFUSION (PRE-WILLOW HOME INFUSION) (OUTPATIENT)
Dept: PHARMACY | Facility: CLINIC | Age: 53
End: 2018-09-04

## 2018-09-04 DIAGNOSIS — Z79.4 TYPE 2 DIABETES MELLITUS WITH STAGE 3 CHRONIC KIDNEY DISEASE, WITH LONG-TERM CURRENT USE OF INSULIN (H): Chronic | ICD-10-CM

## 2018-09-04 DIAGNOSIS — E11.22 TYPE 2 DIABETES MELLITUS WITH STAGE 3 CHRONIC KIDNEY DISEASE, WITH LONG-TERM CURRENT USE OF INSULIN (H): Chronic | ICD-10-CM

## 2018-09-04 DIAGNOSIS — L97.423 DIABETIC ULCER OF LEFT MIDFOOT ASSOCIATED WITH TYPE 2 DIABETES MELLITUS, WITH NECROSIS OF MUSCLE (H): Primary | ICD-10-CM

## 2018-09-04 DIAGNOSIS — N18.30 TYPE 2 DIABETES MELLITUS WITH STAGE 3 CHRONIC KIDNEY DISEASE, WITH LONG-TERM CURRENT USE OF INSULIN (H): Chronic | ICD-10-CM

## 2018-09-04 DIAGNOSIS — Z89.511 STATUS POST BELOW KNEE AMPUTATION OF RIGHT LOWER EXTREMITY (H): ICD-10-CM

## 2018-09-04 DIAGNOSIS — E11.621 DIABETIC ULCER OF LEFT MIDFOOT ASSOCIATED WITH TYPE 2 DIABETES MELLITUS, WITH NECROSIS OF MUSCLE (H): Primary | ICD-10-CM

## 2018-09-04 NOTE — PROGRESS NOTES
This is a recent snapshot of the patient's [facility] medical record.  For current drug dose and complete information and questions, call [phone] or In Basket pool, [facility] home infusion ([id number])  CSN Number:  [id number]

## 2018-09-04 NOTE — NURSING NOTE
Reason For Visit:   Chief Complaint   Patient presents with     Wound Check     1 week follow up. Wound, left foot. Wound vac.        Pain Assessment  Patient Currently in Pain: Denies                 Current Outpatient Prescriptions   Medication Sig Dispense Refill     acetaminophen (TYLENOL) 500 MG tablet Take 2 tablets (1,000 mg) by mouth 3 times daily       acetone, Urine, test STRP 1 strip by In Vitro route as needed 25 each 1     albuterol (PROAIR HFA) 108 (90 Base) MCG/ACT Inhaler Inhale 2 puffs into the lungs every 4 hours as needed for shortness of breath / dyspnea (Patient not taking: Reported on 8/29/2018) 1 Inhaler 0     amitriptyline (ELAVIL) 50 MG tablet Take 1 tablet (50 mg) by mouth At Bedtime 60 tablet 0     aspirin 81 MG tablet Take 1 tablet (81 mg) by mouth daily 100 tablet 3     blood glucose (NO BRAND SPECIFIED) lancets standard Use to test blood sugar 10 times daily or as directed. Accu-check 100 each 11     blood glucose monitoring (NO BRAND SPECIFIED) meter device kit Use to test blood sugar 10 times daily or as directed. Accu-check 1 kit 0     blood glucose monitoring (NO BRAND SPECIFIED) test strip Use to test blood sugars 10 times daily or as directed. Accu-chek 100 strip 11     fluticasone (FLONASE) 50 MCG/ACT spray Spray 2 sprays into left nostril daily 1 Bottle 0     furosemide (LASIX) 40 MG tablet Take 1 tablet (40 mg) by mouth daily 30 tablet 0     glucose 4 g CHEW chewable tablet Take 3-4 tablets to treat low blood sugar. (Patient not taking: Reported on 8/29/2018) 25 tablet 11     insulin aspart (NOVOLOG PEN) 100 UNIT/ML injection Inject 1-7 Units Subcutaneous 3 times daily (before meals) (Patient taking differently: Inject 2-5 Units Subcutaneous 3 times daily (before meals) ) 6.3 mL 0     insulin glargine (LANTUS SOLOSTAR) 100 UNIT/ML pen Inject 8 Units Subcutaneous At Bedtime 2.4 mL 0     insulin pen needle (B-D U/F) 31G X 5 MM Use 3 time(s) a day. 3 each 3     levonorgestrel  (MIRENA, 52 MG,) 20 MCG/24HR IUD placed today 1 each 0     Lidocaine (LIDOCARE) 4 % Patch Place 2 patches onto the skin every 24 hours 30 patch 0     lisinopril (PRINIVIL/ZESTRIL) 10 MG tablet Take 1 tablet (10 mg) by mouth daily 30 tablet 0     methadone (DOLPHINE-INTENSOL) 10 mg/mL CONC (HIGH CONC) solution Take 7 mLs (70 mg) by mouth daily 70 mL 0     metoprolol succinate (TOPROL-XL) 25 MG 24 hr tablet Take 1 tablet (25 mg) by mouth daily 90 tablet 1     multivitamin, therapeutic with minerals (THERA-VIT-M) TABS tablet Take 1 tablet by mouth daily 30 each 0     polyethylene glycol (MIRALAX/GLYCOLAX) Packet Take 17 g by mouth daily 7 packet 0     pregabalin (LYRICA) 75 MG capsule Take 1 capsule (75 mg) by mouth 3 times daily 90 capsule 0     ranitidine (ZANTAC) 300 MG tablet Take 1 tablet (300 mg) by mouth daily 60 tablet 0     umeclidinium-vilanterol (ANORO ELLIPTA) 62.5-25 MCG/INH oral inhaler Inhale 1 puff into the lungs daily 1 Inhaler 2          Allergies   Allergen Reactions     No Clinical Screening - See Comments      Swelling in the throat.

## 2018-09-04 NOTE — TELEPHONE ENCOUNTER
Called \[facility]\"" and spoke with Kuldip who reports they already have orders for this.  Cathy Hdez RN

## 2018-09-04 NOTE — TELEPHONE ENCOUNTER
Alessandra underwent Right below knee amputation on 6/28/18.  Alessandra recently fell on her amputated side when the wound opened.  She is presently having wet to dry dressing changes on her right stump wound.  Dr King has been in contact with pt's other caregivers.  He would like regular wound nurse visits for her right wound care.  Pt has home care through [facility] [phone].  Nurse manager Vannesa was phoned and given order for right leg wound wet to dry dressing changes 3x/wk when pt has wound vac dressing changes.  Lilia Barrera RN

## 2018-09-04 NOTE — PROGRESS NOTES
Chief Complaints and History of Present Illnesses   Patient presents with     Wound Check     1 week follow up. Wound, left foot. Wound vac.           No Known Allergies      Subjective: Alessandra is a 53 year old female who presents to the clinic today for a follow up of left foot ulcer. She has been getting VAC changes. She did fall and an area opened on the left BKA site. Dr. King is currently managing this. Instructions were given to home care for this today.      Objective  A diabetic wound is noted at left  medial foot at first met head measuring  2.5 cm x 2.4 cm x 0.3 cm. Non-tender to palpation.       Lozano Classification: III      Wound base: Pink, Yellow/Granulation, Slough, tendon      Edges: intact      Drainage: slight/serous      Odor: No       Undermining: Yes, plantar portion of wound within subq      Tunneling: No      Bone Exposure: No      Clinical Signs of Infection: No      After obtaining patient consent, the wound was irrigated with copious amounts of saline. No sharp debridement performed on this ulceration today.  _______________________  Wound #2  A wound is noted at right  BKA stump measuring 1.2cm x 3.1xm x 0.2cm. Image in media tab.     Lozano Classification: 2    Wound base: Yellow/Slough    Edges: intact    Drainage: small/serous    Odor: no    Undermining: no    Bone Exposure: No    Clinical Signs of Infection: No    After obtaining patient consent, the wound was irrigated with copious amounts of saline. A scalpel was then used to debride the wound into subcutaneous tissue. The wound edges were debrided back to healthy, bleeding tissue. The wound base exhibited healthy bleeding. Given the patient's lack of sensation, no anesthesia was necessary for the procedure.       Assessment: DM2 with severe neuropathy, right BKA, and left 1st met head ulceration. New ulcer on the right stump.      Plan:   - Pt seen and evaluated.  - Wound debrided as described. Home nurse is coming tomorrow.  Today, Aquacel was applied to the ulcer. Restart VAC tomorrow.  - Aquacel to the rigth BKA ulcer daily.   - Home nurse to change MWF.   - Pt to return to clinic in 2 weeks.

## 2018-09-04 NOTE — LETTER
9/4/2018     RE: Alessandra Pak  [address]  [facility] [id number]     Dear Colleague,    Thank you for referring your patient, Alessandra Pak, to the [facility]. Please see a copy of my visit note below.    Chief Complaints and History of Present Illnesses   Patient presents with     Wound Check     1 week follow up. Wound, left foot. Wound vac.       No Known Allergies    Subjective: Alessandra is a 53 year old female who presents to the clinic today for a follow up of left foot ulcer. She has been getting VAC changes. She did fall and an area opened on the left BKA site. Dr. King is currently managing this. Instructions were given to home care for this today.      Objective  A diabetic wound is noted at left  medial foot at first met head measuring  2.5 cm x 2.4 cm x 0.3 cm. Non-tender to palpation.       Lozano Classification: III      Wound base: Pink, Yellow/Granulation, Slough, tendon      Edges: intact      Drainage: slight/serous      Odor: No       Undermining: Yes, plantar portion of wound within subq      Tunneling: No      Bone Exposure: No      Clinical Signs of Infection: No      After obtaining patient consent, the wound was irrigated with copious amounts of saline. No sharp debridement performed on this ulceration today.  _______________________  Wound #2  A wound is noted at right  BKA stump measuring 1.2cm x 3.1xm x 0.2cm. Image in media tab.     Lozano Classification: 2    Wound base: Yellow/Slough    Edges: intact    Drainage: small/serous    Odor: no    Undermining: no    Bone Exposure: No    Clinical Signs of Infection: No    After obtaining patient consent, the wound was irrigated with copious amounts of saline. A scalpel was then used to debride the wound into subcutaneous tissue. The wound edges were debrided back to healthy, bleeding tissue. The wound base exhibited healthy bleeding. Given the patient's lack of  sensation, no anesthesia was necessary for the procedure.       Assessment: DM2 with severe neuropathy, right BKA, and left 1st met head ulceration. New ulcer on the right stump.      Plan:   - Pt seen and evaluated.  - Wound debrided as described. Home nurse is coming tomorrow. Today, Aquacel was applied to the ulcer. Restart VAC tomorrow.  - Aquacel to the rigth BKA ulcer daily.   - Home nurse to change MWF.   - Pt to return to clinic in 2 weeks.     Sincerely,  Barrington Lewis DPM

## 2018-09-04 NOTE — MR AVS SNAPSHOT
After Visit Summary   9/4/2018    Alessandra Pak    MRN: [id number]           Patient Information     Date Of Birth          1965        Visit Information        Provider Department      9/4/2018 5:40 PM [facility]         Follow-ups after your visit        Your next 10 appointments already scheduled     Sep 18, 2018 10:40 AM CDT   (Arrive by 10:10 AM)   RETURN SPINE with Ambrose King MD   [facility] ([facility])    [address] [place] [address] [id number] [phone]            Sep 18, 2018 12:20 PM CDT   (Arrive by 12:05 PM)   RETURN FOOT/ANKLE with Barrington Lewis DPM   [facility] ([facility])    [address] [place] [id number] [phone]            Oct 05, 2018 12:20 PM CDT   (Arrive by 12:05 PM)   RETURN FOOT/ANKLE with Barrington Lewis DPM   [facility] ([facility])    [address] [place] [id number] [phone]              Who to contact     Please call your clinic at [phone] to:    Ask questions about your health    Make or cancel appointments    Discuss your medicines    Learn about your test results    Speak to your doctor            Additional Information About Your Visit        MyChart Information     [employer] gives you secure access to your electronic health record. If you see a primary care provider, you can also send messages to your care team and make appointments. If you have questions, please call your primary care clinic.  If you do not have a primary care provider, please call [phone] and they will assist you.      [employer] is an electronic gateway that provides easy, online access to your medical records. With [employer], you can request a clinic appointment, read your test results, renew a prescription  or communicate with your care team.     To access your existing account, please contact your [facility] or call [phone] for assistance.        Care EveryWhere ID     This is your Care EveryWhere ID. This could be used by other organizations to access your [place] medical records  [id number]         Blood Pressure from Last 3 Encounters:   08/30/18 93/61   08/01/18 102/68   07/29/18 126/87    Weight from Last 3 Encounters:   08/01/18 51.1 kg (112 lb 9.6 oz)   07/28/18 54.4 kg (120 lb)   07/19/18 53.6 kg (118 lb 1.6 oz)              Today, you had the following     No orders found for display         Today's Medication Changes          These changes are accurate as of 9/4/18  6:16 PM.  If you have any questions, ask your nurse or doctor.               These medicines have changed or have updated prescriptions.        Dose/Directions    insulin aspart 100 UNIT/ML injection   Commonly known as:  NovoLOG PEN   Indication:  Type 2 Diabetes   This may have changed:  how much to take   Used for:  Type 2 diabetes mellitus with stage 3 chronic kidney disease, with long-term current use of insulin (H)        Dose:  1-7 Units   Inject 1-7 Units Subcutaneous 3 times daily (before meals)   Quantity:  6.3 mL   Refills:  0                Primary Care Provider Office Phone # Fax #    Brionna Cobybaldomero Dc [phone]       [id number] [place]  [facility] [id number]        Goals        General    #1 Financial Wellbeing (pt-stated)     Notes - Note created  7/31/2018  2:08 PM by Behl, Melissa K, RN    Goal Statement: I will call [place] [employer] [phone]  Measure of Success: Patient will call [place] [employer] and receive local food assistance resources.  Supportive Steps to Achieve: RNCC provided patient with phone number  Barriers: none known at this time  Strengths: family support, home care, engaged in care coordination  Date to Achieve By: 8/30/18        #2 Transportation (pt-stated)     Notes - Note created  7/31/2018  2:09 PM by Behl, Melissa K, RN    Goal Statement: I will call the city and/or police department to request handicap parking near my residence  Measure of Success: Patient will call and receive appropriate assistance/direction in obtaining handicap parking near her residence  Supportive Steps to Achieve: RNCC advised patient to contact her city/police department to request handicap parking near her residence  Barriers: none known at this time  Strengths: family support, home care, engaged in care coordination  Date to Achieve By: 8/30/18       #3 Medical (pt-stated)     Notes - Note created  7/31/2018  2:10 PM by Behl, Melissa K, RN    Goal Statement: I will test my blood sugar 3 times/day  Measure of Success: Patient will consistent test blood sugar 3 times/day  Supportive Steps to Achieve: RNCC sending refill request for test strips to PCP  Barriers: does not have test strips  Strengths: family support, home care, engaged in care coordination  Date to Achieve By: 8/30/18         Equal Access to Services     NATALIE ROBLEDO AH: Hadii andres bolivar hadasho Soomaali, waaxda luqadaha, qaybta kaalmada adeegyada, sanjay levine . So [facility] [place] [phone].    ATENCIÓN: Si habla español, tiene a nagy disposición servicios gratuitos de asistencia lingüística. Llame al [phone].    We comply with applicable federal civil rights laws and [place] laws. We do not discriminate on the basis of race, color, national origin, age, disability, sex, sexual orientation, or gender identity.            Thank you!     Thank you for choosing [facility]  for your care. Our goal is always to provide you with excellent care. Hearing back from our patients is one way we can continue to improve our services. Please take a few minutes to complete the written survey that you may receive in the mail after your visit with us. Thank you!              Your Updated Medication List - Protect others around you: Learn how to safely use, store and throw away your medicines at www.disposemymeds.org.          This list is accurate as of 9/4/18  6:16 PM.  Always use your most recent med list.                   Brand Name Dispense Instructions for use Diagnosis    acetaminophen 500 MG tablet    TYLENOL     Take 2 tablets (1,000 mg) by mouth 3 times daily    Status post below knee amputation of right lower extremity (H)       acetone (Urine) test Strp     25 each    1 strip by In Vitro route as needed    Type 2 diabetes mellitus with diabetic neuropathy (H)       albuterol 108 (90 Base) MCG/ACT inhaler    PROAIR HFA    1 Inhaler    Inhale 2 puffs into the lungs every 4 hours as needed for shortness of breath / dyspnea    SOB (shortness of breath)       amitriptyline 50 MG tablet    ELAVIL    60 tablet    Take 1 tablet (50 mg) by mouth At Bedtime    Status post below knee amputation of right lower extremity (H)       aspirin 81 MG tablet     100 tablet    Take 1 tablet (81 mg) by mouth daily    Type 2 diabetes mellitus with complication, with long-term current use of insulin (H)       blood glucose lancets standard    no brand specified    100 each    Use to test blood sugar 10 times daily or as directed. Accu-check    Type 2 diabetes mellitus with stage 3 chronic kidney disease, with long-term current use of insulin (H)       blood glucose monitoring meter device kit    no brand specified    1 kit    Use to test blood sugar 10 times daily or as directed. Accu-check    Type 2 diabetes mellitus with stage 3 chronic kidney disease, with long-term current use of insulin (H)       blood glucose monitoring test strip    no brand specified    100 strip    Use to test blood sugars 10 times daily or as directed. Accu-chek    Type 2 diabetes mellitus with stage 3 chronic kidney disease, with long-term current use of insulin (H)       fluticasone 50 MCG/ACT spray    FLONASE    1  Bottle    Spray 2 sprays into left nostril daily    Acute nonseasonal allergic rhinitis due to pollen       furosemide 40 MG tablet    LASIX    30 tablet    Take 1 tablet (40 mg) by mouth daily    SOB (shortness of breath)       glucose 4 g Chew chewable tablet     25 tablet    Take 3-4 tablets to treat low blood sugar.    Type 2 diabetes mellitus with complication, with long-term current use of insulin (H)       insulin aspart 100 UNIT/ML injection    NovoLOG PEN    6.3 mL    Inject 1-7 Units Subcutaneous 3 times daily (before meals)    Type 2 diabetes mellitus with stage 3 chronic kidney disease, with long-term current use of insulin (H)       insulin glargine 100 UNIT/ML injection    LANTUS    2.4 mL    Inject 8 Units Subcutaneous At Bedtime    Type 2 diabetes mellitus with stage 3 chronic kidney disease, with long-term current use of insulin (H)       insulin pen needle 31G X 5 MM    B-D U/F    3 each    Use 3 time(s) a day.    Type 2 diabetes, HbA1c goal < 7% (H)       levonorgestrel 20 MCG/24HR IUD    MIRENA (52 MG)    1 each    placed today    Excessive or frequent menstruation       Lidocaine 4 % Patch    LIDOCARE    30 patch    Place 2 patches onto the skin every 24 hours    Status post below knee amputation of right lower extremity (H)       lisinopril 10 MG tablet    PRINIVIL/ZESTRIL    30 tablet    Take 1 tablet (10 mg) by mouth daily    Type 2 diabetes mellitus with stage 3 chronic kidney disease, with long-term current use of insulin (H)       methadone 10 mg/mL Conc (HIGH CONC) solution    DOLPHINE-INTENSOL    70 mL    Take 7 mLs (70 mg) by mouth daily    Status post below knee amputation of right lower extremity (H)       metoprolol succinate 25 MG 24 hr tablet    TOPROL-XL    90 tablet    Take 1 tablet (25 mg) by mouth daily    Hypertension goal BP (blood pressure) < 140/90, Chronic systolic congestive heart failure (H)       multivitamin, therapeutic with minerals Tabs tablet     30 each    Take 1  tablet by mouth daily    Status post below knee amputation of right lower extremity (H)       polyethylene glycol Packet    MIRALAX/GLYCOLAX    7 packet    Take 17 g by mouth daily    Status post below knee amputation of right lower extremity (H)       pregabalin 75 MG capsule    LYRICA    90 capsule    Take 1 capsule (75 mg) by mouth 3 times daily    Status post below knee amputation of right lower extremity (H)       ranitidine 300 MG tablet    ZANTAC    60 tablet    Take 1 tablet (300 mg) by mouth daily    Status post below knee amputation of right lower extremity (H)       umeclidinium-vilanterol 62.5-25 MCG/INH oral inhaler    ANORO ELLIPTA    1 Inhaler    Inhale 1 puff into the lungs daily    Chronic bronchitis, unspecified chronic bronchitis type (H)

## 2018-09-04 NOTE — TELEPHONE ENCOUNTER
I heard back from both Chapo King and Joshua. They agreed that the wound did not look grossly infected and would likely granulate in. They are going to arrange for additional wound care and ongoing follow up in podiatry/ortho.  I let Ms. Pak know that I will contact [facility] to have her PICC pulled. She knows to contact me if any new ID issues arise.     Samina Downs MD  [phone]

## 2018-09-04 NOTE — TELEPHONE ENCOUNTER
Health Call Center    Phone Message    May a detailed message be left on voicemail: no    Reason for Call: Other: Majo from [facility] called to f/u on progress notes/hx and physical notes for the Pt's wound. I see on 8/29, Pablo confirmed it was faxed. Please either fax again to make sure since they said they haven't received it yet. Please use fax # [phone], rental # [id number].     Action Taken: Message routed to:  [facility]

## 2018-09-05 NOTE — PROGRESS NOTES
This is a recent snapshot of the patient's [facility] medical record.  For current drug dose and complete information and questions, call [phone] or In Basket pool, [facility] home infusion ([id number])  CSN Number:  [id number]

## 2018-09-05 NOTE — TELEPHONE ENCOUNTER
Health Call Center    Phone Message    May a detailed message be left on voicemail: no    Reason for Call: Other: Majo from [facility] called to f/u on progress notes, Per Majo they need EVERY office note from  including yesterdays notes Today. Per Majo Rocha needs the notes before they can treat the wound. Please use fax # [phone], rental # [id number] to expidite.      Action Taken: Message routed to:  [facility]   23-Feb-2018 13:13

## 2018-09-07 NOTE — PROGRESS NOTES
Clinic Care Coordination Contact  [facility]/Voicemail    Referral Source: IP Handoff  Clinical Data: Care Coordinator Outreach  Outreach attempted x 1.  Left message on voicemail with call back information and requested return call.  Plan: Care Coordinator mailed out care coordination introduction letter on 7/31/18. Care Coordinator will try to reach patient again in 5-10 business days.      Care Team Conversations    RN CC attempted to call patient's home care RN Case Manager Nolan [phone] for an update, however, there was no answer.  RN CC left a voicemail requesting a call back from Nolan.       Melissa Behl BSN, RN, N  [facility] Care Coordinator  [phone]

## 2018-09-10 NOTE — PROGRESS NOTES
This is a recent snapshot of the patient's [facility] medical record.  For current drug dose and complete information and questions, call [phone] or In Basket pool, [facility] home infusion ([id number])  CSN Number:  [id number]

## 2018-09-13 NOTE — TELEPHONE ENCOUNTER
Requested Prescriptions   Pending Prescriptions Disp Refills     LYRICA 75 MG capsule [Pharmacy Med Name: LYRICA 75 MG CAPSULE] 90 capsule      Sig: TAKE 1 CAPSULE BY MOUTH 3 TIMES DAILY    There is no refill protocol information for this order        Last Written Prescription Date:  07/20/18  Last Fill Quantity: 90,  # refills: 3   Last office visit: 8/1/2018 with prescribing provider:     Future Office Visit:

## 2018-09-13 NOTE — TELEPHONE ENCOUNTER
Routing refill request to provider for review/approval because:  Drug not on the [facility] refill protocol;   Last prescription was from a different provider.    Yvan Aguirre RN, BSN

## 2018-09-14 NOTE — TELEPHONE ENCOUNTER
Routing refill request to provider for review/approval because:  Labs out of range:  Creatinine  Patient also got a 30 day from different provider.    Stacy Nair RN, [facility]

## 2018-09-14 NOTE — TELEPHONE ENCOUNTER
Faxed Rx for the Lyrica to [facility], [place], [phone], right fax confirmed at 9:57 am today.  Beatriz Alfredo MA/  For [employer] and Kristen

## 2018-09-18 NOTE — PROGRESS NOTES
Chief Complaint:   Chief Complaint   Patient presents with     Wound Check     Wound, left foot. Pt stated that she is concerned about the odor coming from her foot. Pt stated that she took the vac off this past Saturday.           Allergies   Allergen Reactions     No Clinical Screening - See Comments      Swelling in the throat.         Subjective: Alessandra is a 53 year old female who presents to the clinic today for a follow up of left foot ulcer. She saw Dr. King today and in discussion with him, will transfer the more frequent checks of the right BKA stump wound to me. She continues with the VAC on the left foot.     Objective    Image in media tab.   A diabetic pressure wound is noted at left  medial 1st met head measuring 3.1cm x 2.8cm x 0.2cm.    Lozano Classification: 2    Wound base: Pink  Yellow/Granulation  Some necrotic tendon    Edges: intact    Drainage: light/serous    Odor: no    Undermining: no    Bone Exposure: No    Clinical Signs of Infection: No    After obtaining patient consent, the wound was irrigated with copious amounts of saline. A scalpel and curette was then used to debride the wound into the necrotic tendonious tissue with excision of this. The wound edges were debrided to healthy, bleeding tissue. Given the patient's lack of sensation, no anesthesia was necessary for the procedure.       Assessment: Left foot ulceration - less depth today. Tendon is gradually covering up more with granulation.     Plan:   - Pt seen and evaluated  - Wound debrided as described.   - VAC was replaced by myself on the ulcer site.   - BKA wound was visualized today by Dr. King. Will check this at her next visit.   - Pt to return to clinic in 2 weeks.

## 2018-09-18 NOTE — NURSING NOTE
Reason For Visit:   Chief Complaint   Patient presents with     Right Leg - Surgical Followup     DOS 6/28/18. Right BKA       Primary MD: Brionna Calabrese  Ref. MD: Luisa Nugent    ?  No  Currently working? No.  Date of surgery: 6/28/18  Type of surgery: Right BKA  Smoker: No  Request smoking cessation information: No    There were no vitals taken for this visit.    Pain Assessment  Patient Currently in Pain: Denies    Oswestry (STEVEN) Questionnaire    OSWESTRY DISABILITY INDEX 7/24/2018   Count 7   Sum 18   Oswestry Score (%) 51.43   Some recent data might be hidden            Neck Disability Index (NDI) Questionnaire    No flowsheet data found.                Promis 10 Assessment    PROMIS 10 7/24/2018   In general, would you say your health is: Fair   In general, would you say your quality of life is: Poor   In general, how would you rate your physical health? Fair   In general, how would you rate your mental health, including your mood and your ability to think? Poor   In general, how would you rate your satisfaction with your social activities and relationships? Fair   Some recent data might be hidden                Meng Lopez ATC

## 2018-09-18 NOTE — NURSING NOTE
Reason For Visit:   Chief Complaint   Patient presents with     Wound Check     Wound, left foot. Pt stated that she is concerned about the odor coming from her foot. Pt stated that she took the vac off this past Saturday.        Pain Assessment  Patient Currently in Pain: Denies          Current Outpatient Prescriptions   Medication Sig Dispense Refill     acetaminophen (TYLENOL) 500 MG tablet Take 2 tablets (1,000 mg) by mouth 3 times daily       acetone, Urine, test STRP 1 strip by In Vitro route as needed 25 each 1     albuterol (PROAIR HFA) 108 (90 Base) MCG/ACT Inhaler Inhale 2 puffs into the lungs every 4 hours as needed for shortness of breath / dyspnea (Patient not taking: Reported on 8/29/2018) 1 Inhaler 0     amitriptyline (ELAVIL) 50 MG tablet Take 1 tablet (50 mg) by mouth At Bedtime 60 tablet 0     aspirin 81 MG tablet Take 1 tablet (81 mg) by mouth daily 100 tablet 3     blood glucose (NO BRAND SPECIFIED) lancets standard Use to test blood sugar 10 times daily or as directed. Accu-check 100 each 11     blood glucose monitoring (NO BRAND SPECIFIED) meter device kit Use to test blood sugar 10 times daily or as directed. Accu-check 1 kit 0     blood glucose monitoring (NO BRAND SPECIFIED) test strip Use to test blood sugars 10 times daily or as directed. Accu-chek 100 strip 11     fluticasone (FLONASE) 50 MCG/ACT spray Spray 2 sprays into left nostril daily 1 Bottle 0     furosemide (LASIX) 20 MG tablet TAKE 1 TABLET (20 MG) BY MOUTH DAILY 90 tablet 0     furosemide (LASIX) 40 MG tablet Take 1 tablet (40 mg) by mouth daily 30 tablet 0     glucose 4 g CHEW chewable tablet Take 3-4 tablets to treat low blood sugar. (Patient not taking: Reported on 8/29/2018) 25 tablet 11     insulin aspart (NOVOLOG PEN) 100 UNIT/ML injection Inject 1-7 Units Subcutaneous 3 times daily (before meals) (Patient taking differently: Inject 2-5 Units Subcutaneous 3 times daily (before meals) ) 6.3 mL 0     insulin glargine  (LANTUS SOLOSTAR) 100 UNIT/ML pen Inject 8 Units Subcutaneous At Bedtime 2.4 mL 0     insulin pen needle (B-D U/F) 31G X 5 MM Use 3 time(s) a day. 3 each 3     levonorgestrel (MIRENA, 52 MG,) 20 MCG/24HR IUD placed today 1 each 0     Lidocaine (LIDOCARE) 4 % Patch Place 2 patches onto the skin every 24 hours 30 patch 0     lisinopril (PRINIVIL/ZESTRIL) 10 MG tablet Take 1 tablet (10 mg) by mouth daily 30 tablet 0     LYRICA 75 MG capsule TAKE 1 CAPSULE BY MOUTH 3 TIMES DAILY 90 capsule 3     methadone (DOLPHINE-INTENSOL) 10 mg/mL CONC (HIGH CONC) solution Take 7 mLs (70 mg) by mouth daily 70 mL 0     metoprolol succinate (TOPROL-XL) 25 MG 24 hr tablet Take 1 tablet (25 mg) by mouth daily 90 tablet 1     multivitamin, therapeutic with minerals (THERA-VIT-M) TABS tablet Take 1 tablet by mouth daily 30 each 0     polyethylene glycol (MIRALAX/GLYCOLAX) Packet Take 17 g by mouth daily 7 packet 0     ranitidine (ZANTAC) 300 MG tablet Take 1 tablet (300 mg) by mouth daily 60 tablet 0     umeclidinium-vilanterol (ANORO ELLIPTA) 62.5-25 MCG/INH oral inhaler Inhale 1 puff into the lungs daily 1 Inhaler 2          Allergies   Allergen Reactions     No Clinical Screening - See Comments      Swelling in the throat.

## 2018-09-18 NOTE — MR AVS SNAPSHOT
After Visit Summary   9/18/2018    Alessandra Pak    MRN: [id number]           Patient Information     Date Of Birth          1965        Visit Information        Provider Department      9/18/2018 12:20 PM Barrington Lewis DPM [facility]        Today's Diagnoses     Diabetic ulcer of left midfoot associated with type 2 diabetes mellitus, with necrosis of muscle (H)    -  1    Type 2 diabetes mellitus with stage 3 chronic kidney disease, with long-term current use of insulin (H)           Follow-ups after your visit        Your next 10 appointments already scheduled     Oct 05, 2018 12:20 PM CDT   (Arrive by 12:05 PM)   RETURN FOOT/ANKLE with Barrington Lewis DPM   [facility] ([facility])    [address] [address] [phone]              Who to contact     Please call your clinic at [phone] to:    Ask questions about your health    Make or cancel appointments    Discuss your medicines    Learn about your test results    Speak to your doctor            Additional Information About Your Visit        MyChart Information     Yunno gives you secure access to your electronic health record. If you see a primary care provider, you can also send messages to your care team and make appointments. If you have questions, please call your primary care clinic.  If you do not have a primary care provider, please call [phone] and they will assist you.      Yunno is an electronic gateway that provides easy, online access to your medical records. With [employer], you can request a clinic appointment, read your test results, renew a prescription or communicate with your care team.     To access your existing account, please contact your [facility] or call [phone] for assistance.        Care EveryWhere ID     This is your Care EveryWhere ID. This could be used by  other organizations to access your [facility] medical records  [id number]         Blood Pressure from Last 3 Encounters:   08/30/18 93/61   08/01/18 102/68   07/29/18 126/87    Weight from Last 3 Encounters:   08/01/18 51.1 kg (112 lb 9.6 oz)   07/28/18 54.4 kg (120 lb)   07/19/18 53.6 kg (118 lb 1.6 oz)              We Performed the Following     DEBRIDE SKIN/SUQ/MUSCLE          Today's Medication Changes          These changes are accurate as of 9/18/18 12:36 PM.  If you have any questions, ask your nurse or doctor.               These medicines have changed or have updated prescriptions.        Dose/Directions    insulin aspart 100 UNIT/ML injection   Commonly known as:  NovoLOG PEN   Indication:  Type 2 Diabetes   This may have changed:  how much to take   Used for:  Type 2 diabetes mellitus with stage 3 chronic kidney disease, with long-term current use of insulin (H)        Dose:  1-7 Units   Inject 1-7 Units Subcutaneous 3 times daily (before meals)   Quantity:  6.3 mL   Refills:  0                Primary Care Provider Office Phone # Fax #    Brionna Cobylucas Dc -[phone]       [address] [facility] [address]        Goals        General    #1 Financial Wellbeing (pt-stated)     Notes - Note created  7/31/2018  2:08 PM by Behl, Melissa K, RN    Goal Statement: I will call [place] [facility] [phone]  Measure of Success: Patient will call [place] [facility] and receive local food assistance resources.  Supportive Steps to Achieve: RNCC provided patient with phone number  Barriers: none known at this time  Strengths: family support, home care, engaged in care coordination  Date to Achieve By: 8/30/18       #2 Transportation (pt-stated)     Notes - Note created  7/31/2018  2:09 PM by Behl, Melissa K, RN    Goal Statement: I will call the city and/or police department to request handicap parking near my residence  Measure of Success: Patient will call and  receive appropriate assistance/direction in obtaining handicap parking near her residence  Supportive Steps to Achieve: RNCC advised patient to contact her city/police department to request handicap parking near her residence  Barriers: none known at this time  Strengths: family support, home care, engaged in care coordination  Date to Achieve By: 8/30/18       #3 Medical (pt-stated)     Notes - Note created  7/31/2018  2:10 PM by Behl, Melissa K, RN    Goal Statement: I will test my blood sugar 3 times/day  Measure of Success: Patient will consistent test blood sugar 3 times/day  Supportive Steps to Achieve: RNCC sending refill request for test strips to PCP  Barriers: does not have test strips  Strengths: family support, home care, engaged in care coordination  Date to Achieve By: 8/30/18         Equal Access to Services     NICK ROBLEDO : Chiqui osei Sotyler, waaxda luqadaha, qaybta kaalmada aderosaurayaalbino, sanjay hoang. So [facility] [phone].    ATENCIÓN: Si habla español, tiene a nagy disposición servicios gratuitos de asistencia lingüística. Llame al [phone].    We comply with applicable federal civil rights laws and [place] laws. We do not discriminate on the basis of race, color, national origin, age, disability, sex, sexual orientation, or gender identity.            Thank you!     Thank you for choosing [facility]  for your care. Our goal is always to provide you with excellent care. Hearing back from our patients is one way we can continue to improve our services. Please take a few minutes to complete the written survey that you may receive in the mail after your visit with us. Thank you!             Your Updated Medication List - Protect others around you: Learn how to safely use, store and throw away your medicines at www.disposemymeds.org.          This list is accurate as of 9/18/18 12:36 PM.  Always use your most recent med list.                    Brand Name Dispense Instructions for use Diagnosis    acetaminophen 500 MG tablet    TYLENOL     Take 2 tablets (1,000 mg) by mouth 3 times daily    Status post below knee amputation of right lower extremity (H)       acetone (Urine) test Strp     25 each    1 strip by In Vitro route as needed    Type 2 diabetes mellitus with diabetic neuropathy (H)       albuterol 108 (90 Base) MCG/ACT inhaler    PROAIR HFA    1 Inhaler    Inhale 2 puffs into the lungs every 4 hours as needed for shortness of breath / dyspnea    SOB (shortness of breath)       amitriptyline 50 MG tablet    ELAVIL    60 tablet    Take 1 tablet (50 mg) by mouth At Bedtime    Status post below knee amputation of right lower extremity (H)       aspirin 81 MG tablet     100 tablet    Take 1 tablet (81 mg) by mouth daily    Type 2 diabetes mellitus with complication, with long-term current use of insulin (H)       blood glucose lancets standard    no brand specified    100 each    Use to test blood sugar 10 times daily or as directed. Accu-check    Type 2 diabetes mellitus with stage 3 chronic kidney disease, with long-term current use of insulin (H)       blood glucose monitoring meter device kit    no brand specified    1 kit    Use to test blood sugar 10 times daily or as directed. Accu-check    Type 2 diabetes mellitus with stage 3 chronic kidney disease, with long-term current use of insulin (H)       blood glucose monitoring test strip    no brand specified    100 strip    Use to test blood sugars 10 times daily or as directed. Accu-chek    Type 2 diabetes mellitus with stage 3 chronic kidney disease, with long-term current use of insulin (H)       fluticasone 50 MCG/ACT spray    FLONASE    1 Bottle    Spray 2 sprays into left nostril daily    Acute nonseasonal allergic rhinitis due to pollen       * furosemide 40 MG tablet    LASIX    30 tablet    Take 1 tablet (40 mg) by mouth daily    SOB (shortness of breath)       * furosemide 20 MG tablet     LASIX    90 tablet    TAKE 1 TABLET (20 MG) BY MOUTH DAILY    Nonischemic cardiomyopathy (H)       glucose 4 g Chew chewable tablet     25 tablet    Take 3-4 tablets to treat low blood sugar.    Type 2 diabetes mellitus with complication, with long-term current use of insulin (H)       insulin aspart 100 UNIT/ML injection    NovoLOG PEN    6.3 mL    Inject 1-7 Units Subcutaneous 3 times daily (before meals)    Type 2 diabetes mellitus with stage 3 chronic kidney disease, with long-term current use of insulin (H)       insulin glargine 100 UNIT/ML injection    LANTUS    2.4 mL    Inject 8 Units Subcutaneous At Bedtime    Type 2 diabetes mellitus with stage 3 chronic kidney disease, with long-term current use of insulin (H)       insulin pen needle 31G X 5 MM    B-D U/F    3 each    Use 3 time(s) a day.    Type 2 diabetes, HbA1c goal < 7% (H)       levonorgestrel 20 MCG/24HR IUD    MIRENA (52 MG)    1 each    placed today    Excessive or frequent menstruation       Lidocaine 4 % Patch    LIDOCARE    30 patch    Place 2 patches onto the skin every 24 hours    Status post below knee amputation of right lower extremity (H)       lisinopril 10 MG tablet    PRINIVIL/ZESTRIL    30 tablet    Take 1 tablet (10 mg) by mouth daily    Type 2 diabetes mellitus with stage 3 chronic kidney disease, with long-term current use of insulin (H)       LYRICA 75 MG capsule   Generic drug:  pregabalin     90 capsule    TAKE 1 CAPSULE BY MOUTH 3 TIMES DAILY    Status post below knee amputation of right lower extremity (H)       methadone 10 mg/mL Conc (HIGH CONC) solution    DOLPHINE-INTENSOL    70 mL    Take 7 mLs (70 mg) by mouth daily    Status post below knee amputation of right lower extremity (H)       metoprolol succinate 25 MG 24 hr tablet    TOPROL-XL    90 tablet    Take 1 tablet (25 mg) by mouth daily    Hypertension goal BP (blood pressure) < 140/90, Chronic systolic congestive heart failure (H)       multivitamin, therapeutic  with minerals Tabs tablet     30 each    Take 1 tablet by mouth daily    Status post below knee amputation of right lower extremity (H)       polyethylene glycol Packet    MIRALAX/GLYCOLAX    7 packet    Take 17 g by mouth daily    Status post below knee amputation of right lower extremity (H)       ranitidine 300 MG tablet    ZANTAC    60 tablet    Take 1 tablet (300 mg) by mouth daily    Status post below knee amputation of right lower extremity (H)       umeclidinium-vilanterol 62.5-25 MCG/INH oral inhaler    ANORO ELLIPTA    1 Inhaler    Inhale 1 puff into the lungs daily    Chronic bronchitis, unspecified chronic bronchitis type (H)       * Notice:  This list has 2 medication(s) that are the same as other medications prescribed for you. Read the directions carefully, and ask your doctor or other care provider to review them with you.

## 2018-09-18 NOTE — PROGRESS NOTES
Spine Surgery Return Clinic Visit      Chief Complaint:   Surgical Followup of the Right Leg (DOS 6/28/18. Right BKA)      Interval HPI:  Symptom Profile Including: location of symptoms, onset, severity, exacerbating/alleviating factors, previous treatments:        Alessandra Pak is a 53 year old female who returns s/p r BKA in June 2018 for DFU.    She returns today doing pretty well.  She has minimal pain.  She does have a small area of granulation tissue over the lateral aspect of the wound, no signs of infection.    She has a separate left foot DFU which is followed by Dr. Lewis.              Past Medical History:     Past Medical History:   Diagnosis Date     Abdominal pain, right upper quadrant     [place] [facility] [place]US with positive high risk HPV 8/2013    + HPV 33, [place] - GAVIN I, ECC- atypia     Cardiomyopathy (H)     non ischemic cardiomyopathy with EF 15     Cervical high risk HPV (human papillomavirus) test positive 7/8/15, 7/25/16    NIL pap/+ HR HPV (not 16 or 18).      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    leep     Depressive disorder      Gastro-oesophageal reflux disease      Human papillomavirus in conditions classified elsewhere and of unspecified site 2/2012    + HPV 33     Hypertension      Profound impairment, one eye, impairment level not further specified     rt eye due to childhood injury     Systolic CHF (H) 3/12/2015     Tobacco abuse 5/18/2013     Type 2 diabetes mellitus without complications (H)      Uncomplicated asthma             Past Surgical History:     Past Surgical History:   Procedure Laterality Date     AMPUTATE LEG BELOW KNEE Right 6/28/2018    Procedure: AMPUTATE LEG BELOW KNEE;  Right Knee Ampuation Below Knee, Anesthesia Block;  Surgeon: Ambrose King MD;  Location: [facility] OR     C NONSPECIFIC PROCEDURE  2001    cholecystectomy     C NONSPECIFIC PROCEDURE  as a child    tonsillectomy     C NONSPECIFIC PROCEDURE  2001    [place]  procedure     CARDIAC SURGERY      defib     COLPOSCOPY,LOOP ELECTRD CERVIX EXCIS  2002, 2011    stage 2 dysplasia     ENDOBRONCHIAL ULTRASOUND FLEXIBLE N/A 2/19/2015    Procedure: ENDOBRONCHIAL ULTRASOUND FLEXIBLE;  Surgeon: Brenden Tamez MD;  Location: [facility] GI     LEEP TX, CERVICAL  2014    LEEP TX Cervical     RECESSION RESECTION WITH ADJUSTABLE SUTURE  12/13/2011    Procedure:RECESSION RESECTION WITH ADJUSTABLE SUTURE; Right Strabismus Repair with Adjustable Suture       [facility]             Social History:     Social History   Substance Use Topics     Smoking status: Former Smoker     Packs/day: 0.30     Years: 15.00     Types: Cigarettes     Smokeless tobacco: Former User     Quit date: 10/29/2014      Comment: Started smoking in 89/ smokes about 3 per day     Alcohol use No            Family History:     Family History   Problem Relation Age of Onset     Diabetes Mother      diet controled     Hypertension Mother      Arthritis Mother      Lipids Mother      Diabetes Father      Hypertension Father      GASTROINTESTINAL DISEASE Father      gallbladder removed     Bipolar Disorder Brother      Thyroid Disease Brother      Obesity Other      Son     Respiratory Other      Son and Daughter; asthma     Depression Maternal Aunt      Anxiety Disorder Maternal Aunt             Allergies:     Allergies   Allergen Reactions     No Clinical Screening - See Comments      Swelling in the throat.            Medications:     Current Outpatient Prescriptions   Medication     acetaminophen (TYLENOL) 500 MG tablet     acetone, Urine, test STRP     albuterol (PROAIR HFA) 108 (90 Base) MCG/ACT Inhaler     amitriptyline (ELAVIL) 50 MG tablet     aspirin 81 MG tablet     blood glucose (NO BRAND SPECIFIED) lancets standard     blood glucose monitoring (NO BRAND SPECIFIED) meter device kit     blood glucose monitoring (NO BRAND SPECIFIED) test strip     fluticasone (FLONASE) 50 MCG/ACT spray      furosemide (LASIX) 20 MG tablet     furosemide (LASIX) 40 MG tablet     glucose 4 g CHEW chewable tablet     insulin aspart (NOVOLOG PEN) 100 UNIT/ML injection     insulin glargine (LANTUS SOLOSTAR) 100 UNIT/ML pen     insulin pen needle (B-D U/F) 31G X 5 MM     levonorgestrel (MIRENA, 52 MG,) 20 MCG/24HR IUD     Lidocaine (LIDOCARE) 4 % Patch     lisinopril (PRINIVIL/ZESTRIL) 10 MG tablet     LYRICA 75 MG capsule     methadone (DOLPHINE-INTENSOL) 10 mg/mL CONC (HIGH CONC) solution     metoprolol succinate (TOPROL-XL) 25 MG 24 hr tablet     multivitamin, therapeutic with minerals (THERA-VIT-M) TABS tablet     polyethylene glycol (MIRALAX/GLYCOLAX) Packet     ranitidine (ZANTAC) 300 MG tablet     umeclidinium-vilanterol (ANORO ELLIPTA) 62.5-25 MCG/INH oral inhaler     No current facility-administered medications for this visit.              Review of Systems:   A focused musculoskeletal and neurologic ROS was performed with pertinent positives and negatives noted in the HPI.  Additional systems were also reviewed and are documented at the bottom of the note.         Physical Exam:   Vitals: There were no vitals taken for this visit.  Musculoskeletal, Neurologic, and Spine:     Right stump is examined.  3x3 cm area of granulating tissue laterally.  Otherwise well healed.           Imaging:       None       Assessment and Plan:     53 year old female s/p R BKA, doing well, with small area of granulation tissue over the wound.    Patient will f/u with Dr. Lewis for further DFU issues and wound monitoring.  I told her she should avoid using the prosthesis until the wound is healed.       Respectfully,  Ambrose King MD  Spine Surgery  [facility]

## 2018-09-18 NOTE — MR AVS SNAPSHOT
After Visit Summary   9/18/2018    Alessandra Pak    MRN: [id number]           Patient Information     Date Of Birth          1965        Visit Information        Provider Department      9/18/2018 10:40 AM Ambrose King MD [facility]        Today's Diagnoses     Status post below knee amputation of right lower extremity (H)    -  1       Follow-ups after your visit        Your next 10 appointments already scheduled     Sep 18, 2018 12:20 PM CDT   (Arrive by 12:05 PM)   RETURN FOOT/ANKLE with Barrington Lewis DPM   [facility] ([facility])    [address] [place] [id number] [phone]            Oct 05, 2018 12:20 PM CDT   (Arrive by 12:05 PM)   RETURN FOOT/ANKLE with Barrington Lewis DPM   [facility] ([facility])    [address] [place] [id number] [phone]              Who to contact     Please call your clinic at [phone] to:    Ask questions about your health    Make or cancel appointments    Discuss your medicines    Learn about your test results    Speak to your doctor            Additional Information About Your Visit        [employer] Information     [facility] gives you secure access to your electronic health record. If you see a primary care provider, you can also send messages to your care team and make appointments. If you have questions, please call your primary care clinic.  If you do not have a primary care provider, please call [phone] and they will assist you.      [facility] is an electronic gateway that provides easy, online access to your medical records. With [facility], you can request a clinic appointment, read your test results, renew a prescription or communicate with your care team.     To access your existing account, please contact your [facility] or call  [phone] for assistance.        Care EveryWhere ID     This is your Care EveryWhere ID. This could be used by other organizations to access your [facility] medical records  [id number]         Blood Pressure from Last 3 Encounters:   08/30/18 93/61   08/01/18 102/68   07/29/18 126/87    Weight from Last 3 Encounters:   08/01/18 51.1 kg (112 lb 9.6 oz)   07/28/18 54.4 kg (120 lb)   07/19/18 53.6 kg (118 lb 1.6 oz)              Today, you had the following     No orders found for display         Today's Medication Changes          These changes are accurate as of 9/18/18 11:03 AM.  If you have any questions, ask your nurse or doctor.               These medicines have changed or have updated prescriptions.        Dose/Directions    insulin aspart 100 UNIT/ML injection   Commonly known as:  NovoLOG PEN   Indication:  Type 2 Diabetes   This may have changed:  how much to take   Used for:  Type 2 diabetes mellitus with stage 3 chronic kidney disease, with long-term current use of insulin (H)        Dose:  1-7 Units   Inject 1-7 Units Subcutaneous 3 times daily (before meals)   Quantity:  6.3 mL   Refills:  0                Primary Care Provider Office Phone # Fax #    Brionna Coby Dc [phone]       [address]  [facility] [address]        Goals        General    #1 Financial Wellbeing (pt-stated)     Notes - Note created  7/31/2018  2:08 PM by Behl, Melissa K, RN    Goal Statement: I will call [place] [employer] [phone]  Measure of Success: Patient will call [place] [employer] and receive local food assistance resources.  Supportive Steps to Achieve: RNCC provided patient with phone number  Barriers: none known at this time  Strengths: family support, home care, engaged in care coordination  Date to Achieve By: 8/30/18       #2 Transportation (pt-stated)     Notes - Note created  7/31/2018  2:09 PM by Behl, Melissa K, RN    Goal Statement: I will call the [facility]  and/or police department to request handicap parking near my residence  Measure of Success: Patient will call and receive appropriate assistance/direction in obtaining handicap parking near her residence  Supportive Steps to Achieve: RNCC advised patient to contact her city/police department to request handicap parking near her residence  Barriers: none known at this time  Strengths: family support, home care, engaged in care coordination  Date to Achieve By: 8/30/18       #3 Medical (pt-stated)     Notes - Note created  7/31/2018  2:10 PM by Behl, Melissa K, RN    Goal Statement: I will test my blood sugar 3 times/day  Measure of Success: Patient will consistent test blood sugar 3 times/day  Supportive Steps to Achieve: RNCC sending refill request for test strips to PCP  Barriers: does not have test strips  Strengths: family support, home care, engaged in care coordination  Date to Achieve By: 8/30/18         Equal Access to Services     NICK ROBLEDO : Hadii andres bolivar hadashstacey Soomaali, waaxda luqadaha, qaybta kaalmada kateyaalbino, sanjay levine . So [facility] [phone].    ATENCIÓN: Si habla español, tiene a nagy disposición servicios gratuitos de asistencia lingüística. Llame al [phone].    We comply with applicable federal civil rights laws and [place] laws. We do not discriminate on the basis of race, color, national origin, age, disability, sex, sexual orientation, or gender identity.            Thank you!     Thank you for choosing [facility]  for your care. Our goal is always to provide you with excellent care. Hearing back from our patients is one way we can continue to improve our services. Please take a few minutes to complete the written survey that you may receive in the mail after your visit with us. Thank you!             Your Updated Medication List - Protect others around you: Learn how to safely use, store and throw away your medicines at  www.disposemymeds.org.          This list is accurate as of 9/18/18 11:03 AM.  Always use your most recent med list.                   Brand Name Dispense Instructions for use Diagnosis    acetaminophen 500 MG tablet    TYLENOL     Take 2 tablets (1,000 mg) by mouth 3 times daily    Status post below knee amputation of right lower extremity (H)       acetone (Urine) test Strp     25 each    1 strip by In Vitro route as needed    Type 2 diabetes mellitus with diabetic neuropathy (H)       albuterol 108 (90 Base) MCG/ACT inhaler    PROAIR HFA    1 Inhaler    Inhale 2 puffs into the lungs every 4 hours as needed for shortness of breath / dyspnea    SOB (shortness of breath)       amitriptyline 50 MG tablet    ELAVIL    60 tablet    Take 1 tablet (50 mg) by mouth At Bedtime    Status post below knee amputation of right lower extremity (H)       aspirin 81 MG tablet     100 tablet    Take 1 tablet (81 mg) by mouth daily    Type 2 diabetes mellitus with complication, with long-term current use of insulin (H)       blood glucose lancets standard    no brand specified    100 each    Use to test blood sugar 10 times daily or as directed. Accu-check    Type 2 diabetes mellitus with stage 3 chronic kidney disease, with long-term current use of insulin (H)       blood glucose monitoring meter device kit    no brand specified    1 kit    Use to test blood sugar 10 times daily or as directed. Accu-check    Type 2 diabetes mellitus with stage 3 chronic kidney disease, with long-term current use of insulin (H)       blood glucose monitoring test strip    no brand specified    100 strip    Use to test blood sugars 10 times daily or as directed. Accu-chek    Type 2 diabetes mellitus with stage 3 chronic kidney disease, with long-term current use of insulin (H)       fluticasone 50 MCG/ACT spray    FLONASE    1 Bottle    Spray 2 sprays into left nostril daily    Acute nonseasonal allergic rhinitis due to pollen       * furosemide 40  MG tablet    LASIX    30 tablet    Take 1 tablet (40 mg) by mouth daily    SOB (shortness of breath)       * furosemide 20 MG tablet    LASIX    90 tablet    TAKE 1 TABLET (20 MG) BY MOUTH DAILY    Nonischemic cardiomyopathy (H)       glucose 4 g Chew chewable tablet     25 tablet    Take 3-4 tablets to treat low blood sugar.    Type 2 diabetes mellitus with complication, with long-term current use of insulin (H)       insulin aspart 100 UNIT/ML injection    NovoLOG PEN    6.3 mL    Inject 1-7 Units Subcutaneous 3 times daily (before meals)    Type 2 diabetes mellitus with stage 3 chronic kidney disease, with long-term current use of insulin (H)       insulin glargine 100 UNIT/ML injection    LANTUS    2.4 mL    Inject 8 Units Subcutaneous At Bedtime    Type 2 diabetes mellitus with stage 3 chronic kidney disease, with long-term current use of insulin (H)       insulin pen needle 31G X 5 MM    B-D U/F    3 each    Use 3 time(s) a day.    Type 2 diabetes, HbA1c goal < 7% (H)       levonorgestrel 20 MCG/24HR IUD    MIRENA (52 MG)    1 each    placed today    Excessive or frequent menstruation       Lidocaine 4 % Patch    LIDOCARE    30 patch    Place 2 patches onto the skin every 24 hours    Status post below knee amputation of right lower extremity (H)       lisinopril 10 MG tablet    PRINIVIL/ZESTRIL    30 tablet    Take 1 tablet (10 mg) by mouth daily    Type 2 diabetes mellitus with stage 3 chronic kidney disease, with long-term current use of insulin (H)       LYRICA 75 MG capsule   Generic drug:  pregabalin     90 capsule    TAKE 1 CAPSULE BY MOUTH 3 TIMES DAILY    Status post below knee amputation of right lower extremity (H)       methadone 10 mg/mL Conc (HIGH CONC) solution    DOLPHINE-INTENSOL    70 mL    Take 7 mLs (70 mg) by mouth daily    Status post below knee amputation of right lower extremity (H)       metoprolol succinate 25 MG 24 hr tablet    TOPROL-XL    90 tablet    Take 1 tablet (25 mg) by mouth  daily    Hypertension goal BP (blood pressure) < 140/90, Chronic systolic congestive heart failure (H)       multivitamin, therapeutic with minerals Tabs tablet     30 each    Take 1 tablet by mouth daily    Status post below knee amputation of right lower extremity (H)       polyethylene glycol Packet    MIRALAX/GLYCOLAX    7 packet    Take 17 g by mouth daily    Status post below knee amputation of right lower extremity (H)       ranitidine 300 MG tablet    ZANTAC    60 tablet    Take 1 tablet (300 mg) by mouth daily    Status post below knee amputation of right lower extremity (H)       umeclidinium-vilanterol 62.5-25 MCG/INH oral inhaler    ANORO ELLIPTA    1 Inhaler    Inhale 1 puff into the lungs daily    Chronic bronchitis, unspecified chronic bronchitis type (H)       * Notice:  This list has 2 medication(s) that are the same as other medications prescribed for you. Read the directions carefully, and ask your doctor or other care provider to review them with you.

## 2018-09-18 NOTE — LETTER
9/18/2018       RE: Alessandra Pak  [address]  [facility] [address]     Dear Colleague,    Thank you for referring your patient, Alessandra Pak, to the [facility]. Please see a copy of my visit note below.    Spine Surgery Return Clinic Visit      Chief Complaint:   Surgical Followup of the Right Leg (DOS 6/28/18. Right BKA)      Interval HPI:  Symptom Profile Including: location of symptoms, onset, severity, exacerbating/alleviating factors, previous treatments:        Alessandra Pak is a 53 year old female who returns s/p r BKA in June 2018 for DFU.    She returns today doing pretty well.  She has minimal pain.  She does have a small area of granulation tissue over the lateral aspect of the wound, no signs of infection.    She has a separate left foot DFU which is followed by Dr. Lewis.              Past Medical History:     Past Medical History:   Diagnosis Date     Abdominal pain, right upper quadrant     [place] [facility] [place]US with positive high risk HPV 8/2013    + HPV 33, Asher - GAVIN I, ECC- atypia     Cardiomyopathy (H)     non ischemic cardiomyopathy with EF 15     Cervical high risk HPV (human papillomavirus) test positive 7/8/15, 7/25/16    NIL pap/+ HR HPV (not 16 or 18).      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    leep     Depressive disorder      Gastro-oesophageal reflux disease      Human papillomavirus in conditions classified elsewhere and of unspecified site 2/2012    + HPV 33     Hypertension      Profound impairment, one eye, impairment level not further specified     rt eye due to childhood injury     Systolic CHF (H) 3/12/2015     Tobacco abuse 5/18/2013     Type 2 diabetes mellitus without complications (H)      Uncomplicated asthma             Past Surgical History:     Past Surgical History:   Procedure Laterality Date     AMPUTATE LEG BELOW KNEE Right 6/28/2018    Procedure: AMPUTATE  LEG BELOW KNEE;  Right Knee Ampuation Below Knee, Anesthesia Block;  Surgeon: Ambrose King MD;  Location: [facility] OR     C NONSPECIFIC PROCEDURE  2001    cholecystectomy     C NONSPECIFIC PROCEDURE  as a child    tonsillectomy     C NONSPECIFIC PROCEDURE  2001    whipple procedure     CARDIAC SURGERY      defib     COLPOSCOPY,LOOP ELECTRD CERVIX EXCIS  2002, 2011    stage 2 dysplasia     ENDOBRONCHIAL ULTRASOUND FLEXIBLE N/A 2/19/2015    Procedure: ENDOBRONCHIAL ULTRASOUND FLEXIBLE;  Surgeon: Brenden Tamez MD;  Location: [facility] GI     LEEP TX, CERVICAL  2014    LEEP TX Cervical     RECESSION RESECTION WITH ADJUSTABLE SUTURE  12/13/2011    Procedure:RECESSION RESECTION WITH ADJUSTABLE SUTURE; Right Strabismus Repair with Adjustable Suture       TUBAL LIGATION  2007    essure             Social History:     Social History   Substance Use Topics     Smoking status: Former Smoker     Packs/day: 0.30     Years: 15.00     Types: Cigarettes     Smokeless tobacco: Former User     Quit date: 10/29/2014      Comment: Started smoking in 89/ smokes about 3 per day     Alcohol use No            Family History:     Family History   Problem Relation Age of Onset     Diabetes Mother      diet controled     Hypertension Mother      Arthritis Mother      Lipids Mother      Diabetes Father      Hypertension Father      GASTROINTESTINAL DISEASE Father      gallbladder removed     Bipolar Disorder Brother      Thyroid Disease Brother      Obesity Other      Son     Respiratory Other      Son and Daughter; asthma     Depression Maternal Aunt      Anxiety Disorder Maternal Aunt             Allergies:     Allergies   Allergen Reactions     No Clinical Screening - See Comments      Swelling in the throat.            Medications:     Current Outpatient Prescriptions   Medication     acetaminophen (TYLENOL) 500 MG tablet     acetone, Urine, test STRP     albuterol (PROAIR HFA) 108 (90 Base) MCG/ACT Inhaler     amitriptyline  (ELAVIL) 50 MG tablet     aspirin 81 MG tablet     blood glucose (NO BRAND SPECIFIED) lancets standard     blood glucose monitoring (NO BRAND SPECIFIED) meter device kit     blood glucose monitoring (NO BRAND SPECIFIED) test strip     fluticasone (FLONASE) 50 MCG/ACT spray     furosemide (LASIX) 20 MG tablet     furosemide (LASIX) 40 MG tablet     glucose 4 g CHEW chewable tablet     insulin aspart (NOVOLOG PEN) 100 UNIT/ML injection     insulin glargine (LANTUS SOLOSTAR) 100 UNIT/ML pen     insulin pen needle (B-D U/F) 31G X 5 MM     levonorgestrel (MIRENA, 52 MG,) 20 MCG/24HR IUD     Lidocaine (LIDOCARE) 4 % Patch     lisinopril (PRINIVIL/ZESTRIL) 10 MG tablet     LYRICA 75 MG capsule     methadone (DOLPHINE-INTENSOL) 10 mg/mL CONC (HIGH CONC) solution     metoprolol succinate (TOPROL-XL) 25 MG 24 hr tablet     multivitamin, therapeutic with minerals (THERA-VIT-M) TABS tablet     polyethylene glycol (MIRALAX/GLYCOLAX) Packet     ranitidine (ZANTAC) 300 MG tablet     umeclidinium-vilanterol (ANORO ELLIPTA) 62.5-25 MCG/INH oral inhaler     No current facility-administered medications for this visit.              Review of Systems:   A focused musculoskeletal and neurologic ROS was performed with pertinent positives and negatives noted in the HPI.  Additional systems were also reviewed and are documented at the bottom of the note.         Physical Exam:   Vitals: There were no vitals taken for this visit.  Musculoskeletal, Neurologic, and Spine:     Right stump is examined.  3x3 cm area of granulating tissue laterally.  Otherwise well healed.           Imaging:       None       Assessment and Plan:     53 year old female s/p R BKA, doing well, with small area of granulation tissue over the wound.    Patient will f/u with Dr. Lewis for further DFU issues and wound monitoring.  I told her she should avoid using the prosthesis until the wound is healed.         Again, thank you for allowing me to participate in the  care of your patient.      Sincerely,    Ambrose King MD

## 2018-09-21 NOTE — PROGRESS NOTES
Clinic Care Coordination Contact  Care Team Conversations    RN CC received a phone call with update on patient from home care RN Case Manager Nolan [phone].  Nolan states home care has not needs from care coordination at this time and they expect to be in place for an extended period of time at this point.    Plan: RN Care Coordinator will await notification from home care staff informing RN Care Coordinator of patients discharge plans/needs. RN Care Coordinator will review chart and outreach to home care every 4 weeks and will remain available to patient, care team and home care as needed.       Melissa Behl BSN, RN, PHN  [facility] Care Coordinator  [phone]

## 2018-10-03 NOTE — TELEPHONE ENCOUNTER
Mackenzie from home care contacted.  Patient should go to the ER for evaluation. Mackenzie will let patient know.

## 2018-10-03 NOTE — TELEPHONE ENCOUNTER
[facility] Call Center    Phone Message    May a detailed message be left on voicemail: yes    Reason for Call: Symptoms or Concerns     If patient has red-flag symptoms, warm transfer to triage line    Current symptom or concern: Nolan saw Alessandra today and is concerned that there is a possible infections on the left foot.  Her lower extremity is swollen.  Extreme pain.  Foul smell.    Are there any new or worsening symptoms? Yes      Action Taken: Message routed to:  [facility]

## 2018-10-05 NOTE — LETTER
10/5/2018       RE: Alessandra Pak  [address]  [facility] [address]     Dear Colleague,    Thank you for referring your patient, Alessandra Pak, to the [facility]. Please see a copy of my visit note below.    Chief Complaint: No chief complaint on file.    Allergies   Allergen Reactions     No Clinical Screening - See Comments      Swelling in the throat.     Subjective: Alessandra is a 53 year old female who presents to the clinic today for a follow up of left foot ulcer. She continues with the VAC on the left foot. She relates that the week after she saw me, she noted that there was a heavy malodor to the wound.  She related that she told her home nurse about this, however they continue with the past.  She relates that she noted that the area was getting deeper and deeper over the last 2 weeks.  She has not had any nausea, vomiting, diarrhea, fever, chills, night sweats, shortness of breath, chest pain.  However, her mother does relate that she has been sleeping more than usual.  She relates that she is quite concerned about the wound today.  She relates that she has been trying to stay off the foot as much as possible.     Objective  Image in media tab.   A diabetic pressure wound is noted at left  medial 1st met head measuring  4cm x 3cm x 2.6cm.     Lozano Classification: 3     Wound base: Pink  Yellow/Granulation  Some necrotic tendon. Bone is now exposed in the ulcer site. Clear visualization of the met head and the tibial sesamoid.      Edges: inflamed     Drainage: copious/serous     Odor: yes. Foul odor in a pocket under the met head.      Undermining: Under the met head.     Bone Exposure: Yes. Metatarsal and sesamoid     Clinical Signs of Infection: Yes. Malodor, erythema, PTB     After obtaining patient consent, the wound was irrigated with copious amounts of saline. A scalpel and curette and rongeur  w ere then used to debride  the wound into the necrotic tendinous flexor tissue. The wound edges were debrided to healthy, bleeding tissue. Given the patient's lack of sensation, no anesthesia was necessary for the procedure.     Wound #2  A post-surgical wound is noted at right  BKA stump measuring 1cm x 2.9cm x 0.2cm.    Lozano Classification: 2    Wound base: Red  Yellow/Granulation  Slough    Edges: intact    Drainage: small/serous    Odor: no    Undermining: no    Bone Exposure: No    Clinical Signs of Infection: No    After obtaining patient consent, the wound was irrigated with copious amounts of saline. A scalpel was then used to debride the wound into subcutaneous tissue. The wound edges were debrided back to healthy, bleeding tissue. The wound base exhibited healthy bleeding. Given the patient's lack of sensation, no anesthesia was necessary for the procedure.    left foot xrays indicated in 3 weightbearing views.    It appears that there is no inversion on the medial aspect of the base of the proximal phalanx of the hallux.  It also appears to be some erosion of the tibial sesamoid medially.  No gas noted to the foot.      Assessment: Left foot ulceration -this wound is significantly degraded since her last visit.  This is the first time seeing her since she was seen in clinic, and the wound was looking quite well.  I am quite concerned for this wound, as it has exposed bone now.  He does have a very foul odor.  I discussed with her that I think that this ulcer may be require an amputation of the first ray.  Post-surgical wound of BKA stump with delayed healing     Plan:   - Pt seen and evaluated  - Wounds debrided as described.   - Awaiting CBC/ESR/CRP   - Xrays taken and discussed with her and her mom.   - Rx for clindamycin written for 7 days for now. I did take a culture of the malodorous portion of the wound. Will await result.  - Referral to Dr. Peralta.  - Betadine to the wound daily.   - Pt to return to clinic in 1 week.        Again, thank you for allowing me to participate in the care of your patient.      Sincerely,    Barrington Lewis DPM

## 2018-10-05 NOTE — MR AVS SNAPSHOT
After Visit Summary   10/5/2018    Alessandra Pak    MRN: [id number]           Patient Information     Date Of Birth          1965        Visit Information        Provider Department      10/5/2018 12:20 PM Barrington Lewis DPM [facility] [place] [facility]        Today's Diagnoses     Diabetic ulcer of left midfoot associated with type 2 diabetes mellitus, with necrosis of muscle (H)    -  1    Diabetic ulcer of left midfoot associated with type 2 diabetes mellitus, with bone involvement without evidence of necrosis (H)           Follow-ups after your visit        Additional Services     ORTHOPEDICS ADULT REFERRAL       Your provider has referred you to: [facility]: Orthopaedic Clinic [facility] [phone]   [email or web address]      Dr. Peralta    Please be aware that coverage of these services is subject to the terms and limitations of your health insurance plan.  Call member services at your health plan with any benefit or coverage questions.      Please bring the following to your appointment:    >>   Any x-rays, CTs or MRIs which have been performed.  Contact the facility where they were done to arrange for  prior to your scheduled appointment.    >>   List of current medications   >>   This referral request   >>   Any documents/labs given to you for this referral                  Your next 10 appointments already scheduled     Oct 05, 2018  2:00 PM CDT   LAB with [facility] ([facility] [place])    [address] [place] [id number] [phone]           Please do not eat 10-12 hours before your appointment if you are coming in fasting for labs on lipids, cholesterol, or glucose (sugar). This does not apply to pregnant women. Water, hot tea and black coffee (with nothing added) are okay. Do not drink other fluids, diet soda or chew gum.            Oct 12, 2018 12:20 PM CDT   (Arrive by 12:05 PM)    RETURN FOOT/ANKLE with Barrington Lewis DPM   [facility] ([facility] [place])    [address] [id number] [phone]            Oct 16, 2018  7:00 AM CDT   (Arrive by 6:45 AM)   NEW FOOT/ANKLE with Jamey Peralta MD   [facility] ([facility] [place])    [address] [id number] [phone]              Future tests that were ordered for you today     Open Future Orders        Priority Expected Expires Ordered    CBC with platelets Routine  11/4/2018 10/5/2018    Erythrocyte sedimentation rate auto Routine  1/3/2019 10/5/2018    CRP inflammation Routine  1/3/2019 10/5/2018    Wound Culture Aerobic Bacterial Routine  10/5/2019 10/5/2018    Anaerobic bacterial culture Routine  10/6/2019 10/5/2018            Who to contact     Please call your clinic at [phone] to:    Ask questions about your health    Make or cancel appointments    Discuss your medicines    Learn about your test results    Speak to your doctor            Additional Information About Your Visit        [employer] mobility Information     Spout gives you secure access to your electronic health record. If you see a primary care provider, you can also send messages to your care team and make appointments. If you have questions, please call your primary care clinic.  If you do not have a primary care provider, please call [phone] and they will assist you.      Spout is an electronic gateway that provides easy, online access to your medical records. With Spout, you can request a clinic appointment, read your test results, renew a prescription or communicate with your care team.     To access your existing account, please contact your [facility] or call [phone] for assistance.        Care EveryWhere ID     This is your Care EveryWhere ID. This could be used by other  organizations to access your [facility] medical records  [id number]        Your Vitals Were     Pulse Temperature Respirations Pulse Oximetry          101 97.3  F (36.3  C) 16 96%         Blood Pressure from Last 3 Encounters:   10/05/18 (!) 88/58   08/30/18 93/61   08/01/18 102/68    Weight from Last 3 Encounters:   08/01/18 51.1 kg (112 lb 9.6 oz)   07/28/18 54.4 kg (120 lb)   07/19/18 53.6 kg (118 lb 1.6 oz)              We Performed the Following     ORTHOPEDICS ADULT REFERRAL          Today's Medication Changes          These changes are accurate as of 10/5/18  1:55 PM.  If you have any questions, ask your nurse or doctor.               Start taking these medicines.        Dose/Directions    clindamycin 300 MG capsule   Commonly known as:  CLEOCIN   Used for:  Diabetic ulcer of left midfoot associated with type 2 diabetes mellitus, with bone involvement without evidence of necrosis (H)   Started by:  Barrington Lewis DPM        Dose:  300 mg   Take 1 capsule (300 mg) by mouth 3 times daily   Quantity:  21 capsule   Refills:  0         These medicines have changed or have updated prescriptions.        Dose/Directions    insulin aspart 100 UNIT/ML injection   Commonly known as:  NovoLOG PEN   Indication:  Type 2 Diabetes   This may have changed:  how much to take   Used for:  Type 2 diabetes mellitus with stage 3 chronic kidney disease, with long-term current use of insulin (H)        Dose:  1-7 Units   Inject 1-7 Units Subcutaneous 3 times daily (before meals)   Quantity:  6.3 mL   Refills:  0            Where to get your medicines      These medications were sent to [facility] [id number] [facility] - [facility]CKNER - [address]  [address] [address]     Phone:  [phone]     clindamycin 300 MG capsule                Primary Care Provider Office Phone # Fax #    Brionna Dc [phone] [address]  [facility]  [address]        Equal Access to Services     [facility]: Hadii andres bolivar farnaz Terrell, wagracieda luqadaha, qaybta kaalmaalbino piña, sanjay murdockkristiankai hoang. So [facility] [phone].    ATENCIÓN: Si habla español, tiene a nagy disposición servicios gratuitos de asistencia lingüística. Biaame al [phone].    We comply with applicable federal civil rights laws and [place] laws. We do not discriminate on the basis of race, color, national origin, age, disability, sex, sexual orientation, or gender identity.            Thank you!     Thank you for choosing [facility]  for your care. Our goal is always to provide you with excellent care. Hearing back from our patients is one way we can continue to improve our services. Please take a few minutes to complete the written survey that you may receive in the mail after your visit with us. Thank you!             Your Updated Medication List - Protect others around you: Learn how to safely use, store and throw away your medicines at www.disposemymeds.org.          This list is accurate as of 10/5/18  1:55 PM.  Always use your most recent med list.                   Brand Name Dispense Instructions for use Diagnosis    acetaminophen 500 MG tablet    TYLENOL     Take 2 tablets (1,000 mg) by mouth 3 times daily    Status post below knee amputation of right lower extremity (H)       acetone (urine) test strip    KETOSTIX    25 each    1 strip by In Vitro route as needed    Type 2 diabetes mellitus with diabetic neuropathy (H)       albuterol 108 (90 Base) MCG/ACT inhaler    PROAIR HFA    1 Inhaler    Inhale 2 puffs into the lungs every 4 hours as needed for shortness of breath / dyspnea    SOB (shortness of breath)       amitriptyline 50 MG tablet    ELAVIL    60 tablet    Take 1 tablet (50 mg) by mouth At Bedtime    Status post below knee amputation of right lower extremity (H)       aspirin 81 MG tablet     100 tablet    Take 1 tablet (81 mg)  by mouth daily    Type 2 diabetes mellitus with complication, with long-term current use of insulin (H)       blood glucose lancets standard    no brand specified    100 each    Use to test blood sugar 10 times daily or as directed. Accu-check    Type 2 diabetes mellitus with stage 3 chronic kidney disease, with long-term current use of insulin (H)       blood glucose monitoring meter device kit    no brand specified    1 kit    Use to test blood sugar 10 times daily or as directed. Accu-check    Type 2 diabetes mellitus with stage 3 chronic kidney disease, with long-term current use of insulin (H)       blood glucose monitoring test strip    no brand specified    100 strip    Use to test blood sugars 10 times daily or as directed. Accu-chek    Type 2 diabetes mellitus with stage 3 chronic kidney disease, with long-term current use of insulin (H)       clindamycin 300 MG capsule    CLEOCIN    21 capsule    Take 1 capsule (300 mg) by mouth 3 times daily    Diabetic ulcer of left midfoot associated with type 2 diabetes mellitus, with bone involvement without evidence of necrosis (H)       fluticasone 50 MCG/ACT spray    FLONASE    1 Bottle    Spray 2 sprays into left nostril daily    Acute nonseasonal allergic rhinitis due to pollen       * furosemide 40 MG tablet    LASIX    30 tablet    Take 1 tablet (40 mg) by mouth daily    SOB (shortness of breath)       * furosemide 20 MG tablet    LASIX    90 tablet    TAKE 1 TABLET (20 MG) BY MOUTH DAILY    Nonischemic cardiomyopathy (H)       glucose 4 g Chew chewable tablet     25 tablet    Take 3-4 tablets to treat low blood sugar.    Type 2 diabetes mellitus with complication, with long-term current use of insulin (H)       insulin aspart 100 UNIT/ML injection    NovoLOG PEN    6.3 mL    Inject 1-7 Units Subcutaneous 3 times daily (before meals)    Type 2 diabetes mellitus with stage 3 chronic kidney disease, with long-term current use of insulin (H)       insulin glargine  100 UNIT/ML injection    LANTUS    2.4 mL    Inject 8 Units Subcutaneous At Bedtime    Type 2 diabetes mellitus with stage 3 chronic kidney disease, with long-term current use of insulin (H)       insulin pen needle 31G X 5 MM    B-D U/F    3 each    Use 3 time(s) a day.    Type 2 diabetes, HbA1c goal < 7% (H)       levonorgestrel 20 MCG/24HR IUD    MIRENA (52 MG)    1 each    placed today    Excessive or frequent menstruation       Lidocaine 4 % Patch    LIDOCARE    30 patch    Place 2 patches onto the skin every 24 hours    Status post below knee amputation of right lower extremity (H)       lisinopril 10 MG tablet    PRINIVIL/ZESTRIL    30 tablet    Take 1 tablet (10 mg) by mouth daily    Type 2 diabetes mellitus with stage 3 chronic kidney disease, with long-term current use of insulin (H)       LYRICA 75 MG capsule   Generic drug:  pregabalin     90 capsule    TAKE 1 CAPSULE BY MOUTH 3 TIMES DAILY    Status post below knee amputation of right lower extremity (H)       methadone 10 mg/mL Conc (HIGH CONC) solution    DOLPHINE-INTENSOL    70 mL    Take 7 mLs (70 mg) by mouth daily    Status post below knee amputation of right lower extremity (H)       metoprolol succinate 25 MG 24 hr tablet    TOPROL-XL    90 tablet    Take 1 tablet (25 mg) by mouth daily    Hypertension goal BP (blood pressure) < 140/90, Chronic systolic congestive heart failure (H)       multivitamin, therapeutic with minerals Tabs tablet     30 each    Take 1 tablet by mouth daily    Status post below knee amputation of right lower extremity (H)       polyethylene glycol Packet    MIRALAX/GLYCOLAX    7 packet    Take 17 g by mouth daily    Status post below knee amputation of right lower extremity (H)       ranitidine 300 MG tablet    ZANTAC    60 tablet    Take 1 tablet (300 mg) by mouth daily    Status post below knee amputation of right lower extremity (H)       umeclidinium-vilanterol 62.5-25 MCG/INH oral inhaler    ANORO ELLIPTA    1  Inhaler    Inhale 1 puff into the lungs daily    Chronic bronchitis, unspecified chronic bronchitis type (H)       * Notice:  This list has 2 medication(s) that are the same as other medications prescribed for you. Read the directions carefully, and ask your doctor or other care provider to review them with you.

## 2018-10-05 NOTE — TELEPHONE ENCOUNTER
FUTURE VISIT INFORMATION      FUTURE VISIT INFORMATION:    Date: 10/16/18    Time: 0700    Location: [facility]  REFERRAL INFORMATION:    Referring provider:  DR PERALES    Referring providers clinic:  [facility]    Reason for visit/diagnosis  L FOOT ULCER    RECORDS REQUESTED FROM:       Clinic name Comments Records Status Imaging Status                                         RECORDS STATUS      RECORDS IN CHART

## 2018-10-05 NOTE — NURSING NOTE
Reason For Visit:   Chief Complaint   Patient presents with     Wound Check     Wound, left foot.        Pain Assessment  Patient Currently in Pain: Yes  Primary Pain Location: Foot  Pain Orientation: Left     Current Outpatient Prescriptions   Medication Sig Dispense Refill     acetaminophen (TYLENOL) 500 MG tablet Take 2 tablets (1,000 mg) by mouth 3 times daily       acetone, Urine, test STRP 1 strip by In Vitro route as needed 25 each 1     albuterol (PROAIR HFA) 108 (90 Base) MCG/ACT Inhaler Inhale 2 puffs into the lungs every 4 hours as needed for shortness of breath / dyspnea (Patient not taking: Reported on 8/29/2018) 1 Inhaler 0     amitriptyline (ELAVIL) 50 MG tablet Take 1 tablet (50 mg) by mouth At Bedtime 60 tablet 0     aspirin 81 MG tablet Take 1 tablet (81 mg) by mouth daily 100 tablet 3     blood glucose (NO BRAND SPECIFIED) lancets standard Use to test blood sugar 10 times daily or as directed. Accu-check 100 each 11     blood glucose monitoring (NO BRAND SPECIFIED) meter device kit Use to test blood sugar 10 times daily or as directed. Accu-check 1 kit 0     blood glucose monitoring (NO BRAND SPECIFIED) test strip Use to test blood sugars 10 times daily or as directed. Accu-chek 100 strip 11     fluticasone (FLONASE) 50 MCG/ACT spray Spray 2 sprays into left nostril daily 1 Bottle 0     furosemide (LASIX) 20 MG tablet TAKE 1 TABLET (20 MG) BY MOUTH DAILY 90 tablet 0     furosemide (LASIX) 40 MG tablet Take 1 tablet (40 mg) by mouth daily 30 tablet 0     glucose 4 g CHEW chewable tablet Take 3-4 tablets to treat low blood sugar. (Patient not taking: Reported on 8/29/2018) 25 tablet 11     insulin aspart (NOVOLOG PEN) 100 UNIT/ML injection Inject 1-7 Units Subcutaneous 3 times daily (before meals) (Patient taking differently: Inject 2-5 Units Subcutaneous 3 times daily (before meals) ) 6.3 mL 0     insulin glargine (LANTUS SOLOSTAR) 100 UNIT/ML pen Inject 8 Units Subcutaneous At Bedtime 2.4 mL 0      insulin pen needle (B-D U/F) 31G X 5 MM Use 3 time(s) a day. 3 each 3     levonorgestrel (MIRENA, 52 MG,) 20 MCG/24HR IUD placed today 1 each 0     Lidocaine (LIDOCARE) 4 % Patch Place 2 patches onto the skin every 24 hours 30 patch 0     lisinopril (PRINIVIL/ZESTRIL) 10 MG tablet Take 1 tablet (10 mg) by mouth daily 30 tablet 0     LYRICA 75 MG capsule TAKE 1 CAPSULE BY MOUTH 3 TIMES DAILY 90 capsule 3     methadone (DOLPHINE-INTENSOL) 10 mg/mL CONC (HIGH CONC) solution Take 7 mLs (70 mg) by mouth daily 70 mL 0     metoprolol succinate (TOPROL-XL) 25 MG 24 hr tablet Take 1 tablet (25 mg) by mouth daily 90 tablet 1     multivitamin, therapeutic with minerals (THERA-VIT-M) TABS tablet Take 1 tablet by mouth daily 30 each 0     polyethylene glycol (MIRALAX/GLYCOLAX) Packet Take 17 g by mouth daily 7 packet 0     ranitidine (ZANTAC) 300 MG tablet Take 1 tablet (300 mg) by mouth daily 60 tablet 0     umeclidinium-vilanterol (ANORO ELLIPTA) 62.5-25 MCG/INH oral inhaler Inhale 1 puff into the lungs daily 1 Inhaler 2          Allergies   Allergen Reactions     No Clinical Screening - See Comments      Swelling in the throat.

## 2018-10-05 NOTE — PROGRESS NOTES
Chief Complaint: No chief complaint on file.    Allergies   Allergen Reactions     No Clinical Screening - See Comments      Swelling in the throat.     Subjective: Alessandra is a 53 year old female who presents to the clinic today for a follow up of left foot ulcer. She continues with the VAC on the left foot. She relates that the week after she saw me, she noted that there was a heavy malodor to the wound.  She related that she told her home nurse about this, however they continue with the past.  She relates that she noted that the area was getting deeper and deeper over the last 2 weeks.  She has not had any nausea, vomiting, diarrhea, fever, chills, night sweats, shortness of breath, chest pain.  However, her mother does relate that she has been sleeping more than usual.  She relates that she is quite concerned about the wound today.  She relates that she has been trying to stay off the foot as much as possible.     Objective  Image in media tab.   A diabetic pressure wound is noted at left  medial 1st met head measuring 4cm x 3cm x 2.6cm.     Lozano Classification: 3     Wound base: Pink  Yellow/Granulation  Some necrotic tendon. Bone is now exposed in the ulcer site. Clear visualization of the met head and the tibial sesamoid.      Edges: inflamed     Drainage: copious/serous     Odor: yes. Foul odor in a pocket under the met head.      Undermining: Under the met head.     Bone Exposure: Yes. Metatarsal and sesamoid     Clinical Signs of Infection: Yes. Malodor, erythema, PTB     After obtaining patient consent, the wound was irrigated with copious amounts of saline. A scalpel and curette and rongeur  were then used to debride the wound into the necrotic tendinous flexor tissue. The wound edges were debrided to healthy, bleeding tissue. Given the patient's lack of sensation, no anesthesia was necessary for the procedure.     Wound #2  A post-surgical wound is noted at right  BKA stump measuring 1cm x 2.9cm x  0.2cm.    Lozano Classification: 2    Wound base: Red  Yellow/Granulation  Slough    Edges: intact    Drainage: small/serous    Odor: no    Undermining: no    Bone Exposure: No    Clinical Signs of Infection: No    After obtaining patient consent, the wound was irrigated with copious amounts of saline. A scalpel was then used to debride the wound into subcutaneous tissue. The wound edges were debrided back to healthy, bleeding tissue. The wound base exhibited healthy bleeding. Given the patient's lack of sensation, no anesthesia was necessary for the procedure.    left foot xrays indicated in 3 weightbearing views.    It appears that there is no inversion on the medial aspect of the base of the proximal phalanx of the hallux.  It also appears to be some erosion of the tibial sesamoid medially.  No gas noted to the foot.      Assessment: Left foot ulceration -this wound is significantly degraded since her last visit.  This is the first time seeing her since she was seen in clinic, and the wound was looking quite well.  I am quite concerned for this wound, as it has exposed bone now.  He does have a very foul odor.  I discussed with her that I think that this ulcer may be require an amputation of the first ray.  Post-surgical wound of BKA stump with delayed healing     Plan:   - Pt seen and evaluated  - Wounds debrided as described.   - Awaiting CBC/ESR/CRP   - Xrays taken and discussed with her and her mom.   - Rx for clindamycin written for 7 days for now. I did take a culture of the malodorous portion of the wound. Will await result.  - Referral to Dr. Peralta.  - Betadine to the wound daily.   - Pt to return to clinic in 1 week.

## 2018-10-07 PROBLEM — Z45.2 PICC (PERIPHERALLY INSERTED CENTRAL CATHETER) IN PLACE: Status: RESOLVED | Noted: 2018-08-01 | Resolved: 2018-01-01

## 2018-10-07 NOTE — IP AVS SNAPSHOT
[address] [address]    Phone:  [phone]                                       After Visit Summary   10/7/2018    Alessandra Pak    MRN: [id number]           After Visit Summary Signature Page     I have received my discharge instructions, and my questions have been answered. I have discussed any challenges I see with this plan with the nurse or doctor.    ..........................................................................................................................................  Patient/Patient Representative Signature      ..........................................................................................................................................  Patient Representative Print Name and Relationship to Patient    ..................................................               ................................................  Date                                   Time    ..........................................................................................................................................  Reviewed by Signature/Title    ...................................................              ..............................................  Date                                               Time          22EPIC Rev 08/18

## 2018-10-07 NOTE — ED PROVIDER NOTES
History     Chief Complaint   Patient presents with     Toe Pain     HPI  Alessandra Pak is a 53 year old female with a history significant for chronic pancreatitis s/p whipple with secondary diabetes mellitus c/b diabetic foot ulcers status post below the knee right leg amputation who was seen by her podiatrist on 10/05 and was diagnosed with left foot ulcer and was discharged with a prescription for Clindamycin and surgery scheduled for 10/16 for possible amputation. The patient presents to the Emergency Department today for evaluation of increased left foot pain and confusion. The patient reports that she has been having more pain in her left foot at the site of her ulcer. She reports that she has been taking her antibiotics as prescribed. The patient also presents with a friend who notes that the patient has been having some confusion and will not follow conversations normally. The patient also reports hat she has been feeling some shortness of breath today as well and she denies the use of any at home oxygen. She denies having any cough, nausea, vomiting, abdominal pain.      51 yo female with PMH , CKD stage III, NICM s/p ICD, HTN, COPD, and chronic methadone use who was admitted     I have reviewed the Medications, Allergies, Past Medical and Surgical History, and Social History in the [facility] system.    Past Medical History:   Diagnosis Date     Abdominal pain, right upper quadrant     [place] [facility] [place]US with positive high risk HPV 8/2013    + HPV 33, [place] - GAVIN I, ECC- atypia     Cardiomyopathy (H)     non ischemic cardiomyopathy with EF 15     Cervical high risk HPV (human papillomavirus) test positive 7/8/15, 7/25/16    NIL pap/+ HR HPV (not 16 or 18).      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    leep     Depressive disorder      Gastro-oesophageal reflux disease      Human papillomavirus in conditions classified elsewhere and of unspecified site 2/2012    + HPV 33      Hypertension      Profound impairment, one eye, impairment level not further specified     rt eye due to childhood injury     Systolic CHF (H) 3/12/2015     Tobacco abuse 5/18/2013     Type 2 diabetes mellitus without complications (H)      Uncomplicated asthma        Past Surgical History:   Procedure Laterality Date     AMPUTATE LEG BELOW KNEE Right 6/28/2018    Procedure: AMPUTATE LEG BELOW KNEE;  Right Knee Ampuation Below Knee, Anesthesia Block;  Surgeon: Ambrose King MD;  Location: [facility] OR     C NONSPECIFIC PROCEDURE  2001    cholecystectomy     C NONSPECIFIC PROCEDURE  as a child    tonsillectomy     C NONSPECIFIC PROCEDURE  2001    whipple procedure     CARDIAC SURGERY      defib     COLPOSCOPY,LOOP ELECTRD CERVIX EXCIS  2002, 2011    stage 2 dysplasia     ENDOBRONCHIAL ULTRASOUND FLEXIBLE N/A 2/19/2015    Procedure: ENDOBRONCHIAL ULTRASOUND FLEXIBLE;  Surgeon: Brenden Tamez MD;  Location: [facility] GI     LEEP TX, CERVICAL  2014    LEEP TX Cervical     RECESSION RESECTION WITH ADJUSTABLE SUTURE  12/13/2011    Procedure:RECESSION RESECTION WITH ADJUSTABLE SUTURE; Right Strabismus Repair with Adjustable Suture       TUBAL LIGATION  2007    essure        Family History   Problem Relation Age of Onset     Diabetes Mother      diet controled     Hypertension Mother      Arthritis Mother      Lipids Mother      Diabetes Father      Hypertension Father      GASTROINTESTINAL DISEASE Father      gallbladder removed     Bipolar Disorder Brother      Thyroid Disease Brother      Obesity Other      Son     Respiratory Other      Son and Daughter; asthma     Depression Maternal Aunt      Anxiety Disorder Maternal Aunt        Social History   Substance Use Topics     Smoking status: Former Smoker     Packs/day: 0.30     Years: 15.00     Types: Cigarettes     Smokeless tobacco: Former User     Quit date: 10/29/2014      Comment: Started smoking in 89/ smokes about 3 per day     Alcohol use No       Current  Facility-Administered Medications   Medication     0.9% sodium chloride BOLUS     Current Outpatient Prescriptions   Medication     acetaminophen (TYLENOL) 500 MG tablet     acetone, Urine, test STRP     albuterol (PROAIR HFA) 108 (90 Base) MCG/ACT Inhaler     amitriptyline (ELAVIL) 50 MG tablet     aspirin 81 MG tablet     blood glucose (NO BRAND SPECIFIED) lancets standard     blood glucose monitoring (NO BRAND SPECIFIED) meter device kit     blood glucose monitoring (NO BRAND SPECIFIED) test strip     clindamycin (CLEOCIN) 300 MG capsule     fluticasone (FLONASE) 50 MCG/ACT spray     furosemide (LASIX) 20 MG tablet     furosemide (LASIX) 40 MG tablet     glucose 4 g CHEW chewable tablet     insulin aspart (NOVOLOG PEN) 100 UNIT/ML injection     insulin glargine (LANTUS SOLOSTAR) 100 UNIT/ML pen     insulin pen needle (B-D U/F) 31G X 5 MM     levonorgestrel (MIRENA, 52 MG,) 20 MCG/24HR IUD     Lidocaine (LIDOCARE) 4 % Patch     lisinopril (PRINIVIL/ZESTRIL) 10 MG tablet     LYRICA 75 MG capsule     methadone (DOLPHINE-INTENSOL) 10 mg/mL CONC (HIGH CONC) solution     metoprolol succinate (TOPROL-XL) 25 MG 24 hr tablet     multivitamin, therapeutic with minerals (THERA-VIT-M) TABS tablet     polyethylene glycol (MIRALAX/GLYCOLAX) Packet     ranitidine (ZANTAC) 300 MG tablet     umeclidinium-vilanterol (ANORO ELLIPTA) 62.5-25 MCG/INH oral inhaler        Allergies   Allergen Reactions     No Clinical Screening - See Comments      Swelling in the throat.      Review of Systems   Constitutional: Negative for fever.   Respiratory: Negative for cough.    Gastrointestinal: Negative for abdominal pain, nausea and vomiting.   Psychiatric/Behavioral: Positive for confusion.   All other systems reviewed and are negative.    Physical Exam   BP: 120/89  Heart Rate: 109  Temp: 98.2  F (36.8  C)  Resp: 16  SpO2: 94 %    Physical Exam   Constitutional: No distress.   HENT:   Head: Normocephalic and atraumatic.   Mouth/Throat:  Oropharynx is clear and moist.   Eyes: Conjunctivae are normal. Pupils are equal, round, and reactive to light. Right eye exhibits no discharge. Left eye exhibits no discharge.   Neck: Normal range of motion. Neck supple.   Cardiovascular: Normal rate and intact distal pulses.    Pulmonary/Chest: Effort normal. No respiratory distress. She has no wheezes. She has no rales. She exhibits no tenderness.   Abdominal: Soft. She exhibits no distension. There is no tenderness. There is no rebound and no guarding.   Musculoskeletal: Normal range of motion. She exhibits no edema or tenderness.   Neurological: She is alert. She exhibits normal muscle tone.   Oriented to person, but not place or time   Skin: Skin is warm and dry. No rash noted. She is not diaphoretic.   3 cm deep ulceration along the medial aspect of the left foot neer the 1st metatarsal   Psychiatric: She has a normal mood and affect.   Nursing note and vitals reviewed.      ED Course   5:07 PM  The patient was seen and examined by Dr. Marte in Room 14.    ED Course     Procedures             Critical Care time:  none             Labs Ordered and Resulted from Time of ED Arrival Up to the Time of Departure from the ED - No data to display         Assessments & Plan (with Medical Decision Making)   1.  Left diabetic foot infection and osteomyelitis  2.  Pulmonary edema  3.  NIKOLAY    54 yo F with a known diabetic foot ulcer who presents with AMS and increasing left foot pain  On exam, she is mildly tachycardic at 109.  She is also disoriented to place and time and has a prior history of hypoxic versus infectious encephalopathy.  I'm concerned for early sepsis secondary to her infected foot ulcer.  She had an xray with podiatry on 10/5 which showed signs concerning for osteomyelitis and a septic joint.   Wound cultures from that visit grew multiple species, including Strep, Pseudomonas and Staph.  Sensitivities pending.  Will treat with Vancomycin and Zosyn.  CXR  showed mild pulmonary edema.  She was not hypoxic, but given oxygen for comfort.  Will admit to the medicine service. Orthopedics consulted.          I have reviewed the findings, diagnosis, plan and need for follow up with the patient.    New Prescriptions    No medications on file       Final diagnoses:   None   IAmbrose, am serving as a trained medical scribe to document services personally performed by Dom Marte MD, based on the provider's statements to me.   Dom MOE MD, was physically present and have reviewed and verified the accuracy of this note documented by Ambrose Moore.     10/7/2018   [facility], [place], EMERGENCY DEPARTMENT     Dom Marte MD  10/07/18 [id number]

## 2018-10-07 NOTE — LETTER
Transition Communication Hand-off for Care Transitions to Next Level of Care Provider    Name: Alessandra Pak  : 1965  MRN #: [id number]  Primary Care Provider: Brionna Dc     Primary Clinic: [address]  [facility] [address]     Reason for Hospitalization:  Osteomyelitis (H) [M86.9]  Admit Date/Time: 10/7/2018  4:51 PM  Discharge Date: 10/15/18  Payor Source: Payor: [employer] / Plan: [employer] MEDICARE ADVANTAGE / Product Type: Medicare /            Reason for Communication Hand-off Referral: known patient; admitted for osteomyelitis of left foot. She underwent a Left foot first and second ray amputation and will discharge home with IV antibiotics for 2-3 weeks.  IV abx will be managed by ID Clinic; She will also have follow up with Ortho, Vascular and PCP.  Patient wanted to schedule her own PCP f/u thru my chart. The other specialty appts have been requested.     Discharge Plan: home with 24 hr family assist (as previously in place); skilled home care visits (as previously in place); home infusion for IV abx (new)       Concern for non-adherence with plan of care: No     Discharge Needs Assessment:  Needs       Most Recent Value    Equipment Currently Used at Home shower chair, wheelchair, manual    Transportation Available family or friend will provide          Follow-up plan:  Future Appointments  Date Time Provider Department Center   10/16/2018 7:00 AM Jamey Peralta MD [facility]   2018 11:30 AM Marily Ahumada MD [facility]   2018 1:00 PM [id number]   2018 1:40 PM Carlos Llamas MD [facility]       Any outstanding tests or procedures:        Radiology & Cardiology Orders     Future Labs/Procedures Complete By Expires    US GABRIEL Doppler No Exercise  10/15/2018 (Approximate) 10/15/2019        Referrals     Future Labs/Procedures    Home care nursing referral     Comments:    [facility].  Phone  [phone]  Fax   [phone]    Resumption of RN skilled nursing visits. RN to assess vital signs and weight, respiratory and cardiac status, pain level and activity tolerance, hydration, nutrition and bowel status and home safety.  RN to teach medication management.    Home Care OT Referral for Hospital Discharge     Comments:    [facility].    Occupational Therapy- Evaluate and Treat.    Home infusion referral     Comments:    [facility]  Phone [phone]  Fax  [phone]    Anticipated Length of Therapy: see final provider orders     Home Infusion Pharmacist to adjust therapy based on labs and clinical assessments: Yes    Labs:  May draw labs from Venous Catheter: Yes  Home Infusion Pharmacist to order labs based on therapy type and clinical assessments: Yes  [facility]  Phone: [phone]    Fax:[phone]    Agency Staff to assess nursing needs for Infusion Therapy.    Access Device Management:  IV Access Type: PICC  Flush with Heparin and Normal Saline IVP PRN and routine site care (per agency protocol) to maintain access device? Yes    Medication Therapy Management Referral     Comments:    MTM referral reason            Patient had a hospital or ED visit in last 6 months and has more than 10   PTA or Discharge medications    Patient has 5 PTA or Discharge Medications AND one of the following   diagnoses: DM,HF,COPD,AMI DX,PULM HTN       This service is designed to help you get the most from your medications.  A specially trained pharmacist will work closely with you and your doctors  to solve any problems related to your medications and to help you get the   best results from taking them.      The Medication Therapy Management staff will call you to schedule an appointment.                AVS/Discharge Summary is the source of truth; this is a helpful guide for improved communication of patient story

## 2018-10-07 NOTE — IP AVS SNAPSHOT
STOP!!! DO NOT PRINT OR REFERENCE THIS AVS!!!  AVS displayed here is NOT the version that was given to the patient at discharge.  GO TO CHART REVIEW to print or review a copy of the AVS that was frozen/printed at time of discharge.                           MRN:[id number]                      After Visit Summary   10/7/2018    Alessandra Pak    MRN: [id number]           Thank you!     Thank you for choosing [facility] for your care. Our goal is always to provide you with excellent care. Hearing back from our patients is one way we can continue to improve our services. Please take a few minutes to complete the written survey that you may receive in the mail after you visit with us. Thank you!        Patient Information     Date Of Birth          1965        Designated Caregiver       Most Recent Value    Caregiver    Will someone help with your care after discharge? yes    Name of designated caregiver see chart [Anna]    Phone number of caregiver see chart [see chart]    Caregiver address see chart [see chart]      About your hospital stay     You were admitted on:  October 7, 2018 You last received care in the:  [address] [facility]    You were discharged on:  October 15, 2018        Reason for your hospital stay       Sepsis secondary to diabetic foot wound. Improved after IV antibiotics and surgery to remove                  Who to Call     For medical emergencies, please call 911.  For non-urgent questions about your medical care, please call your primary care provider or clinic, [phone]  For questions related to your surgery, please call your surgery clinic        Attending Provider     Provider Specialty    Dom Marte MD Emergency Medicine    [facility], Rosemarie Matt MD Internal Medicine    Raman Lara MD Internal Medicine    , MD Evin Internal Medicine       Primary Care Provider Office Phone # Fax #    Brionna Coby Dc [phone]       When to  contact your care team       Go to your local ED if you have fevers, chills, nausea, vomiting, shortness of breath, abdominal pain                  After Care Instructions     Activity       Your activity upon discharge: NON weight bearing on the left foot. Wear CAM boot            Diet       Follow this diet upon discharge: Orders Placed This Encounter      Calorie Counts      Snacks/Supplements Adult: Boost Glucose Control; Between Meals      Room Service      Moderate Consistent CHO Diet            Wound care and dressings       Instructions to care for your wound at home:  Surgical wound: Adaptive Dressing, Kerlex, then ACE wrap for your wound. Do not soak the wound. Keep it clean and dry.    On the right stump:  1. Cleanse with MicroKlenz and blot dry 2. Apply thin layer (pea sized amt) of aquaphor over wound 3. Cover with comfeel hydrocolloid (#[id number]) dressing                  Follow-up Appointments     Adult [facility] Follow-up and recommended labs and tests       - Follow up with Infectious Disease, Dr Ahumada, Oct 22nd, [facility]    A placeholder for your appt is on 11/7 but a message has been routed to Dr Ahumada to see when you can be placed on her scheduled on 10/22.  Please call the ID clinic within 2-3 days if you haven't received a confirmation of your appt. [phone]  - Follow up with PCP within 1 week  - Follow up with Orthopedic Surgery, 2 weeks with Dr. Peralta, appointment requested   - Follow up with vascular surgery, in 5-6 weeks, Dr. Llamas in 5-6 weeks, needs GABRIEL    Appointments on [place] and/or [facility] (with [facility] or [facility] provider or service). Call [phone] if you haven't heard regarding these appointments within 7 days of discharge.                  Your next 10 appointments already scheduled     Nov 02, 2018  1:30 PM CDT   TELEMEDICINE with Charla Pickard, [facility] [place] ([facility])    [address] [place] [address]  [facility] [id number] [phone]           Note: this is not an onsite visit; there is no need to come to the facility.            Nov 06, 2018  9:40 AM CST   (Arrive by 9:25 AM)   NEW FOOT/ANKLE with Jamey Peralta MD   [facility] ([facility])    [address] [place] [address]  [address] [facility] [id number] [phone]            Nov 29, 2018  1:00 PM CST   US GABRIEL DOPPLER NO EXERCISE 1-2 LVLS BILAT with UCUSV2   [facility] ([facility])    [address] [place] [address]  [address] [facility] [id number] [phone]           How do I prepare for my exam? (Food and drink instructions) No caffeine or tobacco for 1 hour prior to exam.  What should I wear: Wear comfortable clothes.  How long does the exam take: Most ultrasounds take 30 to 60 minutes.  What should I bring: Bring a list of your medicines, including vitamins, minerals and over-the-counter drugs. It is safest to leave personal items at home.  Do I need a :  No  is needed.  What do I need to tell my doctor: Tell your doctor about any allergies you may have.  What should I do after the exam: No restrictions, You may resume normal activities.  What is this test: An ultrasound uses sound waves to make pictures of the body. Sound waves do not cause pain. The only discomfort may be the pressure of the wand against your skin or full bladder.  Who should I call with questions: If you have any questions, please call the Imaging Department where you will have your exam. Directions, parking instructions, and other information is available on our website, [email or web address].            Nov 29, 2018  1:40 PM CST   (Arrive by 1:25 PM)   Return Vascular Visit with Carlos Llamas MD   [facility] ([facility])    [address] [place] [address]  [address] [facility] [id number] [phone]              Additional  Services     Home Care OT Referral for Hospital Discharge       [facility].    Occupational Therapy- Evaluate and Treat.            Home care nursing referral       [facility].  Phone  [phone]  Fax  [phone]    Resumption of RN skilled nursing visits. RN to assess vital signs and weight, respiratory and cardiac status, pain level and activity tolerance, hydration, nutrition and bowel status and home safety.  RN to teach medication management.  RN to draw labs weekly: Vanco trough, BMP,  CBC w/diff and platelets, crp and fax to Dr. Ahumada, Fax:[phone], [facility] Infectious Disease clinic in [place] [address]            Home infusion referral       [facility]  Phone [phone]  Fax  [phone]    Anticipated Length of Therapy: see final provider orders     Home Infusion Pharmacist to adjust therapy based on labs and clinical assessments: Yes    Labs:  May draw labs from Venous Catheter: Yes  Labs weekly: Vanco trough, BMP,  CBC w/diff and platelets, crp and fax to Dr. Ahumada, Fax:[phone], [facility] Infectious Disease clinic in [place] [address]    Home Infusion Pharmacist to order labs based on therapy type and clinical assessments: Yes  Call/Fax Lab Results to: Dr Marily Ahumada, [facility] Infectious Disease [facility]  Phone: [phone]    Fax:[phone]    Agency Staff to assess nursing needs for Infusion Therapy.    Access Device Management:  IV Access Type: PICC  Flush with Heparin and Normal Saline IVP PRN and routine site care (per agency protocol) to maintain access device? Yes            Medication Therapy Management Referral       MTM referral reason            Patient had a hospital or ED visit in last 6 months and has more than 10   PTA or Discharge medications    Patient has 5 PTA or Discharge Medications AND one of the following   diagnoses: DM,HF,COPD,AMI DX,PULM HTN       This service is designed to  "help you get the most from your medications.  A specially trained pharmacist will work closely with you and your doctors  to solve any problems related to your medications and to help you get the   best results from taking them.      The Medication Therapy Management staff will call you to schedule an appointment.            Neurology Adult Referral       Radial Nerve Neuropathy -- needs EMG prior to seeing a neurologist                  Future tests that were ordered for you     US GABRIEL Doppler No Exercise                 Further instructions from your care team       __________________________________________________________  D/C'ing nurse: Please fax to following agencies at time of patient d/c.    [facility].    Fax  [phone]  ____________________________________________________________        Pending Results     No orders found from 10/5/2018 to 10/8/2018.            Statement of Approval     Ordered          10/15/18 1512  I have reviewed and agree with all the recommendations and orders detailed in this document.  EFFECTIVE NOW     Approved and electronically signed by:  Evin Yousif MD             Admission Information     Date & Time Department Dept. Phone    10/7/2018 Unit [address]D [facility] [place] [phone]      Your Vitals Were     Blood Pressure Pulse Temperature Respirations Height Weight    133/70 (BP Location: Right arm) 100 96.5  F (35.8  C) (Oral) 18 1.753 m (5' 9\") 51.6 kg (113 lb 12.1 oz)    Pulse Oximetry BMI (Body Mass Index)                93% 16.8 kg/m2          MyChart Information     [facility] gives you secure access to your electronic health record. If you see a primary care provider, you can also send messages to your care team and make appointments. If you have questions, please call your primary care clinic.  If you do not have a primary care provider, please call [phone] and they will assist you.        Care EveryWhere ID     This is your Care EveryWhere ID. This could be used by " other organizations to access your [facility] medical records  [id number]        Equal Access to Services     NICK ROBLEDO : Chiqui Terrell, andres murray, sanjay yao. So [facility] [phone].    ATENCIÓN: Si habla español, tiene a nagy disposición servicios gratuitos de asistencia lingüística. Llame al [phone].    We comply with applicable federal civil rights laws and [place] laws. We do not discriminate on the basis of race, color, national origin, age, disability, sex, sexual orientation, or gender identity.               Review of your medicines      START taking        Dose / Directions    ceFEPIme 2 G vial   Commonly known as:  MAXIPIME   Indication:  Infection with Dysregulated Inflammatory Response   Used for:  Diabetic foot ulcer with osteomyelitis (H)        Dose:  2 g   Inject 2 g into the vein every 12 hours   Quantity:  20 each   Refills:  0       metroNIDAZOLE 500 MG tablet   Commonly known as:  FLAGYL   Indication:  Infection of the Skin and/or Related Soft Tissue   Used for:  Diabetic foot ulcer with osteomyelitis (H)        Dose:  500 mg   Take 1 tablet (500 mg) by mouth every 8 hours   Quantity:  60 tablet   Refills:  0       oxyCODONE IR 10 MG tablet   Commonly known as:  ROXICODONE   Used for:  Diabetic foot ulcer with osteomyelitis (H)        Dose:  10-20 mg   Take 1-2 tablets (10-20 mg) by mouth every 6 hours as needed for moderate to severe pain   Quantity:  20 tablet   Refills:  0       vancomycin 750 mg   Indication:  Infection of Bone and Bone Marrow   Used for:  Diabetic foot ulcer with osteomyelitis (H)        Dose:  750 mg   Inject 750 mg into the vein every 24 hours   Quantity:  750 mL   Refills:  14         CONTINUE these medicines which may have CHANGED, or have new prescriptions. If we are uncertain of the size of tablets/capsules you have at home, strength may be listed as something that might have  changed.        Dose / Directions    furosemide 40 MG tablet   Commonly known as:  LASIX   Indication:  High Blood Pressure Disorder   This may have changed:  Another medication with the same name was removed. Continue taking this medication, and follow the directions you see here.   Used for:  SOB (shortness of breath)        Dose:  40 mg   Take 1 tablet (40 mg) by mouth daily   Quantity:  30 tablet   Refills:  0       insulin aspart 100 UNIT/ML injection   Commonly known as:  NovoLOG PEN   Indication:  Type 2 Diabetes   This may have changed:  how much to take   Used for:  Type 2 diabetes mellitus with stage 3 chronic kidney disease, with long-term current use of insulin (H)        Dose:  1-7 Units   Inject 1-7 Units Subcutaneous 3 times daily (before meals)   Quantity:  6.3 mL   Refills:  0         CONTINUE these medicines which have NOT CHANGED        Dose / Directions    acetaminophen 500 MG tablet   Commonly known as:  TYLENOL   Used for:  Status post below knee amputation of right lower extremity (H)        Dose:  1000 mg   Take 2 tablets (1,000 mg) by mouth 3 times daily   Refills:  0       acetone (urine) test strip   Commonly known as:  KETOSTIX   Used for:  Type 2 diabetes mellitus with diabetic neuropathy (H)        Dose:  1 strip   1 strip by In Vitro route as needed   Quantity:  25 each   Refills:  1       albuterol 108 (90 Base) MCG/ACT inhaler   Commonly known as:  PROAIR HFA   Used for:  SOB (shortness of breath)        Dose:  2 puff   Inhale 2 puffs into the lungs every 4 hours as needed for shortness of breath / dyspnea   Quantity:  1 Inhaler   Refills:  0       amitriptyline 50 MG tablet   Commonly known as:  ELAVIL   Indication:  Pain   Used for:  Status post below knee amputation of right lower extremity (H)        Dose:  50 mg   Take 1 tablet (50 mg) by mouth At Bedtime   Quantity:  60 tablet   Refills:  0       aspirin 81 MG tablet   Used for:  Type 2 diabetes mellitus with complication, with  long-term current use of insulin (H)        Dose:  81 mg   Take 1 tablet (81 mg) by mouth daily   Quantity:  100 tablet   Refills:  3       blood glucose lancets standard   Commonly known as:  no brand specified   Used for:  Type 2 diabetes mellitus with stage 3 chronic kidney disease, with long-term current use of insulin (H)   Notes to Patient:  Check blod sugar before meals, and at bedtime.  Or if you feel your blood sugar in low.         Use to test blood sugar 10 times daily or as directed. Accu-check   Quantity:  100 each   Refills:  11       blood glucose monitoring meter device kit   Commonly known as:  no brand specified   Used for:  Type 2 diabetes mellitus with stage 3 chronic kidney disease, with long-term current use of insulin (H)        Use to test blood sugar 10 times daily or as directed. Accu-check   Quantity:  1 kit   Refills:  0       blood glucose monitoring test strip   Commonly known as:  no brand specified   Used for:  Type 2 diabetes mellitus with stage 3 chronic kidney disease, with long-term current use of insulin (H)        Use to test blood sugars 10 times daily or as directed. Accu-chek   Quantity:  100 strip   Refills:  11       fluticasone 50 MCG/ACT spray   Commonly known as:  FLONASE   Indication:  COPD   Used for:  Acute nonseasonal allergic rhinitis due to pollen        Dose:  2 spray   Spray 2 sprays into left nostril daily   Quantity:  1 Bottle   Refills:  0       glucose 4 g Chew chewable tablet   Used for:  Type 2 diabetes mellitus with complication, with long-term current use of insulin (H)   Notes to Patient:  For blood sugar less than 70        Take 3-4 tablets to treat low blood sugar.   Quantity:  25 tablet   Refills:  11       insulin glargine 100 UNIT/ML injection   Commonly known as:  LANTUS   Indication:  Type 2 Diabetes   Used for:  Type 2 diabetes mellitus with stage 3 chronic kidney disease, with long-term current use of insulin (H)        Dose:  8 Units   Inject 8  Units Subcutaneous At Bedtime   Quantity:  2.4 mL   Refills:  0       insulin pen needle 31G X 5 MM   Commonly known as:  B-D U/F   Used for:  Type 2 diabetes, HbA1c goal < 7% (H)        Use 3 time(s) a day.   Quantity:  3 each   Refills:  3       levonorgestrel 20 MCG/24HR IUD   Commonly known as:  MIRENA (52 MG)   Used for:  Excessive or frequent menstruation        placed today   Quantity:  1 each   Refills:  0       Lidocaine 4 % Patch   Commonly known as:  LIDOCARE   Indication:  chronic pain   Used for:  Status post below knee amputation of right lower extremity (H)   Notes to Patient:  Wear patches 12 hours each day.  12 hours on 12 hours off.        Dose:  2 patch   Place 2 patches onto the skin every 24 hours   Quantity:  30 patch   Refills:  0       lisinopril 10 MG tablet   Commonly known as:  PRINIVIL/ZESTRIL   Indication:  High Blood Pressure Disorder   Used for:  Type 2 diabetes mellitus with stage 3 chronic kidney disease, with long-term current use of insulin (H)        Dose:  10 mg   Take 1 tablet (10 mg) by mouth daily   Quantity:  30 tablet   Refills:  0       LYRICA 75 MG capsule   Used for:  Status post below knee amputation of right lower extremity (H)   Generic drug:  pregabalin        TAKE 1 CAPSULE BY MOUTH 3 TIMES DAILY   Quantity:  90 capsule   Refills:  3       methadone 10 MG/ML (HIGH CONC) solution   Commonly known as:  DOLOPHINE-INTENSOL   Indication:  Opioid Dependence        Dose:  75 mg   Take 75 mg by mouth daily Confirmed dosing on 10/8/18 with [facility] [place], [place] [phone]   Refills:  0       metoprolol succinate 25 MG 24 hr tablet   Commonly known as:  TOPROL-XL   Used for:  Hypertension goal BP (blood pressure) < 140/90, Chronic systolic congestive heart failure (H)        Dose:  25 mg   Take 1 tablet (25 mg) by mouth daily   Quantity:  90 tablet   Refills:  1       multivitamin, therapeutic with minerals Tabs tablet   Indication:  nutritional  suppliment   Used for:  Status post below knee amputation of right lower extremity (H)        Dose:  1 tablet   Take 1 tablet by mouth daily   Quantity:  30 each   Refills:  0       polyethylene glycol Packet   Commonly known as:  MIRALAX/GLYCOLAX   Indication:  Constipation   Used for:  Status post below knee amputation of right lower extremity (H)        Dose:  17 g   Take 17 g by mouth daily   Quantity:  7 packet   Refills:  0       ranitidine 300 MG tablet   Commonly known as:  ZANTAC   Indication:  Gastroesophageal reflux disease without esophagitis    Used for:  Status post below knee amputation of right lower extremity (H)        Dose:  300 mg   Take 1 tablet (300 mg) by mouth daily   Quantity:  60 tablet   Refills:  0       umeclidinium-vilanterol 62.5-25 MCG/INH oral inhaler   Commonly known as:  ANORO ELLIPTA   Indication:  Chronic Obstructive Lung Disease   Used for:  Chronic bronchitis, unspecified chronic bronchitis type (H)        Dose:  1 puff   Inhale 1 puff into the lungs daily   Quantity:  1 Inhaler   Refills:  2         STOP taking     clindamycin 300 MG capsule   Commonly known as:  CLEOCIN                Where to get your medicines      These medications were sent to [facility] [place], [place] - [address] [place] [address]     Phone:  [phone]     ceFEPIme 2 G vial    metroNIDAZOLE 500 MG tablet         Some of these will need a paper prescription and others can be bought over the counter. Ask your nurse if you have questions.     Bring a paper prescription for each of these medications     oxyCODONE IR 10 MG tablet    vancomycin 750 mg                Protect others around you: Learn how to safely use, store and throw away your medicines at www.disposemymeds.org.        ANTIBIOTIC INSTRUCTION     You've Been Prescribed an Antibiotic - Now What?  Your healthcare team thinks that you or your loved one might have an infection. Some  infections can be treated with antibiotics, which are powerful, life-saving drugs. Like all medications, antibiotics have side effects and should only be used when necessary. There are some important things you should know about your antibiotic treatment.      Your healthcare team may run tests before you start taking an antibiotic.    Your team may take samples (e.g., from your blood, urine or other areas) to run tests to look for bacteria. These test can be important to determine if you need an antibiotic at all and, if you do, which antibiotic will work best.      Within a few days, your healthcare team might change or even stop your antibiotic.    Your team may start you on an antibiotic while they are working to find out what is making you sick.    Your team might change your antibiotic because test results show that a different antibiotic would be better to treat your infection.    In some cases, once your team has more information, they learn that you do not need an antibiotic at all. They may find out that you don't have an infection, or that the antibiotic you're taking won't work against your infection. For example, an infection caused by a virus can't be treated with antibiotics. Staying on an antibiotic when you don't need it is more likely to be harmful than helpful.      You may experience side effects from your antibiotic.    Like all medications, antibiotics have side effects. Some of these can be serious.    Let you healthcare team know if you have any known allergies when you are admitted to the hospital.    One significant side effect of nearly all antibiotics is the risk of severe and sometimes deadly diarrhea caused by Clostridium difficile (C. Difficile). This occurs when a person takes antibiotics because some good germs are destroyed. Antibiotic use allows C. diificile to take over, putting patients at high risk for this serious infection.    As a patient or caregiver, it is important to  understand your or your loved one's antibiotic treatment. It is especially important for caregivers to speak up when patients can't speak for themselves. Here are some important questions to ask your healthcare team.    What infection is this antibiotic treating and how do you know I have that infection?    What side effects might occur from this antibiotic?    How long will I need to take this antibiotic?    Is it safe to take this antibiotic with other medications or supplements (e.g., vitamins) that I am taking?     Are there any special directions I need to know about taking this antibiotic? For example, should I take it with food?    How will I be monitored to know whether my infection is responding to the antibiotic?    What tests may help to make sure the right antibiotic is prescribed for me?      Information provided by:  www.cdc.gov/getsmart  U.S. Department of Health and Human Services  Centers for disease Control and Prevention  National Center for Emerging and Zoonotic Infectious Diseases  Division of Healthcare Quality Promotion        Information about OPIOIDS     PRESCRIPTION OPIOIDS: WHAT YOU NEED TO KNOW   We gave you an opioid (narcotic) pain medicine. It is important to manage your pain, but opioids are not always the best choice. You should first try all the other options your care team gave you. Take this medicine for as short a time (and as few doses) as possible.    Some activities can increase your pain, such as bandage changes or therapy sessions. It may help to take your pain medicine 30 to 60 minutes before these activities. Reduce your stress by getting enough sleep, working on hobbies you enjoy and practicing relaxation or meditation. Talk to your care team about ways to manage your pain beyond prescription opioids.    These medicines have risks:    DO NOT drive when on new or higher doses of pain medicine. These medicines can affect your alertness and reaction times, and you could be  arrested for driving under the influence (DUI). If you need to use opioids long-term, talk to your care team about driving.    DO NOT operate heavy machinery    DO NOT do any other dangerous activities while taking these medicines.    DO NOT drink any alcohol while taking these medicines.     If the opioid prescribed includes acetaminophen, DO NOT take with any other medicines that contain acetaminophen. Read all labels carefully. Look for the word  acetaminophen  or  Tylenol.  Ask your pharmacist if you have questions or are unsure.    You can get addicted to pain medicines, especially if you have a history of addiction (chemical, alcohol or substance dependence). Talk to your care team about ways to reduce this risk.    All opioids tend to cause constipation. Drink plenty of water and eat foods that have a lot of fiber, such as fruits, vegetables, prune juice, apple juice and high-fiber cereal. Take a laxative (Miralax, milk of magnesia, Colace, Senna) if you don t move your bowels at least every other day. Other side effects include upset stomach, sleepiness, dizziness, throwing up, tolerance (needing more of the medicine to have the same effect), physical dependence and slowed breathing.    Store your pills in a secure place, locked if possible. We will not replace any lost or stolen medicine. If you don t finish your medicine, please throw away (dispose) as directed by your pharmacist. The [place] [employer] has more information about safe disposal: https://www.pca.[facility].[place].us/living-green/managing-unwanted-medications             Medication List: This is a list of all your medications and when to take them. Check marks below indicate your daily home schedule. Keep this list as a reference.      Medications           Morning Afternoon Evening Bedtime As Needed    acetaminophen 500 MG tablet   Commonly known as:  TYLENOL   Take 2 tablets (1,000 mg) by mouth 3 times daily   Last time this was  given:  650 mg on 10/13/2018 11:05 PM            8 am       2 pm       8 pm                  acetone (urine) test strip   Commonly known as:  KETOSTIX   1 strip by In Vitro route as needed                                   albuterol 108 (90 Base) MCG/ACT inhaler   Commonly known as:  PROAIR HFA   Inhale 2 puffs into the lungs every 4 hours as needed for shortness of breath / dyspnea   Last time this was given:  6 puffs on 10/13/2018  9:01 AM                            If needed for shortness of breath       amitriptyline 50 MG tablet   Commonly known as:  ELAVIL   Take 1 tablet (50 mg) by mouth At Bedtime   Last time this was given:  50 mg on 10/14/2018 10:43 PM                                   aspirin 81 MG tablet   Take 1 tablet (81 mg) by mouth daily                                   blood glucose lancets standard   Commonly known as:  no brand specified   Use to test blood sugar 10 times daily or as directed. Accu-check   Notes to Patient:  Check blod sugar before meals, and at bedtime.  Or if you feel your blood sugar in low.             Before breakfast       Before lunch       Before supper       At bedtime       If you are having symptoms of low blood sugar check you blood sugar       blood glucose monitoring meter device kit   Commonly known as:  no brand specified   Use to test blood sugar 10 times daily or as directed. Accu-check                                blood glucose monitoring test strip   Commonly known as:  no brand specified   Use to test blood sugars 10 times daily or as directed. Accu-chek                                ceFEPIme 2 G vial   Commonly known as:  MAXIPIME   Inject 2 g into the vein every 12 hours   Last time this was given:  2 g on 10/15/2018  4:39 PM            8 am           8 pm               fluticasone 50 MCG/ACT spray   Commonly known as:  FLONASE   Spray 2 sprays into left nostril daily                                   furosemide 40 MG tablet   Commonly known as:  LASIX    Take 1 tablet (40 mg) by mouth daily            8 am                       glucose 4 g Chew chewable tablet   Take 3-4 tablets to treat low blood sugar.   Notes to Patient:  For blood sugar less than 70                                   insulin aspart 100 UNIT/ML injection   Commonly known as:  NovoLOG PEN   Inject 1-7 Units Subcutaneous 3 times daily (before meals)   Last time this was given:  4 Units on 10/15/2018  2:24 PM                                insulin glargine 100 UNIT/ML injection   Commonly known as:  LANTUS   Inject 8 Units Subcutaneous At Bedtime                                insulin pen needle 31G X 5 MM   Commonly known as:  B-D U/F   Use 3 time(s) a day.                                levonorgestrel 20 MCG/24HR IUD   Commonly known as:  MIRENA (52 MG)   placed today                                Lidocaine 4 % Patch   Commonly known as:  LIDOCARE   Place 2 patches onto the skin every 24 hours   Last time this was given:  1 patch on 10/15/2018  9:27 AM   Notes to Patient:  Wear patches 12 hours each day.  12 hours on 12 hours off.            8 am, place patches           8 pm remove patches               lisinopril 10 MG tablet   Commonly known as:  PRINIVIL/ZESTRIL   Take 1 tablet (10 mg) by mouth daily                                   LYRICA 75 MG capsule   TAKE 1 CAPSULE BY MOUTH 3 TIMES DAILY   Last time this was given:  50 mg on 10/15/2018  4:44 PM   Generic drug:  pregabalin                                         methadone 10 MG/ML (HIGH CONC) solution   Commonly known as:  DOLOPHINE-INTENSOL   Take 75 mg by mouth daily Confirmed dosing on 10/8/18 with [facility]:  [phone]   Last time this was given:  75 mg on 10/15/2018 11:48 AM                                   metoprolol succinate 25 MG 24 hr tablet   Commonly known as:  TOPROL-XL   Take 1 tablet (25 mg) by mouth daily   Last time this was given:  25 mg on 10/15/2018  9:30 AM                                    metroNIDAZOLE 500 MG tablet   Commonly known as:  FLAGYL   Take 1 tablet (500 mg) by mouth every 8 hours   Last time this was given:  500 mg on 10/15/2018  4:44 PM            8 am           4 pm       midnight           multivitamin, therapeutic with minerals Tabs tablet   Take 1 tablet by mouth daily   Last time this was given:  1 tablet on 10/15/2018  9:30 AM                                   oxyCODONE IR 10 MG tablet   Commonly known as:  ROXICODONE   Take 1-2 tablets (10-20 mg) by mouth every 6 hours as needed for moderate to severe pain   Last time this was given:  20 mg on 10/15/2018 12:20 PM                            Every 6 hours if needed for break thru pain       polyethylene glycol Packet   Commonly known as:  MIRALAX/GLYCOLAX   Take 17 g by mouth daily   Last time this was given:  17 g on 10/15/2018  9:34 AM                                   ranitidine 300 MG tablet   Commonly known as:  ZANTAC   Take 1 tablet (300 mg) by mouth daily   Last time this was given:  150 mg on 10/15/2018  9:30 AM                                   umeclidinium-vilanterol 62.5-25 MCG/INH oral inhaler   Commonly known as:  ANORO ELLIPTA   Inhale 1 puff into the lungs daily   Last time this was given:  1 puff on 10/15/2018  9:34 AM                                   vancomycin 750 mg   Inject 750 mg into the vein every 24 hours   Last time this was given:  750 mg on 10/15/2018 12:13 PM

## 2018-10-07 NOTE — ED NOTES
[facility], [place]   ED Nurse to Floor Handoff     Alessandra Pak is a 53 year old female who speaks English and lives with family members,  in a home  They arrived in the ED by car from home    ED Chief Complaint: Toe Pain    ED Dx;   Final diagnoses:   Osteomyelitis (H)         Needed?: No    Allergies:   Allergies   Allergen Reactions     No Clinical Screening - See Comments      Swelling in the throat.   .  Past Medical Hx:   Past Medical History:   Diagnosis Date     Abdominal pain, right upper quadrant     [place] [facility] [place]     ASCUS with positive high risk HPV 8/2013    + HPV 33, [place] - GAVIN I, ECC- atypia     Cardiomyopathy (H)     non ischemic cardiomyopathy with EF 15     Cervical high risk HPV (human papillomavirus) test positive 7/8/15, 7/25/16    NIL pap/+ HR HPV (not 16 or 18).      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    leep     Depressive disorder      Gastro-oesophageal reflux disease      Human papillomavirus in conditions classified elsewhere and of unspecified site 2/2012    + HPV 33     Hypertension      Profound impairment, one eye, impairment level not further specified     rt eye due to childhood injury     Systolic CHF (H) 3/12/2015     Tobacco abuse 5/18/2013     Type 2 diabetes mellitus without complications (H)      Uncomplicated asthma       Baseline Mental status: WDL  Current Mental Status changes: other intermittently forgetful     Infection present or suspected this encounter: cultures pending  Sepsis suspected: No  Isolation type: Contact     Activity level - Baseline/Home:  Independent  Activity Level - Current:   Stand with Assist    Bariatric equipment needed?: No    In the ED these meds were given:   Medications   0.9% sodium chloride BOLUS (1,000 mLs Intravenous New Bag 10/7/18 8589)   piperacillin-tazobactam (ZOSYN) 3.375 g vial to attach to  mL bag (3.375 g Intravenous New Bag 10/7/18 2924)   vancomycin  (VANCOCIN) 1,500 mg in sodium chloride 0.9 % 250 mL intermittent infusion (not administered)       Drips running?  Yes    Home pump  No    Current LDAs  PICC Double Lumen 07/29/18 Right Basilic (Active)   Number of days:70       Wound 02/19/18 Left;Lateral Heel Ulceration (Active)   Number of days:230       Wound 02/21/18 Posterior;Right Heel Abrasion(s) Pale pink/flaky area on R heel (Active)   Number of days:228       Wound 02/22/18 Lateral Foot Suspected deep tissue injury (Active)   Number of days:227       Wound 06/07/18 Mid;Right Ear Ulceration;Suspected pressure ulcer (Active)   Number of days:122       Wound 06/07/18 Medial;Left Foot Ulceration diabetic ulcer (Active)   Number of days:122       Wound 06/06/18 Posterior;Left Heel Ulceration (Active)   Number of days:123       Incision/Surgical Site 06/28/18 Right Leg (Active)   Number of days:101       Labs results:   Labs Ordered and Resulted from Time of ED Arrival Up to the Time of Departure from the ED   CBC WITH PLATELETS DIFFERENTIAL - Abnormal; Notable for the following:        Result Value    WBC 24.8 (*)     RBC Count 3.77 (*)     Hemoglobin 10.0 (*)     Hematocrit 30.6 (*)     RDW 15.5 (*)     Absolute Neutrophil 20.5 (*)     All other components within normal limits   BASIC METABOLIC PANEL - Abnormal; Notable for the following:     Glucose 164 (*)     Urea Nitrogen 63 (*)     Creatinine 2.12 (*)     GFR Estimate 25 (*)     GFR Estimate If Black 30 (*)     All other components within normal limits   NT PROBNP INPATIENT - Abnormal; Notable for the following:     N-Terminal Pro BNP Inpatient 2096 (*)     All other components within normal limits   LACTIC ACID WHOLE BLOOD   ROUTINE UA WITH MICROSCOPIC   URINE CULTURE AEROBIC BACTERIAL   BLOOD CULTURE       Imaging Studies:   Recent Results (from the past 24 hour(s))   Chest XR,  PA & LAT    Narrative    XR CHEST 2 VW  10/7/2018 6:10 PM      HISTORY: sob;     COMPARISON: 7/29/2018    FINDINGS: PA and  lateral views of the chest upright. Left chest wall  pacemaker and lead in stable positions. The cardiac silhouette is  stable and within normal limits. Are perihilar and bibasilar mixed  interstitial and airspace opacities. These do not appear to silhouette  the adjacent structures. Calcified right pretracheal lymph nodes are  unchanged. No pleural effusion or pneumothorax.      Impression    IMPRESSION: Bibasilar mixed interstitial and airspace opacities.  Appearance favors pulmonary edema, though infection is a distinct  possibility.    I have personally reviewed the examination and initial interpretation  and I agree with the findings.    JAYANT SALAS MD       Recent vital signs:   BP (!) 118/109  Temp 98.2  F (36.8  C) (Oral)  Resp 16  SpO2 98%    Cardiac Rhythm: Normal Sinus  Pt needs tele? No  Skin/wound Issues: wound left toe    Code Status: Full Code    Pain control: good    Nausea control: pt had none    Abnormal labs/tests/findings requiring intervention: see epic    Family present during ED course? Yes   Family Comments/Social Situation comments: n/a    Tasks needing completion: None      [id number] [facility]

## 2018-10-07 NOTE — H&P
[facility]    Internal Medicine History and Physical - Gold Service       Date of Admission:  10/7/2018    Assessment & Plan   Alessandra Pak is a 53 year old female  admitted on 10/7/2018. She has a history of diabetes, osteomyelitis, s/p right foot amputation, systolic heart failure, ICD placement and is admitted for recurrent osteomyelitis with evidence for sepsis as well as NIKOLAY.    1) Osteomyelitis, sepsis - Presents with worsened left toe pain in setting of known left foot ulcer.  Put on clinda following debridement on 10/5 but now coming in with worsening odor, pain, chills, rigors and altered mental status.  , WBC 24.8 and imaging shows likely osteo at the distal first metatarsal and tibial sesamoid concerning for osteo with septic joint.  On exam bone is visible.  Wound cultures from 10/5 have grown out 7+ organisms including pseudomonas.  - Started on Zosyn, Vanco in the ED.  Given NIKOLAY will switch to Cefepime, Vanco and continue clindamycin.  - Blood cultures  - Consult orthopedics    2) NIKOLAY - BUN 63 and Cr 2.12 in setting or poor oral intake and recent lasix dose increase.  EF 35-40% on last echo.  BNP down from prior values.  - Suspect volume depletion.  Already received 1 L in the ED.  Will hydrate with an additional liter over the next four hours.  - BMP in am  - Hold lasix, lisinopril  - UA    3) History of DM  - Continue home lantus 8 units QHS  - Continue sliding scale insulin    4) Chronic pain  - Continue home methadone.  Normally takes 70 mg daily.  If kidney function worsens substantially will need to consider decreasing dose.  - Decrease home lyrica dose in setting of NIKOLAY    5) Systolic heart failure - EF 35-40% on last echo.  BNP down from prior values.  S/p AICD placement.  - Hold home lasix, metoprolol in setting of likely sepsis.  Please resume once doing better.      Diet:  Regular  Fluids: None  Carter Catheter: not present    DVT Prophylaxis:  Mechanical  Code Status: Full    Disposition Plan   Expected discharge: 2 - 3 days, recommended to transitional care unit once sepsis resolved.     Entered: GAVINO Power CNP 10/07/2018, 6:55 PM   Information in the above section will display in the discharge planner report.      The patient's care was discussed with the Attending Physician, Dr. Castro.    GAVINO Power CNP  Internal Medicine Staff Hospitalist Service  [facility]  Pager: [phone]  Please see sticky note for cross cover information  ______________________________________________________________________    Chief Complaint   Foot pain    History is obtained from the patient    History of Present Illness   Alessandra Pak is a 53 year old female  admitted on 10/7/2018. She has a history of diabetes, osteomyelitis, s/p right foot amputation, systolic heart failure, ICD placement and is admitted for recurrent osteomyelitis with evidence for sepsis as well as NIKOLAY.    The patient reports she has a left foot ulceration that she follows with orthopedics for.  She was seen three days ago for debridement and put on clindamycin, which she has been taking.  Over the past few days however, the wound has expanded and it has developed a foul odor with associated drainage.   She has had chills and rigors, and her mother has noted she has been at times disoriented and confused which is unusual for her.      No chest pain, no shortness of breath.  She notes poor oral intake over the past few days and decreased urine output.      Review of Systems   The 10 point Review of Systems is negative other than noted in the HPI or here.    Past Medical History    I have reviewed this patient's medical history and updated it with pertinent information if needed.   Past Medical History:   Diagnosis Date     Abdominal pain, right upper quadrant     [place] [facility] [place]     ASCUS with positive high risk HPV 8/2013    + HPV 33,  [place] - GAVIN I, ECC- atypia     Cardiomyopathy (H)     non ischemic cardiomyopathy with EF 15     Cervical high risk HPV (human papillomavirus) test positive 7/8/15, 7/25/16    NIL pap/+ HR HPV (not 16 or 18).      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  Past Surgical History:   Procedure Laterality Date     AMPUTATE LEG BELOW KNEE Right 6/28/2018    Procedure: AMPUTATE LEG BELOW KNEE;  Right Knee Ampuation Below Knee, Anesthesia Block;  Surgeon: Ambrose King MD;  Location: [facility] OR      NONSPECIFIC PROCEDURE  2001    cholecystectomy     C NONSPECIFIC PROCEDURE  as a child    tonsillectomy     C NONSPECIFIC PROCEDURE  2001    whipple procedure     CARDIAC SURGERY      defib     COLPOSCOPY,LOOP ELECTRD CERVIX EXCIS  2002, 2011    stage 2 dysplasia     ENDOBRONCHIAL ULTRASOUND FLEXIBLE N/A 2/19/2015    Procedure: ENDOBRONCHIAL ULTRASOUND FLEXIBLE;  Surgeon: Brenden Tamez MD;  Location: [facility] GI     LEEP TX, CERVICAL  2014    LEEP TX Cervical     RECESSION RESECTION WITH ADJUSTABLE SUTURE  12/13/2011    Procedure:RECESSION RESECTION WITH ADJUSTABLE SUTURE; Right Strabismus Repair with Adjustable Suture       TUBAL LIGATION  2007    [facility]       Social History   Social History   Substance Use Topics     Smoking status: Former Smoker     Packs/day: 0.30     Years: 15.00     Types: Cigarettes     Smokeless tobacco: Former User     Quit date:  10/29/2014      Comment: Started smoking in 89/ smokes about 3 per day     Alcohol use No     Family History   I have reviewed this patient's family history and updated it with pertinent information if needed.   Family History   Problem Relation Age of Onset     Diabetes Mother      diet controled     Hypertension Mother      Arthritis Mother      Lipids Mother      Diabetes Father      Hypertension Father      GASTROINTESTINAL DISEASE Father      gallbladder removed     Bipolar Disorder Brother      Thyroid Disease Brother      Obesity Other      Son     Respiratory Other      Son and Daughter; asthma     Depression Maternal Aunt      Anxiety Disorder Maternal Aunt      Prior to Admission Medications   Prior to Admission Medications   Prescriptions Last Dose Informant Patient Reported? Taking?   LYRICA 75 MG capsule   No No   Sig: TAKE 1 CAPSULE BY MOUTH 3 TIMES DAILY   Lidocaine (LIDOCARE) 4 % Patch   No No   Sig: Place 2 patches onto the skin every 24 hours   acetaminophen (TYLENOL) 500 MG tablet   No No   Sig: Take 2 tablets (1,000 mg) by mouth 3 times daily   acetone, Urine, test STRP   No No   Si strip by In Vitro route as needed   albuterol (PROAIR HFA) 108 (90 Base) MCG/ACT Inhaler   No No   Sig: Inhale 2 puffs into the lungs every 4 hours as needed for shortness of breath / dyspnea   Patient not taking: Reported on 2018   amitriptyline (ELAVIL) 50 MG tablet   No No   Sig: Take 1 tablet (50 mg) by mouth At Bedtime   aspirin 81 MG tablet   No No   Sig: Take 1 tablet (81 mg) by mouth daily   blood glucose (NO BRAND SPECIFIED) lancets standard   No No   Sig: Use to test blood sugar 10 times daily or as directed. Accu-check   blood glucose monitoring (NO BRAND SPECIFIED) meter device kit   No No   Sig: Use to test blood sugar 10 times daily or as directed. Accu-check   blood glucose monitoring (NO BRAND SPECIFIED) test strip   No No   Sig: Use to test blood sugars 10 times daily or as directed.  Accu-chek   clindamycin (CLEOCIN) 300 MG capsule   No No   Sig: Take 1 capsule (300 mg) by mouth 3 times daily   fluticasone (FLONASE) 50 MCG/ACT spray   No No   Sig: Spray 2 sprays into left nostril daily   furosemide (LASIX) 20 MG tablet   No No   Sig: TAKE 1 TABLET (20 MG) BY MOUTH DAILY   furosemide (LASIX) 40 MG tablet   No No   Sig: Take 1 tablet (40 mg) by mouth daily   glucose 4 g CHEW chewable tablet   No No   Sig: Take 3-4 tablets to treat low blood sugar.   Patient not taking: Reported on 8/29/2018   insulin aspart (NOVOLOG PEN) 100 UNIT/ML injection   No No   Sig: Inject 1-7 Units Subcutaneous 3 times daily (before meals)   Patient taking differently: Inject 2-5 Units Subcutaneous 3 times daily (before meals)    insulin glargine (LANTUS SOLOSTAR) 100 UNIT/ML pen   No No   Sig: Inject 8 Units Subcutaneous At Bedtime   insulin pen needle (B-D U/F) 31G X 5 MM   No No   Sig: Use 3 time(s) a day.   levonorgestrel (MIRENA, 52 MG,) 20 MCG/24HR IUD   No No   Sig: placed today   lisinopril (PRINIVIL/ZESTRIL) 10 MG tablet   No No   Sig: Take 1 tablet (10 mg) by mouth daily   methadone (DOLPHINE-INTENSOL) 10 mg/mL CONC (HIGH CONC) solution   No No   Sig: Take 7 mLs (70 mg) by mouth daily   metoprolol succinate (TOPROL-XL) 25 MG 24 hr tablet   No No   Sig: Take 1 tablet (25 mg) by mouth daily   multivitamin, therapeutic with minerals (THERA-VIT-M) TABS tablet   No No   Sig: Take 1 tablet by mouth daily   polyethylene glycol (MIRALAX/GLYCOLAX) Packet   No No   Sig: Take 17 g by mouth daily   ranitidine (ZANTAC) 300 MG tablet   No No   Sig: Take 1 tablet (300 mg) by mouth daily   umeclidinium-vilanterol (ANORO ELLIPTA) 62.5-25 MCG/INH oral inhaler   No No   Sig: Inhale 1 puff into the lungs daily      Facility-Administered Medications: None     Allergies   Allergies   Allergen Reactions     No Clinical Screening - See Comments      Swelling in the throat.       Physical Exam   Vital Signs: Temp: 98.2  F (36.8  C) Temp  src: Oral BP: (!) 118/109   Heart Rate: 109 Resp: 16 SpO2: 98 % O2 Device: None (Room air)      Physical Exam   Constitutional:   Well nourished, well developed, resting comfortably   Head: Normocephalic and atraumatic.   Eyes: Conjunctivae are normal. Pupils are equal, round, and reactive to light.  Cardiovascular: Tachycardic rate and rhythm without murmurs or gallops  Pulmonary/Chest: Clear to auscultation bilaterally, with no wheezes or retractions. No respiratory distress.  GI: Soft with good bowel sounds.  Non-tender, non-distended, with no guarding, no rebound, no peritoneal signs.   Back:  No bony or CVA tenderness   Musculoskeletal:  No edema or clubbing.  1cm x 2cm wound on right leg stump, 2cm x 3 cm foul-smelling lesion on left medial aspect of the left foot.    Skin: Skin is warm and dry. No rash noted.   Neurological: Alert and oriented to person, place, and time. Nonfocal exam  Psychiatric:  Normal mood and affect.      Data   Data reviewed today: I reviewed all medications, new labs and imaging results over the last 24 hours. I personally reviewed her CXR and foot xray.

## 2018-10-07 NOTE — ED TRIAGE NOTES
Pt presents to ED for a complaint of L toe pain. Pt has a history of ulcers due to uncontrolled diabetes but the pain has gotten too bad so her primary MD told her to come to the ED. Upon arrivla pt is also complaining of some shortness of breath.

## 2018-10-08 PROBLEM — Z71.89 ADVANCE CARE PLANNING: Chronic | Status: RESOLVED | Noted: 2017-06-28 | Resolved: 2018-01-01

## 2018-10-08 NOTE — PROGRESS NOTES
Nursing Focus: Admission    D: Arrived at 0230 from emergency department. Admitted for left toe ulceration/risk for sepsis.     I: Admission process began.  Patient oriented to room, enviroment, call light.  MD notified of patient's arrival on unit.     A: Vital signs stable, afebrile.  Patient stable at this time.  Complains of pain in left foot.      P: Implement plan of care when available. Continue to monitor patient. Nursing interventions as appropriate. Notify MD with changes in pt status.

## 2018-10-08 NOTE — PLAN OF CARE
Problem: Infection, Risk/Actual (Adult)  Goal: Identify Related Risk Factors and Signs and Symptoms  Related risk factors and signs and symptoms are identified upon initiation of Human [employer] Clinical Practice Guideline (CPG).  Outcome: No Change  Continues on antibiotics for possible osteomyelitis. Right stump dressing changed by W.OC. Nurse. Alessandra had new right sided weakness in her hand and kept dropping things. Raman Lara MD notified and assessed her. He ordered a head CT and awaiting results. On Ly colton for neuropathy. Placed on Fall's precautions and is up with assistance of two and use of gait belt and walker.

## 2018-10-08 NOTE — PROGRESS NOTES
[facility] Nurse Inpatient Wound Assessment   Reason for consultation: Evaluate and treat Right Stump wound ( Podiatry following Left Metatarsal Wound)     Assessment  Right lateral stump wound due to Trauma pt indicates she recently fell and area split open. Wound has thin layer of serous scabbing and some new epithelium today and shows evidence of healing according to Dr. King's last not and assessment today. Will apply thin layer of Aquaphor and hydrocolloid to ensure wound heals from the bottom up.   Status: initial assessment    Treatment Plan  Right Lateral stump wound: Every other day and Weekly     1. Cleanse with MicroKlenz and blot dry  2. Apply thin layer (pea sized amt) of aquaphor over wound  3. Cover with comfeel hydrocolloid (#[id number]) dressing     Orders Written  WO Nurse follow-up plan:weekly  Nursing to notify the Provider(s) and re-consult the [facility] Nurse if wound(s) deteriorates or new skin concern.    Patient History  According to provider note(s):  Alessandra Pak is a 53 year old female  admitted on 10/7/2018. She has a history of diabetes, osteomyelitis, s/p right foot amputation, systolic heart failure, ICD placement and is admitted for recurrent osteomyelitis with evidence for sepsis as well as NIKOLAY.    Objective Data  Containment of urine/stool: Incontinence Protocol    Active Diet Order    Active Diet Order      Combination Diet Regular Diet Adult    Output:   I/O last 3 completed shifts:  In: 307.5 [P.O.:120; IV Piggyback:187.5]  Out: -     Risk Assessment:   Sensory Perception: 3-->slightly limited  Moisture: 4-->rarely moist  Activity: 3-->walks occasionally  Mobility: 3-->slightly limited  Nutrition: 3-->adequate  Friction and Shear: 2-->potential problem  Kye Score: 18                          Labs:   Recent Labs  Lab 10/08/18  0438  10/05/18  1459   HGB 8.5*  < > 9.6*   INR 1.27*  --   --    WBC 23.4*  < > 15.0*   CRP  --   --  292.0*   < > = values in this interval not  displayed.    Physical Exam  Skin inspection: focused RLE    Wound Location:  Right stump  Date of last photo 10/8/18        Wound History: hx of BKA, pt reports fall occurred causing wound and has been followed by Dr. King with orthopedics outpatient.  Measurements (length x width x depth, in cm) 1.0 cm x 1.9 cm  x  0.1 cm   Wound Base: Pink dry dermal base that is resurfacing with epithelial tissue at edges and thin layer of serous crusting. Wound is very dry and LE skin dry and scaly  Tunneling N/A  Undermining N/A  Palpation of the wound bed: normal   Periwound skin: intact  Color: normal and consistent with surrounding tissue  Temperature: normal   Drainage:, scant  Description of drainage: serous  Odor: none  Pain: denies     Interventions  Current support surface: Standard  Atmos Air mattress  Current off-loading measures: Pillows under calves  Visual inspection of wound(s) completed  Wound Care: completed by RN  Supplies: ordered: Comfeel dressing  Education provided: importance of repositioning, wound progress and Infection prevention   Discussed plan of care with Patient and Nurse    Kyle Castanon RN

## 2018-10-08 NOTE — PROGRESS NOTES
Internal Medicine Progress Note    Date of Service (when I saw the patient): 10/08/2018    Assessment & Plan   Alessandra Pak is a 53 year old female  admitted on 10/7/2018. She has a history of diabetes, osteomyelitis, s/p right foot amputation, systolic heart failure, ICD placement and is admitted for recurrent osteomyelitis with evidence for sepsis as well as NIKOLAY.    Osteomyelitis, sepsis - Presents with worsened left toe pain in setting of known left foot ulcer.  Put on clinda following debridement on 10/5 but now coming in with worsening odor, pain, chills, rigors and altered mental status.  , WBC 24.8 and imaging shows likely osteo at the distal first metatarsal and tibial sesamoid concerning for osteo with septic joint.  On exam bone is visible.  Wound cultures from 10/5 have grown out 7+ organisms including pseudomonas.  - Continue  Cefepime, Vanco and clindamycin.  - Follow blood cultures  - Consult orthopedics and wound nurse     NIKOLAY, hypovolemia - BUN 63 and Cr 2.12 in setting or poor oral intake and recent lasix dose increase.  Suspect volume depletion. EF 35-40% on last echo. Creatinine down to 1.65 this morning.  - Continue hydrate as needed  - Hold lasix, lisinopril  - Follow BMPs     Right sided weakness.  New this morning.  Upper extremity more then lower.  Unclear etiology.  Did have a fall several days ago that was primarily on her buttocks but included her head.  CT scan not done in ED.  Head CT to evaluate for bleed or mass negative.  - Follow exam  - Neuro/neurosurg consult if positive findings  - PT/OT    History of DM, blood sugar levels appropriate  - Continue home lantus 8 units QHS  - Continue sliding scale insulin     Chronic pain, pain adequately controlled  - Continue home methadone.  Normally takes 70 mg daily.  If kidney function worsens substantially will need to consider decreasing dose.  - Decrease home lyrica dose in setting of NIKOLAY     Systolic heart failure - EF  35-40% on last echo.  BNP down from prior values.  S/p AICD placement.  - Hold home lasix, metoprolol in setting of likely sepsis.  Please resume once doing better.      Diet:   Active Diet Order      Combination Diet Regular Diet Adult  DVT Prophylaxis: Pneumatic Compression Devices  Code Status: Full Code    Disposition: Expected discharge in 2-4 days once sepsis is treated and a safe discharge plan is available.      Raman Lara MD  Internal Medicine-Pediatrics Staff  Pager: [phone]    Please see sticky note for cross cover information     ------    Interval History   Awoke from sleep.  Had increased foot pain earlier but adequately controlled now after receiving usual medications.  Biggest concern is right-sided weakness, especially in right arm.  Just started this morning.  First noticed that she was unable to hold a cup of coffee.  No fevers overnight.  Denies chest pain or dizziness.      Did not get much sleep last night.  Poor appetite.      Physical Exam     Vitals:    10/07/18 1934 10/08/18 1151   Weight: 55.2 kg (121 lb 12.8 oz) 50.1 kg (110 lb 8 oz)     Vital Signs with Ranges  Temp:  [97.1  F (36.2  C)-99.3  F (37.4  C)] 99.3  F (37.4  C)  Heart Rate:  [] 98  Resp:  [16-18] 16  BP: (103-159)/() 134/69  SpO2:  [89 %-100 %] 90 %  I/O last 3 completed shifts:  In: 307.5 [P.O.:120; IV Piggyback:187.5]  Out: -     Constitutional: alert and moderate distress   Head: Normocephalic. Atraumatic  Cardiovascular: RRR. No murmurs  Respiratory: Good diaphragmatic excursion. Lungs clear  Abdomen: Abdomen soft, non-tender. BS normal.   Neuro: RUE 3/5 LUE 5/5, RLE 4/5 LLE 5/5, B shoulders 5/5 Reflexes normal and symmetric. Sensation grossly WNL.  Psychiatric: mentation appears normal, anxious and worried    Medications       amitriptyline  50 mg Oral At Bedtime     aspirin  81 mg Oral Daily     ceFEPIme (MAXIPIME) IV  2 g Intravenous Q12H     clindamycin  300 mg Oral TID     insulin aspart  1-7 Units  Subcutaneous TID AC     insulin aspart  1-5 Units Subcutaneous At Bedtime     methadone  75 mg Oral Daily     metoprolol succinate  25 mg Oral Daily     pregabalin  50 mg Oral TID     ranitidine  300 mg Oral Daily     sodium chloride (PF)  3 mL Intracatheter Q8H     umeclidinium-vilanterol  1 puff Inhalation Daily     vancomycin (VANCOCIN) IV  1,000 mg Intravenous Q24H     Recent Results (from the past 24 hour(s))   Chest XR,  PA & LAT    Narrative    XR CHEST 2 VW  10/7/2018 6:10 PM      HISTORY: sob;     COMPARISON: 7/29/2018    FINDINGS: PA and lateral views of the chest upright. Left chest wall  pacemaker and lead in stable positions. The cardiac silhouette is  stable and within normal limits. Are perihilar and bibasilar mixed  interstitial and airspace opacities. These do not appear to silhouette  the adjacent structures. Calcified right pretracheal lymph nodes are  unchanged. No pleural effusion or pneumothorax.      Impression    IMPRESSION: Bibasilar mixed interstitial and airspace opacities.  Appearance favors pulmonary edema, though infection is a distinct  possibility.    I have personally reviewed the examination and initial interpretation  and I agree with the findings.    JAYANT SALAS MD   CT Head w/o Contrast    Narrative    CT HEAD W/O CONTRAST 10/8/2018 3:19 PM    Provided History: Right sided weakness, recent head trauma, evaluate  for intracranial bleed/mass;   ICD-10:    Comparison: CT head 6/10/2018.    Technique: Using multidetector thin collimation helical acquisition  technique, axial, coronal and sagittal CT images from the skull base  to the vertex were obtained without intravenous contrast.     Findings:    No intracranial hemorrhage, mass effect, or midline shift. The  ventricles are proportionate to the cerebral sulci. The gray to white  matter differentiation of the cerebral hemispheres is preserved. The  basal cisterns are patent.    The visualized paranasal sinuses are clear.  The mastoid air cells are  clear. Chronic deformity of the right lamina papyracea.      Impression    Impression: No acute intracranial pathology.    I have personally reviewed the examination and initial interpretation  and I agree with the findings.    NATHANAEL GUZMAN MD     Data     Nursing notes reviewed.  Patient seen with bedside nurse.     I have reviewed today's Medications, Vital Signs, Labs and Images.

## 2018-10-08 NOTE — PROGRESS NOTES
"CLINICAL NUTRITION SERVICES - ASSESSMENT NOTE     Nutrition Prescription    RECOMMENDATIONS FOR MDs/PROVIDERS TO ORDER:  None at this time    Malnutrition Status:    Unable to determine a this time.    Recommendations already ordered by Registered Dietitian (RD):  - Order daily multivitamin with mineral supplement to ensure adequate micronutrient intakes for wound healing.  - Order Calorie Counts starting 10/9 x 3 days to assess po adequacy  - Order Boost Glucose Control supplement betw meals BID to help optimize pt's calorie and protein intakes for wound healing and prevent further wt loss.   - Order Room Service with Assist to help prompt pt to order consistent meals daily    Future/Additional Recommendations:  - Need for supplemental TF if calorie counts results meeting < 75% of estimated needs (< 1200 calories and 50 g PRO).     REASON FOR ASSESSMENT  Alessandra Pak is a/an 53 year old female assessed by the dietitian for Admission Nutrition Risk Screen for unintentional loss of 10# or more in the past two months    NUTRITION HISTORY  Unable to obtain from pt secondary to pt leaving floor for procedure. Per chart review, pt presents with decreased appetite with poor po intakes x several days PTA, confusion and chills.    CURRENT NUTRITION ORDERS  Diet: Regular  Intake/Tolerance: Has only ordered one meal (today) since admit on 10/7.    LABS  Labs reviewed    MEDICATIONS  Medications reviewed    ANTHROPOMETRICS  Height: 175.3 cm (5' 9\")  Most Recent Weight: 50.1 kg (110 lb 8 oz) - today's, Admit wt = 55.2 kg (121 lbs) on 10/7 and recheck pf wt obtained this afternoon = 50.2 kg  - Suspect admitted wt incorrect, based on stable wts x 2 days.  IBW: 62 kg (adjusted for Right BKA) - 81% of IBW  BMI: Underweight BMI <18.5  Weight History: Unable to obtain from pt. Per review of EMR, noted wt trending downward since July with total wt loss of 9.8% x past 3 mos.   Wt Readings from Last 10 Encounters:   10/08/18 50.1 kg " (110 lb 8 oz)   08/01/18 51.1 kg (112 lb 9.6 oz)   07/28/18 54.4 kg (120 lb)   07/19/18 53.6 kg (118 lb 1.6 oz)   07/09/18 55.7 kg (122 lb 14.4 oz)   02/23/18 63.5 kg (139 lb 15.9 oz)   02/16/18 63.5 kg (140 lb)   02/13/18 65.8 kg (145 lb)   02/05/18 66.4 kg (146 lb 6.4 oz)   02/02/18 64.8 kg (142 lb 12.8 oz)       Dosing Weight: 50 kg    ASSESSED NUTRITION NEEDS  Estimated Energy Needs: 6937-5939 kcals/day (30 - 35 kcals/kg )  Justification: Underweight  Estimated Protein Needs: 65-75 grams protein/day (1.3 - 1.5 grams of pro/kg)  Justification: Repletion and Wound healing  Estimated Fluid Needs: (1 mL/kcal)   Justification: Maintenance    PHYSICAL FINDINGS  See malnutrition section below.  Unable to assess secondary to pt unavailable to see  Per chart review, WOC consult for evaluate and treat of right stump wound and left foot osteomyelitis. Per assessment today, right lateral stump wound d/t trauma as pt indicated she fell recently and area split open. Evidence of healing noted.   S/P Right BKA on 6/28  Left foot ulcer    MALNUTRITION  % Intake: Unable to assess  % Weight Loss: > 7.5% in 3 months (severe)  Subcutaneous Fat Loss: Unable to assess  Muscle Loss: Unable to assess  Fluid Accumulation/Edema: None noted per chart review  Malnutrition Diagnosis: Unable to determine due to unable to obtain diet hx and unable to complete nutritional physical assessment.    NUTRITION DIAGNOSIS  Unintended weight loss related to question inadequate nutritional intakes and has increased nutrient needs for wound healing as evidenced by wt loss of 9.8% x past 3 mos, underweight in part d/t Right BKA with BMI 16.3     INTERVENTIONS  Implementation  Nutrition Education: Unable to complete due to pt unavailable to see.   Initiate calorie counts   Collaboration with RN and NST regarding pt's wts. providers  Medical food supplement therapy - as outlined above  Room Service with Assist  Multivitamin/mineral supplement therapy      Goals  1. Wt no to decline < 50 kg  2. Ave po intakes per calorie counts x 3 days to meet at least 75% of estimated nutritional needs (1200 calories and 50 g PRO).      Monitoring/Evaluation  Progress toward goals will be monitored and evaluated per protocol.  Sweetie Cid RD,LD  Unit pager [phone]

## 2018-10-08 NOTE — PROGRESS NOTES
ED Boarding Patient Plan of Care    Admitting service: Medicine  For all critical changes in vital sign or critical lab values please notify the ED Physician immediately.     For non-critical changes or any other patient care questions, please contact the admitting service at: [phone].  After 0800 the patient will be assigned to a day time medicine team.    While the patient remains in the ED please follow all active orders written by the ED providers or by the Medicine Team.  Do not release the sign and held orders until the patient arrives on the floor.    Once a bed is available please send text message to IP team to notify them that the patient is moving to the floor.    GAVINO Power CNP  Pager: [phone] until 11 pm, then [phone]

## 2018-10-08 NOTE — PHARMACY-PHARMACOTHERAPY NOTE
Methadone Clinic Information Note    Clinic Name: [facility]  Clinic Location (city): [place]  Phone Number: [phone]  Prescriber's Name: Dr Sheriff    Talked to RN at clinic and she stated the patient's current methadone dose is 75mg po daily and she last came to clinic on Saturday for her dose and received a take home Sunday dose.      Ricky Farr, PharmD [phone]

## 2018-10-08 NOTE — PHARMACY-VANCOMYCIN DOSING SERVICE
Pharmacy Vancomycin Initial Note  Date of Service 2018  Patient's  1965  53 year old, female    Indication: Osteomyelitis    Current estimated CrCl = Estimated Creatinine Clearance: 27.4 mL/min (based on Cr of 2.12).    Creatinine for last 3 days  10/7/2018:  5:49 PM Creatinine 2.12 mg/dL    Recent Vancomycin Level(s) for last 3 days  No results found for requested labs within last 72 hours.      Vancomycin IV Administrations (past 72 hours)      No vancomycin orders with administrations in past 72 hours.                Nephrotoxins and other renal medications (Future)    Start     Dose/Rate Route Frequency Ordered Stop    10/07/18 1947  vancomycin (VANCOCIN) 1000 mg in dextrose 5% 200 mL PREMIX      1,000 mg  200 mL/hr over 1 Hours Intravenous ONCE 10/07/18 1946            Contrast Orders - past 72 hours     None                Plan:  1.  Start vancomycin  1000 mg IV once, then intermittently based on levels  2.  Goal Trough Level: 15-20 mg/L   3.  Pharmacy will check trough levels as appropriate in 1-3 Days.    4. Serum creatinine levels will be ordered daily for the first week of therapy and at least twice weekly for subsequent weeks.    5. [place] method utilized to dose vancomycin therapy: Method 2    Kalyn Juan, AnthonyD

## 2018-10-08 NOTE — PLAN OF CARE
Problem: Patient Care Overview  Goal: Plan of Care/Patient Progress Review  Outcome: No Change  AVSS. C/o pain in left foot, flexeril & Tylenol given to relief. Ulceration on left food redressed, WOC consult pending. NS bolus running at 125/hour. R LE amputee below the knee, A x 1 to commode w/ walker. Continue w/ BG checks & sliding scale insulin. No acute events, continue to monitor.

## 2018-10-08 NOTE — CONSULTS
[facility]  ORTHOPAEDIC SURGERY CONSULT - HISTORY AND PHYSICAL    DATE OF CONSULT: 10/8/2018 12:52 AM    REQUESTING PROVIDER: Rosemarie Castro MD, MD - N Staff.    CC: left foot ulcer    DATE OF INJURY: na    HISTORY OF PRESENT ILLNESS:   Alessandra Pak is a 53 year old female s/p right BKA for ulcer with painful left foot ulcer that has been managed by podiatry Dr. Lewis. Over the past month she has had increasing pain, drainage, and odor from ulcer of left 1st MTP. She was seen by Dr. Lewis on 10/5/18 and he felt the ulcer looked worse than it has been. Wound cultures were polymicrobial. He started her on clindamycin which she has been taking, and he recommended patient follow up with Dr. Peralta for discussion of possible ray amputation. Unfortunately she presented to ED 10/7/18 with continued worsening pain in the foot. She also endorses fevers and chills    Nursing and physician Emergency Department notes reviewed and HPI updated to reflect their ED course.     PAST MEDICAL HISTORY:   Past Medical History:   Diagnosis Date     Abdominal pain, right upper quadrant     sees Dr Mcclellan pain clinic at [facility] [place]     AICD (automatic cardioverter/defibrillator) present      ASCUS with positive high risk HPV 8/2013    + HPV 33, [place] - GAVIN I, ECC- atypia     Cervical high risk HPV (human papillomavirus) test positive 7/8/15, 7/25/16    NIL pap/+ HR HPV (not 16 or 18).      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    leep     Depressive disorder      Gastro-oesophageal reflux disease      Hypertension      Profound impairment, one eye, impairment level not further specified     rt eye due to childhood injury     Systolic CHF (H) 3/12/2015     Type 2 diabetes mellitus without complications (H)      Uncomplicated asthma      PAST SURGICAL HISTORY:   Past Surgical History:   Procedure Laterality Date     AMPUTATE LEG BELOW KNEE Right 6/28/2018    Procedure: AMPUTATE LEG BELOW KNEE;  Right Knee Ampuation Below  Knee, Anesthesia Block;  Surgeon: Ambrose King MD;  Location: [facility] OR     C NONSPECIFIC PROCEDURE  2001    cholecystectomy     C NONSPECIFIC PROCEDURE  as a child    tonsillectomy     C NONSPECIFIC PROCEDURE  2001    whipple procedure     CARDIAC SURGERY      defib     COLPOSCOPY,LOOP ELECTRD CERVIX EXCIS  2002, 2011    stage 2 dysplasia     ENDOBRONCHIAL ULTRASOUND FLEXIBLE N/A 2/19/2015    Procedure: ENDOBRONCHIAL ULTRASOUND FLEXIBLE;  Surgeon: Brenden Tamez MD;  Location: [facility] GI     LEEP TX, CERVICAL  2014    LEEP TX Cervical     RECESSION RESECTION WITH ADJUSTABLE SUTURE  12/13/2011    Procedure:RECESSION RESECTION WITH ADJUSTABLE SUTURE; Right Strabismus Repair with Adjustable Suture       TUBAL LIGATION  2007    essure      MEDICATIONS:   Prior to Admission medications    Medication Sig Last Dose Taking? Auth Provider   acetaminophen (TYLENOL) 500 MG tablet Take 2 tablets (1,000 mg) by mouth 3 times daily   Alireza Chaparro MD   acetone, Urine, test STRP 1 strip by In Vitro route as needed   Toya Nuno PA-C   albuterol (PROAIR HFA) 108 (90 Base) MCG/ACT Inhaler Inhale 2 puffs into the lungs every 4 hours as needed for shortness of breath / dyspnea  Patient not taking: Reported on 8/29/2018   Juliette Partida MD   amitriptyline (ELAVIL) 50 MG tablet Take 1 tablet (50 mg) by mouth At Bedtime   Juliette Partida MD   aspirin 81 MG tablet Take 1 tablet (81 mg) by mouth daily   Alireza Chaparro MD   blood glucose (NO BRAND SPECIFIED) lancets standard Use to test blood sugar 10 times daily or as directed. Accu-check   Brionna Calabrese MD   blood glucose monitoring (NO BRAND SPECIFIED) meter device kit Use to test blood sugar 10 times daily or as directed. Accu-check   Brionna Calabrese MD   blood glucose monitoring (NO BRAND SPECIFIED) test strip Use to test blood sugars 10 times daily or as directed. Accu-chek   Brionna Calabrese MD    clindamycin (CLEOCIN) 300 MG capsule Take 1 capsule (300 mg) by mouth 3 times daily   Barrington Lewis, ILYA   fluticasone (FLONASE) 50 MCG/ACT spray Spray 2 sprays into left nostril daily   Juliette Partida MD   furosemide (LASIX) 20 MG tablet TAKE 1 TABLET (20 MG) BY MOUTH DAILY   Brionna Calabrese MD   furosemide (LASIX) 40 MG tablet Take 1 tablet (40 mg) by mouth daily   Juliette Partida MD   glucose 4 g CHEW chewable tablet Take 3-4 tablets to treat low blood sugar.  Patient not taking: Reported on 8/29/2018   Alireza Chaparro MD   insulin aspart (NOVOLOG PEN) 100 UNIT/ML injection Inject 1-7 Units Subcutaneous 3 times daily (before meals)  Patient taking differently: Inject 2-5 Units Subcutaneous 3 times daily (before meals)    Juliette Partida MD   insulin glargine (LANTUS SOLOSTAR) 100 UNIT/ML pen Inject 8 Units Subcutaneous At Bedtime   Juliette Partida MD   insulin pen needle (B-D U/F) 31G X 5 MM Use 3 time(s) a day.   Milton Durán MD   levonorgestrel (MIRENA, 52 MG,) 20 MCG/24HR IUD placed today   Alireza Chaparro MD   Lidocaine (LIDOCARE) 4 % Patch Place 2 patches onto the skin every 24 hours   Juliette Partida MD   lisinopril (PRINIVIL/ZESTRIL) 10 MG tablet Take 1 tablet (10 mg) by mouth daily   Juliette Partida MD   LYRICA 75 MG capsule TAKE 1 CAPSULE BY MOUTH 3 TIMES DAILY   Brionna Calabrese MD   methadone (DOLPHINE-INTENSOL) 10 mg/mL CONC (HIGH CONC) solution Take 7 mLs (70 mg) by mouth daily   Juliette Partida MD   metoprolol succinate (TOPROL-XL) 25 MG 24 hr tablet Take 1 tablet (25 mg) by mouth daily   Brionna Calabrese MD   multivitamin, therapeutic with minerals (THERA-VIT-M) TABS tablet Take 1 tablet by mouth daily   Juliette Partida MD   polyethylene glycol (MIRALAX/GLYCOLAX) Packet Take 17 g by mouth daily   Juliette Partida MD   ranitidine (ZANTAC) 300 MG tablet Take 1 tablet (300 mg) by mouth daily    Juliette Partida MD   umeclidinium-vilanterol (ANORO ELLIPTA) 62.5-25 MCG/INH oral inhaler Inhale 1 puff into the lungs daily   Branch Brionna Dc MD       ALLERGIES:   No clinical screening - see comments    PHYSICAL EXAM:   Vitals:    10/07/18 2330 10/07/18 2345 10/08/18 0000 10/08/18 0030   BP: 159/85  145/77 151/80   Resp:       Temp:       TempSrc:       SpO2:  98% 97% 94%   Weight:         General: Awake, alert, appropriate, following commands, NAD.  Neuro: CN II-XII grossly intact.   Psych: Appropriate affect.   Skin: No rashes,  skin color normal.  HEENT: Normal.   Lungs: Breathing comfortably and nonlabored, no wheezes or stridor noted.  Heart/Cardiovascular: Regular pulse, no peripheral cyanosis.    Right Lower Extremity: s/p BKA. Lateral edge of incision with 1 cm area of wound dehiscence that is granulating in. NO evidence of infection    Left lower extremity: ulceration over lateral MTP with exposed bone that appears to have been shaved down previously. No active drainage but fresh bandage in place. No erythema. Absent sensation in stocking glove distribution. Fires TA/GSC. Toes are still with flexion and extension. 2+ pedal pulse        LABS:  Hemoglobin   Date Value Ref Range Status   10/07/2018 10.0 (L) 11.7 - 15.7 g/dL Final   10/05/2018 9.6 (L) 11.7 - 15.7 g/dL Final     WBC   Date Value Ref Range Status   10/07/2018 24.8 (H) 4.0 - 11.0 10e9/L Final     Platelet Count   Date Value Ref Range Status   10/07/2018 360 150 - 450 10e9/L Final     INR   Date Value Ref Range Status   06/13/2018 1.33 (H) 0.86 - 1.14 Final     Creatinine   Date Value Ref Range Status   10/07/2018 2.12 (H) 0.52 - 1.04 mg/dL Final     Glucose   Date Value Ref Range Status   10/07/2018 164 (H) 70 - 99 mg/dL Final       ASSESSMENT AND PLAN:   Alessandra Pak is a 53 year old with diabetic foot ulcer with associated osteomyeltitis    IV abx per primary team  Local wound cares  Recommend trending WBC, CRP and  procalcitonin q48 hours until trending down  Recommend A1c to assess glucose management  Recommend albumin/prealbumin, and vitamin D to assess nutritional deficiencies  Recommend ABIs and vascular consult if <0.8  Recommend outpatient follow up with Dr. Peralta for surgical discussion    Assessment and Plan discussed with Dr. Barry, Orthopaedic Surgery Attending.     Cheyenne Tinsley MD  Orthopaedic Surgery Resident, PGY-4  Pager: [phone]

## 2018-10-09 NOTE — PLAN OF CARE
Problem: Patient Care Overview  Goal: Plan of Care/Patient Progress Review    Tmax 99.1, OVSS. Pt lethargic at start of the shift, but aroused more easily as the shift went on.  overnight. Pt denies pain/nausea/SOB. Pt has declined the need to void overnight. Per previous nurse, would assist w/ 2 up to bedside commode w/ walker/gait belt. Bed alarm on overnight for safety. Pt started calorie counts at 0000. Continue to monitor & w/ POC.    Pt was crying in back pain this am, tylenol & flexeril given, reassess this am if something else is needed for pain management.

## 2018-10-09 NOTE — PROGRESS NOTES
Paged Dr Tinsley to place orders for dressing changes on left foot ulcer. WOC RN is deferring wound cares for this wound to Orthopedics this admission, unless otherwise ordered.

## 2018-10-09 NOTE — PLAN OF CARE
Problem: Patient Care Overview  Goal: Plan of Care/Patient Progress Review  Outcome: No Change  Fever this alejandro 100.6 -100.7, other vitals within parameters.  Tylenol for fever, MD notified. Continues on IV Vancomycin, Cefepime and oral clindamycin.  Weakness noted day shift, head CT done, no acute intracranial pathology.  Continues with weakness and increasing lethargy this evening, Dr notified.  Pt muscle weakness, jerking motions.  Like muscles tire and let go.  Trying to hold phone to order dinner, pt kept dropping phone from ear to lap still holding onto phone.  When sitting will sit up then start to drop back then catch self and sit up again, repeating this motion.   Per MD probable MRI tomorrow.  Pt incontinent of urine, had denied need to void before supper, large incontinent urine latter.  Place bed pan Q 4 hours.  Lethergy seemed to increase with fever.  Blood sugars 155 and 233 not hypoglycemic.  Fall precautions. 2 person assist. Close watch.

## 2018-10-09 NOTE — CONSULTS
[facility] [place]      Neurology Consult    Reason for consult: New right sided weakness, upper extremities greater then lower extremeities    Patient Name: Alessandra Pak  : 1965 MRN#: [id number]      HPI:  Ms. Pak is a 53 year old female with a PMH significant for DMII, osteomyelitis s/p right foot amputation, systolic heart failure who presented with sepsis secondary to osteomyelitis and an NIKOLAY.    Presented with left toe pain in the setting of a known left foot ulcer. She had an elevated CRP and white blodo cell count. She was started on broad spectrum antibiotic.She was noted to have an NIKOLAY and was given IV fluids and initially her diuretics were held. Orthopedics surgery was consulted and requested ABIs and vascular consult. Her NIKOLAY has improved. She reported having right hand weakness, and a CT of the brain was obtained which did not demonstrate intracranial pathology.     She reports that she developed right hand weakness about 10 days ago. She noted this when she woke up. She reports she couldn't open her hand. She reports she had some numbness of her fingertips of her hand. She denies having trouble talking or other weakness. She reports that she did not have associated dysphagia. She reports no vision changes (blurry without glasses) and she reports having intermittent headaches, but none currently. She reports having occasional incontinence the last 7 days related to urinary frequency. No bowel incontinence. No nausea, vomiting, abdominal pain, diarrhea or constipation. She reports that she had previously had right hand weakness in the past which resolved with hand exercises and using a hand brace.     Reports having occasional palpitations. No cough or sob.       Pertinent Past Medical/Surgical History  Past Medical History:   Diagnosis Date     Abdominal pain, right upper quadrant     sees Dr Mcclellan pain clinic at [facility] [place]     AICD (automatic  cardioverter/defibrillator) present      ASCUS with positive high risk HPV 8/2013    + HPV 33, [place] - GAVIN I, ECC- atypia     Cervical high risk HPV (human papillomavirus) test positive 7/8/15, 7/25/16    NIL pap/+ HR HPV (not 16 or 18).      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    leep     Depressive disorder      Gastro-oesophageal reflux disease      Hypertension      Profound impairment, one eye, impairment level not further specified     rt eye due to childhood injury     Systolic CHF (H) 3/12/2015     Type 2 diabetes mellitus without complications (H)      Uncomplicated asthma        Past Surgical History:   Procedure Laterality Date     AMPUTATE LEG BELOW KNEE Right 6/28/2018    Procedure: AMPUTATE LEG BELOW KNEE;  Right Knee Ampuation Below Knee, Anesthesia Block;  Surgeon: Ambrose King MD;  [facility] OR      NONSPECIFIC PROCEDURE  2001    cholecystectomy     C NONSPECIFIC PROCEDURE  as a child    tonsillectomy     C NONSPECIFIC PROCEDURE  2001    whipple procedure     CARDIAC SURGERY      defib     COLPOSCOPY,LOOP ELECTRD CERVIX EXCIS  2002, 2011    stage 2 dysplasia     ENDOBRONCHIAL ULTRASOUND FLEXIBLE N/A 2/19/2015    Procedure: ENDOBRONCHIAL ULTRASOUND FLEXIBLE;  Surgeon: Brenden Tamez MD;  Location: [facility] GI     LEEP TX, CERVICAL  2014    LEEP TX Cervical     RECESSION RESECTION WITH ADJUSTABLE SUTURE  12/13/2011    Procedure:RECESSION RESECTION WITH ADJUSTABLE SUTURE; Right Strabismus Repair with Adjustable Suture       TUBAL LIGATION  2007    essure        Medications:   Current Facility-Administered Medications   Medication     acetaminophen (TYLENOL) tablet 650 mg     albuterol (PROAIR HFA/PROVENTIL HFA/VENTOLIN HFA) 108 (90 Base) MCG/ACT inhaler 2 puff     alum & mag hydroxide-simethicone (MYLANTA ES/MAALOX  ES) suspension 30 mL     amitriptyline (ELAVIL) tablet 50 mg     aspirin EC tablet 81 mg     ceFEPIme (MAXIPIME) 2 g vial to attach to  ml bag for ADULTS or 50 ml  bag for PEDS     clindamycin (CLEOCIN) capsule 300 mg     cyclobenzaprine (FLEXERIL) tablet 10 mg     glucose gel 15-30 g    Or     dextrose 50 % injection 25-50 mL    Or     glucagon injection 1 mg     insulin aspart (NovoLOG) inj (RAPID ACTING)     insulin aspart (NovoLOG) inj (RAPID ACTING)     lidocaine (LMX4) cream     lidocaine 1 % 1 mL     magnesium hydroxide (MILK OF MAGNESIA) suspension 30 mL     melatonin tablet 1 mg     methadone (DOLOPHINE-INTENSOL) 10 MG/ML (HIGH CONC) solution 75 mg     metoprolol succinate (TOPROL-XL) 24 hr tablet 25 mg     multivitamin, therapeutic with minerals (THERA-VIT-M) tablet 1 tablet     naloxone (NARCAN) injection 0.1-0.4 mg     ondansetron (ZOFRAN-ODT) ODT tab 4 mg    Or     ondansetron (ZOFRAN) injection 4 mg     pregabalin (LYRICA) capsule 50 mg     prochlorperazine (COMPAZINE) injection 10 mg    Or     prochlorperazine (COMPAZINE) tablet 10 mg    Or     prochlorperazine (COMPAZINE) Suppository 25 mg     [START ON 10/10/2018] ranitidine (ZANTAC) tablet 150 mg     senna-docusate (SENOKOT-S;PERICOLACE) 8.6-50 MG per tablet 1 tablet    Or     senna-docusate (SENOKOT-S;PERICOLACE) 8.6-50 MG per tablet 2 tablet     sodium chloride (PF) 0.9% PF flush 3 mL     sodium chloride (PF) 0.9% PF flush 3 mL     umeclidinium-vilanterol (ANORO ELLIPTA) 62.5-25 MCG/INH oral inhaler 1 puff     vancomycin (VANCOCIN) 1000 mg in dextrose 5% 200 mL PREMIX   .    Allergies:   Allergies   Allergen Reactions     No Clinical Screening - See Comments      Swelling in the throat.   .    Family History:   Family History   Problem Relation Age of Onset     Diabetes Mother      diet controled     Hypertension Mother      Arthritis Mother      Lipids Mother      Diabetes Father      Hypertension Father      GASTROINTESTINAL DISEASE Father      gallbladder removed     Bipolar Disorder Brother      Thyroid Disease Brother      Obesity Other      Son     Respiratory Other      Son and Daughter; asthma      Depression Maternal Aunt      Anxiety Disorder Maternal Aunt    reviewed family history with patient    Social History: She lives in [place] with her brother and daughter. Not currently drinking alcoho.  She reports smoking half a pack of cigarettes a day.    ROS:  Per HPI    PHYSICAL EXAMINATION  Vital Signs:  B/P: 125/79,  T: 98.9,  P: Data Unavailable,  R: 16    General:  NAD  HEENT:  Mmm, no oral lesions, PERRLA, EOMs intact   Cardio:  R/r/r no murmurs, rubs or gallops    Pulmonary:  Clear lungs, no wheezing, rales or rhonchi   Abdomen:  Bowel sounds appreciated, non-tender, non-distended   Extremities:  Right below the knee amputation, significant asterixis in upper extremities   Skin: slight ulceration at the right below the knee amputation.     Neurologic  Mental Status:  Alert and oriented   Cranial Nerves:  CN II-XII intact   Motor:    0/5 right wrist extension  0/5 right hand finger abduction   2.5 left hand finger abduction   5/5 left wrist extenion   5/5 triceps and biceps strength  5/5 hip flexor and extensor strength  5/5 knee flexion and extention    Reflexes:  2+ biceps, triceps, brachioradialis reflexes   Sensory:  Numb finger tips   Coordination:  Did not assess  Station/Gait:  Did not assess     Labs  Labs and Imaging reviewed and used in developing the plan; pertinent results included.     Lab Results   Component Value Date    GLC 91 10/09/2018       CT HEAD 10/8/2018  Findings:    No intracranial hemorrhage, mass effect, or midline shift. The  ventricles are proportionate to the cerebral sulci. The gray to white  matter differentiation of the cerebral hemispheres is preserved. The  basal cisterns are patent.     The visualized paranasal sinuses are clear. The mastoid air cells are  clear. Chronic deformity of the right lamina papyracea.         Impression: No acute intracranial pathology.       ASSESSMENT & RECOMMENDATIONS     Ms. Enamorado is a 53 year old female with a PMH significant for  DMII, osteomyelitis s/p right below the knee amputation, CKD, HTN, heart failure with reduced ejection fraction who presented with sepsis secondary to diabetic food ulcer of the left foot and osteomyelitis as well as an NIKOLAY. She has had right hand weakness for the past 10 days.     Her exam was significant for profound weakness of the right hand with wrist extension as well as abduction of the right fingers. Reflexes were not affected. We would like to rule out possible motor cortex stroke and recommend a MRI of the brain. The fact that the  is preserved in the right hand suggests that this is less likely a stroke but this is still on the differential. Secondly we would be concerned about a possible peripheral neuropathy, either secondary to compression or possible. The weakness in the extension of the right hand is suggestive of a radial neuropathy. Median nerve appears preserved with the presence of good flexion. She did have slight weakness on the left side of the abduction of the fingers. She could have some baseline peripheral neuropathy in her hands bilaterally, secondary to diabetes. Especially in the setting of existing lower extremity neuropathy and foot ulcers. Her asterixis may be secondary to opioid use versus systemic infeciton.     Recommendations:  MRI of the brain w/o contrast     Patient seen and discussed      Brown Amanda MD

## 2018-10-09 NOTE — PROGRESS NOTES
Care Coordinator Progress Note    Admission Date/Time:  10/7/2018  Attending MD:  Raman Laar MD    Data  Chart reviewed, discussed with interdisciplinary team.   Patient was admitted for:    Osteomyelitis (H)  Diabetic foot ulcer with osteomyelitis (H)  Chronic pulmonary edema  Acute renal failure, unspecified acute renal failure type (H)  Encounter for long-term (current) use of insulin (H)  Personal history of tobacco use, presenting hazards to health.    Concerns with insurance coverage for discharge needs: None.  Current Living Situation: Patient lives with family.  Support System: Supportive and Involved  Services Involved: [employer] providing skilled RN visits;  DME-wheelchair, transfer board, shower chair, grab bars  Transportation at Discharge: Family or friend will provide  Transportation to Medical Appointments:   - Not applicable  Barriers to Discharge: chronically ill    Assessment  Patient with hx of diabetes, osteomyelitis, s/p right foot amputation, systolic heart failure, ICD placement and is admitted for recurrent osteomyelitis with evidence for sepsis as well as NIKOLAY.     Coordination of Care and Referrals:  Patient was open to skilled RN visits through [employer]. (Phone  [phone]; Fax  [phone]) and would like to resume upon discharge. Nolan is patient's home care RN manager (ph: [phone]). Patient had home IV antibiotics earlier this calendar year through [facility]. She reports it went well.  Per Dr. Evin MUSA, discharge plan is still unknown. Ortho and ID involved. Antibiotic plan is TBD.      RNCC will continue to follow plan of care and assist in discharge planning as appropriate.      Plan  Anticipated Discharge Date:  TBD  Anticipated Discharge Plan:  home    Tereza LUNDBERG Heme/Onc RN Care Coordinator  Pager [phone]

## 2018-10-09 NOTE — PROGRESS NOTES
Antimicrobial Stewardship Team Note    Antimicrobial Stewardship Program - A joint venture between [facility] and  Physicians to optimize antibiotic management.  NOT a formal consult - Restricted Antimicrobial Review     Patient: Alessandra Pak  MRN: [id number]  Allergies: No clinical screening - see comments    Brief Summary:  Alessandra Pak is a 53 yr old female with a history of non ischemic cardiomyopathy  with ICD, CKD stage III, chronic pancreatitis s/p pylorus sparing Whipple (2001) with secondary DM c/b diabetic foot ulcers s/p R BKA, COPD, HTN, and chronic methadone who was admitted on 10/7 for increasing pain , wound expansion and foul odor s/p debridement of her chronic left foot ulceration on 10/5 as outpatient.  She endorsed chills and rigors and there was concern for sepsis on admission.  Concern for worsening chronic foot ulceration and toe wound and osteomyelitis.    HPI/ID history: Initially presented in early June with septic shock due to chronic right foot diabetic wound with underlying osteomyelitis. She underwent several I&Ds but given ongoing infection, the decision was made to pursue right BKA on 6/28. Cultures of the distal tibia obtained intraoperatively to assess for residual infection grew a gram positive cocci but the organism unfortunately failed to thrive for identification. Since the culture was obtained from bone and she was at high risk for wound breakdown if she had an inadequately treated infection, she was started on treatment with a 6 week course of Vancomycin which she completed on 8/10/2018. Evaluated in ID clinic on 8/30 with concern that after sutures were recently removed from her stump the area seemed to open up a bit. This worsened after she fell on the stump a few days prior. She has also been having some issues with a new ulcer on the left foot    Assessment:   -ID: SSTI-chronic left foot ulceration  and likely osteo/septic joint at distal first  metatarsal and tibial sesamoid.  With presentation of chronic wound that acutely worsened; pain, worsening odor, chills rigors, AMS, elevated WBC, low grade temp (Tmax 100.7), elevated CRP/ESR, tachycardia and hypotension, treatment of her foot ulceration and toe wound are warranted.  Unfortunately, historical cultures failed to grow from her right wound s/p BKA.  Debridement on 10/5 podiatry noted Yellow/Granulation, some necrotic tendon. Bone is now exposed in the ulcer site. Clear visualization of the met head and the tibial sesamoid and post-surgical wound is noted at right  BKA stump measuring 1cm x 2.9cm x 0.2cm. Cultures have isolated polymicrobal pathogens-see bleow.    She was started on clindamycin after debridement, which has been continued since admission, cefepime and vancomycin were also added.     Recommendations:  1. Given complexity of infection and plan for IV abx to optimize management until final plan for surgery and ID has been involved in her previous care-recommend obtaining an ID consult for antibiotic optimization and long term management.           Active Anti-infective Medications                Start     Stop      10/08/18 2000  vancomycin in dextrose  1,000 mg,   Intravenous,   200 mL/hr,   EVERY 24 HOURS     Bone and/or Joint Infection, Sepsis        --    10/08/18 1000  ceFEPIme  2 g,   Intravenous,   EVERY 12 HOURS     Sepsis        --    10/07/18 2000  clindamycin  300 mg,   Oral,   3 TIMES DAILY     Osteomyelitis        --            Discussed with ID Staff Dr. Jing Busch, PharmD, MPH, BCPS AQ-ID ([phone])      Anti-infectives (Future)    Start     Dose/Rate Route Frequency Ordered Stop    10/08/18 2000  vancomycin (VANCOCIN) 1000 mg in dextrose 5% 200 mL PREMIX      1,000 mg  200 mL/hr over 1 Hours Intravenous EVERY 24 HOURS 10/08/18 1147      10/08/18 1000  ceFEPIme (MAXIPIME) 2 g vial to attach to  ml bag for ADULTS or 50 ml bag for PEDS       2 g  over 30 Minutes Intravenous EVERY 12 HOURS 10/08/18 0755      10/07/18 2000  clindamycin (CLEOCIN) capsule 300 mg      300 mg Oral 3 TIMES DAILY 10/07/18 1958            Vitals and other clinical features  Vitals       10/07 0700  -  10/08 0659 10/08 0700  -  10/09 0659 10/09 0700  -  10/09 1002   Most Recent    Temp ( F) 97.1 -  98.2    98 -  100.7    97.6 -  98     97.6 (36.4)    Heart Rate 101 -  109    87 -  110    98 -  103     98    Resp   16    12 -  18      14     14    BP (!) 118/109 -  159/85    103/66 -  165/88    130/67 -  130/74     130/71    SpO2 (%) (!)89 -  100    (!)89 -  100    93 -  94     94            Labs  Estimated Creatinine Clearance: 37.7 mL/min (based on Cr of 1.38).  Recent Labs   Lab Test  10/09/18   0553  10/08/18   0438  10/07/18   1749  08/28/18   1325  08/23/18   1310  08/15/18   1330   CR  1.38*  1.65*  2.12*  1.10*  1.29*  1.31*       Recent Labs   Lab Test  10/09/18   0553  10/08/18   0438  10/07/18   1749  10/05/18   1459  08/28/18   1325  08/23/18   1310  08/14/18   1340  07/28/18   1539   06/12/18   0420   WBC  20.9*  23.4*  24.8*  15.0*  7.4  8.1  5.9  7.5   < >  19.2*   ANEU   --    --   20.5*   --   3.8  3.9  3.1  3.3   --   14.2*   ALYM   --    --   2.8   --   3.1  3.6  2.2  3.4   --   2.7   JAMESON   --    --   1.3   --   0.3  0.4  0.3  0.5   --   1.2   AEOS   --    --   0.1   --   0.2  0.2  0.2  0.3   --   1.0*   HGB  8.5*  8.5*  10.0*  9.6*  10.5*  10.4*  10.2*  12.2   < >  7.2*   HCT  26.0*  26.1*  30.6*  29.2*  32.6*  32.1*  31.3*  37.7   < >  22.1*   MCV  78  79  81  81  83  82  83  85   < >  75*   PLT  348  317  360  324  273  301  243  215   < >  279    < > = values in this interval not displayed.       Recent Labs   Lab Test  08/28/18   1325  08/23/18   1310  08/14/18   1340  07/16/18   0758  06/29/18   0539  06/26/18   0540  06/06/18   2215   BILITOTAL  0.3  0.2  0.2  0.2  0.2   --   0.5   ALKPHOS  263*  283*  354*  1163*  799*   --   233*   ALBUMIN  2.8*  2.8*   2.5*  2.6*  2.0*  1.9*  2.5*   AST  20  24  35  59*  68*   --   Canceled, Test credited   ALT  33  42  46  75*  58*   --   27       Recent Labs   Lab Test  10/07/18   1749  10/05/18   1459  08/28/18   1325  08/23/18   1310  08/14/18   1340  08/08/18   1115  08/02/18   1215  07/30/18   1245  07/16/18   0758   06/15/18   1808   06/10/18   0400   06/06/18   2215   02/15/15   0240   LACT  0.9   --    --    --    --    --    --    --    --    --   1.1   < >   --    < >  1.7   < >   --    CRP   --   292.0*  52.1*  59.1*  76.7*  69.4*  70.9*  34.3*  27.8*   < >   --    < >   --    < >   --    < >   --    SED   --   118*   --    --    --   46*  86*  109*   --    --    --    --    --    --    --    --   132*   PCAL   --    --    --    --    --    --    --    --    --    --    --    --   5.67   --   5.36   < >   --     < > = values in this interval not displayed.     Recent Labs   Lab Test  08/08/18   1115   VANCOMYCIN  20.8       Culture results with specimen source  Culture Micro   Date Value Ref Range Status   10/08/2018 No growth  Final   10/07/2018 No growth after 2 days  Preliminary   10/07/2018 No growth after 2 days  Preliminary   10/05/2018 Heavy growth  Pseudomonas aeruginosa   (A)  Preliminary   10/05/2018 Heavy growth  Escherichia coli   (A)  Preliminary   10/05/2018 (A)  Preliminary    Light growth  Raoultella ornithinolytica/planticola     10/05/2018 Moderate growth  Enterococcus faecalis   (A)  Preliminary   10/05/2018 (A)  Preliminary    Moderate growth  Methicillin resistant Staphylococcus aureus (MRSA)     10/05/2018 (A)  Preliminary    Heavy growth  Corynebacterium striatum  Identification obtained by MALDI-TOF mass spectrometry research use only database. Test   characteristics determined and verified by the [facility]   ([facility]) [place], [place].  Susceptibility testing not routinely done     10/05/2018 (A)  Preliminary    Heavy growth  Streptococcus anginosus  Susceptibility  testing in progress     10/05/2018 (A)  Preliminary    Heavy growth  Prevotella species  Identification obtained by MALDI-TOF mass spectrometry research use only database. Test   characteristics determined and verified by the [facility]   ([facility]) [place], [place].  Susceptibility testing not routinely done     10/05/2018 Culture in progress  Preliminary    Specimen Description   Date Value Ref Range Status   10/08/2018 Midstream Urine  Final   10/07/2018 Blood Right Arm  Final   10/07/2018 Blood Right Arm  Final   10/05/2018 Left Foot Ulcer  Final   10/05/2018 Left Foot Ulcer  Final   06/29/2018 Blood Left Hand  Final        Urine Studies     Recent Labs   Lab Test  10/08/18   0032  06/07/18   0031  02/16/18   2036  06/24/15   1612  02/14/15   1545   URINEPH  5.0  5.5  5.0  5.0  Duplicate request  SEE ACCN [id number]    5.0   NITRITE  Negative  Negative  Negative  Negative  Duplicate request  SEE ACCN [id number]  *  Negative   LEUKEST  Small*  Small*  Large*  Moderate*  Duplicate request  SEE ACCN [id number]  *  Trace*   WBCU  7*  0  60*  2  0       CSF Testing  No lab results found.    Respiratory Virus Testing    Respiratory Virus Source   Date Value Ref Range Status   06/11/2018 Nasopharyngeal  Final     Influenza A   Date Value Ref Range Status   06/11/2018 Negative NEG^Negative Final     Influenza A, H1   Date Value Ref Range Status   06/11/2018 Negative NEG^Negative Final     Influenza A, H3   Date Value Ref Range Status   06/11/2018 Negative NEG^Negative Final     Influenza A 2009 H1N1   Date Value Ref Range Status   06/11/2018 Negative NEG^Negative Final     Influenza B   Date Value Ref Range Status   06/11/2018 Negative NEG^Negative Final     Respiratory Syncytial Virus A   Date Value Ref Range Status   06/11/2018 Negative NEG^Negative Final     Respiratory Syncytial Virus B   Date Value Ref Range Status   06/11/2018 Negative NEG^Negative Final     Parainfluenza Virus 1   Date Value Ref  Range Status   06/11/2018 Negative NEG^Negative Final     Parainfluenza Virus 2   Date Value Ref Range Status   06/11/2018 Negative NEG^Negative Final     Parainfluenza Virus 3   Date Value Ref Range Status   06/11/2018 Negative NEG^Negative Final     Human Metapneumovirus   Date Value Ref Range Status   06/11/2018 Negative NEG^Negative Final     Human Rhinovirus   Date Value Ref Range Status   06/11/2018 Negative NEG^Negative Final     Adenovirus Species B/E   Date Value Ref Range Status   06/11/2018 Negative NEG^Negative Final     Adenovirus Species C   Date Value Ref Range Status   06/11/2018 Negative NEG^Negative Final     Last check of C difficile  C Diff Toxin B PCR   Date Value Ref Range Status   06/14/2018 Negative NEG^Negative Final     Comment:     Negative: Clostridium difficile target DNA sequences NOT detected, presumed   negative for Clostridium difficile toxin B or the number of bacteria present   may be below the limit of detection for the test.  FDA approved assay performed using [employer] GeneXpert real-time PCR.  A negative result does not exclude actual disease due to Clostridium difficile   and may be due to improper collection, handling and storage of the specimen   or the number of organisms in the specimen is below the detection limit of the   assay.         Imaging:  Chest Xr,  Pa & Lat    Result Date: 10/7/2018  XR CHEST 2 VW  10/7/2018 6:10 PM  HISTORY: sob; COMPARISON: 7/29/2018 FINDINGS: PA and lateral views of the chest upright. Left chest wall pacemaker and lead in stable positions. The cardiac silhouette is stable and within normal limits. Are perihilar and bibasilar mixed interstitial and airspace opacities. These do not appear to silhouette the adjacent structures. Calcified right pretracheal lymph nodes are unchanged. No pleural effusion or pneumothorax.     IMPRESSION: Bibasilar mixed interstitial and airspace opacities. Appearance favors pulmonary edema, though infection is a  distinct possibility. I have personally reviewed the examination and initial interpretation and I agree with the findings. JAYANT SALAS MD    X-ray Lt Foot G/e 3 Vws*    Result Date: 10/5/2018  3 views left foot radiographs 10/5/2018 1:50 PM History: ; Diabetic ulcer of left midfoot associated with type 2 diabetes mellitus, with bone involvement without evidence of necrosis (H); Diabetic ulcer of left midfoot associated with type 2 diabetes mellitus, with bone involvement without evidence of necrosis (H) Comparison: None Findings: AP, oblique, and lateral  views of the left foot were obtained. Overlying dressing obscures bony detail. Deep soft tissue defect of the medial plantar aspect of foot adjacent to the first MTP joint. There are erosions at the medial aspect of the base of the first proximal phalanx and likely periosteal reaction and erosion at the medial aspect of the distal first metatarsal. Also, suspected Medial sesamoid erosion. No definite acute fracture or dislocation. Slight fibular deviation of first MTP joint. Lisfranc articulation alignment is congruent on this non-weight bearing images. Tiny calcaneal Achilles enthesopathy.     Impression: Deep soft tissue defect at medial plantar aspect of the foot at the level of the first metatarsophalangeal joint with associated erosive change at base of first proximal phalanx and likely periosteal reaction and erosion at distal first metatarsal and tibial sesamoid. This is highly concerning for osteomyelitis with septic joint. Correlate clinically for open soft tissue defect extending to bone. [Consider Follow Up: Osteomyelitis and septic arthritis.] This report will be copied to the [facility] to ensure a provider acknowledges the finding. I have personally reviewed the examination and initial interpretation and I agree with the findings. CRUZITO ADAMS    Ct Head W/o Contrast    Result Date: 10/8/2018  CT HEAD W/O CONTRAST 10/8/2018  3:19 PM Provided History: Right sided weakness, recent head trauma, evaluate for intracranial bleed/mass; ICD-10: Comparison: CT head 6/10/2018. Technique: Using multidetector thin collimation helical acquisition technique, axial, coronal and sagittal CT images from the skull base to the vertex were obtained without intravenous contrast. Findings:  No intracranial hemorrhage, mass effect, or midline shift. The ventricles are proportionate to the cerebral sulci. The gray to white matter differentiation of the cerebral hemispheres is preserved. The basal cisterns are patent. The visualized paranasal sinuses are clear. The mastoid air cells are clear. Chronic deformity of the right lamina papyracea.     Impression: No acute intracranial pathology. I have personally reviewed the examination and initial interpretation and I agree with the findings. NATHANAEL GUZMAN MD

## 2018-10-09 NOTE — PLAN OF CARE
Problem: Patient Care Overview  Goal: Plan of Care/Patient Progress Review  Outcome: Therapy, progress toward functional goals as expected  Discharge Planner PT   Patient plan for discharge: home with 24 hr assist from mom - pt also lives with brother and daughter who are all adults and available for asssitance  Current status: Initiated and completed PT evaluation. Treatment indicated. IND bed mobility. STS from EOB, CGA A x 1 and CGA-Min A with FWW - cues for hand placement and instructions for transfer with use of a walker (as use of a walker during functional mobility is new). Remained in standing for ~20seconds with CGA. Stand pivot transfer from EOB, CGA A - min A x 1 with use of FWW and cues for walker navigation. Would recommend further use of a walker for transfer and/or use of a slide board for transfers for safety as pt states that her 2 falls were in the bathroom. Pt is onboard with either. IP PT to follow to increase L LE strengthening through functional mobility such as stand pivot transfers and LE exercises.   Barriers to return to prior living situation: medical status, wound care management, pain management  Recommendations for discharge: Anticipate home with 24 hour assistance/supervision from mother, brother and/or daughter  Rationale for recommendations: PT anticipates discharge to home as pt has multiple family members to assist her as needed, and pt expresses and demonstrates that she is close to baseline.        Entered by: Monica Pineda 10/09/2018 11:39 AM

## 2018-10-09 NOTE — PROGRESS NOTES
10/09/18 0819   Quick Adds   Type of Visit Initial PT Evaluation      Language English   Living Environment   Lives With child(kristina), adult;sibling(s);parent(s)   Living Arrangements house   Home Accessibility bed and bath on same level   Number of Stairs to Enter Home 3   Number of Stairs Within Home 12  (to basement, doesn't need to do)   Stair Railings at Home inside, present on left side   Transportation Available family or friend will provide   Living Environment Comment Pt lives with mom, brother, and daughter. She has no concerns with living arrangement. There is a platform/ramp to get into house where she uses her WC to access house. Pt has a shower chair that she IND transfers to/from  and plans to get grab bars within bathroom.    Self-Care   Dominant Hand right   Usual Activity Tolerance good   Current Activity Tolerance fair   Regular Exercise no   Equipment Currently Used at Home none   Activity/Exercise/Self-Care Comment Propels self in WC for leisure strolls on a daily basis.    Functional Level Prior   Ambulation 0-->independent   Transferring 0-->independent   Toileting 0-->independent   Bathing 0-->independent   Dressing 0-->independent   Eating 0-->independent   Communication 0-->understands/communicates without difficulty   Swallowing 0-->swallows foods/liquids without difficulty   Cognition 0 - no cognition issues reported   Fall history within last six months yes   Number of times patient has fallen within last six months 2   Which of the above functional risks had a recent onset or change? transferring   Prior Functional Level Comment Pt was IND or used assistance from mom, brother, and/or daughter for heavier ADLs. Pt IND utilizes WC for home and for community ambulation.    General Information   Onset of Illness/Injury or Date of Surgery - Date 10/07/18   Referring Physician  Raman Lara MD    Patient/Family Goals Statement Discharge home   Pertinent History of Current  Problem (include personal factors and/or comorbidities that impact the POC) 53 year old female  admitted on 10/7/2018. She has a history of diabetes, osteomyelitis, s/p right foot amputation, systolic heart failure, ICD placement and is admitted for recurrent osteomyelitis with evidence for sepsis as well as NIKOLAY.   Weight-Bearing Status - LLE (minimize weight bearing on medial aspect of foot)   Heart Disease Risk Factors Diabetes;Medical history   General Observations Pt completes stand pivot transfer from bed to WC with use of a FWW. Pt's main means of mobility is through self propulsion of WC.    Cognitive Status Examination   Orientation orientation to person, place and time   Level of Consciousness alert   Follows Commands and Answers Questions 100% of the time   Personal Safety and Judgment intact   Memory intact   Cognitive Comment No concerns with cognition   Pain Assessment   Patient Currently in Pain Yes, see Vital Sign flowsheet   Integumentary/Edema   Integumentary/Edema Comments Pt has an open wound on the medial aspect 1 metarsal head, R transtibial amputation, and small open wound on distal/anterior aspect of R stump.    Range of Motion (ROM)   ROM Comment L LEs WFL for functional mobility, R LE amputated distal of knee, R hip flexion WFL   Strength   Strength Comments LEs WFL for functional mobility, R LE amputated distal to knee, R hip flexion 3/5   Bed Mobility   Bed Mobility Comments IND   Transfer Skills   Transfer Comments Min A with use of FWW   Gait   Gait Comments 3 hops/steps demonstrated during transfer   Balance   Balance Comments Sitting balance is good. Standing balance is fair with FWW.   General Therapy Interventions   Planned Therapy Interventions balance training;strengthening;transfer training   Intervention Comments PT to focus on LE strengthening and transfers with use of FWW or possibly use of slide board transfers to minimize L LE weight bearing.   Clinical Impression   Criteria  "for Skilled Therapeutic Intervention yes, treatment indicated   PT Diagnosis Impaired functional mobility   Influenced by the following impairments Decreased LE strength, painful L foot   Functional limitations due to impairments Increased assistance/supervision needed, use of FWW for transfers   Clinical Presentation Stable/Uncomplicated   Clinical Presentation Rationale clinical judgement and chart review   Clinical Decision Making (Complexity) Low complexity   Therapy Frequency` 5 times/week   Predicted Duration of Therapy Intervention (days/wks) Weeks   Anticipated Equipment Needs at Discharge walker;sliding board   Anticipated Discharge Disposition Home with Assist   Risk & Benefits of therapy have been explained Yes   Patient, Family & other staff in agreement with plan of care Yes   Clinical Impression Comments Evaluation completed. Treatment indicated. Pt is appropriate for IP PT.   [facility] AM-PAC TM \"6 Clicks\"   2016, Trustees of [facility], under license to [employer].  All rights reserved.   6 Clicks Short Forms Basic Mobility Inpatient Short Form   [facility] AM-PAC  \"6 Clicks\" V.2 Basic Mobility Inpatient Short Form   1. Turning from your back to your side while in a flat bed without using bedrails? 4 - None   2. Moving from lying on your back to sitting on the side of a flat bed without using bedrails? 4 - None   3. Moving to and from a bed to a chair (including a wheelchair)? 3 - A Little   4. Standing up from a chair using your arms (e.g., wheelchair, or bedside chair)? 3 - A Little   5. To walk in hospital room? 2 - A Lot   6. Climbing 3-5 steps with a railing? 1 - Total   Basic Mobility Raw Score (Score out of 24.Lower scores equate to lower levels of function) 17   Total Evaluation Time   Total Evaluation Time (Minutes) 15     "

## 2018-10-09 NOTE — PROGRESS NOTES
"Orthopaedic Surgery Progress Note   October 9, 2018    Subjective: Still having pain in ulcerated area of left foot. Multiple provider notes indicated lethargy and weakness but head CT complete and negative for pathology.    Objective: /71 (BP Location: Left arm)  Temp 97.6  F (36.4  C) (Oral)  Resp 14  Ht 1.753 m (5' 9\")  Wt 49.5 kg (109 lb 1.6 oz)  SpO2 94%  BMI 16.11 kg/m2    General: NAD, alert and oriented, cooperative with exam. Sitting up in bed; ready to work with PT  Cardio: RRR, extremities wwp.   Respiratory: Non-labored breathing.  MSK: Focused examination of left foot with stable ulceration over medial border of 1st MTP with exposed bone. Purulent drainage on dressing. NO over erythema around the borders of the ulceration.    Labs:   CBC RESULTS:   Recent Labs   Lab Test  10/09/18   0553   WBC  20.9*   RBC  3.32*   HGB  8.5*   HCT  26.0*   MCV  78   MCH  25.6*   MCHC  32.7   RDW  15.5*   PLT  348     Lab Results   Component Value Date    A1C 9.5 06/06/2018    A1C 12.8 02/16/2018    A1C 11.8 06/21/2017    A1C 11.5 11/02/2016    A1C >15.0  Results confirmed by repeat test   07/25/2016     Lab Results   Component Value Date    ALBUMIN 2.8 08/28/2018       Assessment and Plan: Alessandra Pak is a 53 year old female with poorly controlled diabetes with associated peripheral neuropathy and chronic ulceration. S/p right BKA and with ongoing left foot ulcer previously managed by podiatry and now with referral to ortho (Dr. Peralta) for consideration of amputation. Last seen by podiatry on 10/5/18. Admitted to hospital 10/7/18 due to osteomyelitis at base of ulcer and elevated inflammatory markers.    Likely will benefit from amputation (ray vs BKA) in the future, but needs medical optimization. Continue IV abx for acute infection. Recommend wound team to address left foot ulcer in addition to right BKA incision (which they have been seeing- appreciate this). In the interim, I would recommend wet to " dry dressing changes TID. In addition to treating current infection, patient needs improved glucose control (last A1c 6/6/18 9.5) and improved nutrition (alb 2.8). Should also be assess for vascular status. Needs ABIs; if <0.8, vascular surgery should be consulted    Follow up with Dr. Peralta as outpatient for discussion of surgical intervention.    Cheyenne Tinsley, PGY4  [phone]

## 2018-10-09 NOTE — PROGRESS NOTES
Internal Medicine Progress Note    Date of Service (when I saw the patient): 10/09/2018    Assessment & Plan   Alessandra Pak is a 53 year old female  admitted on 10/7/2018. She has a history of diabetes, osteomyelitis, s/p right foot amputation, systolic heart failure, ICD placement and is admitted for recurrent osteomyelitis with evidence for sepsis as well as NIKOLAY.    Today:  Clinically improving from infection stand point.  Right distal arm weakness improved but below baseline.  Neurology consulted.  Pain appears to be adequately controlled.    Osteomyelitis, sepsis - Presents with worsened left toe pain in setting of known left foot ulcer.  Followed by podiatry as an outpatient.  Put on clinda following debridement on 10/5 but now coming in with worsening odor, pain, chills, rigors and altered mental status.  , WBC 24.8 and imaging shows likely osteo at the distal first metatarsal and tibial sesamoid concerning for osteo with septic joint.  On exam bone is visible.  Wound cultures from 10/5 have grown out 7+ organisms including pseudomonas.  WBC trending down.  - Continue  Cefepime, Vanco and clindamycin.  Day 2 of ?.  - Follow blood cultures  - Orthopedics and wound nurse following    NIKOLAY, hypovolemia - BUN 63 and Cr 2.12 (BL appears to be 0.9 - 1.2) in setting or poor oral intake and recent lasix dose increase.  Suspect volume depletion. EF 35-40% on last echo. Creatinine down to 1.38 this morning.  - Continue hydrate as needed  - Hold lasix, lisinopril  - Follow BMPs     Right sided weakness.  New this morning.  Upper extremity more then lower.  Unclear etiology.  Did have a fall several days ago that was primarily on her buttocks but included her head.  CT scan not done in ED.  Head CT negative for bleed or mass negative.   - Follow exam  - Neuro consult  - MRI for stroke rule eval.  - PT/OT    History of DM, blood sugar levels appropriate  - Continue home lantus 8 units QHS  - Continue sliding  scale insulin     Chronic pain, pain adequately controlled  - Continue home methadone.  Normally takes 70 mg daily.  If kidney function worsens substantially will need to consider decreasing dose.  - Decrease home lyrica dose in setting of NIKOLAY     Systolic heart failure - EF 35-40% on last echo.  BNP down from prior values.  S/p AICD placement.  - Hold home lasix, metoprolol in setting of likely sepsis.  Please resume once doing better.      Diet:   Active Diet Order      Combination Diet Regular Diet Adult  DVT Prophylaxis: Pneumatic Compression Devices  Code Status: Full Code    Disposition: Expected discharge in 2-4 days once sepsis is treated and a safe discharge plan is available.      Raman Lara MD  Internal Medicine-Pediatrics Staff  Pager: [phone]    Please see sticky note for cross cover information     ------    Interval History   Feeling somewhat better today with increased energy and improved strength in right arm.  Continues to have chronic pain.    Did not get much sleep last night.  Poor appetite.      Physical Exam     Vitals:    10/08/18 1151 10/08/18 1439 10/09/18 0734   Weight: 50.1 kg (110 lb 8 oz) 50.2 kg (110 lb 11.2 oz) 49.5 kg (109 lb 1.6 oz)     Vital Signs with Ranges  Temp:  [97.6  F (36.4  C)-100.7  F (38.2  C)] 98.9  F (37.2  C)  Heart Rate:  [] 105  Resp:  [12-16] 16  BP: (112-165)/(63-88) 125/79  SpO2:  [89 %-100 %] 94 %  I/O last 3 completed shifts:  In: 525 [P.O.:200; I.V.:325]  Out: 1200 [Urine:1200]    Constitutional: alert and mild distress (pain)   Head: Normocephalic. Atraumatic  Cardiovascular: RRR. No murmurs  Respiratory: Good diaphragmatic excursion. Lungs clear  Abdomen: Abdomen soft, non-tender. BS normal.   Neuro: RUE 4/5 LUE 5/5 (worse distally), Sensation grossly WNL.  Psychiatric: mentation appears normal, anxious (improved from yesterday)    Medications       amitriptyline  50 mg Oral At Bedtime     aspirin  81 mg Oral Daily     ceFEPIme (MAXIPIME) IV  2 g  Intravenous Q12H     clindamycin  300 mg Oral TID     insulin aspart  1-7 Units Subcutaneous TID AC     insulin aspart  1-5 Units Subcutaneous At Bedtime     methadone  75 mg Oral Daily     metoprolol succinate  25 mg Oral Daily     multivitamin, therapeutic with minerals  1 tablet Oral Daily     pregabalin  50 mg Oral TID     [START ON 10/10/2018] ranitidine  150 mg Oral Daily     sodium chloride (PF)  3 mL Intracatheter Q8H     umeclidinium-vilanterol  1 puff Inhalation Daily     vancomycin (VANCOCIN) IV  1,000 mg Intravenous Q24H     Recent Results (from the past 24 hour(s))   CT Head w/o Contrast    Narrative    CT HEAD W/O CONTRAST 10/8/2018 3:19 PM    Provided History: Right sided weakness, recent head trauma, evaluate  for intracranial bleed/mass;   ICD-10:    Comparison: CT head 6/10/2018.    Technique: Using multidetector thin collimation helical acquisition  technique, axial, coronal and sagittal CT images from the skull base  to the vertex were obtained without intravenous contrast.     Findings:    No intracranial hemorrhage, mass effect, or midline shift. The  ventricles are proportionate to the cerebral sulci. The gray to white  matter differentiation of the cerebral hemispheres is preserved. The  basal cisterns are patent.    The visualized paranasal sinuses are clear. The mastoid air cells are  clear. Chronic deformity of the right lamina papyracea.      Impression    Impression: No acute intracranial pathology.    I have personally reviewed the examination and initial interpretation  and I agree with the findings.    NATHANAEL GUZMAN MD     Data     Nursing notes reviewed.  Patient seen with bedside nurse.     I have reviewed today's Medications, Vital Signs, Labs and Images.

## 2018-10-09 NOTE — PLAN OF CARE
Problem: Patient Care Overview  Goal: Plan of Care/Patient Progress Review  Outcome: No Change    Patient's neuro status appears to be better than yesterday.  Patient has strong bilateral hand  strength, however, patient has to use her left hand to open up her right fist (can't keep fingers not in a fist voluntarily).  Neuro consult order but not completed yet.  Patient was in quite a bit of pain this morning, but was relieved with scheduled Methadone & Lyrica.  Dressing on the wound on patient's stump to be changed every other day (last done yesterday 10/08/18).  Ulcer on left foot to be changed 3 times/day, last done at beginning of this shift by Orthopedic MD.  Sepsis protocol triggered this morning; Lactic Acid was 0.7.

## 2018-10-09 NOTE — PROGRESS NOTES
10/09/18 1457   Quick Adds   Type of Visit Initial Occupational Therapy Evaluation   Living Environment   Lives With child(kristina), adult;sibling(s);parent(s)   Living Arrangements house   Home Accessibility bed and bath on same level   Number of Stairs to Enter Home 3   Number of Stairs Within Home 12  ((to basement, does not need to complete))   Stair Railings at Home inside, present on left side   Transportation Available family or friend will provide   Living Environment Comment Pt living in a duplex with mother, daughter , brother and grandson. House accessible by wheelchair via ramp/platform. Patient utilizes shower chair while in the bathroom and plans to have her landlord install grab bars. Walk-in shower present.    Self-Care   Dominant Hand right   Usual Activity Tolerance good   Current Activity Tolerance good   Regular Exercise no   Equipment Currently Used at Home shower chair;wheelchair, manual   Activity/Exercise/Self-Care Comment Patient completing SPT to wheelchair from bed, toilet, and shower. Able to self-propel manual wc for leisure/community tasks.    Functional Level Prior   Ambulation 0-->independent   Transferring 0-->independent   Toileting 0-->independent   Bathing 1-->assistive equipment   Dressing 0-->independent   Eating 0-->independent   Communication 0-->understands/communicates without difficulty   Swallowing 0-->swallows foods/liquids without difficulty   Cognition 0 - no cognition issues reported   Fall history within last six months yes   Number of times patient has fallen within last six months 2   Which of the above functional risks had a recent onset or change? transferring;toileting;bathing;dressing;eating   Prior Functional Level Comment Patient previously independent/MI with ADL tasks. Participates in some light household tasks as able; family able to assist as needed. Manual wc utilized for functional mobility within the house and community.     General Information   Onset of  "Illness/Injury or Date of Surgery - Date 10/07/18   Referring Physician Raman Lara MD   Patient/Family Goals Statement To regain functional use of RUE and return to PLOF.    Additional Occupational Profile Info/Pertinent History of Current Problem Per chart: \"Alessandra Pak is a 53 year old female  admitted on 10/7/2018. She has a history of diabetes, osteomyelitis, s/p right foot amputation, systolic heart failure, ICD placement and is admitted for recurrent osteomyelitis with evidence for sepsis as well as NIKOLAY.\"   Precautions/Limitations other (see comments)  (RUE weakness. Minimal WB on medial aspect of LLE. )   Weight-Bearing Status - LLE other (see comments)  (Minimize weight on medial foot)   General Observations Pt greeted while seated in manual wheelchair. Family present for majority of session.    General Info Comments Ambulate with assist. Minimize weight on medial foot.    Cognitive Status Examination   Orientation orientation to person, place and time   Level of Consciousness alert   Able to Follow Commands WNL/WFL   Personal Safety (Cognitive) WNL/WFL   Cognitive Comment Patient inconsistent historian with medical notes (denying hitting head during any falls despite medical notes indicating otherwise).     Visual Perception   Visual Perception Wears glasses   Integumentary/Edema   Integumentary/Edema no deficits were identifed   Posture   Posture not impaired   Range of Motion (ROM)   ROM Quick Adds Wrist, Right;Hand (general)   ROM Comment Patient unable to actively extend R wrist/digits or abduct digits. PROM WFL. Hand in flexed position at rest. Patient able to complete self-ROM utilizing LUE.    Strength   Strength Comments Proximal strength WFL. Increased weakness distally, especially with R  strength as compared to L.    Transfer Skill: Bed to Chair/Chair to Bed   Level of [place]: Bed to Chair minimum assist (75% patients effort)   Lower Body Dressing   Level of [place]: Dress " Lower Body stand-by assist   Instrumental Activities of Daily Living (IADL)   Previous Responsibilities housekeeping;medication management   IADL Comments Patient having A from multiple family members for IADL tasks.    Activities of Daily Living Analysis   Impairments Contributing to Impaired Activities of Daily Living balance impaired;pain;strength decreased;motor control impaired;cognition impaired   General Therapy Interventions   Planned Therapy Interventions ADL retraining;cognition;neuromuscular re-education;orthotic fitting/training;ROM;strengthening;transfer training;bed mobility training   Clinical Impression   Criteria for Skilled Therapeutic Interventions Met yes, treatment indicated   OT Diagnosis Decreased ADL and functional transfer independence and safety.   Influenced by the following impairments RUE weakness/wrist drop, wound on L foot, balance, generalized strength, possible mild cognitive deficits   Assessment of Occupational Performance 3-5 Performance Deficits   Identified Performance Deficits Toileting, Bathing, Grooming/Hygiene, Dressing, Feeding with Dominant Hand    Clinical Decision Making (Complexity) Low complexity   Therapy Frequency 5 times/wk   Predicted Duration of Therapy Intervention (days/wks) 2 weeks   Anticipated Equipment Needs at Discharge other (see comments)  (TBD)   Anticipated Discharge Disposition Home with Assist   Risks and Benefits of Treatment have been explained. Yes   Patient, Family & other staff in agreement with plan of care Yes   Clinical Impression Comments Pt appropriate for skilled OT services.    Total Evaluation Time   Total Evaluation Time (Minutes) 10

## 2018-10-09 NOTE — PLAN OF CARE
Problem: Patient Care Overview  Goal: Plan of Care/Patient Progress Review  OT/7D:     Discharge Planner OT   Patient plan for discharge: Home with assist from family.   Current status: Evaluation completed and treatment initiated. Patient unable to actively R extend wrist/digits during assessment, reverting to flexed positioning. Educated patient on neutral positioning of wrist when at rest and gentle self ROM. Patient reporting to have experienced similar R hand/digit weakness in the past and found brace to help. Plan for wrist cock-up splint to be created during next session for maximized functional use. Patient reporting to have had 2 falls when transferring into shower at home. Educated on benefits of sliding board transfers to increase safety. Reports familiarity with sliding board transfers during previous hospitalizations. Patient transferring bed <> wheelchair with CGA-Min A for safety and Max A for sliding board placement. Actively attempting to utilize R hand during transfer without prompting.   Barriers to return to prior living situation: Medical Status, Wounds, Pain, RUE weakness  Recommendations for discharge: Anticipate patient to return home with 24 hour assistance from family as needed. Would likely benefit from OP OT services to address RUE weakness, pending response while IP.    Rationale for recommendations: Due to  RUE weakness and current ADL/mobility status.        Entered by: Ruchi eLon 10/09/2018 4:19 PM

## 2018-10-09 NOTE — PROGRESS NOTES
Paged by Monica from PT regarding incisional wound on right stump. Was unable to call back pager number and patient was no longer on the unit (charge nurse kind enough to look for her). I would recommend keeping a dry dressing (such as gauze and medipore tape or island dressing) on right stump wound. Continue to follow [facility] instructions for care for that wound.    Cheyenne Tinsley  [phone]

## 2018-10-09 NOTE — CONSULTS
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 Patient had been following with podiatry, scheduled to see Dr. Peralta next week for discussion about ray amputation. Wound culture showed polymicrobial infection notable for Pseudomonas and MRSA. Currently being treated with Clindamycin, cefepime, and vancomycin. Initially treated with Zosyn, discontinued due to elevated creatinine. She is currently afebrile and hemodynamically stable, with down-trending WBC count. She would benefit from surgical intervention, improved glycemic control and smoking cessation.     RECOMMENDATIONS:    Non-healing foot ulcer, Osteomyelitis, sepsis  -Continue vancomycin and cefepime (agree with holding zosyn given NIKOLAY)  -Switch clindamycin to metranidazole 500 mg q8h  -Ultimately would benefit from surgical intervention  -If amputation is performed, please send specimen to pathology to evaluate resection margin, as that will help guide duration of antimicrobial therapy  -We discussed the importance of improved glycemic control and smoking cessation for wound healing. Patient expressed concern that she doesn't understand \"how she is messing up\" with her diabetes control, would likely benefit from diabetes education.     Patient was seen and discussed with the ID Attending, Dr. Brandyn Horn MD  Medicine-Pediatrics MP-1  [facility]  Pager: [phone]    History of Present Illness:     THis is a 53 year old woman with a history of CHF with ICD " placement, HTN, and diabetes s/p right BKA on 6/28/18 for osteomyelitis. Alessandra noticed an ulcer on her left first metatarsal in May or June of this year. It has continued to progress. It started out as a blister and then continued to worsen. Over the past two weeks, her ulcer has been starting to deepen, and has now become malodorous. She has been following with orthopedics and podiatry for wound care for her right BKA, and they have been following this ulcer as well. At her last visit, on 10/5, there was exposed bone in the ulcer site with clear visualization of the metatarsal head. She was started on clindamycin and scheduled with Dr. Peralta for discussion of first ray amputation. A wound culture was obtained which has now grown mixed anaerobes and aerobes, notable for Pseudomonas aeruginosa and MRSA. CRP obtained at the visit was 292.     On 10/7 she presented to the ED here at [facility] due to increased pain in her left foot, worsening odor, chills rigors and altered mental status. She had a WBC count of 24.8, showing signs of sepsis. She was started on Vancomycin and Zosyn. She was found to have a serum creatinine of 2.12, and her zosyn was replaced with cefepime. She spiked a fever to 100.7 on the night of 10/8, as she was transitioning to cefepime, but has since been afibrile. He clinical status has improved as well. She remains tachycardic, but has a down-trending WBC count and creatinine, and no longer appears altered.       Review of Systems:   A complete 10 point review of systems was performed, and is as noted in the history above and otherwise negative.    Past Medical History:     Past Medical History:   Diagnosis Date     Abdominal pain, right upper quadrant     sees Dr Mcclellan pain clinic at [facility]     AICD (automatic cardioverter/defibrillator) present      ASCUS with positive high risk HPV 8/2013    + HPV 33, Bruno - GAVIN I, ECC- atypia     Cervical high risk HPV (human papillomavirus) test positive 7/8/15,  7/25/16    NIL pap/+ HR HPV (not 16 or 18).      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    leep     Depressive disorder      Gastro-oesophageal reflux disease      Hypertension      Profound impairment, one eye, impairment level not further specified     rt eye due to childhood injury     Systolic CHF (H) 3/12/2015     Type 2 diabetes mellitus without complications (H)      Uncomplicated asthma        Past Infectious Disease History:    Past Surgical History:     Past Surgical History:   Procedure Laterality Date     AMPUTATE LEG BELOW KNEE Right 6/28/2018    Procedure: AMPUTATE LEG BELOW KNEE;  Right Knee Ampuation Below Knee, Anesthesia Block;  Surgeon: Ambrose King MD;  Location: [facility] OR     C NONSPECIFIC PROCEDURE  2001    cholecystectomy     C NONSPECIFIC PROCEDURE  as a child    tonsillectomy     C NONSPECIFIC PROCEDURE  2001    whipple procedure     CARDIAC SURGERY      defib     COLPOSCOPY,LOOP ELECTRD CERVIX EXCIS  2002, 2011    stage 2 dysplasia     ENDOBRONCHIAL ULTRASOUND FLEXIBLE N/A 2/19/2015    Procedure: ENDOBRONCHIAL ULTRASOUND FLEXIBLE;  Surgeon: Brenden Tamez MD;  Location: [facility] GI     LEEP TX, CERVICAL  2014    LEEP TX Cervical     RECESSION RESECTION WITH ADJUSTABLE SUTURE  12/13/2011    Procedure:RECESSION RESECTION WITH ADJUSTABLE SUTURE; Right Strabismus Repair with Adjustable Suture       TUBAL LIGATION  2007    essure        Family History:     Family History   Problem Relation Age of Onset     Diabetes Mother      diet controled     Hypertension Mother      Arthritis Mother      Lipids Mother      Diabetes Father      Hypertension Father      GASTROINTESTINAL DISEASE Father      gallbladder removed     Bipolar Disorder Brother      Thyroid Disease Brother      Obesity Other      Son     Respiratory Other      Son and Daughter; asthma     Depression Maternal Aunt      Anxiety Disorder Maternal Aunt        Social History:     Social History   Substance Use Topics      Smoking status: Former Smoker     Packs/day: 0.30     Years: 15.00     Types: Cigarettes     Smokeless tobacco: Former User     Quit date: 10/29/2014      Comment: Started smoking in 89/ smokes about 3 per day     Alcohol use No     History   Sexual Activity     Sexual activity: Not Currently     Partners: Male     Birth control/ protection: IUD     Comment: tubes tide       Current Medications (antimicrobials listed in bold):     Antimicrobials:  Cefepime  Clindamycin  Vancomycin    amitriptyline  50 mg Oral At Bedtime     aspirin  81 mg Oral Daily     ceFEPIme (MAXIPIME) IV  2 g Intravenous Q12H     clindamycin  300 mg Oral TID     insulin aspart  1-7 Units Subcutaneous TID AC     insulin aspart  1-5 Units Subcutaneous At Bedtime     methadone  75 mg Oral Daily     metoprolol succinate  25 mg Oral Daily     multivitamin, therapeutic with minerals  1 tablet Oral Daily     pregabalin  50 mg Oral TID     [START ON 10/10/2018] ranitidine  150 mg Oral Daily     sodium chloride (PF)  3 mL Intracatheter Q8H     umeclidinium-vilanterol  1 puff Inhalation Daily     vancomycin (VANCOCIN) IV  1,000 mg Intravenous Q24H       Allergies:     Allergies   Allergen Reactions     No Clinical Screening - See Comments      Swelling in the throat.       Physical Examination:   Vitals were reviewed    Ranges for vital signs:  Temp:  [97.6  F (36.4  C)-100.7  F (38.2  C)] 99.4  F (37.4  C)  Heart Rate:  [] 106  Resp:  [12-16] 16  BP: (112-165)/(63-88) 161/77  SpO2:  [92 %-100 %] 94 %    GENERAL:  Sitting in wheel chair, no apparent distress  HEENT:  head is normocephalic, atraumatic, eyes non-icteric, moist mucous membranes, oropharynx clear   NECK:  supple, no cervical lymphadenopathy  LUNGS:  clear to auscultation bilaterally, no increased work of breathing   CARDIOVASCULAR: S3 gallop with systolic murmur best heard at the left sternal border  ABDOMEN:  normoactive bowel sounds, soft, nontender, nondistended, no appreciable  masses or hepatosplenomegaly  NEUROLOGIC: decreased sensation in left foot  EXTREMITIES: Right BKA with small ulcer at incision site, with granulation tissue. Site had a small amount of fresh blood when the patient moved her leg, no surrounding erythema or purulent drainage. Ulcer at left first metatarsal head with exposed tendon and bone, area of fluctuance on the superior aspect of the first toe. No pain, no drainage  SKIN:  no rashes      Laboratory Data:     Microbiology:         Culture Micro   Date Value Ref Range Status   10/08/2018 No growth  Final   10/07/2018 No growth after 2 days  Preliminary   10/07/2018 No growth after 2 days  Preliminary   10/05/2018 Heavy growth  Pseudomonas aeruginosa   (A)  Final   10/05/2018 Heavy growth  Escherichia coli   (A)  Final   10/05/2018 (A)  Final    Light growth  Raoultella ornithinolytica/planticola     10/05/2018 Moderate growth  Enterococcus faecalis   (A)  Final   10/05/2018 (A)  Final    Moderate growth  Methicillin resistant Staphylococcus aureus (MRSA)     10/05/2018 (A)  Final    Heavy growth  Corynebacterium striatum  Identification obtained by MALDI-TOF mass spectrometry research use only database. Test   characteristics determined and verified by the [facility] Diagnostic Laboratory   ([facility]) [place], [place].  Susceptibility testing not routinely done     10/05/2018 Heavy growth  Streptococcus anginosus   (A)  Final   10/05/2018 (A)  Preliminary    Heavy growth  Prevotella species  Identification obtained by MALDI-TOF mass spectrometry research use only database. Test   characteristics determined and verified by the [facility] Diagnostic Laboratory   ([facility]) [place], [place].  Susceptibility testing not routinely done     10/05/2018 (A)  Preliminary    Heavy growth  Fusobacterium nucleatum  Susceptibility testing not routinely done     10/05/2018 (A)  Preliminary    Plus  Heavy growth  Mixed aerobic and anaerobic jim     10/05/2018 Culture  in progress  Preliminary   06/29/2018 No growth  Final   06/29/2018 No growth  Final   06/28/2018 No anaerobes isolated  Final   06/28/2018 No growth  Final   06/28/2018 No anaerobes isolated  Final   06/28/2018 (A)  Final    Light growth  Gram positive cocci  Organism failed to thrive for identification and suceptibility testing     06/13/2018 No growth  Final   06/13/2018 No growth  Final   06/11/2018 Light growth  Candida glabrata   (A)  Final   06/11/2018 Susceptibility testing not routinely done  Final   06/10/2018 No growth  Final   06/10/2018 No growth  Final   06/09/2018 No growth  Final   06/09/2018 Moderate growth  Enterococcus faecalis   (A)  Final   06/09/2018 (A)  Final    Light growth  Streptococcus mitis group  Susceptibility testing not routinely done     06/07/2018 No growth  Final   06/06/2018 No growth  Final   06/06/2018 No growth  Final   02/19/2018 No growth  Final   02/19/2018 No growth  Final   02/19/2018   Final    Canceled, Test credited  Incorrectly ordered by PCU/Clinic  Test reordered as correct code  Tissue culture     02/19/2018   Final    No anaerobes isolated  Since this specimen was not transported in the proper anaerobic transport media, the   absence of anaerobes in this culture does not rule out the presence of anaerobes in this   specimen.     02/19/2018 (A)  Final    Light growth  Methicillin resistant Staphylococcus aureus (MRSA)     02/19/2018 (A)  Final    Light growth  Coagulase negative Staphylococcus  Susceptibility testing not routinely done  This organism is part of normal jim, but on occasion, may be a true pathogen. Clinical   correlation must be applied to interpreting this microbiology result.     02/19/2018   Final    Critical Value/Significant Value called to and read back by  NESHA HORTON RN ([id number]).  02.21.18 2206 GJS     02/18/2018 No growth  Final   02/18/2018 No growth  Final   02/16/2018   Final    <10,000 colonies/mL  mixed urogenital jim  Susceptibility  testing not routinely done     02/16/2018 No growth  Final   02/16/2018 No growth  Final   04/06/2016 (A)  Final    Heavy growth Staphylococcus aureus  Heavy growth Beta hemolytic Streptococcus group B This isolate DOES NOT   demonstrate inducible clindamycin resistance in vitro. Clindamycin is   susceptible and could be used when indicated, however, erythromycin is   resistant and should not be used.     06/24/2015   Final    <10,000 colonies/mL mixed urogenital jim Susceptibility testing not routinely   done     02/19/2015   Final    Culture negative for acid fast bacilli  Assayed at [facility] [id number]     02/19/2015 (A)  Final    Normal respiratory jim  Light growth Candida albicans / dubliniensis Candida albicans and Candida   dubliniensis are not routinely speciated Susceptibility testing not routinely   done     02/19/2015 (A)  Final    Candida tropicalis isolated  Candida glabrata isolated  No additional fungi cultured after 4 weeks incubation     02/19/2015 No growth after 4 weeks  Final   02/16/2015 (A)  Final    Canceled, Test credited  >10 Squamous epithelial cells/low power field indicates oral contamination.   Please recollect.  Called to Cari on 4E, 2/16/15 14:10 CWi     02/15/2015 No growth  Final   02/15/2015 No growth  Final   02/13/2015 No growth  Final   02/13/2015 No growth  Final   10/31/2014 No growth  Final   10/29/2014   Final    Culture negative for acid fast bacilli  Assayed at [facility] [address]     10/29/2014 No growth  Final   10/29/2014 Culture negative after 4 weeks  Final   10/29/2014 No growth after 4 weeks  Final   10/29/2014 (A)  Final    Light growth Staphylococcus aureus  Light growth Cristela albicans / dubliniensis Candida albicans and Candida   dubliniensis are not routinely speciated     10/28/2014 No growth  Final   10/28/2014 No growth  Final   11/23/2009   Final    <10,000 colonies/mL Beta hemolytic Streptococcus  group B     Comment:     10 to 50,000 colonies/mL Mixed gram positive jim Multiple species present,   probable perineal contamination.  Clindamycin and Erythromycin are not routinely prescribed for isolates from   the urinary tract.   03/04/2005   Final    <10,000 colonies/mL Staphylococcus species Susceptibility testing not routinely     Comment:      done           Other Laboratory Data:    Urine Studies    Recent Labs   Lab Test  10/08/18   0032  06/07/18   0031  02/16/18   2036  06/24/15   1612  02/14/15   1545   LEUKEST  Small*  Small*  Large*  Moderate*  Duplicate request  SEE ACCN [id number]  *  Trace*   WBCU  7*  0  60*  2  0       Inflammatory Markers    Recent Labs   Lab Test  10/05/18   1459  08/28/18   1325  08/23/18   1310  08/14/18   1340  08/08/18   1115  08/02/18   1215  07/30/18   1245  07/16/18   0758   02/15/15   0240   SED  118*   --    --    --   46*  86*  109*   --    --   132*   CRP  292.0*  52.1*  59.1*  76.7*  69.4*  70.9*  34.3*  27.8*   < >   --     < > = values in this interval not displayed.       Hematology Studies    Recent Labs   Lab Test  10/09/18   0553  10/08/18   0438  10/07/18   1749  10/05/18   1459  08/28/18   1325  08/23/18   1310  08/14/18   1340  07/28/18   1539   06/12/18   0420   WBC  20.9*  23.4*  24.8*  15.0*  7.4  8.1  5.9  7.5   < >  19.2*   ANEU   --    --   20.5*   --   3.8  3.9  3.1  3.3   --   14.2*   AEOS   --    --   0.1   --   0.2  0.2  0.2  0.3   --   1.0*   HGB  8.5*  8.5*  10.0*  9.6*  10.5*  10.4*  10.2*  12.2   < >  7.2*   MCV  78  79  81  81  83  82  83  85   < >  75*   PLT  348  317  360  324  273  301  243  215   < >  279    < > = values in this interval not displayed.       Immune Globulin Studies    Recent Labs   Lab Test  02/21/15   0652  02/15/15   1520   IGG  1330   --    IGE   --   46       Metabolic Studies     Recent Labs   Lab Test  10/09/18   0553  10/08/18   0438  10/07/18   1749  08/28/18   1325  08/23/18   1310   NA  141  136  133  139  139    POTASSIUM  4.2  4.3  4.9  5.0  4.6   CHLORIDE  112*  108  104  106  104   CO2  19*  19*  21  25  27   BUN  35*  51*  63*  33*  34*   CR  1.38*  1.65*  2.12*  1.10*  1.29*   GFRESTIMATED  40*  33*  25*  52*  44*       Hepatic Studies    Recent Labs   Lab Test  08/28/18   1325  08/23/18   1310  08/14/18   1340  07/16/18   0758  06/29/18   0539  06/26/18   0540  06/06/18   2215   BILITOTAL  0.3  0.2  0.2  0.2  0.2   --   0.5   ALKPHOS  263*  283*  354*  1163*  799*   --   233*   ALBUMIN  2.8*  2.8*  2.5*  2.6*  2.0*  1.9*  2.5*   AST  20  24  35  59*  68*   --   Canceled, Test credited   ALT  33  42  46  75*  58*   --   27       Thyroid Studies    Recent Labs   Lab Test  01/13/17   1353  11/02/16   1359   TSH  0.32*  0.34*   T4  1.06  1.02       Vancomycin Levels    Recent Labs   Lab Test  08/08/18   1115  08/02/18   1215  07/30/18   1245   VANCOMYCIN  20.8  16.4  16.1       Serostatus:  No results found for: CMVG, CMIGG, CMIG, CMIM, CMVIGM, CMVM, CMLTX, HSVG1, HSVG2, HSVTP1, VH8506, HS12M, HS12GR, HS1GR, HS2GR, HERPES, HSIM, HSIG, HSIGR, HSVIGMAB, HSVG1, VARICZOSAB, EBVIGG, EBIGG, EBVAGN, GT5885  No results found for: EBIG2, EBIGM, TOXG  No lab results found.    Invalid input(s): HSV12, VZVG, SCE615    Toxoplasma Testing  No lab results found.    Invalid input(s): TOXPL, TOXABM, TOXPCR    Virology:  CMV viral loads    Recent Labs   Lab Test  02/19/15   1140  10/29/14   1500   CSPEC  Bronchoalveolar Lavage   Right   Middle  LOBE    Bronchoalveolar Lavage   CMQNT   --   <100     CMV Quantitative   Date Value Ref Range Status   10/29/2014 <100 <100 Copies/mL Final     EBV viral loads   No lab results found.  No results found for: EBVDN, EBRES, EBVDN, EBVSP, EBVPC, EBVPCR  BK viral loads No lab results found.    Invalid input(s): BKDN, BKVPC, BKVPCR  HSV testing  No lab results found.    Hepatitis B Testing   Recent Labs   Lab Test  10/30/14   0445  08/06/13   1352   HBCAB  Negative   --    HEPBANG   --   Negative      Hepatitis C Testing     Hepatitis C Antibody   Date Value Ref Range Status   10/30/2014 Negative NEG Final   08/06/2013 Negative NEG Final     Respiratory Virus Testing    No results found for: RS, FLUAG

## 2018-10-10 NOTE — PLAN OF CARE
Problem: Patient Care Overview  Goal: Plan of Care/Patient Progress Review  Arouses to voice. AVSS. Denies pain at rest. Requested pain medication before dressing change. Oxycodone 5 mg given. Up to bedside commode with assist of 2. Voided 600 mL. Has been asleep most of the night.

## 2018-10-10 NOTE — PROGRESS NOTES
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 She continues to show signs of sepsis, with increase in her WBC count, will require surgical intervention for source control.      RECOMMENDATIONS:     Non-healing foot ulcer, Osteomyelitis, sepsis  -Continue vancomycin, cefepime, metronidazole.   -Surgical intervention will be required for source control  -If amputation is performed, please send specimen to pathology to evaluate resection margin, as that will help guide duration of antimicrobial therapy    Diabetes  -Would benefit from diabetes education and endocrine consult for better glycemic control    NIKOLAY: improving  -continue antibiotics  -follow Creatinine    JLon Horn MD  Medicine-Pediatrics MP-[id number]  [facility]  Pager: [phone]           Interval History:   There were no overnight events. Blood cultures continue to be negative. She continues to be afebrile. Denies fever, chills, night sweats, GI upset, or diarrhea. There is no worsening of pain in her lower extremities. Neurology saw her and recommended MRI due to her right sided weakness and slight facial asymmetry. Most likely due to radial nerve palsy from compression with baseline facial asymmetry.          Review of Systems:   CONSTITUTIONAL:  No fevers or chills  EYES: negative for icterus  ENT:  negative for oral lesions, hearing loss, tinnitus and sore throat  RESPIRATORY:  negative for cough and dyspnea  CARDIOVASCULAR:  negative for chest pain, palpitations  GASTROINTESTINAL:  negative for nausea, vomiting, diarrhea.  GENITOURINARY:  negative for dysuria  HEME:  No easy bruising/bleeding  INTEGUMENT:  See  HPI  NEURO:  Negative for headache         Current Antimicrobials   Vancomycin  Cefepime  Metronidazole         Physical Exam:   Ranges for vital signs:  Temp:  [97.1  F (36.2  C)-99.5  F (37.5  C)] 98.9  F (37.2  C)  Pulse:  [100] 100  Heart Rate:  [100-106] 100  Resp:  [16-20] 20  BP: (102-161)/(63-79) 124/70  SpO2:  [90 %-98 %] 97 %    Intake/Output Summary (Last 24 hours) at 10/10/18 0918  Last data filed at 10/10/18 0659   Gross per 24 hour   Intake              675 ml   Output             1225 ml   Net             -550 ml     Exam:  GENERAL: Thin, sitting in bed in no acute distress, appears somnolent, falling asleep during the interview. Her nurse reports she was recently given lyrica and methadone, which made her more tired.   ENT:  Head is normocephalic, atraumatic. Oropharynx is moist without exudates or ulcers.  EYES:  Eyes have anicteric sclerae. EOMI  NECK:  Supple.  LUNGS:  Clear to auscultation.  CARDIOVASCULAR:  2/6 systolic murmur at the left sternal border, with S3 gallop  ABDOMEN:  Normal bowel sounds, soft, nontender.  EXT: Extremities warm and without edema.  SKIN:  No acute rashes.  Line is in place without any surrounding erythema.  NEUROLOGIC:  Unable to move right hand at the wrist, cranial nerves intact         Laboratory Data:     Creatinine   Date Value Ref Range Status   10/10/2018 1.28 (H) 0.52 - 1.04 mg/dL Final   10/09/2018 1.38 (H) 0.52 - 1.04 mg/dL Final   10/08/2018 1.65 (H) 0.52 - 1.04 mg/dL Final   10/07/2018 2.12 (H) 0.52 - 1.04 mg/dL Final   08/28/2018 1.10 (H) 0.52 - 1.04 mg/dL Final     WBC   Date Value Ref Range Status   10/10/2018 24.9 (H) 4.0 - 11.0 10e9/L Final   10/09/2018 20.9 (H) 4.0 - 11.0 10e9/L Final   10/08/2018 23.4 (H) 4.0 - 11.0 10e9/L Final   10/07/2018 24.8 (H) 4.0 - 11.0 10e9/L Final   10/05/2018 15.0 (H) 4.0 - 11.0 10e9/L Final     Hemoglobin   Date Value Ref Range Status   10/10/2018 8.8 (L) 11.7 - 15.7 g/dL Final     Platelet Count   Date Value Ref Range  Status   10/10/2018 361 150 - 450 10e9/L Final     Sed Rate   Date Value Ref Range Status   10/05/2018 118 (H) 0 - 30 mm/h Final   08/08/2018 46 (H) 0 - 30 mm/h Final   08/02/2018 86 (H) 0 - 30 mm/h Final   07/30/2018 109 (H) 0 - 30 mm/h Final   02/15/2015 132 (H) 0 - 20 mm/h Final     CRP Inflammation   Date Value Ref Range Status   10/05/2018 292.0 (H) 0.0 - 8.0 mg/L Final   08/28/2018 52.1 (H) 0.0 - 8.0 mg/L Final   08/23/2018 59.1 (H) 0.0 - 8.0 mg/L Final   08/14/2018 76.7 (H) 0.0 - 8.0 mg/L Final   08/08/2018 69.4 (H) 0.0 - 8.0 mg/L Final     AST   Date Value Ref Range Status   10/10/2018 12 0 - 45 U/L Final   08/28/2018 20 0 - 45 U/L Final   08/23/2018 24 0 - 45 U/L Final   08/14/2018 35 0 - 45 U/L Final   07/16/2018 59 (H) 0 - 45 U/L Final     ALT   Date Value Ref Range Status   10/10/2018 20 0 - 50 U/L Final   08/28/2018 33 0 - 50 U/L Final   08/23/2018 42 0 - 50 U/L Final   08/14/2018 46 0 - 50 U/L Final   07/16/2018 75 (H) 0 - 50 U/L Final     Bilirubin Total   Date Value Ref Range Status   10/10/2018 0.3 0.2 - 1.3 mg/dL Final   08/28/2018 0.3 0.2 - 1.3 mg/dL Final   08/23/2018 0.2 0.2 - 1.3 mg/dL Final   08/14/2018 0.2 0.2 - 1.3 mg/dL Final   07/16/2018 0.2 0.2 - 1.3 mg/dL Final     Lab Results   Component Value Date     10/10/2018    BUN 32 (H) 10/10/2018    CO2 21 10/10/2018       Culture data:  Blood culture 10/7: no growth to date    \Wound culture 10/5:  Heavy growth   Pseudomonas aeruginosa    (A)   Heavy growth   Escherichia coli    (A)   Light growth   Raoultella ornithinolytica/planticola    (A)   Moderate growth   Enterococcus faecalis    (A)   Moderate growth   Methicillin resistant Staphylococcus aureus (MRSA)    (A)   Heavy growth   Corynebacterium striatum   Identification obtained by MALDI-TOF mass spectrometry research use only database. Test   characteristics determined and verified by the Infectious Diseases Diagnostic Laboratory   ([facility]) [place], [place].   Susceptibility testing  not routinely done    (A)   Heavy growth   Streptococcus anginosus    (A)     Heavy growth   Prevotella species   Identification obtained by MALDI-TOF mass spectrometry research use only database. Test   characteristics determined and verified by the Infectious Diseases Diagnostic Laboratory   ([facility]) [place], [place].   Beta lactamase positive   Susceptibility testing not routinely done    (A)      Heavy growth   Fusobacterium nucleatum   Susceptibility testing not routinely done    (A)   Plus   Heavy growth   Mixed aerobic and anaerobic jim    (A)

## 2018-10-10 NOTE — PLAN OF CARE
Problem: Patient Care Overview  Goal: Plan of Care/Patient Progress Review  Outcome: No Change    Patient continues to have no appetite.  Dietary tech is helping patient place orders for each meal, but once delivered, patient does not eat.  Patient a bit sleepier today, but did work with OT to get splint for right wrist.  Continue with Cefepime.  A1C is 9.1 with lab checks today.  Patient napping in between cares.  Left foot ulcer & right stump dressings not changed this shift because patient wants to shower first.  Oxycodone 5mg given x1 for right stump pain.

## 2018-10-10 NOTE — PROGRESS NOTES
[facility], [place]    Internal Medicine Progress Note - Gold Service      Assessment & Plan   Alessandra Pak is a 53 year old female admitted on 10/7/2018. She has a history of poorly controlled diabetes, osteomyelitis s/p amputation of R foot in June 2018, systolic HF, s/p ICD placement and is admitted for recurrent osteomyelitis with evidence of sepsis.    # Sepsis secondary to Osteomyelitis  # Peripheral vascular disease  # Poorly controlled diabetes    Presented with worsening left toe pain.  Has known left foot ulcer.  Followed by podiatry as outpatient.  Had been on Clinda as outpatient after debridement on 10/5 but came to the hospital with worsening pain, chills, rigors and altered mental status.  Noted to have increased inflammatory markers and leukocytosis.  Imaging showing likely osteomyelitis distal first metatarsal and tibial sesamoid  concerning for osteoid septic joint.  Bone visible on exam.  Polymicrobial organisms on cultures including Pseudomonas.  -Orthopedic following.  Appreciate recs  -ID following.  Continue cefepime, Vanco, clindamycin.    -Discussed with vascular surgery today after GABRIEL findings.  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  Vascular surgery discussed with orthopedics, they are planning to offer revascularization in hopes of foot salvage.  Planning for left common iliac artery angioplasty. NPO at 5 AM for this.  May still need debridement of the wound to improve chances of healing and await further episodes of sepsis    # Diabetes mellitus  Patient currently only on mealtime coverage  Will hold off on adding basal insulin because she will be n.p.o.    # NIKOLAY  Likely secondary to volume depletion in the setting of sepsis and recently increased Lasix dose.  Poor p.o. intake as well.  Improved in the last few days.  Holding Lasix.  In anticipation of her  angiogram tomorrow, will give gentle hydration overnight.  Monitor BMP in the morning.    #Right-sided weakness, arm > legs  Head CT negative for bleed or mass.  Cannot obtain MRI due to her pacemaker  Improved this morning  Has a brace on the hand provided by OT    #Chronic pain  -Continue home methadone  -Currently on decreased home Lyrica dose in the setting of AK I, will have to increase after NIKOLAY resolves    #History of systolic heart failure  EF of 35-40% on last echocardiogram.  -Holding home metoprolol and Lasix        Diet: Combination Diet Regular Diet Adult  Calorie Counts  Snacks/Supplements Adult: Boost Glucose Control; Between Meals  Room Service  Fluids: gentle LR today for 1200 cc  Carter Catheter: not present    DVT Prophylaxis: Pneumatic Compression Devices  Code Status: Full Code    Expected discharge: 4 - 7 days, recommended to prior living arrangement once SIRS/Sepsis treated.    The patient's care was discussed with the Bedside Nurse, Patient and Vascular surgery Consultant.    Evin Yousif MD  Internal Medicine Staff Hospitalist Service  [facility]  Pager: Text page  Please see sticky note for cross cover information    Interval History   No acute events overnight  Feels somewhat better today  Continues to have chronic pain  No chest pain or shortness of breath  Denies any nausea or vomiting, but has poor appetite  Tearful this morning when discussing possible amputation    Four-point review of systems otherwise negative      Data reviewed today: I reviewed all medications, new labs and imaging results over the last 24 hours. I personally reviewed the report of US image(s) showing low GABRIEL.    Physical Exam   Vital Signs: Temp: 99.7  F (37.6  C) Temp src: Oral BP: 119/64 Pulse: 102 Heart Rate: 100 Resp: 16 SpO2: 96 % O2 Device: Nasal cannula Oxygen Delivery: 2 LPM  Weight: 109 lbs 12.63 oz  General Appearance: Chronically ill-appearing woman sleeping but easily awakes to  voice  Respiratory: Lungs are clear bilaterally  Cardiovascular: Normal rate regular rhythm no murmurs rubs or gallops  GI: Soft abdomen nontender nondistended  Skin: Right-sided BKA with stump with ulcer.  On the left lower extremity, has ulcer at the base of left great toe  Other: tearful          Data     Recent Labs  Lab 10/10/18  0605 10/09/18  0553 10/08/18  0438   WBC 24.9* 20.9* 23.4*   HGB 8.8* 8.5* 8.5*   MCV 77* 78 79    348 317   INR  --   --  1.27*    141 136   POTASSIUM 4.4 4.2 4.3   CHLORIDE 110* 112* 108   CO2 21 19* 19*   BUN 32* 35* 51*   CR 1.28* 1.38* 1.65*   ANIONGAP 8 10 8   RICK 8.5 8.6 8.4*   * 91 162*   ALBUMIN 1.6*  --   --    PROTTOTAL 6.8  --   --    BILITOTAL 0.3  --   --    ALKPHOS 152*  --   --    ALT 20  --   --    AST 12  --   --        Recent Results (from the past 24 hour(s))   US GABRIEL Doppler No Excersie Portable    Narrative    Exam: Bilateral lower extremity resting ankle brachial indices dated  10/10/2018 11:33 AM    Comparison study: 4/25/2017    Clinical history: chronic non-healing wounds. has prior hx of  amputation.;      Ordering provider: EMILIANA MUSA    Technique: Bilateral lower extremity resting ankle brachial indices  obtained.    Findings:    Right:      Arm: 110 mmHg     Below the knee amputation    Left:     Arm: 116 mmHg   PT at ankle: 65 mmHg   DP at foot: 65 mmHg   Toe pressure (second digit): 39 mmHg     GABRIEL: 0.55   TBI: 0.34    PPGs:     1st Digit PPG: Severely abnormal   2nd Digit PPG: Moderately abnormal      Impression    Impression:     Right leg: Resting GABRIEL is unobtainable due to below the knee  amputation.    Left leg:      a. Resting GABRIEL is 0.55. Mild to moderately abnormal, 0.41-0.90.     b. TBI 0.34, abnormal. Absent PPG waveform in the first digit and  moderately abnormal in the second digit, worsened from prior.     Findings are consistent with significant peripheral vascular disease.     Guidelines:    GABRIEL Diagnostic Criteria  (Based on criteria published in Circulation  2011; 124: 3745-4568):    > 1.4: Non compressible    1.00 - 1.40: Normal    0.91 - 0.99: Borderline    At or below 0.90: Abnormal    GABRIEL Diagnostic Criteria (Based on ACC/AHA guideline 2008):    >/=1.3 - non compressible vessels    1.00  -1.29 - Normal    0.91 - 0.99 - Borderline    0.41 - 0.90 - Mild to moderate PAD    0.00 - 0.40 - Severe PAD    I have personally reviewed the examination and initial interpretation  and I agree with the findings.    FELIX GARCIA MD     Medications     lactated ringers 100 mL/hr at 10/10/18 1706       amitriptyline  50 mg Oral At Bedtime     aspirin  81 mg Oral Daily     ceFEPIme (MAXIPIME) IV  2 g Intravenous Q12H     insulin aspart  1-7 Units Subcutaneous TID AC     insulin aspart  1-5 Units Subcutaneous At Bedtime     LORazepam  0.5 mg Oral Once     methadone  75 mg Oral Daily     metoprolol succinate  25 mg Oral Daily     metroNIDAZOLE  500 mg Oral Q8H [facility]     multivitamin, therapeutic with minerals  1 tablet Oral Daily     pregabalin  50 mg Oral TID     ranitidine  150 mg Oral Daily     sodium chloride (PF)  3 mL Intracatheter Q8H     umeclidinium-vilanterol  1 puff Inhalation Daily     vancomycin (VANCOCIN) IV  1,000 mg Intravenous Q24H

## 2018-10-10 NOTE — PLAN OF CARE
Problem: Patient Care Overview  Goal: Plan of Care/Patient Progress Review  Outcome: No Change

## 2018-10-10 NOTE — PROGRESS NOTES
Calorie Count  Intake recorded for: 10/9  Kcals: 137  Protein: 10g  # Meals Recorded: 25% roast turkey & mashed potatoes w/ gravy, carrots, josie food cake w/ strawberries, diet lemonade   # Supplements Recorded: 0

## 2018-10-10 NOTE — PLAN OF CARE
Problem: Patient Care Overview  Goal: Plan of Care/Patient Progress Review  Outcome: Improving  AF, VSS. Mentally alert, big change from yesterday.  Still with right hand weakness, see neuro note.  MRI ordered but pt with implanted cardioverter-defibrillator.  Per MRI, this needs to be set to MRI safe mode by Technician who is not available on alejandro/night shift.  Will get MRI tomorrow, [facility] team cross cover physician notified. Pain an issue this alejandro, left great toe at the tip. The toe is very swollen from infection, foot ulcer. Her scheduled pain meds and prn tylenol and flexaril with little effect.  notified and oxycodone 5 mg po with pt reporting adequate pain control. Poor po intake, due to asterixis of upper extremeties.  Has been refusing assist to eat.  Spoke with pt after unable to eat much supper tonight.  She did drink a few sips of a  boost.  Not able to hold the cup steady,  but able to sit next to table and sip from cup at table without lifting the drink.  Alessandra also agreed to let other help her tomorrow with cutting food for her and assisting her with eating. Continue cares.

## 2018-10-10 NOTE — PLAN OF CARE
Problem: Patient Care Overview  Goal: Plan of Care/Patient Progress Review  Discharge Planner OT   Patient plan for discharge: TCU  Current status: pt with apparent R radial N palsy, R SULAIMAN wrist cockup splint fabricated today.   Barriers to return to prior living situation: deconditioning  Recommendations for discharge: TCU eventual hand therapy.   Rationale for recommendations: Pt will require skilled OT/Pt to regain ADL I as well as monitoring of R hand function.       Entered by: Fede Barker 10/10/2018 2:34 PM

## 2018-10-10 NOTE — CONSULTS
[facility], [place]    Vascular Surgery Consultation    Date of Admission:  10/7/2018    Assessment & Plan   Alessandra Pak is a 53 year old female who was admitted on 10/7/2018. I was asked to see the patient for nonhealing left foot wound.  Diminished femoral pulse, Dopplerable pedal signals on left.  6/2018 CT with near occlusion of left distal iliac occlusion with 3 vessel runoff.  Left foot ulcer concerning for osteo.  Already s/p R BKA.    -discussing with Ortho the options for left foot salvage (in setting of recent R BKA)  -please hydrate and make NPO at midnight  -tentatively plan on LLE angiogram with possible iliac intervention versus guillotine BKA tomorrow at 4pm  -overall care per primary  -following  -attending addendum to follow    Update:  -discussed with Ortho that reperfusion of that leg may give her a chance to salvage her foot through a ray amputation, though there's no guarantee that it'll heal especially in the setting of infection  -will tentatively plan on an endovascular intervention tomorrow    The plan was discussed and agreed up on by the on call staff surgeon, Dr. Llamas.    Active Problems:    Osteomyelitis (H)      Emeka Larson MD    Chief Complaint   osteo    History is obtained from the patient    History of Present Illness   Alessandra Pak is a 53 year old female with PMH of CHF (EF 35-40%) s/p AICD, poorly controlled DM (Hgb A1c 9.1), PAD s/p R BKA by Dr. King on 6/28/18 who presents with nonhealing wound on her left foot.  The ulcer had been managed by Dr. Lewis (Podiatry) and had been worsening in pain, drainage, and odor.  She was started on abx and referred to Dr. Peralta for possible ray amputation.  However, she was admitted on 10/7 with continued pain, fevers, chills, and a leukcytosis in the setting of sepsis and recurrent osteomyelitis.  Hospital course has been complicated by NIKOLAY and poor PO intake.    Ortho also following during  this hospitalization.    Patient currently with a wound on her R BKA and medial aspect at the base of the left great toe.  WBC 24.  On vanc/cefepime/flagyl.  Lactate 0.6.  , Cr 1.28.  BMI 16.  ABIs were 0.55 on the left.  Toe pressure of 39 mmHg.  A 6/2018 CT notable for a distal left iliac artery near occlusion with 3 vessel runoff.    Of note, in the setting of her right foot, the patient had a history of multiple recurrent infections recently causing ARDS s/p intubation and ICU care ultimately requiring a R BKA for source control.      Past Medical History   I have reviewed this patient's medical history and updated it with pertinent information if needed.   Past Medical History:   Diagnosis Date     Abdominal pain, right upper quadrant     sees Dr Mcclellan pain clinic at [facility]     AICD (automatic cardioverter/defibrillator) present      ASCUS with positive high risk HPV 8/2013    + HPV 33, [place] - GAVIN I, ECC- atypia     Cervical high risk HPV (human papillomavirus) test positive 7/8/15, 7/25/16    NIL pap/+ HR HPV (not 16 or 18).      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  Past Surgical History:   Procedure Laterality Date     AMPUTATE LEG BELOW KNEE Right 6/28/2018    Procedure: AMPUTATE LEG BELOW KNEE;  Right Knee Ampuation Below Knee, Anesthesia Block;  Surgeon: Ambrose King MD;  Location: [facility] OR     C NONSPECIFIC PROCEDURE  2001    cholecystectomy     C NONSPECIFIC PROCEDURE  as a child    tonsillectomy     C NONSPECIFIC PROCEDURE  2001    whipple procedure      CARDIAC SURGERY      defib     COLPOSCOPY,LOOP ELECTRD CERVIX EXCIS  ,     stage 2 dysplasia     ENDOBRONCHIAL ULTRASOUND FLEXIBLE N/A 2015    Procedure: ENDOBRONCHIAL ULTRASOUND FLEXIBLE;  Surgeon: Brenden Tamez MD;  Location: [facility] GI     LEEP TX, CERVICAL  2014    LEEP TX Cervical     RECESSION RESECTION WITH ADJUSTABLE SUTURE  2011    Procedure:RECESSION RESECTION WITH ADJUSTABLE SUTURE; Right Strabismus Repair with Adjustable Suture       TUBAL LIGATION      essure        Prior to Admission Medications   Prior to Admission Medications   Prescriptions Last Dose Informant Patient Reported? Taking?   LYRICA 75 MG capsule 10/7/2018 at Unknown time  No Yes   Sig: TAKE 1 CAPSULE BY MOUTH 3 TIMES DAILY   Lidocaine (LIDOCARE) 4 % Patch Past Week at Unknown time  No Yes   Sig: Place 2 patches onto the skin every 24 hours   acetaminophen (TYLENOL) 500 MG tablet 10/7/2018 at Unknown time  No Yes   Sig: Take 2 tablets (1,000 mg) by mouth 3 times daily   acetone, Urine, test STRP Unknown at Unknown time  No No   Si strip by In Vitro route as needed   albuterol (PROAIR HFA) 108 (90 Base) MCG/ACT Inhaler 10/7/2018 at Unknown time  No Yes   Sig: Inhale 2 puffs into the lungs every 4 hours as needed for shortness of breath / dyspnea   amitriptyline (ELAVIL) 50 MG tablet 10/7/2018 at Unknown time  No Yes   Sig: Take 1 tablet (50 mg) by mouth At Bedtime   aspirin 81 MG tablet 10/7/2018 at Unknown time  No Yes   Sig: Take 1 tablet (81 mg) by mouth daily   blood glucose (NO BRAND SPECIFIED) lancets standard   No No   Sig: Use to test blood sugar 10 times daily or as directed. Accu-check   blood glucose monitoring (NO BRAND SPECIFIED) meter device kit   No No   Sig: Use to test blood sugar 10 times daily or as directed. Accu-check   blood glucose monitoring (NO BRAND SPECIFIED) test strip   No No   Sig: Use to test blood sugars 10 times daily or as directed. Accu-chek   clindamycin (CLEOCIN) 300  MG capsule 10/7/2018 at Unknown time  No Yes   Sig: Take 1 capsule (300 mg) by mouth 3 times daily   fluticasone (FLONASE) 50 MCG/ACT spray 10/7/2018 at Unknown time  No Yes   Sig: Spray 2 sprays into left nostril daily   furosemide (LASIX) 20 MG tablet 10/7/2018 at Unknown time  No Yes   Sig: TAKE 1 TABLET (20 MG) BY MOUTH DAILY   furosemide (LASIX) 40 MG tablet Unknown at Unknown time  No No   Sig: Take 1 tablet (40 mg) by mouth daily   glucose 4 g CHEW chewable tablet 10/7/2018 at Unknown time  No Yes   Sig: Take 3-4 tablets to treat low blood sugar.   insulin aspart (NOVOLOG PEN) 100 UNIT/ML injection   No No   Sig: Inject 1-7 Units Subcutaneous 3 times daily (before meals)   Patient taking differently: Inject 2-5 Units Subcutaneous 3 times daily (before meals)    insulin glargine (LANTUS SOLOSTAR) 100 UNIT/ML pen   No No   Sig: Inject 8 Units Subcutaneous At Bedtime   insulin pen needle (B-D U/F) 31G X 5 MM   No No   Sig: Use 3 time(s) a day.   levonorgestrel (MIRENA, 52 MG,) 20 MCG/24HR IUD 10/7/2018 at Unknown time  No Yes   Sig: placed today   lisinopril (PRINIVIL/ZESTRIL) 10 MG tablet 10/7/2018 at Unknown time  No Yes   Sig: Take 1 tablet (10 mg) by mouth daily   methadone (DOLOPHINE-INTENSOL) 10 MG/ML (HIGH CONC) solution   Yes Yes   Sig: Take 75 mg by mouth daily Confirmed dosing on 10/8/18 with [facility] [place], [place] [phone]   metoprolol succinate (TOPROL-XL) 25 MG 24 hr tablet 10/7/2018 at Unknown time  No Yes   Sig: Take 1 tablet (25 mg) by mouth daily   multivitamin, therapeutic with minerals (THERA-VIT-M) TABS tablet 10/7/2018 at Unknown time  No Yes   Sig: Take 1 tablet by mouth daily   polyethylene glycol (MIRALAX/GLYCOLAX) Packet 10/7/2018 at Unknown time  No Yes   Sig: Take 17 g by mouth daily   ranitidine (ZANTAC) 300 MG tablet 10/7/2018 at Unknown time  No Yes   Sig: Take 1 tablet (300 mg) by mouth daily   umeclidinium-vilanterol (ANORO ELLIPTA) 62.5-25 MCG/INH  oral inhaler 10/7/2018 at Unknown time  No Yes   Sig: Inhale 1 puff into the lungs daily      Facility-Administered Medications: None     Allergies   Allergies   Allergen Reactions     No Clinical Screening - See Comments      Swelling in the throat.       Social History   I have reviewed this patient's social history and updated it with pertinent information if needed. Alessandra Pak  reports that she has quit smoking. Her smoking use included Cigarettes. She has a 4.50 pack-year smoking history. She quit smokeless tobacco use about 3 years ago. She reports that she does not drink alcohol or use illicit drugs.    Family History   I have reviewed this patient's family history and updated it with pertinent information if needed.   Family History   Problem Relation Age of Onset     Diabetes Mother      diet controled     Hypertension Mother      Arthritis Mother      Lipids Mother      Diabetes Father      Hypertension Father      GASTROINTESTINAL DISEASE Father      gallbladder removed     Bipolar Disorder Brother      Thyroid Disease Brother      Obesity Other      Son     Respiratory Other      Son and Daughter; asthma     Depression Maternal Aunt      Anxiety Disorder Maternal Aunt        Review of Systems   The 10 point Review of Systems is negative other than noted in the HPI or here.     Physical Exam   Temp: 99.9  F (37.7  C) Temp src: Oral BP: 141/74 Pulse: 102 Heart Rate: 100 Resp: 18 SpO2: 90 % O2 Device: Nasal cannula Oxygen Delivery: 2 LPM  Vital Signs with Ranges  Temp:  [97.1  F (36.2  C)-99.9  F (37.7  C)] 99.9  F (37.7  C)  Pulse:  [100-102] 102  Heart Rate:  [100-103] 100  Resp:  [16-20] 18  BP: ()/(60-74) 141/74  SpO2:  [90 %-98 %] 90 %  109 lbs 12.63 oz    Constitutional:  NAD, cachectic  HEENT: normocephalic  CV: RRR  Pulm: CTAB, nonlabored respirations  GI: soft, NT/ND  MSK: warm, dry, pink, 1+ radials/femorals  LLE: 3x3cm skin ulcer at base of left great toe on medial aspect, biphasic  DP/PT  RLE: R BKA stump with skin ulcer  Neuro: A&O, motor/sensory grossly intact  Psych: Appropriate    Data   Results for orders placed or performed during the hospital encounter of 10/07/18 (from the past 24 hour(s))   Glucose by meter   Result Value Ref Range    Glucose 197 (H) 70 - 99 mg/dL   Glucose by meter   Result Value Ref Range    Glucose 186 (H) 70 - 99 mg/dL   Glucose by meter   Result Value Ref Range    Glucose 155 (H) 70 - 99 mg/dL   CBC with platelets   Result Value Ref Range    WBC 24.9 (H) 4.0 - 11.0 10e9/L    RBC Count 3.40 (L) 3.8 - 5.2 10e12/L    Hemoglobin 8.8 (L) 11.7 - 15.7 g/dL    Hematocrit 26.2 (L) 35.0 - 47.0 %    MCV 77 (L) 78 - 100 fl    MCH 25.9 (L) 26.5 - 33.0 pg    MCHC 33.6 31.5 - 36.5 g/dL    RDW 15.5 (H) 10.0 - 15.0 %    Platelet Count 361 150 - 450 10e9/L   Comprehensive metabolic panel   Result Value Ref Range    Sodium 139 133 - 144 mmol/L    Potassium 4.4 3.4 - 5.3 mmol/L    Chloride 110 (H) 94 - 109 mmol/L    Carbon Dioxide 21 20 - 32 mmol/L    Anion Gap 8 3 - 14 mmol/L    Glucose 152 (H) 70 - 99 mg/dL    Urea Nitrogen 32 (H) 7 - 30 mg/dL    Creatinine 1.28 (H) 0.52 - 1.04 mg/dL    GFR Estimate 44 (L) >60 mL/min/1.7m2    GFR Estimate If Black 53 (L) >60 mL/min/1.7m2    Calcium 8.5 8.5 - 10.1 mg/dL    Bilirubin Total 0.3 0.2 - 1.3 mg/dL    Albumin 1.6 (L) 3.4 - 5.0 g/dL    Protein Total 6.8 6.8 - 8.8 g/dL    Alkaline Phosphatase 152 (H) 40 - 150 U/L    ALT 20 0 - 50 U/L    AST 12 0 - 45 U/L   CRP inflammation   Result Value Ref Range    CRP Inflammation 280.0 (H) 0.0 - 8.0 mg/L   Hemoglobin A1c   Result Value Ref Range    Hemoglobin A1C 9.1 (H) 0 - 5.6 %   Glucose by meter   Result Value Ref Range    Glucose 156 (H) 70 - 99 mg/dL   US GABRIEL Doppler No [place] Portable    Narrative    Exam: Bilateral lower extremity resting ankle brachial indices dated  10/10/2018 11:33 AM    Comparison study: 4/25/2017    Clinical history: chronic non-healing wounds. has prior hx  of  amputation.;      Ordering provider: EMILIANA MUSA    Technique: Bilateral lower extremity resting ankle brachial indices  obtained.    Findings:    Right:      Arm: 110 mmHg     Below the knee amputation    Left:     Arm: 116 mmHg   PT at ankle: 65 mmHg   DP at foot: 65 mmHg   Toe pressure (second digit): 39 mmHg     GABRIEL: 0.55   TBI: 0.34    PPGs:     1st Digit PPG: Severely abnormal   2nd Digit PPG: Moderately abnormal      Impression    Impression:     Right leg: Resting GABRIEL is unobtainable due to below the knee  amputation.    Left leg:      a. Resting GABRIEL is 0.55. Mild to moderately abnormal, 0.41-0.90.     b. TBI 0.34, abnormal. Absent PPG waveform in the first digit and  moderately abnormal in the second digit, worsened from prior.     Findings are consistent with significant peripheral vascular disease.     Guidelines:    GABRIEL Diagnostic Criteria (Based on criteria published in Circulation  2011; 124: 5657-7981):    > 1.4: Non compressible    1.00 - 1.40: Normal    0.91 - 0.99: Borderline    At or below 0.90: Abnormal    GABRIEL Diagnostic Criteria (Based on ACC/AHA guideline 2008):    >/=1.3 - non compressible vessels    1.00  -1.29 - Normal    0.91 - 0.99 - Borderline    0.41 - 0.90 - Mild to moderate PAD    0.00 - 0.40 - Severe PAD    I have personally reviewed the examination and initial interpretation  and I agree with the findings.    FELIX GARCIA MD   Vitamin D   Result Value Ref Range    Vitamin D Deficiency screening 25 20 - 75 ug/L   Prealbumin   Result Value Ref Range    Prealbumin 6 (L) 15 - 45 mg/dL   Glucose by meter   Result Value Ref Range    Glucose 172 (H) 70 - 99 mg/dL   Lactic acid level STAT for sepsis protocol   Result Value Ref Range    Lactate for Sepsis Protocol 0.6 (L) 0.7 - 2.0 mmol/L   Glucose by meter   Result Value Ref Range    Glucose 217 (H) 70 - 99 mg/dL     *Note: Due to a large number of results and/or encounters for the requested time period, some results have not been  displayed. A complete set of results can be found in Results Review.

## 2018-10-11 NOTE — OR NURSING
Unable to give insulin due to patient leaving for OR Dr. Mascorro aware. New open area found on outer aspect of right hip that patient was unaware of will in Wound Care consult. OR nurse is aware. Dr. Mascorro notified of pregnancy test not done.

## 2018-10-11 NOTE — PLAN OF CARE
Problem: Diabetes Comorbidity  Goal: Diabetes  Patient comorbidity will be monitored for signs and symptoms of hyperglycemia or hypoglycemia. Problems will be absent, minimized or managed by discharge/transition of care.   Outcome: No Change  Pt continues to have pain in her left foot. Dressing saturated with serous sanguineous drainage. Dressing changed x1. Right amputee dressing intake with no apparent drainage.  Vascular surgery consult completed and pt made npo after [place] for arteriogram. Pt didn't eat lunch or dinner, only taking sips of sprite, all documented on calorie count sheets. BS being monitor and covered per sliding scale. Will continue to monitor and continue POC.

## 2018-10-11 NOTE — PHARMACY-VANCOMYCIN DOSING SERVICE
Pharmacy Vancomycin Note  Date of Service 2018  Patient's  1965   53 year old, female    Indication: Osteomyelitis  Goal Trough Level: 15-20 mg/L  Day of Therapy: Day 5 (started 10/7)  Current Vancomycin regimen:  1000 mg IV q24h    Current estimated CrCl = Estimated Creatinine Clearance: 43.6 mL/min (based on Cr of 1.2).    Creatinine for last 3 days  10/9/2018:  5:53 AM Creatinine 1.38 mg/dL  10/10/2018:  6:05 AM Creatinine 1.28 mg/dL  10/11/2018:  8:29 AM Creatinine 1.20 mg/dL    Recent Vancomycin Levels (past 3 days)  10/10/2018: 10:30 PM Vancomycin Level 20.7 mg/L    Vancomycin IV Administrations (past 72 hours)                   vancomycin (VANCOCIN) 1000 mg in dextrose 5% 200 mL PREMIX (mg) 1,000 mg New Bag 10/10/18 2257     1,000 mg New Bag 10/09/18 2022     1,000 mg New Bag 10/08/18 194                Nephrotoxins and other renal medications (Future)    Start     Dose/Rate Route Frequency Ordered Stop    10/08/18 2000  vancomycin (VANCOCIN) 1000 mg in dextrose 5% 200 mL PREMIX      1,000 mg  200 mL/hr over 1 Hours Intravenous EVERY 24 HOURS 10/08/18 1147               Contrast Orders - past 72 hours     None          Interpretation of levels and current regimen:  Trough level is  Therapeutic    Has serum creatinine changed > 50% in last 72 hours: No    Urine output:  unable to determine    Renal Function: Stable to slightly improving    Plan:  1.  Continue Current Dose  2.  Pharmacy will check trough levels as appropriate in 1-3 Days.    3. Serum creatinine levels will be ordered daily for the first week of therapy and at least twice weekly for subsequent weeks.      Thank you,  Andy J. Kurtzweil, PharmD        .

## 2018-10-11 NOTE — PROGRESS NOTES
[facility], [place]    Neurology Progress Note      Assessment/Recommendations   Ms. Enamorado is a 53 year old female with a PMH significant for DMII, osteomyelitis s/p right below the knee amputation, CKD, HTN, heart failure with reduced ejection fraction who presented with sepsis secondary to diabetic food ulcer of the left foot and osteomyelitis as well as an NIKOLAY. She has had right hand weakness for the past 10 days most likely secondary to a radial nerve neuropathy.     #Right Hand Weakness  Exam today continues to demonstrate weakness of extension of the right hand as well as weakness in abduction of the fingers. This is most consistent with a radial nerve neuropathy, possibly secondary to her diabetes or from compression due to positioning. We do not favor stroke at this time. We recommend obtaining EMG in 2-4 weeks in the outpatient neurology clinic, as well as continuing to work with physical and occupational therapy.     -schedule EMG in 2-4weeks  -follow up with neurology following EMG  -continue with occupational therapy and physical therapy     Patient seen and discussed with Dr. Yevgeniy Amanda MD  PGY3 Internal Medicine       Interval History   Unable to obtain MRI due to pacemaker  Weakness still present in the right hand with no change  Continues to have numbness in her right hand     Physical Exam   Vital Signs: Temp: 96.3  F (35.7  C) Temp src: Axillary BP: 127/70 Pulse: 102 Heart Rate: 91 Resp: 18 SpO2: 93 % O2 Device: Nasal cannula Oxygen Delivery: 2 LPM  Weight: 109 lbs 12.63 oz  General Appearance: NAD  Respiratory: non-labored     Neuro:  Right wrist extension: 0/5  Left wrist flexion: 5/5  Right wrist flexion: 5/5  Left wrist flexion: 5/5  Left and right biceps: 5/5  Right and left triceps: 5/5  Finger abduction right hand: 1/5  Finger abduction left hand: 5/5  Lower extremity strength: 5/5          Data     Recent Labs  Lab 10/11/18  0831  10/10/18  0605 10/09/18  0553 10/08/18  0438   WBC 18.9* 24.9* 20.9* 23.4*   HGB 8.2* 8.8* 8.5* 8.5*   MCV 78 77* 78 79    361 348 317   INR 1.41*  --   --  1.27*    139 141 136   POTASSIUM 4.3 4.4 4.2 4.3   CHLORIDE 111* 110* 112* 108   CO2 21 21 19* 19*   BUN 30 32* 35* 51*   CR 1.20* 1.28* 1.38* 1.65*   ANIONGAP 7 8 10 8   RICK 8.5 8.5 8.6 8.4*   * 152* 91 162*   ALBUMIN  --  1.6*  --   --    PROTTOTAL  --  6.8  --   --    BILITOTAL  --  0.3  --   --    ALKPHOS  --  152*  --   --    ALT  --  20  --   --    AST  --  12  --   --

## 2018-10-11 NOTE — DISCHARGE INSTRUCTIONS
__________________________________________________________  D/C'ing nurse: Please fax to following agencies at time of patient d/c.    [facility]    Fax  [phone]  ____________________________________________________________

## 2018-10-11 NOTE — PLAN OF CARE
Problem: Patient Care Overview  Goal: Plan of Care/Patient Progress Review  Outcome: No Change  Tmax 100.6, Tylenol given w/ improvement. OVSS. No c/o pain overnight. NPO at 0500 for arteriogram at 1600 today. Dressing on left foot changed x1, serosanguinous drainage on dressing.   overnight. Slept comfortably between cares. Continue to monitor.

## 2018-10-11 NOTE — PLAN OF CARE
Problem: Patient Care Overview  Goal: Plan of Care/Patient Progress Review  Outcome: No Change  1835-4613 hours: Afebrile, vital signs are stable. Complaints of LLE pain partially relieved by oxycodone 5 mg po x 2 and tylenol. LLE wet to dry dressing done. Draining serosanguinous fluid. Right BKA hydrocolloid dressing changed as well. Arteriogram postponed until 1800 hours today. Patient has been NPO except medications since midnight. Shower with chlorhexidine scrub performed to LLE. Markings by surgical team intact. Up to wheelchair with assist x 1, walker and gait belt. Bed alarm when family not present. 20 gauge PIV in right arm saline locked.

## 2018-10-11 NOTE — PROGRESS NOTES
Calorie Count  Intake recorded for: 10/10 Kcals: 68  Protein: 3g  # Meals Recorded: less than 25% chicken quesadilla orange, sprite  # Supplements Recorded: 0

## 2018-10-11 NOTE — PLAN OF CARE
Problem: Patient Care Overview  Goal: Plan of Care/Patient Progress Review  7D PT - PT is continuing to hold until further information in known about prognosis of L LE. OT is continued to follow for therapy needs at this time (transfers and ADLs). PT will monitor for changes in status when PT is appropriate.

## 2018-10-11 NOTE — PROGRESS NOTES
"Brief Progress Note    Chart Reviewed.  Discussed surgical options for left foot salvage with Ortho yesterday.  NPO for OR this PM.    Vital signs:  Temp: 96.3  F (35.7  C) Temp src: Axillary BP: 127/70 Pulse: 102 Heart Rate: 91 Resp: 18 SpO2: 93 % O2 Device: Nasal cannula Oxygen Delivery: 2 LPM Height: 175.3 cm (5' 9\") Weight: 49.8 kg (109 lb 12.6 oz)  Estimated body mass index is 16.21 kg/(m^2) as calculated from the following:    Height as of this encounter: 1.753 m (5' 9\").    Weight as of this encounter: 49.8 kg (109 lb 12.6 oz).    Constitutional:  NAD, cachectic  HEENT: normocephalic  CV: RRR  Pulm: CTAB, nonlabored respirations  GI: soft, NT/ND  MSK: warm, dry, pink, 1+ radials/femorals  LLE: 3x3cm skin ulcer at base of left great toe on medial aspect, biphasic DP/PT  RLE: R BKA stump with skin ulcer  Neuro: A&O, motor/sensory grossly intact  Psych: Appropriate    Alessandra Pak is a 53 year old female who was admitted on 10/7/2018. I was asked to see the patient for nonhealing left foot wound.  Diminished femoral pulse, Dopplerable pedal signals on left.  6/2018 CT with near occlusion of left distal iliac occlusion with 3 vessel runoff.  Left foot ulcer concerning for osteo.  Already s/p R BKA.    -will plan on LLE angiogram with possible intervention today at 4pm  -stressed that our reperfusion attempt aims to give her a chance to heal and salvage as much of her foot as possible, but there was no guarantee especially in the setting of her PAD and current infection  -patient consented and marked  -discussed with patient and patient's mother  -overall care per primary  -following    "

## 2018-10-11 NOTE — PLAN OF CARE
Problem: Patient Care Overview  Goal: Plan of Care/Patient Progress Review  OT 7D: cancel pt going to OR this PM, will reschedule therapy.

## 2018-10-11 NOTE — PROGRESS NOTES
[facility], [place]    Internal Medicine Progress Note - Gold Service      Assessment & Plan   Alessandra Pak is a 53 year old female admitted on 10/7/2018. She has a history of poorly controlled diabetes, osteomyelitis s/p amputation of R foot in June 2018, systolic HF, s/p ICD placement and is admitted for recurrent osteomyelitis with evidence of sepsis.    # Sepsis secondary to Osteomyelitis  # Peripheral vascular disease  # Poorly controlled diabetes    Presented with worsening left toe pain.  Has known left foot ulcer.  Followed by podiatry as outpatient.  Had been on Clinda as outpatient after debridement on 10/5 but came to the hospital with worsening pain, chills, rigors and altered mental status.  Noted to have increased inflammatory markers and leukocytosis.  Imaging showing likely osteomyelitis distal first metatarsal and tibial sesamoid  concerning for osteoid septic joint.  Bone visible on exam.  Polymicrobial organisms on cultures including Pseudomonas.  Improving in mental status and leukocytosis  -Orthopedics and vascular surgery following.  Appreciate recs  -ID following.  Continue cefepime, Vanco, clindamycin.    - planning to offer revascularization in hopes of foot salvage.  Planning for left common iliac artery angioplasty 10/11  May still need debridement of the wound to improve chances of healing and await further episodes of sepsis, will discuss with orthopedics tomorrow    # Diabetes mellitus  Patient currently only on mealtime coverage  Will hold off on adding basal insulin because she will be n.p.o. For procedure  Post procedure, may start basal insulin    # NIKOLAY  Likely secondary to volume depletion in the setting of sepsis and recently increased Lasix dose.  Poor p.o. intake as well.  Improved in the last few days.  Holding Lasix.   Improved yet again today after gentle hydration  Will continue to monitor post angiogram    #Right-sided weakness, arm > legs  #  Likely radial nerve palsy  Head CT negative for bleed or mass.  Cannot obtain MRI due to her pacemaker  Improved this morning  Seen by neurology, appreciate recs  Will need EMG as outpatient and neuro followup      #Chronic pain  -Continue home methadone  -Currently on decreased home Lyrica dose in the setting of AK I, will have to increase after NIKOLAY resolves    #History of systolic heart failure  EF of 35-40% on last echocardiogram.  -Holding home metoprolol and Lasix in the setting of sepsis        Diet: Calorie Counts  Snacks/Supplements Adult: Boost Glucose Control; Between Meals  Room Service  NPO for Medical/Clinical Reasons Except for: Meds  Fluids: gentle LR today for 1200 cc  Carter Catheter: not present    DVT Prophylaxis: Pneumatic Compression Devices  Code Status: Full Code    Expected discharge: 4 - 7 days, recommended to prior living arrangement once SIRS/Sepsis treated.    The patient's care was discussed with the Bedside Nurse, Patient and Vascular surgery Consultant.    Evin Yousif MD  Internal Medicine Staff Hospitalist Service  [facility]  Pager: Text page  Please see sticky note for cross cover information    Interval History   No acute events overnight  Feels somewhat better today  Continues to have chronic pain  No chest pain or shortness of breath  Denies any nausea or vomiting, but has poor appetite  Tearful this morning when discussing possible amputation    Four-point review of systems otherwise negative      Data reviewed today: I reviewed all medications, new labs and imaging results over the last 24 hours. I personally reviewed the report of US image(s) showing low GABRIEL.    Physical Exam   Vital Signs: Temp: 96.3  F (35.7  C) Temp src: Axillary BP: 127/70   Heart Rate: 91 Resp: 18 SpO2: 93 % O2 Device: Nasal cannula Oxygen Delivery: 2 LPM  Weight: 111 lbs 0 oz  General Appearance: Chronically ill-appearing woman but awake and alert, very pleasant  Respiratory: Lungs are  clear bilaterally   Cardiovascular: Normal rate regular rhythm no murmurs rubs or gallops  GI: Soft abdomen nontender nondistended  Skin: Right-sided BKA with stump with ulcer.  On the left lower extremity, wounds have dressing  Other: full affect today, good insight on her disease process        Data     Recent Labs  Lab 10/11/18  0829 10/10/18  0605 10/09/18  0553 10/08/18  0438   WBC 18.9* 24.9* 20.9* 23.4*   HGB 8.2* 8.8* 8.5* 8.5*   MCV 78 77* 78 79    361 348 317   INR 1.41*  --   --  1.27*    139 141 136   POTASSIUM 4.3 4.4 4.2 4.3   CHLORIDE 111* 110* 112* 108   CO2 21 21 19* 19*   BUN 30 32* 35* 51*   CR 1.20* 1.28* 1.38* 1.65*   ANIONGAP 7 8 10 8   RICK 8.5 8.5 8.6 8.4*   * 152* 91 162*   ALBUMIN  --  1.6*  --   --    PROTTOTAL  --  6.8  --   --    BILITOTAL  --  0.3  --   --    ALKPHOS  --  152*  --   --    ALT  --  20  --   --    AST  --  12  --   --        No results found for this or any previous visit (from the past 24 hour(s)).  Medications       amitriptyline  50 mg Oral At Bedtime     ceFEPIme (MAXIPIME) IV  2 g Intravenous Q12H     insulin aspart  1-7 Units Subcutaneous TID AC     insulin aspart  1-5 Units Subcutaneous At Bedtime     LORazepam  0.5 mg Oral Once     methadone  75 mg Oral Daily     metoprolol succinate  25 mg Oral Daily     metroNIDAZOLE  500 mg Oral Q8H ORESTES     multivitamin, therapeutic with minerals  1 tablet Oral Daily     pregabalin  50 mg Oral TID     ranitidine  150 mg Oral Daily     sodium chloride (PF)  3 mL Intracatheter Q8H     umeclidinium-vilanterol  1 puff Inhalation Daily     vancomycin (VANCOCIN) IV  1,000 mg Intravenous Q24H

## 2018-10-11 NOTE — PROGRESS NOTES
Ortho update    Discussed patients case with vascular surgery. Vascular surgery considering revascularization of the LLE at site of stenosis in the common iliac. Based on current wound to the left foot, xray imaging, and surrounding tissues, ray amputation is an option and improved profusion would increase the patients chances of having a good outcome. Giving her a chance of preserving her left foot, potentially avoiding a BKA. Patient currently scheduled for outpatient follow up on 10/15, will continue to follow peripherally, and be considering timing and setting of intervention.    Pablo Elizabeth MD  Orthopedic Surgery, PGY-4  Pager: [phone]

## 2018-10-12 NOTE — PROGRESS NOTES
"Brief Progress Note    Chart Reviewed.  POD 1 LLE angio with L RAJNI stent.  L groin is soft, dressing c/d/i.  Palpable L DP/PT.    Vital signs:  Temp: 98.8  F (37.1  C) Temp src: Oral BP: 123/60   Heart Rate: 105 Resp: 15 SpO2: 96 % O2 Device: None (Room air) Oxygen Delivery: 2 LPM Height: 175.3 cm (5' 9\") Weight: 50.3 kg (111 lb)  Estimated body mass index is 16.39 kg/(m^2) as calculated from the following:    Height as of this encounter: 1.753 m (5' 9\").    Weight as of this encounter: 50.3 kg (111 lb).    Constitutional:  NAD, cachectic  HEENT: normocephalic  CV: RRR  Pulm: CTAB, nonlabored respirations  GI: soft, NT/ND  MSK: warm, dry, pink, 1+ radials/femorals  LLE: groin dressing c/d/i, soft; 3x3cm skin ulcer at base of left great toe on medial aspect, palpable DP/PT  RLE: R BKA stump with skin ulcer  Neuro: A&O, motor/sensory grossly intact  Psych: Appropriate    Alessandra Pak is a 53 year old female who was admitted on 10/7/2018. I was asked to see the patient for nonhealing left foot wound.  Diminished femoral pulse, Dopplerable pedal signals on left.  6/2018 CT with near occlusion of left distal iliac occlusion with 3 vessel runoff.  Left foot ulcer concerning for osteo.  Already s/p R BKA. S/p LLE angio with L RAJNI stent on 10/11    -doing well postop; optimized from a vascular standpoint  -continue 81mg aspirin daily  -defer to Podiatry/Ortho for L toe amputation  -reminded that our reperfusion attempt gives her a chance to heal/salvage as much of her foot as possible, but there was no guarantee especially in the setting of her PAD and current infection  -overall care per primary  -following    "

## 2018-10-12 NOTE — BRIEF OP NOTE
[facility], [place]    Brief Operative Note    Pre-operative diagnosis: Left Foot Osteomylitits   Post-operative diagnosis As above  Procedure: Procedure(s):   Placement of 8x80 mm EV3 Self-Expanding Stent on the Left Common Iliac Artery, Aortogram - Wound Class: I-Clean   - Wound Class: I-Clean  Surgeon: Surgeon(s) and Role:     * Carlos Llamas MD - Primary     * Emeka Larson MD - Assisting  Anesthesia: General   Estimated blood loss: Minimal  Drains: None  Specimens: None   Findings:   Left common iliac stenosis  Complications: None.  Implants: None.    OK to resume diet postop  Bedrest for 6 hours (0200 on 10/12/18)  Palpable L DP/PT postop

## 2018-10-12 NOTE — PROGRESS NOTES
[facility], [place]    Internal Medicine Progress Note - Gold Service      Assessment & Plan   Alessandra Pak is a 53 year old female admitted on 10/7/2018. She has a history of poorly controlled diabetes, osteomyelitis s/p amputation of R foot in June 2018, systolic HF, s/p ICD placement and is admitted for recurrent osteomyelitis with evidence of sepsis.    # Sepsis secondary to Osteomyelitis  # Peripheral vascular disease  # Poorly controlled diabetes    Presented with worsening left toe pain.  Has known left foot ulcer.  Followed by podiatry as outpatient.  Had been on Clinda as outpatient after debridement on 10/5 but came to the hospital with worsening pain, chills, rigors and altered mental status.  Noted to have increased inflammatory markers and leukocytosis.  Imaging showing likely osteomyelitis distal first metatarsal and tibial sesamoid  concerning for osteoid septic joint.  Bone visible on exam.  Polymicrobial organisms on cultures including Pseudomonas.  Improving in mental status and leukocytosis  -Orthopedics and vascular surgery following.  Appreciate recs  -ID following.  Continue cefepime, Vanco, metronidazole    - planning to offer revascularization in hopes of foot salvage.  Now s/p left common iliac artery angioplasty 10/11  - patient going to OR 10/12 for first ray amputation (partial amputation of the left foot)    # Diabetes mellitus  Patient currently only on mealtime coverage  Will hold off on adding basal insulin because she will be n.p.o. For procedure  This AM her glucose was 120, so will hold off on adding any basal and continue the mealtime coverage for  Perioperatively, will have to monitor closely    # NIKOLAY  Likely secondary to volume depletion in the setting of sepsis and recently increased Lasix dose.  Poor p.o. intake as well.  Improved in the last few days.  Holding Lasix.   Improved and stabilized around 1.2    #Right-sided weakness, arm > legs  #  Likely radial nerve palsy  Head CT negative for bleed or mass.  Cannot obtain MRI due to her pacemaker  Improved this morning  Seen by neurology, appreciate recs  Will need EMG as outpatient and neuro followup    #Chronic pain  -Continue home methadone  -Currently on decreased home Lyrica dose in the setting of AK I, will have to increase after NIKOLAY resolves    #History of systolic heart failure  EF of 35-40% on last echocardiogram.  -Holding home metoprolol and Lasix in the setting of sepsis        Diet: Snacks/Supplements Adult: Boost Glucose Control; Between Meals  Room Service  Consistent Carbohydrate Diet 3478-7344 Calories: Moderate Consistent CHO (4-6 CHO units/meal)  NPO per Anesthesia Guidelines for Procedure/Surgery Except for: Meds  Fluids: gentle LR today for 1200 cc  Carter Catheter: not present    DVT Prophylaxis: Pneumatic Compression Devices  Code Status: Full Code    Expected discharge: 4 - 7 days, recommended to prior living arrangement once SIRS/Sepsis treated.    The patient's care was discussed with the Bedside Nurse, Patient and Vascular surgery Consultant.    Evin Yousif MD  Internal Medicine Staff Hospitalist Service  [facility]  Pager: Text page  Please see sticky note for cross cover information    Interval History   No acute events overnight  Feels well today  No fevers or chills  No chest pain  Anxious about amputation, but knows why this is important    Four-point review of systems otherwise negative      Data reviewed today: I reviewed all medications, new labs and imaging results over the last 24 hours. I personally reviewed the report of US image(s) showing low GABRIEL.    Physical Exam   Vital Signs: Temp: 98.2  F (36.8  C) Temp src: Oral BP: 149/74   Heart Rate: 106 Resp: 12 SpO2: 100 % O2 Device: None (Room air)    Weight: 111 lbs 0 oz  General Appearance: Chronically ill-appearing woman but awake and alert, very pleasant  Respiratory: Lungs are clear bilaterally    Cardiovascular: Normal rate regular rhythm no murmurs rubs or gallops  GI: Soft abdomen nontender nondistended  Skin: Right-sided BKA with stump with ulcer.  On the left lower extremity, wounds have dressing  Other: full affect today, good insight on her disease process        Data     Recent Labs  Lab 10/12/18  0704 10/11/18  0829 10/10/18  0605  10/08/18  0438   WBC 16.1* 18.9* 24.9*  < > 23.4*   HGB 8.0* 8.2* 8.8*  < > 8.5*   MCV 78 78 77*  < > 79    328 361  < > 317   INR 1.59* 1.41*  --   --  1.27*    138 139  < > 136   POTASSIUM 4.3 4.3 4.4  < > 4.3   CHLORIDE 110* 111* 110*  < > 108   CO2 23 21 21  < > 19*   BUN 24 30 32*  < > 51*   CR 1.28* 1.20* 1.28*  < > 1.65*   ANIONGAP 6 7 8  < > 8   RICK 8.6 8.5 8.5  < > 8.4*   * 170* 152*  < > 162*   ALBUMIN 1.5*  --  1.6*  --   --    PROTTOTAL 6.6*  --  6.8  --   --    BILITOTAL 0.3  --  0.3  --   --    ALKPHOS 149  --  152*  --   --    ALT 14  --  20  --   --    AST 12  --  12  --   --    < > = values in this interval not displayed.    Recent Results (from the past 24 hour(s))   IR OR Angiogram    Narrative    This exam was marked as non-reportable because it will not be read by a   radiologist or a [place] non-radiologist provider.               Medications       amitriptyline  50 mg Oral At Bedtime     ceFEPIme (MAXIPIME) IV  2 g Intravenous Q12H     insulin aspart  1-7 Units Subcutaneous TID AC     insulin aspart  1-5 Units Subcutaneous At Bedtime     LORazepam  0.5 mg Oral Once     methadone  75 mg Oral Daily     metoprolol succinate  25 mg Oral Daily     metroNIDAZOLE  500 mg Oral Q8H ORESTES     multivitamin, therapeutic with minerals  1 tablet Oral Daily     pregabalin  50 mg Oral TID     ranitidine  150 mg Oral Daily     sodium chloride (PF)  3 mL Intracatheter Q8H     umeclidinium-vilanterol  1 puff Inhalation Daily     vancomycin (VANCOCIN) IV  1,000 mg Intravenous Q24H

## 2018-10-12 NOTE — PROGRESS NOTES
"Discussed with Staff, Dr. Peralta, plan for ray amputation left foot tomorrow 10/13. NPO at midnight (ordered)  Cheyenne Tinsley  [phone]    Orthopaedic Surgery Progress Note   October 12, 2018    Subjective: Underwent vascular procedure yesterday- Angio with left common iliac artery stent. Foot hurting a little less.    Discussed that ideally she is medically optimized prior to discussion of amputation. This means controlling glucose, optimizing nutrition, and improving blood flow (appreciate vascular assistance). These things factor into the decision on the level of amputation. Ideally, the infection is also controlled with antibiotics, and then the patient  Follows up  outpatient to discuss options for amputation. Alessandra quickly said that that is her goal as well. She would like to go home and follow up outpatient to discuss amputation.    Objective: /60  Pulse 102  Temp 98.8  F (37.1  C) (Oral)  Resp 15  Ht 1.753 m (5' 9\")  Wt 50.3 kg (111 lb)  SpO2 96%  BMI 16.39 kg/m2    General: NAD, alert and oriented, cooperative with exam.  Cardio: RRR, extremities wwp.   Respiratory: Non-labored breathing.  MSK: Focused examination of left foot with stable ulceration over medial border of 1st MTP with exposed bone. Purulent drainage on dressing and can be expressed from plantar surface, but no palpable abscess. NO over erythema around the borders of the ulceration.    Labs:   CBC RESULTS:   CBC RESULTS:   Recent Labs   Lab Test  10/12/18   0704   WBC  16.1*   RBC  3.16*   HGB  8.0*   HCT  24.5*   MCV  78   MCH  25.3*   MCHC  32.7   RDW  15.5*   PLT  377       Lab Results   Component Value Date    A1C 9.5 06/06/2018    A1C 12.8 02/16/2018    A1C 11.8 06/21/2017    A1C 11.5 11/02/2016    A1C >15.0  Results confirmed by repeat test   07/25/2016     Lab Results   Component Value Date    ALBUMIN 2.8 08/28/2018       Assessment and Plan: Alessandra Pak is a 53 year old female with poorly controlled diabetes " with associated peripheral neuropathy and chronic ulceration. S/p right BKA and with ongoing left foot ulcer previously managed by podiatry and now with referral to ortho (Dr. Peralta) for consideration of amputation. Last seen by podiatry on 10/5/18. Admitted to hospital 10/7/18 due to osteomyelitis at base of ulcer and elevated inflammatory markers. Underwent placement of left RAJNI stent to improve perfusion of foot    -ordered TCO2s to help determine appropriate level of amputation, but can not be done as an inpatient  -continue to work on glucose control and nutrition optimization  -continue abx per ID  -recommend continued wound cares supervised by WOC  -scheduled to follow up with Dr. Peralta 10/16/18, plan to keep this appointment    Follow up with Dr. Peralta as outpatient for discussion of surgical intervention.    Cheyenne Tinsley, PGY4  [phone]  ----------------------------------------------------------------  Addendum:    Updated plan:  - OR tomorrow     Surgeon: Dr. Peralta  Surgery: Partial amputation of left foot (first ray amputation)  Action Items:  [x] Consent  [x] Pre-op Orders  [x] CBC  [x] BMP  [x] PT/INR  [x] T&S  [x] Notify OR ([id number])  [x] Medicine clearance  [x] NPO at midnight  [x] AC held    Lee Obrien MD  Orthopaedic Surgery PGY1  Pager: [phone]

## 2018-10-12 NOTE — PLAN OF CARE
Problem: Patient Care Overview  Goal: Plan of Care/Patient Progress Review  Outcome: No Change    VSS.  and 179 - received coverage. WOC RN saw pt and discovered a new pressure injury on right hip - mepilex applied. Pt educated on importance of shifting weight every 1-2 hours. Dressing to right stump C/D/I - needs to be changed tomorrow. Dressing to left foot changed around noon. Ortho consulted - plan for pt to go to OR for toe amputation Saturday am at 0700. Will need to shower this evening. Mother visiting this afternoon. Pt in good spirits. Oxy x1 for left hip pain - site is C/D/I from angiogram. Capnography d/c'd as pt is 24 hour out from yesterdays angiogram. NPO at midnight for procedure. Continue POC.

## 2018-10-12 NOTE — ANESTHESIA CARE TRANSFER NOTE
Patient: Alessandra Pak    Procedure(s):   Placement of 8x80 mm EV3 Self-Expanding Stent on the Left Common Iliac Artery, Aortogram - Wound Class: I-Clean   - Wound Class: I-Clean    Diagnosis: Left Foot Osteomylitits   Diagnosis Additional Information: No value filed.    Anesthesia Type:   MAC     Note:  Airway :Room Air  Patient transferred to:Medical/Surgical Unit  Comments: VSS. Breathing spontaneously at a regular rate with adequate tidal volumes and maintaining O2 on RA. Denies nausea or pain. No apparent complications from anesthesia.   Handoff Report: Identifed the Patient, Identified the Reponsible Provider, Reviewed the pertinent medical history, Discussed the surgical course, Reviewed Intra-OP anesthesia mangement and issues during anesthesia, Set expectations for post-procedure period and Allowed opportunity for questions and acknowledgement of understanding      Vitals: (Last set prior to Anesthesia Care Transfer)    CRNA VITALS  10/11/2018 1935 - 10/11/2018 2017      10/11/2018             Pulse: 98    SpO2: 100 %                Electronically Signed By: Luisito Mascorro MD  October 11, 2018  8:17 PM

## 2018-10-12 NOTE — PROGRESS NOTES
Care Coordinator - Discharge Planning    Admission Date/Time:  10/7/2018  Attending MD:  Evin Musa MD     Data  Date of initial CC assessment:  10/9  Chart reviewed, discussed with interdisciplinary team.   Patient was admitted for:   1. Osteomyelitis (H)    2. Diabetic foot ulcer with osteomyelitis (H)    3. Chronic pulmonary edema    4. Acute renal failure, unspecified acute renal failure type (H)    5. Encounter for long-term (current) use of insulin (H)    6. Personal history of tobacco use, presenting hazards to health         Assessment   Full assessment completed in previous note    Coordination of Care and Referrals: Spoke with Dr. Evin MUSA about plan of care and discharge planning update. Patient remains on IV antibiotics however she is going to OR tomorrow for toe amputation and if source of infection is controlled, then patient will not need IV antibiotics long-term. Of note, patient was open to [facility] earlier this year, however per [facility] intake, they are out of network with her insurance at this time. [facility] or [facility] would be in-network.  A home infusion referral was not pursued further since MD team is anticipating patient will transition off IV antibiotic prior to hospital discharge.  Referral does remain in place to [facility] (Phone  [phone]; Fax  [phone]) to resume skilled home care.      Plan  Anticipated Discharge Date:  TBD- no discharge anticipated until next week per MD team  Anticipated Discharge Plan:  Home    Tereza LUNDBERG Heme/Onc RN Care Coordinator  Pager [phone]

## 2018-10-12 NOTE — ANESTHESIA PREPROCEDURE EVALUATION
ANESTHESIA PREOP EVALUATION    NPO Status: more then 6 hours    Procedure: Placement of 8x80 mm EV3 Self-Expanding Stent on the Left Common Iliac Artery, Aortogram    HPI: Left Foot Osteomylitits     PMHx/PSHx/ROS:  PAST MEDICAL HISTORY:   Past Medical History:   Diagnosis Date     Abdominal pain, right upper quadrant     sees Dr Mcclellan pain clinic at [facility]     AICD (automatic cardioverter/defibrillator) present      ASCUS with positive high risk HPV 8/2013    + HPV 33, [place] - GAVIN I, ECC- atypia     Cervical high risk HPV (human papillomavirus) test positive 7/8/15, 7/25/16    NIL pap/+ HR HPV (not 16 or 18).      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    leep     Depressive disorder      Gastro-oesophageal reflux disease      Hypertension      Profound impairment, one eye, impairment level not further specified     rt eye due to childhood injury     Systolic CHF (H) 3/12/2015     Type 2 diabetes mellitus without complications (H)      Uncomplicated asthma        PAST SURGICAL HISTORY:   Past Surgical History:   Procedure Laterality Date     AMPUTATE LEG BELOW KNEE Right 6/28/2018    Procedure: AMPUTATE LEG BELOW KNEE;  Right Knee Ampuation Below Knee, Anesthesia Block;  Surgeon: Ambrose King MD;  Location: [facility] OR      NONSPECIFIC PROCEDURE  2001    cholecystectomy     C NONSPECIFIC PROCEDURE  as a child    tonsillectomy     C NONSPECIFIC PROCEDURE  2001    whipple procedure     CARDIAC SURGERY      defib     COLPOSCOPY,LOOP ELECTRD CERVIX EXCIS  2002, 2011    stage 2 dysplasia     ENDOBRONCHIAL ULTRASOUND FLEXIBLE N/A 2/19/2015    Procedure: ENDOBRONCHIAL ULTRASOUND FLEXIBLE;  Surgeon: Brenden Tamez MD;  Location: [facility] GI     LEEP TX, CERVICAL  2014    LEEP TX Cervical     RECESSION RESECTION WITH ADJUSTABLE SUTURE  12/13/2011    Procedure:RECESSION RESECTION WITH ADJUSTABLE SUTURE; Right Strabismus Repair with Adjustable Suture       TUBAL LIGATION  2007    essure        FAMILY HISTORY:    Family History   Problem Relation Age of Onset     Diabetes Mother      diet controled     Hypertension Mother      Arthritis Mother      Lipids Mother      Diabetes Father      Hypertension Father      GASTROINTESTINAL DISEASE Father      gallbladder removed     Bipolar Disorder Brother      Thyroid Disease Brother      Obesity Other      Son     Respiratory Other      Son and Daughter; asthma     Depression Maternal Aunt      Anxiety Disorder Maternal Aunt          Past Anes Hx: No personal or family h/o anesthesia problems      Allergies:   Allergies   Allergen Reactions     No Clinical Screening - See Comments      Swelling in the throat.       Meds:   Prescriptions Prior to Admission   Medication Sig Dispense Refill Last Dose     acetaminophen (TYLENOL) 500 MG tablet Take 2 tablets (1,000 mg) by mouth 3 times daily   10/7/2018 at Unknown time     albuterol (PROAIR HFA) 108 (90 Base) MCG/ACT Inhaler Inhale 2 puffs into the lungs every 4 hours as needed for shortness of breath / dyspnea 1 Inhaler 0 10/7/2018 at Unknown time     amitriptyline (ELAVIL) 50 MG tablet Take 1 tablet (50 mg) by mouth At Bedtime 60 tablet 0 10/7/2018 at Unknown time     aspirin 81 MG tablet Take 1 tablet (81 mg) by mouth daily 100 tablet 3 10/7/2018 at Unknown time     clindamycin (CLEOCIN) 300 MG capsule Take 1 capsule (300 mg) by mouth 3 times daily 21 capsule 0 10/7/2018 at Unknown time     fluticasone (FLONASE) 50 MCG/ACT spray Spray 2 sprays into left nostril daily 1 Bottle 0 10/7/2018 at Unknown time     furosemide (LASIX) 20 MG tablet TAKE 1 TABLET (20 MG) BY MOUTH DAILY 90 tablet 0 10/7/2018 at Unknown time     glucose 4 g CHEW chewable tablet Take 3-4 tablets to treat low blood sugar. 25 tablet 11 10/7/2018 at Unknown time     levonorgestrel (MIRENA, 52 MG,) 20 MCG/24HR IUD placed today 1 each 0 10/7/2018 at Unknown time     Lidocaine (LIDOCARE) 4 % Patch Place 2 patches onto the skin every 24 hours 30 patch 0 Past Week at  Unknown time     lisinopril (PRINIVIL/ZESTRIL) 10 MG tablet Take 1 tablet (10 mg) by mouth daily 30 tablet 0 10/7/2018 at Unknown time     LYRICA 75 MG capsule TAKE 1 CAPSULE BY MOUTH 3 TIMES DAILY 90 capsule 3 10/7/2018 at Unknown time     methadone (DOLOPHINE-INTENSOL) 10 MG/ML (HIGH CONC) solution Take 75 mg by mouth daily Confirmed dosing on 10/8/18 with [facility] [place], [place] [phone]        metoprolol succinate (TOPROL-XL) 25 MG 24 hr tablet Take 1 tablet (25 mg) by mouth daily 90 tablet 1 10/7/2018 at Unknown time     multivitamin, therapeutic with minerals (THERA-VIT-M) TABS tablet Take 1 tablet by mouth daily 30 each 0 10/7/2018 at Unknown time     polyethylene glycol (MIRALAX/GLYCOLAX) Packet Take 17 g by mouth daily 7 packet 0 10/7/2018 at Unknown time     ranitidine (ZANTAC) 300 MG tablet Take 1 tablet (300 mg) by mouth daily 60 tablet 0 10/7/2018 at Unknown time     umeclidinium-vilanterol (ANORO ELLIPTA) 62.5-25 MCG/INH oral inhaler Inhale 1 puff into the lungs daily 1 Inhaler 2 10/7/2018 at Unknown time     acetone, Urine, test STRP 1 strip by In Vitro route as needed 25 each 1 Unknown at Unknown time     blood glucose (NO BRAND SPECIFIED) lancets standard Use to test blood sugar 10 times daily or as directed. Accu-check 100 each 11 Taking     blood glucose monitoring (NO BRAND SPECIFIED) meter device kit Use to test blood sugar 10 times daily or as directed. Accu-check 1 kit 0 Taking     blood glucose monitoring (NO BRAND SPECIFIED) test strip Use to test blood sugars 10 times daily or as directed. Accu-chek 100 strip 11 Taking     furosemide (LASIX) 40 MG tablet Take 1 tablet (40 mg) by mouth daily 30 tablet 0 Unknown at Unknown time     insulin aspart (NOVOLOG PEN) 100 UNIT/ML injection Inject 1-7 Units Subcutaneous 3 times daily (before meals) (Patient taking differently: Inject 2-5 Units Subcutaneous 3 times daily (before meals) ) 6.3 mL 0 Taking     insulin  glargine (LANTUS SOLOSTAR) 100 UNIT/ML pen Inject 8 Units Subcutaneous At Bedtime 2.4 mL 0 Taking     insulin pen needle (B-D U/F) 31G X 5 MM Use 3 time(s) a day. 3 each 3 Taking       No current outpatient prescriptions on file.       Physical Exam:  VS: T 98.7, P 102, /59, R 20, SpO2 92%   MP1, TM distance >3FB, mouth opening adequate, full ROM, edentulous.        BMP:  Lab Results   Component Value Date     10/11/2018      Lab Results   Component Value Date    POTASSIUM 4.3 10/11/2018     Lab Results   Component Value Date    CHLORIDE 111 10/11/2018     Lab Results   Component Value Date    RICK 8.5 10/11/2018     Lab Results   Component Value Date    CO2 21 10/11/2018     Lab Results   Component Value Date    BUN 30 10/11/2018     Lab Results   Component Value Date    CR 1.20 10/11/2018     Lab Results   Component Value Date     10/11/2018        CBC:  Lab Results   Component Value Date    WBC 18.9 10/11/2018     Lab Results   Component Value Date    HGB 8.2 10/11/2018     Lab Results   Component Value Date    HCT 25.0 10/11/2018     Lab Results   Component Value Date     10/11/2018        Coags/Type and Screen  Lab Results   Component Value Date    INR 1.41 10/11/2018     No results found for: PT  Type and Screen:                         Anesthesia Plan      History & Physical Review  History and physical reviewed and following examination; no interval change.    ASA Status:  3 .    NPO Status:  > 8 hours    Plan for MAC Reason for MAC:  Deep or markedly invasive procedure (G8)  PONV prophylaxis:  Ondansetron (or other 5HT-3)       Postoperative Care      Consents  Anesthetic plan, risks, benefits and alternatives discussed with:  Patient..          Yris Law MD    10/11/2018                  .

## 2018-10-12 NOTE — PROGRESS NOTES
[facility] General ID Service: Follow Up Note      Patient:  Alessandra Pak, Date of birth 1965, Medical record number [id number]  Date of Visit:  October 11, 2018         Assessment and Recommendations:   Problem List:  # Diabetic foot infection with osteomyelitis  # Fluctuant area on dorsal aspect of great toe on L  # Sepsis  # DM, insulin dependent    Discussion:  54 yo f with insulin dependent DM, PAD who presents with sepsis, source thought to be infection of L foot. She has been started on broad spectrum antibiotics (to match organisms present in cultures 10/4) and has improved somewhat in regards to sepsis.     Surgery teams involved (vascular, orthopedics). Revascularization procedure yesterday. Planned for OR tomorrow for 1st ray amputation. In addition to open wound on medial aspect of L foot, she has fairly significant area of fluctuance on dorsal aspect of great toe.    Recommendations:  - cont vancomcyin, cefepime, metronidazole for now  - agree with revascularization procedure  - Tomorrow during amputation, please send specimen to pathology to evaluate resection margin, as that will help guide duration of antimicrobial therapy    Patient seen and discussed with attending physician Dr. Brandyn Horn MD  Medicine-Pediatrics MP-1  [facility]  Pager: [phone]          Interval History:   Denies diarrhea, rash. Denies nausea/vomiting but has poor appetite. Afebrile. Denies SOB, abdominal pain. Area of fluctuance on her left first toe opened and is now draining purulent fluid. Underwent revascularization with vascular surgery yesterday.            Physical Exam:   Ranges for vital signs:  Temp:  [98  F (36.7  C)-99.6  F (37.6  C)] 98  F (36.7  C)  Heart Rate:  [] 100  Resp:  [12-20] 16  BP: (114-149)/(59-76) 138/73  SpO2:  [92 %-100 %] 93 %    Intake/Output Summary (Last 24 hours) at 10/11/18 1952  Last data filed at 10/11/18 0659   Gross per 24 hour   Intake           1688.33 ml   Output              450 ml   Net          1238.33 ml     Exam:  GENERAL:  well-developed, in good spirits, no apparent distress  ENT:  Head is normocephalic, atraumatic. .  EYES:  Eyes have anicteric sclerae.    NECK:  Supple.  LUNGS:  Clear to auscultation.  CARDIOVASCULAR:  2/6 systolic murmur at left sternal border.  ABDOMEN:  Normal bowel sounds, soft, nontender.  EXT: Extremities warm and without edema.  + area of fluctuance (~2-3 cm long) on 1st toe of L foot, now open draining purulent fluid  + wound with purulence on L medial aspect of foot  + small area of dehiscence on R stump         Laboratory Data:   Microbiology:         Culture Micro   Date Value Ref Range Status   10/08/2018 No growth  Final   10/07/2018 No growth after 5 days  Preliminary   10/07/2018 No growth after 5 days  Preliminary   10/05/2018 Heavy growth  Pseudomonas aeruginosa   (A)  Final   10/05/2018 Heavy growth  Escherichia coli   (A)  Final   10/05/2018 (A)  Final    Light growth  Raoultella ornithinolytica/planticola     10/05/2018 Moderate growth  Enterococcus faecalis   (A)  Final   10/05/2018 (A)  Final    Moderate growth  Methicillin resistant Staphylococcus aureus (MRSA)     10/05/2018 (A)  Final    Heavy growth  Corynebacterium striatum  Identification obtained by MALDI-TOF mass spectrometry research use only database. Test   characteristics determined and verified by the [facility]   ([facility]) [place], [place].  Susceptibility testing not routinely done     10/05/2018 Heavy growth  Streptococcus anginosus   (A)  Final   10/05/2018 (A)  Final    Heavy growth  Prevotella species  Identification obtained by MALDI-TOF mass spectrometry research use only database. Test   characteristics determined and verified by the [facility]   ([facility]) [place], [place].  Beta lactamase positive  Susceptibility testing not routinely done     10/05/2018 (A)  Final    Heavy  growth  Fusobacterium nucleatum  Susceptibility testing not routinely done     10/05/2018 (A)  Final    Plus  Heavy growth  Mixed aerobic and anaerobic jim     06/29/2018 No growth  Final   06/29/2018 No growth  Final   06/28/2018 No anaerobes isolated  Final   06/28/2018 No growth  Final   06/28/2018 No anaerobes isolated  Final   06/28/2018 (A)  Final    Light growth  Gram positive cocci  Organism failed to thrive for identification and suceptibility testing     06/13/2018 No growth  Final   06/13/2018 No growth  Final   06/11/2018 Light growth  Candida glabrata   (A)  Final   06/11/2018 Susceptibility testing not routinely done  Final   06/10/2018 No growth  Final   06/10/2018 No growth  Final   06/09/2018 No growth  Final   06/09/2018 Moderate growth  Enterococcus faecalis   (A)  Final   06/09/2018 (A)  Final    Light growth  Streptococcus mitis group  Susceptibility testing not routinely done     06/07/2018 No growth  Final   06/06/2018 No growth  Final   06/06/2018 No growth  Final   02/19/2018 No growth  Final   02/19/2018 No growth  Final   02/19/2018   Final    Canceled, Test credited  Incorrectly ordered by [facility]  Test reordered as correct code  Tissue culture     02/19/2018   Final    No anaerobes isolated  Since this specimen was not transported in the proper anaerobic transport media, the   absence of anaerobes in this culture does not rule out the presence of anaerobes in this   specimen.     02/19/2018 (A)  Final    Light growth  Methicillin resistant Staphylococcus aureus (MRSA)     02/19/2018 (A)  Final    Light growth  Coagulase negative Staphylococcus  Susceptibility testing not routinely done  This organism is part of normal jim, but on occasion, may be a true pathogen. Clinical   correlation must be applied to interpreting this microbiology result.     02/19/2018   Final    Critical Value/Significant Value called to and read back by  NESHA HORTON RN (7A).  02.21.18 2206 GJS      02/18/2018 No growth  Final   02/18/2018 No growth  Final   02/16/2018   Final    <10,000 colonies/mL  mixed urogenital jim  Susceptibility testing not routinely done     02/16/2018 No growth  Final   02/16/2018 No growth  Final   04/06/2016 (A)  Final    Heavy growth Staphylococcus aureus  Heavy growth Beta hemolytic Streptococcus group B This isolate DOES NOT   demonstrate inducible clindamycin resistance in vitro. Clindamycin is   susceptible and could be used when indicated, however, erythromycin is   resistant and should not be used.     06/24/2015   Final    <10,000 colonies/mL mixed urogenital jim Susceptibility testing not routinely   done     02/19/2015   Final    Culture negative for acid fast bacilli  Assayed at [facility] [address]     02/19/2015 (A)  Final    Normal respiratory jim  Light growth Candida albicans / dubliniensis Candida albicans and Candida   dubliniensis are not routinely speciated Susceptibility testing not routinely   done     02/19/2015 (A)  Final    Candida tropicalis isolated  Candida glabrata isolated  No additional fungi cultured after 4 weeks incubation     02/19/2015 No growth after 4 weeks  Final   02/16/2015 (A)  Final    Canceled, Test credited  >10 Squamous epithelial cells/low power field indicates oral contamination.   Please recollect.  Called to Cari on 4E, 2/16/15 14:10 CWi     02/15/2015 No growth  Final   02/15/2015 No growth  Final   02/13/2015 No growth  Final   02/13/2015 No growth  Final   10/31/2014 No growth  Final   10/29/2014   Final    Culture negative for acid fast bacilli  Assayed at [facility] [address]     10/29/2014 No growth  Final   10/29/2014 Culture negative after 4 weeks  Final   10/29/2014 No growth after 4 weeks  Final   10/29/2014 (A)  Final    Light growth Staphylococcus aureus  Light growth Cristela albicans / dubliniensis Candida albicans and Candida   dubliniensis are not routinely  speciated     10/28/2014 No growth  Final   10/28/2014 No growth  Final   11/23/2009   Final    <10,000 colonies/mL Beta hemolytic Streptococcus group B     Comment:     10 to 50,000 colonies/mL Mixed gram positive jim Multiple species present,   probable perineal contamination.  Clindamycin and Erythromycin are not routinely prescribed for isolates from   the urinary tract.   03/04/2005   Final    <10,000 colonies/mL Staphylococcus species Susceptibility testing not routinely     Comment:      done           Other Laboratory Data:    Urine Studies    Recent Labs   Lab Test  10/08/18   0032  06/07/18   0031  02/16/18   2036  06/24/15   1612  02/14/15   1545   LEUKEST  Small*  Small*  Large*  Moderate*  Duplicate request  SEE ACCN [id number]  *  Trace*   WBCU  7*  0  60*  2  0       Inflammatory Markers    Recent Labs   Lab Test  10/10/18   0605  10/05/18   1459  08/28/18   1325  08/23/18   1310  08/14/18   1340  08/08/18   1115  08/02/18   1215  07/30/18   1245   02/15/15   0240   SED   --   118*   --    --    --   46*  86*  109*   --   132*   CRP  280.0*  292.0*  52.1*  59.1*  76.7*  69.4*  70.9*  34.3*   < >   --     < > = values in this interval not displayed.       Hematology Studies    Recent Labs   Lab Test  10/12/18   0704  10/11/18   0829  10/10/18   0605  10/09/18   0553  10/08/18   0438  10/07/18   1749   08/28/18   1325  08/23/18   1310  08/14/18   1340  07/28/18   1539   06/12/18   0420   WBC  16.1*  18.9*  24.9*  20.9*  23.4*  24.8*   < >  7.4  8.1  5.9  7.5   < >  19.2*   ANEU   --    --    --    --    --   20.5*   --   3.8  3.9  3.1  3.3   --   14.2*   AEOS   --    --    --    --    --   0.1   --   0.2  0.2  0.2  0.3   --   1.0*   HGB  8.0*  8.2*  8.8*  8.5*  8.5*  10.0*   < >  10.5*  10.4*  10.2*  12.2   < >  7.2*   MCV  78  78  77*  78  79  81   < >  83  82  83  85   < >  75*   PLT  377  328  361  348  317  360   < >  273  301  243  215   < >  279    < > = values in this interval not displayed.        Immune Globulin Studies    Recent Labs   Lab Test  02/21/15   0652  02/15/15   1520   IGG  1330   --    IGE   --   46       Metabolic Studies     Recent Labs   Lab Test  10/12/18   0704  10/11/18   0829  10/10/18   0605  10/09/18   0553  10/08/18   0438   NA  139  138  139  141  136   POTASSIUM  4.3  4.3  4.4  4.2  4.3   CHLORIDE  110*  111*  110*  112*  108   CO2  23  21  21  19*  19*   BUN  24  30  32*  35*  51*   CR  1.28*  1.20*  1.28*  1.38*  1.65*   GFRESTIMATED  44*  47*  44*  40*  33*       Hepatic Studies    Recent Labs   Lab Test  10/12/18   0704  10/10/18   0605  08/28/18   1325  08/23/18   1310  08/14/18   1340  07/16/18   0758   BILITOTAL  0.3  0.3  0.3  0.2  0.2  0.2   ALKPHOS  149  152*  263*  283*  354*  1163*   ALBUMIN  1.5*  1.6*  2.8*  2.8*  2.5*  2.6*   AST  12  12  20  24  35  59*   ALT  14  20  33  42  46  75*       Thyroid Studies    Recent Labs   Lab Test  01/13/17   1353  11/02/16   1359   TSH  0.32*  0.34*   T4  1.06  1.02       Vancomycin Levels    Recent Labs   Lab Test  10/10/18   2230  08/08/18   1115  08/02/18   1215   VANCOMYCIN  20.7  20.8  16.4

## 2018-10-12 NOTE — PLAN OF CARE
Problem: Patient Care Overview  Goal: Plan of Care/Patient Progress Review  7D PT - After chart review and discussion with OT, PT is to defer to OT for ADLs and other therapy needs at this time due to upcomming orthro procedure. PT will continue to monitor if pt's status changes where she demonstrates IP PT needs.

## 2018-10-12 NOTE — PROGRESS NOTES
[facility] General ID Service: Follow Up Note      Patient:  Alessandra Pak, Date of birth 1965, Medical record number [id number]  Date of Visit:  October 11, 2018         Assessment and Recommendations:   Problem List:  # Diabetic foot infection with osteomyelitis  # Fluctuant area on dorsal aspect of great toe on L  # Sepsis  # DM, insulin dependent    Discussion:  52 yo f with insulin dependent DM, PAD who presents with sepsis, source thought to be infection of L foot. She has been started on broad spectrum antibiotics (to match organisms present in cultures 10/4) and has improved somewhat in regards to sepsis.     Surgery teams involved (vascular, orthopedics). She is planned to undergo revascularization (+/- amputation?) procedure today. In addition to open wound on medial aspect of L foot, she has fairly significant area of fluctuance on dorsal aspect of great toe. If amputation is deferred for significant period of time, would like surgical input regarding role of I&D/debridement for more urgent source control of infection.     Recommendations:  - cont vancomcyin, cefepime, metronidazole for now  - agree with revascularization procedure  - If amputation is deferred for significant period of time (for optimization of glucose, etc), would like surgical input regarding role of I&D/debridement of fluctuant area on dorsal aspect of great toe on left for more urgent source control of infection.   - If partial amputation is performed, please send specimen to pathology to evaluate resection margin, as that will help guide duration of antimicrobial therapy    Patient seen and discussed with attending physician Dr. Hakeem Manriquez MD  PGY-5 Infectious Diseases Fellow  [facility] [phone]          Interval History:   Denies diarrhea, rash. Denies nausea/vomiting but has poor appetite. Tmax overnight 100.5. Denies SOB, abdominal pain.            Physical Exam:   Ranges for vital signs:  Temp:  [96.3  F (35.7   C)-100.6  F (38.1  C)] 98.7  F (37.1  C)  Heart Rate:  [] 79  Resp:  [18-20] 20  BP: (114-149)/(59-72) 114/59  SpO2:  [86 %-94 %] 92 %    Intake/Output Summary (Last 24 hours) at 10/11/18 1952  Last data filed at 10/11/18 0659   Gross per 24 hour   Intake          1688.33 ml   Output              450 ml   Net          1238.33 ml     Exam:  GENERAL:  well-developed, sleeping appearing  ENT:  Head is normocephalic, atraumatic. .  EYES:  Eyes have anicteric sclerae.    NECK:  Supple.  LUNGS:  Clear to auscultation.  CARDIOVASCULAR:  Regular rate and rhythm with no murmurs, gallops or rubs.  ABDOMEN:  Normal bowel sounds, soft, nontender.  EXT: Extremities warm and without edema.  + area of fluctuance (~2-3 cm long) on 1st toe of L foot  + wound with purulence on L medial aspect of foot  + small area of dehiscence on R stump         Laboratory Data:   Microbiology:         Culture Micro   Date Value Ref Range Status   10/08/2018 No growth  Final   10/07/2018 No growth after 4 days  Preliminary   10/07/2018 No growth after 4 days  Preliminary   10/05/2018 Heavy growth  Pseudomonas aeruginosa   (A)  Final   10/05/2018 Heavy growth  Escherichia coli   (A)  Final   10/05/2018 (A)  Final    Light growth  Raoultella ornithinolytica/planticola     10/05/2018 Moderate growth  Enterococcus faecalis   (A)  Final   10/05/2018 (A)  Final    Moderate growth  Methicillin resistant Staphylococcus aureus (MRSA)     10/05/2018 (A)  Final    Heavy growth  Corynebacterium striatum  Identification obtained by MALDI-TOF mass spectrometry research use only database. Test   characteristics determined and verified by the [facility]   ([facility]) [place], [place].  Susceptibility testing not routinely done     10/05/2018 Heavy growth  Streptococcus anginosus   (A)  Final   10/05/2018 (A)  Final    Heavy growth  Prevotella species  Identification obtained by MALDI-TOF mass spectrometry research use only database.  Test   characteristics determined and verified by the [facility] Laboratory   ([facility]) [place], [place].  Beta lactamase positive  Susceptibility testing not routinely done     10/05/2018 (A)  Final    Heavy growth  Fusobacterium nucleatum  Susceptibility testing not routinely done     10/05/2018 (A)  Final    Plus  Heavy growth  Mixed aerobic and anaerobic jim     06/29/2018 No growth  Final   06/29/2018 No growth  Final   06/28/2018 No anaerobes isolated  Final   06/28/2018 No growth  Final   06/28/2018 No anaerobes isolated  Final   06/28/2018 (A)  Final    Light growth  Gram positive cocci  Organism failed to thrive for identification and suceptibility testing     06/13/2018 No growth  Final   06/13/2018 No growth  Final   06/11/2018 Light growth  Candida glabrata   (A)  Final   06/11/2018 Susceptibility testing not routinely done  Final   06/10/2018 No growth  Final   06/10/2018 No growth  Final   06/09/2018 No growth  Final   06/09/2018 Moderate growth  Enterococcus faecalis   (A)  Final   06/09/2018 (A)  Final    Light growth  Streptococcus mitis group  Susceptibility testing not routinely done     06/07/2018 No growth  Final   06/06/2018 No growth  Final   06/06/2018 No growth  Final   02/19/2018 No growth  Final   02/19/2018 No growth  Final   02/19/2018   Final    Canceled, Test credited  Incorrectly ordered by PCU/Clinic  Test reordered as correct code  Tissue culture     02/19/2018   Final    No anaerobes isolated  Since this specimen was not transported in the proper anaerobic transport media, the   absence of anaerobes in this culture does not rule out the presence of anaerobes in this   specimen.     02/19/2018 (A)  Final    Light growth  Methicillin resistant Staphylococcus aureus (MRSA)     02/19/2018 (A)  Final    Light growth  Coagulase negative Staphylococcus  Susceptibility testing not routinely done  This organism is part of normal jim, but on occasion, may be a true  pathogen. Clinical   correlation must be applied to interpreting this microbiology result.     02/19/2018   Final    Critical Value/Significant Value called to and read back by  NESHA HORTON RN (7A).  02.21.18 2206 GJS     02/18/2018 No growth  Final   02/18/2018 No growth  Final   02/16/2018   Final    <10,000 colonies/mL  mixed urogenital jim  Susceptibility testing not routinely done     02/16/2018 No growth  Final   02/16/2018 No growth  Final   04/06/2016 (A)  Final    Heavy growth Staphylococcus aureus  Heavy growth Beta hemolytic Streptococcus group B This isolate DOES NOT   demonstrate inducible clindamycin resistance in vitro. Clindamycin is   susceptible and could be used when indicated, however, erythromycin is   resistant and should not be used.     06/24/2015   Final    <10,000 colonies/mL mixed urogenital jim Susceptibility testing not routinely   done     02/19/2015   Final    Culture negative for acid fast bacilli  Assayed at [facility] [address]     02/19/2015 (A)  Final    Normal respiratory jim  Light growth Candida albicans / dubliniensis Candida albicans and Candida   dubliniensis are not routinely speciated Susceptibility testing not routinely   done     02/19/2015 (A)  Final    Candida tropicalis isolated  Candida glabrata isolated  No additional fungi cultured after 4 weeks incubation     02/19/2015 No growth after 4 weeks  Final   02/16/2015 (A)  Final    Canceled, Test credited  >10 Squamous epithelial cells/low power field indicates oral contamination.   Please recollect.  Called to Cari on 4E, 2/16/15 14:10 CWi     02/15/2015 No growth  Final   02/15/2015 No growth  Final   02/13/2015 No growth  Final   02/13/2015 No growth  Final   10/31/2014 No growth  Final   10/29/2014   Final    Culture negative for acid fast bacilli  Assayed at [facility] [address]     10/29/2014 No growth  Final   10/29/2014 Culture negative after 4 weeks   Final   10/29/2014 No growth after 4 weeks  Final   10/29/2014 (A)  Final    Light growth Staphylococcus aureus  Light growth Cristela albicans / dubliniensis Candida albicans and Candida   dubliniensis are not routinely speciated     10/28/2014 No growth  Final   10/28/2014 No growth  Final   11/23/2009   Final    <10,000 colonies/mL Beta hemolytic Streptococcus group B     Comment:     10 to 50,000 colonies/mL Mixed gram positive jim Multiple species present,   probable perineal contamination.  Clindamycin and Erythromycin are not routinely prescribed for isolates from   the urinary tract.   03/04/2005   Final    <10,000 colonies/mL Staphylococcus species Susceptibility testing not routinely     Comment:      done           Other Laboratory Data:    Urine Studies  Recent Labs   Lab Test  10/08/18   0032  06/07/18   0031  02/16/18   2036  06/24/15   1612  02/14/15   1545   LEUKEST  Small*  Small*  Large*  Moderate*  Duplicate request  SEE ACCN [id number]  *  Trace*   WBCU  7*  0  60*  2  0       Inflammatory Markers  Recent Labs   Lab Test  10/10/18   0605  10/05/18   1459  08/28/18   1325  08/23/18   1310  08/14/18   1340  08/08/18   1115  08/02/18   1215  07/30/18   1245   02/15/15   0240   SED   --   118*   --    --    --   46*  86*  109*   --   132*   CRP  280.0*  292.0*  52.1*  59.1*  76.7*  69.4*  70.9*  34.3*   < >   --     < > = values in this interval not displayed.       Hematology Studies  Recent Labs   Lab Test  10/11/18   0829  10/10/18   0605  10/09/18   0553  10/08/18   0438  10/07/18   1749  10/05/18   1459  08/28/18   1325  08/23/18   1310  08/14/18   1340  07/28/18   1539   06/12/18   0420   WBC  18.9*  24.9*  20.9*  23.4*  24.8*  15.0*  7.4  8.1  5.9  7.5   < >  19.2*   ANEU   --    --    --    --   20.5*   --   3.8  3.9  3.1  3.3   --   14.2*   AEOS   --    --    --    --   0.1   --   0.2  0.2  0.2  0.3   --   1.0*   HGB  8.2*  8.8*  8.5*  8.5*  10.0*  9.6*  10.5*  10.4*  10.2*  12.2   < >  7.2*    MCV  78  77*  78  79  81  81  83  82  83  85   < >  75*   PLT  328  361  348  317  360  324  273  301  243  215   < >  279    < > = values in this interval not displayed.       Immune Globulin Studies  Recent Labs   Lab Test  02/21/15   0652  02/15/15   1520   IGG  1330   --    IGE   --   46       Metabolic Studies   Recent Labs   Lab Test  10/11/18   0829  10/10/18   0605  10/09/18   0553  10/08/18   0438  10/07/18   1749   NA  138  139  141  136  133   POTASSIUM  4.3  4.4  4.2  4.3  4.9   CHLORIDE  111*  110*  112*  108  104   CO2  21  21  19*  19*  21   BUN  30  32*  35*  51*  63*   CR  1.20*  1.28*  1.38*  1.65*  2.12*   GFRESTIMATED  47*  44*  40*  33*  25*       Hepatic Studies  Recent Labs   Lab Test  10/10/18   0605  08/28/18   1325  08/23/18   1310  08/14/18   1340  07/16/18   0758  06/29/18   0539   BILITOTAL  0.3  0.3  0.2  0.2  0.2  0.2   ALKPHOS  152*  263*  283*  354*  1163*  799*   ALBUMIN  1.6*  2.8*  2.8*  2.5*  2.6*  2.0*   AST  12  20  24  35  59*  68*   ALT  20  33  42  46  75*  58*       Thyroid Studies  Recent Labs   Lab Test  01/13/17   1353  11/02/16   1359   TSH  0.32*  0.34*   T4  1.06  1.02       Vancomycin Levels  Recent Labs   Lab Test  10/10/18   2230  08/08/18   1115  08/02/18   1215   VANCOMYCIN  20.7  20.8  16.4

## 2018-10-12 NOTE — PROGRESS NOTES
"[facility] Nurse Inpatient Wound Assessment   Reason for consultation: Evaluate and treat Right Stump wound ( Podiatry following Left Metatarsal Wound)                                            10/12/18 NEW Right Hip Wound     Assessment  Right lateral stump wound due to Trauma pt indicates she recently fell and area split open. Wound open and draining, appears dressing was in place for several days trapping moisture. Wound is clean with no signs or symptoms of infection. Will Apply Aquacel ag and cover with absorbent dressing W   Status: Follow assessment    Right Trochanter: Stage 2 Pressure injury Hospital Acquired   Status: Initial assessment     Treatment Plan  Right Lateral stump wound: Every other day and Weekly      1. Cleanse with MicroKlenz and blot dry  2. Cut piece of Aquacel AG (#[id number]) to size of wound and lightly moisten  3. Cover with absorbent dressing    Right Trochanter  1. Clean wound with MicroKlenz Spray, pat dry  2. Paint with No Sting Skin Prep (#[id number]) and allow to dry thoroughly  3. Press a Mepilex  Border Dressing (#[id number]) to the area, making sure to conform nicely to skin curvatures (begin placing the Mepilex at the most distal aspect first, smooth into place in an upward direction, then smooth side to side)   4. Time and date dressing change and fletcher with a \"T\" for treatment of a wound  5. Reposition pt every 1 to 2 hours when in bed and hourly when up to the chair to relieve pressure and promote perfusion to tissue     Orders Written  WO Nurse follow-up plan:weekly  Nursing to notify the Provider(s) and re-consult the WO Nurse if wound(s) deteriorates or new skin concern.     Patient History  According to provider note(s):  Alessandra Pak is a 53 year old female  admitted on 10/7/2018. She has a history of diabetes, osteomyelitis, s/p right foot amputation, systolic heart failure, ICD placement and is admitted for recurrent osteomyelitis with evidence for sepsis as well as " NIKOLAY.     Objective Data  Containment of urine/stool: Incontinence Protocol     Active Diet Order     Active Diet Order      Combination Diet Regular Diet Adult     Output:   I/O last 3 completed shifts:  In: 307.5 [P.O.:120; IV Piggyback:187.5]  Out: -      Risk Assessment:   Sensory Perception: 3-->slightly limited  Moisture: 4-->rarely moist  Activity: 3-->walks occasionally  Mobility: 3-->slightly limited  Nutrition: 3-->adequate  Friction and Shear: 2-->potential problem  Kye Score: 18      Labs:   Recent Labs  Lab 10/08/18  0438   10/05/18  1459   HGB 8.5*  < > 9.6*   INR 1.27*  --   --    WBC 23.4*  < > 15.0*   CRP  --   --  292.0*   < > = values in this interval not displayed.     Physical Exam  Skin inspection: focused RLE     Wound Location:  Right stump  Date of last photo 10/8/18          Wound History: hx of BKA, pt reports fall occurred causing wound and has been followed by Dr. King with orthopedics outpatient.  Measurements (length x width x depth, in cm) 1.0 cm x 1.9 cm  x  0.1 cm   Wound Base: Pink dry dermal base that is resurfacing with epithelial tissue at edges and thin layer of serous crusting. Wound is very dry and LE skin dry and scaly  Tunneling N/A  Undermining N/A  Palpation of the wound bed: normal   Periwound skin: intact  Color: normal and consistent with surrounding tissue  Temperature: normal   Drainage:, scant  Description of drainage: serous  Odor: none  Pain: denies     Wound Location:  Right trochanter  Date of last photo 10/8/18, phone unable to connect and log in- no picture taken 10/12  Wound History: Pt is thin and frail with bony prominences  Measurements (length x width x depth, in cm) 3.5 cm x 3.7 cm  x  0.1 cm   Wound Base: pale pink moist 100% dermal base, directly over trochanter head appears pt has some dry thickened skin approx dime sized.  Tunneling N/A  Undermining N/A  Palpation of the wound bed: normal   Periwound skin: intact  Color: normal and consistent  with surrounding tissue  Temperature: normal   Drainage:, scant  Description of drainage: serous  Odor: none  Pain: denies      Interventions  Current support surface: Standard  Atmos Air mattress  Current off-loading measures: Pillows under calves  Visual inspection of wound(s) completed  Wound Care: completed by RN  Supplies: ordered: Comfeel dressing  Education provided: importance of repositioning, wound progress and Infection prevention   Discussed plan of care with Patient and Nurse

## 2018-10-12 NOTE — PLAN OF CARE
Problem: Patient Care Overview  Goal: Plan of Care/Patient Progress Review  Outcome: Therapy, progress towards functional goals is fair  Pt s/p left common iliac artery stent placement r/t left ft osteomyelitis. Left groin site c/d/i covered with guaze and tagaderm no drainage noted. Pedal pulses observed via doppler. Left foot dressing intact with scant serous sanguineous drainage. Pt to be bed rested until 0200 10/12. C/o mild pain oxycodone given x1. On capnography r/t medication given for procedure. Will continue to monitor and continue POC.

## 2018-10-12 NOTE — ANESTHESIA POSTPROCEDURE EVALUATION
Patient: Alessandra Pak    Procedure(s):   Placement of 8x80 mm EV3 Self-Expanding Stent on the Left Common Iliac Artery, Aortogram - Wound Class: I-Clean   - Wound Class: I-Clean    Diagnosis:Left Foot Osteomylitits   Diagnosis Additional Information: No value filed.    Anesthesia Type:  MAC    Note:  Anesthesia Post Evaluation    Patient location during evaluation: Bedside  Patient participation: Able to fully participate in evaluation  Level of consciousness: awake  Pain management: adequate  Airway patency: patent  Cardiovascular status: acceptable  Respiratory status: acceptable  Hydration status: acceptable  PONV: none     Anesthetic complications: None          Last vitals:  Vitals:    10/11/18 0041 10/11/18 0619 10/11/18 1535   BP:  127/70 114/59   Pulse:      Resp:  18 20   Temp: 38.1  C (100.5  F) 35.7  C (96.3  F) 37.1  C (98.7  F)   SpO2:  93% 92%         Electronically Signed By: Yris Law MD  October 11, 2018  8:16 PM

## 2018-10-12 NOTE — PLAN OF CARE
Problem: Patient Care Overview  Goal: Plan of Care/Patient Progress Review  OT/7D:  Discharge Planner OT   Patient plan for discharge: Did not discuss this date.   Current status: Follow-up with splint fit and comfort. Good compliance with wear schedule and no skin integrity concerns noted. Re-educating patient on proper positioning, as splint slipped proximally. Educated in gentle self-ROM and AROM to wrist flex/ext, digital flex/ext x10 repetitions each. Would benefit from continued education to reinforce. Educating patient on need to re-assess transfer/ADL status following surgery tomorrow.   Barriers to return to prior living situation: Medical Status, Deconditioning  Recommendations for discharge: Anticipate home with 24 hour assist from family, but will need to re-assess transfer and ADL safety following surgery tomorrow. Would also benefit from OP hand therapy.   Rationale for recommendations: Due to decreased ADL/Mobility independence/safety and apparent radial nerve palsy.        Entered by: Ruchi Leon 10/12/2018 2:54 PM

## 2018-10-12 NOTE — OP NOTE
Date of procedure: October 11, 2018    Preop Dx: nonhealing ulcer left foot with osteomyelitis    Postop Dx: same     Procedure:   1. Placement of catheter in aorta, aortogram with pelvic runoff  2. Placement of 8 X 80 EV3 self-expanding stent in left common iliac artery post-dilated to 7 mm   3. Ultrasound guidance for arterial access     Surgeon: NETTIE Llamas MD     Assistant: Emeka Larson MD     Anesthesia: MAC with local infiltration     Complications: None     EBL: < 10 ml    Radiation dose: 47 mGy    Contrast: 30 ml Isoview 370    Indications: 52 YO female with DM, CKI, AICD, CHF, HTN, and recent right BKA has presented with sepsis due to infected left medial foot wound with presumed osteomyelitis. The patient has a persistently elevated WBC despite IV antibiotics and a large open wound on the medial foot overlying the 1st MT. The left femoral pulse was weak, and the GABRIEL was .55. A previous CTA from June shows an advanced stenosis of the left common iliac artery with no other vascular lesions. Planned left common iliac angioplasty and stent to increase flow to foot. I discussed the risks and benefits of the procedure with the patient, and consent was signed.      Findings:   1. Diffuse calcific atherosclerosis of aorta without hemodynamically significant stenosis  2. 80% stemosis in left common iliac artery dilated with 8 X 80 mm self expanding stent post-dilated to 7 mm. Excellent angiographic result  3. Patent left external iliac artery without stenosis  4. Patent left common femoral artery without stenosis     Description of operation: The patient was brought to the operating room and placed in the supine position. The patient's groins lower abdomen and thighs were prepped and draped, and a time out was called. Under ultrasound guidance, the left common femoral artery was accessed with a micropuncture kit, and a 5 Fr sheath was easily placed. A retrograde angiogram was performed, demonstrating occluion  of the common iliac artery at the iliac bifurcation. A glidewire was used to traverse the lesion, and placement in the aorta was confirmed angiographically. 5000 units of IV heparin was administered. A marker pigtail catheter was placed in the aorta, and an aortogram was performed. The 5 Fr sheath was upsized to a 6 Fr sheath, a stiff glidewire was placed, then an 8 X 80 EV3 self-expanding stent was deployed in the lesion. The stent was post-dilated to 7 mm. A completion arteriogram demonstrated a widely patent repair without dissection, residual stensois or extravasation. At this time, the catheters and wires were removed. A MInx closure device was deployed but failed, so the puncture site was held manually for 30 minutes. At the end of the procedure, the patient had a palpable PT pulse in the left foot. She was transported to the PACU in satisfactory condition. I was present, scrubbed and supervised all key and critical portions of this case.      NETTIE Llamas MD  Professor of Surgery  Division of Vascular Surgery

## 2018-10-12 NOTE — PROGRESS NOTES
Calorie Count  Intake recorded for 10/11/2018  Kcals: 0  Protein: 0g  # Meals Recorded: No meals ordered from kitchen. No intake recorded.   # Supplements Recorded: No intake recorded

## 2018-10-12 NOTE — PLAN OF CARE
Problem: Patient Care Overview  Goal: Plan of Care/Patient Progress Review  Outcome: No Change  Afebrile, ovss. Pt remains on capnography d/t procedure yesterday. C/o pain in left foot, oxy given x1 w/ relief. Denies nausea. Left groin dressing c/d/i. Dressing on left foot changed x1.  overnight. Continue to monitor.

## 2018-10-13 NOTE — ANESTHESIA CARE TRANSFER NOTE
Patient: Alessandra Pak    Procedure(s):  Left Foot Partial Amputation - Wound Class: III-Contaminated    Diagnosis: Left Foot Infection  Diagnosis Additional Information: No value filed.    Anesthesia Type:   General     Note:  Airway :Nasal Cannula  Patient transferred to:PACU  Comments: Pt transferred to PACU.  Maintaining airway and oxygenation via NC.  VSS.  Report to RN.  All questions answered. Handoff Report: Identifed the Patient, Identified the Reponsible Provider, Reviewed the pertinent medical history, Discussed the surgical course, Reviewed Intra-OP anesthesia mangement and issues during anesthesia, Set expectations for post-procedure period and Allowed opportunity for questions and acknowledgement of understanding      Vitals: (Last set prior to Anesthesia Care Transfer)    CRNA VITALS  10/13/2018 0839 - 10/13/2018 0921      10/13/2018             NIBP: 132/71    Ht Rate: 97    SpO2: 99 %                Electronically Signed By: GAVINO Mata CRNA  October 13, 2018  9:21 AM

## 2018-10-13 NOTE — ANESTHESIA PREPROCEDURE EVALUATION
Anesthesia Evaluation     . Pt has had prior anesthetic.            ROS/MED HX    ENT/Pulmonary:     (+)asthma COPD, , . .    Neurologic:  - neg neurologic ROS     Cardiovascular:     (+) hypertension----. : . CHF etiology: NICM Last EF: 30 . . :. . Previous cardiac testing Echodate:6/6/18results:Dobutamine stress echo was aborted due to LV dysfunction.  Moderately (EF 30-35%) reduced left ventricular function is present. LVEF 33%  based on biplane tracing. Global hypokinesis.  Left ventricular hypertrophy.  Normal right ventricular size and systolic function.  No significant valve disease.date: results: date: results: date: results:          METS/Exercise Tolerance:     Hematologic:         Musculoskeletal:  - neg musculoskeletal ROS       GI/Hepatic:     (+) GERD       Renal/Genitourinary:     (+) chronic renal disease,       Endo:     (+) type I DM, thyroid problem .      Psychiatric:     (+) psychiatric history anxiety      Infectious Disease:   (+) MRSA, Other Infectious Disease       Malignancy:      - no malignancy   Other:    - neg other ROS                 Physical Exam      Airway   Mallampati: I  Neck ROM: full  Comment: Appears feasible; she is edentulous. Good mouth opening    Dental   (+) lower dentures and upper dentures    Cardiovascular   Rhythm and rate: regular and normal      Pulmonary    breath sounds clear to auscultation    Other findings:                     Anesthesia Plan      History & Physical Review  History and physical reviewed and following examination; no interval change.    ASA Status:  3 .    NPO Status:  > 8 hours    Plan for General and ETT with Intravenous and Propofol induction. Maintenance will be Balanced.    PONV prophylaxis:  Ondansetron (or other 5HT-3) and Dexamethasone or Solumedrol  Additional equipment: 2nd IV      Postoperative Care  Postoperative pain management:  Multi-modal analgesia.  Plan for postoperative opioid use.    Consents  Anesthetic plan, risks,  benefits and alternatives discussed with:  Patient.  Use of blood products discussed: Yes.   Use of blood products discussed with Patient.  Consented to blood products.  .                          .

## 2018-10-13 NOTE — PLAN OF CARE
Problem: Patient Care Overview  Goal: Plan of Care/Patient Progress Review  Outcome: No Change  Afebrile, Heart rate Tachy at times. OVSS.  Pt will be NPO after Midnight for the OR tomorrow. Surgical prep bath completed.  Regular diet= Good PO. No complaints of nausea. Pt complained of increasing  Left Foot pain. MD notified. One time order for Dilaudid and increased Oxycodone  Is providing much better pain control.

## 2018-10-13 NOTE — PROGRESS NOTES
"  Orthopaedic Surgery Progress Note   October 12, 2018    Subjective: Underwent vascular procedure - Angio with left common iliac artery stent. Patient reports she is ready for surgery of her left foot today, no questions at this time. Has been NPO since last night.    Objective: /71 (BP Location: Left arm)  Pulse 102  Temp 99.2  F (37.3  C) (Oral)  Resp 18  Ht 1.753 m (5' 9\")  Wt 50.7 kg (111 lb 11.2 oz)  SpO2 95%  BMI 16.5 kg/m2    General: NAD, alert and oriented, cooperative with exam.  Cardio: RRR, extremities wwp.   Respiratory: Non-labored breathing.  MSK: Focused examination of left foot with stable ulceration over medial border of 1st MTP with exposed bone. Purulent drainage on dressing and can be expressed from plantar surface, but no palpable abscess. NO over erythema around the borders of the ulceration.    Labs:     CBC RESULTS:   Recent Labs   Lab Test  10/12/18   0704   WBC  16.1*   RBC  3.16*   HGB  8.0*   HCT  24.5*   MCV  78   MCH  25.3*   MCHC  32.7   RDW  15.5*   PLT  377     Lab Results   Component Value Date    A1C 9.5 06/06/2018    A1C 12.8 02/16/2018    A1C 11.8 06/21/2017    A1C 11.5 11/02/2016    A1C >15.0  Results confirmed by repeat test   07/25/2016     Lab Results   Component Value Date    ALBUMIN 2.8 08/28/2018     HGB 8.3 (10/13)  WBC 13.5 (10/13)  INR 1.56 (10/13)  Cr 1.29 (10/13)  Type and screen done 10/11    Assessment and Plan: Alessandra Pak is a 53 year old female with poorly controlled diabetes with associated peripheral neuropathy and chronic ulceration. S/p right BKA and with ongoing left foot ulcer previously managed by podiatry and now with referral to ortho (Dr. Peralta) for consideration of amputation. Last seen by podiatry on 10/5/18. Admitted to hospital 10/7/18 due to osteomyelitis at base of ulcer and elevated inflammatory markers. Underwent placement of left RAJNI stent to improve perfusion of foot 10/11/18.    -Plan for OR today for partial left foot " amputation (amputation of first ray)  -NPO  -continue to work on glucose control and nutrition optimization  -continue abx per ID (Cefepime, Vanco, Metronidazole)  -recommend continued wound cares supervised by WOC  -scheduled to follow up with Dr. Peralta 10/16/18, plan to keep this appointment  -F/u TBD    Lee Obrien MD  Orthopaedic Surgery PGY1  Pager: [phone]

## 2018-10-13 NOTE — PLAN OF CARE
Problem: Patient Care Overview  Goal: Plan of Care/Patient Progress Review  Outcome: No Change  00:00-06:00 am  Temp max 99.2 with  OVSS   NPO at MN for partial amputation of left foot today  Pre-op check list initiated  Continue with current IV Abx as previous   and 183    Oxycodone po 10 mg x1 and 20 mg x1 given during the night with adequate relief between doses for c/o left foot pain  No nausea/vomiting  Voiding spontaneously well  Large UOP   Pt appears tired and fatigue but oriented x 4  Pt is otherwise stable and no new acute events overnight  Continue w/POC

## 2018-10-13 NOTE — PROGRESS NOTES
7929-7916: Pt returned from PACU at 1045 s/p left foot-1st 2 toes were removed. Hard boot in place and dressing c/d/i underneath. VSS. Pulsate mattress ordered after reviewing bed algorithm. Oxycodone given for pain. Reports LBM 5-6 days ago. Senna-s given, BS + and denies abdominal distention. MD notified and Miralax and suppository ordered PRN. NWB for 2-3 weeks and patient is aware of this restriction. Hoping to transfer patient into a lift room.     0487-0812: Miralax given and will assess for suppository this evening. Pulsate mattress set up on her bed and pt moved into a lift room. Reports pain to right foot is controlled and declined need for further oxycodone at this time. Pt turned in bed q 2 hours.

## 2018-10-13 NOTE — BRIEF OP NOTE
[facility], [place]    Brief Operative Note    Pre-operative diagnosis: Left Foot Infection  Post-operative diagnosis Same   Procedure: Procedure(s):  Left Foot Partial Amputation - Wound Class: III-Contaminated  Surgeon: Surgeon(s) and Role:     * Jamey Peralta MD - Primary  Anesthesia: General   Estimated blood loss: Less than 10 ml  Drains: None  Specimens:   ID Type Source Tests Collected by Time Destination   A : Left toes Tissue Toe SURGICAL PATHOLOGY EXAM Jamey Peralta MD 10/13/2018  8:23 AM      Findings:   necrotic and infected tissue lateral to the first MTP in the first web space necessitating amputaiton of the second toe..  Complications: None.  Implants: None.      NWB LLE until wound healed, like 2-3 weeks   Activity: recommend bedrest with slideboard transfers given NWB LLE status.   Soft dressing, will change POD#3-4   CAM boot to maintain neutral dorsiflexion -- may benefit from PRAFO to the LLE if it can maintain dorsiflexion   Abx per primary team   Follow up 2 weeks     Pablo Elizabeth MD  Orthopedic Surgery, PGY-4  Pager: [phone]

## 2018-10-13 NOTE — ANESTHESIA POSTPROCEDURE EVALUATION
Patient: Alessandra Pak    Procedure(s):  Left Foot Partial Amputation - Wound Class: III-Contaminated    Diagnosis:Left Foot Infection  Diagnosis Additional Information: No value filed.    Anesthesia Type:  General    Note:  Anesthesia Post Evaluation    Patient location during evaluation: PACU  Patient participation: Able to fully participate in evaluation  Level of consciousness: awake and alert  Pain management: adequate  Airway patency: patent  Cardiovascular status: acceptable  Respiratory status: acceptable  Hydration status: acceptable  PONV: none     Anesthetic complications: None          Last vitals:  Vitals:    10/13/18 0915 10/13/18 0930 10/13/18 0945   BP: 132/71 129/75 130/74   Pulse:      Resp: 26 27    Temp: 36.1  C (97  F) 36.1  C (97  F) 36.1  C (97  F)   SpO2: 99% 100%          Electronically Signed By: Daniel Jones MD  October 13, 2018  9:48 AM

## 2018-10-13 NOTE — PROGRESS NOTES
"VASCULAR SURGERY PROGRESS NOTE    Subjective:  C/o L foot pain. No acute events O/N.    Objective:  Intake/Output Summary (Last 24 hours) at 10/13/18 1120  Last data filed at 10/13/18 1021   Gross per 24 hour   Intake             1250 ml   Output             1420 ml   Net             -170 ml     Labs:  ROUTINE IP LABS (Last four results)  BMP  Recent Labs  Lab 10/13/18  0524 10/12/18  0704 10/11/18  0829 10/10/18  0605    139 138 139   POTASSIUM 4.0 4.3 4.3 4.4   CHLORIDE 109 110* 111* 110*   RICK 8.6 8.6 8.5 8.5   CO2 22 23 21 21   BUN 24 24 30 32*   CR 1.29* 1.28* 1.20* 1.28*   * 120* 170* 152*     CBC  Recent Labs  Lab 10/13/18  0524 10/12/18  0704 10/11/18  0829 10/10/18  0605   WBC 13.5* 16.1* 18.9* 24.9*   RBC 3.25* 3.16* 3.19* 3.40*   HGB 8.3* 8.0* 8.2* 8.8*   HCT 25.2* 24.5* 25.0* 26.2*   MCV 78 78 78 77*   MCH 25.5* 25.3* 25.7* 25.9*   MCHC 32.9 32.7 32.8 33.6   RDW 15.6* 15.5* 15.6* 15.5*    377 328 361     INR  Recent Labs  Lab 10/13/18  0524 10/12/18  0704 10/11/18  0829 10/08/18  0438   INR 1.56* 1.59* 1.41* 1.27*     PHYSICAL EXAM:  /71 (BP Location: Right arm)  Pulse 102  Temp 97.6  F (36.4  C) (Oral)  Resp 11  Ht 1.753 m (5' 9\")  Wt 50.7 kg (111 lb 11.2 oz)  SpO2 95%  BMI 16.5 kg/m2  General: The patient is alert and oriented. Appropriate. No acute distress  Psych: pleasant affect, answers questions appropriately  Skin: Color appropriate for race, warm, dry.  Respiratory: The patient does not require supplemental oxygen. Breathing unlabored  GI:  Abdomen soft, nontender to light palpation.  Extremities: L DP pulse palpable,  Foot dressing intact.      ASSESSMENT:  53F POD2 s/p LLE angio, iliac stent      PLAN:  Continue neurovascular checks  Needs to be on ASA daily  OR for debridement today with ortho      Manuela Avila MD  Vascular Surgery Fellow  [facility] [phone]                  "

## 2018-10-14 NOTE — PROGRESS NOTES
[facility], [place]    Internal Medicine Progress Note - Gold Service      Assessment & Plan   Alessandra Pak is a 53 year old female admitted on 10/7/2018. She has a history of poorly controlled diabetes, osteomyelitis s/p amputation of R foot in June 2018, systolic HF, s/p ICD placement and is admitted for recurrent osteomyelitis with evidence of sepsis. Now s/p amputation of first and second toe on 10/13. On IV antibiotics.    # Sepsis secondary to Osteomyelitis  # Peripheral vascular disease  # Poorly controlled diabetes    Presented with worsening left toe pain.  Has known left foot ulcer.  Followed by podiatry as outpatient.  Had been on Clinda as outpatient after debridement on 10/5 but came to the hospital with worsening pain, chills, rigors and altered mental status.  Noted to have increased inflammatory markers and leukocytosis.  Imaging showing likely osteomyelitis distal first metatarsal and tibial sesamoid  concerning for osteoid septic joint.  Bone visible on exam.  Polymicrobial organisms on cultures including Pseudomonas.  Improving in mental status and leukocytosis  -Orthopedics and vascular surgery following.  Appreciate recs  -ID following.  Continue cefepime, Vanco, metronidazole  - planning to offer revascularization in hopes of foot salvage.  Now s/p left common iliac artery angioplasty 10/11  - patient went to OR 10/12 for first ray amputation (partial amputation of the left foot)  Continue abx after amputation for few days, await cultures  Non-weight bearing status  Cam Boots ordered by Ortho    # Diabetes mellitus  Patient currently only on mealtime coverage  Will hold off on adding basal insulin because she will be n.p.o. For procedure  This AM her glucose was 120, so will hold off on adding any basal and continue the mealtime coverage for  Perioperatively, will have to monitor closely    # NIKOLAY  Likely secondary to volume depletion in the setting of sepsis and  recently increased Lasix dose.  Poor p.o. intake as well.  Improved in the last few days.  Holding Lasix.   Improved and stabilized around 1.2    #Right-sided weakness, arm > legs  # Likely radial nerve palsy  Head CT negative for bleed or mass.  Cannot obtain MRI due to her pacemaker  Improved this morning  Seen by neurology, appreciate recs  Will need EMG as outpatient and neuro followup    #Chronic pain  -Continue home methadone  -Currently on decreased home Lyrica dose in the setting of AK I, will have to increase after NIKOLAY resolves    #History of systolic heart failure  EF of 35-40% on last echocardiogram.  -Holding home metoprolol and Lasix in the setting of sepsis        Diet: Snacks/Supplements Adult: Boost Glucose Control; Between Meals  Room Service  Moderate Consistent CHO Diet  Fluids: PO intake  Carter Catheter: not present    DVT Prophylaxis: Pneumatic Compression Devices  Code Status: Full Code    Expected discharge: 4 - 7 days, recommended to prior living arrangement once SIRS/Sepsis treated.    The patient's care was discussed with the Bedside Nurse, Patient and Vascular surgery Consultant.    Evin Yousif MD  Internal Medicine Staff Hospitalist Service  [facility]  Pager: Text page  Please see sticky note for cross cover information    Interval History   No acute events overnight  Saw patient after she came back from OR for amputation  Tolerated the procedure well without complications  Denies any chest pain or shortness of breath    Four-point review of systems otherwise negative      Data reviewed today: I reviewed all medications, new labs and imaging results over the last 24 hours. I personally reviewed the report of US image(s) showing low GABRIEL.    Physical Exam   Vital Signs: Temp: 100.8  F (38.2  C) Temp src: Oral BP: 149/80 Pulse: 100 Heart Rate: 104 Resp: 16 SpO2: 96 % O2 Device: Nasal cannula Oxygen Delivery: 1 LPM  Weight: 111 lbs 11.2 oz  General Appearance: Chronically  ill-appearing woman but awake and alert, very pleasant  Respiratory: Lungs are clear bilaterally   Cardiovascular: Normal rate regular rhythm no murmurs rubs or gallops  GI: Soft abdomen nontender nondistended  Skin: Right-sided BKA with stump with ulcer.  Cam boot on the left lower extremity after amputation  Other: full affect today, good insight on her disease process        Data     Recent Labs  Lab 10/13/18  0524 10/12/18  0704 10/11/18  0829 10/10/18  0605   WBC 13.5* 16.1* 18.9* 24.9*   HGB 8.3* 8.0* 8.2* 8.8*   MCV 78 78 78 77*    377 328 361   INR 1.56* 1.59* 1.41*  --     139 138 139   POTASSIUM 4.0 4.3 4.3 4.4   CHLORIDE 109 110* 111* 110*   CO2 22 23 21 21   BUN 24 24 30 32*   CR 1.29* 1.28* 1.20* 1.28*   ANIONGAP 6 6 7 8   RICK 8.6 8.6 8.5 8.5   * 120* 170* 152*   ALBUMIN  --  1.5*  --  1.6*   PROTTOTAL  --  6.6*  --  6.8   BILITOTAL  --  0.3  --  0.3   ALKPHOS  --  149  --  152*   ALT  --  14  --  20   AST  --  12  --  12       No results found for this or any previous visit (from the past 24 hour(s)).  Medications       amitriptyline  50 mg Oral At Bedtime     ceFEPIme (MAXIPIME) IV  2 g Intravenous Q12H     insulin aspart  1-7 Units Subcutaneous TID AC     insulin aspart  1-5 Units Subcutaneous At Bedtime     LORazepam  0.5 mg Oral Once     methadone  75 mg Oral Daily     metoprolol succinate  25 mg Oral Daily     metroNIDAZOLE  500 mg Oral Q8H ORESTES     multivitamin, therapeutic with minerals  1 tablet Oral Daily     polyethylene glycol  17 g Oral Daily     pregabalin  50 mg Oral TID     ranitidine  150 mg Oral Daily     sodium chloride (PF)  3 mL Intracatheter Q8H     umeclidinium-vilanterol  1 puff Inhalation Daily     vancomycin (VANCOCIN) IV  1,000 mg Intravenous Q24H

## 2018-10-14 NOTE — PROGRESS NOTES
[facility], [place]    Internal Medicine Progress Note - Gold Service      Assessment & Plan   Alessandra Pak is a 53 year old female admitted on 10/7/2018. She has a history of poorly controlled diabetes, osteomyelitis s/p amputation of R foot in June 2018, systolic HF, s/p ICD placement and is admitted for recurrent osteomyelitis with evidence of sepsis. Now s/p amputation of first and second toe on 10/13. Path pending On IV antibiotics.    # Sepsis secondary to Osteomyelitis  # Peripheral vascular disease  # Poorly controlled diabetes    Presented with worsening left toe pain.  Has known left foot ulcer.  Followed by podiatry as outpatient.  Had been on Clinda as outpatient after debridement on 10/5 but came to the hospital with worsening pain, chills, rigors and altered mental status.  Noted to have increased inflammatory markers and leukocytosis.  Imaging showing likely osteomyelitis distal first metatarsal and tibial sesamoid  concerning for osteoid septic joint.  Bone visible on exam.  Polymicrobial organisms on cultures including Pseudomonas.  -Orthopedics and vascular surgery following.  Appreciate recs  -ID following.  Continue cefepime, Vanco, metronidazole  - Revascularization in hopes of foot salvage.  Now s/p left common iliac artery angioplasty 10/11  - Patient went to OR 10/13 for first and second rays of left foot  - Continue IV abx after amputation, anticipate for at least two weeks  - Non-weight bearing status  - Cam Boots ordered by Ortho. May benefit from PRAFO per ortho if it can maintain dorsiflexion (will see if OT or PT can get it for patient)    # Diabetes mellitus  Patient currently only on mealtime coverage  Glucose from 180s-210 range  Eating well now  Will start 8 units of Glargine at bedtime  Continue sliding scale insulin for mealtime  Will need close follow up with PCP    # NIKOLAY  Likely secondary to volume depletion in the setting of sepsis and recently  increased Lasix dose.  Poor p.o. intake as well.  Improved in the last few days.  Holding Lasix.   Improved and stabilized around 1.2    #Right-sided weakness, arm > legs  # Likely radial nerve palsy  Head CT negative for bleed or mass.  Cannot obtain MRI due to her pacemaker  Improved this morning  Seen by neurology, appreciate recs  Will need EMG as outpatient and neuro followup    #Chronic pain  -Continue home methadone  -Currently on decreased home Lyrica dose in the setting of AK I, will have to increase after NIKOLAY resolves    #History of systolic heart failure  EF of 35-40% on last echocardiogram.  -Holding home metoprolol and Lasix in the setting of sepsis        Diet: Snacks/Supplements Adult: Boost Glucose Control; Between Meals  Room Service  Moderate Consistent CHO Diet  Fluids: PO intake  Carter Catheter: not present    DVT Prophylaxis: Pneumatic Compression Devices  Code Status: Full Code    Expected discharge: 4 - 7 days, recommended to prior living arrangement once SIRS/Sepsis treated.    The patient's care was discussed with the Bedside Nurse, Patient and Vascular surgery Consultant.    Evin Yousif MD  Internal Medicine Staff Hospitalist Service  [facility]  Pager: Text page  Please see sticky note for cross cover information    Interval History   No acute events overnight  Feels well overall  Some pain on the right leg  No chest pain or shortness of breath  No abdominal pain  Appetite is not up to normal yet, but she is trying to eat    Four-point review of systems otherwise negative      Data reviewed today: I reviewed all medications, new labs and imaging results over the last 24 hours. I personally reviewed the report of US image(s) showing low GABRIEL.    Physical Exam   Vital Signs: Temp: 98.6  F (37  C) Temp src: Oral BP: 138/72 Pulse: 100 Heart Rate: 95 Resp: 11 SpO2: 98 % O2 Device: Nasal cannula Oxygen Delivery: 1 LPM  Weight: 111 lbs 11.2 oz  General Appearance: Chronically  ill-appearing woman but awake and alert, very pleasant  Respiratory: Lungs are clear bilaterally   Cardiovascular: Normal rate regular rhythm no murmurs rubs or gallops  GI: Soft abdomen nontender nondistended  Skin: Right-sided BKA with stump with ulcer.  Cam boot on the left lower extremity after amputation  Other: full affect today, good insight on her disease process        Data     Recent Labs  Lab 10/14/18  0526 10/13/18  0524 10/12/18  0704 10/11/18  0829 10/10/18  0605   WBC 15.5* 13.5* 16.1* 18.9* 24.9*   HGB 7.8* 8.3* 8.0* 8.2* 8.8*   MCV 80 78 78 78 77*    358 377 328 361   INR  --  1.56* 1.59* 1.41*  --     138 139 138 139   POTASSIUM 4.2 4.0 4.3 4.3 4.4   CHLORIDE 110* 109 110* 111* 110*   CO2 23 22 23 21 21   BUN 22 24 24 30 32*   CR 1.21* 1.29* 1.28* 1.20* 1.28*   ANIONGAP 7 6 6 7 8   RICK 8.2* 8.6 8.6 8.5 8.5   * 166* 120* 170* 152*   ALBUMIN  --   --  1.5*  --  1.6*   PROTTOTAL  --   --  6.6*  --  6.8   BILITOTAL  --   --  0.3  --  0.3   ALKPHOS  --   --  149  --  152*   ALT  --   --  14  --  20   AST  --   --  12  --  12       No results found for this or any previous visit (from the past 24 hour(s)).  Medications       amitriptyline  50 mg Oral At Bedtime     ceFEPIme (MAXIPIME) IV  2 g Intravenous Q12H     insulin aspart  1-7 Units Subcutaneous TID AC     insulin aspart  1-5 Units Subcutaneous At Bedtime     lidocaine  1 patch Transdermal Q24H     lidocaine   Transdermal Q8H     lidocaine   Transdermal Q24h     LORazepam  0.5 mg Oral Once     methadone  75 mg Oral Daily     metoprolol succinate  25 mg Oral Daily     metroNIDAZOLE  500 mg Oral Q8H ORESTES     multivitamin, therapeutic with minerals  1 tablet Oral Daily     polyethylene glycol  17 g Oral Daily     pregabalin  50 mg Oral TID     ranitidine  150 mg Oral Daily     sodium chloride (PF)  3 mL Intracatheter Q8H     umeclidinium-vilanterol  1 puff Inhalation Daily     vancomycin (VANCOCIN) IV  1,000 mg Intravenous Q24H

## 2018-10-14 NOTE — PROGRESS NOTES
"  Orthopaedic Surgery Progress Note   October 12, 2018    Subjective: S/p vascular procedure - Angio with left common iliac artery stent and amputation of the left foot 1st and 2nd ray. Patient reports she is doing well at this time. Pain controlled. Denies CP, SOB, n/v.    Objective: /59 (BP Location: Left arm)  Pulse 100  Temp 96  F (35.6  C) (Axillary)  Resp 14  Ht 1.753 m (5' 9\")  Wt 50.7 kg (111 lb 11.2 oz)  SpO2 96%  BMI 16.5 kg/m2    General: NAD, alert and oriented, cooperative with exam.  Cardio: RRR, extremities wwp.   Respiratory: Non-labored breathing.  MSK: Focused examination of left foot dressings clean, dry, intact. LLE immobilized in CAM boot. Able to wiggle toes. Toes wwp.  Labs:     CBC RESULTS:   CBC RESULTS:   Recent Labs   Lab Test  10/14/18   0526   WBC  15.5*   RBC  3.08*   HGB  7.8*   HCT  24.7*   MCV  80   MCH  25.3*   MCHC  31.6   RDW  15.9*   PLT  428     Last Comprehensive Metabolic Panel:  Sodium   Date Value Ref Range Status   10/14/2018 141 133 - 144 mmol/L Final     Potassium   Date Value Ref Range Status   10/14/2018 4.2 3.4 - 5.3 mmol/L Final     Chloride   Date Value Ref Range Status   10/14/2018 110 (H) 94 - 109 mmol/L Final     Carbon Dioxide   Date Value Ref Range Status   10/14/2018 23 20 - 32 mmol/L Final     Anion Gap   Date Value Ref Range Status   10/14/2018 7 3 - 14 mmol/L Final     Glucose   Date Value Ref Range Status   10/14/2018 186 (H) 70 - 99 mg/dL Final     Urea Nitrogen   Date Value Ref Range Status   10/14/2018 22 7 - 30 mg/dL Final     Creatinine   Date Value Ref Range Status   10/14/2018 1.21 (H) 0.52 - 1.04 mg/dL Final     GFR Estimate   Date Value Ref Range Status   10/14/2018 47 (L) >60 mL/min/1.7m2 Final     Comment:     Non  GFR Calc     Calcium   Date Value Ref Range Status   10/14/2018 8.2 (L) 8.5 - 10.1 mg/dL Final         Lab Results   Component Value Date    A1C 9.5 06/06/2018    A1C 12.8 02/16/2018    A1C 11.8 06/21/2017 "    A1C 11.5 11/02/2016    A1C >15.0  Results confirmed by repeat test   07/25/2016     Lab Results   Component Value Date    ALBUMIN 2.8 08/28/2018       Assessment and Plan: Alessandra Pak is a 53 year old female with poorly controlled diabetes with associated peripheral neuropathy and chronic ulceration. S/p right BKA and with ongoing left foot ulcer previously managed by podiatry and now with referral to ortho (Dr. Peralta) for consideration of amputation. Last seen by podiatry on 10/5/18. Admitted to hospital 10/7/18 due to osteomyelitis at base of ulcer and elevated inflammatory markers. Underwent placement of left RAJNI stent to improve perfusion of foot 10/11/18 and now s/p amputation of 1st and 2nd rays of left foot 10/13 with Dr. Peralta.      -NWB LLE until wound healed, 2-3 weeks  -Activity: recommend bedrest with slideboard transfers given NWB LLE status  -Dressing: soft dressing, change POD#3-4  -CAM boot to maintain neutral dorsiflexion - may benefit from PRAFO to LLE if it can maintain dorsiflexion  -Diet: regular  -continue to work on glucose control and nutrition optimization  -continue abx per ID and primary team  -Follow-up in 2 weeks with Dr. Peralta, appointment requested (prior 10/16/18 appointment requested to be canceled)      Lee Obrien MD  Orthopaedic Surgery PGY1  Pager: [phone]

## 2018-10-14 NOTE — PLAN OF CARE
0702-5402: Tmax 100.8. And BCs drawn. Capno IPI 9-10 CO2 32. Triggered sepsis. LA 0.8. Pt c/o L foot pain. Flexeril given w/o relief. Jose CHIN. MD ordered 1 time dose of 0.3mg IV Dilaudid given w/ relief of pain. Pt had additional 10mg PO Oxycodone this am. BG overnight 182. Vanco and Cefepime given as scheduled. Voiding in bed pan. NWB s/p procedure. Able to wiggle toes. L boot in place. R knee mepilex and R hip mepilex C/D/I. Continue POC.

## 2018-10-14 NOTE — PROGRESS NOTES
Problem: Patient Care Overview  Goal: Plan of Care/Patient Progress Review  Outcome: Improving    [id number]: AVSS, afebrile. Reports R leg/knee pain, scheduled Morphine, PRN Flexeril given, pt reports good relief. Denies nausea, fair appetite, pt encourage to eat high protein to help with wound healing. Boosts at 1000 and 1400, pt verbalize not been getting boosts, called nutrition and they verify that the order is still valid and will continue to deliver boosts. POD#1 with R toes amputation, as much as RN can observe, gauze dressing CDI, pt reports sensation intact, circulation intact. Pt denies any numbness and tingling. All wound dressings CDI, no changes needed this AM, no excessive drainage or bleeding observed, assessed q4hrs. Capnography and oxygen discharge, pt doing well on RA at 95%+. PIV infiltrated at 1210. Delayed Cefepime due at 1200, pt verbalized want to get PIV when family leaves, understand the importance of IV antibiotic; new PIV placed at 1445. Pt cleaned and changed gown. Up in chair with meals. Working with PT/OT. Assist x1-2. Mechanical lift with assist x2. NWB LLE per MD order, pt educated, verbalize understanding. Constipation, last BM 10/8/18, Miralax given, pt reports passing gas. Patient will get a PICC line placed tomorrow for home discharge with IV antibiotics. Continue with POC.

## 2018-10-15 NOTE — DISCHARGE SUMMARY
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 Ahumada    [facility] Course   Alessandra Pak was admitted on 10/7/2018 for sepsis secondary to wound infection and osteomyelitis.  The following problems were addressed during her hospitalization:    #Sepsis secondary to osteomyelitis and wound infection  #Status post amputation of first and second ray of her left foot  #Peripheral vascular disease  #Prior history of right BKA    Patient presented with sepsis secondary to her wounds on her left foot.  She was started on IV antibiotics with improvement in septic symptoms.  The wounds are secondary to diabetes and peripheral vascular disease.  -Initial concern was that she would need another BKA on the left foot.  However, ABIs were obtained which showed evidence of poor flow, and vascular surgery was consulted.  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  she underwent placement of self-expanding stent in the left common iliac artery with hopes of foot salvage.  -For her wound on the left foot, she underwent amputation of first ray and second ray for source control by orthopedic surgery.   -She is in Cam boots   -Nonweightbearing status   -Seen by occupational therapy and  provided with slide board   - Okay to go home, wound care instructions provided to patient and has nursing visits at home  -Regarding antibiotics, discussed with infectious disease.  Given her septic symptoms, she was continued on vancomycin IV, cefepime IV, metronidazole oral, for at least 2 weeks.  She will see ID on 10/22 as outpatient.  She has PICC line in place.    #Poorly controlled diabetes  Patient's A1c was 9.5.  Sees a small dose of basal insulin and mealtime insulin.  I do not increase these because her glucoses were well controlled at this dose here in the hospital.  However she needs a close follow-up with primary care physician for tight blood sugar control.  Given that the insulin at the same dose are controlling her glucose here in the hospital, I wonder if she is taking insulin regularly or accurately at home.  She will have nursing visits at home, which hopefully will help.  If not, it would not be not unreasonable for her to follow-up with endocrinology as outpatient    #Acute kidney injury  Elevated creatinine at admission, which resolved after sepsis was treated as above    #Right radial nerve palsy  Patient had weakness on the right hand.  Neurology was consulted, who initially thought this was stroke, however her exam and findings were more consistent with right radial nerve palsy.  She was provided with a hand brace.  Will need neurology follow-up in 2 weeks with EMG.    # Emaciation due to severe malnutrition  Patient reported very poor appetite prior to hospitalization.  This can happen due to ongoing infections and inflammation.  After amputation and IV antibiotics for sepsis as above, her p.o. intake did improve.  With improvement in appetite.  She will need close follow-up with primary care provider for ongoing cares.      Consultations This Hospital Stay   PHARMACY TO DOSE VANCO  WOUND OSTOMY CONTINENCE NURSE  IP CONSULT  ORTHOPAEDIC SURGERY ADULT/PEDS IP CONSULT  PHYSICAL THERAPY  ADULT IP CONSULT  OCCUPATIONAL THERAPY ADULT IP CONSULT  NEUROLOGY GENERAL ADULT IP CONSULT  INFECTIOUS DISEASE GENERAL ADULT IP CONSULT  VASCULAR SURGERY ADULT IP CONSULT  ORTHOPAEDIC SURGERY ADULT/PEDS IP CONSULT  WOUND OSTOMY CONTINENCE NURSE  IP CONSULT  VASCULAR ACCESS CARE ADULT IP CONSULT  PHYSICAL THERAPY ADULT IP CONSULT  OCCUPATIONAL THERAPY ADULT IP CONSULT  VASCULAR ACCESS CARE ADULT IP CONSULT  VASCULAR ACCESS CARE ADULT IP CONSULT  VASCULAR ACCESS ADULT IP CONSULT    Code Status   Full Code    Time Spent on this Encounter   I, Evin Yousif, personally saw the patient today and spent greater than 30 minutes discharging this patient.       Evin Yousif MD  Internal Medicine Staff Hospitalist Service  [facility]  Pager: Text page  ______________________________________________________________________    Physical Exam   Vital Signs: Temp: 98.3  F (36.8  C) Temp src: Oral BP: 106/69   Heart Rate: 111 Resp: 16 SpO2: 98 % O2 Device: None (Room air)    Weight: 113 lbs 12.12 oz    General Appearance: Very pleasant woman, sitting on wheelchair, in no acute distress, smiling  Respiratory: Lungs are clear bilaterally  Cardiovascular: Normal rate, regular rhythm, no murmur rubs or gallops  GI: Soft abdomen, nontender, nondistended  Skin: Surgical wounds on the left lower extremity were well dressed.  Has small wound on her right stump, looks dry and granulated  Other: Alert and oriented x3    Significant Results and Procedures   Results for orders placed or performed during the hospital encounter of 10/07/18   Chest XR,  PA & LAT    Narrative    XR CHEST 2 VW  10/7/2018 6:10 PM      HISTORY: sob;     COMPARISON: 7/29/2018    FINDINGS: PA and lateral views of the chest upright. Left chest wall  pacemaker and lead in stable positions. The cardiac silhouette is  stable and within normal limits. Are perihilar and bibasilar mixed  interstitial and airspace opacities. These do not appear to silhouette  the  adjacent structures. Calcified right pretracheal lymph nodes are  unchanged. No pleural effusion or pneumothorax.      Impression    IMPRESSION: Bibasilar mixed interstitial and airspace opacities.  Appearance favors pulmonary edema, though infection is a distinct  possibility.    I have personally reviewed the examination and initial interpretation  and I agree with the findings.    JAYANT SALAS MD   CT Head w/o Contrast    Narrative    CT HEAD W/O CONTRAST 10/8/2018 3:19 PM    Provided History: Right sided weakness, recent head trauma, evaluate  for intracranial bleed/mass;   ICD-10:    Comparison: CT head 6/10/2018.    Technique: Using multidetector thin collimation helical acquisition  technique, axial, coronal and sagittal CT images from the skull base  to the vertex were obtained without intravenous contrast.     Findings:    No intracranial hemorrhage, mass effect, or midline shift. The  ventricles are proportionate to the cerebral sulci. The gray to white  matter differentiation of the cerebral hemispheres is preserved. The  basal cisterns are patent.    The visualized paranasal sinuses are clear. The mastoid air cells are  clear. Chronic deformity of the right lamina papyracea.      Impression    Impression: No acute intracranial pathology.    I have personally reviewed the examination and initial interpretation  and I agree with the findings.    NATHANAEL GUZMAN MD   US GABRIEL Doppler No Excersie Portable    Narrative    Exam: Bilateral lower extremity resting ankle brachial indices dated  10/10/2018 11:33 AM    Comparison study: 4/25/2017    Clinical history: chronic non-healing wounds. has prior hx of  amputation.;      Ordering provider: EMILIANA MUSA    Technique: Bilateral lower extremity resting ankle brachial indices  obtained.    Findings:    Right:      Arm: 110 mmHg     Below the knee amputation    Left:     Arm: 116 mmHg   PT at ankle: 65 mmHg   DP at foot: 65 mmHg   Toe pressure (second  digit): 39 mmHg     GABRIEL: 0.55   TBI: 0.34    PPGs:     1st Digit PPG: Severely abnormal   2nd Digit PPG: Moderately abnormal      Impression    Impression:     Right leg: Resting GABRIEL is unobtainable due to below the knee  amputation.    Left leg:      a. Resting GABRIEL is 0.55. Mild to moderately abnormal, 0.41-0.90.     b. TBI 0.34, abnormal. Absent PPG waveform in the first digit and  moderately abnormal in the second digit, worsened from prior.     Findings are consistent with significant peripheral vascular disease.     Guidelines:    GABRIEL Diagnostic Criteria (Based on criteria published in Circulation  2011; 124: 7394-7807):    > 1.4: Non compressible    1.00 - 1.40: Normal    0.91 - 0.99: Borderline    At or below 0.90: Abnormal    GABRIEL Diagnostic Criteria (Based on ACC/AHA guideline 2008):    >/=1.3 - non compressible vessels    1.00  -1.29 - Normal    0.91 - 0.99 - Borderline    0.41 - 0.90 - Mild to moderate PAD    0.00 - 0.40 - Severe PAD    I have personally reviewed the examination and initial interpretation  and I agree with the findings.    FELIX GARCIA MD   IR OR Angiogram    Narrative    This exam was marked as non-reportable because it will not be read by a   radiologist or a [facility] non-radiologist provider.             XR Chest Port 1 View    Narrative    XR CHEST PORT 1 VW 10/15/2018 6:07 PM    Comparison: 10/7/2018    History: RN placed PICC - verify tip placement;     Findings: Single frontal view of the chest. Right upper extremity PICC  tip projects over the mid SVC. Left chest wall implantable cardiac  device lead in stable position. Cylindrical opacity projecting over  the medial right upper lobe.    Basilar predominant interstitial airspace opacities, improved compared  to 10/7/2018 with mild streaky residual basilar opacities. No  pneumothorax. No significant pleural effusion.    Postoperative changes of cholecystectomy. Stable pneumobilia.      Impression    Impression:   1. Right upper  extremity PICC tip projects over the SVC.  2. Mixed interstitial and airspace opacities of the lung bases,  improved compared to 10/7/2018.    I have personally reviewed the examination and initial interpretation  and I agree with the findings.    MIGUEL RIBEIRO MD     *Note: Due to a large number of results and/or encounters for the requested time period, some results have not been displayed. A complete set of results can be found in Results Review.       Pending Results    Surgical Pathology -- Follow up by ID and Orthopedic surgery         Primary Care Physician   Brionna Hernandez Dc    Discharge Disposition   Discharged to home  Condition at discharge: Stable    Discharge Orders     US GABRIEL Doppler No Exercise     Medication Therapy Management Referral     Home care nursing referral     Home Care OT Referral for Hospital Discharge     Home infusion referral     Neurology Adult Referral     Reason for your hospital stay   Sepsis secondary to diabetic foot wound. Improved after IV antibiotics and surgery to remove     Adult [facility] Follow-up and recommended labs and tests   - Follow up with Infectious Disease, Dr Ahumada, Oct 22nd, [facility]  A placeholder for your appt is on 11/7 but a message has been routed to Dr Ahumada to see when you can be placed on her scheduled on 10/22.  Please call the ID clinic within 2-3 days if you haven't received a confirmation of your appt. [phone]  - Follow up with PCP within 1 week  - Follow up with Orthopedic Surgery, 2 weeks with Dr. Peralta, appointment requested   - Follow up with vascular surgery, in 5-6 weeks, Dr. Llamas in 5-6 weeks, needs GABRIEL    Appointments on [place] and/or [facility] (with [facility] or [facility] provider or service). Call [phone] if you haven't heard regarding these appointments within 7 days of discharge.     Activity   Your activity upon discharge: NON weight bearing on the left foot. Wear CAM boot     When to contact your  care team   Go to your local ED if you have fevers, chills, nausea, vomiting, shortness of breath, abdominal pain     MD face to face encounter   Documentation of Face to Face and Certification for Home Health Services    I certify that patient: Alessandra Pak is under my care and that I, or a nurse practitioner or physician's assistant working with me, had a face-to-face encounter that meets the physician face-to-face encounter requirements with this patient on: October 15, 2018.    This encounter with the patient was in whole, or in part, for the following medical condition, which is the primary reason for home health care: osteomyelitis.    I certify that, based on my findings, the following services are medically necessary home health services: Nursing and Occupational Therapy.    My clinical findings support the need for the above services because: Nurse is needed: To provide assessment and oversight required in the home to assure adherence to the medical plan due to: IV antibiotics, med management.. and Occupational Therapy Services are needed to assess and treat cognitive ability and address ADL safety due to impairment in passive ROM while patient is NWB post op.    Further, I certify that my clinical findings support that this patient is homebound (i.e. absences from home require considerable and taxing effort and are for medical reasons or [facility] services or infrequently or of short duration when for other reasons) because: Requires assistance of another person or specialized equipment to access medical services because patient: Range of motion limitations prevents ability to exit home safely. and Requires supervision of another for safe transfer...    Based on the above findings. I certify that this patient is confined to the home and needs intermittent skilled nursing care, physical therapy and/or speech therapy.  The patient is under my care, and I have initiated the establishment of the plan of  care.  This patient will be followed by a physician who will periodically review the plan of care.  Physician/Provider to provide follow up care: Brionna Calabrese    Attending hospital physician (the Medicare certified [place] provider): Evin Yousif MD  Physician Signature: See electronic signature associated with these discharge orders.  Date: 10/15/2018     Wound care and dressings   Instructions to care for your wound at home:  Surgical wound: Adaptive Dressing, Kerlex, then ACE wrap for your wound. Do not soak the wound. Keep it clean and dry.    On the right stump:  1. Cleanse with MicroKlenz and blot dry 2. Apply thin layer (pea sized amt) of aquaphor over wound 3. Cover with comfeel hydrocolloid (#[id number]) dressing     Full Code     Diet   Follow this diet upon discharge: Orders Placed This Encounter     Calorie Counts     Snacks/Supplements Adult: Boost Glucose Control; Between Meals     Room Service     Moderate Consistent CHO Diet       Discharge Medications   Current Discharge Medication List      START taking these medications    Details   ceFEPIme (MAXIPIME) 2 G vial Inject 2 g into the vein every 12 hours  Qty: 20 each, Refills: 0    Associated Diagnoses: Diabetic foot ulcer with osteomyelitis (H)      metroNIDAZOLE (FLAGYL) 500 MG tablet Take 1 tablet (500 mg) by mouth every 8 hours  Qty: 60 tablet, Refills: 0    Associated Diagnoses: Diabetic foot ulcer with osteomyelitis (H)      oxyCODONE IR (ROXICODONE) 10 MG tablet Take 1-2 tablets (10-20 mg) by mouth every 6 hours as needed for moderate to severe pain  Qty: 20 tablet, Refills: 0    Associated Diagnoses: Diabetic foot ulcer with osteomyelitis (H)      vancomycin 750 mg Inject 750 mg into the vein every 24 hours  Qty: 750 mL, Refills: 14    Associated Diagnoses: Diabetic foot ulcer with osteomyelitis (H)         CONTINUE these medications which have NOT CHANGED    Details   acetaminophen (TYLENOL) 500 MG tablet Take 2 tablets (1,000 mg)  by mouth 3 times daily    Associated Diagnoses: Status post below knee amputation of right lower extremity (H)      albuterol (PROAIR HFA) 108 (90 Base) MCG/ACT Inhaler Inhale 2 puffs into the lungs every 4 hours as needed for shortness of breath / dyspnea  Qty: 1 Inhaler, Refills: 0    Associated Diagnoses: SOB (shortness of breath)      amitriptyline (ELAVIL) 50 MG tablet Take 1 tablet (50 mg) by mouth At Bedtime  Qty: 60 tablet, Refills: 0    Associated Diagnoses: Status post below knee amputation of right lower extremity (H)      aspirin 81 MG tablet Take 1 tablet (81 mg) by mouth daily  Qty: 100 tablet, Refills: 3    Associated Diagnoses: Type 2 diabetes mellitus with complication, with long-term current use of insulin (H)      fluticasone (FLONASE) 50 MCG/ACT spray Spray 2 sprays into left nostril daily  Qty: 1 Bottle, Refills: 0    Associated Diagnoses: Acute nonseasonal allergic rhinitis due to pollen      !! furosemide (LASIX) 20 MG tablet TAKE 1 TABLET (20 MG) BY MOUTH DAILY  Qty: 90 tablet, Refills: 0    Associated Diagnoses: Nonischemic cardiomyopathy (H)      glucose 4 g CHEW chewable tablet Take 3-4 tablets to treat low blood sugar.  Qty: 25 tablet, Refills: 11    Associated Diagnoses: Type 2 diabetes mellitus with complication, with long-term current use of insulin (H)      levonorgestrel (MIRENA, 52 MG,) 20 MCG/24HR IUD placed today  Qty: 1 each, Refills: 0    Associated Diagnoses: Excessive or frequent menstruation      Lidocaine (LIDOCARE) 4 % Patch Place 2 patches onto the skin every 24 hours  Qty: 30 patch, Refills: 0    Associated Diagnoses: Status post below knee amputation of right lower extremity (H)      lisinopril (PRINIVIL/ZESTRIL) 10 MG tablet Take 1 tablet (10 mg) by mouth daily  Qty: 30 tablet, Refills: 0    Associated Diagnoses: Type 2 diabetes mellitus with stage 3 chronic kidney disease, with long-term current use of insulin (H)      LYRICA 75 MG capsule TAKE 1 CAPSULE BY MOUTH 3  TIMES DAILY  Qty: 90 capsule, Refills: 3    Comments: This request is for a new prescription for a controlled substance as required by Federal/State law..  Associated Diagnoses: Status post below knee amputation of right lower extremity (H)      methadone (DOLOPHINE-INTENSOL) 10 MG/ML (HIGH CONC) solution Take 75 mg by mouth daily Confirmed dosing on 10/8/18 with [facility]: [place], [place] [phone]      metoprolol succinate (TOPROL-XL) 25 MG 24 hr tablet Take 1 tablet (25 mg) by mouth daily  Qty: 90 tablet, Refills: 1    Comments: Replaces the metoprolol tartrate  Associated Diagnoses: Hypertension goal BP (blood pressure) < 140/90; Chronic systolic congestive heart failure (H)      multivitamin, therapeutic with minerals (THERA-VIT-M) TABS tablet Take 1 tablet by mouth daily  Qty: 30 each, Refills: 0    Associated Diagnoses: Status post below knee amputation of right lower extremity (H)      polyethylene glycol (MIRALAX/GLYCOLAX) Packet Take 17 g by mouth daily  Qty: 7 packet, Refills: 0    Associated Diagnoses: Status post below knee amputation of right lower extremity (H)      ranitidine (ZANTAC) 300 MG tablet Take 1 tablet (300 mg) by mouth daily  Qty: 60 tablet, Refills: 0    Associated Diagnoses: Status post below knee amputation of right lower extremity (H)      umeclidinium-vilanterol (ANORO ELLIPTA) 62.5-25 MCG/INH oral inhaler Inhale 1 puff into the lungs daily  Qty: 1 Inhaler, Refills: 2    Comments: Replaces combivent  Associated Diagnoses: Chronic bronchitis, unspecified chronic bronchitis type (H)      acetone, Urine, test STRP 1 strip by In Vitro route as needed  Qty: 25 each, Refills: 1    Associated Diagnoses: Type 2 diabetes mellitus with diabetic neuropathy (H)      blood glucose (NO BRAND SPECIFIED) lancets standard Use to test blood sugar 10 times daily or as directed. Accu-check  Qty: 100 each, Refills: 11    Associated Diagnoses: Type 2 diabetes mellitus with stage 3  chronic kidney disease, with long-term current use of insulin (H)      blood glucose monitoring (NO BRAND SPECIFIED) meter device kit Use to test blood sugar 10 times daily or as directed. Accu-check  Qty: 1 kit, Refills: 0    Associated Diagnoses: Type 2 diabetes mellitus with stage 3 chronic kidney disease, with long-term current use of insulin (H)      blood glucose monitoring (NO BRAND SPECIFIED) test strip Use to test blood sugars 10 times daily or as directed. Accu-chek  Qty: 100 strip, Refills: 11    Associated Diagnoses: Type 2 diabetes mellitus with stage 3 chronic kidney disease, with long-term current use of insulin (H)      !! furosemide (LASIX) 40 MG tablet Take 1 tablet (40 mg) by mouth daily  Qty: 30 tablet, Refills: 0    Associated Diagnoses: SOB (shortness of breath)      insulin aspart (NOVOLOG PEN) 100 UNIT/ML injection Inject 1-7 Units Subcutaneous 3 times daily (before meals)  Qty: 6.3 mL, Refills: 0    Comments: For Pre-Meal -189=1 unit, 190-239=2 units, 240-289=3 units, 290-339=4 units, 340-399=5 units, 400-449=6 units, BG = or >450=7 units.  Associated Diagnoses: Type 2 diabetes mellitus with stage 3 chronic kidney disease, with long-term current use of insulin (H)      insulin glargine (LANTUS SOLOSTAR) 100 UNIT/ML pen Inject 8 Units Subcutaneous At Bedtime  Qty: 2.4 mL, Refills: 0    Associated Diagnoses: Type 2 diabetes mellitus with stage 3 chronic kidney disease, with long-term current use of insulin (H)      insulin pen needle (B-D U/F) 31G X 5 MM Use 3 time(s) a day.  Qty: 3 each, Refills: 3    Associated Diagnoses: Type 2 diabetes, HbA1c goal < 7% (H)       !! - Potential duplicate medications found. Please discuss with provider.      STOP taking these medications       clindamycin (CLEOCIN) 300 MG capsule Comments:   Reason for Stopping:             Allergies   Allergies   Allergen Reactions     No Clinical Screening - See Comments      Swelling in the throat.

## 2018-10-15 NOTE — PROGRESS NOTES
Care Coordinator - Discharge Planning    Admission Date/Time:  10/7/2018  Attending MD:  Evin Yousif MD     Data  Date of initial CC assessment:  10/9  Chart reviewed, discussed with interdisciplinary team.   Patient was admitted for:   1. PAD (peripheral artery disease) (H)    2. Osteomyelitis (H)    3. Diabetic foot ulcer with osteomyelitis (H)    4. Chronic pulmonary edema    5. Acute renal failure, unspecified acute renal failure type (H)    6. Encounter for long-term (current) use of insulin (H)    7. Personal history of tobacco use, presenting hazards to health         Assessment   Full assessment completed in previous note. Plan of care reviewed with Phil Saldivar 1. Patient will need IV antibiotics for at least 2 weeks post-discharge. Planning for PICC today. OT has cleared patient to discharge home with family, using sliding board for transfer to/from bed & wheelchair. OT dispensed sliding board; patient has no other home DME needs.      Coordination of Care and Referrals: Provided patient/family with options for Home Infusion. Referral initiated to [facility]; spoke to VADIM Granado ([phone]). Patient was open to [facility] for skilled RN and would like to resume if they can perform the IV cares (home teaching and weekly PICC line change). Pt aware that [facility] will check into this.     1500: Patient will need wheelchair ride through [facility] upon discharge.  Ride scheduled for 5:30PM. Patient down for PICC placement now. Dr Evin YOUSIF paged to complete discharge orders as soon as possible for [facility] to begin making home IV medications.        Plan  Anticipated Discharge Date:  Today if all discharge needs can be arranged  Anticipated Discharge Plan:  home    Tereza Crane  7D Heme/Onc RN Care Coordinator  Pager [phone]

## 2018-10-15 NOTE — OP NOTE
Procedure Date: 10/13/2018      DATE OF PROCEDURE:  10/13/2018      PREOPERATIVE DIAGNOSIS:  Left foot chronic nonhealing ulcer.      POSTOPERATIVE DIAGNOSIS:  Left foot chronic nonhealing ulcer.      PROCEDURE:  Left foot first and second ray amputation.      SURGEON:  Jamey Peralta MD      ASSISTANT:  Pablo Elizabeth MD, PGY4; Lee Cee MD, PGY1.      ANESTHESIA:  General endotracheal.      ESTIMATED BLOOD LOSS:  50 mL.      COMPLICATIONS:  None.      DRAINS:  None.      INDICATIONS:  Please refer to hospital chart for further discussion and indications of Mrs. Pak's case.      PROCEDURE NOTE:  On 10/13/2018, patient was taken to surgery.  Preoperative antibiotics were maintained according to the patient's regular schedule.  After successful induction of general endotracheal anesthesia, she was placed supine on the operating table.  The left lower extremity was prepped and draped in sterile fashion.  After exsanguination by gravity, tourniquet cuff was inflated to 300 mmHg on the proximal third of the left thigh.      Pause for the cause was performed according to institution's policy which confirmed laterality of the procedure.      Following this, we proceeded with a tennis racket incision along the first ray.  This included the resection of the great toe.  An osteotomy was performed across the first metatarsal in an oblique fashion from medial and proximal to distal and lateral.  This allowed us to proceed with exposure of the residual bed which was still showing a fair amount of necrotic muscle compartments.  Therefore, we decided to extend the amputation into the second ray as well.  Further dissection was carried down through the incision and we proceeded with an osteotomy in a similar fashion to the first metatarsal across the second metatarsal as well.  At this point, we confirmed to have a much healthier vascular bed for the foot.      The tourniquet was deflated.  Satisfactory hemostasis  was accomplished with primary closure of the wound with 2-0 Prolene suture without any deep stitches.  Sterile dressings were applied.  The patient was placed in a posterior splint and transferred in stable condition to PACU.      PLAN:  The patient will remain nonweightbearing with slight transfers until the wound is completely healed.  The wound dressings will be performed by the orthopedic team within postop day #2 or #3.      The patient will proceed with an evaluation at a 2-week appointment by the foot and ankle nurse for suture removal.  This will be followed by a 6-week appointment evaluation by myself.  At that time, no x-rays will be obtained.        It is not expected the patient will require any physical therapy except for getting her familiar with slight transfers.         TU NEGRETE MD             D: 10/15/2018   T: 10/15/2018   MT: JAKY      Name:     TWIN SERRANO   MRN:      [id number]        Account:        [id number]   :      1965           Procedure Date: 10/13/2018      Document: [id number]

## 2018-10-15 NOTE — PLAN OF CARE
Problem: Patient Care Overview  Goal: Plan of Care/Patient Progress Review  Outcome: Improving    Patient to get PICC placed today so she can go home with IV Vanco & Cefepime.  Patient required 20mg Oxycodone for left foot pain.  OT worked with patient on using sliding board for getting to/from wheelchair since she is NWB on left foot for 2-3 weeks.  Patient eating better than last week, but still needs encouragement to eat all that she has ordered (eating 25-50% of meals).  Sepsis protocol triggered; Lactic Acid was 0.6 (no interventions).  Patient should discharge this evening after Cefepime given & discharge paperwork reviewed.  Awaiting [facility] time for ride home.

## 2018-10-15 NOTE — PLAN OF CARE
Problem: Patient Care Overview  Goal: Plan of Care/Patient Progress Review  Outcome: No Change  AVSS. C/o aching in RLE, well controlled w/ PRN oxycodone & flexeril. Slept most of shift. Denies nausea, appetite fair. Continue w/ BG checks & monitor for hypoglycemia overnight. No UOP this shift, was placed on bed pain to no results. Bladder scanned to 251mL. Pt reports she will call when she needs to use the bathroom. POD#1 with R toes amputation, as much as RN can observe, gauze dressing CDI, pt reports sensation intact, circulation intact, no dressing changes due until tomorrow. A x 1-2 w/ mechanical lift. Anticipate PICC line placement for tomorrow. Continue to monitor & follow POC.

## 2018-10-15 NOTE — PLAN OF CARE
Problem: Patient Care Overview  Goal: Plan of Care/Patient Progress Review  Discharge Planner OT   Patient plan for discharge: home with assist from family   Current status: Pt pleasant and agreeable to therapy session. Pt SBA for supine<>sitting transfers, lift dependent for transfer from EOB to BSC 2/2 NWB status on BLEs. Pt completed 3 slide board transfers with SBA to and from w/c, pt performs transfers well and demos good BUE strength. Th issued slide board for use at home. Pt states she has all necessary AE at home (BSC, slide board, wheelchair, etc). Pt states her family will be able to provide adequate assist.   Barriers to return to prior living situation: deconditioning, fatigue, post-surgical precautions  Recommendations for discharge: Pt continues to demo good functional strength, currently limited by post-surgical precautions and NWB status on BLEs.  Rationale for recommendations: Home with assist as needed in order to maintain NWB status on LLE to promote optimal healing until pt can progress mobility more.        Entered by: Nichole Giles 10/15/2018 1:15 PM

## 2018-10-15 NOTE — PROGRESS NOTES
"  Orthopaedic Surgery Progress Note   October 15, 2018    Subjective: No acute events overnight. CAM boot is heavy for her. Wanting to go home.    Objective: /68 (BP Location: Left arm)  Pulse 100  Temp 99.2  F (37.3  C) (Oral)  Resp 16  Ht 1.753 m (5' 9\")  Wt 50.7 kg (111 lb 11.2 oz)  SpO2 93%  BMI 16.5 kg/m2    General: NAD, alert and oriented, cooperative with exam.  Cardio: RRR, extremities wwp.   Respiratory: Non-labored breathing.  MSK: Focused examination of left foot dressings clean, dry, intact. LLE immobilized in CAM boot. Endorses sensation to light touch in toes.    Labs:   WBC   Date Value Ref Range Status   10/14/2018 15.5 (H) 4.0 - 11.0 10e9/L Final       Recent Labs  Lab 10/15/18  0208 10/14/18  2249 10/14/18  1711 10/14/18  1148 10/14/18  0526 10/14/18  0203 10/13/18  2238  10/13/18  0524  10/12/18  0704  10/11/18  0829  10/10/18  0605  10/09/18  0553   GLC  --   --   --   --  186*  --   --   --  166*  --  120*  --  170*  --  152*  --  91   * 143* 215* 177*  --  170* 182*  < >  --   < >  --   < >  --   < >  --   < >  --    < > = values in this interval not displayed.    Assessment and Plan: Alessandra Pak is a 53 year old female with poorly controlled diabetes with associated peripheral neuropathy and chronic ulceration. 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 Underwent placement of left RAJNI stent to improve perfusion of foot 10/11/18 and now s/p amputation of 1st and 2nd rays of left foot 10/13 with Dr. Peralta.    -NWB LLE until wound healed, 2-3 weeks  -Activity: recommend bedrest with slideboard transfers given NWB LLE status  -Dressing: soft dressing, change POD#3-4 (dressing will be changed 10/16/18)  -CAM boot to maintain neutral dorsiflexion - may benefit from PRAFO to LLE if it can maintain dorsiflexion  -Diet: regular  -continue to work on glucose control and nutrition optimization  -continue abx per ID and " primary team  -Follow-up in 2 weeks with Dr. Peralta, appointment requested (prior 10/16/18 appointment requested to be canceled)  -discharge per primary team      Cheyenne Tinsley, PGY4  [phone]

## 2018-10-15 NOTE — PROGRESS NOTES
VASCULAR SURGERY PROGRESS NOTE     SUBJECTIVE:  Patient sitting up in bed, complaining of left foot pain.       OBJECTIVE:  Vital signs:  Temp: 99.2  F (37.3  C) Temp src: Oral BP: 122/68   Heart Rate: 96 Resp: 16 SpO2: 93 % O2 Device: None (Room air) Oxygen Delivery: 1 LPM      Intake/Output Summary (Last 24 hours) at 10/15/18 0758  Last data filed at 10/15/18 0350   Gross per 24 hour   Intake             1020 ml   Output             1175 ml   Net             -155 ml       Vitals:    10/10/18 0900 10/11/18 1100 10/12/18 1100   Weight: 49.8 kg (109 lb 12.6 oz) 50.3 kg (111 lb) 50.7 kg (111 lb 11.2 oz)       PHYSICAL EXAM:  NEURO/PSYCH: The patient is alert and oriented.  Appropriate.  Moves all extremities.    SKIN: warm and dry   PULMONARY:non-labored breathing   EXTREMITIES: left foot warm and well perfused, left foot dressing C/D/I      BMP  Recent Labs  Lab 10/14/18  0526 10/13/18  0524 10/12/18  0704 10/11/18  0829    138 139 138   POTASSIUM 4.2 4.0 4.3 4.3   CHLORIDE 110* 109 110* 111*   CO2 23 22 23 21   BUN 22 24 24 30   CR 1.21* 1.29* 1.28* 1.20*   * 166* 120* 170*     CBC  Recent Labs  Lab 10/14/18  0526 10/13/18  0524 10/12/18  0704 10/11/18  0829   WBC 15.5* 13.5* 16.1* 18.9*   HGB 7.8* 8.3* 8.0* 8.2*    358 377 328     INR/PTT  Recent Labs  Lab 10/13/18  0524 10/12/18  0704 10/11/18  0829   INR 1.56* 1.59* 1.41*     ABGNo lab results found in last 7 days.  LFT  Recent Labs  Lab 10/12/18  0704 10/10/18  0605   AST 12 12   ALT 14 20   ALKPHOS 149 152*   BILITOTAL 0.3 0.3   ALBUMIN 1.5* 1.6*       ASSESSMENT:  53 year old female with left foot osteomyelitis status post LLE angiogram with left RAJNI stent on 10/11/2018 with Dr. Llamas.  On 10/3/2018 she underwent partial amputation of left foot with ortho.          PLAN:  - continue ASA  - weight bearing status per ortho  - continue neurovascular checks  - follow up ABIs and office visit with Dr. Llamas in 5-6 weeks.   - antibiotics  per ID      Cici MANCIA, CNS  Division of Vascular Surgery  [facility]    Pager [phone]  Office [phone]

## 2018-10-15 NOTE — PHARMACY-VANCOMYCIN DOSING SERVICE
Pharmacy Vancomycin Note  Date of Service October 15, 2018  Patient's  1965   53 year old, female    Indication: Osteomyelitis  Goal Trough Level: 15-20 mg/L  Day of Therapy: Day 9 (started 10/7)  Current Vancomycin regimen: 1000 mg IV q24h    Current estimated CrCl = Estimated Creatinine Clearance: 44 mL/min (based on Cr of 1.21).    Creatinine for last 3 days  10/12/2018:  7:04 AM Creatinine 1.28 mg/dL  10/13/2018:  5:24 AM Creatinine 1.29 mg/dL  10/14/2018:  5:26 AM Creatinine 1.21 mg/dL    Recent Vancomycin Levels (past 3 days)  10/15/2018: 12:54 AM Vancomycin Level 27.0 mg/L    Vancomycin IV Administrations (past 72 hours)                   vancomycin (VANCOCIN) 1000 mg in dextrose 5% 200 mL PREMIX (mg) 1,000 mg New Bag 10/14/18 0222     1,000 mg New Bag 10/13/18 0207                Nephrotoxins and other renal medications (Future)    Start     Dose/Rate Route Frequency Ordered Stop    10/15/18 1200  vancomycin (VANCOCIN) 750 mg in sodium chloride 0.9 % 250 mL intermittent infusion      750 mg  over 90 Minutes Intravenous EVERY 24 HOURS 10/15/18 0310               Contrast Orders - past 72 hours     None          Interpretation of levels and current regimen:    Trough level is  Supratherapeutic    Has serum creatinine changed > 50% in last 72 hours: No    Renal Function: Stable    Plan:  1.  Decrease Dose to 750 mg IV every 24 hours  2.  Pharmacy will check trough levels as appropriate in 1-3 Days.    3. Serum creatinine levels will be ordered daily for the first week of therapy and at least twice weekly for subsequent weeks.      Martita Bob, PharmD, BCPS

## 2018-10-15 NOTE — PLAN OF CARE
Problem: Patient Care Overview  Goal: Plan of Care/Patient Progress Review    7D PT - Pt acknowledged new evaluation. PT is to defer. Per discussion with MD and OT, OT is to follow for ADLs, transfer, and functional mobility needs at this time. If needs change please contact PT for new orders. Thank you for you referral.

## 2018-10-15 NOTE — PROGRESS NOTES
CLINICAL NUTRITION SERVICES - REASSESSMENT NOTE     Nutrition Prescription    RECOMMENDATIONS FOR MDs/PROVIDERS TO ORDER:  None at this time     Malnutrition Status:    Severe malnutrition in the context of chronic illness or disease    Recommendations already ordered by Registered Dietitian (RD):  None at this time     Future/Additional Recommendations:  1. If POC changes and pt is not discharging would recommend re-ordering calorie counts. If calorie counts come back and pt is meeting less than 75% of energy and protein needs (1200 calories and 50 g PRO) would consider starting nutrition support. Pt has been previously on nutrition support this past June/July 2018.      EVALUATION OF THE PROGRESS TOWARD GOALS   Diet: Moderate CHO diet, Boost glucose control between meals     Intake:   Calorie counts from 10/10-10/12:   10/10:  137 kcals and 10 g PRO  10/11:  68 kcals and 3 g PRO   10/12:  0 kcals and 0 g PRO  --------------------------------------  3 day average: 68 kcals and 4 g PRO.   - This average is meeting < 10% of the pt's minimal energy and protein needs.     - Pt consumed 25% of her breakfast this morning.      NEW FINDINGS   Pt is down for PICC line placement. Pt is discharging this evening.      Wt: Last wieght was from 10/12/18: 50.7 kg. Pt has maintained her weight from 10/8-10/12.     Labs:  10/14/18: K+: 4.2 (WNL), Na+: 141 (WNL)    Meds: Insulin, Thera-vit- M, miralax,     MALNUTRITION  % Intake: </= 50% for >/= 5 days (severe)  % Weight Loss: > 7.5% in 3 months (severe)- Continues   Subcutaneous Fat Loss: Unable to assess  Muscle Loss: Unable to assess  Fluid Accumulation/Edema: Unable to assess  Malnutrition Diagnosis: Severe malnutrition in the context of chronic illness or disease     Previous Goals   1. Wt no to decline < 50 kg  2. Ave po intakes per calorie counts x 3 days to meet at least 75% of estimated nutritional needs (1200 calories and 50 g PRO).   Evaluation:   1. Not met: pt was 49.5  kf on 10/9/18  2. Not Met     Previous Nutrition Diagnosis  Unintended weight loss related to question inadequate nutritional intakes and has increased nutrient needs for wound healing as evidenced by wt loss of 9.8% x past 3 mos, underweight in part d/t Right BKA with BMI 16.3   Evaluation: Improving, weight maintenance     CURRENT NUTRITION DIAGNOSIS  Inadequate oral intake related to decreased appetite as evidenced by 3 day average calorie counts  is meeting < 10% of the pt's minimal energy and protein needs      INTERVENTIONS  Implementation  1. Collaboration with other providers - discharge plans. Plan for pt to discharge this evening around 5:30 pm.     Goals  Ave po intakes per calorie counts x 3 days to meet at least 75% of estimated nutritional needs (1200 calories and 50 g PRO)    Monitoring/Evaluation  Progress toward goals will be monitored and evaluated per protocol.    Diana Garcia, MS, RD, LD  Unit Pgr: [phone]

## 2018-10-15 NOTE — PLAN OF CARE
VSS. Afebrile. Pt bladder scanned for 492cc urine. Unable to void on bedpan. Up to commode w/ lift and voided 450cc. Evening . Pt had not eaten much all day/alejandro and refused Lantus. BG overnight 169. Vanco level 27.0 this am. 0200 vanco dose cancelled and pharmacy to work on vanco dose. 20mg oxycodone given for BLE pain. L boot in place. Non weight baring and elevated. Cotninue POC.

## 2018-10-15 NOTE — PROGRESS NOTES
BLUE [facility] ID Service: Follow Up Note      Patient:  Alessandra Pak, Date of birth 1965, Medical record number [id number]  Date of Visit:  October 15, 2018         Assessment and Recommendations:   Problem List:  # Diabetic foot infection with osteomyelitis s/p 1st ray and 2nd toe amputation on 10/13/18. Wound cultures revealed polymicrobial growth including MRSA, pseudomonas, and anaerobes.  # Sepsis  # DM, insulin dependent  #Prior R BKA, poorly healing  # PAD s/p LLE angio with L RAJNI stent on 10/11/18    Recommendations:  - cont vancomcyin, cefepime, metronidazole for now. Plan is for her to follow-up with me in clinic on 10/22 at which time pathology review will dictate whether she has continued antimicrobials (if pathology margins are negative and wound is well-appearing would consider stopping, though I'm concerned about her wound healing)  -please obtain weekly CBC with platelet/diff, comp metabolic panel, CRP     No objection to discharge as is planned soon.  Marily Ahumada MD   of Medicine, Division of Infectious Diseases  [facility] [phone]          Interval History:   Seen this morning. Doing well. Denies diarrhea. Does not offer much info or complaints.            Physical Exam:   Ranges for vital signs:  Temp:  [98.2  F (36.8  C)-99.2  F (37.3  C)] 98.3  F (36.8  C)  Heart Rate:  [] 111  Resp:  [16] 16  BP: (106-129)/(66-71) 106/69  SpO2:  [93 %-98 %] 98 %    Intake/Output Summary (Last 24 hours) at 10/11/18 1952  Last data filed at 10/11/18 0659   Gross per 24 hour   Intake          1688.33 ml   Output              450 ml   Net          1238.33 ml     Exam:  GENERAL:  well-developed, in good spirits, no apparent distress  ENT:  Head is normocephalic, atraumatic. .  EYES:  Eyes have anicteric sclerae.    NECK:  Supple.  LUNGS:  Clear to auscultation.  CARDIOVASCULAR:  2/6 systolic murmur at left sternal border.  ABDOMEN:  Normal bowel sounds, soft, nontender.  EXT: L  foot bandaged, I did not inspect. R BKA stump still with an open area, no fluctuance or purulence         Laboratory Data:   Microbiology:         Culture Micro   Date Value Ref Range Status   10/13/2018 No growth after 2 days  Preliminary   10/13/2018 No growth after 2 days  Preliminary   10/08/2018 No growth  Final   10/07/2018 No growth  Final   10/07/2018 No growth  Final   10/05/2018 Heavy growth  Pseudomonas aeruginosa   (A)  Final   10/05/2018 Heavy growth  Escherichia coli   (A)  Final   10/05/2018 (A)  Final    Light growth  Raoultella ornithinolytica/planticola     10/05/2018 Moderate growth  Enterococcus faecalis   (A)  Final   10/05/2018 (A)  Final    Moderate growth  Methicillin resistant Staphylococcus aureus (MRSA)     10/05/2018 (A)  Final    Heavy growth  Corynebacterium striatum  Identification obtained by MALDI-TOF mass spectrometry research use only database. Test   characteristics determined and verified by the [facility] Laboratory   ([facility]) [place], [place].  Susceptibility testing not routinely done     10/05/2018 Heavy growth  Streptococcus anginosus   (A)  Final   10/05/2018 (A)  Final    Heavy growth  Prevotella species  Identification obtained by MALDI-TOF mass spectrometry research use only database. Test   characteristics determined and verified by the [facility]   ([facility]) [place], [place].  Beta lactamase positive  Susceptibility testing not routinely done     10/05/2018 (A)  Final    Heavy growth  Fusobacterium nucleatum  Susceptibility testing not routinely done     10/05/2018 (A)  Final    Plus  Heavy growth  Mixed aerobic and anaerobic jim     06/29/2018 No growth  Final   06/29/2018 No growth  Final   06/28/2018 No anaerobes isolated  Final   06/28/2018 No growth  Final   06/28/2018 No anaerobes isolated  Final   06/28/2018 (A)  Final    Light growth  Gram positive cocci  Organism failed to thrive for identification and  suceptibility testing     06/13/2018 No growth  Final   06/13/2018 No growth  Final   06/11/2018 Light growth  Candida glabrata   (A)  Final   06/11/2018 Susceptibility testing not routinely done  Final   06/10/2018 No growth  Final   06/10/2018 No growth  Final   06/09/2018 No growth  Final   06/09/2018 Moderate growth  Enterococcus faecalis   (A)  Final   06/09/2018 (A)  Final    Light growth  Streptococcus mitis group  Susceptibility testing not routinely done     06/07/2018 No growth  Final   06/06/2018 No growth  Final   06/06/2018 No growth  Final   02/19/2018 No growth  Final   02/19/2018 No growth  Final   02/19/2018   Final    Canceled, Test credited  Incorrectly ordered by PCU/Clinic  Test reordered as correct code  Tissue culture     02/19/2018   Final    No anaerobes isolated  Since this specimen was not transported in the proper anaerobic transport media, the   absence of anaerobes in this culture does not rule out the presence of anaerobes in this   specimen.     02/19/2018 (A)  Final    Light growth  Methicillin resistant Staphylococcus aureus (MRSA)     02/19/2018 (A)  Final    Light growth  Coagulase negative Staphylococcus  Susceptibility testing not routinely done  This organism is part of normal jim, but on occasion, may be a true pathogen. Clinical   correlation must be applied to interpreting this microbiology result.     02/19/2018   Final    Critical Value/Significant Value called to and read back by  NESHA HORTON RN (7A).  02.21.18 2206 GJS     02/18/2018 No growth  Final   02/18/2018 No growth  Final   02/16/2018   Final    <10,000 colonies/mL  mixed urogenital jim  Susceptibility testing not routinely done     02/16/2018 No growth  Final   02/16/2018 No growth  Final   04/06/2016 (A)  Final    Heavy growth Staphylococcus aureus  Heavy growth Beta hemolytic Streptococcus group B This isolate DOES NOT   demonstrate inducible clindamycin resistance in vitro. Clindamycin is   susceptible  and could be used when indicated, however, erythromycin is   resistant and should not be used.     06/24/2015   Final    <10,000 colonies/mL mixed urogenital jim Susceptibility testing not routinely   done     02/19/2015   Final    Culture negative for acid fast bacilli  Assayed at [facility] [address]     02/19/2015 (A)  Final    Normal respiratory jim  Light growth Candida albicans / dubliniensis Candida albicans and Candida   dubliniensis are not routinely speciated Susceptibility testing not routinely   done     02/19/2015 (A)  Final    Candida tropicalis isolated  Candida glabrata isolated  No additional fungi cultured after 4 weeks incubation     02/19/2015 No growth after 4 weeks  Final   02/16/2015 (A)  Final    Canceled, Test credited  >10 Squamous epithelial cells/low power field indicates oral contamination.   Please recollect.  Called to Cari on 4E, 2/16/15 14:10 CWi     02/15/2015 No growth  Final   02/15/2015 No growth  Final   02/13/2015 No growth  Final   02/13/2015 No growth  Final   10/31/2014 No growth  Final   10/29/2014   Final    Culture negative for acid fast bacilli  Assayed at [facility] [address]     10/29/2014 No growth  Final   10/29/2014 Culture negative after 4 weeks  Final   10/29/2014 No growth after 4 weeks  Final   10/29/2014 (A)  Final    Light growth Staphylococcus aureus  Light growth Cristela albicans / dubliniensis Candida albicans and Candida   dubliniensis are not routinely speciated     10/28/2014 No growth  Final   10/28/2014 No growth  Final   11/23/2009   Final    <10,000 colonies/mL Beta hemolytic Streptococcus group B     Comment:     10 to 50,000 colonies/mL Mixed gram positive jim Multiple species present,   probable perineal contamination.  Clindamycin and Erythromycin are not routinely prescribed for isolates from   the urinary tract.   03/04/2005   Final    <10,000 colonies/mL Staphylococcus species  Susceptibility testing not routinely     Comment:      done           Other Laboratory Data:    Urine Studies    Recent Labs   Lab Test  10/08/18   0032  06/07/18   0031  02/16/18   2036  06/24/15   1612  02/14/15   1545   LEUKEST  Small*  Small*  Large*  Moderate*  Duplicate request  SEE ACCN [id number]  *  Trace*   WBCU  7*  0  60*  2  0       Inflammatory Markers    Recent Labs   Lab Test  10/10/18   0605  10/05/18   1459  08/28/18   1325  08/23/18   1310  08/14/18   1340  08/08/18   1115  08/02/18   1215  07/30/18   1245   02/15/15   0240   SED   --   118*   --    --    --   46*  86*  109*   --   132*   CRP  280.0*  292.0*  52.1*  59.1*  76.7*  69.4*  70.9*  34.3*   < >   --     < > = values in this interval not displayed.       Hematology Studies    Recent Labs   Lab Test  10/14/18   0526  10/13/18   0524  10/12/18   0704  10/11/18   0829  10/10/18   0605  10/09/18   0553   10/07/18   1749   08/28/18   1325  08/23/18   1310  08/14/18   1340  07/28/18   1539   06/12/18   0420   WBC  15.5*  13.5*  16.1*  18.9*  24.9*  20.9*   < >  24.8*   < >  7.4  8.1  5.9  7.5   < >  19.2*   ANEU   --    --    --    --    --    --    --   20.5*   --   3.8  3.9  3.1  3.3   --   14.2*   AEOS   --    --    --    --    --    --    --   0.1   --   0.2  0.2  0.2  0.3   --   1.0*   HGB  7.8*  8.3*  8.0*  8.2*  8.8*  8.5*   < >  10.0*   < >  10.5*  10.4*  10.2*  12.2   < >  7.2*   MCV  80  78  78  78  77*  78   < >  81   < >  83  82  83  85   < >  75*   PLT  428  358  377  328  361  348   < >  360   < >  273  301  243  215   < >  279    < > = values in this interval not displayed.       Immune Globulin Studies    Recent Labs   Lab Test  02/21/15   0652  02/15/15   1520   IGG  1330   --    IGE   --   46       Metabolic Studies     Recent Labs   Lab Test  10/14/18   0526  10/13/18   0524  10/12/18   0704  10/11/18   0829  10/10/18   0605   NA  141  138  139  138  139   POTASSIUM  4.2  4.0  4.3  4.3  4.4   CHLORIDE  110*  109  110*  111*   110*   CO2  23  22  23  21  21   BUN  22  24  24  30  32*   CR  1.21*  1.29*  1.28*  1.20*  1.28*   GFRESTIMATED  47*  44*  44*  47*  44*       Hepatic Studies    Recent Labs   Lab Test  10/12/18   0704  10/10/18   0605  08/28/18   1325  08/23/18   1310  08/14/18   1340  07/16/18   0758   BILITOTAL  0.3  0.3  0.3  0.2  0.2  0.2   ALKPHOS  149  152*  263*  283*  354*  1163*   ALBUMIN  1.5*  1.6*  2.8*  2.8*  2.5*  2.6*   AST  12  12  20  24  35  59*   ALT  14  20  33  42  46  75*       Thyroid Studies    Recent Labs   Lab Test  01/13/17   1353  11/02/16   1359   TSH  0.32*  0.34*   T4  1.06  1.02       Vancomycin Levels    Recent Labs   Lab Test  10/15/18   0054  10/10/18   2230  08/08/18   1115   VANCOMYCIN  27.0*  20.7  20.8

## 2018-10-15 NOTE — PROVIDER NOTIFICATION
10/15/18 1730   PICC Single Lumen 10/15/18 Right Brachial vein lateral   Placement Date/Time: 10/15/18 1739   Catheter Brand: [employer] PICC  Size (Fr): 4 Fr  Lot #: [id number]  Full barrier precautions done: Yes, hand hygiene, sterile gown, sterile gloves, mask, cap, full body drape, chlorhexidine scrub  Consent Signed: Yes  Ti...   Site Assessment WDL   Line Status Blood return noted;Saline locked   External Cath Length (cm) 2 cm   Extremity Circumference (cm) 22 cm   Dressing Intervention Chlorhexidine patch;Transparent;Securing device;New dressing  (secura cath)   [REMOVED] Peripheral IV 10/14/18 Left;Anterior Lower forearm   Removal Date/Time: 10/15/18 1730  Placement Date/Time: 10/14/18 1515   Size (Gauge)/Length: 20 G;1 3/4 inch  Brand: B [employer]opusapp  Orientation: Left;Anterior  Location: Lower forearm  Site Prep: Chlorhexidine  Local Anesthetic: Injectable  Insertion attempt...   Site Assessment WDL   Phlebitis Scale 0-->no symptoms   Infiltration Scale 0
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
## 2018-10-16 NOTE — TELEPHONE ENCOUNTER
Patient discharged from [facility] IP  ( Inpatient or ER).    Discharge location: [facility]  Discharge date: 10/15/18  Diagnosis: Osteomyelitis (H), Pad (Peripheral Artery Disease) (H)  Patient has been in the ER/IP 2/3 times.  Care Coord:  Potential  9/21/18  Please follow up as appropriate. If no follow up required, please close encounter.

## 2018-10-16 NOTE — PLAN OF CARE
Problem: Patient Care Overview  Goal: Plan of Care/Patient Progress Review  Outcome: Adequate for Discharge Date Met: 10/16/18  Discharge.  AF, VSS.  PICC placed, for home antibiotics, ok'd for use. Pain under control, denied need for IR oxycodone.  Discharge delayed, order clarifications and MD home care orders specific to IV antibiotics had not been faxed.  Pt initially upset, her anger acknowledged and was justified. That she was and had always been respectful to us and assured her no disrespect intended to her.  After expressing her concerns she felt that her felings taken seriously and let nurse review all discharge orders.   Dressing change to left foot as ordered (pt watched), dressing damp (sersanguanous), will need daily dressing changes.  Sent extra dressings home with pt.  Home care to assess wound with dressing change and teach pt family or pt to do dressing changes. Reviewed discharge orders, medications and med schedules. Dressing change instructions.  Pt on methadone prior to admission, understands correct dosage.  Alessandra reports she was on insulin at home, has sliding scale, able to verbalize how to use sliding scale, Able to name her two insulins reporting novolog is her fast acting and lantus her long acting.  Understands signs of low blood sugar and knows how to treat self.  Discharge meds obtained from discharge pharmacy for pt (oxycodone and flagyl), home infusion bringing IV antibiotics, she reports home antibiotics in past. Home infusion will review and do any needed teaching re: picc cares, flush and how to give IV antibiotics.  Home infusion to stat tomorrow morning.  Reviewed appointments she needs to make and those already arranged.  She reports she will read through them all again when home and make the needed appointments.  Discharge orders faxed to her home care agency, including need for dressing change teaching and assessment. Home via  [employer] wheel chair transport. Able to  tranfer with her sliding board.  All personal belongings, sliding board, leg wedge, prescritions, and supplies taken with.

## 2018-10-16 NOTE — PROGRESS NOTES
Clinic Care Coordination Contact  [facility]/Voicemail       Clinical Data: Care Coordinator Outreach  Outreach attempted x 1.  Left message on voicemail with call back information and requested return call.  Plan: Care Coordinator mailed out care coordination introduction letter on 7/31/18. Care Coordinator will try to reach patient again in 1-2 business days.    Melissa Behl BSN, RN, [facility] Care Coordinator  [phone]

## 2018-10-16 NOTE — PLAN OF CARE
Problem: Patient Care Overview  Goal: Plan of Care/Patient Progress Review  Occupational Therapy Discharge Summary    Reason for therapy discharge:    Discharged to home.    Progress towards therapy goal(s). See goals on Care Plan in [facility] electronic health record for goal details.  Goals partially met.  Barriers to achieving goals:   discharge from facility.    Therapy recommendation(s):    Continued therapy is recommended.  Rationale/Recommendations:  pt would benefit from additional therapy to increase safety and independence with functional mobility and ADLs once weight bearing restrictions are lifted on LLE. At this time, pt is able to complete most ADLs and slide board transfers without difficulty, continue using slide board until pt cna bear weight on LLE. .

## 2018-10-16 NOTE — TELEPHONE ENCOUNTER
ED / Discharge Outreach Protocol    Patient Contact    Attempt # 1    Was call answered?  No.  Left message on voicemail with information to call me back.    Margot Munoz RN

## 2018-10-16 NOTE — TELEPHONE ENCOUNTER
See 10/16/18 patient outreach Care Coordination encounter.    Melissa Behl BSN, RN, PHN  [facility] Care Coordinator  [phone]
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
## 2018-10-19 NOTE — MR AVS SNAPSHOT
After Visit Summary   10/19/2018    Alessandra Pak    MRN: [id number]           Patient Information     Date Of Birth          1965        Visit Information        Provider Department      10/19/2018 1:30 PM Charla Pickard, [facility] Rheumatology MTM        Today's Diagnoses     Acute hematogenous osteomyelitis of left foot (H)    -  1    Type 2 diabetes mellitus with diabetic neuropathy, with long-term current use of insulin (H)        Other chronic pain        Nasal polyp          Care Instructions    Recommendations from today's MTM visit:                                                    MTM (medication therapy management) is a service provided by a clinical pharmacist designed to help you get the most of out of your medicines.   Today we reviewed what your medicines are for, how to know if they are working, that your medicines are safe and how to make your medicine regimen as easy as possible.     1. Re-start fluticasone nasal spray as planned.    2. It looks like your blood sugars are still above goal. Given you are not covered for MTM, you would benefit from working with primary care or endocrinology to get your blood sugars under control. This will certainly help with wound healing and infection risk, among other things.    3. Please reach out if you have questions.    Next MTM visit: 2 weeks for BG check-in 11/2 for telephone visit at 1:30 pm    To schedule another MTM appointment, please call the clinic directly or you may call the MTM scheduling line at [phone] or toll-free at [phone].     My Clinical Pharmacist's contact information:                                                      It was a pleasure talking with you today!  Please feel free to contact me with any questions or concerns you have.      Cahrla Pickard PharmD   MTM Pharmacist   Adult Rheumatology and IBD  Phone: [phone]    You may receive a survey about the MTM services you  received.  I would appreciate your feedback to help me serve you better in the future. Please fill it out and return it when you can. Your comments will be anonymous.                    Follow-ups after your visit        Your next 10 appointments already scheduled     Nov 02, 2018  1:30 PM CDT   TELEMEDICINE with Charla Pickard MONICA   [facility] Rheumatology MTM ([facility])    [address] [place] [address]  3rd [facility] [address] [phone]           Note: this is not an onsite visit; there is no need to come to the facility.            Nov 06, 2018  9:40 AM CST   (Arrive by 9:25 AM)   NEW FOOT/ANKLE with Jamey Peralta MD   [facility] ([facility])    [address] [place] [address]  4th Floor  [facility] [address] [phone]            Nov 14, 2018 10:00 AM CST   Return Visit with Marily Ahumada MD   [facility], [place], Infectious Disease ([facility])    [address]  [address]  [facility] [address] [phone]            Nov 29, 2018  1:00 PM CST   US GABRIEL DOPPLER NO EXERCISE 1-2 LVLS BILAT with UCUSV2   [facility] US ([facility])    [address] [place] [address]  1st [facility] [address] [phone]           How do I prepare for my exam? (Food and drink instructions) No caffeine or tobacco for 1 hour prior to exam.  What should I wear: Wear comfortable clothes.  How long does the exam take: Most ultrasounds take 30 to 60 minutes.  What should I bring: Bring a list of your medicines, including vitamins, minerals and over-the-counter drugs. It is safest to leave personal items at home.  Do I need a :  No  is needed.  What do I need to tell my doctor: Tell your doctor about any allergies you may have.  What should I do after the exam: No restrictions, You may resume normal activities.  What is this test: An  ultrasound uses sound waves to make pictures of the body. Sound waves do not cause pain. The only discomfort may be the pressure of the wand against your skin or full bladder.  Who should I call with questions: If you have any questions, please call the Imaging Department where you will have your exam. Directions, parking instructions, and other information is available on our website, [email or web address].            Nov 29, 2018  1:40 PM CST   (Arrive by 1:25 PM)   Return Vascular Visit with Carlos Llamas MD   [facility] ([facility] [place])    [address]  3rd [facility] [address] [phone]              Who to contact     If you have questions or need follow up information about today's clinic visit or your schedule please contact [facility] RHEUMATOLOGY [facility] directly at [phone].  Normal or non-critical lab and imaging results will be communicated to you by MyChart, letter or phone within 4 business days after the clinic has received the results. If you do not hear from us within 7 days, please contact the clinic through [facility] or phone. If you have a critical or abnormal lab result, we will notify you by phone as soon as possible.  Submit refill requests through [employer] or call your pharmacy and they will forward the refill request to us. Please allow 3 business days for your refill to be completed.          Additional Information About Your Visit        MyChart Information     [employer] gives you secure access to your electronic health record. If you see a primary care provider, you can also send messages to your care team and make appointments. If you have questions, please call your primary care clinic.  If you do not have a primary care provider, please call [phone] and they will assist you.        Care EveryWhere ID     This is your Care EveryWhere ID. This could be used by other organizations to access your [place]  medical records  [id number]         Blood Pressure from Last 3 Encounters:   10/22/18 138/76   10/15/18 133/70   10/05/18 (!) 88/58    Weight from Last 3 Encounters:   10/15/18 113 lb 12.1 oz (51.6 kg)   08/01/18 112 lb 9.6 oz (51.1 kg)   07/28/18 120 lb (54.4 kg)              Today, you had the following     No orders found for display         Today's Medication Changes          These changes are accurate as of 10/19/18 11:59 PM.  If you have any questions, ask your nurse or doctor.               These medicines have changed or have updated prescriptions.        Dose/Directions    insulin aspart 100 UNIT/ML injection   Commonly known as:  NovoLOG PEN   Indication:  Type 2 Diabetes   This may have changed:  how much to take   Used for:  Type 2 diabetes mellitus with stage 3 chronic kidney disease, with long-term current use of insulin (H)        Dose:  1-7 Units   Inject 1-7 Units Subcutaneous 3 times daily (before meals)   Quantity:  6.3 mL   Refills:  0                Primary Care Provider Office Phone # Fax #    Brionna Tesfayelucas Dc [phone]       [address]  [place] [address]        Equal Access to Services     NICK ROBLEDO AH: Hadii andres bolivar hadasho Socindiali, waaxda luqadaha, qaybta kaalmada adeegyada, sanjay hoang. So [facility] [phone].    ATENCIÓN: Si habla español, tiene a nagy disposición servicios gratuitos de asistencia lingüística. Llame al [phone].    We comply with applicable federal civil rights laws and [place] laws. We do not discriminate on the basis of race, color, national origin, age, disability, sex, sexual orientation, or gender identity.            Thank you!     Thank you for choosing [facility]  for your care. Our goal is always to provide you with excellent care. Hearing back from our patients is one way we can continue to improve our services. Please take a few minutes to complete  the written survey that you may receive in the mail after your visit with us. Thank you!             Your Updated Medication List - Protect others around you: Learn how to safely use, store and throw away your medicines at www.disposemymeds.org.          This list is accurate as of 10/19/18 11:59 PM.  Always use your most recent med list.                   Brand Name Dispense Instructions for use Diagnosis    acetaminophen 500 MG tablet    TYLENOL     Take 2 tablets (1,000 mg) by mouth 3 times daily    Status post below knee amputation of right lower extremity (H)       acetone (urine) test strip    KETOSTIX    25 each    1 strip by In Vitro route as needed    Type 2 diabetes mellitus with diabetic neuropathy (H)       albuterol 108 (90 Base) MCG/ACT inhaler    PROAIR HFA    1 Inhaler    Inhale 2 puffs into the lungs every 4 hours as needed for shortness of breath / dyspnea    SOB (shortness of breath)       amitriptyline 50 MG tablet    ELAVIL    60 tablet    Take 1 tablet (50 mg) by mouth At Bedtime    Status post below knee amputation of right lower extremity (H)       aspirin 81 MG tablet     100 tablet    Take 1 tablet (81 mg) by mouth daily    Type 2 diabetes mellitus with complication, with long-term current use of insulin (H)       blood glucose lancets standard    no brand specified    100 each    Use to test blood sugar 10 times daily or as directed. Accu-check    Type 2 diabetes mellitus with stage 3 chronic kidney disease, with long-term current use of insulin (H)       blood glucose monitoring meter device kit    no brand specified    1 kit    Use to test blood sugar 10 times daily or as directed. Accu-check    Type 2 diabetes mellitus with stage 3 chronic kidney disease, with long-term current use of insulin (H)       blood glucose monitoring test strip    no brand specified    100 strip    Use to test blood sugars 10 times daily or as directed. Accu-chek    Type 2 diabetes mellitus with stage 3  chronic kidney disease, with long-term current use of insulin (H)       ceFEPIme 2 G vial    MAXIPIME    20 each    Inject 2 g into the vein every 12 hours    Diabetic foot ulcer with osteomyelitis (H)       fluticasone 50 MCG/ACT spray    FLONASE    1 Bottle    Spray 2 sprays into left nostril daily    Acute nonseasonal allergic rhinitis due to pollen       furosemide 40 MG tablet    LASIX    30 tablet    Take 1 tablet (40 mg) by mouth daily    SOB (shortness of breath)       glucose 4 g Chew chewable tablet     25 tablet    Take 3-4 tablets to treat low blood sugar.    Type 2 diabetes mellitus with complication, with long-term current use of insulin (H)       insulin aspart 100 UNIT/ML injection    NovoLOG PEN    6.3 mL    Inject 1-7 Units Subcutaneous 3 times daily (before meals)    Type 2 diabetes mellitus with stage 3 chronic kidney disease, with long-term current use of insulin (H)       insulin glargine 100 UNIT/ML injection    LANTUS    2.4 mL    Inject 8 Units Subcutaneous At Bedtime    Type 2 diabetes mellitus with stage 3 chronic kidney disease, with long-term current use of insulin (H)       insulin pen needle 31G X 5 MM    B-D U/F    3 each    Use 3 time(s) a day.    Type 2 diabetes, HbA1c goal < 7% (H)       levonorgestrel 20 MCG/24HR IUD    MIRENA (52 MG)    1 each    placed today    Excessive or frequent menstruation       Lidocaine 4 % Patch    LIDOCARE    30 patch    Place 2 patches onto the skin every 24 hours    Status post below knee amputation of right lower extremity (H)       lisinopril 10 MG tablet    PRINIVIL/ZESTRIL    30 tablet    Take 1 tablet (10 mg) by mouth daily    Type 2 diabetes mellitus with stage 3 chronic kidney disease, with long-term current use of insulin (H)       LYRICA 75 MG capsule   Generic drug:  pregabalin     90 capsule    TAKE 1 CAPSULE BY MOUTH 3 TIMES DAILY    Status post below knee amputation of right lower extremity (H)       methadone 10 MG/ML (HIGH CONC)  solution    DOLOPHINE-INTENSOL     Take 75 mg by mouth daily Confirmed dosing on 10/8/18 with [facility] [place], [place] [phone]        metoprolol succinate 25 MG 24 hr tablet    TOPROL-XL    90 tablet    Take 1 tablet (25 mg) by mouth daily    Hypertension goal BP (blood pressure) < 140/90, Chronic systolic congestive heart failure (H)       metroNIDAZOLE 500 MG tablet    FLAGYL    60 tablet    Take 1 tablet (500 mg) by mouth every 8 hours    Diabetic foot ulcer with osteomyelitis (H)       multivitamin, therapeutic with minerals Tabs tablet     30 each    Take 1 tablet by mouth daily    Status post below knee amputation of right lower extremity (H)       oxyCODONE IR 10 MG tablet    ROXICODONE    20 tablet    Take 1-2 tablets (10-20 mg) by mouth every 6 hours as needed for moderate to severe pain    Diabetic foot ulcer with osteomyelitis (H)       polyethylene glycol Packet    MIRALAX/GLYCOLAX    7 packet    Take 17 g by mouth daily    Status post below knee amputation of right lower extremity (H)       ranitidine 300 MG tablet    ZANTAC    60 tablet    Take 1 tablet (300 mg) by mouth daily    Status post below knee amputation of right lower extremity (H)       umeclidinium-vilanterol 62.5-25 MCG/INH oral inhaler    ANORO ELLIPTA    1 Inhaler    Inhale 1 puff into the lungs daily    Chronic bronchitis, unspecified chronic bronchitis type (H)       vancomycin 750 mg     750 mL    Inject 750 mg into the vein every 24 hours    Diabetic foot ulcer with osteomyelitis (H)

## 2018-10-19 NOTE — TELEPHONE ENCOUNTER
[facility] Call Center    Phone Message    May a detailed message be left on voicemail: yes    Reason for Call: Other: Martita, from [facility] called requesting Clinical notes from the beginning of September (from start of pt's back therapy) to current date. The are also looking for Wound Measurements, along w/ a brief description of the color, size, and amt of drainage from the date range of 9/9/18-9/24/18. Martita requests this faxed to them at [facility] Fax: [phone], Order # [id number] is needed for the pt's insurance plan.      Action Taken: Message routed to:  [facility] ([facility] Ortho

## 2018-10-19 NOTE — TELEPHONE ENCOUNTER
Pharmacist Mindy from [facility] called to report pt lab values;  Vanco of 28 and creat of 1.68.  Values called to Dr Ahumada who ordered Vanco be held.  Daily Vanco level and BMP to be drawn and may resume Vanco when level is <15 and creat is 1.2 or less.  Orders called to [place].  Pt is scheduled to see Dr Ahumada on Monday.

## 2018-10-19 NOTE — PROGRESS NOTES
SUBJECTIVE/OBJECTIVE:                Alessandra Pak is a 53 year old female called for a transitions of care visit.  She was discharged from [facility] on 10/15 for sepsis due to osteomyelitis, s/p amputation of first and second ray of left foot, history of right BKA, PVD.   Last visit with MTM 8/29/2018 Jovita Stahl    Chief Complaint: None. Reports no real medication changes other than antibiotics.   Of note, she reports phone problems and at times it was difficult to understand communication. Tried to clarify with best of ability.     Allergies/ADRs: Reviewed in Epic  Tobacco: No tobacco use   Alcohol: not currently using  Fills medications at [facility] pharmacy, prefers.     Medication Adherence/Access:  no issues reported    Osteomyelitis: Current therapy includes:  Metronidazole 500 mg every 8 hours  Vancomycin 750 mg daily (on hold)   Cefepime 2 g every 12 hours   Oxycodone 10 mg tablet (taking once daily)     Has Miralax to use as needed, not needing right now but knows how to use it. Reports main concern right now is getting PCA help. Otherwise she is doing okay. Per telephone call today from Infectious disease that she should hold Vanco until level is < 15 (at 28 now) and creatinine is 1.2 or less (at 1.68 now). Reports she is getting IV antibiotics from home infusion - [place]. She has appointment scheduled with ID 10/22.     Diabetes Mellitus: Currently taking between 1-5 units of Novolog with meals and 8 units of Lantus.   She has seen 170-273 mg/dL for blood sugars. She reports sometimes her blood sugars get low - not recently, last was awhile ago. She is hoping that she can get on a pump. Regimen was not change in hospital because sugars were in good control. Reviewed recommendations from last MT visit. She is aware of recommendations for diet made by Jovita. Limited blood sugars available for review from home.   Lab Results   Component Value Date    A1C 9.1 10/10/2018    A1C 9.5 06/06/2018    A1C 12.8 02/16/2018     A1C 11.8 06/21/2017    A1C 11.5 11/02/2016     Chronic Pain:  Current medications include: acetaminophen 1000 mg as needed, not using right now, methadone 70 mg once daily, lidocaine patches 12 hours on and 12 hours off, Lyrica 75 mg three times daily and oxycodone 10 mg daily (written as 10-20 mg Q6 hrs as needed for moderate to severe pain).  This does help with the phantom pain and body aches. She is followed by Dr. Rich Sheriff at the methadone clinic in  [place].    Nasal Polyps: Current therapy includes fluticasone nasal spray daily. She reports not having this for a few days and she noticed a slight headache. She plans to re-start. No reported concerns.     ASSESSMENT:                 Current medications were reviewed today.      Medication Adherence: fair, no issues identified    Osteomyelitis: Improving based on report. Pt appears to be compliant with antibiotic therapy based on report. Emphasized importance of blood sugar control for reducing risk of complications including infections.    Diabetes Mellitus: Needs improvement. Pt would certainly benefit from f/up with endocrinology or PCP to manage blood sugars. Limited numbers available for review to make adjustments today, however, it appears she needs an increase in insulin. Not covered for MTM, so unlikely a long term solution for management. As above, reviewed previous recommendations from last MTM visit.    Chronic Pain: Stable.     Nasal Polyps: Needs improvement. Pt would benefit from re-starting fluticasone as planned.    PLAN:                1. Re-start fluticasone nasal spray as planned  2. Alessandra to schedule with primary care/endocrinology as planned for blood glucose control (not covered for MTM)     I spent 20 minutes with this patient today. I offer these suggestions for consideration by her PCP. A copy of the visit note was provided to the patient's primary care provider.    Will follow up in 2 weeks for appointment/BG check-in.    The  patient was sent via [employer] a summary of these recommendations as an after visit summary.    Charla Pickard PharmD   MTM Pharmacist   Adult Rheumatology and IBD  Phone: [phone]

## 2018-10-22 NOTE — PROGRESS NOTES
[facility] Clinic Return Visit Note         Assessment and Recommendations:   Problem List:  # Diabetic foot infection with osteomyelitis s/p 1st ray and 2nd toe amputation on 10/13/18. Wound cultures revealed polymicrobial growth including MRSA, pseudomonas, and anaerobes. Pathology reveals necrosis at resection border suggesting residual osteo in the remaining tarsal  # Sepsis  # DM, insulin dependent with A1c >10 and persistent hyperglycemia  #Prior R BKA, poorly healing  # PAD s/p LLE angio with L RAJNI stent on 10/11/18  # Pancreatic insufficiency s/p prior Whipple  # Hyperkalemia. Likely multifactorial, and influenced by her blood glucose levels. Also likely consuming supratherapeutic dietary K  # pressure ulcer, R hip    Recommendations:  - continue cefepime watching GFR closely to ensure it does not go below 30 and necessitate dose reduction, also continue vanco with cautious dosing based on CKD and propensity toward accumulation. Goal vanco trough 15. Lastly continue metronidazole  - duration will be for 6 weeks from 10/13, translating to a stop date of 11/24. If there is poor wound healing, could consider an even longer course up to 12 weeks, at which point additional abx would likely be futile  - she will inquire with her endocrinologist about an insulin pump given that her blood glucose levels are >200 consistently  - she will reduce her dietary consumption of potassium, including bananas  - ongoing attention to off-loading R hip and dressing appropirately  - I have requested that she see me at the [facility] in ~4 weeks to discuss whether she is a candidate to stop antibiotics    It is a pleasure to participate in Ms. Pak's care. Visit length 25 min, >50% clinical counseling/care coordination    Marily Ahumada MD   of Medicine, Division of Infectious Diseases  [facility] [phone]          Interval History:   Ms Pak is known to me from her recent admission for SIRS related to L great toe septic  "arthritis with osteo requiring 1st and 2nd toe amputations. Please see above for details. She is changing her dressing ~3x/day. She  Also notes the appearance of  \"bed sore\" on her L hip area. She has been applying dressings but these tend to roll and detach when she pivots to transfer. No fevers. BG readings continue to be high. R BKA stump healing a bit better but there is still a small area of non-draining eschar           Physical Exam:   Ranges for vital signs:  Temp:  [98.5  F (36.9  C)] 98.5  F (36.9  C)  Pulse:  [110] 110  BP: (138)/(76) 138/76  SpO2:  [90 %] 90 %    Exam:  GENERAL:  well-developed, in good spirits, no apparent distress  ENT:  Head is normocephalic, atraumatic. .  EYES:  Eyes have anicteric sclerae.    NECK:  Supple.  LUNGS:  Clear to auscultation.  CARDIOVASCULAR:  2/6 systolic murmur at left sternal border.  EXT:L foot examined and the suture line is well appearing. There is moderate strike-through on her dressing but no purulence. R BKA stump has some minor non-bloody non-purulent eschar without surrounding erythema or induration         Laboratory Data:   Microbiology:         Culture Micro   Date Value Ref Range Status   10/13/2018 No growth  Final   10/13/2018 No growth  Final   10/08/2018 No growth  Final   10/07/2018 No growth  Final   10/07/2018 No growth  Final   10/05/2018 Heavy growth  Pseudomonas aeruginosa   (A)  Final   10/05/2018 Heavy growth  Escherichia coli   (A)  Final   10/05/2018 (A)  Final    Light growth  Raoultella ornithinolytica/planticola     10/05/2018 Moderate growth  Enterococcus faecalis   (A)  Final   10/05/2018 (A)  Final    Moderate growth  Methicillin resistant Staphylococcus aureus (MRSA)     10/05/2018 (A)  Final    Heavy growth  Corynebacterium striatum  Identification obtained by MALDI-TOF mass spectrometry research use only database. Test   characteristics determined and verified by the [facility]   ([facility]) [place], " [place].  Susceptibility testing not routinely done     10/05/2018 Heavy growth  Streptococcus anginosus   (A)  Final   10/05/2018 (A)  Final    Heavy growth  Prevotella species  Identification obtained by MALDI-TOF mass spectrometry research use only database. Test   characteristics determined and verified by the [facility]   ([facility]) [place], [place].  Beta lactamase positive  Susceptibility testing not routinely done     10/05/2018 (A)  Final    Heavy growth  Fusobacterium nucleatum  Susceptibility testing not routinely done     10/05/2018 (A)  Final    Plus  Heavy growth  Mixed aerobic and anaerobic jim     06/29/2018 No growth  Final   06/29/2018 No growth  Final   06/28/2018 No anaerobes isolated  Final   06/28/2018 No growth  Final   06/28/2018 No anaerobes isolated  Final   06/28/2018 (A)  Final    Light growth  Gram positive cocci  Organism failed to thrive for identification and suceptibility testing     06/13/2018 No growth  Final   06/13/2018 No growth  Final   06/11/2018 Light growth  Candida glabrata   (A)  Final   06/11/2018 Susceptibility testing not routinely done  Final   06/10/2018 No growth  Final   06/10/2018 No growth  Final   06/09/2018 No growth  Final   06/09/2018 Moderate growth  Enterococcus faecalis   (A)  Final   06/09/2018 (A)  Final    Light growth  Streptococcus mitis group  Susceptibility testing not routinely done     06/07/2018 No growth  Final   06/06/2018 No growth  Final   06/06/2018 No growth  Final   02/19/2018 No growth  Final   02/19/2018 No growth  Final   02/19/2018   Final    Canceled, Test credited  Incorrectly ordered by [facility]  Test reordered as correct code  Tissue culture     02/19/2018   Final    No anaerobes isolated  Since this specimen was not transported in the proper anaerobic transport media, the   absence of anaerobes in this culture does not rule out the presence of anaerobes in this   specimen.     02/19/2018 (A)  Final    Light  growth  Methicillin resistant Staphylococcus aureus (MRSA)     02/19/2018 (A)  Final    Light growth  Coagulase negative Staphylococcus  Susceptibility testing not routinely done  This organism is part of normal jim, but on occasion, may be a true pathogen. Clinical   correlation must be applied to interpreting this microbiology result.     02/19/2018   Final    Critical Value/Significant Value called to and read back by  NESHA HORTON RN ([id number]).  02.21.18 2206 GJS     02/18/2018 No growth  Final   02/18/2018 No growth  Final   02/16/2018   Final    <10,000 colonies/mL  mixed urogenital jim  Susceptibility testing not routinely done     02/16/2018 No growth  Final   02/16/2018 No growth  Final   04/06/2016 (A)  Final    Heavy growth Staphylococcus aureus  Heavy growth Beta hemolytic Streptococcus group B This isolate DOES NOT   demonstrate inducible clindamycin resistance in vitro. Clindamycin is   susceptible and could be used when indicated, however, erythromycin is   resistant and should not be used.     06/24/2015   Final    <10,000 colonies/mL mixed urogenital jim Susceptibility testing not routinely   done     02/19/2015   Final    Culture negative for acid fast bacilli  Assayed at [facility] [address]     02/19/2015 (A)  Final    Normal respiratory jim  Light growth Candida albicans / dubliniensis Candida albicans and Candida   dubliniensis are not routinely speciated Susceptibility testing not routinely   done     02/19/2015 (A)  Final    Candida tropicalis isolated  Candida glabrata isolated  No additional fungi cultured after 4 weeks incubation     02/19/2015 No growth after 4 weeks  Final   02/16/2015 (A)  Final    Canceled, Test credited  >10 Squamous epithelial cells/low power field indicates oral contamination.   Please recollect.  Called to [facility]ari on 4E, 2/16/15 14:10 CWi     02/15/2015 No growth  Final   02/15/2015 No growth  Final   02/13/2015 No growth  Final    02/13/2015 No growth  Final   10/31/2014 No growth  Final   10/29/2014   Final    Culture negative for acid fast bacilli  Assayed at [facility] [address]     10/29/2014 No growth  Final   10/29/2014 Culture negative after 4 weeks  Final   10/29/2014 No growth after 4 weeks  Final   10/29/2014 (A)  Final    Light growth Staphylococcus aureus  Light growth Cristela albicans / dubliniensis Candida albicans and Candida   dubliniensis are not routinely speciated     10/28/2014 No growth  Final   10/28/2014 No growth  Final   11/23/2009   Final    <10,000 colonies/mL Beta hemolytic Streptococcus group B     Comment:     10 to 50,000 colonies/mL Mixed gram positive jim Multiple species present,   probable perineal contamination.  Clindamycin and Erythromycin are not routinely prescribed for isolates from   the urinary tract.   03/04/2005   Final    <10,000 colonies/mL Staphylococcus species Susceptibility testing not routinely     Comment:      done           Other Laboratory Data:    Urine Studies    Recent Labs   Lab Test  10/08/18   0032  06/07/18   0031  02/16/18   2036  06/24/15   1612  02/14/15   1545   LEUKEST  Small*  Small*  Large*  Moderate*  Duplicate request  SEE ACCN [id number]  *  Trace*   WBCU  7*  0  60*  2  0       Inflammatory Markers    Recent Labs   Lab Test  10/10/18   0605  10/05/18   1459  08/28/18   1325  08/23/18   1310  08/14/18   1340  08/08/18   1115  08/02/18   1215  07/30/18   1245   02/15/15   0240   SED   --   118*   --    --    --   46*  86*  109*   --   132*   CRP  280.0*  292.0*  52.1*  59.1*  76.7*  69.4*  70.9*  34.3*   < >   --     < > = values in this interval not displayed.       Hematology Studies    Recent Labs   Lab Test  10/14/18   0526  10/13/18   0524  10/12/18   0704  10/11/18   0829  10/10/18   0605  10/09/18   0553   10/07/18   1749   08/28/18   1325  08/23/18   1310  08/14/18   1340  07/28/18   1539   06/12/18   0420   WBC  15.5*  13.5*  16.1*   18.9*  24.9*  20.9*   < >  24.8*   < >  7.4  8.1  5.9  7.5   < >  19.2*   ANEU   --    --    --    --    --    --    --   20.5*   --   3.8  3.9  3.1  3.3   --   14.2*   AEOS   --    --    --    --    --    --    --   0.1   --   0.2  0.2  0.2  0.3   --   1.0*   HGB  7.8*  8.3*  8.0*  8.2*  8.8*  8.5*   < >  10.0*   < >  10.5*  10.4*  10.2*  12.2   < >  7.2*   MCV  80  78  78  78  77*  78   < >  81   < >  83  82  83  85   < >  75*   PLT  428  358  377  328  361  348   < >  360   < >  273  301  243  215   < >  279    < > = values in this interval not displayed.       Immune Globulin Studies    Recent Labs   Lab Test  02/21/15   0652  02/15/15   1520   IGG  1330   --    IGE   --   46       Metabolic Studies     Recent Labs   Lab Test  10/14/18   0526  10/13/18   0524  10/12/18   0704  10/11/18   0829  10/10/18   0605   NA  141  138  139  138  139   POTASSIUM  4.2  4.0  4.3  4.3  4.4   CHLORIDE  110*  109  110*  111*  110*   CO2  23  22  23  21  21   BUN  22  24  24  30  32*   CR  1.21*  1.29*  1.28*  1.20*  1.28*   GFRESTIMATED  47*  44*  44*  47*  44*       Hepatic Studies    Recent Labs   Lab Test  10/12/18   0704  10/10/18   0605  08/28/18   1325  08/23/18   1310  08/14/18   1340  07/16/18   0758   BILITOTAL  0.3  0.3  0.3  0.2  0.2  0.2   ALKPHOS  149  152*  263*  283*  354*  1163*   ALBUMIN  1.5*  1.6*  2.8*  2.8*  2.5*  2.6*   AST  12  12  20  24  35  59*   ALT  14  20  33  42  46  75*       Thyroid Studies    Recent Labs   Lab Test  01/13/17   1353  11/02/16   1359   TSH  0.32*  0.34*   T4  1.06  1.02       Vancomycin Levels    Recent Labs   Lab Test  10/15/18   0054  10/10/18   2230  08/08/18   1115   VANCOMYCIN  27.0*  20.7  20.8

## 2018-10-22 NOTE — MR AVS SNAPSHOT
After Visit Summary   10/22/2018    Alesasndra Pak    MRN: [id number]           Patient Information     Date Of Birth          1965        Visit Information        Provider Department      10/22/2018 2:30 PM Marily Ahumada MD [facility], [place], Infectious Disease        Today's Diagnoses     Acute hematogenous osteomyelitis of left foot (H)    -  1    Diabetic ulcer of toe of left foot associated with diabetes mellitus due to underlying condition, limited to breakdown of skin (H)        Type 2 diabetes mellitus with diabetic neuropathy, with long-term current use of insulin (H)        CKD (chronic kidney disease) stage 3, GFR 30-59 ml/min           Follow-ups after your visit        Follow-up notes from your care team     Return in about 4 weeks (around 11/19/2018).      Your next 10 appointments already scheduled     Nov 02, 2018  1:30 PM CDT   TELEMEDICINE with Charla Pickard [facility] Rhe[facility]TM ([facility])    [address] [place] [address]  3rd Floor  [facility] [id number] [phone]           Note: this is not an onsite visit; there is no need to come to the facility.            Nov 06, 2018  9:40 AM CST   (Arrive by 9:25 AM)   NEW FOOT/ANKLE with Jamey Peralta MD   [facility] ([facility] [place])    [address] [facility]  4th Floor  [facility] [id number] [phone]            Nov 14, 2018 10:00 AM CST   Return Visit with Marily Ahumada MD   [facility] [place], Infectious Disease ([facility])    [address]  [address]  [facility] [address] [phone]            Nov 29, 2018  1:00 PM CST   US GABRIEL DOPPLER NO EXERCISE 1-2 LVLS BILAT with [id number] [facility] US ([facility] [place])    [address] [facility]  1st Floor  [facility] [id number] [phone]           How do I prepare for my exam? (Food and  drink instructions) No caffeine or tobacco for 1 hour prior to exam.  What should I wear: Wear comfortable clothes.  How long does the exam take: Most ultrasounds take 30 to 60 minutes.  What should I bring: Bring a list of your medicines, including vitamins, minerals and over-the-counter drugs. It is safest to leave personal items at home.  Do I need a :  No  is needed.  What do I need to tell my doctor: Tell your doctor about any allergies you may have.  What should I do after the exam: No restrictions, You may resume normal activities.  What is this test: An ultrasound uses sound waves to make pictures of the body. Sound waves do not cause pain. The only discomfort may be the pressure of the wand against your skin or full bladder.  Who should I call with questions: If you have any questions, please call the Imaging Department where you will have your exam. Directions, parking instructions, and other information is available on our website, [email or web address].            Nov 29, 2018  1:40 PM CST   (Arrive by 1:25 PM)   Return Vascular Visit with Carlos Llamas MD   [facility] ([facility])    [address] [place] [address] [phone]              Who to contact     Please call your clinic at [phone] to:    Ask questions about your health    Make or cancel appointments    Discuss your medicines    Learn about your test results    Speak to your doctor            Additional Information About Your Visit        [employer] Content Information     [employer] gives you secure access to your electronic health record. If you see a primary care provider, you can also send messages to your care team and make appointments. If you have questions, please call your primary care clinic.  If you do not have a primary care provider, please call [phone] and they will assist you.      [employer] is an electronic gateway that provides easy, online  access to your medical records. With [employer], you can request a clinic appointment, read your test results, renew a prescription or communicate with your care team.     To access your existing account, please contact your [facility] or call [phone] for assistance.        Care EveryWhere ID     This is your Care EveryWhere ID. This could be used by other organizations to access your [place] medical records  [id number]        Your Vitals Were     Pulse Temperature Pulse Oximetry             110 98.5  F (36.9  C) (Oral) 90%          Blood Pressure from Last 3 Encounters:   10/22/18 138/76   10/15/18 133/70   10/05/18 (!) 88/58    Weight from Last 3 Encounters:   10/15/18 51.6 kg (113 lb 12.1 oz)   08/01/18 51.1 kg (112 lb 9.6 oz)   07/28/18 54.4 kg (120 lb)              Today, you had the following     No orders found for display         Today's Medication Changes          These changes are accurate as of 10/22/18 11:59 PM.  If you have any questions, ask your nurse or doctor.               These medicines have changed or have updated prescriptions.        Dose/Directions    insulin aspart 100 UNIT/ML injection   Commonly known as:  NovoLOG PEN   Indication:  Type 2 Diabetes   This may have changed:  how much to take   Used for:  Type 2 diabetes mellitus with stage 3 chronic kidney disease, with long-term current use of insulin (H)        Dose:  1-7 Units   Inject 1-7 Units Subcutaneous 3 times daily (before meals)   Quantity:  6.3 mL   Refills:  0                Primary Care Provider Office Phone # Fax #    Brionna Coby Dc [phone] [address]  [facility] [address]        Equal Access to Services     NATALIE [facility] Hospital[facility] AH: Hadii andres Terrell, wagracieda luqadaha, qaybta kaalmada ayana, sanjay hoang. So [facility] [phone].    ATENCIÓN: Si habla español, tiene a nagy disposición servicios gratuitos de  asistencia lingüística. Rodriguez al [phone].    We comply with applicable federal civil rights laws and [place] laws. We do not discriminate on the basis of race, color, national origin, age, disability, sex, sexual orientation, or gender identity.            Thank you!     Thank you for choosing [facility], [place], INFECTIOUS DISEASE  for your care. Our goal is always to provide you with excellent care. Hearing back from our patients is one way we can continue to improve our services. Please take a few minutes to complete the written survey that you may receive in the mail after your visit with us. Thank you!             Your Updated Medication List - Protect others around you: Learn how to safely use, store and throw away your medicines at www.disposemymeds.org.          This list is accurate as of 10/22/18 11:59 PM.  Always use your most recent med list.                   Brand Name Dispense Instructions for use Diagnosis    acetaminophen 500 MG tablet    TYLENOL     Take 2 tablets (1,000 mg) by mouth 3 times daily    Status post below knee amputation of right lower extremity (H)       acetone (urine) test strip    KETOSTIX    25 each    1 strip by In Vitro route as needed    Type 2 diabetes mellitus with diabetic neuropathy (H)       albuterol 108 (90 Base) MCG/ACT inhaler    PROAIR HFA    1 Inhaler    Inhale 2 puffs into the lungs every 4 hours as needed for shortness of breath / dyspnea    SOB (shortness of breath)       amitriptyline 50 MG tablet    ELAVIL    60 tablet    Take 1 tablet (50 mg) by mouth At Bedtime    Status post below knee amputation of right lower extremity (H)       aspirin 81 MG tablet     100 tablet    Take 1 tablet (81 mg) by mouth daily    Type 2 diabetes mellitus with complication, with long-term current use of insulin (H)       blood glucose lancets standard    no brand specified    100 each    Use to test blood sugar 10 times daily or as directed. Accu-check    Type 2 diabetes  mellitus with stage 3 chronic kidney disease, with long-term current use of insulin (H)       blood glucose monitoring meter device kit    no brand specified    1 kit    Use to test blood sugar 10 times daily or as directed. Accu-check    Type 2 diabetes mellitus with stage 3 chronic kidney disease, with long-term current use of insulin (H)       blood glucose monitoring test strip    no brand specified    100 strip    Use to test blood sugars 10 times daily or as directed. Accu-chek    Type 2 diabetes mellitus with stage 3 chronic kidney disease, with long-term current use of insulin (H)       ceFEPIme 2 G vial    MAXIPIME    20 each    Inject 2 g into the vein every 12 hours    Diabetic foot ulcer with osteomyelitis (H)       fluticasone 50 MCG/ACT spray    FLONASE    1 Bottle    Spray 2 sprays into left nostril daily    Acute nonseasonal allergic rhinitis due to pollen       furosemide 40 MG tablet    LASIX    30 tablet    Take 1 tablet (40 mg) by mouth daily    SOB (shortness of breath)       glucose 4 g Chew chewable tablet     25 tablet    Take 3-4 tablets to treat low blood sugar.    Type 2 diabetes mellitus with complication, with long-term current use of insulin (H)       insulin aspart 100 UNIT/ML injection    NovoLOG PEN    6.3 mL    Inject 1-7 Units Subcutaneous 3 times daily (before meals)    Type 2 diabetes mellitus with stage 3 chronic kidney disease, with long-term current use of insulin (H)       insulin glargine 100 UNIT/ML injection    LANTUS    2.4 mL    Inject 8 Units Subcutaneous At Bedtime    Type 2 diabetes mellitus with stage 3 chronic kidney disease, with long-term current use of insulin (H)       insulin pen needle 31G X 5 MM    B-D U/F    3 each    Use 3 time(s) a day.    Type 2 diabetes, HbA1c goal < 7% (H)       levonorgestrel 20 MCG/24HR IUD    MIRENA (52 MG)    1 each    placed today    Excessive or frequent menstruation       Lidocaine 4 % Patch    LIDOCARE    30 patch    Place 2  patches onto the skin every 24 hours    Status post below knee amputation of right lower extremity (H)       lisinopril 10 MG tablet    PRINIVIL/ZESTRIL    30 tablet    Take 1 tablet (10 mg) by mouth daily    Type 2 diabetes mellitus with stage 3 chronic kidney disease, with long-term current use of insulin (H)       LYRICA 75 MG capsule   Generic drug:  pregabalin     90 capsule    TAKE 1 CAPSULE BY MOUTH 3 TIMES DAILY    Status post below knee amputation of right lower extremity (H)       methadone 10 MG/ML (HIGH CONC) solution    DOLOPHINE-INTENSOL     Take 75 mg by mouth daily Confirmed dosing on 10/8/18 with [facility] [place], [place] [phone]        metoprolol succinate 25 MG 24 hr tablet    TOPROL-XL    90 tablet    Take 1 tablet (25 mg) by mouth daily    Hypertension goal BP (blood pressure) < 140/90, Chronic systolic congestive heart failure (H)       metroNIDAZOLE 500 MG tablet    FLAGYL    60 tablet    Take 1 tablet (500 mg) by mouth every 8 hours    Diabetic foot ulcer with osteomyelitis (H)       multivitamin, therapeutic with minerals Tabs tablet     30 each    Take 1 tablet by mouth daily    Status post below knee amputation of right lower extremity (H)       oxyCODONE IR 10 MG tablet    ROXICODONE    20 tablet    Take 1-2 tablets (10-20 mg) by mouth every 6 hours as needed for moderate to severe pain    Diabetic foot ulcer with osteomyelitis (H)       polyethylene glycol Packet    MIRALAX/GLYCOLAX    7 packet    Take 17 g by mouth daily    Status post below knee amputation of right lower extremity (H)       ranitidine 300 MG tablet    ZANTAC    60 tablet    Take 1 tablet (300 mg) by mouth daily    Status post below knee amputation of right lower extremity (H)       umeclidinium-vilanterol 62.5-25 MCG/INH oral inhaler    ANORO ELLIPTA    1 Inhaler    Inhale 1 puff into the lungs daily    Chronic bronchitis, unspecified chronic bronchitis type (H)       vancomycin 750 mg      750 mL    Inject 750 mg into the vein every 24 hours    Diabetic foot ulcer with osteomyelitis (H)

## 2018-10-22 NOTE — NURSING NOTE
Visit Reason: Follow Up      Evaluation / Assessment:   Patient states no pain today. Vitals taken, allergies and medications reviewed.      Labs: NA      Procedure ordered? NA      Dressing Change: NA      Vaccine ordered / administered: NA      Other: NA

## 2018-10-23 NOTE — MR AVS SNAPSHOT
After Visit Summary   10/23/2018    Alessandra Pak    MRN: [id number]           Patient Information     Date Of Birth          1965        Visit Information        Provider Department      10/23/2018 6:00 AM UC ICD REMOTE [facility]        Today's Diagnoses     Nonischemic cardiomyopathy (H)    -  1       Follow-ups after your visit        Your next 10 appointments already scheduled     Nov 02, 2018  1:30 PM CDT   TE[facility]MEDICINE with [facility] ([facility])    [address] [place] [address]  3rd Floor  [facility] [address]   [phone]           Note: this is not an onsite visit; there is no need to come to the facility.            Nov 06, 2018  9:40 AM CST   (Arrive by 9:25 AM)   NEW FOOT/ANKLE with Jamey Peralta MD   [facility] ([facility])    [address] [place] [address]  4th Floor  [facility] [address] [phone]            Nov 14, 2018 10:00 AM CST   Return Visit with Marily Ahumada MD   [facility], [place], Infectious Disease ([facility])    [address]  [address]  [facility] [address] [phone]            Nov 29, 2018  1:00 PM CST   US GABRIEL DOPPLER NO EXERCISE 1-2 LVLS BILAT with [facility]    [facility] ([facility] [place])    [address] [place] [address]  1st Floor  [facility] [address] [phone]           How do I prepare for my exam? (Food and drink instructions) No caffeine or tobacco for 1 hour prior to exam.  What should I wear: Wear comfortable clothes.  How long does the exam take: Most ultrasounds take 30 to 60 minutes.  What should I bring: Bring a list of your medicines, including vitamins, minerals and over-the-counter drugs. It is safest to leave personal items at home.  Do I need a :  No  is needed.  What do I need to tell my doctor: Tell your doctor  about any allergies you may have.  What should I do after the exam: No restrictions, You may resume normal activities.  What is this test: An ultrasound uses sound waves to make pictures of the body. Sound waves do not cause pain. The only discomfort may be the pressure of the wand against your skin or full bladder.  Who should I call with questions: If you have any questions, please call the Imaging Department where you will have your exam. Directions, parking instructions, and other information is available on our website, [email or web address].            Nov 29, 2018  1:40 PM CST   (Arrive by 1:25 PM)   Return Vascular Visit with Carlos Llamas MD   [facility] ([facility] [place])    [address]  3rd [facility] [address] [phone]              Who to contact     If you have questions or need follow up information about today's clinic visit or your schedule please contact [facility] directly at [phone].  Normal or non-critical lab and imaging results will be communicated to you by [employer], letter or phone within 4 business days after the clinic has received the results. If you do not hear from us within 7 days, please contact the clinic through [employer] or phone. If you have a critical or abnormal lab result, we will notify you by phone as soon as possible.  Submit refill requests through [employer] or call your pharmacy and they will forward the refill request to us. Please allow 3 business days for your refill to be completed.          Additional Information About Your Visit        [employer] Information     [employer] gives you secure access to your electronic health record. If you see a primary care provider, you can also send messages to your care team and make appointments. If you have questions, please call your primary care clinic.  If you do not have a primary care provider, please call [phone] and they will assist you.        Care  EveryWhere ID     This is your Care EveryWhere ID. This could be used by other organizations to access your [place] medical records  [id number]         Blood Pressure from Last 3 Encounters:   10/22/18 138/76   10/15/18 133/70   10/05/18 (!) 88/58    Weight from Last 3 Encounters:   10/15/18 51.6 kg (113 lb 12.1 oz)   08/01/18 51.1 kg (112 lb 9.6 oz)   07/28/18 54.4 kg (120 lb)              We Performed the Following     INTERROGATION DEVICE EVAL REMOTE, PACER/ICD          Today's Medication Changes          These changes are accurate as of 10/23/18 11:59 PM.  If you have any questions, ask your nurse or doctor.               These medicines have changed or have updated prescriptions.        Dose/Directions    insulin aspart 100 UNIT/ML injection   Commonly known as:  NovoLOG PEN   Indication:  Type 2 Diabetes   This may have changed:  how much to take   Used for:  Type 2 diabetes mellitus with stage 3 chronic kidney disease, with long-term current use of insulin (H)        Dose:  1-7 Units   Inject 1-7 Units Subcutaneous 3 times daily (before meals)   Quantity:  6.3 mL   Refills:  0                Primary Care Provider Office Phone # Fax #    Brionna Tesfayelucas Dc [phone] [address]  [facility] [address]        Equal Access to Services     NICK ROBLEDO AH: Hadii andres bolivar hadasho Sotyler, waaxda luqadaha, qaybta kaalmada aderosaurayada, sanjay hoang. So [facility] [phone].    ATENCIÓN: Si habla español, tiene a nagy disposición servicios gratuitos de asistencia lingüística. Llame al [phone].    We comply with applicable federal civil rights laws and [place] laws. We do not discriminate on the basis of race, color, national origin, age, disability, sex, sexual orientation, or gender identity.            Thank you!     Thank you for choosing [facility]  for your care. Our goal is always to provide you with excellent care. Hearing  back from our patients is one way we can continue to improve our services. Please take a few minutes to complete the written survey that you may receive in the mail after your visit with us. Thank you!             Your Updated Medication List - Protect others around you: Learn how to safely use, store and throw away your medicines at www.disposemymeds.org.          This list is accurate as of 10/23/18 11:59 PM.  Always use your most recent med list.                   Brand Name Dispense Instructions for use Diagnosis    acetaminophen 500 MG tablet    TYLENOL     Take 2 tablets (1,000 mg) by mouth 3 times daily    Status post below knee amputation of right lower extremity (H)       acetone (urine) test strip    KETOSTIX    25 each    1 strip by In Vitro route as needed    Type 2 diabetes mellitus with diabetic neuropathy (H)       albuterol 108 (90 Base) MCG/ACT inhaler    PROAIR HFA    1 Inhaler    Inhale 2 puffs into the lungs every 4 hours as needed for shortness of breath / dyspnea    SOB (shortness of breath)       amitriptyline 50 MG tablet    ELAVIL    60 tablet    Take 1 tablet (50 mg) by mouth At Bedtime    Status post below knee amputation of right lower extremity (H)       aspirin 81 MG tablet     100 tablet    Take 1 tablet (81 mg) by mouth daily    Type 2 diabetes mellitus with complication, with long-term current use of insulin (H)       blood glucose lancets standard    no brand specified    100 each    Use to test blood sugar 10 times daily or as directed. Accu-check    Type 2 diabetes mellitus with stage 3 chronic kidney disease, with long-term current use of insulin (H)       blood glucose monitoring meter device kit    no brand specified    1 kit    Use to test blood sugar 10 times daily or as directed. Accu-check    Type 2 diabetes mellitus with stage 3 chronic kidney disease, with long-term current use of insulin (H)       blood glucose monitoring test strip    no brand specified    100 strip     Use to test blood sugars 10 times daily or as directed. Accu-chek    Type 2 diabetes mellitus with stage 3 chronic kidney disease, with long-term current use of insulin (H)       ceFEPIme 2 G vial    MAXIPIME    20 each    Inject 2 g into the vein every 12 hours    Diabetic foot ulcer with osteomyelitis (H)       fluticasone 50 MCG/ACT spray    FLONASE    1 Bottle    Spray 2 sprays into left nostril daily    Acute nonseasonal allergic rhinitis due to pollen       furosemide 40 MG tablet    LASIX    30 tablet    Take 1 tablet (40 mg) by mouth daily    SOB (shortness of breath)       glucose 4 g Chew chewable tablet     25 tablet    Take 3-4 tablets to treat low blood sugar.    Type 2 diabetes mellitus with complication, with long-term current use of insulin (H)       insulin aspart 100 UNIT/ML injection    NovoLOG PEN    6.3 mL    Inject 1-7 Units Subcutaneous 3 times daily (before meals)    Type 2 diabetes mellitus with stage 3 chronic kidney disease, with long-term current use of insulin (H)       insulin glargine 100 UNIT/ML injection    LANTUS    2.4 mL    Inject 8 Units Subcutaneous At Bedtime    Type 2 diabetes mellitus with stage 3 chronic kidney disease, with long-term current use of insulin (H)       insulin pen needle 31G X 5 MM    B-D U/F    3 each    Use 3 time(s) a day.    Type 2 diabetes, HbA1c goal < 7% (H)       levonorgestrel 20 MCG/24HR IUD    MIRENA (52 MG)    1 each    placed today    Excessive or frequent menstruation       Lidocaine 4 % Patch    LIDOCARE    30 patch    Place 2 patches onto the skin every 24 hours    Status post below knee amputation of right lower extremity (H)       lisinopril 10 MG tablet    PRINIVIL/ZESTRIL    30 tablet    Take 1 tablet (10 mg) by mouth daily    Type 2 diabetes mellitus with stage 3 chronic kidney disease, with long-term current use of insulin (H)       LYRICA 75 MG capsule   Generic drug:  pregabalin     90 capsule    TAKE 1 CAPSULE BY MOUTH 3 TIMES DAILY     Status post below knee amputation of right lower extremity (H)       methadone 10 MG/ML (HIGH CONC) solution    DOLOPHINE-INTENSOL     Take 75 mg by mouth daily Confirmed dosing on 10/8/18 with [facility]: [place], [place] [phone]        metoprolol succinate 25 MG 24 hr tablet    TOPROL-XL    90 tablet    Take 1 tablet (25 mg) by mouth daily    Hypertension goal BP (blood pressure) < 140/90, Chronic systolic congestive heart failure (H)       metroNIDAZOLE 500 MG tablet    FLAGYL    60 tablet    Take 1 tablet (500 mg) by mouth every 8 hours    Diabetic foot ulcer with osteomyelitis (H)       multivitamin, therapeutic with minerals Tabs tablet     30 each    Take 1 tablet by mouth daily    Status post below knee amputation of right lower extremity (H)       oxyCODONE IR 10 MG tablet    ROXICODONE    20 tablet    Take 1-2 tablets (10-20 mg) by mouth every 6 hours as needed for moderate to severe pain    Diabetic foot ulcer with osteomyelitis (H)       polyethylene glycol Packet    MIRALAX/GLYCOLAX    7 packet    Take 17 g by mouth daily    Status post below knee amputation of right lower extremity (H)       ranitidine 300 MG tablet    ZANTAC    60 tablet    Take 1 tablet (300 mg) by mouth daily    Status post below knee amputation of right lower extremity (H)       umeclidinium-vilanterol 62.5-25 MCG/INH oral inhaler    ANORO ELLIPTA    1 Inhaler    Inhale 1 puff into the lungs daily    Chronic bronchitis, unspecified chronic bronchitis type (H)       vancomycin 750 mg     750 mL    Inject 750 mg into the vein every 24 hours    Diabetic foot ulcer with osteomyelitis (H)

## 2018-10-24 NOTE — TELEPHONE ENCOUNTER
[facility] Call Center    Phone Message    May a detailed message be left on voicemail: no    Reason for Call: Other: Martita from [facility] called to request wound measurements for length/width/depth for hailey PT's wounds and clinical notes dated from 9/9/18-9/24/18 including type of wound, measurement, and other pertinent information. This is required for insurance purposes. Please fax to [phone], and please include reference # [id number] on the information. Martita also wants a call back today regarding this at [phone].    Action Taken: Message routed to:  [facility] ([facility]): [facility] ORTHOPEDICS

## 2018-10-25 NOTE — PROGRESS NOTES
Preliminary Device Interrogation Results.  Final physician signed paceart report to be scanned and attached.    Scheduled [employer], single chamber ICD, remote transmission received and reviewed. Device transmission sent per MD orders. Her presenting rhythm appears to be ST @ 108 bpm. No arrhythmias recorded over the last 3 months. Normal device function. = 0%. Lead trends appear stable. Battery estimates 9 years to MARGI. Pt notified of transmission results, via VM. She was instructed to call and schedule appts with Dr. Villarreal and device clinic. Pt hasn't been seen by cardiology or device clinic for 2.5 years.  Remote ICD transmission

## 2018-10-25 NOTE — PATIENT INSTRUCTIONS
Recommendations from today's MTM visit:                                                    MTM (medication therapy management) is a service provided by a clinical pharmacist designed to help you get the most of out of your medicines.   Today we reviewed what your medicines are for, how to know if they are working, that your medicines are safe and how to make your medicine regimen as easy as possible.     1. Re-start fluticasone nasal spray as planned.    2. It looks like your blood sugars are still above goal. Given you are not covered for MTM, you would benefit from working with primary care or endocrinology to get your blood sugars under control. This will certainly help with wound healing and infection risk, among other things.    3. Please reach out if you have questions.    Next MTM visit: 2 weeks for BG check-in 11/2 for telephone visit at 1:30 pm    To schedule another MTM appointment, please call the clinic directly or you may call the MTM scheduling line at [phone] or toll-free at [phone].     My Clinical Pharmacist's contact information:                                                      It was a pleasure talking with you today!  Please feel free to contact me with any questions or concerns you have.      Charla CruzD   MTM Pharmacist   Adult Rheumatology and IBD  Phone: [phone]    You may receive a survey about the MTM services you received.  I would appreciate your feedback to help me serve you better in the future. Please fill it out and return it when you can. Your comments will be anonymous.

## 2018-10-29 NOTE — TELEPHONE ENCOUNTER
"Requested Prescriptions   Pending Prescriptions Disp Refills     ranitidine (ZANTAC) 300 MG tablet [Pharmacy Med Name: RANITIDINE 300 MG TABLET]  Last Written Prescription Date:  07/21/18  Last Fill Quantity: 60,  # refills: 0   Last Office Visit with FMAYLA, CY or [facility] prescribing provider:  08/01/18-[facility]   Future Office Visit:    Next 5 appointments (look out 90 days)     Nov 14, 2018 10:00 AM CST   Return Visit with Marily Ahumada MD   [facility], [place], Infectious Disease ([facility])    [address]  [address] [place] [address]   [phone]                90 tablet 3     Sig: TAKE 1 TABLET (300 MG) BY MOUTH DAILY    H2 Blockers Protocol Passed    10/29/2018 12:09 PM       Passed - Patient is age 12 or older       Passed - Recent (12 mo) or future (30 days) visit within the authorizing provider's specialty    Patient had office visit in the last 12 months or has a visit in the next 30 days with authorizing provider or within the authorizing provider's specialty.  See \"Patient Info\" tab in inbasket, or \"Choose Columns\" in Meds & Orders section of the refill encounter.                "

## 2018-10-30 NOTE — TELEPHONE ENCOUNTER
Routing refill request to provider for review/approval because:  Last ordering provider was no PCP - provider to advise on refill request.     Camilla Mueller RN

## 2018-10-31 NOTE — TELEPHONE ENCOUNTER
"Requested Prescriptions   Pending Prescriptions Disp Refills     insulin pen needle (B-D U/F) 31G X 5 MM  Last Written Prescription Date:  02/21/15  Last Fill Quantity: 3,  # refills: 3   Last Office Visit with AYLA, CY or [facility] prescribing provider:  08/01/18-Mary Dc   Future Office Visit:    Next 5 appointments (look out 90 days)     Nov 05, 2018 11:20 AM CST   Office Visit with Brionna Dc MD   [facility] ([facility])    [address] [address]   [phone]            Nov 14, 2018 10:00 AM CST   Return Visit with Marily Ahumada MD   [facility], [place], Infectious Disease ([facility])    [address] [place] [address]   [phone]            Jan 07, 2019  2:00 PM CST   Office Visit with PFT LAB   [facility])    [address] [place] [address]   [phone]            Jan 07, 2019  3:00 PM CST   Return Visit with Rene Swartz MD   [facility])    [address] [place] [address]   [phone]                3 each 3     Sig: Use 3 time(s) a day.    Diabetic Supplies Protocol Passed    10/31/2018  1:49 PM       Passed - Patient is 18 years of age or older       Passed - Recent (6 mo) or future (30 days) visit within the authorizing provider's specialty    Patient had office visit in the last 6 months or has a visit in the next 30 days with authorizing provider.  See \"Patient Info\" tab in inbasket, or \"Choose Columns\" in Meds & Orders section of the refill encounter.              "

## 2018-11-02 NOTE — TELEPHONE ENCOUNTER
[facility] Call Center    Phone Message    May a detailed message be left on voicemail: yes    Reason for Call: Other: Mindy from [place] calling. She states her and Dr. Ahumada have been changing the dosage on pts vancomycin and she thinks it needs to be changed again. She is asking for a call back from Dr. Ahumada to discuss changing the dose.     Action Taken: Message routed to:  [facility] ([facility]

## 2018-11-02 NOTE — TELEPHONE ENCOUNTER
Called Mindy at [place] to let her know Dr Ahumada is OK with her increasing the IV vanco dose to get to the goal of 15. She reports the trough today was 10.5.   Cathy Hdez RN

## 2018-11-02 NOTE — TELEPHONE ENCOUNTER
[facility] Call Center    Phone Message    May a detailed message be left on voicemail: yes    Reason for Call: Other: Mindy is calling back for an update. Mindy has not received a phone call back and state that it is important. Please call Mindy back ASAP. Thanks.      Action Taken: Message routed to:  [facility] ([facility]): ID

## 2018-11-06 NOTE — NURSING NOTE
"Reason For Visit:   Chief Complaint   Patient presents with     Consult     diabetic foot ulcer of left midfoot associated with type 2 diabetes mellitus        Ht 1.753 m (5' 9\")  Wt 51.7 kg (113 lb 14.4 oz)  BMI 16.82 kg/m2    Pain Assessment  Patient Currently in Pain: No  Patient's Stated Pain Goal: No pain  Word Pain Scale: No pain  Primary Pain Location: Leg  Pain Orientation: Left  Pain Descriptors: Other (comment)    Walter Dunham, [facility]    "

## 2018-11-06 NOTE — LETTER
11/6/2018       RE: Alessandra Pak  [address]  [facility] [address]     Dear Colleague,    Thank you for referring your patient, Alessandra Pak, to the [facility]. Please see a copy of my visit note below.    CHIEF COMPLAINT:  Status post left first and second ray amputation performed on 10/13/2018.      HISTORY OF PRESENT ILLNESS:  Ms. Pak presents today for further followup.  Reports to be doing well.  Reports to be compliant.      PHYSICAL EXAMINATION:  On today's visit, she presents with a well-healed surgical incision except for a spot measuring approximately 0.5 cm in diameter where there is some drainage and maceration.  Otherwise, presents with a completely healed surgical incision.  There are no signs of infection or inflammation.      On today's visit, sutures were removed and she tolerated well the procedure.      ASSESSMENT:  Status post left first and second ray amputation.      PLAN:  I discussed with the patient to remain nonweightbearing for the time being.  She will be reconnected with Dr. Lewis in order to accomplish the final healing of the incision.      The patient will follow up with us on a p.r.n. basis.  All questions were answered.     Again, thank you for allowing me to participate in the care of your patient.      Sincerely,    Jamey Peralta MD

## 2018-11-06 NOTE — MR AVS SNAPSHOT
After Visit Summary   11/6/2018    Alessandra Pak    MRN: [id number]           Patient Information     Date Of Birth          1965        Visit Information        Provider Department      11/6/2018 9:40 AM [facility] Peralta [facility]        Today's Diagnoses     Pain in joint, ankle and foot, left    -  1       Follow-ups after your visit        Your next 10 appointments already scheduled     Nov 14, 2018 10:00 AM CST   Return Visit with Marily Ahumada MD   [facility], [place], Infectious Disease ([facility])    [address]  [facility] [address]   [phone]            Nov 16, 2018 10:20 AM CST   (Arrive by 10:05 AM)   RETURN FOOT/ANKLE with Barrington Lewis DPM   [facility] ([facility])    [address]  4th Floor  [facility] [address]   [phone]            Nov 29, 2018  1:00 PM CST   US GABRIEL DOPPLER NO EXERCISE 1-2 LVLS BILAT with UCUSV2   [facility] US ([facility])    [address]  1st Floor  [facility] [address] [phone]           How do I prepare for my exam? (Food and drink instructions) No caffeine or tobacco for 1 hour prior to exam.  What should I wear: Wear comfortable clothes.  How long does the exam take: Most ultrasounds take 30 to 60 minutes.  What should I bring: Bring a list of your medicines, including vitamins, minerals and over-the-counter drugs. It is safest to leave personal items at home.  Do I need a :  No  is needed.  What do I need to tell my doctor: Tell your doctor about any allergies you may have.  What should I do after the exam: No restrictions, You may resume normal activities.  What is this test: An ultrasound uses sound waves to make pictures of the body. Sound waves do not cause pain. The only discomfort may be the pressure of the wand against  your skin or full bladder.  Who should I call with questions: If you have any questions, please call the Imaging Department where you will have your exam. Directions, parking instructions, and other information is available on our website, [email or web address].            Nov 29, 2018  1:40 PM CST   (Arrive by 1:25 PM)   Return Vascular Visit with Carlos Llamas MD   [facility] ([facility])    [address] [place], [place] [address] [phone]            Dec 07, 2018 10:30 AM CST   (Arrive by 10:15 AM)   RETURN DIABETES with Toya Nuno PA-C   [facility] Endocrinology ([facility])    [address] [place] [address] [phone]            Jan 07, 2019  2:00 PM CST   [facility] LAB   [facility]    [address] [place] [address] [phone]           Bring a current list of meds and any records pertaining to this visit. For Physicals, please bring immunization records and any forms needing to be filled out. Please arrive 10 minutes early to complete paperwork.            Jan 07, 2019  3:00 PM CST   Return Visit with Rene Swartz MD   [facility]    [address] [place] [address] [phone]              Who to contact     Please call your clinic at [phone] to:    Ask questions about your health    Make or cancel appointments    Discuss your medicines    Learn about your test results    Speak to your doctor            Additional Information About Your Visit        [employer] Information     [id number] gives you secure access to your electronic health record. If you see a primary care provider, you can also send messages to your care team and make appointments. If you have questions, please call your primary care clinic.  If you do not have a  "primary care provider, please call [phone] and they will assist you.      You[employer] is an electronic gateway that provides easy, online access to your medical records. With [employer], you can request a clinic appointment, read your test results, renew a prescription or communicate with your care team.     To access your existing account, please contact your [facility] or call [phone] for assistance.        Care EveryWhere ID     This is your Care EveryWhere ID. This could be used by other organizations to access your [place] medical records  [id number]        Your Vitals Were     Height BMI (Body Mass Index)                1.753 m (5' 9\") 16.82 kg/m2           Blood Pressure from Last 3 Encounters:   10/22/18 138/76   10/15/18 133/70   10/05/18 (!) 88/58    Weight from Last 3 Encounters:   11/06/18 51.7 kg (113 lb 14.4 oz)   10/15/18 51.6 kg (113 lb 12.1 oz)   08/01/18 51.1 kg (112 lb 9.6 oz)              Today, you had the following     No orders found for display         Today's Medication Changes          These changes are accurate as of 11/6/18 10:26 AM.  If you have any questions, ask your nurse or doctor.               These medicines have changed or have updated prescriptions.        Dose/Directions    insulin aspart 100 UNIT/ML injection   Commonly known as:  NovoLOG PEN   Indication:  Type 2 Diabetes   This may have changed:  how much to take   Used for:  Type 2 diabetes mellitus with stage 3 chronic kidney disease, with long-term current use of insulin (H)        Dose:  1-7 Units   Inject 1-7 Units Subcutaneous 3 times daily (before meals)   Quantity:  6.3 mL   Refills:  0                Primary Care Provider Office Phone # Fax #    Brionna Coby Dc [phone]       [address]  [facility] [address]        Equal Access to Services     NICK ROBLEDO AH: Hadii aad osmel Terrell, federicoda terri, qacristinata aletha piña, " sanjay molinalucas phillips'aan ah. So [facility] [phone].    ATENCIÓN: Si lizetla emanuel, tiene a nagy disposición servicios gratuitos de asistencia lingüística. Rodriguez roche [phone].    We comply with applicable federal civil rights laws and [place] laws. We do not discriminate on the basis of race, color, national origin, age, disability, sex, sexual orientation, or gender identity.            Thank you!     Thank you for choosing [facility]  for your care. Our goal is always to provide you with excellent care. Hearing back from our patients is one way we can continue to improve our services. Please take a few minutes to complete the written survey that you may receive in the mail after your visit with us. Thank you!             Your Updated Medication List - Protect others around you: Learn how to safely use, store and throw away your medicines at www.disposemymeds.org.          This list is accurate as of 11/6/18 10:26 AM.  Always use your most recent med list.                   Brand Name Dispense Instructions for use Diagnosis    acetaminophen 500 MG tablet    TYLENOL     Take 2 tablets (1,000 mg) by mouth 3 times daily    Status post below knee amputation of right lower extremity (H)       acetone (urine) test strip    KETOSTIX    25 each    1 strip by In Vitro route as needed    Type 2 diabetes mellitus with diabetic neuropathy (H)       albuterol 108 (90 Base) MCG/ACT inhaler    PROAIR HFA    1 Inhaler    Inhale 2 puffs into the lungs every 4 hours as needed for shortness of breath / dyspnea    SOB (shortness of breath)       amitriptyline 50 MG tablet    ELAVIL    60 tablet    Take 1 tablet (50 mg) by mouth At Bedtime    Status post below knee amputation of right lower extremity (H)       aspirin 81 MG tablet     100 tablet    Take 1 tablet (81 mg) by mouth daily    Type 2 diabetes mellitus with complication, with long-term current use of insulin (H)       blood glucose lancets  standard    no brand specified    100 each    Use to test blood sugar 10 times daily or as directed. Accu-check    Type 2 diabetes mellitus with stage 3 chronic kidney disease, with long-term current use of insulin (H)       blood glucose monitoring meter device kit    no brand specified    1 kit    Use to test blood sugar 10 times daily or as directed. Accu-check    Type 2 diabetes mellitus with stage 3 chronic kidney disease, with long-term current use of insulin (H)       blood glucose monitoring test strip    no brand specified    100 strip    Use to test blood sugars 10 times daily or as directed. Accu-chek    Type 2 diabetes mellitus with stage 3 chronic kidney disease, with long-term current use of insulin (H)       ceFEPIme 2 G vial    MAXIPIME    20 each    Inject 2 g into the vein every 12 hours    Diabetic foot ulcer with osteomyelitis (H)       fluticasone 50 MCG/ACT spray    FLONASE    1 Bottle    Spray 2 sprays into left nostril daily    Acute nonseasonal allergic rhinitis due to pollen       furosemide 40 MG tablet    LASIX    30 tablet    Take 1 tablet (40 mg) by mouth daily    SOB (shortness of breath)       glucose 4 g Chew chewable tablet     25 tablet    Take 3-4 tablets to treat low blood sugar.    Type 2 diabetes mellitus with complication, with long-term current use of insulin (H)       insulin aspart 100 UNIT/ML injection    NovoLOG PEN    6.3 mL    Inject 1-7 Units Subcutaneous 3 times daily (before meals)    Type 2 diabetes mellitus with stage 3 chronic kidney disease, with long-term current use of insulin (H)       insulin glargine 100 UNIT/ML injection    LANTUS    2.4 mL    Inject 8 Units Subcutaneous At Bedtime    Type 2 diabetes mellitus with stage 3 chronic kidney disease, with long-term current use of insulin (H)       insulin pen needle 31G X 5 MM    B-D U/F    3 each    Use 3 time(s) a day.    Type 2 diabetes mellitus with stage 3 chronic kidney disease, with long-term current use  of insulin (H)       levonorgestrel 20 MCG/24HR IUD    MIRENA (52 MG)    1 each    placed today    Excessive or frequent menstruation       Lidocaine 4 % Patch    LIDOCARE    30 patch    Place 2 patches onto the skin every 24 hours    Status post below knee amputation of right lower extremity (H)       lisinopril 10 MG tablet    PRINIVIL/ZESTRIL    30 tablet    Take 1 tablet (10 mg) by mouth daily    Type 2 diabetes mellitus with stage 3 chronic kidney disease, with long-term current use of insulin (H)       LYRICA 75 MG capsule   Generic drug:  pregabalin     90 capsule    TAKE 1 CAPSULE BY MOUTH 3 TIMES DAILY    Status post below knee amputation of right lower extremity (H)       methadone 10 MG/ML (HIGH CONC) solution    DOLOPHINE-INTENSOL     Take 75 mg by mouth daily Confirmed dosing on 10/8/18 with [facility]: [place], [place] [phone]        metoprolol succinate 25 MG 24 hr tablet    TOPROL-XL    90 tablet    Take 1 tablet (25 mg) by mouth daily    Hypertension goal BP (blood pressure) < 140/90, Chronic systolic congestive heart failure (H)       metroNIDAZOLE 500 MG tablet    FLAGYL    60 tablet    Take 1 tablet (500 mg) by mouth every 8 hours    Diabetic foot ulcer with osteomyelitis (H)       multivitamin, therapeutic with minerals Tabs tablet     30 each    Take 1 tablet by mouth daily    Status post below knee amputation of right lower extremity (H)       oxyCODONE IR 10 MG tablet    ROXICODONE    20 tablet    Take 1-2 tablets (10-20 mg) by mouth every 6 hours as needed for moderate to severe pain    Diabetic foot ulcer with osteomyelitis (H)       polyethylene glycol Packet    MIRALAX/GLYCOLAX    7 packet    Take 17 g by mouth daily    Status post below knee amputation of right lower extremity (H)       * ranitidine 300 MG tablet    ZANTAC    60 tablet    Take 1 tablet (300 mg) by mouth daily    Status post below knee amputation of right lower extremity (H)       * ranitidine  300 MG tablet    ZANTAC    90 tablet    TAKE 1 TABLET (300 MG) BY MOUTH DAILY    Gastroesophageal reflux disease without esophagitis       umeclidinium-vilanterol 62.5-25 MCG/INH oral inhaler    ANORO ELLIPTA    1 Inhaler    Inhale 1 puff into the lungs daily    Chronic bronchitis, unspecified chronic bronchitis type (H)       vancomycin 750 mg     750 mL    Inject 750 mg into the vein every 24 hours    Diabetic foot ulcer with osteomyelitis (H)       * Notice:  This list has 2 medication(s) that are the same as other medications prescribed for you. Read the directions carefully, and ask your doctor or other care provider to review them with you.

## 2018-11-06 NOTE — PROGRESS NOTES
CHIEF COMPLAINT:  Status post left first and second ray amputation performed on 10/13/2018.      HISTORY OF PRESENT ILLNESS:  Ms. Pak presents today for further followup.  Reports to be doing well.  Reports to be compliant.      PHYSICAL EXAMINATION:  On today's visit, she presents with a well-healed surgical incision except for a spot measuring approximately 0.5 cm in diameter where there is some drainage and maceration.  Otherwise, presents with a completely healed surgical incision.  There are no signs of infection or inflammation.      On today's visit, sutures were removed and she tolerated well the procedure.      ASSESSMENT:  Status post left first and second ray amputation.      PLAN:  I discussed with the patient to remain nonweightbearing for the time being.  She will be reconnected with Dr. Lewis in order to accomplish the final healing of the incision.      The patient will follow up with us on a p.r.n. basis.  All questions were answered.

## 2018-11-07 PROBLEM — S98.132A AMPUTATED TOE OF LEFT FOOT (H): Status: ACTIVE | Noted: 2018-01-01

## 2018-11-07 NOTE — PATIENT INSTRUCTIONS
At [facility], we strive to deliver an exceptional experience to you, every time we see you.  If you receive a survey in the mail, please send us back your thoughts. We really do value your feedback.    Your care team:                            Family Medicine Internal Medicine   MD Chandana Mcdaniel MD Shantel Branch-Fleming, MD Katya Georgiev PA-C Megan Hill, APRN STEPH Walton MD Pediatrics   Tommy Escoto, ROMIE Diaz, MD Terri Javier APRN CNP   MD Sulma Toure MD Deborah Mielke, MD Jane Swartz, APRN [facility]      Clinic hours: Monday - Thursday 7 am-7 pm; Fridays 7 am-5 pm.   Urgent care: Monday - Friday 11 am-9 pm; Saturday and Sunday 9 am-5 pm.  Pharmacy : Monday -Thursday 8 am-8 pm; Friday 8 am-6 pm; Saturday and Sunday 9 am-5 pm.     Clinic: [phone]   Pharmacy: [phone]
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
## 2018-11-07 NOTE — MR AVS SNAPSHOT
After Visit Summary   11/7/2018    Alessandra Pak    MRN: [id number]           Patient Information     Date Of Birth          1965        Visit Information        Provider Department      11/7/2018 11:20 AM Brionna Calabrese MD [facility]        Today's Diagnoses     Amputated great and second toes of left foot (H)    -  1    Diabetic ulcer of left midfoot associated with type 2 diabetes mellitus, with necrosis of muscle (H)        Right wrist drop        Radial nerve palsy, right        Screening for malignant neoplasm of cervix        Need for prophylactic vaccination and inoculation against influenza          Care Instructions    At [facility], we strive to deliver an exceptional experience to you, every time we see you.  If you receive a survey in the mail, please send us back your thoughts. We really do value your feedback.    Your care team:                            Family Medicine Internal Medicine   MD Chandana Mcdaniel MD Shantel Branch-Fleming, MD Katya Georgiev PA-C  Haley Chance, APRN CNP    Richmond Walton MD Pediatrics   Tommy Escoto, PAAsifC  Jacqui Diaz, MD Terri Javier APRN CNP   MD Sulma Toure MD Deborah Mielke, MD Kim Thein, APRN [facility]      Clinic hours: Monday - Thursday 7 am-7 pm; Fridays 7 am-5 pm.   Urgent care: Monday - Friday 11 am-9 pm; Saturday and Sunday 9 am-5 pm.  Pharmacy : Monday -Thursday 8 am-8 pm; Friday 8 am-6 pm; Saturday and Sunday 9 am-5 pm.     Clinic: [phone]   Pharmacy: [phone]                Follow-ups after your visit        Additional Services     Home Care OT Referral for Hospital Discharge                 Follow-up notes from your care team     Return in about 4 weeks (around 12/5/2018).      Your next 10 appointments already scheduled     Nov 14, 2018 10:00 AM CST   Return Visit with Marily Ahumada MD   [facility] [place],  Infectious Disease ([facility])    [address]  [address]  [facility] [address]   [phone]            Nov 16, 2018 10:20 AM CST   (Arrive by 10:05 AM)   RETURN FOOT/ANKLE with Barrington Lewis DPM   [facility] ([facility])    [address] [place] [address]  [address] [facility] [id number] [phone]            Nov 29, 2018  1:00 PM CST   US GABRIEL DOPPLER NO EXERCISE 1-2 LVLS BILAT with UCUSV2   [facility] US ([facility])    [address] [facility]  [address] [facility] [id number] [phone]           How do I prepare for my exam? (Food and drink instructions) No caffeine or tobacco for 1 hour prior to exam.  What should I wear: Wear comfortable clothes.  How long does the exam take: Most ultrasounds take 30 to 60 minutes.  What should I bring: Bring a list of your medicines, including vitamins, minerals and over-the-counter drugs. It is safest to leave personal items at home.  Do I need a :  No  is needed.  What do I need to tell my doctor: Tell your doctor about any allergies you may have.  What should I do after the exam: No restrictions, You may resume normal activities.  What is this test: An ultrasound uses sound waves to make pictures of the body. Sound waves do not cause pain. The only discomfort may be the pressure of the wand against your skin or full bladder.  Who should I call with questions: If you have any questions, please call the Imaging Department where you will have your exam. Directions, parking instructions, and other information is available on our website, [email or web address].            Nov 29, 2018  1:40 PM CST   (Arrive by 1:25 PM)   Return Vascular Visit with Carlos Llamas MD   [facility] ([facility])    [address] [place] [address]  [address] [facility] [id number] [phone]             Dec 07, 2018 10:30 AM CST   (Arrive by 10:15 AM)   RETURN DIABETES with Toya Nuno PA-C   [facility] ([facility])    [address] [facility]  3rd Floor  [facility] [address] [phone]            Jan 07, 2019  2:00 PM CST   [facility] Visit with PFT LAB   [facility]    [address] [place] [address] [phone]           Bring a current list of meds and any records pertaining to this visit. For Physicals, please bring immunization records and any forms needing to be filled out. Please arrive 10 minutes early to complete paperwork.            Jan 07, 2019  3:00 PM CST   Return Visit with Rene Swartz MD   [facility]    [address] [place] [address] [phone]              Who to contact     If you have questions or need follow up information about today's clinic visit or your schedule please contact [facility] directly at [phone].  Normal or non-critical lab and imaging results will be communicated to you by MyChart, letter or phone within 4 business days after the clinic has received the results. If you do not hear from us within 7 days, please contact the clinic through MyChart or phone. If you have a critical or abnormal lab result, we will notify you by phone as soon as possible.  Submit refill requests through [facility] or call your pharmacy and they will forward the refill request to us. Please allow 3 business days for your refill to be completed.          Additional Information About Your Visit        MyChart Information     [employer] gives you secure access to your electronic health record. If you see a primary care provider, you can also send messages to your care team and make appointments. If you have questions, please call your primary care clinic.  If you do not have a primary care provider,  please call [phone] and they will assist you.        Care EveryWhere ID     This is your Care EveryWhere ID. This could be used by other organizations to access your [facility] medical records  [id number]        Your Vitals Were     Pulse Temperature Pulse Oximetry Breastfeeding?          93 98.1  F (36.7  C) (Oral) 100% No         Blood Pressure from Last 3 Encounters:   11/07/18 138/80   10/22/18 138/76   10/15/18 133/70    Weight from Last 3 Encounters:   11/06/18 113 lb 14.4 oz (51.7 kg)   10/15/18 113 lb 12.1 oz (51.6 kg)   08/01/18 112 lb 9.6 oz (51.1 kg)              We Performed the Following     Home Care OT Referral for Hospital Discharge          Today's Medication Changes          These changes are accurate as of 11/7/18 11:41 AM.  If you have any questions, ask your nurse or doctor.               These medicines have changed or have updated prescriptions.        Dose/Directions    insulin aspart 100 UNIT/ML injection   Commonly known as:  NovoLOG PEN   Indication:  Type 2 Diabetes   This may have changed:  how much to take   Used for:  Type 2 diabetes mellitus with stage 3 chronic kidney disease, with long-term current use of insulin (H)        Dose:  1-7 Units   Inject 1-7 Units Subcutaneous 3 times daily (before meals)   Quantity:  6.3 mL   Refills:  0         Stop taking these medicines if you haven't already. Please contact your care team if you have questions.     acetone (urine) test strip   Commonly known as:  KETOSTIX   Stopped by:  Brionna Calabrese MD                    Primary Care Provider Office Phone # Fax #    Brionna Dc [phone]       [address] [place]  [facility] [address]        Equal Access to Services     [facility] Center[facility] : Chiqui Terrell, waaxda luqadaha, qaybta kaalmada ayana, sanjay hoang. So [facility] [phone].    ATENCIÓN: Si habla español, tiene a nagy disposición servicios  jose francisco de asistencia lingüística. Rodriguez roche [phone].    We comply with applicable federal civil rights laws and [place] laws. We do not discriminate on the basis of race, color, national origin, age, disability, sex, sexual orientation, or gender identity.            Thank you!     Thank you for choosing [facility]  for your care. Our goal is always to provide you with excellent care. Hearing back from our patients is one way we can continue to improve our services. Please take a few minutes to complete the written survey that you may receive in the mail after your visit with us. Thank you!             Your Updated Medication List - Protect others around you: Learn how to safely use, store and throw away your medicines at www.disposemymeds.org.          This list is accurate as of 11/7/18 11:41 AM.  Always use your most recent med list.                   Brand Name Dispense Instructions for use Diagnosis    acetaminophen 500 MG tablet    TYLENOL     Take 2 tablets (1,000 mg) by mouth 3 times daily    Status post below knee amputation of right lower extremity (H)       albuterol 108 (90 Base) MCG/ACT inhaler    PROAIR HFA    1 Inhaler    Inhale 2 puffs into the lungs every 4 hours as needed for shortness of breath / dyspnea    SOB (shortness of breath)       amitriptyline 50 MG tablet    ELAVIL    60 tablet    Take 1 tablet (50 mg) by mouth At Bedtime    Status post below knee amputation of right lower extremity (H)       aspirin 81 MG tablet     100 tablet    Take 1 tablet (81 mg) by mouth daily    Type 2 diabetes mellitus with complication, with long-term current use of insulin (H)       blood glucose lancets standard    no brand specified    100 each    Use to test blood sugar 10 times daily or as directed. Accu-check    Type 2 diabetes mellitus with stage 3 chronic kidney disease, with long-term current use of insulin (H)       blood glucose monitoring meter device kit    no brand  specified    1 kit    Use to test blood sugar 10 times daily or as directed. Accu-check    Type 2 diabetes mellitus with stage 3 chronic kidney disease, with long-term current use of insulin (H)       blood glucose monitoring test strip    no brand specified    100 strip    Use to test blood sugars 10 times daily or as directed. Accu-chek    Type 2 diabetes mellitus with stage 3 chronic kidney disease, with long-term current use of insulin (H)       ceFEPIme 2 G vial    MAXIPIME    20 each    Inject 2 g into the vein every 12 hours    Diabetic foot ulcer with osteomyelitis (H)       fluticasone 50 MCG/ACT spray    FLONASE    1 Bottle    Spray 2 sprays into left nostril daily    Acute nonseasonal allergic rhinitis due to pollen       furosemide 40 MG tablet    LASIX    30 tablet    Take 1 tablet (40 mg) by mouth daily    SOB (shortness of breath)       glucose 4 g Chew chewable tablet     25 tablet    Take 3-4 tablets to treat low blood sugar.    Type 2 diabetes mellitus with complication, with long-term current use of insulin (H)       insulin aspart 100 UNIT/ML injection    NovoLOG PEN    6.3 mL    Inject 1-7 Units Subcutaneous 3 times daily (before meals)    Type 2 diabetes mellitus with stage 3 chronic kidney disease, with long-term current use of insulin (H)       insulin glargine 100 UNIT/ML injection    LANTUS    2.4 mL    Inject 8 Units Subcutaneous At Bedtime    Type 2 diabetes mellitus with stage 3 chronic kidney disease, with long-term current use of insulin (H)       insulin pen needle 31G X 5 MM    B-D U/F    3 each    Use 3 time(s) a day.    Type 2 diabetes mellitus with stage 3 chronic kidney disease, with long-term current use of insulin (H)       levonorgestrel 20 MCG/24HR IUD    MIRENA (52 MG)    1 each    placed today    Excessive or frequent menstruation       Lidocaine 4 % Patch    LIDOCARE    30 patch    Place 2 patches onto the skin every 24 hours    Status post below knee amputation of right  lower extremity (H)       lisinopril 10 MG tablet    PRINIVIL/ZESTRIL    30 tablet    Take 1 tablet (10 mg) by mouth daily    Type 2 diabetes mellitus with stage 3 chronic kidney disease, with long-term current use of insulin (H)       LYRICA 75 MG capsule   Generic drug:  pregabalin     90 capsule    TAKE 1 CAPSULE BY MOUTH 3 TIMES DAILY    Status post below knee amputation of right lower extremity (H)       methadone 10 MG/ML (HIGH CONC) solution    DOLOPHINE-INTENSOL     Take 75 mg by mouth daily Confirmed dosing on 10/8/18 with [facility]: [place], [place] [phone]        metoprolol succinate 25 MG 24 hr tablet    TOPROL-XL    90 tablet    Take 1 tablet (25 mg) by mouth daily    Hypertension goal BP (blood pressure) < 140/90, Chronic systolic congestive heart failure (H)       metroNIDAZOLE 500 MG tablet    FLAGYL    60 tablet    Take 1 tablet (500 mg) by mouth every 8 hours    Diabetic foot ulcer with osteomyelitis (H)       multivitamin, therapeutic with minerals Tabs tablet     30 each    Take 1 tablet by mouth daily    Status post below knee amputation of right lower extremity (H)       oxyCODONE IR 10 MG tablet    ROXICODONE    20 tablet    Take 1-2 tablets (10-20 mg) by mouth every 6 hours as needed for moderate to severe pain    Diabetic foot ulcer with osteomyelitis (H)       polyethylene glycol Packet    MIRALAX/GLYCOLAX    7 packet    Take 17 g by mouth daily    Status post below knee amputation of right lower extremity (H)       * ranitidine 300 MG tablet    ZANTAC    60 tablet    Take 1 tablet (300 mg) by mouth daily    Status post below knee amputation of right lower extremity (H)       * ranitidine 300 MG tablet    ZANTAC    90 tablet    TAKE 1 TABLET (300 MG) BY MOUTH DAILY    Gastroesophageal reflux disease without esophagitis       umeclidinium-vilanterol 62.5-25 MCG/INH oral inhaler    ANORO ELLIPTA    1 Inhaler    Inhale 1 puff into the lungs daily    Chronic  bronchitis, unspecified chronic bronchitis type (H)       vancomycin 750 mg     750 mL    Inject 750 mg into the vein every 24 hours    Diabetic foot ulcer with osteomyelitis (H)       * Notice:  This list has 2 medication(s) that are the same as other medications prescribed for you. Read the directions carefully, and ask your doctor or other care provider to review them with you.

## 2018-11-09 NOTE — TELEPHONE ENCOUNTER
I received a call from Sharla with  Home care ([phone]). Sharla visited Alessandra today and observed her PICC to be partially dislodged, and it would not yield blood return or infuse. Alessandra also has 28 doses on hand of cefepime, strongly suggesting she is not self-dosing.    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 She is notably also s/p R BKA.    Her PICC should be removed and replaced as it has been dislodged. Re-advancement would only place her at risk for infection.    Based on the stockpile of cefepime she has at home, she is not a candidate for home IV antibiotics. I would suggest she be placed at a TCU for there remainder of her antibiotic course. She may require prolongation of her pre-determined course, since she has missed so many doses.    She was directed to the [facility].    Marily Ahumada MD   of Medicine, Division of Infectious Diseases  [facility] [phone]

## 2018-11-09 NOTE — TELEPHONE ENCOUNTER
Left a message to have the patient return my call to schedule a CT prior to the PFT/Dr. Swartz appointment on 1/7/19 .    [facility]~Specialty/Med Surg   [phone]

## 2018-11-12 NOTE — DISCHARGE INSTRUCTIONS
Follow-up with your primary care physician and your infectious disease physician as needed.  Return the emergency department for any new or worsening symptoms as needed.

## 2018-11-12 NOTE — ED TRIAGE NOTES
53 year old female with PICC line in right arm (used for Vancomycin) presents with complaints of noted line dysfunction since Friday, when unable to obtain blood return from line. Home health nurse unable to have line function after NS flush and Heparin flush.  Patient sent here for assessment.

## 2018-11-12 NOTE — ED AVS SNAPSHOT
[facility], [place], Emergency Department    [address] [place] [address]    Phone:  [phone]                                       Alessandra Pak   MRN: [id number]    Department:  [facility], Emergency Department   Date of Visit:  11/12/2018           After Visit Summary Signature Page     I have received my discharge instructions, and my questions have been answered. I have discussed any challenges I see with this plan with the nurse or doctor.    ..........................................................................................................................................  Patient/Patient Representative Signature      ..........................................................................................................................................  Patient Representative Print Name and Relationship to Patient    ..................................................               ................................................  Date                                   Time    ..........................................................................................................................................  Reviewed by Signature/Title    ...................................................              ..............................................  Date                                               Time          22EPIC Rev 08/18

## 2018-11-12 NOTE — ED PROVIDER NOTES
History     Chief Complaint   Patient presents with     Vascular Access Problem     HPI  Alessandra Pak is a 53 year old female with a history of HTN, systolic CHF, s/p AICD placement, DM2, asthma, GERD, and depression who presents for evaluation of PICC line dysfunction. The patient states on Friday, three days ago, she was told by her home health nurse that she needed to present for evaluation because her PICC line would not draw anything out. The patient states she has been able to infuse her antibiotics into the PICC line without difficulty. She did not infuse her antibiotics today because she was presenting here to the ED. She is receiving daily infusions of vancomycin and cefepime for treatment of left lower extremity osteomyelitis.     I have reviewed the Medications, Allergies, Past Medical and Surgical History, and Social History in the [facility] system.  Past Medical History:   Diagnosis Date     Abdominal pain, right upper quadrant     sees Dr Mcclellan pain clinic at [facility] [place] [place]     AICD (automatic cardioverter/defibrillator) present      ASCUS with positive high risk HPV 8/2013    + HPV 33, [place] - GAVIN I, ECC- atypia     Cervical high risk HPV (human papillomavirus) test positive 7/8/15, 7/25/16    NIL pap/+ HR HPV (not 16 or 18).      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    leep     Depressive disorder      Gastro-oesophageal reflux disease      Hypertension      Profound impairment, one eye, impairment level not further specified     rt eye due to childhood injury     Systolic CHF (H) 3/12/2015     Type 2 diabetes mellitus without complications (H)      Uncomplicated asthma        Past Surgical History:   Procedure Laterality Date     AMPUTATE FOOT Left 10/13/2018    Procedure: AMPUTATE FOOT;  Left Foot Partial Amputation;  Surgeon: Jamey Peralta MD;  Location: [facility] OR     AMPUTATE LEG BELOW KNEE Right 6/28/2018    Procedure: AMPUTATE LEG BELOW KNEE;  Right Knee Ampuation Below Knee, Anesthesia Block;   Surgeon: Ambrose King MD;  Location: [facility] OR     ANGIOGRAM Left 10/11/2018    Procedure: ANGIOGRAM;   Placement of 8x80 mm EV3 Self-Expanding Stent on the Left Common Iliac Artery, Aortogram;  Surgeon: Carlos Llamas MD;  Location: [facility] OR     ANGIOPLASTY Left 10/11/2018    Procedure: ANGIOPLASTY;;  Surgeon: Carlos Llamas MD;  Location: [facility] OR     C NONSPECIFIC PROCEDURE  2001    cholecystectomy     C NONSPECIFIC PROCEDURE  as a child    tonsillectomy     C NONSPECIFIC PROCEDURE  2001    whipple procedure     CARDIAC SURGERY      defib     COLPOSCOPY,LOOP ELECTRD CERVIX EXCIS  2002, 2011    stage 2 dysplasia     ENDOBRONCHIAL ULTRASOUND FLEXIBLE N/A 2/19/2015    Procedure: ENDOBRONCHIAL ULTRASOUND FLEXIBLE;  Surgeon: Brenden Tamez MD;  Location: [facility] GI     LEEP TX, CERVICAL  2014    LEEP TX Cervical     RECESSION RESECTION WITH ADJUSTABLE SUTURE  12/13/2011    Procedure:RECESSION RESECTION WITH ADJUSTABLE SUTURE; Right Strabismus Repair with Adjustable Suture       TUBAL LIGATION  2007    essure        Family History   Problem Relation Age of Onset     Diabetes Mother      diet controled     Hypertension Mother      Arthritis Mother      Lipids Mother      Diabetes Father      Hypertension Father      GASTROINTESTINAL DISEASE Father      gallbladder removed     Bipolar Disorder Brother      Thyroid Disease Brother      Obesity Other      Son     Respiratory Other      Son and Daughter; asthma     Depression Maternal Aunt      Anxiety Disorder Maternal Aunt        Social History   Substance Use Topics     Smoking status: Former Smoker     Packs/day: 0.30     Years: 15.00     Types: Cigarettes     Smokeless tobacco: Former User     Quit date: 10/29/2014      Comment: Started smoking in 89/ smokes about 3 per day     Alcohol use No       No current facility-administered medications for this encounter.      Current Outpatient Prescriptions   Medication     albuterol (PROAIR HFA)  108 (90 Base) MCG/ACT Inhaler     amitriptyline (ELAVIL) 50 MG tablet     aspirin 81 MG tablet     ceFEPIme (MAXIPIME) 2 G vial     fluticasone (FLONASE) 50 MCG/ACT spray     furosemide (LASIX) 40 MG tablet     levonorgestrel (MIRENA, 52 MG,) 20 MCG/24HR IUD     lisinopril (PRINIVIL/ZESTRIL) 10 MG tablet     LYRICA 75 MG capsule     methadone (DOLOPHINE-INTENSOL) 10 MG/ML (HIGH CONC) solution     metoprolol succinate (TOPROL-XL) 25 MG 24 hr tablet     metroNIDAZOLE (FLAGYL) 500 MG tablet     ranitidine (ZANTAC) 300 MG tablet     ranitidine (ZANTAC) 300 MG tablet     umeclidinium-vilanterol (ANORO ELLIPTA) 62.5-25 MCG/INH oral inhaler     vancomycin 750 mg     acetaminophen (TYLENOL) 500 MG tablet     blood glucose (NO BRAND SPECIFIED) lancets standard     blood glucose monitoring (NO BRAND SPECIFIED) meter device kit     blood glucose monitoring (NO BRAND SPECIFIED) test strip     glucose 4 g CHEW chewable tablet     insulin aspart (NOVOLOG PEN) 100 UNIT/ML injection     insulin glargine (LANTUS SOLOSTAR) 100 UNIT/ML pen     insulin pen needle (B-D U/F) 31G X 5 MM     Lidocaine (LIDOCARE) 4 % Patch     multivitamin, therapeutic with minerals (THERA-VIT-M) TABS tablet     oxyCODONE IR (ROXICODONE) 10 MG tablet     polyethylene glycol (MIRALAX/GLYCOLAX) Packet        Allergies   Allergen Reactions     No Clinical Screening - See Comments      Swelling in the throat.       Review of Systems   Constitutional: Negative for fever.   HENT: Negative for congestion.    Eyes: Negative for redness.   Respiratory: Negative for shortness of breath.    Cardiovascular: Negative for chest pain.   Gastrointestinal: Negative for abdominal pain.   Genitourinary: Negative for difficulty urinating.   Musculoskeletal: Negative for arthralgias and neck stiffness.   Skin: Negative for color change.   Neurological: Negative for headaches.   Psychiatric/Behavioral: Negative for confusion.   All other systems reviewed and are  "negative.      Physical Exam   BP: 121/71  Pulse: 92  Temp: 98.4  F (36.9  C)  Resp: 16  Height: 175.3 cm (5' 9\")  Weight: 50.3 kg (111 lb)  SpO2: 95 %      Physical Exam   Constitutional: No distress.   HENT:   Head: Atraumatic.   Mouth/Throat: Oropharynx is clear and moist. No oropharyngeal exudate.   Eyes: EOM are normal. No scleral icterus.   Neck: Neck supple.   Cardiovascular: Normal rate, regular rhythm and normal heart sounds.    Pulmonary/Chest: Breath sounds normal. No respiratory distress.   Abdominal: Soft. She exhibits no distension. There is no tenderness.   Musculoskeletal: She exhibits no edema or deformity.   Right upper arm PICC line in place   Neurological: She is alert.   Skin: Skin is warm and dry. She is not diaphoretic.   Psychiatric: She has a normal mood and affect. Her behavior is normal.       ED Course     ED Course     Procedures       3:16 PM  The patient was seen and examined by Dr. Amin in Room [facility].        Labs Ordered and Resulted from Time of ED Arrival Up to the Time of Departure from the ED - No data to display         Assessments & Plan (with Medical Decision Making)   53-year-old female presents with a chief complaint of suspected PICC line occlusion.  Patient's nurse was able to withdraw blood as well as flush the line.  It seems to be functioning as intended.  We will discharge her home to have her follow-up with her regular physician and return as needed.    I have reviewed the nursing notes.    I have reviewed the findings, diagnosis, plan and need for follow up with the patient.    Discharge Medication List as of 11/12/2018  4:13 PM          Final diagnoses:   Occlusion of peripherally inserted central catheter (PICC) line, initial encounter (H)   Amara MOE, am serving as a trained medical scribe to document services personally performed by Keyon Amin MD, based on the provider's statements to me.   Keyon MOE MD, was physically present and " have reviewed and verified the accuracy of this note documented by Amara Brown.      11/12/2018   [facility], [place], EMERGENCY DEPARTMENT     Keyon Amin DO  11/12/18 [id number]

## 2018-11-12 NOTE — ED AVS SNAPSHOT
[facility], Emergency Department    [address] [address]    Phone:  [phone]                                       Alessandra Pak   MRN: [id number]    Department:  [facility], Emergency Department   Date of Visit:  11/12/2018           Patient Information     Date Of Birth          1965        Your diagnoses for this visit were:     Occlusion of peripherally inserted central catheter (PICC) line, initial encounter (H)        You were seen by Keyon Amin DO.        Discharge Instructions       Follow-up with your primary care physician and your infectious disease physician as needed.  Return the emergency department for any new or worsening symptoms as needed.    Your next 10 appointments already scheduled     Nov 14, 2018 10:00 AM CST   Return Visit with Marily Ahumada MD   [facility], Infectious Disease ([facility])    [address] [place] [address]  [address] [place] [id number] [phone]            Nov 16, 2018 10:20 AM CST   (Arrive by 10:05 AM)   RETURN FOOT/ANKLE with Barrington Lewis [facility] ([facility])    [address] [place] [address]  [address] [facility] [id number] [phone]            Nov 29, 2018  1:00 PM CST   US GABRIEL DOPPLER NO EXERCISE 1-2 LVLS BILAT with [id number] US ([facility])    [address] [place] [id number] [phone]           How do I prepare for my exam? (Food and drink instructions) No caffeine or tobacco for 1 hour prior to exam.  What should I wear: Wear comfortable clothes.  How long does the exam take: Most ultrasounds take 30 to 60 minutes.  What should I bring: Bring a list of your medicines, including vitamins, minerals and over-the-counter drugs. It is safest to leave personal items at home.  Do I need a :  No  is needed.  What do I need to  tell my doctor: Tell your doctor about any allergies you may have.  What should I do after the exam: No restrictions, You may resume normal activities.  What is this test: An ultrasound uses sound waves to make pictures of the body. Sound waves do not cause pain. The only discomfort may be the pressure of the wand against your skin or full bladder.  Who should I call with questions: If you have any questions, please call the Imaging Department where you will have your exam. Directions, parking instructions, and other information is available on our website, [email or web address].            Nov 29, 2018  1:40 PM CST   (Arrive by 1:25 PM)   Return Vascular Visit with Carlos Llamas MD   [facility] ([facility])    [address] [place] [id number] [phone]            Dec 07, 2018 10:30 AM CST   (Arrive by 10:15 AM)   RETURN DIABETES with Toya Nuno PA-C   [facility] Endocrinology ([facility])    [address] [place] [id number] [phone]            Jan 07, 2019  1:00 PM CST   CT CHEST W/O CONTRAST with MGCT1   [facility])    [address] [place] [id number] [phone]           How do I prepare for my exam? (Food and drink instructions) No Food and Drink Restrictions.  How do I prepare for my exam? (Other instructions) You do not need to do anything special to prepare for this exam. For a sinus scan: Use your nose spray (nasal decongestant spray) as directed.  What should I wear: Please wear loose clothing, such as a sweat suit or jogging clothes. Avoid snaps, zippers and other metal. We may ask you to undress and put on a hospital gown.  How long does the exam take: Most scans take less than 20 minutes.  What should I bring: Please bring any scans or X-rays taken at other hospitals, if similar tests were done.  Also bring a list of your medicines, including vitamins, minerals and over-the-counter drugs. It is safest to leave personal items at home.  Do I need a : No  is needed.  What do I need to tell my doctor? Be sure to tell your doctor: * If you have any allergies. * If there s any chance you are pregnant. * If you are breastfeeding.  What should I do after the exam: No restrictions, You may resume normal activities.  What is this test: A CT (computed tomography) scan is a series of pictures that allows us to look inside your body. The scanner creates images of the body in cross sections, much like slices of bread. This helps us see any problems more clearly.  Who should I call with questions: If you have any questions, please call the Imaging Department where you will have your exam. Directions, parking instructions, and other information is available on our website, [email or web address].            Jan 07, 2019  2:00 PM CST   Office Visit with PFT LAB   [facility])    [address] [place] [id number] [phone]           Bring a current list of meds and any records pertaining to this visit. For Physicals, please bring immunization records and any forms needing to be filled out. Please arrive 10 minutes early to complete paperwork.            Jan 07, 2019  3:00 PM CST   Return Visit with Rene Swartz MD   [facility])    [address] [place] [address] [phone]              24 Hour Appointment Hotline       To make an appointment at any [facility], call [phone] ([phone]). If you don't have a family doctor or clinic, we will help you find one. [facility] are conveniently located to serve the needs of you and your family.             Review of your medicines      CONTINUE these medicines which may have CHANGED, or have new prescriptions. If we are  uncertain of the size of tablets/capsules you have at home, strength may be listed as something that might have changed.        Dose / Directions Last dose taken    insulin aspart 100 UNIT/ML injection   Commonly known as:  NovoLOG PEN   Dose:  1-7 Units   Indication:  Type 2 Diabetes   What changed:  how much to take   Quantity:  6.3 mL        Inject 1-7 Units Subcutaneous 3 times daily (before meals)   Refills:  0          Our records show that you are taking the medicines listed below. If these are incorrect, please call your family doctor or clinic.        Dose / Directions Last dose taken    acetaminophen 500 MG tablet   Commonly known as:  TYLENOL   Dose:  1000 mg        Take 2 tablets (1,000 mg) by mouth 3 times daily   Refills:  0        albuterol 108 (90 Base) MCG/ACT inhaler   Commonly known as:  PROAIR HFA   Dose:  2 puff   Quantity:  1 Inhaler        Inhale 2 puffs into the lungs every 4 hours as needed for shortness of breath / dyspnea   Refills:  0        amitriptyline 50 MG tablet   Commonly known as:  ELAVIL   Dose:  50 mg   Indication:  Pain   Quantity:  60 tablet        Take 1 tablet (50 mg) by mouth At Bedtime   Refills:  0        aspirin 81 MG tablet   Dose:  81 mg   Quantity:  100 tablet        Take 1 tablet (81 mg) by mouth daily   Refills:  3        blood glucose lancets standard   Commonly known as:  no brand specified   Quantity:  100 each        Use to test blood sugar 10 times daily or as directed. Accu-check   Refills:  11        blood glucose monitoring meter device kit   Commonly known as:  no brand specified   Quantity:  1 kit        Use to test blood sugar 10 times daily or as directed. Accu-check   Refills:  0        blood glucose monitoring test strip   Commonly known as:  no brand specified   Quantity:  100 strip        Use to test blood sugars 10 times daily or as directed. Accu-chek   Refills:  11        ceFEPIme 2 G vial   Commonly known as:  MAXIPIME   Dose:  2 g   Indication:   Infection with Dysregulated Inflammatory Response   Quantity:  20 each        Inject 2 g into the vein every 12 hours   Refills:  0        fluticasone 50 MCG/ACT spray   Commonly known as:  FLONASE   Dose:  2 spray   Indication:  COPD   Quantity:  1 Bottle        Spray 2 sprays into left nostril daily   Refills:  0        furosemide 40 MG tablet   Commonly known as:  LASIX   Dose:  40 mg   Indication:  High Blood Pressure Disorder   Quantity:  30 tablet        Take 1 tablet (40 mg) by mouth daily   Refills:  0        glucose 4 g Chew chewable tablet   Quantity:  25 tablet        Take 3-4 tablets to treat low blood sugar.   Refills:  11        insulin glargine 100 UNIT/ML injection   Commonly known as:  LANTUS   Dose:  8 Units   Indication:  Type 2 Diabetes   Quantity:  2.4 mL        Inject 8 Units Subcutaneous At Bedtime   Refills:  0        insulin pen needle 31G X 5 MM   Commonly known as:  B-D U/F   Quantity:  3 each        Use 3 time(s) a day.   Refills:  3        levonorgestrel 20 MCG/24HR IUD   Commonly known as:  MIRENA (52 MG)   Quantity:  1 each        placed today   Refills:  0        Lidocaine 4 % Patch   Commonly known as:  LIDOCARE   Dose:  2 patch   Indication:  chronic pain   Quantity:  30 patch        Place 2 patches onto the skin every 24 hours   Refills:  0        lisinopril 10 MG tablet   Commonly known as:  PRINIVIL/ZESTRIL   Dose:  10 mg   Indication:  High Blood Pressure Disorder   Quantity:  30 tablet        Take 1 tablet (10 mg) by mouth daily   Refills:  0        LYRICA 75 MG capsule   Quantity:  90 capsule   Generic drug:  pregabalin        TAKE 1 CAPSULE BY MOUTH 3 TIMES DAILY   Refills:  3        methadone 10 MG/ML (HIGH CONC) solution   Commonly known as:  DOLOPHINE-INTENSOL   Dose:  75 mg   Indication:  Opioid Dependence        Take 75 mg by mouth daily Confirmed dosing on 10/8/18 with [facility] [place], [place] [phone]   Refills:  0        metoprolol  succinate 25 MG 24 hr tablet   Commonly known as:  TOPROL-XL   Dose:  25 mg   Quantity:  90 tablet        Take 1 tablet (25 mg) by mouth daily   Refills:  1        metroNIDAZOLE 500 MG tablet   Commonly known as:  FLAGYL   Dose:  500 mg   Indication:  Infection of the Skin and/or Related Soft Tissue   Quantity:  60 tablet        Take 1 tablet (500 mg) by mouth every 8 hours   Refills:  0        multivitamin, therapeutic with minerals Tabs tablet   Dose:  1 tablet   Indication:  nutritional suppliment   Quantity:  30 each        Take 1 tablet by mouth daily   Refills:  0        oxyCODONE IR 10 MG tablet   Commonly known as:  ROXICODONE   Dose:  10-20 mg   Quantity:  20 tablet        Take 1-2 tablets (10-20 mg) by mouth every 6 hours as needed for moderate to severe pain   Refills:  0        polyethylene glycol Packet   Commonly known as:  MIRALAX/GLYCOLAX   Dose:  17 g   Indication:  Constipation   Quantity:  7 packet        Take 17 g by mouth daily   Refills:  0        * ranitidine 300 MG tablet   Commonly known as:  ZANTAC   Dose:  300 mg   Indication:  Gastroesophageal reflux disease without esophagitis    Quantity:  60 tablet        Take 1 tablet (300 mg) by mouth daily   Refills:  0        * ranitidine 300 MG tablet   Commonly known as:  ZANTAC   Quantity:  90 tablet        TAKE 1 TABLET (300 MG) BY MOUTH DAILY   Refills:  3        umeclidinium-vilanterol 62.5-25 MCG/INH oral inhaler   Commonly known as:  ANORO ELLIPTA   Dose:  1 puff   Indication:  Chronic Obstructive Lung Disease   Quantity:  1 Inhaler        Inhale 1 puff into the lungs daily   Refills:  2        vancomycin 750 mg   Dose:  750 mg   Indication:  Infection of Bone and Bone Marrow   Quantity:  750 mL        Inject 750 mg into the vein every 24 hours   Refills:  14        * Notice:  This list has 2 medication(s) that are the same as other medications prescribed for you. Read the directions carefully, and ask your doctor or other care provider to  review them with you.            Orders Needing Specimen Collection     None      Pending Results     No orders found from 11/10/2018 to 11/13/2018.            Pending Culture Results     No orders found from 11/10/2018 to 11/13/2018.            Pending Results Instructions     If you had any lab results that were not finalized at the time of your Discharge, you can call the ED Lab Result RN at [phone]. You will be contacted by this team for any positive Lab results or changes in treatment. The nurses are available 7 days a week from 10A to 6:30P.  You can leave a message 24 hours per day and they will return your call.        Thank you for choosing [facility]       Thank you for choosing [facility] for your care. Our goal is always to provide you with excellent care. Hearing back from our patients is one way we can continue to improve our services. Please take a few minutes to complete the written survey that you may receive in the mail after you visit with us. Thank you!        [employer] Information     [employer] gives you secure access to your electronic health record. If you see a primary care provider, you can also send messages to your care team and make appointments. If you have questions, please call your primary care clinic.  If you do not have a primary care provider, please call [phone] and they will assist you.        Care EveryWhere ID     This is your Care EveryWhere ID. This could be used by other organizations to access your [facility] medical records  [id number]        Equal Access to Services     NICK ROBLEDO AH: Hadii andres Terrell, andres murray, qasanjay herbert. So [facility] [phone].    ATENCIÓN: Si habla español, tiene a nagy disposición servicios gratuitos de asistencia lingüística. Llame al [phone].    We comply with applicable federal civil rights laws and [place] laws. We do not discriminate on the basis of race, color,  national origin, age, disability, sex, sexual orientation, or gender identity.            After Visit Summary       This is your record. Keep this with you and show to your community pharmacist(s) and doctor(s) at your next visit.

## 2018-11-13 NOTE — PROGRESS NOTES
Clinic Care Coordination Contact  [facility]/Voicemail       Clinical Data: Care Coordinator Outreach  Outreach attempted x 1.  Left message on voicemail with call back information and requested return call.  Plan: Care Coordinator mailed out care coordination introduction letter on 7/31/18. Care Coordinator will try to reach patient again in 1-2 business days.    Melissa Behl BSN, RN, [facility] Care Coordinator  [phone]
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
## 2018-11-14 NOTE — PROGRESS NOTES
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 Wound cultures revealed polymicrobial growth including MRSA, pseudomonas, and anaerobes. Pathology reveals necrosis at resection border suggesting residual osteo in the remaining tarsal  # DM, insulin dependent with A1c >10 and persistent hyperglycemia  #Prior R BKA, very slowly healing  # PAD s/p LLE angio with L RAJNI stent on 10/11/18  # Pancreatic insufficiency s/p prior Whipple  # Hyperkalemia. Likely multifactorial, and influenced by her blood glucose levels. Also likely consuming supratherapeutic dietary K  # pressure ulcer, R hip    Recommendations:  - continue cefepime watching GFR closely to ensure it does not go below 30 and necessitate dose reduction, also continue vanco with cautious dosing based on CKD and propensity toward accumulation. Goal vanco trough 15. Lastly continue metronidazole  - duration will be for 8 weeks from 10/13, translating to a stop date of 12/8. If there is poor wound healing, could consider an even longer course up to 12 weeks, at which point additional abx would likely be futile  - I will send a note to her endocrinologist about insulin pump  - she needs to continue to off-load, which is difficult given her R BKA. She fortunately has additional nursing aides coming in to her home to assist her. I will discuss her candidacy for R BKA prosthesis with her ortho team.    It is a pleasure to participate in Ms. Pak's care. Visit length 25 min, >50% clinical counseling/care coordination    Marily Ahumada MD   of Medicine, Division of Infectious Diseases  [facility] [phone]          Interval History:   Ms Pak is known to me from her recent admission for SIRS related to L great toe septic arthritis with osteo requiring 1st and 2nd toe amputations. Please see above for  details. She is changing her dressing ~1x/day. BG readings continue to be high. R BKA stump healing with only a slight remaining eschar.    She is a bit down about having read an article that amputation of 1 limb equates to a 50% likelihood that another limb will be amputated int he setting of vascular disease. She is anxious to begin the process of R BKA prosthesis because this will help her off-load her L foot.           Physical Exam:   Ranges for vital signs:  Temp:  [98.7  F (37.1  C)] 98.7  F (37.1  C)  Pulse:  [86] 86  BP: (142)/(90) 142/90  SpO2:  [95 %] 95 %    Exam:  GENERAL:  well-developed, in good spirits, no apparent distress  ENT:  Head is normocephalic, atraumatic. .  EYES:  Eyes have anicteric sclerae.    NECK:  Supple.  LUNGS:  Clear to auscultation.  CARDIOVASCULAR:  2/6 systolic murmur at left sternal border.  EXT:L foot examined and the suture line is well appearing. There is moderate strike-through on her dressing but no purulence. R BKA stump has some minor non-bloody non-purulent eschar without surrounding erythema or induration         Laboratory Data:   Microbiology:         Culture Micro   Date Value Ref Range Status   10/13/2018 No growth  Final   10/13/2018 No growth  Final   10/08/2018 No growth  Final   10/07/2018 No growth  Final   10/07/2018 No growth  Final   10/05/2018 Heavy growth  Pseudomonas aeruginosa   (A)  Final   10/05/2018 Heavy growth  Escherichia coli   (A)  Final   10/05/2018 (A)  Final    Light growth  Raoultella ornithinolytica/planticola     10/05/2018 Moderate growth  Enterococcus faecalis   (A)  Final   10/05/2018 (A)  Final    Moderate growth  Methicillin resistant Staphylococcus aureus (MRSA)     10/05/2018 (A)  Final    Heavy growth  Corynebacterium striatum  Identification obtained by MALDI-TOF mass spectrometry research use only database. Test   characteristics determined and verified by the Infectious Diseases Diagnostic Laboratory   ([facility]) [facility]  [place].  Susceptibility testing not routinely done     10/05/2018 Heavy growth  Streptococcus anginosus   (A)  Final   10/05/2018 (A)  Final    Heavy growth  Prevotella species  Identification obtained by MALDI-TOF mass spectrometry research use only database. Test   characteristics determined and verified by the Infectious Diseases Diagnostic Laboratory   ([facility]) [place], [place].  Beta lactamase positive  Susceptibility testing not routinely done     10/05/2018 (A)  Final    Heavy growth  Fusobacterium nucleatum  Susceptibility testing not routinely done     10/05/2018 (A)  Final    Plus  Heavy growth  Mixed aerobic and anaerobic jim     06/29/2018 No growth  Final   06/29/2018 No growth  Final   06/28/2018 No anaerobes isolated  Final   06/28/2018 No growth  Final   06/28/2018 No anaerobes isolated  Final   06/28/2018 (A)  Final    Light growth  Gram positive cocci  Organism failed to thrive for identification and suceptibility testing     06/13/2018 No growth  Final   06/13/2018 No growth  Final   06/11/2018 Light growth  Candida glabrata   (A)  Final   06/11/2018 Susceptibility testing not routinely done  Final   06/10/2018 No growth  Final   06/10/2018 No growth  Final   06/09/2018 No growth  Final   06/09/2018 Moderate growth  Enterococcus faecalis   (A)  Final   06/09/2018 (A)  Final    Light growth  Streptococcus mitis group  Susceptibility testing not routinely done     06/07/2018 No growth  Final   06/06/2018 No growth  Final   06/06/2018 No growth  Final   02/19/2018 No growth  Final   02/19/2018 No growth  Final   02/19/2018   Final    Canceled, Test credited  Incorrectly ordered by [facility]/Clinic  Test reordered as correct code  Tissue culture     02/19/2018   Final    No anaerobes isolated  Since this specimen was not transported in the proper anaerobic transport media, the   absence of anaerobes in this culture does not rule out the presence of anaerobes in this   specimen.     02/19/2018 (A)  Final    Light  growth  Methicillin resistant Staphylococcus aureus (MRSA)     02/19/2018 (A)  Final    Light growth  Coagulase negative Staphylococcus  Susceptibility testing not routinely done  This organism is part of normal jim, but on occasion, may be a true pathogen. Clinical   correlation must be applied to interpreting this microbiology result.     02/19/2018   Final    Critical Value/Significant Value called to and read back by  NESHA HORTON RN (7A).  02.21.18 2206 GJS     02/18/2018 No growth  Final   02/18/2018 No growth  Final   02/16/2018   Final    <10,000 colonies/mL  mixed urogenital jim  Susceptibility testing not routinely done     02/16/2018 No growth  Final   02/16/2018 No growth  Final   04/06/2016 (A)  Final    Heavy growth Staphylococcus aureus  Heavy growth Beta hemolytic Streptococcus group B This isolate DOES NOT   demonstrate inducible clindamycin resistance in vitro. Clindamycin is   susceptible and could be used when indicated, however, erythromycin is   resistant and should not be used.     06/24/2015   Final    <10,000 colonies/mL mixed urogenital jim Susceptibility testing not routinely   done     02/19/2015   Final    Culture negative for acid fast bacilli  Assayed at [facility] [address]     02/19/2015 (A)  Final    Normal respiratory jim  Light growth Candida albicans / dubliniensis Candida albicans and Candida   dubliniensis are not routinely speciated Susceptibility testing not routinely   done     02/19/2015 (A)  Final    Candida tropicalis isolated  Candida glabrata isolated  No additional fungi cultured after 4 weeks incubation     02/19/2015 No growth after 4 weeks  Final   02/16/2015 (A)  Final    Canceled, Test credited  >10 Squamous epithelial cells/low power field indicates oral contamination.   Please recollect.  Called to Cari on 4E, 2/16/15 14:10 CWi     02/15/2015 No growth  Final   02/15/2015 No growth  Final   02/13/2015 No growth  Final    02/13/2015 No growth  Final   10/31/2014 No growth  Final   10/29/2014   Final    Culture negative for acid fast bacilli  Assayed at [facility] [address]     10/29/2014 No growth  Final   10/29/2014 Culture negative after 4 weeks  Final   10/29/2014 No growth after 4 weeks  Final   10/29/2014 (A)  Final    Light growth Staphylococcus aureus  Light growth Cristela albicans / dubliniensis Candida albicans and Candida   dubliniensis are not routinely speciated     10/28/2014 No growth  Final   10/28/2014 No growth  Final   11/23/2009   Final    <10,000 colonies/mL Beta hemolytic Streptococcus group B     Comment:     10 to 50,000 colonies/mL Mixed gram positive jim Multiple species present,   probable perineal contamination.  Clindamycin and Erythromycin are not routinely prescribed for isolates from   the urinary tract.   03/04/2005   Final    <10,000 colonies/mL Staphylococcus species Susceptibility testing not routinely     Comment:      done           Other Laboratory Data:    Urine Studies    Recent Labs   Lab Test  10/08/18   0032  06/07/18   0031  02/16/18   2036  06/24/15   1612  02/14/15   1545   LEUKEST  Small*  Small*  Large*  Moderate*  Duplicate request  SEE ACCN [id number]  *  Trace*   WBCU  7*  0  60*  2  0       Inflammatory Markers    Recent Labs   Lab Test  10/10/18   0605  10/05/18   1459  08/28/18   1325  08/23/18   1310  08/14/18   1340  08/08/18   1115  08/02/18   1215  07/30/18   1245   02/15/15   0240   SED   --   118*   --    --    --   46*  86*  109*   --   132*   CRP  280.0*  292.0*  52.1*  59.1*  76.7*  69.4*  70.9*  34.3*   < >   --     < > = values in this interval not displayed.       Hematology Studies    Recent Labs   Lab Test  10/14/18   0526  10/13/18   0524  10/12/18   0704  10/11/18   0829  10/10/18   0605  10/09/18   0553   10/07/18   1749   08/28/18   1325  08/23/18   1310  08/14/18   1340  07/28/18   1539   06/12/18   0420   WBC  15.5*  13.5*  16.1*   18.9*  24.9*  20.9*   < >  24.8*   < >  7.4  8.1  5.9  7.5   < >  19.2*   ANEU   --    --    --    --    --    --    --   20.5*   --   3.8  3.9  3.1  3.3   --   14.2*   AEOS   --    --    --    --    --    --    --   0.1   --   0.2  0.2  0.2  0.3   --   1.0*   HGB  7.8*  8.3*  8.0*  8.2*  8.8*  8.5*   < >  10.0*   < >  10.5*  10.4*  10.2*  12.2   < >  7.2*   MCV  80  78  78  78  77*  78   < >  81   < >  83  82  83  85   < >  75*   PLT  428  358  377  328  361  348   < >  360   < >  273  301  243  215   < >  279    < > = values in this interval not displayed.       Immune Globulin Studies    Recent Labs   Lab Test  02/21/15   0652  02/15/15   1520   IGG  1330   --    IGE   --   46       Metabolic Studies     Recent Labs   Lab Test 10/18/18  10/14/18   0526  10/13/18   0524  10/12/18   0704  10/11/18   0829  10/10/18   0605   NA   --   141  138  139  138  139   POTASSIUM   --   4.2  4.0  4.3  4.3  4.4   CHLORIDE   --   110*  109  110*  111*  110*   CO2   --   23  22  23  21  21   BUN   --   22  24  24  30  32*   CR  1.68*  1.21*  1.29*  1.28*  1.20*  1.28*   GFRESTIMATED  32*  47*  44*  44*  47*  44*       Hepatic Studies    Recent Labs   Lab Test  10/12/18   0704  10/10/18   0605  08/28/18   1325  08/23/18   1310  08/14/18   1340  07/16/18   0758   BILITOTAL  0.3  0.3  0.3  0.2  0.2  0.2   ALKPHOS  149  152*  263*  283*  354*  1163*   ALBUMIN  1.5*  1.6*  2.8*  2.8*  2.5*  2.6*   AST  12  12  20  24  35  59*   ALT  14  20  33  42  46  75*       Thyroid Studies    Recent Labs   Lab Test  01/13/17   1353  11/02/16   1359   TSH  0.32*  0.34*   T4  1.06  1.02       Vancomycin Levels    Recent Labs   Lab Test  10/15/18   0054  10/10/18   2230  08/08/18   1115   VANCOMYCIN  27.0*  20.7  20.8

## 2018-11-14 NOTE — MR AVS SNAPSHOT
After Visit Summary   11/14/2018    Alessandra Pak    MRN: [id number]           Patient Information     Date Of Birth          1965        Visit Information        Provider Department      11/14/2018 10:00 AM Marily Ahumada MD [facility], [place], Infectious Disease        Today's Diagnoses     Diabetic ulcer of toe of left foot associated with diabetes mellitus due to underlying condition, limited to breakdown of skin (H)    -  1       Follow-ups after your visit        Follow-up notes from your care team     Return in about 4 weeks (around 12/12/2018).      Your next 10 appointments already scheduled     Dec 04, 2018  4:00 PM CST   (Arrive by 3:45 PM)   RETURN FOOT/ANKLE with Barrington Lewis [facility] ([facility])    [address]  4th Floor  [facility] [address] [phone]            Dec 06, 2018 12:30 PM CST   US GABRIEL DOPPLER NO EXERCISE 1-2 LVLS BILAT with UCUSV2   [facility] ([facility])    [address]  1st Floor  [facility] [address] [phone]           How do I prepare for my exam? (Food and drink instructions) No caffeine or tobacco for 1 hour prior to exam.  What should I wear: Wear comfortable clothes.  How long does the exam take: Most ultrasounds take 30 to 60 minutes.  What should I bring: Bring a list of your medicines, including vitamins, minerals and over-the-counter drugs. It is safest to leave personal items at home.  Do I need a :  No  is needed.  What do I need to tell my doctor: Tell your doctor about any allergies you may have.  What should I do after the exam: No restrictions, You may resume normal activities.  What is this test: An ultrasound uses sound waves to make pictures of the body. Sound waves do not cause pain. The only discomfort may be the pressure of the wand against your skin or full bladder.  Who should I call with  questions: If you have any questions, please call the Imaging Department where you will have your exam. Directions, parking instructions, and other information is available on our website, [email or web address].            Dec 06, 2018  1:20 PM CST   (Arrive by 1:05 PM)   Return Vascular Visit with Carlos Llamas MD   [facility] ([facility])    [address] [place] [id number]   [phone]            Dec 07, 2018 10:30 AM CST   (Arrive by 10:15 AM)   RETURN DIABETES with Toya Nuno PA-C   [facility] ([facility])    [address] [place] [id number] [phone]            Dec 12, 2018 11:00 AM CST   Return Visit with Marily Ahumada MD   [facility] [place], Infectious Disease ([facility])    [address] [place] [address]  [address] [place] [id number] [phone]            Jan 07, 2019  1:00 PM CST   CT CHEST W/O CONTRAST with MGCT1   [facility]    [address] [place] [address] [phone]           How do I prepare for my exam? (Food and drink instructions) No Food and Drink Restrictions.  How do I prepare for my exam? (Other instructions) You do not need to do anything special to prepare for this exam. For a sinus scan: Use your nose spray (nasal decongestant spray) as directed.  What should I wear: Please wear loose clothing, such as a sweat suit or jogging clothes. Avoid snaps, zippers and other metal. We may ask you to undress and put on a hospital gown.  How long does the exam take: Most scans take less than 20 minutes.  What should I bring: Please bring any scans or X-rays taken at other hospitals, if similar tests were done. Also bring a list of your medicines, including vitamins, minerals and over-the-counter drugs. It is safest to leave personal items  at home.  Do I need a : No  is needed.  What do I need to tell my doctor? Be sure to tell your doctor: * If you have any allergies. * If there s any chance you are pregnant. * If you are breastfeeding.  What should I do after the exam: No restrictions, You may resume normal activities.  What is this test: A CT (computed tomography) scan is a series of pictures that allows us to look inside your body. The scanner creates images of the body in cross sections, much like slices of bread. This helps us see any problems more clearly.  Who should I call with questions: If you have any questions, please call the Imaging Department where you will have your exam. Directions, parking instructions, and other information is available on our website, [email or web address].            Jan 07, 2019  2:00 PM CST   Office Visit with PFT LAB   [facility])    [address] [place] [address] [phone]           Bring a current list of meds and any records pertaining to this visit. For Physicals, please bring immunization records and any forms needing to be filled out. Please arrive 10 minutes early to complete paperwork.            Jan 07, 2019  3:00 PM CST   Return Visit with Rene Swartz MD   [facility])    [address] [place] [address] [phone]              Who to contact     Please call your clinic at [phone] to:    Ask questions about your health    Make or cancel appointments    Discuss your medicines    Learn about your test results    Speak to your doctor            Additional Information About Your Visit        [employer] Information     [employer] gives you secure access to your electronic health record. If you see a primary care provider, you can also send messages to your care team and make appointments. If you have questions, please call your primary care clinic.  If you do  not have a primary care provider, please call [phone] and they will assist you.      [employer] is an electronic gateway that provides easy, online access to your medical records. With [employer], you can request a clinic appointment, read your test results, renew a prescription or communicate with your care team.     To access your existing account, please contact your [facility] or call [phone] for assistance.        Care EveryWhere ID     This is your Care EveryWhere ID. This could be used by other organizations to access your [facility] medical records  [id number]        Your Vitals Were     Pulse Temperature Pulse Oximetry             86 98.7  F (37.1  C) (Oral) 95%          Blood Pressure from Last 3 Encounters:   11/14/18 142/90   11/12/18 121/71   11/07/18 138/80    Weight from Last 3 Encounters:   11/12/18 50.3 kg (111 lb)   11/06/18 51.7 kg (113 lb 14.4 oz)   10/15/18 51.6 kg (113 lb 12.1 oz)              Today, you had the following     No orders found for display         Today's Medication Changes          These changes are accurate as of 11/14/18 11:59 PM.  If you have any questions, ask your nurse or doctor.               These medicines have changed or have updated prescriptions.        Dose/Directions    insulin aspart 100 UNIT/ML injection   Commonly known as:  NovoLOG PEN   Indication:  Type 2 Diabetes   This may have changed:  how much to take   Used for:  Type 2 diabetes mellitus with stage 3 chronic kidney disease, with long-term current use of insulin (H)        Dose:  1-7 Units   Inject 1-7 Units Subcutaneous 3 times daily (before meals)   Quantity:  6.3 mL   Refills:  0                Primary Care Provider Office Phone # Fax #    Brionna Coby Dc [phone]       [address]  [place] [facility] [address]        Equal Access to Services     NICK ROBLEDO AH: Chiqui Terrell, andres murray, alyssa pompa  sanjay piñarosaura montesinosaan ah. So [facility] [phone].    ATENCIÓN: Si lizetla emanuel, tiene a nagy disposición servicios gratuitos de asistencia lingüística. Rodriguez al [phone].    We comply with applicable federal civil rights laws and [place] laws. We do not discriminate on the basis of race, color, national origin, age, disability, sex, sexual orientation, or gender identity.            Thank you!     Thank you for choosing [facility], [place], INFECTIOUS DISEASE  for your care. Our goal is always to provide you with excellent care. Hearing back from our patients is one way we can continue to improve our services. Please take a few minutes to complete the written survey that you may receive in the mail after your visit with us. Thank you!             Your Updated Medication List - Protect others around you: Learn how to safely use, store and throw away your medicines at www.disposemymeds.org.          This list is accurate as of 11/14/18 11:59 PM.  Always use your most recent med list.                   Brand Name Dispense Instructions for use Diagnosis    acetaminophen 500 MG tablet    TYLENOL     Take 2 tablets (1,000 mg) by mouth 3 times daily    Status post below knee amputation of right lower extremity (H)       albuterol 108 (90 Base) MCG/ACT inhaler    PROAIR HFA    1 Inhaler    Inhale 2 puffs into the lungs every 4 hours as needed for shortness of breath / dyspnea    SOB (shortness of breath)       amitriptyline 50 MG tablet    ELAVIL    60 tablet    Take 1 tablet (50 mg) by mouth At Bedtime    Status post below knee amputation of right lower extremity (H)       aspirin 81 MG tablet     100 tablet    Take 1 tablet (81 mg) by mouth daily    Type 2 diabetes mellitus with complication, with long-term current use of insulin (H)       blood glucose lancets standard    no brand specified    100 each    Use to test blood sugar 10 times daily or as directed. Accu-check    Type 2 diabetes  mellitus with stage 3 chronic kidney disease, with long-term current use of insulin (H)       blood glucose monitoring meter device kit    no brand specified    1 kit    Use to test blood sugar 10 times daily or as directed. Accu-check    Type 2 diabetes mellitus with stage 3 chronic kidney disease, with long-term current use of insulin (H)       blood glucose monitoring test strip    no brand specified    100 strip    Use to test blood sugars 10 times daily or as directed. Accu-chek    Type 2 diabetes mellitus with stage 3 chronic kidney disease, with long-term current use of insulin (H)       ceFEPIme 2 G vial    MAXIPIME    20 each    Inject 2 g into the vein every 12 hours    Diabetic foot ulcer with osteomyelitis (H)       fluticasone 50 MCG/ACT spray    FLONASE    1 Bottle    Spray 2 sprays into left nostril daily    Acute nonseasonal allergic rhinitis due to pollen       furosemide 40 MG tablet    LASIX    30 tablet    Take 1 tablet (40 mg) by mouth daily    SOB (shortness of breath)       glucose 4 g Chew chewable tablet     25 tablet    Take 3-4 tablets to treat low blood sugar.    Type 2 diabetes mellitus with complication, with long-term current use of insulin (H)       insulin aspart 100 UNIT/ML injection    NovoLOG PEN    6.3 mL    Inject 1-7 Units Subcutaneous 3 times daily (before meals)    Type 2 diabetes mellitus with stage 3 chronic kidney disease, with long-term current use of insulin (H)       insulin glargine 100 UNIT/ML injection    LANTUS    2.4 mL    Inject 8 Units Subcutaneous At Bedtime    Type 2 diabetes mellitus with stage 3 chronic kidney disease, with long-term current use of insulin (H)       insulin pen needle 31G X 5 MM miscellaneous    B-D U/F    3 each    Use 3 time(s) a day.    Type 2 diabetes mellitus with stage 3 chronic kidney disease, with long-term current use of insulin (H)       levonorgestrel 20 MCG/24HR IUD    MIRENA (52 MG)    1 each    placed today    Excessive or  frequent menstruation       Lidocaine 4 % Patch    LIDOCARE    30 patch    Place 2 patches onto the skin every 24 hours    Status post below knee amputation of right lower extremity (H)       lisinopril 10 MG tablet    PRINIVIL/ZESTRIL    30 tablet    Take 1 tablet (10 mg) by mouth daily    Type 2 diabetes mellitus with stage 3 chronic kidney disease, with long-term current use of insulin (H)       LYRICA 75 MG capsule   Generic drug:  pregabalin     90 capsule    TAKE 1 CAPSULE BY MOUTH 3 TIMES DAILY    Status post below knee amputation of right lower extremity (H)       methadone 10 MG/ML (HIGH CONC) solution    DOLOPHINE-INTENSOL     Take 75 mg by mouth daily Confirmed dosing on 10/8/18 with [facility] [place], [place] [phone]        metoprolol succinate 25 MG 24 hr tablet    TOPROL-XL    90 tablet    Take 1 tablet (25 mg) by mouth daily    Hypertension goal BP (blood pressure) < 140/90, Chronic systolic congestive heart failure (H)       metroNIDAZOLE 500 MG tablet    FLAGYL    60 tablet    Take 1 tablet (500 mg) by mouth every 8 hours    Diabetic foot ulcer with osteomyelitis (H)       multivitamin, therapeutic with minerals Tabs tablet     30 each    Take 1 tablet by mouth daily    Status post below knee amputation of right lower extremity (H)       oxyCODONE IR 10 MG tablet    ROXICODONE    20 tablet    Take 1-2 tablets (10-20 mg) by mouth every 6 hours as needed for moderate to severe pain    Diabetic foot ulcer with osteomyelitis (H)       polyethylene glycol Packet    MIRALAX/GLYCOLAX    7 packet    Take 17 g by mouth daily    Status post below knee amputation of right lower extremity (H)       ranitidine 300 MG tablet    ZANTAC    90 tablet    TAKE 1 TABLET (300 MG) BY MOUTH DAILY    Gastroesophageal reflux disease without esophagitis       umeclidinium-vilanterol 62.5-25 MCG/INH oral inhaler    ANORO ELLIPTA    1 Inhaler    Inhale 1 puff into the lungs daily    Chronic  bronchitis, unspecified chronic bronchitis type (H)       vancomycin 750 mg     750 mL    Inject 750 mg into the vein every 24 hours    Diabetic foot ulcer with osteomyelitis (H)

## 2018-11-14 NOTE — TELEPHONE ENCOUNTER
[facility] Call Center    Phone Message    May a detailed message be left on voicemail: yes    Reason for Call: Other: Martin -Pharmacist stated that he has been trying to reach the patient, Callled to see if pt kept her appointment for today 11/14/18. Martin wants to know the status of  her [facility]. Please call  and leave detailed messages regarding pt info.      Action Taken: Message routed to:  [facility] ([facility] sohail infectious

## 2018-11-14 NOTE — Clinical Note
I saw Alessandra in follow-up. She's doing relatively well, but has some break-down of her L heel and also her glucose levels are above target.   Two questions arose during her encounter: -when will discussion begin about R BKA prosthesis? This may ultimately help her off-load the L foot -is she a candidate for insulin pump?  Thanks! Marily Ahumada MD  of Medicine, Division of Infectious Diseases [facility] [phone]

## 2018-11-16 NOTE — NURSING NOTE
Chief Complaint   Patient presents with     Left Foot - Follow Up For, WOUND CARE       Pain Assessment  Patient Currently in Pain: Yes  0-10 Pain Scale: 5  Primary Pain Location: Foot  Pain Orientation: Left  Other Pain Locations: back and shoulders  Pain Descriptors: Dull, Aching, Constant  Alleviating Factors: Other (comment) (lyrica)               There were no vitals taken for this visit.    Allergies   Allergen Reactions     No Clinical Screening - See Comments      Swelling in the throat.       Current Outpatient Prescriptions   Medication Sig Dispense Refill     albuterol (PROAIR HFA) 108 (90 Base) MCG/ACT Inhaler Inhale 2 puffs into the lungs every 4 hours as needed for shortness of breath / dyspnea 1 Inhaler 0     amitriptyline (ELAVIL) 50 MG tablet Take 1 tablet (50 mg) by mouth At Bedtime 60 tablet 0     aspirin 81 MG tablet Take 1 tablet (81 mg) by mouth daily 100 tablet 3     blood glucose (NO BRAND SPECIFIED) lancets standard Use to test blood sugar 10 times daily or as directed. Accu-check 100 each 11     blood glucose monitoring (NO BRAND SPECIFIED) meter device kit Use to test blood sugar 10 times daily or as directed. Accu-check 1 kit 0     blood glucose monitoring (NO BRAND SPECIFIED) test strip Use to test blood sugars 10 times daily or as directed. Accu-chek 100 strip 11     ceFEPIme (MAXIPIME) 2 G vial Inject 2 g into the vein every 12 hours 20 each 0     fluticasone (FLONASE) 50 MCG/ACT spray Spray 2 sprays into left nostril daily 1 Bottle 0     furosemide (LASIX) 40 MG tablet Take 1 tablet (40 mg) by mouth daily 30 tablet 0     glucose 4 g CHEW chewable tablet Take 3-4 tablets to treat low blood sugar. 25 tablet 11     insulin pen needle (B-D U/F) 31G X 5 MM Use 3 time(s) a day. 3 each 3     levonorgestrel (MIRENA, 52 MG,) 20 MCG/24HR IUD placed today 1 each 0     Lidocaine (LIDOCARE) 4 % Patch Place 2 patches onto the skin every 24 hours 30 patch 0     lisinopril (PRINIVIL/ZESTRIL) 10 MG  tablet Take 1 tablet (10 mg) by mouth daily 30 tablet 0     LYRICA 75 MG capsule TAKE 1 CAPSULE BY MOUTH 3 TIMES DAILY 90 capsule 3     methadone (DOLOPHINE-INTENSOL) 10 MG/ML (HIGH CONC) solution Take 75 mg by mouth daily Confirmed dosing on 10/8/18 with [facility]: [place], [place] [phone]       metoprolol succinate (TOPROL-XL) 25 MG 24 hr tablet Take 1 tablet (25 mg) by mouth daily 90 tablet 1     metroNIDAZOLE (FLAGYL) 500 MG tablet Take 1 tablet (500 mg) by mouth every 8 hours 60 tablet 0     multivitamin, therapeutic with minerals (THERA-VIT-M) TABS tablet Take 1 tablet by mouth daily 30 each 0     polyethylene glycol (MIRALAX/GLYCOLAX) Packet Take 17 g by mouth daily 7 packet 0     ranitidine (ZANTAC) 300 MG tablet TAKE 1 TABLET (300 MG) BY MOUTH DAILY 90 tablet 3     umeclidinium-vilanterol (ANORO ELLIPTA) 62.5-25 MCG/INH oral inhaler Inhale 1 puff into the lungs daily 1 Inhaler 2     vancomycin 750 mg Inject 750 mg into the vein every 24 hours 750 mL 14     acetaminophen (TYLENOL) 500 MG tablet Take 2 tablets (1,000 mg) by mouth 3 times daily (Patient not taking: Reported on 11/14/2018)       insulin aspart (NOVOLOG PEN) 100 UNIT/ML injection Inject 1-7 Units Subcutaneous 3 times daily (before meals) (Patient taking differently: Inject 2-5 Units Subcutaneous 3 times daily (before meals) ) 6.3 mL 0     insulin glargine (LANTUS SOLOSTAR) 100 UNIT/ML pen Inject 8 Units Subcutaneous At Bedtime 2.4 mL 0     oxyCODONE IR (ROXICODONE) 10 MG tablet Take 1-2 tablets (10-20 mg) by mouth every 6 hours as needed for moderate to severe pain (Patient not taking: Reported on 11/14/2018) 20 tablet 0       Danette Pink ATC  11/16/2018

## 2018-11-16 NOTE — PROGRESS NOTES
Chief Complaint:   Chief Complaint   Patient presents with     Left Foot - Follow Up For, WOUND CARE          Allergies   Allergen Reactions     No Clinical Screening - See Comments      Swelling in the throat.         Subjective: Alessandra is a 53 year old female who presents to the clinic today for a follow up of left foot ulcerations. First and second rays of left foot were amputated on 10/13 by Dr. Peralta. Since then, she has been performing dressing changes to incision site with adaptic, wrapping foot with kerlix and securing with  ACE. The incision wound has been slowly healing in. Also continues to manage a left heel ulceration. There is some serosanguinous drainage from both areas. Continues IV antibiotic infusions and is following with Dr. Ahumada of ID closely. Denies purulent drainage, redness, swelling, fever, chills, n/v, confusion.    Objective  Wound #1  A surgical incision wound is noted at left  lateral foot at hallux amputation site, measuring approximately 1.5 cm x 0.2 cm x 0.2 cm.    Wound base: Pink/Granulation, yellow/slough    Edges: scab, hyperkeratotic, intact    Drainage: small/serosanguinous    Odor: None    Undermining: No    Bone Exposure: No    Clinical Signs of Infection: No    After obtaining patient consent, the wound was irrigated with copious amounts of saline. A scalpel was then used to debride the wound into subcutaneous tissue. The wound edges were debrided back to healthy, bleeding tissue. The wound base exhibited healthy bleeding. Given the patient's lack of sensation, no anesthesia was necessary for the procedure.        Wound #2  A chronic pressure wound is noted at left  plantar heel measuring approximately 3 cm x 2.5 cm x 0.1 cm.    Lozano Classification: 2    Wound base: Red  Black/Granulation  Eschar    Edges: Scab    Drainage: slight/serosanguinous    Odor: None    Undermining: No    Bone Exposure: No    Clinical Signs of Infection: No    After obtaining patient consent, the  wound was irrigated with copious amounts of saline. A scalpel was then used to debride the wound into dermal tissue. The wound edges were debrided back to healthy, bleeding tissue. The wound base exhibited healthy bleeding. Given the patient's lack of sensation, no anesthesia was necessary for the procedure.        Assessment: Diabetic left foot wounds  S/p left first and second ray amputation 10/13/18    Plan:   - Pt seen and evaluated.   - Wounds debrided as described above.  - Wound care instructions: 1. Spray with microklenz, pat dry 2. Apply medihoney to incision wound, followed by petroleum gauze 3. Apply optifoam to heel wound. 4. Wrap with kerlix, secure with ACE. Perform every other day.   - Pt to return to clinic in 2 weeks

## 2018-11-16 NOTE — LETTER
11/16/2018       RE: Alessandra Pak  [address]  [facility] [address]     Dear Colleague,    Thank you for referring your patient, Alessandra Pak, to the [facility]. Please see a copy of my visit note below.    Chief Complaint:   Chief Complaint   Patient presents with     Left Foot - Follow Up For, WOUND CARE          Allergies   Allergen Reactions     No Clinical Screening - See Comments      Swelling in the throat.         Subjective: Alessandra is a 53 year old female who presents to the clinic today for a follow up of left foot ulcerations. First and second rays of left foot were amputated on 10/13 by Dr. Peralta. Since then, she has been performing dressing changes to incision site with adaptic, wrapping foot with kerlix and securing with  ACE. The incision wound has been slowly healing in. Also continues to manage a left heel ulceration. There is some serosanguinous drainage from both areas. Continues IV antibiotic infusions and is following with Dr. Ahumada of ID closely. Denies purulent drainage, redness, swelling, fever, chills, n/v, confusion.    Objective  Wound #1  A surgical incision wound is noted at left  lateral foot at hallux amputation site, measuring approximately 1.5 cm x 0.2 cm x 0.2 cm.    Wound base: Pink/Granulation, yellow/slough    Edges: scab, hyperkeratotic, intact    Drainage: small/serosanguinous    Odor: None    Undermining: No    Bone Exposure: No    Clinical Signs of Infection: No    After obtaining patient consent, the wound was irrigated with copious amounts of saline. A scalpel was then used to debride the wound into subcutaneous tissue. The wound edges were debrided back to healthy, bleeding tissue. The wound base exhibited healthy bleeding. Given the patient's lack of sensation, no anesthesia was necessary for the procedure.        Wound #2  A chronic pressure wound is noted at left  plantar heel measuring  approximately 3 cm x 2.5 cm x 0.1 cm.    Lozano Classification: 2    Wound base: Red  Black/Granulation  Eschar    Edges: Scab    Drainage: slight/serosanguinous    Odor: None    Undermining: No    Bone Exposure: No    Clinical Signs of Infection: No    After obtaining patient consent, the wound was irrigated with copious amounts of saline. A scalpel was then used to debride the wound into dermal tissue. The wound edges were debrided back to healthy, bleeding tissue. The wound base exhibited healthy bleeding. Given the patient's lack of sensation, no anesthesia was necessary for the procedure.        Assessment: Diabetic left foot wounds  S/p left first and second ray amputation 10/13/18    Plan:   - Pt seen and evaluated.   - Wounds debrided as described above.  - Wound care instructions: 1. Spray with microklenz, pat dry 2. Apply medihoney to incision wound, followed by petroleum gauze 3. Apply optifoam to heel wound. 4. Wrap with kerlix, secure with ACE. Perform every other day.   - Pt to return to clinic in 2 weeks      Again, thank you for allowing me to participate in the care of your patient.      Sincerely,    Barrington Lewis DPM

## 2018-11-16 NOTE — MR AVS SNAPSHOT
After Visit Summary   11/16/2018    Alessandra Pak    MRN: [id number]           Patient Information     Date Of Birth          1965        Visit Information        Provider Department      11/16/2018 10:20 AM [facility]         Follow-ups after your visit        Your next 10 appointments already scheduled     Nov 29, 2018  1:00 PM CST   US GABRIEL DOPPLER NO EXERCISE 1-2 LVLS BILAT with UCUSV2   [facility] ([facility])    [address] [place] [address] [phone]           How do I prepare for my exam? (Food and drink instructions) No caffeine or tobacco for 1 hour prior to exam.  What should I wear: Wear comfortable clothes.  How long does the exam take: Most ultrasounds take 30 to 60 minutes.  What should I bring: Bring a list of your medicines, including vitamins, minerals and over-the-counter drugs. It is safest to leave personal items at home.  Do I need a :  No  is needed.  What do I need to tell my doctor: Tell your doctor about any allergies you may have.  What should I do after the exam: No restrictions, You may resume normal activities.  What is this test: An ultrasound uses sound waves to make pictures of the body. Sound waves do not cause pain. The only discomfort may be the pressure of the wand against your skin or full bladder.  Who should I call with questions: If you have any questions, please call the Imaging Department where you will have your exam. Directions, parking instructions, and other information is available on our website, [email or web address].            Nov 29, 2018  1:40 PM CST   (Arrive by 1:25 PM)   Return Vascular Visit with Carlos Llamas MD   [facility] ([facility])    [address] [place] [id number] [phone]            Dec 04, 2018  4:00 PM CST   (Arrive by  3:45 PM)   RETURN FOOT/ANKLE with Barrington Lewis DPM   [facility] ([facility])    [address]  4th Floor  [facility] [address]   [phone]            Dec 07, 2018 10:30 AM CST   (Arrive by 10:15 AM)   RETURN DIABETES with Toya Nuno PA-C   [facility] ([facility])    [address]  3rd Floor  [facility] [address]   [phone]            Dec 12, 2018 11:00 AM CST   Return Visit with Marily Ahumada MD   [facility], Infectious Disease ([facility])    [address]  [address]  [facility] [address]   [phone]            Jan 07, 2019  1:00 PM CST   CT CHEST W/O CONTRAST with MGCT1   [facility] ([facility])    [address] [place] [id number] [phone]           How do I prepare for my exam? (Food and drink instructions) No Food and Drink Restrictions.  How do I prepare for my exam? (Other instructions) You do not need to do anything special to prepare for this exam. For a sinus scan: Use your nose spray (nasal decongestant spray) as directed.  What should I wear: Please wear loose clothing, such as a sweat suit or jogging clothes. Avoid snaps, zippers and other metal. We may ask you to undress and put on a hospital gown.  How long does the exam take: Most scans take less than 20 minutes.  What should I bring: Please bring any scans or X-rays taken at other hospitals, if similar tests were done. Also bring a list of your medicines, including vitamins, minerals and over-the-counter drugs. It is safest to leave personal items at home.  Do I need a : No  is needed.  What do I need to tell my doctor? Be sure to tell your doctor: * If you have any allergies. * If there s any chance you are pregnant. * If you are breastfeeding.  What should I do after the exam: No  restrictions, You may resume normal activities.  What is this test: A CT (computed tomography) scan is a series of pictures that allows us to look inside your body. The scanner creates images of the body in cross sections, much like slices of bread. This helps us see any problems more clearly.  Who should I call with questions: If you have any questions, please call the Imaging Department where you will have your exam. Directions, parking instructions, and other information is available on our website, [email or web address].            Jan 07, 2019  2:00 PM CST   Office Visit with PFT LAB   [facility])    [address] [place] [address] [phone]           Bring a current list of meds and any records pertaining to this visit. For Physicals, please bring immunization records and any forms needing to be filled out. Please arrive 10 minutes early to complete paperwork.            Jan 07, 2019  3:00 PM CST   Return Visit with Rene Swartz MD   [facility])    [address] [place] [address] [phone]              Who to contact     Please call your clinic at [phone] to:    Ask questions about your health    Make or cancel appointments    Discuss your medicines    Learn about your test results    Speak to your doctor            Additional Information About Your Visit        [employer] Information     [employer] gives you secure access to your electronic health record. If you see a primary care provider, you can also send messages to your care team and make appointments. If you have questions, please call your primary care clinic.  If you do not have a primary care provider, please call [phone] and they will assist you.      [employer] is an electronic gateway that provides easy, online access to your medical records. With [employer], you can request a clinic appointment, read your test  results, renew a prescription or communicate with your care team.     To access your existing account, please contact your [facility] or call [phone] for assistance.        Care EveryWhere ID     This is your Care EveryWhere ID. This could be used by other organizations to access your [facility] medical records  [id number]         Blood Pressure from Last 3 Encounters:   11/14/18 142/90   11/12/18 121/71   11/07/18 138/80    Weight from Last 3 Encounters:   11/12/18 50.3 kg (111 lb)   11/06/18 51.7 kg (113 lb 14.4 oz)   10/15/18 51.6 kg (113 lb 12.1 oz)              Today, you had the following     No orders found for display         Today's Medication Changes          These changes are accurate as of 11/16/18 10:56 AM.  If you have any questions, ask your nurse or doctor.               These medicines have changed or have updated prescriptions.        Dose/Directions    insulin aspart 100 UNIT/ML injection   Commonly known as:  NovoLOG PEN   Indication:  Type 2 Diabetes   This may have changed:  how much to take   Used for:  Type 2 diabetes mellitus with stage 3 chronic kidney disease, with long-term current use of insulin (H)        Dose:  1-7 Units   Inject 1-7 Units Subcutaneous 3 times daily (before meals)   Quantity:  6.3 mL   Refills:  0                Primary Care Provider Office Phone # Fax #    Brionna Tesfayelucas D[facility] [phone]       [address] [facility] [address]        Equal Access to Services     NICK ROBLEDO : Hadii andres osei Sotyelr, waaxda lugilbertadaha, qaybta kaalmaalbino piña, sanjay levine . So [facility] [phone].    ATENCIÓN: Si habla español, tiene a nagy disposición servicios gratuitos de asistencia lingüística. Llame al [phone].    We comply with applicable federal civil rights laws and [place] laws. We do not discriminate on the basis of race, color, national origin, age, disability,  sex, sexual orientation, or gender identity.            Thank you!     Thank you for choosing [facility]  for your care. Our goal is always to provide you with excellent care. Hearing back from our patients is one way we can continue to improve our services. Please take a few minutes to complete the written survey that you may receive in the mail after your visit with us. Thank you!             Your Updated Medication List - Protect others around you: Learn how to safely use, store and throw away your medicines at www.disposemymeds.org.          This list is accurate as of 11/16/18 10:56 AM.  Always use your most recent med list.                   Brand Name Dispense Instructions for use Diagnosis    acetaminophen 500 MG tablet    TYLENOL     Take 2 tablets (1,000 mg) by mouth 3 times daily    Status post below knee amputation of right lower extremity (H)       albuterol 108 (90 Base) MCG/ACT inhaler    PROAIR HFA    1 Inhaler    Inhale 2 puffs into the lungs every 4 hours as needed for shortness of breath / dyspnea    SOB (shortness of breath)       amitriptyline 50 MG tablet    ELAVIL    60 tablet    Take 1 tablet (50 mg) by mouth At Bedtime    Status post below knee amputation of right lower extremity (H)       aspirin 81 MG tablet     100 tablet    Take 1 tablet (81 mg) by mouth daily    Type 2 diabetes mellitus with complication, with long-term current use of insulin (H)       blood glucose lancets standard    no brand specified    100 each    Use to test blood sugar 10 times daily or as directed. Accu-check    Type 2 diabetes mellitus with stage 3 chronic kidney disease, with long-term current use of insulin (H)       blood glucose monitoring meter device kit    no brand specified    1 kit    Use to test blood sugar 10 times daily or as directed. Accu-check    Type 2 diabetes mellitus with stage 3 chronic kidney disease, with long-term current use of insulin (H)       blood glucose monitoring  test strip    no brand specified    100 strip    Use to test blood sugars 10 times daily or as directed. Accu-chek    Type 2 diabetes mellitus with stage 3 chronic kidney disease, with long-term current use of insulin (H)       ceFEPIme 2 G vial    MAXIPIME    20 each    Inject 2 g into the vein every 12 hours    Diabetic foot ulcer with osteomyelitis (H)       fluticasone 50 MCG/ACT spray    FLONASE    1 Bottle    Spray 2 sprays into left nostril daily    Acute nonseasonal allergic rhinitis due to pollen       furosemide 40 MG tablet    LASIX    30 tablet    Take 1 tablet (40 mg) by mouth daily    SOB (shortness of breath)       glucose 4 g Chew chewable tablet     25 tablet    Take 3-4 tablets to treat low blood sugar.    Type 2 diabetes mellitus with complication, with long-term current use of insulin (H)       insulin aspart 100 UNIT/ML injection    NovoLOG PEN    6.3 mL    Inject 1-7 Units Subcutaneous 3 times daily (before meals)    Type 2 diabetes mellitus with stage 3 chronic kidney disease, with long-term current use of insulin (H)       insulin glargine 100 UNIT/ML injection    LANTUS    2.4 mL    Inject 8 Units Subcutaneous At Bedtime    Type 2 diabetes mellitus with stage 3 chronic kidney disease, with long-term current use of insulin (H)       insulin pen needle 31G X 5 MM    B-D U/F    3 each    Use 3 time(s) a day.    Type 2 diabetes mellitus with stage 3 chronic kidney disease, with long-term current use of insulin (H)       levonorgestrel 20 MCG/24HR IUD    MIRENA (52 MG)    1 each    placed today    Excessive or frequent menstruation       Lidocaine 4 % Patch    LIDOCARE    30 patch    Place 2 patches onto the skin every 24 hours    Status post below knee amputation of right lower extremity (H)       lisinopril 10 MG tablet    PRINIVIL/ZESTRIL    30 tablet    Take 1 tablet (10 mg) by mouth daily    Type 2 diabetes mellitus with stage 3 chronic kidney disease, with long-term current use of insulin  (H)       LYRICA 75 MG capsule   Generic drug:  pregabalin     90 capsule    TAKE 1 CAPSULE BY MOUTH 3 TIMES DAILY    Status post below knee amputation of right lower extremity (H)       methadone 10 MG/ML (HIGH CONC) solution    DOLOPHINE-INTENSOL     Take 75 mg by mouth daily Confirmed dosing on 10/8/18 with [facility]: [place], [place] [phone]        metoprolol succinate 25 MG 24 hr tablet    TOPROL-XL    90 tablet    Take 1 tablet (25 mg) by mouth daily    Hypertension goal BP (blood pressure) < 140/90, Chronic systolic congestive heart failure (H)       metroNIDAZOLE 500 MG tablet    FLAGYL    60 tablet    Take 1 tablet (500 mg) by mouth every 8 hours    Diabetic foot ulcer with osteomyelitis (H)       multivitamin, therapeutic with minerals Tabs tablet     30 each    Take 1 tablet by mouth daily    Status post below knee amputation of right lower extremity (H)       oxyCODONE IR 10 MG tablet    ROXICODONE    20 tablet    Take 1-2 tablets (10-20 mg) by mouth every 6 hours as needed for moderate to severe pain    Diabetic foot ulcer with osteomyelitis (H)       polyethylene glycol Packet    MIRALAX/GLYCOLAX    7 packet    Take 17 g by mouth daily    Status post below knee amputation of right lower extremity (H)       ranitidine 300 MG tablet    ZANTAC    90 tablet    TAKE 1 TABLET (300 MG) BY MOUTH DAILY    Gastroesophageal reflux disease without esophagitis       umeclidinium-vilanterol 62.5-25 MCG/INH oral inhaler    ANORO ELLIPTA    1 Inhaler    Inhale 1 puff into the lungs daily    Chronic bronchitis, unspecified chronic bronchitis type (H)       vancomycin 750 mg     750 mL    Inject 750 mg into the vein every 24 hours    Diabetic foot ulcer with osteomyelitis (H)

## 2018-11-21 NOTE — TELEPHONE ENCOUNTER
Marily Ahumada MD   You 17 hours ago (3:46 PM)                 Yes, please - leave line in and I'll remove at her appt if she's still doing well. Thanks-   tejinder (Routing comment)                        You   Marily Ahumada MD 2 days ago                   Hi Dr. Ahumada, I see that she has an appt with you on 12/12. Should I have [facility] leave the line in, in case you need to extend abxs?   Haley (Routing comment)                        You   Marily Ahumada MD 7 days ago                   Thank you! Should I have them pull the line that day as well?   Haley (Routing comment)                        Marily Ahumada MD   You 7 days ago                   I am going to extend Alessandra another 2 weeks, so instead of stopping 11/24 she'll stop 12/8.     Thanks-   Marily (Routing comment)

## 2018-11-21 NOTE — TELEPHONE ENCOUNTER
Called Mindy, pharmacist at [facility], and let her know that pt's IV abxs will stop on 12/8/18 and PICC should be kept in place until her appt with Dr. Ahumada on 12/12. If she's doing well at at that appt, Dr. Ahumada will remove line then.  Haley Calhoun RN

## 2018-11-28 NOTE — TELEPHONE ENCOUNTER
from [facility] called stating that pt's Vancomycin Trough lab came back at 20.2.  knows that they are shooting for 15 and is looking for guidance.  states he knows Dr. Ahumada wants pt on vanco through December 8th, but is wondering during this last week if Dr. Ahumada wants to do a lower dose. Maybe drop down to 750mg every 48 hours?     states they are alternatively going back out tomorrow to recheck pt's potassium, but they can also check vanco level at that time if Dr. Ahumada would like.     The best number to reach  is [phone].

## 2018-11-28 NOTE — TELEPHONE ENCOUNTER
Alessandra is receiving vancomycin and cefepime for L foot gangrene s/p partial amputation. Stop date 12/8.  I was informed that Alessandra's K was 6.3 on labs today. Glucose 204, Cr 1.1. I spoke with her and she is aware of dietary source of potassium. Of note lasix is on her med list but she reports she does not take it - she has had mild Cr bump in the past with vanco so I think it's best she stay off this.    I directed her to the ED for further eval and management. Assuming she will be released from the ED, I also arrange for repeat BMP tomorrow.    Marily Ahumada MD   of Medicine, Division of Infectious Diseases  pgr [phone]

## 2018-11-29 NOTE — TELEPHONE ENCOUNTER
Reason for Call:  Other FYI     Detailed comments: Hannah calling to inform you that she went to Alessandra's home 2 times today to draw labs. Both times there was no answer, Hannah waited about 30 minutes each time. Just wanted to let you know that no labs were drawn today for Alessandra.     Phone Number Patient can be reached at: [phone]    Best Time: Any    Can we leave a detailed message on this number? YES    Call taken on 11/29/2018 at 4:30 PM by Cornell Caldera

## 2018-11-29 NOTE — TELEPHONE ENCOUNTER
Routing to PCP as SAM Liao MA     RD provided extensive verbal review of principles RD provided extensive verbal review of strategies for enhancing nutritional intake of patient, particularly via ingestion of nutrient dense, bland food items.  Mother and patient verbalized good comprehension and presented with pertinent concerns, all of which were addressed by this RD.

## 2018-11-30 NOTE — TELEPHONE ENCOUNTER
Lois from [facility] called stating patient's labs were not drawn yesterday due to the patient not letting the nurse into the home. Lois stated the nurse did confirm the patient was home but was not allowed access to the patient.     Any questions call  at [phone]. A fax was also sent for Dr. Ahumada to sign from [facility].    -Park ROSAS CMA

## 2018-12-05 NOTE — ED AVS SNAPSHOT
[facility], [place], Emergency Department    [address] [place] [address]    Phone:  [phone]                                       Alessandra Pak   MRN: [id number]    Department:  [facility], Emergency Department   Date of Visit:  12/5/2018           After Visit Summary Signature Page     I have received my discharge instructions, and my questions have been answered. I have discussed any challenges I see with this plan with the nurse or doctor.    ..........................................................................................................................................  Patient/Patient Representative Signature      ..........................................................................................................................................  Patient Representative Print Name and Relationship to Patient    ..................................................               ................................................  Date                                   Time    ..........................................................................................................................................  Reviewed by Signature/Title    ...................................................              ..............................................  Date                                               Time          22EPIC Rev 08/18

## 2018-12-05 NOTE — ED AVS SNAPSHOT
[facility], Emergency Department    [address] [facility] [address]    Phone:  [phone]                                       Alessandra Pak   MRN: [id number]    Department:  [facility], Emergency Department   Date of Visit:  12/5/2018           Patient Information     Date Of Birth          1965        Your diagnoses for this visit were:     Abdominal pain, generalized     Other chronic pain        You were seen by Karina Jaimes MD.        Discharge Instructions       Thank you for your patience today.  Please follow-up with your regular doctor in the next 2-3 days for further evaluation and follow-up care.  Please call to schedule an appointment.  Please continue your own medications.  Please return to the ER if you develop any worsening of your current symptoms.  It was a pleasure taking care of you today.  We hope you feel better soon.      Your next 10 appointments already scheduled     Dec 05, 2018 12:40 PM CST   (Arrive by 12:25 PM)   RETURN FOOT/ANKLE with Becky King PA-C   [facility] ([facility])    [address] [place] [address]  4th [facility] [address] [phone]            Dec 06, 2018 12:30 PM CST   US GABRIEL DOPPLER NO EXERCISE 1-2 LVLS BILAT with UCUSV2   [facility] ([facility])    [address] [place] [address] [phone]           How do I prepare for my exam? (Food and drink instructions) No caffeine or tobacco for 1 hour prior to exam.  What should I wear: Wear comfortable clothes.  How long does the exam take: Most ultrasounds take 30 to 60 minutes.  What should I bring: Bring a list of your medicines, including vitamins, minerals and over-the-counter drugs. It is safest to leave personal items at home.  Do I need a :  No  is needed.  What do I need to tell my doctor: Tell your doctor about any allergies you may have.  What should I  do after the exam: No restrictions, You may resume normal activities.  What is this test: An ultrasound uses sound waves to make pictures of the body. Sound waves do not cause pain. The only discomfort may be the pressure of the wand against your skin or full bladder.  Who should I call with questions: If you have any questions, please call the Imaging Department where you will have your exam. Directions, parking instructions, and other information is available on our website, [email or web address].            Dec 06, 2018  1:20 PM CST   (Arrive by 1:05 PM)   Return Vascular Visit with Carlos Llamas MD   [facility] ([facility])    [address] [place] [address]   [phone]            Dec 07, 2018 10:30 AM CST   (Arrive by 10:15 AM)   RETURN DIABETES with Toya Nuno PA-C   [facility] Endocrinology ([facility])    [address] [place] [address]   [phone]            Dec 07, 2018 11:30 AM CST   (Arrive by 11:15 AM)   Diabetes Education with Anh Chow RN   [facility] Diabetes ([facility])    [address] [place] [address] [phone]            Dec 12, 2018 11:00 AM CST   Return Visit with Marily Ahumada MD   [facility] [place], Infectious Disease ([facility])    [address] [place], [place] [address]e [address]  [address]  [facility] [address]   [phone]            Jan 23, 2019 12:00 AM CST   CARDIAC DEVICE CHECK - REMOTE with  ICD REMOTE   [facility] ([facility])    [address] [place] [address]  [address]  [facility] [address] [phone]            Jan 28, 2019  1:15 PM CST   CT CHEST W/O CONTRAST with MGCT1   [facility] ([facility])    [address] [place] [address] [phone]           How do I prepare  for my exam? (Food and drink instructions) No Food and Drink Restrictions.  How do I prepare for my exam? (Other instructions) You do not need to do anything special to prepare for this exam. For a sinus scan: Use your nose spray (nasal decongestant spray) as directed.  What should I wear: Please wear loose clothing, such as a sweat suit or jogging clothes. Avoid snaps, zippers and other metal. We may ask you to undress and put on a hospital gown.  How long does the exam take: Most scans take less than 20 minutes.  What should I bring: Please bring any scans or X-rays taken at other hospitals, if similar tests were done. Also bring a list of your medicines, including vitamins, minerals and over-the-counter drugs. It is safest to leave personal items at home.  Do I need a : No  is needed.  What do I need to tell my doctor? Be sure to tell your doctor: * If you have any allergies. * If there s any chance you are pregnant. * If you are breastfeeding.  What should I do after the exam: No restrictions, You may resume normal activities.  What is this test: A CT (computed tomography) scan is a series of pictures that allows us to look inside your body. The scanner creates images of the body in cross sections, much like slices of bread. This helps us see any problems more clearly.  Who should I call with questions: If you have any questions, please call the Imaging Department where you will have your exam. Directions, parking instructions, and other information is available on our website, [email or web address].            Jan 28, 2019  1:30 PM CST   Office Visit with PFT LAB   [facility] ([facility])    [address] [place] [address] [phone]           Bring a current list of meds and any records pertaining to this visit. For Physicals, please bring immunization records and any forms needing to be filled out. Please arrive 10 minutes early to complete  paperwork.            Jan 28, 2019  2:30 PM CST   Return Visit with Rene Swartz MD   [facility] ([facility])    [address] [place] [address] [phone]              24 Hour Appointment Hotline       To make an appointment at any [facility], call [phone] ([phone]). If you don't have a family doctor or clinic, we will help you find one. [facility] are conveniently located to serve the needs of you and your family.             Review of your medicines      Our records show that you are taking the medicines listed below. If these are incorrect, please call your family doctor or clinic.        Dose / Directions Last dose taken    acetaminophen 500 MG tablet   Commonly known as:  TYLENOL   Dose:  1000 mg        Take 2 tablets (1,000 mg) by mouth 3 times daily   Refills:  0        albuterol 108 (90 Base) MCG/ACT inhaler   Commonly known as:  PROAIR HFA   Dose:  2 puff   Quantity:  1 Inhaler        Inhale 2 puffs into the lungs every 4 hours as needed for shortness of breath / dyspnea   Refills:  0        amitriptyline 50 MG tablet   Commonly known as:  ELAVIL   Dose:  50 mg   Indication:  Pain   Quantity:  60 tablet        Take 1 tablet (50 mg) by mouth At Bedtime   Refills:  0        aspirin 81 MG tablet   Commonly known as:  ASA   Dose:  81 mg   Quantity:  100 tablet        Take 1 tablet (81 mg) by mouth daily   Refills:  3        blood glucose lancets standard   Commonly known as:  NO BRAND SPECIFIED   Quantity:  100 each        Use to test blood sugar 10 times daily or as directed. Accu-check   Refills:  11        blood glucose monitoring meter device kit   Commonly known as:  NO BRAND SPECIFIED   Quantity:  1 kit        Use to test blood sugar 10 times daily or as directed. Accu-check   Refills:  0        blood glucose monitoring test strip   Commonly known as:  NO BRAND SPECIFIED   Quantity:  100 strip        Use to test blood  sugars 10 times daily or as directed. Accu-chek   Refills:  11        ceFEPIme 2 G vial   Commonly known as:  MAXIPIME   Dose:  2 g   Indication:  Infection with Dysregulated Inflammatory Response   Quantity:  20 each        Inject 2 g into the vein every 12 hours   Refills:  0        fluticasone 50 MCG/ACT nasal spray   Commonly known as:  FLONASE   Dose:  2 spray   Indication:  COPD   Quantity:  1 Bottle        Spray 2 sprays into left nostril daily   Refills:  0        furosemide 40 MG tablet   Commonly known as:  LASIX   Dose:  40 mg   Indication:  High Blood Pressure Disorder   Quantity:  30 tablet        Take 1 tablet (40 mg) by mouth daily   Refills:  0        glucose 4 g chewable tablet   Commonly known as:  BD GLUCOSE   Quantity:  25 tablet        Take 3-4 tablets to treat low blood sugar.   Refills:  11        insulin aspart 100 UNIT/ML pen   Commonly known as:  NovoLOG PEN   Dose:  1-7 Units   Indication:  Type 2 Diabetes   Quantity:  6.3 mL        Inject 1-7 Units Subcutaneous 3 times daily (before meals)   Refills:  0        insulin glargine 100 UNIT/ML pen   Commonly known as:  LANTUS PEN   Dose:  8 Units   Indication:  Type 2 Diabetes   Quantity:  2.4 mL        Inject 8 Units Subcutaneous At Bedtime   Refills:  0        insulin pen needle 31G X 5 MM miscellaneous   Commonly known as:  B-D U/F   Quantity:  3 each        Use 3 time(s) a day.   Refills:  3        levonorgestrel 20 MCG/24HR IUD   Commonly known as:  MIRENA (52 MG)   Quantity:  1 each        placed today   Refills:  0        Lidocaine 4 % Patch   Commonly known as:  LIDOCARE   Dose:  2 patch   Indication:  chronic pain   Quantity:  30 patch        Place 2 patches onto the skin every 24 hours   Refills:  0        lisinopril 10 MG tablet   Commonly known as:  PRINIVIL/ZESTRIL   Dose:  10 mg   Indication:  High Blood Pressure Disorder   Quantity:  30 tablet        Take 1 tablet (10 mg) by mouth daily   Refills:  0        LYRICA 75 MG capsule    Quantity:  90 capsule   Generic drug:  pregabalin        TAKE 1 CAPSULE BY MOUTH 3 TIMES DAILY   Refills:  3        methadone 10 MG/ML (HIGH CONC) solution   Commonly known as:  DOLOPHINE-INTENSOL   Dose:  75 mg   Indication:  Opioid Dependence        Take 75 mg by mouth daily Confirmed dosing on 10/8/18 with [facility]: [place], [place] [phone]   Refills:  0        metoprolol succinate ER 25 MG 24 hr tablet   Commonly known as:  TOPROL-XL   Dose:  25 mg   Quantity:  90 tablet        Take 1 tablet (25 mg) by mouth daily   Refills:  1        metroNIDAZOLE 500 MG tablet   Commonly known as:  FLAGYL   Dose:  500 mg   Indication:  Infection of the Skin and/or Related Soft Tissue   Quantity:  60 tablet        Take 1 tablet (500 mg) by mouth every 8 hours   Refills:  0        multivitamin w/minerals tablet   Dose:  1 tablet   Indication:  nutritional suppliment   Quantity:  30 each        Take 1 tablet by mouth daily   Refills:  0        oxyCODONE IR 10 MG tablet   Commonly known as:  ROXICODONE   Dose:  10-20 mg   Quantity:  20 tablet        Take 1-2 tablets (10-20 mg) by mouth every 6 hours as needed for moderate to severe pain   Refills:  0        polyethylene glycol packet   Commonly known as:  MIRALAX/GLYCOLAX   Dose:  17 g   Indication:  Constipation   Quantity:  7 packet        Take 17 g by mouth daily   Refills:  0        ranitidine 300 MG tablet   Commonly known as:  ZANTAC   Quantity:  90 tablet        TAKE 1 TABLET (300 MG) BY MOUTH DAILY   Refills:  3        umeclidinium-vilanterol 62.5-25 MCG/INH oral inhaler   Commonly known as:  ANORO ELLIPTA   Dose:  1 puff   Indication:  Chronic Obstructive Lung Disease   Quantity:  1 Inhaler        Inhale 1 puff into the lungs daily   Refills:  2        vancomycin 750 mg   Dose:  750 mg   Indication:  Infection of Bone and Bone Marrow   Quantity:  750 mL        Inject 750 mg into the vein every 24 hours   Refills:  14                Procedures  and tests performed during your visit     CBC with platelets differential    Comprehensive metabolic panel    Lipase    XR Chest Port 1 View      Orders Needing Specimen Collection     Ordered          12/05/18 0443  UA with Microscopic - STAT, Prio: STAT, Needs to be Collected     Scheduled Task Status   12/05/18 0443 Collect UA with Microscopic Open   Order Class:  PCU Collect                  Pending Results     Date and Time Order Name Status Description    12/5/2018 0526 XR Chest Port 1 View Preliminary             Pending Culture Results     No orders found from 12/3/2018 to 12/6/2018.            Pending Results Instructions     If you had any lab results that were not finalized at the time of your Discharge, you can call the ED Lab Result RN at [phone]. You will be contacted by this team for any positive Lab results or changes in treatment. The nurses are available 7 days a week from 10A to 6:30P.  You can leave a message 24 hours per day and they will return your call.        Thank you for choosing [facility]       Thank you for choosing [facility] for your care. Our goal is always to provide you with excellent care. Hearing back from our patients is one way we can continue to improve our services. Please take a few minutes to complete the written survey that you may receive in the mail after you visit with us. Thank you!        [employer] Information     [employer] gives you secure access to your electronic health record. If you see a primary care provider, you can also send messages to your care team and make appointments. If you have questions, please call your primary care clinic.  If you do not have a primary care provider, please call [phone] and they will assist you.        Care EveryWhere ID     This is your Care EveryWhere ID. This could be used by other organizations to access your [facility] medical records  [id number]        Equal Access to Services     NICK ROBLEDO AH: Chiqui osei  andres Terrell, alyssa landmasanjay hernandez. So [facility] [phone].    ATENCIÓN: Si habla español, tiene a nagy disposición servicios gratuitos de asistencia lingüística. Llame al [phone].    We comply with applicable federal civil rights laws and [place] laws. We do not discriminate on the basis of race, color, national origin, age, disability, sex, sexual orientation, or gender identity.            After Visit Summary       This is your record. Keep this with you and show to your community pharmacist(s) and doctor(s) at your next visit.

## 2018-12-05 NOTE — ED TRIAGE NOTES
Pt arrived from home with complaints of abdominal pain and shortness of breath. She is on IV vancomycin at home through a PICC line in the R arm. She received 50 mcg of Fent IM en route.

## 2018-12-05 NOTE — ED PROVIDER NOTES
History     Chief Complaint   Patient presents with     Abdominal Pain     HPI  Alessandra Pak is a 53 year old female who has a past medical history of hypertension, systolic congestive heart failure status post AICD placement, DM2, asthma, GERD, depression, chronic pancreatitis, history of  Recent daibetic foot infection with osteomyelitis and sepsis syndrome s/p 1st and 2nd toe amputation on 10/13/18 currently on antibiotics via PICC line who presents emergency department from home by EMS with complaint of abdominal pain.  Patient complains of abdominal pain. Patient reports pain as a diffuse constant pain in her abdomen with radiation to her back with no aggravating or alleviating factors. Patient denies any fever, chills, nausea, vomiting. Patient reports some associated weakness and dysuria. Patient requesting pain medication upon arrival however patient is currently on methadone via pain management.     I have reviewed the Medications, Allergies, Past Medical and Surgical History, and Social History in the Epic system.    Review of Systems   Constitutional: Negative for fever.   HENT: Negative for sore throat.    Eyes: Negative for visual disturbance.   Respiratory: Negative for shortness of breath.    Cardiovascular: Negative for chest pain.   Gastrointestinal: Positive for abdominal pain.   Endocrine: Negative for polyuria.   Genitourinary: Positive for dysuria.   Musculoskeletal: Positive for back pain.   Allergic/Immunologic: Negative for immunocompromised state.   Neurological: Positive for weakness.   Hematological: Does not bruise/bleed easily.   Psychiatric/Behavioral: Negative for confusion.       Physical Exam   BP: (!) 137/94  Pulse: 100  Heart Rate: 109  Temp: 97.6  F (36.4  C)  Resp: 25  Weight: 52.2 kg (115 lb 1.3 oz)  SpO2: 91 %      Physical Exam   Constitutional: She is oriented to person, place, and time.   Chronically ill appearing, moderate distress secondary to pain   HENT:   Head:  Normocephalic and atraumatic.   Dry mucous membranes   Eyes: Conjunctivae and EOM are normal. Pupils are equal, round, and reactive to light.   Neck: Normal range of motion. Neck supple.   Cardiovascular: Normal rate, regular rhythm and normal heart sounds.    Pulmonary/Chest: Effort normal and breath sounds normal. No respiratory distress. She has no wheezes.   Abdominal: Soft. There is no tenderness. There is no rebound and no guarding.   Musculoskeletal: Normal range of motion.   Neurological: She is alert and oriented to person, place, and time.   Skin: Skin is warm and dry. No rash noted.   Psychiatric: She has a normal mood and affect. Her behavior is normal.       ED Course        Procedures               Labs Ordered and Resulted from Time of ED Arrival Up to the Time of Departure from the ED   CBC WITH PLATELETS DIFFERENTIAL - Abnormal; Notable for the following components:       Result Value    Hemoglobin 11.5 (*)     RDW 18.5 (*)     All other components within normal limits   COMPREHENSIVE METABOLIC PANEL - Abnormal; Notable for the following components:    Glucose 156 (*)     GFR Estimate 57 (*)     Albumin 2.9 (*)     Alkaline Phosphatase 157 (*)     All other components within normal limits   LIPASE - Abnormal; Notable for the following components:    Lipase 22 (*)     All other components within normal limits     .  Results for orders placed or performed during the hospital encounter of 12/05/18   XR Chest Port 1 View    Narrative    Exam: XR CHEST PORT 1 VW, 12/5/2018 5:34 AM    Indication: cough;     Comparison: Chest x-ray 10/15/2018    Findings:   Frontal chest x-ray. Left chest wall cardiac device with intracardiac  lead. Unchanged cardiac silhouette. No pneumothorax. Right upper  extremity PICC projects over the right brachiocephalic vein. Linear  left lower zone opacity, likely representing atelectasis.. No acute  airspace opacity.      Impression    Impression:  1. No acute airspace  opacity.  2. Interval change in position of right upper extremity PICC with tip  projecting over the right brachiocephalic vein.  3. Left lower zone linear opacity likely representing atelectasis.    I have personally reviewed the examination and initial interpretation  and I agree with the findings.    ANDREI RODRIGUES MD   CBC with platelets differential   Result Value Ref Range    WBC 7.2 4.0 - 11.0 10e9/L    RBC Count 4.19 3.8 - 5.2 10e12/L    Hemoglobin 11.5 (L) 11.7 - 15.7 g/dL    Hematocrit 35.5 35.0 - 47.0 %    MCV 85 78 - 100 fl    MCH 27.4 26.5 - 33.0 pg    MCHC 32.4 31.5 - 36.5 g/dL    RDW 18.5 (H) 10.0 - 15.0 %    Platelet Count 214 150 - 450 10e9/L    Diff Method Automated Method     % Neutrophils 41.8 %    % Lymphocytes 48.9 %    % Monocytes 5.1 %    % Eosinophils 3.6 %    % Basophils 0.6 %    % Immature Granulocytes 0.0 %    Nucleated RBCs 0 0 /100    Absolute Neutrophil 3.0 1.6 - 8.3 10e9/L    Absolute Lymphocytes 3.5 0.8 - 5.3 10e9/L    Absolute Monocytes 0.4 0.0 - 1.3 10e9/L    Absolute Eosinophils 0.3 0.0 - 0.7 10e9/L    Absolute Basophils 0.0 0.0 - 0.2 10e9/L    Abs Immature Granulocytes 0.0 0 - 0.4 10e9/L    Absolute Nucleated RBC 0.0    Comprehensive metabolic panel   Result Value Ref Range    Sodium 140 133 - 144 mmol/L    Potassium 4.6 3.4 - 5.3 mmol/L    Chloride 106 94 - 109 mmol/L    Carbon Dioxide 26 20 - 32 mmol/L    Anion Gap 8 3 - 14 mmol/L    Glucose 156 (H) 70 - 99 mg/dL    Urea Nitrogen 28 7 - 30 mg/dL    Creatinine 1.02 0.52 - 1.04 mg/dL    GFR Estimate 57 (L) >60 mL/min/1.7m2    GFR Estimate If Black 69 >60 mL/min/1.7m2    Calcium 9.0 8.5 - 10.1 mg/dL    Bilirubin Total 0.3 0.2 - 1.3 mg/dL    Albumin 2.9 (L) 3.4 - 5.0 g/dL    Protein Total 7.6 6.8 - 8.8 g/dL    Alkaline Phosphatase 157 (H) 40 - 150 U/L    ALT 32 0 - 50 U/L    AST 29 0 - 45 U/L   Lipase   Result Value Ref Range    Lipase 22 (L) 73 - 393 U/L     *Note: Due to a large number of results and/or encounters for the  requested time period, some results have not been displayed. A complete set of results can be found in Results Review.            Assessments & Plan (with Medical Decision Making)   Alessandra Pak is a 53 year old female who has a past medical history of hypertension, systolic congestive heart failure status post AICD placement, DM2, asthma, GERD, depression, chronic pancreatitis, history of  Recent daibetic foot infection with osteomyelitis and sepsis syndrome s/p 1st and 2nd toe amputation on 10/13/18 currently on antibiotics via PICC line who presents emergency department from home by EMS with complaint of abdominal pain.  On arrival patient is chronically ill-appearing but otherwise afebrile, moderate distress secondary to pain.  Abdomen is soft, nontender, nondistended, no peritoneal signs.  At this time will check comprehensive labs and reevaluate.  Patient requesting pain medication in the emergency department however I did discuss with patient regarding this as she is currently on methadone from the pain clinic.  We will give 1 dose of 50 mcg of fentanyl in the emergency department and reevaluate.  I reviewed comprehensive labs which were remarkable for white blood cell count 7.2, hemoglobin 11.5, no acute metabolic or electrolyte abnormality, normal lipase, no transaminitis.  I reviewed chest x-ray which demonstrates no acute infiltrate.  At this time patient requesting to be discharged so she can follow-up in pain clinic.  Patient does not want any further treatment, IV hydration, or testing at this time.  I highly recommend patient to follow-up in the pain clinic along with her primary care physician for further evaluation and treatment of her ongoing medical complaints.  Patient understands and agrees with plan and return precautions discussed. .The patient is discharged home with instructions to return if their symptoms persist or worsen.  Plan for close follow-up with their primary physician.  I  discussed workup, results, treatment, and plan with the patient.  Patient understands and agrees with the plan.      I have reviewed the nursing notes.    I have reviewed the findings, diagnosis, plan and need for follow up with the patient.    This SmartLink is deprecated. Use AVPathGroupEDLIST instead to display the medication list for a patient.    Final diagnoses:   Abdominal pain, generalized   Other chronic pain       12/5/2018   [facility], [place], EMERGENCY DEPARTMENT     Karina Jaimes MD  12/17/18 0511

## 2018-12-06 NOTE — PROGRESS NOTES
Clinic Care Coordination Contact  Care Coordination Communication    Referral Source: IP Handoff    Clinical Data: Patient was at [facility] ED 12/5/18 for abdominal pain.     Home Care Contact:              Home Care Agency: [facility]              Contact name () and phone number: Nolan FIERRO Case Manager [phone]              Care Coordination contacted home care: home care is aware of RN CC involvement              Anticipated start of care date: Patient is open to home care and receiving skilled nurse visits 3 days/week for wound care.  Patient reports wounds are healing well, no signs/symptoms of infection specifically drainage or fever.  Patient reports checking her blood glucose levels 3 times/day, typically <150 with occasional morning blood glucose levels of 170 if she forgot to take her insulin the night before.  Patient denies any blood glucose levels <70 since hospitalization.  Patient will be seeing her endocrinologist tomorrow.  Patient is hoping to go back on an insulin pump in the future.    Patient Contact:                           Discharge instructions were reviewed with patient/caregiver. Yes              Do you have any questions about your medications? No, patient reports taking Senna, 2 tablets daily after having been constipated for 10 days.  Patient states she started Miralax daily yesterday.  Patient had a Fleets enema yesterday as recommended by ED.              Follow up appointment is scheduled for: RN CC transferred patient to scheduling line to schedule within 2-3 days of ED discharge.              Provided 24 Hour Nurse Line and/or 24 Hour Appointment Scheduling: Yes, previously provided to patient               Home care has contacted patient: Yes, next home care visit is scheduled for tomorrow.              Patient questions/concerns: No  RN CC educated patient on the importance of fluid intake to prevent/treat constipation.    Plan: RN Care Coordinator will await  notification from home care staff informing RN Care Coordinator of patients discharge plans/needs. RN/SW Care Coordinator will review chart and outreach to home care every 4 weeks and as needed.      Melissa Behl BSN, RN, N  [facility] Care Coordinator  [phone]

## 2018-12-12 NOTE — NURSING NOTE
Visit Reason: Follow Up      Evaluation / Assessment:   Patient states no pain today. Vitals taken, allergies and medications reviewed.      Labs: NA      Procedure ordered? NA      Dressing Change: NA      Vaccine ordered / administered: NA      Other: PICC line removed today by Infusion RN per Dr. Ahumada.

## 2018-12-13 NOTE — TELEPHONE ENCOUNTER
Called  from [place] and told him that Alessandra got her PICC pulled yesterday at the clinic.    Bebe Melton RN

## 2018-12-13 NOTE — TELEPHONE ENCOUNTER
[facility] Call Center    Phone Message    May a detailed message be left on voicemail: yes    Reason for Call: Other: , pharmacist at [facility] infusion, is trying to confirm if pt had IV line pulled in clinic yesterday. Please call back to discuss.     Action Taken: Message routed to:  [facility] ([facility]): Infectious Disease

## 2018-12-27 NOTE — TELEPHONE ENCOUNTER
See refill request from today in Chart Review. Will process in order received, request is currently in refill que.    Charla Gilliland RN  [facility]

## 2018-12-27 NOTE — TELEPHONE ENCOUNTER
"Requested Prescriptions   Pending Prescriptions Disp Refills     furosemide (LASIX) 40 MG tablet 30 tablet 0          Last Written Prescription Date:  7/21/18  Last Fill Quantity: 30,  # refills: 0   Last Office Visit with [facility], [facility] or [facility] prescribing provider:  12/12/18   Future Office Visit:    Next 5 appointments (look out 90 days)    Jan 14, 2019  2:20 PM CST  Office Visit with Brionna Dc MD  [facility] ([facility]) [address] [facility] [address]  [phone]   Jan 28, 2019  1:30 PM CST  Office Visit with PFT LAB  [facility] ([facility]) [address] [place] [phone]  [phone]   Jan 28, 2019  2:30 PM CST  Return Visit with Rene Swartz MD  [facility]) [address] [place] [phone]  [phone]   Feb 13, 2019 11:00 AM CST  Return Visit with Marily Ahumada MD  [facility], [place], Infectious Disease ([facility]) [address] [place] [address]  [address] [place] [id number] [phone]          Sig: Take 1 tablet (40 mg) by mouth daily    Diuretics (Including Combos) Protocol Failed - 12/27/2018  1:07 PM       Failed - Blood pressure under 140/90 in past 12 months    BP Readings from Last 3 Encounters:   12/12/18 (!) 151/91   12/05/18 (!) 174/120   11/14/18 142/90                Passed - Recent (12 mo) or future (30 days) visit within the authorizing provider's specialty    Patient had office visit in the last 12 months or has a visit in the next 30 days with authorizing provider or within the authorizing provider's specialty.  See \"Patient Info\" tab in inbasket, or \"Choose Columns\" in Meds & Orders section of the refill encounter.             Passed - Patient is age 18 or older       Passed - No active pregancy on record       Passed - Normal serum creatinine on file " in past 12 months    Recent Labs   Lab Test 12/05/18  0421   CR 1.02             Passed - Normal serum potassium on file in past 12 months    Recent Labs   Lab Test 12/05/18 0421   POTASSIUM 4.6                   Passed - Normal serum sodium on file in past 12 months    Recent Labs   Lab Test 12/05/18 0421                Passed - No positive pregnancy test in past 12 months              David Faarax  [facility]

## 2019-01-01 ENCOUNTER — PATIENT OUTREACH (OUTPATIENT)
Dept: CARE COORDINATION | Facility: CLINIC | Age: 54
End: 2019-01-01

## 2019-01-01 ENCOUNTER — TELEPHONE (OUTPATIENT)
Dept: EDUCATION SERVICES | Facility: CLINIC | Age: 54
End: 2019-01-01

## 2019-01-01 ENCOUNTER — OFFICE VISIT (OUTPATIENT)
Dept: FAMILY MEDICINE | Facility: CLINIC | Age: 54
End: 2019-01-01
Payer: COMMERCIAL

## 2019-01-01 ENCOUNTER — MEDICAL CORRESPONDENCE (OUTPATIENT)
Dept: HEALTH INFORMATION MANAGEMENT | Facility: CLINIC | Age: 54
End: 2019-01-01

## 2019-01-01 ENCOUNTER — TELEPHONE (OUTPATIENT)
Dept: FAMILY MEDICINE | Facility: CLINIC | Age: 54
End: 2019-01-01

## 2019-01-01 ENCOUNTER — OFFICE VISIT (OUTPATIENT)
Dept: CT IMAGING | Facility: CLINIC | Age: 54
End: 2019-01-01
Attending: INTERNAL MEDICINE
Payer: COMMERCIAL

## 2019-01-01 ENCOUNTER — HEALTH MAINTENANCE LETTER (OUTPATIENT)
Age: 54
End: 2019-01-01

## 2019-01-01 ENCOUNTER — OFFICE VISIT (OUTPATIENT)
Dept: ORTHOPEDICS | Facility: CLINIC | Age: 54
End: 2019-01-01
Payer: COMMERCIAL

## 2019-01-01 ENCOUNTER — HOSPITAL ENCOUNTER (OUTPATIENT)
Dept: PHYSICAL THERAPY | Facility: CLINIC | Age: 54
Setting detail: THERAPIES SERIES
End: 2019-01-21
Attending: PODIATRIST
Payer: COMMERCIAL

## 2019-01-01 ENCOUNTER — DOCUMENTATION ONLY (OUTPATIENT)
Dept: CARE COORDINATION | Facility: CLINIC | Age: 54
End: 2019-01-01

## 2019-01-01 ENCOUNTER — MYC REFILL (OUTPATIENT)
Dept: FAMILY MEDICINE | Facility: CLINIC | Age: 54
End: 2019-01-01

## 2019-01-01 ENCOUNTER — ANCILLARY PROCEDURE (OUTPATIENT)
Dept: CARDIOLOGY | Facility: CLINIC | Age: 54
End: 2019-01-01
Attending: INTERNAL MEDICINE
Payer: COMMERCIAL

## 2019-01-01 ENCOUNTER — TRANSFERRED RECORDS (OUTPATIENT)
Dept: HEALTH INFORMATION MANAGEMENT | Facility: CLINIC | Age: 54
End: 2019-01-01

## 2019-01-01 ENCOUNTER — TELEPHONE (OUTPATIENT)
Dept: ENDOCRINOLOGY | Facility: CLINIC | Age: 54
End: 2019-01-01

## 2019-01-01 ENCOUNTER — MYC REFILL (OUTPATIENT)
Dept: INTERNAL MEDICINE | Facility: CLINIC | Age: 54
End: 2019-01-01

## 2019-01-01 VITALS
SYSTOLIC BLOOD PRESSURE: 110 MMHG | TEMPERATURE: 98.1 F | HEART RATE: 98 BPM | OXYGEN SATURATION: 91 % | DIASTOLIC BLOOD PRESSURE: 63 MMHG | RESPIRATION RATE: 18 BRPM

## 2019-01-01 VITALS
DIASTOLIC BLOOD PRESSURE: 63 MMHG | BODY MASS INDEX: 15.05 KG/M2 | HEIGHT: 69 IN | OXYGEN SATURATION: 98 % | HEART RATE: 90 BPM | SYSTOLIC BLOOD PRESSURE: 108 MMHG | TEMPERATURE: 97.8 F | WEIGHT: 101.6 LBS

## 2019-01-01 VITALS — HEART RATE: 99 BPM | DIASTOLIC BLOOD PRESSURE: 85 MMHG | OXYGEN SATURATION: 95 % | SYSTOLIC BLOOD PRESSURE: 137 MMHG

## 2019-01-01 DIAGNOSIS — R91.8 PULMONARY NODULES: Primary | ICD-10-CM

## 2019-01-01 DIAGNOSIS — Z79.4 TYPE 2 DIABETES MELLITUS WITH STAGE 3 CHRONIC KIDNEY DISEASE, WITH LONG-TERM CURRENT USE OF INSULIN (H): Chronic | ICD-10-CM

## 2019-01-01 DIAGNOSIS — E11.22 TYPE 2 DIABETES MELLITUS WITH STAGE 3 CHRONIC KIDNEY DISEASE, WITH LONG-TERM CURRENT USE OF INSULIN (H): Primary | Chronic | ICD-10-CM

## 2019-01-01 DIAGNOSIS — I42.8 NONISCHEMIC CARDIOMYOPATHY (H): Chronic | ICD-10-CM

## 2019-01-01 DIAGNOSIS — S98.132A AMPUTATED TOE OF LEFT FOOT (H): ICD-10-CM

## 2019-01-01 DIAGNOSIS — I50.22 CHRONIC SYSTOLIC CONGESTIVE HEART FAILURE (H): Chronic | ICD-10-CM

## 2019-01-01 DIAGNOSIS — K21.9 GASTROESOPHAGEAL REFLUX DISEASE WITHOUT ESOPHAGITIS: ICD-10-CM

## 2019-01-01 DIAGNOSIS — N18.30 TYPE 2 DIABETES MELLITUS WITH STAGE 3 CHRONIC KIDNEY DISEASE, WITH LONG-TERM CURRENT USE OF INSULIN (H): Chronic | ICD-10-CM

## 2019-01-01 DIAGNOSIS — E11.8 TYPE 2 DIABETES MELLITUS WITH COMPLICATION, WITH LONG-TERM CURRENT USE OF INSULIN (H): ICD-10-CM

## 2019-01-01 DIAGNOSIS — L97.521 DIABETIC ULCER OF TOE OF LEFT FOOT ASSOCIATED WITH DIABETES MELLITUS DUE TO UNDERLYING CONDITION, LIMITED TO BREAKDOWN OF SKIN (H): Primary | ICD-10-CM

## 2019-01-01 DIAGNOSIS — E04.9 ENLARGED THYROID: ICD-10-CM

## 2019-01-01 DIAGNOSIS — Z79.4 TYPE 2 DIABETES MELLITUS WITH STAGE 3 CHRONIC KIDNEY DISEASE, WITH LONG-TERM CURRENT USE OF INSULIN (H): Primary | Chronic | ICD-10-CM

## 2019-01-01 DIAGNOSIS — Z89.511 STATUS POST BELOW KNEE AMPUTATION OF RIGHT LOWER EXTREMITY (H): ICD-10-CM

## 2019-01-01 DIAGNOSIS — Z79.4 TYPE 2 DIABETES MELLITUS WITH COMPLICATION, WITH LONG-TERM CURRENT USE OF INSULIN (H): ICD-10-CM

## 2019-01-01 DIAGNOSIS — E11.22 TYPE 2 DIABETES MELLITUS WITH STAGE 3 CHRONIC KIDNEY DISEASE, WITH LONG-TERM CURRENT USE OF INSULIN (H): Chronic | ICD-10-CM

## 2019-01-01 DIAGNOSIS — I10 HYPERTENSION GOAL BP (BLOOD PRESSURE) < 140/90: Chronic | ICD-10-CM

## 2019-01-01 DIAGNOSIS — Z12.4 SCREENING FOR MALIGNANT NEOPLASM OF CERVIX: ICD-10-CM

## 2019-01-01 DIAGNOSIS — J42 CHRONIC BRONCHITIS, UNSPECIFIED CHRONIC BRONCHITIS TYPE (H): Chronic | ICD-10-CM

## 2019-01-01 DIAGNOSIS — Z79.4 TYPE 2 DIABETES MELLITUS WITH DIABETIC NEUROPATHY, WITH LONG-TERM CURRENT USE OF INSULIN (H): Chronic | ICD-10-CM

## 2019-01-01 DIAGNOSIS — E11.40 TYPE 2 DIABETES MELLITUS WITH DIABETIC NEUROPATHY, WITH LONG-TERM CURRENT USE OF INSULIN (H): Primary | Chronic | ICD-10-CM

## 2019-01-01 DIAGNOSIS — R06.02 SOB (SHORTNESS OF BREATH): ICD-10-CM

## 2019-01-01 DIAGNOSIS — J22 LRTI (LOWER RESPIRATORY TRACT INFECTION): ICD-10-CM

## 2019-01-01 DIAGNOSIS — Z12.11 SCREEN FOR COLON CANCER: ICD-10-CM

## 2019-01-01 DIAGNOSIS — L08.9 FINGER INFECTION: ICD-10-CM

## 2019-01-01 DIAGNOSIS — E08.621 DIABETIC ULCER OF TOE OF LEFT FOOT ASSOCIATED WITH DIABETES MELLITUS DUE TO UNDERLYING CONDITION, LIMITED TO BREAKDOWN OF SKIN (H): Primary | ICD-10-CM

## 2019-01-01 DIAGNOSIS — I50.20 SYSTOLIC CONGESTIVE HEART FAILURE, UNSPECIFIED HF CHRONICITY (H): Primary | Chronic | ICD-10-CM

## 2019-01-01 DIAGNOSIS — I42.8 NON-ISCHEMIC CARDIOMYOPATHY (H): ICD-10-CM

## 2019-01-01 DIAGNOSIS — F33.1 MAJOR DEPRESSIVE DISORDER, RECURRENT EPISODE, MODERATE (H): ICD-10-CM

## 2019-01-01 DIAGNOSIS — E11.40 TYPE 2 DIABETES MELLITUS WITH DIABETIC NEUROPATHY, WITH LONG-TERM CURRENT USE OF INSULIN (H): Chronic | ICD-10-CM

## 2019-01-01 DIAGNOSIS — Z79.4 TYPE 2 DIABETES MELLITUS WITH DIABETIC NEUROPATHY, WITH LONG-TERM CURRENT USE OF INSULIN (H): Primary | Chronic | ICD-10-CM

## 2019-01-01 DIAGNOSIS — N18.30 TYPE 2 DIABETES MELLITUS WITH STAGE 3 CHRONIC KIDNEY DISEASE, WITH LONG-TERM CURRENT USE OF INSULIN (H): Primary | Chronic | ICD-10-CM

## 2019-01-01 DIAGNOSIS — Z89.511 STATUS POST BELOW KNEE AMPUTATION OF RIGHT LOWER EXTREMITY (H): Primary | ICD-10-CM

## 2019-01-01 DIAGNOSIS — M86.072 ACUTE HEMATOGENOUS OSTEOMYELITIS OF LEFT FOOT (H): ICD-10-CM

## 2019-01-01 LAB
ANION GAP SERPL CALCULATED.3IONS-SCNC: 8 MMOL/L (ref 3–14)
BUN SERPL-MCNC: 37 MG/DL (ref 7–30)
CALCIUM SERPL-MCNC: 9 MG/DL (ref 8.5–10.1)
CHLORIDE SERPL-SCNC: 105 MMOL/L (ref 94–109)
CHOLEST SERPL-MCNC: 182 MG/DL
CO2 SERPL-SCNC: 22 MMOL/L (ref 20–32)
CREAT SERPL-MCNC: 1.01 MG/DL (ref 0.52–1.04)
GFR SERPL CREATININE-BSD FRML MDRD: 64 ML/MIN/{1.73_M2}
GLUCOSE SERPL-MCNC: 271 MG/DL (ref 70–99)
HBA1C MFR BLD: 9.4 % (ref 0–5.6)
HDLC SERPL-MCNC: 38 MG/DL
HEMOCCULT STL QL IA: NEGATIVE
LDLC SERPL CALC-MCNC: 91 MG/DL
MDC_IDC_EPISODE_DTM: NORMAL
MDC_IDC_EPISODE_ID: NORMAL
MDC_IDC_EPISODE_TYPE: NORMAL
MDC_IDC_LEAD_IMPLANT_DT: NORMAL
MDC_IDC_LEAD_LOCATION: NORMAL
MDC_IDC_LEAD_LOCATION_DETAIL_1: NORMAL
MDC_IDC_LEAD_MFG: NORMAL
MDC_IDC_LEAD_MODEL: NORMAL
MDC_IDC_LEAD_POLARITY_TYPE: NORMAL
MDC_IDC_LEAD_SERIAL: NORMAL
MDC_IDC_MSMT_BATTERY_DTM: NORMAL
MDC_IDC_MSMT_BATTERY_REMAINING_LONGEVITY: 108 MO
MDC_IDC_MSMT_BATTERY_REMAINING_PERCENTAGE: 100 %
MDC_IDC_MSMT_BATTERY_STATUS: NORMAL
MDC_IDC_MSMT_CAP_CHARGE_DTM: NORMAL
MDC_IDC_MSMT_CAP_CHARGE_TIME: 10.3 S
MDC_IDC_MSMT_CAP_CHARGE_TYPE: NORMAL
MDC_IDC_MSMT_LEADCHNL_RV_IMPEDANCE_VALUE: 459 OHM
MDC_IDC_MSMT_LEADCHNL_RV_PACING_THRESHOLD_AMPLITUDE: 1.3 V
MDC_IDC_MSMT_LEADCHNL_RV_PACING_THRESHOLD_PULSEWIDTH: 0.5 MS
MDC_IDC_PG_IMPLANT_DTM: NORMAL
MDC_IDC_PG_MFG: NORMAL
MDC_IDC_PG_MODEL: NORMAL
MDC_IDC_PG_SERIAL: NORMAL
MDC_IDC_PG_TYPE: NORMAL
MDC_IDC_SESS_CLINIC_NAME: NORMAL
MDC_IDC_SESS_DTM: NORMAL
MDC_IDC_SESS_TYPE: NORMAL
MDC_IDC_SET_BRADY_LOWRATE: 40 {BEATS}/MIN
MDC_IDC_SET_BRADY_MODE: NORMAL
MDC_IDC_SET_LEADCHNL_RV_PACING_AMPLITUDE: 2.6 V
MDC_IDC_SET_LEADCHNL_RV_PACING_POLARITY: NORMAL
MDC_IDC_SET_LEADCHNL_RV_PACING_PULSEWIDTH: 0.5 MS
MDC_IDC_SET_LEADCHNL_RV_SENSING_ADAPTATION_MODE: NORMAL
MDC_IDC_SET_LEADCHNL_RV_SENSING_POLARITY: NORMAL
MDC_IDC_SET_LEADCHNL_RV_SENSING_SENSITIVITY: 0.6 MV
MDC_IDC_SET_ZONE_DETECTION_INTERVAL: 250 MS
MDC_IDC_SET_ZONE_DETECTION_INTERVAL: 300 MS
MDC_IDC_SET_ZONE_DETECTION_INTERVAL: 353 MS
MDC_IDC_SET_ZONE_TYPE: NORMAL
MDC_IDC_SET_ZONE_VENDOR_TYPE: NORMAL
MDC_IDC_STAT_BRADY_DTM_END: NORMAL
MDC_IDC_STAT_BRADY_DTM_START: NORMAL
MDC_IDC_STAT_BRADY_RV_PERCENT_PACED: 0 %
MDC_IDC_STAT_EPISODE_RECENT_COUNT: 0
MDC_IDC_STAT_EPISODE_RECENT_COUNT: 6
MDC_IDC_STAT_EPISODE_RECENT_COUNT_DTM_END: NORMAL
MDC_IDC_STAT_EPISODE_RECENT_COUNT_DTM_START: NORMAL
MDC_IDC_STAT_EPISODE_TYPE: NORMAL
MDC_IDC_STAT_EPISODE_VENDOR_TYPE: NORMAL
MDC_IDC_STAT_TACHYTHERAPY_ATP_DELIVERED_RECENT: 0
MDC_IDC_STAT_TACHYTHERAPY_ATP_DELIVERED_TOTAL: 0
MDC_IDC_STAT_TACHYTHERAPY_RECENT_DTM_END: NORMAL
MDC_IDC_STAT_TACHYTHERAPY_RECENT_DTM_START: NORMAL
MDC_IDC_STAT_TACHYTHERAPY_SHOCKS_ABORTED_RECENT: 0
MDC_IDC_STAT_TACHYTHERAPY_SHOCKS_ABORTED_TOTAL: 0
MDC_IDC_STAT_TACHYTHERAPY_SHOCKS_DELIVERED_RECENT: 0
MDC_IDC_STAT_TACHYTHERAPY_SHOCKS_DELIVERED_TOTAL: 0
MDC_IDC_STAT_TACHYTHERAPY_TOTAL_DTM_END: NORMAL
MDC_IDC_STAT_TACHYTHERAPY_TOTAL_DTM_START: NORMAL
NONHDLC SERPL-MCNC: 144 MG/DL
POTASSIUM SERPL-SCNC: 5.4 MMOL/L (ref 3.4–5.3)
SODIUM SERPL-SCNC: 135 MMOL/L (ref 133–144)
TRIGL SERPL-MCNC: 267 MG/DL
TSH SERPL DL<=0.005 MIU/L-ACNC: 0.51 MU/L (ref 0.4–4)

## 2019-01-01 PROCEDURE — 97110 THERAPEUTIC EXERCISES: CPT | Mod: GP | Performed by: PHYSICAL THERAPIST

## 2019-01-01 PROCEDURE — G0463 HOSPITAL OUTPT CLINIC VISIT: HCPCS | Mod: ZF

## 2019-01-01 PROCEDURE — 99213 OFFICE O/P EST LOW 20 MIN: CPT | Performed by: FAMILY MEDICINE

## 2019-01-01 PROCEDURE — 84443 ASSAY THYROID STIM HORMONE: CPT | Performed by: FAMILY MEDICINE

## 2019-01-01 PROCEDURE — 36415 COLL VENOUS BLD VENIPUNCTURE: CPT | Performed by: FAMILY MEDICINE

## 2019-01-01 PROCEDURE — 82274 ASSAY TEST FOR BLOOD FECAL: CPT | Performed by: FAMILY MEDICINE

## 2019-01-01 PROCEDURE — 97530 THERAPEUTIC ACTIVITIES: CPT | Mod: GP | Performed by: PHYSICAL THERAPIST

## 2019-01-01 PROCEDURE — 93296 REM INTERROG EVL PM/IDS: CPT | Mod: ZF

## 2019-01-01 PROCEDURE — 99214 OFFICE O/P EST MOD 30 MIN: CPT | Performed by: FAMILY MEDICINE

## 2019-01-01 PROCEDURE — 83036 HEMOGLOBIN GLYCOSYLATED A1C: CPT | Performed by: FAMILY MEDICINE

## 2019-01-01 PROCEDURE — 80061 LIPID PANEL: CPT | Performed by: FAMILY MEDICINE

## 2019-01-01 PROCEDURE — 97161 PT EVAL LOW COMPLEX 20 MIN: CPT | Mod: GP | Performed by: PHYSICAL THERAPIST

## 2019-01-01 PROCEDURE — 93295 DEV INTERROG REMOTE 1/2/MLT: CPT | Performed by: INTERNAL MEDICINE

## 2019-01-01 PROCEDURE — 80048 BASIC METABOLIC PNL TOTAL CA: CPT | Performed by: FAMILY MEDICINE

## 2019-01-01 RX ORDER — FUROSEMIDE 40 MG
TABLET ORAL
Qty: 30 TABLET | Refills: 0 | Status: SHIPPED | OUTPATIENT
Start: 2019-01-01 | End: 2019-01-01

## 2019-01-01 RX ORDER — AMITRIPTYLINE HYDROCHLORIDE 50 MG/1
TABLET ORAL
Qty: 90 TABLET | Refills: 1 | Status: SHIPPED | OUTPATIENT
Start: 2019-01-01

## 2019-01-01 RX ORDER — AMITRIPTYLINE HYDROCHLORIDE 50 MG/1
50 TABLET ORAL AT BEDTIME
Qty: 90 TABLET | Refills: 0 | Status: SHIPPED | OUTPATIENT
Start: 2019-01-01 | End: 2019-01-01

## 2019-01-01 RX ORDER — FUROSEMIDE 40 MG
40 TABLET ORAL DAILY
Qty: 30 TABLET | Refills: 0 | Status: SHIPPED | OUTPATIENT
Start: 2019-01-01 | End: 2019-01-01

## 2019-01-01 RX ORDER — METOPROLOL SUCCINATE 25 MG/1
25 TABLET, EXTENDED RELEASE ORAL DAILY
Qty: 90 TABLET | Refills: 1 | Status: SHIPPED | OUTPATIENT
Start: 2019-01-01 | End: 2019-01-01

## 2019-01-01 RX ORDER — LISINOPRIL 10 MG/1
TABLET ORAL
Qty: 30 TABLET | Refills: 0 | OUTPATIENT
Start: 2019-01-01

## 2019-01-01 RX ORDER — FUROSEMIDE 40 MG
TABLET ORAL
Qty: 10 TABLET | Refills: 0 | Status: SHIPPED | OUTPATIENT
Start: 2019-01-01 | End: 2019-01-01

## 2019-01-01 RX ORDER — LISINOPRIL 10 MG/1
10 TABLET ORAL DAILY
Qty: 90 TABLET | Refills: 1 | Status: SHIPPED | OUTPATIENT
Start: 2019-01-01

## 2019-01-01 RX ORDER — AMITRIPTYLINE HYDROCHLORIDE 50 MG/1
50 TABLET ORAL AT BEDTIME
Qty: 90 TABLET | Refills: 0 | OUTPATIENT
Start: 2019-01-01

## 2019-01-01 RX ORDER — PREGABALIN 75 MG/1
CAPSULE ORAL
Qty: 90 CAPSULE | Refills: 0 | Status: SHIPPED | OUTPATIENT
Start: 2019-01-01 | End: 2019-01-01

## 2019-01-01 RX ORDER — FUROSEMIDE 40 MG
40 TABLET ORAL DAILY
Qty: 90 TABLET | Refills: 1 | Status: SHIPPED | OUTPATIENT
Start: 2019-01-01

## 2019-01-01 RX ORDER — METOPROLOL SUCCINATE 25 MG/1
25 TABLET, EXTENDED RELEASE ORAL DAILY
Qty: 90 TABLET | Refills: 1 | Status: SHIPPED | OUTPATIENT
Start: 2019-01-01

## 2019-01-01 RX ORDER — FUROSEMIDE 40 MG
TABLET ORAL
Qty: 30 TABLET | Refills: 0 | OUTPATIENT
Start: 2019-01-01

## 2019-01-01 RX ORDER — PREGABALIN 75 MG/1
75 CAPSULE ORAL 3 TIMES DAILY
Qty: 270 CAPSULE | Refills: 1 | Status: SHIPPED | OUTPATIENT
Start: 2019-01-01

## 2019-01-01 RX ORDER — LISINOPRIL 10 MG/1
10 TABLET ORAL DAILY
Qty: 30 TABLET | Refills: 0 | Status: SHIPPED | OUTPATIENT
Start: 2019-01-01 | End: 2019-01-01

## 2019-01-01 RX ORDER — DOXYCYCLINE 100 MG/1
100 CAPSULE ORAL 2 TIMES DAILY
Qty: 20 CAPSULE | Refills: 0 | Status: SHIPPED | OUTPATIENT
Start: 2019-01-01 | End: 2019-01-01

## 2019-01-01 RX ORDER — PREGABALIN 75 MG
CAPSULE ORAL
Qty: 90 CAPSULE | Refills: 0 | OUTPATIENT
Start: 2019-01-01

## 2019-01-01 ASSESSMENT — ACTIVITIES OF DAILY LIVING (ADL)
DEPENDENT_IADLS:: CLEANING;COOKING;LAUNDRY;SHOPPING;MEAL PREPARATION;MONEY MANAGEMENT;TRANSPORTATION

## 2019-01-01 ASSESSMENT — MIFFLIN-ST. JEOR: SCORE: 1140.23

## 2019-01-01 ASSESSMENT — PAIN SCALES - GENERAL
PAINLEVEL: NO PAIN (0)

## 2019-01-02 NOTE — TELEPHONE ENCOUNTER
"Requested Prescriptions   Pending Prescriptions Disp Refills     lisinopril (PRINIVIL/ZESTRIL) 10 MG tablet 30 tablet 0    Last Written Prescription Date:  07/21/18  Last Fill Quantity: 30,  # refills: 0   Last Office Visit with [facility], [facility] or [facility] prescribing provider:  11/07/18-Mary Dc   Future Office Visit:    Next 5 appointments (look out 90 days)    Jan 14, 2019  2:20 PM CST  Office Visit with Brionna Dc MD  [facility] ([facility]) [address] [facility] [address]  [phone]   Jan 28, 2019  1:30 PM CST  Office Visit with PFT LAB  [facility]) [address] [place] [phone]  [phone]   Jan 28, 2019  2:30 PM CST  Return Visit with Rene Swartz MD  [facility]) [id number] [place] [phone]  [phone]   Feb 13, 2019 11:00 AM CST  Return Visit with Marily Ahumada MD  [facility], [place], Infectious Disease ([facility]) [address] [place] [phone]  [phone]        Sig: Take 1 tablet (10 mg) by mouth daily    ACE Inhibitors (Including Combos) Protocol Failed - 1/2/2019  2:03 PM       Failed - Blood pressure under 140/90 in past 12 months    BP Readings from Last 3 Encounters:   12/12/18 (!) 151/91   12/05/18 (!) 174/120   11/14/18 142/90                Passed - Recent (12 mo) or future (30 days) visit within the authorizing provider's specialty    Patient had office visit in the last 12 months or has a visit in the next 30 days with authorizing provider or within the authorizing provider's specialty.  See \"Patient Info\" tab in inbasket, or \"Choose Columns\" in Meds & Orders section of the refill encounter.             Passed - Patient is age 18 or older       Passed - No active pregnancy on record       Passed - Normal " serum creatinine on file in past 12 months    Recent Labs   Lab Test 12/05/18  0421  10/28/14  1233   CR 1.02   < >  --    CREAT  --   --  1.7*    < > = values in this interval not displayed.            Passed - Normal serum potassium on file in past 12 months    Recent Labs   Lab Test 12/05/18 0421   POTASSIUM 4.6            Passed - No positive pregnancy test in past 12 months

## 2019-01-02 NOTE — TELEPHONE ENCOUNTER
Routing refill request to provider for review/approval because:  A break in medication  Not signed last by PCP.   Was to be seen in December and no appointment on file.    Stacy Nair RN, [facility]

## 2019-01-07 NOTE — TELEPHONE ENCOUNTER
Medication is being filled for 1 time refill only due to:  Patient needs to be seen because needs appt.   Pt is schedule this month.  Cici Tay RN

## 2019-01-15 NOTE — NURSING NOTE
Reason For Visit:   Chief Complaint   Patient presents with     Follow Up     Left foot.        Pain Assessment  Patient Currently in Pain: Denies         Current Outpatient Medications   Medication Sig Dispense Refill     acetaminophen (TYLENOL) 500 MG tablet Take 2 tablets (1,000 mg) by mouth 3 times daily (Patient not taking: Reported on 11/14/2018)       albuterol (PROAIR HFA) 108 (90 Base) MCG/ACT Inhaler Inhale 2 puffs into the lungs every 4 hours as needed for shortness of breath / dyspnea 1 Inhaler 0     amitriptyline (ELAVIL) 50 MG tablet Take 1 tablet (50 mg) by mouth At Bedtime 60 tablet 0     aspirin 81 MG tablet Take 1 tablet (81 mg) by mouth daily 100 tablet 3     blood glucose (NO BRAND SPECIFIED) lancets standard Use to test blood sugar 10 times daily or as directed. Accu-check 100 each 11     blood glucose monitoring (NO BRAND SPECIFIED) meter device kit Use to test blood sugar 10 times daily or as directed. Accu-check 1 kit 0     blood glucose monitoring (NO BRAND SPECIFIED) test strip Use to test blood sugars 10 times daily or as directed. Accu-chek 100 strip 11     ceFEPIme (MAXIPIME) 2 G vial Inject 2 g into the vein every 12 hours 20 each 0     doxycycline hyclate (VIBRA-TABS) 100 MG tablet Take 1 tablet (100 mg) by mouth 2 times daily 180 tablet 0     fluticasone (FLONASE) 50 MCG/ACT spray Spray 2 sprays into left nostril daily 1 Bottle 0     furosemide (LASIX) 40 MG tablet Take 1 tablet (40 mg) by mouth daily 30 tablet 0     glucose 4 g CHEW chewable tablet Take 3-4 tablets to treat low blood sugar. 25 tablet 11     insulin aspart (NOVOLOG PEN) 100 UNIT/ML injection Inject 1-7 Units Subcutaneous 3 times daily (before meals) (Patient taking differently: Inject 2-5 Units Subcutaneous 3 times daily (before meals) ) 6.3 mL 0     insulin glargine (LANTUS SOLOSTAR) 100 UNIT/ML pen Inject 8 Units Subcutaneous At Bedtime 2.4 mL 0     insulin pen needle (B-D U/F) 31G X 5 MM Use 3 time(s) a day. 3 each  3     levonorgestrel (MIRENA, 52 MG,) 20 MCG/24HR IUD placed today 1 each 0     Lidocaine (LIDOCARE) 4 % Patch Place 2 patches onto the skin every 24 hours 30 patch 0     lisinopril (PRINIVIL/ZESTRIL) 10 MG tablet Take 1 tablet (10 mg) by mouth daily 30 tablet 0     LYRICA 75 MG capsule TAKE 1 CAPSULE BY MOUTH 3 TIMES DAILY 90 capsule 3     methadone (DOLOPHINE-INTENSOL) 10 MG/ML (HIGH CONC) solution Take 75 mg by mouth daily Confirmed dosing on 10/8/18 with [facility]: [place], [place] [phone]       metoprolol succinate (TOPROL-XL) 25 MG 24 hr tablet Take 1 tablet (25 mg) by mouth daily 90 tablet 1     metroNIDAZOLE (FLAGYL) 500 MG tablet Take 1 tablet (500 mg) by mouth every 8 hours 60 tablet 0     multivitamin, therapeutic with minerals (THERA-VIT-M) TABS tablet Take 1 tablet by mouth daily 30 each 0     oxyCODONE IR (ROXICODONE) 10 MG tablet Take 1-2 tablets (10-20 mg) by mouth every 6 hours as needed for moderate to severe pain (Patient not taking: Reported on 11/14/2018) 20 tablet 0     polyethylene glycol (MIRALAX/GLYCOLAX) Packet Take 17 g by mouth daily 7 packet 0     ranitidine (ZANTAC) 300 MG tablet TAKE 1 TABLET (300 MG) BY MOUTH DAILY 90 tablet 3     sennosides (SENOKOT) 8.6 MG tablet Take 2 tablets by mouth daily as needed for constipation       umeclidinium-vilanterol (ANORO ELLIPTA) 62.5-25 MCG/INH oral inhaler Inhale 1 puff into the lungs daily 1 Inhaler 2     vancomycin 750 mg Inject 750 mg into the vein every 24 hours 750 mL 14          Allergies   Allergen Reactions     No Clinical Screening - See Comments      Swelling in the throat.

## 2019-01-15 NOTE — PROGRESS NOTES
Chief Complaint   Patient presents with     RECHECK              Allergies   Allergen Reactions     No Clinical Screening - See Comments      Swelling in the throat.         Subjective: Alessandra is a 53 year old female who presents to the clinic today for a follow up of BL foot and leg ulcers. She was last seen in November. She has her PICC line pulled in December. Relates that the wounds are completely healed over. She relates that she is here today to discuss next steps with getting her weight bearing again.     Objective  No open lesions noted today. Suture noted to the left 1st met head incision site. This was removed.   Non-palpable DP and PT pulses on the left. CRT 5 seconds. Absent pedal hair.  Gross sensation diminished on the left.   1st and 2nd digit amputations on the left. Right BKA noted. This was not examined today.       Assessment: DM2 with severe diabetic neuropathy.  Right BKA.  Left foot amputations with resultant wound and separate heel ulcer - all healed today.     Plan:   - Pt seen and evaluated  - Order written for a right prosthetic and left diabetic shoe with a filler. She will get this filled.  - Start physical therapy.   - Pt to return to clinic in 3 months or sooner if problems arise.

## 2019-01-15 NOTE — LETTER
1/15/2019       RE: Alessandra Pak  [address]  [facility] [address]     Dear Colleague,    Thank you for referring your patient, Alessandra Pak, to the [facility]. Please see a copy of my visit note below.    Chief Complaint   Patient presents with     RECHECK       Allergies   Allergen Reactions     No Clinical Screening - See Comments      Swelling in the throat.       Subjective: Alessandra is a 53 year old female who presents to the clinic today for a follow up of BL foot and leg ulcers. She was last seen in November. She has her PICC line pulled in December. Relates that the wounds are completely healed over. She relates that she is here today to discuss next steps with getting her weight bearing again.     Objective  No open lesions noted today. Suture noted to the left 1st met head incision site. This was removed.   Non-palpable DP and PT pulses on the left. CRT 5 seconds. Absent pedal hair.  Gross sensation diminished on the left.   1st and 2nd digit amputations on the left. Right BKA noted. This was not examined today.       Assessment: DM2 with severe diabetic neuropathy.  Right BKA.  Left foot amputations with resultant wound and separate heel ulcer - all healed today.     Plan:   - Pt seen and evaluated  - Order written for a right prosthetic and left diabetic shoe with a filler. She will get this filled.  - Start physical therapy.   - Pt to return to clinic in 3 months or sooner if problems arise.     Again, thank you for allowing me to participate in the care of your patient.      Sincerely,    Barrington Lewis DPM
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
## 2019-01-21 NOTE — PROGRESS NOTES
[facility]        OUTPATIENT PHYSICAL THERAPY FUNCTIONAL EVALUATION  PLAN OF TREATMENT FOR OUTPATIENT REHABILITATION  (COMPLETE FOR INITIAL CLAIMS ONLY)  Patient's Last Name, First Name, M.I.  YOB: 1965  Alessandra Pak     Provider's Name   [facility]   Medical Record No.  [id number]     Start of Care Date:  01/21/19   Onset Date:  01/15/19   Type:     _X__PT   ____OT  ____SLP Medical Diagnosis:   amputated 2nd and 3rd toes; s/p BKA      PT Diagnosis:  weakness and gait instability s/p BKA and toe amputations  Visits from SOC:  1                              __________________________________________________________________________________  Plan of Treatment/Functional Goals:  IADL retraining, ADL retraining, bed mobility training, balance training, gait training, neuromuscular re-education, ROM, strengthening, stretching, transfer training, prosthetic fitting/training, wheelchair management/propulsion training           GOALS  standing tolerance   Alessandra will stand x 5 minutes with only light UE support at walker in 2/3 trials in order to show improved strength and standing tolerance on RLE.   04/20/19    transfers   Alessandra will transfer from wheelchair to surface with and without use of 2WW demonstrating safety and independence in order to decrease falls at home.   03/21/19    prosthesis   Alessandra will start process to get prosthesis in order to work toward ambulation with and without use of walker to improve her mobility.   04/20/19    wheelchair propulsion   Alessandra will propel her wheelchair x 10 minutes without rest break and proper mechanics in order to show improved functional endurance.  04/20/19    exercise program   Alessandra will be independent and compliant with HEP for strengthening, functional endurance and lengthening in order to prepare for prosthesis and  improve her functional mobility at home.   04/20/19           Therapy Frequency:  2 times/Week   Predicted Duration of Therapy Intervention:  up to 90 days    Teresa Angel PT                                    I CERTIFY THE NEED FOR THESE SERVICES FURNISHED UNDER        THIS PLAN OF TREATMENT AND WHILE UNDER MY CARE     (Physician co-signature of this document indicates review and certification of the therapy plan).                Certification Date From: 1/21/19      Certification Date To:  4/20/19    Referring Provider:  Barrington Lewis DPM     Initial Assessment  See Epic Evaluation- Start of Care Date: 01/21/19

## 2019-01-21 NOTE — PROGRESS NOTES
01/21/19 1100   Quick Adds   Type of Visit Initial OP PT Evaluation   General Information   Start of Care Date 01/21/19   Referring Physician Barrington Serrano DPM    Orders Evaluate and Treat as Indicated   Order Date 01/15/19   Medical Diagnosis amputated 2nd and 3rd toes; s/p BKA    Onset of illness/injury or Date of Surgery 01/15/19   Surgical/Medical history reviewed Yes   Pertinent history of current problem (include personal factors and/or comorbidities that impact the POC) Pertinent medical hx of systolic CHF, DMII, hypertension, depression with recent BKA and toe amputation. R BKA done July 2018 and 2nd and 3rd toes a few months ago on L foot. Arrives in wheelchair. No pain in L foot. and R leg is healed. She was cleared by MD to put full weight in her L foot. She saw orthotist many months ago after her BKA surgery but she could not get prosthesis as her would was not healed. She wants to go ahead and get prosthesis-already has relationship with person at [facility]. She uses her wheelchair for mobility and transfers mostly without assistance. Has had many falls at home-- she thinks between 10-20 over the last 6 months. She feels weak and wants to get moving more, knows she has a lot of work to do before she gets a prosthesis.   Prior level of function comment transferring on her own to chair and bathroom things; not doing many exercises right now for strengthening; thinks she could stand for 1-2 minutes in kitchen before loosing her balance.    Current Community Support Family/friend caregiver  (daughter and son help her as needed )   Patient role/Employment history Other/comments  (not working )   Living environment House/[facility]   Home/Community Accessibility Comments does have three steps in and out of her house; at home with daughter, mother and son just moved back in.    Current Assistive Devices Manual Wheel Chair   ADL Devices Shower/Tub Chair;Wall Grab Bar;Commode  (grab bar in bathroom, commode over  toilet )   Assistive Devices Comments does not have walker yet   Patient/Family Goals Statement get prosthesis, improve strength and balance, reduce her falls    General Information Comments son helped get her out of house today, had medical transportation if needed, son brought her today    Fall Risk Screen   Fall screen completed by PT   Have you fallen 2 or more times in the past year? Yes   Have you fallen and had an injury in the past year? Yes   Timed Up and Go score (seconds) not ambulatory    Is patient a fall risk? Yes   Fall screen comments Patient with she thinks 20+ falls since July-- she has forgotten that she had her amputation and lost balance.    Pain   Pain comments no pain today-- does get some phantom limb pain but better    Cognitive Status Examination   Orientation orientation to person, place and time   Level of Consciousness alert   Follows Commands and Answers Questions 100% of the time   Personal Safety and Judgment intact   Memory intact   Integumentary   Integumentary Comments did not assess her amputation today-- with shoe and sock on and wanted to keep this on today. Residual limb with stocking over top-- healed according to MD, will assess further at future sessions.    Posture   Posture Forward head position   Range of Motion (ROM)   ROM Comment B hip flexor tightness from being in seated position, mild HS tightness on B LEs    Strength   Strength Comments hip flexors- 4/5 B, hip extensors-- 3+/5 RLE, 4/5 LLE, knee flexors-- 4/5 B, ankle DF 4/5 LLE; arms fatigue after using the walker in standing x 5 feet of steps    Bed Mobility   Bed Mobility Comments independent    Transfer Skills   Transfer Comments transfers to and from wheelchair via stand pivot-- decreased pivot noted on stance leg needing to use her upper body more to keep her stability on nearby surface    Locomotion   Wheel Chair Mobility Comments uses manual wheelchair independenly x 100+ feet, fatigues for distances > 2-3  blocks per patient    Gait   Gait Comments does not ambulate-- with walker today, was able to perform 5 hops foward on LLE with CGA and 2WW    Balance   Balance Comments poor balance-- needed UE support in standing, reports several falls; nacho at walker x 1 minute with B UE support    Sensory Examination   Sensory Perception Comments did not formally test but with DM II with sensory changes    Planned Therapy Interventions   Planned Therapy Interventions IADL retraining;ADL retraining;bed mobility training;balance training;gait training;neuromuscular re-education;ROM;strengthening;stretching;transfer training;prosthetic fitting/training;wheelchair management/propulsion training   Clinical Impression   Criteria for Skilled Therapeutic Interventions Met yes, treatment indicated   PT Diagnosis weakness and gait instability s/p BKA and toe amputations    Influenced by the following impairments weakness, decreased ROM, balance impairments, gait instability, decreased activity tolerance, amuptation    Functional limitations due to impairments decreased independence with functional mobility, at increased risk of falls    Clinical Presentation Stable/Uncomplicated   Clinical Presentation Rationale stable medically, PT impairments, clinical judgement    Clinical Decision Making (Complexity) Low complexity   Therapy Frequency 2 times/Week   Predicted Duration of Therapy Intervention (days/wks) up to 90 days   Risk & Benefits of therapy have been explained Yes   Patient, Family & other staff in agreement with plan of care Yes   Clinical Impression Comments Patient presents with recent 2nd and 3rd toe amputation as well as BKA this past summer-- she has done limited PT post amputation d/t WB restrictions and increased healing time. Will benefit from PT 2 times per week in order to progress her mobility and decrease her risk of falls. Also will be fitted for prosthesis soon so will need training on how to use this.    GOALS    PT Eval Goals 1;2;3;4;5   Goal 1   Goal Identifier standing tolerance    Goal Description Alessandra will stand x 5 minutes with only light UE support at walker in 2/3 trials in order to show improved strength and standing tolerance on RLE.    Target Date 04/20/19   Goal 2   Goal Identifier transfers    Goal Description Alessandra will transfer from wheelchair to surface with and without use of 2WW demonstrating safety and independence in order to decrease falls at home.    Target Date 03/21/19   Goal 3   Goal Identifier prosthesis    Goal Description Alessandra will start process to get prosthesis in order to work toward ambulation with and without use of walker to improve her mobility.    Target Date 04/20/19   Goal 4   Goal Identifier wheelchair propulsion    Goal Description Alessandra will propel her wheelchair x 10 minutes without rest break and proper mechanics in order to show improved functional endurance.   Target Date 04/20/19   Goal 5   Goal Identifier exercise program    Goal Description Alessandra will be independent and compliant with HEP for strengthening, functional endurance and lengthening in order to prepare for prosthesis and improve her functional mobility at home.    Target Date 04/20/19   Total Evaluation Time   PT Eval, Low Complexity Minutes (36125) 30

## 2019-01-29 NOTE — TELEPHONE ENCOUNTER
"Requested Prescriptions   Pending Prescriptions Disp Refills     furosemide (LASIX) 40 MG tablet [Pharmacy Med Name: FUROSEMIDE 40 MG TABLET] 30 tablet 0      Last Written Prescription Date:  01/02/19  Last Fill Quantity: 30,  # refills: 0   Last Office Visit with [facility], [facility] or [facility] prescribing provider:  11/07/18-Mary Lovell   Future Office Visit:    Next 5 appointments (look out 90 days)    Feb 13, 2019 11:00 AM CST  Return Visit with Marily Ahumada MD  [facility], [place], Infectious Disease ([facility]) [address]  [address] [place] [id number] [phone]            Diuretics (Including Combos) Protocol Failed - 1/29/2019  1:55 AM       Failed - Blood pressure under 140/90 in past 12 months    BP Readings from Last 3 Encounters:   12/12/18 (!) 151/91   12/05/18 (!) 174/120   11/14/18 142/90                Passed - Recent (12 mo) or future (30 days) visit within the authorizing provider's specialty    Patient had office visit in the last 12 months or has a visit in the next 30 days with authorizing provider or within the authorizing provider's specialty.  See \"Patient Info\" tab in inbasket, or \"Choose Columns\" in Meds & Orders section of the refill encounter.             Passed - Medication is active on med list       Passed - Patient is age 18 or older       Passed - No active pregancy on record       Passed - Normal serum creatinine on file in past 12 months    Recent Labs   Lab Test 12/05/18  0421   CR 1.02             Passed - Normal serum potassium on file in past 12 months    Recent Labs   Lab Test 12/05/18  0421   POTASSIUM 4.6                   Passed - Normal serum sodium on file in past 12 months    Recent Labs   Lab Test 12/05/18  0421                Passed - No positive pregnancy test in past 12 months          "

## 2019-02-01 NOTE — TELEPHONE ENCOUNTER
Routing refill request to provider for review/approval because:  Kristen given x1 and patient did not follow up, please advise.    Stacy Nair RN, [facility]

## 2019-02-11 NOTE — PROGRESS NOTES
Physical Therapy Treatment     ASSESSMENT:   Patient seen on 2W nursing unit.  Today's treatment focused on bed mobility and pivot transfers.  The patient is demonstrating fair progress as evidenced by transferring to sit without assist and able to pivot x 2 taking a few steps to get to chair.  At this time the patient continues to demonstrate impairments in activity tolerance, limited knowledge of compensatory strategies and pain which is limiting the performance of all functional mobility.  Further skilled physical therapy services are medically necessary to address the above impairments and performance deficits.            PLAN AND RECOMMENDATIONS:   Recommendations for Discharge:  Recommendation for Discharge: PT: Post acute therapy      Plan:   Continue skilled PT, including the following Treatment/Interventions: Functional transfer training;Strengthening;ROM;Endurance training;Bed mobility;Gait training;Safety Education (02/10/19 1320)     PT Frequency: Twice a day;7 days/week (02/11/19 0840),   Frequency Comments: goals due 2/15, R hip alexander-arthroplasty, WBAT, lives alone, 18 steps to enter, no ADDuction of RLE -Aframe wedge in bed (02/11/19 0840)     Treatment Plan for Next Session: Progress standing tolerance and gait.                 EQUIPMENT:      PT/OT ADL Equipment for Discharge: Sferra a reacher (02/11/19 4588)      DIAGNOSIS:   1. Closed fracture of right hip, initial encounter (CMS/[facility])    2. Essential hypertension    3. Hx of CABG    4. Aortic stenosis, moderate    5. Chronic obstructive pulmonary disease, unspecified COPD type (CMS/[facility])           PRECAUTIONS:   Precautions  Hip Precautions: (Posterior hip precautions)  Weight Bearing Status: Weight bearing as tolerated right lower extremity       EDUCATION:   On this date, the patient was educated on bed mobility, transfers and ambulation.  The response to education was: Needs reinforcement.     SUBJECTIVE:   Subjective: Pt. reports constant pain  This is a recent snapshot of the patient's [facility] medical record.  For current drug dose and complete information and questions, call [phone] or In Basket pool, fv home infusion ([id number])  CSN Number:  [id number]       which is worse since therapy yesterday.  Pt. is abreeable to transfers but refuses ther ex. (02/11/19 0840)       OBJECTIVE:   Bed Mobility:    Bed Mobility  Supine to Sit: Supervision (Supv) (02/11/19 0840)  Bed Mobility Comments: Moved very slowly but was able to get LE's over edge of bed and rise to sitting. (02/11/19 0840)     Transfers:    Transfers  Sit to Stand: Minimal Assist (Min) (02/11/19 0840)  Stand to Sit: Touching/Steadying Assistance (02/11/19 0840)  Stand Pivot Transfers: Minimal Assist (Min) (02/11/19 0840)  Assistive Device/: 2-wheeled walker (02/11/19 0840)  Transfer Comments 1: Pt. able to push up with intermittent support to prevent posterior weight shift. (02/11/19 0840)  Transfer Comments 2: Cues for hand placement on walker. (02/11/19 0840)      Gait:    Gait  Gait Assistance: Minimal Assist (Min) (02/11/19 0840)  Assistive Device/: 2-wheeled walker (02/11/19 0840)  Ambulation Distance (Feet): 5 Feet (02/11/19 0840)  Gait Comments 1: Pt. took several slow sliding steps to chair  (02/11/19 0840)       Stairs:          GOALS:   Short Term Goals to Be Reviewed On: 02/15/19 (02/11/19 0840)  Short Term Goals = Discharge Goals: Yes (02/11/19 0840)  Goal Agreement: Patient agrees with goals and treatment plan (02/11/19 0840)  Bed Mobility Discharge Goal: independent no rails  (02/08/19 0900)  Transfer Discharge Goal: modified independent WW , RLE WBAT  (02/08/19 0900)  Ambulation Discharge Goal: modified independent WW RLE WBAT 60-100ft (02/08/19 0900)  Stairs Discharge Goal: 18 steps, 2 rails supervision  (02/08/19 0900)  Therapeutic Exercise Discharge Goal: supervision RLE HEP x 20 reps to improve strength and mobility (02/08/19 0900)       BILLING INFORMATION:   Total Treatment Time: PT Time Spent: 35 minutes   Timed Treatment Minutes:

## 2019-02-20 NOTE — PROGRESS NOTES
Clinic Care Coordination Contact  [facility]/Voicemail    Referral Source: IP Handoff  Clinical Data: Care Coordinator Outreach  Outreach attempted x 1.  Left message on voicemail with call back information and requested return call.  Plan: Care Coordinator mailed out care coordination introduction letter on 7/31/18. Care Coordinator will try to reach patient again in 3-5 business days.    Melissa Behl BSN, RN, N  [facility] Care Coordinator  [phone]

## 2019-02-28 NOTE — PROGRESS NOTES
Clinic Care Coordination Contact  [facility]/Voicemail    Referral Source: IP Handoff  Clinical Data: Care Coordinator Outreach  Outreach attempted x 2.  Left message on voicemail with call back information and requested return call.  Plan: Care Coordinator mailed out care coordination introduction letter on 7/31/18. Care Coordinator will attempt follow up again in 2-4 weeks.    Melissa Behl BSN, RN, N  [facility] Care Coordinator  [phone]

## 2019-03-04 NOTE — TELEPHONE ENCOUNTER
"Requested Prescriptions   Pending Prescriptions Disp Refills     amitriptyline (ELAVIL) 50 MG tablet [Pharmacy Med Name: AMITRIPTYLINE HCL 50 MG TAB] 90 tablet 0    Last Written Prescription Date:  7/20/18  Last Fill Quantity: 60,  # refills: 0   Last Office Visit with [facility] or [facility] prescribing provider:  11/7/18   Future Office Visit:      Sig: TAKE 1 TABLET (50 MG) BY MOUTH AT BEDTIME    Tricyclic Agents ( Annual appt and no PHQ9) Failed - 3/4/2019  2:26 PM       Failed - Blood Pressure under 140/90 in past 12 mos    BP Readings from Last 3 Encounters:   12/12/18 (!) 151/91   12/05/18 (!) 174/120   11/14/18 142/90                Passed - Recent (12 mo) or future (30 days) visit within authorizing provider's specialty    Patient had office visit in the last 12 months or has a visit in the next 30 days with authorizing provider or within the authorizing provider's specialty.  See \"Patient Info\" tab in inbasket, or \"Choose Columns\" in Meds & Orders section of the refill encounter.             Passed - Medication is active on med list       Passed - Patient is age 18 or older       Passed - Patient is not pregnant       Passed - No positive pregnancy test on record in past 12 mos          "

## 2019-03-06 NOTE — TELEPHONE ENCOUNTER
Routing refill request to provider for review/approval because:  A break in medication  Eun Barnes RN

## 2019-03-19 NOTE — PROGRESS NOTES
Clinic Care Coordination Contact  [facility]/Voicemail    Referral Source: IP Handoff  Clinical Data: Care Coordinator Outreach  Outreach attempted x 3.  Left message on voicemail with call back information and requested return call.  Plan: Care Coordinator mailed out care coordination introduction letter on 7/31/18. Care Coordinator left a voicemail for patient's home care nurse requesting a call back with updates.    RN CC will await call back from home care.    .  Melissa Behl BSN, RN, PHN  [facility] Care Coordinator  [phone]

## 2019-03-26 NOTE — TELEPHONE ENCOUNTER
"Requested Prescriptions   Pending Prescriptions Disp Refills     furosemide (LASIX) 40 MG tablet 30 tablet 0          Last Written Prescription Date:  2/1/19  Last Fill Quantity: 30,  # refills: 0   Last Office Visit with [facility], [facility] or [facility] prescribing provider:  11/7/18   Future Office Visit:      Sig: Take 1 tablet (40 mg) by mouth daily    Diuretics (Including Combos) Protocol Failed - 3/26/2019  9:58 AM       Failed - Blood pressure under 140/90 in past 12 months    BP Readings from Last 3 Encounters:   12/12/18 (!) 151/91   12/05/18 (!) 174/120   11/14/18 142/90                Passed - Recent (12 mo) or future (30 days) visit within the authorizing provider's specialty    Patient had office visit in the last 12 months or has a visit in the next 30 days with authorizing provider or within the authorizing provider's specialty.  See \"Patient Info\" tab in inbasket, or \"Choose Columns\" in Meds & Orders section of the refill encounter.             Passed - Medication is active on med list       Passed - Patient is age 18 or older       Passed - No active pregancy on record       Passed - Normal serum creatinine on file in past 12 months    Recent Labs   Lab Test 12/05/18  0421   CR 1.02             Passed - Normal serum potassium on file in past 12 months    Recent Labs   Lab Test 12/05/18  0421   POTASSIUM 4.6                   Passed - Normal serum sodium on file in past 12 months    Recent Labs   Lab Test 12/05/18  0421                Passed - No positive pregnancy test in past 12 months        lisinopril (PRINIVIL/ZESTRIL) 10 MG tablet 30 tablet 0        Last Written Prescription Date:  9/13/18  Last Fill Quantity: 30,  # refills: 0   Last Office Visit with [facility] or [facility] prescribing provider:  11/7/18   Future Office Visit:      Sig: Take 1 tablet (10 mg) by mouth daily    ACE Inhibitors (Including Combos) Protocol Failed - 3/26/2019  9:58 AM       Failed - Blood pressure under 140/90 in past 12 " "months    BP Readings from Last 3 Encounters:   12/12/18 (!) 151/91   12/05/18 (!) 174/120   11/14/18 142/90                Passed - Recent (12 mo) or future (30 days) visit within the authorizing provider's specialty    Patient had office visit in the last 12 months or has a visit in the next 30 days with authorizing provider or within the authorizing provider's specialty.  See \"Patient Info\" tab in inbasket, or \"Choose Columns\" in Meds & Orders section of the refill encounter.             Passed - Medication is active on med list       Passed - Patient is age 18 or older       Passed - No active pregnancy on record       Passed - Normal serum creatinine on file in past 12 months    Recent Labs   Lab Test 12/05/18  0421  10/28/14  1233   CR 1.02   < >  --    CREAT  --   --  1.7*    < > = values in this interval not displayed.            Passed - Normal serum potassium on file in past 12 months    Recent Labs   Lab Test 12/05/18  0421   POTASSIUM 4.6            Passed - No positive pregnancy test within past 12 months        metoprolol succinate ER (TOPROL-XL) 25 MG 24 hr tablet 90 tablet 1        Last Written Prescription Date:  8/15/18  Last Fill Quantity: 90,  # refills: 1   Last Office Visit with [facility] or [facility] prescribing provider:  11/7/18   Future Office Visit:      Sig: Take 1 tablet (25 mg) by mouth daily    Beta-Blockers Protocol Failed - 3/26/2019  9:58 AM       Failed - Blood pressure under 140/90 in past 12 months    BP Readings from Last 3 Encounters:   12/12/18 (!) 151/91   12/05/18 (!) 174/120   11/14/18 142/90                Passed - Patient is age 6 or older       Passed - Recent (12 mo) or future (30 days) visit within the authorizing provider's specialty    Patient had office visit in the last 12 months or has a visit in the next 30 days with authorizing provider or within the authorizing provider's specialty.  See \"Patient Info\" tab in inbasket, or \"Choose Columns\" in Meds & Orders section " "of the refill encounter.             Passed - Medication is active on med list        ranitidine (ZANTAC) 300 MG tablet    Last Written Prescription Date:  10/31/18  Last Fill Quantity: 90,  # refills: 3   Last Office Visit with [facility] or [facility] prescribing provider:  11/7/18   Future Office Visit:      90 tablet 3    H2 Blockers Protocol Passed - 3/26/2019  9:58 AM       Passed - Patient is age 12 or older       Passed - Recent (12 mo) or future (30 days) visit within the authorizing provider's specialty    Patient had office visit in the last 12 months or has a visit in the next 30 days with authorizing provider or within the authorizing provider's specialty.  See \"Patient Info\" tab in inbasket, or \"Choose Columns\" in Meds & Orders section of the refill encounter.             Passed - Medication is active on med list        pregabalin (LYRICA) 75 MG capsule          Last Written Prescription Date:  9/13/18  Last Fill Quantity: 90,  # refills: 3   Last Office Visit with [facility], [facility] or [facility] prescribing provider:  11/7/18   Future Office Visit:      Sig: Take by mouth 3 times daily    There is no refill protocol information for this order        amitriptyline (ELAVIL) 50 MG tablet 90 tablet 0          Last Written Prescription Date:  3/6/19  Last Fill Quantity: 90,  # refills: 0   Last Office Visit with [facility], [facility] or [facility] prescribing provider:  11/7/18   Future Office Visit:      Sig: Take 1 tablet (50 mg) by mouth At Bedtime    Tricyclic Agents ( Annual appt and no PHQ9) Failed - 3/26/2019  9:58 AM       Failed - Blood Pressure under 140/90 in past 12 mos    BP Readings from Last 3 Encounters:   12/12/18 (!) 151/91   12/05/18 (!) 174/120   11/14/18 142/90                Passed - Recent (12 mo) or future (30 days) visit within authorizing provider's specialty    Patient had office visit in the last 12 months or has a visit in the next 30 days with authorizing provider or within the authorizing provider's " "specialty.  See \"Patient Info\" tab in inbasket, or \"Choose Columns\" in Meds & Orders section of the refill encounter.             Passed - Medication is active on med list       Passed - Patient is age 18 or older       Passed - Patient is not pregnant       Passed - No positive pregnancy test on record in past 12 mos        insulin pen needle (B-D U/F) 31G X 5 MM miscellaneous 3 each 3          Last Written Prescription Date:  10/31/18  Last Fill Quantity: 3,  # refills: 3   Last Office Visit with [facility], [facility] or  [facility] prescribing provider:  11/7/18   Future Office Visit:      Sig: Use 3 time(s) a day.    Diabetic Supplies Protocol Passed - 3/26/2019  9:58 AM       Passed - Medication is active on med list       Passed - Patient is 18 years of age or older       Passed - Recent (6 mo) or future (30 days) visit within the authorizing provider's specialty    Patient had office visit in the last 6 months or has a visit in the next 30 days with authorizing provider.  See \"Patient Info\" tab in inbasket, or \"Choose Columns\" in Meds & Orders section of the refill encounter.            umeclidinium-vilanterol (ANORO ELLIPTA) 62.5-25 MCG/INH oral inhaler 1 Inhaler 2          Last Written Prescription Date:  8/15/18  Last Fill Quantity: 1,  # refills: 2   Last Office Visit with [facility], [facility] or  [facility] prescribing provider:  11/7/18   Future Office Visit:      Sig: Inhale 1 puff into the lungs daily    Asthma Maintenance Inhalers - Anticholinergics Failed - 3/26/2019  9:58 AM       Failed - Asthma control assessment score within normal limits in last 6 months    Please review ACT score.          Passed - Patient is age 12 years or older       Passed - Medication is active on med list       Passed - Recent (6 mo) or future (30 days) visit within the authorizing provider's specialty    Patient had office visit in the last 6 months or has a visit in the next 30 days with authorizing provider or within the authorizing provider's " "specialty.  See \"Patient Info\" tab in inbasket, or \"Choose Columns\" in Meds & Orders section of the refill encounter.                  David Faarax  [facility]  "

## 2019-03-27 NOTE — TELEPHONE ENCOUNTER
Routing refill request to provider for review/approval because:  BP not at goal  Margot Munoz RN

## 2019-03-28 NOTE — TELEPHONE ENCOUNTER
Refilled X 30 days for most medications as she is due for OV, fasting labs. Pls assist in scheduling appt.  Cathryn MANCIA, CNP

## 2019-03-29 NOTE — TELEPHONE ENCOUNTER
Faxed Rx for the Lyrica 75 mg to [facility], [phone], right fax confirmed at 8:44 am today, 3/29/19.  Beatriz Alfredo MA/  For [employer] and Kristen

## 2019-04-01 NOTE — LETTER
Alessandra Pak  [address]  [place] [address]          04/08/19      Dear Alessandra Pak        At [facility] we care about your health and are committed to providing quality patient care. Regular appointments are a vital part of the care and management of your health and can help prevent many of the complications that can occur.      It has come to our attention that you were given #10 tablet for your refill request as you are due for an office visit. Please call [facility] at [phone] soon to schedule your appointment.    If you have transferred care to another clinic please call to inform us so that we do not continue to send you reminder letters.      Sincerely,      [facility] Care Team

## 2019-04-01 NOTE — TELEPHONE ENCOUNTER
"Requested Prescriptions   Pending Prescriptions Disp Refills     furosemide (LASIX) 40 MG tablet [Pharmacy Med Name: FUROSEMIDE 40 MG TABLET] 30 tablet 0     Sig: TAKE 1 TABLET BY MOUTH EVERY DAY    Diuretics (Including Combos) Protocol Failed - 4/1/2019  1:14 PM       Failed - Blood pressure under 140/90 in past 12 months    BP Readings from Last 3 Encounters:   12/12/18 (!) 151/91   12/05/18 (!) 174/120   11/14/18 142/90                Passed - Recent (12 mo) or future (30 days) visit within the authorizing provider's specialty    Patient had office visit in the last 12 months or has a visit in the next 30 days with authorizing provider or within the authorizing provider's specialty.  See \"Patient Info\" tab in inbasket, or \"Choose Columns\" in Meds & Orders section of the refill encounter.             Passed - Medication is active on med list       Passed - Patient is age 18 or older       Passed - No active pregancy on record       Passed - Normal serum creatinine on file in past 12 months    Recent Labs   Lab Test 12/05/18  0421   CR 1.02             Passed - Normal serum potassium on file in past 12 months    Recent Labs   Lab Test 12/05/18  0421   POTASSIUM 4.6                   Passed - Normal serum sodium on file in past 12 months    Recent Labs   Lab Test 12/05/18  0421                Passed - No positive pregnancy test in past 12 months        Last Written Prescription Date:  3/28/19  Last Fill Quantity: 30,  # refills: 0   Last office visit: 11/7/2018 with prescribing provider:  Brionna Calabrese     Future Office Visit:      "

## 2019-04-03 NOTE — PROGRESS NOTES
Clinic Care Coordination Contact  [facility]/Voicemail    Referral Source: IP Handoff  Clinical Data: Care Coordinator Outreach  Outreach attempted x 7.  Left message on voicemail with call back information and requested return call.  Plan: Care Coordinator mailed out care coordination introduction letter on 7-31-18. Care Coordinator will do no further outreaches at this time.          Ravi Armijo MSN, RN, PHN, CCM   Primary Care Clinical RN Care Coordinator  [facility]   [email or web address]  Office: [phone]

## 2019-04-03 NOTE — PROGRESS NOTES
"Clinic Care Coordination Contact  Outreach to homecare    The RN CC reached out to the home care nurse as she did not return the call from Melissa Behl.  Per Nolan the home care nurse the patient has been discharged from home care \"quite a while ago\".  The RN CC will attempt to contact the patient for a follow-up.    Ravi Armijo MSN, RN, PHN, [facility]   Primary Care Clinical RN Care Coordinator  [facility]   [email or web address]  Office: [phone]      "

## 2019-04-03 NOTE — TELEPHONE ENCOUNTER
Routing refill request to provider for review/approval because:  Kristen given x1 and patient did not follow up, please advise.  No appointments scheduled. Patient was sent a My Chart message on 3/29/19 telling her she needs appointment and there is no appointment scheduled . Please see refill encounter from 3/26/19  Eun Barnes RN

## 2019-04-04 NOTE — TELEPHONE ENCOUNTER
This writer attempted to contact patient on 04/04/19      Reason for call refill and unable to leave message, vm not set up will retry.      If patient calls back:   2nd floor [place] Care Team (MA/TC) called. Inform patient that someone from the team will contact them, document that pt called and route to care team.         Silvia Vuong MA

## 2019-04-05 NOTE — TELEPHONE ENCOUNTER
This writer attempted to contact Patient  on 04/05/19      Reason for call Patient given #10 and needs an office visit and left voicemail message.      If patient calls back:   Schedule Office Visit appointment within 1 week with primary care, postponing ,message.        Beatriz Alfredo MA

## 2019-05-08 NOTE — TELEPHONE ENCOUNTER
Different pharmacy being requested for refill than previously sent to.   Prescription approved per [facility] [place] Protocol.        Yvan Aguirre RN, BSN, PHN

## 2019-05-15 NOTE — NURSING NOTE
Visit Reason: Follow up    Evaluation / Assessment: Patient reports no pain. Vitals taken, allergies and medications reviewed.    Labs: NA    Procedure ordered? NA    Dressing Change: NA    Vaccine ordered / administered: NA    Other: NA

## 2019-05-15 NOTE — PROGRESS NOTES
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 Wound cultures revealed polymicrobial growth including MRSA, pseudomonas, and anaerobes. Pathology reveals necrosis at resection border suggesting residual osteo in the remaining tarsal. She received 8 weeks of therapy, completed mid Dec 2018, then about 5 months of suppressive doxycycline. She is now off all antibiotics for the last 2-3 weeks and her wound have remained closed.  # DM, insulin dependent with A1c >10 and persistent hyperglycemia  #Prior R BKA, very slowly healing  # PAD s/p LLE angio with L RAJNI stent on 10/11/18  # Pancreatic insufficiency s/p prior Whipple  # Hyperkalemia. Likely multifactorial, and influenced by her blood glucose levels. Also likely consuming supratherapeutic dietary K  # Waxing/waning L heel pressure ulcer    We discussed that she will remain at increased risk of infection - relapsed or new infection - given her comorbidities. I encouraged her that I am pleased that her infection has relapsed to date, and this is hopefully evidence that she'll remain infection-free for at least the foreseeable future    Recommendations:  - continue off antibiotics  - Glucose control is critical. She is seeing her PCP on 5/20 and her endocrinologist next month  - encouraged her to be engaged with PMR for initiation of R BKA prosthesis which will assist with off-loading of her L foot and reduce the future likelihood of relapsed infection    It is a pleasure to participate in Ms. Pak's care. Visit length 20 min, >50% clinical counseling/care coordination    Marily Ahumada MD   of Medicine, Division of Infectious Diseases  [facility] [phone]          Interval History:   Ms Pak is known to me from her prior history of osteomyelitis. Please see above for additional detail. I last saw  her in 12/2018. She's done well since that time. She was on suppressive doxycycline (given positive margins from ray amputation) until several weeks ago. Her wounds have fortunately closed. She reports she continues to stay quit from tobacco and sees her PCP soon to discuss diabetes control.           Physical Exam:   /85   Pulse 99   SpO2 95%     Exam:  GENERAL:  well-developed, in good spirits, no apparent distress. Thin.  ENT:  Head is normocephalic, atraumatic. .  EYES:  Eyes have anicteric sclerae.    NECK:  Supple.  EXT:L foot examined and the suture line is well appearing. Incision appears to be closed. There is a healing L heel sore without active drainage. R BKA stump well-appearing.         Laboratory Data:   Microbiology:         Culture Micro   Date Value Ref Range Status   10/13/2018 No growth  Final   10/13/2018 No growth  Final   10/08/2018 No growth  Final   10/07/2018 No growth  Final   10/07/2018 No growth  Final   10/05/2018 Heavy growth  Pseudomonas aeruginosa   (A)  Final   10/05/2018 Heavy growth  Escherichia coli   (A)  Final   10/05/2018 (A)  Final    Light growth  Raoultella ornithinolytica/planticola     10/05/2018 Moderate growth  Enterococcus faecalis   (A)  Final   10/05/2018 (A)  Final    Moderate growth  Methicillin resistant Staphylococcus aureus (MRSA)     10/05/2018 (A)  Final    Heavy growth  Corynebacterium striatum  Identification obtained by MALDI-TOF mass spectrometry research use only database. Test   characteristics determined and verified by the [facility]   ([facility]) [place], [place].  Susceptibility testing not routinely done     10/05/2018 Heavy growth  Streptococcus anginosus   (A)  Final   10/05/2018 (A)  Final    Heavy growth  Prevotella species  Identification obtained by MALDI-TOF mass spectrometry research use only database. Test   characteristics determined and verified by the [facility]   ([facility])  [place], [place].  Beta lactamase positive  Susceptibility testing not routinely done     10/05/2018 (A)  Final    Heavy growth  Fusobacterium nucleatum  Susceptibility testing not routinely done     10/05/2018 (A)  Final    Plus  Heavy growth  Mixed aerobic and anaerobic jim     06/29/2018 No growth  Final   06/29/2018 No growth  Final   06/28/2018 No anaerobes isolated  Final   06/28/2018 No growth  Final   06/28/2018 No anaerobes isolated  Final   06/28/2018 (A)  Final    Light growth  Gram positive cocci  Organism failed to thrive for identification and suceptibility testing     06/13/2018 No growth  Final   06/13/2018 No growth  Final   06/11/2018 Light growth  Candida glabrata   (A)  Final   06/11/2018 Susceptibility testing not routinely done  Final   06/10/2018 No growth  Final   06/10/2018 No growth  Final   06/09/2018 No growth  Final   06/09/2018 Moderate growth  Enterococcus faecalis   (A)  Final   06/09/2018 (A)  Final    Light growth  Streptococcus mitis group  Susceptibility testing not routinely done     06/07/2018 No growth  Final   06/06/2018 No growth  Final   06/06/2018 No growth  Final   02/19/2018 No growth  Final   02/19/2018 No growth  Final   02/19/2018   Final    Canceled, Test credited  Incorrectly ordered by PCU/Clinic  Test reordered as correct code  Tissue culture     02/19/2018   Final    No anaerobes isolated  Since this specimen was not transported in the proper anaerobic transport media, the   absence of anaerobes in this culture does not rule out the presence of anaerobes in this   specimen.     02/19/2018 (A)  Final    Light growth  Methicillin resistant Staphylococcus aureus (MRSA)     02/19/2018 (A)  Final    Light growth  Coagulase negative Staphylococcus  Susceptibility testing not routinely done  This organism is part of normal jim, but on occasion, may be a true pathogen. Clinical   correlation must be applied to interpreting this microbiology result.     02/19/2018    Final    Critical Value/Significant Value called to and read back by  NESHA HORTON RN ([id number]).  02.21.18 2206 GJS     02/18/2018 No growth  Final   02/18/2018 No growth  Final   02/16/2018   Final    <10,000 colonies/mL  mixed urogenital jim  Susceptibility testing not routinely done     02/16/2018 No growth  Final   02/16/2018 No growth  Final   04/06/2016 (A)  Final    Heavy growth Staphylococcus aureus  Heavy growth Beta hemolytic Streptococcus group B This isolate DOES NOT   demonstrate inducible clindamycin resistance in vitro. Clindamycin is   susceptible and could be used when indicated, however, erythromycin is   resistant and should not be used.     06/24/2015   Final    <10,000 colonies/mL mixed urogenital jim Susceptibility testing not routinely   done     02/19/2015   Final    Culture negative for acid fast bacilli  Assayed at [facility] [address]     02/19/2015 (A)  Final    Normal respiratory jim  Light growth Candida albicans / dubliniensis Candida albicans and Candida   dubliniensis are not routinely speciated Susceptibility testing not routinely   done     02/19/2015 (A)  Final    Candida tropicalis isolated  Candida glabrata isolated  No additional fungi cultured after 4 weeks incubation     02/19/2015 No growth after 4 weeks  Final   02/16/2015 (A)  Final    Canceled, Test credited  >10 Squamous epithelial cells/low power field indicates oral contamination.   Please recollect.  Called to Cari on 4E, 2/16/15 14:10 CWi     02/15/2015 No growth  Final   02/15/2015 No growth  Final   02/13/2015 No growth  Final   02/13/2015 No growth  Final   10/31/2014 No growth  Final   10/29/2014   Final    Culture negative for acid fast bacilli  Assayed at [facility],[address] [address]     10/29/2014 No growth  Final   10/29/2014 Culture negative after 4 weeks  Final   10/29/2014 No growth after 4 weeks  Final   10/29/2014 (A)  Final    Light growth Staphylococcus  aureus  Light growth Candida albicans / dubliniensis Candida albicans and Candida   dubliniensis are not routinely speciated     10/28/2014 No growth  Final   10/28/2014 No growth  Final   11/23/2009   Final    <10,000 colonies/mL Beta hemolytic Streptococcus group B     Comment:     10 to 50,000 colonies/mL Mixed gram positive jim Multiple species present,   probable perineal contamination.  Clindamycin and Erythromycin are not routinely prescribed for isolates from   the urinary tract.   03/04/2005   Final    <10,000 colonies/mL Staphylococcus species Susceptibility testing not routinely     Comment:      done           Other Laboratory Data:    Urine Studies    Recent Labs   Lab Test 10/08/18  0032 06/07/18  0031 02/16/18  2036 06/24/15  1612 02/14/15  1545   LEUKEST Small* Small* Large* Moderate* Duplicate request  SEE ACCN [id number]  *  Trace*   WBCU 7* 0 60* 2 0       Inflammatory Markers    Recent Labs   Lab Test 10/10/18  0605 10/05/18  1459 08/28/18  1325 08/23/18  1310 08/14/18  1340 08/08/18  1115 08/02/18  1215 07/30/18  1245  02/15/15  0240   SED  --  118*  --   --   --  46* 86* 109*  --  132*   .0* 292.0* 52.1* 59.1* 76.7* 69.4* 70.9* 34.3*   < >  --     < > = values in this interval not displayed.       Hematology Studies    Recent Labs   Lab Test 12/05/18  0421 10/14/18  0526 10/13/18  0524 10/12/18  0704 10/11/18  0829 10/10/18  0605  10/07/18  1749  08/28/18  1325 08/23/18  1310 08/14/18  1340 07/28/18  1539   WBC 7.2 15.5* 13.5* 16.1* 18.9* 24.9*   < > 24.8*   < > 7.4 8.1 5.9 7.5   ANEU 3.0  --   --   --   --   --   --  20.5*  --  3.8 3.9 3.1 3.3   AEOS 0.3  --   --   --   --   --   --  0.1  --  0.2 0.2 0.2 0.3   HGB 11.5* 7.8* 8.3* 8.0* 8.2* 8.8*   < > 10.0*   < > 10.5* 10.4* 10.2* 12.2   MCV 85 80 78 78 78 77*   < > 81   < > 83 82 83 85    428 358 377 328 361   < > 360   < > 273 301 243 215    < > = values in this interval not displayed.       Immune Globulin Studies    Recent Labs    Lab Test 02/21/15  0652 02/15/15  1520   IGG 1,330  --    IGE  --  46       Metabolic Studies     Recent Labs   Lab Test 12/05/18  0421 10/18/18 10/14/18  0526 10/13/18  0524 10/12/18  0704 10/11/18  0829     --  141 138 139 138   POTASSIUM 4.6  --  4.2 4.0 4.3 4.3   CHLORIDE 106  --  110* 109 110* 111*   CO2 26  --  23 22 23 21   BUN 28  --  22 24 24 30   CR 1.02 1.68* 1.21* 1.29* 1.28* 1.20*   GFRESTIMATED 57* 32* 47* 44* 44* 47*       Hepatic Studies    Recent Labs   Lab Test 12/05/18  0421 10/12/18  0704 10/10/18  0605 08/28/18  1325 08/23/18  1310 08/14/18  1340   BILITOTAL 0.3 0.3 0.3 0.3 0.2 0.2   ALKPHOS 157* 149 152* 263* 283* 354*   ALBUMIN 2.9* 1.5* 1.6* 2.8* 2.8* 2.5*   AST 29 12 12 20 24 35   ALT 32 14 20 33 42 46       Thyroid Studies    Recent Labs   Lab Test 01/13/17  1353 11/02/16  1359   TSH 0.32* 0.34*   T4 1.06 1.02       Vancomycin Levels    Recent Labs   Lab Test 10/15/18  0054 10/10/18  2230 08/08/18  1115   VANCOMYCIN 27.0* 20.7 20.8

## 2019-05-20 NOTE — PROGRESS NOTES
Subjective     Alessandra Pak is a 53 year old female who presents to clinic today for the following health issues:    HPI     Diabetes Follow-up      How often are you checking your blood sugar? Three times daily    What time of day are you checking your blood sugars (select all that apply)?  Before meals and After meals    Have you had any blood sugars above 200?  Yes     Have you had any blood sugars below 70?  No    What symptoms do you notice when your blood sugar is low?  None    What concerns do you have today about your diabetes? None     Do you have any of these symptoms? (Select all that apply)  Numbness in feet     Have you had a diabetic eye exam in the last 12 months? Yes- Date of last eye exam: 2 weeks ago     Seeing Endocrinology next month    BP Readings from Last 2 Encounters:   05/20/19 108/63   05/15/19 137/85     Hemoglobin A1C (%)   Date Value   10/10/2018 9.1 (H)   06/06/2018 9.5 (H)     LDL Cholesterol Calculated (mg/dL)   Date Value   02/05/2018 56   07/08/2015 49     LDL Cholesterol Direct (mg/dL)   Date Value   07/25/2016 77       Diabetes Management Resources  Depression and Anxiety Follow-Up    How are you doing with your depression since your last visit? No change    How are you doing with your anxiety since your last visit?  Improved     Are you having other symptoms that might be associated with depression or anxiety? No    Have you had a significant life event? No     Do you have any concerns with your use of alcohol or other drugs? No    Social History     Tobacco Use     Smoking status: Former Smoker     Packs/day: 0.30     Years: 15.00     Pack years: 4.50     Types: Cigarettes     Smokeless tobacco: Former User     Quit date: 10/29/2014     Tobacco comment: Started smoking in 89/ smokes about 3 per day   Substance Use Topics     Alcohol use: No     Alcohol/week: 0.0 oz     Drug use: No     PHQ 6/21/2017 1/30/2018 8/1/2018   PHQ-9 Total Score 12 3 9   Q9: Thoughts of better off  "dead/self-harm past 2 weeks Several days Not at all Not at all     BITA-7 SCORE 1/14/2016 2/4/2016 8/1/2018   Total Score - - -   Total Score 4 4 7     In the past two weeks have you had thoughts of suicide or self-harm?  No.    Do you have concerns about your personal safety or the safety of others?   No    Suicide Assessment Five-step Evaluation and Treatment (SAFE-T)  Chronic Kidney Disease Follow-up      Current NSAID use?  No      Amount of exercise or physical activity: None    Problems taking medications regularly: No    Medication side effects: none    Diet: regular (no restrictions)      Concern - infection left middle finger  Onset: April 1st    Description:   Pt states there was a blister like sore on her middle finger but recently the blistered popped. No pain but was itching for a bit.       Therapies Tried and outcome: Bacitracin       {    Reviewed and updated as needed this visit by Provider  Tobacco  Allergies  Meds  Problems  Med Hx  Surg Hx  Fam Hx         Review of Systems   ROS COMP: Constitutional, HEENT, cardiovascular, pulmonary, GI, , musculoskeletal, neuro, skin, endocrine and psych systems are negative, except as otherwise noted.      Objective    /63 (BP Location: Left arm, Patient Position: Chair, Cuff Size: Child)   Pulse 90   Temp 97.8  F (36.6  C) (Tympanic)   Ht 1.753 m (5' 9\")   Wt 46.1 kg (101 lb 9.6 oz)   LMP  (Exact Date)   SpO2 98%   Breastfeeding? No   BMI 15.00 kg/m    Body mass index is 15 kg/m .  Physical Exam   GENERAL: healthy, alert and no distress  EYES: Eyes grossly normal to inspection, PERRL and conjunctivae and sclerae normal  NECK: no adenopathy, no asymmetry, masses, or scars and thyroid normal to palpation  RESP: lungs clear to auscultation - no rales, rhonchi or wheezes  CV: regular rate and rhythm, normal S1 S2, no S3 or S4, no murmur, click or rub, no peripheral edema and peripheral pulses strong  ABDOMEN: soft, nontender, no " hepatosplenomegaly, no masses and bowel sounds normal  MS: Right BKA   SKIN: rough area on right third finger without drainage or redness.  PSYCH: mentation appears normal, affect normal/bright    Diagnostic Test Results:  Labs reviewed in Epic        Assessment & Plan     1. Type 2 diabetes mellitus with stage 3 chronic kidney disease, with long-term current use of insulin (H)  Uncertain control - check labs and refill medication.  Upcoming endocrinology appointment.  - Lipid panel reflex to direct LDL Fasting  - lisinopril (PRINIVIL/ZESTRIL) 10 MG tablet; Take 1 tablet (10 mg) by mouth daily  Dispense: 90 tablet; Refill: 1  - TSH with free T4 reflex  - Hemoglobin A1c    2. Enlarged thyroid  Recheck today  - TSH with free T4 reflex    3. Finger infection  Monitor and follow up if signs of active infection    4. Hypertension goal BP (blood pressure) < 140/90  Controlled - refill and check lab  - Basic metabolic panel    5. Nonischemic cardiomyopathy (H)  No new symptoms - check labs and refill.  - Lipid panel reflex to direct LDL Fasting  - furosemide (LASIX) 40 MG tablet; Take 1 tablet (40 mg) by mouth daily  Dispense: 90 tablet; Refill: 1  - lisinopril (PRINIVIL/ZESTRIL) 10 MG tablet; Take 1 tablet (10 mg) by mouth daily  Dispense: 90 tablet; Refill: 1    6. Major depressive disorder, recurrent episode, moderate (H)  Stable - monitor  - Basic metabolic panel    7. Screen for colon cancer    - Fecal colorectal cancer screen (FIT); Future    8. Status post below knee amputation of right lower extremity (H)  Refilled.  - pregabalin (LYRICA) 75 MG capsule; Take 1 capsule (75 mg) by mouth 3 times daily  Dispense: 270 capsule; Refill: 1     The uses and side effects, including black box warnings as appropriate, were discussed in detail.  All patient questions were answered.  The patient was instructed to call immediately if any side effects developed.     Return in about 3 months (around 8/20/2019).    Brionna Hernandez  MD Vanda  [facility]

## 2019-05-20 NOTE — PATIENT INSTRUCTIONS
Patient Education     Infected Burn, with Cream or Ointment and Dressing  Your burn has become infected. This is usually because skin germs (bacteria) have gotten into the burn area.  Home care  Follow these guidelines when caring for yourself at home:    Change your dressing once a day, unless you were told otherwise. If the bandage sticks, soak it off in warm water. A bandage left in place too long can make the infection worse.    Wash the area with soap and water to remove all cream, ointment, ooze, or scabs. You may do this in a sink, under a tub faucet, or in the shower. Rinse off the soap and pat dry with a clean towel. Look for signs of infection.    Apply antibiotic cream or ointment according to your healthcare provider's instructions. This will prevent infection and keep the bandage from sticking.    Cover the burn with a nonstick gauze. Then wrap it with the bandage material.    If the bandage becomes wet or soiled, change it.    You may use over-the-counter medicine to control pain, unless another pain medicine was prescribed. If you have chronic liver or kidney disease, talk with your provider before taking acetaminophen or ibuprofen. Also talk with your provider if you ve had a stomach ulcer or GI bleeding. Don t give ibuprofen to children younger than 6 months of age.  Follow-up care  Follow up with your healthcare provider, or as advised. The infection should not get worse once you start treatment. Check the burn in 2 days for the signs of worsening infection listed below.  When to seek medical advice  Call your healthcare provider right away if any of these occur:    Pain in the wound gets worse    Redness, swelling, or pus coming from the wound gets worse    Fever of 100.4  F (38.0 C) or higher, or as directed by your healthcare provider  Date Last Reviewed: 12/1/2016 2000-2018 The "[employer]. [address] [place] [address], [place] [address]. All rights reserved. This information is not  intended as a substitute for professional medical care. Always follow your healthcare professional's instructions.

## 2019-05-22 NOTE — TELEPHONE ENCOUNTER
Reason for call:  Other   Patient called regarding (reason for call): call back  Additional comments: patient returning missed call    Phone number to reach patient:  Home number on file [phone] (home)    Best Time:  any    Can we leave a detailed message on this number?  YES     Call park *[phone]

## 2019-05-22 NOTE — TELEPHONE ENCOUNTER
This writer attempted to contact Alessandra on 05/22/19      Reason for call abnormal results and unable to leave message.      If patient calls back:   Registered Nurse called. Follow Triage Call workflow      Notes recorded by Brionna Calabrese MD on 5/22/2019 at 1:37 PM CDT  Please call and send letter if unable to reach by phone.    Potassium is a little high, increase fluid intake by 10 oz per day. This should be rechecked when she sees endocrinology.  Diabetes is slightly worse than last check, discuss with endocrinology further at upcoming appt.  Cholesterol and thyroid function are stable.    Please contact the clinic if you have additional questions.  Thank you.    Sincerely,    Brionna Nair RN

## 2019-05-22 NOTE — TELEPHONE ENCOUNTER
Patient called back and was given the results from the 5/20/19 visit.    Stacy Nair RN, [facility]

## 2019-05-22 NOTE — RESULT ENCOUNTER NOTE
Please call and send letter if unable to reach by phone.    Potassium is a little high, increase fluid intake by 10 oz per day. This should be rechecked when she sees endocrinology.  Diabetes is slightly worse than last check, discuss with endocrinology further at upcoming appt.  Cholesterol and thyroid function are stable.    Please contact the clinic if you have additional questions.  Thank you.    Sincerely,    Brionna Dc

## 2019-05-28 NOTE — TELEPHONE ENCOUNTER
All of these have refills sent to [facility] 05/20/19.    [facility] is now requesting these be sent to them

## 2019-05-28 NOTE — RESULT ENCOUNTER NOTE
Ms. Pak,    Your stool test was negative for blood.  This decreases the likelihood of left sided colon cancer.  You should have this test repeated yearly or have a colonoscopy done for total colon cancer screening.     Please contact the clinic if you have additional questions.  Thank you.    Sincerely,    Brionna Dc

## 2019-05-29 NOTE — TELEPHONE ENCOUNTER
"Patient is scheduled to see diabetes education on 6/3/19, however does not currently have a referral in place. Please order a \"Diabetes Educator Referral\" in the  section of this telephone encounter if you are in agreement with this appointment.    Thank you!    BIANKA Tesfaye CDE    "

## 2019-05-30 NOTE — TELEPHONE ENCOUNTER
Team, can you please contact patient and verify what she is needing regarding her prescriptions? Was seen in office on 5/20/19 by SBF and was given refills on furosemide, lisinopril, and Lyrica, sent to [facility] pharmacy in [place].     Did she pick these up from [facility]? A 90 day supply?   Does she still want refills [facility] to [facility]?  [facility] pharmacy was obviously pulled in to OV encounter at time of visit. Unclear if was relayed to staff or provider at time of visit that was only wanting temporary supply sent locally.  Can you please clarify situation and send back to pool with outcome, thank you.    Charla Gilliland RN

## 2019-05-30 NOTE — TELEPHONE ENCOUNTER
Patient returned call and states Lasix , Lyrica and Lisinopril needs refill. She is out of the Lyrica and Lisinopril. She uses the [facility] pharmacy in [facility].   Any further questions please call her.     Best number to reach caller: Cell number on file:    Telephone Information:   Mobile [phone]       Is it ok to leave a detailed message: YES

## 2019-05-30 NOTE — TELEPHONE ENCOUNTER
Reason for Call:  Other prescription    Detailed comments: patient is out of the following meds, and wanting a 14 day refill to the [facility] pharmacy. Patient wanted [employer] to call and ask for these meds so they have time to mail out her scripts.      furosemide (LASIX) 40 MG tablet  lisinopril (PRINIVIL/ZESTRIL) 10 MG tablet  pregabalin (LYRICA) 75 MG capsule    Phone Number Patient can be reached at: Other phone number:  [phone] [employer]    Best Time: any    Can we leave a detailed message on this number? YES    Call taken on 5/30/2019 at 4:13 PM by Jovita Yousif

## 2019-05-30 NOTE — TELEPHONE ENCOUNTER
Requested medications-Lyrica, Furosemide, and Lisinopril were all recently refilled on 5/20/19 by PCP and sent to local [facility] pharmacy in [place]. Additional refills given at this time.    Requests denied to [employer] Mail order for this reason.    Charla Gilliland RN

## 2019-05-31 NOTE — TELEPHONE ENCOUNTER
This writer attempted to contact Alessandra on 05/31/19      Reason for call inform that [facility] got Rx's but can get at any [facility] and unable to leave message.      If patient calls back:   Registered Nurse called. Follow Triage Call workflow        Stacy Nair, RN

## 2019-06-03 NOTE — TELEPHONE ENCOUNTER
Spoke with Alessandra and she already picked up medications from [facility] pharmacy.       Yvan Aguirre RN, BSN, PHN

## 2019-06-10 NOTE — PROGRESS NOTES
Subjective     Alessandra Pak is a 53 year old female who presents to clinic today for the following health issues:    HPI   Acute Illness   Acute illness concerns: Cough  Onset: 1 week    Fever: no     Chills/Sweats: no     Headache (location?): YES    Sinus Pressure:YES    Conjunctivitis:  no    Ear Pain: no    Rhinorrhea: no     Congestion: YES    Sore Throat: YES     Cough: YES-productive of green sputum, with shortness of breath    Wheeze: no     Decreased Appetite: YES    Nausea: no     Vomiting: no     Diarrhea:  no     Dysuria/Freq.: no     Fatigue/Achiness: YES    Sick/Strep Exposure: YES- grandchild has a URI     Therapies Tried and outcome: Robitussin: mild relieve    Right BKA related to diabetes/neuropathy and infection.  Due to have visit to get prosethsis.  Will be going to [facility].      Reviewed and updated as needed this visit by Provider  Tobacco  Allergies  Meds  Problems  Med Hx  Surg Hx  Fam Hx         Review of Systems   ROS COMP: Constitutional, HEENT, cardiovascular, pulmonary, gi and gu systems are negative, except as otherwise noted.      Objective    /63 (BP Location: Left arm, Patient Position: Chair, Cuff Size: Adult Regular)   Pulse 98   Temp 98.1  F (36.7  C) (Oral)   Resp 18   LMP  (LMP Unknown)   SpO2 91%   Breastfeeding? No   There is no height or weight on file to calculate BMI.  Physical Exam   GENERAL: healthy, alert and no distress  EYES: Eyes grossly normal to inspection, PERRL and conjunctivae and sclerae normal  HENT: ear canals and TM's normal, nose and mouth without ulcers or lesions  NECK: no adenopathy, no asymmetry, masses, or scars and thyroid normal to palpation  RESP: rhonchi L upper posterior  CV: regular rate and rhythm, normal S1 S2, no S3 or S4, no murmur, click or rub, no peripheral edema and peripheral pulses strong  ABDOMEN: soft, nontender, no hepatosplenomegaly, no masses and bowel sounds normal  MS: right BKA    Diagnostic Test  Results:  Labs reviewed in Epic        Assessment & Plan     1. Status post below knee amputation of right lower extremity (H)  Okay for prosthesis fitting and wear.    2. LRTI (lower respiratory tract infection)  Discussed abx verse observe given extensive antibiotic exposure history, patient opted for abx today. Treat with doxycycline.  - doxycycline hyclate (VIBRAMYCIN) 100 MG capsule; Take 1 capsule (100 mg) by mouth 2 times daily for 10 days  Dispense: 20 capsule; Refill: 0    3. Screening for malignant neoplasm of cervix  Recommended and patient to schedule      The uses and side effects, including black box warnings as appropriate, were discussed in detail.  All patient questions were answered.  The patient was instructed to call immediately if any side effects developed.      Return in about 2 weeks (around 6/24/2019) for Annual Exam.    Brionna Dc MD  [facility]

## 2019-07-09 NOTE — TELEPHONE ENCOUNTER
"Requested Prescriptions   Pending Prescriptions Disp Refills     ranitidine (ZANTAC) 300 MG tablet [Pharmacy Med Name: RANITIDINE  MG Tablet] 90 tablet 0     Sig: TAKE 1 TABLET EVERY DAY         Last Written Prescription Date:  3/28/19  Last Fill Quantity: 90,  # refills: 0   Last Office Visit with [facility] or [facility] prescribing provider:  6/10/19   Future Office Visit:         H2 Blockers Protocol Passed - 7/9/2019  2:34 PM        Passed - Patient is age 12 or older        Passed - Recent (12 mo) or future (30 days) visit within the authorizing provider's specialty     Patient had office visit in the last 12 months or has a visit in the next 30 days with authorizing provider or within the authorizing provider's specialty.  See \"Patient Info\" tab in inbasket, or \"Choose Columns\" in Meds & Orders section of the refill encounter.              Passed - Medication is active on med list        amitriptyline (ELAVIL) 50 MG tablet [Pharmacy Med Name: AMITRIPTYLINE HYDROCHLORIDE 50 MG Tablet] 90 tablet 0     Sig: TAKE 1 TABLET AT BEDTIME         Last Written Prescription Date:  5/8/19  Last Fill Quantity: 90,  # refills: 0   Last Office Visit with [facility], [facility] or [facility] prescribing provider:  6/10/19   Future Office Visit:         Tricyclic Agents ( Annual appt and no PHQ9) Passed - 7/9/2019  2:34 PM        Passed - Blood Pressure under 140/90 in past 12 mos     BP Readings from Last 3 Encounters:   06/10/19 110/63   05/20/19 108/63   05/15/19 137/85                 Passed - Recent (12 mo) or future (30 days) visit within authorizing provider's specialty     Patient had office visit in the last 12 months or has a visit in the next 30 days with authorizing provider or within the authorizing provider's specialty.  See \"Patient Info\" tab in inbasket, or \"Choose Columns\" in Meds & Orders section of the refill encounter.              Passed - Medication is active on med list        Passed - Patient is age 18 or older    "     Passed - Patient is not pregnant        Passed - No positive pregnancy test on record in past 12 mos              David Faarax  [facility]

## 2019-07-10 NOTE — TELEPHONE ENCOUNTER
"Requested Prescriptions   Pending Prescriptions Disp Refills     amitriptyline (ELAVIL) 50 MG tablet 90 tablet 0     Sig: Take 1 tablet (50 mg) by mouth At Bedtime       Tricyclic Agents ( Annual appt and no PHQ9) Passed - 7/9/2019  6:25 PM        Passed - Blood Pressure under 140/90 in past 12 mos     BP Readings from Last 3 Encounters:   06/10/19 110/63   05/20/19 108/63   05/15/19 137/85                 Passed - Recent (12 mo) or future (30 days) visit within authorizing provider's specialty     Patient had office visit in the last 12 months or has a visit in the next 30 days with authorizing provider or within the authorizing provider's specialty.  See \"Patient Info\" tab in inbasket, or \"Choose Columns\" in Meds & Orders section of the refill encounter.              Passed - Medication is active on med list        Passed - Patient is age 18 or older        Passed - Patient is not pregnant        Passed - No positive pregnancy test on record in past 12 mos        amitriptyline (ELAVIL) 50 MG tablet  Last Written Prescription Date:  5/8/19  Last Fill Quantity: 90,  # refills: 0   Last office visit: 6/10/2019 with prescribing provider:  DARREN   Future Office Visit:      "

## 2019-07-10 NOTE — TELEPHONE ENCOUNTER
"Requested Prescriptions   Pending Prescriptions Disp Refills     metoprolol succinate ER (TOPROL-XL) 25 MG 24 hr tablet 90 tablet 1     Sig: Take 1 tablet (25 mg) by mouth daily       Beta-Blockers Protocol Passed - 7/9/2019  6:25 PM        Passed - Blood pressure under 140/90 in past 12 months     BP Readings from Last 3 Encounters:   06/10/19 110/63   05/20/19 108/63   05/15/19 137/85                 Passed - Patient is age 6 or older        Passed - Recent (12 mo) or future (30 days) visit within the authorizing provider's specialty     Patient had office visit in the last 12 months or has a visit in the next 30 days with authorizing provider or within the authorizing provider's specialty.  See \"Patient Info\" tab in inbasket, or \"Choose Columns\" in Meds & Orders section of the refill encounter.              Passed - Medication is active on med list        ranitidine (ZANTAC) 300 MG tablet 90 tablet 0     Sig: TAKE 1 TABLET (300 MG) BY MOUTH DAILY       H2 Blockers Protocol Passed - 7/9/2019  6:25 PM        Passed - Patient is age 12 or older        Passed - Recent (12 mo) or future (30 days) visit within the authorizing provider's specialty     Patient had office visit in the last 12 months or has a visit in the next 30 days with authorizing provider or within the authorizing provider's specialty.  See \"Patient Info\" tab in inbasket, or \"Choose Columns\" in Meds & Orders section of the refill encounter.              Passed - Medication is active on med list        umeclidinium-vilanterol (ANORO ELLIPTA) 62.5-25 MCG/INH oral inhaler 1 Inhaler 2     Sig: Inhale 1 puff into the lungs daily       Asthma Maintenance Inhalers - Anticholinergics Failed - 7/9/2019  6:25 PM        Failed - Asthma control assessment score within normal limits in last 6 months     Please review ACT score.           Passed - Patient is age 12 years or older        Passed - Medication is active on med list        Passed - Recent (6 mo) or " "future (30 days) visit within the authorizing provider's specialty     Patient had office visit in the last 6 months or has a visit in the next 30 days with authorizing provider or within the authorizing provider's specialty.  See \"Patient Info\" tab in inbasket, or \"Choose Columns\" in Meds & Orders section of the refill encounter.            metoprolol succinate ER (TOPROL-XL) 25 MG 24 hr tablet  Last Written Prescription Date:  3/28/19  Last Fill Quantity: 90,  # refills: 1   Last office visit: 6/10/2019 with prescribing provider:  BRANDI Swartz   Future Office Visit:      ranitidine (ZANTAC) 300 MG tablet  Last Written Prescription Date:  3/28/19  Last Fill Quantity: 90,  # refills: 0   Last office visit: 6/10/2019 with prescribing provider:  BRANDI Swartz   Future Office Visit:      umeclidinium-vilanterol (ANORO ELLIPTA) 62.5-25 MCG/INH oral inhaler  Last Written Prescription Date:  3/28/19  Last Fill Quantity: 1,  # refills: 2   Last office visit: 6/10/2019 with prescribing provider:  BRANDI Swartz   Future Office Visit:      No flowsheet data found.      "

## 2019-07-10 NOTE — TELEPHONE ENCOUNTER
"Requested Prescriptions   Pending Prescriptions Disp Refills     aspirin (ASA) 81 MG tablet  Last Written Prescription Date:  07/05/18  Last Fill Quantity: 100,  # refills: 3   Last Office Visit with [facility] or [facility] prescribing provider:  06/10/19-[facility]   Future Office Visit:    100 tablet 3     Sig: Take 1 tablet (81 mg) by mouth daily       Analgesics (Non-Narcotic Tylenol and ASA Only) Failed - 7/9/2019  6:25 PM        Failed - Recent (12 mo) or future (30 days) visit within the authorizing provider's specialty     Patient had office visit in the last 12 months or has a visit in the next 30 days with authorizing provider or within the authorizing provider's specialty.  See \"Patient Info\" tab in inbasket, or \"Choose Columns\" in Meds & Orders section of the refill encounter.              Passed - Patient is age 20 years or older     If ASA is flagged for ages under 20 years old. Forward to provider for confirmation Ryes Syndrome is not a concern.              Passed - Medication is active on med list          "

## 2019-07-11 NOTE — TELEPHONE ENCOUNTER
Different mail order pharmacy being requested for refill than previously sent to.    Prescription approved per [facility] Refill Protocol.      Yvan Aguirre RN, BSN, PHN

## 2019-07-12 NOTE — TELEPHONE ENCOUNTER
Routing refill request to provider for review/approval because:  Labs out of range:  ACT  HTN not discussed at most recent OV - please advise.    Camilla Demarco, BSN, RN, PHN

## 2019-07-12 NOTE — TELEPHONE ENCOUNTER
Prescription approved per [facility] Refill Protocol.      Yvan Aguirre RN, BSN, PHN

## 2019-07-29 NOTE — TELEPHONE ENCOUNTER
[facility] Call Center    Phone Message    May a detailed message be left on voicemail: yes    Reason for Call: Medication Refill Request    Has the patient contacted the pharmacy for the refill? Yes   Name of medication being requested: Diabetic Testing Supplies  Provider who prescribed the medication: Toya Nuno  Pharmacy: [facility]  Date medication is needed: ASAP    Please fax new Rx to [phone]. If there are any questions or concerns, please follow up at [phone]      Action Taken: Message routed to:  [facility] ([facility] Najma

## 2019-07-29 NOTE — TELEPHONE ENCOUNTER
LVM for patient. She has had 6 no shows since her appt in Feb 2018. She needs to speak with management before being scheduled again or she will no longer receive any refills from the endocrine team. Will formulate letter to be sent to patient as well.

## 2019-07-29 NOTE — LETTER
July 29, 2019       TO: Alessandra Pak  [address]  [place] [address]         Dear Ms. Pak,    We missed you at your last appointment at [facility]. According to our records, you have missed six appointments without providing notice. If our records are incorrect, please let us know.    Prior to scheduling your next appointment with us, you will need to speak to the clinic manager. Please call the clinic at [phone] and ask to speak to the clinic manager to discuss the next steps for your care.       Sincerely,      [facility]      CC:  Brionna Calabrese   No ref. provider found

## 2019-08-02 NOTE — TELEPHONE ENCOUNTER
Called [facility] to gather more information. They state that the patient was seen by them and that the patient feels ready to go ahead with a Prosthetic leg. They will fax over the office notes to my direct fax. They state that they need a Rx DME for a Lower Limb below the knee prosthetic. I will place notes in Dr Mary Dc's basket for her return on Monday, 8/5/19.  Beatriz Alfredo MA/  For [employer] and Kristen

## 2019-08-02 NOTE — TELEPHONE ENCOUNTER
Reason for Call:  Other prescription    Detailed comments: Please Fax RX for prostetic leg to [phone]    Phone Number can be reached at: [facility] [phone]    Best Time: any    Can we leave a detailed message on this number? YES    Call taken on 8/2/2019 at 4:11 PM by Leelee Bush

## 2019-08-07 NOTE — TELEPHONE ENCOUNTER
Faxed Rx DME Lower Leg Prosthetic to [facility] [phone], right fax confirmed at 11:56 am today, 8/7/19. In TC copy basket.  Beatriz Alfredo MA/  For Teams Spirit and Kristen

## 2019-12-17 NOTE — PROGRESS NOTES
"[facility], [place]    Internal Medicine Progress Note - [facility] Service    Main Plans for Today    -continue vanc/cefepime/metronidazole  -hold insulin for NPO incl TFs for surgery  -pt refuse lexiscan, no further workup needed, no evidence of ICM on TTE, volume status optimized  -Plans for surgery 6/28 on [place]  -NPO @ [facility]    Assessment & Plan   Alessandra Pak is a 52 year old female admitted on 6/6/2018. 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 Hospital course was complicated by respiratory failure due to ARDS requiring intubation (6/10-15), extubated and transferred to medicine for further cares.    # Type 2 Diabetes  # Hypoglycemia, resolved  # Hx of Chronic pancreatitis s/p whipple's  # Severe malnutrition in the context of acute illness  Under control of hyperglycemia on 7 units, no hypoglycemia.  Will hold patient's afternoon glargine and ssi as patient will be prolonged n.p.o. for OR tomorrow.  Patient states that her hunger has improved with cycling tube feeds, taking ~1000 kcal per day at this time.  -hold glargine 7u, med ssi  -cycle TFs to try to stimulate appetite, calorie counts, goal \"~ 1200 kcals and 50 gm protein per day\"  -continue to monitor, ensure patient able to maintain normo to slight hyperglycemia on PO as weaning from TFs  -pantoprazole PO qd  -throat lozenge and spray    TF regimen: Nutren 1.5 @ 50 ml/hr, (1200 ml/day)  Provides: 1800 kcals (32+), 82 gm PRO (1.4+), 912 mL H2O, 211 gm CHO and 0 fiber.   Water flushes: 50 ml every 2 hours    # Anxiety  Anxiety improving. Patient remains redirectable and calms with reassurance. Patient on amitriptyline, prefer to avoid benzos, SSRIs unlikely to have benefit acutely. Health psychology " .  Chief Complaint   Patient presents with    Cold Symptoms     since 12/12     . Visit Vitals  BP (!) 165/128 (BP 1 Location: Right arm, BP Patient Position: Sitting)   Pulse (!) 106   Temp 97.6 °F (36.4 °C)   Resp 18   Ht 5' 4\" (1.626 m)   Wt 172 lb (78 kg)   SpO2 95%   BMI 29.52 kg/m²     . Elsie Rizo has been helpful, continue visits.  -continue regular reassurance  -consider low dose hydroxyzine if necessary    # Severe Sepsis, resolved  # R diabetic foot infection, s/p I&D x 3, Ongoing Osteomyelitis  Patient mentally ready for amputation, discussions with therapy and health psychology have been helpful.  Surgery not emergent or urgent per orthopedics.  Given life-threatening nature of diabetic foot infection and inconsistent follow-up, amputation would likely still be the best course as patient is also opposed to long-term IV antibiotics and would need close follow-up.  -appreciate ortho and ID input  -change to vanc/cefepime/metronidazole for renal protection. Per ID, stop abx completely following amputation  -Plans for surgery 6/28 on [place]  -NPO @ [facility]  -qod CRP, stabilized    # Preop  Patient is class III risk per revised cardiac index (CHF history, insulin use). Patient cannot perform 4 METS of activity at baseline.  TTE performed 6/9/18, EF 50%, no valvular abnormalities. Will require further evaluation with EKG as well as possible stress imaging (last angiogram with mild nonobstructive CAD in 2014).   -Dobutamine echo cancelled due to EF 33% down from prior 55% earlier during admission (10-15% in 2015)  -Cardiology consulted for assistance with cardiovascular optimization prior to surgery, recommended Lexiscan, patient refused.  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  Improved volume status on exam, crackles improved, S3 resolved, lower extremity edema resolved.  Patient continues to tolerate  room air at rest.  -hold 40 mg PO furosemide today, increased tachycardia though cr, CO2, BUN remain stable  -Daily weights, strict I/O  -wean O2 as tolerated, goal >88%    # Normocytic Anemia  # Anemia of chronic disease  Insufficient reticulocyte response on 6/10 noted.  Ferritin elevated.  Status post 3 units PRBC (6/17, 6/14, 6/13). Appropriate response. No bleeding observed.  -continue to monitor    # Diarrhea, resolved  Notes worsening with creon. Neg infectious studies.  -Continue to monitor    # Hypernatremia, resolved  Improved with increased  cc q2h (146). Now with PO intake.  -monitor    # Chronic pain  Discussed patient's methadone with pharmacy we do not note interaction with patient's current antibiotics (though would have interaction with linezolid if used in the future).  No empiric changes to methadone for anticipated body weight changes following amputation at this time.  - Methadone 70 qday  - Pregabalin  - Amitriptyline  - d/c oxycodone, pt has not been using    # Adrenal insufficiency  S/p stress dose steroid taper, complete 6/18.    # NIKOLAY on CKD, resolved  - monitor  - abx change as above    # Anisocoria/R afferent pupillary defect  - stable      # Pain Assessment:  Current Pain Score 6/27/2018   Patient currently in pain? no   Pain score (0-10) -   Pain location -   Pain descriptors -   CPOT pain score -   - Alessandra is experiencing pain due to Chronic pain as well as recent surgical procedure, NG. Pain management was discussed and the plan was created in a collaborative fashion.  Alessandra's response to the current recommendations: engaged  - Please see the plan for pain management as documented above        Diet: Room Service  Regular Diet Adult  Adult Formula Drip Feeding: Continuous Nutren 1.5; Nasoduodenal tube; Goal Rate: 80; mL/hr; From: 8:00 PM; 10:00 AM; Medication - Tube Feeding Flush Frequency: At least 15-30 mL water before and after medication administration and with tube  cloggi...  Snacks/Supplements Adult: Magic Cup; Between Meals  Snacks/Supplements Adult: Magic Cup; With Meals  NPO for Medical/Clinical Reasons Except for: Meds  Fluids: Free water flushes as aboe  DVT Prophylaxis: hold for surgery  Code Status: Full Code    Disposition Plan   Expected discharge: 4 - 7 days, recommended to transitional care unit once antibiotic plan established, safe disposition plan/ TCU bed available and post-amputation rehab needs established.     Entered: Alireza Chaparro 06/27/2018, 3:36 PM   Information in the above section will display in the discharge planner report.      Alireza Chaparro  [facility]  Maroon: 5  Pager: [phone]  Please see sticky note for cross cover information    Interval History   No acute events.  Ortho stopped by in the AM to chat with patient. Patient states that edema continues to improve, no sob. R foot pain well controlled.    Physical Exam   Vital Signs: Temp: 99  F (37.2  C) Temp src: Oral BP: 135/74 Pulse: 100 Heart Rate: 100 Resp: 18 SpO2: 92 % O2 Device: None (Room air)    Weight: 124 lbs 0 oz  General Appearance: Pleasant, mildly anxious, lying flat in bed  Respiratory: Crackles in the bases bilaterally improved, breathing comfortably  Cardiovascular: Normal rate, regular rhythm, no S3, no murmur rubs  GI: Abdomen is soft, nontender, nondistended no acute changes noted.  Skin: no rashes or jaundice, R foot dressed  Other: Alert and oriented x4, speech fluent, no LE edema, wwp        Data   Medications     IV fluid REPLACEMENT ONLY Stopped (06/14/18 1319)     IV fluid REPLACEMENT ONLY Stopped (06/12/18 2205)       acetaminophen  1,000 mg Oral TID     amitriptyline  50 mg Oral At Bedtime     amylase-lipase-protease  2 capsule Oral TID w/meals     ceFEPIme (MAXIPIME) IV  2 g Intravenous Q12H     fluticasone  2 spray Left nostril Daily     heparin lock flush  5-10 mL Intracatheter Q24H     heparin  5,000 Units Subcutaneous Q12H     lisinopril  10 mg  Oral Daily     melatonin  6 mg Oral At Bedtime     methadone  70 mg Oral Daily     metroNIDAZOLE  500 mg Intravenous Q6H     multivitamins with minerals  15 mL Per Feeding Tube Daily     omeprazole  20 mg Oral Daily     pregabalin  75 mg Oral TID     protein modular  1 packet Per Feeding Tube BID 09 12     vancomycin (VANCOCIN) IV  1,000 mg Intravenous Q24H     Data     Recent Labs  Lab 06/27/18  0709 06/26/18  0540 06/25/18  0552 06/24/18  1556  06/21/18  1659 06/21/18  0555   WBC 7.6  --   --   --   --   --  8.4   HGB 9.0*  --   --   --   --   --  7.8*   MCV 85  --   --   --   --   --  84     --   --  455*  --  582* 488*    138 139  --   < >  --  140   POTASSIUM 4.2 4.4 4.6  --   < >  --  4.4   CHLORIDE 104 104 104  --   < >  --  103   CO2 26 27 28  --   < >  --  31   BUN 31* 31* 32*  --   < >  --  30   CR 1.17* 1.25* 1.29*  --   < >  --  1.09*   ANIONGAP 7 8 7  --   < >  --  5   RICK 8.3* 8.6 8.9  --   < >  --  8.2*   * 230* 178*  --   < >  --  106*   ALBUMIN  --  1.9*  --   --   --   --   --    < > = values in this interval not displayed.  No results found for this or any previous visit (from the past 24 hour(s)).

## 2020-10-12 NOTE — PROGRESS NOTES
Dr. Pinto ordered 1 unit of IV insulin. Given at 1310. MDa said the glucose will be checked intra procedure.    full weight-bearing

## 2021-04-27 ENCOUNTER — MEDICAL CORRESPONDENCE (OUTPATIENT)
Dept: HEALTH INFORMATION MANAGEMENT | Facility: CLINIC | Age: 56
End: 2021-04-27

## 2022-05-18 NOTE — PLAN OF CARE
Problem: Patient Care Overview  Goal: Plan of Care/Patient Progress Review  Discharge Planner PT   Patient plan for discharge: rehab  Current status: Pt remains non-compliant with weight bearing restrictions, despite education. Pt anxious and emotional throughout session d/t scheduled R BKA tomorrow. Patient educated on squat pivot transfers and wc mobility in prep for future mobility; pt able to propel self at best 200 feet.   Barriers to return to prior living situation: level of assist, medical stability  Recommendations for discharge: anticipate need for rehab at discharge; will update recs s/p BKA         Entered by: Mindy Nugent 06/27/2018 4:13 PM          Patient/Caregiver provided printed discharge information.

## 2022-10-10 NOTE — TELEPHONE ENCOUNTER
Physical Therapy Visit    Visit Type: Daily Treatment Note  Visit: 5  Referring Provider: Monique Foster MD  Medical Diagnosis (from order): Diagnosis Information    Diagnosis  719.47 (ICD-9-CM) - M25.579 (ICD-10-CM) - Ankle joint pain         SUBJECTIVE                                                                                                               After last session, I felt better the next day.  I even felt it up into my right jaw.  I could walk easier.  Icing frequently as needed.  Right foot when I am standing will still feel like it wants to turn in.  Right toes are not as numb and will be good for several days but today a little more numb.  I still am concerned with limited mobility in right hip.  Outer right leg all the way from foot to hip.  I did start using magnesium oil today on my lower leg.      Functional Change: As above.  Only walking 5 minutes/day        OBJECTIVE                                                                                                                                       Treatment     Therapeutic Exercise  Trial of single leg heel rise x 5 each with fair tolerance on left.  Advised to trial fabrizio heel rise and eccentric lowering of each leg and build to 30 reps/day   Reinforced walking 5 minutes 2 time/day to build back up to 20 minutes as prior to this injury-pt voices some apprehension in overdoing it     Manual Therapy   inferior glide right hip 20 sec x 6 in supine   Lateral distraction right hip 20 sec x 6 in supine  Long axis traction right leg for myofascial release     Skilled input: verbal instruction/cues and tactile instruction/cues    Writer verbally educated and received verbal consent for hand placement, positioning of patient, and techniques to be performed today from patient for hand placement and palpation for techniques and clothing adjustments for techniques as described above and how they are pertinent to the patient's plan of  Refused back noting to see the 6/2/17 Rx.    Stacy Nair RN, [facility] Triage     care.    Home Exercise Program  Access Code: [email or web address]  URL: [email or web address]  Date: 10/10/2022  Prepared by: Rose Marie Ochoa    Exercises  · Seated Plantar Fascia Stretch - 1 x daily - 7 x weekly - 2 sets - 10 reps - 10 hold  · Gastroc Stretch on Wall - 1 x daily - 7 x weekly - 1 sets - 3-5 reps - 30 hold  · Long Sitting Calf Stretch with Strap - 1 x daily - 7 x weekly - 1 sets - 3 reps - 15-30 hold  · Seated Piriformis Stretch with Trunk Bend - 1 x daily - 7 x weekly - 1 sets - 3 reps - 15-30 hold  · Supine Figure 4 Piriformis Stretch - 1 x daily - 7 x weekly - 1 sets - 3-5 reps - 15-30 hold  · Supine Single Bent Knee Fallout - 1 x daily - 7 x weekly - 1 sets - 10 reps  · ITB Stretch at Wall - 1 x daily - 7 x weekly - 1 sets - 3 reps - 15-30 hold  · Seated Flexion Stretch - 1 x daily - 7 x weekly - 1 sets - 3 reps  · Child's Pose Stretch - 1 x daily - 7 x weekly - 1 sets - 3 reps  · Standing Heel Raises - 2-3 x daily - 7 x weekly - 1 sets - 10 reps-fabrizio heel up and one leg down    increase walking to 2 5 minute walks/day         ASSESSMENT                                                                                                            Patient responding well to manual work right leg.  Patient apprehensive regarding increasing walking tolerance as she flares easily.     Patient Education:   Results of above outlined education: Verbalizes understanding and Demonstrates understanding    PLAN                                                                                                                           Suggestions for next session as indicated: Progress per plan of care, manual prn for joint/tissue mobility, possible self traction of right leg with use of door jam/belt, trial seated BAPS board with weights right foot, single leg stance/tandem stance        Therapy procedure time and total treatment time can be found documented on the Time Entry flowsheet
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
## 2022-10-26 NOTE — PATIENT INSTRUCTIONS
At [facility], we strive to deliver an exceptional experience to you, every time we see you.  If you receive a survey in the mail, please send us back your thoughts. We really do value your feedback.    Based on your medical history, these are the current health maintenance/preventive care services that you are due for (some may have been done at this visit.)  Health Maintenance Due   Topic Date Due     EYE EXAM  09/23/2016     ZOSTER IMMUNIZATION (1 of 2) 07/18/2017     PAP  12/28/2017     PHQ-9  02/01/2019     MICROALBUMIN  02/05/2019     DIABETIC FOOT EXAM  02/27/2019         Suggested websites for health information:  Www.[place].org : Up to date and easily searchable information on multiple topics.  Www.medlineplus.gov : medication info, interactive tutorials, watch real surgeries online  Www.familydoctor.org : good info from the Academy of Family Physicians  Www.cdc.gov : public health info, travel advisories, epidemics (H1N1)  Www.aap.org : children's health info, normal development, vaccinations  Www.health.[facility].us : [place] dept of health, public health issues in [place], N1N1    Your care team:                            Family Medicine Internal Medicine   MD Chandana Mcdaniel MD Shantel Branch-Fleming, MD Katya Georgiev PA-C Nam Ho, MD Pediatrics   ROMIE Colby, MD Sulma Nicolas CNP, MD Deborah Mielke, MD Kim Thein, APRN [facility]      Clinic hours: Monday - Thursday 7 am-7 pm; Fridays 7 am-5 pm.   Urgent care: Monday - Friday 11 am-9 pm; Saturday and Sunday 9 am-5 pm.  Pharmacy : Monday -Thursday 8 am-8 pm; Friday 8 am-6 pm; Saturday and Sunday 9 am-5 pm.     Clinic: [phone]   Pharmacy: [phone]     oriented to person, place and time

## 2023-10-25 NOTE — PROGRESS NOTES
[facility] ride was made by unit HUC for tomorrow 7/21 @ 4:00 pm. SW will continue to follow and assist as needed.    TIERA Turcios  [facility]   P: [phone]  Pgr: [phone]     Have Your Skin Lesions Been Treated?: not been treated Is This A New Presentation, Or A Follow-Up?: Skin Lesions How Severe Is Your Skin Lesion?: mild

## 2024-04-09 NOTE — PLAN OF CARE
Problem: Patient Care Overview  Goal: Plan of Care/Patient Progress Review  PT 5B: pt off unit upon attempt x 2; will reschedule to 6/27.       

## 2024-11-18 NOTE — PROGRESS NOTES
[facility] ID SERVICE: ONGOING CONSULTATION   Alessandra Pak : 1965 Sex: female:   Medical record number [id number] Attending Physician: Steven Morse MD  Date of Service: 2018    PROBLEM LIST:   1) R foot ulcer with large plantar fluid collection - s/p bedside I&D 18  2) CT findings concerning for pneumonia - no longer hypoxic, no symptoms of pneumonia  3) Secondary diabetes s/p Whipple surgery with hyperglycemia  4) Hx of NICM, HTN, COPD, chronic pancreatitis, depression    RECOMMENDATIONS:   1) Discontinue Piperacillin-Tazobactam  2) Continue Vancomycin IV (goal trough 15-20) -plan to continue up until her ID appointment on 3/12.   3) follow-up in ID clinic with Dr. Madison on , at 12:30pm. Please inform patient of this appointment on her discharge summary  4) Please obtain weekly CBC, CMP, CRP and vancomycin trough with results faxed to [facility], attn: Dr. Madison    DISCUSSION:   Ms. Pak is a 53yo woman with a history of NICM, HTN, COPD, GERD, depression, chronic pancreatitis s/p pylorus sparing Whipple with secondary diabetes who was admitted on 2018 with hypoxia and fever. Imaging was negative for PE, but suggestive of pneumonia. However, on exam patient denies cough or ongoing dyspnea and is now back on room air suggesting she may not have actually had a pneumonia.    The more pressing issue is her foot infection. MRI was obtained and showed a large fluid collection about her second MTP joint which is almost 6cm AP on the plantar aspect. The concern was that her clinical picture represented insufficient source control, she had a repeat bedside I&D done  with dimitri purulence. Ortho feels repeat I&D will not likely be necessary. Would continue Vancomycin IV for at least 3 weeks and follow-up in clinic at that time for duration determination and consideration of conversion to oral antibiotics pending her clinical course, with a goal  Okay to recheck in 1 month if he prefers. Thank you!   "of 6 weeks of treatment, IV upfront followed by an oral tail. Will need weekly labs as outlined above - please have the labs faxed to [facility], attn: Dr. Madison.     Above discussed with Infectious Diseases Staff, Dr. Hoda Madison.     Esperanza Gilman D.O.  [facility] ID Fellow  [phone]    Attestation:  I have reviewed today's vital signs, medications, labs and imaging.  Floor time: 25 minutes, Face-to-face time: 10 minutes, Total time: 35 minutes    Alessandra Pak was seen in the hospital by Hoda Madison on 02/22/2018, with the fellow, Dr. Esperanza Gilman MD. [facility] Infectious Diseases Fellow. I reviewed the history & exam. Assessment and plan were jointly made.  I agree with and have edited the fellow's note and plan of care.      Hoda Madison MD.  ID Staff  [id number]           CHIEF INFECTIOUS DISEASES COMPLAINT:  Diabetic foot infection with ongoing fevers    INTERVAL HISTORY:   Patient underwent bedside I&D by Ortho yesterday with evacuation of significant amount of pus. Patient feels the area looks greatly improved, but having some pain now. No fevers or chills. WBC improved to 13.8 from 18.1.     ROS: A five-point review of systems was obtained and was negative with the exception of that which is described above.    Allergies   Allergen Reactions     No Known Drug Allergies    Allergies were reviewed.    No current outpatient prescriptions on file.     CURRENT ANTI-INFECTIVES:   Vancomycin  Piperacillin-Tazobactam     EXAMINATION:   /89 (BP Location: Left arm)  Pulse 115  Temp 98.3  F (36.8  C) (Oral)  Resp 18  Ht 1.753 m (5' 9\")  Wt 63 kg (139 lb)  SpO2 97%  BMI 20.53 kg/m2  GENERAL:  Well-developed, well-nourished, sitting up at edge of bed in mild/moderate pain.   EYES:  Eyes have anicteric sclerae without conjunctival injection.  NECK:  Supple. No cervical lymphadenopathy.  LUNGS:  Normal respiratory effort. Faint bibasilar " crackles.  CARDIOVASCULAR:  Tachycardic, S3 present, 3/6 systolic ejection murmur  ABDOMEN:  Normal bowel sounds, soft, nontender. No appreciable hepatosplenomegaly.  SKIN:  R foot with I&D sites on dorsum of foot with small amount of pus, no surrounding fluctuance, dorsal erythema and edema slightly improved, plantar aspect of the foot with small incision site and wick in place with no surrounding fluctuance or expressible pus, ~1-2cm debrided area over the plantar medial 1st metatarsal head with clean appearing base.  NEUROLOGIC:  Grossly nonfocal. Active x4 extremities.  PSYCH: Appropriate affect. Alert and oriented to person, place and time.  CURRENT LINES: L arm PIV    NEW DATA/RESULTS:   All interval basic labs, microbiology results and imaging were reviewed.    Culture Micro   Date Value Ref Range Status   02/19/2018 No growth after 3 days  Preliminary   02/19/2018 No growth after 3 days  Preliminary   02/19/2018   Final    Canceled, Test credited  Incorrectly ordered by PCU/Clinic  Test reordered as correct code  Tissue culture     02/19/2018 Culture negative monitoring continues  Preliminary   02/19/2018 (A)  Final    Light growth  Methicillin resistant Staphylococcus aureus (MRSA)     02/19/2018 (A)  Final    Light growth  Coagulase negative Staphylococcus  Susceptibility testing not routinely done  This organism is part of normal jim, but on occasion, may be a true pathogen. Clinical   correlation must be applied to interpreting this microbiology result.     02/19/2018   Final    Critical Value/Significant Value called to and read back by  NESHA HORTON RN ([id number]).  02.21.18 2206 GJS         Recent Labs   Lab Test  02/22/18   0636  02/20/18   0633  02/18/18   1321  02/17/18   0643  02/16/18   1528  02/10/16   1229   CRP  200.0*  260.0*  290.0*  280.0*  290.0*  19.0*     Recent Labs   Lab Test  02/22/18   0636  02/20/18   0633  02/18/18   1321  02/17/18   0643  02/16/18   1528  02/05/18   1234   WBC  13.8*   18.1*  19.0*  19.8*  22.3*  10.6     Recent Labs   Lab Test  02/22/18   0636  02/20/18   0633  02/18/18   1321  02/17/18   0643   CR  1.25*  1.09*  1.19*  1.23*  1.22*   GFRESTIMATED  45*  53*  48*  46*  47*       Hematology Studies  Recent Labs   Lab Test  02/22/18   0636  02/20/18   0633  02/18/18   1321  02/17/18   0643  02/16/18   1528  02/05/18   1234  02/10/16   1229   02/15/15   0544   02/13/15   1244   10/30/14   0445   10/28/14   2215   WBC  13.8*  18.1*  19.0*  19.8*  22.3*  10.6  9.2   < >  11.1*   < >  13.3*   < >  8.0   < >  11.0   ANEU   --    --    --    --   19.6*   --   4.5   --   8.1   --   9.8*   --   5.5   --   8.7*   AEOS   --    --    --    --   0.0   --   0.4   --   0.3   --   0.3   --   0.5   --   0.1   HCT  27.5*  30.7*  32.1*  31.5*  35.6  38.8  37.2   < >  30.3*   < >  32.7*   < >  30.7*   < >  28.1*   PLT  382  295  261  259  288  288  201   < >  184   < >  150   < >  235   < >  219    < > = values in this interval not displayed.       Metabolic  Recent Labs   Lab Test  02/22/18   0636  02/20/18   0633  02/18/18   1321   NA  138  135  134   BUN  17  16  22   CO2  26  26  26   CR  1.25*  1.09*  1.19*  1.23*   GFRESTIMATED  45*  53*  48*  46*       Hepatic Studies  Recent Labs   Lab Test  02/16/18   1528  06/21/17   1528  02/10/16   1229   BILITOTAL  0.6  0.3  0.2   ALKPHOS  212*  252*  217*   ALBUMIN  2.8*  3.1*  3.2*   AST  22  25  22   ALT  14  38  36       Immunologlobulins  Recent Labs   Lab Test  02/21/15   0652  02/15/15   1520  02/15/15   0240   IGG  1330   --    --    IGE   --   46   --    SED   --    --   132*       IMAGING RESULTS     Bilateral LE Duplex 2/16/2018  No evidence of deep venous thrombosis in either lower extremity.     NM V/Q Scan 2/17/2018  Impression: Pulmonary emboli is not present.     CT Chest w/o Contrast 2/17/2018  1. New groundglass opacities in the upper lobes bilaterally in a peribronchovascular distribution. Findings are concerning for pneumonia.  2.  Upper lobe predominant centrilobular and paraseptal emphysema.  3. Sequelae of chronic pancreatitis, including parenchymal calcifications.  4. Unchanged pneumobilia.      MRI Right Foot 2/18/2018  1. Ulcer defect on the plantar aspect of the foot which appears to communicate with a large fluid collection surrounding the second MTP joint and extending distally to the level of the distal aspect of the proximal phalanx and extending proximally to the proximal metatarsal diaphyseal region. It is difficult to sort out the joint capsule from pericapsular fluid and there could be significant distention of the joint capsule or extensive surrounding fluid with lobular margins maintained by surrounding soft tissues. Infected contents of this fluid collection and this could be relatively easily sampled with ultrasound guidance either from a dorsal or plantar approach.  2. Small joint effusions about the third through fifth MTP joints and more moderate joint effusion about the first MTP joint. Relatively unusual lack of enhancement about the synovium may be related to poor synovial vascularity but is nonspecific.  3. Cellulitis about the central compartment of the foot without other associated fluid collections. Edema/cellulitis about the dorsal soft tissues, medially from the dorsum of the first metatarsal and extending nearly to the plantar margin of the medial forefoot and laterally extending from the intertarsal region of the second and third metatarsals to the lateral margin of the foot almost to the plantar surface.  4. Fluid surrounding the extensor digitorum and flexor digitorum tendons, possibly reactive and/or tenosynovitis. The tendons themselves appear normal.  5. No MR evidence of osteomyelitis although this cannot be completely excluded about the second metatarsal head and MTP joint region. Questionable mild periosteal reaction about the plantar aspect of the proximal phalanx of the second toe, proximally at the  joint margin on the comparison radiograph.  6. The small dorsal fracture off the base of the middle phalanx of the fourth toe is poorly visualized.

## (undated) DEVICE — LINEN TOWEL PACK X6 WHITE 5487

## (undated) DEVICE — SOL WATER IRRIG 1000ML BOTTLE 2F7114

## (undated) DEVICE — SUCTION TIP YANKAUER STR K87

## (undated) DEVICE — Device

## (undated) DEVICE — INTRO SHEATH BRITE TIP 6FRX11CM 401-611M

## (undated) DEVICE — PACK LOWER EXTREMITY RIVERSIDE SOP32LEFSX

## (undated) DEVICE — INTRO SHEATH BRITE TIP 5FRX11CM 401-511M

## (undated) DEVICE — DRAPE SHEET REV FOLD 3/4 9349

## (undated) DEVICE — CLOSURE DEVICE VASCULAR MYNXGRIP 6F/7F GREEN MX6721

## (undated) DEVICE — GLOVE PROTEXIS W/NEU-THERA 7.5  2D73TE75

## (undated) DEVICE — PREP CHLORAPREP 26ML TINTED ORANGE  260815

## (undated) DEVICE — SUCTION MANIFOLD DORNOCH ULTRA CART UL-CL500

## (undated) DEVICE — TOURNIQUET CUFF 24" REPRO GREEN 60-7070-104

## (undated) DEVICE — BNDG ELASTIC 4"X5YDS STERILE 6611-4S

## (undated) DEVICE — BLADE SAW SAGITTAL STRK XSHORT 25X9.5X0.6MM 2108-145-000

## (undated) DEVICE — GLOVE PROTEXIS BLUE W/NEU-THERA 8.0  2D73EB80

## (undated) DEVICE — SOL NACL 0.9% IRRIG 1000ML BOTTLE 2F7124

## (undated) DEVICE — CAST PLASTER SPLINT 5X30" 7395

## (undated) DEVICE — ESU GROUND PAD ADULT W/CORD E7507

## (undated) DEVICE — DRAPE CONVERTORS U-DRAPE 60X72" 8476

## (undated) DEVICE — GLOVE PROTEXIS BLUE W/NEU-THERA 7.5  2D73EB75

## (undated) DEVICE — SPECIMEN CONTAINER 5OZ STERILE 2600SA

## (undated) DEVICE — SU SILK 2-0 TIE 12X30" A305H

## (undated) DEVICE — GLOVE PROTEXIS MICRO 7.5  2D73PM75

## (undated) DEVICE — SU SILK 0 TIE 6X30" A306H

## (undated) DEVICE — SU VICRYL 2-0 CT-2 27" UND J269H

## (undated) DEVICE — SU VICRYL 0 CT-1 CR 8X18" J740D

## (undated) DEVICE — GLOVE PROTEXIS BLUE W/NEU-THERA 7.0  2D73EB70

## (undated) DEVICE — STRAP UNIVERSAL POSITIONING 2-PIECE 4X47X76" 91-287

## (undated) DEVICE — SU SILK 2-0 SH 30" K833H

## (undated) DEVICE — SUCTION TIP YANKAUER W/O VENT K86

## (undated) DEVICE — GUIDEWIRE TERUMO .035X180 ANG GR3508

## (undated) DEVICE — GLIDEWIRE TERUMO .035X180 ANG STIFF GS3508

## (undated) DEVICE — APPLICATOR COTTON TIP 6"X2 STERILE LF 6012

## (undated) DEVICE — LINEN TOWEL PACK X30 5481

## (undated) DEVICE — SU PROLENE 2-0 SHDA 36" 8523H

## (undated) DEVICE — CATH ANGION PIGTAIL ACCU-VU 5FRX70CM SIZING 13709801

## (undated) DEVICE — SU ETHILON 3-0 PS-1 18" 1663H

## (undated) DEVICE — SU DERMABOND ADVANCED .7ML DNX12

## (undated) DEVICE — CUP AND LID 2PK 2OZ STERILE  SSK9006A

## (undated) DEVICE — BLADE KNIFE SURG 10 371110

## (undated) DEVICE — IMM LEG ELEVATOR 79-90191

## (undated) DEVICE — SPONGE LAP 18X18" X8435

## (undated) DEVICE — INFLATION DEVICE ENCORE  26 PRESSURE GAUGE M001151050

## (undated) DEVICE — KIT MICRO-INTRODUCER STIFFEN 4FR 7274V

## (undated) DEVICE — SU VICRYL 2-0 CT-1 27" UND J259H

## (undated) DEVICE — LINEN TOWEL PACK X5 5464

## (undated) DEVICE — DRAPE STOCKINETTE IMPERVIOUS 10" 21048

## (undated) DEVICE — STRAP KNEE/BODY 31143004

## (undated) DEVICE — DRSG ABDOMINAL 07 1/2X8" 7197D

## (undated) DEVICE — SU VICRYL 2-0 CT-2 8X18" UND D8144

## (undated) DEVICE — GOWN XLG DISP 9545

## (undated) DEVICE — DRSG ADAPTIC 3X16"  6114

## (undated) DEVICE — DRAPE MAYO STAND 23X54 8337

## (undated) DEVICE — DRSG XEROFORM 5X9" CUR253590W

## (undated) DEVICE — BNDG ELASTIC 6"X5YDS STERILE 6611-6S

## (undated) DEVICE — GLOVE PROTEXIS W/NEU-THERA 7.0  2D73TE70

## (undated) DEVICE — LIGHT HANDLE X1 31140133

## (undated) DEVICE — ESU GROUND PAD UNIVERSAL W/O CORD

## (undated) DEVICE — NDL COUNTER 20CT 31142493

## (undated) DEVICE — DEVICE MULTI TORQUE TD01

## (undated) RX ORDER — METOPROLOL TARTRATE 1 MG/ML
INJECTION, SOLUTION INTRAVENOUS
Status: DISPENSED
Start: 2018-06-26

## (undated) RX ORDER — SODIUM CHLORIDE, SODIUM LACTATE, POTASSIUM CHLORIDE, CALCIUM CHLORIDE 600; 310; 30; 20 MG/100ML; MG/100ML; MG/100ML; MG/100ML
INJECTION, SOLUTION INTRAVENOUS
Status: DISPENSED
Start: 2018-06-28

## (undated) RX ORDER — CEFAZOLIN SODIUM 2 G/100ML
INJECTION, SOLUTION INTRAVENOUS
Status: DISPENSED
Start: 2018-06-28

## (undated) RX ORDER — DEXAMETHASONE SODIUM PHOSPHATE 4 MG/ML
INJECTION, SOLUTION INTRA-ARTICULAR; INTRALESIONAL; INTRAMUSCULAR; INTRAVENOUS; SOFT TISSUE
Status: DISPENSED
Start: 2018-01-01

## (undated) RX ORDER — GLYCOPYRROLATE 0.2 MG/ML
INJECTION, SOLUTION INTRAMUSCULAR; INTRAVENOUS
Status: DISPENSED
Start: 2018-01-01

## (undated) RX ORDER — PROPOFOL 10 MG/ML
INJECTION, EMULSION INTRAVENOUS
Status: DISPENSED
Start: 2018-01-01

## (undated) RX ORDER — IOPAMIDOL 755 MG/ML
INJECTION, SOLUTION INTRAVASCULAR
Status: DISPENSED
Start: 2018-01-01

## (undated) RX ORDER — FENTANYL CITRATE 50 UG/ML
INJECTION, SOLUTION INTRAMUSCULAR; INTRAVENOUS
Status: DISPENSED
Start: 2018-06-28

## (undated) RX ORDER — LIDOCAINE HYDROCHLORIDE 10 MG/ML
INJECTION, SOLUTION EPIDURAL; INFILTRATION; INTRACAUDAL; PERINEURAL
Status: DISPENSED
Start: 2018-02-22

## (undated) RX ORDER — BUPIVACAINE HYDROCHLORIDE 5 MG/ML
INJECTION, SOLUTION EPIDURAL; INTRACAUDAL
Status: DISPENSED
Start: 2018-01-01

## (undated) RX ORDER — ONDANSETRON 2 MG/ML
INJECTION INTRAMUSCULAR; INTRAVENOUS
Status: DISPENSED
Start: 2018-01-01

## (undated) RX ORDER — ONDANSETRON 2 MG/ML
INJECTION INTRAMUSCULAR; INTRAVENOUS
Status: DISPENSED
Start: 2018-06-28

## (undated) RX ORDER — FENTANYL CITRATE 50 UG/ML
INJECTION, SOLUTION INTRAMUSCULAR; INTRAVENOUS
Status: DISPENSED
Start: 2018-01-01

## (undated) RX ORDER — DOBUTAMINE HYDROCHLORIDE 200 MG/100ML
INJECTION INTRAVENOUS
Status: DISPENSED
Start: 2018-06-26

## (undated) RX ORDER — LIDOCAINE HYDROCHLORIDE 10 MG/ML
INJECTION, SOLUTION EPIDURAL; INFILTRATION; INTRACAUDAL; PERINEURAL
Status: DISPENSED
Start: 2018-01-01

## (undated) RX ORDER — DOBUTAMINE HYDROCHLORIDE 200 MG/100ML
INJECTION INTRAVENOUS
Status: DISPENSED
Start: 2018-06-22

## (undated) RX ORDER — PROPOFOL 10 MG/ML
INJECTION, EMULSION INTRAVENOUS
Status: DISPENSED
Start: 2018-06-28

## (undated) RX ORDER — HYDROMORPHONE HYDROCHLORIDE 1 MG/ML
INJECTION, SOLUTION INTRAMUSCULAR; INTRAVENOUS; SUBCUTANEOUS
Status: DISPENSED
Start: 2018-06-28

## (undated) RX ORDER — LIDOCAINE HYDROCHLORIDE 20 MG/ML
INJECTION, SOLUTION EPIDURAL; INFILTRATION; INTRACAUDAL; PERINEURAL
Status: DISPENSED
Start: 2018-01-01

## (undated) RX ORDER — BUPIVACAINE HYDROCHLORIDE 2.5 MG/ML
INJECTION, SOLUTION EPIDURAL; INFILTRATION; INTRACAUDAL
Status: DISPENSED
Start: 2018-06-28

## (undated) RX ORDER — LIDOCAINE HYDROCHLORIDE 10 MG/ML
INJECTION, SOLUTION EPIDURAL; INFILTRATION; INTRACAUDAL; PERINEURAL
Status: DISPENSED
Start: 2018-06-07

## (undated) RX ORDER — HEPARIN SODIUM 1000 [USP'U]/ML
INJECTION, SOLUTION INTRAVENOUS; SUBCUTANEOUS
Status: DISPENSED
Start: 2018-01-01

## (undated) RX ORDER — LIDOCAINE HYDROCHLORIDE 10 MG/ML
INJECTION, SOLUTION EPIDURAL; INFILTRATION; INTRACAUDAL; PERINEURAL
Status: DISPENSED
Start: 2018-07-29

## (undated) RX ORDER — METOPROLOL TARTRATE 1 MG/ML
INJECTION, SOLUTION INTRAVENOUS
Status: DISPENSED
Start: 2018-06-22